# Patient Record
Sex: FEMALE | Race: WHITE | NOT HISPANIC OR LATINO | Employment: OTHER | URBAN - METROPOLITAN AREA
[De-identification: names, ages, dates, MRNs, and addresses within clinical notes are randomized per-mention and may not be internally consistent; named-entity substitution may affect disease eponyms.]

---

## 2017-05-03 ENCOUNTER — HOSPITAL ENCOUNTER (OUTPATIENT)
Dept: BONE DENSITY | Facility: IMAGING CENTER | Age: 77
Discharge: HOME | End: 2017-05-03
Payer: MEDICARE

## 2017-05-03 DIAGNOSIS — M81.0 AGE-RELATED OSTEOPOROSIS WITHOUT CURRENT PATHOLOGICAL FRACTURE: ICD-10-CM

## 2017-05-03 PROCEDURE — 77080 DXA BONE DENSITY AXIAL: CPT

## 2017-05-04 ENCOUNTER — GENERIC CONVERSION - ENCOUNTER (OUTPATIENT)
Dept: OTHER | Facility: OTHER | Age: 77
End: 2017-05-04

## 2017-06-19 ENCOUNTER — ALLSCRIPTS OFFICE VISIT (OUTPATIENT)
Dept: OTHER | Facility: OTHER | Age: 77
End: 2017-06-19

## 2017-06-19 ENCOUNTER — GENERIC CONVERSION - ENCOUNTER (OUTPATIENT)
Dept: OTHER | Facility: OTHER | Age: 77
End: 2017-06-19

## 2017-06-19 ENCOUNTER — TRANSCRIBE ORDERS (OUTPATIENT)
Dept: LAB | Facility: CLINIC | Age: 77
End: 2017-06-19

## 2017-06-19 ENCOUNTER — APPOINTMENT (OUTPATIENT)
Dept: LAB | Facility: CLINIC | Age: 77
End: 2017-06-19
Payer: MEDICARE

## 2017-06-19 DIAGNOSIS — I48.91 ATRIAL FIBRILLATION, UNSPECIFIED TYPE (HCC): ICD-10-CM

## 2017-06-19 DIAGNOSIS — I48.91 ATRIAL FIBRILLATION, UNSPECIFIED TYPE (HCC): Primary | ICD-10-CM

## 2017-06-19 LAB
INR PPP: 1.38 (ref 0.86–1.16)
PROTHROMBIN TIME: 17 SECONDS (ref 12.1–14.4)

## 2017-06-19 PROCEDURE — 36415 COLL VENOUS BLD VENIPUNCTURE: CPT

## 2017-06-19 PROCEDURE — 85610 PROTHROMBIN TIME: CPT

## 2017-07-05 ENCOUNTER — APPOINTMENT (OUTPATIENT)
Dept: LAB | Facility: CLINIC | Age: 77
End: 2017-07-05
Payer: MEDICARE

## 2017-07-05 ENCOUNTER — TRANSCRIBE ORDERS (OUTPATIENT)
Dept: LAB | Facility: CLINIC | Age: 77
End: 2017-07-05

## 2017-07-05 DIAGNOSIS — I48.91 ATRIAL FIBRILLATION, UNSPECIFIED TYPE (HCC): ICD-10-CM

## 2017-07-05 DIAGNOSIS — I48.91 ATRIAL FIBRILLATION, UNSPECIFIED TYPE (HCC): Primary | ICD-10-CM

## 2017-07-05 LAB
INR PPP: 3.63 (ref 0.86–1.16)
PROTHROMBIN TIME: 36.7 SECONDS (ref 12.1–14.4)

## 2017-07-05 PROCEDURE — 85610 PROTHROMBIN TIME: CPT

## 2017-07-05 PROCEDURE — 36415 COLL VENOUS BLD VENIPUNCTURE: CPT

## 2017-07-06 ENCOUNTER — GENERIC CONVERSION - ENCOUNTER (OUTPATIENT)
Dept: OTHER | Facility: OTHER | Age: 77
End: 2017-07-06

## 2017-08-09 ENCOUNTER — APPOINTMENT (OUTPATIENT)
Dept: LAB | Facility: CLINIC | Age: 77
End: 2017-08-09
Payer: MEDICARE

## 2017-08-09 ENCOUNTER — TRANSCRIBE ORDERS (OUTPATIENT)
Dept: LAB | Facility: CLINIC | Age: 77
End: 2017-08-09

## 2017-08-09 DIAGNOSIS — I48.91 ATRIAL FIBRILLATION, UNSPECIFIED TYPE (HCC): ICD-10-CM

## 2017-08-09 DIAGNOSIS — I48.91 ATRIAL FIBRILLATION, UNSPECIFIED TYPE (HCC): Primary | ICD-10-CM

## 2017-08-09 LAB
INR PPP: 3.53 (ref 0.86–1.16)
PROTHROMBIN TIME: 35.9 SECONDS (ref 12.1–14.4)

## 2017-08-09 PROCEDURE — 36415 COLL VENOUS BLD VENIPUNCTURE: CPT

## 2017-08-09 PROCEDURE — 85610 PROTHROMBIN TIME: CPT

## 2017-08-10 ENCOUNTER — GENERIC CONVERSION - ENCOUNTER (OUTPATIENT)
Dept: OTHER | Facility: OTHER | Age: 77
End: 2017-08-10

## 2017-09-29 ENCOUNTER — APPOINTMENT (OUTPATIENT)
Dept: LAB | Facility: CLINIC | Age: 77
End: 2017-09-29
Payer: MEDICARE

## 2017-09-29 ENCOUNTER — TRANSCRIBE ORDERS (OUTPATIENT)
Dept: LAB | Facility: CLINIC | Age: 77
End: 2017-09-29

## 2017-09-29 ENCOUNTER — GENERIC CONVERSION - ENCOUNTER (OUTPATIENT)
Dept: OTHER | Facility: OTHER | Age: 77
End: 2017-09-29

## 2017-09-29 DIAGNOSIS — I48.91 ATRIAL FIBRILLATION, UNSPECIFIED TYPE (HCC): ICD-10-CM

## 2017-09-29 DIAGNOSIS — I48.91 ATRIAL FIBRILLATION, UNSPECIFIED TYPE (HCC): Primary | ICD-10-CM

## 2017-09-29 LAB
INR PPP: 2.78 (ref 0.86–1.16)
PROTHROMBIN TIME: 29.7 SECONDS (ref 12.1–14.4)

## 2017-09-29 PROCEDURE — 36415 COLL VENOUS BLD VENIPUNCTURE: CPT

## 2017-09-29 PROCEDURE — 85610 PROTHROMBIN TIME: CPT

## 2017-10-23 ENCOUNTER — ALLSCRIPTS OFFICE VISIT (OUTPATIENT)
Dept: OTHER | Facility: OTHER | Age: 77
End: 2017-10-23

## 2017-10-23 ENCOUNTER — GENERIC CONVERSION - ENCOUNTER (OUTPATIENT)
Dept: OTHER | Facility: OTHER | Age: 77
End: 2017-10-23

## 2017-11-22 ENCOUNTER — APPOINTMENT (EMERGENCY)
Dept: RADIOLOGY | Facility: HOSPITAL | Age: 77
DRG: 871 | End: 2017-11-22
Payer: MEDICARE

## 2017-11-22 ENCOUNTER — ALLSCRIPTS OFFICE VISIT (OUTPATIENT)
Dept: OTHER | Facility: OTHER | Age: 77
End: 2017-11-22

## 2017-11-22 ENCOUNTER — HOSPITAL ENCOUNTER (INPATIENT)
Facility: HOSPITAL | Age: 77
LOS: 3 days | Discharge: HOME | DRG: 871 | End: 2017-11-25
Attending: INTERNAL MEDICINE | Admitting: INTERNAL MEDICINE
Payer: MEDICARE

## 2017-11-22 DIAGNOSIS — A41.9 SEPSIS (HCC): ICD-10-CM

## 2017-11-22 DIAGNOSIS — J96.01 ACUTE RESPIRATORY FAILURE WITH HYPOXIA (HCC): ICD-10-CM

## 2017-11-22 DIAGNOSIS — J18.9 PNEUMONIA OF RIGHT LOWER LOBE DUE TO INFECTIOUS ORGANISM: Primary | ICD-10-CM

## 2017-11-22 PROBLEM — D68.59 HYPERCOAGULABLE STATE (HCC): Status: ACTIVE | Noted: 2017-11-22

## 2017-11-22 PROBLEM — D68.2 HYPOPROTHROMBINEMIA (HCC): Status: ACTIVE | Noted: 2017-11-22

## 2017-11-22 PROBLEM — J44.9 COPD (CHRONIC OBSTRUCTIVE PULMONARY DISEASE) (HCC): Status: ACTIVE | Noted: 2017-11-22

## 2017-11-22 PROBLEM — I10 HYPERTENSION: Status: ACTIVE | Noted: 2017-11-22

## 2017-11-22 LAB
ALBUMIN SERPL BCP-MCNC: 3.4 G/DL (ref 3.5–5)
ALP SERPL-CCNC: 82 U/L (ref 46–116)
ALT SERPL W P-5'-P-CCNC: 17 U/L (ref 12–78)
ANION GAP SERPL CALCULATED.3IONS-SCNC: 10 MMOL/L (ref 4–13)
APTT PPP: 74 SECONDS (ref 23–35)
AST SERPL W P-5'-P-CCNC: 20 U/L (ref 5–45)
BACTERIA UR QL AUTO: ABNORMAL /HPF
BASOPHILS # BLD AUTO: 0.02 THOUSANDS/ΜL (ref 0–0.1)
BASOPHILS NFR BLD AUTO: 0 % (ref 0–1)
BILIRUB SERPL-MCNC: 1.3 MG/DL (ref 0.2–1)
BILIRUB UR QL STRIP: ABNORMAL
BUN SERPL-MCNC: 18 MG/DL (ref 5–25)
CALCIUM SERPL-MCNC: 8.4 MG/DL (ref 8.3–10.1)
CHLORIDE SERPL-SCNC: 94 MMOL/L (ref 100–108)
CLARITY UR: ABNORMAL
CO2 SERPL-SCNC: 29 MMOL/L (ref 21–32)
COLOR UR: ABNORMAL
CREAT SERPL-MCNC: 0.91 MG/DL (ref 0.6–1.3)
EOSINOPHIL # BLD AUTO: 0.01 THOUSAND/ΜL (ref 0–0.61)
EOSINOPHIL NFR BLD AUTO: 0 % (ref 0–6)
ERYTHROCYTE [DISTWIDTH] IN BLOOD BY AUTOMATED COUNT: 13.4 % (ref 11.6–15.1)
FLUAV AG SPEC QL IA: NEGATIVE
FLUAV AG SPEC QL: NORMAL
FLUBV AG SPEC QL IA: NEGATIVE
FLUBV AG SPEC QL: NORMAL
GFR SERPL CREATININE-BSD FRML MDRD: 61 ML/MIN/1.73SQ M
GLUCOSE SERPL-MCNC: 105 MG/DL (ref 65–140)
GLUCOSE UR STRIP-MCNC: NEGATIVE MG/DL
HCT VFR BLD AUTO: 41.7 % (ref 34.8–46.1)
HGB BLD-MCNC: 13.5 G/DL (ref 11.5–15.4)
HGB UR QL STRIP.AUTO: ABNORMAL
HYALINE CASTS #/AREA URNS LPF: ABNORMAL /LPF
INR PPP: 5.17 (ref 0.86–1.16)
KETONES UR STRIP-MCNC: ABNORMAL MG/DL
LACTATE SERPL-SCNC: 1.8 MMOL/L (ref 0.5–2)
LEUKOCYTE ESTERASE UR QL STRIP: NEGATIVE
LYMPHOCYTES # BLD AUTO: 0.87 THOUSANDS/ΜL (ref 0.6–4.47)
LYMPHOCYTES NFR BLD AUTO: 6 % (ref 14–44)
MAGNESIUM SERPL-MCNC: 1.8 MG/DL (ref 1.6–2.6)
MCH RBC QN AUTO: 30.3 PG (ref 26.8–34.3)
MCHC RBC AUTO-ENTMCNC: 32.4 G/DL (ref 31.4–37.4)
MCV RBC AUTO: 94 FL (ref 82–98)
MONOCYTES # BLD AUTO: 1.05 THOUSAND/ΜL (ref 0.17–1.22)
MONOCYTES NFR BLD AUTO: 7 % (ref 4–12)
NEUTROPHILS # BLD AUTO: 12.92 THOUSANDS/ΜL (ref 1.85–7.62)
NEUTS SEG NFR BLD AUTO: 87 % (ref 43–75)
NITRITE UR QL STRIP: NEGATIVE
NON-SQ EPI CELLS URNS QL MICRO: ABNORMAL /HPF
NT-PROBNP SERPL-MCNC: 531 PG/ML
PH UR STRIP.AUTO: 6 [PH] (ref 4.5–8)
PLATELET # BLD AUTO: 264 THOUSANDS/UL (ref 149–390)
PMV BLD AUTO: 9.4 FL (ref 8.9–12.7)
POTASSIUM SERPL-SCNC: 4.3 MMOL/L (ref 3.5–5.3)
PROT SERPL-MCNC: 8 G/DL (ref 6.4–8.2)
PROT UR STRIP-MCNC: ABNORMAL MG/DL
PROTHROMBIN TIME: 47.9 SECONDS (ref 12.1–14.4)
RBC # BLD AUTO: 4.45 MILLION/UL (ref 3.81–5.12)
RBC #/AREA URNS AUTO: ABNORMAL /HPF
RSV B RNA SPEC QL NAA+PROBE: NORMAL
S PNEUM AG UR QL: NEGATIVE
SODIUM SERPL-SCNC: 133 MMOL/L (ref 136–145)
SP GR UR STRIP.AUTO: >=1.03 (ref 1–1.03)
TROPONIN I SERPL-MCNC: <0.02 NG/ML
UROBILINOGEN UR QL STRIP.AUTO: 1 E.U./DL
WBC # BLD AUTO: 14.87 THOUSAND/UL (ref 4.31–10.16)
WBC #/AREA URNS AUTO: ABNORMAL /HPF

## 2017-11-22 PROCEDURE — 85610 PROTHROMBIN TIME: CPT | Performed by: NURSE PRACTITIONER

## 2017-11-22 PROCEDURE — 87185 SC STD ENZYME DETCJ PER NZM: CPT | Performed by: NURSE PRACTITIONER

## 2017-11-22 PROCEDURE — 80053 COMPREHEN METABOLIC PANEL: CPT | Performed by: NURSE PRACTITIONER

## 2017-11-22 PROCEDURE — 71010 HB CHEST X-RAY 1 VIEW FRONTAL: CPT | Performed by: INTERNAL MEDICINE

## 2017-11-22 PROCEDURE — 94640 AIRWAY INHALATION TREATMENT: CPT

## 2017-11-22 PROCEDURE — 87184 SC STD DISK METHOD PER PLATE: CPT | Performed by: NURSE PRACTITIONER

## 2017-11-22 PROCEDURE — 93005 ELECTROCARDIOGRAM TRACING: CPT | Performed by: NURSE PRACTITIONER

## 2017-11-22 PROCEDURE — 96365 THER/PROPH/DIAG IV INF INIT: CPT

## 2017-11-22 PROCEDURE — 99285 EMERGENCY DEPT VISIT HI MDM: CPT

## 2017-11-22 PROCEDURE — 83735 ASSAY OF MAGNESIUM: CPT | Performed by: NURSE PRACTITIONER

## 2017-11-22 PROCEDURE — 85730 THROMBOPLASTIN TIME PARTIAL: CPT | Performed by: NURSE PRACTITIONER

## 2017-11-22 PROCEDURE — 85025 COMPLETE CBC W/AUTO DIFF WBC: CPT | Performed by: NURSE PRACTITIONER

## 2017-11-22 PROCEDURE — 83605 ASSAY OF LACTIC ACID: CPT | Performed by: NURSE PRACTITIONER

## 2017-11-22 PROCEDURE — 36415 COLL VENOUS BLD VENIPUNCTURE: CPT | Performed by: NURSE PRACTITIONER

## 2017-11-22 PROCEDURE — 87449 NOS EACH ORGANISM AG IA: CPT | Performed by: INTERNAL MEDICINE

## 2017-11-22 PROCEDURE — 87077 CULTURE AEROBIC IDENTIFY: CPT | Performed by: NURSE PRACTITIONER

## 2017-11-22 PROCEDURE — 87798 DETECT AGENT NOS DNA AMP: CPT | Performed by: NURSE PRACTITIONER

## 2017-11-22 PROCEDURE — 83880 ASSAY OF NATRIURETIC PEPTIDE: CPT | Performed by: NURSE PRACTITIONER

## 2017-11-22 PROCEDURE — 81001 URINALYSIS AUTO W/SCOPE: CPT | Performed by: NURSE PRACTITIONER

## 2017-11-22 PROCEDURE — 94760 N-INVAS EAR/PLS OXIMETRY 1: CPT

## 2017-11-22 PROCEDURE — 84484 ASSAY OF TROPONIN QUANT: CPT | Performed by: NURSE PRACTITIONER

## 2017-11-22 PROCEDURE — 87400 INFLUENZA A/B EACH AG IA: CPT | Performed by: NURSE PRACTITIONER

## 2017-11-22 PROCEDURE — 87040 BLOOD CULTURE FOR BACTERIA: CPT | Performed by: NURSE PRACTITIONER

## 2017-11-22 RX ORDER — LEVOFLOXACIN 500 MG/1
500 TABLET, FILM COATED ORAL EVERY 24 HOURS
Status: DISCONTINUED | OUTPATIENT
Start: 2017-11-23 | End: 2017-11-25 | Stop reason: HOSPADM

## 2017-11-22 RX ORDER — METHYLPREDNISOLONE SODIUM SUCCINATE 40 MG/ML
20 INJECTION, POWDER, LYOPHILIZED, FOR SOLUTION INTRAMUSCULAR; INTRAVENOUS EVERY 8 HOURS SCHEDULED
Status: DISCONTINUED | OUTPATIENT
Start: 2017-11-22 | End: 2017-11-24

## 2017-11-22 RX ORDER — WARFARIN SODIUM 5 MG/1
TABLET ORAL
COMMUNITY
End: 2018-03-27 | Stop reason: SDUPTHER

## 2017-11-22 RX ORDER — ONDANSETRON 2 MG/ML
4 INJECTION INTRAMUSCULAR; INTRAVENOUS ONCE
Status: DISCONTINUED | OUTPATIENT
Start: 2017-11-22 | End: 2017-11-25 | Stop reason: HOSPADM

## 2017-11-22 RX ORDER — SODIUM CHLORIDE 9 MG/ML
125 INJECTION, SOLUTION INTRAVENOUS CONTINUOUS
Status: DISCONTINUED | OUTPATIENT
Start: 2017-11-22 | End: 2017-11-22

## 2017-11-22 RX ORDER — DILTIAZEM HYDROCHLORIDE 180 MG/1
180 CAPSULE, COATED, EXTENDED RELEASE ORAL DAILY
COMMUNITY
End: 2017-11-25 | Stop reason: HOSPADM

## 2017-11-22 RX ORDER — ACETAMINOPHEN 325 MG/1
650 TABLET ORAL ONCE
Status: COMPLETED | OUTPATIENT
Start: 2017-11-22 | End: 2017-11-22

## 2017-11-22 RX ORDER — DIGOXIN 125 MCG
125 TABLET ORAL DAILY
COMMUNITY
End: 2018-12-27

## 2017-11-22 RX ORDER — ZOLPIDEM TARTRATE 5 MG/1
5 TABLET ORAL
Status: DISCONTINUED | OUTPATIENT
Start: 2017-11-22 | End: 2017-11-25 | Stop reason: HOSPADM

## 2017-11-22 RX ORDER — SODIUM CHLORIDE 9 MG/ML
125 INJECTION, SOLUTION INTRAVENOUS CONTINUOUS
Status: DISCONTINUED | OUTPATIENT
Start: 2017-11-22 | End: 2017-11-23

## 2017-11-22 RX ORDER — DIPHENOXYLATE HYDROCHLORIDE AND ATROPINE SULFATE 2.5; .025 MG/1; MG/1
1 TABLET ORAL DAILY
COMMUNITY

## 2017-11-22 RX ORDER — DIGOXIN 125 MCG
125 TABLET ORAL DAILY
Status: DISCONTINUED | OUTPATIENT
Start: 2017-11-22 | End: 2017-11-25 | Stop reason: HOSPADM

## 2017-11-22 RX ORDER — ONDANSETRON 2 MG/ML
4 INJECTION INTRAMUSCULAR; INTRAVENOUS ONCE AS NEEDED
Status: DISCONTINUED | OUTPATIENT
Start: 2017-11-22 | End: 2017-11-25 | Stop reason: HOSPADM

## 2017-11-22 RX ORDER — MAGNESIUM HYDROXIDE/ALUMINUM HYDROXICE/SIMETHICONE 120; 1200; 1200 MG/30ML; MG/30ML; MG/30ML
30 SUSPENSION ORAL EVERY 6 HOURS PRN
Status: DISCONTINUED | OUTPATIENT
Start: 2017-11-22 | End: 2017-11-25 | Stop reason: HOSPADM

## 2017-11-22 RX ORDER — DILTIAZEM HYDROCHLORIDE 180 MG/1
180 CAPSULE, COATED, EXTENDED RELEASE ORAL DAILY
Status: DISCONTINUED | OUTPATIENT
Start: 2017-11-22 | End: 2017-11-24

## 2017-11-22 RX ORDER — PANTOPRAZOLE SODIUM 40 MG/1
40 TABLET, DELAYED RELEASE ORAL
Status: DISCONTINUED | OUTPATIENT
Start: 2017-11-23 | End: 2017-11-25 | Stop reason: HOSPADM

## 2017-11-22 RX ORDER — ALBUTEROL SULFATE 2.5 MG/3ML
2.5 SOLUTION RESPIRATORY (INHALATION) EVERY 6 HOURS PRN
COMMUNITY
End: 2019-03-06 | Stop reason: SDUPTHER

## 2017-11-22 RX ORDER — DOCUSATE SODIUM 100 MG/1
100 CAPSULE, LIQUID FILLED ORAL 2 TIMES DAILY
Status: DISCONTINUED | OUTPATIENT
Start: 2017-11-22 | End: 2017-11-25 | Stop reason: HOSPADM

## 2017-11-22 RX ORDER — PHYTONADIONE 10 MG/ML
2.5 INJECTION, EMULSION INTRAMUSCULAR; INTRAVENOUS; SUBCUTANEOUS ONCE
Status: COMPLETED | OUTPATIENT
Start: 2017-11-22 | End: 2017-11-22

## 2017-11-22 RX ORDER — GUAIFENESIN/DEXTROMETHORPHAN 100-10MG/5
10 SYRUP ORAL EVERY 4 HOURS PRN
Status: DISCONTINUED | OUTPATIENT
Start: 2017-11-22 | End: 2017-11-25 | Stop reason: HOSPADM

## 2017-11-22 RX ORDER — LEVOFLOXACIN 5 MG/ML
750 INJECTION, SOLUTION INTRAVENOUS ONCE
Status: COMPLETED | OUTPATIENT
Start: 2017-11-22 | End: 2017-11-22

## 2017-11-22 RX ORDER — ACETAMINOPHEN 325 MG/1
650 TABLET ORAL EVERY 6 HOURS PRN
Status: DISCONTINUED | OUTPATIENT
Start: 2017-11-22 | End: 2017-11-25 | Stop reason: HOSPADM

## 2017-11-22 RX ORDER — IPRATROPIUM BROMIDE AND ALBUTEROL SULFATE 2.5; .5 MG/3ML; MG/3ML
3 SOLUTION RESPIRATORY (INHALATION) ONCE
Status: COMPLETED | OUTPATIENT
Start: 2017-11-22 | End: 2017-11-22

## 2017-11-22 RX ADMIN — LEVOFLOXACIN 750 MG: 5 INJECTION, SOLUTION INTRAVENOUS at 12:35

## 2017-11-22 RX ADMIN — IPRATROPIUM BROMIDE AND ALBUTEROL SULFATE 3 ML: 2.5; .5 SOLUTION RESPIRATORY (INHALATION) at 12:33

## 2017-11-22 RX ADMIN — FLUTICASONE PROPIONATE AND SALMETEROL 1 PUFF: 50; 250 POWDER RESPIRATORY (INHALATION) at 20:24

## 2017-11-22 RX ADMIN — METHYLPREDNISOLONE SODIUM SUCCINATE 20 MG: 40 INJECTION, POWDER, FOR SOLUTION INTRAMUSCULAR; INTRAVENOUS at 21:36

## 2017-11-22 RX ADMIN — SODIUM CHLORIDE 125 ML/HR: 0.9 INJECTION, SOLUTION INTRAVENOUS at 15:15

## 2017-11-22 RX ADMIN — DIGOXIN 125 MCG: 125 TABLET ORAL at 16:32

## 2017-11-22 RX ADMIN — PHYTONADIONE 2.5 MG: 10 INJECTION, EMULSION INTRAMUSCULAR; INTRAVENOUS; SUBCUTANEOUS at 13:33

## 2017-11-22 RX ADMIN — SODIUM CHLORIDE 1000 ML: 0.9 INJECTION, SOLUTION INTRAVENOUS at 12:10

## 2017-11-22 RX ADMIN — SODIUM CHLORIDE 125 ML/HR: 0.9 INJECTION, SOLUTION INTRAVENOUS at 21:41

## 2017-11-22 RX ADMIN — ACETAMINOPHEN 650 MG: 325 TABLET ORAL at 12:14

## 2017-11-22 RX ADMIN — METHYLPREDNISOLONE SODIUM SUCCINATE 20 MG: 40 INJECTION, POWDER, FOR SOLUTION INTRAMUSCULAR; INTRAVENOUS at 16:32

## 2017-11-22 RX ADMIN — NICOTINE 1 PATCH: 7 PATCH, EXTENDED RELEASE TRANSDERMAL at 16:33

## 2017-11-22 RX ADMIN — DILTIAZEM HYDROCHLORIDE 180 MG: 180 CAPSULE, COATED, EXTENDED RELEASE ORAL at 16:32

## 2017-11-22 NOTE — ED PROVIDER NOTES
History  Chief Complaint   Patient presents with    Cough     Presents to ED with fever, cough, congestion since Monday  Was seen by PCP and referred to ED  Denies any N/V/D   Fever - 75 years or older      patient is a 11-year-old female who presents to the emergency room with a past medical history significant for Antiphospholipid antibody positive,Asthma, Atrial fibrillation, Chronic obstructive pulmonary disease, Hereditary hemolytic anemia cardiac disease, hypertension, with a chief complaint of shortness of breath  She was sent here by her family doctor when her O2 sat was noted to be in the 80s and remained in the 80s after breathing treatment  She states that she has some shortness of breath and a productive cough  She has chest pain associated with her coughing  She states that she had a fever and took to a Tylenol yesterday but has not taken any today  History provided by:  Patient   used: No    Cough   Cough characteristics:  Productive  Sputum characteristics:  Nondescript  Severity:  Moderate  Onset quality:  Gradual  Timing:  Constant  Chronicity:  New  Smoker: yes    Relieved by:  Nothing  Worsened by: Activity  Ineffective treatments:  Home nebulizer  Associated symptoms: fever, rhinorrhea and shortness of breath    Fever - 75 years or older   Associated symptoms: cough and rhinorrhea        Prior to Admission Medications   Prescriptions Last Dose Informant Patient Reported? Taking?    albuterol (2 5 mg/3 mL) 0 083 % nebulizer solution  Self Yes Yes   Sig: Take 2 5 mg by nebulization every 6 (six) hours as needed for wheezing   denosumab (PROLIA) 60 mg/mL  Self Yes Yes   Sig: Inject 60 mg under the skin once 6 months   digoxin (LANOXIN) 0 125 mg tablet  Self Yes Yes   Sig: Take 125 mcg by mouth daily   diltiazem (CARTIA XT) 180 mg 24 hr capsule  Self Yes Yes   Sig: Take 180 mg by mouth daily   fluticasone-salmeterol (ADVAIR) 250-50 mcg/dose inhaler  Self Yes Yes   Sig: Inhale 1 puff every 12 (twelve) hours   multivitamin (THERAGRAN) TABS  Self Yes Yes   Sig: Take 1 tablet by mouth daily   tiotropium (SPIRIVA) 18 mcg inhalation capsule  Self Yes Yes   Sig: Place 18 mcg into inhaler and inhale daily   warfarin (COUMADIN) 5 mg tablet  Self Yes Yes   Sig: Take by mouth daily      Facility-Administered Medications: None       Past Medical History:   Diagnosis Date    Cardiac disease     Chronic pain     COPD (chronic obstructive pulmonary disease) (HCC)     Hyperlipidemia     Hypertension        History reviewed  No pertinent surgical history  History reviewed  No pertinent family history  I have reviewed and agree with the history as documented  Social History   Substance Use Topics    Smoking status: Current Some Day Smoker     Types: Cigarettes    Smokeless tobacco: Never Used    Alcohol use No        Review of Systems   Constitutional: Positive for fatigue and fever  HENT: Positive for rhinorrhea  Respiratory: Positive for cough, chest tightness and shortness of breath  Physical Exam  ED Triage Vitals [11/22/17 1158]   Temperature Pulse Respirations Blood Pressure SpO2   (!) 101 6 °F (38 7 °C) (!) 112 20 136/60 (!) 77 %      Temp Source Heart Rate Source Patient Position - Orthostatic VS BP Location FiO2 (%)   Temporal Monitor Sitting Right arm --      Pain Score       5           Orthostatic Vital Signs  Vitals:    11/22/17 1315 11/22/17 1330 11/22/17 1345 11/22/17 1400   BP: 116/56 109/53 125/63 110/54   Pulse: 97 97 96 93   Patient Position - Orthostatic VS: Lying Lying Lying Lying       Physical Exam   Constitutional: She is oriented to person, place, and time  She appears well-developed and well-nourished  HENT:   Head: Normocephalic and atraumatic  Nose: Rhinorrhea present  Eyes: EOM are normal  Pupils are equal, round, and reactive to light  No scleral icterus  Neck: Normal range of motion  Neck supple  No thyromegaly present  Cardiovascular: Regular rhythm, normal heart sounds and intact distal pulses  Tachycardia present  Exam reveals no gallop and no friction rub  No murmur heard  Pulmonary/Chest: She has decreased breath sounds in the right lower field and the left lower field  She has rhonchi in the right middle field, the right lower field, the left middle field and the left lower field  Abdominal: Soft  Bowel sounds are normal    Musculoskeletal: Normal range of motion  She exhibits no edema or tenderness  Neurological: She is alert and oriented to person, place, and time  Skin: Skin is warm and dry  Nursing note and vitals reviewed  ED Medications  Medications   acetaminophen (TYLENOL) tablet 650 mg (650 mg Oral Given 11/22/17 1214)   sodium chloride 0 9 % bolus 1,000 mL (0 mL Intravenous Stopped 11/22/17 1310)   levofloxacin (LEVAQUIN) IVPB (premix) 750 mg (0 mg Intravenous Stopped 11/22/17 1405)   ipratropium-albuterol (DUO-NEB) 0 5-2 5 mg/mL inhalation solution 3 mL (3 mL Nebulization Given 11/22/17 1233)   phytonadione (AQUA-MEPHYTON) 10 mg/mL SC/IM injection 2 5 mg (2 5 mg Subcutaneous Given 11/22/17 1333)       Diagnostic Studies  Results Reviewed     Procedure Component Value Units Date/Time    Blood culture #1 [[id number]] Collected:  11/22/17 1220    Lab Status: In process Specimen:  Blood from Arm, Left Updated:  11/22/17 1349    B-type natriuretic peptide [[id number]]  (Abnormal) Collected:  11/22/17 1207    Lab Status:  Final result Specimen:  Blood from Arm, Right Updated:  11/22/17 1319     NT-proBNP 531 (H) pg/mL     APTT [[id number]]  (Abnormal) Collected:  11/22/17 1207    Lab Status:  Final result Specimen:  Blood Updated:  11/22/17 1249     PTT 74 (H) seconds     Narrative:          Therapeutic Heparin Range = 60-90 seconds  Therapeutic Heparin Range = 60-90 seconds    Protime-INR [[id number]]  (Abnormal) Collected:  11/22/17 1207    Lab Status:  Final result Specimen:  Blood Updated:  11/22/17 1249 Protime 47 9 (H) seconds      INR 5 17 (HH)    Lactic acid, plasma [[id number]]  (Normal) Collected:  11/22/17 1207    Lab Status:  Final result Specimen:  Blood from Arm, Right Updated:  11/22/17 1247     LACTIC ACID 1 8 mmol/L     Narrative:         Result may be elevated if tourniquet was used during collection  Rapid Influenza Screen with Reflex PCR (indicated for patients <2 mo of age) [[id number]]  (Normal) Collected:  11/22/17 1207    Lab Status:  Final result Specimen:  Nasopharyngeal from Nasopharyngeal Swab Updated:  11/22/17 1245     Rapid Influenza A Ag Negative     Rapid Influenza B Ag Negative    Influenza A/B and RSV by PCR (Indicated for patients > 2 mo of age) [[id number]] Collected:  11/22/17 1207    Lab Status: In process Specimen:  Nasopharyngeal from Nasopharyngeal Swab Updated:  11/22/17 1245    Troponin I [[id number]]  (Normal) Collected:  11/22/17 1207    Lab Status:  Final result Specimen:  Blood from Arm, Right Updated:  11/22/17 1243     Troponin I <0 02 ng/mL     Narrative:         [employer] Chemistry analyzer 99% cutoff is > 0 04 ng/mL in network labs    o cTnI 99% cutoff is useful only when applied to patients in the clinical setting of myocardial ischemia  o cTnI 99% cutoff should be interpreted in the context of clinical history, ECG findings and possibly cardiac imaging to establish correct diagnosis  o cTnI 99% cutoff may be suggestive but clearly not indicative of a coronary event without the clinical setting of myocardial ischemia      Comprehensive metabolic panel [[id number]]  (Abnormal) Collected:  11/22/17 1207    Lab Status:  Final result Specimen:  Blood from Arm, Right Updated:  11/22/17 1242     Sodium 133 (L) mmol/L      Potassium 4 3 mmol/L      Chloride 94 (L) mmol/L      CO2 29 mmol/L      Anion Gap 10 mmol/L      BUN 18 mg/dL      Creatinine 0 91 mg/dL      Glucose 105 mg/dL      Calcium 8 4 mg/dL      AST 20 U/L      ALT 17 U/L      Alkaline Phosphatase 82 U/L      Total Protein 8 0 g/dL      Albumin 3 4 (L) g/dL      Total Bilirubin 1 30 (H) mg/dL      eGFR 61 ml/min/1 73sq m     Narrative:         National Kidney Disease Education Program recommendations are as follows:  GFR calculation is accurate only with a steady state creatinine  Chronic Kidney disease less than 60 ml/min/1 73 sq  meters  Kidney failure less than 15 ml/min/1 73 sq  meters  Magnesium [[id number]]  (Normal) Collected:  11/22/17 1207    Lab Status:  Final result Specimen:  Blood from Arm, Right Updated:  11/22/17 1242     Magnesium 1 8 mg/dL     CBC and differential [[id number]]  (Abnormal) Collected:  11/22/17 1207    Lab Status:  Final result Specimen:  Blood from Arm, Right Updated:  11/22/17 1228     WBC 14 87 (H) Thousand/uL      RBC 4 45 Million/uL      Hemoglobin 13 5 g/dL      Hematocrit 41 7 %      MCV 94 fL      MCH 30 3 pg      MCHC 32 4 g/dL      RDW 13 4 %      MPV 9 4 fL      Platelets 588 Thousands/uL      Neutrophils Relative 87 (H) %      Lymphocytes Relative 6 (L) %      Monocytes Relative 7 %      Eosinophils Relative 0 %      Basophils Relative 0 %      Neutrophils Absolute 12 92 (H) Thousands/µL      Lymphocytes Absolute 0 87 Thousands/µL      Monocytes Absolute 1 05 Thousand/µL      Eosinophils Absolute 0 01 Thousand/µL      Basophils Absolute 0 02 Thousands/µL     Blood culture #2 [[id number]] Collected:  11/22/17 1207    Lab Status: In process Specimen:  Blood from Arm, Right Updated:  11/22/17 1220    UA w Reflex to Microscopic w Reflex to Culture [[id number]]     Lab Status:  No result Specimen:  Urine                  XR chest portable   Final Result by Larry Esparza MD (11/22 1246)      Right-sided pulmonary infiltrate with small right effusion  Findings concerning for pneumonia    Recommend continued follow-up         Workstation performed: [id number]                    Procedures  Procedures       Phone Contacts  ED Phone Contact    ED Course  ED Course as of Nov 22 1424 Wed Nov 22, 2017   1415 Blood Pressure: 110/54   1415 Temperature: 97 9 °F (36 6 °C)   1415 Blood Pressure: 136/60   1415 Temperature: (!) 101 6 °F (38 7 °C)   1415 Pulse: (!) 112   1415 SpO2: (!) 77 %   1415 Blood Pressure: 109/53   1415 Pulse: 97   1415 SpO2: 95 %   1415 Blood Pressure: 110/54   1415 Temperature: 97 9 °F (36 6 °C)   1415 Pulse: 93   1415 Respirations: 20   1415 SpO2: 96 %   1415 LACTIC ACID: 1 8   1416 WBC: (!) 14 87   1416 Hemoglobin: 13 5   1416 Hematocrit: 41 7   1416 Platelets: 427   6545 Sodium: (!) 133   1416 Potassium: 4 3   1416 eGFR: 61   1416 Rapid Influenza A Ag: Negative   1416 Rapid Influenza B Ag: Negative   1416 Protime: (!) 47 9   1416 INR: (!!) 5 17   1416 NT-proBNP: (!) 531                         Initial Sepsis Screening     Row Name 11/22/17 1418                Is the patient's history suggestive of a new or worsening infection? (!)  Yes (Proceed)  -AM        Suspected source of infection pneumonia  -AM        Are two or more of the following signs & symptoms of infection both present and new to the patient?         Indicate SIRS criteria Hyperthemia > 38 3C (100 9F); Tachycardia > 90 bpm;Leukocytosis (WBC > 51044 IJL); Tachypnea > 20 resp per min  -AM        If the answer is yes to both questions, suspicion of sepsis is present          If severe sepsis is present AND tissue hypoperfusion perists in the hour after fluid resuscitation or lactate > 4, the patient meets criteria for SEPTIC SHOCK          Are any of the following organ dysfunction criteria present within 6 hours of suspected infection and SIRS criteria that are NOT considered to be chronic conditions?  (!)  Yes  -AM        Organ dysfunction INR > 1 5 or aPTT > 60 secs  -AM        Date of presentation of severe sepsis          Time of presentation of severe sepsis          Tissue hypoperfusion persists in the hour after crystalloid fluid administration, evidenced, by either:          Was hypotension present within one hour of the conclusion of crystalloid fluid administration?           Date of presentation of septic shock          Time of presentation of septic shock            User Key  (r) = Recorded By, (t) = Taken By, (c) = Cosigned By    Initials Name Provider Type    AM Porsche Pandya Nurse Practitioner                  MDM  Number of Diagnoses or Management Options  Acute respiratory failure with hypoxia [facility]): new and requires workup  Pneumonia of right lower lobe due to infectious organism [facility]): new and requires workup  Sepsis [facility]): new and requires workup  Diagnosis management comments: Pt with new onset resp failure with hypoxia, cough, fever, SIRS with sepsis, not severe sepsis  RML, RLL pna  IV abx, fluid  Cont to need oxygen   Admit to med/surg         Amount and/or Complexity of Data Reviewed  Clinical lab tests: ordered and reviewed  Tests in the radiology section of CPT®: ordered and reviewed  Tests in the medicine section of CPT®: ordered and reviewed  Decide to obtain previous medical records or to obtain history from someone other than the patient: yes  Review and summarize past medical records: yes  Discuss the patient with other providers: yes  Independent visualization of images, tracings, or specimens: yes    Patient Progress  Patient progress: improved    CritCare Time    Disposition  Final diagnoses:   Pneumonia of right lower lobe due to infectious organism [facility])   Acute respiratory failure with hypoxia ([facility] [address] )   [facility]eps[facility]     Time reflects when diagnosis was documented in both MDM as applicable and the Disposition within this note     Time User Action Codes Description Comment    11/22/2017  2:09 PM Milus Gelineau Add [J18 1] Pneumonia of right lower lobe due to infectious organism ([facility] [address] )     11/22/2017  2:09 PM Milus Gelineau Modify [J18 1] Pneumonia of right lower lobe due to infectious organism ([facility] [address] )     11/22/2017  2:10 PM Esther Montesinter Add [J16 0] Pneumonia of right middle lobe due to Chlamydia species     11/22/2017  2:10 PM Carole Xiao Remove [J16 0] Pneumonia of right middle lobe due to Chlamydia species     11/22/2017  2:10 PM Carole Xiao Add [J96 01] Acute respiratory failure with hypoxia ([address] )     11/22/2017  2:10 PM Carole Xiao Add [A41 9] Sepsis [facility])       ED Disposition     ED Disposition Condition Comment    Admit  Case was discussed with Juan Arriaga and the patient's admission status was agreed to be Admission Status: inpatient status to the service of Dr Juan Arriaga   Follow-up Information    None       Current Discharge Medication List      CONTINUE these medications which have NOT CHANGED    Details   albuterol (2 5 mg/3 mL) 0 083 % nebulizer solution Take 2 5 mg by nebulization every 6 (six) hours as needed for wheezing      denosumab (PROLIA) 60 mg/mL Inject 60 mg under the skin once 6 months      digoxin (LANOXIN) 0 125 mg tablet Take 125 mcg by mouth daily      diltiazem (CARTIA XT) 180 mg 24 hr capsule Take 180 mg by mouth daily      fluticasone-salmeterol (ADVAIR) 250-50 mcg/dose inhaler Inhale 1 puff every 12 (twelve) hours      multivitamin (THERAGRAN) TABS Take 1 tablet by mouth daily      tiotropium (SPIRIVA) 18 mcg inhalation capsule Place 18 mcg into inhaler and inhale daily      warfarin (COUMADIN) 5 mg tablet Take by mouth daily           No discharge procedures on file      ED Provider  Electronically Signed by           YUE Bell  11/22/17 3597 YUE Boyle  11/22/17 5053

## 2017-11-22 NOTE — ED PROVIDER NOTES
History  Chief Complaint   Patient presents with    Cough     Presents to ED with fever, cough, congestion since Monday  Was seen by PCP and referred to ED  Denies any N/V/D   Fever - 76 years or older     Pt is a 65year old female who presents to the emergency room with a significant past medical history of COPD, cardiac disease, and hypertension with a chief complaint of shortness of breath  She was sent here by her family doctor when her O2 sat was noted to be in the [de-identified] and remained in the 80s after a breathing treatment  She states that she has some shortness of breath, and a productive cough  She has chest pain associated with her coughing  She states that she has had a fever, and took a Tylenol yesterday but has not taken any today  History provided by:  Patient and relative   used: No    Cough   Cough characteristics:  Productive  Severity:  Severe  Progression:  Worsening  Chronicity:  New  Smoker: yes    Relieved by: Given 1 neb treatment at PCP with minimal improvement  Worsened by: Activity  Ineffective treatments:  Home nebulizer  Associated symptoms: fever, rhinorrhea, shortness of breath and sinus congestion    Fever:     Max temp PTA:  101    Temp source:  Oral  Rhinorrhea:     Quality:  Clear    Severity:  Moderate    Timing:  Constant    Progression:  Worsening  Shortness of breath:     Severity:  Severe    Onset quality:  Gradual    Timing:  Constant    Progression:  Worsening  Fever - 75 years or older   Associated symptoms: cough and rhinorrhea        None       Past Medical History:   Diagnosis Date    Cardiac disease     Chronic pain     COPD (chronic obstructive pulmonary disease) (HCC)     Hyperlipidemia     Hypertension        History reviewed  No pertinent surgical history  History reviewed  No pertinent family history  I have reviewed and agree with the history as documented      Social History   Substance Use Topics    Smoking status: Current Some Day Smoker     Types: Cigarettes    Smokeless tobacco: Never Used    Alcohol use No        Review of Systems   Constitutional: Positive for fever  HENT: Positive for rhinorrhea  Respiratory: Positive for cough, chest tightness and shortness of breath  All other systems reviewed and are negative  Physical Exam  ED Triage Vitals [11/22/17 1158]   Temperature Pulse Respirations Blood Pressure SpO2   (!) 101 6 °F (38 7 °C) (!) 112 20 136/60 (!) 77 %      Temp Source Heart Rate Source Patient Position - Orthostatic VS BP Location FiO2 (%)   Temporal Monitor Sitting Right arm --      Pain Score       5           Orthostatic Vital Signs  Vitals:    11/22/17 1158   BP: 136/60   Pulse: (!) 112   Patient Position - Orthostatic VS: Sitting       Physical Exam   Constitutional: She is oriented to person, place, and time  She appears well-developed and well-nourished  HENT:   Head: Normocephalic and atraumatic  Nose: Rhinorrhea present  Eyes: EOM are normal  Pupils are equal, round, and reactive to light  No scleral icterus  Neck: Normal range of motion  Neck supple  No thyromegaly present  Cardiovascular: Regular rhythm, normal heart sounds and intact distal pulses  Tachycardia present  Exam reveals no gallop and no friction rub  No murmur heard  Pulmonary/Chest: Tachypnea noted  She is in respiratory distress  She has decreased breath sounds in the right lower field and the left lower field  She has rhonchi in the right upper field, the right middle field, the left upper field and the left middle field  Abdominal: Soft  Bowel sounds are normal    Musculoskeletal: Normal range of motion  She exhibits no edema or tenderness  Neurological: She is alert and oriented to person, place, and time  Skin: Skin is warm and dry  Nursing note and vitals reviewed        ED Medications  Medications   acetaminophen (TYLENOL) tablet 650 mg (not administered)   sodium chloride 0 9 % bolus 1,000 mL (not administered)       Diagnostic Studies  Results Reviewed     Procedure Component Value Units Date/Time    Rapid Influenza Screen with Reflex PCR (indicated for patients <2 mo of age) [[id number]]     Lab Status:  No result Specimen:  Nasopharyngeal     CBC and differential [[id number]]     Lab Status:  No result Specimen:  Blood     Blood culture #1 [[id number]]     Lab Status:  No result Specimen:  Blood     Blood culture #2 [[id number]]     Lab Status:  No result Specimen:  Blood     UA w Reflex to Microscopic w Reflex to Culture [[id number]]     Lab Status:  No result Specimen:  Urine     Comprehensive metabolic panel [[id number]]     Lab Status:  No result Specimen:  Blood     Lactic acid, plasma [[id number]]     Lab Status:  No result Specimen:  Blood     Magnesium [[id number]]     Lab Status:  No result Specimen:  Blood     Troponin I [[id number]]     Lab Status:  No result Specimen:  Blood     B-type natriuretic peptide [[id number]]     Lab Status:  No result Specimen:  Blood                  XR chest portable    (Results Pending)              Procedures  ECG 12 Lead Documentation  Date/Time: 11/22/2017 12:19 PM  Performed by: Kleber Dominguez by: Gaurav Varma     ECG reviewed by me, the ED Provider: yes    Patient location:  ED  Previous ECG:     Previous ECG:  Compared to current    Comparison ECG info:  A fib with rvr    Similarity:  Changes noted  Interpretation:     Interpretation: abnormal    Rate:     ECG rate:  108    ECG rate assessment: tachycardic    Rhythm:     Rhythm: sinus tachycardia    Ectopy:     Ectopy: PAC    QRS:     QRS axis:  Normal  ST segments:     ST segments:  Normal  T waves:     T waves: normal             Phone Contacts  ED Phone Contact    ED Course  ED Course                                [facility]  CritCare Time    Disposition  Final diagnoses:   None     ED Disposition     None      Follow-up Information    None       Patient's Medications    No medications on file     No discharge procedures on file      ED Provider  Electronically Signed by           YUE Groves  11/22/17 3982

## 2017-11-22 NOTE — H&P
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[email or web address] )      Plan:  Admit Med surge telemetry  Will require a 2 midnight stay  Patient is a full code  Treat pneumonia with Levaquin steroids nebulizers  Vitamin K as or even given to reverse her INR, as patient will clearly require less Coumadin    History of Present Illness        HPI: Serafin Forte is a 65y o  year old female who presents with shortness of breath of 2 days duration  Patient started with shortness of breath cough productive in nature last 2 days  Developed fever and chills in the last 24 hours  The eyes any hemoptysis or significant purulent phlegm  No chest pains  Patient went to her primary care physician's office who found her to be very hypoxic and sent to the emergency room for evaluation  She was subsequently be admitted with right lower lobe pneumonia  Patient has been on him other in the past but has not used it  Of note she smokes about 2 cigarettes per day but previously as a significant greater than 2 pack per day smoking history  Review of Systems   Constitutional: Positive for activity change, chills and fever  HENT: Negative  Eyes: Negative  Respiratory: Positive for cough  Cardiovascular: Negative  Gastrointestinal: Negative  Genitourinary: Negative  Neurological: Negative  Hematological:        Patient is history hypercoagulable state       Historical Information   Past Medical History:   Diagnosis Date    Cardiac disease     Chronic pain     COPD (chronic obstructive pulmonary disease) (HCC)     Hyperlipidemia     Hypertension      History reviewed   No pertinent surgical history  Social History   History   Alcohol Use No     History   Drug Use No     History   Smoking Status    Current Some Day Smoker    Types: Cigarettes   Smokeless Tobacco    Never Used     Family History: History reviewed  No pertinent family history  Meds/Allergies      Current Facility-Administered Medications   Medication Dose Route Frequency    levofloxacin (LEVAQUIN) IVPB (premix) 750 mg  750 mg Intravenous Once         (Not in a hospital admission)  Allergies   Allergen Reactions    Penicillins Rash    Sulfa Antibiotics Rash       Objective   Vitals: Blood pressure 109/53, pulse 97, temperature (!) 100 6 °F (38 1 °C), temperature source Temporal, resp  rate 20, height 5' 1 5" (1 562 m), weight 55 1 kg (121 lb 7 oz), SpO2 95 %, not currently breastfeeding  Intake/Output Summary (Last 24 hours) at 11/22/17 1358  Last data filed at 11/22/17 1310   Gross per 24 hour   Intake             1000 ml   Output                0 ml   Net             1000 ml     Invasive Devices     Peripheral Intravenous Line            Peripheral IV 11/22/17 Right Antecubital less than 1 day                Physical Exam   Constitutional: She is oriented to person, place, and time  She appears well-developed and well-nourished  HENT:   Head: Normocephalic and atraumatic  Right Ear: External ear normal    Left Ear: External ear normal    Eyes: EOM are normal  Pupils are equal, round, and reactive to light  Neck: Normal range of motion  No JVD present  Cardiovascular: Normal rate and regular rhythm  Pulmonary/Chest:   Rhonchi and rales right lung base few scattered wheezes   Abdominal: Soft  Bowel sounds are normal    Musculoskeletal: Normal range of motion  She exhibits no edema  Neurological: She is alert and oriented to person, place, and time  Skin: Skin is warm and dry  Psychiatric: She has a normal mood and affect  Vitals reviewed        Lab Results:   Admission on 11/22/2017 Component Date Value    Rapid Influenza A Ag 11/22/2017 Negative     Rapid Influenza B Ag 11/22/2017 Negative     WBC 11/22/2017 14 87*    RBC 11/22/2017 4 45     Hemoglobin 11/22/2017 13 5     Hematocrit 11/22/2017 41 7     MCV 11/22/2017 94     MCH 11/22/2017 30 3     MCHC 11/22/2017 32 4     RDW 11/22/2017 13 4     MPV 11/22/2017 9 4     Platelets 63/41/4234 264     Neutrophils Relative 11/22/2017 87*    Lymphocytes Relative 11/22/2017 6*    Monocytes Relative 11/22/2017 7     Eosinophils Relative 11/22/2017 0     Basophils Relative 11/22/2017 0     Neutrophils Absolute 11/22/2017 12 92*    Lymphocytes Absolute 11/22/2017 0 87     Monocytes Absolute 11/22/2017 1 05     Eosinophils Absolute 11/22/2017 0 01     Basophils Absolute 11/22/2017 0 02     Sodium 11/22/2017 133*    Potassium 11/22/2017 4 3     Chloride 11/22/2017 94*    CO2 11/22/2017 29     Anion Gap 11/22/2017 10     BUN 11/22/2017 18     Creatinine 11/22/2017 0 91     Glucose 11/22/2017 105     Calcium 11/22/2017 8 4     AST 11/22/2017 20     ALT 11/22/2017 17     Alkaline Phosphatase 11/22/2017 82     Total Protein 11/22/2017 8 0     Albumin 11/22/2017 3 4*    Total Bilirubin 11/22/2017 1 30*    eGFR 11/22/2017 61     LACTIC ACID 11/22/2017 1 8     Magnesium 11/22/2017 1 8     Troponin I 11/22/2017 <0 02     NT-proBNP 11/22/2017 531*    Protime 11/22/2017 47 9*    INR 11/22/2017 5 17*    PTT 11/22/2017 74*     Imaging Studies: I have personally reviewed pertinent reports  EKG, Pathology, and Other Studies: I have personally reviewed pertinent reports  VTE Pharmacologic Prophylaxis: Warfarin (Coumadin)  VTE Mechanical Prophylaxis: reason for no mechanical VTE prophylaxis Not indicated    This note has been constructed using a voice recognition system         Antionette Wallace MD

## 2017-11-22 NOTE — SEPSIS NOTE
Sepsis Note   Josue Sandoval 65 y o  female MRN: [id number]  Unit/Bed#: [address] [place] [address] Encounter: [id number]            Initial Sepsis Screening     Row Name 11/22/17 1418                Is the patient's history suggestive of a new or worsening infection? (!)  Yes (Proceed)  -AM        Suspected source of infection pneumonia  -AM        Are two or more of the following signs & symptoms of infection both present and new to the patient?         Indicate SIRS criteria Hyperthemia > 38 3C (100 9F); Tachycardia > 90 bpm;Leukocytosis (WBC > 10268 IJL); Tachypnea > 20 resp per min  -AM        If the answer is yes to both questions, suspicion of sepsis is present          If severe sepsis is present AND tissue hypoperfusion perists in the hour after fluid resuscitation or lactate > 4, the patient meets criteria for SEPTIC SHOCK          Are any of the following organ dysfunction criteria present within 6 hours of suspected infection and SIRS criteria that are NOT considered to be chronic conditions? (!)  Yes  -AM        Organ dysfunction INR > 1 5 or aPTT > 60 secs  -AM        Date of presentation of severe sepsis          Time of presentation of severe sepsis          Tissue hypoperfusion persists in the hour after crystalloid fluid administration, evidenced, by either:          Was hypotension present within one hour of the conclusion of crystalloid fluid administration?           Date of presentation of septic shock          Time of presentation of septic shock            User Key  (r) = Recorded By, (t) = Taken By, (c) = Cosigned By    Initials Name Provider Type    YUE Ford Nurse Practitioner

## 2017-11-23 PROBLEM — A41.9 SEPSIS (HCC): Status: RESOLVED | Noted: 2017-11-22 | Resolved: 2017-11-23

## 2017-11-23 LAB
ANION GAP SERPL CALCULATED.3IONS-SCNC: 7 MMOL/L (ref 4–13)
BUN SERPL-MCNC: 12 MG/DL (ref 5–25)
CALCIUM SERPL-MCNC: 7.6 MG/DL (ref 8.3–10.1)
CHLORIDE SERPL-SCNC: 103 MMOL/L (ref 100–108)
CO2 SERPL-SCNC: 26 MMOL/L (ref 21–32)
CREAT SERPL-MCNC: 0.51 MG/DL (ref 0.6–1.3)
ERYTHROCYTE [DISTWIDTH] IN BLOOD BY AUTOMATED COUNT: 13.2 % (ref 11.6–15.1)
GFR SERPL CREATININE-BSD FRML MDRD: 92 ML/MIN/1.73SQ M
GLUCOSE SERPL-MCNC: 135 MG/DL (ref 65–140)
HCT VFR BLD AUTO: 34.3 % (ref 34.8–46.1)
HGB BLD-MCNC: 10.9 G/DL (ref 11.5–15.4)
INR PPP: 3.89 (ref 0.86–1.16)
MCH RBC QN AUTO: 31 PG (ref 26.8–34.3)
MCHC RBC AUTO-ENTMCNC: 31.8 G/DL (ref 31.4–37.4)
MCV RBC AUTO: 97 FL (ref 82–98)
PLATELET # BLD AUTO: 225 THOUSANDS/UL (ref 149–390)
PMV BLD AUTO: 9.2 FL (ref 8.9–12.7)
POTASSIUM SERPL-SCNC: 4.6 MMOL/L (ref 3.5–5.3)
PROTHROMBIN TIME: 38.2 SECONDS (ref 12.1–14.4)
RBC # BLD AUTO: 3.52 MILLION/UL (ref 3.81–5.12)
SODIUM SERPL-SCNC: 136 MMOL/L (ref 136–145)
WBC # BLD AUTO: 9.26 THOUSAND/UL (ref 4.31–10.16)

## 2017-11-23 PROCEDURE — 94760 N-INVAS EAR/PLS OXIMETRY 1: CPT

## 2017-11-23 PROCEDURE — 85027 COMPLETE CBC AUTOMATED: CPT | Performed by: INTERNAL MEDICINE

## 2017-11-23 PROCEDURE — 94640 AIRWAY INHALATION TREATMENT: CPT

## 2017-11-23 PROCEDURE — 85610 PROTHROMBIN TIME: CPT | Performed by: INTERNAL MEDICINE

## 2017-11-23 PROCEDURE — 80048 BASIC METABOLIC PNL TOTAL CA: CPT | Performed by: INTERNAL MEDICINE

## 2017-11-23 RX ORDER — ALBUTEROL SULFATE 2.5 MG/3ML
2.5 SOLUTION RESPIRATORY (INHALATION) EVERY 4 HOURS PRN
Status: DISCONTINUED | OUTPATIENT
Start: 2017-11-23 | End: 2017-11-25 | Stop reason: HOSPADM

## 2017-11-23 RX ADMIN — FLUTICASONE PROPIONATE AND SALMETEROL 1 PUFF: 50; 250 POWDER RESPIRATORY (INHALATION) at 10:15

## 2017-11-23 RX ADMIN — LEVOFLOXACIN 500 MG: 500 TABLET, FILM COATED ORAL at 12:23

## 2017-11-23 RX ADMIN — METHYLPREDNISOLONE SODIUM SUCCINATE 20 MG: 40 INJECTION, POWDER, FOR SOLUTION INTRAMUSCULAR; INTRAVENOUS at 05:58

## 2017-11-23 RX ADMIN — METHYLPREDNISOLONE SODIUM SUCCINATE 20 MG: 40 INJECTION, POWDER, FOR SOLUTION INTRAMUSCULAR; INTRAVENOUS at 21:15

## 2017-11-23 RX ADMIN — DILTIAZEM HYDROCHLORIDE 180 MG: 180 CAPSULE, COATED, EXTENDED RELEASE ORAL at 08:17

## 2017-11-23 RX ADMIN — GUAIFENESIN AND DEXTROMETHORPHAN 10 ML: 100; 10 SYRUP ORAL at 22:09

## 2017-11-23 RX ADMIN — DOCUSATE SODIUM 100 MG: 100 CAPSULE, LIQUID FILLED ORAL at 08:17

## 2017-11-23 RX ADMIN — METHYLPREDNISOLONE SODIUM SUCCINATE 20 MG: 40 INJECTION, POWDER, FOR SOLUTION INTRAMUSCULAR; INTRAVENOUS at 13:24

## 2017-11-23 RX ADMIN — NICOTINE 1 PATCH: 7 PATCH, EXTENDED RELEASE TRANSDERMAL at 08:18

## 2017-11-23 RX ADMIN — DIGOXIN 125 MCG: 125 TABLET ORAL at 08:17

## 2017-11-23 RX ADMIN — TIOTROPIUM BROMIDE 18 MCG: 18 CAPSULE ORAL; RESPIRATORY (INHALATION) at 10:16

## 2017-11-23 RX ADMIN — SODIUM CHLORIDE 125 ML/HR: 0.9 INJECTION, SOLUTION INTRAVENOUS at 05:58

## 2017-11-23 RX ADMIN — FLUTICASONE PROPIONATE AND SALMETEROL 1 PUFF: 50; 250 POWDER RESPIRATORY (INHALATION) at 21:04

## 2017-11-23 NOTE — CASE MANAGEMENT
Initial Clinical Review    Admission: Date/Time/Statement: 11/22/17 @ 1325     Orders Placed This Encounter   Procedures    Inpatient Admission (expected length of stay for this patient is greater than two midnights)     Standing Status:   Standing     Number of Occurrences:   1     Order Specific Question:   Admitting Physician     Answer:   Angelina Lmabert [[id number]]     Order Specific Question:   Level of Care     Answer:   Med Surg [[id number]]     Order Specific Question:   Estimated length of stay     Answer:   More than 2 Midnights     Order Specific Question:   Certification     Answer:   I certify that inpatient services are medically necessary for this patient for a duration of greater than two midnights  See H&P and MD Progress Notes for additional information about the patient's course of treatment  ED: Date/Time/Mode of Arrival:   ED Arrival Information     Expected Arrival Acuity Means of Arrival Escorted By Service Admission Type    11/22/2017 11:26 11/22/2017 11:47 Urgent Walk-In Family Member General Medicine Urgent    Arrival Complaint    cough, fever, r/o pneumonia          Chief Complaint:   Chief Complaint   Patient presents with    Cough     Presents to ED with fever, cough, congestion since Monday  Was seen by PCP and referred to ED  Denies any N/V/D   Fever - 75 years or older       History of Illness: 65y o  year old female who presents with shortness of breath of 2 days duration  Patient started with shortness of breath cough productive in nature last 2 days  Developed fever and chills in the last 24 hours  The eyes any hemoptysis or significant purulent phlegm  No chest pains  Patient went to her primary care physician's office who found her to be very hypoxic and sent to the emergency room for evaluation  She was subsequently be admitted with right lower lobe pneumonia  Patient has been on him other in the past but has not used it    Of note she smokes about 2 cigarettes per day but previously as a significant greater than 2 pack per day smoking history  ED Vital Signs:   ED Triage Vitals [11/22/17 1158]   Temperature Pulse Respirations Blood Pressure SpO2   (!) 101 6 °F (38 7 °C) (!) 112 20 136/60 (!) 77 %      Temp Source Heart Rate Source Patient Position - Orthostatic VS BP Location FiO2 (%)   Temporal Monitor Sitting Right arm --      Pain Score       5        Wt Readings from Last 1 Encounters:   11/23/17 58 kg (127 lb 13 9 oz)       Vital Signs (abnormal): room air sat 77%  Maximum temperature 101  6   sustaied pulse 112 - 100  Abnormal Labs/Diagnostic Test Results:   UA trace protein  1+ ketones  Small blood  NT-proBNP 531  INR 5 17  Na 133, cl 94  Albumin 3 4  Total bilirubin 1 30  Wbc 14 87    CxR-   Right-sided pulmonary infiltrate with small right effusion   Findings concerning for pneumonia  ED Treatment: blood cultures  Oxygen 6 liters  Medication Administration from 11/22/2017 1126 to 11/22/2017 1419       Date/Time Order Dose Route Action Action by Comments     11/22/2017 1214 acetaminophen (TYLENOL) tablet 650 mg 650 mg Oral Given Lillian Otero RN given for fever of 101 6     11/22/2017 1310 sodium chloride 0 9 % bolus 1,000 mL 0 mL Intravenous Stopped Lillian Otero RN      11/22/2017 1210 sodium chloride 0 9 % bolus 1,000 mL 1,000 mL Intravenous New Bag Lillian Otero RN      11/22/2017 1405 levofloxacin (LEVAQUIN) IVPB (premix) 750 mg 0 mg Intravenous Stopped Lillian Otero RN      11/22/2017 1235 levofloxacin (LEVAQUIN) IVPB (premix) 750 mg 750 mg Intravenous [address] Lillian Otero RN      11/22/2017 1233 ipratropium-albuterol (DUO-NEB) 0 5-2 5 mg/mL inhalation solution 3 mL 3 mL Nebulization Given Lillian Otero RN      11/22/2017 1333 phytonadione (AQUA-MEPHYTON) 10 mg/mL SC/IM injection 2 5 mg 2 5 mg Subcutaneous Given Lillian Otero RN           Past Medical/Surgical History:    Active Ambulatory Problems     Diagnosis Date Noted    No Active Ambulatory Problems     Resolved Ambulatory Problems     Diagnosis Date Noted    No Resolved Ambulatory Problems     Past Medical History:   Diagnosis Date    Cardiac disease     Chronic pain     [facility]chronic obstructive [facility]) ([facility] )     Hyperlipidemia     Hypertension        Admitting Diagnosis: Cough [R05]  Fever [R50 9]  Acute respiratory failure with hypoxia ([facility]) [J96 01]  Sepsis ([facility] ) [A41 9]  Pneumonia of right lower lobe due to infectious organism ([place] [address] ) [J18 1]    Age/Sex: 65 y o  female    Assessment/Plan: Principal Problem:   Pneumonia  Active Problems:    COPD (chronic obstructive pulmonary disease) ([facility])    Sepsis ([facility] )    Acute respiratory failure with hypoxia ([facility] )    Hypertension    Hypercoagulable state ([place] [address] )    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[place] [address] )     Plan:  Admit Med surge telemetry  Will require a 2 midnight stay  Patient is a full code  Treat pneumonia with Levaquin steroids nebulizers  Vitamin K as or even given to reverse her INR, as patient will clearly require less Coumadin    Admission Orders:  11/22/2017  1325 INPATIENT   Scheduled Meds:   digoxin 125 mcg Oral Daily   diltiazem 180 mg Oral Daily   docusate sodium 100 mg Oral BID   fluticasone-salmeterol 1 puff Inhalation Q12H CARMEN   levofloxacin 500 mg Oral Q24H   methylPREDNISolone sodium succinate 20 mg Intravenous Q8H [address]   nicotine 1 patch Transdermal Daily   ondansetron 4 mg Intravenous Once   pantoprazole 40 mg Oral Early Morning   tiotropium 18 mcg Inhalation Daily     Continuous Infusions:    PRN Meds: not used:   acetaminophen    aluminum-magnesium hydroxide-simethicone    dextromethorphan-guaiFENesin    ondansetron    zolpidem    OTHER ORDERS: respiratory protocol - oxygen titrated 6 liters to current 4 liters     Oximetry room air with ambulation

## 2017-11-23 NOTE — PROGRESS NOTES
Patient alert and oriented x 4  Lung sounds diminished in the left lobes and coarse wheezes in the right lobes  Patient cooperative yet patient is eager to go home  Will continue to monitor  Call bell within reach

## 2017-11-23 NOTE — PROGRESS NOTES
Assessment    1  Shortness of breath (786 05) (R06 02)    Plan  Cough, Reactive airway disease    · Albuterol Sulfate (2 5 MG/3ML) 0 083% Inhalation Nebulization Solution   · MINI NEBULIZER- POC; Status:Complete;   Done: 10OTP7014 12:04PM    Discussion/Summary    Shortness of breath  Patient with what appears to be obvious respiratory infection and possibly pneumonia  She was reluctant to be seen in the emergency room but we myself and her son convinced patient to be seen at [facility] Emergency Room for further evaluation  I explained that this situation could be life threatening to her with her underlying condition  She agreed that she would go to emergency room for evaluation  Chief Complaint  Patient c/o productive cough, SOB and chills x 2 days  Taking Tylenol Cold medication  History of Present Illness  HPI: I reviewed chief complaint with the patient  Patient denies any documented fevers but has felt chills and sweats  She has productive cough  She has not been using her home nebulizer machine  Review of Systems   Constitutional: feeling poorly-- and-- feeling tired  ENT: sore throat  Cardiovascular: no complaints of slow or fast heart rate, no chest pain, no palpitations, no leg claudication or lower extremity edema  Respiratory: cough,-- wheezing-- and-- shortness of breath during exertion  Gastrointestinal: no complaints of abdominal pain, no constipation, no nausea or diarrhea, no vomiting, no bloody stools  Genitourinary: no complaints of dysuria, no incontinence, no pelvic pain, no dysmenorrhea, no vaginal discharge or abnormal vaginal bleeding  Musculoskeletal: no complaints of arthralgia, no myalgia, no joint swelling or stiffness, no limb pain or swelling  Integumentary: no complaints of skin rash or lesion, no itching or dry skin, no skin wounds  Active Problems  1  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)   2  Asthma (897 90) (J81 829)   3   Atrial fibrillation (427 31) (I48 91)   4  Cellulitis (682 9) (L03 90)   5  Chronic obstructive pulmonary disease (496) (J44 9)   6  Hereditary hemolytic anemia (282 9) (D58 9)   7  Hip Sprain (843 9)   8  Need for prophylactic vaccination and inoculation against influenza (V04 81) (Z23)   9  Need for vaccination with 13-polyvalent pneumococcal conjugate vaccine (V03 82) (Z23)   10  Osteoporosis (733 00) (M81 0)   11  Phlebitis or thrombophlebitis of deep vessel of lower extremity (451 19) (I80 209)   12  Reactive airway disease (493 90) (J45 909)   13  Tick bite (919 4,E906 4) (W57 XXXA)   14  Visit for screening mammogram (V76 12) (Z12 31)   15  Vitamin D deficiency (268 9) (E55 9)    Past Medical History  1  History of Blood type O+ (V49 89) (Z67 40)   2  History of Encounter for screening colonoscopy (V76 51) (Z12 11)   3  History of Fracture Of The Ankle (824 8)   4  History of Visit for screening mammogram (Y11 12) (Z12 31)    Family History  Mother    1  Family history of hypertension (V17 49) (Z82 49)   2  Family history of skin cancer (V16 8) (Z80 8)  Father    3  Family history of hypertension (V17 49) (Z82 49)    Social History   · Never A Smoker  The social history was reviewed and updated today  Surgical History    1  History of Oophorectomy    Current Meds   1  Advair Diskus 250-50 MCG/DOSE Inhalation Aerosol Powder Breath Activated; USE ONE INHALATION TWO TIMES A DAY; Therapy: 83MIR7349 to (Last Rx:01Apr2017)  Requested for: 01Apr2017 Ordered   2  Albuterol Sulfate (2 5 MG/3ML) 0 083% Inhalation Nebulization Solution; USE ONE VIAL IN NEBULIZER EVERY 4 TO 6 HOURS AS NEEDED FOR  COUGH  ANDWHEEZE; Therapy: (Recorded:96Xik5401) to Recorded   3  Cartia  MG Oral Capsule Extended Release 24 Hour; take 1 capsule daily; Therapy: 64OII7006 to (Last Rx:01Apr2017)  Requested for: 01Apr2017 Ordered   4  Digoxin 250 MCG Oral Tablet; Take 1 tablet daily;  Therapy: 85AMV5838 to (Last Rx:01Apr2017)  Requested for: 01Apr2017 Ordered   5  DilTIAZem HCl ER Coated Beads 180 MG Oral Capsule Extended Release 24 Hour; take 1 capsule daily; Therapy: 27TMZ9120 to (Last Rx:28Nov2014)  Requested for: 98BHU4282 Ordered   6  Multivitamins Oral Capsule; TAKE 1 CAPSULE Daily; Therapy: (Recorded:85Dsq7870) to Recorded   7  Prolia 60 MG/ML Subcutaneous Solution; INJECT SUBCUTANEOUSLY  60 MG / 1 ML EVERY 6 MONTHS; To Be Done: 81TTN6300; Status: HOLD FOR - Administration Ordered   8  Spiriva HandiHaler 18 MCG Inhalation Capsule; INHALE THE CONTENTS OF 1 CAPSULE DAILY AS DIRECTED; Therapy: 63LVC9651 to (Last Rx:01Apr2017)  Requested for: 01Apr2017 Ordered   9  Warfarin Sodium 5 MG Oral Tablet; Take 1 tablet daily; Therapy: 84TPT3341 to (Last Rx:01Apr2017)  Requested for: 01Apr2017 Ordered    The medication list was reviewed and updated today  Allergies  1  Penicillins   2  Sulfa Drugs    Vitals   Recorded: 22Nov2017 10:34AM   Temperature 100 7 F, Tympanic   Heart Rate 86   Respiration 24   Systolic 343, RUE, Sitting   Diastolic 64, RUE, Sitting   Weight 120 lb 4 oz   BMI Calculated 23 1   BSA Calculated 1 51       Physical Exam   Constitutional  General appearance: Abnormal  -- Patient appeared fatigued and speaking in short sentences due to coughing and shortness of breath  Ears, Nose, Mouth, and Throat  External inspection of ears and nose: Normal    Otoscopic examination: Tympanic membranes translucent with normal light reflex  Canals patent without erythema  Oropharynx: Normal with no erythema, edema, exudate or lesions  Pulmonary  Respiratory effort: Abnormal  -- Patient seen to be speaking in short sentences due to inability to catch her breath  Auscultation of lungs: Abnormal  -- Decreased breath sounds bilaterally  Harsh productive cough  Pulse oximetry was 83% on room air  After receiving albuterol nebulizer treatment maximum pulse ox reached 87%    Cardiovascular  Auscultation of heart: Abnormal  -- Chronic atrial fibrillation  Carotid pulses: Normal    Musculoskeletal  Gait and station: Abnormal  -- Patient needed assistance with walking by holding on to her son's arms due to weakness  Skin  Skin and subcutaneous tissue: Normal without rashes or lesions  Results/Data  MINI NEBULIZER- POC [id number] 48:68AR Garett Byrd     Test Name Result Flag Reference   [address] [id number]         Future Appointments    Date/Time Provider Specialty Site   38/06/8361 47:49 AM MADDIE Pinto   Family Medicine [address] [place] [address]       Signatures   Electronically signed by : Magali Rinne, M D ; Nov 22 8568  3:47PM EST                       (Author)

## 2017-11-23 NOTE — PROGRESS NOTES
Progress Note - Camilo Luis 65 y o  female MRN: [id number]    Unit/Bed#: [address] [place] [address] 204-01 Encounter: [id number]      Assessment:    Principal Problem:    Pneumonia  Active Problems:    COPD (chronic obstructive pulmonary disease) ([facility] )    Acute respiratory failure with hypoxia (HCC)    Hypertension    Hypercoagulable state ([facility] )    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[place] [address] )  Resolved Problems:    Sepsis ([place] [address] )      Plan:  Continue treatment for outpatient pneumonia  Discontinue telemetry and IV fluids  Check O2 sat room air    Hold Coumadin another day her INR is improved      Subjective:   Less cough and shortness of breath feels much better    Objective:     Vitals: Blood pressure (!) 115/47, pulse 70, temperature (!) 97 4 °F (36 3 °C), temperature source Oral, resp  rate 17, height 5' 1 5" (1 562 m), weight 58 kg (127 lb 13 9 oz), SpO2 92 %, not currently breastfeeding  ,Body mass index is 23 77 kg/m²  Intake/Output Summary (Last 24 hours) at 11/23/17 0819  Last data filed at 11/23/17 0100   Gross per 24 hour   Intake          1954 17 ml   Output              700 ml   Net          1254 17 ml       Physical Exam:    Alert and awake in no acute distress  Lungs better breath sounds  Heart regular rate and rythm, normal heart sounds  Abdomen soft, active bowel sounds, non-tender, non-distended  Extremities: no cyanosis, clubbing or edema        Invasive Devices     Peripheral Intravenous Line            Peripheral IV 11/22/17 Right Antecubital less than 1 day                            Lab, Imaging and other studies: I have personally reviewed pertinent reports         Results from last 7 days  Lab Units 11/23/17  0455 11/22/17  1207   WBC Thousand/uL 9 26 14 87*   HEMOGLOBIN g/dL 10 9* 13 5   HEMATOCRIT % 34 3* 41 7   PLATELETS Thousands/uL 225 264   NEUTROS PCT %  --  87*   LYMPHS PCT %  --  6*   MONOS PCT %  --  7   EOS PCT %  --  0       Results from last 7 days  Lab Units 11/23/17  0455 11/22/17  1207   SODIUM mmol/L 136 133*   POTASSIUM mmol/L 4 6 4 3   CHLORIDE mmol/L 103 94*   CO2 mmol/L 26 29   BUN mg/dL 12 18   CREATININE mg/dL 0 51* 0 91   CALCIUM mg/dL 7 6* 8 4   TOTAL PROTEIN g/dL  --  8 0   BILIRUBIN TOTAL mg/dL  --  1 30*   ALK PHOS U/L  --  82   ALT U/L  --  17   AST U/L  --  20   GLUCOSE RANDOM mg/dL 135 105     Lab Results   Component Value Date    TROPONINI <0 02 11/22/2017       Results from last 7 days  Lab Units 11/23/17  0455 11/22/17  1207   INR  3 89* 5 17*     No results found for: Cristy Cote SPUTUMCULTUR    Imaging:  Results for orders placed during the hospital encounter of 11/22/17   XR chest portable    Narrative CHEST     INDICATION:  Chest congestion and shortness of breath and cough    COMPARISON:  July 16, 2012    VIEWS:   AP frontal    IMAGES:  1    FINDINGS:         Cardiomediastinal silhouette appears unremarkable  There is a right-sided lung infiltrate probably involving middle lobe and at least a portion of lower lobe which is new from previous exam and is concerning for pneumonia  Left lung remains clear  There is blunting of the right costophrenic angle which   suggests the presence of pleural fluid  There is no pneumothorax  Visualized osseous structures appear within normal limits for the patient's age  Impression Right-sided pulmonary infiltrate with small right effusion  Findings concerning for pneumonia  Recommend continued follow-up      Workstation performed: [id number]       No results found for this or any previous visit  PATIENT CENTERED ROUNDS: I have performed rounds with the nursing staff  This note has been constructed using a voice recognition system      Brandie Vernon MD

## 2017-11-24 LAB
ATRIAL RATE: 108 BPM
INR PPP: 2.1 (ref 0.86–1.16)
P AXIS: 74 DEGREES
PR INTERVAL: 146 MS
PROTHROMBIN TIME: 23.4 SECONDS (ref 12.1–14.4)
QRS AXIS: 37 DEGREES
QRSD INTERVAL: 86 MS
QT INTERVAL: 298 MS
QTC INTERVAL: 399 MS
T WAVE AXIS: 65 DEGREES
VENTRICULAR RATE: 108 BPM

## 2017-11-24 PROCEDURE — 94640 AIRWAY INHALATION TREATMENT: CPT

## 2017-11-24 PROCEDURE — 94761 N-INVAS EAR/PLS OXIMETRY MLT: CPT

## 2017-11-24 PROCEDURE — 87040 BLOOD CULTURE FOR BACTERIA: CPT | Performed by: INTERNAL MEDICINE

## 2017-11-24 PROCEDURE — 94760 N-INVAS EAR/PLS OXIMETRY 1: CPT

## 2017-11-24 PROCEDURE — 85610 PROTHROMBIN TIME: CPT | Performed by: INTERNAL MEDICINE

## 2017-11-24 RX ORDER — DILTIAZEM HYDROCHLORIDE 240 MG/1
240 CAPSULE, COATED, EXTENDED RELEASE ORAL DAILY
Status: DISCONTINUED | OUTPATIENT
Start: 2017-11-24 | End: 2017-11-25 | Stop reason: HOSPADM

## 2017-11-24 RX ORDER — PREDNISONE 10 MG/1
10 TABLET ORAL 2 TIMES DAILY WITH MEALS
Status: DISCONTINUED | OUTPATIENT
Start: 2017-11-24 | End: 2017-11-25 | Stop reason: HOSPADM

## 2017-11-24 RX ORDER — WARFARIN SODIUM 2.5 MG/1
2.5 TABLET ORAL
Status: COMPLETED | OUTPATIENT
Start: 2017-11-24 | End: 2017-11-24

## 2017-11-24 RX ADMIN — FLUTICASONE PROPIONATE AND SALMETEROL 1 PUFF: 50; 250 POWDER RESPIRATORY (INHALATION) at 21:11

## 2017-11-24 RX ADMIN — FLUTICASONE PROPIONATE AND SALMETEROL 1 PUFF: 50; 250 POWDER RESPIRATORY (INHALATION) at 07:48

## 2017-11-24 RX ADMIN — DIGOXIN 125 MCG: 125 TABLET ORAL at 08:46

## 2017-11-24 RX ADMIN — WARFARIN SODIUM 2.5 MG: 2.5 TABLET ORAL at 17:30

## 2017-11-24 RX ADMIN — METHYLPREDNISOLONE SODIUM SUCCINATE 20 MG: 40 INJECTION, POWDER, FOR SOLUTION INTRAMUSCULAR; INTRAVENOUS at 05:35

## 2017-11-24 RX ADMIN — GUAIFENESIN AND DEXTROMETHORPHAN 10 ML: 100; 10 SYRUP ORAL at 17:35

## 2017-11-24 RX ADMIN — DILTIAZEM HYDROCHLORIDE 240 MG: 240 CAPSULE, COATED, EXTENDED RELEASE ORAL at 08:47

## 2017-11-24 RX ADMIN — PREDNISONE 10 MG: 10 TABLET ORAL at 15:54

## 2017-11-24 RX ADMIN — LEVOFLOXACIN 500 MG: 500 TABLET, FILM COATED ORAL at 13:10

## 2017-11-24 RX ADMIN — TIOTROPIUM BROMIDE 18 MCG: 18 CAPSULE ORAL; RESPIRATORY (INHALATION) at 07:46

## 2017-11-24 NOTE — RESPIRATORY THERAPY NOTE
sl oxygen          Home Oxygen Qualifying Test       Patient name: Surjit Kessler        : 1940   Date of Test:  2017  Diagnosis:      Home Oxygen Test:    **Medicare Guidelines require item(s) 1-5 on all ambulatory patients or 1 and 2 on on-ambulatory patients  1   Baseline SPO2 on Room Air at rest   89 %  If = or < 88% on room air add O2 via NC and titrate patient  Patient must be ambulated with O2 and titrated to > 88% with exertion  2   SPO2 on Oxygen at rest 2lpm  94 %  3   SPO2 during exercise on Room Air 86% to 90 %  4   SPO2 during exercise on Oxygen  90%  to 93% at a liter flow of  2  lpm     5   Exercise performed:    [x] Walking     [] Stairs     [x] Duration  5 (min )     [x] Distance  350 (ft )       [x]  Supplemental Home Oxygen is indicated  []  Client does not qualify for home oxygen        Tanya Ventura , RT

## 2017-11-24 NOTE — PROGRESS NOTES
Progress Note - Jane Ramos 65 y o  female MRN: [id number]    Unit/Bed#: [address] [place] [address] [id number] Encounter: [id number]        * Pneumonia   Assessment & Plan       Improving respiratory status with some persistent coughing spells this morning but minimal sputum production  Day 3  Of empiric antibiotic therapy with Levaquin  Blood cultures 1 of 2 from admission show positive for coccobacillus await final sensitivity-will repeat blood cultures this morning  Leukocytosis has improved from 14,009 1000 this morning        Acute respiratory failure with hypoxia ([facility])   Assessment & Plan       Currently on 2 L nasal cannula-will check oximetry with will rest and ambulation today         COPD (chronic obstructive pulmonary disease) ([facility])   Assessment & Plan       Currently on Solu-Medrol 20 Q 8-will switch to p o  steroids        Hypertension   Assessment & Plan       Blood pressure stable on current  diltiazem         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[facility])   Assessment & Plan    Improving today will resume at lower doses of warfarin        Hypercoagulable state ([address] )   Assessment & Plan       INR improved at 2 10-will resume Coumadin                Subjective:   Patient had coughing spell this morning but minimal sputum production  She is feeling somewhat stronger than admission  She denies any shaking chills or lightheadedness  ROS  Comprehensive system review negative other than noted above    Objective:     Vitals: Blood pressure (!) 175/72, pulse 73, temperature 97 6 °F (36 4 °C), temperature source Oral, resp  rate 18, height 5' 1 5" (1 562 m), weight 58 kg (127 lb 13 9 oz), SpO2 94 %, not currently breastfeeding  ,Body mass index is 23 77 kg/m²    Current Facility-Administered Medications   Medication Dose Route Frequency Provider Last Rate Last Dose    acetaminophen (TYLENOL) tablet 650 mg  650 mg Oral Q6H PRN Jerson Smith MD        albuterol inhalation solution 2 5 mg  2 5 mg Nebulization Q4H PRN Jerson Smith MD       [facility] aluminum-magnesium hydroxide-simethicone (MYLANTA) 200-200-20 mg/5 mL oral suspension 30 mL  30 mL Oral Q6H PRN Alexa Colon MD        dextromethorphan-guaiFENesin (ROBITUSSIN DM)  mg/5 mL oral syrup 10 mL  10 mL Oral Q4H PRN Alexa Colon MD   10 mL at 11/23/17 2209    digoxin (LANOXIN) tablet 125 mcg  125 mcg Oral Daily Alexa Colon MD   125 mcg at 11/23/17 0817    diltiazem (CARDIZEM CD) 24 hr capsule 180 mg  180 mg Oral Daily Alexa Colon MD   180 mg at 11/23/17 0817    docusate sodium (COLACE) capsule 100 mg  100 mg Oral BID Alexa Colon MD   100 mg at 11/23/17 0817    fluticasone-salmeterol (ADVAIR) 250-50 mcg/dose inhaler 1 puff  1 puff Inhalation Q12H [address] Alexa Colon MD   1 puff at 11/23/17 2104    levofloxacin (LEVAQUIN) tablet 500 mg  500 mg Oral Q24H Alexa Colon MD   500 mg at 11/23/17 1223    nicotine (NICODERM CQ) 7 mg/24hr TD 24 hr patch 1 patch  1 patch Transdermal Daily Alexa Colon MD   1 patch at 11/23/17 0818    ondansetron (ZOFRAN) injection 4 mg  4 mg Intravenous Once PRN YUE Galvez        ondansetron [facility]) injection 4 mg  4 mg Intravenous Once Alexa Colon MD        pantoprazole (PROTONIX) EC tablet 40 mg  40 mg Oral Early Morning Alexa Colon MD        predniSONE tablet 10 mg  10 mg Oral BID With Meals Gal Lindo DO        tiotropium (SPIRIVA) capsule for inhaler 18 mcg  18 mcg Inhalation Daily Alexa Colon MD   18 mcg at 11/23/17 1016    warfarin (COUMADIN) tablet 2 5 mg  2 5 mg Oral Once (warfarin) Gal Lindo DO        zolpidem (AMBIEN) tablet 5 mg  5 mg Oral HS PRN Alexa Colon MD         Prescriptions Prior to Admission   Medication    albuterol (2 5 mg/3 mL) 0 083 % nebulizer solution    denosumab (PROLIA) 60 mg/mL    digoxin (LANOXIN) 0 125 mg tablet    diltiazem (CARTIA XT) 180 mg 24 hr capsule    fluticasone-salmeterol (ADVAIR) 250-50 mcg/dose inhaler    multivitamin (THERAGRAN) TABS    tiotropium (SPIRIVA) 18 mcg inhalation capsule    warfarin (COUMADIN) 5 mg tablet         Intake/Output Summary (Last 24 hours) at 11/24/17 0735  Last data filed at 11/23/17 2121   Gross per 24 hour   Intake                0 ml   Output              800 ml   Net             -800 ml       Physical Exam:  General appearance: alert, cooperative and no distress  Neck: no adenopathy, supple, symmetrical, trachea midline and thyroid not enlarged, symmetric, no tenderness/mass/nodules  Lungs: Decreased breath sounds in the bases  Some occasional upper airway cough right greater than left rhonchi  Heart: regular rate and rhythm, S1, S2 normal, no murmur, click, rub or gallop  Abdomen: soft, non-tender; bowel sounds normal; no masses,  no organomegaly  Extremities: no ulcers, gangrene or trophic changes and Chronic trace nonpitting edema on the right leg   Skin: Skin color, texture, turgor normal  No rashes or lesions  Neurologic: Grossly normal       Lab, Imaging and other studies: I have personally reviewed pertinent reports              Results from last 7 days  Lab Units 11/23/17  0455 11/22/17  1207   WBC Thousand/uL 9 26 14 87*   HEMOGLOBIN g/dL 10 9* 13 5   HEMATOCRIT % 34 3* 41 7   PLATELETS Thousands/uL 225 264   NEUTROS PCT %  --  87*   LYMPHS PCT %  --  6*   MONOS PCT %  --  7   EOS PCT %  --  0       Results from last 7 days  Lab Units 11/23/17  0455 11/22/17  1207   SODIUM mmol/L 136 133*   POTASSIUM mmol/L 4 6 4 3   CHLORIDE mmol/L 103 94*   CO2 mmol/L 26 29   BUN mg/dL 12 18   CREATININE mg/dL 0 51* 0 91   CALCIUM mg/dL 7 6* 8 4   TOTAL PROTEIN g/dL  --  8 0   BILIRUBIN TOTAL mg/dL  --  1 30*   ALK PHOS U/L  --  82   ALT U/L  --  17   AST U/L  --  20   GLUCOSE RANDOM mg/dL 135 105     Lab Results   Component Value Date    TROPONINI <0 02 11/22/2017       Results from last 7 days  Lab Units 11/24/17  0533 11/23/17  0455 11/22/17  1207   INR  2 10* 3 89* 5 17*     Lab Results   Component Value Date    BLOODCX No Growth at 24 hrs  11/22/2017       Imaging:  Results for orders placed during the hospital encounter of 11/22/17   XR chest portable    Narrative CHEST     INDICATION:  Chest congestion and shortness of breath and cough    COMPARISON:  July 16, 2012    VIEWS:   AP frontal    IMAGES:  1    FINDINGS:         Cardiomediastinal silhouette appears unremarkable  There is a right-sided lung infiltrate probably involving middle lobe and at least a portion of lower lobe which is new from previous exam and is concerning for pneumonia  Left lung remains clear  There is blunting of the right costophrenic angle which   suggests the presence of pleural fluid  There is no pneumothorax  Visualized osseous structures appear within normal limits for the patient's age  Impression Right-sided pulmonary infiltrate with small right effusion  Findings concerning for pneumonia  Recommend continued follow-up      Workstation performed: [id number]       No results found for this or any previous visit  PATIENT CENTERED ROUNDS: I have performed rounds with the nursing staff            Mihaela Pereira DO

## 2017-11-24 NOTE — ASSESSMENT & PLAN NOTE
Improving respiratory status with some persistent coughing spells this morning but minimal sputum production  Day 3  Of empiric antibiotic therapy with Levaquin     Blood cultures 1 of 2 from admission show positive for coccobacillus await final sensitivity-will repeat blood cultures this morning  Leukocytosis has improved from 14,009 1000 this morning

## 2017-11-24 NOTE — SOCIAL WORK
Met with patient discussed role of Care Management  Patient resides alone [place] home  Her son, Sergio Verma  is currently at [facility] and is a dialysis patient  He often stays with her  Patient reports being independent of ADl's  And uses mo assistive device  She has a nebulizer at home  He uses [facility] in [facility] for her meds  She plans to return home and anticipates no discharge needs

## 2017-11-25 VITALS
OXYGEN SATURATION: 95 % | TEMPERATURE: 97.8 F | SYSTOLIC BLOOD PRESSURE: 154 MMHG | DIASTOLIC BLOOD PRESSURE: 67 MMHG | RESPIRATION RATE: 18 BRPM | HEIGHT: 62 IN | HEART RATE: 66 BPM | WEIGHT: 127.87 LBS | BODY MASS INDEX: 23.53 KG/M2

## 2017-11-25 LAB
BACTERIA BLD CULT: ABNORMAL
ERYTHROCYTE [DISTWIDTH] IN BLOOD BY AUTOMATED COUNT: 13.2 % (ref 11.6–15.1)
GRAM STN SPEC: ABNORMAL
HCT VFR BLD AUTO: 34.4 % (ref 34.8–46.1)
HGB BLD-MCNC: 11.1 G/DL (ref 11.5–15.4)
INR PPP: 1.98 (ref 0.86–1.16)
MCH RBC QN AUTO: 30.6 PG (ref 26.8–34.3)
MCHC RBC AUTO-ENTMCNC: 32.3 G/DL (ref 31.4–37.4)
MCV RBC AUTO: 95 FL (ref 82–98)
PLATELET # BLD AUTO: 263 THOUSANDS/UL (ref 149–390)
PMV BLD AUTO: 9.1 FL (ref 8.9–12.7)
PROTHROMBIN TIME: 22.4 SECONDS (ref 12.1–14.4)
RBC # BLD AUTO: 3.63 MILLION/UL (ref 3.81–5.12)
WBC # BLD AUTO: 11.12 THOUSAND/UL (ref 4.31–10.16)

## 2017-11-25 PROCEDURE — 85610 PROTHROMBIN TIME: CPT | Performed by: INTERNAL MEDICINE

## 2017-11-25 PROCEDURE — 85027 COMPLETE CBC AUTOMATED: CPT | Performed by: INTERNAL MEDICINE

## 2017-11-25 PROCEDURE — 94760 N-INVAS EAR/PLS OXIMETRY 1: CPT

## 2017-11-25 RX ORDER — CEFDINIR 300 MG/1
300 CAPSULE ORAL 2 TIMES DAILY
Status: DISCONTINUED | OUTPATIENT
Start: 2017-11-25 | End: 2017-11-25

## 2017-11-25 RX ORDER — PREDNISONE 10 MG/1
TABLET ORAL
Qty: 11 TABLET | Refills: 0 | Status: SHIPPED | OUTPATIENT
Start: 2017-11-25 | End: 2018-06-25 | Stop reason: ALTCHOICE

## 2017-11-25 RX ORDER — CEFUROXIME AXETIL 250 MG/1
500 TABLET ORAL EVERY 12 HOURS SCHEDULED
Status: DISCONTINUED | OUTPATIENT
Start: 2017-11-25 | End: 2017-11-25

## 2017-11-25 RX ORDER — CEFDINIR 300 MG/1
300 CAPSULE ORAL 2 TIMES DAILY
Status: DISCONTINUED | OUTPATIENT
Start: 2017-11-25 | End: 2017-11-25 | Stop reason: HOSPADM

## 2017-11-25 RX ORDER — CEFDINIR 300 MG/1
300 CAPSULE ORAL 2 TIMES DAILY
Qty: 20 CAPSULE | Refills: 0 | Status: SHIPPED | OUTPATIENT
Start: 2017-11-26 | End: 2017-12-06

## 2017-11-25 RX ORDER — DILTIAZEM HYDROCHLORIDE 240 MG/1
240 CAPSULE, COATED, EXTENDED RELEASE ORAL DAILY
Qty: 30 CAPSULE | Refills: 5 | Status: SHIPPED | OUTPATIENT
Start: 2017-11-26 | End: 2018-03-03 | Stop reason: SDUPTHER

## 2017-11-25 RX ADMIN — PREDNISONE 10 MG: 10 TABLET ORAL at 17:46

## 2017-11-25 RX ADMIN — PREDNISONE 10 MG: 10 TABLET ORAL at 08:45

## 2017-11-25 RX ADMIN — DILTIAZEM HYDROCHLORIDE 240 MG: 240 CAPSULE, COATED, EXTENDED RELEASE ORAL at 08:44

## 2017-11-25 RX ADMIN — DIGOXIN 125 MCG: 125 TABLET ORAL at 08:44

## 2017-11-25 RX ADMIN — LEVOFLOXACIN 500 MG: 500 TABLET, FILM COATED ORAL at 13:30

## 2017-11-25 RX ADMIN — TIOTROPIUM BROMIDE 18 MCG: 18 CAPSULE ORAL; RESPIRATORY (INHALATION) at 07:58

## 2017-11-25 RX ADMIN — FLUTICASONE PROPIONATE AND SALMETEROL 1 PUFF: 50; 250 POWDER RESPIRATORY (INHALATION) at 07:58

## 2017-11-25 RX ADMIN — CEFDINIR 300 MG: 300 CAPSULE ORAL at 15:38

## 2017-11-25 RX ADMIN — DOCUSATE SODIUM 100 MG: 100 CAPSULE, LIQUID FILLED ORAL at 08:44

## 2017-11-25 NOTE — DISCHARGE SUMMARY
Discharge Summary - Jane Ramos 65 y o  female MRN: [id number]    Unit/Bed#: [address] [place] [address] Encounter: [id number]    Admission Date: 11/22/2017     Admitting Diagnosis: Cough [R05]  Fever [R50 9]  Acute respiratory failure with hypoxia (HCC) [J96 01]  Sepsis (HCC) [A41 9]  Pneumonia of right lower lobe due to infectious organism ([address] ) [J18 1]    HPI:  69-year-old female admitted with fever respiratory failure and found to have sepsis related to right lower lobe pneumonia  Consults none    Procedures Performed:   Orders Placed This Encounter   Procedures    ED ECG Documentation Only       Hospital Course:  Patient was admitted and placed on empiric antibiotic therapy with Levaquin  Eventually her admission  blood cultures showed 1 of 2 positive showing Haemophilus influenzae  She was switch to oral cefdinir despite her history of penicillin allergy which was questionable rash  She was tolerating this will be discharged on an additional six days of oral antibiotics  Significant Findings, Care, Treatment and Services Provided:   CHEST     INDICATION:  Chest congestion and shortness of breath and cough    COMPARISON:  July 16, 2012    VIEWS:   AP frontal    IMAGES:  1    FINDINGS:         Cardiomediastinal silhouette appears unremarkable  There is a right-sided lung infiltrate probably involving middle lobe and at least a portion of lower lobe which is new from previous exam and is concerning for pneumonia   Left lung remains clear   There is blunting of the right costophrenic angle which   suggests the presence of pleural fluid   There is no pneumothorax  Visualized osseous structures appear within normal limits for the patient's age     Impression:       Right-sided pulmonary infiltrate with small right effusion   Findings concerning for pneumonia   Recommend continued follow-up       G    Complications: none  Discharge Diagnosis: Principal Problem:    Pneumonia  Active Problems:    Acute respiratory failure with hypoxia (HCC)    COPD (chronic obstructive pulmonary disease) ([facility] )    Hypertension    Hypercoagulable state ([facility] )    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[facility] )        Condition at Discharge: good     Discharge instructions/Information to patient and family:   See after visit summary for information provided to patient and family  Provisions for Follow-Up Care:  See after visit summary for information related to follow-up care and any pertinent home health orders  Disposition: Home    Planned Readmission: No    Discharge Statement   I spent 25 minutes discharging the patient  This time was spent on the day of discharge  I had direct contact with the patient on the day of discharge  Discharge Medications:  See after visit summary for reconciled discharge medications provided to patient and family          Roseann Nicholas DO

## 2017-11-27 LAB — BACTERIA BLD CULT: NORMAL

## 2017-11-29 LAB — BACTERIA BLD CULT: NORMAL

## 2017-12-01 ENCOUNTER — ALLSCRIPTS OFFICE VISIT (OUTPATIENT)
Dept: OTHER | Facility: OTHER | Age: 77
End: 2017-12-01

## 2017-12-01 DIAGNOSIS — J18.9 PNEUMONIA: ICD-10-CM

## 2017-12-01 DIAGNOSIS — I48.91 ATRIAL FIBRILLATION (HCC): ICD-10-CM

## 2017-12-27 ENCOUNTER — HOSPITAL ENCOUNTER (OUTPATIENT)
Dept: RADIOLOGY | Facility: HOSPITAL | Age: 77
Discharge: HOME | End: 2017-12-27
Payer: MEDICARE

## 2017-12-27 ENCOUNTER — TRANSCRIBE ORDERS (OUTPATIENT)
Dept: ADMINISTRATIVE | Facility: HOSPITAL | Age: 77
End: 2017-12-27

## 2017-12-27 ENCOUNTER — GENERIC CONVERSION - ENCOUNTER (OUTPATIENT)
Dept: OTHER | Facility: OTHER | Age: 77
End: 2017-12-27

## 2017-12-27 ENCOUNTER — APPOINTMENT (OUTPATIENT)
Dept: LAB | Facility: HOSPITAL | Age: 77
End: 2017-12-27
Payer: MEDICARE

## 2017-12-27 DIAGNOSIS — I48.91 ATRIAL FIBRILLATION (HCC): ICD-10-CM

## 2017-12-27 DIAGNOSIS — J18.9 PNEUMONIA: ICD-10-CM

## 2017-12-27 LAB
INR PPP: 3.9 (ref 0.86–1.16)
PROTHROMBIN TIME: 38.3 SECONDS (ref 12.1–14.4)

## 2017-12-27 PROCEDURE — 36415 COLL VENOUS BLD VENIPUNCTURE: CPT

## 2017-12-27 PROCEDURE — 71020 HB CHEST X-RAY 2VW FRONTAL&LATL: CPT

## 2017-12-27 PROCEDURE — 85610 PROTHROMBIN TIME: CPT

## 2017-12-29 ENCOUNTER — GENERIC CONVERSION - ENCOUNTER (OUTPATIENT)
Dept: OTHER | Facility: OTHER | Age: 77
End: 2017-12-29

## 2018-01-02 ENCOUNTER — GENERIC CONVERSION - ENCOUNTER (OUTPATIENT)
Dept: OTHER | Facility: OTHER | Age: 78
End: 2018-01-02

## 2018-01-10 NOTE — RESULT NOTES
Discussion/Summary   INR is 1 38  Is pt taking her coumadin as directed because this is very low   If she is compliant I will need to change the dosing     Verified Results  (1) PT WITH INR [id number] 83:68GO Kylie Persaud     Test Name Result Flag Reference   INR 1 38 H 0 86-1 16   PT 17 0 seconds H 12 1-14 4

## 2018-01-12 NOTE — RESULT NOTES
Discussion/Summary   hold coumadin for 2 days   decrease to 2 5mg M&W 5mg rest of week and check INR in 2-3 weeks     Verified Results  (1) PT WITH INR [id number] 55:38UM Branford Fallow     Test Name Result Flag Reference   INR 3 63 H 0 86-1 16   PT 36 7 seconds H 12 1-14 4

## 2018-01-12 NOTE — RESULT NOTES
Discussion/Summary   Hold Coumadin for 2 days    Restart same dose and recheck INR in 2 weeks after restart     Verified Results  (1) PT WITH INR [id number] 52:52ZC Nicole Mantle     Test Name Result Flag Reference   INR 3 53 H 0 86-1 16   PT 35 9 seconds H 12 1-14 4

## 2018-01-13 VITALS
BODY MASS INDEX: 23.98 KG/M2 | HEIGHT: 61 IN | SYSTOLIC BLOOD PRESSURE: 160 MMHG | WEIGHT: 127 LBS | HEART RATE: 80 BPM | DIASTOLIC BLOOD PRESSURE: 80 MMHG | TEMPERATURE: 97 F | RESPIRATION RATE: 16 BRPM

## 2018-01-14 VITALS
WEIGHT: 120.25 LBS | DIASTOLIC BLOOD PRESSURE: 64 MMHG | SYSTOLIC BLOOD PRESSURE: 148 MMHG | RESPIRATION RATE: 24 BRPM | HEART RATE: 86 BPM | TEMPERATURE: 100.7 F | BODY MASS INDEX: 23.1 KG/M2

## 2018-01-15 NOTE — RESULT NOTES
Verified Results  (1) PT WITH INR [id number] 36:41LS Aaron Lemon     Test Name Result Flag Reference   INR 1 96 H 0 86-1 16   PT 21 5 seconds H 11 8-14 1       Discussion/Summary   no changes to coumadin   recheck bw in 4 weeks

## 2018-01-15 NOTE — RESULT NOTES
Discussion/Summary   No changes to Coumadin dosing    Recheck INR in 4 weeks     Verified Results  (1) PT WITH INR [id number] 16:17CT Lindstrom Precise     Test Name Result Flag Reference   INR 2 78 H 0 86-1 16   PT 29 7 seconds H 12 1-14 4

## 2018-01-16 NOTE — RESULT NOTES
Verified Results  (1) CBC/PLT/DIFF [id number] 31:72RX Brigitte Arita     Test Name Result Flag Reference   WBC COUNT 4 64 Thousand/uL  4 31-10 16   RBC COUNT 4 35 Million/uL  3 81-5 12   HEMOGLOBIN 13 5 g/dL  11 5-15 4   HEMATOCRIT 41 1 %  34 8-46  1   MCV 95 fL  82-98   MCH 31 0 pg  26 8-34 3   MCHC 32 8 g/dL  31 4-37 4   RDW 12 9 %  11 6-15 1   MPV 9 5 fL  8 9-12 7   PLATELET COUNT 397 Thousands/uL  149-390   nRBC AUTOMATED 0 /100 WBCs     NEUTROPHILS RELATIVE PERCENT 63 %  43-75   LYMPHOCYTES RELATIVE PERCENT 28 %  14-44   MONOCYTES RELATIVE PERCENT 7 %  4-12   EOSINOPHILS RELATIVE PERCENT 2 %  0-6   BASOPHILS RELATIVE PERCENT 0 %  0-1   NEUTROPHILS ABSOLUTE COUNT 2 89 Thousands/µL  1 85-7 62   LYMPHOCYTES ABSOLUTE COUNT 1 31 Thousands/µL  0 60-4 47   MONOCYTES ABSOLUTE COUNT 0 32 Thousand/µL  0 17-1 22   EOSINOPHILS ABSOLUTE COUNT 0 08 Thousand/µL  0 00-0 61   BASOPHILS ABSOLUTE COUNT 0 01 Thousands/µL  0 00-0 10     (1) COMPREHENSIVE METABOLIC PANEL [id number] 02:78JN Aaron Lemon   National Kidney Disease Education Program recommendations are as follows:  GFR calculation is accurate only with a steady state creatinine  Chronic Kidney disease less than 60 ml/min/1 73 sq  meters  Kidney failure less than 15 ml/min/1 73 sq  meters       Test Name Result Flag Reference   GLUCOSE,RANDM 102 mg/dL     SODIUM 138 mmol/L  136-145   POTASSIUM 4 8 mmol/L  3 5-5 3   CHLORIDE 101 mmol/L  100-108   CARBON DIOXIDE 33 mmol/L H 21-32   ANION GAP (CALC) 4 mmol/L  4-13   BLOOD UREA NITROGEN 12 mg/dL  5-25   CREATININE 0 81 mg/dL  0 60-1 30   Standardized to IDMS reference method   CALCIUM 8 7 mg/dL  8 3-10 1   BILI, TOTAL 0 48 mg/dL  0 20-1 00   ALK PHOSPHATAS 72 U/L     ALT (SGPT) 21 U/L  12-78   AST(SGOT) 23 U/L  5-45   ALBUMIN 3 8 g/dL  3 5-5 0   TOTAL PROTEIN 7 4 g/dL  6 4-8 2   eGFR Non-African American      >60 0 ml/min/1 73sq m     (1) LIPID PANEL, FASTING [id number] 51:58EL Aaron Lemon   Triglyceride: Normal              <150 mg/dl       Borderline High    150-199 mg/dl       High               200-499 mg/dl       Very High          >499 mg/dl  Cholesterol:         Desirable        <200 mg/dl      Borderline High  200-239 mg/dl      High             >239 mg/dl  HDL Cholesterol:        High    >59 mg/dL      Low     <41 mg/dL     Test Name Result Flag Reference   CHOLESTEROL 174 mg/dL     HDL,DIRECT 47 mg/dL  40-60   LDL CHOLESTEROL CALCULATED 110 mg/dL H 0-100   TRIGLYCERIDES 86 mg/dL  <=150     (1) DIGOXIN [id number] 67:96CN Aaron Woods     Test Name Result Flag Reference   DIGOXIN 0 6 ng/mL L 0 8-2 0     (1) TSH [id number] 77:85LQ Aaron Lemon   Patients undergoing fluorescein dye angiography may retain small amounts of fluorescein in the body for 48-72 hours post procedure  Samples containing fluorescein can produce falsely depressed TSH values  If the patient had this procedure,a specimen should be resubmitted post fluorescein clearance  The recommended reference ranges for TSH during pregnancy are as follows:  First trimester 0 1 to 2 5 uIU/mL  Second trimester  0 2 to 3 0 uIU/mL  Third trimester 0 3 to 3 0 uIU/m     Test Name Result Flag Reference   TSH 3 690 uIU/mL  0 358-3 740       Discussion/Summary   bw shows digoxin level a little low  Please make sure pt is compliant with taking her Digoxin on a daily basis

## 2018-01-16 NOTE — RESULT NOTES
Discussion/Summary   No significant changes in bone density on latest DEXA scan  Continue dosing Prolia injections every 6 months     Verified Results  * DXA BONE DENSITY SPINE HIP AND PELVIS [id number] 22:47KH Lisy Gaytan Order Number: [id number]    - Patient Instructions: To schedule this appointment, please contact Central Scheduling at [phone]  Test Name Result Flag Reference   DXA BONE DENSITY SPINE HIP AND PELVIS (Report)     DXA SCAN     CLINICAL HISTORY: 34-year-old woman  Menopause at age 63  OTHER RISK FACTORS: History of vertebral compression fracture following a fall on wet grass  History of ankle fracture following a low level of injury  History of hip fracture in a parent COPD  TECHNIQUE: Bone densitometry was performed using a Hologic Horizon A bone densitometer  Regions of interest appear properly placed  COMPARISON: August 20, 2014  RESULTS:      LUMBAR SPINE L1-L4 (L3): BMD 0 829 gm/cm2 / T-score -1 9 / Z score 0 7   (Lumbar levels within parentheses [if any] represent vertebrae excluded from analysis due to local structural abnormalities or artifact)  LEFT TOTAL HIP: BMD 0 680 gm/cm2 / T-score -2 1 / Z score -0 2   LEFT FEMORAL NECK: BMD 0 517 gm/cm2 / T score -3 0 / Z score -0 8       CHANGE: Since the last DXA:   LUMBAR SPINE BMD has increased 0 012 gm/cm2 or 1 5%  This change is not statistically significant  HIP BMD has decreased 0 021 gm/cm2 or 3 0%  This change is not statistically significant  IMPRESSION:     1  Osteoporosis  2  Since a DXA study from August 20, 2014, there has been no statistically significant change in bone mineral density  2  The 10 year risk of hip fracture is 39% with the 10 year risk of major osteoporotic fracture being 52% as calculated by the [facility]/WHO fracture risk assessment tool (FRAX)        3  The current NOF guidelines recommend treating patients with a T-score of -2 5 or less in the lumbar spine or hips, or in post-menopausal women and men over the age of 48 with low bone mass (osteopenia) and a FRAX 10 year risk score of >3% for hip    fracture and/or >20% for major osteoporotic fracture  4  A daily intake of at least 1200 mg calcium and vitamin D 800- 1000 IU, as well as weight bearing and muscle strengthening exercise, fall prevention and avoidance of tobacco and excessive alcohol as preventive measurements are suggested  5  Follow-up DXA in two years is recommended for most patients  A one year follow-up DXA is recommended after initiation or change in therapy for osteoporosis  More frequent evaluation is also appropriate for patients with conditions associated with    rapid bone loss, such as glucocorticoid therapy  The FRAX tool has not been validated in patients currently or previously treated with pharmacotherapy for osteoporosis  In such patients, clinical judgment must be exercised in interpreting FRAX scores  It is not appropriate to use FRAX to monitor    treatment response         WHO CLASSIFICATION:   Normal (a T-score of -1 0 or higher)   Low bone mineral density (a T-score of less than -1 0 but higher than -2 5)   Osteoporosis (a T-score of -2 5 or less)   Severe osteoporosis (a T-score of -2 5 or less with a fragility fracture)     Least significant change (lumbar spine): 0 022 g/cm2; 2 3%   Least significant change (total hip): 0 022 g/cm2; 3 2%   Least significant change (forearm): 0 008 g/cm2; 1 2%       Workstation performed: [id number]     Signed by:   Tia Figueroa MD   5/3/17

## 2018-01-22 VITALS
WEIGHT: 123.38 LBS | DIASTOLIC BLOOD PRESSURE: 62 MMHG | SYSTOLIC BLOOD PRESSURE: 122 MMHG | BODY MASS INDEX: 23.29 KG/M2 | TEMPERATURE: 97.5 F | HEART RATE: 80 BPM | RESPIRATION RATE: 16 BRPM | HEIGHT: 61 IN

## 2018-01-22 VITALS — TEMPERATURE: 98.2 F

## 2018-01-23 NOTE — PROGRESS NOTES
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)   2  Asthma (493 90) (J45 909)   3  Cellulitis (682 9) (L03 90)   4  Chronic obstructive pulmonary disease (496) (J44 9)   5  Hereditary hemolytic anemia (282 9) (D58 9)   6  Hip Sprain (843 9)   7  Need for prophylactic vaccination and inoculation against influenza (V04 81) (Z23)   8  Need for vaccination with 13-polyvalent pneumococcal conjugate vaccine (V03 82) (Z23)   9  Osteoporosis (733 00) (M81 0)   10  Phlebitis or thrombophlebitis of deep vessel of lower extremity (451 19) (I80 209)   11  Reactive airway disease (493 90) (J45 909)   12  Tick bite (919 4,E906 4) (W57 XXXA)   13  Visit for screening mammogram (V76 12) (Z12 31)   14  Vitamin D deficiency (268 9) (E55 9)    Atrial fibrillation (427 31) (I48 91)          Current Meds   1  Advair Diskus 250-50 MCG/DOSE Inhalation Aerosol Powder Breath Activated; USE   ONE INHALATION TWO TIMES A DAY; Therapy: 21BVG0409 to (Last Rx:01Apr2017)  Requested for: 01Apr2017 Ordered   2  Albuterol Sulfate (2 5 MG/3ML) 0 083% Inhalation Nebulization Solution; USE ONE VIAL   IN NEBULIZER EVERY 4 TO 6 HOURS AS NEEDED FOR  COUGH    ANDWHEEZE;   Therapy: (Recorded:53Hrg3418) to Recorded   3  Cartia  MG Oral Capsule Extended Release 24 Hour; take 1 capsule daily; Therapy: 61CTQ2781 to (Last Rx:01Apr2017)  Requested for: 01Apr2017 Ordered   4  Digoxin 250 MCG Oral Tablet; Take 1 tablet daily; Therapy: 29ZVM0562 to (Last Rx:01Apr2017)  Requested for: 01Apr2017 Ordered   5  DilTIAZem HCl ER Coated Beads 180 MG Oral Capsule Extended Release 24 Hour;   take 1 capsule daily; Therapy: 70EZR8960 to (Last Rx:28Nov2014)  Requested for: 37FYB0953 Ordered   6  Multivitamins Oral Capsule; TAKE 1 CAPSULE Daily; Therapy: (Recorded:74Vyx6454) to Recorded   7   Prolia 60 MG/ML Subcutaneous Solution; INJECT SUBCUTANEOUSLY  60 MG / 1 ML   EVERY 6 MONTHS; To Be Done: 35MLQ0899; Status: HOLD FOR - Administration   Ordered   8  Spiriva HandiHaler 18 MCG Inhalation Capsule; INHALE THE CONTENTS OF 1   CAPSULE DAILY AS DIRECTED; Therapy: 99HWR2946 to (Last Rx:01Apr2017)  Requested for: 01Apr2017 Ordered   9  Warfarin Sodium 5 MG Oral Tablet; Take 1 tablet daily; Therapy: 43TCQ4911 to (Last Rx:01Apr2017)  Requested for: 01Apr2017 Ordered    Allergies    1  Penicillins   2   Sulfa Drugs    Vitals  Signs    Temperature: 98 2 F    Plan  Need for prophylactic vaccination and inoculation against influenza    · Fluzone High-Dose 0 5 ML Intramuscular Suspension Prefilled Syringe  Osteoporosis    · Prolia 60 MG/ML Subcutaneous Solution    Signatures   Electronically signed by : MADDIE Marin ; Dec  6 1616  5:20PM EST                       (Author)

## 2018-01-23 NOTE — RESULT NOTES
Discussion/Summary   hold coumadin for 2 days then restart at 2 5mg M,W,F and 5mg rest of week   recheck INR in 2-3 weeks     Verified Results  (1) PT WITH INR [id number] 39:81GF Emiliano Burkett Order Number: [id number]     Test Name Result Flag Reference   INR 3 90 H 0 86-1 16   PT 38 3 seconds H 12 1-14 4

## 2018-01-23 NOTE — MISCELLANEOUS
Assessment    1  Community acquired pneumonia [phone]) (J18 9)   2  Atrial fibrillation (427 31) (I48 91)    Plan  Asthma    · Albuterol Sulfate (2 5 MG/3ML) 0 083% Inhalation Nebulization Solution; INHALE 1   0 083% Inhalation   Rx By: Jyotsna Weeks; Dispense: 0 Days ; #:1 X 3 ML Plas Cont (25 Plas Conts); Refill: 3; For: Asthma; BING = N; Verified Transmission to [facility]; Last Updated By: System, [employer]; 12/1/2017 2:50:06 PM  Atrial fibrillation    · Cartia  MG Oral Capsule Extended Release 24 Hour; take 1 capsule daily   Rx By: Jyotsna Weeks; Dispense: 0 Days ; #:90 Capsule Extended Release 24 Hour; Refill: 0; For: Atrial fibrillation; BING = N; Verified Transmission to [facility]; Last Updated By: System, [employer]; 12/1/2017 2:50:06 PM   · (1) PT WITH INR; Status:Active; Requested for:16Eqe3065;    Perform:[facility]; Due:57Wdy7606; Ordered; For:Atrial fibrillation; Ordered By:Aaron Lemon;  Community acquired pneumonia    · * XR CHEST PA & LATERAL; Status:Active; Requested for:77Xdg3737;    Perform:[facility]; [phone]; Ordered; For:Community acquired pneumonia; Ordered By:Aaron Lemon;    Discussion/Summary  Discussion Summary:   Pneumonia  Patient given a prescription to obtain follow-up chest x-ray in 4 weeks to observe for resolution  Atrial fibrillation  Patient given prescription to obtain INR testing in 2 weeks  Hypertension  Patient's blood pressure is stable at this time she will continue with current regimen of medication similar to upon discharge  Chief Complaint  Chief Complaint Free Text Note Form: ELIANA NOTE: Admitted to [facility] 11/22/17 for Cough/Fever/Acute respiratory failure with hypoxia/Sepsis/Pneumonia RRL and D/C with Dx of Pneumonia  Spoke with pt, she denies fever/SOB or chest pain  Cough persists but very much decreased and not mucus to expectorate  Appt for 12/1/17 @ 2PM with Dr Karely Link  History of Present Illness  TCM Communication [place]: The patient is being contacted for follow-up after hospitalization and 11/27/17  Hospital records were reviewed  She was hospitalized at and [facility]  The date of admission: 11/22/17, date of discharge: 11/25/17  Diagnosis: Pneumonia RLL  She was discharged to home  Medications reviewed and updated today  She scheduled a follow up appointment  Symptoms: cough, but no shortness of breath and no chest pain  The patient is currently experiencing symptoms  Counseling was provided to the patient  Topics counseled included instructions for management and importance of compliance with treatment  Communication performed and completed by Charlene Turner LPN       HPI: I reviewed ELIANA note as well as discharge summary from the hospital  Patient appears to feel back to her baseline  She denies any fevers since being discharged from the hospital  She is back to driving short distances  She denies any significant coughing or shortness of breath  Review of Systems  Complete-Female:   Constitutional: No fever, no chills, feels well, no tiredness, no recent weight gain or weight loss  ENT: no complaints of earache, no loss of hearing, no nose bleeds, no nasal discharge, no sore throat, no hoarseness  Cardiovascular: No complaints of slow heart rate, no fast heart rate, no chest pain, no palpitations, no leg claudication, no lower extremity edema  Respiratory: No complaints of shortness of breath, no wheezing, no cough, no SOB on exertion, no orthopnea, no PND  Gastrointestinal: No complaints of abdominal pain, no constipation, no nausea or vomiting, no diarrhea, no bloody stools  Genitourinary: No complaints of dysuria, no incontinence, no pelvic pain, no dysmenorrhea, no vaginal discharge or bleeding  Musculoskeletal: No complaints of arthralgias, no myalgias, no joint swelling or stiffness, no limb pain or swelling     Integumentary: No complaints of skin rash or lesions, no itching, no skin wounds, no breast pain or lump  Active Problems    1  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)   2  Asthma (493 90) (J45 909)   3  Atrial fibrillation (427 31) (I48 91)   4  Cellulitis (682 9) (L03 90)   5  Chronic obstructive pulmonary disease (496) (J44 9)   6  Cough (786 2) (R05)   7  Hereditary hemolytic anemia (282 9) (D58 9)   8  Hip Sprain (843 9)   9  Need for prophylactic vaccination and inoculation against influenza (V04 81) (Z23)   10  Need for vaccination with 13-polyvalent pneumococcal conjugate vaccine (V03 82) (Z23)   11  Osteoporosis (733 00) (M81 0)   12  Phlebitis or thrombophlebitis of deep vessel of lower extremity (451 19) (I80 209)   13  Reactive airway disease (493 90) (J45 909)   14  Shortness of breath (786 05) (R06 02)   15  Tick bite (919 4,E906 4) (W57 XXXA)   16  Visit for screening mammogram (V76 12) (Z12 31)   17  Vitamin D deficiency (268 9) (E55 9)    Past Medical History    1  History of Blood type O+ (V49 89) (Z67 40)   2  History of Encounter for screening colonoscopy (V76 51) (Z12 11)   3  History of Fracture Of The Ankle (824 8)   4  History of Visit for screening mammogram (V76 12) (Z12 31)    Surgical History    1  History of Oophorectomy    Family History  Mother    1  Family history of hypertension (V17 49) (Z82 49)   2  Family history of skin cancer (V16 8) (Z80 8)  Father    3  Family history of hypertension (V17 49) (Z82 49)    Social History    · Never A Smoker  Social History Reviewed: The social history was reviewed and updated today  Current Meds   1  Advair Diskus 250-50 MCG/DOSE Inhalation Aerosol Powder Breath Activated; USE ONE   INHALATION TWO TIMES A DAY; Therapy: 38LUH6781 to (Last Rx:01Apr2017)  Requested for: 01Apr2017 Ordered   2   Albuterol Sulfate (2 5 MG/3ML) 0 083% Inhalation Nebulization Solution; USE ONE VIAL   IN NEBULIZER EVERY 4 TO 6 HOURS AS NEEDED FOR  COUGH    ANDWHEEZE;   Therapy: (Recorded:82Nag7463) to Recorded   3  Cartia  MG Oral Capsule Extended Release 24 Hour; Therapy: 98TBW3336 to  Requested for: 48IHQ8875 Recorded   4  Digoxin 250 MCG Oral Tablet; Take 1 tablet daily; Therapy: 38OLX0493 to (Last Rx:01Apr2017)  Requested for: 01Apr2017 Ordered   5  DilTIAZem HCl ER Coated Beads 180 MG Oral Capsule Extended Release 24 Hour;   take 1 capsule daily; Therapy: 23XPE6692 to (Last Rx:22Iiv4344)  Requested for: 59VKF6566 Ordered   6  Multivitamins Oral Capsule; TAKE 1 CAPSULE Daily; Therapy: (Recorded:87Nyj6081) to Recorded   7  Prolia 60 MG/ML Subcutaneous Solution; INJECT SUBCUTANEOUSLY  60 MG / 1 ML   EVERY 6 MONTHS; To Be Done: 92DMR3827; Status: HOLD FOR - Administration   Ordered   8  Spiriva HandiHaler 18 MCG Inhalation Capsule; INHALE THE CONTENTS OF 1   CAPSULE DAILY AS DIRECTED; Therapy: 75FOU2411 to (Last Rx:01Apr2017)  Requested for: 01Apr2017 Ordered   9  Warfarin Sodium 5 MG Oral Tablet; Take 1 tablet daily; Therapy: 59ENW2565 to (Last Rx:01Apr2017)  Requested for: 01Apr2017 Ordered  Medication List Reviewed: The medication list was reviewed and updated today  Allergies    1  Penicillins   2  Sulfa Drugs    Vitals  Signs   Recorded: 27BKT4492 02:06PM   Temperature: 97 5 F  Heart Rate: 80  Respiration: 16  Systolic: 325  Diastolic: 62  Height: 5 ft 0 5 in  Weight: 123 lb 6 oz  BMI Calculated: 23 7  BSA Calculated: 1 53    Physical Exam    Constitutional   General appearance: No acute distress, well appearing and well nourished  Ears, Nose, Mouth, and Throat   External inspection of ears and nose: Normal     Otoscopic examination: Tympanic membranes translucent with normal light reflex  Canals patent without erythema  Oropharynx: Normal with no erythema, edema, exudate or lesions  Pulmonary   Respiratory effort: No increased work of breathing or signs of respiratory distress  Auscultation of lungs: Clear to auscultation      Cardiovascular Auscultation of heart: Abnormal   Chronic atrial fibrillation  Lymphatic   Palpation of lymph nodes in neck: No lymphadenopathy  Health Management  Health Maintenance   Medicare Annual Wellness Visit; every 1 year; Next Due: 49PGT0462;  Active    Signatures   Electronically signed by : MADDIE Reyna ; Dec  1 7999  7:29PM EST                       (Author)

## 2018-01-23 NOTE — RESULT NOTES
Discussion/Summary   Chest x-ray shows no evidence of previous pneumonia     Verified Results  * XR CHEST PA & LATERAL 29HJC5570 82:04EN [facility] Order Number: [id number]     Test Name Result Flag Reference   XR CHEST PA & LATERAL (Report)     CHEST DUAL ENERGY     INDICATION: Cough  Smoker  Follow-up for pneumonia  COMPARISON: 11/22/2017 and 7/16/2012     VIEWS: PA (including soft tissue and bone algorithms) and lateral projections; 4 images     FINDINGS:        Cardiomediastinal silhouette appears unremarkable  The lungs have cleared  Lungs are hyperinflated  No pneumothorax or pleural effusion  Visualized osseous structures appear stable with chronic compression deformities throughout the thoracic spine  IMPRESSION:     COPD  Interval clearing of right-sided infiltrate  Workstation performed: [id number]     Signed by:    Tarsha Dela Cruz MD   12/29/17

## 2018-01-24 VITALS
SYSTOLIC BLOOD PRESSURE: 122 MMHG | HEART RATE: 84 BPM | BODY MASS INDEX: 23.65 KG/M2 | TEMPERATURE: 99.6 F | WEIGHT: 125.25 LBS | HEIGHT: 61 IN | RESPIRATION RATE: 17 BRPM | DIASTOLIC BLOOD PRESSURE: 60 MMHG

## 2018-03-03 DIAGNOSIS — I10 ESSENTIAL HYPERTENSION: Primary | ICD-10-CM

## 2018-03-05 RX ORDER — DILTIAZEM HYDROCHLORIDE 240 MG/1
CAPSULE, EXTENDED RELEASE ORAL
Qty: 90 CAPSULE | Refills: 1 | Status: SHIPPED | OUTPATIENT
Start: 2018-03-05 | End: 2018-08-29 | Stop reason: SDUPTHER

## 2018-03-27 DIAGNOSIS — I48.20 CHRONIC ATRIAL FIBRILLATION (HCC): ICD-10-CM

## 2018-03-27 DIAGNOSIS — J45.909 MODERATE ASTHMA, UNSPECIFIED WHETHER COMPLICATED, UNSPECIFIED WHETHER PERSISTENT: Primary | ICD-10-CM

## 2018-03-27 RX ORDER — DIGOXIN 250 MCG
TABLET ORAL
Qty: 90 TABLET | Refills: 3 | Status: SHIPPED | OUTPATIENT
Start: 2018-03-27 | End: 2019-03-22 | Stop reason: SDUPTHER

## 2018-03-27 RX ORDER — TIOTROPIUM BROMIDE 18 UG/1
CAPSULE ORAL; RESPIRATORY (INHALATION)
Qty: 90 EACH | Refills: 3 | Status: SHIPPED | OUTPATIENT
Start: 2018-03-27 | End: 2019-03-06 | Stop reason: SDUPTHER

## 2018-03-27 RX ORDER — WARFARIN SODIUM 5 MG/1
TABLET ORAL
Qty: 90 TABLET | Refills: 3 | Status: SHIPPED | OUTPATIENT
Start: 2018-03-27 | End: 2019-03-22 | Stop reason: SDUPTHER

## 2018-06-08 ENCOUNTER — TRANSCRIBE ORDERS (OUTPATIENT)
Dept: LAB | Facility: CLINIC | Age: 78
End: 2018-06-08

## 2018-06-08 ENCOUNTER — ANTICOAG VISIT (OUTPATIENT)
Dept: FAMILY MEDICINE CLINIC | Facility: CLINIC | Age: 78
End: 2018-06-08

## 2018-06-08 ENCOUNTER — TELEPHONE (OUTPATIENT)
Dept: FAMILY MEDICINE CLINIC | Facility: CLINIC | Age: 78
End: 2018-06-08

## 2018-06-08 ENCOUNTER — APPOINTMENT (OUTPATIENT)
Dept: LAB | Facility: CLINIC | Age: 78
End: 2018-06-08
Payer: MEDICARE

## 2018-06-08 DIAGNOSIS — I48.91 ATRIAL FIBRILLATION, UNSPECIFIED TYPE (HCC): Primary | ICD-10-CM

## 2018-06-08 DIAGNOSIS — I48.91 ATRIAL FIBRILLATION, UNSPECIFIED TYPE (HCC): ICD-10-CM

## 2018-06-08 LAB
INR PPP: 3.2 (ref 0.86–1.17)
INR PPP: 3.2 (ref 0.86–1.17)
PROTHROMBIN TIME: 32.8 SECONDS (ref 11.8–14.2)

## 2018-06-08 PROCEDURE — 85610 PROTHROMBIN TIME: CPT

## 2018-06-08 PROCEDURE — 36415 COLL VENOUS BLD VENIPUNCTURE: CPT

## 2018-06-08 NOTE — TELEPHONE ENCOUNTER
----- Message from Magali Rinne, MD sent at 9/8/8585  1:17 PM EDT -----  Hold Coumadin for 1 day    Restart same dose and recheck INR in 2 weeks

## 2018-06-17 DIAGNOSIS — J44.9 CHRONIC OBSTRUCTIVE PULMONARY DISEASE, UNSPECIFIED COPD TYPE (HCC): Primary | ICD-10-CM

## 2018-06-25 ENCOUNTER — ANTICOAG VISIT (OUTPATIENT)
Dept: FAMILY MEDICINE CLINIC | Facility: CLINIC | Age: 78
End: 2018-06-25

## 2018-06-25 ENCOUNTER — APPOINTMENT (OUTPATIENT)
Dept: LAB | Facility: CLINIC | Age: 78
End: 2018-06-25
Payer: MEDICARE

## 2018-06-25 ENCOUNTER — TELEPHONE (OUTPATIENT)
Dept: FAMILY MEDICINE CLINIC | Facility: CLINIC | Age: 78
End: 2018-06-25

## 2018-06-25 ENCOUNTER — OFFICE VISIT (OUTPATIENT)
Dept: FAMILY MEDICINE CLINIC | Facility: CLINIC | Age: 78
End: 2018-06-25
Payer: MEDICARE

## 2018-06-25 VITALS
RESPIRATION RATE: 20 BRPM | HEIGHT: 61 IN | WEIGHT: 124 LBS | BODY MASS INDEX: 23.41 KG/M2 | DIASTOLIC BLOOD PRESSURE: 70 MMHG | TEMPERATURE: 97.1 F | SYSTOLIC BLOOD PRESSURE: 120 MMHG | HEART RATE: 84 BPM

## 2018-06-25 DIAGNOSIS — D68.59 HYPERCOAGULABLE STATE (HCC): ICD-10-CM

## 2018-06-25 DIAGNOSIS — I48.21 PERMANENT ATRIAL FIBRILLATION (HCC): ICD-10-CM

## 2018-06-25 DIAGNOSIS — I48.21 PERMANENT ATRIAL FIBRILLATION (HCC): Primary | ICD-10-CM

## 2018-06-25 DIAGNOSIS — I10 ESSENTIAL HYPERTENSION: ICD-10-CM

## 2018-06-25 DIAGNOSIS — D68.2 HYPOPROTHROMBINEMIA (HCC): ICD-10-CM

## 2018-06-25 DIAGNOSIS — L98.9 SKIN LESION: ICD-10-CM

## 2018-06-25 DIAGNOSIS — J44.9 CHRONIC OBSTRUCTIVE PULMONARY DISEASE, UNSPECIFIED COPD TYPE (HCC): ICD-10-CM

## 2018-06-25 LAB
ALBUMIN SERPL BCP-MCNC: 4 G/DL (ref 3.5–5)
ALP SERPL-CCNC: 94 U/L (ref 46–116)
ALT SERPL W P-5'-P-CCNC: 20 U/L (ref 12–78)
ANION GAP SERPL CALCULATED.3IONS-SCNC: 5 MMOL/L (ref 4–13)
AST SERPL W P-5'-P-CCNC: 22 U/L (ref 5–45)
BILIRUB SERPL-MCNC: 0.44 MG/DL (ref 0.2–1)
BUN SERPL-MCNC: 11 MG/DL (ref 5–25)
CALCIUM SERPL-MCNC: 9.6 MG/DL (ref 8.3–10.1)
CHLORIDE SERPL-SCNC: 95 MMOL/L (ref 100–108)
CHOLEST SERPL-MCNC: 161 MG/DL (ref 50–200)
CO2 SERPL-SCNC: 33 MMOL/L (ref 21–32)
CREAT SERPL-MCNC: 0.88 MG/DL (ref 0.6–1.3)
ERYTHROCYTE [DISTWIDTH] IN BLOOD BY AUTOMATED COUNT: 14.3 % (ref 11.6–15.1)
GFR SERPL CREATININE-BSD FRML MDRD: 63 ML/MIN/1.73SQ M
GLUCOSE P FAST SERPL-MCNC: 101 MG/DL (ref 65–99)
HCT VFR BLD AUTO: 42 % (ref 34.8–46.1)
HDLC SERPL-MCNC: 49 MG/DL (ref 40–60)
HGB BLD-MCNC: 14.1 G/DL (ref 11.5–15.4)
INR PPP: 2.53 (ref 0.86–1.17)
INR PPP: 2.53 (ref 0.86–1.17)
LDLC SERPL CALC-MCNC: 101 MG/DL (ref 0–100)
MCH RBC QN AUTO: 32.9 PG (ref 26.8–34.3)
MCHC RBC AUTO-ENTMCNC: 33.6 G/DL (ref 31.4–37.4)
MCV RBC AUTO: 98 FL (ref 82–98)
NONHDLC SERPL-MCNC: 112 MG/DL
PLATELET # BLD AUTO: 281 THOUSANDS/UL (ref 149–390)
PMV BLD AUTO: 10.7 FL (ref 8.9–12.7)
POTASSIUM SERPL-SCNC: 5.1 MMOL/L (ref 3.5–5.3)
PROT SERPL-MCNC: 7.9 G/DL (ref 6.4–8.2)
PROTHROMBIN TIME: 27.3 SECONDS (ref 11.8–14.2)
RBC # BLD AUTO: 4.28 MILLION/UL (ref 3.81–5.12)
SODIUM SERPL-SCNC: 133 MMOL/L (ref 136–145)
TRIGL SERPL-MCNC: 55 MG/DL
TSH SERPL DL<=0.05 MIU/L-ACNC: 4.99 UIU/ML (ref 0.36–3.74)
WBC # BLD AUTO: 5.82 THOUSAND/UL (ref 4.31–10.16)

## 2018-06-25 PROCEDURE — 80053 COMPREHEN METABOLIC PANEL: CPT

## 2018-06-25 PROCEDURE — 85610 PROTHROMBIN TIME: CPT

## 2018-06-25 PROCEDURE — 85027 COMPLETE CBC AUTOMATED: CPT

## 2018-06-25 PROCEDURE — 99214 OFFICE O/P EST MOD 30 MIN: CPT | Performed by: FAMILY MEDICINE

## 2018-06-25 PROCEDURE — 80061 LIPID PANEL: CPT

## 2018-06-25 PROCEDURE — 36415 COLL VENOUS BLD VENIPUNCTURE: CPT

## 2018-06-25 PROCEDURE — 84443 ASSAY THYROID STIM HORMONE: CPT

## 2018-06-25 PROCEDURE — G0439 PPPS, SUBSEQ VISIT: HCPCS | Performed by: FAMILY MEDICINE

## 2018-06-25 RX ORDER — LORATADINE 10 MG/1
10 TABLET ORAL DAILY
COMMUNITY

## 2018-06-25 NOTE — ASSESSMENT & PLAN NOTE
COPD   Patient appears stable at this time  She refused a rescue inhaler but will continue to use home nebulizers as needed  She will continue with Advair and Spiriva inhalers on a daily basis    Patient will call if she has any significant flare ups

## 2018-06-25 NOTE — PROGRESS NOTES
FAMILY PRACTICE OFFICE VISIT       NAME: Josue Sandoval  AGE: 77 y o  SEX: female       : 1940        MRN: [id number]    DATE: 2018  TIME: 11:55 AM    Assessment and Plan     Problem List Items Addressed This Visit     COPD (chronic obstructive pulmonary disease) ([facility] )     COPD  Patient appears stable at this time  She refused a rescue inhaler but will continue to use home nebulizers as needed  She will continue with Advair and Spiriva inhalers on a daily basis  Patient will call if she has any significant flare ups         Relevant Medications    loratadine (CLARITIN) 10 mg tablet    Hypertension     Hypertension  Patient blood pressure is stable at this time she will continue with current regimen of medications         Hypercoagulable state ([facility] )    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HCC)     Hypoprothrombinemia  Patient with chronic anticoagulation  She will continue to obtain INR testing as ordered and continue with current warfarin therapy         Skin lesion     Skin lesion  Patient with suspected seborrheic keratoses on her her nose  She was referred back to dermatologist for evaluation and possible cryotherapy           Other Visit Diagnoses     Permanent atrial fibrillation ([facility] )    -  Primary    Relevant Orders    Protime-INR    CBC    Comprehensive metabolic panel    Lipid panel    TSH, 3rd generation            There are no Patient Instructions on file for this visit  Chief Complaint     Chief Complaint   Patient presents with    Follow-up    Medicare Wellness Visit     subsequent       History of Present Illness     Patient denies any recent illness  She is due for her INR tested at this time  Review of Systems   Review of Systems   Constitutional: Negative  HENT: Negative  Respiratory:        Occasional flare-ups of her asthma for which patient uses home nebulizer machine  She is still on Advair and Spiriva inhalers on a daily basis   Cardiovascular: Negative  Gastrointestinal: Negative  Genitourinary: Negative  Musculoskeletal: Negative  Neurological: Negative  Active Problem List     Patient Active Problem List   Diagnosis    Pneumonia    COPD (chronic obstructive pulmonary disease) ([facility] )    [facility] with [facility]    Hypertension    Hypercoagulable state ([facility] )    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[facility] )    Skin lesion       Past Medical History:  Past Medical History:   Diagnosis Date    Blood type O+     Cardiac disease     Chronic pain     COPD (chronic obstructive pulmonary disease) (HCC)     Fracture of ankle     left    Hyperlipidemia     Hypertension        Past Surgical History:  Past Surgical History:   Procedure Laterality Date    BILATERAL OOPHORECTOMY Bilateral        Family History:  Family History   Problem Relation Age of Onset    Hypertension Mother     Skin cancer Mother     Hypertension Father        Social History:  Social History     Social History    Marital status:      Spouse name: N/A    Number of children: N/A    Years of education: N/A     Occupational History    Not on file  Social History Main Topics    Smoking status: Current Some Day Smoker     Types: Cigarettes    Smokeless tobacco: Never Used      Comment: Never smoker per [employer]    Alcohol use No    Drug use: No    Sexual activity: Not on file     Other Topics Concern    Not on file     Social History Narrative    No narrative on file       Objective     Vitals:    06/25/18 1140   BP: 120/70   Pulse:    Resp:    Temp:      Wt Readings from Last 3 Encounters:   06/25/18 56 2 kg (124 lb)   01/02/18 56 8 kg (125 lb 4 oz)   12/01/17 56 kg (123 lb 6 1 oz)       Physical Exam   Constitutional: No distress  HENT:   Mouth/Throat: Oropharynx is clear and moist  No oropharyngeal exudate     Tympanic membranes within normal limits bilaterally   Neck:   No carotid bruits   Cardiovascular:   Regular rate and rhythm with no murmurs Pulmonary/Chest:   Lungs are clear to auscultation without wheezes,rales, or rhonchi   Musculoskeletal:   Trace edema bilateral lower extremities   Lymphadenopathy:     She has no cervical adenopathy  Skin:   Patient with noticed to have a brownish dry papular lesion on tip of her nose    She states she did have this frozen off by dermatologist Dr Serene Schaefer in the past       Pertinent Laboratory/Diagnostic Studies:  Lab Results   Component Value Date    GLUCOSE 135 11/23/2017    BUN 12 11/23/2017    CREATININE 0 51 (L) 11/23/2017    CALCIUM 7 6 (L) 11/23/2017     11/23/2017    K 4 6 11/23/2017    CO2 26 11/23/2017     11/23/2017     Lab Results   Component Value Date    ALT 17 11/22/2017    AST 20 11/22/2017    ALKPHOS 82 11/22/2017    BILITOT 1 30 (H) 11/22/2017       Lab Results   Component Value Date    WBC 11 12 (H) 11/25/2017    HGB 11 1 (L) 11/25/2017    HCT 34 4 (L) 11/25/2017    MCV 95 11/25/2017     11/25/2017       No results found for: TSH    Lab Results   Component Value Date    CHOL 175 12/20/2016     Lab Results   Component Value Date    TRIG 104 12/20/2016     Lab Results   Component Value Date    HDL 52 12/20/2016     Lab Results   Component Value Date    LDLCALC 102 (H) 12/20/2016     No results found for: HGBA1C    Results for orders placed or performed in visit on 06/08/18   Protime-INR   Result Value Ref Range    INR 3 20 (A) 0 86 - 1 17       Orders Placed This Encounter   Procedures    Protime-INR    CBC    Comprehensive metabolic panel    Lipid panel    TSH, 3rd generation       ALLERGIES:  Allergies   Allergen Reactions    Penicillins Rash    Sulfa Antibiotics Rash       Current Medications     Current Outpatient Prescriptions   Medication Sig Dispense Refill    ADVAIR DISKUS 250-50 MCG/DOSE inhaler USE ONE INHALATION TWO TIMES A  each 3    albuterol (2 5 mg/3 mL) 0 083 % nebulizer solution Take 2 5 mg by nebulization every 6 (six) hours as needed for wheezing      CARTIA  MG 24 hr capsule TAKE 1 CAPSULE DAILY 90 capsule 1    denosumab (PROLIA) 60 mg/mL Inject 60 mg under the skin once 6 months      digoxin (LANOXIN) 0 25 mg tablet TAKE 1 TABLET DAILY 90 tablet 3    loratadine (CLARITIN) 10 mg tablet Take 10 mg by mouth daily      multivitamin (THERAGRAN) TABS Take 1 tablet by mouth daily      SPIRIVA HANDIHALER 18 MCG inhalation capsule INHALE THE CONTENTS OF 1 CAPSULE DAILY AS DIRECTED 90 each 3    warfarin (COUMADIN) 5 mg tablet TAKE 1 TABLET DAILY 90 tablet 3    digoxin (LANOXIN) 0 125 mg tablet Take 125 mcg by mouth daily       No current facility-administered medications for this visit            Health Maintenance     Health Maintenance   Topic Date Due    Depression Screening PHQ-9  1940    SLP PLAN OF CARE  1940    DTaP,Tdap,and Td Vaccines (1 - Tdap) 02/20/1960    Fall Risk  02/20/2004    Urinary Incontinence Screening  02/20/2004    GLAUCOMA SCREENING 67+ YR  02/20/2006    INFLUENZA VACCINE  09/01/2018    PNEUMOCOCCAL POLYSACCHARIDE VACCINE AGE 65 AND OVER  Completed     Immunization History   Administered Date(s) Administered    Influenza Split High Dose Preservative Free IM 09/17/2015, 12/19/2016, 10/23/2017    Influenza TIV (IM) 11/01/2011, 11/16/2012, 10/25/2013    Pneumococcal Conjugate 13-Valent 03/17/2016    Pneumococcal Polysaccharide PPV23 50/12/3976       Ayleen Carbone MD

## 2018-06-25 NOTE — PROGRESS NOTES
Assessment and Plan:  Problem List Items Addressed This Visit     COPD (chronic obstructive pulmonary disease) ([facility] [address] )     COPD  Patient appears stable at this time  She refused a rescue inhaler but will continue to use home nebulizers as needed  She will continue with Advair and Spiriva inhalers on a daily basis  Patient will call if she has any significant flare ups         Relevant Medications    loratadine (CLARITIN) 10 mg tablet    Hypertension     Hypertension  Patient blood pressure is stable at this time she will continue with current regimen of medications         Hypercoagulable state ([facility] )    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HCC)     Hypoprothrombinemia  Patient with chronic anticoagulation  She will continue to obtain INR testing as ordered and continue with current warfarin therapy         Skin lesion     Skin lesion  Patient with suspected seborrheic keratoses on her her nose  She was referred back to dermatologist for evaluation and possible cryotherapy           Other Visit Diagnoses     Permanent atrial fibrillation ([facility] )    -  Primary    Relevant Orders    Protime-INR    CBC    Comprehensive metabolic panel    Lipid panel    TSH, 3rd generation        Health Maintenance Due   Topic Date Due    Depression Screening PHQ-9  1940    SLP PLAN OF CARE  1940    DTaP,Tdap,and Td Vaccines (1 - Tdap) 02/20/1960    Fall Risk  02/20/2004    Urinary Incontinence Screening  02/20/2004    GLAUCOMA SCREENING 67+ YR  02/20/2006         HPI:  Coby Campoverde is a 77 y o  female here for her Subsequent Wellness Visit      Patient Active Problem List   Diagnosis    Pneumonia    COPD (chronic obstructive pulmonary disease) ([facility] [address] [facility]    Acute respiratory failure with hypoxia ([facility]    Hypertension    Hypercoagulable state ([facility] )    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[facility] )    Skin lesion     Past Medical History:   Diagnosis Date    Blood type O+     Cardiac disease     Chronic pain     COPD (chronic obstructive pulmonary disease) ([facility] [address] )     Fracture of ankle     left    Hyperlipidemia     Hypertension      Past Surgical History:   Procedure Laterality Date    BILATERAL OOPHORECTOMY Bilateral      Family History   Problem Relation Age of Onset    Hypertension Mother     Skin cancer Mother     Hypertension Father      History   Smoking Status    Current Some Day Smoker    Types: Cigarettes   Smokeless Tobacco    Never Used     Comment: Never smoker per [employer]     History   Alcohol Use No      History   Drug Use No         Current Outpatient Prescriptions   Medication Sig Dispense Refill    ADVAIR DISKUS 250-50 MCG/DOSE inhaler USE ONE INHALATION TWO TIMES A  each 3    albuterol (2 5 mg/3 mL) 0 083 % nebulizer solution Take 2 5 mg by nebulization every 6 (six) hours as needed for wheezing      CARTIA  MG 24 hr capsule TAKE 1 CAPSULE DAILY 90 capsule 1    denosumab (PROLIA) 60 mg/mL Inject 60 mg under the skin once 6 months      digoxin (LANOXIN) 0 25 mg tablet TAKE 1 TABLET DAILY 90 tablet 3    loratadine (CLARITIN) 10 mg tablet Take 10 mg by mouth daily      multivitamin (THERAGRAN) TABS Take 1 tablet by mouth daily      SPIRIVA HANDIHALER 18 MCG inhalation capsule INHALE THE CONTENTS OF 1 CAPSULE DAILY AS DIRECTED 90 each 3    warfarin (COUMADIN) 5 mg tablet TAKE 1 TABLET DAILY 90 tablet 3    digoxin (LANOXIN) 0 125 mg tablet Take 125 mcg by mouth daily       No current facility-administered medications for this visit        Allergies   Allergen Reactions    Penicillins Rash    Sulfa Antibiotics Rash     Immunization History   Administered Date(s) Administered    Influenza Split High Dose Preservative Free IM 09/17/2015, 12/19/2016, 10/23/2017    Influenza TIV (IM) 11/01/2011, 11/16/2012, 10/25/2013    Pneumococcal Conjugate 13-Valent 03/17/2016    Pneumococcal Polysaccharide PPV23 12/23/2005       Patient Care Team:  Caden Kapoor MD as PCP - Carmine Mendosa MD    Medicare Screening Tests and Risk Assessments:  AWV Clinical     ISAR:   Previous hospitalizations?:  Yes   How many hospitalizations have you had in the last year?:  1-2   Additional Comments:  November 2017 pnuemonia       Once in a Lifetime Medicare Screening:   EKG performed?:  No    AAA screening performed? (if performed, please add date to Health Maintenance):  No       Medicare Screening Tests and Risk Assessment:   AAA Risk Assessment    None Indicated:  Yes    Osteoporosis Risk Assessment     Female:  Yes   :  Yes :  No   Age over 48:  Yes Low body weight (<127lbs):  Yes   Tobacco use:  No Alcohol use:  Yes   Low calcium diet:  No PMHX of fractures:  Yes   FHX of fractures:  Yes    HIV Risk Assessment    None indicated:  Yes        Drug and Alcohol Use:   Tobacco use    Cigarettes:  former smoker    Quit date:  1/1/17   Smokeless:  never used smokeless tobacco    Tobacco use duration    Tobacco Cessation Readiness    Alcohol use    Alcohol use:  rare use    Concern about alcohol use:  No    Alcohol Treatment Readiness   Illicit Drug Use    Drug use:  never    Drug type:  no sedative use       Diet & Exercise:   Diet   What is your diet?:  Regular, Low Saturated Fat, No Added Salt, Limited junk food   How many servings a day of the following:   Fruits and Vegetables:  5 or more Meat:  1-2   Whole Grains:  2 Simple Carbs:  1   Dairy:  2 Soda:  0   Coffee:  5 or more Tea:  0   Exercise    Do you currently exercise?:  yes    Frequency:  daily    Minutes per day:  15    Type of exercise:  walking       Cognitive Impairment Screening:   Depression screening preformed:  Yes     PHQ-9 Depression scale score:  0   Depression screening results:  negative for symptoms   Cognitive Impairment Screening    Do you have difficulty learning or retaining new information?:  No Do you have difficulty handling new tasks?:  No   Do you have difficulty with reasoning?:  No Do you have difficulty with spatial ability and orientation?:  No   Do you have difficulty with language?:  No Do you have difficulty with behavior?:  No       Functional Ability/Level of Safety:   Hearing    Hearing difficulties:  No Bilateral:  normal   Hearing aid:  No    Hearing Impairment Assessment    Hearing status:  No impairment   Current Activities    Status:  unlimited ADL's, unlimited IADL's, unlimited social activities, unlimited driving   Help needed with the folllowing:    Using the phone:  No Transportation:  No   Shopping:  No Preparing Meals:  No   Doing Housework:  No Doing Laundry:  No   Managing Medications:  No Managing Money:  No   ADL    Feeding:  Independant   Oral hygiene and Facial grooming:  Independant   Bathing:  Independant   Upper Body Dressing:  Independant   Lower Body Dressing:  Independant   Toileting:  Independant   Bed Mobility:  Independant   Fall Risk   Have you fallen in the last 12 months?:  No Are you unsteady on your feet?:  No    Are you taking any medications that may cause fatigue or dizziness?:  Yes    Do you rush to the bathroom potentially risking a fall?:  No   Injury History   Polypharmacy:  Yes Antidepressant Use:  No   Sedative Use:  No Antihypertensive Use:  Yes   Previous Fall:  No Alcohol Use:  Yes   Deconditioning:  No Visual Impairment:  No   Cogitive Impairment:  No Mmobility Impairment:  No   Postural Hypotension:  No Urinary Incontinence:  No       Home Safety:   Are there hazards in your environment?:  No   Home Safety Risk Factors   Unfamilar with surroundings:  No Uneven floors:  No   Stairs or handrail saftey risk:  No Loose rugs:  No   Household clutter:  No Poor household lighting:  No   No grab bars in bathroom:  No Further evaluation needed:  No       Advanced Directives:   Advanced Directives    Living Will:  No Durable POA for healthcare:  No   Advanced directive:  No    Patient's End of Life Decisions    End of life decisions comments:  Papers given and explained to pt         Urinary Incontinence:   Do you have urinary incontinence?:  No        Glaucoma:            Provider Screening    No data filed        No exam data present    Physical Exam :MMSE=30  History obtained from chart review  Physical Exam

## 2018-06-25 NOTE — ASSESSMENT & PLAN NOTE
Skin lesion  Patient with suspected seborrheic keratoses on her her nose    She was referred back to dermatologist for evaluation and possible cryotherapy

## 2018-06-25 NOTE — TELEPHONE ENCOUNTER
----- Message from Quinton Kate MD sent at 7/62/0909  3:41 PM EDT -----  No changes to Coumadin dosing    Recheck INR in 4 weeks

## 2018-06-25 NOTE — ASSESSMENT & PLAN NOTE
Hypoprothrombinemia  Patient with chronic anticoagulation    She will continue to obtain INR testing as ordered and continue with current warfarin therapy

## 2018-06-25 NOTE — ASSESSMENT & PLAN NOTE
Hypertension    Patient blood pressure is stable at this time she will continue with current regimen of medications

## 2018-06-26 ENCOUNTER — TELEPHONE (OUTPATIENT)
Dept: FAMILY MEDICINE CLINIC | Facility: CLINIC | Age: 78
End: 2018-06-26

## 2018-06-26 DIAGNOSIS — E03.9 HYPOTHYROIDISM, UNSPECIFIED TYPE: Primary | ICD-10-CM

## 2018-06-26 RX ORDER — LEVOTHYROXINE SODIUM 0.03 MG/1
25 TABLET ORAL DAILY
Qty: 90 TABLET | Refills: 3 | Status: SHIPPED | OUTPATIENT
Start: 2018-06-26 | End: 2019-06-03 | Stop reason: SDUPTHER

## 2018-06-26 NOTE — TELEPHONE ENCOUNTER
Patient is aware of MD instructions  Patient is agreeable to starting levothyroxine, please send to her mail order pharmacy

## 2018-06-26 NOTE — TELEPHONE ENCOUNTER
----- Message from Abdiel Mccord MD sent at 4/57/7837  9:20 AM EDT -----  Recent blood work shows her thyroid is low functioning    If she is agreeable I would [facility] to [facility] levothyroxine which she would take once daily and repeat blood work in 2 months for her thyroid

## 2018-08-02 ENCOUNTER — ANTICOAG VISIT (OUTPATIENT)
Dept: FAMILY MEDICINE CLINIC | Facility: CLINIC | Age: 78
End: 2018-08-02

## 2018-08-02 ENCOUNTER — TRANSCRIBE ORDERS (OUTPATIENT)
Dept: LAB | Facility: CLINIC | Age: 78
End: 2018-08-02

## 2018-08-02 ENCOUNTER — TELEPHONE (OUTPATIENT)
Dept: FAMILY MEDICINE CLINIC | Facility: CLINIC | Age: 78
End: 2018-08-02

## 2018-08-02 ENCOUNTER — APPOINTMENT (OUTPATIENT)
Dept: LAB | Facility: CLINIC | Age: 78
End: 2018-08-02
Payer: MEDICARE

## 2018-08-02 DIAGNOSIS — E03.9 HYPOTHYROIDISM, UNSPECIFIED TYPE: ICD-10-CM

## 2018-08-02 DIAGNOSIS — I48.20 CHRONIC ATRIAL FIBRILLATION (HCC): Primary | ICD-10-CM

## 2018-08-02 DIAGNOSIS — E03.9 HYPOTHYROIDISM, ADULT: ICD-10-CM

## 2018-08-02 DIAGNOSIS — E03.9 HYPOTHYROIDISM, UNSPECIFIED TYPE: Primary | ICD-10-CM

## 2018-08-02 LAB
INR PPP: 2.89 (ref 0.86–1.17)
INR PPP: 2.89 (ref 0.86–1.17)
PROTHROMBIN TIME: 30.3 SECONDS (ref 11.8–14.2)
TSH SERPL DL<=0.05 MIU/L-ACNC: 2.97 UIU/ML (ref 0.36–3.74)

## 2018-08-02 PROCEDURE — 84443 ASSAY THYROID STIM HORMONE: CPT

## 2018-08-02 PROCEDURE — 85610 PROTHROMBIN TIME: CPT

## 2018-08-02 PROCEDURE — 36415 COLL VENOUS BLD VENIPUNCTURE: CPT

## 2018-08-02 NOTE — TELEPHONE ENCOUNTER
----- Message from Erma Cheung MD sent at 5/8/5271  4:04 PM EDT -----  No changes on Coumadin dosing    Recheck INR in 4 weeks

## 2018-08-03 ENCOUNTER — TELEPHONE (OUTPATIENT)
Dept: FAMILY MEDICINE CLINIC | Facility: CLINIC | Age: 78
End: 2018-08-03

## 2018-08-03 DIAGNOSIS — I10 ESSENTIAL HYPERTENSION: Primary | ICD-10-CM

## 2018-08-03 DIAGNOSIS — E78.5 HYPERLIPIDEMIA, UNSPECIFIED HYPERLIPIDEMIA TYPE: ICD-10-CM

## 2018-08-03 NOTE — TELEPHONE ENCOUNTER
Yes she would need blood work prior to did December office visit however she should continue to get her Coumadin blood work as instructed    I will print out prescription

## 2018-08-03 NOTE — TELEPHONE ENCOUNTER
----- Message from Westley Bernal MD sent at 8/2/6090  7:17 AM EDT -----  Thyroid blood work stable for now    Continue current dose of medication

## 2018-08-03 NOTE — TELEPHONE ENCOUNTER
Spoke to patient and gave results, She has an appointment in Dec and she was wondering is she has to get more blood work done before her next appt

## 2018-08-29 DIAGNOSIS — I10 ESSENTIAL HYPERTENSION: ICD-10-CM

## 2018-08-29 RX ORDER — DILTIAZEM HYDROCHLORIDE 240 MG/1
CAPSULE, EXTENDED RELEASE ORAL
Qty: 90 CAPSULE | Refills: 1 | Status: SHIPPED | OUTPATIENT
Start: 2018-08-29 | End: 2019-02-25 | Stop reason: SDUPTHER

## 2018-12-06 ENCOUNTER — ANTICOAG VISIT (OUTPATIENT)
Dept: FAMILY MEDICINE CLINIC | Facility: CLINIC | Age: 78
End: 2018-12-06

## 2018-12-06 ENCOUNTER — APPOINTMENT (OUTPATIENT)
Dept: LAB | Facility: CLINIC | Age: 78
End: 2018-12-06
Payer: MEDICARE

## 2018-12-06 ENCOUNTER — TRANSCRIBE ORDERS (OUTPATIENT)
Dept: LAB | Facility: CLINIC | Age: 78
End: 2018-12-06

## 2018-12-06 ENCOUNTER — TELEPHONE (OUTPATIENT)
Dept: FAMILY MEDICINE CLINIC | Facility: CLINIC | Age: 78
End: 2018-12-06

## 2018-12-06 DIAGNOSIS — E78.5 HYPERLIPIDEMIA, UNSPECIFIED HYPERLIPIDEMIA TYPE: ICD-10-CM

## 2018-12-06 DIAGNOSIS — I10 ESSENTIAL HYPERTENSION: ICD-10-CM

## 2018-12-06 DIAGNOSIS — I48.91 ATRIAL FIBRILLATION, UNSPECIFIED TYPE (HCC): Primary | ICD-10-CM

## 2018-12-06 DIAGNOSIS — I48.91 ATRIAL FIBRILLATION, UNSPECIFIED TYPE (HCC): ICD-10-CM

## 2018-12-06 LAB
ALBUMIN SERPL BCP-MCNC: 3.7 G/DL (ref 3.5–5)
ALP SERPL-CCNC: 102 U/L (ref 46–116)
ALT SERPL W P-5'-P-CCNC: 23 U/L (ref 12–78)
ANION GAP SERPL CALCULATED.3IONS-SCNC: 5 MMOL/L (ref 4–13)
AST SERPL W P-5'-P-CCNC: 21 U/L (ref 5–45)
BILIRUB SERPL-MCNC: 0.38 MG/DL (ref 0.2–1)
BUN SERPL-MCNC: 9 MG/DL (ref 5–25)
CALCIUM SERPL-MCNC: 9.6 MG/DL (ref 8.3–10.1)
CHLORIDE SERPL-SCNC: 95 MMOL/L (ref 100–108)
CHOLEST SERPL-MCNC: 180 MG/DL (ref 50–200)
CO2 SERPL-SCNC: 32 MMOL/L (ref 21–32)
CREAT SERPL-MCNC: 0.76 MG/DL (ref 0.6–1.3)
ERYTHROCYTE [DISTWIDTH] IN BLOOD BY AUTOMATED COUNT: 14.4 % (ref 11.6–15.1)
GFR SERPL CREATININE-BSD FRML MDRD: 75 ML/MIN/1.73SQ M
GLUCOSE P FAST SERPL-MCNC: 108 MG/DL (ref 65–99)
HCT VFR BLD AUTO: 41.2 % (ref 34.8–46.1)
HDLC SERPL-MCNC: 57 MG/DL (ref 40–60)
HGB BLD-MCNC: 13.5 G/DL (ref 11.5–15.4)
INR PPP: 2.67 (ref 0.86–1.17)
INR PPP: 2.67 (ref 0.86–1.17)
LDLC SERPL CALC-MCNC: 109 MG/DL (ref 0–100)
MCH RBC QN AUTO: 32.8 PG (ref 26.8–34.3)
MCHC RBC AUTO-ENTMCNC: 32.8 G/DL (ref 31.4–37.4)
MCV RBC AUTO: 100 FL (ref 82–98)
NONHDLC SERPL-MCNC: 123 MG/DL
PLATELET # BLD AUTO: 285 THOUSANDS/UL (ref 149–390)
PMV BLD AUTO: 9.4 FL (ref 8.9–12.7)
POTASSIUM SERPL-SCNC: 4.3 MMOL/L (ref 3.5–5.3)
PROT SERPL-MCNC: 7.7 G/DL (ref 6.4–8.2)
PROTHROMBIN TIME: 28.5 SECONDS (ref 11.8–14.2)
RBC # BLD AUTO: 4.11 MILLION/UL (ref 3.81–5.12)
SODIUM SERPL-SCNC: 132 MMOL/L (ref 136–145)
TRIGL SERPL-MCNC: 68 MG/DL
TSH SERPL DL<=0.05 MIU/L-ACNC: 3.2 UIU/ML (ref 0.36–3.74)
WBC # BLD AUTO: 4.38 THOUSAND/UL (ref 4.31–10.16)

## 2018-12-06 PROCEDURE — 85610 PROTHROMBIN TIME: CPT

## 2018-12-06 PROCEDURE — 85027 COMPLETE CBC AUTOMATED: CPT

## 2018-12-06 PROCEDURE — 84443 ASSAY THYROID STIM HORMONE: CPT

## 2018-12-06 PROCEDURE — 36415 COLL VENOUS BLD VENIPUNCTURE: CPT

## 2018-12-06 PROCEDURE — 80061 LIPID PANEL: CPT

## 2018-12-06 PROCEDURE — 80053 COMPREHEN METABOLIC PANEL: CPT

## 2018-12-06 NOTE — TELEPHONE ENCOUNTER
----- Message from Gilmer Boyle MD sent at 07/7/7929 12:24 PM EST -----  No changes to Coumadin dosing    Recheck INR in 4 weeks

## 2018-12-26 RX ORDER — DILTIAZEM HYDROCHLORIDE 180 MG/1
1 CAPSULE, EXTENDED RELEASE ORAL DAILY
COMMUNITY
Start: 2013-02-15 | End: 2018-12-27

## 2018-12-27 ENCOUNTER — OFFICE VISIT (OUTPATIENT)
Dept: FAMILY MEDICINE CLINIC | Facility: CLINIC | Age: 78
End: 2018-12-27
Payer: MEDICARE

## 2018-12-27 VITALS
TEMPERATURE: 97.4 F | HEIGHT: 61 IN | BODY MASS INDEX: 22.13 KG/M2 | SYSTOLIC BLOOD PRESSURE: 140 MMHG | DIASTOLIC BLOOD PRESSURE: 70 MMHG | WEIGHT: 117.2 LBS | HEART RATE: 80 BPM | RESPIRATION RATE: 16 BRPM

## 2018-12-27 DIAGNOSIS — D68.59 HYPERCOAGULABLE STATE (HCC): ICD-10-CM

## 2018-12-27 DIAGNOSIS — J44.9 CHRONIC OBSTRUCTIVE PULMONARY DISEASE, UNSPECIFIED COPD TYPE (HCC): Primary | ICD-10-CM

## 2018-12-27 DIAGNOSIS — I10 ESSENTIAL HYPERTENSION: ICD-10-CM

## 2018-12-27 PROCEDURE — 99213 OFFICE O/P EST LOW 20 MIN: CPT | Performed by: FAMILY MEDICINE

## 2018-12-27 NOTE — ASSESSMENT & PLAN NOTE
Hypercoagulable state    Patient will continue to obtain INR testing as ordered and will continue with current warfarin treatment

## 2018-12-27 NOTE — PROGRESS NOTES
FAMILY PRACTICE OFFICE VISIT       NAME: Raysa Mane  AGE: 77 y o  SEX: female       : 1940        MRN: [id number]    DATE: 2018  TIME: 10:33 AM    Assessment and Plan     Problem List Items Addressed This Visit     COPD (chronic obstructive pulmonary disease) ([address] ) - Primary     COPD  Patient's condition appears stable on current regimen of inhalers  Hypertension     Hypertension  Blood pressures at home have been stable and patient will continue with current regimen of medications         Hypercoagulable state ([facility] [address] )     Hypercoagulable state  Patient will continue to obtain INR testing as ordered and will continue with current warfarin treatment                 There are no Patient Instructions on file for this visit  Chief Complaint     Chief Complaint   Patient presents with    Follow-up     Pt is here for 6 month follow up       History of Present Illness     Patient denies any recent illness  She occasionally gets back pains from degenerative joint disease  She has not had a Prolia injection in the past 13 months  I reviewed her most recent blood test results from 2018  Review of Systems   Review of Systems   Constitutional: Positive for fatigue  Negative for fever  HENT: Negative  Respiratory: Negative  Cardiovascular: Negative  Gastrointestinal: Negative  Genitourinary: Negative  Musculoskeletal:        Occ  Back pains from DJD   Neurological: Negative  Psychiatric/Behavioral: Negative          Active Problem List     Patient Active Problem List   Diagnosis    Pneumonia    COPD (chronic obstructive pulmonary disease) ([facility] [address] )    Acute respiratory failure with hypoxia ([facility] [address] )    Hypertension    Hypercoagulable state ([facility] [address] )    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[facility] [address] )    Skin lesion       Past Medical History:  Past Medical History:   Diagnosis Date    Blood type O+     Cardiac disease     Chronic pain     COPD [facility]chronic [facility] disease) ([facility] )     Fracture of ankle     left    Hyperlipidemia     Hypertension        Past Surgical History:  Past Surgical History:   Procedure Laterality Date    BILATERAL OOPHORECTOMY Bilateral        Family History:  Family History   Problem Relation Age of Onset    Hypertension Mother     Skin cancer Mother     Hypertension Father        Social History:  Social History     Social History    Marital status:      Spouse name: N/A    Number of children: N/A    Years of education: N/A     Occupational History    Not on file  Social History Main Topics    Smoking status: Current Some Day Smoker     Types: Cigarettes    Smokeless tobacco: Never Used      Comment: Never smoker per [employer]    Alcohol use No    Drug use: No    Sexual activity: Not on file     Other Topics Concern    Not on file     Social History Narrative    No narrative on file       Objective     Vitals:    12/27/18 1003   BP: 140/70   Pulse: 80   Resp: 16   Temp: (!) 97 4 °F (36 3 °C)     Wt Readings from Last 3 Encounters:   12/27/18 53 2 kg (117 lb 3 2 oz)   06/25/18 56 2 kg (124 lb)   01/02/18 56 8 kg (125 lb 4 oz)       Physical Exam   Constitutional: She is oriented to person, place, and time  No distress  HENT:   Mouth/Throat: Oropharynx is clear and moist  No oropharyngeal exudate  Tympanic membranes within normal limits bilaterally   Neck:   No carotid bruits   Cardiovascular:   Regular rate and rhythm with no murmurs   Pulmonary/Chest:   Lungs are clear to auscultation without wheezes,rales, or rhonchi   Musculoskeletal: She exhibits no edema  Lymphadenopathy:     She has no cervical adenopathy  Neurological: She is alert and oriented to person, place, and time  No cranial nerve deficit  Psychiatric: She has a normal mood and affect   Her behavior is normal  Judgment and thought content normal        Pertinent Laboratory/Diagnostic Studies:  Lab Results   Component Value Date    GLUCOSE 96 03/17/2015    BUN 9 12/06/2018    CREATININE 0 76 12/06/2018    CALCIUM 9 6 12/06/2018     03/17/2015    K 4 3 12/06/2018    CO2 32 12/06/2018    CL 95 (L) 12/06/2018     Lab Results   Component Value Date    ALT 23 12/06/2018    AST 21 12/06/2018    ALKPHOS 102 12/06/2018    BILITOT 0 40 03/17/2015       Lab Results   Component Value Date    WBC 4 38 12/06/2018    HGB 13 5 12/06/2018    HCT 41 2 12/06/2018     (H) 12/06/2018     12/06/2018       No results found for: TSH    Lab Results   Component Value Date    CHOL 145 03/17/2015     Lab Results   Component Value Date    TRIG 68 12/06/2018     Lab Results   Component Value Date    HDL 57 12/06/2018     Lab Results   Component Value Date    LDLCALC 109 (H) 12/06/2018     No results found for: HGBA1C    Results for orders placed or performed in visit on 12/06/18   Protime-INR   Result Value Ref Range    INR 2 67 (A) 0 86 - 1 17       No orders of the defined types were placed in this encounter        ALLERGIES:  Allergies   Allergen Reactions    Penicillins Rash and Hives    Sulfa Antibiotics Rash       Current Medications     Current Outpatient Prescriptions   Medication Sig Dispense Refill    ADVAIR DISKUS 250-50 MCG/DOSE inhaler USE ONE INHALATION TWO TIMES A  each 3    albuterol (2 5 mg/3 mL) 0 083 % nebulizer solution Take 2 5 mg by nebulization every 6 (six) hours as needed for wheezing      CARTIA  MG 24 hr capsule TAKE 1 CAPSULE DAILY 90 capsule 1    denosumab (PROLIA) 60 mg/mL Inject 60 mg under the skin once 6 months      digoxin (LANOXIN) 0 25 mg tablet TAKE 1 TABLET DAILY 90 tablet 3    levothyroxine 25 mcg tablet Take 1 tablet (25 mcg total) by mouth daily 90 tablet 3    loratadine (CLARITIN) 10 mg tablet Take 10 mg by mouth daily      multivitamin (THERAGRAN) TABS Take 1 tablet by mouth daily      SPIRIVA HANDIHALER 18 MCG inhalation capsule INHALE THE CONTENTS OF 1 CAPSULE DAILY AS DIRECTED 90 each 3    warfarin (COUMADIN) 5 mg tablet TAKE 1 TABLET DAILY 90 tablet 3     No current facility-administered medications for this visit            Health Maintenance     Health Maintenance   Topic Date Due    Medicare Annual Wellness Visit (AWV)  1940    SLP PLAN OF CARE  1940    DTaP,Tdap,and Td Vaccines (1 - Tdap) 02/20/1960    Fall Risk  06/25/2019    Urinary Incontinence Screening  06/25/2019    Depression Screening PHQ  12/27/2019    INFLUENZA VACCINE  Completed    Pneumococcal PPSV23/PCV13 65+ Years / Low and Medium Risk  Completed     Immunization History   Administered Date(s) Administered    Influenza 12/14/2004, 10/17/2018    Influenza Split High Dose Preservative Free IM 09/17/2015, 12/19/2016, 10/23/2017    Influenza TIV (IM) 11/01/2011, 11/16/2012, 10/25/2013    Pneumococcal Conjugate 13-Valent 03/17/2016    Pneumococcal Polysaccharide PPV23 31/42/1696       Ced Villatoro MD

## 2018-12-27 NOTE — ASSESSMENT & PLAN NOTE
Hypertension    Blood pressures at home have been stable and patient will continue with current regimen of medications

## 2019-02-25 DIAGNOSIS — I10 ESSENTIAL HYPERTENSION: ICD-10-CM

## 2019-02-25 RX ORDER — DILTIAZEM HYDROCHLORIDE 240 MG/1
CAPSULE, EXTENDED RELEASE ORAL
Qty: 90 CAPSULE | Refills: 1 | Status: SHIPPED | OUTPATIENT
Start: 2019-02-25 | End: 2019-08-24 | Stop reason: SDUPTHER

## 2019-03-06 DIAGNOSIS — J45.909 MODERATE ASTHMA, UNSPECIFIED WHETHER COMPLICATED, UNSPECIFIED WHETHER PERSISTENT: ICD-10-CM

## 2019-03-07 RX ORDER — TIOTROPIUM BROMIDE 18 UG/1
CAPSULE ORAL; RESPIRATORY (INHALATION)
Qty: 90 EACH | Refills: 3 | Status: SHIPPED | OUTPATIENT
Start: 2019-03-07 | End: 2020-05-31

## 2019-03-07 RX ORDER — ALBUTEROL SULFATE 2.5 MG/3ML
SOLUTION RESPIRATORY (INHALATION)
Qty: 75 ML | Refills: 3 | Status: SHIPPED | OUTPATIENT
Start: 2019-03-07 | End: 2020-05-31

## 2019-03-22 DIAGNOSIS — I48.20 CHRONIC ATRIAL FIBRILLATION (HCC): ICD-10-CM

## 2019-03-22 RX ORDER — DIGOXIN 250 UG/1
TABLET ORAL
Qty: 90 TABLET | Refills: 3 | Status: SHIPPED | OUTPATIENT
Start: 2019-03-22 | End: 2020-03-16

## 2019-03-22 RX ORDER — WARFARIN SODIUM 5 MG/1
TABLET ORAL
Qty: 90 TABLET | Refills: 3 | Status: SHIPPED | OUTPATIENT
Start: 2019-03-22 | End: 2020-03-16

## 2019-04-02 ENCOUNTER — APPOINTMENT (OUTPATIENT)
Dept: LAB | Facility: CLINIC | Age: 79
End: 2019-04-02
Payer: MEDICARE

## 2019-04-02 ENCOUNTER — ANTICOAG VISIT (OUTPATIENT)
Dept: FAMILY MEDICINE CLINIC | Facility: CLINIC | Age: 79
End: 2019-04-02

## 2019-04-02 ENCOUNTER — TRANSCRIBE ORDERS (OUTPATIENT)
Dept: LAB | Facility: CLINIC | Age: 79
End: 2019-04-02

## 2019-04-02 DIAGNOSIS — I48.20 CHRONIC ATRIAL FIBRILLATION (HCC): Primary | ICD-10-CM

## 2019-04-02 DIAGNOSIS — I48.20 CHRONIC ATRIAL FIBRILLATION (HCC): ICD-10-CM

## 2019-04-02 LAB
INR PPP: 2.88 (ref 0.86–1.17)
INR PPP: 2.88 (ref 0.86–1.17)
PROTHROMBIN TIME: 30.2 SECONDS (ref 11.8–14.2)

## 2019-04-02 PROCEDURE — 85610 PROTHROMBIN TIME: CPT

## 2019-04-02 PROCEDURE — 36415 COLL VENOUS BLD VENIPUNCTURE: CPT

## 2019-04-04 ENCOUNTER — CLINICAL SUPPORT (OUTPATIENT)
Dept: FAMILY MEDICINE CLINIC | Facility: CLINIC | Age: 79
End: 2019-04-04
Payer: MEDICARE

## 2019-04-04 DIAGNOSIS — M81.0 OSTEOPOROSIS, UNSPECIFIED OSTEOPOROSIS TYPE, UNSPECIFIED PATHOLOGICAL FRACTURE PRESENCE: Primary | ICD-10-CM

## 2019-06-03 DIAGNOSIS — E03.9 HYPOTHYROIDISM, UNSPECIFIED TYPE: ICD-10-CM

## 2019-06-03 RX ORDER — LEVOTHYROXINE SODIUM 0.03 MG/1
TABLET ORAL
Qty: 90 TABLET | Refills: 3 | Status: SHIPPED | OUTPATIENT
Start: 2019-06-03 | End: 2020-05-28

## 2019-06-27 ENCOUNTER — OFFICE VISIT (OUTPATIENT)
Dept: FAMILY MEDICINE CLINIC | Facility: CLINIC | Age: 79
End: 2019-06-27
Payer: MEDICARE

## 2019-06-27 ENCOUNTER — TRANSCRIBE ORDERS (OUTPATIENT)
Dept: LAB | Facility: CLINIC | Age: 79
End: 2019-06-27

## 2019-06-27 ENCOUNTER — APPOINTMENT (OUTPATIENT)
Dept: LAB | Facility: CLINIC | Age: 79
End: 2019-06-27
Payer: MEDICARE

## 2019-06-27 ENCOUNTER — ANTICOAG VISIT (OUTPATIENT)
Dept: FAMILY MEDICINE CLINIC | Facility: CLINIC | Age: 79
End: 2019-06-27

## 2019-06-27 VITALS
BODY MASS INDEX: 22.6 KG/M2 | WEIGHT: 115.13 LBS | SYSTOLIC BLOOD PRESSURE: 140 MMHG | RESPIRATION RATE: 16 BRPM | TEMPERATURE: 97.7 F | OXYGEN SATURATION: 91 % | HEIGHT: 60 IN | HEART RATE: 77 BPM | DIASTOLIC BLOOD PRESSURE: 70 MMHG

## 2019-06-27 DIAGNOSIS — I48.21 PERMANENT ATRIAL FIBRILLATION (HCC): Primary | ICD-10-CM

## 2019-06-27 DIAGNOSIS — I48.21 PERMANENT ATRIAL FIBRILLATION (HCC): ICD-10-CM

## 2019-06-27 DIAGNOSIS — I10 ESSENTIAL HYPERTENSION: ICD-10-CM

## 2019-06-27 DIAGNOSIS — D68.59 HYPERCOAGULABLE STATE (HCC): ICD-10-CM

## 2019-06-27 DIAGNOSIS — J44.9 CHRONIC OBSTRUCTIVE PULMONARY DISEASE, UNSPECIFIED COPD TYPE (HCC): ICD-10-CM

## 2019-06-27 DIAGNOSIS — J96.01 ACUTE RESPIRATORY FAILURE WITH HYPOXIA (HCC): ICD-10-CM

## 2019-06-27 DIAGNOSIS — M81.0 AGE RELATED OSTEOPOROSIS, UNSPECIFIED PATHOLOGICAL FRACTURE PRESENCE: ICD-10-CM

## 2019-06-27 DIAGNOSIS — M81.0 OSTEOPOROSIS, UNSPECIFIED OSTEOPOROSIS TYPE, UNSPECIFIED PATHOLOGICAL FRACTURE PRESENCE: Primary | ICD-10-CM

## 2019-06-27 LAB
INR PPP: 2.36 (ref 0.84–1.19)
INR PPP: 2.36 (ref 0.84–1.19)
PROTHROMBIN TIME: 25.3 SECONDS (ref 11.6–14.5)

## 2019-06-27 PROCEDURE — 99214 OFFICE O/P EST MOD 30 MIN: CPT | Performed by: FAMILY MEDICINE

## 2019-06-27 PROCEDURE — 85610 PROTHROMBIN TIME: CPT

## 2019-06-27 PROCEDURE — 36415 COLL VENOUS BLD VENIPUNCTURE: CPT

## 2019-08-13 DIAGNOSIS — J44.9 CHRONIC OBSTRUCTIVE PULMONARY DISEASE, UNSPECIFIED COPD TYPE (HCC): ICD-10-CM

## 2019-08-24 DIAGNOSIS — I10 ESSENTIAL HYPERTENSION: ICD-10-CM

## 2019-08-26 RX ORDER — DILTIAZEM HYDROCHLORIDE 240 MG/1
CAPSULE, EXTENDED RELEASE ORAL
Qty: 90 CAPSULE | Refills: 3 | Status: SHIPPED | OUTPATIENT
Start: 2019-08-26 | End: 2020-06-30 | Stop reason: ALTCHOICE

## 2019-09-05 ENCOUNTER — HOSPITAL ENCOUNTER (OUTPATIENT)
Dept: BONE DENSITY | Facility: IMAGING CENTER | Age: 79
Discharge: HOME | End: 2019-09-05
Payer: MEDICARE

## 2019-09-05 DIAGNOSIS — M81.0 OSTEOPOROSIS, UNSPECIFIED OSTEOPOROSIS TYPE, UNSPECIFIED PATHOLOGICAL FRACTURE PRESENCE: ICD-10-CM

## 2019-09-05 PROCEDURE — 77080 DXA BONE DENSITY AXIAL: CPT

## 2019-10-16 ENCOUNTER — CLINICAL SUPPORT (OUTPATIENT)
Dept: FAMILY MEDICINE CLINIC | Facility: CLINIC | Age: 79
End: 2019-10-16
Payer: MEDICARE

## 2019-10-16 ENCOUNTER — APPOINTMENT (OUTPATIENT)
Dept: LAB | Facility: CLINIC | Age: 79
End: 2019-10-16
Payer: MEDICARE

## 2019-10-16 ENCOUNTER — ANTICOAG VISIT (OUTPATIENT)
Dept: FAMILY MEDICINE CLINIC | Facility: CLINIC | Age: 79
End: 2019-10-16

## 2019-10-16 DIAGNOSIS — J30.89 ALLERGIC RHINITIS DUE TO OTHER ALLERGIC TRIGGER, UNSPECIFIED SEASONALITY: ICD-10-CM

## 2019-10-16 DIAGNOSIS — Z23 FLU VACCINE NEED: ICD-10-CM

## 2019-10-16 DIAGNOSIS — M81.0 OSTEOPOROSIS, UNSPECIFIED OSTEOPOROSIS TYPE, UNSPECIFIED PATHOLOGICAL FRACTURE PRESENCE: Primary | ICD-10-CM

## 2019-10-16 PROCEDURE — 90662 IIV NO PRSV INCREASED AG IM: CPT

## 2019-10-16 PROCEDURE — 96372 THER/PROPH/DIAG INJ SC/IM: CPT | Performed by: FAMILY MEDICINE

## 2019-10-16 PROCEDURE — G0008 ADMIN INFLUENZA VIRUS VAC: HCPCS

## 2019-10-16 NOTE — PATIENT INSTRUCTIONS
Message   Received:  Today   Message Contents   Madison Cordero MD  [facility]             No changes to Coumadin dosing   Recheck INR in 4 weeks

## 2019-12-27 ENCOUNTER — APPOINTMENT (OUTPATIENT)
Dept: LAB | Facility: CLINIC | Age: 79
End: 2019-12-27
Payer: MEDICARE

## 2019-12-27 ENCOUNTER — OFFICE VISIT (OUTPATIENT)
Dept: FAMILY MEDICINE CLINIC | Facility: CLINIC | Age: 79
End: 2019-12-27
Payer: MEDICARE

## 2019-12-27 ENCOUNTER — ANTICOAG VISIT (OUTPATIENT)
Dept: FAMILY MEDICINE CLINIC | Facility: CLINIC | Age: 79
End: 2019-12-27

## 2019-12-27 VITALS
TEMPERATURE: 98.9 F | HEART RATE: 88 BPM | DIASTOLIC BLOOD PRESSURE: 70 MMHG | OXYGEN SATURATION: 94 % | SYSTOLIC BLOOD PRESSURE: 150 MMHG | WEIGHT: 115.8 LBS | BODY MASS INDEX: 22.58 KG/M2

## 2019-12-27 DIAGNOSIS — E03.9 HYPOTHYROIDISM, UNSPECIFIED TYPE: ICD-10-CM

## 2019-12-27 DIAGNOSIS — J44.9 CHRONIC OBSTRUCTIVE PULMONARY DISEASE, UNSPECIFIED COPD TYPE (HCC): ICD-10-CM

## 2019-12-27 DIAGNOSIS — I10 ESSENTIAL HYPERTENSION: Primary | ICD-10-CM

## 2019-12-27 DIAGNOSIS — I10 ESSENTIAL HYPERTENSION: ICD-10-CM

## 2019-12-27 DIAGNOSIS — M81.0 AGE RELATED OSTEOPOROSIS, UNSPECIFIED PATHOLOGICAL FRACTURE PRESENCE: ICD-10-CM

## 2019-12-27 DIAGNOSIS — I48.91 ATRIAL FIBRILLATION, UNSPECIFIED TYPE (HCC): ICD-10-CM

## 2019-12-27 PROBLEM — J96.01 ACUTE RESPIRATORY FAILURE WITH HYPOXIA (HCC): Status: RESOLVED | Noted: 2017-11-22 | Resolved: 2019-12-27

## 2019-12-27 LAB
ALBUMIN SERPL BCP-MCNC: 3.8 G/DL (ref 3.5–5)
ALP SERPL-CCNC: 75 U/L (ref 46–116)
ALT SERPL W P-5'-P-CCNC: 20 U/L (ref 12–78)
ANION GAP SERPL CALCULATED.3IONS-SCNC: 2 MMOL/L (ref 4–13)
AST SERPL W P-5'-P-CCNC: 21 U/L (ref 5–45)
BILIRUB SERPL-MCNC: 0.57 MG/DL (ref 0.2–1)
BUN SERPL-MCNC: 9 MG/DL (ref 5–25)
CALCIUM SERPL-MCNC: 9 MG/DL (ref 8.3–10.1)
CHLORIDE SERPL-SCNC: 99 MMOL/L (ref 100–108)
CHOLEST SERPL-MCNC: 177 MG/DL (ref 50–200)
CO2 SERPL-SCNC: 35 MMOL/L (ref 21–32)
CREAT SERPL-MCNC: 0.75 MG/DL (ref 0.6–1.3)
ERYTHROCYTE [DISTWIDTH] IN BLOOD BY AUTOMATED COUNT: 13.8 % (ref 11.6–15.1)
GFR SERPL CREATININE-BSD FRML MDRD: 76 ML/MIN/1.73SQ M
GLUCOSE P FAST SERPL-MCNC: 106 MG/DL (ref 65–99)
HCT VFR BLD AUTO: 42.7 % (ref 34.8–46.1)
HDLC SERPL-MCNC: 60 MG/DL
HGB BLD-MCNC: 14.3 G/DL (ref 11.5–15.4)
INR PPP: 2.04 (ref 0.84–1.19)
LDLC SERPL CALC-MCNC: 106 MG/DL (ref 0–100)
MCH RBC QN AUTO: 34 PG (ref 26.8–34.3)
MCHC RBC AUTO-ENTMCNC: 33.5 G/DL (ref 31.4–37.4)
MCV RBC AUTO: 101 FL (ref 82–98)
NONHDLC SERPL-MCNC: 117 MG/DL
PLATELET # BLD AUTO: 277 THOUSANDS/UL (ref 149–390)
PMV BLD AUTO: 9.5 FL (ref 8.9–12.7)
POTASSIUM SERPL-SCNC: 4.6 MMOL/L (ref 3.5–5.3)
PROT SERPL-MCNC: 7.5 G/DL (ref 6.4–8.2)
PROTHROMBIN TIME: 22.5 SECONDS (ref 11.6–14.5)
RBC # BLD AUTO: 4.21 MILLION/UL (ref 3.81–5.12)
SODIUM SERPL-SCNC: 136 MMOL/L (ref 136–145)
TRIGL SERPL-MCNC: 57 MG/DL
TSH SERPL DL<=0.05 MIU/L-ACNC: 2.39 UIU/ML (ref 0.36–3.74)
WBC # BLD AUTO: 4.53 THOUSAND/UL (ref 4.31–10.16)

## 2019-12-27 PROCEDURE — 85027 COMPLETE CBC AUTOMATED: CPT

## 2019-12-27 PROCEDURE — 80053 COMPREHEN METABOLIC PANEL: CPT

## 2019-12-27 PROCEDURE — 85610 PROTHROMBIN TIME: CPT

## 2019-12-27 PROCEDURE — 36415 COLL VENOUS BLD VENIPUNCTURE: CPT

## 2019-12-27 PROCEDURE — 99214 OFFICE O/P EST MOD 30 MIN: CPT | Performed by: FAMILY MEDICINE

## 2019-12-27 PROCEDURE — 84443 ASSAY THYROID STIM HORMONE: CPT

## 2019-12-27 PROCEDURE — 80061 LIPID PANEL: CPT

## 2019-12-27 NOTE — PROGRESS NOTES
FAMILY PRACTICE OFFICE VISIT       NAME: Kenneth Cardona  AGE: [de-identified] y o  SEX: female       : 1940        MRN: [id number]    DATE: 2019  TIME: 10:12 AM    Assessment and Plan     Problem List Items Addressed This Visit        Respiratory    COPD (chronic obstructive pulmonary disease) ([facility] [address] )     COPD  Patient denies any recent illness  She feels her breathing is stable on current use of Spiriva  Occasionally she will use her home nebulizer as needed            Cardiovascular and Mediastinum    Hypertension - Primary     Hypertension  Patient is stable at this time she will continue with current regimen of medications  She was encouraged to continue checking home blood pressures and call if they maintain above 140/90         Relevant Orders    CBC    Comprehensive metabolic panel    Lipid panel    TSH, 3rd generation    Atrial fibrillation (HCC)     Atrial fibrillation  Patient with history of paroxysmal atrial fibrillation  Patient currently in sinus rhythm  She will continue with warfarin therapy and digoxin and have INR blood test as ordered            Musculoskeletal and Integument    Osteoporosis     Osteoporosis  Patient is due for next Prolia injection in mid 2020  Patient will make appointment for nurse visit           Other Visit Diagnoses     Hypothyroidism, unspecified type        Relevant Orders    CBC    Comprehensive metabolic panel    Lipid panel    TSH, 3rd generation              Chief Complaint     Chief Complaint   Patient presents with    Follow-up     6 month follow up        History of Present Illness     Patient denies any recent illness  She has not had her INR tested recently due to noncompliance  She does take her medications on a regular basis  She has had her annual flu vaccine this year  Patient states she does check her blood pressures at home and often time ranges in the 130s over 70s        Review of Systems   Review of Systems   Constitutional: Positive for fatigue  Negative for fever  HENT: Negative  Respiratory: Negative  Cardiovascular: Negative  Gastrointestinal: Negative  Genitourinary: Negative  Musculoskeletal: Negative  Neurological: Negative  Psychiatric/Behavioral: Negative  Active Problem List     Patient Active Problem List   Diagnosis    Pneumonia    COPD (chronic obstructive pulmonary disease) ([facility] )    Hypertension    Hypercoagulable state ([facility] )    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[facility] )    Skin lesion    Antiphospholipid antibody positive    Atrial fibrillation (HCC)    Hereditary hemolytic anemia (HCC)    Asthma    Vitamin D deficiency    Osteoporosis       Past Medical History:  Past Medical History:   Diagnosis Date    Blood type O+     Cardiac disease     Chronic pain     COPD (chronic obstructive pulmonary disease) (HCC)     Fracture of ankle     left    Hyperlipidemia     Hypertension        Past Surgical History:  Past Surgical History:   Procedure Laterality Date    BILATERAL OOPHORECTOMY Bilateral        Family History:  Family History   Problem Relation Age of Onset    Hypertension Mother     Skin cancer Mother     Hypertension Father        Social History:  Social History     Socioeconomic History    Marital status:       Spouse name: Not on file    Number of children: Not on file    Years of education: Not on file    Highest education level: Not on file   Occupational History    Not on file   Social Needs    Financial resource strain: Not on file    Food insecurity:     Worry: Not on file     Inability: Not on file    Transportation needs:     Medical: Not on file     Non-medical: Not on file   Tobacco Use    Smoking status: Current Some Day Smoker     Types: Cigarettes    Smokeless tobacco: Never Used    Tobacco comment: Never smoker per [employer]   Substance and Sexual Activity    Alcohol use: No    Drug use: No    Sexual activity: Not on file   Lifestyle    Physical activity: Days per week: Not on file     Minutes per session: Not on file    Stress: Not on file   Relationships    Social connections:     Talks on phone: Not on file     Gets together: Not on file     Attends [employer] service: Not on file     Active member of club or organization: Not on file     Attends meetings of clubs or organizations: Not on file     Relationship status: Not on file    Intimate partner violence:     Fear of current or ex partner: Not on file     Emotionally abused: Not on file     Physically abused: Not on file     Forced sexual activity: Not on file   Other Topics Concern    Not on file   Social History Narrative    Not on file       Objective     Vitals:    12/27/19 0957   BP: 150/70   Pulse:    Temp:    SpO2:      Wt Readings from Last 3 Encounters:   12/27/19 52 5 kg (115 lb 12 8 oz)   06/27/19 52 2 kg (115 lb 2 oz)   12/27/18 53 2 kg (117 lb 3 2 oz)       Physical Exam   Constitutional: She is oriented to person, place, and time  No distress  Cardiovascular: Normal heart sounds  Regular rate and rhythm with no murmurs   Pulmonary/Chest: Breath sounds normal    Lungs are clear to auscultation without wheezes,rales, or rhonchi   Musculoskeletal: She exhibits no edema  Neurological: She is alert and oriented to person, place, and time  No cranial nerve deficit  Psychiatric: She has a normal mood and affect   Her behavior is normal  Judgment and thought content normal        Pertinent Laboratory/Diagnostic Studies:  Lab Results   Component Value Date    GLUCOSE 96 03/17/2015    BUN 9 12/06/2018    CREATININE 0 76 12/06/2018    CALCIUM 9 6 12/06/2018     03/17/2015    K 4 3 12/06/2018    CO2 32 12/06/2018    CL 95 (L) 12/06/2018     Lab Results   Component Value Date    ALT 23 12/06/2018    AST 21 12/06/2018    ALKPHOS 102 12/06/2018    BILITOT 0 40 03/17/2015       Lab Results   Component Value Date    WBC 4 38 12/06/2018    HGB 13 5 12/06/2018    HCT 41 2 12/06/2018     (H) 12/06/2018     12/06/2018       No results found for: TSH    Lab Results   Component Value Date    CHOL 145 03/17/2015     Lab Results   Component Value Date    TRIG 68 12/06/2018     Lab Results   Component Value Date    HDL 57 12/06/2018     Lab Results   Component Value Date    LDLCALC 109 (H) 12/06/2018     No results found for: HGBA1C    Results for orders placed or performed in visit on 10/11/19   Protime-INR   Result Value Ref Range    Protime 30 4 (H) 11 6 - 14 5 seconds    INR 2 97 (H) 0 84 - 1 19       Orders Placed This Encounter   Procedures    CBC    Comprehensive metabolic panel    Lipid panel    TSH, 3rd generation       ALLERGIES:  Allergies   Allergen Reactions    Penicillins Rash and Hives    Sulfa Antibiotics Rash       Current Medications     Current Outpatient Medications   Medication Sig Dispense Refill    ADVAIR DISKUS 250-50 MCG/DOSE inhaler USE 1 INHALATION TWICE A  each 3    albuterol (2 5 mg/3 mL) 0 083 % nebulizer solution USE 1 VIAL IN NEBULIZER EVERY 4 TO 6 HOURS AS NEEDED FOR COUGH AND WHEEZE 75 mL 3    denosumab (PROLIA) 60 mg/mL Inject 60 mg under the skin once 6 months      DIGOX 250 MCG tablet TAKE 1 TABLET DAILY 90 tablet 3    levothyroxine 25 mcg tablet TAKE 1 TABLET DAILY 90 tablet 3    loratadine (CLARITIN) 10 mg tablet Take 10 mg by mouth daily      multivitamin (THERAGRAN) TABS Take 1 tablet by mouth daily      SPIRIVA HANDIHALER 18 MCG inhalation capsule INHALE THE CONTENTS OF 1 CAPSULE DAILY AS DIRECTED 90 each 3    warfarin (COUMADIN) 5 mg tablet TAKE 1 TABLET DAILY 90 tablet 3    CARTIA  MG 24 hr capsule TAKE 1 CAPSULE DAILY (Patient not taking: Reported on 12/27/2019) 90 capsule 3     No current facility-administered medications for this visit            Health Maintenance     Health Maintenance   Topic Date Due    SLP PLAN OF CARE  1940    Depression Screening PHQ  12/27/2019    DTaP,Tdap,and Td Vaccines (1 - Tdap) 06/26/2020 (Originally 2/20/1951)    Fall Risk  06/27/2020    Urinary Incontinence Screening  06/27/2020    BMI: Adult  06/27/2020    Influenza Vaccine  Completed    Pneumococcal Vaccine: 65+ Years  Completed    Pneumococcal Vaccine: Pediatrics (0 to 5 Years) and At-Risk Patients (6 to 59 Years)  Aged Out    HIB Vaccine  Aged Out    Hepatitis B Vaccine  Aged Out    IPV Vaccine  Aged Out    Hepatitis A Vaccine  Aged Out    Meningococcal ACWY Vaccine  Aged Out    HPV Vaccine  Aged [employer] History   Administered Date(s) Administered    INFLUENZA 12/14/2004, 10/17/2018    Influenza Split High Dose Preservative Free IM 09/17/2015, 12/19/2016, 10/23/2017    Influenza TIV (IM) 11/01/2011, 11/16/2012, 10/25/2013    Influenza, high dose seasonal 0 5 mL 10/16/2019    Pneumococcal Conjugate 13-Valent 03/17/2016    Pneumococcal Polysaccharide PPV23 20/50/6714       Eric Boyce MD

## 2019-12-27 NOTE — ASSESSMENT & PLAN NOTE
COPD   Patient denies any recent illness  She feels her breathing is stable on current use of Spiriva    Occasionally she will use her home nebulizer as needed

## 2019-12-27 NOTE — ASSESSMENT & PLAN NOTE
Hypertension  Patient is stable at this time she will continue with current regimen of medications    She was encouraged to continue checking home blood pressures and call if they maintain above 140/90

## 2019-12-27 NOTE — ASSESSMENT & PLAN NOTE
Atrial fibrillation  Patient with history of paroxysmal atrial fibrillation  Patient currently in sinus rhythm    She will continue with warfarin therapy and digoxin and have INR blood test as ordered

## 2019-12-27 NOTE — ASSESSMENT & PLAN NOTE
Osteoporosis  Patient is due for next Prolia injection in mid April 2020   Patient will make appointment for nurse visit

## 2019-12-30 ENCOUNTER — TELEPHONE (OUTPATIENT)
Dept: FAMILY MEDICINE CLINIC | Facility: CLINIC | Age: 79
End: 2019-12-30

## 2020-03-16 DIAGNOSIS — I48.20 CHRONIC ATRIAL FIBRILLATION (HCC): ICD-10-CM

## 2020-03-16 RX ORDER — DIGOXIN 250 MCG
TABLET ORAL
Qty: 90 TABLET | Refills: 3 | Status: SHIPPED | OUTPATIENT
Start: 2020-03-16 | End: 2021-03-11

## 2020-03-16 RX ORDER — WARFARIN SODIUM 5 MG/1
TABLET ORAL
Qty: 90 TABLET | Refills: 3 | Status: SHIPPED | OUTPATIENT
Start: 2020-03-16 | End: 2021-03-11

## 2020-05-28 DIAGNOSIS — E03.9 HYPOTHYROIDISM, UNSPECIFIED TYPE: ICD-10-CM

## 2020-05-28 RX ORDER — LEVOTHYROXINE SODIUM 0.03 MG/1
TABLET ORAL
Qty: 90 TABLET | Refills: 1 | Status: SHIPPED | OUTPATIENT
Start: 2020-05-28 | End: 2020-11-24

## 2020-05-31 DIAGNOSIS — J45.909 MODERATE ASTHMA, UNSPECIFIED WHETHER COMPLICATED, UNSPECIFIED WHETHER PERSISTENT: ICD-10-CM

## 2020-05-31 RX ORDER — TIOTROPIUM BROMIDE 18 UG/1
CAPSULE ORAL; RESPIRATORY (INHALATION)
Qty: 90 EACH | Refills: 3 | Status: SHIPPED | OUTPATIENT
Start: 2020-05-31 | End: 2021-05-26

## 2020-05-31 RX ORDER — ALBUTEROL SULFATE 2.5 MG/3ML
SOLUTION RESPIRATORY (INHALATION)
Qty: 75 VIAL | Refills: 3 | Status: SHIPPED | OUTPATIENT
Start: 2020-05-31

## 2020-06-05 ENCOUNTER — TELEPHONE (OUTPATIENT)
Dept: FAMILY MEDICINE CLINIC | Facility: CLINIC | Age: 80
End: 2020-06-05

## 2020-06-08 ENCOUNTER — TELEPHONE (OUTPATIENT)
Dept: FAMILY MEDICINE CLINIC | Facility: CLINIC | Age: 80
End: 2020-06-08

## 2020-06-08 ENCOUNTER — ANTICOAG VISIT (OUTPATIENT)
Dept: FAMILY MEDICINE CLINIC | Facility: CLINIC | Age: 80
End: 2020-06-08

## 2020-06-08 ENCOUNTER — APPOINTMENT (OUTPATIENT)
Dept: LAB | Facility: CLINIC | Age: 80
End: 2020-06-08
Payer: MEDICARE

## 2020-06-08 LAB — INR PPP: 3.4 (ref 0.84–1.19)

## 2020-06-22 ENCOUNTER — ANTICOAG VISIT (OUTPATIENT)
Dept: FAMILY MEDICINE CLINIC | Facility: CLINIC | Age: 80
End: 2020-06-22

## 2020-06-22 ENCOUNTER — APPOINTMENT (OUTPATIENT)
Dept: LAB | Facility: CLINIC | Age: 80
End: 2020-06-22
Payer: MEDICARE

## 2020-06-22 LAB — INR PPP: 1.4 (ref 0.84–1.19)

## 2020-06-30 ENCOUNTER — OFFICE VISIT (OUTPATIENT)
Dept: FAMILY MEDICINE CLINIC | Facility: CLINIC | Age: 80
End: 2020-06-30
Payer: MEDICARE

## 2020-06-30 VITALS
OXYGEN SATURATION: 91 % | TEMPERATURE: 98.7 F | HEIGHT: 60 IN | HEART RATE: 89 BPM | DIASTOLIC BLOOD PRESSURE: 70 MMHG | RESPIRATION RATE: 18 BRPM | WEIGHT: 118.8 LBS | SYSTOLIC BLOOD PRESSURE: 160 MMHG | BODY MASS INDEX: 23.32 KG/M2

## 2020-06-30 DIAGNOSIS — E03.9 HYPOTHYROIDISM, UNSPECIFIED TYPE: ICD-10-CM

## 2020-06-30 DIAGNOSIS — J44.9 CHRONIC OBSTRUCTIVE PULMONARY DISEASE, UNSPECIFIED COPD TYPE (HCC): ICD-10-CM

## 2020-06-30 DIAGNOSIS — M81.0 AGE RELATED OSTEOPOROSIS, UNSPECIFIED PATHOLOGICAL FRACTURE PRESENCE: Primary | ICD-10-CM

## 2020-06-30 DIAGNOSIS — I48.91 ATRIAL FIBRILLATION, UNSPECIFIED TYPE (HCC): ICD-10-CM

## 2020-06-30 DIAGNOSIS — I10 ESSENTIAL HYPERTENSION: ICD-10-CM

## 2020-06-30 PROCEDURE — 3078F DIAST BP <80 MM HG: CPT | Performed by: FAMILY MEDICINE

## 2020-06-30 PROCEDURE — 4040F PNEUMOC VAC/ADMIN/RCVD: CPT | Performed by: FAMILY MEDICINE

## 2020-06-30 PROCEDURE — 99214 OFFICE O/P EST MOD 30 MIN: CPT | Performed by: FAMILY MEDICINE

## 2020-06-30 PROCEDURE — 96372 THER/PROPH/DIAG INJ SC/IM: CPT | Performed by: FAMILY MEDICINE

## 2020-06-30 PROCEDURE — 1160F RVW MEDS BY RX/DR IN RCRD: CPT | Performed by: FAMILY MEDICINE

## 2020-06-30 PROCEDURE — 3008F BODY MASS INDEX DOCD: CPT | Performed by: FAMILY MEDICINE

## 2020-06-30 PROCEDURE — 3077F SYST BP >= 140 MM HG: CPT | Performed by: FAMILY MEDICINE

## 2020-07-06 ENCOUNTER — HOSPITAL ENCOUNTER (EMERGENCY)
Facility: HOSPITAL | Age: 80
Discharge: HOME | End: 2020-07-06
Attending: EMERGENCY MEDICINE | Admitting: EMERGENCY MEDICINE
Payer: MEDICARE

## 2020-07-06 ENCOUNTER — ANTICOAG VISIT (OUTPATIENT)
Dept: FAMILY MEDICINE CLINIC | Facility: CLINIC | Age: 80
End: 2020-07-06

## 2020-07-06 ENCOUNTER — TELEPHONE (OUTPATIENT)
Dept: FAMILY MEDICINE CLINIC | Facility: CLINIC | Age: 80
End: 2020-07-06

## 2020-07-06 ENCOUNTER — APPOINTMENT (OUTPATIENT)
Dept: LAB | Facility: CLINIC | Age: 80
End: 2020-07-06
Payer: MEDICARE

## 2020-07-06 VITALS
HEIGHT: 61 IN | BODY MASS INDEX: 22.62 KG/M2 | SYSTOLIC BLOOD PRESSURE: 165 MMHG | OXYGEN SATURATION: 92 % | RESPIRATION RATE: 18 BRPM | WEIGHT: 119.8 LBS | TEMPERATURE: 97.6 F | HEART RATE: 70 BPM | DIASTOLIC BLOOD PRESSURE: 70 MMHG

## 2020-07-06 DIAGNOSIS — I10 ESSENTIAL HYPERTENSION: ICD-10-CM

## 2020-07-06 DIAGNOSIS — R79.1 ELEVATED INR: Primary | ICD-10-CM

## 2020-07-06 DIAGNOSIS — E03.9 HYPOTHYROIDISM, UNSPECIFIED TYPE: ICD-10-CM

## 2020-07-06 DIAGNOSIS — D68.9 COAGULOPATHY (HCC): Primary | ICD-10-CM

## 2020-07-06 DIAGNOSIS — I48.91 ATRIAL FIBRILLATION, UNSPECIFIED TYPE (HCC): ICD-10-CM

## 2020-07-06 LAB
ALBUMIN SERPL BCP-MCNC: 3.7 G/DL (ref 3.5–5)
ALP SERPL-CCNC: 92 U/L (ref 46–116)
ALT SERPL W P-5'-P-CCNC: 15 U/L (ref 12–78)
ANION GAP SERPL CALCULATED.3IONS-SCNC: 3 MMOL/L (ref 4–13)
APTT PPP: 51 SECONDS (ref 23–37)
AST SERPL W P-5'-P-CCNC: 18 U/L (ref 5–45)
BILIRUB SERPL-MCNC: 0.63 MG/DL (ref 0.2–1)
BUN SERPL-MCNC: 10 MG/DL (ref 5–25)
CALCIUM SERPL-MCNC: 9.4 MG/DL (ref 8.3–10.1)
CHLORIDE SERPL-SCNC: 99 MMOL/L (ref 100–108)
CHOLEST SERPL-MCNC: 183 MG/DL (ref 50–200)
CO2 SERPL-SCNC: 33 MMOL/L (ref 21–32)
CREAT SERPL-MCNC: 0.76 MG/DL (ref 0.6–1.3)
ERYTHROCYTE [DISTWIDTH] IN BLOOD BY AUTOMATED COUNT: 13.6 % (ref 11.6–15.1)
GFR SERPL CREATININE-BSD FRML MDRD: 74 ML/MIN/1.73SQ M
GLUCOSE P FAST SERPL-MCNC: 99 MG/DL (ref 65–99)
HCT VFR BLD AUTO: 42.5 % (ref 34.8–46.1)
HDLC SERPL-MCNC: 52 MG/DL
HGB BLD-MCNC: 14.1 G/DL (ref 11.5–15.4)
INR PPP: 2.28 (ref 0.84–1.19)
INR PPP: 2.31 (ref 0.84–1.19)
INR PPP: 7.6 (ref 0.84–1.19)
LDLC SERPL CALC-MCNC: 119 MG/DL (ref 0–100)
MCH RBC QN AUTO: 33.6 PG (ref 26.8–34.3)
MCHC RBC AUTO-ENTMCNC: 33.2 G/DL (ref 31.4–37.4)
MCV RBC AUTO: 101 FL (ref 82–98)
NONHDLC SERPL-MCNC: 131 MG/DL
PLATELET # BLD AUTO: 273 THOUSANDS/UL (ref 149–390)
PMV BLD AUTO: 10.1 FL (ref 8.9–12.7)
POTASSIUM SERPL-SCNC: 4.7 MMOL/L (ref 3.5–5.3)
PROT SERPL-MCNC: 7.6 G/DL (ref 6.4–8.2)
PROTHROMBIN TIME: 24.8 SECONDS (ref 11.6–14.5)
PROTHROMBIN TIME: 25.2 SECONDS (ref 11.6–14.5)
RBC # BLD AUTO: 4.2 MILLION/UL (ref 3.81–5.12)
SODIUM SERPL-SCNC: 135 MMOL/L (ref 136–145)
TRIGL SERPL-MCNC: 59 MG/DL
TSH SERPL DL<=0.05 MIU/L-ACNC: 2.65 UIU/ML (ref 0.36–3.74)
WBC # BLD AUTO: 4.26 THOUSAND/UL (ref 4.31–10.16)

## 2020-07-06 PROCEDURE — 85610 PROTHROMBIN TIME: CPT | Performed by: EMERGENCY MEDICINE

## 2020-07-06 PROCEDURE — 80061 LIPID PANEL: CPT

## 2020-07-06 PROCEDURE — 85730 THROMBOPLASTIN TIME PARTIAL: CPT | Performed by: EMERGENCY MEDICINE

## 2020-07-06 PROCEDURE — 80053 COMPREHEN METABOLIC PANEL: CPT

## 2020-07-06 PROCEDURE — 85610 PROTHROMBIN TIME: CPT

## 2020-07-06 PROCEDURE — 36415 COLL VENOUS BLD VENIPUNCTURE: CPT

## 2020-07-06 PROCEDURE — 84443 ASSAY THYROID STIM HORMONE: CPT

## 2020-07-06 PROCEDURE — 85027 COMPLETE CBC AUTOMATED: CPT

## 2020-07-06 PROCEDURE — 99284 EMERGENCY DEPT VISIT MOD MDM: CPT

## 2020-07-06 PROCEDURE — 99284 EMERGENCY DEPT VISIT MOD MDM: CPT | Performed by: EMERGENCY MEDICINE

## 2020-07-06 RX ORDER — MULTIVIT WITH MINERALS/LUTEIN
1000 TABLET ORAL DAILY
COMMUNITY

## 2020-07-06 NOTE — TELEPHONE ENCOUNTER
----- Message from Jay Quiroz MD sent at 7/0/3960 12:31 PM EDT -----  INR drawn today was VERY high at 7 6  I would need her to go to [facility] emergency room today either in [place] or [facility] to possibly receive a vitamin K injection to lower number more quickly    Hold on any Coumadin dosing until she has INR recheck tomorrow

## 2020-07-06 NOTE — ED PROVIDER NOTES
History  Chief Complaint   Patient presents with    Coagulation Disorder     This is an 59-year-old female who presents for evaluation of elevated INR  She is on Coumadin for a history atrial fibrillation and DVTs  She receives 5 mg of Coumadin on Monday Wednesday Friday and 2 5 mg every other day  She had blood work done as an outpatient this morning and was told to come to the emergency room by her primary care physician secondary to a 7 6 reading of the INR  Of note there is also a reading from today 2 31  Called lab to look in to the discrepancy in the meantime will repeat the INR  She denies any headache chest pain shortness of breath no blood in her urine or stool no bleeding elsewhere she is awake alert no acute distress  History provided by:  Patient  Medical Problem   Location:  INR  Quality:  Elevation  Severity:  Unable to specify  Onset quality:  Unable to specify  Timing:  Unable to specify  Chronicity:  New  Context:  Elevated INR while on Coumadin therapy  Relieved by:  Nothing  Worsened by:  Oral anticoagulant  Associated symptoms: no abdominal pain, no chest pain and no shortness of breath        Prior to Admission Medications   Prescriptions Last Dose Informant Patient Reported? Taking?    ADVAIR DISKUS 250-50 MCG/DOSE inhaler 7/6/2020 at Unknown time  No Yes   Sig: USE 1 INHALATION TWICE A DAY   Ascorbic Acid (VITAMIN C) 1000 MG tablet   Yes Yes   Sig: Take 1,000 mg by mouth daily   Cholecalciferol (VITAMIN D3) 20 MCG (800 UNIT) TABS   Yes Yes   Sig: Take by mouth   SPIRIVA HANDIHALER 18 MCG inhalation capsule 7/6/2020 at Unknown time  No Yes   Sig: INHALE THE CONTENTS OF 1 CAPSULE DAILY AS DIRECTED   albuterol (2 5 mg/3 mL) 0 083 % nebulizer solution 7/6/2020 at Unknown time  No Yes   Sig: USE 1 VIAL IN NEBULIZER EVERY 4 TO 6 HOURS AS NEEDED FOR COUGH AND WHEEZE   denosumab (PROLIA) 60 mg/mL 7/6/2020 at Unknown time Self Yes Yes   Sig: Inject 60 mg under the skin once 6 months digoxin (LANOXIN) 0 25 mg tablet 7/6/2020 at Unknown time  No Yes   Sig: TAKE 1 TABLET DAILY   levothyroxine 25 mcg tablet 7/6/2020 at Unknown time  No Yes   Sig: TAKE 1 TABLET DAILY   loratadine (CLARITIN) 10 mg tablet 7/6/2020 at Unknown time Self Yes Yes   Sig: Take 10 mg by mouth daily   multivitamin (THERAGRAN) TABS 7/6/2020 at Unknown time Self Yes Yes   Sig: Take 1 tablet by mouth daily   warfarin (COUMADIN) 5 mg tablet 7/6/2020 at Unknown time  No Yes   Sig: TAKE 1 TABLET DAILY      Facility-Administered Medications: None       Past Medical History:   Diagnosis Date    Anti-phospholipid syndrome ([address] )     Asthma     Blood type O+     Cardiac disease     Chronic pain     [facility]     Fracture of ankle     left    Hyperlipidemia     Hypertension     Osteoporosis        Past Surgical History:   Procedure Laterality Date    APPENDECTOMY      BILATERAL OOPHORECTOMY Bilateral     ORIF TIBIAL SHAFT FRACTURE W/ PLATES AND SCREWS Right        Family History   Problem Relation Age of Onset    Hypertension Mother     Skin cancer Mother     Hypertension Father      I have reviewed and agree with the history as documented  E-Cigarette/Vaping    E-Cigarette Use Never User      E-Cigarette/Vaping Substances     Social History     Tobacco Use    Smoking status: Never Smoker    Smokeless tobacco: Never Used    Tobacco comment: Never smoker per [employer]   Substance Use Topics    Alcohol use: No     Frequency: Never    Drug use: No       Review of Systems   Respiratory: Negative for shortness of breath  Cardiovascular: Negative for chest pain  Gastrointestinal: Negative for abdominal pain  All other systems reviewed and are negative  Physical Exam  Physical Exam   Constitutional: She is oriented to person, place, and time  She appears well-developed and well-nourished  No distress  HENT:   Head: Normocephalic and atraumatic     Right Ear: External ear normal    Left Ear: External ear normal    Eyes: Pupils are equal, round, and reactive to light  EOM are normal  Right eye exhibits no discharge  Left eye exhibits no discharge  No scleral icterus  Neck: Neck supple  No JVD present  No tracheal deviation present  Cardiovascular: Normal heart sounds and intact distal pulses  Exam reveals no gallop and no friction rub  No murmur heard  Irregular   Pulmonary/Chest: Effort normal and breath sounds normal  No stridor  No respiratory distress  She has no wheezes  Abdominal: Soft  Bowel sounds are normal  She exhibits no distension and no mass  There is no tenderness  There is no rebound and no guarding  Musculoskeletal: Normal range of motion  She exhibits no edema, tenderness or deformity  Neurological: She is alert and oriented to person, place, and time  No cranial nerve deficit or sensory deficit  She exhibits normal muscle tone  Coordination normal    Skin: Skin is warm and dry  No rash noted  She is not diaphoretic  Psychiatric: She has a normal mood and affect  Her behavior is normal  Thought content normal    Vitals reviewed        Vital Signs  ED Triage Vitals   Temperature Pulse Respirations Blood Pressure SpO2   07/06/20 1538 07/06/20 1530 07/06/20 1530 07/06/20 1530 07/06/20 1530   97 6 °F (36 4 °C) 74 17 (!) 192/80 93 %      Temp Source Heart Rate Source Patient Position - Orthostatic VS BP Location FiO2 (%)   07/06/20 1538 07/06/20 1538 07/06/20 1530 07/06/20 1530 --   Temporal Monitor Lying Right arm       Pain Score       --                  Vitals:    07/06/20 1530 07/06/20 1538 07/06/20 1545   BP: (!) 192/80 165/70 165/70   Pulse: 74 70    Patient Position - Orthostatic VS: Lying Sitting          Visual Acuity      ED Medications  Medications - No data to display    Diagnostic Studies  Results Reviewed     Procedure Component Value Units Date/Time    Protime-INR [[id number]]  (Abnormal) Collected:  07/06/20 1530    Lab Status: Final result Specimen:  Blood from Arm, Right Updated:  07/06/20 1558     Protime 24 8 seconds      INR 2 28    APTT [[id number]]  (Abnormal) Collected:  07/06/20 1530    Lab Status:  Final result Specimen:  Blood from Arm, Right Updated:  07/06/20 1558     PTT 51 seconds                  No orders to display              Procedures  Procedures         ED Course  ED Course as of Jul 10 0723   Mon Jul 06, 2020   1624 Patient's INR is 2 2 a which is therapeutic no need for further intervention at this time follow-up with primary care physician          US AUDIT      Most Recent Value   Initial Alcohol Screen: US AUDIT-C    1  How often do you have a drink containing alcohol?  0 Filed at: 07/06/2020 1522   2  How many drinks containing alcohol do you have on a typical day you are drinking? 0 Filed at: 07/06/2020 1522   3b  FEMALE Any Age, or MALE 65+: How often do you have 4 or more drinks on one occassion? 0 Filed at: 07/06/2020 1522   Audit-C Score  0 Filed at: 07/06/2020 1522                  CAROLINA/DAST-10      Most Recent Value   How many times in the past year have you    Used an illegal drug or used a prescription medication for non-medical reasons?   Never Filed at: 07/06/2020 1522                                MDM  Number of Diagnoses or Management Options  Diagnosis management comments: Elevated INR possible lab error will repeat test at this time no obvious clinical signs of bleeding       Amount and/or Complexity of Data Reviewed  Clinical lab tests: ordered          Disposition  Final diagnoses:   Coagulopathy ([facility] ) - Therapeutic Coumadin     Time reflects when diagnosis was documented in both MDM as applicable and the Disposition within this note     Time User Action Codes Description Comment    7/6/2020  3:37 PM Kaitlynn Lao [D68 9] Coagulopathy ([facility] [address] )     7/6/2020  4:26 PM Trina Bedolla [D68 9] Coagulopathy [facility]) Therapeutic Coumadin      ED Disposition     ED Disposition Condition Date/Time Comment    Discharge Stable Mon Jul 6, 2020  4:26 PM Deidre Espinozas discharge to home/self care  Follow-up Information     Follow up With Specialties Details Why Contact Info    Marthe Krabbe, MD Family Medicine In 1 day  [address]  [phone]            Discharge Medication List as of 7/6/2020  4:30 PM      CONTINUE these medications which have NOT CHANGED    Details   ADVAIR DISKUS 250-50 MCG/DOSE inhaler USE 1 INHALATION TWICE A DAY, Normal      albuterol (2 5 mg/3 mL) 0 083 % nebulizer solution USE 1 VIAL IN NEBULIZER EVERY 4 TO 6 HOURS AS NEEDED FOR COUGH AND WHEEZE, Normal      Ascorbic Acid (VITAMIN C) 1000 MG tablet Take 1,000 mg by mouth daily, Historical Med      Cholecalciferol (VITAMIN D3) 20 MCG (800 UNIT) TABS Take by mouth, Historical Med      denosumab (PROLIA) 60 mg/mL Inject 60 mg under the skin once 6 months, Historical Med      digoxin (LANOXIN) 0 25 mg tablet TAKE 1 TABLET DAILY, Normal      levothyroxine 25 mcg tablet TAKE 1 TABLET DAILY, Normal      loratadine (CLARITIN) 10 mg tablet Take 10 mg by mouth daily, Historical Med      multivitamin (THERAGRAN) TABS Take 1 tablet by mouth daily, Historical Med      SPIRIVA HANDIHALER 18 MCG inhalation capsule INHALE THE CONTENTS OF 1 CAPSULE DAILY AS DIRECTED, Normal      warfarin (COUMADIN) 5 mg tablet TAKE 1 TABLET DAILY, Normal           No discharge procedures on file      PDMP Review     None          ED Provider  Electronically Signed by           Harsha Baird,   07/06/20 [address], DO  07/10/20 [id number]

## 2020-07-06 NOTE — TELEPHONE ENCOUNTER
I spoke with pt and made aware as per MD's orders  Pt was hesitant about going to ER  I reiterated the importance of further evaluation, Pt verbalized understanding  She states she will call her son and have him take her to  [facility]  ADT21 orders placed

## 2020-07-07 NOTE — TELEPHONE ENCOUNTER
Spoke with patient, stated when in the ER her INR came back 2 2, INR received was incorrect, she did not receive the vitamin K shot, and will be resuming coumadin and recheck in 2 weeks

## 2020-08-10 ENCOUNTER — ANTICOAG VISIT (OUTPATIENT)
Dept: FAMILY MEDICINE CLINIC | Facility: CLINIC | Age: 80
End: 2020-08-10

## 2020-08-10 ENCOUNTER — TELEPHONE (OUTPATIENT)
Dept: FAMILY MEDICINE CLINIC | Facility: CLINIC | Age: 80
End: 2020-08-10

## 2020-08-10 ENCOUNTER — APPOINTMENT (OUTPATIENT)
Dept: LAB | Facility: CLINIC | Age: 80
End: 2020-08-10
Payer: MEDICARE

## 2020-08-10 ENCOUNTER — TRANSCRIBE ORDERS (OUTPATIENT)
Dept: LAB | Facility: CLINIC | Age: 80
End: 2020-08-10

## 2020-08-10 DIAGNOSIS — I48.91 ATRIAL FIBRILLATION, UNSPECIFIED TYPE (HCC): Primary | ICD-10-CM

## 2020-08-10 LAB
INR PPP: 3.09 (ref 0.84–1.19)
PROTHROMBIN TIME: 31.6 SECONDS (ref 11.6–14.5)

## 2020-08-10 PROCEDURE — 85610 PROTHROMBIN TIME: CPT

## 2020-08-10 PROCEDURE — 36415 COLL VENOUS BLD VENIPUNCTURE: CPT

## 2020-08-10 NOTE — TELEPHONE ENCOUNTER
----- Message from Treasure Dsouza MD sent at 3/59/0753 11:54 AM EDT -----  Hold Coumadin dosing for 1 day    Restart same dose and recheck INR in 4 weeks

## 2020-08-10 NOTE — TELEPHONE ENCOUNTER
Spoke with pt  Made aware as per provider's orders  Pt verbalized understanding and had no questions at this time

## 2020-08-10 NOTE — PATIENT INSTRUCTIONS
Bonnie De La Rosa MD  [address] [facility]               Hold Coumadin dosing for 1 day   Restart same dose and recheck INR in 4 weeks      Spoke with pt  Made aware as per provider's orders  Pt verbalized understanding and had no questions at this time

## 2020-08-10 NOTE — PROGRESS NOTES
Sangeetha Ibrahim MD  [address] [facility]               Hold Coumadin dosing for 1 day   Restart same dose and recheck INR in 4 weeks      Spoke with pt  Made aware as per provider's orders  Pt verbalized understanding and had no questions at this time

## 2020-09-11 ENCOUNTER — ANTICOAG VISIT (OUTPATIENT)
Dept: FAMILY MEDICINE CLINIC | Facility: CLINIC | Age: 80
End: 2020-09-11

## 2020-09-11 ENCOUNTER — APPOINTMENT (OUTPATIENT)
Dept: LAB | Facility: CLINIC | Age: 80
End: 2020-09-11
Payer: MEDICARE

## 2020-09-11 LAB — INR PPP: 2.28 (ref 0.84–1.19)

## 2020-09-17 DIAGNOSIS — I10 ESSENTIAL HYPERTENSION: ICD-10-CM

## 2020-09-17 RX ORDER — DILTIAZEM HYDROCHLORIDE 240 MG/1
CAPSULE, COATED, EXTENDED RELEASE ORAL
Qty: 90 CAPSULE | Refills: 3 | Status: SHIPPED | OUTPATIENT
Start: 2020-09-17 | End: 2021-09-13

## 2020-10-22 ENCOUNTER — LAB (OUTPATIENT)
Dept: LAB | Facility: CLINIC | Age: 80
End: 2020-10-22
Payer: MEDICARE

## 2020-10-22 ENCOUNTER — ANTICOAG VISIT (OUTPATIENT)
Dept: FAMILY MEDICINE CLINIC | Facility: CLINIC | Age: 80
End: 2020-10-22

## 2020-10-22 LAB — INR PPP: 2.49 (ref 0.84–1.19)

## 2020-11-24 DIAGNOSIS — E03.9 HYPOTHYROIDISM, UNSPECIFIED TYPE: ICD-10-CM

## 2020-11-24 RX ORDER — LEVOTHYROXINE SODIUM 0.03 MG/1
TABLET ORAL
Qty: 90 TABLET | Refills: 3 | Status: SHIPPED | OUTPATIENT
Start: 2020-11-24 | End: 2021-11-01

## 2020-11-30 DIAGNOSIS — J44.9 CHRONIC OBSTRUCTIVE PULMONARY DISEASE, UNSPECIFIED COPD TYPE (HCC): ICD-10-CM

## 2020-12-03 ENCOUNTER — TRANSCRIBE ORDERS (OUTPATIENT)
Dept: LAB | Facility: CLINIC | Age: 80
End: 2020-12-03

## 2020-12-03 ENCOUNTER — ANTICOAG VISIT (OUTPATIENT)
Dept: FAMILY MEDICINE CLINIC | Facility: CLINIC | Age: 80
End: 2020-12-03

## 2020-12-03 ENCOUNTER — LAB (OUTPATIENT)
Dept: LAB | Facility: CLINIC | Age: 80
End: 2020-12-03
Payer: MEDICARE

## 2020-12-03 ENCOUNTER — TELEPHONE (OUTPATIENT)
Dept: FAMILY MEDICINE CLINIC | Facility: CLINIC | Age: 80
End: 2020-12-03

## 2020-12-03 DIAGNOSIS — I48.91 ATRIAL FIBRILLATION, UNSPECIFIED TYPE (HCC): Primary | ICD-10-CM

## 2020-12-03 LAB — INR PPP: 1.98 (ref 0.84–1.19)

## 2020-12-30 ENCOUNTER — OFFICE VISIT (OUTPATIENT)
Dept: FAMILY MEDICINE CLINIC | Facility: CLINIC | Age: 80
End: 2020-12-30
Payer: MEDICARE

## 2020-12-30 ENCOUNTER — TELEPHONE (OUTPATIENT)
Dept: FAMILY MEDICINE CLINIC | Facility: CLINIC | Age: 80
End: 2020-12-30

## 2020-12-30 ENCOUNTER — ANTICOAG VISIT (OUTPATIENT)
Dept: FAMILY MEDICINE CLINIC | Facility: CLINIC | Age: 80
End: 2020-12-30

## 2020-12-30 ENCOUNTER — LAB (OUTPATIENT)
Dept: LAB | Facility: CLINIC | Age: 80
End: 2020-12-30
Payer: COMMERCIAL

## 2020-12-30 VITALS
DIASTOLIC BLOOD PRESSURE: 70 MMHG | OXYGEN SATURATION: 95 % | TEMPERATURE: 98.2 F | WEIGHT: 115.4 LBS | RESPIRATION RATE: 18 BRPM | SYSTOLIC BLOOD PRESSURE: 170 MMHG | HEART RATE: 80 BPM | BODY MASS INDEX: 21.79 KG/M2 | HEIGHT: 61 IN

## 2020-12-30 DIAGNOSIS — M81.0 AGE RELATED OSTEOPOROSIS, UNSPECIFIED PATHOLOGICAL FRACTURE PRESENCE: ICD-10-CM

## 2020-12-30 DIAGNOSIS — I10 ESSENTIAL HYPERTENSION: ICD-10-CM

## 2020-12-30 DIAGNOSIS — I48.91 ATRIAL FIBRILLATION, UNSPECIFIED TYPE (HCC): Primary | ICD-10-CM

## 2020-12-30 LAB — INR PPP: 2.49 (ref 0.84–1.19)

## 2020-12-30 PROCEDURE — 99213 OFFICE O/P EST LOW 20 MIN: CPT | Performed by: FAMILY MEDICINE

## 2021-03-03 ENCOUNTER — TELEPHONE (OUTPATIENT)
Dept: FAMILY MEDICINE CLINIC | Facility: CLINIC | Age: 81
End: 2021-03-03

## 2021-03-03 ENCOUNTER — TRANSCRIBE ORDERS (OUTPATIENT)
Dept: LAB | Facility: CLINIC | Age: 81
End: 2021-03-03

## 2021-03-03 ENCOUNTER — LAB (OUTPATIENT)
Dept: LAB | Facility: CLINIC | Age: 81
End: 2021-03-03
Payer: MEDICARE

## 2021-03-03 ENCOUNTER — ANTICOAG VISIT (OUTPATIENT)
Dept: FAMILY MEDICINE CLINIC | Facility: CLINIC | Age: 81
End: 2021-03-03

## 2021-03-03 DIAGNOSIS — I48.91 ATRIAL FIBRILLATION, UNSPECIFIED TYPE (HCC): Primary | ICD-10-CM

## 2021-03-03 LAB
INR PPP: 2.39 (ref 0.84–1.19)
INR PPP: 2.39 (ref 0.84–1.19)
PROTHROMBIN TIME: 26 SECONDS (ref 11.6–14.5)

## 2021-03-03 PROCEDURE — 85610 PROTHROMBIN TIME: CPT

## 2021-03-03 PROCEDURE — 36415 COLL VENOUS BLD VENIPUNCTURE: CPT

## 2021-03-03 NOTE — PROGRESS NOTES
YUE [facility]             Continue same coumadin dosing and repeat INR in 1 month

## 2021-03-03 NOTE — TELEPHONE ENCOUNTER
----- Message from [address] [place] [address] sent at 3/3/2021  3:43 PM EST -----  Continue same coumadin dosing and repeat INR in 1 month

## 2021-03-03 NOTE — PATIENT INSTRUCTIONS
YUE [facility]             Continue same coumadin dosing and repeat INR in 1 month      Left detailed message

## 2021-03-04 DIAGNOSIS — Z23 ENCOUNTER FOR IMMUNIZATION: ICD-10-CM

## 2021-03-11 DIAGNOSIS — I48.20 CHRONIC ATRIAL FIBRILLATION (HCC): ICD-10-CM

## 2021-03-11 RX ORDER — WARFARIN SODIUM 5 MG/1
TABLET ORAL
Qty: 90 TABLET | Refills: 3 | Status: SHIPPED | OUTPATIENT
Start: 2021-03-11 | End: 2022-05-30

## 2021-03-11 RX ORDER — DIGOXIN 250 MCG
TABLET ORAL
Qty: 90 TABLET | Refills: 3 | Status: SHIPPED | OUTPATIENT
Start: 2021-03-11 | End: 2022-04-06

## 2021-03-18 ENCOUNTER — IMMUNIZATIONS (OUTPATIENT)
Dept: FAMILY MEDICINE CLINIC | Facility: HOSPITAL | Age: 81
End: 2021-03-18

## 2021-03-18 DIAGNOSIS — Z23 ENCOUNTER FOR IMMUNIZATION: Primary | ICD-10-CM

## 2021-03-18 PROCEDURE — 0001A SARS-COV-2 / COVID-19 MRNA VACCINE (PFIZER-BIONTECH) 30 MCG: CPT

## 2021-03-18 PROCEDURE — 91300 SARS-COV-2 / COVID-19 MRNA VACCINE (PFIZER-BIONTECH) 30 MCG: CPT

## 2021-04-09 ENCOUNTER — IMMUNIZATIONS (OUTPATIENT)
Dept: FAMILY MEDICINE CLINIC | Facility: HOSPITAL | Age: 81
End: 2021-04-09

## 2021-04-09 DIAGNOSIS — Z23 ENCOUNTER FOR IMMUNIZATION: Primary | ICD-10-CM

## 2021-04-09 PROCEDURE — 0002A SARS-COV-2 / COVID-19 MRNA VACCINE (PFIZER-BIONTECH) 30 MCG: CPT

## 2021-04-09 PROCEDURE — 91300 SARS-COV-2 / COVID-19 MRNA VACCINE (PFIZER-BIONTECH) 30 MCG: CPT

## 2021-05-26 DIAGNOSIS — J45.909 MODERATE ASTHMA, UNSPECIFIED WHETHER COMPLICATED, UNSPECIFIED WHETHER PERSISTENT: ICD-10-CM

## 2021-05-26 RX ORDER — TIOTROPIUM BROMIDE 18 UG/1
CAPSULE ORAL; RESPIRATORY (INHALATION)
Qty: 90 EACH | Refills: 3 | Status: SHIPPED | OUTPATIENT
Start: 2021-05-26 | End: 2022-05-23

## 2021-06-30 ENCOUNTER — ANTICOAG VISIT (OUTPATIENT)
Dept: FAMILY MEDICINE CLINIC | Facility: CLINIC | Age: 81
End: 2021-06-30

## 2021-06-30 ENCOUNTER — TELEPHONE (OUTPATIENT)
Dept: FAMILY MEDICINE CLINIC | Facility: CLINIC | Age: 81
End: 2021-06-30

## 2021-06-30 ENCOUNTER — OFFICE VISIT (OUTPATIENT)
Dept: FAMILY MEDICINE CLINIC | Facility: CLINIC | Age: 81
End: 2021-06-30
Payer: MEDICARE

## 2021-06-30 ENCOUNTER — APPOINTMENT (OUTPATIENT)
Dept: LAB | Facility: CLINIC | Age: 81
End: 2021-06-30
Payer: MEDICARE

## 2021-06-30 VITALS
BODY MASS INDEX: 21.71 KG/M2 | HEIGHT: 61 IN | TEMPERATURE: 97.8 F | RESPIRATION RATE: 15 BRPM | SYSTOLIC BLOOD PRESSURE: 128 MMHG | WEIGHT: 115 LBS | HEART RATE: 87 BPM | DIASTOLIC BLOOD PRESSURE: 70 MMHG | OXYGEN SATURATION: 86 %

## 2021-06-30 DIAGNOSIS — D68.2 HYPOPROTHROMBINEMIA (HCC): ICD-10-CM

## 2021-06-30 DIAGNOSIS — J44.9 CHRONIC OBSTRUCTIVE PULMONARY DISEASE, UNSPECIFIED COPD TYPE (HCC): ICD-10-CM

## 2021-06-30 DIAGNOSIS — I48.91 ATRIAL FIBRILLATION, UNSPECIFIED TYPE (HCC): ICD-10-CM

## 2021-06-30 DIAGNOSIS — R76.0 ANTIPHOSPHOLIPID ANTIBODY POSITIVE: ICD-10-CM

## 2021-06-30 DIAGNOSIS — I10 ESSENTIAL HYPERTENSION: ICD-10-CM

## 2021-06-30 DIAGNOSIS — D68.9 COAGULOPATHY (HCC): ICD-10-CM

## 2021-06-30 DIAGNOSIS — M54.50 LOW BACK PAIN WITHOUT SCIATICA, UNSPECIFIED BACK PAIN LATERALITY, UNSPECIFIED CHRONICITY: ICD-10-CM

## 2021-06-30 DIAGNOSIS — M81.0 OSTEOPOROSIS, UNSPECIFIED OSTEOPOROSIS TYPE, UNSPECIFIED PATHOLOGICAL FRACTURE PRESENCE: Primary | ICD-10-CM

## 2021-06-30 LAB
ALBUMIN SERPL BCP-MCNC: 3.3 G/DL (ref 3.5–5)
ALP SERPL-CCNC: 82 U/L (ref 46–116)
ALT SERPL W P-5'-P-CCNC: 16 U/L (ref 12–78)
ANION GAP SERPL CALCULATED.3IONS-SCNC: 2 MMOL/L (ref 4–13)
AST SERPL W P-5'-P-CCNC: 18 U/L (ref 5–45)
BILIRUB SERPL-MCNC: 0.73 MG/DL (ref 0.2–1)
BUN SERPL-MCNC: 13 MG/DL (ref 5–25)
CALCIUM ALBUM COR SERPL-MCNC: 9.7 MG/DL (ref 8.3–10.1)
CALCIUM SERPL-MCNC: 9.1 MG/DL (ref 8.3–10.1)
CHLORIDE SERPL-SCNC: 96 MMOL/L (ref 100–108)
CHOLEST SERPL-MCNC: 146 MG/DL (ref 50–200)
CO2 SERPL-SCNC: 35 MMOL/L (ref 21–32)
CREAT SERPL-MCNC: 0.61 MG/DL (ref 0.6–1.3)
ERYTHROCYTE [DISTWIDTH] IN BLOOD BY AUTOMATED COUNT: 16.6 % (ref 11.6–15.1)
GFR SERPL CREATININE-BSD FRML MDRD: 85 ML/MIN/1.73SQ M
GLUCOSE P FAST SERPL-MCNC: 88 MG/DL (ref 65–99)
HCT VFR BLD AUTO: 33.1 % (ref 34.8–46.1)
HDLC SERPL-MCNC: 47 MG/DL
HGB BLD-MCNC: 10.9 G/DL (ref 11.5–15.4)
INR PPP: 3.26 (ref 0.84–1.19)
INR PPP: 3.26 (ref 0.84–1.19)
LDLC SERPL CALC-MCNC: 88 MG/DL (ref 0–100)
MCH RBC QN AUTO: 34.6 PG (ref 26.8–34.3)
MCHC RBC AUTO-ENTMCNC: 32.9 G/DL (ref 31.4–37.4)
MCV RBC AUTO: 105 FL (ref 82–98)
NONHDLC SERPL-MCNC: 99 MG/DL
PLATELET # BLD AUTO: 279 THOUSANDS/UL (ref 149–390)
PMV BLD AUTO: 10.3 FL (ref 8.9–12.7)
POTASSIUM SERPL-SCNC: 4.5 MMOL/L (ref 3.5–5.3)
PROT SERPL-MCNC: 7.5 G/DL (ref 6.4–8.2)
PROTHROMBIN TIME: 33 SECONDS (ref 11.6–14.5)
RBC # BLD AUTO: 3.15 MILLION/UL (ref 3.81–5.12)
SODIUM SERPL-SCNC: 133 MMOL/L (ref 136–145)
TRIGL SERPL-MCNC: 53 MG/DL
TSH SERPL DL<=0.05 MIU/L-ACNC: 3.41 UIU/ML (ref 0.36–3.74)
WBC # BLD AUTO: 4.19 THOUSAND/UL (ref 4.31–10.16)

## 2021-06-30 PROCEDURE — 36415 COLL VENOUS BLD VENIPUNCTURE: CPT

## 2021-06-30 PROCEDURE — 85610 PROTHROMBIN TIME: CPT

## 2021-06-30 PROCEDURE — 84443 ASSAY THYROID STIM HORMONE: CPT

## 2021-06-30 PROCEDURE — 99214 OFFICE O/P EST MOD 30 MIN: CPT | Performed by: FAMILY MEDICINE

## 2021-06-30 PROCEDURE — 80053 COMPREHEN METABOLIC PANEL: CPT

## 2021-06-30 PROCEDURE — 80061 LIPID PANEL: CPT

## 2021-06-30 PROCEDURE — 85027 COMPLETE CBC AUTOMATED: CPT

## 2021-06-30 PROCEDURE — 96372 THER/PROPH/DIAG INJ SC/IM: CPT

## 2021-06-30 RX ORDER — LIDOCAINE 50 MG/G
1 PATCH TOPICAL DAILY
Qty: 30 PATCH | Refills: 3 | Status: SHIPPED | OUTPATIENT
Start: 2021-06-30

## 2021-06-30 NOTE — ASSESSMENT & PLAN NOTE
Hyper hypertension    Patient blood pressure is stable at this time she will continue with current regimen of medications

## 2021-06-30 NOTE — PATIENT INSTRUCTIONS
YUE Tejeda   6/30/2021  4:47 PM EDT Back to Top      Hold Coumadin tonight  Continue same Coumadin dosing and repeat INR in 1 week  Spoke with patient  Made aware of  results and provider's instructions  Patient verbalized understanding and was agreeable w/ plan

## 2021-06-30 NOTE — ASSESSMENT & PLAN NOTE
Osteoporosis    Patient received her Prolia injection today for her ongoing treatment of osteoporosis

## 2021-06-30 NOTE — TELEPHONE ENCOUNTER
----- Message from [facility] sent at 6/30/2021  4:47 PM EDT -----  Hold Coumadin tonight  Continue same Coumadin dosing and repeat INR in 1 week

## 2021-06-30 NOTE — PROGRESS NOTES
FAMILY PRACTICE OFFICE VISIT       NAME: Luda Grove  AGE: 79 y o  SEX: female       : 1940        MRN: [id number]    DATE: 2021  TIME: 5:21 PM    Assessment and Plan     Problem List Items Addressed This Visit        Respiratory    COPD (chronic obstructive pulmonary disease) ([facility] 75 )      COPD  Patient will continue with current regiment Spiriva and Advair as ordered  Her COVID vaccination is up-to-date  Cardiovascular and Mediastinum    Hypertension       Hyper hypertension  Patient blood pressure is stable at this time she will continue with current regimen of medications         Relevant Orders    CBC (Completed)    Comprehensive metabolic panel (Completed)    Lipid panel (Completed)    TSH, 3rd generation (Completed)    Atrial fibrillation (HCC)      History of atrial fibrillation  Patient continues on warfarin therapy  She will obtain INR testing today for further evaluation  We will make further recommendations pending results of test          Relevant Orders    CBC (Completed)    Comprehensive metabolic panel (Completed)    Lipid panel (Completed)    TSH, 3rd generation (Completed)       Musculoskeletal and Integument    Osteoporosis - Primary       Osteoporosis  Patient received her Prolia injection today for her ongoing treatment of osteoporosis         Relevant Medications    denosumab (PROLIA) subcutaneous injection 60 mg (Completed)    lidocaine (LIDODERM) 5 %       Hematopoietic and Hemostatic    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[facility] )    Relevant Orders    Protime-INR (Completed)       Other    Antiphospholipid antibody positive    Relevant Orders    Protime-INR (Completed)    Low back pain without sciatica      Back pain  Patient was given lidocaine patches to apply 12 hours per day as needed for chronic back pain    She will obtain x-ray of lumbar spine for further evaluation and to rule out possible compression fracture         Relevant Medications    lidocaine (LIDODERM) 5 % Other Relevant Orders    XR spine lumbar minimum 4 views non injury      Other Visit Diagnoses     Coagulopathy ([email or web address] )        Relevant Orders    CBC (Completed)    Comprehensive metabolic panel (Completed)    Lipid panel (Completed)    TSH, 3rd generation (Completed)    Protime-INR (Completed)              Chief Complaint     Chief Complaint   Patient presents with    Follow-up     bmi phq  due     Annual Exam       History of Present Illness      Patient in the office to review chronic medical condition  She denies any recent illness  She does struggle with humidity in warm weather but has been stable with current regimen of Advair and Spiriva  Patient is not always compliant with obtaining INR testing for her Coumadin on of regular basis as instructed  Patient is up-to-date with COVID vaccination      Review of Systems   Review of Systems   Constitutional: Positive for fatigue  Negative for fever  HENT: Negative  Respiratory: Negative  Cardiovascular: Negative  Gastrointestinal: Negative  Genitourinary: Negative  Musculoskeletal: Positive for arthralgias and back pain  Patient does complain of chronic back pain especially with standing and sitting positions  She denies any recent   Injuries   Neurological: Negative  Psychiatric/Behavioral: Negative          Active Problem List     Patient Active Problem List   Diagnosis    Pneumonia    [facility]    Hypertension    Hypercoagulable state ([facility]    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[email or web address] )    Skin lesion    Antiphospholipid antibody positive    Atrial fibrillation (HCC)    Hereditary hemolytic anemia (HCC)    Asthma    Vitamin D deficiency    Osteoporosis    Low back pain without sciatica       Past Medical History:  Past Medical History:   Diagnosis Date    Anti-phospholipid syndrome ([email or web address] )     Asthma     Blood type O+     Cardiac disease     Chronic pain     [facility]     Fracture of ankle     left    Hyperlipidemia     Hypertension     Osteoporosis        Past Surgical History:  Past Surgical History:   Procedure Laterality Date    APPENDECTOMY      BILATERAL OOPHORECTOMY Bilateral     ORIF TIBIAL SHAFT FRACTURE W/ PLATES AND SCREWS Right        Family History:  Family History   Problem Relation Age of Onset    Hypertension Mother     Skin cancer Mother     Hypertension Father        Social History:  Social History     Socioeconomic History    Marital status:      Spouse name: Not on file    Number of children: Not on file    Years of education: Not on file    Highest education level: Not on file   Occupational History    Not on file   Tobacco Use    Smoking status: Never Smoker    Smokeless tobacco: Never Used    Tobacco comment: Never smoker per [employer]   Vaping Use    Vaping Use: Never used   Substance and Sexual Activity    Alcohol use: No    Drug use: No    Sexual activity: Not on file   Other Topics Concern    Not on file   Social History Narrative    Not on file     Social Determinants of Health     Financial Resource Strain:     Difficulty of Paying Living Expenses:    Food Insecurity:     Worried About [address] [employer] in the Last Year:     [address] in the Last Year:    Transportation Needs:     Lack of Transportation (Medical):      Lack of Transportation (Non-Medical):    Physical Activity:     Days of Exercise per Week:     Minutes of Exercise per Session:    Stress:     Feeling of Stress :    Social Connections:     Frequency of Communication with Friends and Family:     Frequency of Social Gatherings with Friends and Family:     Attends [employer] Services:     Active Member of Clubs or Organizations:     Attends Club or Organization Meetings:     Marital Status:    Intimate Partner Violence:     Fear of Current or Ex-Partner:     Emotionally Abused:     Physically Abused:     Sexually Abused:        Objective     Vitals:    06/30/21 1041   BP: 128/70   Pulse: 87   Resp: 15   Temp: 97 8 °F (36 6 °C)   SpO2: (!) 86%     Wt Readings from Last 3 Encounters:   06/30/21 52 2 kg (115 lb)   12/30/20 52 3 kg (115 lb 6 4 oz)   07/06/20 54 3 kg (119 lb 12 8 oz)       Physical Exam  Constitutional:       General: She is not in acute distress  Appearance: Normal appearance  She is not ill-appearing  HENT:      Head: Normocephalic and atraumatic  Eyes:      General:         Right eye: No discharge  Left eye: No discharge  Extraocular Movements: Extraocular movements intact  Conjunctiva/sclera: Conjunctivae normal       Pupils: Pupils are equal, round, and reactive to light  Cardiovascular:      Rate and Rhythm: Normal rate and regular rhythm  Heart sounds: Normal heart sounds  No murmur heard  Pulmonary:      Effort: Pulmonary effort is normal  No respiratory distress  Breath sounds: Normal breath sounds  No wheezing, rhonchi or rales  Abdominal:      General: Abdomen is flat  Bowel sounds are normal  There is no distension  Palpations: Abdomen is soft  Tenderness: There is no abdominal tenderness  There is no guarding or rebound  Musculoskeletal:      Right lower leg: Edema present  Left lower leg: Edema present  Comments: Patient with  Chronic kyphosis of thoracic spine    Patient with +1-2 peripheral edema bilateral lower extremities  Negative ulcerations or skin lesions noted  Negative Homans sign   Neurological:      General: No focal deficit present  Mental Status: She is alert and oriented to person, place, and time  Psychiatric:         Mood and Affect: Mood normal          Behavior: Behavior normal          Thought Content:  Thought content normal          Judgment: Judgment normal          Pertinent Laboratory/Diagnostic Studies:  Lab Results   Component Value Date    GLUCOSE 96 03/17/2015    BUN 13 06/30/2021 CREATININE 0 61 06/30/2021    CALCIUM 9 1 06/30/2021     03/17/2015    K 4 5 06/30/2021    CO2 35 (H) 06/30/2021    CL 96 (L) 06/30/2021     Lab Results   Component Value Date    ALT 16 06/30/2021    AST 18 06/30/2021    ALKPHOS 82 06/30/2021    BILITOT 0 40 03/17/2015       Lab Results   Component Value Date    WBC 4 19 (L) 06/30/2021    HGB 10 9 (L) 06/30/2021    HCT 33 1 (L) 06/30/2021     (H) 06/30/2021     06/30/2021       No results found for: TSH    Lab Results   Component Value Date    CHOL 145 03/17/2015     Lab Results   Component Value Date    TRIG 53 06/30/2021     Lab Results   Component Value Date    HDL 47 06/30/2021     Lab Results   Component Value Date    LDLCALC 88 06/30/2021     No results found for: HGBA1C    Results for orders placed or performed in visit on 03/03/21   Protime-INR   Result Value Ref Range    INR 2 39 (A) 0 84 - 1 19       Orders Placed This Encounter   Procedures    XR spine lumbar minimum 4 views non injury    CBC    Comprehensive metabolic panel    Lipid panel    TSH, 3rd generation    Protime-INR       ALLERGIES:  Allergies   Allergen Reactions    Aspirin Other (See Comments)    Penicillins Rash and Hives    Sulfa Antibiotics Rash       Current Medications     Current Outpatient Medications   Medication Sig Dispense Refill    Advair Diskus 250-50 MCG/DOSE inhaler USE 1 INHALATION TWICE A  each 3    albuterol (2 5 mg/3 mL) 0 083 % nebulizer solution USE 1 VIAL IN NEBULIZER EVERY 4 TO 6 HOURS AS NEEDED FOR COUGH AND WHEEZE 75 vial 3    Ascorbic Acid (VITAMIN C) 1000 MG tablet Take 1,000 mg by mouth daily      Cholecalciferol (VITAMIN D3) 20 MCG (800 UNIT) TABS Take by mouth      denosumab (PROLIA) 60 mg/mL Inject 60 mg under the skin once 6 months      digoxin (LANOXIN) 0 25 mg tablet TAKE 1 TABLET DAILY 90 tablet 3    diltiazem (CARDIZEM CD) 240 mg 24 hr capsule TAKE 1 CAPSULE DAILY 90 capsule 3    levothyroxine 25 mcg tablet TAKE 1 TABLET DAILY 90 tablet 3    loratadine (CLARITIN) 10 mg tablet Take 10 mg by mouth daily      multivitamin (THERAGRAN) TABS Take 1 tablet by mouth daily      Spiriva HandiHaler 18 MCG inhalation capsule INHALE THE CONTENTS OF 1 CAPSULE DAILY AS DIRECTED 90 each 3    warfarin (COUMADIN) 5 mg tablet TAKE 1 TABLET DAILY 90 tablet 3    lidocaine (LIDODERM) 5 % Apply 1 patch topically daily Remove & Discard patch within 12 hours or as directed by MD 30 patch 3     No current facility-administered medications for this visit           Health Maintenance     Health Maintenance   Topic Date Due    SLP PLAN OF CARE  Never done    Annual Physical  Never done    DTaP,Tdap,and Td Vaccines (1 - Tdap) Never done    Fall Risk  06/27/2020    Depression Screening PHQ  06/30/2022    BMI: Adult  06/30/2022    Pneumococcal Vaccine: 65+ Years  Completed    Influenza Vaccine  Completed    COVID-19 Vaccine  Completed    HIB Vaccine  Aged Out    Hepatitis B Vaccine  Aged Out    IPV Vaccine  Aged Out    Hepatitis A Vaccine  Aged Out    Meningococcal ACWY Vaccine  Aged Out    HPV Vaccine  Aged Out     Immunization History   Administered Date(s) Administered    INFLUENZA 12/14/2004, 10/17/2018, 10/15/2020    Influenza Split High Dose Preservative Free IM 09/17/2015, 12/19/2016, 10/23/2017    Influenza, high dose seasonal 0 7 mL 10/16/2019    Influenza, seasonal, injectable 11/01/2011, 11/16/2012, 10/25/2013    Pneumococcal Conjugate 13-Valent 03/17/2016    Pneumococcal Polysaccharide PPV23 12/23/2005    SARS-CoV-2 / COVID-19 mRNA IM (Pfizer-BioNTech) 03/18/2021, 40/25/1084       Sangeetha Ibrahim MD

## 2021-06-30 NOTE — ASSESSMENT & PLAN NOTE
COPD   Patient will continue with current regiment Spiriva and Advair as ordered  Her COVID vaccination is up-to-date

## 2021-06-30 NOTE — PROGRESS NOTES
YUE Dsouza   6/30/2021  4:47 PM EDT Back to Top      Hold Coumadin tonight  Continue same Coumadin dosing and repeat INR in 1 week  Spoke with patient  Made aware of  results and provider's instructions  Patient verbalized understanding and was agreeable w/ plan

## 2021-06-30 NOTE — ASSESSMENT & PLAN NOTE
Back pain  Patient was given lidocaine patches to apply 12 hours per day as needed for chronic back pain    She will obtain x-ray of lumbar spine for further evaluation and to rule out possible compression fracture

## 2021-06-30 NOTE — ASSESSMENT & PLAN NOTE
History of atrial fibrillation  Patient continues on warfarin therapy  She will obtain INR testing today for further evaluation    We will make further recommendations pending results of test

## 2021-07-02 ENCOUNTER — TELEPHONE (OUTPATIENT)
Dept: FAMILY MEDICINE CLINIC | Facility: CLINIC | Age: 81
End: 2021-07-02

## 2021-07-02 NOTE — TELEPHONE ENCOUNTER
----- Message from Narcisa Stephen MD sent at 0/1/4104  6:42 AM EDT -----   Recent blood work showed patient to have anemia compared to last year    I will place order for further blood tests to check iron level  when she has repeat INR test next week

## 2021-07-07 ENCOUNTER — TELEPHONE (OUTPATIENT)
Dept: FAMILY MEDICINE CLINIC | Facility: CLINIC | Age: 81
End: 2021-07-07

## 2021-07-07 ENCOUNTER — APPOINTMENT (OUTPATIENT)
Dept: LAB | Facility: CLINIC | Age: 81
End: 2021-07-07
Payer: MEDICARE

## 2021-07-07 ENCOUNTER — ANTICOAG VISIT (OUTPATIENT)
Dept: FAMILY MEDICINE CLINIC | Facility: CLINIC | Age: 81
End: 2021-07-07

## 2021-07-07 DIAGNOSIS — D64.9 ANEMIA, UNSPECIFIED TYPE: ICD-10-CM

## 2021-07-07 LAB
FERRITIN SERPL-MCNC: 390 NG/ML (ref 8–388)
FOLATE SERPL-MCNC: >20 NG/ML (ref 3.1–17.5)
INR PPP: 3 (ref 0.84–1.19)
IRON SATN MFR SERPL: 20 %
IRON SERPL-MCNC: 49 UG/DL (ref 50–170)
TIBC SERPL-MCNC: 240 UG/DL (ref 250–450)
VIT B12 SERPL-MCNC: 487 PG/ML (ref 100–900)

## 2021-07-07 PROCEDURE — 83550 IRON BINDING TEST: CPT

## 2021-07-07 PROCEDURE — 82728 ASSAY OF FERRITIN: CPT

## 2021-07-07 PROCEDURE — 82746 ASSAY OF FOLIC ACID SERUM: CPT

## 2021-07-07 PROCEDURE — 82607 VITAMIN B-12: CPT

## 2021-07-07 PROCEDURE — 83540 ASSAY OF IRON: CPT

## 2021-07-07 NOTE — PROGRESS NOTES
Tamika Butler MD sent to [facility]   No changes to Coumadin dosing   Recheck INR in 4 weeks     Spoke with patient  Made aware of provider's instructions  Patient verbalized understanding and was agreeable w/ plan

## 2021-07-07 NOTE — PATIENT INSTRUCTIONS
Frannie Beard MD sent to [facility]   No changes to Coumadin dosing   Recheck INR in 4 weeks     Spoke with patient  Made aware of provider's instructions  Patient verbalized understanding and was agreeable w/ plan

## 2021-07-09 ENCOUNTER — TELEPHONE (OUTPATIENT)
Dept: FAMILY MEDICINE CLINIC | Facility: CLINIC | Age: 81
End: 2021-07-09

## 2021-07-09 NOTE — TELEPHONE ENCOUNTER
----- Message from Charlie Lizarraga MD sent at 7/7/5311  6:47 AM EDT -----   Latest blood work does show patient to be iron deficient  I would recommend restarting over-the-counter ferrous sulfate 65 mg tablet once daily

## 2021-07-16 ENCOUNTER — HOSPITAL ENCOUNTER (OUTPATIENT)
Dept: RADIOLOGY | Facility: HOSPITAL | Age: 81
Discharge: HOME | End: 2021-07-16
Payer: MEDICARE

## 2021-07-16 DIAGNOSIS — M54.50 LOW BACK PAIN WITHOUT SCIATICA, UNSPECIFIED BACK PAIN LATERALITY, UNSPECIFIED CHRONICITY: ICD-10-CM

## 2021-07-16 PROCEDURE — 72110 X-RAY EXAM L-2 SPINE 4/>VWS: CPT

## 2021-08-25 ENCOUNTER — TELEPHONE (OUTPATIENT)
Dept: FAMILY MEDICINE CLINIC | Facility: CLINIC | Age: 81
End: 2021-08-25

## 2021-08-25 ENCOUNTER — ANTICOAG VISIT (OUTPATIENT)
Dept: FAMILY MEDICINE CLINIC | Facility: CLINIC | Age: 81
End: 2021-08-25

## 2021-08-25 ENCOUNTER — APPOINTMENT (OUTPATIENT)
Dept: LAB | Facility: CLINIC | Age: 81
End: 2021-08-25
Payer: MEDICARE

## 2021-08-25 DIAGNOSIS — I48.91 ATRIAL FIBRILLATION, UNSPECIFIED TYPE (HCC): ICD-10-CM

## 2021-08-25 DIAGNOSIS — I48.91 ATRIAL FIBRILLATION, UNSPECIFIED TYPE (HCC): Primary | ICD-10-CM

## 2021-08-25 LAB
INR PPP: 3.77 (ref 0.84–1.19)
INR PPP: 3.77 (ref 0.84–1.19)
PROTHROMBIN TIME: 36.9 SECONDS (ref 11.6–14.5)

## 2021-08-25 PROCEDURE — 85610 PROTHROMBIN TIME: CPT

## 2021-08-25 PROCEDURE — 36415 COLL VENOUS BLD VENIPUNCTURE: CPT

## 2021-08-25 NOTE — TELEPHONE ENCOUNTER
----- Message from Bishnu Alfonso MD sent at 8/25/2021  4:23 PM EDT -----  Please advised patient to hold Coumadin today and tomorrow, then take 2 5 mg daily and repeat INR on Tuesday or Wednesday next week    Thank you

## 2021-08-25 NOTE — PATIENT INSTRUCTIONS
Anderson Cooper MD  [address] [facility]  Please advised patient to hold Coumadin today and tomorrow, then take 2 5 mg daily and repeat INR on Tuesday or Wednesday next week  Ashish  you   Pt aware

## 2021-08-25 NOTE — TELEPHONE ENCOUNTER
Patient is at the lab and needs a standing order for her PT InR    Please add this in as soon as possible,

## 2021-08-26 ENCOUNTER — ANTICOAG VISIT (OUTPATIENT)
Dept: FAMILY MEDICINE CLINIC | Facility: CLINIC | Age: 81
End: 2021-08-26

## 2021-08-26 NOTE — PATIENT INSTRUCTIONS
Hold Coumadin dosing for 2 days   Restart at 2 5 mg daily and repeat blood work in 2 weeks after restarting

## 2021-09-10 ENCOUNTER — ANTICOAG VISIT (OUTPATIENT)
Dept: FAMILY MEDICINE CLINIC | Facility: CLINIC | Age: 81
End: 2021-09-10

## 2021-09-10 ENCOUNTER — TELEPHONE (OUTPATIENT)
Dept: FAMILY MEDICINE CLINIC | Facility: CLINIC | Age: 81
End: 2021-09-10

## 2021-09-10 ENCOUNTER — APPOINTMENT (OUTPATIENT)
Dept: LAB | Facility: CLINIC | Age: 81
End: 2021-09-10
Payer: MEDICARE

## 2021-09-10 DIAGNOSIS — IMO0002 FLUTTER-FIBRILLATION: ICD-10-CM

## 2021-09-10 LAB
INR PPP: 1.7 (ref 0.84–1.19)
INR PPP: 1.7 (ref 0.84–1.19)
PROTHROMBIN TIME: 19.2 SECONDS (ref 11.6–14.5)

## 2021-09-10 PROCEDURE — 36415 COLL VENOUS BLD VENIPUNCTURE: CPT

## 2021-09-10 PROCEDURE — 85610 PROTHROMBIN TIME: CPT

## 2021-09-10 NOTE — PATIENT INSTRUCTIONS
MD MEKHI Valverde [facility]   Continue current regimen of warfarin and repeat INR in 2 weeks   Pt aware

## 2021-09-10 NOTE — PROGRESS NOTES
MD MEKHI Sarabia samy [facility]   Continue current regimen of warfarin and repeat INR in 2 weeks

## 2021-09-10 NOTE — TELEPHONE ENCOUNTER
----- Message from Que Linder MD sent at 3/95/0477 12:07 PM EDT -----   Continue current regimen of warfarin and repeat INR in 2 weeks

## 2021-09-12 DIAGNOSIS — I10 ESSENTIAL HYPERTENSION: ICD-10-CM

## 2021-09-13 RX ORDER — DILTIAZEM HYDROCHLORIDE 240 MG/1
CAPSULE, COATED, EXTENDED RELEASE ORAL
Qty: 90 CAPSULE | Refills: 3 | Status: SHIPPED | OUTPATIENT
Start: 2021-09-13

## 2021-09-24 ENCOUNTER — ANTICOAG VISIT (OUTPATIENT)
Dept: FAMILY MEDICINE CLINIC | Facility: CLINIC | Age: 81
End: 2021-09-24

## 2021-09-24 ENCOUNTER — APPOINTMENT (OUTPATIENT)
Dept: LAB | Facility: CLINIC | Age: 81
End: 2021-09-24
Payer: MEDICARE

## 2021-09-24 DIAGNOSIS — IMO0002 FLUTTER-FIBRILLATION: ICD-10-CM

## 2021-09-24 LAB
INR PPP: 1.99 (ref 0.84–1.19)
INR PPP: 1.99 (ref 0.84–1.19)
PROTHROMBIN TIME: 21.7 SECONDS (ref 11.6–14.5)

## 2021-09-24 PROCEDURE — 85610 PROTHROMBIN TIME: CPT

## 2021-09-24 PROCEDURE — 36415 COLL VENOUS BLD VENIPUNCTURE: CPT

## 2021-11-01 DIAGNOSIS — E03.9 HYPOTHYROIDISM, UNSPECIFIED TYPE: ICD-10-CM

## 2021-11-01 RX ORDER — LEVOTHYROXINE SODIUM 0.03 MG/1
TABLET ORAL
Qty: 90 TABLET | Refills: 3 | Status: SHIPPED | OUTPATIENT
Start: 2021-11-01

## 2021-11-02 ENCOUNTER — ANTICOAG VISIT (OUTPATIENT)
Dept: FAMILY MEDICINE CLINIC | Facility: CLINIC | Age: 81
End: 2021-11-02

## 2021-11-02 ENCOUNTER — APPOINTMENT (OUTPATIENT)
Dept: LAB | Facility: CLINIC | Age: 81
End: 2021-11-02
Payer: MEDICARE

## 2021-11-02 DIAGNOSIS — IMO0002 FLUTTER-FIBRILLATION: ICD-10-CM

## 2021-11-02 LAB
INR PPP: 1.61 (ref 0.84–1.19)
INR PPP: 1.61 (ref 0.84–1.19)
PROTHROMBIN TIME: 18.4 SECONDS (ref 11.6–14.5)

## 2021-11-02 PROCEDURE — 36415 COLL VENOUS BLD VENIPUNCTURE: CPT

## 2021-11-02 PROCEDURE — 85610 PROTHROMBIN TIME: CPT

## 2021-12-10 ENCOUNTER — TELEPHONE (OUTPATIENT)
Dept: FAMILY MEDICINE CLINIC | Facility: CLINIC | Age: 81
End: 2021-12-10

## 2021-12-10 ENCOUNTER — ANTICOAG VISIT (OUTPATIENT)
Dept: FAMILY MEDICINE CLINIC | Facility: CLINIC | Age: 81
End: 2021-12-10

## 2021-12-10 ENCOUNTER — APPOINTMENT (OUTPATIENT)
Dept: LAB | Facility: CLINIC | Age: 81
End: 2021-12-10
Payer: MEDICARE

## 2021-12-10 DIAGNOSIS — IMO0002 FLUTTER-FIBRILLATION: ICD-10-CM

## 2021-12-10 LAB
INR PPP: 2.15 (ref 0.84–1.19)
PROTHROMBIN TIME: 23 SECONDS (ref 11.6–14.5)

## 2021-12-10 PROCEDURE — 36415 COLL VENOUS BLD VENIPUNCTURE: CPT

## 2021-12-10 PROCEDURE — 85610 PROTHROMBIN TIME: CPT

## 2021-12-30 ENCOUNTER — APPOINTMENT (OUTPATIENT)
Dept: LAB | Facility: CLINIC | Age: 81
End: 2021-12-30
Payer: MEDICARE

## 2021-12-30 ENCOUNTER — OFFICE VISIT (OUTPATIENT)
Dept: FAMILY MEDICINE CLINIC | Facility: CLINIC | Age: 81
End: 2021-12-30
Payer: MEDICARE

## 2021-12-30 VITALS
DIASTOLIC BLOOD PRESSURE: 50 MMHG | BODY MASS INDEX: 21.93 KG/M2 | RESPIRATION RATE: 16 BRPM | HEIGHT: 61 IN | WEIGHT: 116.13 LBS | OXYGEN SATURATION: 95 % | SYSTOLIC BLOOD PRESSURE: 160 MMHG | HEART RATE: 80 BPM | TEMPERATURE: 97.8 F

## 2021-12-30 DIAGNOSIS — M81.0 AGE RELATED OSTEOPOROSIS, UNSPECIFIED PATHOLOGICAL FRACTURE PRESENCE: ICD-10-CM

## 2021-12-30 DIAGNOSIS — R60.9 PERIPHERAL EDEMA: ICD-10-CM

## 2021-12-30 DIAGNOSIS — L98.9 SKIN LESION: ICD-10-CM

## 2021-12-30 DIAGNOSIS — I10 PRIMARY HYPERTENSION: ICD-10-CM

## 2021-12-30 DIAGNOSIS — I10 ESSENTIAL HYPERTENSION: Primary | ICD-10-CM

## 2021-12-30 DIAGNOSIS — E03.9 HYPOTHYROIDISM, UNSPECIFIED TYPE: ICD-10-CM

## 2021-12-30 DIAGNOSIS — D64.9 ANEMIA, UNSPECIFIED TYPE: ICD-10-CM

## 2021-12-30 DIAGNOSIS — I10 ESSENTIAL HYPERTENSION: ICD-10-CM

## 2021-12-30 DIAGNOSIS — M54.50 LOW BACK PAIN WITHOUT SCIATICA, UNSPECIFIED BACK PAIN LATERALITY, UNSPECIFIED CHRONICITY: ICD-10-CM

## 2021-12-30 LAB
ALBUMIN SERPL BCP-MCNC: 3.8 G/DL (ref 3.5–5)
ALP SERPL-CCNC: 94 U/L (ref 46–116)
ALT SERPL W P-5'-P-CCNC: 17 U/L (ref 12–78)
ANION GAP SERPL CALCULATED.3IONS-SCNC: 1 MMOL/L (ref 4–13)
AST SERPL W P-5'-P-CCNC: 22 U/L (ref 5–45)
BILIRUB SERPL-MCNC: 1.02 MG/DL (ref 0.2–1)
BUN SERPL-MCNC: 13 MG/DL (ref 5–25)
CALCIUM SERPL-MCNC: 9.3 MG/DL (ref 8.3–10.1)
CHLORIDE SERPL-SCNC: 99 MMOL/L (ref 100–108)
CHOLEST SERPL-MCNC: 150 MG/DL
CO2 SERPL-SCNC: 35 MMOL/L (ref 21–32)
CREAT SERPL-MCNC: 0.66 MG/DL (ref 0.6–1.3)
ERYTHROCYTE [DISTWIDTH] IN BLOOD BY AUTOMATED COUNT: 16.6 % (ref 11.6–15.1)
GFR SERPL CREATININE-BSD FRML MDRD: 82 ML/MIN/1.73SQ M
GLUCOSE P FAST SERPL-MCNC: 92 MG/DL (ref 65–99)
HCT VFR BLD AUTO: 33.4 % (ref 34.8–46.1)
HDLC SERPL-MCNC: 53 MG/DL
HGB BLD-MCNC: 10.8 G/DL (ref 11.5–15.4)
LDLC SERPL CALC-MCNC: 87 MG/DL (ref 0–100)
MCH RBC QN AUTO: 37.4 PG (ref 26.8–34.3)
MCHC RBC AUTO-ENTMCNC: 32.3 G/DL (ref 31.4–37.4)
MCV RBC AUTO: 116 FL (ref 82–98)
NONHDLC SERPL-MCNC: 97 MG/DL
PLATELET # BLD AUTO: 265 THOUSANDS/UL (ref 149–390)
PMV BLD AUTO: 10.4 FL (ref 8.9–12.7)
POTASSIUM SERPL-SCNC: 4.8 MMOL/L (ref 3.5–5.3)
PROT SERPL-MCNC: 7.7 G/DL (ref 6.4–8.2)
RBC # BLD AUTO: 2.89 MILLION/UL (ref 3.81–5.12)
SODIUM SERPL-SCNC: 135 MMOL/L (ref 136–145)
TRIGL SERPL-MCNC: 52 MG/DL
TSH SERPL DL<=0.05 MIU/L-ACNC: 3.32 UIU/ML (ref 0.36–3.74)
WBC # BLD AUTO: 4.1 THOUSAND/UL (ref 4.31–10.16)

## 2021-12-30 PROCEDURE — 36415 COLL VENOUS BLD VENIPUNCTURE: CPT

## 2021-12-30 PROCEDURE — 80053 COMPREHEN METABOLIC PANEL: CPT

## 2021-12-30 PROCEDURE — 80061 LIPID PANEL: CPT

## 2021-12-30 PROCEDURE — 85027 COMPLETE CBC AUTOMATED: CPT

## 2021-12-30 PROCEDURE — 99214 OFFICE O/P EST MOD 30 MIN: CPT | Performed by: FAMILY MEDICINE

## 2021-12-30 PROCEDURE — 84443 ASSAY THYROID STIM HORMONE: CPT

## 2021-12-30 RX ORDER — HYDROCHLOROTHIAZIDE 12.5 MG/1
12.5 TABLET ORAL DAILY
Qty: 30 TABLET | Refills: 5 | Status: SHIPPED | OUTPATIENT
Start: 2021-12-30 | End: 2022-06-30 | Stop reason: SDUPTHER

## 2022-01-04 ENCOUNTER — TELEPHONE (OUTPATIENT)
Dept: FAMILY MEDICINE CLINIC | Facility: CLINIC | Age: 82
End: 2022-01-04

## 2022-01-04 NOTE — TELEPHONE ENCOUNTER
----- Message from Jay Quiroz MD sent at 9/5/7414  9:15 AM EST -----  Recent blood work shows patient to be anemic once again due to low iron  I sent prescription to pharmacy for patient to try different formulation of iron which she would drink 1 tsp on a daily basis    If she is able to tolerate this recheck blood work for blood count in 3 months

## 2022-01-06 ENCOUNTER — ANTICOAG VISIT (OUTPATIENT)
Dept: FAMILY MEDICINE CLINIC | Facility: CLINIC | Age: 82
End: 2022-01-06

## 2022-01-06 ENCOUNTER — APPOINTMENT (OUTPATIENT)
Dept: LAB | Facility: CLINIC | Age: 82
End: 2022-01-06
Payer: MEDICARE

## 2022-01-06 DIAGNOSIS — IMO0002 FLUTTER-FIBRILLATION: ICD-10-CM

## 2022-01-06 LAB
INR PPP: 2.36 (ref 0.84–1.19)
INR PPP: 2.36 (ref 0.84–1.19)
PROTHROMBIN TIME: 24.6 SECONDS (ref 11.6–14.5)

## 2022-01-06 PROCEDURE — 85610 PROTHROMBIN TIME: CPT

## 2022-01-06 PROCEDURE — 36415 COLL VENOUS BLD VENIPUNCTURE: CPT

## 2022-01-17 ENCOUNTER — TELEPHONE (OUTPATIENT)
Dept: PAIN MEDICINE | Facility: CLINIC | Age: 82
End: 2022-01-17

## 2022-01-25 ENCOUNTER — TELEPHONE (OUTPATIENT)
Dept: PAIN MEDICINE | Facility: CLINIC | Age: 82
End: 2022-01-25

## 2022-02-08 ENCOUNTER — TELEPHONE (OUTPATIENT)
Dept: PAIN MEDICINE | Facility: CLINIC | Age: 82
End: 2022-02-08

## 2022-03-01 ENCOUNTER — TELEPHONE (OUTPATIENT)
Dept: PAIN MEDICINE | Facility: CLINIC | Age: 82
End: 2022-03-01

## 2022-03-03 DIAGNOSIS — J44.9 CHRONIC OBSTRUCTIVE PULMONARY DISEASE, UNSPECIFIED COPD TYPE (HCC): ICD-10-CM

## 2022-04-06 DIAGNOSIS — I48.20 CHRONIC ATRIAL FIBRILLATION (HCC): ICD-10-CM

## 2022-04-06 RX ORDER — DIGOXIN 250 MCG
TABLET ORAL
Qty: 90 TABLET | Refills: 3 | Status: SHIPPED | OUTPATIENT
Start: 2022-04-06

## 2022-05-23 DIAGNOSIS — J45.909 MODERATE ASTHMA, UNSPECIFIED WHETHER COMPLICATED, UNSPECIFIED WHETHER PERSISTENT: ICD-10-CM

## 2022-05-23 RX ORDER — TIOTROPIUM BROMIDE 18 UG/1
CAPSULE ORAL; RESPIRATORY (INHALATION)
Qty: 90 CAPSULE | Refills: 3 | Status: SHIPPED | OUTPATIENT
Start: 2022-05-23

## 2022-05-30 DIAGNOSIS — I48.20 CHRONIC ATRIAL FIBRILLATION (HCC): ICD-10-CM

## 2022-05-30 RX ORDER — WARFARIN SODIUM 5 MG/1
TABLET ORAL
Qty: 90 TABLET | Refills: 3 | Status: SHIPPED | OUTPATIENT
Start: 2022-05-30

## 2022-06-30 ENCOUNTER — OFFICE VISIT (OUTPATIENT)
Dept: FAMILY MEDICINE CLINIC | Facility: CLINIC | Age: 82
End: 2022-06-30
Payer: MEDICARE

## 2022-06-30 ENCOUNTER — ANTICOAG VISIT (OUTPATIENT)
Dept: FAMILY MEDICINE CLINIC | Facility: CLINIC | Age: 82
End: 2022-06-30

## 2022-06-30 ENCOUNTER — APPOINTMENT (OUTPATIENT)
Dept: LAB | Facility: CLINIC | Age: 82
End: 2022-06-30
Payer: MEDICARE

## 2022-06-30 VITALS
WEIGHT: 115.13 LBS | HEART RATE: 89 BPM | SYSTOLIC BLOOD PRESSURE: 140 MMHG | TEMPERATURE: 97.8 F | DIASTOLIC BLOOD PRESSURE: 60 MMHG | RESPIRATION RATE: 16 BRPM | BODY MASS INDEX: 22.6 KG/M2 | OXYGEN SATURATION: 95 % | HEIGHT: 60 IN

## 2022-06-30 DIAGNOSIS — I48.91 ATRIAL FIBRILLATION, UNSPECIFIED TYPE (HCC): ICD-10-CM

## 2022-06-30 DIAGNOSIS — I10 PRIMARY HYPERTENSION: ICD-10-CM

## 2022-06-30 DIAGNOSIS — R60.9 PERIPHERAL EDEMA: ICD-10-CM

## 2022-06-30 DIAGNOSIS — I10 ESSENTIAL HYPERTENSION: ICD-10-CM

## 2022-06-30 DIAGNOSIS — D68.9 COAGULOPATHY (HCC): ICD-10-CM

## 2022-06-30 DIAGNOSIS — J44.9 CHRONIC OBSTRUCTIVE PULMONARY DISEASE, UNSPECIFIED COPD TYPE (HCC): ICD-10-CM

## 2022-06-30 DIAGNOSIS — M81.0 AGE RELATED OSTEOPOROSIS, UNSPECIFIED PATHOLOGICAL FRACTURE PRESENCE: Primary | ICD-10-CM

## 2022-06-30 DIAGNOSIS — E03.9 HYPOTHYROIDISM, UNSPECIFIED TYPE: ICD-10-CM

## 2022-06-30 PROBLEM — L98.9 SKIN LESION: Status: RESOLVED | Noted: 2018-06-25 | Resolved: 2022-06-30

## 2022-06-30 PROBLEM — J18.9 PNEUMONIA: Status: RESOLVED | Noted: 2017-11-22 | Resolved: 2022-06-30

## 2022-06-30 LAB
ALBUMIN SERPL BCP-MCNC: 3.4 G/DL (ref 3.5–5)
ALP SERPL-CCNC: 93 U/L (ref 46–116)
ALT SERPL W P-5'-P-CCNC: 17 U/L (ref 12–78)
ANION GAP SERPL CALCULATED.3IONS-SCNC: 1 MMOL/L (ref 4–13)
AST SERPL W P-5'-P-CCNC: 23 U/L (ref 5–45)
BILIRUB SERPL-MCNC: 0.92 MG/DL (ref 0.2–1)
BUN SERPL-MCNC: 13 MG/DL (ref 5–25)
CALCIUM ALBUM COR SERPL-MCNC: 10.2 MG/DL (ref 8.3–10.1)
CALCIUM SERPL-MCNC: 9.7 MG/DL (ref 8.3–10.1)
CHLORIDE SERPL-SCNC: 99 MMOL/L (ref 100–108)
CHOLEST SERPL-MCNC: 142 MG/DL
CO2 SERPL-SCNC: 37 MMOL/L (ref 21–32)
CREAT SERPL-MCNC: 0.77 MG/DL (ref 0.6–1.3)
ERYTHROCYTE [DISTWIDTH] IN BLOOD BY AUTOMATED COUNT: 15.8 % (ref 11.6–15.1)
FERRITIN SERPL-MCNC: 360 NG/ML (ref 8–388)
GFR SERPL CREATININE-BSD FRML MDRD: 71 ML/MIN/1.73SQ M
GLUCOSE P FAST SERPL-MCNC: 112 MG/DL (ref 65–99)
HCT VFR BLD AUTO: 33.6 % (ref 34.8–46.1)
HDLC SERPL-MCNC: 54 MG/DL
HGB BLD-MCNC: 11.7 G/DL (ref 11.5–15.4)
INR PPP: 1.85 (ref 0.84–1.19)
INR PPP: 1.85 (ref 0.84–1.19)
IRON SATN MFR SERPL: 20 % (ref 15–50)
IRON SERPL-MCNC: 53 UG/DL (ref 50–170)
LDLC SERPL CALC-MCNC: 78 MG/DL (ref 0–100)
MCH RBC QN AUTO: 39.3 PG (ref 26.8–34.3)
MCHC RBC AUTO-ENTMCNC: 34.8 G/DL (ref 31.4–37.4)
MCV RBC AUTO: 113 FL (ref 82–98)
NONHDLC SERPL-MCNC: 88 MG/DL
PLATELET # BLD AUTO: 268 THOUSANDS/UL (ref 149–390)
PMV BLD AUTO: 9.1 FL (ref 8.9–12.7)
POTASSIUM SERPL-SCNC: 4.5 MMOL/L (ref 3.5–5.3)
PROT SERPL-MCNC: 8.1 G/DL (ref 6.4–8.2)
PROTHROMBIN TIME: 20.5 SECONDS (ref 11.6–14.5)
RBC # BLD AUTO: 2.98 MILLION/UL (ref 3.81–5.12)
SODIUM SERPL-SCNC: 137 MMOL/L (ref 136–145)
TIBC SERPL-MCNC: 266 UG/DL (ref 250–450)
TRIGL SERPL-MCNC: 50 MG/DL
TSH SERPL DL<=0.05 MIU/L-ACNC: 3.45 UIU/ML (ref 0.45–4.5)
WBC # BLD AUTO: 4.2 THOUSAND/UL (ref 4.31–10.16)

## 2022-06-30 PROCEDURE — 85027 COMPLETE CBC AUTOMATED: CPT

## 2022-06-30 PROCEDURE — 82728 ASSAY OF FERRITIN: CPT

## 2022-06-30 PROCEDURE — 83540 ASSAY OF IRON: CPT

## 2022-06-30 PROCEDURE — 96372 THER/PROPH/DIAG INJ SC/IM: CPT | Performed by: FAMILY MEDICINE

## 2022-06-30 PROCEDURE — 80061 LIPID PANEL: CPT

## 2022-06-30 PROCEDURE — 99214 OFFICE O/P EST MOD 30 MIN: CPT | Performed by: FAMILY MEDICINE

## 2022-06-30 PROCEDURE — 80053 COMPREHEN METABOLIC PANEL: CPT

## 2022-06-30 PROCEDURE — 85610 PROTHROMBIN TIME: CPT

## 2022-06-30 PROCEDURE — 36415 COLL VENOUS BLD VENIPUNCTURE: CPT

## 2022-06-30 PROCEDURE — 83550 IRON BINDING TEST: CPT

## 2022-06-30 PROCEDURE — 84443 ASSAY THYROID STIM HORMONE: CPT

## 2022-06-30 RX ORDER — HYDROCHLOROTHIAZIDE 12.5 MG/1
12.5 TABLET ORAL DAILY
Qty: 30 TABLET | Refills: 5 | Status: SHIPPED | OUTPATIENT
Start: 2022-06-30

## 2022-06-30 NOTE — ASSESSMENT & PLAN NOTE
Osteoporosis  Patient received Prolia injection today    She will continue with injection every 6 months as ordered

## 2022-06-30 NOTE — ASSESSMENT & PLAN NOTE
Peripheral edema  Patient with asymptomatic chronic peripheral edema  She refuses any type of compression stocking  She will continue with hydrochlorothiazide as ordered  She was instructed to elevate legs whenever possible

## 2022-06-30 NOTE — PROGRESS NOTES
FAMILY PRACTICE OFFICE VISIT       NAME: Zain Sexton  AGE: 79 y o  SEX: female       : 1940        MRN: [id number]    DATE: 2022  TIME: 10:21 AM    Assessment and Plan     Problem List Items Addressed This Visit        Endocrine    Hypothyroidism     Hypothyroidism  Patient will check TSH blood work and continue with current dose of levothyroxine           Relevant Orders    CBC    Lipid panel    Comprehensive metabolic panel    TSH, 3rd generation       Respiratory    COPD (chronic obstructive pulmonary disease) ([facility] [address] )       Cardiovascular and Mediastinum    Hypertension - Primary     Hypertension  Patient blood pressure is stable at this time she will continue current regimen of medications           Relevant Medications    hydrochlorothiazide (HYDRODIURIL) 12 5 mg tablet    Other Relevant Orders    CBC    Lipid panel    Comprehensive metabolic panel    TSH, 3rd generation    Atrial fibrillation (HCC)     Atrial fibrillation  Patient currently in stable rhythm  She will continue with current dose of Coumadin and obtain INR testing as recommended           Relevant Orders    CBC    Lipid panel    Comprehensive metabolic panel    TSH, 3rd generation    Protime-INR       Musculoskeletal and Integument    Osteoporosis     Osteoporosis  Patient received Prolia injection today  She will continue with injection every 6 months as ordered              Other    Peripheral edema     Peripheral edema  Patient with asymptomatic chronic peripheral edema  She refuses any type of compression stocking  She will continue with hydrochlorothiazide as ordered  She was instructed to elevate legs whenever possible               Other Visit Diagnoses     Coagulopathy ([facility] [address] )        Essential hypertension        Relevant Medications    hydrochlorothiazide (HYDRODIURIL) 12 5 mg tablet              Chief Complaint     Chief Complaint   Patient presents with    Follow-up     6 month        History of Present Illness Patient in the office to review chronic medical condition  She denies any recent illness  She does describe increased stressors in her life with her daughter being diagnosed with breast cancer  She has not seen any specialists recently  She has been noncompliant with obtaining INR testing as requested  Patient is due for Prolia injection for her history of osteoporosis      Review of Systems   Review of Systems   Constitutional: Negative  HENT: Negative  Eyes: Negative  Respiratory: Negative  Cardiovascular: Positive for leg swelling  Negative for chest pain and palpitations  Gastrointestinal: Negative  Genitourinary: Negative  Musculoskeletal: Negative  Skin: Negative  Neurological: Negative      Psychiatric/Behavioral:        As per HPI       Active Problem List     Patient Active Problem List   Diagnosis    COPD (chronic obstructive pulmonary disease) (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[facility] )    Antiphospholipid antibody positive    Atrial fibrillation (HCC)    Hereditary hemolytic anemia (HCC)    Asthma    Vitamin D deficiency    Osteoporosis    Low back pain without sciatica    Peripheral edema    Hypothyroidism       Past Medical History:  Past Medical History:   Diagnosis Date    Anti-phospholipid syndrome (HCC)     Asthma     Blood type O+     Cardiac disease     Chronic pain     COPD (chronic obstructive pulmonary disease) (HCC)     Fracture of ankle     left    Hyperlipidemia     Hypertension     Osteoporosis        Past Surgical History:  Past Surgical History:   Procedure Laterality Date    APPENDECTOMY      BILATERAL OOPHORECTOMY Bilateral     ORIF TIBIAL SHAFT FRACTURE W/ PLATES AND SCREWS Right        Family History:  Family History   Problem Relation Age of Onset    Hypertension Mother     Skin cancer Mother     Hypertension Father        Social History:  Social History     Socioeconomic History    Marital status:      Spouse name: Not on file    Number of children: Not on file    Years of education: Not on file    Highest education level: Not on file   Occupational History    Not on file   Tobacco Use    Smoking status: Never Smoker    Smokeless tobacco: Never Used    Tobacco comment: Never smoker per [employer]   Vaping Use    Vaping Use: Never used   Substance and Sexual Activity    Alcohol use: No    Drug use: No    Sexual activity: Not Currently   Other Topics Concern    Not on file   Social History Narrative    Not on file     Social Determinants of Health     Financial Resource Strain: Not on file   Food Insecurity: Not on file   Transportation Needs: Not on file   Physical Activity: Not on file   Stress: Not on file   Social Connections: Not on file   Intimate Partner Violence: Not on file   Housing Stability: Not on file       Objective     Vitals:    06/30/22 1008   BP: 140/60   Pulse:    Resp:    Temp:    SpO2:      Wt Readings from Last 3 Encounters:   06/30/22 52 2 kg (115 lb 2 oz)   12/30/21 52 7 kg (116 lb 2 oz)   06/30/21 52 2 kg (115 lb)       Physical Exam  Constitutional:       General: She is not in acute distress  Appearance: Normal appearance  She is not ill-appearing  HENT:      Head: Normocephalic and atraumatic  Eyes:      General:         Right eye: No discharge  Left eye: No discharge  Extraocular Movements: Extraocular movements intact  Conjunctiva/sclera: Conjunctivae normal       Pupils: Pupils are equal, round, and reactive to light  Neck:      Vascular: No carotid bruit  Cardiovascular:      Rate and Rhythm: Normal rate and regular rhythm  Heart sounds: Normal heart sounds  No murmur heard  Pulmonary:      Effort: Pulmonary effort is normal       Breath sounds: Normal breath sounds  No wheezing, rhonchi or rales  Musculoskeletal:      Right lower leg: Edema present  Left lower leg: Edema present        Comments: Patient with chronic +1 peripheral edema bilateral lower extremities  Skin is dry but intact with no breakdown   Lymphadenopathy:      Cervical: No cervical adenopathy  Skin:     Findings: No rash  Neurological:      General: No focal deficit present  Mental Status: She is alert and oriented to person, place, and time  Cranial Nerves: No cranial nerve deficit  Psychiatric:         Mood and Affect: Mood normal          Behavior: Behavior normal          Thought Content:  Thought content normal          Judgment: Judgment normal          Pertinent Laboratory/Diagnostic Studies:  Lab Results   Component Value Date    GLUCOSE 96 03/17/2015    BUN 13 12/30/2021    CREATININE 0 66 12/30/2021    CALCIUM 9 3 12/30/2021     03/17/2015    K 4 8 12/30/2021    CO2 35 (H) 12/30/2021    CL 99 (L) 12/30/2021     Lab Results   Component Value Date    ALT 17 12/30/2021    AST 22 12/30/2021    ALKPHOS 94 12/30/2021    BILITOT 0 40 03/17/2015       Lab Results   Component Value Date    WBC 4 10 (L) 12/30/2021    HGB 10 8 (L) 12/30/2021    HCT 33 4 (L) 12/30/2021     (H) 12/30/2021     12/30/2021       No results found for: TSH    Lab Results   Component Value Date    CHOL 145 03/17/2015     Lab Results   Component Value Date    TRIG 52 12/30/2021     Lab Results   Component Value Date    HDL 53 12/30/2021     Lab Results   Component Value Date    LDLCALC 87 12/30/2021     No results found for: HGBA1C    Results for orders placed or performed in visit on 01/06/22   Protime-INR   Result Value Ref Range    INR 2 36 (A) 0 84 - 1 19       Orders Placed This Encounter   Procedures    CBC    Lipid panel    Comprehensive metabolic panel    TSH, 3rd generation    Protime-INR       ALLERGIES:  Allergies   Allergen Reactions    Aspirin Other (See Comments)    Penicillins Rash and Hives    Sulfa Antibiotics Rash       Current Medications     Current Outpatient Medications   Medication Sig Dispense Refill    Advair Diskus 250-50 MCG/DOSE inhaler USE 1 INHALATION TWICE A  blister 3    albuterol (2 5 mg/3 mL) 0 083 % nebulizer solution USE 1 VIAL IN NEBULIZER EVERY 4 TO 6 HOURS AS NEEDED FOR COUGH AND WHEEZE 75 vial 3    Ascorbic Acid (VITAMIN C) 1000 MG tablet Take 1,000 mg by mouth daily      Cholecalciferol (VITAMIN D3) 20 MCG (800 UNIT) TABS Take by mouth      denosumab (PROLIA) 60 mg/mL Inject 60 mg under the skin once 6 months      digoxin (LANOXIN) 0 25 mg tablet TAKE 1 TABLET DAILY 90 tablet 3    diltiazem (CARDIZEM CD) 240 mg 24 hr capsule TAKE 1 CAPSULE DAILY 90 capsule 3    Ferrous Sulfate 220 (44 Fe) MG/5ML SOLN Take one tsp po q day 150 mL 5    hydrochlorothiazide (HYDRODIURIL) 12 5 mg tablet Take 1 tablet (12 5 mg total) by mouth daily 30 tablet 5    levothyroxine 25 mcg tablet TAKE 1 TABLET DAILY 90 tablet 3    loratadine (CLARITIN) 10 mg tablet Take 10 mg by mouth daily      multivitamin (THERAGRAN) TABS Take 1 tablet by mouth daily      Spiriva HandiHaler 18 MCG inhalation capsule INHALE THE CONTENTS OF 1 CAPSULE DAILY AS DIRECTED 90 capsule 3    warfarin (COUMADIN) 5 mg tablet TAKE 1 TABLET DAILY 90 tablet 3    lidocaine (LIDODERM) 5 % Apply 1 patch topically daily Remove & Discard patch within 12 hours or as directed by MD (Patient not taking: No sig reported) 30 patch 3     No current facility-administered medications for this visit           Health Maintenance     Health Maintenance   Topic Date Due    SLP PLAN OF CARE  Never done    Annual Physical  Never done    DTaP,Tdap,and Td Vaccines (1 - Tdap) Never done    COVID-19 Vaccine (3 - Booster for Pfizer series) 09/09/2021    Fall Risk  12/30/2022    Depression Screening  06/30/2023    BMI: Adult  06/30/2023    Osteoporosis Screening  Completed    Pneumococcal Vaccine: 65+ Years  Completed    Influenza Vaccine  Completed    HIB Vaccine  Aged Out    Hepatitis B Vaccine  Aged Out    IPV Vaccine  Aged C/ [address] 19 Hepatitis A Vaccine  Aged Out    Meningococcal ACWY Vaccine  Aged Out    HPV Vaccine  Aged Out     Immunization History   Administered Date(s) Administered    COVID-19 PFIZER VACCINE 0 3 ML IM 03/18/2021, 04/09/2021    INFLUENZA 12/14/2004, 10/17/2018, 10/15/2020, 09/24/2021    Influenza Split High Dose Preservative Free IM 09/17/2015, 12/19/2016, 10/23/2017    Influenza, high dose seasonal 0 7 mL 10/16/2019    Influenza, seasonal, injectable 11/01/2011, 11/16/2012, 10/25/2013    Pneumococcal Conjugate 13-Valent 03/17/2016    Pneumococcal Polysaccharide PPV23 93/95/1581       Blanka Colunga MD    Spent 25 minutes with this patient which greater than 50% was spent counseling or reviewing chart

## 2022-06-30 NOTE — ASSESSMENT & PLAN NOTE
Atrial fibrillation  Patient currently in stable rhythm    She will continue with current dose of Coumadin and obtain INR testing as recommended

## 2022-10-25 DIAGNOSIS — E03.9 HYPOTHYROIDISM, UNSPECIFIED TYPE: ICD-10-CM

## 2022-10-25 RX ORDER — LEVOTHYROXINE SODIUM 0.03 MG/1
TABLET ORAL
Qty: 90 TABLET | Refills: 3 | Status: SHIPPED | OUTPATIENT
Start: 2022-10-25

## 2022-11-08 DIAGNOSIS — I10 ESSENTIAL HYPERTENSION: ICD-10-CM

## 2022-11-08 RX ORDER — DILTIAZEM HYDROCHLORIDE 240 MG/1
CAPSULE, COATED, EXTENDED RELEASE ORAL
Qty: 90 CAPSULE | Refills: 3 | Status: SHIPPED | OUTPATIENT
Start: 2022-11-08

## 2023-01-01 ENCOUNTER — APPOINTMENT (EMERGENCY)
Dept: CT IMAGING | Facility: HOSPITAL | Age: 83
DRG: 871 | End: 2023-01-01
Payer: MEDICARE

## 2023-01-01 ENCOUNTER — HOSPITAL ENCOUNTER (INPATIENT)
Facility: HOSPITAL | Age: 83
LOS: 6 days | DRG: 871 | End: 2023-12-18
Attending: EMERGENCY MEDICINE | Admitting: INTERNAL MEDICINE
Payer: MEDICARE

## 2023-01-01 ENCOUNTER — APPOINTMENT (INPATIENT)
Dept: RADIOLOGY | Facility: HOSPITAL | Age: 83
DRG: 871 | End: 2023-01-01
Payer: MEDICARE

## 2023-01-01 ENCOUNTER — APPOINTMENT (EMERGENCY)
Dept: RADIOLOGY | Facility: HOSPITAL | Age: 83
DRG: 871 | End: 2023-01-01
Payer: MEDICARE

## 2023-01-01 ENCOUNTER — PATIENT OUTREACH (OUTPATIENT)
Dept: CASE MANAGEMENT | Facility: OTHER | Age: 83
End: 2023-01-01

## 2023-01-01 VITALS
RESPIRATION RATE: 18 BRPM | TEMPERATURE: 98.6 F | SYSTOLIC BLOOD PRESSURE: 143 MMHG | WEIGHT: 102.51 LBS | OXYGEN SATURATION: 93 % | HEART RATE: 99 BPM | HEIGHT: 66 IN | DIASTOLIC BLOOD PRESSURE: 63 MMHG | BODY MASS INDEX: 16.48 KG/M2

## 2023-01-01 DIAGNOSIS — J18.9 PNEUMONIA: ICD-10-CM

## 2023-01-01 DIAGNOSIS — Z51.5 PALLIATIVE CARE PATIENT: ICD-10-CM

## 2023-01-01 DIAGNOSIS — J42 CHRONIC BRONCHITIS, UNSPECIFIED CHRONIC BRONCHITIS TYPE (HCC): ICD-10-CM

## 2023-01-01 DIAGNOSIS — R63.6 UNDERWEIGHT: ICD-10-CM

## 2023-01-01 DIAGNOSIS — G89.4 CHRONIC PAIN SYNDROME: ICD-10-CM

## 2023-01-01 DIAGNOSIS — R62.7 FAILURE TO THRIVE IN ADULT: Primary | ICD-10-CM

## 2023-01-01 LAB
2HR DELTA HS TROPONIN: -1 NG/L
4HR DELTA HS TROPONIN: 1 NG/L
ALBUMIN SERPL BCP-MCNC: 2.9 G/DL (ref 3.5–5)
ALBUMIN SERPL BCP-MCNC: 2.9 G/DL (ref 3.5–5)
ALBUMIN SERPL BCP-MCNC: 3.2 G/DL (ref 3.5–5)
ALP SERPL-CCNC: 111 U/L (ref 34–104)
ALP SERPL-CCNC: 124 U/L (ref 34–104)
ALP SERPL-CCNC: 89 U/L (ref 34–104)
ALT SERPL W P-5'-P-CCNC: 10 U/L (ref 7–52)
ALT SERPL W P-5'-P-CCNC: 11 U/L (ref 7–52)
ALT SERPL W P-5'-P-CCNC: 11 U/L (ref 7–52)
ANION GAP SERPL CALCULATED.3IONS-SCNC: 11 MMOL/L
ANION GAP SERPL CALCULATED.3IONS-SCNC: 11 MMOL/L
ANION GAP SERPL CALCULATED.3IONS-SCNC: 4 MMOL/L
ANION GAP SERPL CALCULATED.3IONS-SCNC: 8 MMOL/L
ANISOCYTOSIS BLD QL SMEAR: PRESENT
APAP SERPL-MCNC: 4 UG/ML (ref 10–20)
APTT PPP: 52 SECONDS (ref 23–37)
APTT PPP: 59 SECONDS (ref 23–37)
APTT PPP: 68 SECONDS (ref 23–37)
APTT PPP: 70 SECONDS (ref 23–37)
APTT PPP: 75 SECONDS (ref 23–37)
APTT PPP: 77 SECONDS (ref 23–37)
APTT PPP: 83 SECONDS (ref 23–37)
APTT PPP: 88 SECONDS (ref 23–37)
APTT PPP: 96 SECONDS (ref 23–37)
AST SERPL W P-5'-P-CCNC: 12 U/L (ref 13–39)
AST SERPL W P-5'-P-CCNC: 17 U/L (ref 13–39)
AST SERPL W P-5'-P-CCNC: 20 U/L (ref 13–39)
ATRIAL RATE: 83 BPM
BACTERIA BLD CULT: NORMAL
BACTERIA BLD CULT: NORMAL
BACTERIA UR QL AUTO: ABNORMAL /HPF
BASE EXCESS BLDA CALC-SCNC: 21 MMOL/L (ref -2–3)
BASOPHILS # BLD AUTO: 0.02 THOUSANDS/ÂΜL (ref 0–0.1)
BASOPHILS # BLD AUTO: 0.03 THOUSANDS/ÂΜL (ref 0–0.1)
BASOPHILS # BLD AUTO: 0.03 THOUSANDS/ÂΜL (ref 0–0.1)
BASOPHILS # BLD MANUAL: 0 THOUSAND/UL (ref 0–0.1)
BASOPHILS NFR BLD AUTO: 0 % (ref 0–1)
BASOPHILS NFR MAR MANUAL: 0 % (ref 0–1)
BILIRUB DIRECT SERPL-MCNC: 0.1 MG/DL (ref 0–0.2)
BILIRUB DIRECT SERPL-MCNC: 0.14 MG/DL (ref 0–0.2)
BILIRUB SERPL-MCNC: 0.53 MG/DL (ref 0.2–1)
BILIRUB SERPL-MCNC: 0.65 MG/DL (ref 0.2–1)
BILIRUB SERPL-MCNC: 0.79 MG/DL (ref 0.2–1)
BILIRUB UR QL STRIP: NEGATIVE
BUN SERPL-MCNC: 33 MG/DL (ref 5–25)
BUN SERPL-MCNC: 35 MG/DL (ref 5–25)
BUN SERPL-MCNC: 38 MG/DL (ref 5–25)
BUN SERPL-MCNC: 50 MG/DL (ref 5–25)
BUN SERPL-MCNC: 54 MG/DL (ref 5–25)
BUN SERPL-MCNC: 54 MG/DL (ref 5–25)
CA-I BLD-SCNC: 1.1 MMOL/L (ref 1.12–1.32)
CALCIUM ALBUM COR SERPL-MCNC: 9.6 MG/DL (ref 8.3–10.1)
CALCIUM SERPL-MCNC: 8.5 MG/DL (ref 8.4–10.2)
CALCIUM SERPL-MCNC: 9 MG/DL (ref 8.4–10.2)
CALCIUM SERPL-MCNC: 9 MG/DL (ref 8.4–10.2)
CALCIUM SERPL-MCNC: 9.2 MG/DL (ref 8.4–10.2)
CALCIUM SERPL-MCNC: 9.3 MG/DL (ref 8.4–10.2)
CALCIUM SERPL-MCNC: 9.4 MG/DL (ref 8.4–10.2)
CAOX CRY URNS QL MICRO: ABNORMAL /HPF
CARDIAC TROPONIN I PNL SERPL HS: 22 NG/L
CARDIAC TROPONIN I PNL SERPL HS: 23 NG/L
CARDIAC TROPONIN I PNL SERPL HS: 24 NG/L
CHLORIDE SERPL-SCNC: 100 MMOL/L (ref 96–108)
CHLORIDE SERPL-SCNC: 102 MMOL/L (ref 96–108)
CHLORIDE SERPL-SCNC: 107 MMOL/L (ref 96–108)
CHLORIDE SERPL-SCNC: 90 MMOL/L (ref 96–108)
CHLORIDE SERPL-SCNC: 94 MMOL/L (ref 96–108)
CHLORIDE SERPL-SCNC: 96 MMOL/L (ref 96–108)
CLARITY UR: ABNORMAL
CO2 SERPL-SCNC: 35 MMOL/L (ref 21–32)
CO2 SERPL-SCNC: 37 MMOL/L (ref 21–32)
CO2 SERPL-SCNC: 39 MMOL/L (ref 21–32)
CO2 SERPL-SCNC: 41 MMOL/L (ref 21–32)
CO2 SERPL-SCNC: 43 MMOL/L (ref 21–32)
CO2 SERPL-SCNC: 45 MMOL/L (ref 21–32)
COLOR UR: YELLOW
CREAT SERPL-MCNC: 0.67 MG/DL (ref 0.6–1.3)
CREAT SERPL-MCNC: 0.8 MG/DL (ref 0.6–1.3)
CREAT SERPL-MCNC: 0.82 MG/DL (ref 0.6–1.3)
CREAT SERPL-MCNC: 0.88 MG/DL (ref 0.6–1.3)
CREAT SERPL-MCNC: 0.9 MG/DL (ref 0.6–1.3)
CREAT SERPL-MCNC: 1.01 MG/DL (ref 0.6–1.3)
EOSINOPHIL # BLD AUTO: 0 THOUSAND/ÂΜL (ref 0–0.61)
EOSINOPHIL # BLD AUTO: 0.01 THOUSAND/ÂΜL (ref 0–0.61)
EOSINOPHIL # BLD AUTO: 0.11 THOUSAND/ÂΜL (ref 0–0.61)
EOSINOPHIL # BLD MANUAL: 0 THOUSAND/UL (ref 0–0.4)
EOSINOPHIL NFR BLD AUTO: 0 % (ref 0–6)
EOSINOPHIL NFR BLD AUTO: 0 % (ref 0–6)
EOSINOPHIL NFR BLD AUTO: 1 % (ref 0–6)
EOSINOPHIL NFR BLD MANUAL: 0 % (ref 0–6)
ERYTHROCYTE [DISTWIDTH] IN BLOOD BY AUTOMATED COUNT: 16.2 % (ref 11.6–15.1)
ERYTHROCYTE [DISTWIDTH] IN BLOOD BY AUTOMATED COUNT: 16.5 % (ref 11.6–15.1)
ERYTHROCYTE [DISTWIDTH] IN BLOOD BY AUTOMATED COUNT: 16.6 % (ref 11.6–15.1)
ERYTHROCYTE [DISTWIDTH] IN BLOOD BY AUTOMATED COUNT: 16.7 % (ref 11.6–15.1)
ERYTHROCYTE [DISTWIDTH] IN BLOOD BY AUTOMATED COUNT: 16.7 % (ref 11.6–15.1)
ERYTHROCYTE [DISTWIDTH] IN BLOOD BY AUTOMATED COUNT: 17 % (ref 11.6–15.1)
ETHANOL SERPL-MCNC: <10 MG/DL
FLUAV RNA RESP QL NAA+PROBE: NEGATIVE
FLUBV RNA RESP QL NAA+PROBE: NEGATIVE
GFR SERPL CREATININE-BSD FRML MDRD: 51 ML/MIN/1.73SQ M
GFR SERPL CREATININE-BSD FRML MDRD: 58 ML/MIN/1.73SQ M
GFR SERPL CREATININE-BSD FRML MDRD: 60 ML/MIN/1.73SQ M
GFR SERPL CREATININE-BSD FRML MDRD: 65 ML/MIN/1.73SQ M
GFR SERPL CREATININE-BSD FRML MDRD: 67 ML/MIN/1.73SQ M
GFR SERPL CREATININE-BSD FRML MDRD: 80 ML/MIN/1.73SQ M
GLUCOSE SERPL-MCNC: 124 MG/DL (ref 65–140)
GLUCOSE SERPL-MCNC: 125 MG/DL (ref 65–140)
GLUCOSE SERPL-MCNC: 138 MG/DL (ref 65–140)
GLUCOSE SERPL-MCNC: 154 MG/DL (ref 65–140)
GLUCOSE SERPL-MCNC: 164 MG/DL (ref 65–140)
GLUCOSE SERPL-MCNC: 75 MG/DL (ref 65–140)
GLUCOSE SERPL-MCNC: 97 MG/DL (ref 65–140)
GLUCOSE UR STRIP-MCNC: NEGATIVE MG/DL
HAV IGM SER QL: ABNORMAL
HBV CORE AB SER QL: NORMAL
HBV CORE IGM SER QL: REACTIVE
HBV SURFACE AG SER QL: ABNORMAL
HCO3 BLDA-SCNC: 49.7 MMOL/L (ref 24–30)
HCT VFR BLD AUTO: 31.2 % (ref 34.8–46.1)
HCT VFR BLD AUTO: 32.8 % (ref 34.8–46.1)
HCT VFR BLD AUTO: 33.4 % (ref 34.8–46.1)
HCT VFR BLD AUTO: 34 % (ref 34.8–46.1)
HCT VFR BLD AUTO: 34.5 % (ref 34.8–46.1)
HCT VFR BLD AUTO: 35.1 % (ref 34.8–46.1)
HCT VFR BLD CALC: 30 % (ref 34.8–46.1)
HCV AB SER QL: ABNORMAL
HELMET CELLS BLD QL SMEAR: PRESENT
HGB BLD-MCNC: 10.2 G/DL (ref 11.5–15.4)
HGB BLD-MCNC: 10.2 G/DL (ref 11.5–15.4)
HGB BLD-MCNC: 10.3 G/DL (ref 11.5–15.4)
HGB BLD-MCNC: 9.5 G/DL (ref 11.5–15.4)
HGB BLD-MCNC: 9.7 G/DL (ref 11.5–15.4)
HGB BLD-MCNC: 9.7 G/DL (ref 11.5–15.4)
HGB BLDA-MCNC: 10.2 G/DL (ref 11.5–15.4)
HGB UR QL STRIP.AUTO: NEGATIVE
HYALINE CASTS #/AREA URNS LPF: ABNORMAL /LPF
IMM GRANULOCYTES # BLD AUTO: 0.07 THOUSAND/UL (ref 0–0.2)
IMM GRANULOCYTES # BLD AUTO: 0.11 THOUSAND/UL (ref 0–0.2)
IMM GRANULOCYTES # BLD AUTO: 0.28 THOUSAND/UL (ref 0–0.2)
IMM GRANULOCYTES NFR BLD AUTO: 1 % (ref 0–2)
IMM GRANULOCYTES NFR BLD AUTO: 1 % (ref 0–2)
IMM GRANULOCYTES NFR BLD AUTO: 2 % (ref 0–2)
INR PPP: 1.32 (ref 0.84–1.19)
INR PPP: 1.57 (ref 0.84–1.19)
KETONES UR STRIP-MCNC: NEGATIVE MG/DL
L PNEUMO1 AG UR QL IA.RAPID: NEGATIVE
LACTATE SERPL-SCNC: 1.1 MMOL/L (ref 0.5–2)
LEUKOCYTE ESTERASE UR QL STRIP: ABNORMAL
LYMPHOCYTES # BLD AUTO: 0.2 THOUSAND/UL (ref 0.6–4.47)
LYMPHOCYTES # BLD AUTO: 0.23 THOUSANDS/ÂΜL (ref 0.6–4.47)
LYMPHOCYTES # BLD AUTO: 0.25 THOUSANDS/ÂΜL (ref 0.6–4.47)
LYMPHOCYTES # BLD AUTO: 0.54 THOUSANDS/ÂΜL (ref 0.6–4.47)
LYMPHOCYTES # BLD AUTO: 1 % (ref 14–44)
LYMPHOCYTES NFR BLD AUTO: 2 % (ref 14–44)
LYMPHOCYTES NFR BLD AUTO: 3 % (ref 14–44)
LYMPHOCYTES NFR BLD AUTO: 3 % (ref 14–44)
MACROCYTES BLD QL AUTO: PRESENT
MAGNESIUM SERPL-MCNC: 2.4 MG/DL (ref 1.9–2.7)
MAGNESIUM SERPL-MCNC: 2.5 MG/DL (ref 1.9–2.7)
MAGNESIUM SERPL-MCNC: 2.5 MG/DL (ref 1.9–2.7)
MAGNESIUM SERPL-MCNC: 2.6 MG/DL (ref 1.9–2.7)
MCH RBC QN AUTO: 30.3 PG (ref 26.8–34.3)
MCH RBC QN AUTO: 31.3 PG (ref 26.8–34.3)
MCH RBC QN AUTO: 31.4 PG (ref 26.8–34.3)
MCH RBC QN AUTO: 31.5 PG (ref 26.8–34.3)
MCH RBC QN AUTO: 32.1 PG (ref 26.8–34.3)
MCH RBC QN AUTO: 32.1 PG (ref 26.8–34.3)
MCHC RBC AUTO-ENTMCNC: 28.5 G/DL (ref 31.4–37.4)
MCHC RBC AUTO-ENTMCNC: 29.1 G/DL (ref 31.4–37.4)
MCHC RBC AUTO-ENTMCNC: 29.6 G/DL (ref 31.4–37.4)
MCHC RBC AUTO-ENTMCNC: 29.9 G/DL (ref 31.4–37.4)
MCHC RBC AUTO-ENTMCNC: 30.4 G/DL (ref 31.4–37.4)
MCHC RBC AUTO-ENTMCNC: 30.5 G/DL (ref 31.4–37.4)
MCV RBC AUTO: 105 FL (ref 82–98)
MCV RBC AUTO: 105 FL (ref 82–98)
MCV RBC AUTO: 106 FL (ref 82–98)
MCV RBC AUTO: 108 FL (ref 82–98)
MONOCYTES # BLD AUTO: 0.2 THOUSAND/UL (ref 0–1.22)
MONOCYTES # BLD AUTO: 0.38 THOUSAND/ÂΜL (ref 0.17–1.22)
MONOCYTES # BLD AUTO: 0.57 THOUSAND/ÂΜL (ref 0.17–1.22)
MONOCYTES # BLD AUTO: 0.61 THOUSAND/ÂΜL (ref 0.17–1.22)
MONOCYTES NFR BLD AUTO: 3 % (ref 4–12)
MONOCYTES NFR BLD AUTO: 4 % (ref 4–12)
MONOCYTES NFR BLD AUTO: 5 % (ref 4–12)
MONOCYTES NFR BLD: 1 % (ref 4–12)
MRSA NOSE QL CULT: NORMAL
NEUTROPHILS # BLD AUTO: 10.32 THOUSANDS/ÂΜL (ref 1.85–7.62)
NEUTROPHILS # BLD AUTO: 17.39 THOUSANDS/ÂΜL (ref 1.85–7.62)
NEUTROPHILS # BLD AUTO: 9.42 THOUSANDS/ÂΜL (ref 1.85–7.62)
NEUTROPHILS # BLD MANUAL: 20.06 THOUSAND/UL (ref 1.85–7.62)
NEUTS BAND NFR BLD MANUAL: 8 % (ref 0–8)
NEUTS SEG NFR BLD AUTO: 90 % (ref 43–75)
NEUTS SEG NFR BLD AUTO: 91 % (ref 43–75)
NEUTS SEG NFR BLD AUTO: 92 % (ref 43–75)
NEUTS SEG NFR BLD AUTO: 92 % (ref 43–75)
NITRITE UR QL STRIP: NEGATIVE
NON-SQ EPI CELLS URNS QL MICRO: ABNORMAL /HPF
NRBC BLD AUTO-RTO: 0 /100 WBCS
P AXIS: 58 DEGREES
PCO2 BLD: 84.2 MM HG (ref 42–50)
PCO2 BLD: >45 MMOL/L (ref 21–32)
PH BLD: 7.38 [PH] (ref 7.3–7.4)
PH UR STRIP.AUTO: 5.5 [PH]
PHOSPHATE SERPL-MCNC: 2.7 MG/DL (ref 2.3–4.1)
PHOSPHATE SERPL-MCNC: 2.8 MG/DL (ref 2.3–4.1)
PHOSPHATE SERPL-MCNC: 3.3 MG/DL (ref 2.3–4.1)
PHOSPHATE SERPL-MCNC: 3.3 MG/DL (ref 2.3–4.1)
PLATELET # BLD AUTO: 144 THOUSANDS/UL (ref 149–390)
PLATELET # BLD AUTO: 188 THOUSANDS/UL (ref 149–390)
PLATELET # BLD AUTO: 209 THOUSANDS/UL (ref 149–390)
PLATELET # BLD AUTO: 256 THOUSANDS/UL (ref 149–390)
PLATELET # BLD AUTO: 272 THOUSANDS/UL (ref 149–390)
PLATELET # BLD AUTO: 291 THOUSANDS/UL (ref 149–390)
PLATELET BLD QL SMEAR: ADEQUATE
PMV BLD AUTO: 9 FL (ref 8.9–12.7)
PMV BLD AUTO: 9.1 FL (ref 8.9–12.7)
PMV BLD AUTO: 9.2 FL (ref 8.9–12.7)
PMV BLD AUTO: 9.3 FL (ref 8.9–12.7)
PMV BLD AUTO: 9.4 FL (ref 8.9–12.7)
PMV BLD AUTO: 9.6 FL (ref 8.9–12.7)
PO2 BLD: 61 MM HG (ref 35–45)
POIKILOCYTOSIS BLD QL SMEAR: PRESENT
POTASSIUM BLD-SCNC: 2.9 MMOL/L (ref 3.5–5.3)
POTASSIUM SERPL-SCNC: 3 MMOL/L (ref 3.5–5.3)
POTASSIUM SERPL-SCNC: 3.3 MMOL/L (ref 3.5–5.3)
POTASSIUM SERPL-SCNC: 3.5 MMOL/L (ref 3.5–5.3)
POTASSIUM SERPL-SCNC: 4 MMOL/L (ref 3.5–5.3)
POTASSIUM SERPL-SCNC: 4 MMOL/L (ref 3.5–5.3)
POTASSIUM SERPL-SCNC: 4.2 MMOL/L (ref 3.5–5.3)
PR INTERVAL: 134 MS
PROCALCITONIN SERPL-MCNC: 0.46 NG/ML
PROCALCITONIN SERPL-MCNC: 0.59 NG/ML
PROCALCITONIN SERPL-MCNC: 0.61 NG/ML
PROT SERPL-MCNC: 5.8 G/DL (ref 6.4–8.4)
PROT SERPL-MCNC: 5.8 G/DL (ref 6.4–8.4)
PROT SERPL-MCNC: 6.5 G/DL (ref 6.4–8.4)
PROT UR STRIP-MCNC: ABNORMAL MG/DL
PROTHROMBIN TIME: 16.8 SECONDS (ref 11.6–14.5)
PROTHROMBIN TIME: 19.3 SECONDS (ref 11.6–14.5)
QRS AXIS: 45 DEGREES
QRSD INTERVAL: 92 MS
QT INTERVAL: 338 MS
QTC INTERVAL: 397 MS
RBC # BLD AUTO: 2.96 MILLION/UL (ref 3.81–5.12)
RBC # BLD AUTO: 3.1 MILLION/UL (ref 3.81–5.12)
RBC # BLD AUTO: 3.18 MILLION/UL (ref 3.81–5.12)
RBC # BLD AUTO: 3.2 MILLION/UL (ref 3.81–5.12)
RBC # BLD AUTO: 3.25 MILLION/UL (ref 3.81–5.12)
RBC # BLD AUTO: 3.27 MILLION/UL (ref 3.81–5.12)
RBC #/AREA URNS AUTO: ABNORMAL /HPF
RBC MORPH BLD: PRESENT
RSV RNA RESP QL NAA+PROBE: NEGATIVE
S PNEUM AG UR QL: NEGATIVE
SALICYLATES SERPL-MCNC: <5 MG/DL (ref 3–20)
SAO2 % BLD FROM PO2: 88 % (ref 60–85)
SARS-COV-2 RNA RESP QL NAA+PROBE: NEGATIVE
SODIUM BLD-SCNC: 137 MMOL/L (ref 136–145)
SODIUM SERPL-SCNC: 139 MMOL/L (ref 135–147)
SODIUM SERPL-SCNC: 143 MMOL/L (ref 135–147)
SODIUM SERPL-SCNC: 147 MMOL/L (ref 135–147)
SODIUM SERPL-SCNC: 147 MMOL/L (ref 135–147)
SODIUM SERPL-SCNC: 150 MMOL/L (ref 135–147)
SODIUM SERPL-SCNC: 153 MMOL/L (ref 135–147)
SP GR UR STRIP.AUTO: 1.02 (ref 1–1.03)
SPECIMEN SOURCE: ABNORMAL
STOMATOCYTES BLD QL SMEAR: PRESENT
T WAVE AXIS: 39 DEGREES
UROBILINOGEN UR STRIP-ACNC: <2 MG/DL
VENTRICULAR RATE: 83 BPM
WBC # BLD AUTO: 10.19 THOUSAND/UL (ref 4.31–10.16)
WBC # BLD AUTO: 11.33 THOUSAND/UL (ref 4.31–10.16)
WBC # BLD AUTO: 12 THOUSAND/UL (ref 4.31–10.16)
WBC # BLD AUTO: 18.85 THOUSAND/UL (ref 4.31–10.16)
WBC # BLD AUTO: 20 THOUSAND/UL (ref 4.31–10.16)
WBC # BLD AUTO: 20.47 THOUSAND/UL (ref 4.31–10.16)
WBC #/AREA URNS AUTO: ABNORMAL /HPF

## 2023-01-01 PROCEDURE — 70498 CT ANGIOGRAPHY NECK: CPT

## 2023-01-01 PROCEDURE — 94760 N-INVAS EAR/PLS OXIMETRY 1: CPT

## 2023-01-01 PROCEDURE — G1004 CDSM NDSC: HCPCS

## 2023-01-01 PROCEDURE — 84100 ASSAY OF PHOSPHORUS: CPT

## 2023-01-01 PROCEDURE — 85610 PROTHROMBIN TIME: CPT

## 2023-01-01 PROCEDURE — 80076 HEPATIC FUNCTION PANEL: CPT | Performed by: PHYSICIAN ASSISTANT

## 2023-01-01 PROCEDURE — 87081 CULTURE SCREEN ONLY: CPT | Performed by: PHYSICIAN ASSISTANT

## 2023-01-01 PROCEDURE — 85027 COMPLETE CBC AUTOMATED: CPT

## 2023-01-01 PROCEDURE — 85027 COMPLETE CBC AUTOMATED: CPT | Performed by: PHYSICIAN ASSISTANT

## 2023-01-01 PROCEDURE — 80048 BASIC METABOLIC PNL TOTAL CA: CPT

## 2023-01-01 PROCEDURE — 85014 HEMATOCRIT: CPT

## 2023-01-01 PROCEDURE — 99418 PROLNG IP/OBS E/M EA 15 MIN: CPT | Performed by: PHYSICIAN ASSISTANT

## 2023-01-01 PROCEDURE — 80179 DRUG ASSAY SALICYLATE: CPT | Performed by: EMERGENCY MEDICINE

## 2023-01-01 PROCEDURE — 82077 ASSAY SPEC XCP UR&BREATH IA: CPT | Performed by: EMERGENCY MEDICINE

## 2023-01-01 PROCEDURE — 0241U HB NFCT DS VIR RESP RNA 4 TRGT: CPT

## 2023-01-01 PROCEDURE — 94669 MECHANICAL CHEST WALL OSCILL: CPT

## 2023-01-01 PROCEDURE — 85730 THROMBOPLASTIN TIME PARTIAL: CPT | Performed by: PHYSICIAN ASSISTANT

## 2023-01-01 PROCEDURE — 74177 CT ABD & PELVIS W/CONTRAST: CPT

## 2023-01-01 PROCEDURE — 81001 URINALYSIS AUTO W/SCOPE: CPT

## 2023-01-01 PROCEDURE — 82948 REAGENT STRIP/BLOOD GLUCOSE: CPT

## 2023-01-01 PROCEDURE — 94003 VENT MGMT INPAT SUBQ DAY: CPT

## 2023-01-01 PROCEDURE — 85025 COMPLETE CBC W/AUTO DIFF WBC: CPT

## 2023-01-01 PROCEDURE — 82330 ASSAY OF CALCIUM: CPT

## 2023-01-01 PROCEDURE — 94660 CPAP INITIATION&MGMT: CPT

## 2023-01-01 PROCEDURE — 99291 CRITICAL CARE FIRST HOUR: CPT | Performed by: INTERNAL MEDICINE

## 2023-01-01 PROCEDURE — 94640 AIRWAY INHALATION TREATMENT: CPT

## 2023-01-01 PROCEDURE — 94002 VENT MGMT INPAT INIT DAY: CPT

## 2023-01-01 PROCEDURE — 94644 CONT INHLJ TX 1ST HOUR: CPT

## 2023-01-01 PROCEDURE — 84484 ASSAY OF TROPONIN QUANT: CPT

## 2023-01-01 PROCEDURE — 84100 ASSAY OF PHOSPHORUS: CPT | Performed by: PHYSICIAN ASSISTANT

## 2023-01-01 PROCEDURE — 80048 BASIC METABOLIC PNL TOTAL CA: CPT | Performed by: STUDENT IN AN ORGANIZED HEALTH CARE EDUCATION/TRAINING PROGRAM

## 2023-01-01 PROCEDURE — 87040 BLOOD CULTURE FOR BACTERIA: CPT

## 2023-01-01 PROCEDURE — 99238 HOSP IP/OBS DSCHRG MGMT 30/<: CPT

## 2023-01-01 PROCEDURE — 85730 THROMBOPLASTIN TIME PARTIAL: CPT

## 2023-01-01 PROCEDURE — 80053 COMPREHEN METABOLIC PANEL: CPT | Performed by: PHYSICIAN ASSISTANT

## 2023-01-01 PROCEDURE — 83735 ASSAY OF MAGNESIUM: CPT

## 2023-01-01 PROCEDURE — 85007 BL SMEAR W/DIFF WBC COUNT: CPT | Performed by: PHYSICIAN ASSISTANT

## 2023-01-01 PROCEDURE — 83735 ASSAY OF MAGNESIUM: CPT | Performed by: PHYSICIAN ASSISTANT

## 2023-01-01 PROCEDURE — 70496 CT ANGIOGRAPHY HEAD: CPT

## 2023-01-01 PROCEDURE — 99233 SBSQ HOSP IP/OBS HIGH 50: CPT | Performed by: INTERNAL MEDICINE

## 2023-01-01 PROCEDURE — 96361 HYDRATE IV INFUSION ADD-ON: CPT

## 2023-01-01 PROCEDURE — 71045 X-RAY EXAM CHEST 1 VIEW: CPT

## 2023-01-01 PROCEDURE — 71260 CT THORAX DX C+: CPT

## 2023-01-01 PROCEDURE — 87449 NOS EACH ORGANISM AG IA: CPT | Performed by: PHYSICIAN ASSISTANT

## 2023-01-01 PROCEDURE — 80074 ACUTE HEPATITIS PANEL: CPT

## 2023-01-01 PROCEDURE — 99223 1ST HOSP IP/OBS HIGH 75: CPT | Performed by: PHYSICIAN ASSISTANT

## 2023-01-01 PROCEDURE — 94664 DEMO&/EVAL PT USE INHALER: CPT

## 2023-01-01 PROCEDURE — 96365 THER/PROPH/DIAG IV INF INIT: CPT

## 2023-01-01 PROCEDURE — 80076 HEPATIC FUNCTION PANEL: CPT

## 2023-01-01 PROCEDURE — 82947 ASSAY GLUCOSE BLOOD QUANT: CPT

## 2023-01-01 PROCEDURE — 84295 ASSAY OF SERUM SODIUM: CPT

## 2023-01-01 PROCEDURE — 84145 PROCALCITONIN (PCT): CPT

## 2023-01-01 PROCEDURE — 99232 SBSQ HOSP IP/OBS MODERATE 35: CPT | Performed by: INTERNAL MEDICINE

## 2023-01-01 PROCEDURE — 93005 ELECTROCARDIOGRAM TRACING: CPT

## 2023-01-01 PROCEDURE — 84132 ASSAY OF SERUM POTASSIUM: CPT

## 2023-01-01 PROCEDURE — 85730 THROMBOPLASTIN TIME PARTIAL: CPT | Performed by: INTERNAL MEDICINE

## 2023-01-01 PROCEDURE — 99233 SBSQ HOSP IP/OBS HIGH 50: CPT | Performed by: PHYSICIAN ASSISTANT

## 2023-01-01 PROCEDURE — 96375 TX/PRO/DX INJ NEW DRUG ADDON: CPT

## 2023-01-01 PROCEDURE — 99285 EMERGENCY DEPT VISIT HI MDM: CPT

## 2023-01-01 PROCEDURE — 80143 DRUG ASSAY ACETAMINOPHEN: CPT | Performed by: EMERGENCY MEDICINE

## 2023-01-01 PROCEDURE — 85025 COMPLETE CBC W/AUTO DIFF WBC: CPT | Performed by: PHYSICIAN ASSISTANT

## 2023-01-01 PROCEDURE — 84145 PROCALCITONIN (PCT): CPT | Performed by: PHYSICIAN ASSISTANT

## 2023-01-01 PROCEDURE — 36415 COLL VENOUS BLD VENIPUNCTURE: CPT

## 2023-01-01 PROCEDURE — 82803 BLOOD GASES ANY COMBINATION: CPT

## 2023-01-01 PROCEDURE — 83605 ASSAY OF LACTIC ACID: CPT

## 2023-01-01 PROCEDURE — 86704 HEP B CORE ANTIBODY TOTAL: CPT

## 2023-01-01 PROCEDURE — 80048 BASIC METABOLIC PNL TOTAL CA: CPT | Performed by: PHYSICIAN ASSISTANT

## 2023-01-01 RX ORDER — BUDESONIDE 0.5 MG/2ML
0.5 INHALANT ORAL
Status: DISCONTINUED | OUTPATIENT
Start: 2023-01-01 | End: 2023-01-01

## 2023-01-01 RX ORDER — SODIUM CHLORIDE FOR INHALATION 0.9 %
3 VIAL, NEBULIZER (ML) INHALATION
Status: DISCONTINUED | OUTPATIENT
Start: 2023-01-01 | End: 2023-01-01

## 2023-01-01 RX ORDER — LEVALBUTEROL INHALATION SOLUTION 1.25 MG/3ML
1.25 SOLUTION RESPIRATORY (INHALATION)
Status: DISCONTINUED | OUTPATIENT
Start: 2023-01-01 | End: 2023-01-01

## 2023-01-01 RX ORDER — CHLORHEXIDINE GLUCONATE ORAL RINSE 1.2 MG/ML
15 SOLUTION DENTAL EVERY 12 HOURS SCHEDULED
Status: DISCONTINUED | OUTPATIENT
Start: 2023-01-01 | End: 2023-01-01

## 2023-01-01 RX ORDER — MAGNESIUM SULFATE HEPTAHYDRATE 40 MG/ML
2 INJECTION, SOLUTION INTRAVENOUS ONCE
Status: COMPLETED | OUTPATIENT
Start: 2023-01-01 | End: 2023-01-01

## 2023-01-01 RX ORDER — FENTANYL CITRATE 50 UG/ML
25 INJECTION, SOLUTION INTRAMUSCULAR; INTRAVENOUS ONCE
Status: COMPLETED | OUTPATIENT
Start: 2023-01-01 | End: 2023-01-01

## 2023-01-01 RX ORDER — LIDOCAINE 50 MG/G
1 PATCH TOPICAL DAILY
Status: DISCONTINUED | OUTPATIENT
Start: 2023-01-01 | End: 2023-01-01

## 2023-01-01 RX ORDER — HALOPERIDOL 5 MG/ML
0.5 INJECTION INTRAMUSCULAR EVERY 2 HOUR PRN
Status: DISCONTINUED | OUTPATIENT
Start: 2023-01-01 | End: 2023-01-01 | Stop reason: HOSPADM

## 2023-01-01 RX ORDER — FERROUS SULFATE 325(65) MG
325 TABLET ORAL DAILY
Status: DISCONTINUED | OUTPATIENT
Start: 2023-01-01 | End: 2023-01-01

## 2023-01-01 RX ORDER — POTASSIUM CHLORIDE 14.9 MG/ML
20 INJECTION INTRAVENOUS
Status: COMPLETED | OUTPATIENT
Start: 2023-01-01 | End: 2023-01-01

## 2023-01-01 RX ORDER — LORAZEPAM 2 MG/ML
1 INJECTION INTRAMUSCULAR
Status: DISCONTINUED | OUTPATIENT
Start: 2023-01-01 | End: 2023-01-01 | Stop reason: HOSPADM

## 2023-01-01 RX ORDER — ALBUTEROL SULFATE 2.5 MG/3ML
2.5 SOLUTION RESPIRATORY (INHALATION) EVERY 6 HOURS PRN
Status: DISCONTINUED | OUTPATIENT
Start: 2023-01-01 | End: 2023-01-01

## 2023-01-01 RX ORDER — FUROSEMIDE 10 MG/ML
40 INJECTION INTRAMUSCULAR; INTRAVENOUS ONCE
Status: COMPLETED | OUTPATIENT
Start: 2023-01-01 | End: 2023-01-01

## 2023-01-01 RX ORDER — LABETALOL HYDROCHLORIDE 5 MG/ML
10 INJECTION, SOLUTION INTRAVENOUS EVERY 6 HOURS PRN
Status: DISCONTINUED | OUTPATIENT
Start: 2023-01-01 | End: 2023-01-01

## 2023-01-01 RX ORDER — GUAIFENESIN 600 MG/1
600 TABLET, EXTENDED RELEASE ORAL 2 TIMES DAILY
Status: DISCONTINUED | OUTPATIENT
Start: 2023-01-01 | End: 2023-01-01

## 2023-01-01 RX ORDER — IPRATROPIUM BROMIDE AND ALBUTEROL SULFATE 2.5; .5 MG/3ML; MG/3ML
3 SOLUTION RESPIRATORY (INHALATION)
Status: DISCONTINUED | OUTPATIENT
Start: 2023-01-01 | End: 2023-01-01

## 2023-01-01 RX ORDER — DILTIAZEM HYDROCHLORIDE 120 MG/1
240 CAPSULE, COATED, EXTENDED RELEASE ORAL DAILY
Status: DISCONTINUED | OUTPATIENT
Start: 2023-01-01 | End: 2023-01-01

## 2023-01-01 RX ORDER — POTASSIUM CHLORIDE 14.9 MG/ML
20 INJECTION INTRAVENOUS ONCE
Qty: 100 ML | Refills: 0 | Status: COMPLETED | OUTPATIENT
Start: 2023-01-01 | End: 2023-01-01

## 2023-01-01 RX ORDER — CEFTRIAXONE 2 G/50ML
2000 INJECTION, SOLUTION INTRAVENOUS ONCE
Status: COMPLETED | OUTPATIENT
Start: 2023-01-01 | End: 2023-01-01

## 2023-01-01 RX ORDER — FLUTICASONE FUROATE AND VILANTEROL 200; 25 UG/1; UG/1
1 POWDER RESPIRATORY (INHALATION) DAILY
Status: DISCONTINUED | OUTPATIENT
Start: 2023-01-01 | End: 2023-01-01

## 2023-01-01 RX ORDER — METHYLPREDNISOLONE SODIUM SUCCINATE 40 MG/ML
40 INJECTION, POWDER, LYOPHILIZED, FOR SOLUTION INTRAMUSCULAR; INTRAVENOUS EVERY 8 HOURS SCHEDULED
Status: DISCONTINUED | OUTPATIENT
Start: 2023-01-01 | End: 2023-01-01

## 2023-01-01 RX ORDER — VANCOMYCIN HYDROCHLORIDE 1 G/200ML
1000 INJECTION, SOLUTION INTRAVENOUS EVERY 24 HOURS
Status: DISCONTINUED | OUTPATIENT
Start: 2023-01-01 | End: 2023-01-01

## 2023-01-01 RX ORDER — ACETAMINOPHEN 10 MG/ML
1000 INJECTION, SOLUTION INTRAVENOUS ONCE
Status: COMPLETED | OUTPATIENT
Start: 2023-01-01 | End: 2023-01-01

## 2023-01-01 RX ORDER — HEPARIN SODIUM 1000 [USP'U]/ML
1350 INJECTION, SOLUTION INTRAVENOUS; SUBCUTANEOUS EVERY 6 HOURS PRN
Status: DISCONTINUED | OUTPATIENT
Start: 2023-01-01 | End: 2023-01-01

## 2023-01-01 RX ORDER — CEFTRIAXONE 1 G/50ML
1000 INJECTION, SOLUTION INTRAVENOUS EVERY 24 HOURS
Status: DISCONTINUED | OUTPATIENT
Start: 2023-01-01 | End: 2023-01-01

## 2023-01-01 RX ORDER — METHYLPREDNISOLONE SODIUM SUCCINATE 125 MG/2ML
125 INJECTION, POWDER, LYOPHILIZED, FOR SOLUTION INTRAMUSCULAR; INTRAVENOUS ONCE
Status: COMPLETED | OUTPATIENT
Start: 2023-01-01 | End: 2023-01-01

## 2023-01-01 RX ORDER — PANTOPRAZOLE SODIUM 40 MG/1
40 TABLET, DELAYED RELEASE ORAL
Status: DISCONTINUED | OUTPATIENT
Start: 2023-01-01 | End: 2023-01-01

## 2023-01-01 RX ORDER — CEFEPIME HYDROCHLORIDE 2 G/50ML
2000 INJECTION, SOLUTION INTRAVENOUS EVERY 12 HOURS
Status: DISCONTINUED | OUTPATIENT
Start: 2023-01-01 | End: 2023-01-01

## 2023-01-01 RX ORDER — HYDRALAZINE HYDROCHLORIDE 20 MG/ML
5 INJECTION INTRAMUSCULAR; INTRAVENOUS EVERY 6 HOURS PRN
Status: DISCONTINUED | OUTPATIENT
Start: 2023-01-01 | End: 2023-01-01

## 2023-01-01 RX ORDER — HEPARIN SODIUM 10000 [USP'U]/100ML
3-20 INJECTION, SOLUTION INTRAVENOUS
Status: DISCONTINUED | OUTPATIENT
Start: 2023-01-01 | End: 2023-01-01

## 2023-01-01 RX ORDER — GLYCOPYRROLATE 0.2 MG/ML
0.1 INJECTION INTRAMUSCULAR; INTRAVENOUS EVERY 4 HOURS PRN
Status: DISCONTINUED | OUTPATIENT
Start: 2023-01-01 | End: 2023-01-01 | Stop reason: HOSPADM

## 2023-01-01 RX ORDER — HYDRALAZINE HYDROCHLORIDE 20 MG/ML
10 INJECTION INTRAMUSCULAR; INTRAVENOUS EVERY 6 HOURS PRN
Status: DISCONTINUED | OUTPATIENT
Start: 2023-01-01 | End: 2023-01-01

## 2023-01-01 RX ORDER — HEPARIN SODIUM 1000 [USP'U]/ML
2700 INJECTION, SOLUTION INTRAVENOUS; SUBCUTANEOUS EVERY 6 HOURS PRN
Status: DISCONTINUED | OUTPATIENT
Start: 2023-01-01 | End: 2023-01-01

## 2023-01-01 RX ORDER — LEVOTHYROXINE SODIUM 0.03 MG/1
25 TABLET ORAL
Status: DISCONTINUED | OUTPATIENT
Start: 2023-01-01 | End: 2023-01-01

## 2023-01-01 RX ORDER — HEPARIN SODIUM 5000 [USP'U]/ML
5000 INJECTION, SOLUTION INTRAVENOUS; SUBCUTANEOUS EVERY 8 HOURS SCHEDULED
Status: DISCONTINUED | OUTPATIENT
Start: 2023-01-01 | End: 2023-01-01

## 2023-01-01 RX ORDER — SODIUM CHLORIDE FOR INHALATION 0.9 %
12 VIAL, NEBULIZER (ML) INHALATION ONCE
Status: COMPLETED | OUTPATIENT
Start: 2023-01-01 | End: 2023-01-01

## 2023-01-01 RX ORDER — ACETAMINOPHEN 325 MG/1
650 TABLET ORAL EVERY 6 HOURS PRN
Status: DISCONTINUED | OUTPATIENT
Start: 2023-01-01 | End: 2023-01-01

## 2023-01-01 RX ORDER — HYDROMORPHONE HCL/PF 1 MG/ML
0.5 SYRINGE (ML) INJECTION EVERY 2 HOUR PRN
Status: DISCONTINUED | OUTPATIENT
Start: 2023-01-01 | End: 2023-01-01 | Stop reason: HOSPADM

## 2023-01-01 RX ORDER — HYDROMORPHONE HCL IN WATER/PF 6 MG/30 ML
0.2 PATIENT CONTROLLED ANALGESIA SYRINGE INTRAVENOUS EVERY 4 HOURS PRN
Status: DISCONTINUED | OUTPATIENT
Start: 2023-01-01 | End: 2023-01-01

## 2023-01-01 RX ORDER — DEXMEDETOMIDINE HYDROCHLORIDE 4 UG/ML
.1-1.2 INJECTION, SOLUTION INTRAVENOUS
Status: DISCONTINUED | OUTPATIENT
Start: 2023-01-01 | End: 2023-01-01 | Stop reason: HOSPADM

## 2023-01-01 RX ORDER — LORAZEPAM 2 MG/ML
1 INJECTION INTRAMUSCULAR
Status: DISCONTINUED | OUTPATIENT
Start: 2023-01-01 | End: 2023-01-01

## 2023-01-01 RX ADMIN — BUDESONIDE 0.5 MG: 0.5 INHALANT ORAL at 19:16

## 2023-01-01 RX ADMIN — LIDOCAINE 1 PATCH: 700 PATCH TOPICAL at 08:32

## 2023-01-01 RX ADMIN — HYDROMORPHONE HYDROCHLORIDE 0.2 MG: 0.2 INJECTION, SOLUTION INTRAMUSCULAR; INTRAVENOUS; SUBCUTANEOUS at 03:45

## 2023-01-01 RX ADMIN — CEFEPIME HYDROCHLORIDE 2000 MG: 2 INJECTION, SOLUTION INTRAVENOUS at 11:37

## 2023-01-01 RX ADMIN — IOHEXOL 60 ML: 350 INJECTION, SOLUTION INTRAVENOUS at 20:45

## 2023-01-01 RX ADMIN — IPRATROPIUM BROMIDE 0.5 MG: 0.5 SOLUTION RESPIRATORY (INHALATION) at 01:09

## 2023-01-01 RX ADMIN — IPRATROPIUM BROMIDE 0.5 MG: 0.5 SOLUTION RESPIRATORY (INHALATION) at 07:22

## 2023-01-01 RX ADMIN — LIDOCAINE 1 PATCH: 700 PATCH TOPICAL at 10:49

## 2023-01-01 RX ADMIN — HEPARIN SODIUM 12 UNITS/KG/HR: 10000 INJECTION, SOLUTION INTRAVENOUS at 00:36

## 2023-01-01 RX ADMIN — LEVALBUTEROL HYDROCHLORIDE 1.25 MG: 1.25 SOLUTION RESPIRATORY (INHALATION) at 07:21

## 2023-01-01 RX ADMIN — CEFEPIME HYDROCHLORIDE 2000 MG: 2 INJECTION, SOLUTION INTRAVENOUS at 11:19

## 2023-01-01 RX ADMIN — CEFEPIME HYDROCHLORIDE 2000 MG: 2 INJECTION, SOLUTION INTRAVENOUS at 01:05

## 2023-01-01 RX ADMIN — IPRATROPIUM BROMIDE 0.5 MG: 0.5 SOLUTION RESPIRATORY (INHALATION) at 07:28

## 2023-01-01 RX ADMIN — BUDESONIDE 0.5 MG: 0.5 INHALANT ORAL at 08:27

## 2023-01-01 RX ADMIN — CEFEPIME HYDROCHLORIDE 2000 MG: 2 INJECTION, SOLUTION INTRAVENOUS at 00:36

## 2023-01-01 RX ADMIN — FUROSEMIDE 40 MG: 10 INJECTION, SOLUTION INTRAMUSCULAR; INTRAVENOUS at 12:15

## 2023-01-01 RX ADMIN — FENTANYL CITRATE 25 MCG: 50 INJECTION INTRAMUSCULAR; INTRAVENOUS at 10:35

## 2023-01-01 RX ADMIN — CEFEPIME HYDROCHLORIDE 2000 MG: 2 INJECTION, SOLUTION INTRAVENOUS at 23:08

## 2023-01-01 RX ADMIN — HYDROMORPHONE HYDROCHLORIDE 0.2 MG: 0.2 INJECTION, SOLUTION INTRAMUSCULAR; INTRAVENOUS; SUBCUTANEOUS at 22:22

## 2023-01-01 RX ADMIN — LIDOCAINE 1 PATCH: 700 PATCH TOPICAL at 11:38

## 2023-01-01 RX ADMIN — POTASSIUM CHLORIDE 20 MEQ: 14.9 INJECTION, SOLUTION INTRAVENOUS at 14:14

## 2023-01-01 RX ADMIN — BUDESONIDE 0.5 MG: 0.5 INHALANT ORAL at 20:24

## 2023-01-01 RX ADMIN — CHLORHEXIDINE GLUCONATE 15 ML: 1.2 RINSE ORAL at 21:25

## 2023-01-01 RX ADMIN — DEXMEDETOMIDINE HYDROCHLORIDE 0.6 MCG/KG/HR: 4 INJECTION, SOLUTION INTRAVENOUS at 15:22

## 2023-01-01 RX ADMIN — DEXMEDETOMIDINE HYDROCHLORIDE 0.4 MCG/KG/HR: 4 INJECTION, SOLUTION INTRAVENOUS at 00:35

## 2023-01-01 RX ADMIN — HYDROMORPHONE HYDROCHLORIDE 0.5 MG: 1 INJECTION, SOLUTION INTRAMUSCULAR; INTRAVENOUS; SUBCUTANEOUS at 00:14

## 2023-01-01 RX ADMIN — LEVALBUTEROL HYDROCHLORIDE 1.25 MG: 1.25 SOLUTION RESPIRATORY (INHALATION) at 02:31

## 2023-01-01 RX ADMIN — ACETAMINOPHEN 1000 MG: 10 INJECTION INTRAVENOUS at 08:00

## 2023-01-01 RX ADMIN — VANCOMYCIN HYDROCHLORIDE 1000 MG: 1 INJECTION, SOLUTION INTRAVENOUS at 15:21

## 2023-01-01 RX ADMIN — METHYLPREDNISOLONE SODIUM SUCCINATE 40 MG: 40 INJECTION, POWDER, FOR SOLUTION INTRAMUSCULAR; INTRAVENOUS at 12:15

## 2023-01-01 RX ADMIN — LEVALBUTEROL HYDROCHLORIDE 1.25 MG: 1.25 SOLUTION RESPIRATORY (INHALATION) at 19:16

## 2023-01-01 RX ADMIN — DEXMEDETOMIDINE HYDROCHLORIDE 0.7 MCG/KG/HR: 4 INJECTION, SOLUTION INTRAVENOUS at 16:49

## 2023-01-01 RX ADMIN — LEVALBUTEROL HYDROCHLORIDE 1.25 MG: 1.25 SOLUTION RESPIRATORY (INHALATION) at 13:08

## 2023-01-01 RX ADMIN — METHYLPREDNISOLONE SODIUM SUCCINATE 40 MG: 40 INJECTION, POWDER, FOR SOLUTION INTRAMUSCULAR; INTRAVENOUS at 05:16

## 2023-01-01 RX ADMIN — BUDESONIDE 0.5 MG: 0.5 INHALANT ORAL at 07:24

## 2023-01-01 RX ADMIN — IPRATROPIUM BROMIDE 0.5 MG: 0.5 SOLUTION RESPIRATORY (INHALATION) at 02:33

## 2023-01-01 RX ADMIN — LEVALBUTEROL HYDROCHLORIDE 1.25 MG: 1.25 SOLUTION RESPIRATORY (INHALATION) at 08:27

## 2023-01-01 RX ADMIN — IPRATROPIUM BROMIDE 0.5 MG: 0.5 SOLUTION RESPIRATORY (INHALATION) at 19:16

## 2023-01-01 RX ADMIN — METHYLPREDNISOLONE SODIUM SUCCINATE 40 MG: 40 INJECTION, POWDER, FOR SOLUTION INTRAMUSCULAR; INTRAVENOUS at 05:07

## 2023-01-01 RX ADMIN — CEFEPIME HYDROCHLORIDE 2000 MG: 2 INJECTION, SOLUTION INTRAVENOUS at 12:12

## 2023-01-01 RX ADMIN — LEVALBUTEROL HYDROCHLORIDE 1.25 MG: 1.25 SOLUTION RESPIRATORY (INHALATION) at 02:33

## 2023-01-01 RX ADMIN — VANCOMYCIN HYDROCHLORIDE 750 MG: 750 INJECTION, SOLUTION INTRAVENOUS at 14:39

## 2023-01-01 RX ADMIN — LEVALBUTEROL HYDROCHLORIDE 1.25 MG: 1.25 SOLUTION RESPIRATORY (INHALATION) at 14:05

## 2023-01-01 RX ADMIN — VANCOMYCIN HYDROCHLORIDE 1250 MG: 5 INJECTION, POWDER, LYOPHILIZED, FOR SOLUTION INTRAVENOUS at 01:13

## 2023-01-01 RX ADMIN — CEFEPIME HYDROCHLORIDE 2000 MG: 2 INJECTION, SOLUTION INTRAVENOUS at 01:00

## 2023-01-01 RX ADMIN — IPRATROPIUM BROMIDE 0.5 MG: 0.5 SOLUTION RESPIRATORY (INHALATION) at 20:27

## 2023-01-01 RX ADMIN — CEFEPIME HYDROCHLORIDE 2000 MG: 2 INJECTION, SOLUTION INTRAVENOUS at 01:28

## 2023-01-01 RX ADMIN — HEPARIN SODIUM 12 UNITS/KG/HR: 10000 INJECTION, SOLUTION INTRAVENOUS at 14:58

## 2023-01-01 RX ADMIN — BUDESONIDE 0.5 MG: 0.5 INHALANT ORAL at 07:23

## 2023-01-01 RX ADMIN — CHLORHEXIDINE GLUCONATE 15 ML: 1.2 RINSE ORAL at 22:20

## 2023-01-01 RX ADMIN — LEVALBUTEROL HYDROCHLORIDE 1.25 MG: 1.25 SOLUTION RESPIRATORY (INHALATION) at 20:24

## 2023-01-01 RX ADMIN — CHLORHEXIDINE GLUCONATE 15 ML: 1.2 RINSE ORAL at 22:00

## 2023-01-01 RX ADMIN — MAGNESIUM SULFATE HEPTAHYDRATE 2 G: 2 INJECTION, SOLUTION INTRAVENOUS at 04:54

## 2023-01-01 RX ADMIN — LIDOCAINE 1 PATCH: 700 PATCH TOPICAL at 10:35

## 2023-01-01 RX ADMIN — BUDESONIDE 0.5 MG: 0.5 INHALANT ORAL at 19:33

## 2023-01-01 RX ADMIN — Medication 12 ML: at 23:08

## 2023-01-01 RX ADMIN — HYDRALAZINE HYDROCHLORIDE 10 MG: 20 INJECTION INTRAMUSCULAR; INTRAVENOUS at 02:37

## 2023-01-01 RX ADMIN — HYDROMORPHONE HYDROCHLORIDE 0.2 MG: 0.2 INJECTION, SOLUTION INTRAMUSCULAR; INTRAVENOUS; SUBCUTANEOUS at 11:32

## 2023-01-01 RX ADMIN — HYDRALAZINE HYDROCHLORIDE 10 MG: 20 INJECTION INTRAMUSCULAR; INTRAVENOUS at 11:34

## 2023-01-01 RX ADMIN — HYDROMORPHONE HYDROCHLORIDE 0.2 MG: 0.2 INJECTION, SOLUTION INTRAMUSCULAR; INTRAVENOUS; SUBCUTANEOUS at 23:06

## 2023-01-01 RX ADMIN — BUDESONIDE 0.5 MG: 0.5 INHALANT ORAL at 02:31

## 2023-01-01 RX ADMIN — HEPARIN SODIUM 10 UNITS/KG/HR: 10000 INJECTION, SOLUTION INTRAVENOUS at 23:52

## 2023-01-01 RX ADMIN — POTASSIUM CHLORIDE 20 MEQ: 14.9 INJECTION, SOLUTION INTRAVENOUS at 07:12

## 2023-01-01 RX ADMIN — BUDESONIDE 0.5 MG: 0.5 INHALANT ORAL at 07:28

## 2023-01-01 RX ADMIN — HYDROMORPHONE HYDROCHLORIDE 0.5 MG: 1 INJECTION, SOLUTION INTRAMUSCULAR; INTRAVENOUS; SUBCUTANEOUS at 16:04

## 2023-01-01 RX ADMIN — SODIUM CHLORIDE 1000 ML: 0.9 INJECTION, SOLUTION INTRAVENOUS at 21:31

## 2023-01-01 RX ADMIN — METHYLPREDNISOLONE SODIUM SUCCINATE 125 MG: 125 INJECTION, POWDER, FOR SOLUTION INTRAMUSCULAR; INTRAVENOUS at 04:54

## 2023-01-01 RX ADMIN — IPRATROPIUM BROMIDE 0.5 MG: 0.5 SOLUTION RESPIRATORY (INHALATION) at 01:51

## 2023-01-01 RX ADMIN — IPRATROPIUM BROMIDE 0.5 MG: 0.5 SOLUTION RESPIRATORY (INHALATION) at 14:05

## 2023-01-01 RX ADMIN — IPRATROPIUM BROMIDE 0.5 MG: 0.5 SOLUTION RESPIRATORY (INHALATION) at 20:24

## 2023-01-01 RX ADMIN — LEVALBUTEROL HYDROCHLORIDE 1.25 MG: 1.25 SOLUTION RESPIRATORY (INHALATION) at 01:51

## 2023-01-01 RX ADMIN — DEXMEDETOMIDINE HYDROCHLORIDE 0.7 MCG/KG/HR: 4 INJECTION, SOLUTION INTRAVENOUS at 04:50

## 2023-01-01 RX ADMIN — METHYLPREDNISOLONE SODIUM SUCCINATE 40 MG: 40 INJECTION, POWDER, FOR SOLUTION INTRAMUSCULAR; INTRAVENOUS at 22:20

## 2023-01-01 RX ADMIN — HYDROMORPHONE HYDROCHLORIDE 0.5 MG: 1 INJECTION, SOLUTION INTRAMUSCULAR; INTRAVENOUS; SUBCUTANEOUS at 19:53

## 2023-01-01 RX ADMIN — MAGNESIUM SULFATE HEPTAHYDRATE 2 G: 2 INJECTION, SOLUTION INTRAVENOUS at 02:45

## 2023-01-01 RX ADMIN — LEVALBUTEROL HYDROCHLORIDE 1.25 MG: 1.25 SOLUTION RESPIRATORY (INHALATION) at 07:28

## 2023-01-01 RX ADMIN — FUROSEMIDE 40 MG: 10 INJECTION, SOLUTION INTRAMUSCULAR; INTRAVENOUS at 19:27

## 2023-01-01 RX ADMIN — LEVALBUTEROL HYDROCHLORIDE 1.25 MG: 1.25 SOLUTION RESPIRATORY (INHALATION) at 02:47

## 2023-01-01 RX ADMIN — CHLORHEXIDINE GLUCONATE 15 ML: 1.2 RINSE ORAL at 08:02

## 2023-01-01 RX ADMIN — LABETALOL HYDROCHLORIDE 10 MG: 5 INJECTION, SOLUTION INTRAVENOUS at 12:14

## 2023-01-01 RX ADMIN — LEVALBUTEROL HYDROCHLORIDE 1.25 MG: 1.25 SOLUTION RESPIRATORY (INHALATION) at 20:27

## 2023-01-01 RX ADMIN — HYDROMORPHONE HYDROCHLORIDE 0.2 MG: 0.2 INJECTION, SOLUTION INTRAMUSCULAR; INTRAVENOUS; SUBCUTANEOUS at 02:22

## 2023-01-01 RX ADMIN — IPRATROPIUM BROMIDE 0.5 MG: 0.5 SOLUTION RESPIRATORY (INHALATION) at 13:08

## 2023-01-01 RX ADMIN — IPRATROPIUM BROMIDE 0.5 MG: 0.5 SOLUTION RESPIRATORY (INHALATION) at 08:27

## 2023-01-01 RX ADMIN — BUDESONIDE 0.5 MG: 0.5 INHALANT ORAL at 07:22

## 2023-01-01 RX ADMIN — METHYLPREDNISOLONE SODIUM SUCCINATE 125 MG: 125 INJECTION, POWDER, FOR SOLUTION INTRAMUSCULAR; INTRAVENOUS at 23:07

## 2023-01-01 RX ADMIN — LEVOTHYROXINE SODIUM 25 MCG: 25 TABLET ORAL at 05:10

## 2023-01-01 RX ADMIN — HYDROMORPHONE HYDROCHLORIDE 0.2 MG: 0.2 INJECTION, SOLUTION INTRAMUSCULAR; INTRAVENOUS; SUBCUTANEOUS at 16:53

## 2023-01-01 RX ADMIN — IPRATROPIUM BROMIDE 0.5 MG: 0.5 SOLUTION RESPIRATORY (INHALATION) at 19:33

## 2023-01-01 RX ADMIN — LEVALBUTEROL HYDROCHLORIDE 1.25 MG: 1.25 SOLUTION RESPIRATORY (INHALATION) at 07:22

## 2023-01-01 RX ADMIN — CHLORHEXIDINE GLUCONATE 15 ML: 1.2 RINSE ORAL at 10:49

## 2023-01-01 RX ADMIN — POTASSIUM CHLORIDE 20 MEQ: 14.9 INJECTION, SOLUTION INTRAVENOUS at 09:15

## 2023-01-01 RX ADMIN — AZITHROMYCIN 500 MG: 500 INJECTION, POWDER, LYOPHILIZED, FOR SOLUTION INTRAVENOUS at 23:28

## 2023-01-01 RX ADMIN — CEFEPIME HYDROCHLORIDE 2000 MG: 2 INJECTION, SOLUTION INTRAVENOUS at 11:15

## 2023-01-01 RX ADMIN — IPRATROPIUM BROMIDE 1 MG: 0.5 SOLUTION RESPIRATORY (INHALATION) at 23:08

## 2023-01-01 RX ADMIN — DEXMEDETOMIDINE HYDROCHLORIDE 0.7 MCG/KG/HR: 4 INJECTION, SOLUTION INTRAVENOUS at 03:49

## 2023-01-01 RX ADMIN — CHLORHEXIDINE GLUCONATE 15 ML: 1.2 RINSE ORAL at 10:35

## 2023-01-01 RX ADMIN — LEVALBUTEROL HYDROCHLORIDE 1.25 MG: 1.25 SOLUTION RESPIRATORY (INHALATION) at 13:01

## 2023-01-01 RX ADMIN — DEXMEDETOMIDINE HYDROCHLORIDE 0.2 MCG/KG/HR: 4 INJECTION, SOLUTION INTRAVENOUS at 04:39

## 2023-01-01 RX ADMIN — POTASSIUM CHLORIDE 20 MEQ: 14.9 INJECTION, SOLUTION INTRAVENOUS at 04:20

## 2023-01-01 RX ADMIN — LEVALBUTEROL HYDROCHLORIDE 1.25 MG: 1.25 SOLUTION RESPIRATORY (INHALATION) at 13:39

## 2023-01-01 RX ADMIN — HYDROMORPHONE HYDROCHLORIDE 0.2 MG: 0.2 INJECTION, SOLUTION INTRAMUSCULAR; INTRAVENOUS; SUBCUTANEOUS at 07:59

## 2023-01-01 RX ADMIN — HYDROMORPHONE HYDROCHLORIDE 0.5 MG: 1 INJECTION, SOLUTION INTRAMUSCULAR; INTRAVENOUS; SUBCUTANEOUS at 13:55

## 2023-01-01 RX ADMIN — IPRATROPIUM BROMIDE 0.5 MG: 0.5 SOLUTION RESPIRATORY (INHALATION) at 07:25

## 2023-01-01 RX ADMIN — HYDROMORPHONE HYDROCHLORIDE 0.2 MG: 0.2 INJECTION, SOLUTION INTRAMUSCULAR; INTRAVENOUS; SUBCUTANEOUS at 05:06

## 2023-01-01 RX ADMIN — ALBUTEROL SULFATE 10 MG: 2.5 SOLUTION RESPIRATORY (INHALATION) at 23:08

## 2023-01-01 RX ADMIN — LEVALBUTEROL HYDROCHLORIDE 1.25 MG: 1.25 SOLUTION RESPIRATORY (INHALATION) at 01:09

## 2023-01-01 RX ADMIN — CEFEPIME HYDROCHLORIDE 2000 MG: 2 INJECTION, SOLUTION INTRAVENOUS at 13:12

## 2023-01-01 RX ADMIN — POTASSIUM CHLORIDE 20 MEQ: 14.9 INJECTION, SOLUTION INTRAVENOUS at 12:10

## 2023-01-01 RX ADMIN — CHLORHEXIDINE GLUCONATE 15 ML: 1.2 RINSE ORAL at 08:32

## 2023-01-01 RX ADMIN — LABETALOL HYDROCHLORIDE 10 MG: 5 INJECTION, SOLUTION INTRAVENOUS at 23:07

## 2023-01-01 RX ADMIN — LEVALBUTEROL HYDROCHLORIDE 1.25 MG: 1.25 SOLUTION RESPIRATORY (INHALATION) at 07:24

## 2023-01-01 RX ADMIN — IPRATROPIUM BROMIDE 0.5 MG: 0.5 SOLUTION RESPIRATORY (INHALATION) at 02:47

## 2023-01-01 RX ADMIN — HYDRALAZINE HYDROCHLORIDE 5 MG: 20 INJECTION INTRAMUSCULAR; INTRAVENOUS at 14:41

## 2023-01-01 RX ADMIN — IPRATROPIUM BROMIDE 0.5 MG: 0.5 SOLUTION RESPIRATORY (INHALATION) at 13:01

## 2023-01-01 RX ADMIN — CEFTRIAXONE 2000 MG: 2 INJECTION, SOLUTION INTRAVENOUS at 22:51

## 2023-01-01 RX ADMIN — LEVALBUTEROL HYDROCHLORIDE 1.25 MG: 1.25 SOLUTION RESPIRATORY (INHALATION) at 19:33

## 2023-01-01 RX ADMIN — HYDROMORPHONE HYDROCHLORIDE 0.2 MG: 0.2 INJECTION, SOLUTION INTRAMUSCULAR; INTRAVENOUS; SUBCUTANEOUS at 17:15

## 2023-01-01 RX ADMIN — HYDROMORPHONE HYDROCHLORIDE 0.2 MG: 0.2 INJECTION, SOLUTION INTRAMUSCULAR; INTRAVENOUS; SUBCUTANEOUS at 12:07

## 2023-01-01 RX ADMIN — LORAZEPAM 1 MG: 2 INJECTION INTRAMUSCULAR; INTRAVENOUS at 16:01

## 2023-01-01 RX ADMIN — IPRATROPIUM BROMIDE 0.5 MG: 0.5 SOLUTION RESPIRATORY (INHALATION) at 13:39

## 2023-01-01 RX ADMIN — POTASSIUM CHLORIDE 20 MEQ: 14.9 INJECTION, SOLUTION INTRAVENOUS at 02:07

## 2023-01-01 RX ADMIN — HYDROMORPHONE HYDROCHLORIDE 0.2 MG: 0.2 INJECTION, SOLUTION INTRAMUSCULAR; INTRAVENOUS; SUBCUTANEOUS at 01:09

## 2023-01-01 RX ADMIN — IPRATROPIUM BROMIDE 0.5 MG: 0.5 SOLUTION RESPIRATORY (INHALATION) at 02:31

## 2023-01-01 RX ADMIN — METHYLPREDNISOLONE SODIUM SUCCINATE 40 MG: 40 INJECTION, POWDER, FOR SOLUTION INTRAMUSCULAR; INTRAVENOUS at 22:22

## 2023-01-01 RX ADMIN — METHYLPREDNISOLONE SODIUM SUCCINATE 40 MG: 40 INJECTION, POWDER, FOR SOLUTION INTRAMUSCULAR; INTRAVENOUS at 15:19

## 2023-01-01 RX ADMIN — BUDESONIDE 0.5 MG: 0.5 INHALANT ORAL at 20:26

## 2023-01-03 ENCOUNTER — APPOINTMENT (OUTPATIENT)
Dept: LAB | Facility: CLINIC | Age: 83
End: 2023-01-03

## 2023-01-03 ENCOUNTER — ANTICOAG VISIT (OUTPATIENT)
Dept: FAMILY MEDICINE CLINIC | Facility: CLINIC | Age: 83
End: 2023-01-03

## 2023-01-03 ENCOUNTER — OFFICE VISIT (OUTPATIENT)
Dept: FAMILY MEDICINE CLINIC | Facility: CLINIC | Age: 83
End: 2023-01-03

## 2023-01-03 ENCOUNTER — TELEPHONE (OUTPATIENT)
Dept: FAMILY MEDICINE CLINIC | Facility: CLINIC | Age: 83
End: 2023-01-03

## 2023-01-03 VITALS
WEIGHT: 116.13 LBS | BODY MASS INDEX: 22.8 KG/M2 | HEART RATE: 70 BPM | RESPIRATION RATE: 16 BRPM | SYSTOLIC BLOOD PRESSURE: 140 MMHG | OXYGEN SATURATION: 95 % | DIASTOLIC BLOOD PRESSURE: 60 MMHG | HEIGHT: 60 IN | TEMPERATURE: 98 F

## 2023-01-03 DIAGNOSIS — I10 ESSENTIAL HYPERTENSION: ICD-10-CM

## 2023-01-03 DIAGNOSIS — E03.9 HYPOTHYROIDISM, UNSPECIFIED TYPE: ICD-10-CM

## 2023-01-03 DIAGNOSIS — D68.9 COAGULOPATHY (HCC): ICD-10-CM

## 2023-01-03 DIAGNOSIS — M54.50 LOW BACK PAIN WITHOUT SCIATICA, UNSPECIFIED BACK PAIN LATERALITY, UNSPECIFIED CHRONICITY: ICD-10-CM

## 2023-01-03 DIAGNOSIS — I10 ESSENTIAL HYPERTENSION: Primary | ICD-10-CM

## 2023-01-03 DIAGNOSIS — I48.91 ATRIAL FIBRILLATION, UNSPECIFIED TYPE (HCC): ICD-10-CM

## 2023-01-03 DIAGNOSIS — M81.0 AGE RELATED OSTEOPOROSIS, UNSPECIFIED PATHOLOGICAL FRACTURE PRESENCE: ICD-10-CM

## 2023-01-03 DIAGNOSIS — I10 PRIMARY HYPERTENSION: ICD-10-CM

## 2023-01-03 DIAGNOSIS — J44.9 CHRONIC OBSTRUCTIVE PULMONARY DISEASE, UNSPECIFIED COPD TYPE (HCC): ICD-10-CM

## 2023-01-03 LAB
ALBUMIN SERPL BCP-MCNC: 3.6 G/DL (ref 3.5–5)
ALP SERPL-CCNC: 89 U/L (ref 46–116)
ALT SERPL W P-5'-P-CCNC: 19 U/L (ref 12–78)
ANION GAP SERPL CALCULATED.3IONS-SCNC: 3 MMOL/L (ref 4–13)
AST SERPL W P-5'-P-CCNC: 23 U/L (ref 5–45)
BILIRUB SERPL-MCNC: 0.81 MG/DL (ref 0.2–1)
BUN SERPL-MCNC: 17 MG/DL (ref 5–25)
CALCIUM SERPL-MCNC: 9.3 MG/DL (ref 8.3–10.1)
CHLORIDE SERPL-SCNC: 98 MMOL/L (ref 96–108)
CHOLEST SERPL-MCNC: 156 MG/DL
CO2 SERPL-SCNC: 33 MMOL/L (ref 21–32)
CREAT SERPL-MCNC: 0.8 MG/DL (ref 0.6–1.3)
ERYTHROCYTE [DISTWIDTH] IN BLOOD BY AUTOMATED COUNT: 15.9 % (ref 11.6–15.1)
GFR SERPL CREATININE-BSD FRML MDRD: 68 ML/MIN/1.73SQ M
GLUCOSE P FAST SERPL-MCNC: 89 MG/DL (ref 65–99)
HCT VFR BLD AUTO: 35.6 % (ref 34.8–46.1)
HDLC SERPL-MCNC: 60 MG/DL
HGB BLD-MCNC: 11.9 G/DL (ref 11.5–15.4)
INR PPP: 2.34 (ref 0.84–1.19)
INR PPP: 2.34 (ref 0.84–1.19)
LDLC SERPL CALC-MCNC: 86 MG/DL (ref 0–100)
MCH RBC QN AUTO: 37.3 PG (ref 26.8–34.3)
MCHC RBC AUTO-ENTMCNC: 33.4 G/DL (ref 31.4–37.4)
MCV RBC AUTO: 112 FL (ref 82–98)
NONHDLC SERPL-MCNC: 96 MG/DL
PLATELET # BLD AUTO: 293 THOUSANDS/UL (ref 149–390)
PMV BLD AUTO: 9.3 FL (ref 8.9–12.7)
POTASSIUM SERPL-SCNC: 4.8 MMOL/L (ref 3.5–5.3)
PROT SERPL-MCNC: 7.5 G/DL (ref 6.4–8.4)
PROTHROMBIN TIME: 25.9 SECONDS (ref 11.6–14.5)
RBC # BLD AUTO: 3.19 MILLION/UL (ref 3.81–5.12)
SODIUM SERPL-SCNC: 134 MMOL/L (ref 135–147)
TRIGL SERPL-MCNC: 52 MG/DL
TSH SERPL DL<=0.05 MIU/L-ACNC: 3.83 UIU/ML (ref 0.45–4.5)
WBC # BLD AUTO: 5.42 THOUSAND/UL (ref 4.31–10.16)

## 2023-01-03 NOTE — ASSESSMENT & PLAN NOTE
Osteoporosis  Unfortunately we did not have Prolia injection available in our office today however she will call back next week to receive her Prolia injection every 6 months as per her schedule

## 2023-01-03 NOTE — TELEPHONE ENCOUNTER
----- Message from Andreia Tidwell MD sent at 4/0/9271  4:27 PM EST -----  No changes to Coumadin dosing    Recheck INR in 4 weeks

## 2023-01-03 NOTE — ASSESSMENT & PLAN NOTE
Back pain    Patient was given referral to [facility] management in [facility] for evaluation of her chronic back pain due to compression fracture

## 2023-01-03 NOTE — ASSESSMENT & PLAN NOTE
History of atrial fibrillation  Patient will obtain INR testing and continue with current dose of warfarin

## 2023-01-03 NOTE — ASSESSMENT & PLAN NOTE
COPD   Patient denies any worsening of shortness of breath I will continue with current inhaler as ordered

## 2023-01-03 NOTE — PROGRESS NOTES
FAMILY PRACTICE OFFICE VISIT       NAME: Cara Lopez  AGE: 79 y o  SEX: female       : 1940        MRN: [id number]    DATE: 1/3/2023  TIME: 12:45 PM    Assessment and Plan     Problem List Items Addressed This Visit        Endocrine    Hypothyroidism     Hypothyroidism  Patient will check TSH blood work and continue with current dose of levothyroxine         Relevant Orders    CBC    Comprehensive metabolic panel    Lipid panel    TSH, 3rd generation       Respiratory    COPD (chronic obstructive pulmonary disease) ([facility] [address] )     COPD  Patient denies any worsening of shortness of breath I will continue with current inhaler as ordered            Cardiovascular and Mediastinum    Hypertension     Hypertension  Patient blood pressure is stable at this time and she will continue with current regimen of medications         Atrial fibrillation ([facility] [address] )     History of atrial fibrillation  Patient will obtain INR testing and continue with current dose of warfarin  Relevant Orders    Protime-INR       Musculoskeletal and Integument    Osteoporosis     Osteoporosis  Unfortunately we did not have Prolia injection available in our office today however she will call back next week to receive her Prolia injection every 6 months as per her schedule  Relevant Orders    Ambulatory Referral to Pain Management       Other    Low back pain without sciatica     Back pain    Patient was given referral to [facility] [place] [facility] pain management in [facility] for evaluation of her chronic back pain due to compression fracture         Relevant Orders    Ambulatory Referral to Pain Management   Other Visit Diagnoses     Essential hypertension    -  Primary    Relevant Orders    CBC    Comprehensive metabolic panel    Lipid panel    TSH, 3rd generation    Coagulopathy [facility])                  Chief Complaint     Chief Complaint   Patient presents with   • Follow-up     6 month        History of Present Illness     Patient in the office to review chronic medical condition  She denies any recent illness  Unfortunately she continues to experience back pain from her history of compression fracture of 2 vertebral spinal bones  She did not pursue pain management recommendation that was recommended last year  She is accompanied by her son who requested a referral back to pain management for evaluation  Patient has been noncompliant in obtaining INR testing on a regular basis as recommended due to being on her warfarin therapy      Review of Systems   Review of Systems   Constitutional: Positive for fatigue  HENT: Negative  Respiratory: Negative  Cardiovascular: Negative  Gastrointestinal: Negative  Genitourinary: Negative  Musculoskeletal: Positive for arthralgias, back pain and gait problem  Psychiatric/Behavioral: Positive for sleep disturbance  The patient is nervous/anxious          Active Problem List     Patient Active Problem List   Diagnosis   • COPD (chronic obstructive pulmonary disease) ([facility] [address] )   • Hypertension   • Hypercoagulable state ([facility] [address] )   • 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HCC)   • Antiphospholipid antibody positive   • Atrial fibrillation (HCC)   • Hereditary hemolytic anemia (HCC)   • Asthma   • Vitamin D deficiency   • Osteoporosis   • Low back pain without sciatica   • Peripheral edema   • Hypothyroidism       Past Medical History:  Past Medical History:   Diagnosis Date   • Anti-phospholipid syndrome (HCC)    • Asthma    • Blood type O+    • Cardiac disease    • Chronic pain    • COPD (chronic obstructive pulmonary disease) (HCC)    • Fracture of ankle     left   • Hyperlipidemia    • Hypertension    • Osteoporosis        Past Surgical History:  Past Surgical History:   Procedure Laterality Date   • APPENDECTOMY     • BILATERAL OOPHORECTOMY Bilateral    • ORIF TIBIAL SHAFT FRACTURE W/ PLATES AND SCREWS Right        Family History:  Family History   Problem Relation Age of Onset   • Hypertension Mother    • Skin cancer Mother    • Hypertension Father        Social History:  Social History     Socioeconomic History   • Marital status:      Spouse name: Not on file   • Number of children: Not on file   • Years of education: Not on file   • Highest education level: Not on file   Occupational History   • Not on file   Tobacco Use   • Smoking status: Never   • Smokeless tobacco: Never   • Tobacco comments:     Never smoker per [employer]   Vaping Use   • Vaping Use: Never used   Substance and Sexual Activity   • Alcohol use: No   • Drug use: No   • Sexual activity: Not Currently   Other Topics Concern   • Not on file   Social History Narrative   • Not on file     Social Determinants of Health     Financial Resource Strain: Not on file   Food Insecurity: Not on file   Transportation Needs: Not on file   Physical Activity: Not on file   Stress: Not on file   Social Connections: Not on file   Intimate Partner Violence: Not on file   Housing Stability: Not on file       Objective     Vitals:    01/03/23 1129   BP: 140/60   Pulse:    Resp:    Temp:    SpO2:      Wt Readings from Last 3 Encounters:   01/03/23 52 7 kg (116 lb 2 oz)   06/30/22 52 2 kg (115 lb 2 oz)   12/30/21 52 7 kg (116 lb 2 oz)       Physical Exam  Constitutional:       General: She is not in acute distress  Appearance: Normal appearance  She is not ill-appearing  HENT:      Head: Normocephalic and atraumatic  Eyes:      General:         Right eye: No discharge  Left eye: No discharge  Conjunctiva/sclera: Conjunctivae normal       Pupils: Pupils are equal, round, and reactive to light  Neck:      Vascular: No carotid bruit  Cardiovascular:      Rate and Rhythm: Normal rate and regular rhythm  Heart sounds: Normal heart sounds  No murmur heard  Pulmonary:      Effort: Pulmonary effort is normal       Breath sounds: Normal breath sounds  No wheezing, rhonchi or rales  Musculoskeletal:      Right lower leg: No edema        Left lower leg: No edema  Comments: Patient ambulates with a slight kyphotic gait but no assistive device  Lymphadenopathy:      Cervical: No cervical adenopathy  Skin:     Findings: No rash  Neurological:      General: No focal deficit present  Mental Status: She is alert and oriented to person, place, and time  Cranial Nerves: No cranial nerve deficit  Psychiatric:         Mood and Affect: Mood normal          Behavior: Behavior normal          Thought Content:  Thought content normal          Judgment: Judgment normal          Pertinent Laboratory/Diagnostic Studies:  Lab Results   Component Value Date    GLUCOSE 96 03/17/2015    BUN 13 06/30/2022    CREATININE 0 77 06/30/2022    CALCIUM 9 7 06/30/2022     03/17/2015    K 4 5 06/30/2022    CO2 37 (H) 06/30/2022    CL 99 (L) 06/30/2022     Lab Results   Component Value Date    ALT 17 06/30/2022    AST 23 06/30/2022    ALKPHOS 93 06/30/2022    BILITOT 0 40 03/17/2015       Lab Results   Component Value Date    WBC 4 20 (L) 06/30/2022    HGB 11 7 06/30/2022    HCT 33 6 (L) 06/30/2022     (H) 06/30/2022     06/30/2022       No results found for: TSH    Lab Results   Component Value Date    CHOL 145 03/17/2015     Lab Results   Component Value Date    TRIG 50 06/30/2022     Lab Results   Component Value Date    HDL 54 06/30/2022     Lab Results   Component Value Date    LDLCALC 78 06/30/2022     No results found for: HGBA1C    Results for orders placed or performed in visit on 06/30/22   Protime-INR   Result Value Ref Range    INR 1 85 (A) 0 84 - 1 19       Orders Placed This Encounter   Procedures   • CBC   • Comprehensive metabolic panel   • Lipid panel   • TSH, 3rd generation   • Protime-INR   • Ambulatory Referral to Pain Management       ALLERGIES:  Allergies   Allergen Reactions   • Aspirin Other (See Comments)   • Penicillins Rash and Hives   • Sulfa Antibiotics Rash       Current Medications     Current Outpatient Medications   Medication Sig Dispense Refill   • Advair Diskus 250-50 MCG/DOSE inhaler USE 1 INHALATION TWICE A  blister 3   • albuterol (2 5 mg/3 mL) 0 083 % nebulizer solution USE 1 VIAL IN NEBULIZER EVERY 4 TO 6 HOURS AS NEEDED FOR COUGH AND WHEEZE 75 vial 3   • Ascorbic Acid (VITAMIN C) 1000 MG tablet Take 1,000 mg by mouth daily     • Cholecalciferol (VITAMIN D3) 20 MCG (800 UNIT) TABS Take by mouth     • denosumab (PROLIA) 60 mg/mL Inject 60 mg under the skin once 6 months     • digoxin (LANOXIN) 0 25 mg tablet TAKE 1 TABLET DAILY 90 tablet 3   • diltiazem (CARDIZEM CD) 240 mg 24 hr capsule TAKE 1 CAPSULE DAILY 90 capsule 3   • Ferrous Sulfate 220 (44 Fe) MG/5ML LIQD take 1 teaspoonful by mouth once daily 150 mL 5   • hydrochlorothiazide (HYDRODIURIL) 12 5 mg tablet Take 1 tablet (12 5 mg total) by mouth daily 30 tablet 5   • loratadine (CLARITIN) 10 mg tablet Take 10 mg by mouth daily     • multivitamin (THERAGRAN) TABS Take 1 tablet by mouth daily     • Spiriva HandiHaler 18 MCG inhalation capsule INHALE THE CONTENTS OF 1 CAPSULE DAILY AS DIRECTED 90 capsule 3   • warfarin (COUMADIN) 5 mg tablet TAKE 1 TABLET DAILY 90 tablet 3   • levothyroxine 25 mcg tablet TAKE 1 TABLET DAILY 90 tablet 3   • lidocaine (LIDODERM) 5 % Apply 1 patch topically daily Remove & Discard patch within 12 hours or as directed by MD (Patient not taking: Reported on 12/30/2021) 30 patch 3     No current facility-administered medications for this visit           Health Maintenance     Health Maintenance   Topic Date Due   • Hepatitis B Vaccine (1 of 3 - 3-dose series) Never done   • SLP PLAN OF CARE  Never done   • Annual Physical  Never done   • DTaP,Tdap,and Td Vaccines (1 - Tdap) Never done   • COVID-19 Vaccine (3 - Booster for Pfizer series) 06/04/2021   • Influenza Vaccine (1) 06/30/2023 (Originally 9/1/2022)   • Fall Risk  01/03/2024   • Depression Screening  01/03/2024   • Urinary Incontinence Screening  01/03/2024   • BMI: Adult  01/03/2024   • Osteoporosis Screening  Completed   • Pneumococcal Vaccine: 65+ Years  Completed   • HIB Vaccine  Aged Out   • IPV Vaccine  Aged Out   • Hepatitis A Vaccine  Aged Out   • Meningococcal ACWY Vaccine  Aged Out   • HPV Vaccine  Aged Out     Immunization History   Administered Date(s) Administered   • COVID-19 PFIZER VACCINE 0 3 ML IM 03/18/2021, 04/09/2021   • INFLUENZA 12/14/2004, 10/17/2018, 10/15/2020, 09/24/2021   • Influenza Split High Dose Preservative Free IM 09/17/2015, 12/19/2016, 10/23/2017   • Influenza, high dose seasonal 0 7 mL 10/16/2019   • Influenza, seasonal, injectable 11/01/2011, 11/16/2012, 10/25/2013   • Pneumococcal Conjugate 13-Valent 03/17/2016   • Pneumococcal Polysaccharide PPV23 50/69/7179       Liz Ocampo MD    I spent 25 minutes with this patient of which greater 50% was spent counseling or reviewing chart

## 2023-01-03 NOTE — PATIENT INSTRUCTIONS
MD MEKHI Brown [facility] Clinical  No changes to Coumadin dosing    Recheck INR in 4 weeks  Pt aware

## 2023-01-06 NOTE — PROGRESS NOTES
Assessment and Plan:     Problem List Items Addressed This Visit        Endocrine    Hypothyroidism     Hypothyroidism  Patient will check TSH blood work and continue with current dose of levothyroxine         Relevant Orders    CBC (Completed)    Comprehensive metabolic panel (Completed)    Lipid panel (Completed)    TSH, 3rd generation (Completed)       Respiratory    COPD (chronic obstructive pulmonary disease) ([address] )     COPD  Patient denies any worsening of shortness of breath I will continue with current inhaler as ordered            Cardiovascular and Mediastinum    Hypertension     Hypertension  Patient blood pressure is stable at this time and she will continue with current regimen of medications         Atrial fibrillation ([address] )     History of atrial fibrillation  Patient will obtain INR testing and continue with current dose of warfarin  Relevant Orders    Protime-INR (Completed)       Musculoskeletal and Integument    Osteoporosis     Osteoporosis  Unfortunately we did not have Prolia injection available in our office today however she will call back next week to receive her Prolia injection every 6 months as per her schedule  Relevant Orders    Ambulatory Referral to Pain Management       Other    Low back pain without sciatica     Back pain  Patient was given referral to [facility] pain management in [facility] for evaluation of her chronic back pain due to compression fracture         Relevant Orders    Ambulatory Referral to Pain Management   Other Visit Diagnoses     Essential hypertension    -  Primary    Relevant Orders    CBC (Completed)    Comprehensive metabolic panel (Completed)    Lipid panel (Completed)    TSH, 3rd generation (Completed)    Coagulopathy ([address] )               Preventive health issues were discussed with patient, and age appropriate screening tests were ordered as noted in patient's After Visit Summary    Personalized health advice and appropriate referrals for health education or preventive services given if needed, as noted in patient's After Visit Summary  History of Present Illness:     Patient presents for a Medicare Wellness Visit    HPI   Patient Care Team:  Colin Peterson MD as PCP - Noelle Cook MD     Review of Systems:     Review of Systems     Problem List:     Patient Active Problem List   Diagnosis   • COPD (chronic obstructive pulmonary disease) ([place] [address] )   • Hypertension   • Hypercoagulable state ([place] [address] )   • 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HCC)   • Antiphospholipid antibody positive   • Atrial fibrillation (HCC)   • Hereditary hemolytic anemia ([place] [address] )   • Asthma   • Vitamin D deficiency   • Osteoporosis   • Low back pain without sciatica   • Peripheral edema   • Hypothyroidism      Past Medical and Surgical History:     Past Medical History:   Diagnosis Date   • Anti-phospholipid syndrome ([place] [address] )    • Asthma    • Blood type O+    • Cardiac disease    • Chronic pain    • COPD (chronic obstructive pulmonary disease) ([place] [address] )    • Fracture of ankle     left   • Hyperlipidemia    • Hypertension    • Osteoporosis      Past Surgical History:   Procedure Laterality Date   • APPENDECTOMY     • BILATERAL OOPHORECTOMY Bilateral    • ORIF TIBIAL SHAFT FRACTURE W/ PLATES AND SCREWS Right       Family History:     Family History   Problem Relation Age of Onset   • Hypertension Mother    • Skin cancer Mother    • Hypertension Father       Social History:     Social History     Socioeconomic History   • Marital status:       Spouse name: None   • Number of children: None   • Years of education: None   • Highest education level: None   Occupational History   • None   Tobacco Use   • Smoking status: Never   • Smokeless tobacco: Never   • Tobacco comments:     Never smoker per [employer]   Vaping Use   • Vaping Use: Never used   Substance and Sexual Activity   • Alcohol use: No   • Drug use: No   • Sexual activity: Not Currently   Other Topics Concern   • None   Social History Narrative   • None     Social Determinants of Health     Financial Resource Strain: Not on file   Food Insecurity: Not on file   Transportation Needs: Not on file   Physical Activity: Not on file   Stress: Not on file   Social Connections: Not on file   Intimate Partner Violence: Not on file   Housing Stability: Not on file      Medications and Allergies:     Current Outpatient Medications   Medication Sig Dispense Refill   • Advair Diskus 250-50 MCG/DOSE inhaler USE 1 INHALATION TWICE A  blister 3   • albuterol (2 5 mg/3 mL) 0 083 % nebulizer solution USE 1 VIAL IN NEBULIZER EVERY 4 TO 6 HOURS AS NEEDED FOR COUGH AND WHEEZE 75 vial 3   • Ascorbic Acid (VITAMIN C) 1000 MG tablet Take 1,000 mg by mouth daily     • Cholecalciferol (VITAMIN D3) 20 MCG (800 UNIT) TABS Take by mouth     • denosumab (PROLIA) 60 mg/mL Inject 60 mg under the skin once 6 months     • digoxin (LANOXIN) 0 25 mg tablet TAKE 1 TABLET DAILY 90 tablet 3   • diltiazem (CARDIZEM CD) 240 mg 24 hr capsule TAKE 1 CAPSULE DAILY 90 capsule 3   • Ferrous Sulfate 220 (44 Fe) MG/5ML LIQD take 1 teaspoonful by mouth once daily 150 mL 5   • hydrochlorothiazide (HYDRODIURIL) 12 5 mg tablet Take 1 tablet (12 5 mg total) by mouth daily 30 tablet 5   • loratadine (CLARITIN) 10 mg tablet Take 10 mg by mouth daily     • multivitamin (THERAGRAN) TABS Take 1 tablet by mouth daily     • Spiriva HandiHaler 18 MCG inhalation capsule INHALE THE CONTENTS OF 1 CAPSULE DAILY AS DIRECTED 90 capsule 3   • warfarin (COUMADIN) 5 mg tablet TAKE 1 TABLET DAILY 90 tablet 3   • levothyroxine 25 mcg tablet TAKE 1 TABLET DAILY 90 tablet 3   • lidocaine (LIDODERM) 5 % Apply 1 patch topically daily Remove & Discard patch within 12 hours or as directed by MD (Patient not taking: Reported on 12/30/2021) 30 patch 3     No current facility-administered medications for this visit       Allergies   Allergen Reactions   • Aspirin Other (See Comments)   • Penicillins Rash and Hives   • Sulfa Antibiotics Rash      Immunizations:     Immunization History   Administered Date(s) Administered   • COVID-19 PFIZER VACCINE 0 3 ML IM 03/18/2021, 04/09/2021   • INFLUENZA 12/14/2004, 10/17/2018, 10/15/2020, 09/24/2021   • Influenza Split High Dose Preservative Free IM 09/17/2015, 12/19/2016, 10/23/2017   • Influenza, high dose seasonal 0 7 mL 10/16/2019   • Influenza, seasonal, injectable 11/01/2011, 11/16/2012, 10/25/2013   • Pneumococcal Conjugate 13-Valent 03/17/2016   • Pneumococcal Polysaccharide PPV23 12/23/2005      Health Maintenance: There are no preventive care reminders to display for this patient  Topic Date Due   • Hepatitis B Vaccine (1 of 3 - 3-dose series) Never done   • DTaP,Tdap,and Td Vaccines (1 - Tdap) Never done   • COVID-19 Vaccine (3 - Booster for Pfizer series) 06/04/2021      Medicare Screening Tests and Risk Assessments:         Health Risk Assessment:   Patient rates overall health as fair  Patient feels that their physical health rating is same  Patient is satisfied with their life  Eyesight was rated as same  Hearing was rated as same  Patient feels that their emotional and mental health rating is same  Patients states they are never, rarely angry  Patient states they are never, rarely unusually tired/fatigued  Pain experienced in the last 7 days has been none  Patient states that she has experienced no weight loss or gain in last 6 months  Depression Screening:   PHQ-2 Score: 1      Fall Risk Screening: In the past year, patient has experienced: no history of falling in past year      Urinary Incontinence Screening:   Patient has leaked urine accidently in the last six months  Home Safety:  Patient does not have trouble with stairs inside or outside of their home  Patient has working smoke alarms and has working carbon monoxide detector  Home safety hazards include: none  Nutrition:   Current diet is Regular       Medications:   Patient is currently taking over-the-counter supplements  OTC medications include: see medication list  Patient is able to manage medications  Activities of Daily Living (ADLs)/Instrumental Activities of Daily Living (IADLs):   Walk and transfer into and out of bed and chair?: Yes  Dress and groom yourself?: Yes    Bathe or shower yourself?: Yes    Feed yourself? Yes  Do your laundry/housekeeping?: Yes  Manage your money, pay your bills and track your expenses?: Yes  Make your own meals?: Yes    Do your own shopping?: Yes    Previous Hospitalizations:   Any hospitalizations or ED visits within the last 12 months?: No      Advance Care Planning:   Living will: No      PREVENTIVE SCREENINGS      Cardiovascular Screening:    General: Screening Current      Diabetes Screening:     General: Screening Current      Cervical Cancer Screening:    General: Screening Not Indicated      Osteoporosis Screening:    General: Screening Not Indicated and History Osteoporosis      Lung Cancer Screening:     General: Screening Not Indicated    Screening, Brief Intervention, and Referral to Treatment (SBIRT)    Screening    Typical number of drinks in a week: 0    Single Item Drug Screening:  How often have you used an illegal drug (including marijuana) or a prescription medication for non-medical reasons in the past year? never    Single Item Drug Screen Score: 0  Interpretation: Negative screen for possible drug use disorder    No results found       Physical Exam:     /60   Pulse 70   Temp 98 °F (36 7 °C)   Resp 16   Ht 5' (1 524 m)   Wt 52 7 kg (116 lb 2 oz)   SpO2 95%   BMI 22 68 kg/m²     Physical Exam     Bonnie De La Rosa MD

## 2023-01-06 NOTE — PATIENT INSTRUCTIONS
Medicare Preventive Visit Patient Instructions  Thank you for completing your Welcome to Medicare Visit or Medicare Annual Wellness Visit today  Your next wellness visit will be due in one year (1/7/2024)  The screening/preventive services that you may require over the next 5-10 years are detailed below  Some tests may not apply to you based off risk factors and/or age  Screening tests ordered at today's visit but not completed yet may show as past due  Also, please note that scanned in results may not display below  Preventive Screenings:  Service Recommendations Previous Testing/Comments   Colorectal Cancer Screening  * Colonoscopy    * Fecal Occult Blood Test (FOBT)/Fecal Immunochemical Test (FIT)  * Fecal DNA/Cologuard Test  * Flexible Sigmoidoscopy Age: 39-70 years old   Colonoscopy: every 10 years (may be performed more frequently if at higher risk)  OR  FOBT/FIT: every 1 year  OR  Cologuard: every 3 years  OR  Sigmoidoscopy: every 5 years  Screening may be recommended earlier than age 39 if at higher risk for colorectal cancer  Also, an individualized decision between you and your healthcare provider will decide whether screening between the ages of 74-80 would be appropriate  Colonoscopy: Not on file  FOBT/FIT: Not on file  Cologuard: Not on file  Sigmoidoscopy: Not on file          Breast Cancer Screening Age: 36 years old  Frequency: every 1-2 years  Not required if history of left and right mastectomy Mammogram: Not on file        Cervical Cancer Screening Between the ages of 21-29, pap smear recommended once every 3 years  Between the ages of 33-67, can perform pap smear with HPV co-testing every 5 years     Recommendations may differ for women with a history of total hysterectomy, cervical cancer, or abnormal pap smears in past  Pap Smear: Not on file    Screening Not Indicated   Hepatitis C Screening Once for adults born between [facility] [place]  More frequently in patients at high risk for Hepatitis C Hep C Antibody: Not on file        Diabetes Screening 1-2 times per year if you're at risk for diabetes or have pre-diabetes Fasting glucose: 89 mg/dL (1/3/2023)  A1C: No results in last 5 years (No results in last 5 years)  Screening Current   Cholesterol Screening Once every 5 years if you don't have a lipid disorder  May order more often based on risk factors  Lipid panel: 01/03/2023    Screening Current     Other Preventive Screenings Covered by Medicare:  1  Abdominal Aortic Aneurysm (AAA) Screening: covered once if your at risk  You're considered to be at risk if you have a family history of AAA  2  Lung Cancer Screening: covers low dose CT scan once per year if you meet all of the following conditions: (1) Age 50-69; (2) No signs or symptoms of lung cancer; (3) Current smoker or have quit smoking within the last 15 years; (4) You have a tobacco smoking history of at least 20 pack years (packs per day multiplied by number of years you smoked); (5) You get a written order from a healthcare provider  3  Glaucoma Screening: covered annually if you're considered high risk: (1) You have diabetes OR (2) Family history of glaucoma OR (3)  aged 48 and older OR (3)  American aged 72 and older  3  Osteoporosis Screening: covered every 2 years if you meet one of the following conditions: (1) You're estrogen deficient and at risk for osteoporosis based off medical history and other findings; (2) Have a vertebral abnormality; (3) On glucocorticoid therapy for more than 3 months; (4) Have primary hyperparathyroidism; (5) On osteoporosis medications and need to assess response to drug therapy  · Last bone density test (DXA Scan): 09/05/2019   5  HIV Screening: covered annually if you're between the age of 15-65  Also covered annually if you are younger than 13 and older than 72 with risk factors for HIV infection   For pregnant patients, it is covered up to 3 times per pregnancy  Immunizations:  Immunization Recommendations   Influenza Vaccine Annual influenza vaccination during flu season is recommended for all persons aged >= 6 months who do not have contraindications   Pneumococcal Vaccine   * Pneumococcal conjugate vaccine = PCV13 (Prevnar 13), PCV15 (Vaxneuvance), PCV20 (Prevnar 20)  * Pneumococcal polysaccharide vaccine = PPSV23 (Pneumovax) Adults 25-60 years old: 1-3 doses may be recommended based on certain risk factors  Adults 72 years old: 1-2 doses may be recommended based off what pneumonia vaccine you previously received   Hepatitis B Vaccine 3 dose series if at intermediate or high risk (ex: diabetes, end stage renal disease, liver disease)   Tetanus (Td) Vaccine - COST NOT COVERED BY MEDICARE PART B Following completion of primary series, a booster dose should be given every 10 years to maintain immunity against tetanus  Td may also be given as tetanus wound prophylaxis  Tdap Vaccine - COST NOT COVERED BY MEDICARE PART B Recommended at least once for all adults  For pregnant patients, recommended with each pregnancy  Shingles Vaccine (Shingrix) - COST NOT COVERED BY MEDICARE PART B  2 shot series recommended in those aged 48 and above     Health Maintenance Due:  There are no preventive care reminders to display for this patient  Immunizations Due:      Topic Date Due   • Hepatitis B Vaccine (1 of 3 - 3-dose series) Never done   • DTaP,Tdap,and Td Vaccines (1 - Tdap) Never done   • COVID-19 Vaccine (3 - Booster for Pfizer series) 06/04/2021     Advance Directives   What are advance directives? Advance directives are legal documents that state your wishes and plans for medical care  These plans are made ahead of time in case you lose your ability to make decisions for yourself  Advance directives can apply to any medical decision, such as the treatments you want, and if you want to donate organs  What are the types of advance directives?   There are many types of advance directives, and each state has rules about how to use them  You may choose a combination of any of the following:  · Living will: This is a written record of the treatment you want  You can also choose which treatments you do not want, which to limit, and which to stop at a certain time  This includes surgery, medicine, IV fluid, and tube feedings  · Durable power of  for healthcare [facility]): This is a written record that states who you want to make healthcare choices for you when you are unable to make them for yourself  This person, called a proxy, is usually a family member or a friend  You may choose more than 1 proxy  · Do not resuscitate (DNR) order:  A DNR order is used in case your heart stops beating or you stop breathing  It is a request not to have certain forms of treatment, such as CPR  A DNR order may be included in other types of advance directives  · Medical directive: This covers the care that you want if you are in a coma, near death, or unable to make decisions for yourself  You can list the treatments you want for each condition  Treatment may include pain medicine, surgery, blood transfusions, dialysis, IV or tube feedings, and a ventilator (breathing machine)  · Values history: This document has questions about your views, beliefs, and how you feel and think about life  This information can help others choose the care that you would choose  Why are advance directives important? An advance directive helps you control your care  Although spoken wishes may be used, it is better to have your wishes written down  Spoken wishes can be misunderstood, or not followed  Treatments may be given even if you do not want them  An advance directive may make it easier for your family to make difficult choices about your care  Urinary Incontinence   Urinary incontinence (UI)  is when you lose control of your bladder   UI develops because your bladder cannot store or empty urine properly  The 3 most common types of UI are stress incontinence, urge incontinence, or both  Medicines:   · May be given to help strengthen your bladder control  Report any side effects of medication to your healthcare provider  Do pelvic muscle exercises often:  Your pelvic muscles help you stop urinating  Squeeze these muscles tight for 5 seconds, then relax for 5 seconds  Gradually work up to squeezing for 10 seconds  Do 3 sets of 15 repetitions a day, or as directed  This will help strengthen your pelvic muscles and improve bladder control  Train your bladder:  Go to the bathroom at set times, such as every 2 hours, even if you do not feel the urge to go  You can also try to hold your urine when you feel the urge to go  For example, hold your urine for 5 minutes when you feel the urge to go  As that becomes easier, hold your urine for 10 minutes  Self-care:   · Keep a UI record  Write down how often you leak urine and how much you leak  Make a note of what you were doing when you leaked urine  · Drink liquids as directed  You may need to limit the amount of liquid you drink to help control your urine leakage  Do not drink any liquid right before you go to bed  Limit or do not have drinks that contain caffeine or alcohol  · Prevent constipation  Eat a variety of high-fiber foods  Good examples are high-fiber cereals, beans, vegetables, and whole-grain breads  Walking is the best way to trigger your intestines to have a bowel movement  · Exercise regularly and maintain a healthy weight  Weight loss and exercise will decrease pressure on your bladder and help you control your leakage  · Use a catheter as directed  to help empty your bladder  A catheter is a tiny, plastic tube that is put into your bladder to drain your urine  · Go to behavior therapy as directed  Behavior therapy may be used to help you learn to control your urge to urinate         © Copyright [employer] 2018 Information is for End User's use only and may not be sold, redistributed or otherwise used for commercial purposes   All illustrations and images included in CareNotes® are the copyrighted property of A [employer] A M [employer]  or Sonal Rodriguez

## 2023-02-10 ENCOUNTER — NURSE TRIAGE (OUTPATIENT)
Dept: OTHER | Facility: OTHER | Age: 83
End: 2023-02-10

## 2023-02-10 ENCOUNTER — OFFICE VISIT (OUTPATIENT)
Dept: FAMILY MEDICINE CLINIC | Facility: CLINIC | Age: 83
End: 2023-02-10

## 2023-02-10 VITALS
RESPIRATION RATE: 16 BRPM | OXYGEN SATURATION: 95 % | HEIGHT: 60 IN | TEMPERATURE: 97.2 F | DIASTOLIC BLOOD PRESSURE: 70 MMHG | SYSTOLIC BLOOD PRESSURE: 160 MMHG | WEIGHT: 113.13 LBS | HEART RATE: 66 BPM | BODY MASS INDEX: 22.21 KG/M2

## 2023-02-10 DIAGNOSIS — F41.9 ANXIETY: Primary | ICD-10-CM

## 2023-02-10 DIAGNOSIS — I10 ESSENTIAL HYPERTENSION: ICD-10-CM

## 2023-02-10 RX ORDER — HYDROCHLOROTHIAZIDE 12.5 MG/1
12.5 TABLET ORAL DAILY
Qty: 30 TABLET | Refills: 5 | Status: SHIPPED | OUTPATIENT
Start: 2023-02-10

## 2023-02-10 RX ORDER — ALPRAZOLAM 0.25 MG/1
TABLET ORAL
Qty: 30 TABLET | Refills: 1 | Status: SHIPPED | OUTPATIENT
Start: 2023-02-10

## 2023-02-10 NOTE — ASSESSMENT & PLAN NOTE
Anxiety  I had a long discussion with the patient regarding possible treatment options  She was given prescription to take 1/2 tablet of Xanax 0 25 mg p o  twice daily to see if this would help with her symptoms as well as with her sleeping at night    They will call if symptoms persist without improvement after the first week

## 2023-02-10 NOTE — PROGRESS NOTES
FAMILY PRACTICE OFFICE VISIT       NAME: Albert Marroquin  AGE: 79 y o  SEX: female       : 1940        MRN: [id number]    DATE: 2/10/2023  TIME: 1:58 PM    Assessment and Plan     Problem List Items Addressed This Visit        Cardiovascular and Mediastinum    Essential hypertension    Relevant Medications    hydrochlorothiazide (HYDRODIURIL) 12 5 mg tablet       Other    Anxiety - Primary     Anxiety  I had a long discussion with the patient regarding possible treatment options  She was given prescription to take 1/2 tablet of Xanax 0 25 mg p o  twice daily to see if this would help with her symptoms as well as with her sleeping at night  They will call if symptoms persist without improvement after the first week         Relevant Medications    ALPRAZolam (XANAX) 0 25 mg tablet           Chief Complaint     Chief Complaint   Patient presents with   • Anxiety     X weeks    • Insomnia     Poor appetite        History of Present Illness     Patient in the office with complaints of increased generalized anxiety  She is accompanied by her son who reports that over the past several weeks she has had significant increase in anxiety where patient worries about daily issues such as finances and paying bills although family members take care of her billing  Patient had 1 out right panic attack for her she was shaking and felt uncomfortable  After her son bought her a pack of cigarettes and she started smoking her symptoms subsided  She also has concerns for her children who at times have had issues with her medical health  Patient lives alone but at least 2 of her sons stop by her house a couple times a day or call on a frequent basis to see how she is doing  For the most part patient is self-sufficient and can cook for herself and take care of ADLs      Review of Systems   Review of Systems   Constitutional: Negative  Respiratory: Negative  Cardiovascular: Negative  Gastrointestinal: Negative  Psychiatric/Behavioral: Positive for agitation, decreased concentration, dysphoric mood and sleep disturbance  Negative for self-injury and suicidal ideas  The patient is nervous/anxious  Active Problem List     Patient Active Problem List   Diagnosis   • COPD (chronic obstructive pulmonary disease) ([place] [address] )   • Hypertension   • Hypercoagulable state ([place] [address] )   • 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HCC)   • Antiphospholipid antibody positive   • Atrial fibrillation (HCC)   • Hereditary hemolytic anemia (HCC)   • Asthma   • Vitamin D deficiency   • Osteoporosis   • Low back pain without sciatica   • Peripheral edema   • Hypothyroidism   • Essential hypertension   • Anxiety       Past Medical History:  Past Medical History:   Diagnosis Date   • Anti-phospholipid syndrome ([place] [address] )    • Asthma    • Blood type O+    • Cardiac disease    • Chronic pain    • COPD (chronic obstructive pulmonary disease) ([facility])    • Fracture of ankle     left   • Hyperlipidemia    • Hypertension    • Osteoporosis        Past Surgical History:  Past Surgical History:   Procedure Laterality Date   • APPENDECTOMY     • BILATERAL OOPHORECTOMY Bilateral    • ORIF TIBIAL SHAFT FRACTURE W/ PLATES AND SCREWS Right        Family History:  Family History   Problem Relation Age of Onset   • Hypertension Mother    • Skin cancer Mother    • Hypertension Father        Social History:  Social History     Socioeconomic History   • Marital status:       Spouse name: Not on file   • Number of children: Not on file   • Years of education: Not on file   • Highest education level: Not on file   Occupational History   • Not on file   Tobacco Use   • Smoking status: Never   • Smokeless tobacco: Never   • Tobacco comments:     Never smoker per [employer]   Vaping Use   • Vaping Use: Never used   Substance and Sexual Activity   • Alcohol use: No   • Drug use: No   • Sexual activity: Not Currently   Other Topics Concern   • Not on file   Social History Narrative   • Not on file     Social Determinants of Health     Financial Resource Strain: Medium Risk   • Difficulty of Paying Living Expenses: Somewhat hard   Food Insecurity: Not on file   Transportation Needs: No Transportation Needs   • Lack of Transportation (Medical): No   • Lack of Transportation (Non-Medical): No   Physical Activity: Not on file   Stress: Not on file   Social Connections: Not on file   Intimate Partner Violence: Not on file   Housing Stability: Not on file       Objective     Vitals:    02/10/23 1135   BP: 160/70   Pulse: 66   Resp: 16   Temp: (!) 97 2 °F (36 2 °C)   SpO2: 95%     Wt Readings from Last 3 Encounters:   02/10/23 51 3 kg (113 lb 2 oz)   01/03/23 52 7 kg (116 lb 2 oz)   06/30/22 52 2 kg (115 lb 2 oz)       Physical Exam  Constitutional:       General: She is not in acute distress  Appearance: Normal appearance  She is not ill-appearing  Neurological:      General: No focal deficit present  Mental Status: She is alert and oriented to person, place, and time  Psychiatric:      Comments: Patient in no acute distress but did appear anxious and describing her symptoms           Pertinent Laboratory/Diagnostic Studies:  Lab Results   Component Value Date    GLUCOSE 96 03/17/2015    BUN 17 01/03/2023    CREATININE 0 80 01/03/2023    CALCIUM 9 3 01/03/2023     03/17/2015    K 4 8 01/03/2023    CO2 33 (H) 01/03/2023    CL 98 01/03/2023     Lab Results   Component Value Date    ALT 19 01/03/2023    AST 23 01/03/2023    ALKPHOS 89 01/03/2023    BILITOT 0 40 03/17/2015       Lab Results   Component Value Date    WBC 5 42 01/03/2023    HGB 11 9 01/03/2023    HCT 35 6 01/03/2023     (H) 01/03/2023     01/03/2023       No results found for: TSH    Lab Results   Component Value Date    CHOL 145 03/17/2015     Lab Results   Component Value Date    TRIG 52 01/03/2023     Lab Results   Component Value Date    HDL 60 01/03/2023     Lab Results   Component Value Date    LDLCALC 86 01/03/2023     No results found for: HGBA1C    Results for orders placed or performed in visit on 01/03/23   Protime-INR   Result Value Ref Range    INR 2 34 (A) 0 84 - 1 19       No orders of the defined types were placed in this encounter        ALLERGIES:  Allergies   Allergen Reactions   • Aspirin Other (See Comments)   • Penicillins Rash and Hives   • Sulfa Antibiotics Rash       Current Medications     Current Outpatient Medications   Medication Sig Dispense Refill   • ALPRAZolam (XANAX) 0 25 mg tablet 1/2 tablet p o  twice daily 30 tablet 1   • Ascorbic Acid (VITAMIN C) 1000 MG tablet Take 1,000 mg by mouth daily     • Cholecalciferol (VITAMIN D3) 20 MCG (800 UNIT) TABS Take by mouth     • denosumab (PROLIA) 60 mg/mL Inject 60 mg under the skin once 6 months     • digoxin (LANOXIN) 0 25 mg tablet TAKE 1 TABLET DAILY 90 tablet 3   • diltiazem (CARDIZEM CD) 240 mg 24 hr capsule TAKE 1 CAPSULE DAILY 90 capsule 3   • Ferrous Sulfate 220 (44 Fe) MG/5ML LIQD take 1 teaspoonful by mouth once daily 150 mL 5   • hydrochlorothiazide (HYDRODIURIL) 12 5 mg tablet Take 1 tablet (12 5 mg total) by mouth daily 30 tablet 5   • levothyroxine 25 mcg tablet TAKE 1 TABLET DAILY 90 tablet 3   • loratadine (CLARITIN) 10 mg tablet Take 10 mg by mouth daily     • multivitamin (THERAGRAN) TABS Take 1 tablet by mouth daily     • Spiriva HandiHaler 18 MCG inhalation capsule INHALE THE CONTENTS OF 1 CAPSULE DAILY AS DIRECTED 90 capsule 3   • warfarin (COUMADIN) 5 mg tablet TAKE 1 TABLET DAILY 90 tablet 3   • Advair Diskus 250-50 MCG/DOSE inhaler USE 1 INHALATION TWICE A DAY (Patient not taking: Reported on 2/10/2023) 180 blister 3   • albuterol (2 5 mg/3 mL) 0 083 % nebulizer solution USE 1 VIAL IN NEBULIZER EVERY 4 TO 6 HOURS AS NEEDED FOR COUGH AND WHEEZE (Patient not taking: Reported on 2/10/2023) 75 vial 3   • lidocaine (LIDODERM) 5 % Apply 1 patch topically daily Remove & Discard patch within 12 hours or as directed by MD (Patient not taking: Reported on 12/30/2021) 30 patch 3     No current facility-administered medications for this visit           Health Maintenance     Health Maintenance   Topic Date Due   • SLP PLAN OF CARE  Never done   • Annual Physical  Never done   • DTaP,Tdap,and Td Vaccines (1 - Tdap) Never done   • COVID-19 Vaccine (3 - Booster for Pfizer series) 06/04/2021   • Influenza Vaccine (1) 06/30/2023 (Originally 9/1/2022)   • Depression Screening  01/03/2024   • Fall Risk  01/06/2024   • Urinary Incontinence Screening  01/06/2024   • BMI: Adult  02/10/2024   • Osteoporosis Screening  Completed   • Pneumococcal Vaccine: 65+ Years  Completed   • HIB Vaccine  Aged Out   • IPV Vaccine  Aged Out   • Hepatitis A Vaccine  Aged Out   • Meningococcal ACWY Vaccine  Aged Out   • HPV Vaccine  Aged Out     Immunization History   Administered Date(s) Administered   • COVID-19 PFIZER VACCINE 0 3 ML IM 03/18/2021, 04/09/2021   • INFLUENZA 12/14/2004, 10/17/2018, 10/15/2020, 09/24/2021   • Influenza Split High Dose Preservative Free IM 09/17/2015, 12/19/2016, 10/23/2017   • Influenza, high dose seasonal 0 7 mL 10/16/2019   • Influenza, seasonal, injectable 11/01/2011, 11/16/2012, 10/25/2013   • Pneumococcal Conjugate 13-Valent 03/17/2016   • Pneumococcal Polysaccharide PPV23 78/61/9278       Isael Montoya MD

## 2023-02-10 NOTE — TELEPHONE ENCOUNTER
Patient's son reports they lost a family member 2 weeks ago and ever since then patient seems to have increased anxiety that has worsened over the last 4-5 days  She is anxious, not sleeping, and tearful  He would like her seen today in the office  Appointment made for [phone] with her PCP  Reason for Disposition  • Symptoms interfere with sleep    Answer Assessment - Initial Assessment Questions  1  CONCERN: "What happened that made you call today?"      Patient crying, anxious   2  ANXIETY SYMPTOM SCREENING: "Can you describe how you have been feeling?"  (e g , tense, restless, panicky, anxious, keyed up, trouble sleeping, trouble concentrating)      Lost a family member, confused, not sleeping, crying   3  ONSET: "How long have you been feeling this way?"      2 weeks, worse the last 4-5 days   4   RECURRENT: "Have you felt this way before?"  If Yes, ask: "What happened that time?" "What helped these feelings go away in the past?"       Forgetful at times, this is new    Protocols used: ANXIETY AND PANIC ATTACK-ADULT-

## 2023-02-12 ENCOUNTER — HOSPITAL ENCOUNTER (EMERGENCY)
Facility: HOSPITAL | Age: 83
Discharge: HOME | End: 2023-02-12
Attending: EMERGENCY MEDICINE

## 2023-02-12 ENCOUNTER — APPOINTMENT (EMERGENCY)
Dept: RADIOLOGY | Facility: HOSPITAL | Age: 83
End: 2023-02-12

## 2023-02-12 VITALS
DIASTOLIC BLOOD PRESSURE: 90 MMHG | WEIGHT: 113 LBS | OXYGEN SATURATION: 94 % | TEMPERATURE: 97.9 F | RESPIRATION RATE: 21 BRPM | SYSTOLIC BLOOD PRESSURE: 177 MMHG | HEIGHT: 60 IN | BODY MASS INDEX: 22.19 KG/M2 | HEART RATE: 73 BPM

## 2023-02-12 DIAGNOSIS — R53.1 GENERALIZED WEAKNESS: Primary | ICD-10-CM

## 2023-02-12 DIAGNOSIS — N39.0 UTI (URINARY TRACT INFECTION): ICD-10-CM

## 2023-02-12 DIAGNOSIS — R91.1 PULMONARY NODULE, RIGHT: ICD-10-CM

## 2023-02-12 LAB
2HR DELTA HS TROPONIN: 2 NG/L
ALBUMIN SERPL BCP-MCNC: 3.6 G/DL (ref 3.5–5)
ALP SERPL-CCNC: 90 U/L (ref 46–116)
ALT SERPL W P-5'-P-CCNC: 23 U/L (ref 12–78)
ANION GAP SERPL CALCULATED.3IONS-SCNC: 4 MMOL/L (ref 4–13)
APTT PPP: 43 SECONDS (ref 23–37)
AST SERPL W P-5'-P-CCNC: 32 U/L (ref 5–45)
ATRIAL RATE: 76 BPM
BACTERIA UR QL AUTO: ABNORMAL /HPF
BASOPHILS # BLD AUTO: 0.01 THOUSANDS/ÂΜL (ref 0–0.1)
BASOPHILS NFR BLD AUTO: 0 % (ref 0–1)
BILIRUB SERPL-MCNC: 0.8 MG/DL (ref 0.2–1)
BILIRUB UR QL STRIP: NEGATIVE
BUN SERPL-MCNC: 22 MG/DL (ref 5–25)
CALCIUM SERPL-MCNC: 9.2 MG/DL (ref 8.3–10.1)
CARDIAC TROPONIN I PNL SERPL HS: 11 NG/L
CARDIAC TROPONIN I PNL SERPL HS: 13 NG/L
CHLORIDE SERPL-SCNC: 98 MMOL/L (ref 96–108)
CLARITY UR: ABNORMAL
CO2 SERPL-SCNC: 34 MMOL/L (ref 21–32)
COLOR UR: YELLOW
CREAT SERPL-MCNC: 0.83 MG/DL (ref 0.6–1.3)
EOSINOPHIL # BLD AUTO: 0.03 THOUSAND/ÂΜL (ref 0–0.61)
EOSINOPHIL NFR BLD AUTO: 1 % (ref 0–6)
FLUAV RNA RESP QL NAA+PROBE: NEGATIVE
FLUBV RNA RESP QL NAA+PROBE: NEGATIVE
GFR SERPL CREATININE-BSD FRML MDRD: 65 ML/MIN/1.73SQ M
GLUCOSE SERPL-MCNC: 100 MG/DL (ref 65–140)
GLUCOSE UR STRIP-MCNC: NEGATIVE MG/DL
HCT VFR BLD AUTO: 32.5 % (ref 34.8–46.1)
HGB BLD-MCNC: 11.9 G/DL (ref 11.5–15.4)
HGB UR QL STRIP.AUTO: ABNORMAL
HYALINE CASTS #/AREA URNS LPF: ABNORMAL /LPF
IMM GRANULOCYTES # BLD AUTO: 0.01 THOUSAND/UL (ref 0–0.2)
IMM GRANULOCYTES NFR BLD AUTO: 0 % (ref 0–2)
INR PPP: 1.84 (ref 0.84–1.19)
KETONES UR STRIP-MCNC: NEGATIVE MG/DL
LEUKOCYTE ESTERASE UR QL STRIP: ABNORMAL
LYMPHOCYTES # BLD AUTO: 0.59 THOUSANDS/ÂΜL (ref 0.6–4.47)
LYMPHOCYTES NFR BLD AUTO: 12 % (ref 14–44)
MCH RBC QN AUTO: 41.3 PG (ref 26.8–34.3)
MCHC RBC AUTO-ENTMCNC: 36.6 G/DL (ref 31.4–37.4)
MCV RBC AUTO: 113 FL (ref 82–98)
MONOCYTES # BLD AUTO: 0.28 THOUSAND/ÂΜL (ref 0.17–1.22)
MONOCYTES NFR BLD AUTO: 6 % (ref 4–12)
MUCOUS THREADS UR QL AUTO: ABNORMAL
NEUTROPHILS # BLD AUTO: 3.93 THOUSANDS/ÂΜL (ref 1.85–7.62)
NEUTS SEG NFR BLD AUTO: 81 % (ref 43–75)
NITRITE UR QL STRIP: NEGATIVE
NON-SQ EPI CELLS URNS QL MICRO: ABNORMAL /HPF
NRBC BLD AUTO-RTO: 0 /100 WBCS
NT-PROBNP SERPL-MCNC: 746 PG/ML
P AXIS: 65 DEGREES
PH UR STRIP.AUTO: 6.5 [PH]
PLATELET # BLD AUTO: 244 THOUSANDS/UL (ref 149–390)
PMV BLD AUTO: 8.5 FL (ref 8.9–12.7)
POTASSIUM SERPL-SCNC: 4.6 MMOL/L (ref 3.5–5.3)
PR INTERVAL: 170 MS
PROT SERPL-MCNC: 7.6 G/DL (ref 6.4–8.4)
PROT UR STRIP-MCNC: ABNORMAL MG/DL
PROTHROMBIN TIME: 22.3 SECONDS (ref 11.6–14.5)
QRS AXIS: -9 DEGREES
QRSD INTERVAL: 80 MS
QT INTERVAL: 322 MS
QTC INTERVAL: 362 MS
RBC # BLD AUTO: 2.88 MILLION/UL (ref 3.81–5.12)
RBC #/AREA URNS AUTO: ABNORMAL /HPF
RSV RNA RESP QL NAA+PROBE: NEGATIVE
SARS-COV-2 RNA RESP QL NAA+PROBE: NEGATIVE
SODIUM SERPL-SCNC: 136 MMOL/L (ref 135–147)
SP GR UR STRIP.AUTO: 1.01 (ref 1–1.03)
T WAVE AXIS: 38 DEGREES
TSH SERPL DL<=0.05 MIU/L-ACNC: 4.05 UIU/ML (ref 0.45–4.5)
UROBILINOGEN UR STRIP-ACNC: <2 MG/DL
VENTRICULAR RATE: 76 BPM
WBC # BLD AUTO: 4.85 THOUSAND/UL (ref 4.31–10.16)
WBC #/AREA URNS AUTO: ABNORMAL /HPF

## 2023-02-12 RX ORDER — CEPHALEXIN 250 MG/1
500 CAPSULE ORAL ONCE
Status: COMPLETED | OUTPATIENT
Start: 2023-02-12 | End: 2023-02-12

## 2023-02-12 RX ORDER — CEPHALEXIN 500 MG/1
500 CAPSULE ORAL EVERY 12 HOURS SCHEDULED
Qty: 14 CAPSULE | Refills: 0 | Status: SHIPPED | OUTPATIENT
Start: 2023-02-12 | End: 2023-02-19

## 2023-02-12 RX ADMIN — CEPHALEXIN 500 MG: 250 CAPSULE ORAL at 14:05

## 2023-02-12 NOTE — ED PROVIDER NOTES
History  Chief Complaint   Patient presents with   • Weakness - Generalized     Pt to ED c/o weakness, decreased urine output, increased swelling in legs over the last month  Dtr states that she seemed dehydrated so increased pts fluids  Pt states that even with the increased fluid she is still not urinating as usual  Feels its trickling and frequent and that shes not emptying her bladder all the way  C/o shortness of breath that seems worse than usual     Patient is an 80 y/o F with h/o COPD, HTN, hyperlipidemia, CAD that presents to the ED with decreased urination and swelling in her legs that started about 1 month ago  Patient stopped taking her diuretic about 1 month ago  She denies fevers, chills  She has had dysuria off and on over the past month and dribbling urine  She has had generalized weakness  Patient states she stopped taking her diuretic because she feels nauseated all the time  She denies chest pain or SOB  No abdominal pain  Patient continues to smoke  History provided by:  Patient and relative (daughter)      Prior to Admission Medications   Prescriptions Last Dose Informant Patient Reported? Taking?    ALPRAZolam (XANAX) 0 25 mg tablet   No No   Si/2 tablet p o  twice daily   Advair Diskus 250-50 MCG/DOSE inhaler   No No   Sig: USE 1 INHALATION TWICE A DAY   Patient not taking: Reported on 2/10/2023   Ascorbic Acid (VITAMIN C) 1000 MG tablet   Yes No   Sig: Take 1,000 mg by mouth daily   Cholecalciferol (VITAMIN D3) 20 MCG (800 UNIT) TABS   Yes No   Sig: Take by mouth   Ferrous Sulfate 220 (44 Fe) MG/5ML LIQD   No No   Sig: take 1 teaspoonful by mouth once daily   Spiriva HandiHaler 18 MCG inhalation capsule   No No   Sig: INHALE THE CONTENTS OF 1 CAPSULE DAILY AS DIRECTED   albuterol (2 5 mg/3 mL) 0 083 % nebulizer solution   No No   Sig: USE 1 VIAL IN NEBULIZER EVERY 4 TO 6 HOURS AS NEEDED FOR COUGH AND WHEEZE   Patient not taking: Reported on 2/10/2023   denosumab (PROLIA) 60 mg/mL   Yes No   Sig: Inject 60 mg under the skin once 6 months   digoxin (LANOXIN) 0 25 mg tablet   No No   Sig: TAKE 1 TABLET DAILY   diltiazem (CARDIZEM CD) 240 mg 24 hr capsule   No No   Sig: TAKE 1 CAPSULE DAILY   hydrochlorothiazide (HYDRODIURIL) 12 5 mg tablet   No No   Sig: Take 1 tablet (12 5 mg total) by mouth daily   levothyroxine 25 mcg tablet   No No   Sig: TAKE 1 TABLET DAILY   lidocaine (LIDODERM) 5 %   No No   Sig: Apply 1 patch topically daily Remove & Discard patch within 12 hours or as directed by MD   Patient not taking: Reported on 12/30/2021   loratadine (CLARITIN) 10 mg tablet   Yes No   Sig: Take 10 mg by mouth daily   multivitamin (THERAGRAN) TABS   Yes No   Sig: Take 1 tablet by mouth daily   warfarin (COUMADIN) 5 mg tablet   No No   Sig: TAKE 1 TABLET DAILY      Facility-Administered Medications: None       Past Medical History:   Diagnosis Date   • Anti-phospholipid syndrome (HCC)    • Asthma    • Blood type O+    • Cardiac disease    • Chronic pain    • COPD (chronic obstructive pulmonary disease) (HCC)    • Fracture of ankle     left   • Hyperlipidemia    • Hypertension    • Osteoporosis        Past Surgical History:   Procedure Laterality Date   • APPENDECTOMY     • BILATERAL OOPHORECTOMY Bilateral    • ORIF TIBIAL SHAFT FRACTURE W/ PLATES AND SCREWS Right        Family History   Problem Relation Age of Onset   • Hypertension Mother    • Skin cancer Mother    • Hypertension Father      I have reviewed and agree with the history as documented  E-Cigarette/Vaping   • E-Cigarette Use Never User      E-Cigarette/Vaping Substances   • Nicotine No    • THC No      Social History     Tobacco Use   • Smoking status: Never   • Smokeless tobacco: Never   • Tobacco comments:     Never smoker per [employer]   Vaping Use   • Vaping Use: Never used   Substance Use Topics   • Alcohol use: No   • Drug use: No       Review of Systems   Constitutional: Positive for chills and fatigue   Negative for fever    HENT: Negative  Respiratory: Negative for cough and shortness of breath  Gastrointestinal: Positive for nausea  Genitourinary: Positive for decreased urine volume and dysuria  Skin: Negative for color change, pallor and rash  Neurological: Positive for weakness  Negative for dizziness, light-headedness and numbness  Psychiatric/Behavioral: Negative for confusion  All other systems reviewed and are negative  Physical Exam  Physical Exam  Vitals and nursing note reviewed  Constitutional:       General: She is not in acute distress  Appearance: Normal appearance  She is well-developed, well-groomed and underweight  She is not ill-appearing or diaphoretic  HENT:      Head: Normocephalic and atraumatic  Right Ear: External ear normal       Left Ear: External ear normal       Nose: Nose normal       Mouth/Throat:      Mouth: Mucous membranes are dry  Eyes:      Conjunctiva/sclera: Conjunctivae normal       Pupils: Pupils are equal    Cardiovascular:      Rate and Rhythm: Normal rate and regular rhythm  Heart sounds: Normal heart sounds  Pulmonary:      Effort: Pulmonary effort is normal       Breath sounds: Decreased breath sounds present  No wheezing or rhonchi  Abdominal:      General: Abdomen is flat  Bowel sounds are normal       Palpations: Abdomen is soft  Tenderness: There is no abdominal tenderness  Musculoskeletal:         General: Normal range of motion  Cervical back: Normal range of motion  Right lower le+ Pitting Edema present  Left lower le+ Pitting Edema present  Skin:     General: Skin is warm and dry  Coloration: Skin is pale  Findings: No abrasion, bruising or rash  Neurological:      Mental Status: She is alert and oriented to person, place, and time  GCS: GCS eye subscore is 4  GCS verbal subscore is 5  GCS motor subscore is 6  Cranial Nerves: Cranial nerves 2-12 are intact        Motor: Motor function is intact  Psychiatric:         Mood and Affect: Mood normal          Speech: Speech normal          Behavior: Behavior is cooperative           Vital Signs  ED Triage Vitals   Temperature Pulse Respirations Blood Pressure SpO2   02/12/23 1046 02/12/23 1030 02/12/23 1032 02/12/23 1030 02/12/23 1030   97 9 °F (36 6 °C) 81 18 (!) 197/86 94 %      Temp Source Heart Rate Source Patient Position - Orthostatic VS BP Location FiO2 (%)   02/12/23 1046 02/12/23 1032 02/12/23 1032 02/12/23 1032 --   Oral Monitor Lying Left arm       Pain Score       02/12/23 1033       No Pain           Vitals:    02/12/23 1230 02/12/23 1300 02/12/23 1315 02/12/23 1400   BP:   (!) 189/80 (!) 177/90   Pulse: 72 71 69 73   Patient Position - Orthostatic VS:             Visual Acuity      ED Medications  Medications   cephalexin (KEFLEX) capsule 500 mg (500 mg Oral Given 2/12/23 1405)       Diagnostic Studies  Results Reviewed     Procedure Component Value Units Date/Time    UA w Reflex to Microscopic w Reflex to Culture [[id number]]  (Abnormal) Collected: 02/12/23 1220    Lab Status: Final result Specimen: Urine, Clean Catch Updated: 02/12/23 1347     Color, UA Yellow     Clarity, UA Slightly Cloudy     Specific Gravity, UA 1 015     pH, UA 6 5     Leukocytes, UA Large     Nitrite, UA Negative     Protein, UA 30 (1+) mg/dl      Glucose, UA Negative mg/dl      Ketones, UA Negative mg/dl      Urobilinogen, UA <2 0 mg/dl      Bilirubin, UA Negative     Occult Blood, UA Trace    Urine Microscopic [[id number]]  (Abnormal) Collected: 02/12/23 1220    Lab Status: Final result Specimen: Urine, Clean Catch Updated: 02/12/23 1346     RBC, UA 0-1 /hpf      WBC, UA 4-10 /hpf      Epithelial Cells Moderate /hpf      Bacteria, UA Occasional /hpf      MUCUS THREADS Occasional     Hyaline Casts, UA 10-20 /lpf     HS Troponin I 2hr [[id number]]  (Normal) Collected: 02/12/23 1229    Lab Status: Final result Specimen: Blood from Arm, Right Updated: 02/12/23 1318     hs TnI 2hr 13 ng/L      Delta 2hr hsTnI 2 ng/L     FLU/RSV/COVID - if FLU/RSV clinically relevant [[id number]]  (Normal) Collected: 02/12/23 1051    Lab Status: Final result Specimen: Nares from Nose Updated: 02/12/23 1200     SARS-CoV-2 Negative     INFLUENZA A PCR Negative     INFLUENZA B PCR Negative     RSV PCR Negative    Narrative:      FOR PEDIATRIC PATIENTS - copy/paste COVID Guidelines URL to browser: [email or web address]  Signifydx    SARS-CoV-2 assay is a Nucleic Acid Amplification assay intended for the  qualitative detection of nucleic acid from SARS-CoV-2 in nasopharyngeal  swabs  Results are for the presumptive identification of SARS-CoV-2 RNA  Positive results are indicative of infection with SARS-CoV-2, the virus  causing COVID-19, but do not rule out bacterial infection or co-infection  with other viruses  Laboratories within the [place] and its  territories are required to report all positive results to the appropriate  public health authorities  Negative results do not preclude SARS-CoV-2  infection and should not be used as the sole basis for treatment or other  patient management decisions  Negative results must be combined with  clinical observations, patient history, and epidemiological information  This test has not been FDA cleared or approved  This test has been authorized by FDA under an Emergency Use Authorization  (EUA)  This test is only authorized for the duration of time the  declaration that circumstances exist justifying the authorization of the  emergency use of an in vitro diagnostic tests for detection of SARS-CoV-2  virus and/or diagnosis of COVID-19 infection under section 564(b)(1) of  the Act, 21 U  S C  601IEZ-0(B)(8), unless the authorization is terminated  or revoked sooner  The test has been validated but independent review by FDA  and CLIA is pending  Test performed using [employer] GeneXpert:  This RT-PCR assay targets N2,  a region unique to SARS-CoV-2  A conserved region in the E-gene was chosen  for pan-Sarbecovirus detection which includes SARS-CoV-2  According to CMS-2020-01-R, this platform meets the definition of high-throughput technology      Comprehensive metabolic panel [[id number]]  (Abnormal) Collected: 02/12/23 1051    Lab Status: Final result Specimen: Blood from Arm, Right Updated: 02/12/23 1136     Sodium 136 mmol/L      Potassium 4 6 mmol/L      Chloride 98 mmol/L      CO2 34 mmol/L      ANION GAP 4 mmol/L      BUN 22 mg/dL      Creatinine 0 83 mg/dL      Glucose 100 mg/dL      Calcium 9 2 mg/dL      AST 32 U/L      ALT 23 U/L      Alkaline Phosphatase 90 U/L      Total Protein 7 6 g/dL      Albumin 3 6 g/dL      Total Bilirubin 0 80 mg/dL      eGFR 65 ml/min/1 73sq m     Narrative:      [place] guidelines for Chronic Kidney Disease (CKD):   •  Stage 1 with normal or high GFR (GFR > 90 mL/min/1 73 square meters)  •  Stage 2 Mild CKD (GFR = 60-89 mL/min/1 73 square meters)  •  Stage 3A Moderate CKD (GFR = 45-59 mL/min/1 73 square meters)  •  Stage 3B Moderate CKD (GFR = 30-44 mL/min/1 73 square meters)  •  Stage 4 Severe CKD (GFR = 15-29 mL/min/1 73 square meters)  •  Stage 5 End Stage CKD (GFR <15 mL/min/1 73 square meters)  Note: GFR calculation is accurate only with a steady state creatinine    NT-BNP PRO-[facility],[place],[facility],[place] campuses only [[id number]]  (Abnormal) Collected: 02/12/23 1051    Lab Status: Final result Specimen: Blood from Arm, Right Updated: 02/12/23 1136     NT-proBNP 746 pg/mL     HS Troponin 0hr (reflex protocol) [[id number]]  (Normal) Collected: 02/12/23 1051    Lab Status: Final result Specimen: Blood from Arm, Right Updated: 02/12/23 1132     hs TnI 0hr 11 ng/L     TSH [[id number]]  (Normal) Collected: 02/12/23 1051    Lab Status: Final result Specimen: Blood from Arm, Right Updated: 02/12/23 1132     TSH 3RD GENERATON 4 055 uIU/mL     Narrative: Patients undergoing fluorescein dye angiography may retain small amounts of fluorescein in the body for 48-72 hours post procedure  Samples containing fluorescein can produce falsely depressed TSH values  If the patient had this procedure,a specimen should be resubmitted post fluorescein clearance  Protime-INR [[id number]]  (Abnormal) Collected: 02/12/23 1051    Lab Status: Final result Specimen: Blood from Arm, Right Updated: 02/12/23 1127     Protime 22 3 seconds      INR 1 84    APTT [[id number]]  (Abnormal) Collected: 02/12/23 1051    Lab Status: Final result Specimen: Blood from Arm, Right Updated: 02/12/23 1127     PTT 43 seconds     CBC and differential [[id number]]  (Abnormal) Collected: 02/12/23 1051    Lab Status: Final result Specimen: Blood from Arm, Right Updated: 02/12/23 1101     WBC 4 85 Thousand/uL      RBC 2 88 Million/uL      Hemoglobin 11 9 g/dL      Hematocrit 32 5 %       fL      MCH 41 3 pg      MCHC 36 6 g/dL      MPV 8 5 fL      Platelets 340 Thousands/uL      nRBC 0 /100 WBCs      Neutrophils Relative 81 %      Immat GRANS % 0 %      Lymphocytes Relative 12 %      Monocytes Relative 6 %      Eosinophils Relative 1 %      Basophils Relative 0 %      Neutrophils Absolute 3 93 Thousands/µL      Immature Grans Absolute 0 01 Thousand/uL      Lymphocytes Absolute 0 59 Thousands/µL      Monocytes Absolute 0 28 Thousand/µL      Eosinophils Absolute 0 03 Thousand/µL      Basophils Absolute 0 01 Thousands/µL                  XR chest 1 view portable   Final Result by Merlinda Lambert, MD (02/12 1109)      No definite acute disease  2 5 cm nodule in the right cardiophrenic sulcus, of uncertain etiology  Further evaluation with a chest CT with no contrast could be performed if clinically warranted  The study was marked in [facility] for immediate notification                    Workstation performed: [id number]                    Procedures  ECG 12 Lead Documentation Only    Date/Time: 2/12/2023 10:50 AM  Performed by: Carlene Bolivar PA-C  Authorized by: Carlene Bolivar PA-C     Indications / Diagnosis:  Weakness  ECG reviewed by me, the ED Provider: yes    Patient location:  ED  Previous ECG:     Previous ECG:  Compared to current    Similarity:  No change  Rate:     ECG rate:  76  Rhythm:     Rhythm: sinus rhythm    Conduction:     Conduction: normal    ST segments:     ST segments:  Normal  T waves:     T waves: normal               ED Course                               SBIRT 22yo+    Flowsheet Row Most Recent Value   SBIRT (23 yo +)    In order to provide better care to our patients, we are screening all of our patients for alcohol and drug use  Would it be okay to ask you these screening questions? Yes Filed at: 02/12/2023 1058   Initial Alcohol Screen: US AUDIT-C     1  How often do you have a drink containing alcohol? 0 Filed at: 02/12/2023 1058   2  How many drinks containing alcohol do you have on a typical day you are drinking? 0 Filed at: 02/12/2023 1058   3a  Male UNDER 65: How often do you have five or more drinks on one occasion? 0 Filed at: 02/12/2023 1058   3b  FEMALE Any Age, or MALE 65+: How often do you have 4 or more drinks on one occassion? 0 Filed at: 02/12/2023 1058   Audit-C Score 0 Filed at: 02/12/2023 1058   [place]: How many times in the past year have you    Used an illegal drug or used a prescription medication for non-medical reasons? Never Filed at: 02/12/2023 1058                    Medical Decision Making  Patient with decreased urination, dribbling, weakness, stopped taking diuretic, will order labs, CXR, EKG to r/o cardiopulmonary disease, UTI, electrolyte abnormality or dehydration  Patient with over 200mL of urine pre void, only 80mL post void  Patient found to have UTI, will prescribe abx  Patient has swelling in legs, elevated BNP, explained to patient the importance of taking her diuretic  Return precautions given     Patient and daughter informed of pulmonary nodule and need to f/u with PCP for further evaluation with CT  Generalized weakness: complicated acute illness or injury  Pulmonary nodule, right: complicated acute illness or injury  UTI (urinary tract infection): complicated acute illness or injury  Amount and/or Complexity of Data Reviewed  Independent Historian:      Details: daughter supplied partial history due to patient being a poor historian  Labs: ordered  Radiology: ordered  ECG/medicine tests: ordered and independent interpretation performed  Risk  Prescription drug management  Disposition  Final diagnoses:   Generalized weakness   Pulmonary nodule, right   UTI (urinary tract infection)     Time reflects when diagnosis was documented in both MDM as applicable and the Disposition within this note     Time User Action Codes Description Comment    2/12/2023 11:14 AM Evia Feather Add [R53 1] Generalized weakness     2/12/2023 11:29 AM Evia Feather Add [R91 1] Pulmonary nodule, right     2/12/2023  1:50 PM Evia Feather Add [N39 0] UTI (urinary tract infection)       ED Disposition     ED Disposition   Discharge    Condition   Stable    Date/Time   Sun Feb 12, 2023  1:50 PM    Comment   Fawn Felty discharge to home/self care                 Follow-up Information     Follow up With Specialties Details Why Contact Devendra Navarrete MD Family Medicine Call in 2 days For recheck [address]  [phone]            Discharge Medication List as of 2/12/2023  1:51 PM      START taking these medications    Details   cephalexin (KEFLEX) 500 mg capsule Take 1 capsule (500 mg total) by mouth every 12 (twelve) hours for 7 days, Starting Sun 2/12/2023, Until Sun 2/19/2023, Normal         CONTINUE these medications which have NOT CHANGED    Details   Advair Diskus 250-50 MCG/DOSE inhaler USE 1 INHALATION TWICE A DAY, Normal      albuterol (2 5 mg/3 mL) 0 083 % nebulizer solution USE 1 VIAL IN NEBULIZER EVERY 4 TO 6 HOURS AS NEEDED FOR COUGH AND WHEEZE, Normal      ALPRAZolam (XANAX) 0 25 mg tablet 1/2 tablet p o  twice daily, Normal      Ascorbic Acid (VITAMIN C) 1000 MG tablet Take 1,000 mg by mouth daily, Historical Med      Cholecalciferol (VITAMIN D3) 20 MCG (800 UNIT) TABS Take by mouth, Historical Med      denosumab (PROLIA) 60 mg/mL Inject 60 mg under the skin once 6 months, Historical Med      digoxin (LANOXIN) 0 25 mg tablet TAKE 1 TABLET DAILY, Normal      diltiazem (CARDIZEM CD) 240 mg 24 hr capsule TAKE 1 CAPSULE DAILY, Normal      Ferrous Sulfate 220 (44 Fe) MG/5ML LIQD take 1 teaspoonful by mouth once daily, Normal      hydrochlorothiazide (HYDRODIURIL) 12 5 mg tablet Take 1 tablet (12 5 mg total) by mouth daily, Starting Fri 2/10/2023, Normal      levothyroxine 25 mcg tablet TAKE 1 TABLET DAILY, Normal      lidocaine (LIDODERM) 5 % Apply 1 patch topically daily Remove & Discard patch within 12 hours or as directed by MD, Starting Wed 6/30/2021, Normal      loratadine (CLARITIN) 10 mg tablet Take 10 mg by mouth daily, Historical Med      multivitamin (THERAGRAN) TABS Take 1 tablet by mouth daily, Historical Med      Spiriva HandiHaler 18 MCG inhalation capsule INHALE THE CONTENTS OF 1 CAPSULE DAILY AS DIRECTED, Normal      warfarin (COUMADIN) 5 mg tablet TAKE 1 TABLET DAILY, Normal             No discharge procedures on file      PDMP Review     None          ED Provider  Electronically Signed by           Esperanza Sumner PA-C  02/12/23 0844

## 2023-02-13 ENCOUNTER — TELEPHONE (OUTPATIENT)
Dept: FAMILY MEDICINE CLINIC | Facility: CLINIC | Age: 83
End: 2023-02-13

## 2023-02-13 NOTE — TELEPHONE ENCOUNTER
Please speak to pt directly  She may continue to get her Prolia injections in the office   Please arrange nurse visit to administer

## 2023-02-15 ENCOUNTER — TELEPHONE (OUTPATIENT)
Dept: FAMILY MEDICINE CLINIC | Facility: CLINIC | Age: 83
End: 2023-02-15

## 2023-02-15 ENCOUNTER — OFFICE VISIT (OUTPATIENT)
Dept: FAMILY MEDICINE CLINIC | Facility: CLINIC | Age: 83
End: 2023-02-15

## 2023-02-15 VITALS
TEMPERATURE: 98.4 F | RESPIRATION RATE: 16 BRPM | DIASTOLIC BLOOD PRESSURE: 60 MMHG | WEIGHT: 104.5 LBS | HEIGHT: 60 IN | OXYGEN SATURATION: 95 % | HEART RATE: 60 BPM | SYSTOLIC BLOOD PRESSURE: 180 MMHG | BODY MASS INDEX: 20.52 KG/M2

## 2023-02-15 DIAGNOSIS — F41.9 ANXIETY: ICD-10-CM

## 2023-02-15 DIAGNOSIS — B35.1 ONYCHOMYCOSIS: ICD-10-CM

## 2023-02-15 DIAGNOSIS — R91.1 PULMONARY NODULE: ICD-10-CM

## 2023-02-15 DIAGNOSIS — N39.0 URINARY TRACT INFECTION WITHOUT HEMATURIA, SITE UNSPECIFIED: Primary | ICD-10-CM

## 2023-02-15 LAB
SL AMB  POCT GLUCOSE, UA: NORMAL
SL AMB LEUKOCYTE ESTERASE,UA: NORMAL
SL AMB POCT BILIRUBIN,UA: NORMAL
SL AMB POCT BLOOD,UA: NORMAL
SL AMB POCT CLARITY,UA: NORMAL
SL AMB POCT COLOR,UA: YELLOW
SL AMB POCT KETONES,UA: NORMAL
SL AMB POCT NITRITE,UA: NORMAL
SL AMB POCT PH,UA: 5
SL AMB POCT SPECIFIC GRAVITY,UA: 1.01
SL AMB POCT URINE PROTEIN: 15
SL AMB POCT UROBILINOGEN: NORMAL

## 2023-02-15 NOTE — ASSESSMENT & PLAN NOTE
UTI   Urine sample in the office appeared stable    Patient will complete course of antibiotics provided in emergency room for treatment of previously diagnosed UTI

## 2023-02-15 NOTE — ASSESSMENT & PLAN NOTE
Anxiety  Patient will initiate sertraline 50 mg daily  She is aware it may take 2 to 4 weeks for symptoms to improve  She will try to wean down on her use of Xanax to one quarter of a tablet twice daily if needed

## 2023-02-15 NOTE — PROGRESS NOTES
FAMILY PRACTICE OFFICE VISIT       NAME: Ene Damon  AGE: 79 y o  SEX: female       : 1940        MRN: [id number]    DATE: 2/15/2023  TIME: 12:28 PM    Assessment and Plan     Problem List Items Addressed This Visit        Musculoskeletal and Integument    Onychomycosis     Onychomycosis  Patient was given recommendation for podiatry evaluation for further treatment and general maintenance of nail care            Genitourinary    Urinary tract infection without hematuria - Primary     UTI  Urine sample in the office appeared stable  Patient will complete course of antibiotics provided in emergency room for treatment of previously diagnosed UTI         Relevant Orders    POCT urine dip (Completed)       Other    Anxiety     Anxiety  Patient will initiate sertraline 50 mg daily  She is aware it may take 2 to 4 weeks for symptoms to improve  She will try to wean down on her use of Xanax to one quarter of a tablet twice daily if needed  Relevant Medications    sertraline (Zoloft) 50 mg tablet    Pulmonary nodule    Relevant Orders    CT chest wo contrast           Chief Complaint     Chief Complaint   Patient presents with   • Follow-up     ER , PT HERE TODAY WITH DAUGHTER PO         History of Present Illness     Patient in the office after having emergency room visit  I reviewed report from emergency room  Patient was suspected to have UTI and was given prescription for antibiotic  She is accompanied by her daughter who states she has been much more sedated and less talkative since initiating Xanax medication  Although she is less anxious and has no hand tremors daughter feels she is overmedicated  Patient daughter would like to initiate a maintenance medication for anxiety such as Zoloft  Patient was also noted to have incidental finding of 2 5 cm nodule on x-ray  Radiologist recommends CAT scan with for further evaluation  Patient continues to smoke on a daily basis    She has had decreased appetite and some weight loss since hospital visit  Review of Systems   Review of Systems   Constitutional: Positive for fatigue and unexpected weight change  Negative for fever  Respiratory: Negative  Cardiovascular: Negative  Gastrointestinal: Negative  Psychiatric/Behavioral: Positive for agitation, confusion, decreased concentration, dysphoric mood and sleep disturbance  Negative for hallucinations, self-injury and suicidal ideas  The patient is nervous/anxious and is hyperactive  Active Problem List     Patient Active Problem List   Diagnosis   • [facility]   • Hypertension   • Hypercoagulable state ([facility] )   • 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HCC)   • Antiphospholipid antibody positive   • Atrial fibrillation (HCC)   • Hereditary hemolytic anemia (HCC)   • Asthma   • Vitamin D deficiency   • Osteoporosis   • Low back pain without sciatica   • Peripheral edema   • Hypothyroidism   • Essential hypertension   • Anxiety   • Pulmonary nodule   • Urinary tract infection without hematuria   • Onychomycosis       Past Medical History:  Past Medical History:   Diagnosis Date   • Anti-phospholipid syndrome (HCC)    • Asthma    • Blood type O+    • Cardiac disease    • Chronic pain    • COPD (chronic obstructive pulmonary disease) ([facility])    • Fracture of ankle     left   • Hyperlipidemia    • Hypertension    • Osteoporosis        Past Surgical History:  Past Surgical History:   Procedure Laterality Date   • APPENDECTOMY     • BILATERAL OOPHORECTOMY Bilateral    • ORIF TIBIAL SHAFT FRACTURE W/ PLATES AND SCREWS Right        Family History:  Family History   Problem Relation Age of Onset   • Hypertension Mother    • Skin cancer Mother    • Hypertension Father        Social History:  Social History     Socioeconomic History   • Marital status:       Spouse name: Not on file   • Number of children: Not on file   • Years of education: Not on file   • Highest education level: Not on file   Occupational History   • Not on file   Tobacco Use   • Smoking status: Never   • Smokeless tobacco: Never   • Tobacco comments:     Never smoker per [employer]   Vaping Use   • Vaping Use: Never used   Substance and Sexual Activity   • Alcohol use: No   • Drug use: No   • Sexual activity: Not Currently   Other Topics Concern   • Not on file   Social History Narrative   • Not on file     Social Determinants of Health     Financial Resource Strain: Medium Risk   • Difficulty of Paying Living Expenses: Somewhat hard   Food Insecurity: Not on file   Transportation Needs: No Transportation Needs   • Lack of Transportation (Medical): No   • Lack of Transportation (Non-Medical): No   Physical Activity: Not on file   Stress: Not on file   Social Connections: Not on file   Intimate Partner Violence: Not on file   Housing Stability: Not on file       Objective     Vitals:    02/15/23 0725   BP: (!) 180/60   Pulse: 60   Resp: 16   Temp: 98 4 °F (36 9 °C)   SpO2: 95%     Wt Readings from Last 3 Encounters:   02/15/23 47 4 kg (104 lb 8 oz)   02/12/23 51 3 kg (113 lb)   02/10/23 51 3 kg (113 lb 2 oz)       Physical Exam  Constitutional:       General: She is not in acute distress  Appearance: Normal appearance  She is not ill-appearing  Eyes:      General:         Right eye: No discharge  Left eye: No discharge  Extraocular Movements: Extraocular movements intact  Conjunctiva/sclera: Conjunctivae normal       Pupils: Pupils are equal, round, and reactive to light  Cardiovascular:      Rate and Rhythm: Normal rate and regular rhythm  Heart sounds: Normal heart sounds  No murmur heard  Pulmonary:      Effort: Pulmonary effort is normal  No respiratory distress  Breath sounds: Normal breath sounds  No wheezing, rhonchi or rales  Musculoskeletal:      Right lower leg: Edema present  Left lower leg: Edema present        Comments: Patient with onychomycosis and hard disfigured toenails on bilateral feet    Trace bilateral peripheral edema lower extremities   Neurological:      Mental Status: She is alert  Mental status is at baseline  Psychiatric:      Comments: Patient in no acute distress but appeared much more sedated than previous office visit where she was obviously anxious with tremors    She will answer questions slowly and appropriately but otherwise does not speak unless spoken to         Pertinent Laboratory/Diagnostic Studies:  Lab Results   Component Value Date    GLUCOSE 96 03/17/2015    BUN 22 02/12/2023    CREATININE 0 83 02/12/2023    CALCIUM 9 2 02/12/2023     03/17/2015    K 4 6 02/12/2023    CO2 34 (H) 02/12/2023    CL 98 02/12/2023     Lab Results   Component Value Date    ALT 23 02/12/2023    AST 32 02/12/2023    ALKPHOS 90 02/12/2023    BILITOT 0 40 03/17/2015       Lab Results   Component Value Date    WBC 4 85 02/12/2023    HGB 11 9 02/12/2023    HCT 32 5 (L) 02/12/2023     (H) 02/12/2023     02/12/2023       No results found for: TSH    Lab Results   Component Value Date    CHOL 145 03/17/2015     Lab Results   Component Value Date    TRIG 52 01/03/2023     Lab Results   Component Value Date    HDL 60 01/03/2023     Lab Results   Component Value Date    LDLCALC 86 01/03/2023     No results found for: HGBA1C    Results for orders placed or performed in visit on 02/15/23   POCT urine dip   Result Value Ref Range    LEUKOCYTE ESTERASE,UA neg     NITRITE,UA neg     SL AMB POCT UROBILINOGEN neg     POCT URINE PROTEIN 15      PH,UA 5 0     BLOOD,UA neg     SPECIFIC GRAVITY,UA 1 015     KETONES,UA neg     BILIRUBIN,UA neg     GLUCOSE, UA neg      COLOR,UA yellow     CLARITY,UA dark        Orders Placed This Encounter   Procedures   • CT chest wo contrast   • POCT urine dip       ALLERGIES:  Allergies   Allergen Reactions   • Aspirin Other (See Comments)   • Penicillins Rash and Hives   • Sulfa Antibiotics Rash       Current Medications     Current Outpatient Medications   Medication Sig Dispense Refill   • ALPRAZolam (XANAX) 0 25 mg tablet 1/2 tablet p o  twice daily 30 tablet 1   • Ascorbic Acid (VITAMIN C) 1000 MG tablet Take 1,000 mg by mouth daily     • cephalexin (KEFLEX) 500 mg capsule Take 1 capsule (500 mg total) by mouth every 12 (twelve) hours for 7 days 14 capsule 0   • Cholecalciferol (VITAMIN D3) 20 MCG (800 UNIT) TABS Take by mouth     • denosumab (PROLIA) 60 mg/mL Inject 60 mg under the skin once 6 months     • digoxin (LANOXIN) 0 25 mg tablet TAKE 1 TABLET DAILY 90 tablet 3   • diltiazem (CARDIZEM CD) 240 mg 24 hr capsule TAKE 1 CAPSULE DAILY 90 capsule 3   • Ferrous Sulfate 220 (44 Fe) MG/5ML LIQD take 1 teaspoonful by mouth once daily 150 mL 5   • hydrochlorothiazide (HYDRODIURIL) 12 5 mg tablet Take 1 tablet (12 5 mg total) by mouth daily 30 tablet 5   • levothyroxine 25 mcg tablet TAKE 1 TABLET DAILY 90 tablet 3   • loratadine (CLARITIN) 10 mg tablet Take 10 mg by mouth daily     • multivitamin (THERAGRAN) TABS Take 1 tablet by mouth daily     • sertraline (Zoloft) 50 mg tablet Take 1 tablet (50 mg total) by mouth daily 30 tablet 5   • Spiriva HandiHaler 18 MCG inhalation capsule INHALE THE CONTENTS OF 1 CAPSULE DAILY AS DIRECTED 90 capsule 3   • warfarin (COUMADIN) 5 mg tablet TAKE 1 TABLET DAILY 90 tablet 3   • Advair Diskus 250-50 MCG/DOSE inhaler USE 1 INHALATION TWICE A DAY (Patient not taking: Reported on 2/10/2023) 180 blister 3   • albuterol (2 5 mg/3 mL) 0 083 % nebulizer solution USE 1 VIAL IN NEBULIZER EVERY 4 TO 6 HOURS AS NEEDED FOR COUGH AND WHEEZE (Patient not taking: Reported on 2/10/2023) 75 vial 3   • lidocaine (LIDODERM) 5 % Apply 1 patch topically daily Remove & Discard patch within 12 hours or as directed by MD (Patient not taking: Reported on 12/30/2021) 30 patch 3     No current facility-administered medications for this visit           Health Maintenance     Health Maintenance   Topic Date Due • SLP PLAN OF CARE  Never done   • Annual Physical  Never done   • DTaP,Tdap,and Td Vaccines (1 - Tdap) Never done   • COVID-19 Vaccine (3 - Booster for Pfizer series) 06/04/2021   • Influenza Vaccine (1) 06/30/2023 (Originally 9/1/2022)   • Depression Screening  01/03/2024   • Fall Risk  01/06/2024   • Urinary Incontinence Screening  01/06/2024   • BMI: Adult  02/15/2024   • Osteoporosis Screening  Completed   • Pneumococcal Vaccine: 65+ Years  Completed   • HIB Vaccine  Aged Out   • IPV Vaccine  Aged Out   • Hepatitis A Vaccine  Aged Out   • Meningococcal ACWY Vaccine  Aged Out   • HPV Vaccine  Aged Out     Immunization History   Administered Date(s) Administered   • COVID-19 PFIZER VACCINE 0 3 ML IM 03/18/2021, 04/09/2021   • INFLUENZA 12/14/2004, 10/17/2018, 10/15/2020, 09/24/2021   • Influenza Split High Dose Preservative Free IM 09/17/2015, 12/19/2016, 10/23/2017   • Influenza, high dose seasonal 0 7 mL 10/16/2019   • Influenza, seasonal, injectable 11/01/2011, 11/16/2012, 10/25/2013   • Pneumococcal Conjugate 13-Valent 03/17/2016   • Pneumococcal Polysaccharide PPV23 65/24/8210       Kelechi Hightower MD

## 2023-02-15 NOTE — TELEPHONE ENCOUNTER
Please speak to a family member  I forgot to give them information on local podiatrist for her nail problem of her feet    Please give phone number for Dr Melvin Licea or Dr Vincenzo Dalton in [facility] [place] for podiatric care

## 2023-02-15 NOTE — ASSESSMENT & PLAN NOTE
Onychomycosis    Patient was given recommendation for podiatry evaluation for further treatment and general maintenance of nail care

## 2023-02-21 ENCOUNTER — APPOINTMENT (EMERGENCY)
Dept: CT IMAGING | Facility: HOSPITAL | Age: 83
End: 2023-02-21

## 2023-02-21 ENCOUNTER — APPOINTMENT (EMERGENCY)
Dept: RADIOLOGY | Facility: HOSPITAL | Age: 83
End: 2023-02-21

## 2023-02-21 ENCOUNTER — HOSPITAL ENCOUNTER (INPATIENT)
Facility: HOSPITAL | Age: 83
LOS: 2 days | End: 2023-02-24
Attending: EMERGENCY MEDICINE | Admitting: INTERNAL MEDICINE

## 2023-02-21 DIAGNOSIS — R62.7 ADULT FAILURE TO THRIVE: Primary | ICD-10-CM

## 2023-02-21 DIAGNOSIS — I10 PRIMARY HYPERTENSION: ICD-10-CM

## 2023-02-21 DIAGNOSIS — I48.91 ATRIAL FIBRILLATION, UNSPECIFIED TYPE (HCC): ICD-10-CM

## 2023-02-21 DIAGNOSIS — R62.7 FAILURE TO THRIVE IN ADULT: ICD-10-CM

## 2023-02-21 DIAGNOSIS — F32.2 SEVERE DEPRESSION (HCC): ICD-10-CM

## 2023-02-21 DIAGNOSIS — F32.A DEPRESSION: ICD-10-CM

## 2023-02-21 DIAGNOSIS — R53.83 LETHARGY: ICD-10-CM

## 2023-02-21 DIAGNOSIS — F41.8 DEPRESSION WITH ANXIETY: ICD-10-CM

## 2023-02-21 LAB
ALBUMIN SERPL BCP-MCNC: 3.9 G/DL (ref 3.5–5)
ALP SERPL-CCNC: 75 U/L (ref 34–104)
ALT SERPL W P-5'-P-CCNC: 14 U/L (ref 7–52)
ANION GAP SERPL CALCULATED.3IONS-SCNC: 6 MMOL/L (ref 4–13)
AST SERPL W P-5'-P-CCNC: 19 U/L (ref 13–39)
BACTERIA UR QL AUTO: ABNORMAL /HPF
BASOPHILS # BLD AUTO: 0.01 THOUSANDS/ÂΜL (ref 0–0.1)
BASOPHILS NFR BLD AUTO: 0 % (ref 0–1)
BILIRUB SERPL-MCNC: 0.81 MG/DL (ref 0.2–1)
BILIRUB UR QL STRIP: NEGATIVE
BUN SERPL-MCNC: 35 MG/DL (ref 5–25)
CALCIUM SERPL-MCNC: 9.9 MG/DL (ref 8.4–10.2)
CARDIAC TROPONIN I PNL SERPL HS: 24 NG/L
CHLORIDE SERPL-SCNC: 94 MMOL/L (ref 96–108)
CLARITY UR: CLEAR
CO2 SERPL-SCNC: 39 MMOL/L (ref 21–32)
COLOR UR: YELLOW
CREAT SERPL-MCNC: 0.6 MG/DL (ref 0.6–1.3)
DIGOXIN SERPL-MCNC: 2.5 NG/ML (ref 0.8–2)
EOSINOPHIL # BLD AUTO: 0.02 THOUSAND/ÂΜL (ref 0–0.61)
EOSINOPHIL NFR BLD AUTO: 0 % (ref 0–6)
ERYTHROCYTE [DISTWIDTH] IN BLOOD BY AUTOMATED COUNT: 15 % (ref 11.6–15.1)
GFR SERPL CREATININE-BSD FRML MDRD: 83 ML/MIN/1.73SQ M
GLUCOSE SERPL-MCNC: 118 MG/DL (ref 65–140)
GLUCOSE UR STRIP-MCNC: NEGATIVE MG/DL
HCT VFR BLD AUTO: 35.5 % (ref 34.8–46.1)
HGB BLD-MCNC: 12.2 G/DL (ref 11.5–15.4)
HGB UR QL STRIP.AUTO: NEGATIVE
IMM GRANULOCYTES # BLD AUTO: 0.01 THOUSAND/UL (ref 0–0.2)
IMM GRANULOCYTES NFR BLD AUTO: 0 % (ref 0–2)
INR PPP: 3.13 (ref 0.84–1.19)
KETONES UR STRIP-MCNC: NEGATIVE MG/DL
LACTATE SERPL-SCNC: 0.8 MMOL/L (ref 0.5–2)
LEUKOCYTE ESTERASE UR QL STRIP: NEGATIVE
LIPASE SERPL-CCNC: 35 U/L (ref 11–82)
LYMPHOCYTES # BLD AUTO: 0.54 THOUSANDS/ÂΜL (ref 0.6–4.47)
LYMPHOCYTES NFR BLD AUTO: 10 % (ref 14–44)
MCH RBC QN AUTO: 35.9 PG (ref 26.8–34.3)
MCHC RBC AUTO-ENTMCNC: 34.4 G/DL (ref 31.4–37.4)
MCV RBC AUTO: 104 FL (ref 82–98)
MONOCYTES # BLD AUTO: 0.3 THOUSAND/ÂΜL (ref 0.17–1.22)
MONOCYTES NFR BLD AUTO: 6 % (ref 4–12)
MUCOUS THREADS UR QL AUTO: ABNORMAL
NEUTROPHILS # BLD AUTO: 4.47 THOUSANDS/ÂΜL (ref 1.85–7.62)
NEUTS SEG NFR BLD AUTO: 84 % (ref 43–75)
NITRITE UR QL STRIP: NEGATIVE
NON-SQ EPI CELLS URNS QL MICRO: ABNORMAL /HPF
NRBC BLD AUTO-RTO: 0 /100 WBCS
PH UR STRIP.AUTO: 6 [PH]
PLATELET # BLD AUTO: 270 THOUSANDS/UL (ref 149–390)
PMV BLD AUTO: 8.7 FL (ref 8.9–12.7)
POTASSIUM SERPL-SCNC: 3.2 MMOL/L (ref 3.5–5.3)
PROT SERPL-MCNC: 7.4 G/DL (ref 6.4–8.4)
PROT UR STRIP-MCNC: ABNORMAL MG/DL
PROTHROMBIN TIME: 32.4 SECONDS (ref 11.6–14.5)
RBC # BLD AUTO: 3.4 MILLION/UL (ref 3.81–5.12)
RBC #/AREA URNS AUTO: ABNORMAL /HPF
SODIUM SERPL-SCNC: 139 MMOL/L (ref 135–147)
SP GR UR STRIP.AUTO: 1.03 (ref 1–1.03)
UROBILINOGEN UR STRIP-ACNC: 2 MG/DL
WBC # BLD AUTO: 5.35 THOUSAND/UL (ref 4.31–10.16)
WBC #/AREA URNS AUTO: ABNORMAL /HPF

## 2023-02-21 RX ADMIN — SODIUM CHLORIDE 500 ML: 0.9 INJECTION, SOLUTION INTRAVENOUS at 22:53

## 2023-02-22 ENCOUNTER — APPOINTMENT (EMERGENCY)
Dept: CT IMAGING | Facility: HOSPITAL | Age: 83
End: 2023-02-22

## 2023-02-22 PROBLEM — R07.9 CHEST PAIN: Status: RESOLVED | Noted: 2023-02-22 | Resolved: 2023-02-22

## 2023-02-22 PROBLEM — R77.8 ELEVATED TROPONIN: Status: ACTIVE | Noted: 2023-02-22

## 2023-02-22 PROBLEM — R62.7 FAILURE TO THRIVE IN ADULT: Status: ACTIVE | Noted: 2023-02-22

## 2023-02-22 PROBLEM — R07.9 CHEST PAIN: Status: ACTIVE | Noted: 2023-02-22

## 2023-02-22 PROBLEM — R79.89 ELEVATED TROPONIN: Status: ACTIVE | Noted: 2023-02-22

## 2023-02-22 LAB
2HR DELTA HS TROPONIN: 0 NG/L
4HR DELTA HS TROPONIN: 1 NG/L
ANION GAP SERPL CALCULATED.3IONS-SCNC: 6 MMOL/L (ref 4–13)
ATRIAL RATE: 63 BPM
ATRIAL RATE: 64 BPM
BUN SERPL-MCNC: 27 MG/DL (ref 5–25)
CALCIUM SERPL-MCNC: 9 MG/DL (ref 8.4–10.2)
CARDIAC TROPONIN I PNL SERPL HS: 24 NG/L
CARDIAC TROPONIN I PNL SERPL HS: 25 NG/L
CHLORIDE SERPL-SCNC: 97 MMOL/L (ref 96–108)
CO2 SERPL-SCNC: 36 MMOL/L (ref 21–32)
CREAT SERPL-MCNC: 0.45 MG/DL (ref 0.6–1.3)
GFR SERPL CREATININE-BSD FRML MDRD: 92 ML/MIN/1.73SQ M
GLUCOSE SERPL-MCNC: 104 MG/DL (ref 65–140)
INR PPP: 3.33 (ref 0.84–1.19)
MAGNESIUM SERPL-MCNC: 2 MG/DL (ref 1.9–2.7)
P AXIS: 53 DEGREES
P AXIS: 55 DEGREES
POTASSIUM SERPL-SCNC: 3.5 MMOL/L (ref 3.5–5.3)
PR INTERVAL: 196 MS
PR INTERVAL: 212 MS
PROTHROMBIN TIME: 34 SECONDS (ref 11.6–14.5)
QRS AXIS: -27 DEGREES
QRS AXIS: -27 DEGREES
QRSD INTERVAL: 94 MS
QRSD INTERVAL: 96 MS
QT INTERVAL: 360 MS
QT INTERVAL: 370 MS
QTC INTERVAL: 368 MS
QTC INTERVAL: 381 MS
SODIUM SERPL-SCNC: 139 MMOL/L (ref 135–147)
T WAVE AXIS: 109 DEGREES
T WAVE AXIS: 65 DEGREES
TSH SERPL DL<=0.05 MIU/L-ACNC: 1.16 UIU/ML (ref 0.45–4.5)
VENTRICULAR RATE: 63 BPM
VENTRICULAR RATE: 64 BPM

## 2023-02-22 RX ORDER — SODIUM CHLORIDE 9 MG/ML
75 INJECTION, SOLUTION INTRAVENOUS CONTINUOUS
Status: DISPENSED | OUTPATIENT
Start: 2023-02-22 | End: 2023-02-22

## 2023-02-22 RX ORDER — DIGOXIN 125 MCG
125 TABLET ORAL DAILY
Status: DISCONTINUED | OUTPATIENT
Start: 2023-02-23 | End: 2023-02-24 | Stop reason: HOSPADM

## 2023-02-22 RX ORDER — POTASSIUM CHLORIDE 29.8 MG/ML
40 INJECTION INTRAVENOUS ONCE
Status: DISCONTINUED | OUTPATIENT
Start: 2023-02-22 | End: 2023-02-22

## 2023-02-22 RX ORDER — LEVOTHYROXINE SODIUM 0.03 MG/1
25 TABLET ORAL
Status: DISCONTINUED | OUTPATIENT
Start: 2023-02-22 | End: 2023-02-24 | Stop reason: HOSPADM

## 2023-02-22 RX ORDER — MIRTAZAPINE 15 MG/1
15 TABLET, FILM COATED ORAL
Status: DISCONTINUED | OUTPATIENT
Start: 2023-02-22 | End: 2023-02-23

## 2023-02-22 RX ORDER — HYDROCHLOROTHIAZIDE 12.5 MG/1
12.5 TABLET ORAL DAILY
Status: DISCONTINUED | OUTPATIENT
Start: 2023-02-22 | End: 2023-02-24 | Stop reason: HOSPADM

## 2023-02-22 RX ORDER — FLUTICASONE FUROATE AND VILANTEROL 100; 25 UG/1; UG/1
1 POWDER RESPIRATORY (INHALATION)
Status: DISCONTINUED | OUTPATIENT
Start: 2023-02-22 | End: 2023-02-24 | Stop reason: HOSPADM

## 2023-02-22 RX ORDER — HYDRALAZINE HYDROCHLORIDE 20 MG/ML
10 INJECTION INTRAMUSCULAR; INTRAVENOUS ONCE
Status: COMPLETED | OUTPATIENT
Start: 2023-02-22 | End: 2023-02-22

## 2023-02-22 RX ORDER — ALBUTEROL SULFATE 2.5 MG/3ML
2.5 SOLUTION RESPIRATORY (INHALATION) EVERY 6 HOURS PRN
Status: DISCONTINUED | OUTPATIENT
Start: 2023-02-22 | End: 2023-02-24 | Stop reason: HOSPADM

## 2023-02-22 RX ORDER — ACETAMINOPHEN 325 MG/1
650 TABLET ORAL EVERY 6 HOURS PRN
Status: DISCONTINUED | OUTPATIENT
Start: 2023-02-22 | End: 2023-02-24 | Stop reason: HOSPADM

## 2023-02-22 RX ORDER — LABETALOL HYDROCHLORIDE 5 MG/ML
10 INJECTION, SOLUTION INTRAVENOUS ONCE
Status: COMPLETED | OUTPATIENT
Start: 2023-02-22 | End: 2023-02-22

## 2023-02-22 RX ORDER — DILTIAZEM HYDROCHLORIDE 240 MG/1
240 CAPSULE, COATED, EXTENDED RELEASE ORAL DAILY
Status: DISCONTINUED | OUTPATIENT
Start: 2023-02-22 | End: 2023-02-24 | Stop reason: HOSPADM

## 2023-02-22 RX ORDER — POTASSIUM CHLORIDE 14.9 MG/ML
20 INJECTION INTRAVENOUS
Status: COMPLETED | OUTPATIENT
Start: 2023-02-22 | End: 2023-02-22

## 2023-02-22 RX ADMIN — HYDRALAZINE HYDROCHLORIDE 10 MG: 20 INJECTION INTRAMUSCULAR; INTRAVENOUS at 04:22

## 2023-02-22 RX ADMIN — SODIUM CHLORIDE 75 ML/HR: 0.9 INJECTION, SOLUTION INTRAVENOUS at 04:22

## 2023-02-22 RX ADMIN — DILTIAZEM HYDROCHLORIDE 240 MG: 240 CAPSULE, COATED, EXTENDED RELEASE ORAL at 08:00

## 2023-02-22 RX ADMIN — LABETALOL HYDROCHLORIDE 10 MG: 5 INJECTION, SOLUTION INTRAVENOUS at 05:56

## 2023-02-22 RX ADMIN — POTASSIUM CHLORIDE 20 MEQ: 14.9 INJECTION, SOLUTION INTRAVENOUS at 05:56

## 2023-02-22 RX ADMIN — SERTRALINE HYDROCHLORIDE 50 MG: 50 TABLET ORAL at 08:00

## 2023-02-22 RX ADMIN — POTASSIUM CHLORIDE 20 MEQ: 14.9 INJECTION, SOLUTION INTRAVENOUS at 04:25

## 2023-02-22 RX ADMIN — MIRTAZAPINE 15 MG: 15 TABLET, FILM COATED ORAL at 20:45

## 2023-02-22 RX ADMIN — SODIUM CHLORIDE 500 ML: 0.9 INJECTION, SOLUTION INTRAVENOUS at 02:31

## 2023-02-22 RX ADMIN — LEVOTHYROXINE SODIUM 25 MCG: 25 TABLET ORAL at 05:57

## 2023-02-22 RX ADMIN — HYDROCHLOROTHIAZIDE 12.5 MG: 12.5 TABLET ORAL at 08:00

## 2023-02-22 NOTE — ASSESSMENT & PLAN NOTE
Home medications include Cardizem, digoxin, warfarin  INR-3 13 supratherapeutic (2/22) and 4 58 (2/23)  PT 34 (2/22) and 43 6 (2/23)  Digoxin level-2 5 supratherapeutic  EKG- sinus rhythm 64  Digoxin held (2/22) and restarted (2/23)    Plan-  Hold warfarin for now   Repeat PT/INR

## 2023-02-22 NOTE — ASSESSMENT & PLAN NOTE
Home medications include levothyroxine 25 QD   Medication compliance question  TSH levels normal (2/21)    Plan-  Continue levothyroxine med

## 2023-02-22 NOTE — PHYSICAL THERAPY NOTE
PHYSICAL THERAPY EVALUATION NOTE      Patient Name: Jose Ramirez  CQFPU'A Date: 2023    AGE:   79 y o  Mrn:   [id number]  ADMIT DX:  Adult failure to thrive [R62 7]  Lethargy [R53 83]    Past Medical History:   Diagnosis Date    Anti-phospholipid syndrome (HCC)     Asthma     Blood type O+     Cardiac disease     Chronic pain     COPD (chronic obstructive pulmonary disease) (HCC)     Fracture of ankle     left    Hyperlipidemia     Hypertension     Osteoporosis      Length Of Stay: 0  PHYSICAL THERAPY EVALUATION :   Patient's identity confirmed via 2 patient identifiers (full name and ) at start of session       23 0820   PT Last Visit   PT Visit Date 23   Note Type   Note type Evaluation   Pain Assessment   Pain Assessment Tool 0-10   Pain Score No Pain   Restrictions/Precautions   Weight Bearing Precautions Per Order No   Other Precautions Cognitive; Chair Alarm; Bed Alarm;Multiple lines;O2;Fall Risk   Home Living   Type of Home House   Home Layout Two level  (0 SWETA)   Bathroom Shower/Tub Tub/shower unit   Home Equipment [employer]   Additional Comments Pt reports living home alone w/ her cat  She uses a cane for "stability"   Prior Function   Level of [place] Independent with ADLs; Independent with functional mobility   Lives With (S)  Alone   Receives Help From Family  (reports kids live locally)   IADLs Family/Friend/Other provides transportation   Falls in the last 6 months 0  (pt declines)   Vocational Retired   General   Family/Caregiver Present No   Cognition   Overall Cognitive Status Impaired   Arousal/Participation Responsive   Attention Attends with cues to redirect   Orientation Level Oriented to person   Memory Within functional limits   Following Commands Follows one step commands inconsistently   Comments Pt w/ a very flat affect, significantly increased time to respond to questions   Appears anxious/panic-y  Subjective   Subjective "My cane has now disappeared with my purse and my keys to my house  I have a cat that's now going to die since I can't feed it"   RLE Assessment   RLE Assessment WFL   Strength RLE   RLE Overall Strength 3+/5   LLE Assessment   LLE Assessment WFL   Strength LLE   LLE Overall Strength 3+/5   Bed Mobility   Supine to Sit 4  Minimal assistance   Additional items Assist x 1; Increased time required;Verbal cues;LE management;HOB elevated  (to pt's R)   Additional Comments Pt is able to sit EOB w/ supervision balance   Transfers   Sit to Stand 4  Minimal assistance   Additional items Assist x 1   Stand to Sit 4  Minimal assistance   Additional items Assist x 1; Increased time required;Verbal cues   Ambulation/Elevation   Gait pattern Decreased foot clearance; Short stride   Gait Assistance 3  Moderate assist   Additional items Assist x 1   Assistive Device SPC   Distance 3 ft  (to commode)   Balance   Static Sitting Fair   Static Standing Poor +   Ambulatory Poor  (w/ SPC)   Activity Tolerance   Activity Tolerance Patient limited by fatigue   Medical Staff [place] coordination w/ OT Pam Jovel   Nurse Made Aware Spoke to CIPRIANO Kline   Assessment   Problem List Decreased strength;Decreased endurance; Impaired balance;Decreased mobility; Decreased cognition   Assessment Nereida Santamaria is a 79 y o  Female who [facility] from home w/ c/o decreased PO intake, fatigue, difficulty breathing and diagnosis of FTT  Orders for PT eval and treat received  Pt presents w/ comorbidities of COPD, HTN, antiphospholipid antibody syndrome, hypothyroidism, osteoporosis, HTN, anxiety  At baseline, pt mobilizes mod I w/ SPC, and reports 0 falls in the last 6 months  Upon evaluation, pt presents w/ the following deficits: weakness, impaired balance, decreased endurance and impaired cognition   Upon eval, pt requires min A x 1 for bed mobility, min A x 1 for transfers, and mod A x 1 for gait  Given the above findings, discharge recommendation is for Post-acute inpatient rehabilitation  During this admission, pt would benefit from continued skilled inpatient PT in the acute care setting in order to address the abovementioned deficits to maximize function and mobility before DC from acute care  Goals   Patient Goals pt reports wanting to feed her cat so she doesn't die   STG Expiration Date 03/04/23   Short Term Goal #1 Patient will: Increase bilateral LE strength 1/2 grade to facilitate independent mobility, Perform all bed mobility tasks w/ supervision to improve pt's independence w/ repositioning for decrease risk of skin breakdown, Perform all transfers w/ supervision consistently from various height surfaces in order to improve I w/ engagement w/ real-world environments/situations, Ambulate at least 150 ft  with least restrictive assistive device w/ supervision w/o LOB to facilitate return and engagement w/ previous living environment, Increase all balance 1/2 grade to decrease risk for falls, Complete exercise program independently and Tolerate 3 hr OOB to faciliate upright tolerance, PT to see for stairs when appropriate   PT Treatment Day 0   Plan   Treatment/Interventions Functional transfer training;LE strengthening/ROM; Therapeutic exercise; Endurance training;Cognitive reorientation;Patient/family training;Gait training;Bed mobility; Equipment eval/education   PT Frequency 3-5x/wk   Recommendation   PT Discharge Recommendation Post acute rehabilitation services   Equipment Recommended Walker   Additional Comments (S)  Pt would likely benefit from a psych consult given onset of symptoms after passing of pt's brother  Pt also apparently very anxious at baseline     AM-PAC Basic Mobility Inpatient   Turning in Flat Bed Without Bedrails 3   Lying on Back to Sitting on Edge of Flat Bed Without Bedrails 3   Moving Bed to Chair 3   Standing Up From Chair Using Arms 3   Walk in Room 2 Climb 3-5 Stairs With Railing 1   Basic Mobility Inpatient Raw Score 15   Basic Mobility Standardized Score 36 97   Highest Level Of Mobility   -[facility] Goal 4: Move to chair/commode   -[facility] Achieved 5: Stand (1 or more minutes)   Additional Treatment Session   Treatment Assessment Pt seated OOB on commode  Agreeable to trial RW for functional mobility  Pt requires min A x 1 for STS from commode w/ VC for hand placement  Pt then is able to ambulate 6 ft w/ RW w/ min A x 1 w/ VC for RW management  Pt also reports feeling tired  Pt OOB in recliner chair at end of session   Equipment Use RW   Additional Treatment Day 1   End of Consult   Patient Position at End of Consult Bedside chair;Bed/Chair alarm activated; All needs within reach         The patient's AM-PAC Basic Mobility Inpatient Short Form Raw Score is 15, Standardized Score is 36 97  A standardized score less than 38 32 (raw score of 16) suggests the patient may benefit from discharge to post-acute rehabilitation services which may not coincide with above PT recommendations  However please refer to therapist recommendation for discharge planning given other factors that may influence destination      Pt would benefit from skilled inpatient PT during this admission in order to facilitate progress towards goals to maximize functional independence    Dean Wade, PT, DPT

## 2023-02-22 NOTE — ASSESSMENT & PLAN NOTE
Home medications include Warfarin   INR supratherapeutic 3 13 (2/22) and 4 58 (2/23)  PT 34 0 (2/22) and 43 6 (2/23)  Digoxin held (2/22) and restarted (2/23)    Plan-   Hold warfarin  Repeat PT/INR

## 2023-02-22 NOTE — OCCUPATIONAL THERAPY NOTE
Occupational Therapy Evaluation     Patient Name: Amanda Serna  [id number] Date: 2/22/2023  Problem List  Principal Problem:    Failure to thrive in adult  Active Problems:    COPD (chronic obstructive pulmonary disease) (HCC)    Hypertension    Antiphospholipid antibody syndrome (HCC)    Atrial fibrillation ([address] )    Hypothyroidism    Past Medical History  Past Medical History:   Diagnosis Date    Anti-phospholipid syndrome (HCC)     Asthma     Blood type O+     Cardiac disease     Chronic pain     COPD (chronic obstructive pulmonary disease) (HCC)     Fracture of ankle     left    Hyperlipidemia     Hypertension     Osteoporosis      Past Surgical History  Past Surgical History:   Procedure Laterality Date    APPENDECTOMY      BILATERAL OOPHORECTOMY Bilateral     ORIF TIBIAL SHAFT FRACTURE W/ PLATES AND SCREWS Right              02/22/23 0819   OT Last Visit   OT Visit Date 02/22/23   Note Type   Note type Evaluation   Pain Assessment   Pain Assessment Tool 0-10   Pain Score No Pain   Restrictions/Precautions   Weight Bearing Precautions Per Order No   Other Precautions Chair Alarm; Bed Alarm;Multiple lines;Telemetry; Fall Risk;O2;Cognitive   Home Living   Type of [facility] Two level;Bed/bath upstairs;Stairs to enter with rails  (0 SWETA through front door, 12 SWETA with rails through back door)   Bathroom Shower/Tub Tub/shower unit   Bathroom Toilet Standard   Bathroom Equipment   (none)   [address]   Additional Comments pt reports using cane for functional mobility at baseline   Prior Function   Level of [place] Independent with ADLs; Independent with functional mobility   Lives With Alone   Receives Help From Family  (son)   IADLs Independent with driving; Independent with meal prep; Independent with medication management   Falls in the last 6 months 0   Vocational Retired   Lifestyle   Autonomy PTA, pt living alone in [facility], (I) with ADLs, (I) with IADLs, (+) driving, (-) falls, and use of cane for functional mobility  Reciprocal Relationships son lives nearby   Service to Others retired   Intrinsic Gratification used to like to garden and [address] "I crotcheted placemats"   ADL   Eating Assistance 5  Supervision/Setup   Grooming Assistance 5  Supervision/Setup   UB Bathing Assistance 4  Minimal Assistance   LB [address]  [address] 2  Maximal Assistance   LB [address] into underwear; Thread LLE into underwear;Don/doff L sock; Supervision/safety;Verbal cueing;Setup; Increased time to complete  (pt don/doff socks at EOB  pt don underwear sitting at [facility])   Toileting Assistance  3  Moderate Assistance   Toileting Deficit Supervison/safety;Setup; Increased time to complete; Bedside commode;Clothing management up  (at [facility])   Additional Comments While unable to assess eating, grooming, UB bathing, LB bathing and UB dressing at time of evaluation, with use of clinical reasoning, pt's performance throughout evaluation indicates that pt may be able to perform these tasks at the levels listed above  Bed Mobility   Supine to Sit 4  Minimal assistance   Additional items Assist x 1; Increased time required;HOB elevated;Verbal cues;LE management   Additional Comments pt sat at EOB for 3-5 minutes don socks   Transfers   Sit to Stand 4  Minimal assistance   Additional items Assist x 1; Increased time required;Verbal cues  (min Ax1, verbal cues for hand placement)   Stand to Sit 4  Minimal assistance   Additional items Assist x 1; Increased time required;Verbal cues  (min Ax1, verbal cues for hand placement)   Toilet transfer 4  Minimal assistance   Additional items Assist x 1; Increased time required;Verbal cues; Commode  (use of RW, verbal cues for hand placement)   Additional Comments use of cane and RW   Functional Mobility   Functional Mobility 3  Moderate assistance   Additional Comments Ax1, mod Ax1 with cane from EOB>BSC  min Ax1 with RW from [employer]   Additional items Rolling walker;SPC   Balance   Static Sitting Fair   Dynamic Sitting Fair -   Static Standing Poor +   Dynamic Standing Poor   Ambulatory Poor   Activity Tolerance   Activity Tolerance Patient limited by fatigue   Medical Staff Made Aware RN Omar Martinez, PT Natalya   RUE Assessment   RUE Assessment WFL   LUE Assessment   LUE Assessment WFL   Hand Function   Gross Motor Coordination Functional   Fine Motor Coordination Functional   Cognition   Overall Cognitive Status Impaired   Arousal/Participation Alert   Attention Attends with cues to redirect   Orientation Level Oriented to person  (unable to assess time, place, situation  pt with delayed response  time)   Memory Within functional limits   Following Commands Follows one step commands inconsistently   Comments pt identified via name and   pt alert upon arrival  pt required increased response time when answering questions  Pt selectively responding to questions  Will continue to assess cognition  Assessment   Limitation Decreased ADL status; Decreased Safe judgement during ADL;Decreased cognition;Decreased endurance;Decreased self-care trans;Decreased high-level ADLs   Prognosis Fair   Assessment Pt is a 79 y o  female seen for OT evaluation s/p admission to [address] [place] [address] on 2023 due to generalized weakness  Pt diagnosed with Failure to thrive in adult  Pt has a significant PMH impacting occupational performance including: A-fib, COPD, anxiety, hypertension, and hypothyroidism  Pt with no recent admissions in the last 2 months  PTA, pt living alone in [facility], (I) with ADLs, (I) with IADLs, (+) driving, (-) falls, and use of cane for functional mobility   Personal and environmental factors inhibiting engagement in occupations include limited social support, inaccessible home environment, inaccessible bathroom environment, difficulty completing ADLs and difficulty completing IADLs  During evaluation pt performed as is outlined above in flowsheet  Pt required VC for attention to task and hand placement  Standardized assessments used to assist in identifying performance deficits include AMPAC 6-Clicks and Barthel ADL Index  Performance deficits that affect the pt’s occupational performance during the initial evaluation include impaired balance, functional mobility, endurance, activity tolerance, forward functional reach, functional standing tolerance and overall strength, attention to task, communication and social skills, safety awareness, insight into deficits, orientation and increased response time   Based on pt’s functional performance and deficits the following occupations will be addressed in OT treatments in order to maximize pt’s independence and overall occupational performance: grooming, bathing/showering, toileting and toilet hygiene, dressing and functional mobility  Goals are listed below  Upon discharge from acute care setting recommend d/c to [address] [place] [address]  Goals   Patient Goals pt did not state goals  will continue to assess   LTG Time Frame 10-14   Long Term Goal see goals listed below   Plan   Treatment Interventions ADL retraining;Functional transfer training; Endurance training;Cognitive reorientation;Patient/family training;Equipment evaluation/education; Compensatory technique education; Energy conservation; Activityengagement   Goal Expiration Date 03/08/23   OT Treatment Day 0   OT Frequency 3-5x/wk   Recommendation   OT Discharge Recommendation Post acute rehabilitation services   AM-PAC Daily Activity Inpatient   Lower Body Dressing 2   Bathing 2   Toileting 2   Upper Body Dressing 3   Grooming 3   Eating 3   Daily Activity Raw Score 15   Daily Activity Standardized Score (Calc for Raw Score >=11) 34 69   AM-PAC Applied Cognition Inpatient   Following a Speech/Presentation 2   Understanding Ordinary Conversation 3   Taking Medications 1   Remembering Where Things Are Placed or Put Away 2   Remembering List of 4-5 Errands 1   Taking Care of Complicated Tasks 1   Applied Cognition Raw Score 10   Applied Cognition Standardized Score 24 98   Barthel Index   Feeding 5   Bathing 0   Grooming Score 0   Dressing Score 5   Bladder Score 5   Bowels Score 10   Toilet Use Score 5   Transfers (Bed/Chair) Score 10   Mobility (Level Surface) Score 0   Stairs Score 0   Barthel Index Score 40   End of Consult   Patient Position at End of Consult Bedside chair;Bed/Chair alarm activated; All needs within reach     Pt will perform functional transfers from supine to EOB independently to improve participation in morning routine  Pt will perform functional transfers to/from all surfaces with (S) to improve participation in daily routines  Pt will complete functional mobility from bedroom><bathroom with mi (A) to improve participation in daily routine  Pt will perform UB dressing with mod (I) to improve participation in morning routines  Pt will perform UB bathing with mod (I) to improve participation in ADLs  Pt will perform LB dressing with min (A) to improve participation in ADLs  Pt will perform LB bathing with min (A) to improve participation in morning routines  Pt will perform tolieting tasks with (S) to improve independence in ADL tasks  Pt will performing grooming tasks with mod (I) to improve participation in ADLs  Pt will stand at sink for 2 minutes with (S) to improve standing tolerance during ADL tasks  Pt will participate in on-going cognitive evaluation to ensure safe discharge planning  The patient's raw score on the -PAC Daily Activity Inpatient Short Form is 15  A raw score of less than 19 suggests the patient may benefit from discharge to post-acute rehabilitation services  Please refer to the recommendation of the Occupational Therapist for safe discharge planning  This session, pt required and most appropriately benefited from skilled OT/PT co-eval due to decreased activity tolerance and unpredictable medical and/or functional status  OT and PT goals were addressed separately as seen in documentation       Nasrin Records, OTS

## 2023-02-22 NOTE — ED PROVIDER NOTES
tHistory  Chief Complaint   Patient presents with   • Failure To Thrive     Pt has been having more difficulty breathing  Has a hx of COPD  Family states o2 was in the [de-identified]  Pt has not been eating  Pts daughter believes that pt is depressed and triggered by her brother in laws        Linette Barcenas is a 79-year-old female presenting due to decreased oral intake, fatigue, increased difficulty breathing  Patient history includes COPD, A-fib brought in by her son and daughter due to decreased eating and drinking for about 2 weeks with a decrease in activity  Over the past 2 weeks patient has had times where she did not eat much at all but also times where she will eat a little bit, today she was refusing to eat or drink anything  They think patient has been becoming depressed due to the passing of her brother-in-law about 6 weeks ago  Patient does not answer most questions directed at her and history is coming from son and daughter  Patient does not endorse any recent pain but children say she has been a little nauseous  Patient had a recent UTI that was treated with antibiotics, last dose was taken 2 days ago  Prior to Admission Medications   Prescriptions Last Dose Informant Patient Reported? Taking?    ALPRAZolam (XANAX) 0 25 mg tablet   No No   Si/ tablet p o  twice daily   Advair Diskus 250-50 MCG/DOSE inhaler   No No   Sig: USE 1 INHALATION TWICE A DAY   Patient not taking: Reported on 2/10/2023   Ascorbic Acid (VITAMIN C) 1000 MG tablet   Yes No   Sig: Take 1,000 mg by mouth daily   Cholecalciferol (VITAMIN D3) 20 MCG (800 UNIT) TABS   Yes No   Sig: Take by mouth   Ferrous Sulfate 220 (44 Fe) MG/5ML LIQD   No No   Sig: take 1 teaspoonful by mouth once daily   Spiriva HandiHaler 18 MCG inhalation capsule   No No   Sig: INHALE THE CONTENTS OF 1 CAPSULE DAILY AS DIRECTED   albuterol (2 5 mg/3 mL) 0 083 % nebulizer solution   No No   Sig: USE 1 VIAL IN NEBULIZER EVERY 4 TO 6 HOURS AS NEEDED FOR COUGH AND WHEEZE   Patient not taking: Reported on 2/10/2023   denosumab (PROLIA) 60 mg/mL   Yes No   Sig: Inject 60 mg under the skin once 6 months   digoxin (LANOXIN) 0 25 mg tablet   No No   Sig: TAKE 1 TABLET DAILY   diltiazem (CARDIZEM CD) 240 mg 24 hr capsule   No No   Sig: TAKE 1 CAPSULE DAILY   hydrochlorothiazide (HYDRODIURIL) 12 5 mg tablet   No No   Sig: Take 1 tablet (12 5 mg total) by mouth daily   levothyroxine 25 mcg tablet   No No   Sig: TAKE 1 TABLET DAILY   lidocaine (LIDODERM) 5 %   No No   Sig: Apply 1 patch topically daily Remove & Discard patch within 12 hours or as directed by MD   Patient not taking: Reported on 12/30/2021   loratadine (CLARITIN) 10 mg tablet   Yes No   Sig: Take 10 mg by mouth daily   multivitamin (THERAGRAN) TABS   Yes No   Sig: Take 1 tablet by mouth daily   sertraline (Zoloft) 50 mg tablet   No No   Sig: Take 1 tablet (50 mg total) by mouth daily   warfarin (COUMADIN) 5 mg tablet   No No   Sig: TAKE 1 TABLET DAILY      Facility-Administered Medications: None       Past Medical History:   Diagnosis Date   • Anti-phospholipid syndrome (HCC)    • Asthma    • Blood type O+    • Cardiac disease    • Chronic pain    • COPD (chronic obstructive pulmonary disease) (HCC)    • Fracture of ankle     left   • Hyperlipidemia    • Hypertension    • Osteoporosis        Past Surgical History:   Procedure Laterality Date   • APPENDECTOMY     • BILATERAL OOPHORECTOMY Bilateral    • ORIF TIBIAL SHAFT FRACTURE W/ PLATES AND SCREWS Right        Family History   Problem Relation Age of Onset   • Hypertension Mother    • Skin cancer Mother    • Hypertension Father      I have reviewed and agree with the history as documented      E-Cigarette/Vaping   • E-Cigarette Use Never User      E-Cigarette/Vaping Substances   • Nicotine No    • THC No      Social History     Tobacco Use   • Smoking status: Never   • Smokeless tobacco: Never   • Tobacco comments:     Never smoker per [employer]   Vaping Use • Vaping Use: Never used   Substance Use Topics   • Alcohol use: No   • Drug use: No        Review of Systems   Constitutional: Positive for activity change (Decreased) and appetite change (Decreased)  Negative for chills and fever  HENT: Negative for congestion and rhinorrhea  Respiratory: Positive for shortness of breath  Negative for cough  Cardiovascular: Negative for chest pain and palpitations  Gastrointestinal: Positive for nausea  Negative for abdominal pain and vomiting  Neurological: Negative for dizziness, speech difficulty, weakness and headaches  Physical Exam  ED Triage Vitals [02/21/23 1937]   Temperature Pulse Respirations Blood Pressure SpO2   97 7 °F (36 5 °C) (!) 53 20 170/72 93 %      Temp Source Heart Rate Source Patient Position - Orthostatic VS BP Location FiO2 (%)   Oral Monitor Sitting Right arm --      Pain Score       --             Orthostatic Vital Signs  Vitals:    02/21/23 1937 02/21/23 2300   BP: 170/72 (!) 184/81   Pulse: (!) 53 58   Patient Position - Orthostatic VS: Sitting Lying       Physical Exam  Vitals and nursing note reviewed  Constitutional:       General: She is not in acute distress  Appearance: She is well-developed  Comments: Patient does not answer many questions   HENT:      Head: Normocephalic and atraumatic  Nose: Nose normal  No congestion  Mouth/Throat:      Mouth: Mucous membranes are dry  Pharynx: Oropharynx is clear  No oropharyngeal exudate or posterior oropharyngeal erythema  Eyes:      General: No scleral icterus  Extraocular Movements: Extraocular movements intact  Conjunctiva/sclera: Conjunctivae normal       Pupils: Pupils are equal, round, and reactive to light  Cardiovascular:      Rate and Rhythm: Normal rate and regular rhythm  Pulses: Normal pulses  Heart sounds: Normal heart sounds  No murmur heard  Pulmonary:      Effort: Pulmonary effort is normal  No respiratory distress  Breath sounds: Normal breath sounds  Chest:      Chest wall: No tenderness  Abdominal:      General: Abdomen is flat  Bowel sounds are normal       Palpations: Abdomen is soft  Tenderness: There is abdominal tenderness (Nonlocalized)  Musculoskeletal:         General: No deformity or signs of injury  Cervical back: Neck supple  No tenderness  Right lower leg: Edema present  Left lower leg: Edema present  Skin:     General: Skin is warm and dry  Neurological:      Mental Status: She is alert  ED Medications  Medications   sodium chloride 0 9 % bolus 500 mL (has no administration in time range)   sodium chloride 0 9 % bolus 500 mL (500 mL Intravenous New Bag 2/21/23 2253)       Diagnostic Studies  Results Reviewed     Procedure Component Value Units Date/Time    HS Troponin I 2hr [[id number]] Collected: 02/22/23 0142    Lab Status: In process Specimen: Blood from Arm, Right Updated: 02/22/23 0149    HS Troponin I 4hr [[id number]]     Lab Status: No result Specimen: Blood     Protime-INR [[id number]]  (Abnormal) Collected: 02/21/23 2251    Lab Status: Final result Specimen: Blood from Arm, Right Updated: 02/21/23 2331     Protime 32 4 seconds      INR 3 13    HS Troponin 0hr (reflex protocol) [[id number]]  (Normal) Collected: 02/21/23 2251    Lab Status: Final result Specimen: Blood from Arm, Right Updated: 02/21/23 2330     hs TnI 0hr 24 ng/L     Lactic acid [[id number]]  (Normal) Collected: 02/21/23 2251    Lab Status: Final result Specimen: Blood from Arm, Right Updated: 02/21/23 2324     LACTIC ACID 0 8 mmol/L     Narrative:      Result may be elevated if tourniquet was used during collection      Lipase [[id number]]  (Normal) Collected: 02/21/23 2251    Lab Status: Final result Specimen: Blood from Arm, Right Updated: 02/21/23 2323     Lipase 35 u/L     Comprehensive metabolic panel [[id number]]  (Abnormal) Collected: 02/21/23 2251    Lab Status: Final result Specimen: Blood from Arm, Right Updated: 02/21/23 2323     Sodium 139 mmol/L      Potassium 3 2 mmol/L      Chloride 94 mmol/L      CO2 39 mmol/L      ANION GAP 6 mmol/L      BUN 35 mg/dL      Creatinine 0 60 mg/dL      Glucose 118 mg/dL      Calcium 9 9 mg/dL      AST 19 U/L      ALT 14 U/L      Alkaline Phosphatase 75 U/L      Total Protein 7 4 g/dL      Albumin 3 9 g/dL      Total Bilirubin 0 81 mg/dL      eGFR 83 ml/min/1 73sq m     Narrative:      [employer] guidelines for Chronic Kidney Disease (CKD):   •  Stage 1 with normal or high GFR (GFR > 90 mL/min/1 73 square meters)  •  Stage 2 Mild CKD (GFR = 60-89 mL/min/1 73 square meters)  •  Stage 3A Moderate CKD (GFR = 45-59 mL/min/1 73 square meters)  •  Stage 3B Moderate CKD (GFR = 30-44 mL/min/1 73 square meters)  •  Stage 4 Severe CKD (GFR = 15-29 mL/min/1 73 square meters)  •  Stage 5 End Stage CKD (GFR <15 mL/min/1 73 square meters)  Note: GFR calculation is accurate only with a steady state creatinine    Digoxin level [[id number]]  (Abnormal) Collected: 02/21/23 2251    Lab Status: Final result Specimen: Blood from Arm, Right Updated: 02/21/23 2323     Digoxin Lvl 2 5 ng/mL     CBC and differential [[id number]]  (Abnormal) Collected: 02/21/23 2251    Lab Status: Final result Specimen: Blood from Arm, Right Updated: 02/21/23 2317     WBC 5 35 Thousand/uL      RBC 3 40 Million/uL      Hemoglobin 12 2 g/dL      Hematocrit 35 5 %       fL      MCH 35 9 pg      MCHC 34 4 g/dL      RDW 15 0 %      MPV 8 7 fL      Platelets 063 Thousands/uL      nRBC 0 /100 WBCs      Neutrophils Relative 84 %      Immat GRANS % 0 %      Lymphocytes Relative 10 %      Monocytes Relative 6 %      Eosinophils Relative 0 %      Basophils Relative 0 %      Neutrophils Absolute 4 47 Thousands/µL      Immature Grans Absolute 0 01 Thousand/uL      Lymphocytes Absolute 0 54 Thousands/µL      Monocytes Absolute 0 30 Thousand/µL      Eosinophils Absolute 0 02 Thousand/µL      Basophils Absolute 0 01 Thousands/µL     Urine Microscopic [[id number]]  (Abnormal) Collected: 02/21/23 2244    Lab Status: Final result Specimen: Urine, Clean Catch Updated: 02/21/23 2310     RBC, UA 1-2 /hpf      WBC, UA 2-4 /hpf      Epithelial Cells None Seen /hpf      Bacteria, UA None Seen /hpf      MUCUS THREADS Occasional    UA w Reflex to Microscopic w Reflex to Culture [[id number]]  (Abnormal) Collected: 02/21/23 2244    Lab Status: Final result Specimen: Urine, Clean Catch Updated: 02/21/23 2307     Color, UA Yellow     Clarity, UA Clear     Specific Gravity, UA 1 027     pH, UA 6 0     Leukocytes, UA Negative     Nitrite, UA Negative     Protein, UA 30 (1+) mg/dl      Glucose, UA Negative mg/dl      Ketones, UA Negative mg/dl      Urobilinogen, UA 2 0 mg/dl      Bilirubin, UA Negative     Occult Blood, UA Negative                 CT abdomen pelvis wo contrast   Final Result by Des Burch DO (02/22 0143)      Small right-sided femoral hernia contains several normal-appearing loops of bowel (axial image 131, series 301)  No discrete evidence of bowel obstruction  Enlarged fibroid uterus  Age-indeterminate osteoporotic compression fractures as described  Skin thickening of the bilateral breasts, left greater than right, possibly related to prior intervention/radiation therapy  Correlation with patient's symptoms recommended  Dedicated breast imaging may be considered nonemergently as clinically    warranted  Cardiomegaly, coronary atherosclerosis, cholelithiasis without discrete evidence of acute cholecystitis, colonic diverticulosis without evidence of acute diverticulitis and other findings as above        Workstation performed: [id number]         XR chest 1 view portable   ED Interpretation by Marcie Dawn MD (02/21 2334)   No acute cardiopulmonary disease      CT head without contrast    (Results Pending)         Procedures  ECG 12 Lead Documentation Only    Date/Time: 2/22/2023 1:15 AM  Performed by: Daryn Guzmán MD  Authorized by: Daryn Guzmán MD     Patient location:  ED  Previous ECG:     Previous ECG:  Compared to current    Comparison ECG info:  PVC present    Similarity:  Changes noted  Interpretation:     Interpretation: abnormal    Rate:     ECG rate:  64    ECG rate assessment: normal    Rhythm:     Rhythm: sinus rhythm    Ectopy:     Ectopy: PVCs      PVCs:  Infrequent  QRS:     QRS axis:  Normal    QRS intervals:  Normal  ST segments:     ST segments:  Normal  T waves:     T waves: normal            ED Course  ED Course as of 02/22/23 0213   Tue Feb 21, 2023   2334 XR chest 1 view portable  No acute cardiopulmonary disease  2334 Vitals reviewed, bradycardia   2335 hs TnI 0hr: 24  Elevated, will repeat 2 hr   Wed Feb 22, 2023   0018 LACTIC ACID: 0 8   0018 Urine Microscopic(!)  No UTI   0103 Patient dehydrated, IV NS given   0149 Small right-sided femoral hernia contains several normal-appearing loops of bowel (axial image 131, series 301)  No discrete evidence of bowel obstruction        Enlarged fibroid uterus      Age-indeterminate osteoporotic compression fractures as described      Skin thickening of the bilateral breasts, left greater than right, possibly related to prior intervention/radiation therapy  Correlation with patient's symptoms recommended  Dedicated breast imaging may be considered nonemergently as clinically   warranted      Cardiomegaly, coronary atherosclerosis, cholelithiasis without discrete evidence of acute cholecystitis, colonic diverticulosis without evidence of acute diverticulitis and other findings as above  0046 Patient discussed with inpatient team and admitted inpatient under Dr Schwartz               HEART Risk Score    Flowsheet Row Most Recent Value   Heart Score Risk Calculator    History 1 Filed at: 02/22/2023 0140   ECG 1 Filed at: 02/22/2023 0140   Age 2 Filed at: 02/22/2023 0140   Risk Factors 2 Filed at: 02/22/2023 0140   Troponin 1 Filed at: 02/22/2023 0140   HEART Score 7 Filed at: 02/22/2023 0140                      SBIRT 20yo+    Flowsheet Row Most Recent Value   SBIRT (25 yo +)    In order to provide better care to our patients, we are screening all of our patients for alcohol and drug use  Would it be okay to ask you these screening questions? Yes Filed at: 02/21/2023 2106   Initial Alcohol Screen: US AUDIT-C     1  How often do you have a drink containing alcohol? 0 Filed at: 02/21/2023 2106   2  How many drinks containing alcohol do you have on a typical day you are drinking? 0 Filed at: 02/21/2023 2106   3a  Male UNDER 65: How often do you have five or more drinks on one occasion? 0 Filed at: 02/21/2023 2106   3b  FEMALE Any Age, or MALE 65+: How often do you have 4 or more drinks on one occassion? 0 Filed at: 02/21/2023 2106   Audit-C Score 0 Filed at: 02/21/2023 2106   [place]: How many times in the past year have you    Used an illegal drug or used a prescription medication for non-medical reasons? Never Filed at: 02/21/2023 2106                MDM      Disposition  Final diagnoses:   Adult failure to thrive   Lethargy     Time reflects when diagnosis was documented in both MDM as applicable and the Disposition within this note     Time User Action Codes Description Comment    2/22/2023  2:10 AM Vickye Fruits Add [R62 7] Adult failure to thrive     2/22/2023  2:11 AM Vickye Fruits Add [R53 83] Lethargy       ED Disposition     ED Disposition   Admit    Condition   Stable    Date/Time   Wed Feb 22, 2023  2:10 AM    Comment   Case was discussed with Resident and the patient's admission status was agreed to be Admission Status: inpatient status to the service of Dr Jolanta Stubbs   Follow-up Information    None         Patient's Medications   Discharge Prescriptions    No medications on file     No discharge procedures on file      PDMP Review     None           ED Provider  Attending physically available and gene Ramirez I managed the patient along with the ED Attending      Electronically Signed by         Saran Morales MD  02/22/23 4791

## 2023-02-22 NOTE — ASSESSMENT & PLAN NOTE
Home medications include Advair, Albuterol, Spiriva  Son suspects medication non compliance   Denies SOB, cough, congestion, fevers   93% on RA     Plan-  Continue home medications

## 2023-02-22 NOTE — ASSESSMENT & PLAN NOTE
POA /72  Repeat during examination 201/81  Home medications include hydrochlorothiazide  Son is unsure if patient was compliant with home medications  Hydralazine 10 mg given once (2/23)   Remains hypertensive within last 24hr    Plan-  Continue vital signs as per routine  Continue hydrochlorothiazide

## 2023-02-22 NOTE — CONSULTS
Consultation - Geriatric Medicine   Jose Ramirez 79 y o  female MRN: [id number]  Unit/Bed#: S -01 Encounter: [id number]      Assessment/Plan   Failure to thrive in adult  Unclear etiology, could be secondary to depression/anxiety-but also could be other things  Progressively worsening lethargy and generalized weakness  With noted poor appetite and decreased p o  intake  Patient did report that she has not eaten anything in about a week  She does report that she is not eating because she cannot swallow, speech therapy consult placed  CT of the head was negative  CT chest abdomen pelvis showed small right-sided femoral hernia, enlarged fibroid uterus, age-indeterminate osteoporotic compression fractures, skin thickening of the bilateral breasts, cardiomegaly, coronary atherosclerosis, cholelithiasis, colonic diverticulosis- see scanned for full report  Nutrition consult placed for supplement recommendations  Continue IVF  Further management per primary     Depression/anxiety  Chronic and worse since loss of her brother in law about 6 weeks ago  The son states that she has always had anxiety but lately over the last couple months she has become extremely more anxious and paranoid about things  Her depression got worse after the loss of her brother-in-law  Was started on Xanax a couple weeks ago to assist with anxiety  Per her son the Xanax just made her zombie like in which she would stare off in space  Was started on Zoloft 50 mg daily about 1 week ago and her xanax dose was decreased to 0 125mg bid prn  Patient with flat affect and minimally wanting to participate in exam  Would not answer questions about SI or HI  Discussed history, examination, and medications with Dr Karol Celestin, recommended psych consult and possibly starting Remeron  May also benefit from discontinuing Zoloft and trialing Remeron 7 5 mg nightly which could help assist her with appetite, depression, and insomnia  Last QTc 381 from EKG done today, stable  Will place psych consult and see what their recommendations would be as her depression is severe and she is having some episodes of paranoia    Insomnia  First-line treatment is behavior modification  Maintain sleep-wake cycle, avoid nighttime interruptions  Avoid caffeine throughout the day  Avoid napping throughout the day  Encourage physical activity throughout the day  Could trial melatonin 3 mg nightly  May benefit from low-dose Remeron that may help with her insomnia, depression, and poor appetite    Hypertension  Blood pressures have been elevated since admission  Blood pressure this morning 183/76  Currently on Cardizem 240 mg daily, hydrochlorothiazide 12 5 mg daily  Management per primary    Hypothyroidism  Last TSH 1 156  Currently on levothyroxine 25 mcg daily  Follow-up with PCP on discharge    A-fib  Currently rate controlled and asymptomatic  On Coumadin for anticoagulation  Other medications digoxin and Cardizem   Management per primary    [address]  Patient does have hearing impairment   Hearing impairment  correlated with depression, cognitive impairment, delirium and falls in the older adult  Speak clearly  Use sound amplifier  Speak face to face  Use clear dictation and enunciation of words    Vision impairment  Patient does have vision impairment  Wears glasses at baseline, at home at this time  Recommend use of corrective lenses at all appropriate times  Encourage adequate lighting and encourage use of assistance with ambulation  Keep personal belongings close to avoid reaching  Encourage appropriate footwear at all times  Recommend large font for printed materials provided to patient      Antiphospholipid antibody syndrome  On Coumadin for anticoagulation  INR supratherapeutic on admission at 3 13  INR this morning 3 33  Management per primary    COPD  Home medications include Advair, albuterol, and Spiriva  Anterior patient is taking medications at appropriately  Respiratory status currently stable on room air  Continue home inhalers  Management per primary    Osteoporosis  Receives Prolia injections every 6 months at her PCP office    Low back pain without sciatica  Chronic, was previously doing lidocaine patches  Was referred to [facility] pain management in [facility] for evaluation by her PCP  Does not appear to have gone to that appointment at this time    Dysphagia  Patient reports that she is having trouble swallowing and things are getting stuck and that is why she is not eating  Speech therapy consult placed  May need to consider GI consult pending speech eval    Frailty  Clinical Frail Scale: 6 living with moderate fraility  Total protein 7 4 and albumin 3 9  Encourage adequate hydration and nutrition, including protein in meals  OOB as tolerated  PT/OT consulted  Encourage early and frequent moblization    Ambulatory dysfunction  At a baseline ambulates with cane  PT/OT following  Fall precautions  Out of bed as tolerated  Encourage early and frequent mobilization  Encourage adequate hydration and nutrition  Provide adequate pain management   Goal is to STR? Continue with PT/OT for continued strength and balance training     Home medication review  Albuterol nebulizer 1 vial every 4-6 hours as needed for coughing or wheezing  Xanax 0 25 mg take half tablet twice daily  Vitamin C 1000 mg daily  Digoxin 0 25 mg daily  Cardizem 240 mg daily  Ferrous sulfate 220 mg daily  Advair 1 puff twice daily  Hydrochlorothiazide 12 5 mg daily  Levothyroxine 25 mcg daily  Claritin 10 mg daily  Multivitamin daily  Sertraline 50 mg daily  Spiriva 1 capsule daily  Coumadin 5 mg daily    Personally confirmed with medication list on visit with PCP Dr Hermelindo Lopez on 6/89/8453  Did attempt to call pharmacy multiple times and was unable to get through        History of Present Illness   Physician Requesting Consult: Yamilet Mckeon MD  Reason for Consult / Principal Problem: Failure to thrive adult  Hx and PE limited by: None    Additional history obtained from: Chart review and patient evaluation      HPI: Raysa Mane is a 79y o  year old female who presents with generalized weakness and lethargy which has been progressively getting worse over the last few weeks  Patient's brother in law recently passed away about 6 weeks ago which has worsened her mental health  She has gotten increasingly more anxious and paranoid  Her son reports that prior to this his mother smoked a pack a day and drink multiple cups of coffee, at this time she stopped smoking and was no longer drinking coffee or eating  Her comorbidities include A-fib, COPD, anxiety, hypertension, hypothyroidism, antiphospholipid syndrome, osteoporosis, and amatory dysfunction  Patient lives at home alone in a two-level house  She ambulates with a cane  Denies any recent falls  She is independent for ADLs and IADLs  Although her son reports that over the last 2 weeks she has gotten weaker and has not been able to do a lot for herself  She uses eyeglasses and dentures, no hearing aids  She has issues with insomnia, anxiety, and depression  She also notes appetite loss and weight loss, states that she has not eaten anything in 1 week  She also states that she cannot swallow and that it is contributing to why she cannot and will not eat  Recently has been having bowel bladder incontinence  She still drives, no recent accidents or tickets  Did go back to patient's room about an hour later to speak with son Jose Barrera, he was able to assist with history and I updated him on plan of care  Upon examination patient was out of bed in a chair, resting  She appeared very anxious on exam   Only answered a few questions and did not want to participate in exam   She did tell me she was not feeling hungry and did not want to eat  She also stated that she does not eat because she cannot swallow  She denied any pain   Per nursing no acute concerns or issues at his time  Inpatient consult to Gerontology  Consult performed by: YUE Ac  Consult ordered by: Herbert Staley MD          Review of Systems   Reason unable to perform ROS: limited by patient refusing to answer questions  Constitutional: Positive for appetite change and fatigue  HENT: Positive for trouble swallowing  Musculoskeletal: Positive for back pain and gait problem  Negative for arthralgias  Neurological: Positive for weakness  Psychiatric/Behavioral: Positive for dysphoric mood and sleep disturbance  The patient is nervous/anxious          Historical Information   Past Medical History:   Diagnosis Date   • Anti-phospholipid syndrome (HCC)    • Asthma    • Blood type O+    • Cardiac disease    • Chronic pain    • COPD (chronic obstructive pulmonary disease) ([facility])    • Fracture of ankle     left   • Hyperlipidemia    • Hypertension    • Osteoporosis      Past Surgical History:   Procedure Laterality Date   • APPENDECTOMY     • BILATERAL OOPHORECTOMY Bilateral    • ORIF TIBIAL SHAFT FRACTURE W/ PLATES AND SCREWS Right      Social History   Social History     Substance and Sexual Activity   Alcohol Use No     Social History     Substance and Sexual Activity   Drug Use No     Social History     Tobacco Use   Smoking Status Never   Smokeless Tobacco Never   Tobacco Comments    Never smoker per [employer]     Family History:   Family History   Problem Relation Age of Onset   • Hypertension Mother    • Skin cancer Mother    • Hypertension Father        Meds/Allergies   all current active meds have been reviewed    Allergies   Allergen Reactions   • Aspirin Other (See Comments)   • Penicillins Rash and Hives   • Sulfa Antibiotics Rash       Objective     Intake/Output Summary (Last 24 hours) at 2/22/2023 1109  Last data filed at 2/22/2023 0309  Gross per 24 hour   Intake 500 ml   Output --   Net 500 ml     Invasive Devices     Peripheral Intravenous Line  Duration Peripheral IV 02/21/23 Right Antecubital <1 day                Physical Exam  Vitals reviewed  Constitutional:       General: She is not in acute distress  Appearance: She is well-developed  She is ill-appearing  HENT:      Head: Normocephalic and atraumatic  Cardiovascular:      Rate and Rhythm: Normal rate and regular rhythm  Heart sounds: No murmur heard  Pulmonary:      Effort: Pulmonary effort is normal  No respiratory distress  Breath sounds: Normal breath sounds  Abdominal:      Palpations: Abdomen is soft  Tenderness: There is no abdominal tenderness  Musculoskeletal:         General: No swelling  Right lower leg: Edema present  Left lower leg: Edema present  Skin:     General: Skin is warm and dry  Capillary Refill: Capillary refill takes less than 2 seconds  Coloration: Skin is pale  Findings: Bruising present  Neurological:      General: No focal deficit present  Mental Status: She is alert and oriented to person, place, and time  Mental status is at baseline  Cranial Nerves: No cranial nerve deficit  Motor: Weakness present  Gait: Gait abnormal    Psychiatric:         Mood and Affect: Mood is anxious and depressed  Affect is flat  Speech: She is noncommunicative  Speech is delayed  Behavior: Behavior is slowed           Lab Results:   I have personally reviewed pertinent lab results including the following:  -CBC, CMP, UA, digoxin level, lactic acid, lipase, magnesium, TSH, troponin    I have personally reviewed the following imaging study reports in PACS:  -CT abdomen pelvis, CT head      Therapies:   PT: consulted  OT: consulted    VTE Prophylaxis: Sequential compression device (Venodyne)     Code Status: Level 1 - Full Code  Advance Directive and Living Will:    yes, in process  Power of :  yes  POLST:  no    Family and Social Support:   Lives at home alone  Drives on her own  Son for support system    Goals of Care: Undetermined    Please note:  Voice-recognition software may have been used in the preparation of this document  Occasional wrong word or "sound-alike" substitutions may have occurred due to the inherent limitations of voice recognition software  Interpretation should be guided by context

## 2023-02-22 NOTE — ED ATTENDING ATTESTATION
2/21/2023    IZeinab MD, saw and evaluated the patient  I have discussed the patient with the resident and agree with the resident's findings, Plan of Care, and MDM as documented in the resident's  note, except where noted  All available labs and Radiology studies were reviewed  I was present for key portions of any procedure(s) performed by the resident and I was immediately available to provide assistance  At this point I agree with the current assessment done in the Emergency Department  I have conducted an independent evaluation of this patient a history and physical is as follows:    50-year-old male presenting to the emergency department with family describes as fatigue lethargy and a very depressed mood  Patient at time of evaluation in any is not very forthcoming with her history  She has no specific concerns other than some generalized weakness and fatigue  Vital signs reviewed and she is found to be slightly bradycardic  Physical examination is benign for any acute Felegy      Plan: Labs, EKG, chest x-ray  ED Course         Critical Care Time  Procedures

## 2023-02-22 NOTE — PLAN OF CARE
Problem: OCCUPATIONAL THERAPY ADULT  Goal: Performs self-care activities at highest level of function for planned discharge setting  See evaluation for individualized goals  Description: Treatment Interventions: ADL retraining, Functional transfer training, Endurance training, Cognitive reorientation, Patient/family training, Equipment evaluation/education, Compensatory technique education, Energy conservation, Activityengagement          See flowsheet documentation for full assessment, interventions and recommendations  Note: Limitation: Decreased ADL status, Decreased Safe judgement during ADL, Decreased cognition, Decreased endurance, Decreased self-care trans, Decreased high-level ADLs  Prognosis: Fair  Assessment: Pt is a 79 y o  female seen for OT evaluation s/p admission to [address] [place] [address] on 2/21/2023 due to generalized weakness  Pt diagnosed with Failure to thrive in adult  Pt has a significant PMH impacting occupational performance including: A-fib, COPD, anxiety, hypertension, and hypothyroidism  Pt with no recent admissions in the last 2 months  PTA, pt living alone in [facility], (I) with ADLs, (I) with IADLs, (+) driving, (-) falls, and use of cane for functional mobility  Personal and environmental factors inhibiting engagement in occupations include limited social support, inaccessible home environment, inaccessible bathroom environment, difficulty completing ADLs and difficulty completing IADLs  During evaluation pt performed as is outlined above in flowsheet  Pt required VC for attention to task and hand placement  Standardized assessments used to assist in identifying performance deficits include AMPAC 6-Clicks and Barthel ADL Index   Performance deficits that affect the pt’s occupational performance during the initial evaluation include impaired balance, functional mobility, endurance, activity tolerance, forward functional reach, functional standing tolerance and overall strength, attention to task, communication and social skills, safety awareness, insight into deficits, orientation and increased response time   Based on pt’s functional performance and deficits the following occupations will be addressed in OT treatments in order to maximize pt’s independence and overall occupational performance: grooming, bathing/showering, toileting and toilet hygiene, dressing and functional mobility  Goals are listed below  Upon discharge from acute care setting recommend d/c to [address] [place] [address]       OT Discharge Recommendation: Post acute rehabilitation services

## 2023-02-22 NOTE — ASSESSMENT & PLAN NOTE
Progressively worsening lethargy and generalized weakness   Poor appetite, decreased PO intake  Recent family death 6 weeks ago and has been depressed since   DDx including but not limited to: metabolic abnormality, dehydration, viral illness, anemia, ACS, MI, thyroid disease, intracranial process, other infectious process including UTI; doubt Guillan [place] syndrome or myasthenia gravis or botulism or multiple sclerosis  K-3 2  UA- Proteins, Micro WBCs, Occasional mucus   CT Head- Neg   CT AP- Right femoral hernia, cardiomegaly, CAD, cholelithiasis, diverticulosis, stable compression fracture    Plan-  TSH Free T4- Pending  Mg- pending  Monitor and replenish electrolytes  Appreciate geriatrics recommendations

## 2023-02-22 NOTE — ASSESSMENT & PLAN NOTE
Home medications include Cardizem, digoxin, warfarin  INR-3 13 supratherapeutic  Digoxin level-2 5 supratherapeutic  EKG- sinus rhythm 64    Plan-  Hold warfarin for now   Hold digoxin for now  Recheck INR

## 2023-02-22 NOTE — ASSESSMENT & PLAN NOTE
Progressively worsening lethargy and generalized weakness   Poor appetite, decreased PO intake  Recent family death 6 weeks ago and has been depressed since   DDx including but not limited to: metabolic abnormality, dehydration, viral illness, anemia, ACS, MI, thyroid disease, intracranial process, other infectious process including UTI; doubt Guillan [place] syndrome or myasthenia gravis or botulism or multiple sclerosis   Likely metabolic abnormality secondary to dehydration and poor PO intake d/t depressive episode  K-3 2  Increased BUN   Troponin 24, 24 unlikely cardiac origin   UA- Proteins, Micro WBCs, Occasional mucus   CT Head- Neg   CT AP- Right femoral hernia, cardiomegaly, CAD, cholelithiasis, diverticulosis, stable compression fracture  Magnesium and TSH levels normal (2/21)  Albumin level low (2/22)  Level 1 diet with Ensure shake   Ate 75% of meal at 2000 on 2/22    Plan-  · Monitor and replenish electrolytes  · Continue sertraline daily  · Add mirtazapine 7 5 mg p o  nightly  · Appreciate geriatrics recommendations  · Appreciate nutritionist recommendations  · Gentle IVF 75ml/hr

## 2023-02-22 NOTE — ASSESSMENT & PLAN NOTE
POA /72  Repeat during examination 201/81  Home medications include hydrochlorothiazide  Son is unsure if patient was compliant with home medications    Plan-  Hydralazine 10 mg IV once   Repeat BP  Continue home medications as appropriate

## 2023-02-22 NOTE — ASSESSMENT & PLAN NOTE
Home medications include Advair, Albuterol, Spiriva  Son suspects medication non compliance   Denies SOB, cough, congestion, fevers   93% on RA upon admission  Mid-90s O2 saturation on 3 L (2/23)    Plan-  Continue home medications  Add incentive spirometry

## 2023-02-22 NOTE — ASSESSMENT & PLAN NOTE
Home medications include levothyroxine 25 QD   Medication compliance question    Plan-  TSH free T4-pending

## 2023-02-22 NOTE — PLAN OF CARE
Problem: PHYSICAL THERAPY ADULT  Goal: Performs mobility at highest level of function for planned discharge setting  See evaluation for individualized goals  Description: Treatment/Interventions: Functional transfer training, LE strengthening/ROM, Therapeutic exercise, Endurance training, Cognitive reorientation, Patient/family training, Gait training, Bed mobility, Equipment eval/education  Equipment Recommended: Maria Eugenia Morris       See flowsheet documentation for full assessment, interventions and recommendations  2/22/2023 1541 by Darrell Barragan, PT  Note:    Problem List: Decreased strength, Decreased endurance, Impaired balance, Decreased mobility, Decreased cognition  Assessment: Gaudencio Calvin is a 79 y o  Female who [facility] from home w/ c/o decreased PO intake, fatigue, difficulty breathing and diagnosis of FTT  Orders for PT eval and treat received  Pt presents w/ comorbidities of COPD, HTN, antiphospholipid antibody syndrome, hypothyroidism, osteoporosis, HTN, anxiety  At baseline, pt mobilizes mod I w/ SPC, and reports 0 falls in the last 6 months  Upon evaluation, pt presents w/ the following deficits: weakness, impaired balance, decreased endurance and impaired cognition  Upon eval, pt requires min A x 1 for bed mobility, min A x 1 for transfers, and mod A x 1 for gait  Given the above findings, discharge recommendation is for Post-acute inpatient rehabilitation  During this admission, pt would benefit from continued skilled inpatient PT in the acute care setting in order to address the abovementioned deficits to maximize function and mobility before DC from acute care  PT Discharge Recommendation: Post acute rehabilitation services    See flowsheet documentation for full assessment

## 2023-02-22 NOTE — SPEECH THERAPY NOTE
Speech Language/Pathology  Speech-Language Pathology Bedside Swallow Evaluation        Patient Name: Abundio Meyer    [id number] Date: 2/22/2023     Problem List  Principal Problem:    Failure to thrive in adult  Active Problems:    COPD (chronic obstructive pulmonary disease) (HCC)    Hypertension    Antiphospholipid antibody syndrome (HCC)    Atrial fibrillation ([facility] [address] )    Hypothyroidism         Past Medical History  Past Medical History:   Diagnosis Date   • Anti-phospholipid syndrome (HCC)    • Asthma    • Blood type O+    • Cardiac disease    • Chronic pain    • COPD (chronic obstructive pulmonary disease) (HCC)    • Fracture of ankle     left   • Hyperlipidemia    • Hypertension    • Osteoporosis        Past Surgical History  Past Surgical History:   Procedure Laterality Date   • APPENDECTOMY     • BILATERAL OOPHORECTOMY Bilateral    • ORIF TIBIAL SHAFT FRACTURE W/ PLATES AND SCREWS Right        Summary   Pt presents with oral and pharyngeal swallow function that appears [facility] for thin liquids and puree  Pt with limited PO intake secondary to medical reason vs fear? Clinician unable to decipher  Clinician provided extensive education on food textures and then subcategories of those textures  Pt accepted puree only  Clinician presented trials from thin to thicker puree and also increasing bolus size  For example first trial of jello was about 2 ml, next trial was 5 ml, and so forth  Overall, pt consumed 3 oz of purees  No overt s/s of aspiration were observed  Pt reports feeling full though did not eat breakfast and only consumed about 3oz of purees with SLP  Pt also reports foods feeling stuck  However, this was determined via yes/no ?'s as pt had limited verbal communication w/ SLP  ST to follow in order to determine LRD for adequate nutrition/hydration        Recommendations:   Diet: puree/level 1 diet and thin liquids   Meds: crushed with puree   Frequent Oral care: 4x/day  Aspiration precautions and compensatory swallowing strategies: upright posture  Other Recommendations/ considerations: Encouragement to drink/eat       Current Medical Status  Pt is a 79 y o  female who presented to [address] [place] [address]  On February 21, 2023 with FTT  Upon presenting to the ED, decreased oral intake, fatigue, increased difficulty breathing  Patient history includes COPD, A-fib brought in by her son and daughter due to decreased eating and drinking for about 2 weeks with a decrease in activity  Over the past 2 weeks patient has had times where she did not eat much at all but also times where she will eat a little bit, today she was refusing to eat or drink anything  They think patient has been becoming depressed due to the passing of her brother-in-law about 6 weeks ago  Patient does not answer most questions directed at her and history is coming from son and daughter  Patient does not endorse any recent pain but children say she has been a little nauseous  Patient had a recent UTI that was treated with antibiotics, last dose was taken 2 days ago  Past medical history:  Please see H&P for details    Special Studies:  02/21/2023 Chest XR IMPRESSION: Persistent nodular opacity at the right cardiophrenic angle thought to represent atelectasis or scarring on abdominal pelvic CT performed contemporaneously  02/22/23 CT Head wo Contrast IMPRESSION: No acute infarct, hemorrhage, or midline shift     Moderate chronic microangiopathic changes within the brain      Social/Education/Vocational Hx:  Pt lives alone    Swallow Information   Current Risks for Dysphagia & Aspiration: AMS  Current Symptoms/Concerns: decreased PO intake  Current Diet: regular diet and thin liquids   Baseline Diet: regular diet and thin liquids  Takes pills: whole with liquids    Baseline Assessment   Behavior/Cognition: intermittent periods of alertness as pt would fluctuate between open/closed eyes  Speech/Language Status: very limited verbal output  Patient Positioning: OOB in chair- semi reclined     Swallow Mechanism Exam   Facial: symmetrical at rest  Labial: unable to test 2/2 limited command following  Lingual: unable to test 2/2 limited command following  Velum: unable to visualize  Mandible: unable to test 2/2 limited command following  Dentition: dentures per son  Vocal quality:weak   Volitional Cough: weak   Respiratory: Supplemental O2    Consistencies Assessed and Performance   Consistencies Administered: puree: graded puree trials (jello, applesauce, and pudding)    Oral Stage: Pt was able to adequately retrieve bolus from spoon and straw  Pt with noted oral manipulation of all puree trials  A-P transfer appeared timely  No oral residue remained in the oral cavity s/p swallows       Pharyngeal Stage: Pt consumed bites of puree, without immediate cough, throat clearing, change in vocal quality, or change in respiratory functioning s/p swallows  Esophageal Concerns: Pt reports feeling full though did not eat breakfast and only consumed about 3oz of purees with SLP  Pt also reports foods feeling stuck  However, this was determined via yes/no ?'s as pt had limited verbal communication w/ SLP  *Pt with limited PO intake secondary to medical reason vs fear? Clinician unable to decipher  Clinician provided extensive education on food textures and then subcategories of those textures  Pt accepted puree only  Clinician presented trials from thin to thicker puree and also increasing bolus size  For example first trial of jello was about 2 ml, next trial was 5 ml, and so forth  Overall, pt consumed 3 oz of purees  No overt s/s of aspiration were observed  Results Reviewed with: patient, RN, PA and family     Dysphagia Goals:   1  Pt will consume puree with thin liquids without overt s/s of aspiration/dysphagia x1-2 meal follow ups  2  Pt will trial mechanical soft solids/soft solids without overt s/s of aspiration/dysphagia      Discharge recommendation: TBD    Speech Therapy Prognosis   Prognosis: fair    Prognosis Considerations: medical status and cognitive status

## 2023-02-22 NOTE — CASE MANAGEMENT
Case Management Assessment & Discharge Planning Note    Patient name Raysa Mane  Location S /S -01 MRN [id number]  : 1940 Date 2023       Current Admission Date: 2023  Current Admission Diagnosis:Failure to thrive in adult   Patient Active Problem List    Diagnosis Date Noted   • Failure to thrive in adult 2023   • Pulmonary nodule 02/15/2023   • Urinary tract infection without hematuria 02/15/2023   • Onychomycosis 02/15/2023   • Essential hypertension 02/10/2023   • Anxiety 02/10/2023   • Hypothyroidism 2022   • Peripheral edema 2021   • Low back pain without sciatica 2021   • COPD (chronic obstructive pulmonary disease) ([facility] [address] ) 2017   • Hypertension 2017   • Hypercoagulable state ([facility] [address] ) 2017   • 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   • Vitamin D deficiency 2016   • Osteoporosis 2015   • Antiphospholipid antibody syndrome ([facility] [address] ) 2012   • Atrial fibrillation ([facility] [address] ) 2012   • Hereditary hemolytic anemia ([facility] [address] ) 2012   • Asthma 2012      LOS (days): 0  Geometric Mean LOS (GMLOS) (days): 2 60  Days to GMLOS:2     OBJECTIVE:    Risk of Unplanned Readmission Score: 13         Current admission status: Inpatient       Preferred Pharmacy:   [id number] [address], [address] [address]  Phone: [phone] Fax: [address], [address] [address]  Phone: [phone] Fax: [phone]    Primary Care Provider: Leon Cardona MD    Primary Insurance: MEDICARE  Secondary Insurance: BLUE CROSS    ASSESSMENT:  Lui Castellanos Proxies    There are no active Health Care Proxies on file                   Readmission Root Cause  30 Day Readmission: No    Patient Information  Admitted from[de-identified] Home  Mental Status: Alert (Patient oriented to name, place and year but very flat affect and took long to respond to questions )  During Assessment patient was accompanied by: Son  Assessment information provided by[de-identified] Patient  Primary Caregiver: Self  Support Systems: [address] of Residence: [address] [place] [address] do you live in?: [address] [facility] entry access options  Select all that apply : Stairs  Number of steps to enter home : 7  Do the steps have railings?: Yes  Type of Current Residence: Bi-level  Upon entering residence, is there a bedroom on the main floor (no further steps)?: Yes (If enters through the front door after going up 7 steps )  Upon entering residence, is there a bathroom on the main floor (no further steps)?: Yes  In the last 12 months, was there a time when you were not able to pay the mortgage or rent on time?: No  In the last 12 months, how many places have you lived?: 1  In the last 12 months, was there a time when you did not have a steady place to sleep or slept in a shelter (including now)?: No  Homeless/housing insecurity resource given?: N/A  Living Arrangements: Lives Alone (Has supportive kids that takes turns and visits daily)  Is patient a ?: No    Activities of Daily Living Prior to Admission  Functional Status: Independent  Completes ADLs independently?: Yes (Takes sponge baths with basin   Cooked light meals )  Ambulates independently?: Yes  Does patient use assisted devices?: Yes  Assisted Devices (DME) used: Straight Cane (At times)  Does patient currently own DME?: Yes  What DME does the patient currently own?: Straight Cane  Does patient have a history of Outpatient Therapy (PT/OT)?: No  Does the patient have a history of Short-Term Rehab?: No  Does patient have a history of HHC?: No  Does patient currently have [address]?: No         Patient Information Continued  Income Source: Pension/FDC  Does patient have prescription coverage?: Yes  Within the past 12 months, you worried that your food would run out before you got the money to buy more : Never true  Within the past 12 months, the food you bought just didn't last and you didn't have money to get more : Never true  Food insecurity resource given?: N/A  Does patient have a history of substance abuse?: No  Does patient have a history of Mental Health Diagnosis?: Yes (Depression and Anxiety)  Is patient receiving treatment for mental health?: Yes (Started on Xanax and Zoloft recently)  Has patient received inpatient treatment related to mental health in the last 2 years?: No         Means of Transportation  Means of Transport to Appts[de-identified] Family transport  In the past 12 months, has lack of transportation kept you from medical appointments or from getting medications?: No  In the past 12 months, has lack of transportation kept you from meetings, work, or from getting things needed for daily living?: No  Was application for public transport provided?: N/A        DISCHARGE DETAILS:    Discharge planning discussed with[de-identified] Patient, Patient's sons Franci Davila and Marylou 89 of Choice: Yes  Comments - Freedom of Choice: CM discussed discharge needs per care team recommendations and both sons in agreement with blanket SNF referrals - referrals made   Cm will follow up with SNF choice list   CM contacted family/caregiver?: Yes  Were Treatment Team discharge recommendations reviewed with patient/caregiver?: Yes  Did patient/caregiver verbalize understanding of patient care needs?: N/A- going to facility  Were patient/caregiver advised of the risks associated with not following Treatment Team discharge recommendations?: Yes    Contacts  Patient Contacts: SonFranci  Relationship to Patient[de-identified] Family  Contact Method: Phone  Phone Number: See face sheet  Reason/Outcome: Discharge [address]         Is the patient interested in [facility] at discharge?: No    DME Referral Provided  Referral made for DME?: No    Other Referral/Resources/Interventions Provided:  Interventions: Short Term Rehab  Referral Comments: CM introduced self and role and discussed discharge needs per care team recommendations and both sons in agreement with blanket SNF referrals - referrals made   Cm will follow up with SNF choice list     Would you like to participate in our [address] service program?  : No - Declined    Treatment Team Recommendation: Short Term Rehab  Discharge Destination Plan[de-identified] Short Term Rehab

## 2023-02-22 NOTE — H&P
[facility]  H&P- [facility] 1940, 79 y o  female MRN: [id number]  Unit/Bed#: ED-41 Encounter: [id number]  Primary Care Provider: Beatriz Schwartz MD   Date and time admitted to hospital: 2/21/2023  9:03 PM    * Failure to thrive in adult  Assessment & Plan  Progressively worsening lethargy and generalized weakness   Poor appetite, decreased PO intake  Recent family death 6 weeks ago and has been depressed since   DDx including but not limited to: metabolic abnormality, dehydration, viral illness, anemia, ACS, MI, thyroid disease, intracranial process, other infectious process including UTI; doubt Guillan [place] syndrome or myasthenia gravis or botulism or multiple sclerosis  K-3 2  Increased BUN   Troponin 24, 24 unlikely cardiac origin   UA- Proteins, Micro WBCs, Occasional mucus   CT Head- Neg   CT AP- Right femoral hernia, cardiomegaly, CAD, cholelithiasis, diverticulosis, stable compression fracture    Plan-  TSH Free T4- Pending  Mg- pending  Monitor and replenish electrolytes  Appreciate geriatrics recommendations  Gentle IVF 75ml/hr     Hypertension  Assessment & Plan  POA /72  Repeat during examination 201/81  Home medications include hydrochlorothiazide  Son is unsure if patient was compliant with home medications    Plan-  Hydralazine 10 mg IV once   Repeat BP  Continue home medications as appropriate    Hypothyroidism  Assessment & Plan  Home medications include levothyroxine 25 QD   Medication compliance question    Plan-  TSH free T4-pending    Atrial fibrillation (HCC)  Assessment & Plan  Home medications include Cardizem, digoxin, warfarin  INR-3 13 supratherapeutic  Digoxin level-2 5 supratherapeutic  EKG- sinus rhythm 64    Plan-  Hold warfarin for now   Hold digoxin for now  Recheck INR      Antiphospholipid antibody syndrome (HCC)  Assessment & Plan  Home medications include Warfarin   INR supratherapeutic 3 13    Plan-   See Afib plan  Repeat INR    COPD (chronic obstructive pulmonary disease) (HCC)  Assessment & Plan  Home medications include Advair, Albuterol, Spiriva  Son suspects medication non compliance   Denies SOB, cough, congestion, fevers   93% on RA     Plan-  Continue home medications     VTE Pharmacologic Prophylaxis: VTE Score: 7 High Risk (Score >/= 5) - Pharmacological DVT Prophylaxis Ordered: warfarin (Coumadin)  Sequential Compression Devices Ordered  Code Status: Level 1 - Full Code   Discussion with family: Updated  (son) at bedside  Anticipated Length of Stay: Patient will be admitted on an inpatient basis with an anticipated length of stay of greater than 2 midnights secondary to FTT  Chief Complaint: Failure to thrive, Generalized Weakness    History of Present Illness:  Micheline Dobbins is a 79 y o  female with a PMH of A-fib, COPD, anxiety, hypertension, hypothyroidism, antiphospholipid syndrome who presents with generalized weakness and lethargy that has been progressively worsening over the past few weeks  Patient's son was at the bedside who also per history who explained that patient's brother recently passed away around 6 weeks ago and has taken a toll on her mental health since, for example she has not been eating or drinking enough  He stated that normally patient is very anxious and high strung but however prior to arrival she became very lethargic and weak  He notes that the patient lives alone and suspect that patient is noncompliant with medications  Patient denies fevers, chills, headache, dizziness, chest pain, shortness of breath, palpitations, abdominal pain, NVD, urinary symptoms, leg pain, or any other symptoms at this time        ED-  Afebrile, /81, HR 58, 93% on room air  CBC unremarkable  BMP hypokalemia 3 2, increased BUN 35  Digoxin level supratherapeutic 2 5  INR supratherapeutic 3 13  Elevated troponin 24, 24  UA proteins, micro WBCs, occasional mucus  CT head negative  CT AP- cardiomegaly, CAD, cholelithiasis, diverticulosis, ostial compression fractures, right femoral hernia, skin thickening of breasts  ED gave to NS 1L Bolus  Admitted for failure to thrive, generalized weakness      Review of Systems:  Review of Systems   Constitutional: Positive for activity change and appetite change  Negative for chills and fever  Generalized weakness     HENT: Negative for congestion, ear pain and sore throat  Eyes: Negative for pain and visual disturbance  Respiratory: Negative for cough and shortness of breath  Cardiovascular: Negative for chest pain and palpitations  Gastrointestinal: Negative for abdominal pain and vomiting  Genitourinary: Negative for difficulty urinating, dysuria, hematuria and urgency  Musculoskeletal: Negative for arthralgias and back pain  Skin: Negative for color change and rash  Neurological: Positive for weakness  Negative for dizziness, seizures, syncope, numbness and headaches  Psychiatric/Behavioral: Negative for confusion  All other systems reviewed and are negative  Past Medical and Surgical History:   Past Medical History:   Diagnosis Date   • Anti-phospholipid syndrome ([facility] [address] )    • Asthma    • Blood type O+    • Cardiac disease    • Chronic pain    • COPD (chronic obstructive pulmonary disease) ([facility])    • Fracture of ankle     left   • Hyperlipidemia    • Hypertension    • Osteoporosis        Past Surgical History:   Procedure Laterality Date   • APPENDECTOMY     • BILATERAL OOPHORECTOMY Bilateral    • ORIF TIBIAL SHAFT FRACTURE W/ PLATES AND SCREWS Right        Meds/Allergies:  Prior to Admission medications    Medication Sig Start Date End Date Taking?  Authorizing Provider   Advair Diskus 250-50 MCG/DOSE inhaler USE 1 INHALATION TWICE A DAY 2/2/74  Yes Jay Quiroz MD   ALPRAZolam Melvin Santana) 0 25 mg tablet 1/2 tablet p o  twice daily 0/44/59  Yes Jay Quiroz MD   Ascorbic Acid (VITAMIN C) 1000 MG tablet Take 1,000 mg by mouth daily   Yes Historical Provider, MD   Cholecalciferol (VITAMIN D3) 20 MCG (800 UNIT) TABS Take by mouth   Yes Historical Provider, MD   digoxin (LANOXIN) 0 25 mg tablet TAKE 1 TABLET DAILY 3/5/28  Yes Steve Treadwell MD   diltiazem (CARDIZEM CD) 240 mg 24 hr capsule TAKE 1 CAPSULE DAILY 01/0/92  Yes Steve Treadwell MD   Ferrous Sulfate 220 (44 Fe) MG/5ML LIQD take 1 teaspoonful by mouth once daily 3/6/55  Yes Steve Treadwell MD   hydrochlorothiazide (HYDRODIURIL) 12 5 mg tablet Take 1 tablet (12 5 mg total) by mouth daily 5/61/02  Yes Steve Treadwell MD   levothyroxine 25 mcg tablet TAKE 1 TABLET DAILY 45/79/70  Yes Steve Treadwell MD   lidocaine (LIDODERM) 5 % Apply 1 patch topically daily Remove & Discard patch within 12 hours or as directed by MD 9/08/66  Yes Steve Treadwell MD   loratadine (CLARITIN) 10 mg tablet Take 10 mg by mouth daily   Yes Historical Provider, MD   multivitamin (THERAGRAN) TABS Take 1 tablet by mouth daily   Yes Historical Provider, MD   sertraline (Zoloft) 50 mg tablet Take 1 tablet (50 mg total) by mouth daily 8/32/71  Yes Steve Treadwell MD   Spiriva HandiHaler 18 MCG inhalation capsule INHALE THE CONTENTS OF 1 CAPSULE DAILY AS DIRECTED 8/35/79  Yes Steve Treadwell MD   warfarin (COUMADIN) 5 mg tablet TAKE 1 TABLET DAILY 0/13/98  Yes Steve Treadwell MD   albuterol (2 5 mg/3 mL) 0 083 % nebulizer solution USE 1 VIAL IN NEBULIZER EVERY 4 TO 6 HOURS AS NEEDED FOR COUGH AND WHEEZE 4/93/76   Steve Treadwell MD   denosumab (PROLIA) 60 mg/mL Inject 60 mg under the skin once 6 months    Historical Provider, MD     I have reviewed home medications with patient family member  Allergies: Allergies   Allergen Reactions   • Aspirin Other (See Comments)   • Penicillins Rash and Hives   • Sulfa Antibiotics Rash       Social History:  Marital Status:     Occupation: Retired  Patient Pre-hospital Living Situation: Home  Patient Pre-hospital Level of Mobility: walks  Patient Pre-hospital Diet Restrictions: None  Substance Use History:   Social History     Substance and Sexual Activity   Alcohol Use No     Social History     Tobacco Use   Smoking Status Never   Smokeless Tobacco Never   Tobacco Comments    Never smoker per [employer]     Social History     Substance and Sexual Activity   Drug Use No       Family History:  Family History   Problem Relation Age of Onset   • Hypertension Mother    • Skin cancer Mother    • Hypertension Father        Physical Exam:     Vitals:   Blood Pressure: (!) 181/81 (02/22/23 0400)  Pulse: 66 (02/22/23 0400)  Temperature: 97 7 °F (36 5 °C) (02/21/23 1937)  Temp Source: Oral (02/21/23 1937)  Respirations: 18 (02/21/23 2300)  SpO2: 98 % (02/22/23 0400)    Physical Exam  Constitutional:       General: She is not in acute distress  Appearance: She is not ill-appearing, toxic-appearing or diaphoretic  HENT:      Head: Normocephalic and atraumatic  Mouth/Throat:      Mouth: Mucous membranes are dry  Eyes:      Extraocular Movements: Extraocular movements intact  Pupils: Pupils are equal, round, and reactive to light  Cardiovascular:      Rate and Rhythm: Normal rate and regular rhythm  Pulses: Normal pulses  Heart sounds: Normal heart sounds  No murmur heard  No friction rub  No gallop  Pulmonary:      Effort: Pulmonary effort is normal  No respiratory distress  Breath sounds: Normal breath sounds  No wheezing or rhonchi  Chest:      Chest wall: No tenderness  Abdominal:      General: Abdomen is flat  Bowel sounds are normal       Tenderness: There is no abdominal tenderness  There is no right CVA tenderness or left CVA tenderness  Musculoskeletal:      Cervical back: Normal range of motion  Right lower leg: Edema present  Left lower leg: Edema present  Neurological:      General: No focal deficit present  Mental Status: She is alert and oriented to person, place, and time  Cranial Nerves: No cranial nerve deficit        Sensory: No sensory deficit  Motor: Weakness present  Coordination: Coordination normal    Psychiatric:         Mood and Affect: Mood normal          Behavior: Behavior normal          Additional Data:     Lab Results:  Results from last 7 days   Lab Units 02/21/23  2251   WBC Thousand/uL 5 35   HEMOGLOBIN g/dL 12 2   HEMATOCRIT % 35 5   PLATELETS Thousands/uL 270   NEUTROS PCT % 84*   LYMPHS PCT % 10*   MONOS PCT % 6   EOS PCT % 0     Results from last 7 days   Lab Units 02/21/23  2251   SODIUM mmol/L 139   POTASSIUM mmol/L 3 2*   CHLORIDE mmol/L 94*   CO2 mmol/L 39*   BUN mg/dL 35*   CREATININE mg/dL 0 60   ANION GAP mmol/L 6   CALCIUM mg/dL 9 9   ALBUMIN g/dL 3 9   TOTAL BILIRUBIN mg/dL 0 81   ALK PHOS U/L 75   ALT U/L 14   AST U/L 19   GLUCOSE RANDOM mg/dL 118     Results from last 7 days   Lab Units 02/21/23  2251   INR  3 13*             Results from last 7 days   Lab Units 02/21/23  2251   LACTIC ACID mmol/L 0 8       Lines/Drains:  Invasive Devices     Peripheral Intravenous Line  Duration           Peripheral IV 02/21/23 Right Antecubital <1 day                    Imaging: Reviewed radiology reports from this admission including: chest xray, abdominal/pelvic CT and CT head  CT head without contrast   Final Result by Genna Blood MD (02/22 7239)      No acute infarct, hemorrhage, or midline shift  Moderate chronic microangiopathic changes within the brain  Workstation performed: [id number]         CT abdomen pelvis wo contrast   Final Result by Yeimy Ricci DO (02/22 5383)      Small right-sided femoral hernia contains several normal-appearing loops of bowel (axial image 131, series 301)  No discrete evidence of bowel obstruction  Enlarged fibroid uterus  Age-indeterminate osteoporotic compression fractures as described  Skin thickening of the bilateral breasts, left greater than right, possibly related to prior intervention/radiation therapy    Correlation with patient's symptoms recommended  Dedicated breast imaging may be considered nonemergently as clinically    warranted  Cardiomegaly, coronary atherosclerosis, cholelithiasis without discrete evidence of acute cholecystitis, colonic diverticulosis without evidence of acute diverticulitis and other findings as above  Workstation performed: [id number]         XR chest 1 view portable   ED Interpretation by Juan Manuel Zuleta MD (02/21 7294)   No acute cardiopulmonary disease          EKG and Other Studies Reviewed on Admission:   · EKG: NSR  HR 64     ** Please Note: This note has been constructed using a voice recognition system   **

## 2023-02-23 PROBLEM — F39 UNSPECIFIED MOOD (AFFECTIVE) DISORDER (HCC): Status: ACTIVE | Noted: 2023-02-23

## 2023-02-23 PROBLEM — E43 SEVERE PROTEIN-CALORIE MALNUTRITION (HCC): Status: ACTIVE | Noted: 2023-02-23

## 2023-02-23 LAB
ALBUMIN SERPL BCP-MCNC: 3.4 G/DL (ref 3.5–5)
ALP SERPL-CCNC: 71 U/L (ref 34–104)
ALT SERPL W P-5'-P-CCNC: 14 U/L (ref 7–52)
ANION GAP SERPL CALCULATED.3IONS-SCNC: 3 MMOL/L (ref 4–13)
AST SERPL W P-5'-P-CCNC: 19 U/L (ref 13–39)
BILIRUB DIRECT SERPL-MCNC: 0.16 MG/DL (ref 0–0.2)
BILIRUB SERPL-MCNC: 0.72 MG/DL (ref 0.2–1)
BUN SERPL-MCNC: 29 MG/DL (ref 5–25)
CALCIUM SERPL-MCNC: 9.3 MG/DL (ref 8.4–10.2)
CHLORIDE SERPL-SCNC: 98 MMOL/L (ref 96–108)
CO2 SERPL-SCNC: 39 MMOL/L (ref 21–32)
CREAT SERPL-MCNC: 0.55 MG/DL (ref 0.6–1.3)
GFR SERPL CREATININE-BSD FRML MDRD: 86 ML/MIN/1.73SQ M
GLUCOSE SERPL-MCNC: 147 MG/DL (ref 65–140)
INR PPP: 4.58 (ref 0.84–1.19)
POTASSIUM SERPL-SCNC: 3.3 MMOL/L (ref 3.5–5.3)
PROT SERPL-MCNC: 6.6 G/DL (ref 6.4–8.4)
PROTHROMBIN TIME: 43.6 SECONDS (ref 11.6–14.5)
SODIUM SERPL-SCNC: 140 MMOL/L (ref 135–147)

## 2023-02-23 RX ORDER — POTASSIUM CHLORIDE 20 MEQ/1
20 TABLET, EXTENDED RELEASE ORAL ONCE
Status: COMPLETED | OUTPATIENT
Start: 2023-02-23 | End: 2023-02-23

## 2023-02-23 RX ORDER — MIRTAZAPINE 15 MG/1
7.5 TABLET, FILM COATED ORAL
Status: DISCONTINUED | OUTPATIENT
Start: 2023-02-23 | End: 2023-02-24 | Stop reason: HOSPADM

## 2023-02-23 RX ORDER — ARIPIPRAZOLE 2 MG/1
2 TABLET ORAL DAILY
Status: DISCONTINUED | OUTPATIENT
Start: 2023-02-23 | End: 2023-02-24 | Stop reason: HOSPADM

## 2023-02-23 RX ORDER — LIDOCAINE 50 MG/G
1 PATCH TOPICAL DAILY
Status: DISCONTINUED | OUTPATIENT
Start: 2023-02-23 | End: 2023-02-24 | Stop reason: HOSPADM

## 2023-02-23 RX ADMIN — DIGOXIN 125 MCG: 125 TABLET ORAL at 08:11

## 2023-02-23 RX ADMIN — DILTIAZEM HYDROCHLORIDE 240 MG: 240 CAPSULE, COATED, EXTENDED RELEASE ORAL at 08:11

## 2023-02-23 RX ADMIN — LIDOCAINE 5% 1 PATCH: 700 PATCH TOPICAL at 14:24

## 2023-02-23 RX ADMIN — HYDROCHLOROTHIAZIDE 12.5 MG: 12.5 TABLET ORAL at 08:11

## 2023-02-23 RX ADMIN — LEVOTHYROXINE SODIUM 25 MCG: 25 TABLET ORAL at 05:09

## 2023-02-23 RX ADMIN — MIRTAZAPINE 7.5 MG: 15 TABLET, FILM COATED ORAL at 22:43

## 2023-02-23 RX ADMIN — POTASSIUM CHLORIDE 20 MEQ: 1500 TABLET, EXTENDED RELEASE ORAL at 15:32

## 2023-02-23 RX ADMIN — ARIPIPRAZOLE 2 MG: 2 TABLET ORAL at 15:32

## 2023-02-23 RX ADMIN — SERTRALINE HYDROCHLORIDE 50 MG: 50 TABLET ORAL at 08:11

## 2023-02-23 NOTE — ASSESSMENT & PLAN NOTE
Pt has been experiencing anhedonia, sleep disturbances, fatigue, appetite disturbance and psychomotor retardation since brother-in-law's death  Pt has been stating that her death is near and rejects making any future plans  Pt denies acute suicidal ideation, homicidal ideation, or passive death wishes, patient has been cooperative with care      Plan:  See anxiety plan

## 2023-02-23 NOTE — ASSESSMENT & PLAN NOTE
Malnutrition Findings:   Adult Malnutrition type: Acute illness  Adult Degree of Malnutrition: Other severe protein calorie malnutrition  Malnutrition Characteristics: Fat loss, Muscle loss, Inadequate energy, Weight loss     Plan:  -Monitor weight and input/output  -Level 1 diet with Ensure supplementation  - Nutritionist consult    360 Statement: Severe malnutrition related to inadequate oral intake as evidenced by loss of subcutaneous fat and muscle extremities, orbitals, osseaous; 10 4% weight decrease x 1 5 months, +3 edema, < 75% energy intake compared to estimated nutritional needs for > 7 days  Currently treated with oral supplementation, nutrition consult  BMI Findings: There is no height or weight on file to calculate BMI

## 2023-02-23 NOTE — PLAN OF CARE
Problem: PHYSICAL THERAPY ADULT  Goal: Performs mobility at highest level of function for planned discharge setting  See evaluation for individualized goals  Description: Treatment/Interventions: Functional transfer training, LE strengthening/ROM, Therapeutic exercise, Endurance training, Cognitive reorientation, Patient/family training, Gait training, Bed mobility, Equipment eval/education  Equipment Recommended: Jesusita Brandon       See flowsheet documentation for full assessment, interventions and recommendations  Outcome: Progressing  Note:    Problem List: Decreased strength, Decreased endurance, Impaired balance, Decreased mobility, Decreased cognition  Assessment: pt began tx session seated OOB in the recliner and was agreeable to participate in PT intervention  pt cotninues to require min Ax1 for all functional transfers to and from RW with VC's for hand placementb while ascending to rW and descending back into recliner  pt was able to complete 3 STS in todays tx session in order to strengthen Le';s and increase safety and balance with all funcitonal transfers  pt continues to be limited with functional mobility, activity tolerance and ambulation distance as pt was limited to 30'x2 rW and required a 5 minmute rest break in between ambulation trials  pt was able to participate in TE activities while seated in recliner  pt would benefit from continued skilled PT intervention in order to increase functional mobility, activity tolerance and ambulation distance  Continue to recommend post acute rehab services at the time of D/C in order to maximize pt functional independence and safety with all OOB mobility  PT Discharge Recommendation: Post acute rehabilitation services    See flowsheet documentation for full assessment

## 2023-02-23 NOTE — ASSESSMENT & PLAN NOTE
Pt has been experiencing worries about advanced directives and taking care of herself for the past two weeks and demonstrates pessimism about these issues being handled  Pt demonstrating paranoia about her insurance coverage  Pt denies acute suicidal ideation, homicidal ideation, or passive death wishes, patient has been cooperative with care      Differential: Acute stress disorder, Generalized anxiety disorder, Substance induced anxiety disorder (including prescription medication), Anxiety secondary to other medical condition    Plan:  Geriatrics consulted and recs appreciated  Behavioral health consulted and recs appreciated  Remeron 7 5 mg QHS ordered  Continue Zoloft 50 mg daily  Consider Abilify 2 mg daily and cardiology consult to assess risk of its use  Consider inpatient psych admission after medication adjustments and continued therapies

## 2023-02-23 NOTE — PROGRESS NOTES
Progress Note - Geriatric Medicine   Harijacobo Gallardo 79 y o  female MRN: [id number]  Unit/Bed#: S -01 Encounter: [id number]      Assessment/Plan:  Failure to thrive in adult  Unclear etiology, could be secondary to depression/anxiety-but also could be other things  Progressively worsening lethargy and generalized weakness  With noted poor appetite and decreased p o  intake  Patient did report that she has not eaten anything in about a week  Nutrition consult placed for supplement recommendations, started on Ensure supplements  Was started on Remeron to assist with insomnia, depression, and appetite  Further management per primary     Depression/anxiety  Chronic and worse since loss of her brother in law about 6 weeks ago  The son states that she has always had anxiety but lately over the last couple months she has become extremely more anxious and paranoid about things  Was started on Xanax a couple weeks ago to assist with anxiety  Per her son the Xanax just made her zombie like in which she would stare off in space  Was started on Zoloft 50 mg daily about 1 week ago and her xanax dose was decreased to 0 125mg bid prn  Was started on Remeron 15 mg nightly, would recommend 7 5 mg nightly instead of 50 mg nightly  Last QTc 381 from EKG done today, stable  Psych consult pending, appreciate recommendations     Insomnia  First-line treatment is behavior modification  Maintain sleep-wake cycle, avoid nighttime interruptions  Avoid caffeine throughout the day  Avoid napping throughout the day  Encourage physical activity throughout the day  Was started on Remeron 15 mg nightly  To get to sleep last night    Dysphagia  Reports trouble swallowing and feeling like things get stuck  Was seen by speech therapy who recommended pureed diet with thin liquids  Aspiration precautions    Ambulatory dysfunction  At a baseline ambulates without AD  PT/OT following  Fall precautions  Out of bed as tolerated  Encourage early and frequent mobilization  Encourage adequate hydration and nutrition  Provide adequate pain management   Goal is undetermined  Continue with PT/OT for continued strength and balance training     Subjective:   Upon examination patient was out of bed in the chair, resting  She appeared anxious and depressed on exam   ROS and examination limited due to patient not wanting to participate  Nurse reports she did eat some of her breakfast   She slept okay last night  Her son Franci Davila was bedside during examination  He stated that she was feeling a bit better today  Per nursing no other acute concerns or issues at this time  Review of Systems   Unable to perform ROS: Other (patient not wanting to particpate in exam)         Objective:     Vitals: Blood pressure 168/60, pulse 64, temperature 98 5 °F (36 9 °C), resp  rate 16, SpO2 95 %, not currently breastfeeding  ,There is no height or weight on file to calculate BMI  No intake or output data in the 24 hours ending 02/23/23 1146    Current Medications: Reviewed    Physical Exam:   Physical Exam  Vitals reviewed  Constitutional:       General: She is not in acute distress  Appearance: She is well-developed  She is ill-appearing  HENT:      Head: Normocephalic and atraumatic  Mouth/Throat:      Mouth: Mucous membranes are dry  Pharynx: No oropharyngeal exudate or posterior oropharyngeal erythema  Cardiovascular:      Rate and Rhythm: Normal rate and regular rhythm  Heart sounds: No murmur heard  Pulmonary:      Effort: Pulmonary effort is normal  No respiratory distress  Breath sounds: Normal breath sounds  Abdominal:      Palpations: Abdomen is soft  Tenderness: There is no abdominal tenderness  Musculoskeletal:         General: No swelling  Right lower leg: Edema present  Left lower leg: Edema present  Skin:     General: Skin is warm and dry  Capillary Refill: Capillary refill takes less than 2 seconds  Coloration: Skin is pale  Findings: Bruising present  Neurological:      General: No focal deficit present  Mental Status: She is alert and oriented to person, place, and time  Mental status is at baseline  Cranial Nerves: No cranial nerve deficit  Motor: Weakness present  Gait: Gait abnormal    Psychiatric:         Mood and Affect: Mood is anxious and depressed  Affect is flat  Speech: She is noncommunicative  Speech is delayed  Behavior: Behavior is slowed  Invasive Devices     Peripheral Intravenous Line  Duration           Peripheral IV 02/21/23 Right Antecubital 1 day                Lab, Imaging and other studies: I have personally reviewed pertinent reports  Please note:  Voice-recognition software may have been used in the preparation of this document  Occasional wrong word or "sound-alike" substitutions may have occurred due to the inherent limitations of voice recognition software  Interpretation should be guided by context

## 2023-02-23 NOTE — PHYSICAL THERAPY NOTE
PHYSICAL THERAPY NOTE          Patient Name: Marci Hill  [id number] Date: 2/23/2023 02/23/23 1423   PT Last Visit   PT Visit Date 02/23/23   Note Type   Note Type Treatment   Pain Assessment   Pain Assessment Tool 0-10   Pain Score No Pain   Pain Location/Orientation Location: Abdomen   Effect of Pain on Daily Activities limited activity tolerance   Patient's Stated Pain Goal No pain   Hospital Pain Intervention(s) Repositioned; Ambulation/increased activity; Rest   Multiple Pain Sites No   Pain Rating: FLACC (Rest) - Face 1   Pain Rating: FLACC (Rest) - Legs 0   Pain Rating: FLACC (Rest) - Activity 0   Pain Rating: FLACC (Rest) - Cry 0   Pain Rating: FLACC (Rest) - Consolability 0   Score: FLACC (Rest) 1   Restrictions/Precautions   Weight Bearing Precautions Per Order No   Other Precautions Cognitive; Chair Alarm; Bed Alarm;Multiple lines;O2;Fall Risk  (3L NC 02)   General   Chart Reviewed Yes   Response to Previous Treatment Patient with no complaints from previous session  Family/Caregiver Present No   Cognition   Overall Cognitive Status Impaired   Arousal/Participation Alert; Responsive; Cooperative   Attention Attends with cues to redirect   Orientation Level Oriented to person;Disoriented to time;Oriented to place;Oriented to situation   Memory Within functional limits   Following Commands Follows one step commands inconsistently   Comments pt with continued flat affect and required + time to respond to questions and to follow directions   Subjective   Subjective pt was agreeable to participate in PT intervention   Bed Mobility   Rolling R Unable to assess   Rolling L Unable to assess   Supine to Sit Unable to assess   Sit to Supine Unable to assess   Additional Comments pt seated OOB in the recliner pre/post tx session   Transfers   Sit to Stand 4  Minimal assistance   Additional items Assist x 1; Armrests; Increased time required;Verbal cues   Stand to Sit 4  Minimal assistance   Additional items Assist x 1; Armrests; Increased time required;Verbal cues   Stand pivot 4  Minimal assistance   Additional items Assist x 1; Increased time required;Verbal cues   Toilet transfer Unable to assess   Additional Comments pt continues to require RW, VC's for hand placement and min Ax1 for safety and balance with all functional transfers and ambulation in todays tx session   Ambulation/Elevation   Gait pattern Decreased foot clearance; Foward flexed; Short stride; Excessively slow   Gait Assistance 4  Minimal assist   Additional items Assist x 1;Verbal cues   Assistive Device Rolling walker  (pt requested RW instead of SPC)   Distance 30'x2 RW  (pt required a 5 minute seated recliner rest break)   Balance   Static Sitting Fair   Dynamic Sitting Fair -   Static Standing Poor +   Dynamic Standing Poor   Ambulatory Poor +  (w/ RW)   Endurance Deficit   Endurance Deficit Yes   Endurance Deficit Description limited activity tolerance and ambulation distance   Activity Tolerance   Activity Tolerance Patient limited by fatigue   Nurse Made Aware Spoke to RN   Exercises   Hip Abduction Sitting;10 reps;AROM; Bilateral   Knee AROM Long Arc Quad Sitting;10 reps;AROM; Bilateral   Ankle Pumps Sitting;10 reps;AROM; Bilateral   Marching Sitting;5 reps;AROM; Bilateral   Assessment   Problem List Decreased strength;Decreased endurance; Impaired balance;Decreased mobility; Decreased cognition   Assessment pt began tx session seated OOB in the recliner and was agreeable to participate in PT intervention  pt cotninues to require min Ax1 for all functional transfers to and from RW with VC's for hand placementb while ascending to rW and descending back into recliner  pt was able to complete 3 STS in todays tx session in order to strengthen Le';s and increase safety and balance with all funcitonal transfers   pt continues to be limited with functional mobility, activity tolerance and ambulation distance as pt was limited to 30'x2 rW and required a 5 minmute rest break in between ambulation trials  pt was able to participate in TE activities while seated in recliner  pt would benefit from continued skilled PT intervention in order to increase functional mobility, activity tolerance and ambulation distance  Continue to recommend post acute rehab services at the time of D/C in order to maximize pt functional independence and safety with all OOB mobility  Goals   Patient Goals no goals stated this tx session   STG Expiration Date 03/04/23   PT Treatment Day 1   Plan   Treatment/Interventions Functional transfer training;LE strengthening/ROM; Therapeutic exercise; Endurance training;Cognitive reorientation;Patient/family training;Equipment eval/education; Bed mobility;Gait training;Spoke to nursing   Progress Slow progress, decreased activity tolerance   PT Frequency 3-5x/wk   Recommendation   PT Discharge Recommendation Post acute rehabilitation services   Equipment Recommended [address] Basic Mobility Inpatient   Turning in Flat Bed Without Bedrails 3   Lying on Back to Sitting on Edge of Flat Bed Without Bedrails 3   Moving Bed to Chair 3   Standing Up From Chair Using Arms 3   Walk in Room 3   Climb 3-5 Stairs With Railing 1   Basic Mobility Inpatient Raw Score 16   Basic Mobility Standardized Score 38 32   Highest Level Of Mobility   [facility] Goal 5: Stand one or more mins   Education   Education Provided Mobility training;Assistive device   Patient Reinforcement needed   End of Consult   Patient Position at End of Consult Bedside chair;Bed/Chair alarm activated; All needs within reach   The patient's AM-PAC Basic Mobility Inpatient Short Form Raw Score is 16  A Raw score of less than or equal to 16 suggests the patient may benefit from discharge to post-acute rehabilitation services  Please also refer to the recommendation of the Physical Therapist for safe discharge planning        Luis Felipe Wilson Hali Ramirez

## 2023-02-23 NOTE — PROGRESS NOTES
[facility]  Progress Note - Brii Veloz 1940, 79 y o  female MRN: [id number]  Unit/Bed#: S -01 Encounter: [id number]  Primary Care Provider: Treasure Dsouza MD   Date and time admitted to hospital: 2/21/2023  9:03 PM    * Failure to thrive in adult  Assessment & Plan  Progressively worsening lethargy and generalized weakness   Poor appetite, decreased PO intake  Recent family death 6 weeks ago and has been depressed since   DDx including but not limited to: metabolic abnormality, dehydration, viral illness, anemia, ACS, MI, thyroid disease, intracranial process, other infectious process including UTI; doubt Guillan [place] syndrome or myasthenia gravis or botulism or multiple sclerosis   Likely metabolic abnormality secondary to dehydration and poor PO intake d/t depressive episode  K-3 2  Increased BUN   Troponin 24, 24 unlikely cardiac origin   UA- Proteins, Micro WBCs, Occasional mucus   CT Head- Neg   CT AP- Right femoral hernia, cardiomegaly, CAD, cholelithiasis, diverticulosis, stable compression fracture  Magnesium and TSH levels normal (2/21)  Albumin level low (2/22)  Level 1 diet with Ensure shake   Ate 75% of meal at 2000 on 2/22    Plan-  Monitor and replenish electrolytes  Appreciate geriatrics recommendations  Appreciate nutritionist recommendations  Gentle IVF 75ml/hr    Severe protein-calorie malnutrition ([address] )  Assessment & Plan  Malnutrition Findings:   Adult Malnutrition type: Acute illness  Adult Degree of Malnutrition: Other severe protein calorie malnutrition  Malnutrition Characteristics: Fat loss, Muscle loss, Inadequate energy, Weight loss     Plan:  -Monitor weight and input/output  -Level 1 diet with Ensure supplementation  - Nutritionist consult    360 Statement: Severe malnutrition related to inadequate oral intake as evidenced by loss of subcutaneous fat and muscle extremities, orbitals, osseaous; 10 4% weight decrease x 1 5 months, +3 edema, < 75% energy intake compared to estimated nutritional needs for > 7 days  Currently treated with oral supplementation, nutrition consult  BMI Findings: There is no height or weight on file to calculate BMI  Unspecified mood (affective) disorder (HCC)  Assessment & Plan  Pt has been experiencing anhedonia, sleep disturbances, fatigue, appetite disturbance and psychomotor retardation since brother-in-law's death  Pt has been stating that her death is near and rejects making any future plans  Pt denies acute suicidal ideation, homicidal ideation, or passive death wishes, patient has been cooperative with care  Plan:  See anxiety plan        Anxiety  Assessment & Plan  Pt has been experiencing worries about advanced directives and taking care of herself for the past two weeks and demonstrates pessimism about these issues being handled  Pt demonstrating paranoia about her insurance coverage  Pt denies acute suicidal ideation, homicidal ideation, or passive death wishes, patient has been cooperative with care      Differential: Acute stress disorder, Generalized anxiety disorder, Substance induced anxiety disorder (including prescription medication), Anxiety secondary to other medical condition    Plan:  Geriatrics consulted and recs appreciated  Behavioral health consulted and recs appreciated  Remeron 7 5 mg QHS ordered  Continue Zoloft 50 mg daily  Consider Abilify 2 mg daily and cardiology consult to assess risk of its use  Consider inpatient psych admission after medication adjustments and continued therapies    Hypothyroidism  Assessment & Plan  Home medications include levothyroxine 25 QD   Medication compliance question  TSH levels normal (2/21)    Plan-  Continue levothyroxine med    Atrial fibrillation [facility])  Assessment & Plan  Home medications include Cardizem, digoxin, warfarin  INR-3 13 supratherapeutic (2/22) and 4 58 (2/23)  PT 34 (2/22) and 43 6 (2/23)  Digoxin level-2 5 supratherapeutic  EKG- sinus rhythm 64  Digoxin held (2/22) and restarted (2/23)    Plan-  Hold warfarin for now   Repeat PT/INR      Antiphospholipid antibody syndrome [facility])  Assessment & Plan  Home medications include Warfarin   INR supratherapeutic 3 13 (2/22) and 4 58 (2/23)  PT 34 0 (2/22) and 43 6 (2/23)  Digoxin held (2/22) and restarted (2/23)    Plan-   Hold warfarin  Repeat PT/INR    Hypertension  Assessment & Plan  POA /72  Repeat during examination 201/81  Home medications include hydrochlorothiazide  Son is unsure if patient was compliant with home medications  Hydralazine 10 mg given once (2/23)   Remains hypertensive within last 24hr    Plan-  Continue vital signs as per routine  Continue hydrochlorothiazide    COPD (chronic obstructive pulmonary disease) (HCC)  Assessment & Plan  Home medications include Advair, Albuterol, Spiriva  Son suspects medication non compliance   Denies SOB, cough, congestion, fevers   93% on RA upon admission  Mid-90s O2 saturation on 3 L (2/23)    Plan-  Continue home medications  Add incentive spirometry        VTE Pharmacologic Prophylaxis: VTE Score: 7 High Risk (Score >/= 5) - Pharmacological DVT Prophylaxis Contraindicated  Sequential Compression Devices Ordered  Patient Centered Rounds: I performed bedside rounds with nursing staff today  Discussions with Specialists or Other Care Team Provider: Gerontology, Behavioral Health, PT, OT, Nutrition Services    Education and Discussions with Family / Patient: Updated  (son) at bedside  Current Length of Stay: 1 day(s)  Current Patient Status: Inpatient   Discharge Plan: Anticipate discharge tomorrow to rehab facility  Code Status: Level 1 - Full Code    Subjective:   Pt was seated and drinking Ensure shake upon evaluation  Pt's son, Duy Dang, was also at bedside  Pt demonstrated more interactiveness and quicker response time  Pt is complaining of abdominal pain   Pt finished 75% of her dinner yesterday and was able to work with PT today  Teresita White endorses interest in acute rehabilitation for his mother and is looking for long-term care facilities for her post-rehab  Objective:     Vitals:   Temp (24hrs), Av 3 °F (36 8 °C), Min:97 9 °F (36 6 °C), Max:98 5 °F (36 9 °C)    Temp:  [97 9 °F (36 6 °C)-98 5 °F (36 9 °C)] 97 9 °F (36 6 °C)  HR:  [55-64] 62  Resp:  [16-18] 16  BP: (150-168)/(60-72) 167/72  SpO2:  [94 %-97 %] 94 %  There is no height or weight on file to calculate BMI  Input and Output Summary (last 24 hours):   No intake or output data in the 24 hours ending 23 1612    Physical Exam:   Physical Exam  Constitutional:       General: She is awake  Appearance: She is underweight  Interventions: Nasal cannula in place  HENT:      Head: Normocephalic  Right Ear: External ear normal       Left Ear: External ear normal       Nose: Nose normal       Mouth/Throat:      Mouth: Mucous membranes are moist    Eyes:      Conjunctiva/sclera: Conjunctivae normal       Pupils: Pupils are equal, round, and reactive to light  Cardiovascular:      Rate and Rhythm: Normal rate and regular rhythm  Pulses: Normal pulses  Pulmonary:      Effort: Pulmonary effort is normal  No respiratory distress  Breath sounds: No stridor  Wheezing present  No rhonchi  Abdominal:      General: There is no distension  Palpations: Abdomen is soft  There is no mass  Tenderness: There is abdominal tenderness  There is no guarding or rebound  Musculoskeletal:         General: Normal range of motion  Cervical back: Normal range of motion  Right lower leg: No edema  Left lower leg: No edema  Lymphadenopathy:      Cervical: No cervical adenopathy  Skin:     Capillary Refill: Capillary refill takes less than 2 seconds  Neurological:      General: No focal deficit present  Mental Status: She is alert  Psychiatric:         Mood and Affect: Mood is anxious and depressed           Speech: Speech is delayed  Behavior: Behavior is slowed and withdrawn  Behavior is cooperative           Additional Data:     Labs:  Results from last 7 days   Lab Units 02/21/23  2251   WBC Thousand/uL 5 35   HEMOGLOBIN g/dL 12 2   HEMATOCRIT % 35 5   PLATELETS Thousands/uL 270   NEUTROS PCT % 84*   LYMPHS PCT % 10*   MONOS PCT % 6   EOS PCT % 0     Results from last 7 days   Lab Units 02/23/23  1305   SODIUM mmol/L 140   POTASSIUM mmol/L 3 3*   CHLORIDE mmol/L 98   CO2 mmol/L 39*   BUN mg/dL 29*   CREATININE mg/dL 0 55*   ANION GAP mmol/L 3*   CALCIUM mg/dL 9 3   ALBUMIN g/dL 3 4*   TOTAL BILIRUBIN mg/dL 0 72   ALK PHOS U/L 71   ALT U/L 14   AST U/L 19   GLUCOSE RANDOM mg/dL 147*     Results from last 7 days   Lab Units 02/23/23  1305   INR  4 58*             Results from last 7 days   Lab Units 02/21/23  2251   LACTIC ACID mmol/L 0 8       Lines/Drains:  Invasive Devices     Peripheral Intravenous Line  Duration           Peripheral IV 02/21/23 Right Antecubital 1 day                      Imaging: Reviewed radiology reports from this admission including: chest xray, abdominal/pelvic CT and CT head    Recent Cultures (last 7 days):         Last 24 Hours Medication List:   Current Facility-Administered Medications   Medication Dose Route Frequency Provider Last Rate   • acetaminophen  650 mg Oral Q6H PRN Truman Denson MD     • albuterol  2 5 mg Nebulization Q6H PRN Truman Denson MD     • ARIPiprazole  2 mg Oral Daily Mallory Green DO     • digoxin  125 mcg Oral Daily Radha Davila MD     • diltiazem  240 mg Oral Daily Truman Denson MD     • Fluticasone Furoate-Vilanterol  1 puff Inhalation Daily Truman Denson MD     • hydrochlorothiazide  12 5 mg Oral Daily Truman Denson MD     • levothyroxine  25 mcg Oral Early Morning Truman Denson MD     • lidocaine  1 patch Topical Daily Rosendo Ricci MD     • mirtazapine  7 5 mg Oral HS Mallory Green DO     • sertraline  50 mg Oral Daily Truman Denson MD     • umeclidinium  1 puff Inhalation Daily Truman Denson MD          Today, Patient Was Seen By: Elan Aiken    **Please Note: This note may have been constructed using a voice recognition system  **

## 2023-02-23 NOTE — CONSULTS
Psychiatry Consultation Note   Lety Marquez 79 y o  female MRN: [id number]  Unit/Bed#: S -01 Encounter: [id number]    Assessment and Plan:    Assessment:    Principal Psychiatric Problem:  1  Unspecified mood disorder ([facility] )  a  Differential: Major depressive disorder, Persistent complex bereavement disorder, Acute stress disorder, Substance induced mood disorder (including prescription medications), Depression secondary to other medical condition,   2  Unspecified anxiety disorder  a  Differential: Acute stress disorder, Generalized anxiety disorder, Substance induced anxiety disorder (including prescription medication), Anxiety secondary to other medical condition  3  Tobacco use disorder  4  Failure to thrive in adult    Principal Problem:    Failure to thrive in adult  Active Problems:    COPD (chronic obstructive pulmonary disease) ([facility])    Hypertension    Antiphospholipid antibody syndrome (HCC)    Atrial fibrillation ([facility] )    Hypothyroidism      Plan:   Discussed with primary team, with the following recommendations:    1  Treatment Recommendations:  a  Patient with significant symptoms of depression contributing to failure to thrive  Patient with significant neurovegetative symptoms of depression with poor oral intake which presents risk to patient  Patient denies acute suicidal ideation, homicidal ideation, or passive death wishes, patient has been cooperative with care  At time of evaluation, patient agreeable to medication adjustments while on medical-surgical unit and patient and family agreeable to structured environment/rehabilaition placement after hospitalization  Psychiatry will continue to follow as patient progresses through medical treatment and assess for need for inpatient psychiatric admission after medication adjustments and continued therapies  b  Medical management per primary team, no new labs indicated at this time  2  Pharmacological:   a   Recommend the following medication changes:   i  Agree with addition of Remeron, recommend 7 5 mg QHS for augmentation of antidepressant, appetite disturbance, and sleep disturbance  ii  Continue Zoloft 50 mg daily for anxiety and mood  iii  Discussed with Geriatrics and Primary Team regarding risk/benefits of adding augmentation agent for symptoms  Discussed stimulant vs Abilify  1  Given patient's cardiac history, recommend Abilify 2 mg daily and cardiology consult to assess risk for stimulant use  of stimulant medication  3  Observation level: Routine  4  Referrals: to be determined  5  Psychiatry will continue to follow as needed  Please contact our service via [employer] with any additional questions or concerns  If contacting after hours, please call or [facility] the on-call team (AMWELL: [phone]) with any questions or concerns  Risks, benefits and possible side effects of Medications: Risks, benefits, and possible side effects of medications explained to patient and patient son's and both verbalize understanding  HPI:  History of Present Illness   Physician Requesting Consult: Jelani Cohen MD  Reason for Consult / Principal Problem: depression    Chief Complaint: "I no longer eat or drink "    Camden Lam is a 79 y o  female with a past medical history significant for recent failure to thrive in adult with electrolyte imbalances and frailty, HTN, hypothyrodism, A-fib rate controlled on anticoagulation, antiphospholipid antibody syndrome, COPD, osteoporosis on Prolia injections, visual and hearing impairment, and a past psychiatric history significant for anxiety, who was admitted to [place] [address] medical-surgical unit on 2/21/2023 due to failure to thrive in adult  Psychiatric consultation was requested for management of depression and anxiety  On initial psychiatric evaluation, Simin appears frail, with blunted affect, and psychomotor slowing   The patient frequently answers "I don't know," when asked questions about current symptoms and situation, patient more freely discusses the past and her day-to-day activities  Patient replies "I guess," when asked if she is feeling sad, and depressed  Patient became tearful when asked about recent losses, and was unable to bring herself to state her brother-in-law recently passed  This writer asked directly about brother-in-law's death and patient nodded, stating "I don't know," when asked if her decline in eating, drinking, and sleeping worsened after his passing  The patient states his passing made her "realize how important life is " When asked to expand further, patient stated "I don't know "   Patient endorsed about 2 weeks ago she stopped feeling hungry and has stopped eating, stating "I no longer eat or drink " She notes her appetite has been "going downhill," and endorses she has no desire to eat  The patient endorses she also has not been sleeping  She does not feel rested and notes decreased energy  She endorses decreased motivation, concentration, and memory  The patient endorses guilt and feels like a burden to her family because she is "wearing out," her children since they are helping her more frequently now  The     Patient denied feeling anxious at time of evaluation and denied a history of anxiety, though per chart review, patient has been diagnosed with anxiety previously  Patient did endorse worry at times  Patient denied racing thoughts  The patient denied history of manic-related symptoms other than recent decreased sleep  The patient denied psychotic symptoms of hallucinations, paranoia, referential thinking, and did not demonstrate delusional thinking  On re-evaluation with attending psychiatrist, patient's son, Franci Davila, present and patient agreeable to psychiatric team obtaining collateral information from son  Franci Davila discusses patient was attending to her ADLs and IADLs until very recently   He notes a decline over the last 6-8 weeks, which sharply declined over the last 2 weeks  He endorses the patient had anxiety throughout her life, worsened during the pandemic and she has not been out of her house for much other than doctor's appointments  He notes she has not attended her grandchildren's birthdays or other events for multiple years  He endorsed after the death of his Uncle, the patient became more withdrawn and depressed  He endorsed she stopped sleeping, was maybe sleeping 1-3 hours per night and within the last week has stopped eating  He notes she stopped drinking coffee or smoking ciggarettes, which were both frequent hobbies for patient  He endorses they presented to PCP a few weeks ago who trialed Xanax which caused the patient "to just stare off," and she endorsed visual changes so medication was stopped and patient was started on Zoloft  He endorses patient has had fear of not paying her bills, toilet issues in the home (which were attended to and repaired previously), and getting her affairs in order for her estate   He reports the patient stated she did not need the new computer he had purchased her because she "wouldn't be here in a week "     Psychiatric Review Of Systems:  Sleep: Yes, decreased  Interest/Anhedonia: yes  Guilt/hopeless: Yes, increased  Low energy/anergy: yes  Poor Concentration: yes  Appetite changes: Yes, decreased  Weight changes: Yes, decreased  Somatic symptoms: Denies  Anxiety/panic: Denies  Vandana: Denies history of manic-related symptoms and does not demonstrate at time of evaluation  Self injurious behavior/risky behavior: yes, decreased oral intake  Trauma: none reported  Suicidal ideation: Denies, though answers "I don't know," when asked if she wanted to be with her  family members  Homicidal ideation: Denies  Auditory hallucinations: Denies  Visual hallucinations: Denies  Other hallucinations: Denies  Delusional thinking: patient did not make delusional statements at time of evaluation    Historical Information     Past Psychiatric History:   Past Inpatient Psychiatric Treatment:   No history of past inpatient psychiatric admissions  Past Outpatient Psychiatric Treatment:    No history of past outpatient psychiatric treatment  Past Suicide Attempts: Denied  Past Violent Behavior: None reported or per chart review  Past Psychiatric Medication Trials: Zoloft and Xanax    Substance Abuse History:  Social History     Tobacco History     Smoking Status  Never    Smokeless Tobacco Use  Never    Tobacco Comments  Never smoker per [employer]          Alcohol History     Alcohol Use Status  No          Drug Use     Drug Use Status  No          Sexual Activity     Sexually Active  Not Currently          Activities of Daily Living    Not Asked                 I have assessed this patient for substance use within the past 12 months      Alcohol use: Endorses she drinks socially - notes glass of wine at weddings/events  Marijuana: Denies current use  Other substance use:  Denies current use  Longest clean time: not applicable  History of Inpatient/Outpatient rehabilitation program: Denies  Nicotine use: 1 ppd smoker, endorses she quit a few months ago    Family Psychiatric History:   Psychiatric Illness:  patient denies  Substance Abuse:  Son - substance abuse  Suicide Attempts:  patient denies    Social History:  Education: unknown  Learning Disabilities: none reported  Marital History:   Children: 4 adult children, 3 sons and 1 daughter, multiple grandchildren  Living Arrangement: lives alone in a house  Occupational History: retired  Functioning Relationships: good support system  Legal History: Denies   History: none reported  Access to Firearms: none reported    Traumatic History:   Abuse: none reported  Other Traumatic Events: death of family members, notably her  and recently her brother-in-law     Past Medical History:    Past Medical History:   Diagnosis Date   • Anti-phospholipid syndrome ([facility] )    • Asthma    • Blood type O+    • Cardiac disease    • Chronic pain    • COPD (chronic obstructive pulmonary disease) ([facility])    • Fracture of ankle     left   • Hyperlipidemia    • Hypertension    • Osteoporosis      Past Surgical History:   Procedure Laterality Date   • APPENDECTOMY     • BILATERAL OOPHORECTOMY Bilateral    • ORIF TIBIAL SHAFT FRACTURE W/ PLATES AND SCREWS Right        Medical Review Of Systems:  Pertinent items are noted in HPI  Meds/Allergies   All current active medications have been reviewed    Current medications:   Current Facility-Administered Medications   Medication Dose Route Frequency   • acetaminophen (TYLENOL) tablet 650 mg  650 mg Oral Q6H PRN   • albuterol inhalation solution 2 5 mg  2 5 mg Nebulization Q6H PRN   • digoxin (LANOXIN) tablet 125 mcg  125 mcg Oral Daily   • diltiazem (CARDIZEM CD) 24 hr capsule 240 mg  240 mg Oral Daily   • Fluticasone Furoate-Vilanterol 100-25 mcg/actuation 1 puff  1 puff Inhalation Daily   • hydrochlorothiazide (HYDRODIURIL) tablet 12 5 mg  12 5 mg Oral Daily   • levothyroxine tablet 25 mcg  25 mcg Oral Early Morning   • mirtazapine (REMERON) tablet 15 mg  15 mg Oral HS   • sertraline (ZOLOFT) tablet 50 mg  50 mg Oral Daily   • umeclidinium 62 5 mcg/actuation inhaler AEPB 1 puff  1 puff Inhalation Daily     Allergies   Allergen Reactions   • Aspirin Other (See Comments)   • Penicillins Rash and Hives   • Sulfa Antibiotics Rash       Objective   Vital signs in last 24 hours:  Temp:  [97 9 °F (36 6 °C)-98 5 °F (36 9 °C)] 98 5 °F (36 9 °C)  HR:  [55-64] 64  Resp:  [16-18] 16  BP: (150-168)/(56-61) 168/60    No intake or output data in the 24 hours ending 02/23/23 1030    Mental Status Evaluation:  Appearance:  age appropriate, wearing hospital clothes, marginal grooming and hygiene, nasal canula in place, frail appearing, intermittent eye contact   Behavior:  cooperative, calm, guarded at times, psychomotor slowing   Speech:  clear, coherent, slow, delayed, soft   Mood:  "I don't know"   Affect:  blunted, depressed, reactive to son at times   Language: appears grossly intact   Thought Process:  organized, coherent, slowing of thoughts, negative thinking   Associations: intact associations   Thought Content:  no overt delusions, negative thinking, negative thoughts, preoccupied with thoughts of being a burden/guilt   Perceptual Disturbances: no auditory hallucinations, no visual hallucinations, does not appear responding to internal stimuli   Risk Potential: Suicidal ideation - None at present  Homicidal ideation - None at present  Potential for aggression - Not at present   Sensorium:  oriented to person, place, time/date and situation   Memory:  remote memory intact, patient answers "I don't know," to questions regarding recent events, patient able to name days of the week backward from Friday, patient able to identify objects, patient able to complete simple calculations correctly   Consciousness:  alert and awake   Attention/Concentration: attention span and concentration are age appropriate   Intellect: within normal limits   Fund of Knowledge: Appears within normal limits   Insight:  limited   Judgment: limited   Muscle Strength Muscle Tone: appears to have decreased strength  appears within normal range   Gait/Station: unable to assess as patient remained in hospital bed, patient ambulates with cane at baseline   Motor Activity: no abnormal movements     Laboratory Results: I have personally reviewed all pertinent laboratory/tests results    Results from the past 24 hours: No results found for this or any previous visit (from the past 24 hour(s))  Imaging Studies: CT abdomen pelvis wo contrast    Result Date: 2/22/2023  Narrative: CT ABDOMEN AND PELVIS WITHOUT IV CONTRAST INDICATION:   LLQ abdominal pain failure to thrive, abd tenderness  COMPARISON:  None   TECHNIQUE:  CT examination of the abdomen and pelvis was performed without intravenous contrast  Axial, sagittal, and coronal 2D reformatted images were created from the source data and submitted for interpretation  Radiation dose length product (DLP) for this visit:  207 mGy-cm   This examination, like all CT scans performed in the [facility], was performed utilizing techniques to minimize radiation dose exposure, including the use of iterative reconstruction and automated exposure control  Enteric contrast was not administered  FINDINGS: ABDOMEN LOWER CHEST:  Atelectasis/scarring in the bilateral lower lung fields  Heart appears enlarged  Moderate coronary atherosclerosis  Moderate aortic atherosclerosis  Skin thickening of the bilateral breasts, left greater than right, possibly related to prior intervention/radiation therapy  Correlation with patient's symptoms recommended  Dedicated breast imaging may be considered as clinically warranted nonemergently  LIVER/BILIARY TREE:  Unremarkable  GALLBLADDER:  There are gallstone(s) within the gallbladder, without pericholecystic inflammatory changes  SPLEEN:  Unremarkable  PANCREAS:  Unremarkable  ADRENAL GLANDS:  Adrenal gland thickening bilaterally  KIDNEYS/URETERS:  Unremarkable  No hydronephrosis  STOMACH AND BOWEL:  Colonic diverticulosis, no discrete evidence of acute diverticulitis  Small right-sided femoral hernia contains several normal-appearing loops of bowel  No discrete evidence of bowel obstruction  Otherwise grossly unremarkable  APPENDIX:  No findings to suggest appendicitis  ABDOMINOPELVIC CAVITY:  No ascites  No pneumoperitoneum  No lymphadenopathy  VESSELS:  Tortuous aorta, moderate atherosclerosis; no aortic aneurysm PELVIS REPRODUCTIVE ORGANS:  Enlarged fibroid uterus URINARY BLADDER:  Several small calcifications in the posterior bladder lumen; otherwise grossly unremarkable ABDOMINAL WALL/INGUINAL REGIONS:  Body wall edema    Midline sutures in the lower abdomen/pelvis anteriorly OSSEOUS STRUCTURES: Multiple osteoporotic compression fractures most prominently at the T11, L2, and L4 levels with approximately 50% loss of height; age indeterminate  Multilevel degenerative changes  Anterolisthesis of approximately 4 mm of L5 on S1,  probably related to facet joint arthropathy at this level  Impression: Small right-sided femoral hernia contains several normal-appearing loops of bowel (axial image 131, series 301)  No discrete evidence of bowel obstruction  Enlarged fibroid uterus  Age-indeterminate osteoporotic compression fractures as described  Skin thickening of the bilateral breasts, left greater than right, possibly related to prior intervention/radiation therapy  Correlation with patient's symptoms recommended  Dedicated breast imaging may be considered nonemergently as clinically warranted  Cardiomegaly, coronary atherosclerosis, cholelithiasis without discrete evidence of acute cholecystitis, colonic diverticulosis without evidence of acute diverticulitis and other findings as above  Workstation performed: [id number]     XR chest 1 view portable    Result Date: 2/22/2023  Narrative: CHEST INDICATION:   Hx COPD, SOB  COMPARISON:  2/12/2023 EXAM PERFORMED/VIEWS:  XR CHEST PORTABLE Single view FINDINGS: Nodular opacity measuring approximately 2 5 cm is again noted at the right cardiophrenic angle, unchanged  This was thought to represent atelectasis/scarring on abdominopelvic CT performed 2/21/2023  Cardiomediastinal silhouette appears unremarkable  The lungs are otherwise clear  No pneumothorax or pleural effusion  Osseous structures appear within normal limits for patient age       Impression: Persistent nodular opacity at the right cardiophrenic angle thought to represent atelectasis or scarring on abdominal pelvic CT performed contemporaneously Workstation performed: [id number]     XR chest 1 view portable    Result Date: 2/12/2023  Narrative: CHEST INDICATION:   SOB  COMPARISON:  CXR 12/27/2017 and chest CT 05/26/2006 7  EXAM PERFORMED/VIEWS:  XR CHEST PORTABLE FINDINGS: Cardiomediastinal silhouette appears unremarkable  2 5 cm nodule in the right cardiophrenic sulcus  No definite acute disease  No effusion or pneumothorax  Osseous structures appear within normal limits for patient age  Impression: No definite acute disease  2 5 cm nodule in the right cardiophrenic sulcus, of uncertain etiology  Further evaluation with a chest CT with no contrast could be performed if clinically warranted  The study was marked in [facility] for immediate notification  Workstation performed: [id number]     CT head without contrast    Result Date: 2/22/2023  Narrative: CT BRAIN - WITHOUT CONTRAST INDICATION:   AMS, on coumadin  COMPARISON:  None  TECHNIQUE:  CT examination of the brain was performed  In addition to axial images, sagittal and coronal 2D reformatted images were created and submitted for interpretation  Radiation dose length product (DLP) for this visit:  1041 mGy-cm   This examination, like all CT scans performed in the [facility], was performed utilizing techniques to minimize radiation dose exposure, including the use of iterative reconstruction and automated exposure control  IMAGE QUALITY:  Diagnostic  FINDINGS: PARENCHYMA: Decreased attenuation is noted in periventricular and subcortical white matter demonstrating an appearance that is statistically most likely to represent moderate microangiopathic change  No CT signs of acute infarction  No intracranial mass, mass effect or midline shift  No acute parenchymal hemorrhage  VENTRICLES AND EXTRA-AXIAL SPACES:  Ventricles and extra-axial CSF spaces are prominent commensurate with the degree of volume loss  No hydrocephalus  No acute extra-axial hemorrhage  VISUALIZED ORBITS: Normal visualized orbits  PARANASAL SINUSES: Normal visualized paranasal sinuses   CALVARIUM AND EXTRACRANIAL SOFT TISSUES:  Normal      Impression: No acute infarct, hemorrhage, or midline shift  Moderate chronic microangiopathic changes within the brain  Workstation performed: [id number]     EKG: EKG on 02/22/23 demonstrated NSR with occasional PVCs, septal infarct peviously identified, HR 64 bpm, QT/QTc 370/381 ms    Code Status: Level 1 - Full Code  Advance Directive and Living Will:     N/A  Power of :   N/A    The following interventions are recommended: to be determined as patient continues to progress in treatment with medication adjustments and psychiatric and geriatric follow up    Haider Collins DO 02/23/23  Psychiatry Resident, PGY-II    This note was completed in part utilizing [employer] Software  Grammatical, translation, syntax errors, random word insertions, spelling mistakes, and incomplete sentences may be an occasional consequence of this system secondary to software limitations with voice recognition, ambient noise, and hardware issues  If you have any questions or concerns about the content, text, or information contained within the body of this dictation, please contact the provider for clarification

## 2023-02-24 VITALS
OXYGEN SATURATION: 89 % | BODY MASS INDEX: 19.73 KG/M2 | WEIGHT: 101 LBS | TEMPERATURE: 98.3 F | HEART RATE: 67 BPM | DIASTOLIC BLOOD PRESSURE: 71 MMHG | RESPIRATION RATE: 16 BRPM | SYSTOLIC BLOOD PRESSURE: 167 MMHG

## 2023-02-24 LAB
ANION GAP SERPL CALCULATED.3IONS-SCNC: 5 MMOL/L (ref 4–13)
BUN SERPL-MCNC: 24 MG/DL (ref 5–25)
CALCIUM SERPL-MCNC: 9.1 MG/DL (ref 8.4–10.2)
CHLORIDE SERPL-SCNC: 94 MMOL/L (ref 96–108)
CO2 SERPL-SCNC: 42 MMOL/L (ref 21–32)
CREAT SERPL-MCNC: 0.47 MG/DL (ref 0.6–1.3)
GFR SERPL CREATININE-BSD FRML MDRD: 90 ML/MIN/1.73SQ M
GLUCOSE SERPL-MCNC: 115 MG/DL (ref 65–140)
INR PPP: 4.01 (ref 0.84–1.19)
POTASSIUM SERPL-SCNC: 3 MMOL/L (ref 3.5–5.3)
PROTHROMBIN TIME: 39.3 SECONDS (ref 11.6–14.5)
SODIUM SERPL-SCNC: 141 MMOL/L (ref 135–147)

## 2023-02-24 RX ORDER — LOSARTAN POTASSIUM 25 MG/1
25 TABLET ORAL DAILY
Status: DISCONTINUED | OUTPATIENT
Start: 2023-02-24 | End: 2023-02-24 | Stop reason: HOSPADM

## 2023-02-24 RX ORDER — LOSARTAN POTASSIUM 25 MG/1
12.5 TABLET ORAL DAILY
Status: CANCELLED | OUTPATIENT
Start: 2023-02-24

## 2023-02-24 RX ORDER — DILTIAZEM HYDROCHLORIDE 240 MG/1
240 CAPSULE, COATED, EXTENDED RELEASE ORAL DAILY
Qty: 30 CAPSULE | Refills: 0
Start: 2023-02-25

## 2023-02-24 RX ORDER — LOSARTAN POTASSIUM 25 MG/1
25 TABLET ORAL DAILY
Status: DISCONTINUED | OUTPATIENT
Start: 2023-02-24 | End: 2023-02-24

## 2023-02-24 RX ORDER — DIGOXIN 125 MCG
125 TABLET ORAL DAILY
Qty: 30 TABLET | Refills: 0
Start: 2023-02-25 | End: 2023-03-14

## 2023-02-24 RX ORDER — LOSARTAN POTASSIUM 25 MG/1
12.5 TABLET ORAL DAILY
Status: DISCONTINUED | OUTPATIENT
Start: 2023-02-24 | End: 2023-02-24

## 2023-02-24 RX ORDER — ARIPIPRAZOLE 2 MG/1
2 TABLET ORAL DAILY
Qty: 30 TABLET | Refills: 0
Start: 2023-02-25 | End: 2023-03-14

## 2023-02-24 RX ORDER — LOSARTAN POTASSIUM 25 MG/1
25 TABLET ORAL DAILY
Qty: 30 TABLET | Refills: 0
Start: 2023-02-25

## 2023-02-24 RX ORDER — MIRTAZAPINE 7.5 MG/1
7.5 TABLET, FILM COATED ORAL
Qty: 30 TABLET | Refills: 0
Start: 2023-02-24

## 2023-02-24 RX ADMIN — DIGOXIN 125 MCG: 125 TABLET ORAL at 08:44

## 2023-02-24 RX ADMIN — ARIPIPRAZOLE 2 MG: 2 TABLET ORAL at 08:44

## 2023-02-24 RX ADMIN — LOSARTAN POTASSIUM 25 MG: 25 TABLET, FILM COATED ORAL at 11:27

## 2023-02-24 RX ADMIN — LIDOCAINE 5% 1 PATCH: 700 PATCH TOPICAL at 08:43

## 2023-02-24 RX ADMIN — SERTRALINE HYDROCHLORIDE 50 MG: 50 TABLET ORAL at 08:44

## 2023-02-24 RX ADMIN — HYDROCHLOROTHIAZIDE 12.5 MG: 12.5 TABLET ORAL at 08:44

## 2023-02-24 RX ADMIN — DILTIAZEM HYDROCHLORIDE 240 MG: 240 CAPSULE, COATED, EXTENDED RELEASE ORAL at 08:44

## 2023-02-24 NOTE — DISCHARGE INSTR - AVS FIRST PAGE
Dear Charito Banks,     It was our pleasure to care for you here at [facility] [employer]  It is our hope that we were always able to exceed the expected standards for your care during your stay  You were hospitalized due to ***  You were cared for on the *** floor by Renetta Bailey MD under the service of Naveen Simmons MD with the [facility] who covers for your primary care physician (PCP), Colin Peterson MD, while you were hospitalized  If you have any questions or concerns related to this hospitalization, you may contact us at [phone]  For follow up as well as any medication refills, we recommend that you follow up with your primary care physician  A registered nurse will reach out to you by phone within a few days after your discharge to answer any additional questions that you may have after going home  However, at this time we provide for you here, the most important instructions / recommendations at discharge:     Notable Medication Adjustments -   Please start taking Remeron 7 5 mg daily at bedtime -this helps with sleeping and with your appetite  Please start take Abilify 2 mg tablet once daily-this helps with feelings of hopelessness  Please start taking losartan (Cozaar) 25 mg once daily-this helps with blood pressure  Testing Required after Discharge -   None  Important follow up information -   Please take care to follow good sleep hygiene  Make sure lights are off, and things are quiet from 11 PM until 6 AM   Other Instructions -   Number  Please review this entire after visit summary as additional general instructions including medication list, appointments, activity, diet, any pertinent wound care, and other additional recommendations from your care team that may be provided for you        Sincerely,     Renetta Bailey MD

## 2023-02-24 NOTE — ASSESSMENT & PLAN NOTE
Progressively worsening lethargy and generalized weakness   Poor appetite, decreased PO intake  Recent family death 6 weeks ago and has been depressed since   DDx including but not limited to: metabolic abnormality, dehydration, viral illness, anemia, ACS, MI, thyroid disease, intracranial process, other infectious process including UTI; doubt Gu[place] syndrome or myasthenia gravis or botulism or multiple sclerosis   Likely metabolic abnormality secondary to dehydration and poor PO intake d/t depressive episode  K-3 2  Increased BUN   Troponin 24, 24 unlikely cardiac origin   UA- Proteins, Micro WBCs, Occasional mucus   CT Head- Neg   CT AP- Right femoral hernia, cardiomegaly, CAD, cholelithiasis, diverticulosis, stable compression fracture  Magnesium and TSH levels normal (2/21)  Albumin level low (2/22)  Level 1 diet with Ensure shake   Ate 75% of meal at 2000 on 2/22 and 2/23    Plan-  · Monitor and replenish electrolytes  · Continue sertraline daily  · Add mirtazapine 7 5 mg p o  nightly  · Appreciate geriatrics recommendations  · Appreciate nutritionist recommendations  · Discontinue IV fluids

## 2023-02-24 NOTE — CASE MANAGEMENT
Case Management Discharge Planning Note    Patient name Camden Lam  Location S /S -01 MRN [id number]  : 1940 Date 2023       Current Admission Date: 2023  Current Admission Diagnosis:Failure to thrive in adult   Patient Active Problem List    Diagnosis Date Noted   • Severe protein-calorie malnutrition ([facility] ) 2023   • Unspecified mood (affective) disorder ([facility] ) 2023   • Failure to thrive in adult 2023   • Pulmonary nodule 02/15/2023   • Urinary tract infection without hematuria 02/15/2023   • Onychomycosis 02/15/2023   • Essential hypertension 02/10/2023   • Anxiety 02/10/2023   • Hypothyroidism 2022   • Peripheral edema 2021   • Low back pain without sciatica 2021   • COPD (chronic obstructive pulmonary disease) ([facility] ) 2017   • Hypertension 2017   • Hypercoagulable state ([facility] ) 2017   • 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   • Vitamin D deficiency 2016   • Osteoporosis 2015   • Antiphospholipid antibody syndrome ([facility] ) 2012   • Atrial fibrillation ([facility] ) 2012   • Hereditary hemolytic anemia ([facility] ) 2012   • Asthma 2012      LOS (days): 2  Geometric Mean LOS (GMLOS) (days): 3 40  Days to GMLOS:0 9     OBJECTIVE:  Risk of Unplanned Readmission Score: 16 14         Current admission status: Inpatient   Preferred Pharmacy:   [address] [place], [place] [address], [address] [facility] [address] [address]  Phone: [phone] Fax: Jake Good , [facility] [address]  Phone: [phone] Fax: [phone]    Primary Care Provider: Sangeetha Ibrahim MD    Primary Insurance: MEDICARE  Secondary Insurance: BLUE CROSS    DISCHARGE DETAILS:          Comments - Freedom of Choice: CM provided SNF choice list and patient's son, [place] decided on [facility] and [facility]   Cm informed the facility and made aware patient will discharge today - they have a bed today  CM contacted family/caregiver?: Yes  Were Treatment Team discharge recommendations reviewed with patient/caregiver?: Yes  Did patient/caregiver verbalize understanding of patient care needs?: N/A- going to facility  Were patient/caregiver advised of the risks associated with not following Treatment Team discharge recommendations?: Yes    Contacts  Patient Contacts: Son, Nacho Espino  Relationship to Patient[de-identified] Family  Contact Method: In Person  Reason/Outcome: Discharge [address]         Is the patient interested in [facility] at discharge?: No    DME Referral Provided  Referral made for DME?: No    Other Referral/Resources/Interventions Provided:  Interventions: Short Term Rehab  Referral Comments: [facility]    Would you like to participate in our [facility] service program?  : No - Declined    Treatment Team Recommendation: Short Term Rehab  Discharge Destination Plan[de-identified] Short Term Rehab  Transport at Discharge : [facility]  Dispatcher Contacted: Yes  Number/Name of Dispatcher: RoundTrip     ETA of Transport (Date): 02/24/23  ETA of Transport (Time): 1700     Transfer Mode: Stretcher  Accompanied by: EMS personnel     IMM Given (Date):: 02/24/23  IMM Given to[de-identified] Family  Family notified[de-identified] Nacho Espino, son       Accepting Facility Name, [address] : [facility]  Receiving Facility/Agency Phone Number: [phone]  Facility/Agency Fax Number: [phone]       Lasha Burk IMM reviewed with patient's caregiver, patient's caregiver agrees with discharge determination

## 2023-02-24 NOTE — SPEECH THERAPY NOTE
Speech Language/Pathology    Speech/Language Pathology Progress Note    Patient Name: Lety Marquez  UPHEMANT Date: 2/24/2023    Subjective:  Patient reports ongoing lack of hunger/desire to eat  RN reports some po intake but has been minimal      Objective:  Patient seen upright in recliner  She refused po today but with discussion she did admit to globus sensation/discomfort in midsternal area with intake  She reports she denies any regurgitation/retrograde flow but admits she "cant remember " She denies any sxs of aspiraiton with intake  Education was provided on strategies for improved ease/safety of the swallow  Reciprocal comprehension was verbally expressed  Also discussed potential esophageal dysphagia and option for additional barium swallow evaluation however I suspect she would have limited tolerance for this and when discussed with patient she declines this at this time  Assessment:  Swallow function not directly assessed today as the patient politely refused po intake however she did admit to esophageal dysphagia sxs  Cannot r/o an esophageal component of her poor po intake  Could order an esophagram or EGD to further assess however it is unlikely she would be able to tolerate at this time and furthermore politely refused additional assessment       Plan/Recommendations:  Continue current POC    Aspiration precautions and compensatory swallowing strategies: upright posture, slow rate of feeding, small bites/sips, alternating bites and sips and use of added moisteners as well as smaller more frequent meals    Will f/u as able/indicated    MADDIE Esparza S , [address] [facility]  Speech Language Pathologist   Available via [address] [place] [address] #[id number]  [place] #[id number]

## 2023-02-24 NOTE — ASSESSMENT & PLAN NOTE
Malnutrition Findings:   Adult Malnutrition type: Acute illness  Adult Degree of Malnutrition: Other severe protein calorie malnutrition  Malnutrition Characteristics: Fat loss, Muscle loss, Inadequate energy, Weight loss     Plan:  -Monitor weight and input/output  -Level 1 diet with Ensure supplementation    360 Statement: Severe malnutrition related to inadequate oral intake as evidenced by loss of subcutaneous fat and muscle extremities, orbitals, osseaous; 10 4% weight decrease x 1 5 months, +3 edema, < 75% energy intake compared to estimated nutritional needs for > 7 days  Currently treated with oral supplementation, nutrition consult  BMI Findings: Body mass index is 19 73 kg/m²

## 2023-02-24 NOTE — ASSESSMENT & PLAN NOTE
Home medications include Warfarin   INR supratherapeutic 3 13 (2/22) and 4 58 (2/23) and downtrending  PT 34 0 (2/22) and 43 6 (2/23) and downtrending  Digoxin held (2/22) and restarted (2/23)    Plan-   Resume warfarin at discharge  Follow-up PT/INR in 1 week

## 2023-02-24 NOTE — PROGRESS NOTES
[facility]  Progress Note - Camden Lam 1940, 79 y o  female MRN: [id number]  Unit/Bed#: S -01 Encounter: [id number]  Primary Care Provider: Sangeetha Ibrahim MD   Date and time admitted to hospital: 2/21/2023  9:03 PM    * Failure to thrive in adult  Assessment & Plan  Progressively worsening lethargy and generalized weakness   Poor appetite, decreased PO intake  Recent family death 6 weeks ago and has been depressed since   DDx including but not limited to: metabolic abnormality, dehydration, viral illness, anemia, ACS, MI, thyroid disease, intracranial process, other infectious process including UTI; doubt Guillan [place] syndrome or myasthenia gravis or botulism or multiple sclerosis   Likely metabolic abnormality secondary to dehydration and poor PO intake d/t depressive episode  K-3 2  Increased BUN   Troponin 24, 24 unlikely cardiac origin   UA- Proteins, Micro WBCs, Occasional mucus   CT Head- Neg   CT AP- Right femoral hernia, cardiomegaly, CAD, cholelithiasis, diverticulosis, stable compression fracture  Magnesium and TSH levels normal (2/21)  Albumin level low (2/22)  Level 1 diet with Ensure shake   Ate 75% of meal at 2000 on 2/22 and 2/23    Plan-  · Monitor and replenish electrolytes  · Continue sertraline daily  · Add mirtazapine 7 5 mg p o  nightly  · Appreciate geriatrics recommendations  · Appreciate nutritionist recommendations  · Gentle IVF 75ml/hr    Severe protein-calorie malnutrition ([facility] [address] )  Assessment & Plan  Malnutrition Findings:   Adult Malnutrition type: Acute illness  Adult Degree of Malnutrition: Other severe protein calorie malnutrition  Malnutrition Characteristics: Fat loss, Muscle loss, Inadequate energy, Weight loss     Plan:  -Monitor weight and input/output  -Level 1 diet with Ensure supplementation    360 Statement: Severe malnutrition related to inadequate oral intake as evidenced by loss of subcutaneous fat and muscle extremities, orbitals, osseaous; 10 4% weight decrease x 1 5 months, +3 edema, < 75% energy intake compared to estimated nutritional needs for > 7 days  Currently treated with oral supplementation, nutrition consult  BMI Findings: Body mass index is 19 73 kg/m²  Unspecified mood (affective) disorder (HCC)  Assessment & Plan  Pt has been experiencing anhedonia, sleep disturbances, fatigue, appetite disturbance and psychomotor retardation since brother-in-law's death  Pt has been stating that her death is near and rejects making any future plans  Pt denies acute suicidal ideation, homicidal ideation, or passive death wishes, patient has been cooperative with care  Plan:  See anxiety plan        Anxiety  Assessment & Plan  Pt has been experiencing worries about advanced directives and taking care of herself for the past two weeks and demonstrates pessimism about these issues being handled  Pt demonstrating paranoia about her insurance coverage  Pt denies acute suicidal ideation, homicidal ideation, or passive death wishes, patient has been cooperative with care      Differential: Acute stress disorder, Generalized anxiety disorder, Substance induced anxiety disorder (including prescription medication), Anxiety secondary to other medical condition  Remeron dosage changed from 15 mg to 7 5 mg per [address]:  Geriatrics consulted and recs appreciated  Behavioral health consulted and recs appreciated  Continue Remeron 7 5 mg QHS   Continue Zoloft 50 mg daily  Continue Abilify 2 mg daily  Consider inpatient psych admission after medication adjustments and continued therapies    Hypothyroidism  Assessment & Plan  Home medications include levothyroxine 25 QD   Medication compliance question  TSH levels normal (2/21)    Plan-  Continue levothyroxine med    Atrial fibrillation [facility])  Assessment & Plan  Home medications include Cardizem, digoxin, warfarin  INR-3 13 supratherapeutic (2/22) and 4 58 (2/23)  PT 34 (2/22) and 43 6 (2/23)  Digoxin level-2 5 supratherapeutic  EKG- sinus rhythm 64  Digoxin held (2/22) and restarted (2/23)    Plan-  Hold warfarin for now  Continue Cardizem  Repeat PT/INR      Antiphospholipid antibody syndrome [facility])  Assessment & Plan  Home medications include Warfarin   INR supratherapeutic 3 13 (2/22) and 4 58 (2/23) and downtrending  PT 34 0 (2/22) and 43 6 (2/23) and downtrending  Digoxin held (2/22) and restarted (2/23)    Plan-   Hold warfarin  Repeat PT/INR and trend    Hypertension  Assessment & Plan  POA /72  Repeat during examination 201/81  Home medications include hydrochlorothiazide  Son is unsure if patient was compliant with home medications  Hydralazine 10 mg given once (2/23)   Remains hypertensive within last 24hr  Ordered Losartan 25 mg (2/24)    Plan-  Continue vital signs as per routine  Continue hydrochlorothiazide  Continue Losartan 25 mg    COPD (chronic obstructive pulmonary disease) ([facility])  Assessment & Plan  Home medications include Advair, Albuterol, Spiriva  Son suspects medication non compliance   Denies SOB, cough, congestion, fevers   93% on RA upon admission  Mid-90s O2 saturation on 3 L (2/23)  O2 sat at 97% on 3L (2/24)    Plan-  Continue home medications  Add incentive spirometry        VTE Pharmacologic Prophylaxis: VTE Score: 7 High Risk (Score >/= 5) - Pharmacological DVT Prophylaxis Contraindicated  Sequential Compression Devices Ordered  Patient Centered Rounds: I performed bedside rounds with nursing staff today  Discussions with Specialists or Other Care Team Provider: Geriatrics and [facility], PT/OT and Nutrition Services    Education and Discussions with Family / Patient: Updated  (son) at bedside  Current Length of Stay: 2 day(s)  Current Patient Status: Inpatient   Discharge Plan: Anticipate discharge tomorrow to rehab facility  Code Status: Level 1 - Full Code    Subjective:   Pt was being awoken by nurse at bedside   Pt ate 75% of dinner last night and has been experiencing neck pain with fears of dysphagia  Swallow function was not assessed today due to poor PO intake and pt refused other assessment options (esophagram or EGD)  Pt has persisted to be hypertensive O/N  Mirtazapine was changed from 15 to 7 5 mg dosage per behavioral health recs  Pt was discontinued off IV drugs  Objective:     Vitals:   Temp (24hrs), Av 6 °F (37 °C), Min:97 9 °F (36 6 °C), Max:99 2 °F (37 3 °C)    Temp:  [97 9 °F (36 6 °C)-99 2 °F (37 3 °C)] 98 6 °F (37 °C)  HR:  [62-74] 74  BP: (166-177)/(60-84) 171/77  SpO2:  [91 %-95 %] 91 %  Body mass index is 19 73 kg/m²  Input and Output Summary (last 24 hours): Intake/Output Summary (Last 24 hours) at 2023 1403  Last data filed at 2023 1701  Gross per 24 hour   Intake --   Output 200 ml   Net -200 ml       Physical Exam:   Physical Exam  Vitals reviewed  Constitutional:       General: She is not in acute distress  Appearance: She is well-developed  She is ill-appearing  Comments: Frail appearing    HENT:      Head: Normocephalic and atraumatic  Mouth/Throat:      Mouth: Mucous membranes are dry  Pharynx: No oropharyngeal exudate or posterior oropharyngeal erythema  Cardiovascular:      Rate and Rhythm: Normal rate and regular rhythm  Heart sounds: No murmur heard  Pulmonary:      Effort: Pulmonary effort is normal  No respiratory distress  Breath sounds: Normal breath sounds  Abdominal:      General: Abdomen is flat  There is no distension  Palpations: Abdomen is soft  Tenderness: There is no abdominal tenderness  Musculoskeletal:         General: No swelling  Right lower leg: Edema (trace) present  Left lower leg: Edema (trace) present  Skin:     General: Skin is warm and dry  Capillary Refill: Capillary refill takes less than 2 seconds  Coloration: Skin is pale  Findings: Bruising present     Neurological: General: No focal deficit present  Mental Status: She is alert and oriented to person, place, and time  Mental status is at baseline  Cranial Nerves: No cranial nerve deficit  Motor: Weakness present  Gait: Gait abnormal    Psychiatric:         Mood and Affect: Mood is anxious and depressed  Affect is flat  Speech: She is noncommunicative  Speech is delayed  Behavior: Behavior is slowed            Additional Data:     Labs:  Results from last 7 days   Lab Units 02/21/23  2251   WBC Thousand/uL 5 35   HEMOGLOBIN g/dL 12 2   HEMATOCRIT % 35 5   PLATELETS Thousands/uL 270   NEUTROS PCT % 84*   LYMPHS PCT % 10*   MONOS PCT % 6   EOS PCT % 0     Results from last 7 days   Lab Units 02/24/23  1150 02/23/23  1305   SODIUM mmol/L 141 140   POTASSIUM mmol/L 3 0* 3 3*   CHLORIDE mmol/L 94* 98   CO2 mmol/L 42* 39*   BUN mg/dL 24 29*   CREATININE mg/dL 0 47* 0 55*   ANION GAP mmol/L 5 3*   CALCIUM mg/dL 9 1 9 3   ALBUMIN g/dL  --  3 4*   TOTAL BILIRUBIN mg/dL  --  0 72   ALK PHOS U/L  --  71   ALT U/L  --  14   AST U/L  --  19   GLUCOSE RANDOM mg/dL 115 147*     Results from last 7 days   Lab Units 02/24/23  0520   INR  4 01*             Results from last 7 days   Lab Units 02/21/23  2251   LACTIC ACID mmol/L 0 8       Lines/Drains:  Invasive Devices     Peripheral Intravenous Line  Duration           Peripheral IV 02/21/23 Right Antecubital 2 days                      Imaging: Reviewed radiology reports from this admission including: chest xray, abdominal/pelvic CT and CT head    Recent Cultures (last 7 days):         Last 24 Hours Medication List:   Current Facility-Administered Medications   Medication Dose Route Frequency Provider Last Rate   • acetaminophen  650 mg Oral Q6H PRN Maria Zazueta MD     • albuterol  2 5 mg Nebulization Q6H PRN Maria Zazueta MD     • ARIPiprazole  2 mg Oral Daily Mallory Green DO     • digoxin  125 mcg Oral Daily Jerry Garcia MD     • diltiazem 240 mg Oral Daily Maria Zazueta MD     • Fluticasone Furoate-Vilanterol  1 puff Inhalation Daily Maria Zazueta MD     • hydrochlorothiazide  12 5 mg Oral Daily Maria Zazueta MD     • levothyroxine  25 mcg Oral Early Morning Maria Zazueta MD     • lidocaine  1 patch Topical Daily Mckenzie Suárez MD     • losartan  25 mg Oral Daily Mckenzie Suárez MD     • mirtazapine  7 5 mg Oral HS Mallory Green DO     • sertraline  50 mg Oral Daily Maria Zazueta MD     • umeclidinium  1 puff Inhalation Daily Maria Zazueta MD          Today, Patient Was Seen By: Saurav Ramirez    **Please Note: This note may have been constructed using a voice recognition system  **

## 2023-02-24 NOTE — ASSESSMENT & PLAN NOTE
POA /72  Repeat during examination 201/81  Home medications include hydrochlorothiazide  Son is unsure if patient was compliant with home medications  Hydralazine 10 mg given once (2/23)   Remains hypertensive within last 24hr  Ordered Losartan 25 mg (2/24)    Plan-  Continue vital signs as per routine  Continue hydrochlorothiazide  Continue Losartan 25 mg

## 2023-02-24 NOTE — ASSESSMENT & PLAN NOTE
Pt has been experiencing worries about advanced directives and taking care of herself for the past two weeks and demonstrates pessimism about these issues being handled  Pt demonstrating paranoia about her insurance coverage  Pt denies acute suicidal ideation, homicidal ideation, or passive death wishes, patient has been cooperative with care      Differential: Acute stress disorder, Generalized anxiety disorder, Substance induced anxiety disorder (including prescription medication), Anxiety secondary to other medical condition  Remeron dosage changed from 15 mg to 7 5 mg per Behavioral Health recs    Plan:  Geriatrics consulted and recs appreciated  Behavioral health consulted and recs appreciated  Continue Remeron 7 5 mg QHS   Continue Zoloft 50 mg daily  Continue Abilify 2 mg daily  Consider inpatient psych admission after medication adjustments and continued therapies

## 2023-02-24 NOTE — PROGRESS NOTES
Progress Note - Geriatric Medicine   Anne De Jesus 79 y o  female MRN: [id number]  Unit/Bed#: S -01 Encounter: [id number]      Assessment/Plan:  Failure to thrive in adult  Unclear etiology, likely secondary to depression   Progressively worsening lethargy and generalized weakness noted by family over the last 1-2 weeks  With noted poor appetite and decreased p o  intake  Nutrition consult placed for supplement recommendations, started on Ensure supplements  Was started on Remeron to assist with insomnia, depression, and appetite  Psych consulted for further management on depression  Further management per primary     Depression/anxiety  Chronic, but worse since loss of her brother in law about 6 weeks ago  Anxiety worsened and she Was started on Xanax a couple weeks ago to assist with anxiety  Per her son the Xanax just made her zombie like in which she would stare off in space  Was started on Zoloft 50 mg daily about 1 week ago and her xanax dose was decreased to 0 125mg bid prn  Was started on Remeron 15 mg Wednesday night which was then changed to 7 5mg qhs   Last QTc 381 from EKG on 2/22/23, stable  Neck with confidence outpatient yesterday who recommended Remeron 7 5 mg nightly, continue Zoloft 50 mg daily, and starting Abilify 2 mg daily  They also recommended consulting cardiology to assess risk for stimulant use such as Ritalin  Further management per psych    Insomnia  First-line treatment is behavior modification  Maintain sleep-wake cycle, avoid nighttime interruptions  Avoid caffeine throughout the day  Avoid napping throughout the day  Encourage physical activity throughout the day  Is currently on Remeron 7 5 mg nightly  Continue to monitor    Dysphagia  Reports trouble swallowing and feeling like things get stuck  Was seen by speech therapy who recommended pureed diet with thin liquids  Aspiration precautions  Follow up with ST at facility for further changes in diet     Ambulatory dysfunction  At a baseline ambulates without AD  PT/OT following  Fall precautions  Out of bed as tolerated  Encourage early and frequent mobilization  Encourage adequate hydration and nutrition  Provide adequate pain management   Goal is to discharge to STR  Continue with PT/OT for continued strength and balance training     Subjective:   Upon examination patient was noted in the chair, resting  Patient's son and grandson at bedside during examination  Patient was feeling a little bit better today  She ate a small amount of her breakfast   Still appears very depressed on exam   She only answers a few questions  Did states she had some pain in her back  Per nursing no acute concerns or issues at this time  Review of Systems   Unable to perform ROS: Other (limited by patient response )   Constitutional: Positive for activity change, appetite change and fatigue  Respiratory: Positive for shortness of breath  Cardiovascular: Negative for chest pain and palpitations  Musculoskeletal: Positive for arthralgias, back pain and gait problem  Neurological: Positive for weakness  Psychiatric/Behavioral: Positive for dysphoric mood and sleep disturbance  The patient is nervous/anxious  Objective:     Vitals: Blood pressure (!) 171/77, pulse 74, temperature 98 6 °F (37 °C), resp  rate 16, weight 45 8 kg (101 lb), SpO2 91 %, not currently breastfeeding  ,Body mass index is 19 73 kg/m²  Intake/Output Summary (Last 24 hours) at 2/24/2023 1155  Last data filed at 2/23/2023 1701  Gross per 24 hour   Intake --   Output 200 ml   Net -200 ml       Current Medications: Reviewed    Physical Exam:   Physical Exam  Vitals reviewed  Constitutional:       General: She is not in acute distress  Appearance: She is well-developed  She is ill-appearing  Comments: Frail appearing    HENT:      Head: Normocephalic and atraumatic  Mouth/Throat:      Mouth: Mucous membranes are dry        Pharynx: No oropharyngeal exudate or posterior oropharyngeal erythema  Cardiovascular:      Rate and Rhythm: Normal rate and regular rhythm  Heart sounds: No murmur heard  Pulmonary:      Effort: Pulmonary effort is normal  No respiratory distress  Breath sounds: Normal breath sounds  Abdominal:      General: Abdomen is flat  There is no distension  Palpations: Abdomen is soft  Tenderness: There is no abdominal tenderness  Musculoskeletal:         General: No swelling  Right lower leg: Edema (trace) present  Left lower leg: Edema (trace) present  Skin:     General: Skin is warm and dry  Capillary Refill: Capillary refill takes less than 2 seconds  Coloration: Skin is pale  Findings: Bruising present  Neurological:      General: No focal deficit present  Mental Status: She is alert and oriented to person, place, and time  Mental status is at baseline  Cranial Nerves: No cranial nerve deficit  Motor: Weakness present  Gait: Gait abnormal    Psychiatric:         Mood and Affect: Mood is anxious and depressed  Affect is flat  Speech: She is noncommunicative  Speech is delayed  Behavior: Behavior is slowed  Invasive Devices     Peripheral Intravenous Line  Duration           Peripheral IV 02/21/23 Right Antecubital 2 days                Lab, Imaging and other studies: I have personally reviewed pertinent reports  Please note:  Voice-recognition software may have been used in the preparation of this document  Occasional wrong word or "sound-alike" substitutions may have occurred due to the inherent limitations of voice recognition software  Interpretation should be guided by context

## 2023-02-24 NOTE — ASSESSMENT & PLAN NOTE
Home medications include Cardizem, digoxin, warfarin  INR-3 13 supratherapeutic (2/22) and 4 58 (2/23)  PT 34 (2/22) and 43 6 (2/23)  Digoxin level-2 5 supratherapeutic  EKG- sinus rhythm 64  Digoxin held (2/22) and restarted (2/23)    Plan-  Resume warfarin at discharge  Continue Cardizem  Repeat PT/INR

## 2023-02-24 NOTE — ASSESSMENT & PLAN NOTE
Home medications include Advair, Albuterol, Spiriva  Son suspects medication non compliance   Denies SOB, cough, congestion, fevers   93% on RA upon admission  Mid-90s O2 saturation on 3 L (2/23)  O2 sat at 97% on 3L (2/24)    Plan-  Continue home medications  Add incentive spirometry

## 2023-02-24 NOTE — PLAN OF CARE
Problem: Potential for Falls  Goal: Patient will remain free of falls  Description: INTERVENTIONS:  - Educate patient/family on patient safety including physical limitations  - Instruct patient to call for assistance with activity   - Consult OT/PT to assist with strengthening/mobility   - Keep Call bell within reach  - Keep bed low and locked with side rails adjusted as appropriate  - Keep care items and personal belongings within reach  - Initiate and maintain comfort rounds  - Make Fall Risk Sign visible to staff  Problem: MOBILITY - ADULT  Goal: Maintain or return to baseline ADL function  Description: INTERVENTIONS:  -  Assess patient's ability to carry out ADLs; assess patient's baseline for ADL function and identify physical deficits which impact ability to perform ADLs (bathing, care of mouth/teeth, toileting, grooming, dressing, etc )  - Assess/evaluate cause of self-care deficits   - Assess range of motion  - Assess patient's mobility; develop plan if impaired  - Assess patient's need for assistive devices and provide as appropriate  - Encourage maximum independence but intervene and supervise when necessary  - Involve family in performance of ADLs  - Assess for home care needs following discharge   - Consider OT consult to assist with ADL evaluation and planning for discharge  - Provide patient education as appropriate  Outcome: Progressing  Goal: Maintains/Returns to pre admission functional level  Description: INTERVENTIONS:  - Perform BMAT or MOVE assessment daily    - Set and communicate daily mobility goal to care team and patient/family/caregiver     - Collaborate with rehabilitation services on mobility goals if consulted  - Out of bed for toileting  - Record patient progress and toleration of activity level   Outcome: Progressing     - Apply yellow socks and bracelet for high fall risk patients  - Consider moving patient to room near nurses station  Outcome: Progressing

## 2023-02-25 DIAGNOSIS — D64.9 ANEMIA, UNSPECIFIED TYPE: ICD-10-CM

## 2023-02-26 RX ORDER — FERROUS SULFATE 220 (44)/5
SOLUTION, ORAL ORAL
Qty: 150 ML | Refills: 5 | Status: ON HOLD | OUTPATIENT
Start: 2023-02-26

## 2023-02-27 ENCOUNTER — APPOINTMENT (EMERGENCY)
Dept: CT IMAGING | Facility: HOSPITAL | Age: 83
End: 2023-02-27

## 2023-02-27 ENCOUNTER — HOSPITAL ENCOUNTER (INPATIENT)
Facility: HOSPITAL | Age: 83
LOS: 15 days | Discharge: NURSING, REHAB OR LONG-TERM CARE | End: 2023-03-14
Attending: EMERGENCY MEDICINE | Admitting: HOSPITALIST

## 2023-02-27 DIAGNOSIS — F32.9 MDD (MAJOR DEPRESSIVE DISORDER): ICD-10-CM

## 2023-02-27 DIAGNOSIS — R62.7 FAILURE TO THRIVE IN ADULT: ICD-10-CM

## 2023-02-27 DIAGNOSIS — R41.82 ALTERED MENTAL STATUS: Primary | ICD-10-CM

## 2023-02-27 DIAGNOSIS — I48.20 CHRONIC ATRIAL FIBRILLATION (HCC): ICD-10-CM

## 2023-02-27 DIAGNOSIS — J18.9 PNEUMONIA: ICD-10-CM

## 2023-02-27 DIAGNOSIS — F39 UNSPECIFIED MOOD (AFFECTIVE) DISORDER (HCC): ICD-10-CM

## 2023-02-27 DIAGNOSIS — I10 ESSENTIAL HYPERTENSION: ICD-10-CM

## 2023-02-27 DIAGNOSIS — K59.00 CONSTIPATION, UNSPECIFIED CONSTIPATION TYPE: ICD-10-CM

## 2023-02-27 DIAGNOSIS — I72.5 ANEURYSM OF BASILAR ARTERY (HCC): ICD-10-CM

## 2023-02-27 DIAGNOSIS — M54.50 LOW BACK PAIN WITHOUT SCIATICA, UNSPECIFIED BACK PAIN LATERALITY, UNSPECIFIED CHRONICITY: ICD-10-CM

## 2023-02-27 PROBLEM — R93.89 ABNORMAL CT OF THE CHEST: Status: ACTIVE | Noted: 2023-02-27

## 2023-02-27 PROBLEM — E04.1 THYROID NODULE: Status: ACTIVE | Noted: 2023-02-27

## 2023-02-27 PROBLEM — E87.29 COMPENSATED RESPIRATORY ACIDOSIS: Status: ACTIVE | Noted: 2023-02-27

## 2023-02-27 PROBLEM — R79.89 POSITIVE D DIMER: Status: ACTIVE | Noted: 2023-02-27

## 2023-02-27 PROBLEM — R79.1 SUPRATHERAPEUTIC INR: Status: ACTIVE | Noted: 2023-02-27

## 2023-02-27 PROBLEM — R79.89 POSITIVE D DIMER: Status: RESOLVED | Noted: 2023-02-27 | Resolved: 2023-02-27

## 2023-02-27 PROBLEM — E87.6 HYPOKALEMIA: Status: ACTIVE | Noted: 2023-02-27

## 2023-02-27 LAB
2HR DELTA HS TROPONIN: -8 NG/L
ABO GROUP BLD: NORMAL
ABO GROUP BLD: NORMAL
ALBUMIN SERPL BCP-MCNC: 3.3 G/DL (ref 3.5–5)
ALP SERPL-CCNC: 66 U/L (ref 34–104)
ALT SERPL W P-5'-P-CCNC: 14 U/L (ref 7–52)
AMMONIA PLAS-SCNC: 38 UMOL/L (ref 18–72)
ANION GAP SERPL CALCULATED.3IONS-SCNC: 11 MMOL/L (ref 4–13)
APAP SERPL-MCNC: <10 UG/ML (ref 10–20)
APTT PPP: 47 SECONDS (ref 23–37)
ARTERIAL PATENCY WRIST A: YES
AST SERPL W P-5'-P-CCNC: 23 U/L (ref 13–39)
ATRIAL RATE: 74 BPM
BACTERIA UR QL AUTO: ABNORMAL /HPF
BASE EXCESS BLDA CALC-SCNC: 16.2 MMOL/L
BASOPHILS # BLD AUTO: 0.01 THOUSANDS/ÂΜL (ref 0–0.1)
BASOPHILS NFR BLD AUTO: 0 % (ref 0–1)
BILIRUB SERPL-MCNC: 0.93 MG/DL (ref 0.2–1)
BILIRUB UR QL STRIP: NEGATIVE
BLD GP AB SCN SERPL QL: POSITIVE
BNP SERPL-MCNC: 243 PG/ML (ref 0–100)
BUN SERPL-MCNC: 31 MG/DL (ref 5–25)
CALCIUM ALBUM COR SERPL-MCNC: 9.7 MG/DL (ref 8.3–10.1)
CALCIUM SERPL-MCNC: 9.1 MG/DL (ref 8.4–10.2)
CARDIAC TROPONIN I PNL SERPL HS: 29 NG/L
CARDIAC TROPONIN I PNL SERPL HS: 37 NG/L
CHLORIDE SERPL-SCNC: 97 MMOL/L (ref 96–108)
CK SERPL-CCNC: 26 U/L (ref 26–192)
CLARITY UR: ABNORMAL
CO2 SERPL-SCNC: 37 MMOL/L (ref 21–32)
COLOR UR: YELLOW
CREAT SERPL-MCNC: 0.53 MG/DL (ref 0.6–1.3)
D DIMER PPP FEU-MCNC: 1.06 UG/ML FEU
DAT C3-SP REAG RBC QL: NORMAL
DAT IGG-SP REAG RBCCO QL: NORMAL
DAT POLY-SP REAG RBC QL: NORMAL
DIGOXIN SERPL-MCNC: 0.8 NG/ML (ref 0.8–2)
EOSINOPHIL # BLD AUTO: 0.01 THOUSAND/ÂΜL (ref 0–0.61)
EOSINOPHIL NFR BLD AUTO: 0 % (ref 0–6)
ERYTHROCYTE [DISTWIDTH] IN BLOOD BY AUTOMATED COUNT: 12.7 % (ref 11.6–15.1)
ETHANOL SERPL-MCNC: <10 MG/DL
FLUAV RNA RESP QL NAA+PROBE: NEGATIVE
FLUBV RNA RESP QL NAA+PROBE: NEGATIVE
GFR SERPL CREATININE-BSD FRML MDRD: 87 ML/MIN/1.73SQ M
GLUCOSE SERPL-MCNC: 67 MG/DL (ref 65–140)
GLUCOSE SERPL-MCNC: 77 MG/DL (ref 65–140)
GLUCOSE UR STRIP-MCNC: NEGATIVE MG/DL
HCO3 BLDA-SCNC: 45.3 MMOL/L (ref 22–28)
HCT VFR BLD AUTO: 37.5 % (ref 34.8–46.1)
HGB BLD-MCNC: 12.2 G/DL (ref 11.5–15.4)
HGB UR QL STRIP.AUTO: ABNORMAL
IMM GRANULOCYTES # BLD AUTO: 0.04 THOUSAND/UL (ref 0–0.2)
IMM GRANULOCYTES NFR BLD AUTO: 1 % (ref 0–2)
INR PPP: 4.6 (ref 0.84–1.19)
KETONES UR STRIP-MCNC: ABNORMAL MG/DL
LACTATE SERPL-SCNC: 0.6 MMOL/L (ref 0.5–2)
LEUKOCYTE ESTERASE UR QL STRIP: NEGATIVE
LYMPHOCYTES # BLD AUTO: 0.71 THOUSANDS/ÂΜL (ref 0.6–4.47)
LYMPHOCYTES NFR BLD AUTO: 10 % (ref 14–44)
MAGNESIUM SERPL-MCNC: 2.3 MG/DL (ref 1.9–2.7)
MCH RBC QN AUTO: 33.2 PG (ref 26.8–34.3)
MCHC RBC AUTO-ENTMCNC: 32.5 G/DL (ref 31.4–37.4)
MCV RBC AUTO: 102 FL (ref 82–98)
MONOCYTES # BLD AUTO: 0.4 THOUSAND/ÂΜL (ref 0.17–1.22)
MONOCYTES NFR BLD AUTO: 5 % (ref 4–12)
MUCOUS THREADS UR QL AUTO: ABNORMAL
NASAL CANNULA: 3.5
NEUTROPHILS # BLD AUTO: 6.27 THOUSANDS/ÂΜL (ref 1.85–7.62)
NEUTS SEG NFR BLD AUTO: 84 % (ref 43–75)
NITRITE UR QL STRIP: NEGATIVE
NON-SQ EPI CELLS URNS QL MICRO: ABNORMAL /HPF
NRBC BLD AUTO-RTO: 0 /100 WBCS
O2 CT BLDA-SCNC: 21.1 ML/DL (ref 16–23)
OXYHGB MFR BLDA: 95.4 % (ref 94–97)
P AXIS: 71 DEGREES
PCO2 BLDA: 73.2 MM HG (ref 36–44)
PH BLDA: 7.41 [PH] (ref 7.35–7.45)
PH UR STRIP.AUTO: 6 [PH]
PLATELET # BLD AUTO: 239 THOUSANDS/UL (ref 149–390)
PMV BLD AUTO: 8.8 FL (ref 8.9–12.7)
PO2 BLDA: 91.4 MM HG (ref 75–129)
POTASSIUM SERPL-SCNC: 3.3 MMOL/L (ref 3.5–5.3)
PR INTERVAL: 186 MS
PROCALCITONIN SERPL-MCNC: 0.11 NG/ML
PROT SERPL-MCNC: 6.2 G/DL (ref 6.4–8.4)
PROT UR STRIP-MCNC: ABNORMAL MG/DL
PROTHROMBIN TIME: 43.8 SECONDS (ref 11.6–14.5)
QRS AXIS: 17 DEGREES
QRSD INTERVAL: 88 MS
QT INTERVAL: 336 MS
QTC INTERVAL: 372 MS
RBC # BLD AUTO: 3.67 MILLION/UL (ref 3.81–5.12)
RBC #/AREA URNS AUTO: ABNORMAL /HPF
RH BLD: POSITIVE
RH BLD: POSITIVE
RSV RNA RESP QL NAA+PROBE: NEGATIVE
SALICYLATES SERPL-MCNC: <5 MG/DL (ref 3–20)
SARS-COV-2 RNA RESP QL NAA+PROBE: NEGATIVE
SODIUM SERPL-SCNC: 145 MMOL/L (ref 135–147)
SP GR UR STRIP.AUTO: 1.02 (ref 1–1.03)
SPECIMEN EXPIRATION DATE: NORMAL
SPECIMEN SOURCE: ABNORMAL
T WAVE AXIS: 2 DEGREES
TSH SERPL DL<=0.05 MIU/L-ACNC: 1.14 UIU/ML (ref 0.45–4.5)
UROBILINOGEN UR STRIP-ACNC: 2 MG/DL
VENTRICULAR RATE: 74 BPM
VIT B12 SERPL-MCNC: 836 PG/ML (ref 100–900)
WBC # BLD AUTO: 7.44 THOUSAND/UL (ref 4.31–10.16)
WBC #/AREA URNS AUTO: ABNORMAL /HPF

## 2023-02-27 RX ORDER — FLUTICASONE FUROATE AND VILANTEROL 100; 25 UG/1; UG/1
1 POWDER RESPIRATORY (INHALATION)
Status: DISCONTINUED | OUTPATIENT
Start: 2023-02-27 | End: 2023-03-14 | Stop reason: HOSPADM

## 2023-02-27 RX ORDER — POTASSIUM CHLORIDE 14.9 MG/ML
20 INJECTION INTRAVENOUS EVERY 4 HOURS
Status: COMPLETED | OUTPATIENT
Start: 2023-02-27 | End: 2023-02-28

## 2023-02-27 RX ORDER — MIRTAZAPINE 15 MG/1
7.5 TABLET, FILM COATED ORAL
Status: DISCONTINUED | OUTPATIENT
Start: 2023-02-27 | End: 2023-03-06

## 2023-02-27 RX ORDER — LOSARTAN POTASSIUM 25 MG/1
25 TABLET ORAL DAILY
Status: DISCONTINUED | OUTPATIENT
Start: 2023-02-27 | End: 2023-03-01

## 2023-02-27 RX ORDER — SODIUM CHLORIDE, SODIUM GLUCONATE, SODIUM ACETATE, POTASSIUM CHLORIDE, MAGNESIUM CHLORIDE, SODIUM PHOSPHATE, DIBASIC, AND POTASSIUM PHOSPHATE .53; .5; .37; .037; .03; .012; .00082 G/100ML; G/100ML; G/100ML; G/100ML; G/100ML; G/100ML; G/100ML
100 INJECTION, SOLUTION INTRAVENOUS CONTINUOUS
Status: DISCONTINUED | OUTPATIENT
Start: 2023-02-27 | End: 2023-03-01

## 2023-02-27 RX ORDER — POTASSIUM CHLORIDE 14.9 MG/ML
20 INJECTION INTRAVENOUS EVERY 4 HOURS
Status: DISCONTINUED | OUTPATIENT
Start: 2023-02-27 | End: 2023-02-27

## 2023-02-27 RX ORDER — ALBUTEROL SULFATE 2.5 MG/3ML
2.5 SOLUTION RESPIRATORY (INHALATION) EVERY 4 HOURS PRN
Status: DISCONTINUED | OUTPATIENT
Start: 2023-02-27 | End: 2023-03-11

## 2023-02-27 RX ORDER — DEXTROSE MONOHYDRATE 25 G/50ML
25 INJECTION, SOLUTION INTRAVENOUS ONCE
Status: COMPLETED | OUTPATIENT
Start: 2023-02-27 | End: 2023-02-27

## 2023-02-27 RX ORDER — LEVOTHYROXINE SODIUM 0.03 MG/1
25 TABLET ORAL DAILY
Status: DISCONTINUED | OUTPATIENT
Start: 2023-02-27 | End: 2023-03-14 | Stop reason: HOSPADM

## 2023-02-27 RX ORDER — HYDROCHLOROTHIAZIDE 12.5 MG/1
12.5 TABLET ORAL DAILY
Status: DISCONTINUED | OUTPATIENT
Start: 2023-02-27 | End: 2023-03-02

## 2023-02-27 RX ORDER — DILTIAZEM HYDROCHLORIDE 240 MG/1
240 CAPSULE, COATED, EXTENDED RELEASE ORAL DAILY
Status: DISCONTINUED | OUTPATIENT
Start: 2023-02-27 | End: 2023-03-14 | Stop reason: HOSPADM

## 2023-02-27 RX ORDER — LIDOCAINE 50 MG/G
1 PATCH TOPICAL DAILY
Status: DISCONTINUED | OUTPATIENT
Start: 2023-02-27 | End: 2023-03-14 | Stop reason: HOSPADM

## 2023-02-27 RX ADMIN — CEFTRIAXONE 1000 MG: 1 INJECTION, POWDER, FOR SOLUTION INTRAMUSCULAR; INTRAVENOUS at 07:50

## 2023-02-27 RX ADMIN — SODIUM CHLORIDE, SODIUM GLUCONATE, SODIUM ACETATE, POTASSIUM CHLORIDE, MAGNESIUM CHLORIDE, SODIUM PHOSPHATE, DIBASIC, AND POTASSIUM PHOSPHATE 85 ML/HR: .53; .5; .37; .037; .03; .012; .00082 INJECTION, SOLUTION INTRAVENOUS at 16:16

## 2023-02-27 RX ADMIN — POTASSIUM CHLORIDE 20 MEQ: 14.9 INJECTION, SOLUTION INTRAVENOUS at 18:45

## 2023-02-27 RX ADMIN — MIRTAZAPINE 7.5 MG: 15 TABLET, FILM COATED ORAL at 22:00

## 2023-02-27 RX ADMIN — AZITHROMYCIN 500 MG: 500 INJECTION, POWDER, LYOPHILIZED, FOR SOLUTION INTRAVENOUS at 08:30

## 2023-02-27 RX ADMIN — DEXTROSE MONOHYDRATE 25 ML: 25 INJECTION, SOLUTION INTRAVENOUS at 03:49

## 2023-02-27 RX ADMIN — IOHEXOL 100 ML: 350 INJECTION, SOLUTION INTRAVENOUS at 06:22

## 2023-02-27 RX ADMIN — POTASSIUM CHLORIDE 20 MEQ: 14.9 INJECTION, SOLUTION INTRAVENOUS at 20:45

## 2023-02-27 NOTE — ASSESSMENT & PLAN NOTE
Admission /73  3  History of COPD, unclear compliance with home inhalers per pt's sons  Smoker  No wheezing on exam    No indication for BiPAP at this time, given metabolic compensation  Continue home inhalers  Wean supplemental O2 to target SpO2 of 88% and above, not to exceed 96%

## 2023-02-27 NOTE — PLAN OF CARE
Problem: MOBILITY - ADULT  Goal: Maintain or return to baseline ADL function  Description: INTERVENTIONS:  -  Assess patient's ability to carry out ADLs; assess patient's baseline for ADL function and identify physical deficits which impact ability to perform ADLs (bathing, care of mouth/teeth, toileting, grooming, dressing, etc )  - Assess/evaluate cause of self-care deficits   - Assess range of motion  - Assess patient's mobility; develop plan if impaired  - Assess patient's need for assistive devices and provide as appropriate  - Encourage maximum independence but intervene and supervise when necessary  - Involve family in performance of ADLs  - Assess for home care needs following discharge   - Consider OT consult to assist with ADL evaluation and planning for discharge  - Provide patient education as appropriate  Outcome: Progressing  Goal: Maintains/Returns to pre admission functional level  Description: INTERVENTIONS:  - Perform BMAT or MOVE assessment daily    - Set and communicate daily mobility goal to care team and patient/family/caregiver  - Collaborate with rehabilitation services on mobility goals if consulted  - Reposition patient every 2 hours    - Record patient progress and toleration of activity level   Outcome: Progressing     Problem: Prexisting or High Potential for Compromised Skin Integrity  Goal: Skin integrity is maintained or improved  Description: INTERVENTIONS:  - Identify patients at risk for skin breakdown  - Assess and monitor skin integrity  - Assess and monitor nutrition and hydration status  - Monitor labs   - Assess for incontinence   - Turn and reposition patient  - Assist with mobility/ambulation  - Relieve pressure over bony prominences  - Avoid friction and shearing  - Provide appropriate hygiene as needed including keeping skin clean and dry  - Evaluate need for skin moisturizer/barrier cream  - Collaborate with interdisciplinary team   - Patient/family teaching  - Consider wound care consult   Outcome: Progressing     Problem: NEUROSENSORY - ADULT  Goal: Achieves stable or improved neurological status  Description: INTERVENTIONS  - Monitor and report changes in neurological status  - Monitor vital signs such as temperature, blood pressure, glucose, and any other labs ordered   - Initiate measures to prevent increased intracranial pressure  - Monitor for seizure activity and implement precautions if appropriate      Outcome: Progressing  Goal: Achieves maximal functionality and self care  Description: INTERVENTIONS  - Monitor swallowing and airway patency with patient fatigue and changes in neurological status  - Encourage and assist patient to increase activity and self care     - Encourage visually impaired, hearing impaired and aphasic patients to use assistive/communication devices  Outcome: Progressing     Problem: RESPIRATORY - ADULT  Goal: Achieves optimal ventilation and oxygenation  Description: INTERVENTIONS:  - Assess for changes in respiratory status  - Assess for changes in mentation and behavior  - Position to facilitate oxygenation and minimize respiratory effort  - Oxygen administered by appropriate delivery if ordered  - Initiate smoking cessation education as indicated  - Encourage broncho-pulmonary hygiene including cough, deep breathe, Incentive Spirometry  - Assess the need for suctioning and aspirate as needed  - Assess and instruct to report SOB or any respiratory difficulty  - Respiratory Therapy support as indicated  Outcome: Progressing

## 2023-02-27 NOTE — ASSESSMENT & PLAN NOTE
On diltiazem, digoxin, and warfarin at home  Warfarin favored AC for antiphospholipid syndrome     -Holding warfarin for supratherapeutic INR of 4 6 POA  -Dig level supratherapeutic at 2 5 during prior admission  -Dig restarted at discharge  -Check dig level this admission-->therapeutic at 0 8  -Continue to hold digoxin in this elderly woman given concern for toxicity  Per chart review, has been on dig since at least 2012   -Monitor on tele  -Check Echo   None in EMR   -Can add metoprolol or increase dose of diltiazem for rate control if HR going up off dig

## 2023-02-27 NOTE — ASSESSMENT & PLAN NOTE
Pt on warfarin for antiphospholipid syndrome  INR 4 60 POA    -Hold home warfarin 5 mg QD  -Monitor INR daily  -Resume warfarin at reduced dose of 2 5 mg daily when INR approximately 2 5 (goal 2-3)  Monitor and adjust dose as needed

## 2023-02-27 NOTE — ED ATTENDING ATTESTATION
2/27/2023  IRian MD, saw and evaluated the patient  I have discussed the patient with the resident/non-physician practitioner and agree with the resident's/non-physician practitioner's findings, Plan of Care, and MDM as documented in the resident's/non-physician practitioner's note, except where noted  All available labs and Radiology studies were reviewed  I was present for key portions of any procedure(s) performed by the resident/non-physician practitioner and I was immediately available to provide assistance  At this point I agree with the current assessment done in the Emergency Department  I have conducted an independent evaluation of this patient a history and physical is as follows:    70-year-old female brought in from a local nursing home via EMS with a reported alteration of her mental status  Not much history provided by nursing was increasingly more confused and all former baseline  Patient not providing much history at the time of evaluation so we are unable to obtain a full review of systems  Vital signs reviewed  Very thin elderly female showing no signs of acute distress  She will respond to some verbal cues  Cardiopulmonary examination within normal limits  Neuro exam grossly intact  Abdominal examination soft 9 tender nondistended normal bowel sounds  No signs of trauma to the head  Plan: Cardiac monitoring, continuous pulse oximetry, place IVs, IV hydration, labs, imaging        ED Course         Critical Care Time  Procedures

## 2023-02-27 NOTE — ASSESSMENT & PLAN NOTE
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

## 2023-02-27 NOTE — H&P
[address] [facility] 1940, 79 y o  female MRN: [id number]  Unit/Bed#: S -01 Encounter: [id number]  Primary Care Provider: Mode Reyez MD   Date and time admitted to hospital: 2023  3:02 AM    * Altered mental status  Assessment & Plan  Acutely depressed mental status  Per family, pt was socially conversational when seen on 23  STR reported that pt was less alert starting afternoon of 23  Broadly, DDx includes neurologic, infection, toxic/metabolic  Low suspicion for stroke or seizure, given persistence of obtunded state and absence of focal findings  Low suspicion for infection given normal VS; WBC, LA, procal wnl; UA negative for leuks or nitrites  Toxic/metabolic: Salicylates, acetaminophen, EtOH, ammonia, digoxin level, TSH negative or normal   ABG notable for respiratory acidosis without acidemia, compensated by metabolic alkalosis  Recent medication changes include addition of aripiprazole, started 23  Adverse affects include sedated state reported in 3-5% of patients     -Hold Abilify for possible medication SE   -Check vit B12  -Monitor mental status  GCS approx 7 on admission  -Continue to monitor VS, WBC  Monitor off additional abx at this time  -Wean supp O2 to target SpO2 88% and above, not greater than 96%    Hypokalemia  Assessment & Plan  K 3 3 POA    Replace K now, 20 meq IV q4h x3 doses  Check AM BMP  Keep K >4 0, given hx of Afib    Supratherapeutic INR  Assessment & Plan  Pt on warfarin for antiphospholipid syndrome  INR 4 60 POA    -Hold home warfarin 5 mg QD  -Monitor INR daily  -Resume warfarin at reduced dose of 2 5 mg daily when INR approximately 2 5 (goal 2-3)  Monitor and adjust dose as needed  Compensated respiratory acidosis  Assessment & Plan  Admission /73  4/45 3  History of COPD, unclear compliance with home inhalers per pt's sons  Smoker    No wheezing on exam    No indication for BiPAP at this time, given metabolic compensation  Continue home inhalers  Wean supplemental O2 to target SpO2 of 88% and above, not to exceed 96%  Failure to thrive in adult  Assessment & Plan  Recent admission for same  Pt had reportedly stopped eating, drinking, or taking meds following death of brother-in-law, however, pt had improved at time of discharge  Started on mirtazapine, Abilify during that admission     -Continue mirtazapine   -Pt currently NPO due to obtunded mental status   -Nursing dysphagia assessment and advance diet when pt more alert  -IVF plasmalyte 85 ml/h until pt taking adequate PO  -PT/OT consult for treatment while admitted once pt able to participate  -Consider consult to Palliative Care if pt in decline and pending family goals of care    Atrial fibrillation [facility])  Assessment & Plan  On diltiazem, digoxin, and warfarin at home  Warfarin favored AC for antiphospholipid syndrome     -Holding warfarin for supratherapeutic INR of 4 6 POA  -Dig level supratherapeutic at 2 5 during prior admission  -Dig restarted at discharge  -Check dig level this admission-->therapeutic at 0 8  -Continue to hold digoxin in this elderly woman given concern for toxicity  Per chart review, has been on dig since at least 2012   -Monitor on tele  -Check Echo  None in EMR   -Can add metoprolol or increase dose of diltiazem for rate control if HR going up off dig    Abnormal CT of the chest  Assessment & Plan  PE Study with CT abdomen & pelvis with contrast 2/27/2023:  "No pulmonary embolism seen  There is a new right basilar density with associated few branching nodular density in the right middle lobe and a 5 mm nodular density superior segment left lower lobe, indeterminate  These may be on inflammatory/infectious basis /pneumonia     Short interval follow-up chest CT at 3 months suggested to demonstrate resolution No acute inflammatory stranding within the abdomen Right-sided inguinal/femoral hernia, stable without bowel obstruction  Fibroid uterus "    Similar changes seen on prior imaging of 2/21  Low suspicion for PNA, given normal VS; absence of cough, SOB, increased wob; normal WBC, LA, procal   Suspect atelectasis in dependent lung of pt who has recently been hospitalized and has had limited mobility out of bed  Pt received ceftriaxone and azithromycin in ED    -Start incentive vinicio when pt alert to participate  -Turn patient with nursing  -Monitor off additional abx, trend WBC and temp  -F/u BCx x2, in process  -Can collect sputum Cx if pt develops cough  -3 month f/u imaging      Positive D dimer-resolved as of 2/27/2023  Assessment & Plan  D-dimer elevated at 1 06 on presentation  Low suspicion for PE given normal VS, supratherapeutic INR of 4 6 on admission    CT PE chest w con negative for PE    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[address] )  Assessment & Plan  Incidental finding on CTA head and neck    CTA head and neck with and without contrast 2/27/2023:  "No acute intracranial abnormality  Moderate chronic microangiopathy  Negative CTA head and neck for large vessel occlusion, dissection, or high-grade stenosis  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 projecting laterally in between left posterior cerebral artery and left superior cerebellar  Recommend consultation with the Neurovascular Center, a division of [address] [place] [address] Neuroscience at [phone]  Incidental thyroid nodule(s) for which nonemergent thyroid ultrasound is recommended "    Spoke with out-patient Neurosurg office at above number, who recommend o/p f/u    Referred for ambulatory Neurosurg new pt visit      Thyroid nodule  Assessment & Plan  CTA head and neck with and without contrast 2/27/2023:  "No acute intracranial abnormality  Moderate chronic microangiopathy  Negative CTA head and neck for large vessel occlusion, dissection, or high-grade stenosis   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 projecting laterally in between left posterior cerebral artery and left superior cerebellar  Recommend consultation with the Neurovascular Center, a division of [facility] [place] for Neuroscience at [phone]  Incidental thyroid nodule(s) for which nonemergent thyroid ultrasound is recommended "    -Order o/p US prior to discharge    Hypothyroidism  Assessment & Plan  TSH wnl  Cont home levothyroxine    VTE Pharmacologic Prophylaxis: VTE Score: 6 High Risk (Score >/= 5) - Pharmacological DVT Prophylaxis Contraindicated  Sequential Compression Devices Ordered  Pt takes Coumadin at home for antiphospholipid syndrome, INR supra therapeutic at presentation  Code Status: Level 2 - DNAR: but accepts endotracheal intubation, per adult sons, present at bedside, who report their mother has explicitly expressed this to them in the past   Discussion with family: Updated  (son) at bedside  Anticipated Length of Stay: Patient will be admitted on an inpatient basis with an anticipated length of stay of greater than 2 midnights secondary to AMS  Chief Complaint: AMS    History of Present Illness:  Baylee Nix is a 79 y o  female with a PMH of antiphospholipid syndrome, asthma, COPD, hypertension, hyperlipidemia, A-fib, osteoporosis, hypothyroidism and recent admission for failure to thrive from 2/21/2023 to 2/24/2023 who presents from [facility] via EMS for altered mental status with decreased responsiveness  Pt is obtunded during this encounter, moans, no eye opening, responds to painful stimuli GCS of 7  History is provided by adult sons, who are present at bedside  They report that their mother improved during her recent hospitalization, with better PO intake at meals, more interactive and conversationally coherent  Sons report pt was doing well when seen on Saturday, but they received a call from [facility] staff on Sunday afternoon reporting that patient had become less alert   Sons report recent medication changes from prior admission include additions of aripiprazole, mirtazapine, and losartan  Sons are not aware of any new symptoms prior to change in mental status  Deny cough, SOB, increased wob  They do not believe their mother had been compliant with her respiratory inhalers at home, but are not sure whether it was administered at [facility]  Review of Systems:  Review of Systems   Unable to perform ROS: Mental status change       Past Medical and Surgical History:   Past Medical History:   Diagnosis Date   • Anti-phospholipid syndrome (HCC)    • Asthma    • Blood type O+    • Cardiac disease    • Chronic pain    • COPD (chronic obstructive pulmonary disease) (HCC)    • Fracture of ankle     left   • Hyperlipidemia    • Hypertension    • Osteoporosis        Past Surgical History:   Procedure Laterality Date   • APPENDECTOMY     • BILATERAL OOPHORECTOMY Bilateral    • ORIF TIBIAL SHAFT FRACTURE W/ PLATES AND SCREWS Right        Meds/Allergies:  Prior to Admission medications    Medication Sig Start Date End Date Taking?  Authorizing Provider   Advair Diskus 250-50 MCG/DOSE inhaler USE 1 INHALATION TWICE A DAY 0/6/67   Lacie Bob MD   albuterol (2 5 mg/3 mL) 0 083 % nebulizer solution USE 1 VIAL IN NEBULIZER EVERY 4 TO 6 HOURS AS NEEDED FOR COUGH AND WHEEZE 6/16/27   Lacie Bob MD   ARIPiprazole (ABILIFY) 2 mg tablet Take 1 tablet (2 mg total) by mouth daily Do not start before February 25, 2023 2/25/23   Alex Velez MD   Ascorbic Acid (VITAMIN C) 1000 MG tablet Take 1,000 mg by mouth daily    Historical Provider, MD   Cholecalciferol (VITAMIN D3) 20 MCG (800 UNIT) TABS Take by mouth    Historical Provider, MD   denosumab (PROLIA) 60 mg/mL Inject 60 mg under the skin once 6 months    Historical Provider, MD   digoxin (LANOXIN) 0 125 mg tablet Take 1 tablet (125 mcg total) by mouth daily Do not start before February 25, 2023 2/25/23   Alex Velez MD   diltiazem (CARDIZEM CD) 240 mg 24 hr capsule Take 1 capsule (240 mg total) by mouth daily Do not start before February 25, 2023 2/25/23   Carlos Alberto Rose MD   Ferrous Sulfate 220 (44 Fe) MG/5ML LIQD take 1 teaspoonful by mouth once daily 3/34/90   Jeanie Fortune MD   hydrochlorothiazide (HYDRODIURIL) 12 5 mg tablet Take 1 tablet (12 5 mg total) by mouth daily 0/92/93   Jeanie Fortune MD   levothyroxine 25 mcg tablet TAKE 1 TABLET DAILY 08/93/42   Jeanie Fortune MD   lidocaine (LIDODERM) 5 % Apply 1 patch topically daily Remove & Discard patch within 12 hours or as directed by MD 1/12/86   Jeanie Fortune MD   loratadine (CLARITIN) 10 mg tablet Take 10 mg by mouth daily    Historical Provider, MD   losartan (COZAAR) 25 mg tablet Take 1 tablet (25 mg total) by mouth daily Do not start before February 25, 2023 2/25/23   Carlos Alberto Rose MD   mirtazapine (REMERON) 7 5 MG tablet Take 1 tablet (7 5 mg total) by mouth daily at bedtime 2/24/23   Carlos Alberto Rose MD   multivitamin [facility]) TABS Take 1 tablet by mouth daily    Historical Provider, MD   sertraline (Zoloft) 50 mg tablet Take 1 tablet (50 mg total) by mouth daily 6/72/21   Jeanie Fortune MD   Spiriva HandiHaler 18 MCG inhalation capsule INHALE THE CONTENTS OF 1 CAPSULE DAILY AS DIRECTED 8/71/87   Jeanie Fortune MD   warfarin (COUMADIN) 5 mg tablet TAKE 1 TABLET DAILY 4/74/74   Jeanie Fortune MD     I have reveiwed home medications using records provided by [facility]  Allergies: Allergies   Allergen Reactions   • Aspirin Other (See Comments)   • Penicillins Rash and Hives   • Sulfa Antibiotics Rash       Social History:  Marital Status:    Occupation: Not assessed  Patient Pre-hospital Living Situation: Pt is presenting from [facility]  Lives at home at baseline  Patient Pre-hospital Level of Mobility: Presenting from STR  Walks at baseline    Patient Pre-hospital Diet Restrictions: None  Substance Use History:   Social History     Substance and Sexual Activity   Alcohol Use No     Social History     Tobacco Use   Smoking Status Some Days   • Types: Cigarettes   Smokeless Tobacco Never   Tobacco Comments    Never smoker per [employer]     Social History     Substance and Sexual Activity   Drug Use No       Family History:  Family History   Problem Relation Age of Onset   • Hypertension Mother    • Skin cancer Mother    • Hypertension Father        Physical Exam:     Vitals:   Blood Pressure: 170/77 (02/27/23 1015)  Pulse: 71 (02/27/23 1015)  Temperature: 98 °F (36 7 °C) (02/27/23 0307)  Temp Source: Rectal (02/27/23 0307)  Respirations: 16 (02/27/23 1015)  SpO2: 100 % (02/27/23 1015)    Physical Exam  Constitutional:       General: She is in acute distress (moaning)  Appearance: Normal appearance  She is normal weight  She is ill-appearing  She is not toxic-appearing or diaphoretic  Comments: obtunded   HENT:      Head: Normocephalic and atraumatic  Mouth/Throat:      Mouth: Mucous membranes are dry  Pharynx: Oropharynx is clear  No oropharyngeal exudate or posterior oropharyngeal erythema  Cardiovascular:      Rate and Rhythm: Normal rate and regular rhythm  Pulses: Normal pulses  Heart sounds: No murmur heard  No friction rub  No gallop  Comments: Faint heart sounds  Pulmonary:      Effort: Pulmonary effort is normal  No respiratory distress  Breath sounds: No stridor  Rales (diffuse scant rales, greatest in R lateral base) present  No wheezing or rhonchi  Comments: Auscultated in anterior and lateral fields  Chest:      Chest wall: No tenderness  Abdominal:      General: Bowel sounds are normal  There is no distension  Palpations: Abdomen is soft  Tenderness: There is no guarding or rebound  Musculoskeletal:         General: No swelling  Right lower leg: No edema  Left lower leg: No edema  Skin:     General: Skin is warm and dry  Coloration: Skin is not jaundiced or pale     Neurological:      Comments: Not opening eyes, incoherent verbalizations, withdrawal from pain  GCS 7  Additional Data:     Lab Results:  Results from last 7 days   Lab Units 02/27/23  0332   WBC Thousand/uL 7 44   HEMOGLOBIN g/dL 12 2   HEMATOCRIT % 37 5   PLATELETS Thousands/uL 239   NEUTROS PCT % 84*   LYMPHS PCT % 10*   MONOS PCT % 5   EOS PCT % 0     Results from last 7 days   Lab Units 02/27/23  0324   SODIUM mmol/L 145   POTASSIUM mmol/L 3 3*   CHLORIDE mmol/L 97   CO2 mmol/L 37*   BUN mg/dL 31*   CREATININE mg/dL 0 53*   ANION GAP mmol/L 11   CALCIUM mg/dL 9 1   ALBUMIN g/dL 3 3*   TOTAL BILIRUBIN mg/dL 0 93   ALK PHOS U/L 66   ALT U/L 14   AST U/L 23   GLUCOSE RANDOM mg/dL 77     Results from last 7 days   Lab Units 02/27/23  0324   INR  4 60*     Results from last 7 days   Lab Units 02/27/23  0346   POC GLUCOSE mg/dl 67         Results from last 7 days   Lab Units 02/27/23  0332 02/27/23  0324 02/21/23  2251   LACTIC ACID mmol/L 0 6  --  0 8   PROCALCITONIN ng/ml  --  0 11  --        Lines/Drains:  Invasive Devices     Peripheral Intravenous Line  Duration           Peripheral IV 02/27/23 Left Antecubital <1 day    Peripheral IV 02/27/23 Right;Ventral (anterior) Forearm <1 day                    Imaging: Reviewed radiology reports from this admission including: chest CT scan, abdominal/pelvic CT and CT head and Personally reviewed the following imaging: chest CT scan and abdominal/pelvic CT  CTA head and neck with and without contrast   Final Result by Gracie Stewart MD (02/27 8003)      No acute intracranial abnormality  Moderate chronic microangiopathy  Negative CTA head and neck for large vessel occlusion, dissection, or high-grade stenosis  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 projecting laterally in between left posterior cerebral artery and left superior cerebellar  Recommend consultation with the Neurovascular Center, a division of [facility] Neuroscience at    [phone]        Incidental thyroid nodule(s) for which nonemergent thyroid ultrasound is recommended  Additional chronic/incidental findings as detailed above  The study was marked in [facility] for immediate notification  Workstation performed: [id number]         PE Study with CT abdomen & pelvis with contrast   Final Result by Albino Burnett MD (02/27 1996)      No pulmonary embolism seen      There is a new right basilar density with associated few branching nodular density in the right middle lobe and a 5 mm nodular density superior segment left lower lobe, indeterminate  These may be on inflammatory/infectious basis /pneumonia   Short    interval follow-up chest CT at 3 months suggested to demonstrate resolution      No acute inflammatory stranding within the abdomen      Right-sided inguinal/femoral hernia, stable without bowel obstruction   Fibroid uterus   The study was marked in [employer] for immediate notification  Workstation performed: [id number]             EKG and Other Studies Reviewed on Admission:   · EKG: Sinus rhythm with occasional PVCs, HR 74     ** Please Note: This note has been constructed using a voice recognition system   **

## 2023-02-27 NOTE — ASSESSMENT & PLAN NOTE
D-dimer elevated at 1 06 on presentation  Low suspicion for PE given normal VS, supratherapeutic INR of 4 6 on admission    CT PE chest w con negative for PE

## 2023-02-27 NOTE — ASSESSMENT & PLAN NOTE
Recent admission for same  Pt had reportedly stopped eating, drinking, or taking meds following death of brother-in-law, however, pt had improved at time of discharge   Started on mirtazapine, Abilify during that admission     -Continue mirtazapine   -Pt currently NPO due to obtunded mental status   -Nursing dysphagia assessment and advance diet when pt more alert  -IVF plasmalyte 85 ml/h until pt taking adequate PO  -PT/OT consult for treatment while admitted once pt able to participate  -Consider consult to Palliative Care if pt in decline and pending family goals of care

## 2023-02-27 NOTE — CASE MANAGEMENT
Case Management Assessment & Discharge Planning Note    Patient name Baylee Nix  Location S /S -05 MRN [id number]  : 1940 Date 2023       Current Admission Date: 2023  Current Admission Diagnosis:Altered mental status, Pneumonia, Failure to thrive in adult, AMS (altered mental status)   Patient Active Problem List    Diagnosis Date Noted   • Severe protein-calorie malnutrition ([facility] [address] ) 2023   • Unspecified mood (affective) disorder ([facility] [facility] ) 2023   • Failure to thrive in adult 2023   • Pulmonary nodule 02/15/2023   • Urinary tract infection without hematuria 02/15/2023   • Onychomycosis 02/15/2023   • Essential hypertension 02/10/2023   • Anxiety 02/10/2023   • Hypothyroidism 2022   • Peripheral edema 2021   • Low back pain without sciatica 2021   • COPD (chronic obstructive pulmonary disease) ([facility] [address] ) 2017   • Hypertension 2017   • Hypercoagulable state ([address] ) 2017   • 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   • Vitamin D deficiency 2016   • Osteoporosis 2015   • Antiphospholipid antibody syndrome ([facility] [address] ) 2012   • Atrial fibrillation ([facility] [address] ) 2012   • [facility] 2012   • Asthma 2012      LOS (days): 0  Geometric Mean LOS (GMLOS) (days):   Days to GMLOS:     OBJECTIVE:  PATIENT READMITTED TO HOSPITAL  Risk of Unplanned Readmission Score: 16 43         Current admission status: Inpatient       Preferred Pharmacy:   [id number] [address]  [address] [id number]  Phone: [phone] Fax: [address], [address] [address]  Phone: [phone] Fax: [phone]    Primary Care Provider: Venice Hernandez MD    Primary Insurance: MEDICARE  Secondary Insurance: BLUE CROSS    ASSESSMENT:  [address] Proxies    There are no active Health Care Proxies on file  Readmission Root Cause  30 Day Readmission: Yes  Who directed you to return to the hospital?: Other (comment) [facility])  Did you understand whom to contact if you had questions or problems?: Yes  Did you get your prescriptions before you left the hospital?: Yes  Were you able to get your prescriptions filled when you left the hospital?: Yes  Did you take your medications as prescribed?: Yes  Were you able to get to your follow-up appointments?: Yes  During previous admission, was a post-acute recommendation made?: Yes  What post-acute resources were offered?: STR  Patient was readmitted due to: Altered Mental Status  Action Plan: Medication and medical management  Patient Information  Admitted from[de-identified] Facility  Mental Status: Confused  During Assessment patient was accompanied by: Son  Assessment information provided by[de-identified] Son (Solomon-pt's son at bedside )  Primary Caregiver: Self  Support Systems: [address] of Residence: [id number] do you live in?: [address] [facility] entry access options   Select all that apply : Stairs  Number of steps to enter home : 7  Do the steps have railings?: Yes  Type of Current Residence: Bi-level  Upon entering residence, is there a bedroom on the main floor (no further steps)?: Yes  Upon entering residence, is there a bathroom on the main floor (no further steps)?: Yes  In the last 12 months, was there a time when you were not able to pay the mortgage or rent on time?: No  In the last 12 months, how many places have you lived?: 1  In the last 12 months, was there a time when you did not have a steady place to sleep or slept in a shelter (including now)?: No  Homeless/housing insecurity resource given?: N/A  Living Arrangements: Lives Alone (Has supportive sons who take turns visiting patient )  Is patient a ?: No    Activities of Daily Living Prior to Admission  Functional Status: Independent  Completes ADLs independently?: Yes  Ambulates independently?: Yes  Does patient use assisted devices?: Yes  Assisted Devices (DME) used: Straight Cane  Does patient currently own DME?: Yes  What DME does the patient currently own?: Straight Cane  Does patient have a history of Outpatient Therapy (PT/OT)?: No  Does the patient have a history of Short-Term Rehab?: Yes (Currently in STR at [facility])  Does patient have a history of HHC?: No  Does patient currently have [facility]?: No         Patient Information Continued  Income Source: SSI/SSD  Does patient have prescription coverage?: Yes  Within the past 12 months, you worried that your food would run out before you got the money to buy more : Never true  Within the past 12 months, the food you bought just didn't last and you didn't have money to get more : Never true  Food insecurity resource given?: N/A  Does patient receive dialysis treatments?: No  Does patient have a history of substance abuse?: No  Does patient have a history of Mental Health Diagnosis?: Yes (Depression and anxiety)  Is patient receiving treatment for mental health?: Yes         Means of Transportation  Means of Transport to Appts[de-identified] Family transport  In the past 12 months, has lack of transportation kept you from medical appointments or from getting medications?: No  In the past 12 months, has lack of transportation kept you from meetings, work, or from getting things needed for daily living?: No  Was application for public transport provided?: N/A        DISCHARGE DETAILS:    Discharge planning discussed with[de-identified] Patient and pt's sons at bedside  Freedom of Choice: Yes  Comments - Freedom of Choice: Pt was at [facility] for STR prior to admission  Pt's family would like pt to return to [facility] upon discharge    CM contacted family/caregiver?: Yes  Were Treatment Team discharge recommendations reviewed with patient/caregiver?: Yes  Did patient/caregiver verbalize understanding of patient care needs?: N/A- going to facility  Were patient/caregiver advised of the risks associated with not following Treatment Team discharge recommendations?: Yes    Contacts  Patient Contacts: Son, Miki Lombardi  Relationship to Patient[de-identified] Family  Contact Method: In Person  Reason/Outcome: Discharge Planning, Emergency [address]         Is the patient interested in [facility] at discharge?: No         Other Referral/Resources/Interventions Provided:  Interventions: Short Term Rehab  Referral Comments: Pt was at [facility] prior to admission  Pt's family would like pt to return to [facility] to complete STR      Would you like to participate in our [address] service program?  : No - Declined    Treatment Team Recommendation: Short Term Rehab

## 2023-02-27 NOTE — ASSESSMENT & PLAN NOTE
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

## 2023-02-27 NOTE — ED PROVIDER NOTES
History  Chief Complaint   Patient presents with   • Altered Mental Status     Pt presents via EMS from [address] [place] [address] in [place] with pt being unresponsive  EMS received minimal information and pt only responds to painful stimuli      HPI patient is an 79year-old for presents this evening with AMS, decreased responsiveness  Patient recently seen here and admitted for unspecified mood disorder - with adjustment disorder since brother-in-law's death, severe protein calorie malnutrition, failure to thrive  Patient responds to painful stimuli  Nesha Anderson did not provide a history for the patient to EMS, was told the nursing home will be faxing information over  Patient is 99% on 3 L O2 which is her baseline per EMS relayed from nursing home staff  Patient has a past medical history of hypothyroidism, CAD, COPD, antiphospholipid syndrome, psych  Pt is a full code  Prior to Admission Medications   Prescriptions Last Dose Informant Patient Reported? Taking? ARIPiprazole (ABILIFY) 2 mg tablet   No No   Sig: Take 1 tablet (2 mg total) by mouth daily Do not start before February 25, 2023  Advair Diskus 250-50 MCG/DOSE inhaler   No No   Sig: USE 1 INHALATION TWICE A DAY   Ascorbic Acid (VITAMIN C) 1000 MG tablet   Yes No   Sig: Take 1,000 mg by mouth daily   Cholecalciferol (VITAMIN D3) 20 MCG (800 UNIT) TABS   Yes No   Sig: Take by mouth   Ferrous Sulfate 220 (44 Fe) MG/5ML LIQD   No No   Sig: take 1 teaspoonful by mouth once daily   Spiriva HandiHaler 18 MCG inhalation capsule   No No   Sig: INHALE THE CONTENTS OF 1 CAPSULE DAILY AS DIRECTED   albuterol (2 5 mg/3 mL) 0 083 % nebulizer solution   No No   Sig: USE 1 VIAL IN NEBULIZER EVERY 4 TO 6 HOURS AS NEEDED FOR COUGH AND WHEEZE   denosumab (PROLIA) 60 mg/mL   Yes No   Sig: Inject 60 mg under the skin once 6 months   digoxin (LANOXIN) 0 125 mg tablet   No No   Sig: Take 1 tablet (125 mcg total) by mouth daily Do not start before February 25, 2023     diltiazem (CARDIZEM CD) 240 mg 24 hr capsule   No No   Sig: Take 1 capsule (240 mg total) by mouth daily Do not start before February 25, 2023    hydrochlorothiazide (HYDRODIURIL) 12 5 mg tablet   No No   Sig: Take 1 tablet (12 5 mg total) by mouth daily   levothyroxine 25 mcg tablet   No No   Sig: TAKE 1 TABLET DAILY   lidocaine (LIDODERM) 5 %   No No   Sig: Apply 1 patch topically daily Remove & Discard patch within 12 hours or as directed by MD   loratadine (CLARITIN) 10 mg tablet   Yes No   Sig: Take 10 mg by mouth daily   losartan (COZAAR) 25 mg tablet   No No   Sig: Take 1 tablet (25 mg total) by mouth daily Do not start before February 25, 2023  mirtazapine (REMERON) 7 5 MG tablet   No No   Sig: Take 1 tablet (7 5 mg total) by mouth daily at bedtime   multivitamin (THERAGRAN) TABS   Yes No   Sig: Take 1 tablet by mouth daily   sertraline (Zoloft) 50 mg tablet   No No   Sig: Take 1 tablet (50 mg total) by mouth daily   warfarin (COUMADIN) 5 mg tablet   No No   Sig: TAKE 1 TABLET DAILY      Facility-Administered Medications: None       Past Medical History:   Diagnosis Date   • Anti-phospholipid syndrome (HCC)    • Asthma    • Blood type O+    • Cardiac disease    • Chronic pain    • COPD (chronic obstructive pulmonary disease) (HCC)    • Fracture of ankle     left   • Hyperlipidemia    • Hypertension    • Osteoporosis        Past Surgical History:   Procedure Laterality Date   • APPENDECTOMY     • BILATERAL OOPHORECTOMY Bilateral    • ORIF TIBIAL SHAFT FRACTURE W/ PLATES AND SCREWS Right        Family History   Problem Relation Age of Onset   • Hypertension Mother    • Skin cancer Mother    • Hypertension Father      I have reviewed and agree with the history as documented      E-Cigarette/Vaping   • E-Cigarette Use Never User      E-Cigarette/Vaping Substances   • Nicotine No    • THC No      Social History     Tobacco Use   • Smoking status: Some Days     Types: Cigarettes   • Smokeless tobacco: Never   • Tobacco comments:     Never smoker per [employer]   Vaping Use   • Vaping Use: Never used   Substance Use Topics   • Alcohol use: No   • Drug use: No        Review of Systems   Unable to perform ROS: Patient unresponsive       Physical Exam  ED Triage Vitals   Temperature Pulse Respirations Blood Pressure SpO2   02/27/23 0307 02/27/23 0307 02/27/23 0307 02/27/23 0307 02/27/23 0307   98 °F (36 7 °C) 72 20 (!) 171/81 99 %      Temp Source Heart Rate Source Patient Position - Orthostatic VS BP Location FiO2 (%)   02/27/23 0307 02/27/23 0307 02/27/23 0307 02/27/23 0307 --   Rectal Monitor Lying Right arm       Pain Score       02/27/23 1100       No Pain             Orthostatic Vital Signs  Vitals:    02/27/23 1015 02/27/23 1511 02/27/23 1634 02/27/23 2058   BP: 170/77 158/64  (!) 179/81   Pulse: 71 78 79 79   Patient Position - Orthostatic VS: Lying Lying         Physical Exam  Vitals and nursing note reviewed  Constitutional:       Appearance: She is ill-appearing  Comments: Unresponsive   HENT:      Head: Atraumatic  Mouth/Throat:      Pharynx: Oropharynx is clear  Eyes:      General: No scleral icterus  Extraocular Movements:      Right eye: No nystagmus  Left eye: No nystagmus  Pupils: Pupils are equal, round, and reactive to light  Pupils are equal       Right eye: Pupil is round and reactive  Left eye: Pupil is round and reactive  Cardiovascular:      Rate and Rhythm: Normal rate and regular rhythm  Pulmonary:      Effort: Pulmonary effort is normal  No respiratory distress  Breath sounds: No stridor  No wheezing  Comments: 100% on baseline 3L O2, Coarse breath sounds right lung lower and middle lobe  Chest:      Chest wall: No tenderness  Abdominal:      Palpations: Abdomen is soft  Musculoskeletal:      Cervical back: Neck supple  No rigidity  Skin:     General: Skin is warm and dry  Coloration: Skin is not cyanotic or pale        Findings: No erythema or rash    Neurological:      Mental Status: She is unresponsive  GCS: GCS eye subscore is 3  GCS verbal subscore is 2  GCS motor subscore is 3  Cranial Nerves: No facial asymmetry           ED Medications  Medications   diltiazem (CARDIZEM CD) 24 hr capsule 240 mg (has no administration in time range)   hydrochlorothiazide (HYDRODIURIL) tablet 12 5 mg (has no administration in time range)   lidocaine (LIDODERM) 5 % patch 1 patch (has no administration in time range)   losartan (COZAAR) tablet 25 mg (has no administration in time range)   mirtazapine (REMERON) tablet 7 5 mg (7 5 mg Oral Given 2/27/23 2200)   sertraline (ZOLOFT) tablet 50 mg (has no administration in time range)   Fluticasone Furoate-Vilanterol 100-25 mcg/actuation 1 puff (has no administration in time range)   albuterol inhalation solution 2 5 mg (has no administration in time range)   levothyroxine tablet 25 mcg (has no administration in time range)   umeclidinium 62 5 mcg/actuation inhaler AEPB 1 puff (has no administration in time range)   multi-electrolyte (PLASMALYTE-A/ISOLYTE-S PH 7 4) IV solution (85 mL/hr Intravenous New Bag 2/27/23 1616)   dextrose 50 % IV solution 25 mL (25 mL Intravenous Given 2/27/23 0349)   iohexol (OMNIPAQUE) 350 MG/ML injection (SINGLE-DOSE) 100 mL (100 mL Intravenous Given 2/27/23 0622)   ceftriaxone (ROCEPHIN) 1 g/50 mL in dextrose IVPB (0 mg Intravenous Stopped 2/27/23 0820)   azithromycin (ZITHROMAX) 500 mg in sodium chloride 0 9% 250mL IVPB 500 mg (0 mg Intravenous Stopped 2/27/23 0930)   potassium chloride 20 mEq IVPB (premix) (20 mEq Intravenous New Bag 2/27/23 2045)       Diagnostic Studies  Results Reviewed     Procedure Component Value Units Date/Time    Vitamin B12 [[id number]]  (Normal) Collected: 02/27/23 0332    Lab Status: Final result Specimen: Blood from Arm, Left Updated: 02/27/23 1821     Vitamin B-12 836 pg/mL     Digoxin level [[id number]]  (Normal) Collected: 02/27/23 0324    Lab Status: Final result Specimen: Blood from Arm, Right Updated: 02/27/23 1403     Digoxin Lvl 0 8 ng/mL     Blood gas, arterial [[id number]]  (Abnormal) Collected: 02/27/23 1108    Lab Status: Final result Specimen: Blood, Arterial from Radial, Left Updated: 02/27/23 1206     pH, Arterial 7 409     pCO2, Arterial 73 2 mm Hg      pO2, Arterial 91 4 mm Hg      HCO3, Arterial 45 3 mmol/L      Base Excess, Arterial 16 2 mmol/L      O2 Content, Arterial 21 1 mL/dL      O2 HGB,Arterial  95 4 %      SOURCE Radial, Left     MARYLIN TEST Yes     Nasal Cannula 3 5    Blood culture #1 [[id number]] Collected: 02/27/23 0324    Lab Status: Preliminary result Specimen: Blood from Arm, Right Updated: 02/27/23 1101     Blood Culture Received in Microbiology Lab  Culture in Progress  Blood culture #2 [[id number]] Collected: 02/27/23 0332    Lab Status: Preliminary result Specimen: Blood from Arm, Left Updated: 02/27/23 1101     Blood Culture Received in Microbiology Lab  Culture in Progress      Procalcitonin [[id number]]  (Normal) Collected: 02/27/23 0324    Lab Status: Final result Specimen: Blood from Arm, Right Updated: 02/27/23 1035     Procalcitonin 0 11 ng/ml     HS Troponin I 2hr [[id number]]  (Normal) Collected: 02/27/23 0537    Lab Status: Final result Specimen: Blood from Arm, Right Updated: 02/27/23 0616     hs TnI 2hr 29 ng/L      Delta 2hr hsTnI -8 ng/L     Comprehensive metabolic panel [[id number]]  (Abnormal) Collected: 02/27/23 0324    Lab Status: Final result Specimen: Blood from Arm, Right Updated: 02/27/23 7571     Sodium 145 mmol/L      Potassium 3 3 mmol/L      Chloride 97 mmol/L      CO2 37 mmol/L      ANION GAP 11 mmol/L      BUN 31 mg/dL      Creatinine 0 53 mg/dL      Glucose 77 mg/dL      Calcium 9 1 mg/dL      Corrected Calcium 9 7 mg/dL      AST 23 U/L      ALT 14 U/L      Alkaline Phosphatase 66 U/L      Total Protein 6 2 g/dL      Albumin 3 3 g/dL      Total Bilirubin 0 93 mg/dL      eGFR 87 ml/min/1 73sq m Narrative:      National Kidney Disease Foundation guidelines for Chronic Kidney Disease (CKD):   •  Stage 1 with normal or high GFR (GFR > 90 mL/min/1 73 square meters)  •  Stage 2 Mild CKD (GFR = 60-89 mL/min/1 73 square meters)  •  Stage 3A Moderate CKD (GFR = 45-59 mL/min/1 73 square meters)  •  Stage 3B Moderate CKD (GFR = 30-44 mL/min/1 73 square meters)  •  Stage 4 Severe CKD (GFR = 15-29 mL/min/1 73 square meters)  •  Stage 5 End Stage CKD (GFR <15 mL/min/1 73 square meters)  Note: GFR calculation is accurate only with a steady state creatinine    Magnesium [[id number]]  (Normal) Collected: 02/27/23 0324    Lab Status: Final result Specimen: Blood from Arm, Right Updated: 02/27/23 2419     Magnesium 2 3 mg/dL     CK Total with Reflex CKMB [[id number]]  (Normal) Collected: 02/27/23 0324    Lab Status: Final result Specimen: Blood from Arm, Right Updated: 02/27/23 0613     Total CK 26 U/L     Salicylate level [[id number]]  (Normal) Collected: 02/27/23 0332    Lab Status: Final result Specimen: Blood from Arm, Left Updated: 14/19/47 3775     Salicylate Lvl <5 mg/dL     Acetaminophen level-If concentration is detectable, please discuss with medical  on call  [[id number]]  (Abnormal) Collected: 02/27/23 0332    Lab Status: Final result Specimen: Blood from Arm, Left Updated: 02/27/23 0613     Acetaminophen Level <10 ug/mL     TSH [[id number]]  (Normal) Collected: 02/27/23 0324    Lab Status: Final result Specimen: Blood from Arm, Right Updated: 02/27/23 0454     TSH 3RD GENERATON 1 139 uIU/mL     Narrative:      Patients undergoing fluorescein dye angiography may retain small amounts of fluorescein in the body for 48-72 hours post procedure  Samples containing fluorescein can produce falsely depressed TSH values  If the patient had this procedure,a specimen should be resubmitted post fluorescein clearance        FLU/RSV/COVID - if FLU/RSV clinically relevant [[id number]]  (Normal) Collected: 02/27/23 0324 Lab Status: Final result Specimen: Nares from Nose Updated: 02/27/23 0433     SARS-CoV-2 Negative     INFLUENZA A PCR Negative     INFLUENZA B PCR Negative     RSV PCR Negative    Narrative:      FOR PEDIATRIC PATIENTS - copy/paste COVID Guidelines URL to browser: [email or web address]  [email or web address]    SARS-CoV-2 assay is a Nucleic Acid Amplification assay intended for the  qualitative detection of nucleic acid from SARS-CoV-2 in nasopharyngeal  swabs  Results are for the presumptive identification of SARS-CoV-2 RNA  Positive results are indicative of infection with SARS-CoV-2, the virus  causing COVID-19, but do not rule out bacterial infection or co-infection  with other viruses  Laboratories within the [place] and its  territories are required to report all positive results to the appropriate  public health authorities  Negative results do not preclude SARS-CoV-2  infection and should not be used as the sole basis for treatment or other  patient management decisions  Negative results must be combined with  clinical observations, patient history, and epidemiological information  This test has not been FDA cleared or approved  This test has been authorized by FDA under an Emergency Use Authorization  (EUA)  This test is only authorized for the duration of time the  declaration that circumstances exist justifying the authorization of the  emergency use of an in vitro diagnostic tests for detection of SARS-CoV-2  virus and/or diagnosis of COVID-19 infection under section 564(b)(1) of  the Act, 21 U  S C  943KUD-6(U)(7), unless the authorization is terminated  or revoked sooner  The test has been validated but independent review by FDA  and CLIA is pending  Test performed using [employer] GeneXpert: This RT-PCR assay targets N2,  a region unique to SARS-CoV-2   A conserved region in the E-gene was chosen  for pan-Sarbecovirus detection which includes SARS-CoV-2  According to CMS-2020-01-R, this platform meets the definition of high-throughput technology  HS Troponin 0hr (reflex protocol) [[id number]]  (Normal) Collected: 02/27/23 0332    Lab Status: Final result Specimen: Blood from Arm, Left Updated: 02/27/23 0421     hs TnI 0hr 37 ng/L     B-Type Natriuretic Peptide(BNP) [[id number]]  (Abnormal) Collected: 02/27/23 0332    Lab Status: Final result Specimen: Blood from Arm, Left Updated: 02/27/23 0419      pg/mL     Lactic acid [[id number]]  (Normal) Collected: 02/27/23 0332    Lab Status: Final result Specimen: Blood from Arm, Left Updated: 02/27/23 0417     LACTIC ACID 0 6 mmol/L     Narrative:      Result may be elevated if tourniquet was used during collection  Ethanol [[id number]]  (Normal) Collected: 02/27/23 0324    Lab Status: Final result Specimen: Blood from Arm, Right Updated: 02/27/23 0417     Ethanol Lvl <10 mg/dL     Ammonia [[id number]]  (Normal) Collected: 02/27/23 0324    Lab Status: Final result Specimen: Blood from Arm, Right Updated: 02/27/23 0416     Ammonia 38 umol/L     Urine Microscopic [[id number]]  (Abnormal) Collected: 02/27/23 0357    Lab Status: Final result Specimen: Urine, Straight Cath Updated: 02/27/23 0416     RBC, UA 10-20 /hpf      WBC, UA 4-10 /hpf      Epithelial Cells Occasional /hpf      Bacteria, UA Occasional /hpf      MUCUS THREADS Occasional    Protime-INR [[id number]]  (Abnormal) Collected: 02/27/23 0324    Lab Status: Final result Specimen: Blood from Arm, Right Updated: 02/27/23 0411     Protime 43 8 seconds      INR 4 60    APTT [[id number]]  (Abnormal) Collected: 02/27/23 0324    Lab Status: Final result Specimen: Blood from Arm, Right Updated: 02/27/23 0411     PTT 47 seconds     D-Dimer [[id number]]  (Abnormal) Collected: 02/27/23 0324    Lab Status: Final result Specimen: Blood from Arm, Right Updated: 02/27/23 0411     D-Dimer, Quant 1 06 ug/ml FEU     Narrative:       In the evaluation for possible pulmonary embolism, in the appropriate (Well's Score of 4 or less) patient, the age adjusted d-dimer cutoff for this patient can be calculated as:    Age x 0 01 (in ug/mL) for Age-adjusted D-dimer exclusion threshold for a patient over 50 years  UA w Reflex to Microscopic w Reflex to Culture [[id number]]  (Abnormal) Collected: 02/27/23 0357    Lab Status: Final result Specimen: Urine, Straight Cath Updated: 02/27/23 0411     Color, UA Yellow     Clarity, UA Turbid     Specific [place], UA 1 023     pH, UA 6 0     Leukocytes, UA Negative     Nitrite, UA Negative     Protein, UA 30 (1+) mg/dl      Glucose, UA Negative mg/dl      Ketones, UA 40 (2+) mg/dl      Urobilinogen, UA 2 0 mg/dl      Bilirubin, UA Negative     Occult Blood, UA Trace    Fingerstick Glucose (POCT) [[id number]]  (Normal) Collected: 02/27/23 0346    Lab Status: Final result Updated: 02/27/23 0357     POC Glucose 67 mg/dl     CBC and differential [[id number]]  (Abnormal) Collected: 02/27/23 0332    Lab Status: Final result Specimen: Blood from Arm, Left Updated: 02/27/23 0351     WBC 7 44 Thousand/uL      RBC 3 67 Million/uL      Hemoglobin 12 2 g/dL      Hematocrit 37 5 %       fL      MCH 33 2 pg      MCHC 32 5 g/dL      RDW 12 7 %      MPV 8 8 fL      Platelets 985 Thousands/uL      nRBC 0 /100 WBCs      Neutrophils Relative 84 %      Immat GRANS % 1 %      Lymphocytes Relative 10 %      Monocytes Relative 5 %      Eosinophils Relative 0 %      Basophils Relative 0 %      Neutrophils Absolute 6 27 Thousands/µL      Immature Grans Absolute 0 04 Thousand/uL      Lymphocytes Absolute 0 71 Thousands/µL      Monocytes Absolute 0 40 Thousand/µL      Eosinophils Absolute 0 01 Thousand/µL      Basophils Absolute 0 01 Thousands/µL                  CTA head and neck with and without contrast   Final Result by Ceasar Bosworth, MD (02/27 0353)      No acute intracranial abnormality  Moderate chronic microangiopathy        Negative CTA head and neck for large vessel occlusion, dissection, or high-grade stenosis  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 projecting laterally in between left posterior cerebral artery and left superior cerebellar  Recommend consultation with the [facility], a division of [address] [place] [address] Neuroscience at    [phone]  Incidental thyroid nodule(s) for which nonemergent thyroid ultrasound is recommended  Additional chronic/incidental findings as detailed above  The study was marked in [facility] for immediate notification  Workstation performed: [id number]         PE Study with CT abdomen & pelvis with contrast   Final Result by Hang Lizama MD (02/27 9664)      No pulmonary embolism seen      There is a new right basilar density with associated few branching nodular density in the right middle lobe and a 5 mm nodular density superior segment left lower lobe, indeterminate  These may be on inflammatory/infectious basis /pneumonia   Short    interval follow-up chest CT at 3 months suggested to demonstrate resolution      No acute inflammatory stranding within the abdomen      Right-sided inguinal/femoral hernia, stable without bowel obstruction   Fibroid uterus   The study was marked in [facility] for immediate notification  Workstation performed: [id number]               Procedures  ECG 12 Lead Documentation Only    Date/Time: 2/27/2023 3:37 AM  Performed by: Junior Mitzy DO  Authorized by: Junior Mitzy DO     Indications / Diagnosis:  74  ECG reviewed by me, the ED Provider: yes    Patient location:  ED  Previous ECG:     Previous ECG:  Compared to current    Comparison ECG info:  2/22/23 T wave inversions in lateral leads resolved  QRS shifted right      Similarity:  Changes noted    Comparison to cardiac monitor: Yes    Interpretation:     Interpretation: abnormal    Rate:     ECG rate:  74    ECG rate assessment: normal    Rhythm: Rhythm: sinus rhythm    Ectopy:     Ectopy: PVCs    Comments:      NSR @ 74 with PVCs  No STEMI  ED Course       Initial blood glucose is 67  Amp of D50 ordered  Glucose improved to     Patient has been snoring, protecting airway - 100% on 3L O2 currently  Have not been able to elicit a response beyond groaning to pain and occasionally eyes opening with what appears to be some level of intent  Pt was admitted here the previous week - will discuss with [facility]  Pt admitted to inpatient [facility] under the care of Dr Jesse Car stable, wnl  MDM  AMS, pneumonia, UTI, failure to thrive, hypothyroidism, mood disorder, adjustment disorder, beriberi, wernicke's, delirium, polypharmacy, CVA, head injury  Pt's bsg was 67 upon initial assessment - amp of d50 given improving bsg  Pt resisted having eyes opened, pupils PERRL @ 2mm  Will panscan and order variable workup do to AMS/lack of present illness hx  Pt appeared to have pna on ct chest, there also seems to be a psych component to the unresponsiveness per history  Nursing home reported she was close to her baseline per EMS  Pt has a hx of failure to thrive and malnutrition  Disposition  Final diagnoses:    Altered mental status   Pneumonia   Failure to thrive in adult     Time reflects when diagnosis was documented in both MDM as applicable and the Disposition within this note     Time User Action Codes Description Comment    2/27/2023  7:59 AM Shantell Alma Rosa Add [R41 82] Altered mental status     2/27/2023  7:59 AM Shantell Alma Rosa Add [R62 51] Failure to thrive (child)     2/27/2023  7:59 AM Shantell Alma Rosa Remove [R62 51] Failure to thrive (child)     2/27/2023  7:59 AM Shantell Alma Rosa Add [J18 9] Pneumonia     2/27/2023  8:00 AM Shantell Alma Rosa Add [R62 7] Failure to thrive in adult     2/27/2023  2:54 PM Zaynab Hightower Add [I72 5] Aneurysm of basilar artery [facility])       ED Disposition     ED Disposition   Admit    Condition   Stable    Date/Time   Mon Feb 27, 2023  8:00 AM    Comment   Case was discussed with Ella Huerta (EDEN DAVIS) and the patient's admission status was agreed to be Inpatient admission to the service of Dr Socorro Gonzalez  Follow-up Information    None         Current Discharge Medication List      CONTINUE these medications which have NOT CHANGED    Details   Advair Diskus 250-50 MCG/DOSE inhaler USE 1 INHALATION TWICE A DAY  Qty: 180 blister, Refills: 3    Associated Diagnoses: Chronic obstructive pulmonary disease, unspecified COPD type ([facility])      albuterol (2 5 mg/3 mL) 0 083 % nebulizer solution USE 1 VIAL IN NEBULIZER EVERY 4 TO 6 HOURS AS NEEDED FOR COUGH AND WHEEZE  Qty: 75 vial, Refills: 3    Associated Diagnoses: Moderate asthma, unspecified whether complicated, unspecified whether persistent      ARIPiprazole (ABILIFY) 2 mg tablet Take 1 tablet (2 mg total) by mouth daily Do not start before February 25, 2023  Qty: 30 tablet, Refills: 0    Associated Diagnoses: Adult failure to thrive      Ascorbic Acid (VITAMIN C) 1000 MG tablet Take 1,000 mg by mouth daily      Cholecalciferol (VITAMIN D3) 20 MCG (800 UNIT) TABS Take by mouth      denosumab (PROLIA) 60 mg/mL Inject 60 mg under the skin once 6 months      digoxin (LANOXIN) 0 125 mg tablet Take 1 tablet (125 mcg total) by mouth daily Do not start before February 25, 2023  Qty: 30 tablet, Refills: 0    Associated Diagnoses: Atrial fibrillation, unspecified type ([facility])      diltiazem (CARDIZEM CD) 240 mg 24 hr capsule Take 1 capsule (240 mg total) by mouth daily Do not start before February 25, 2023  Qty: 30 capsule, Refills: 0    Associated Diagnoses: Atrial fibrillation, unspecified type ([address] );  Primary hypertension      Ferrous Sulfate 220 (44 Fe) MG/5ML LIQD take 1 teaspoonful by mouth once daily  Qty: 150 mL, Refills: 5    Associated Diagnoses: Anemia, unspecified type      hydrochlorothiazide (HYDRODIURIL) 12 5 mg tablet Take 1 tablet (12 5 mg total) by mouth daily  Qty: 30 tablet, Refills: 5    Associated Diagnoses: Essential hypertension      levothyroxine 25 mcg tablet TAKE 1 TABLET DAILY  Qty: 90 tablet, Refills: 3    Associated Diagnoses: Hypothyroidism, unspecified type      lidocaine (LIDODERM) 5 % Apply 1 patch topically daily Remove & Discard patch within 12 hours or as directed by MD  Qty: 30 patch, Refills: 3    Associated Diagnoses: Osteoporosis, unspecified osteoporosis type, unspecified pathological fracture presence; Low back pain without sciatica, unspecified back pain laterality, unspecified chronicity      loratadine (CLARITIN) 10 mg tablet Take 10 mg by mouth daily      losartan (COZAAR) 25 mg tablet Take 1 tablet (25 mg total) by mouth daily Do not start before February 25, 2023  Qty: 30 tablet, Refills: 0    Associated Diagnoses: Primary hypertension      mirtazapine (REMERON) 7 5 MG tablet Take 1 tablet (7 5 mg total) by mouth daily at bedtime  Qty: 30 tablet, Refills: 0    Associated Diagnoses: Adult failure to thrive      multivitamin (THERAGRAN) TABS Take 1 tablet by mouth daily      sertraline (Zoloft) 50 mg tablet Take 1 tablet (50 mg total) by mouth daily  Qty: 30 tablet, Refills: 5    Associated Diagnoses: Anxiety      Spiriva HandiHaler 18 MCG inhalation capsule INHALE THE CONTENTS OF 1 CAPSULE DAILY AS DIRECTED  Qty: 90 capsule, Refills: 3    Associated Diagnoses: Moderate asthma, unspecified whether complicated, unspecified whether persistent      warfarin (COUMADIN) 5 mg tablet TAKE 1 TABLET DAILY  Qty: 90 tablet, Refills: 3    Associated Diagnoses: Chronic atrial fibrillation ([facility] [address] )               PDMP Review     None           ED Provider  Attending physically available and evaluated Pete Soriano I managed the patient along with the ED Attending      Electronically Signed by         Tasha Esparza DO  02/27/23 9500

## 2023-02-27 NOTE — ASSESSMENT & PLAN NOTE
Suspect toxic metabolic encephalopathy from multiple behavioral health medications  Per family, pt was socially conversational when seen on Saturday 2/25/23  STR reported that pt was less alert starting afternoon of Sunday 2/26/23  No acute infection identified  Patient appears dehydrated  SNF notes worsening mentation following abilify  CTA head/neck unremarkable for acute pathology  Salicylates, acetaminophen, EtOH, ammonia, digoxin level, TSH negative or normal   ABG notable for respiratory acidosis without acidemia, compensated by metabolic alkalosis  Improving mental status since admission  · DC Abilify  Do not restart  Patient did receive dose this morning 2/27/23  · Continue Remeron and Zoloft  · No indication for inpatient psych admission  · IV Hydration  · PT/OT evals with likely return to SNF for subacute rehab  · Encourage oral intake  Speech therapy has cleared patient for regular diet/thin liquids

## 2023-02-27 NOTE — QUICK NOTE
QUICK NOTE - Deterioration Index  Erik Gray 79 y o  female MRN: [id number]  Unit/Bed#: S -01 Encounter: [id number]    Date Paged: 23  Time Paged: [phone]  Room #: S   Arrival Time: 1310  Deterioration index score at time of page: 62 81  %  Spoke with RN from primary team  Need to escalate level of care: no     PROBLEMS resulting in high DI score:   •   30% [place] coma scale 11   24 Age 79years old   17% Supplemental oxygen Nasal cannula   15% Neurological exam Lethargic   7% Systolic 156   4% Sodium 748 mmol/L   2% Pulse oximetry 100 %   2% BUN abnormal (31 mg/dL)       PLAN:    • GCS 11, initally 6 in ED, 7 on H/p note  Improving  • Supplemental oxygen 4L NC, currently 100% SpO2  • Lethargic, pt admitted with AMS, was initially unresponsive  DDx unclear at this time  • /77, antihypertensives ordered, bedside RN will recheck now and reach out to primary team for PRN antihypertensives  • Pt is currently a level 2 DNR but accepts intubation  • D/w bedside RN, mental status slowly improving now responding to voice and answerig questions  • No need to escalate level of care    Please contact critical care via [employer] with any questions or concerns       Vitals:   Vitals:    23 0700 23 0830 23 0915 23 1015   BP: (!) 185/83 (!) 173/76 170/75 170/77   BP Location: Right arm Right arm Right arm Right arm   Pulse: 68 68 69 71   Resp: 14 16 14 16   Temp:       TempSrc:       SpO2: 100% 98% 99% 100%       Respiratory:  SpO2: SpO2: 100 %, SpO2 Activity: SpO2 Activity: At Rest, SpO2 Device: O2 Device: Nasal cannula  Nasal Cannula O2 Flow Rate (L/min): 4 L/min    Temperature: Temp (24hrs), Av °F (36 7 °C), Min:98 °F (36 7 °C), Max:98 °F (36 7 °C)  Current: Temperature: 98 °F (36 7 °C)    Labs:   Results from last 7 days   Lab Units 23  0332 23  2251   WBC Thousand/uL 7 44 5 35   HEMOGLOBIN g/dL 12 2 12 2   HEMATOCRIT % 37 5 35 5   PLATELETS Thousands/uL 239 270   NEUTROS PCT % 84* 84*   MONOS PCT % 5 6     Results from last 7 days   Lab Units 02/27/23  0324 02/24/23  1150 02/23/23  1305 02/22/23  0613 02/21/23  2251   SODIUM mmol/L 145 141 140   < > 139   POTASSIUM mmol/L 3 3* 3 0* 3 3*   < > 3 2*   CHLORIDE mmol/L 97 94* 98   < > 94*   CO2 mmol/L 37* 42* 39*   < > 39*   BUN mg/dL 31* 24 29*   < > 35*   CREATININE mg/dL 0 53* 0 47* 0 55*   < > 0 60   CALCIUM mg/dL 9 1 9 1 9 3   < > 9 9   ALK PHOS U/L 66  --  71  --  75   ALT U/L 14  --  14  --  14   AST U/L 23  --  19  --  19    < > = values in this interval not displayed       Results from last 7 days   Lab Units 02/27/23  0324 02/21/23  2251   MAGNESIUM mg/dL 2 3 2 0     Results from last 7 days   Lab Units 02/27/23  0332 02/21/23  2251   LACTIC ACID mmol/L 0 6 0 8         Results from last 7 days   Lab Units 02/27/23  0324   PROCALCITONIN ng/ml 0 11       Code Status: Level 2 - DNAR: but accepts endotracheal intubation

## 2023-02-27 NOTE — ASSESSMENT & PLAN NOTE
Acutely depressed mental status  Per family, pt was socially conversational when seen on Saturday 2/25/23  STR reported that pt was less alert starting afternoon of Sunday 2/26/23  Broadly, DDx includes neurologic, infection, toxic/metabolic  Low suspicion for stroke or seizure, given persistence of obtunded state and absence of focal findings  Low suspicion for infection given normal VS; WBC, LA, procal wnl; UA negative for leuks or nitrites  Toxic/metabolic: Salicylates, acetaminophen, EtOH, ammonia, digoxin level, TSH negative or normal   ABG notable for respiratory acidosis without acidemia, compensated by metabolic alkalosis  Recent medication changes include addition of aripiprazole, started 2/23/23  Adverse affects include sedated state reported in 3-5% of patients     -Hold Abilify for possible medication SE   -Check vit B12  -Monitor mental status  GCS approx 7 on admission  -Continue to monitor VS, WBC   Monitor off additional abx at this time  -Wean supp O2 to target SpO2 88% and above, not greater than 96%

## 2023-02-27 NOTE — ASSESSMENT & PLAN NOTE
PE Study with CT abdomen & pelvis with contrast 2/27/2023:  "No pulmonary embolism seen  There is a new right basilar density with associated few branching nodular density in the right middle lobe and a 5 mm nodular density superior segment left lower lobe, indeterminate  These may be on inflammatory/infectious basis /pneumonia   Short interval follow-up chest CT at 3 months suggested to demonstrate resolution No acute inflammatory stranding within the abdomen Right-sided inguinal/femoral hernia, stable without bowel obstruction  Fibroid uterus "    Similar changes seen on prior imaging of 2/21  Low suspicion for PNA, given normal VS; absence of cough, SOB, increased wob; normal WBC, LA, procal   Suspect atelectasis in dependent lung of pt who has recently been hospitalized and has had limited mobility out of bed      Pt received ceftriaxone and azithromycin in ED    -Start incentive vinicio when pt alert to participate  -Turn patient with nursing  -Monitor off additional abx, trend WBC and temp  -F/u BCx x2, in process  -Can collect sputum Cx if pt develops cough  -3 month f/u imaging

## 2023-02-28 ENCOUNTER — APPOINTMENT (INPATIENT)
Dept: NON INVASIVE DIAGNOSTICS | Facility: HOSPITAL | Age: 83
End: 2023-02-28

## 2023-02-28 PROBLEM — E87.29 COMPENSATED RESPIRATORY ACIDOSIS: Status: RESOLVED | Noted: 2023-02-27 | Resolved: 2023-02-28

## 2023-02-28 PROBLEM — Z91.199 MEDICAL NON-COMPLIANCE: Status: RESOLVED | Noted: 2023-02-28 | Resolved: 2023-02-28

## 2023-02-28 PROBLEM — Z91.199 MEDICAL NON-COMPLIANCE: Status: ACTIVE | Noted: 2023-02-28

## 2023-02-28 PROBLEM — E87.6 HYPOKALEMIA: Status: RESOLVED | Noted: 2023-02-27 | Resolved: 2023-02-28

## 2023-02-28 LAB
ANION GAP SERPL CALCULATED.3IONS-SCNC: 6 MMOL/L (ref 4–13)
AORTIC ROOT: 3.2 CM
APICAL FOUR CHAMBER EJECTION FRACTION: 63 %
ASCENDING AORTA: 2.9 CM
BUN SERPL-MCNC: 28 MG/DL (ref 5–25)
CALCIUM SERPL-MCNC: 8.6 MG/DL (ref 8.4–10.2)
CHLORIDE SERPL-SCNC: 98 MMOL/L (ref 96–108)
CO2 SERPL-SCNC: 40 MMOL/L (ref 21–32)
CREAT SERPL-MCNC: 0.39 MG/DL (ref 0.6–1.3)
E WAVE DECELERATION TIME: 305 MS
ERYTHROCYTE [DISTWIDTH] IN BLOOD BY AUTOMATED COUNT: 12.9 % (ref 11.6–15.1)
FRACTIONAL SHORTENING: 32 (ref 28–44)
GFR SERPL CREATININE-BSD FRML MDRD: 96 ML/MIN/1.73SQ M
GLUCOSE SERPL-MCNC: 74 MG/DL (ref 65–140)
HCT VFR BLD AUTO: 37.3 % (ref 34.8–46.1)
HGB BLD-MCNC: 12.1 G/DL (ref 11.5–15.4)
INR PPP: 5.66 (ref 0.84–1.19)
INTERVENTRICULAR SEPTUM IN DIASTOLE (PARASTERNAL SHORT AXIS VIEW): 1.2 CM
INTERVENTRICULAR SEPTUM: 1.2 CM (ref 0.6–1.1)
LAAS-AP2: 18.1 CM2
LAAS-AP4: 14.9 CM2
LEFT ATRIUM SIZE: 3.5 CM
LEFT INTERNAL DIMENSION IN SYSTOLE: 2.3 CM (ref 2.1–4)
LEFT VENTRICULAR INTERNAL DIMENSION IN DIASTOLE: 3.4 CM (ref 3.5–6)
LEFT VENTRICULAR POSTERIOR WALL IN END DIASTOLE: 1.2 CM
LEFT VENTRICULAR STROKE VOLUME: 28 ML
LVSV (TEICH): 28 ML
MAGNESIUM SERPL-MCNC: 2.3 MG/DL (ref 1.9–2.7)
MCH RBC QN AUTO: 33.5 PG (ref 26.8–34.3)
MCHC RBC AUTO-ENTMCNC: 32.4 G/DL (ref 31.4–37.4)
MCV RBC AUTO: 103 FL (ref 82–98)
MV E'TISSUE VEL-SEP: 6 CM/S
MV PEAK A VEL: 1.45 M/S
MV PEAK E VEL: 85 CM/S
MV STENOSIS PRESSURE HALF TIME: 88 MS
MV VALVE AREA P 1/2 METHOD: 2.5
PHOSPHATE SERPL-MCNC: 2.3 MG/DL (ref 2.3–4.1)
PLATELET # BLD AUTO: 227 THOUSANDS/UL (ref 149–390)
PMV BLD AUTO: 9 FL (ref 8.9–12.7)
POTASSIUM SERPL-SCNC: 3.5 MMOL/L (ref 3.5–5.3)
PROTHROMBIN TIME: 51.4 SECONDS (ref 11.6–14.5)
RBC # BLD AUTO: 3.61 MILLION/UL (ref 3.81–5.12)
RIGHT ATRIUM AREA SYSTOLE A4C: 10.1 CM2
RIGHT VENTRICLE ID DIMENSION: 3.8 CM
SL CV LEFT ATRIUM LENGTH A2C: 5.3 CM
SL CV LV EF: 65
SL CV PED ECHO LEFT VENTRICLE DIASTOLIC VOLUME (MOD BIPLANE) 2D: 46 ML
SL CV PED ECHO LEFT VENTRICLE SYSTOLIC VOLUME (MOD BIPLANE) 2D: 18 ML
SODIUM SERPL-SCNC: 144 MMOL/L (ref 135–147)
TR MAX PG: 31 MMHG
TR PEAK VELOCITY: 2.8 M/S
TRICUSPID ANNULAR PLANE SYSTOLIC EXCURSION: 1.9 CM
TRICUSPID VALVE PEAK REGURGITATION VELOCITY: 2.81 M/S
WBC # BLD AUTO: 9.82 THOUSAND/UL (ref 4.31–10.16)

## 2023-02-28 RX ORDER — LABETALOL HYDROCHLORIDE 5 MG/ML
10 INJECTION, SOLUTION INTRAVENOUS EVERY 4 HOURS PRN
Status: DISCONTINUED | OUTPATIENT
Start: 2023-02-28 | End: 2023-03-03

## 2023-02-28 RX ORDER — ARIPIPRAZOLE 2 MG/1
2 TABLET ORAL DAILY
Status: CANCELLED | OUTPATIENT
Start: 2023-02-28

## 2023-02-28 RX ADMIN — SERTRALINE HYDROCHLORIDE 50 MG: 50 TABLET ORAL at 10:10

## 2023-02-28 RX ADMIN — DILTIAZEM HYDROCHLORIDE 240 MG: 240 CAPSULE, COATED, EXTENDED RELEASE ORAL at 10:10

## 2023-02-28 RX ADMIN — HYDROCHLOROTHIAZIDE 12.5 MG: 12.5 TABLET ORAL at 10:10

## 2023-02-28 RX ADMIN — LOSARTAN POTASSIUM 25 MG: 25 TABLET, FILM COATED ORAL at 10:10

## 2023-02-28 RX ADMIN — SODIUM CHLORIDE, SODIUM GLUCONATE, SODIUM ACETATE, POTASSIUM CHLORIDE, MAGNESIUM CHLORIDE, SODIUM PHOSPHATE, DIBASIC, AND POTASSIUM PHOSPHATE 85 ML/HR: .53; .5; .37; .037; .03; .012; .00082 INJECTION, SOLUTION INTRAVENOUS at 01:32

## 2023-02-28 RX ADMIN — MIRTAZAPINE 7.5 MG: 15 TABLET, FILM COATED ORAL at 22:12

## 2023-02-28 RX ADMIN — FLUTICASONE FUROATE AND VILANTEROL TRIFENATATE 1 PUFF: 100; 25 POWDER RESPIRATORY (INHALATION) at 10:11

## 2023-02-28 RX ADMIN — LEVOTHYROXINE SODIUM 25 MCG: 25 TABLET ORAL at 10:10

## 2023-02-28 RX ADMIN — UMECLIDINIUM 1 PUFF: 62.5 AEROSOL, POWDER ORAL at 10:11

## 2023-02-28 NOTE — PLAN OF CARE
Problem: MOBILITY - ADULT  Goal: Maintain or return to baseline ADL function  Description: INTERVENTIONS:  -  Assess patient's ability to carry out ADLs; assess patient's baseline for ADL function and identify physical deficits which impact ability to perform ADLs (bathing, care of mouth/teeth, toileting, grooming, dressing, etc )  - Assess/evaluate cause of self-care deficits   - Assess range of motion  - Assess patient's mobility; develop plan if impaired  - Assess patient's need for assistive devices and provide as appropriate  - Encourage maximum independence but intervene and supervise when necessary  - Involve family in performance of ADLs  - Assess for home care needs following discharge   - Consider OT consult to assist with ADL evaluation and planning for discharge  - Provide patient education as appropriate  Outcome: Progressing  Goal: Maintains/Returns to pre admission functional level  Description: INTERVENTIONS:  - Perform BMAT or MOVE assessment daily    - Set and communicate daily mobility goal to care team and patient/family/caregiver  - Collaborate with rehabilitation services on mobility goals if consulted  - Perform Range of Motion  times a day  - Reposition patient every  hours    - Dangle patient  times a day  - Stand patient  times a day  - Ambulate patient  times a day  - Out of bed to chair  times a day   - Out of bed for meals  times a day  - Out of bed for toileting  - Record patient progress and toleration of activity level   Outcome: Progressing     Problem: Prexisting or High Potential for Compromised Skin Integrity  Goal: Skin integrity is maintained or improved  Description: INTERVENTIONS:  - Identify patients at risk for skin breakdown  - Assess and monitor skin integrity  - Assess and monitor nutrition and hydration status  - Monitor labs   - Assess for incontinence   - Turn and reposition patient  - Assist with mobility/ambulation  - Relieve pressure over bony prominences  - Avoid friction and shearing  - Provide appropriate hygiene as needed including keeping skin clean and dry  - Evaluate need for skin moisturizer/barrier cream  - Collaborate with interdisciplinary team   - Patient/family teaching  - Consider wound care consult   Outcome: Progressing     Problem: NEUROSENSORY - ADULT  Goal: Achieves stable or improved neurological status  Description: INTERVENTIONS  - Monitor and report changes in neurological status  - Monitor vital signs such as temperature, blood pressure, glucose, and any other labs ordered   - Initiate measures to prevent increased intracranial pressure  - Monitor for seizure activity and implement precautions if appropriate      Outcome: Progressing  Goal: Achieves maximal functionality and self care  Description: INTERVENTIONS  - Monitor swallowing and airway patency with patient fatigue and changes in neurological status  - Encourage and assist patient to increase activity and self care     - Encourage visually impaired, hearing impaired and aphasic patients to use assistive/communication devices  Outcome: Progressing     Problem: RESPIRATORY - ADULT  Goal: Achieves optimal ventilation and oxygenation  Description: INTERVENTIONS:  - Assess for changes in respiratory status  - Assess for changes in mentation and behavior  - Position to facilitate oxygenation and minimize respiratory effort  - Oxygen administered by appropriate delivery if ordered  - Initiate smoking cessation education as indicated  - Encourage broncho-pulmonary hygiene including cough, deep breathe, Incentive Spirometry  - Assess the need for suctioning and aspirate as needed  - Assess and instruct to report SOB or any respiratory difficulty  - Respiratory Therapy support as indicated  Outcome: Progressing     Problem: Nutrition/Hydration-ADULT  Goal: Nutrient/Hydration intake appropriate for improving, restoring or maintaining nutritional needs  Description: Monitor and assess patient's nutrition/hydration status for malnutrition  Collaborate with interdisciplinary team and initiate plan and interventions as ordered  Monitor patient's weight and dietary intake as ordered or per policy  Utilize nutrition screening tool and intervene as necessary  Determine patient's food preferences and provide high-protein, high-caloric foods as appropriate       INTERVENTIONS:  - Monitor oral intake, urinary output, labs, and treatment plans  - Assess nutrition and hydration status and recommend course of action  - Evaluate amount of meals eaten  - Assist patient with eating if necessary   - Allow adequate time for meals  - Recommend/ encourage appropriate diets, oral nutritional supplements, and vitamin/mineral supplements  - Order, calculate, and assess calorie counts as needed  - Recommend, monitor, and adjust tube feedings and TPN/PPN based on assessed needs  - Assess need for intravenous fluids  - Provide specific nutrition/hydration education as appropriate  - Include patient/family/caregiver in decisions related to nutrition  Outcome: Progressing

## 2023-02-28 NOTE — ASSESSMENT & PLAN NOTE
Incidental finding on CTA head and neck/  CTA head and neck with and without contrast 2/27/2023:  "No acute intracranial abnormality  Moderate chronic microangiopathy  Negative CTA head and neck for large vessel occlusion, dissection, or high-grade stenosis  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 projecting laterally in between left posterior cerebral artery and left superior cerebellar  · Outpatient referral for Neurosurgery follow-up  Will provide on DC

## 2023-02-28 NOTE — PROGRESS NOTES
-- Patient:  -- MRN: [id number]  -- Aidin Request ID: [id number]  -- Level of care reserved: Jeffery Gillilanduel  -- Partner Reserved: Marzena [address] [facility] [place], [place] [facility] [place], [address] [place] [address] [phone]  -- Clinical needs requested:  -- Geography searched: 10 miles around [phone]  -- Start of Service:  -- Request sent: 1:38pm EST on 2/27/2023 by Angel Hutchison  -- Partner reserved: 3:09pm EST on 2/28/2023 by Bello Panchal  -- Choice list shared: 9:20am EST on 2/28/2023 by Bello Panchal

## 2023-02-28 NOTE — MALNUTRITION/BMI
This medical record reflects one or more clinical indicators suggestive of malnutrition and/or morbid obesity  Malnutrition Findings:   Adult Malnutrition type: Chronic illness  Adult Degree of Malnutrition: Other severe protein calorie malnutrition  Malnutrition Characteristics: Muscle loss, Inadequate energy, Weight loss              360 Statement: Severe protein calorie malnutrition r/t inadequate intake as evidenced by a 13% weight loss over 2 months, <75% energy intake compared to estimated energy needs > 1 month, and muscle wasting present to temples and clavicle; currently NPO recommend oral nutritional supplements once diet advances    BMI Findings: Body mass index is 19 73 kg/m²  See Nutrition note dated 2/28/2023 for additional details  Completed nutrition assessment is viewable in the nutrition documentation

## 2023-02-28 NOTE — SPEECH THERAPY NOTE
Speech-Language Pathology Bedside Swallow Evaluation        Patient Name: Hayley Torres    RMLRD'P Date: 2/28/2023     Problem List  Principal Problem:    Altered mental status  Active Problems:    Atrial fibrillation (HCC)    Hypothyroidism    Failure to thrive in adult    Hypokalemia    Abnormal CT of the chest    Supratherapeutic INR    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HCC)    Thyroid nodule    Compensated respiratory acidosis    Medical non-compliance         Past Medical History  Past Medical History:   Diagnosis Date   • Anti-phospholipid syndrome (HCC)    • Asthma    • Blood type O+    • Cardiac disease    • Chronic pain    • COPD (chronic obstructive pulmonary disease) (HCC)    • Fracture of ankle     left   • Hyperlipidemia    • Hypertension    • Osteoporosis        Past Surgical History  Past Surgical History:   Procedure Laterality Date   • APPENDECTOMY     • BILATERAL OOPHORECTOMY Bilateral    • ORIF TIBIAL SHAFT FRACTURE W/ PLATES AND SCREWS Right        Summary    oral and pharyngeal stages of swallowing appeared WNL at this time  No overt s/s aspiration and no c/o food dysphagia symptoms w/ toast  Pt stated she usually can take meds whole w/ water  Recommendations:   Diet: regular diet and thin liquids   Meds: whole with liquid   Frequent Oral care: 2x/day  Other Recommendations/ considerations: will follow up  X 1-2 as needed for diet tolerance  Current Medical Status  Pt is a 79 y o  female who presented to [address] [place] [address]  with altered mental status  Pt [facility] from [facility] via EMS for altered mental status with decreased responsiveness  Pt is obtunded during this encounter, moans, no eye opening, responds to painful stimuli GCS of 7  History is provided by adult sons, who are present at bedside  They report that their mother improved during her recent hospitalization, with better PO intake at meals, more interactive and conversationally coherent   Sons report pt was doing well when seen on Saturday, but they received a call from [facility] staff on Sunday afternoon reporting that patient had become less alert  Sons report recent medication changes     Past medical history:   Please see H&P for details    Special Studies:  CTA chest/ ct abd/pelvis w/ contrast: 2/27/23  There is a new right basilar density with associated few branching nodular density in the right middle lobe and a 5 mm nodular density superior segment left lower lobe, indeterminate  These may be on inflammatory/infectious basis /pneumonia   Social/Education/Vocational Hx:  Pt lives at home, was at [address] [place] [address]   Current Risks for Dysphagia & Aspiration: AMS  Current Symptoms/Concerns: AMS; FTT  Current Diet: NPO   Baseline Diet: puree/level 1 diet and thin liquids-last admission, but regular at home  Takes pills-can take whole w/ water, has been getting them crushed     Baseline Assessment   Behavior/Cognition: alert  Speech/Language Status: able to participate in basic conversation and able to follow commands  Patient Positioning: upright in bed     Swallow Mechanism Exam   Facial: symmetrical  Labial: WFL  Lingual: WFL  Velum: unable to visualize  Mandible: adequate ROM  Dentition: full dentures  Vocal quality:clear/adequate   Volitional Cough: strong/productive   Respiratory: NC    Consistencies Assessed and Performance   Consistencies Administered: thin liquids, puree and soft solids (toast)    Oral Stage: pt self fed- able to bite toast, good mastication/manipulation and transfer  Pt drank thin liquids by cup and straw w/ good oral control and transfer  No pocketing or oral residue noted  Pharyngeal Stage: swallow initiation was prompt and complete w/ no coughing, throat clearing or wet voice noted         Esophageal Concerns: none reported      Results Reviewed with: patient, MD and family   Dysphagia Goals: pt will tolerate regular diet with thin liquids without s/s of aspiration x2-3 sessions      Chary Gil MA [facility]  Speech Pathologist  [place] license # [id number]  [address] [place] [address] license # [id number]  Available via [employer]

## 2023-02-28 NOTE — ASSESSMENT & PLAN NOTE
CTA head and neck with and without contrast 2/27/2023:  "No acute intracranial abnormality  Moderate chronic microangiopathy  Negative CTA head and neck for large vessel occlusion, dissection, or high-grade stenosis  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 projecting laterally in between left posterior cerebral artery and left superior cerebellar  Recommend consultation with the [facility], a division of [address] [place] [address] Neuroscience at [phone]   Incidental thyroid nodule(s) for which nonemergent thyroid ultrasound is recommended "    · Outpatient f/u with pcp for non-emergent thyroid u/s

## 2023-02-28 NOTE — INCIDENTAL FINDINGS
The following findings require follow up:  Radiographic finding   Finding:  new right basilar density with associated few branching nodular density in the right middle lobe and a 5 mm nodular density superior segment left lower lobe, indeterminate    These may be on inflammatory/infectious basis /pneumonia       Follow up required:  Short interval follow-up chest CT at 3 months suggested to demonstrate resolution     Follow up should be done within 3 month(s)    Please notify the following clinician to assist with the follow up:   Dr Barbara Morgan MD

## 2023-02-28 NOTE — CONSULTS
Consultation - Geriatric Medicine   Keith Lewsi 79 y o  female MRN: [id number]  Unit/Bed#: S -01 Encounter: [id number]              Inpatient consult to Gerontology     Performed by  Rishi Reeves MD     Authorized by Marcelo Rascon MD                Assessment/Plan     Altered Mental status  Resolved  Per son Courtney Hatfield, patient was conversational on 2023 then on 2023 patient was noted to be less alert approximately 30 minutes after receiving Abilify and progressed to becoming obtunded  Abilify was held upon admission on  and patient was noted to be A&Ox3 again on morning of        - Discontinue Abilify, as this is presumed to have provoked her AMS  Depression/Anxiety  Per son Courtney Hatfield, patient has been increasingly depressed and anxious since her brother-in-law  about 1 month ago  Psychiatry was consulted during last admission and added Abilify for mood stabilization    - Continue with home Zoloft 50 mg daily and Remeron 7 5 mg at bedtime     - Discontinue Abilify     Failure to thrive  Patient recently admitted from - for failure to thrive in the context of worsening depression, anxiety and appetite after death of brother-in-law  - Continue home Zoloft and Remeron to aid in depression, anxiety and appetite  - Recommend advancing diet as tolerated with protein supplementation    Dysphagia  During recent admission, patient reported trouble swallowing and feeling like things get stuck  Was seen by speech therapy then, but patient refused PO at that time and did not participate in evaluation  They recommended pureed diet with thin liquids     - Aspiration precautions  - Speech swallow eval pending  - recommend pureed diet with thing liquids for now pending speech swallow eval      Ambulatory Dysfunction  Normally ambulates without assistive devises    Walker recommended at recent STR     -PT/OT following  -Fall precautions  -Out of bed as tolerated  -Encourage early and frequent mobilization  -Encourage adequate hydration and nutrition  -Provide adequate pain management   -Goal is to discharge to [facility]  -Continue with PT/OT for continued strength and balance training     Medical Non-Compliance  Per son Ivette Rene, patient has stockpiles of meds at home including inhalers and digoxin, he suspects that she isn't taking most of her medications  - consider VNA services to assist with medication compliance     Other conditions to be managed per primary team      History of Present Illness   Physician Requesting Consult: Karyn Chand MD  Reason for Consult / Principal Problem: AMS  Hx and PE limited by: NA  Additional history obtained from: Patient, SNF, and sons Berto Lamb      HPI: Baylee Nix is a 79y o  year old female with a past medical history of antiphospholipid syndrome, asthma, COPD, hypertension, hyperlipidemia, A-fib, osteoporosis, hypothyroidism who was readmitted yesterday from [facility] ([address] [facility]) for altered mental status  Of note patient was recently admitted from 2023 to 2023 due to failure to thrive  Per discussion with son Miroslava Melissa, patient was been doing well living home alone independently until about a month ago when her brother-in-law  and she started becoming increasingly depressed and anxious, and started eating less which led to her prior admission on 2023  At home, she never required oxygen or a walker  Her son reported that she is not adherent to her home medications, he has found months supplies of inhalers and digoxin in her home, he cannot say for sure what exactly she has and has not been taking  During last admission, psychiatry was consulted and they added Abilify and Remeron to her Zoloft  She was also started on losartan    Patient was discharged to short-term rehab on , per nursing staff she was initially ambulating well independently, but continued to have reduced appetite eating about 25%-50% of her servings  She was on 2-3 liters of O2 which was a new requirement  Per son Anuradha Plascencia, she became obtunded about 30 minutes after receiving Abilify, she was then brought back to the ED  This morning upon examination, patient was sitting up comfortably in bed, alert and oriented x3, and had no complaints  She has yet to ambulate or eat anything  She did report some anxiety about how she would be able to get around and care for herself  Review of Systems   Constitutional: Positive for appetite change (decreased)  Negative for chills and fever  HENT: Negative for congestion and rhinorrhea  Respiratory: Negative for cough, shortness of breath and wheezing  Cardiovascular: Negative for chest pain, palpitations and leg swelling  Gastrointestinal: Negative for abdominal pain, blood in stool, constipation, diarrhea, nausea and vomiting  Endocrine: Negative for polyuria  Genitourinary: Negative for dysuria and hematuria  Neurological: Negative for dizziness and headaches  Psychiatric/Behavioral: Negative for confusion, dysphoric mood and suicidal ideas  The patient is nervous/anxious  Meds/Allergies   All current active meds have been reviewed  Allergies   Allergen Reactions   • Aspirin Other (See Comments)   • Penicillins Rash and Hives   • Sulfa Antibiotics Rash       Objective   Vitals:    02/28/23 0852   BP: (!) 182/60   Pulse:    Resp:    Temp:    SpO2:        Intake/Output Summary (Last 24 hours) at 2/28/2023 2456  Last data filed at 2/27/2023 0930  Gross per 24 hour   Intake 250 ml   Output --   Net 250 ml     Invasive Devices     Peripheral Intravenous Line  Duration           Peripheral IV 02/27/23 Left Antecubital 1 day    Peripheral IV 02/27/23 Right;Ventral (anterior) Forearm 1 day                Physical Exam  Constitutional:       General: She is not in acute distress  Appearance: She is not ill-appearing, toxic-appearing or diaphoretic        Comments: Cachectic   HENT: Head: Normocephalic  Mouth/Throat:      Mouth: Mucous membranes are dry  Cardiovascular:      Rate and Rhythm: Normal rate and regular rhythm  Heart sounds: Normal heart sounds  No murmur heard  Pulmonary:      Effort: Pulmonary effort is normal  No respiratory distress  Breath sounds: Normal breath sounds  No stridor  No wheezing, rhonchi or rales  Abdominal:      General: Bowel sounds are normal       Tenderness: There is no abdominal tenderness  There is no guarding  Musculoskeletal:      Right lower leg: No edema  Left lower leg: No edema  Skin:     Capillary Refill: Capillary refill takes less than 2 seconds  Neurological:      Mental Status: She is alert and oriented to person, place, and time  Psychiatric:         Mood and Affect: Mood normal          Behavior: Behavior normal          Lab Results:   I have personally reviewed pertinent lab and imaging results       Tests:   Echo 2/28 - EF 65%  EKG 2/27 - Sinu rhythm with PVCs, QTc 372      VTE Prophylaxis: Drema Gathers held due to supra-therapeutic INR    Code Status: Level 2 - DNAR: but accepts endotracheal intubation  Advance Directive and Living Will:      Power of :    POLST:      Family and Social Support:   Living Arrangements: Lives Alone  Support Systems: Son  Type of Current Residence: Private residence  [address]: No   [place] of Choice: Yes

## 2023-02-28 NOTE — INCIDENTAL FINDINGS
The following findings require follow up:  Radiographic finding   Finding: Thyroid nodule   Follow up required: Non-emergent thyroid u/s   Follow up should be done within 1 month(s)    Please notify the following clinician to assist with the follow up:   Dr Moon Wolfe

## 2023-02-28 NOTE — INCIDENTAL FINDINGS
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

## 2023-02-28 NOTE — PROGRESS NOTES
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 projecting laterally in between left posterior cerebral artery and left superior cerebellar  Recommend consultation with [facility], a division of [address] [place] [address] Neuroscience at [phone]  Incidental thyroid nodule(s) for which nonemergent thyroid ultrasound is recommended "    · Outpatient f/u with pcp for non-emergent thyroid u/s    Aneurysm of basilar artery [facility])  Assessment & Plan  Incidental finding on CTA head and neck/  CTA head and neck with and without contrast 2/27/2023:  "No acute intracranial abnormality  Moderate chronic microangiopathy  Negative CTA head and neck for large vessel occlusion, dissection, or high-grade stenosis  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 projecting laterally in between left posterior cerebral artery and left superior cerebellar  · Outpatient referral for Neurosurgery follow-up  Will provide on DC  Supratherapeutic INR  Assessment & Plan  · See Afib plan      Abnormal CT of the chest  Assessment & Plan  · PE Study with CT abdomen & pelvis with contrast 2/27/2023:  There is a new right basilar density with associated few branching nodular density in the right middle lobe and a 5 mm nodular density superior segment left lower lobe, indeterminate  These may be on inflammatory/infectious basis /pneumonia     Short interval follow-up chest CT at 3 months suggested to demonstrate resolution  Failure to thrive in adult  Assessment & Plan  · Likely multifactoria in the setting of toxic metabolic encephalopathy with recent changes to meds as well as element of depression  · Speech cleared for oral diet    Hypothyroidism  Assessment & Plan  · TSH wnl  · Cont home levothyroxine    Atrial fibrillation (HCC)  Assessment & Plan  On diltiazem, digoxin, and warfarin at home  · Hold coumadin  INR supratherapeutic; no signs of active bleeding  Goal INR 2-3  Daily INR  · Continue digoxin and cardizem as ordered    Antiphospholipid antibody syndrome (HCC)  Assessment & Plan  · On coumadin  Goal INR 2-3  · INR supra therapeutic  No active evidence of bleeding  · Hold coumadin  Daily INR, if INR continues to rise >6, will give low dose oral vit K 2 5mg    * Altered mental status  Assessment & Plan  Suspect toxic metabolic encephalopathy from multiple behavioral health medications  Per family, pt was socially conversational when seen on Saturday 2/25/23  STR reported that pt was less alert starting afternoon of Sunday 2/26/23  No acute infection identified  Patient appears dehydrated  SNF notes worsening mentation following abilify  CTA head/neck unremarkable for acute pathology  Salicylates, acetaminophen, EtOH, ammonia, digoxin level, TSH negative or normal   ABG notable for respiratory acidosis without acidemia, compensated by metabolic alkalosis  Improving mental status since admission  · DC Abilify  Do not restart  Patient did receive dose this morning 2/27/23  · Continue Remeron and Zoloft  · No indication for inpatient psych admission  · IV Hydration  · PT/OT evals with likely return to SNF for subacute rehab  · Encourage oral intake  Speech therapy has cleared patient for regular diet/thin liquids  VTE Pharmacologic Prophylaxis: VTE Score: 6 Moderate Risk (Score 3-4) - Pharmacological DVT Prophylaxis Contraindicated  Sequential Compression Devices Ordered  Patient has supratherapeutic INR on coumadin  Currently anticoagulated    Patient Centered Rounds: I performed bedside rounds with nursing staff today  Discussions with Specialists or Other Care Team Provider: Discussed with Geriatrics and behavioral health    Education and Discussions with Family / Patient: Updated  (son) via phone  Total Time Spent on Date of Encounter in care of patient: 45 minutes This time was spent on one or more of the following: performing physical exam; counseling and coordination of care; obtaining or reviewing history; documenting in the medical record; reviewing/ordering tests, medications or procedures; communicating with other healthcare professionals and discussing with patient's family/caregivers  Current Length of Stay: 1 day(s)  Current Patient Status: Inpatient   Certification Statement: The patient will continue to require additional inpatient hospital stay due to IV fluids  PT evals  Holding abilify, monitor for improvement in mental status  Discharge Plan: Anticipate discharge in 24-48 hrs to rehab facility  Code Status: Level 2 - DNAR: but accepts endotracheal intubation    Subjective:   Patient seen and examined at bedside  Patient arousable though lethargic this morning  Able to tell me she is in the hospital, the correct month and year  States she feels weak  Poor/unreliable historian  Objective:     Vitals:   Temp (24hrs), Av 8 °F (36 6 °C), Min:97 4 °F (36 3 °C), Max:98 6 °F (37 °C)    Temp:  [97 4 °F (36 3 °C)-98 6 °F (37 °C)] 97 8 °F (36 6 °C)  HR:  [71-81] 71  Resp:  [16-18] 16  BP: (147-182)/(56-81) 147/56  SpO2:  [94 %-99 %] 94 %  Body mass index is 19 73 kg/m²  Input and Output Summary (last 24 hours):   No intake or output data in the 24 hours ending 23 1609    Physical Exam:   Physical Exam  Vitals reviewed  Constitutional:       Appearance: She is ill-appearing        Comments: Thin-appearing   HENT: Mouth/Throat:      Mouth: Mucous membranes are dry  Pharynx: No oropharyngeal exudate or posterior oropharyngeal erythema  Cardiovascular:      Rate and Rhythm: Normal rate  Rhythm irregular  Pulmonary:      Effort: Respiratory distress present  Breath sounds: No wheezing or rhonchi  Abdominal:      General: There is no distension  Palpations: Abdomen is soft  There is no mass  Tenderness: There is no abdominal tenderness  There is no guarding  Skin:     General: Skin is warm and dry     Neurological:      Comments: Lethargic though arousable          Additional Data:     Labs:  Results from last 7 days   Lab Units 02/28/23  0516 02/27/23  0332   WBC Thousand/uL 9 82 7 44   HEMOGLOBIN g/dL 12 1 12 2   HEMATOCRIT % 37 3 37 5   PLATELETS Thousands/uL 227 239   NEUTROS PCT %  --  84*   LYMPHS PCT %  --  10*   MONOS PCT %  --  5   EOS PCT %  --  0     Results from last 7 days   Lab Units 02/28/23  0516 02/27/23  0324   SODIUM mmol/L 144 145   POTASSIUM mmol/L 3 5 3 3*   CHLORIDE mmol/L 98 97   CO2 mmol/L 40* 37*   BUN mg/dL 28* 31*   CREATININE mg/dL 0 39* 0 53*   ANION GAP mmol/L 6 11   CALCIUM mg/dL 8 6 9 1   ALBUMIN g/dL  --  3 3*   TOTAL BILIRUBIN mg/dL  --  0 93   ALK PHOS U/L  --  66   ALT U/L  --  14   AST U/L  --  23   GLUCOSE RANDOM mg/dL 74 77     Results from last 7 days   Lab Units 02/28/23  0516   INR  5 66*     Results from last 7 days   Lab Units 02/27/23  0346   POC GLUCOSE mg/dl 67         Results from last 7 days   Lab Units 02/27/23  0332 02/27/23  0324 02/21/23  2251   LACTIC ACID mmol/L 0 6  --  0 8   PROCALCITONIN ng/ml  --  0 11  --        Lines/Drains:  Invasive Devices     Peripheral Intravenous Line  Duration           Peripheral IV 02/27/23 Left Antecubital 1 day    Peripheral IV 02/27/23 Right;Ventral (anterior) Forearm 1 day                      Imaging: Reviewed radiology reports from this admission including: abdominal/pelvic CT    Recent Cultures (last 7 days): Results from last 7 days   Lab Units 02/27/23  0332 02/27/23  0324   BLOOD CULTURE  No Growth at 24 hrs  No Growth at 24 hrs  Last 24 Hours Medication List:   Current Facility-Administered Medications   Medication Dose Route Frequency Provider Last Rate   • albuterol  2 5 mg Nebulization Q4H PRN Perez Sunshine MD     • diltiazem  240 mg Oral Daily Perez Sunshine MD     • Fluticasone Furoate-Vilanterol  1 puff Inhalation Daily Perez Sunshine MD     • hydrochlorothiazide  12 5 mg Oral Daily Perez Sunshine MD     • labetalol  10 mg Intravenous Q4H PRN Stacey Jackson MD     • levothyroxine  25 mcg Oral Daily Perez Sunshine MD     • lidocaine  1 patch Topical Daily Perez Sunshine MD     • losartan  25 mg Oral Daily Perez Sunshine MD     • mirtazapine  7 5 mg Oral HS Perez Sunshine MD     • multi-electrolyte  100 mL/hr Intravenous Continuous Stacey Jackson  mL/hr (02/28/23 1008)   • sertraline  50 mg Oral Daily Perez Sunshine MD     • umeclidinium  1 puff Inhalation Daily Perez Sunshine MD          Today, Patient Was Seen By: Stacey Jackson MD    **Please Note: This note may have been constructed using a voice recognition system  **

## 2023-02-28 NOTE — ASSESSMENT & PLAN NOTE
· PE Study with CT abdomen & pelvis with contrast 2/27/2023:  There is a new right basilar density with associated few branching nodular density in the right middle lobe and a 5 mm nodular density superior segment left lower lobe, indeterminate  These may be on inflammatory/infectious basis /pneumonia   Short interval follow-up chest CT at 3 months suggested to demonstrate resolution

## 2023-02-28 NOTE — ASSESSMENT & PLAN NOTE
Per sons, patient has stockpiles of unused medications at home including inhalers and digoxin  They suspect the she is not taking most of her medications

## 2023-02-28 NOTE — PLAN OF CARE
Problem: MOBILITY - ADULT  Goal: Maintain or return to baseline ADL function  Description: INTERVENTIONS:  -  Assess patient's ability to carry out ADLs; assess patient's baseline for ADL function and identify physical deficits which impact ability to perform ADLs (bathing, care of mouth/teeth, toileting, grooming, dressing, etc )  - Assess/evaluate cause of self-care deficits   - Assess range of motion  - Assess patient's mobility; develop plan if impaired  - Assess patient's need for assistive devices and provide as appropriate  - Encourage maximum independence but intervene and supervise when necessary  - Involve family in performance of ADLs  - Assess for home care needs following discharge   - Consider OT consult to assist with ADL evaluation and planning for discharge  - Provide patient education as appropriate  Outcome: Progressing  Goal: Maintains/Returns to pre admission functional level  Description: INTERVENTIONS:  - Perform BMAT or MOVE assessment daily    - Set and communicate daily mobility goal to care team and patient/family/caregiver  - Collaborate with rehabilitation services on mobility goals if consulted  - Reposition patient every 2 hours    - Out of bed for toileting  - Record patient progress and toleration of activity level   Outcome: Progressing     Problem: Prexisting or High Potential for Compromised Skin Integrity  Goal: Skin integrity is maintained or improved  Description: INTERVENTIONS:  - Identify patients at risk for skin breakdown  - Assess and monitor skin integrity  - Assess and monitor nutrition and hydration status  - Monitor labs   - Assess for incontinence   - Turn and reposition patient  - Assist with mobility/ambulation  - Relieve pressure over bony prominences  - Avoid friction and shearing  - Provide appropriate hygiene as needed including keeping skin clean and dry  - Evaluate need for skin moisturizer/barrier cream  - Collaborate with interdisciplinary team   - Patient/family teaching  - Consider wound care consult   Outcome: Progressing     Problem: NEUROSENSORY - ADULT  Goal: Achieves stable or improved neurological status  Description: INTERVENTIONS  - Monitor and report changes in neurological status  - Monitor vital signs such as temperature, blood pressure, glucose, and any other labs ordered   - Initiate measures to prevent increased intracranial pressure  - Monitor for seizure activity and implement precautions if appropriate      Outcome: Progressing  Goal: Achieves maximal functionality and self care  Description: INTERVENTIONS  - Monitor swallowing and airway patency with patient fatigue and changes in neurological status  - Encourage and assist patient to increase activity and self care  - Encourage visually impaired, hearing impaired and aphasic patients to use assistive/communication devices  Outcome: Progressing     Problem: RESPIRATORY - ADULT  Goal: Achieves optimal ventilation and oxygenation  Description: INTERVENTIONS:  - Assess for changes in respiratory status  - Assess for changes in mentation and behavior  - Position to facilitate oxygenation and minimize respiratory effort  - Oxygen administered by appropriate delivery if ordered  - Initiate smoking cessation education as indicated  - Encourage broncho-pulmonary hygiene including cough, deep breathe, Incentive Spirometry  - Assess the need for suctioning and aspirate as needed  - Assess and instruct to report SOB or any respiratory difficulty  - Respiratory Therapy support as indicated  Outcome: Progressing     Problem: Nutrition/Hydration-ADULT  Goal: Nutrient/Hydration intake appropriate for improving, restoring or maintaining nutritional needs  Description: Monitor and assess patient's nutrition/hydration status for malnutrition  Collaborate with interdisciplinary team and initiate plan and interventions as ordered  Monitor patient's weight and dietary intake as ordered or per policy   Utilize nutrition screening tool and intervene as necessary  Determine patient's food preferences and provide high-protein, high-caloric foods as appropriate       INTERVENTIONS:  - Monitor oral intake, urinary output, labs, and treatment plans  - Assess nutrition and hydration status and recommend course of action  - Evaluate amount of meals eaten  - Assist patient with eating if necessary   - Allow adequate time for meals  - Recommend/ encourage appropriate diets, oral nutritional supplements, and vitamin/mineral supplements  - Order, calculate, and assess calorie counts as needed  - Recommend, monitor, and adjust tube feedings and TPN/PPN based on assessed needs  - Assess need for intravenous fluids  - Provide specific nutrition/hydration education as appropriate  - Include patient/family/caregiver in decisions related to nutrition  Outcome: Progressing

## 2023-02-28 NOTE — ASSESSMENT & PLAN NOTE
On diltiazem, digoxin, and warfarin at home  · Hold coumadin  INR supratherapeutic; no signs of active bleeding  Goal INR 2-3  Daily INR    · Continue digoxin and cardizem as ordered

## 2023-02-28 NOTE — ASSESSMENT & PLAN NOTE
· Likely multifactoria in the setting of toxic metabolic encephalopathy with recent changes to meds as well as element of depression  · Speech cleared for oral diet

## 2023-02-28 NOTE — PROGRESS NOTES
Received critical lab value, INR 5 66, on call notified via tiger text, Urmila Huitron  No new orders received at this time

## 2023-02-28 NOTE — ASSESSMENT & PLAN NOTE
· On coumadin  Goal INR 2-3  · INR supra therapeutic  No active evidence of bleeding  · Hold coumadin    Daily INR, if INR continues to rise >6, will give low dose oral vit K 2 5mg

## 2023-02-28 NOTE — CONSULTS
Psychiatry Consultation Note   Keith Lewis 79 y o  female MRN: [id number]  Unit/Bed#: S -01 Encounter: [id number]    Assessment and Plan:    Assessment:    Principal Psychiatric Problem:  1  Major depressive disorder, single episodeUnspecified mood disorder ([address] )  a  Differential: Persistent complex bereavement disorder  2  Unspecified anxiety disorder  a  Differential: Generalized anxiety disorder, Anxiety secondary to other medical condition  3  Tobacco use disorder  4  Failure to thrive in adult    Principal Problem:    Altered mental status  Active Problems:    Atrial fibrillation (HCC)    Hypothyroidism    Failure to thrive in adult    Hypokalemia    Abnormal CT of the chest    Supratherapeutic INR    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HCC)    Thyroid nodule    Compensated respiratory acidosis      Plan:   Discussed with primary team, with the following recommendations:    1  Treatment Recommendations:  a  Patient does not appear to pose an acute risk to self or others and does not meet inpatient psychiatric admission at this time and can be discharged pending medical clearance  Referrals will be made for outpatient psychiatric follow-up as needed  b  Medical management per primary team, no new labs indicated at this time  2  Pharmacological:   a  Recommend the following medication changes:   i  Continue Zoloft 50 mg daily for mood  ii  Continue Remeron 7 5 mg QHS for mood, sleep disturbance, and appetite disturbance  iii  Will not continue Abilify at this time after conversation with patient and family and per report patient become more disoriented following administration of medication on 02/27/23  Family does not want medication re-started at this time  3  Observation level: Routine  4  Psychiatry will sign off at this time  Please reach out to our service via [employer] for re-evaluation as needed   If contacting after hours, please call or TigerText the on-call team (JACQUELINE: [phone]) with any questions or concerns  Risks, benefits and possible side effects of Medications: No new medications at this time  HPI:  History of Present Illness   Physician Requesting Consult: Lesley Almanza MD  Reason for Consult / Principal Problem: failure to thrive, altered mental status    Chief Complaint: "I'm tired "    Erik Gray is a 79 y o  female with a past medical history significant for recent failure to thrive in adult with electrolyte imbalances and frailty, HTN, hypothyroidism, A-fib rate controlled and on anticoagulation, antiphospholipid antibody syndrome on anticoagulation, COPD, osteoporosis on Prolia injections, visual and hearing impairment, and a past psychiatric history significant for anxiety and unspecified mood disorder, who was admitted to [address] [place] [address] medical-surgical unit on 2/27/2023 due to altered mental status  Psychiatric consultation was requested for management of altered mental status and mood  Patient was recently admitted to [address] [place] [address] from 02/22-02/24/23 and was followed by primary team, geriatrics, and psychiatry  Patient was diagnosed with unspecified mood disorder - MDD high on differential at that time and patient was continued on Zoloft, started on Remeron 7 5 mg QHS, and started on Abilify 2 mg daily  Patient with improvement in oral intake and more conversational  She was discharged to [address]  Patient presented to emergency room via EMS from [facility] on [facility]/27/23 with altered mental status and unresponsiveness  At the time of presentation, patient responded to painful stimuli  Patient with blood glucose of 67 on presentation, amp of d50 was provided  Findings consistent with pneumonia or inflammatory process were found on CT  No CTA signs of acute infarction, intracranial mass, parenchymal hemorrhage on CT head/neck   Evidence of fibromuscular dysplasia seen on L vertebral artery, mild atherosclerotic disease visualized in carotids b/l, incidental finding son aneurysm in basilar artery  Patient with markers and clinical signs of dehydration  Patient with supratherapeutic INR on admission and hypokalemia  On initial psychiatric evaluation this admission, Simin endorses "I'm trying," and when asked to elaborate stated "to get better " Simin endorses her anxiety has been "not so bad," and endorses an improvement in her mood since starting new medications last week  She endorses she was sleeping better while still hospitalized and endorses she has been "scared to fall out of bed," at home and on discharge to SNF which has caused decreased sleep  Patient is unable to recall how well she has or has not been eating since discharge, stating "I would have eaten the applesauce!" and patient laughs afterwards  Patient more reactive and conversational compared to prior  She does not respond "I don't know," as frequently, though does answer this way when asked about current depressed mood  Patient denies suicidal ideation, homicidal ideation, passive death wishes including thoughts to go to sleep and not wake up/thoughts of giving up, on evaluation today  Patient does endorse feeling tired and endorses decreased energy  Patient states "I'm hopeful, I'm hoping," in regards to ability to improve physically and mentally today  Patient denies history or current manic-related symptoms, denies history of or current auditory or visual hallucinations  Patient does not appear to be responding to internal stimuli and does not make paranoid or delusional statements at time of evaluation  Patient oriented to self, hospital, city/state, and time at time of evaluation  The patient appears weaker compared to prior evaluation on last admission, and appears dry on physical exam     Per psychiatric consult during last admission by this writer on 02/23/23: "Simin appears frail, with blunted affect, and psychomotor slowing   The patient frequently answers "I don't know," when asked questions about current symptoms and situation, patient more freely discusses the past and her day-to-day activities  Patient replies "I guess," when asked if she is feeling sad, and depressed  Patient became tearful when asked about recent losses, and was unable to bring herself to state her brother-in-law recently passed  This writer asked directly about brother-in-law's death and patient nodded, stating "I don't know," when asked if her decline in eating, drinking, and sleeping worsened after his passing  The patient states his passing made her "realize how important life is " When asked to expand further, patient stated "I don't know "   Patient endorsed about 2 weeks ago she stopped feeling hungry and has stopped eating, stating "I no longer eat or drink " She notes her appetite has been "going downhill," and endorses she has no desire to eat  The patient endorses she also has not been sleeping  She does not feel rested and notes decreased energy  She endorses decreased motivation, concentration, and memory  The patient endorses guilt and feels like a burden to her family because she is "wearing out," her children since they are helping her more frequently now  Patient denied feeling anxious at time of evaluation and denied a history of anxiety, though per chart review, patient has been diagnosed with anxiety previously  Patient did endorse worry at times  Patient denied racing thoughts  The patient denied history of manic-related symptoms other than recent decreased sleep  The patient denied psychotic symptoms of hallucinations, paranoia, referential thinking, and did not demonstrate delusional thinking  On re-evaluation with attending psychiatrist, patient's son, Arthor Simmonds, present and patient agreeable to psychiatric team obtaining collateral information from son  Arthor Simmonds discusses patient was attending to her ADLs and IADLs until very recently   He notes a decline over the last 6-8 weeks, which sharply declined over the last 2 weeks  He endorses the patient had anxiety throughout her life, worsened during the pandemic and she has not been out of her house for much other than doctor's appointments  He notes she has not attended her grandchildren's birthdays or other events for multiple years  He endorsed after the death of his Uncle, the patient became more withdrawn and depressed  He endorsed she stopped sleeping, was maybe sleeping 1-3 hours per night and within the last week has stopped eating  He notes she stopped drinking coffee or smoking ciggarettes, which were both frequent hobbies for patient  He endorses they presented to PCP a few weeks ago who trialed Xanax which caused the patient "to just stare off," and she endorsed visual changes so medication was stopped and patient was started on Zoloft  He endorses patient has had fear of not paying her bills, toilet issues in the home (which were attended to and repaired previously), and getting her affairs in order for her estate   He reports the patient stated she did not need the new computer he had purchased her because she "wouldn't be here in a week " "    Psychiatric Review Of Systems:  Sleep: Yes, decreased  Interest/Anhedonia: unchanged, decreased overall  Guilt/hopeless: Denied  Low energy/anergy: yes  Poor Concentration: Denied  Appetite changes: Yes, decreased  Weight changes: unchanged, had decreased  Somatic symptoms: Dened  Anxiety/panic: improved  Vandana: denies history and does not demonstrate manic-related symptoms at time of evaluation  Self injurious behavior/risky behavior: yes, decreased oral intake  Suicidal ideation: Denied  Homicidal ideation: Denied  Auditory hallucinations: Denied  Visual hallucinations: Denied  Other hallucinations: Denied  Delusional thinking: patient does not make paranoid or delusional statements at time of evaluation    Historical Information     Past Psychiatric History:   Past Inpatient Psychiatric Treatment:   No history of past inpatient psychiatric admissions  Past Outpatient Psychiatric Treatment:    No history of past outpatient psychiatric treatment  Past Suicide Attempts: Denied  Past Violent Behavior: None per chart review and none reported on prior evaluations  Past Psychiatric Medication Trials: Zoloft, Remeron, Abilify and Xanax    Substance Abuse History:  Social History     Tobacco History     Smoking Status  Some Days Smoking Tobacco Type  Cigarettes    Smokeless Tobacco Use  Never    Tobacco Comments  Never smoker per [employer]          Alcohol History     Alcohol Use Status  No          Drug Use     Drug Use Status  No          Sexual Activity     Sexually Active  Not Currently          Activities of Daily Living    Not Asked                 I am unable to assess the patient for substance use within the past 12 months as they are unable or unwilling to answer on this presentation   On prior evaluation on 02/23/23, patient and patient's son reported:  Alcohol use: previously drank socially - glass of wine at weddings/events prior to recent admissions  Marijuana: Denied use  Other substance use:  Denied use  Longest clean time: not applicable  History of Inpatient/Outpatient rehabilitation program: Denied  Nicotine use: 1 ppd smoker, son endorses patient stopped smoking a few weeks ago    Family Psychiatric History:   Psychiatric Illness:  no family history of psychiatric illness  Substance Abuse:  Son - substance use  Suicide Attempts:  no family history of suicide attempts    Social History:  Education: unknown  Learning Disabilities: none previously reported  Marital History:   Children: 4 adult children, 3 sons and 1 daughter, multiple grandchildren  Living Arrangement: was residing at [facility] since 02/24/23, patient previously lived in home alone prior to recent admission  Occupational History: retired  Functioning Relationships: good support system  Legal History: Denied   History: Denied  Access to Firearms: Denied    Traumatic History:   Abuse: none previously reported  Other Traumatic Events: death of family members - notably her  and most recently her brother-in-law     Past Medical History:    Past Medical History:   Diagnosis Date   • Anti-phospholipid syndrome ([facility] )    • Asthma    • Blood type O+    • Cardiac disease    • Chronic pain    • COPD (chronic obstructive pulmonary disease) ([facility] )    • Fracture of ankle     left   • Hyperlipidemia    • Hypertension    • Osteoporosis      Past Surgical History:   Procedure Laterality Date   • APPENDECTOMY     • BILATERAL OOPHORECTOMY Bilateral    • ORIF TIBIAL SHAFT FRACTURE W/ PLATES AND SCREWS Right        Medical Review Of Systems:  Pertinent items are noted in HPI  Meds/Allergies   All current active medications have been reviewed    Current medications:   Current Facility-Administered Medications   Medication Dose Route Frequency   • albuterol inhalation solution 2 5 mg  2 5 mg Nebulization Q4H PRN   • diltiazem (CARDIZEM CD) 24 hr capsule 240 mg  240 mg Oral Daily   • Fluticasone Furoate-Vilanterol 100-25 mcg/actuation 1 puff  1 puff Inhalation Daily   • hydrochlorothiazide (HYDRODIURIL) tablet 12 5 mg  12 5 mg Oral Daily   • labetalol (NORMODYNE) injection 10 mg  10 mg Intravenous Q4H PRN   • levothyroxine tablet 25 mcg  25 mcg Oral Daily   • lidocaine (LIDODERM) 5 % patch 1 patch  1 patch Topical Daily   • losartan (COZAAR) tablet 25 mg  25 mg Oral Daily   • mirtazapine (REMERON) tablet 7 5 mg  7 5 mg Oral HS   • multi-electrolyte (PLASMALYTE-A/ISOLYTE-S PH 7 4) IV solution  100 mL/hr Intravenous Continuous   • sertraline (ZOLOFT) tablet 50 mg  50 mg Oral Daily   • umeclidinium 62 5 mcg/actuation inhaler AEPB 1 puff  1 puff Inhalation Daily     Allergies   Allergen Reactions   • Aspirin Other (See Comments)   • Penicillins Rash and Hives   • Sulfa Antibiotics Rash       Objective Vital signs in last 24 hours:  Temp:  [97 4 °F (36 3 °C)-98 6 °F (37 °C)] 97 4 °F (36 3 °C)  HR:  [71-81] 81  Resp:  [16-18] 18  BP: (158-182)/(60-81) 182/60      Intake/Output Summary (Last 24 hours) at 2/28/2023 0418  Last data filed at 2/27/2023 0930  Gross per 24 hour   Intake 250 ml   Output --   Net 250 ml       Mental Status Evaluation:  Appearance:  age appropriate, marginal hygiene, dressed in hospital attire, frail appearing, good eye contact   Behavior:  cooperative, calm, psychomotor slowing   Speech:  clear, coherent, slow, soft   Mood:  "I'm hoping "   Affect:  constricted, reactive at times, appropriately laughing   Language: appears grossly intact   Thought Process:  organized, coherent, logical, appears to have slowing of thoughts, intermittent negative thinking   Associations: intact associations   Thought Content:  no overt delusions, negative thoughts   Perceptual Disturbances: no auditory hallucinations, no visual hallucinations, does not appear responding to internal stimuli   Risk Potential: Suicidal ideation - None at present, contracts for safety on the unit, would talk to staff if not feeling safe on the unit  Homicidal ideation - None at present  Potential for aggression - Not at present   Sensorium:  oriented to person, place, time/date and situation   Memory:  recent memory of events prior to admission impaired, remote memory intact   Consciousness:  alert and awake   Attention/Concentration: attention span and concentration are age appropriate   Intellect: within normal limits   Fund of Knowledge: Appears within normal limits   Insight:  limited   Judgment: limited   Muscle Strength Muscle Tone: appears to have decreased strength  appears within normal range   Gait/Station: unable to assess as patient remained in hospital bed, patient ambulated with cane at baseline previously   Motor Activity: no abnormal movements     Laboratory Results: I have personally reviewed all pertinent laboratory/tests results    Results from the past 24 hours:   Recent Results (from the past 24 hour(s))   Blood gas, arterial    Collection Time: 02/27/23 11:08 AM   Result Value Ref Range    pH, Arterial 7 409 7 350 - 7 450    pCO2, Arterial 73 2 (HH) 36 0 - 44 0 mm Hg    pO2, Arterial 91 4 75 0 - 129 0 mm Hg    HCO3, Arterial 45 3 (H) 22 0 - 28 0 mmol/L    Base Excess, Arterial 16 2 mmol/L    O2 Content, Arterial 21 1 16 0 - 23 0 mL/dL    O2 HGB,Arterial  95 4 94 0 - 97 0 %    SOURCE Radial, Left     MARYLIN TEST Yes     Nasal Cannula 3 5    Protime-INR    Collection Time: 02/28/23  5:16 AM   Result Value Ref Range    Protime 51 4 (H) 11 6 - 14 5 seconds    INR 5 66 (HH) 0 84 - 6 30   Basic metabolic panel    Collection Time: 02/28/23  5:16 AM   Result Value Ref Range    Sodium 144 135 - 147 mmol/L    Potassium 3 5 3 5 - 5 3 mmol/L    Chloride 98 96 - 108 mmol/L    CO2 40 (H) 21 - 32 mmol/L    ANION GAP 6 4 - 13 mmol/L    BUN 28 (H) 5 - 25 mg/dL    Creatinine 0 39 (L) 0 60 - 1 30 mg/dL    Glucose 74 65 - 140 mg/dL    Calcium 8 6 8 4 - 10 2 mg/dL    eGFR 96 ml/min/1 73sq m   CBC (With Platelets)    Collection Time: 02/28/23  5:16 AM   Result Value Ref Range    WBC 9 82 4 31 - 10 16 Thousand/uL    RBC 3 61 (L) 3 81 - 5 12 Million/uL    Hemoglobin 12 1 11 5 - 15 4 g/dL    Hematocrit 37 3 34 8 - 46 1 %     (H) 82 - 98 fL    MCH 33 5 26 8 - 34 3 pg    MCHC 32 4 31 4 - 37 4 g/dL    RDW 12 9 11 6 - 15 1 %    Platelets 559 627 - 468 Thousands/uL    MPV 9 0 8 9 - 12 7 fL   Magnesium    Collection Time: 02/28/23  5:16 AM   Result Value Ref Range    Magnesium 2 3 1 9 - 2 7 mg/dL   Phosphorus    Collection Time: 02/28/23  5:16 AM   Result Value Ref Range    Phosphorus 2 3 2 3 - 4 1 mg/dL   Echo complete w/ contrast if indicated    Collection Time: 02/28/23  8:17 AM   Result Value Ref Range    A4C EF 63 %    LVIDd 3 40 cm    LVIDS 2 30 cm    IVSd 1 20 cm    LVPWd 1 20 cm    FS 32 28 - 44    MV E' Tissue Velocity Septal 6 cm/s    LA Volume Index (BP) 35 0     E/A ratio 0 59     E wave deceleration time 305 ms    MV Peak E Deyvi 85 cm/s    MV Peak A Deyvi 1 45 m/s    RVID d 3 8 cm    Tricuspid annular plane systolic excursion 7 43 cm    LA size 3 5 cm    LA length (A2C) 5 30 cm    RAA A4C 10 1 cm2    MV stenosis pressure 1/2 time 88 ms    MV valve area p 1/2 method 2 50     TR Peak Deyvi 2 8 m/s    Triscuspid Valve Regurgitation Peak Gradient 31 0 mmHg    Ao root 3 20 cm    Asc Ao 2 9 cm    Tricuspid valve peak regurgitation velocity 2 81 m/s    Left ventricular stroke volume (2D) 28 00 mL    IVS 1 2 cm    LEFT VENTRICLE SYSTOLIC VOLUME (MOD BIPLANE) 2D 18 mL    LV DIASTOLIC VOLUME (MOD BIPLANE) 2D 46 mL    Left Atrium Area-systolic Four Chamber 22 5 cm2    Left Atrium Area-systolic Apical Two Chamber 18 1 cm2    LVSV, 2D 28 mL       Imaging Studies: CT abdomen pelvis wo contrast    Result Date: 2/22/2023  Narrative: CT ABDOMEN AND PELVIS WITHOUT IV CONTRAST INDICATION:   LLQ abdominal pain failure to thrive, abd tenderness  COMPARISON:  None  TECHNIQUE:  CT examination of the abdomen and pelvis was performed without intravenous contrast  Axial, sagittal, and coronal 2D reformatted images were created from the source data and submitted for interpretation  Radiation dose length product (DLP) for this visit:  207 mGy-cm   This examination, like all CT scans performed in the [facility], was performed utilizing techniques to minimize radiation dose exposure, including the use of iterative reconstruction and automated exposure control  Enteric contrast was not administered  FINDINGS: ABDOMEN LOWER CHEST:  Atelectasis/scarring in the bilateral lower lung fields  Heart appears enlarged  Moderate coronary atherosclerosis  Moderate aortic atherosclerosis  Skin thickening of the bilateral breasts, left greater than right, possibly related to prior intervention/radiation therapy    Correlation with patient's symptoms recommended  Dedicated breast imaging may be considered as clinically warranted nonemergently  LIVER/BILIARY TREE:  Unremarkable  GALLBLADDER:  There are gallstone(s) within the gallbladder, without pericholecystic inflammatory changes  SPLEEN:  Unremarkable  PANCREAS:  Unremarkable  ADRENAL GLANDS:  Adrenal gland thickening bilaterally  KIDNEYS/URETERS:  Unremarkable  No hydronephrosis  STOMACH AND BOWEL:  Colonic diverticulosis, no discrete evidence of acute diverticulitis  Small right-sided femoral hernia contains several normal-appearing loops of bowel  No discrete evidence of bowel obstruction  Otherwise grossly unremarkable  APPENDIX:  No findings to suggest appendicitis  ABDOMINOPELVIC CAVITY:  No ascites  No pneumoperitoneum  No lymphadenopathy  VESSELS:  Tortuous aorta, moderate atherosclerosis; no aortic aneurysm PELVIS REPRODUCTIVE ORGANS:  Enlarged fibroid uterus URINARY BLADDER:  Several small calcifications in the posterior bladder lumen; otherwise grossly unremarkable ABDOMINAL WALL/INGUINAL REGIONS:  Body wall edema  Midline sutures in the lower abdomen/pelvis anteriorly OSSEOUS STRUCTURES: Multiple osteoporotic compression fractures most prominently at the T11, L2, and L4 levels with approximately 50% loss of height; age indeterminate  Multilevel degenerative changes  Anterolisthesis of approximately 4 mm of L5 on S1,  probably related to facet joint arthropathy at this level  Impression: Small right-sided femoral hernia contains several normal-appearing loops of bowel (axial image 131, series 301)  No discrete evidence of bowel obstruction  Enlarged fibroid uterus  Age-indeterminate osteoporotic compression fractures as described  Skin thickening of the bilateral breasts, left greater than right, possibly related to prior intervention/radiation therapy  Correlation with patient's symptoms recommended    Dedicated breast imaging may be considered nonemergently as clinically warranted  Cardiomegaly, coronary atherosclerosis, cholelithiasis without discrete evidence of acute cholecystitis, colonic diverticulosis without evidence of acute diverticulitis and other findings as above  Workstation performed: [id number]     CTA head and neck with and without contrast    Result Date: 2/27/2023  Narrative: CTA NECK AND BRAIN WITH AND WITHOUT CONTRAST INDICATION: AMS - decreased responsiveness, no history COMPARISON:   CT head without contrast February 22, 2023  TECHNIQUE:  Routine CT imaging of the Brain without contrast   Post contrast imaging was performed after administration of iodinated contrast through the neck and brain  Post contrast axial 0 625 mm images timed to opacify the arterial system  3D rendering was performed on an independent workstation  MIP reconstructions performed  Coronal reconstructions were performed of the noncontrast portion of the brain  Radiation dose length product (DLP) for this visit:  0496 78 75 49 mGy-cm   This examination, like all CT scans performed in the [facility], was performed utilizing techniques to minimize radiation dose exposure, including the use of iterative reconstruction and automated exposure control  IV Contrast:  100 mL of iohexol (OMNIPAQUE)  IMAGE QUALITY:   Diagnostic FINDINGS: NONCONTRAST BRAIN PARENCHYMA: Decreased attenuation is noted in periventricular and subcortical white matter demonstrating an appearance that is statistically most likely to represent moderate microangiopathic change  No CT signs of acute infarction  No intracranial mass, mass effect or midline shift  No acute parenchymal hemorrhage  Partial empty sella  VENTRICLES AND EXTRA-AXIAL SPACES:  Normal for the patient's age  VISUALIZED ORBITS: Normal visualized orbits  PARANASAL SINUSES: Normal visualized paranasal sinuses   CERVICAL VASCULATURE AORTIC ARCH AND GREAT VESSELS:  Mild atherosclerotic disease of the arch, proximal great vessels and visualized subclavian vessels  No significant stenosis  RIGHT VERTEBRAL ARTERY CERVICAL SEGMENT:  Normal origin  The vessel is normal in caliber throughout the neck  LEFT VERTEBRAL ARTERY CERVICAL SEGMENT:  Normal origin  The vessel is normal in caliber throughout the neck  Mildly beaded appearance of left distal V2/proximal V3 segment, likely due to fibromuscular dysplasia  RIGHT EXTRACRANIAL CAROTID SEGMENT:  Mild atherosclerotic disease of the distal common carotid artery and proximal cervical internal carotid artery without significant stenosis compared to the more distal ICA  Less than 50% stenosis  No dissection  LEFT EXTRACRANIAL CAROTID SEGMENT:  Mild atherosclerotic disease of the distal common carotid artery and proximal cervical internal carotid artery without significant stenosis compared to the more distal ICA  Less than 50% stenosis  No dissection  NASCET criteria was used to determine the degree of internal carotid artery diameter stenosis  INTRACRANIAL VASCULATURE INTERNAL CAROTID ARTERIES:  Normal enhancement of the intracranial portions of the internal carotid arteries with mild calcified atherosclerotic disease bilaterally  Normal ophthalmic artery origins  Normal ICA terminus  ANTERIOR CIRCULATION:  Aplastic left A1 segment, normal variant  Normal caliber right A1 segment  Anterior cerebral arteries with normal enhancement  Normal anterior communicating artery  MIDDLE CEREBRAL ARTERY CIRCULATION:  M1 segment and middle cerebral artery branches demonstrate normal enhancement bilaterally  DISTAL VERTEBRAL ARTERIES:  Patent distal vertebral arteries  Minimal calcified atherosclerotic disease in right vertebral artery proximal V4 segment  Posterior inferior cerebellar artery origins are normal  Normal vertebral basilar junction  BASILAR ARTERY:  Basilar artery is normal in caliber    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 projecting laterally in between left posterior cerebral artery and left superior cerebellar artery (305:178)  Normal superior cerebellar arteries  POSTERIOR CEREBRAL ARTERIES: Both posterior cerebral arteries arises from the basilar tip  Both arteries demonstrate normal enhancement  VENOUS STRUCTURES:  Normal  NON VASCULAR ANATOMY BONY STRUCTURES:  No acute osseous abnormality  Moderate multilevel spinal degenerative changes  Chronic T5 compression deformity with moderate height loss  SOFT TISSUES OF THE NECK: 1 5 cm heterogeneously enhancing partially calcified nodule in left thyroid lobe (304:217)  Incidental discovery of one or more thyroid nodule(s) measuring more than 1 5 cm and without suspicious features is noted in this patient who is above 28years old; according to guidelines published in the February 2015 white paper on incidental thyroid nodules in the Journal of the [employer] of Radiology Nasir Deng), further characterization with thyroid ultrasound is recommended  THORACIC INLET:  Emphysema with mild biapical fibrotic change  Impression: No acute intracranial abnormality  Moderate chronic microangiopathy  Negative CTA head and neck for large vessel occlusion, dissection, or high-grade stenosis  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 projecting laterally in between left posterior cerebral artery and left superior cerebellar  Recommend consultation with the [facility], a division of [facility] at [phone]  Incidental thyroid nodule(s) for which nonemergent thyroid ultrasound is recommended  Additional chronic/incidental findings as detailed above  The study was marked in [facility] for immediate notification   Workstation performed: [id number]     XR chest 1 view portable    Result Date: 2/22/2023  Narrative: CHEST INDICATION:   Hx COPD, SOB  COMPARISON:  2/12/2023 EXAM PERFORMED/VIEWS:  XR CHEST PORTABLE Single view FINDINGS: Nodular opacity measuring approximately 2 5 cm is again noted at the right cardiophrenic angle, unchanged  This was thought to represent atelectasis/scarring on abdominopelvic CT performed 2/21/2023  Cardiomediastinal silhouette appears unremarkable  The lungs are otherwise clear  No pneumothorax or pleural effusion  Osseous structures appear within normal limits for patient age  Impression: Persistent nodular opacity at the right cardiophrenic angle thought to represent atelectasis or scarring on abdominal pelvic CT performed contemporaneously Workstation performed: [id number]     XR chest 1 view portable    Result Date: 2/12/2023  Narrative: CHEST INDICATION:   SOB  COMPARISON:  CXR 12/27/2017 and chest CT 05/26/2006 7  EXAM PERFORMED/VIEWS:  XR CHEST PORTABLE FINDINGS: Cardiomediastinal silhouette appears unremarkable  2 5 cm nodule in the right cardiophrenic sulcus  No definite acute disease  No effusion or pneumothorax  Osseous structures appear within normal limits for patient age  Impression: No definite acute disease  2 5 cm nodule in the right cardiophrenic sulcus, of uncertain etiology  Further evaluation with a chest CT with no contrast could be performed if clinically warranted  The study was marked in [facility] for immediate notification  Workstation performed: [id number]     CT head without contrast    Result Date: 2/22/2023  Narrative: CT BRAIN - WITHOUT CONTRAST INDICATION:   AMS, on coumadin  COMPARISON:  None  TECHNIQUE:  CT examination of the brain was performed  In addition to axial images, sagittal and coronal 2D reformatted images were created and submitted for interpretation  Radiation dose length product (DLP) for this visit:  1041 mGy-cm   This examination, like all CT scans performed in the [facility], was performed utilizing techniques to minimize radiation dose exposure, including the use of iterative reconstruction and automated exposure control  IMAGE QUALITY:  Diagnostic   FINDINGS: PARENCHYMA: Decreased attenuation is noted in periventricular and subcortical white matter demonstrating an appearance that is statistically most likely to represent moderate microangiopathic change  No CT signs of acute infarction  No intracranial mass, mass effect or midline shift  No acute parenchymal hemorrhage  VENTRICLES AND EXTRA-AXIAL SPACES:  Ventricles and extra-axial CSF spaces are prominent commensurate with the degree of volume loss  No hydrocephalus  No acute extra-axial hemorrhage  VISUALIZED ORBITS: Normal visualized orbits  PARANASAL SINUSES: Normal visualized paranasal sinuses  CALVARIUM AND EXTRACRANIAL SOFT TISSUES:  Normal      Impression: No acute infarct, hemorrhage, or midline shift  Moderate chronic microangiopathic changes within the brain  Workstation performed: [id number]     PE Study with CT abdomen & pelvis with contrast    Result Date: 2/27/2023  Narrative: CT PULMONARY ANGIOGRAM OF THE CHEST AND CT ABDOMEN AND PELVIS WITH INTRAVENOUS CONTRAST INDICATION:   ams, no history of present illness  COMPARISON:  None  TECHNIQUE:  CT examination of the chest, abdomen and pelvis was performed  Thin section CT angiographic technique was used in the chest in order to evaluate for pulmonary embolus and coronal 3D MIP postprocessing was performed on the acquisition scanner  Axial, sagittal, and coronal 2D reformatted images were created from the source data and submitted for interpretation  Radiation dose length product (DLP) for this visit:  541 mGy-cm   This examination, like all CT scans performed in the [facility], was performed utilizing techniques to minimize radiation dose exposure, including the use of iterative reconstruction and automated exposure control  IV Contrast:  100 mL of iohexol (OMNIPAQUE) Enteric Contrast:  Enteric contrast was not administered   FINDINGS: CHEST PULMONARY ARTERIAL TREE:  No filling defects seen in the 1st through 4th order pulmonary branches to suggest any pulmonary embolism LUNGS:  No acute consolidation seen Emphysema seen a nodular density seen in the superior segment of the left lower lobe, measuring about 5 mm, image 65 series 308 Nodular densities are also seen in the right middle lobe area measuring about 5 to 6 mm, seen in image 127 series 308 Atelectasis seen right mid middle lobe Consolidation/atelectasis right lung base Trachea and central bronchi are patent PLEURA:  Unremarkable  HEART/AORTA:  Heart is unremarkable for patient's age  Ascending aorta measures 3 6 cm Descending thoracic aorta measures about 2 9 cm Multifocal mild atherosclerotic plaquing seen MEDIASTINUM AND BENITO:  Unremarkable  CHEST WALL AND LOWER NECK:  Unremarkable  Again noted is skin thickening in the both breasts Chronic wedge compression deformity of the thoracic vertebra ABDOMEN LIVER/BILIARY TREE:  Unremarkable  GALLBLADDER:  No calcified gallstones  No pericholecystic inflammatory change  SPLEEN:  Unremarkable  PANCREAS:  Unremarkable  ADRENAL GLANDS:  Nodularity of the both adrenal gland seen KIDNEYS/URETERS:  Unremarkable  No hydronephrosis  STOMACH AND BOWEL:  Diverticulosis seen within the right inguinal hernia seen containing the small bowel loops There is no abnormal dilation of the small bowel loops seen APPENDIX:  No findings to suggest appendicitis  ABDOMINOPELVIC CAVITY:  No ascites  No pneumoperitoneum  No lymphadenopathy  VESSELS:  Unremarkable for patient's age  PELVIS REPRODUCTIVE ORGANS:  Fibroid uterus seen URINARY BLADDER:  Bladder is incompletely distended ABDOMINAL WALL/INGUINAL REGIONS:  Unremarkable  OSSEOUS STRUCTURES:  Degenerative disc disease seen at L5-S1, L4-5 Chronic wedge compression deformity of the L2 vertebra, L1 vertebra     Impression: No pulmonary embolism seen There is a new right basilar density with associated few branching nodular density in the right middle lobe and a 5 mm nodular density superior segment left lower lobe, indeterminate    These may be on inflammatory/infectious basis /pneumonia   Short interval follow-up chest CT at 3 months suggested to demonstrate resolution No acute inflammatory stranding within the abdomen Right-sided inguinal/femoral hernia, stable without bowel obstruction Fibroid uterus The study was marked in EPIC for immediate notification  Workstation performed: [id number]     EKG: EKG on 02/27/23 demonstrated NSR with PVCs, septal infarct previously noted, HR 74 bpm, QT/QTc 336/372 ms    Code Status: Level 2 - DNAR: but accepts endotracheal intubation  Advance Directive and Living Will:     N/A  Power of : N? [id number] [facility], DO 02/28/23  Psychiatry Resident, PGY-II    This note was completed in part utilizing [employer] Software  Grammatical, translation, syntax errors, random word insertions, spelling mistakes, and incomplete sentences may be an occasional consequence of this system secondary to software limitations with voice recognition, ambient noise, and hardware issues  If you have any questions or concerns about the content, text, or information contained within the body of this dictation, please contact the provider for clarification

## 2023-03-01 LAB
ANION GAP SERPL CALCULATED.3IONS-SCNC: 3 MMOL/L (ref 4–13)
BASOPHILS # BLD AUTO: 0.02 THOUSANDS/ÂΜL (ref 0–0.1)
BASOPHILS NFR BLD AUTO: 0 % (ref 0–1)
BUN SERPL-MCNC: 20 MG/DL (ref 5–25)
CALCIUM SERPL-MCNC: 8.3 MG/DL (ref 8.4–10.2)
CHLORIDE SERPL-SCNC: 93 MMOL/L (ref 96–108)
CO2 SERPL-SCNC: 44 MMOL/L (ref 21–32)
CREAT SERPL-MCNC: 0.4 MG/DL (ref 0.6–1.3)
EOSINOPHIL # BLD AUTO: 0.01 THOUSAND/ÂΜL (ref 0–0.61)
EOSINOPHIL NFR BLD AUTO: 0 % (ref 0–6)
ERYTHROCYTE [DISTWIDTH] IN BLOOD BY AUTOMATED COUNT: 12.6 % (ref 11.6–15.1)
GFR SERPL CREATININE-BSD FRML MDRD: 95 ML/MIN/1.73SQ M
GLUCOSE SERPL-MCNC: 96 MG/DL (ref 65–140)
HCT VFR BLD AUTO: 36.1 % (ref 34.8–46.1)
HGB BLD-MCNC: 11.8 G/DL (ref 11.5–15.4)
IMM GRANULOCYTES # BLD AUTO: 0.05 THOUSAND/UL (ref 0–0.2)
IMM GRANULOCYTES NFR BLD AUTO: 1 % (ref 0–2)
INR PPP: 4 (ref 0.84–1.19)
LYMPHOCYTES # BLD AUTO: 0.66 THOUSANDS/ÂΜL (ref 0.6–4.47)
LYMPHOCYTES NFR BLD AUTO: 8 % (ref 14–44)
MCH RBC QN AUTO: 33.5 PG (ref 26.8–34.3)
MCHC RBC AUTO-ENTMCNC: 32.7 G/DL (ref 31.4–37.4)
MCV RBC AUTO: 103 FL (ref 82–98)
MONOCYTES # BLD AUTO: 0.29 THOUSAND/ÂΜL (ref 0.17–1.22)
MONOCYTES NFR BLD AUTO: 3 % (ref 4–12)
MRSA NOSE QL CULT: NORMAL
NEUTROPHILS # BLD AUTO: 7.54 THOUSANDS/ÂΜL (ref 1.85–7.62)
NEUTS SEG NFR BLD AUTO: 88 % (ref 43–75)
NRBC BLD AUTO-RTO: 0 /100 WBCS
PLATELET # BLD AUTO: 221 THOUSANDS/UL (ref 149–390)
PMV BLD AUTO: 9.4 FL (ref 8.9–12.7)
POTASSIUM SERPL-SCNC: 2.7 MMOL/L (ref 3.5–5.3)
POTASSIUM SERPL-SCNC: 3.3 MMOL/L (ref 3.5–5.3)
PROTHROMBIN TIME: 39.2 SECONDS (ref 11.6–14.5)
RBC # BLD AUTO: 3.52 MILLION/UL (ref 3.81–5.12)
SODIUM SERPL-SCNC: 140 MMOL/L (ref 135–147)
WBC # BLD AUTO: 8.57 THOUSAND/UL (ref 4.31–10.16)

## 2023-03-01 RX ORDER — POTASSIUM CHLORIDE 20 MEQ/1
40 TABLET, EXTENDED RELEASE ORAL ONCE
Status: COMPLETED | OUTPATIENT
Start: 2023-03-01 | End: 2023-03-01

## 2023-03-01 RX ORDER — LOSARTAN POTASSIUM 50 MG/1
50 TABLET ORAL DAILY
Status: DISCONTINUED | OUTPATIENT
Start: 2023-03-01 | End: 2023-03-06

## 2023-03-01 RX ORDER — POTASSIUM CHLORIDE 14.9 MG/ML
20 INJECTION INTRAVENOUS
Status: COMPLETED | OUTPATIENT
Start: 2023-03-01 | End: 2023-03-01

## 2023-03-01 RX ADMIN — UMECLIDINIUM 1 PUFF: 62.5 AEROSOL, POWDER ORAL at 09:40

## 2023-03-01 RX ADMIN — FLUTICASONE FUROATE AND VILANTEROL TRIFENATATE 1 PUFF: 100; 25 POWDER RESPIRATORY (INHALATION) at 09:40

## 2023-03-01 RX ADMIN — MIRTAZAPINE 7.5 MG: 15 TABLET, FILM COATED ORAL at 21:54

## 2023-03-01 RX ADMIN — HYDROCHLOROTHIAZIDE 12.5 MG: 12.5 TABLET ORAL at 09:38

## 2023-03-01 RX ADMIN — POTASSIUM CHLORIDE 40 MEQ: 1500 TABLET, EXTENDED RELEASE ORAL at 09:39

## 2023-03-01 RX ADMIN — POTASSIUM CHLORIDE 20 MEQ: 14.9 INJECTION, SOLUTION INTRAVENOUS at 09:40

## 2023-03-01 RX ADMIN — LOSARTAN POTASSIUM 50 MG: 50 TABLET, FILM COATED ORAL at 09:47

## 2023-03-01 RX ADMIN — DILTIAZEM HYDROCHLORIDE 240 MG: 240 CAPSULE, COATED, EXTENDED RELEASE ORAL at 09:38

## 2023-03-01 RX ADMIN — LEVOTHYROXINE SODIUM 25 MCG: 25 TABLET ORAL at 09:38

## 2023-03-01 RX ADMIN — SERTRALINE HYDROCHLORIDE 50 MG: 50 TABLET ORAL at 09:38

## 2023-03-01 RX ADMIN — POTASSIUM CHLORIDE 20 MEQ: 14.9 INJECTION, SOLUTION INTRAVENOUS at 11:54

## 2023-03-01 NOTE — PLAN OF CARE
Problem: MOBILITY - ADULT  Goal: Maintain or return to baseline ADL function  Description: INTERVENTIONS:  -  Assess patient's ability to carry out ADLs; assess patient's baseline for ADL function and identify physical deficits which impact ability to perform ADLs (bathing, care of mouth/teeth, toileting, grooming, dressing, etc )  - Assess/evaluate cause of self-care deficits   - Assess range of motion  - Assess patient's mobility; develop plan if impaired  - Assess patient's need for assistive devices and provide as appropriate  - Encourage maximum independence but intervene and supervise when necessary  - Involve family in performance of ADLs  - Assess for home care needs following discharge   - Consider OT consult to assist with ADL evaluation and planning for discharge  - Provide patient education as appropriate  Outcome: Progressing  Goal: Maintains/Returns to pre admission functional level  Description: INTERVENTIONS:  - Perform BMAT or MOVE assessment daily    - Set and communicate daily mobility goal to care team and patient/family/caregiver  - Collaborate with rehabilitation services on mobility goals if consulted  - Perform Range of Motion  times a day  - Reposition patient every  hours    - Dangle patient  times a day  - Stand patient  times a day  - Ambulate patient  times a day  - Out of bed to chair  times a day   - Out of bed for me times a day  - Out of bed for toileting  - Record patient progress and toleration of activity level   Outcome: Progressing     Problem: Prexisting or High Potential for Compromised Skin Integrity  Goal: Skin integrity is maintained or improved  Description: INTERVENTIONS:  - Identify patients at risk for skin breakdown  - Assess and monitor skin integrity  - Assess and monitor nutrition and hydration status  - Monitor labs   - Assess for incontinence   - Turn and reposition patient  - Assist with mobility/ambulation  - Relieve pressure over bony prominences  - Avoid friction and shearing  - Provide appropriate hygiene as needed including keeping skin clean and dry  - Evaluate need for skin moisturizer/barrier cream  - Collaborate with interdisciplinary team   - Patient/family teaching  - Consider wound care consult   Outcome: Progressing     Problem: NEUROSENSORY - ADULT  Goal: Achieves stable or improved neurological status  Description: INTERVENTIONS  - Monitor and report changes in neurological status  - Monitor vital signs such as temperature, blood pressure, glucose, and any other labs ordered   - Initiate measures to prevent increased intracranial pressure  - Monitor for seizure activity and implement precautions if appropriate      Outcome: Progressing  Goal: Achieves maximal functionality and self care  Description: INTERVENTIONS  - Monitor swallowing and airway patency with patient fatigue and changes in neurological status  - Encourage and assist patient to increase activity and self care     - Encourage visually impaired, hearing impaired and aphasic patients to use assistive/communication devices  Outcome: Progressing     Problem: RESPIRATORY - ADULT  Goal: Achieves optimal ventilation and oxygenation  Description: INTERVENTIONS:  - Assess for changes in respiratory status  - Assess for changes in mentation and behavior  - Position to facilitate oxygenation and minimize respiratory effort  - Oxygen administered by appropriate delivery if ordered  - Initiate smoking cessation education as indicated  - Encourage broncho-pulmonary hygiene including cough, deep breathe, Incentive Spirometry  - Assess the need for suctioning and aspirate as needed  - Assess and instruct to report SOB or any respiratory difficulty  - Respiratory Therapy support as indicated  Outcome: Progressing     Problem: Nutrition/Hydration-ADULT  Goal: Nutrient/Hydration intake appropriate for improving, restoring or maintaining nutritional needs  Description: Monitor and assess patient's nutrition/hydration status for malnutrition  Collaborate with interdisciplinary team and initiate plan and interventions as ordered  Monitor patient's weight and dietary intake as ordered or per policy  Utilize nutrition screening tool and intervene as necessary  Determine patient's food preferences and provide high-protein, high-caloric foods as appropriate       INTERVENTIONS:  - Monitor oral intake, urinary output, labs, and treatment plans  - Assess nutrition and hydration status and recommend course of action  - Evaluate amount of meals eaten  - Assist patient with eating if necessary   - Allow adequate time for meals  - Recommend/ encourage appropriate diets, oral nutritional supplements, and vitamin/mineral supplements  - Order, calculate, and assess calorie counts as needed  - Recommend, monitor, and adjust tube feedings and TPN/PPN based on assessed needs  - Assess need for intravenous fluids  - Provide specific nutrition/hydration education as appropriate  - Include patient/family/caregiver in decisions related to nutrition  Outcome: Progressing

## 2023-03-01 NOTE — ASSESSMENT & PLAN NOTE
· On coumadin  Goal INR 2-3  · INR supra therapeutic  No active evidence of bleeding  · Hold coumadin  INR downtrending, restart coumadin when INR 2-3

## 2023-03-01 NOTE — CASE MANAGEMENT
Case Management Discharge Planning Note    Patient name Jey Montoya S /S -94 MRN [id number]  : 1940 Date 3/1/2023       Current Admission Date: 2023  Current Admission Diagnosis:Altered mental status   Patient Active Problem List    Diagnosis Date Noted   • Altered mental status 2023   • Abnormal CT of the chest 2023   • Supratherapeutic INR 2023   • 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[address] ) 2023   • Thyroid nodule 2023   • Severe protein-calorie malnutrition ([address] ) 2023   • Unspecified mood (affective) disorder ([address] ) 2023   • Failure to thrive in adult 2023   • Pulmonary nodule 02/15/2023   • Urinary tract infection without hematuria 02/15/2023   • Onychomycosis 02/15/2023   • Essential hypertension 02/10/2023   • Anxiety 02/10/2023   • Hypothyroidism 2022   • Peripheral edema 2021   • Low back pain without sciatica 2021   • COPD (chronic obstructive pulmonary disease) ([address] ) 2017   • Hypertension 2017   • Hypercoagulable state ([address] ) 2017   • 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   • Vitamin D deficiency 2016   • Osteoporosis 2015   • Antiphospholipid antibody syndrome ([facility] ) 2012   • Atrial fibrillation ([address] ) 2012   • Hereditary hemolytic anemia ([facility] [address] [facility] ) 2012   • Asthma 2012      LOS (days): 2  Geometric Mean LOS (GMLOS) (days): 2 70  Days to GMLOS:0 5     OBJECTIVE:  Risk of Unplanned Readmission Score: 21 58         Current admission status: Inpatient   Preferred Pharmacy:   [address] [place] [address] #[id number] [address] [facility] [address] [address]  Phone: [phone] Fax: [address], [address] [address]  Phone: [phone] Fax: [phone]    Primary Care Provider: Luis Felipe Schneider MD    Primary Insurance: MEDICARE  Secondary Insurance: [employer]    DISCHARGE DETAILS:                                                                                      IMM reviewed with patient's caregiver, patient's caregiver agrees with discharge determination    IMM Given (Date):: 03/01/23  IMM Given to[de-identified] Family  Family notified[de-identified] Anuradha Plascencia (son)

## 2023-03-01 NOTE — ASSESSMENT & PLAN NOTE
Suspect toxic metabolic encephalopathy from multiple behavioral health medications  Per family, pt was socially conversational when seen on Saturday 2/25/23  STR reported that pt was less alert starting afternoon of Sunday 2/26/23  No acute infection identified  Patient appears dehydrated  SNF notes worsening mentation following abilify  CTA head/neck unremarkable for acute pathology  Salicylates, acetaminophen, EtOH, ammonia, digoxin level, TSH negative or normal   ABG notable for respiratory acidosis without acidemia, compensated by metabolic alkalosis  Improving mental status since admission  · DC Abilify  Do not restart  Last dose 2/27/23      · Continue Remeron and Zoloft  · No indication for inpatient psych admission  · IV fluids discontinued  · PT/OT evals with likely return to SNF for subacute rehab  · Oral intake improved

## 2023-03-01 NOTE — QUICK NOTE
Toxic encephalopathy, possibly due to adverse effects of Abilify, as evidenced by acute altered mental status, stupor, treated with head ct, holding Abilify, monitoring mental status

## 2023-03-01 NOTE — OCCUPATIONAL THERAPY NOTE
Occupational Therapy Evaluation     Patient Name: Keith Lewis  RSEOF'X Date: 3/1/2023  Problem List  Principal Problem:    Altered mental status  Active Problems:    Antiphospholipid antibody syndrome (HCC)    Atrial fibrillation (HCC)    Hypothyroidism    Failure to thrive in adult    Abnormal CT of the chest    Supratherapeutic INR    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HCC)    Thyroid nodule    Past Medical History  Past Medical History:   Diagnosis Date    Anti-phospholipid syndrome (HCC)     Asthma     Blood type O+     Cardiac disease     Chronic pain     COPD (chronic obstructive pulmonary disease) (HCC)     Fracture of ankle     left    Hyperlipidemia     Hypertension     Hypokalemia 2/27/2023    Osteoporosis      Past Surgical History  Past Surgical History:   Procedure Laterality Date    APPENDECTOMY      BILATERAL OOPHORECTOMY Bilateral     ORIF TIBIAL SHAFT FRACTURE W/ PLATES AND SCREWS Right         03/01/23 1212   OT Last Visit   OT Visit Date 03/01/23  (Wednesday)   Note Type   Note type Evaluation   Pain Assessment   Pain Assessment Tool 0-10   Pain Score No Pain   Pain Location/Orientation   ("the only pain I have is the potassium that I to take")   Restrictions/Precautions   Weight Bearing Precautions Per Order No   Other Precautions Cognitive; Chair Alarm; Bed Alarm;Telemetry;Multiple lines; Fall Risk;O2;Hard of hearing   Home Living   Type of Home SNF  (admit from rehab)   Additional Comments Pt admit from rehab  At baseline lives alone   Prior Function   Level of [place] Needs assistance with [address] staff  (at baseline pt lives alone)   [place] [address] Help From Family  (supportive son, Courtney Hatfield present upon therapist arrival)   IADLs Family/Friend/Other provides transportation; Family/Friend/Other provides meals; Family/Friend/Other provides medication management   Falls in the last 6 months   (pt denies since rencet DC to rehab)   Vocational Retired   Comments Pt reports using cane at baseline for functional mobility  Use of RW at rehab   Lifestyle   Autonomy Pt completes ADLs Roxy at baseline prior to recent admission and rehab  Reciprocal Relationships Supportive family  Pt lives alone at baseline  Pt reports raising 4 children and has 8 great / grandchildren   Service to Others Pt reports retired    Intrinsic Gratification Pt reports enjoying knitting, crocehting and sewing  Pt added that she had a garden at home   General   Family/Caregiver Present Yes  (son, Nava Guess present upon therapist arrival)   Subjective   Subjective "The only pain I have is that postassium that I had to take"   ADL   Where Assessed Edge of bed  (vs OOB In chair)   Eating Assistance 6  Modified independent   Eating Deficit Setup   Grooming Assistance 4  Minimal Assistance   Grooming Deficit Setup;Supervision/safety; Increased time to complete;Brushing hair   UB Bathing Assistance 5  Supervision/Setup   UB Bathing Deficit Setup;Supervision/safety; Increased time to complete  (seated after set- up)   LB Bathing Assistance Unable to assess   UB Dressing Assistance 5  Supervision/Setup   UB Dressing Deficit Setup; Increased time to complete;Supervision/safety  (due to mutliple lines)   LB Dressing Assistance 3  Moderate Assistance   LB Dressing Deficit Thread RLE into pants; Thread LLE into pants;Pull up over hips; Requires assistive device for steadying;Steadying;Setup   Toileting Assistance  Unable to assess  (denied need to void)   Additional Comments on 2 L acute O2   Bed Mobility   Supine to Sit 4  Minimal assistance   Additional items Assist x 1; Increased time required;Verbal cues;HOB elevated; Bedrails  (to pt's L)   Sit to Supine Unable to assess   Additional Comments Pt seated OOB in chair post eval w/ needs met, call bell in reach and chair alarm acitvated   Transfers   Sit to Stand 4  Minimal assistance   Additional items Assist x 1; Increased time required;Verbal cues   Stand to Sit 4  Minimal assistance Additional items Assist x 1; Increased time required;Verbal cues   Additional Comments Pt performed sit <> stand 2 X   Functional Mobility   Functional Mobility 4  Minimal assistance   Additional Comments engaged in functional mobility few feet from EOB to chair w/ min HHA and 2nd trial using RW w/ min A (CG/ steadying)   Additional items Rolling walker;Hand hold assistance   Balance   Static Sitting Fair +   Dynamic Sitting Fair -   Static Standing Fair -   Ambulatory Poor +   Activity Tolerance   Activity Tolerance Patient limited by fatigue   Medical Staff Made Aware spoke to Adela BOYLE and PT, DEBBY   Nurse Made Aware per Jennie COTA appropriate to see pt   RUE Assessment   RUE Assessment WFL   RUE Strength   RUE Overall Strength Within Functional Limits - able to perform ADL tasks with strength  (observed during ADLs)   LUE Assessment   LUE Assessment WFL   LUE Strength   LUE Overall Strength Within Functional Limits - able to perform ADL tasks with strength  (observed during ADLs)   Hand Function   Gross Motor Coordination Functional   Fine Motor Coordination Impaired   Sensation   Light Touch   (responded to light touch)   Cognition   Overall Cognitive Status Impaired   Arousal/Participation Alert; Cooperative   Attention Attends with cues to redirect   Orientation Level Oriented to person;Oriented to place  (able to report month/ year but not date)   Memory Decreased recall of recent events   Following Commands Follows one step commands without difficulty   Comments Identified pt by full name and birthdate  Alert and oriented to person/ place/ time  Able to follow directions during ADLs w/ + time  Soft spoken but pleasant and cooperative   Assessment   Limitation Decreased ADL status; Decreased UE strength;Decreased cognition;Decreased endurance;Decreased high-level ADLs; Decreased self-care trans;Decreased fine motor control   Assessment Pt is an 80yo female [facility] from rehab w/ altered mental status, decreased responsiveness  Recently admitted for failure to thrive and DC to rehab on 2/24/2023  Significant PMH impacting her occupational performance includes COPD, HTN, a-fib, L ankle fx, R tibia ORIF  Pt w/ active OT orders and activity orders  Pt admit from rehab, and at baseline lives alone, I w/ ADLs using cane  Upon eval, pt alert and oriented to person, place, and generally to time (for month / year)  Pt required min A to complete grooming, S UB bathing, S w/ UBD, and mod A LBD  Pt required min A to complete bed mobility, sit <> stand and functional mobility  Pt completing ADLs below baseline level of I and would benefit from OT while in acute care to address deficits  Recommend post acute rehab when medically stable for discharge from acute care  Will continue to follow   Goals   Patient Goals Will continue to assess  Pt did not state   Plan   Treatment Interventions ADL retraining;Functional transfer training;UE strengthening/ROM; Endurance training;Cognitive reorientation;Patient/family training;Equipment evaluation/education; Compensatory technique education;Continued evaluation; Energy conservation; Activityengagement   Goal Expiration Date 03/08/23   OT Frequency 3-5x/wk   Recommendation   OT Discharge Recommendation Post acute rehabilitation services   AM-PAC Daily Activity Inpatient   Lower Body Dressing 3   Bathing 3   Toileting 3   Upper Body Dressing 3   Grooming 3   Eating 4   Daily Activity Raw Score 19   Daily Activity Standardized Score (Calc for Raw Score >=11) 40 22   AM-PAC Applied Cognition Inpatient   Following a Speech/Presentation 2   Understanding Ordinary Conversation 4   Taking Medications 2   Remembering Where Things Are Placed or Put Away 3   Remembering List of 4-5 Errands 2   Taking Care of Complicated Tasks 2   Applied Cognition Raw Score 15   Applied Cognition Standardized Score 33 54   Barthel Index   Feeding 5   Bathing 0   Grooming Score 0   Dressing Score 5   Bladder Score 5   Bowels Score 10   Toilet Use Score 5   Transfers (Bed/Chair) Score 10   Mobility (Level Surface) Score 0   Stairs Score 0   Barthel Index Score 40   Modified Winchester Scale   Modified Winchester Scale 4         The patient's raw score on the AM-PAC Daily Activity Inpatient Short Form is 19  A raw score of greater than or equal to 19 suggests the patient may benefit from discharge to home  Please refer to the recommendation of the Occupational Therapist for safe discharge planning  OT DC recommendation does not coincide w/ AMPA score   Recommend return to rehab when medically stable as pt completing ADL below baseline level of I      Pt goals to be met by 3/8/2023 to max I w/ ADLs and improve engagement to return home includes:    -Pt will demonstrate good attention and participation in ongoing eval of functional cognitive skills to assist in DC planning    -Pt will complete functional transfers to all surfaces w/ S using AD, DME as needed to max I w/ ADLs    -Pt will consistently engage in functional mobility using least restrictive device w/ S household distances    -Pt will complete bed mobility supine <> sit w/ mod I to max I w/ ADLs and improve engagement    -Pt will complete UBD w/ mod I to max I w/ ADLs    -Pt will complete LBD w/ S to max I and minimize burden of care    -Pt will demonstrate improved activity and sitting tolerance OOB in chair for all meals    Pt will consistently follow 2 step directions w/ good recall to improve engagement and max I     Safia Yoo OTR/L

## 2023-03-01 NOTE — ASSESSMENT & PLAN NOTE
· Likely multifactorial in the setting of toxic metabolic encephalopathy with recent changes to meds as well as element of depression  Improving    · Speech cleared for oral diet

## 2023-03-01 NOTE — PLAN OF CARE
Problem: OCCUPATIONAL THERAPY ADULT  Goal: Performs self-care activities at highest level of function for planned discharge setting  See evaluation for individualized goals  Description: Treatment Interventions: ADL retraining, Functional transfer training, UE strengthening/ROM, Endurance training, Cognitive reorientation, Patient/family training, Equipment evaluation/education, Compensatory technique education, Continued evaluation, Energy conservation, Activityengagement          See flowsheet documentation for full assessment, interventions and recommendations  Note: Limitation: Decreased ADL status, Decreased UE strength, Decreased cognition, Decreased endurance, Decreased high-level ADLs, Decreased self-care trans, Decreased fine motor control     Assessment: Pt is an 80yo female admitted [facility] on 2/27/2023 from rehab w/ altered mental status, decreased responsiveness  Recently admitted for failure to thrive and DC to rehab on 2/24/2023  Significant PMH impacting her occupational performance includes COPD, HTN, a-fib, L ankle fx, R tibia ORIF  Pt w/ active OT orders and activity orders  Pt admit from rehab, and at baseline lives alone, I w/ ADLs using cane  Upon eval, pt alert and oriented to person, place, and generally to time (for month / year)  Pt required min A to complete grooming, S UB bathing, S w/ UBD, and mod A LBD  Pt required min A to complete bed mobility, sit <> stand and functional mobility  Pt completing ADLs below baseline level of I and would benefit from OT while in acute care to address deficits  Recommend post acute rehab when medically stable for discharge from acute care   Will continue to follow     OT Discharge Recommendation: Post acute rehabilitation services

## 2023-03-01 NOTE — QUICK NOTE
Severe protein-calorie malnutrition, due to chronic illness, as evidenced by Dietary note stating, Malnutrition Characteristics: Muscle loss, Inadequate energy, Weight loss, bmi 19 73, treated with Dietary consult, oral supplements when diet is advanced         Findings: Dietary note 360 Statement: Severe protein calorie malnutrition r/t inadequate intake as evidenced by a 13% weight loss over 2 months, <75% energy intake compared to estimated energy needs > 1 month, and muscle wasting present to temples and clavicle; currently NPO recommend oral nutritional supplements once diet advances

## 2023-03-01 NOTE — ASSESSMENT & PLAN NOTE
CTA head and neck with and without contrast 2/27/2023:  "No acute intracranial abnormality  Moderate chronic microangiopathy  Negative CTA head and neck for large vessel occlusion, dissection, or high-grade stenosis  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 projecting laterally in between left posterior cerebral artery and left superior cerebellar  Recommend [facility] with [facility] of [address] [place] [address] Neuroscience at [phone]   Incidental thyroid nodule(s) for which nonemergent thyroid ultrasound is recommended "    · Outpatient f/u with pcp for non-emergent thyroid u/s

## 2023-03-01 NOTE — PROGRESS NOTES
Progress Note - Geriatric Medicine   Baylee Hidden 79 y o  female MRN: [id number]  Unit/Bed#: S -01 Encounter: [id number]      Assessment/Plan:  1  Altered Mental Status   Pt was altered and confused  Pt was disoriented and barely able to speak  Pt was not able to answer questions appropriately yesterday  However, today pt was much more oriented and able to converse appropriately  Son and patient are happy that Abilify was discontinued  Delirium precautions  -Patient is high risk of delirium due to mental status change  -Initiate delirium precautions  -maintain normal sleep/wake cycle  -minimize overnight interruptions, group overnight vitals/labs/nursing checks as possible  -dim lights, close blinds and turn off tv to minimize stimulation and encourage sleep environment in evenings  -ensure that pain is well controlled consider Tylenol 975mg Q8H scheduled if not already ordered   -monitor for fecal and urinary retention which may precipitate delirium  -encourage early mobilization and ambulation  -provide frequent reorientation and redirection  -encourage family and friends at the bedside to help help calm patient if anxious  -avoid medications which may precipitate or worsen delirium such as tramadol, benzodiazepine, anticholinergics, and benadryl  -encourage hydration and nutrition   -redirect unwanted behaviors as first line, avoid physical restraints, use chemical restraint only if all other attempts have been unsuccessful  2   Depression/anxiety  Patient's mood has become recently depressed and anxious lately due to her brother-in-law passing about a month ago  Patient was taking Remeron 7 5 mg at bedtime, Zoloft 50 mg daily, and Abilify 2 mg daily  Due to patient's increased altered mental status, Abilify has been discontinued  3   Failure to thrive  Patient was recently admitted from 2/21/2023 to 2/20/2023 for failure to thrive    Patient was discharged with the medications explained above for depression and anxiety  But now recommend to just continue Zoloft and Remeron  Recommend patient to continue to eat and drink  Patient was evaluated by psychiatry  4   Dysphagia  Patient complained of trouble swallowing on last admission and has been lethargic throughout this admission until today  Speech pathology evaluated patient and recommended a regular diet with thin liquids  Patient has been tolerating diet so far  5  Ambulatory dysfunction  Patient usually ambulates without any assistive devices but at [facility] walker was recommended  PT and OT were consulted  Follow the recommendations  Fall Precautions  - Assess patient frequently for physical needs, encourage use of assistant devices as needed and directed by PT/OT  -  Identify cognitive and physical deficits and behaviors that affect risk of falls  - consider moving patient closer to nursing station to monitor more closely for impulsive behavior which may increase risk of falls  -  [place] fall precautions   - Educate patient/family on patient safety including physical limitations and importance of using call bell for assistance   - Modify environment to reduce risk of injury including cap IV and disconnect from pole when not in use, ensuring adequate lighting in room and restroom, ensuring that path to restroom is clear and free of trip hazards  - PT/OT consulted to assist with strengthening/mobility and assist with discharge planning to appropriate level of care    6  Medication noncompliance  Her son Jake Keller reported finding many positive medications at her home including inhalers and digoxin  Son suspects she is not taking most of her medications  VNA services were recommended to assist with medication compliance  7   Hypokalemia  Potassium has been low throughout this admission  This morning her potassium was 2 7  Patient received potassium chloride 40 mEq oral and 20 mEq IV  Follow potassium level with a BMP tomorrow      8  Hypertension  Patient is currently taking hydrochlorothiazide 12 5 mg, diltiazem 240 mg, and losartan 50 mg  Patient's blood pressure has been high despite antihypertensives  Due to hypertension and hypokalemia, recommend discontinuing hydrochlorothiazide and initiating spironolactone  9   Hypothyroidism  Patient is currently taking levothyroxine 25 mcg daily  TSH has been in normal range during admission with most recent level at 1 139      10  Antiphospholipid syndrome  Patient takes warfarin  Patient is not taking warfarin during admission  Most recent INR was 4 00  Follow-up with PCP upon discharge to evaluate INR  11   Osteoporosis  Patient receives Prolia injection every 6 months  At a recent PCP visit a referral for pain management was placed  Follow-up with PCP upon discharge  12   COPD  Patient should continue with inhalers to manage intermittent shortness of breath  13   Polypharmacy  Patient is currently taking diltiazem 240 mg daily, hydrochlorothiazide 12 5 mg daily, levothyroxine 25 mcg daily, lidocaine 5% patch administer over 12 hours, losartan 50 mg daily, for diazepam 7 5 mg daily, sertraline 50 mg daily, fluticasone furoate-vilanterol inhalation 1 puff daily, and umeclidinium 62 5 mcg inhalation 1 puff daily  Recommendations:  - Due to hypertension and hypokalemia, recommend discontinuing hydrochlorothiazide and initiating spironolactone  Subjective:   Pt is more oriented since being off of Abilify  Feeling the best she has since this admission  Pt has been tolerating more by mouth  Son was in the room at the time of interview and exam  Pt wants to get back home  Pt did not recall having a BM  Stated she slept well  Review of Systems   Constitutional: Positive for appetite change  Negative for chills, fatigue and fever  HENT: Negative for sore throat and trouble swallowing  Respiratory: Negative for chest tightness and shortness of breath      Cardiovascular: Negative for chest pain and palpitations  Gastrointestinal: Negative for abdominal pain  Genitourinary: Negative for difficulty urinating, frequency and urgency  Musculoskeletal: Negative for arthralgias  Neurological: Negative for dizziness, light-headedness and headaches  Psychiatric/Behavioral: Negative for behavioral problems, confusion and sleep disturbance  The patient is not nervous/anxious  Objective:     Vitals: Blood pressure 152/59, pulse 67, temperature 98 3 °F (36 8 °C), temperature source Oral, resp  rate 16, height 5' (1 524 m), weight 45 8 kg (101 lb), SpO2 91 %, not currently breastfeeding  ,Body mass index is 19 73 kg/m²  Intake/Output Summary (Last 24 hours) at 3/1/2023 1616  Last data filed at 3/1/2023 1500  Gross per 24 hour   Intake --   Output 550 ml   Net -550 ml       Current Medications: Reviewed    Physical Exam:   Physical Exam  Constitutional:       Appearance: Normal appearance  She is ill-appearing  HENT:      Nose: Nose normal       Mouth/Throat:      Mouth: Mucous membranes are moist       Pharynx: Oropharynx is clear  Eyes:      Conjunctiva/sclera: Conjunctivae normal    Cardiovascular:      Rate and Rhythm: Normal rate and regular rhythm  Pulses: Normal pulses  Heart sounds: Normal heart sounds  No murmur heard  No friction rub  Pulmonary:      Effort: Pulmonary effort is normal  No respiratory distress  Breath sounds: Normal breath sounds  No wheezing or rhonchi  Abdominal:      General: Bowel sounds are normal  There is no distension  Palpations: Abdomen is soft  There is no mass  Tenderness: There is no abdominal tenderness  Musculoskeletal:      Right lower leg: No edema  Left lower leg: No edema  Skin:     General: Skin is warm  Findings: Bruising present  Neurological:      General: No focal deficit present  Mental Status: She is alert and oriented to person, place, and time     Psychiatric: Mood and Affect: Mood normal          Speech: Speech is delayed  Behavior: Behavior normal          Thought Content: Thought content normal          Judgment: Judgment normal           Invasive Devices       Peripheral Intravenous Line  Duration             Peripheral IV 02/27/23 Left Antecubital 2 days    Peripheral IV 02/27/23 Right;Ventral (anterior) Forearm 2 days                    Lab, Imaging and other studies: I have personally reviewed pertinent reports  Paola RANGELS transcribed the note and examined pt with Dr Rainelle Seip

## 2023-03-01 NOTE — PROGRESS NOTES
[facility]  Progress Note - Iftikhar Shells 1940, 79 y o  female MRN: [id number]  Unit/Bed#: S -01 Encounter: [id number]  Primary Care Provider: Sandra Lyons MD   Date and time admitted to hospital: 2/27/2023  3:02 AM    * Altered mental status  Assessment & Plan  Suspect toxic metabolic encephalopathy from multiple behavioral health medications  Per family, pt was socially conversational when seen on Saturday 2/25/23  STR reported that pt was less alert starting afternoon of Sunday 2/26/23  No acute infection identified  Patient appears dehydrated  SNF notes worsening mentation following abilify  CTA head/neck unremarkable for acute pathology  Salicylates, acetaminophen, EtOH, ammonia, digoxin level, TSH negative or normal   ABG notable for respiratory acidosis without acidemia, compensated by metabolic alkalosis  Improving mental status since admission  · DC Abilify  Do not restart  Last dose 2/27/23  · Continue Remeron and Zoloft  · No indication for inpatient psych admission  · IV fluids discontinued  · PT/OT evals with likely return to SNF for subacute rehab  · Oral intake improved          Thyroid nodule  Assessment & Plan  CTA head and neck with and without contrast 2/27/2023:  "No acute intracranial abnormality  Moderate chronic microangiopathy  Negative CTA head and neck for large vessel occlusion, dissection, or high-grade stenosis  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 projecting laterally in between left posterior cerebral artery and left superior cerebellar  Recommend [facility] with [facility] a [facility] of [address] [place] [address] for Neuroscience at [phone]   Incidental thyroid nodule(s) for which nonemergent thyroid ultrasound is recommended "    · Outpatient f/u with pcp for non-emergent thyroid u/s    Aneurysm of basilar artery [facility])  Assessment & Plan  Incidental finding on CTA head and neck/  CTA head and neck with and without contrast 2/27/2023:  "No acute intracranial abnormality  Moderate chronic microangiopathy  Negative CTA head and neck for large vessel occlusion, dissection, or high-grade stenosis  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 projecting laterally in between left posterior cerebral artery and left superior cerebellar  · Outpatient referral for Neurosurgery follow-up  Will provide on DC  Supratherapeutic INR  Assessment & Plan  · See Afib plan      Abnormal CT of the chest  Assessment & Plan  · PE Study with CT abdomen & pelvis with contrast 2/27/2023:  There is a new right basilar density with associated few branching nodular density in the right middle lobe and a 5 mm nodular density superior segment left lower lobe, indeterminate  These may be on inflammatory/infectious basis /pneumonia   Short interval follow-up chest CT at 3 months suggested to demonstrate resolution  Hypokalemia  Assessment & Plan  · K 2 7, replete  AM BMP  Anticipate to improve with improvement in oral intake  Failure to thrive in adult  Assessment & Plan  · Likely multifactorial in the setting of toxic metabolic encephalopathy with recent changes to meds as well as element of depression  Improving  · Speech cleared for oral diet    Hypothyroidism  Assessment & Plan  · TSH wnl  · Cont home levothyroxine    Atrial fibrillation (HCC)  Assessment & Plan  On diltiazem, digoxin, and warfarin at home  · See above    Antiphospholipid antibody syndrome (HCC)  Assessment & Plan  · On coumadin  Goal INR 2-3  · INR supra therapeutic  No active evidence of bleeding  · Hold coumadin  INR downtrending, restart coumadin when INR 2-3  VTE Pharmacologic Prophylaxis: VTE Score: 6 High Risk (Score >/= 5) - Pharmacological DVT Prophylaxis Ordered: Supratherapeutic on coumadin  Sequential Compression Devices Ordered      Patient Centered Rounds: I performed bedside rounds with nursing staff today   Discussions with Specialists or Other Care Team Provider: None    Education and Discussions with Family / Patient: Updated  (son) via phone  Solomon    Total Time Spent on Date of Encounter in care of patient: 35 minutes This time was spent on one or more of the following: performing physical exam; counseling and coordination of care; obtaining or reviewing history; documenting in the medical record; reviewing/ordering tests, medications or procedures; communicating with other healthcare professionals and discussing with patient's family/caregivers  Current Length of Stay: 2 day(s)  Current Patient Status: Inpatient   Certification Statement: The patient will continue to require additional inpatient hospital stay due to electrolyte disturbance  Discharge Plan: Anticipate discharge tomorrow to rehab facility  Code Status: Level 2 - DNAR: but accepts endotracheal intubation    Subjective:   No acute events overnight  Patient is alert and oriented x3  Still gets confused at times though much better with respect to mentation  Eating and drinking this morning  Afebrile  Objective:     Vitals:   Temp (24hrs), Av °F (36 7 °C), Min:97 7 °F (36 5 °C), Max:98 3 °F (36 8 °C)    Temp:  [97 7 °F (36 5 °C)-98 3 °F (36 8 °C)] 98 3 °F (36 8 °C)  HR:  [59-67] 67  Resp:  [16-19] 16  BP: (152-179)/(55-69) 152/59  SpO2:  [91 %-96 %] 91 %  Body mass index is 19 73 kg/m²  Input and Output Summary (last 24 hours): Intake/Output Summary (Last 24 hours) at 3/1/2023 1509  Last data filed at 3/1/2023 0542  Gross per 24 hour   Intake --   Output 400 ml   Net -400 ml       Physical Exam:   Physical Exam  Vitals and nursing note reviewed  Constitutional:       General: She is not in acute distress  Appearance: She is not ill-appearing or toxic-appearing  Comments: Thin-appearing   Cardiovascular:      Rate and Rhythm: Normal rate  Rhythm irregular     Pulmonary:      Effort: No respiratory distress  Breath sounds: No wheezing, rhonchi or rales  Abdominal:      General: There is no distension  Palpations: Abdomen is soft  Tenderness: There is no abdominal tenderness  Musculoskeletal:         General: No swelling  Skin:     General: Skin is warm and dry  Coloration: Skin is not jaundiced  Neurological:      Mental Status: Mental status is at baseline  Additional Data:     Labs:  Results from last 7 days   Lab Units 03/01/23  0539   WBC Thousand/uL 8 57   HEMOGLOBIN g/dL 11 8   HEMATOCRIT % 36 1   PLATELETS Thousands/uL 221   NEUTROS PCT % 88*   LYMPHS PCT % 8*   MONOS PCT % 3*   EOS PCT % 0     Results from last 7 days   Lab Units 03/01/23  0539 02/28/23  0516 02/27/23  0324   SODIUM mmol/L 140   < > 145   POTASSIUM mmol/L 2 7*   < > 3 3*   CHLORIDE mmol/L 93*   < > 97   CO2 mmol/L 44*   < > 37*   BUN mg/dL 20   < > 31*   CREATININE mg/dL 0 40*   < > 0 53*   ANION GAP mmol/L 3*   < > 11   CALCIUM mg/dL 8 3*   < > 9 1   ALBUMIN g/dL  --   --  3 3*   TOTAL BILIRUBIN mg/dL  --   --  0 93   ALK PHOS U/L  --   --  66   ALT U/L  --   --  14   AST U/L  --   --  23   GLUCOSE RANDOM mg/dL 96   < > 77    < > = values in this interval not displayed       Results from last 7 days   Lab Units 03/01/23  0539   INR  4 00*     Results from last 7 days   Lab Units 02/27/23  0346   POC GLUCOSE mg/dl 67         Results from last 7 days   Lab Units 02/27/23  0332 02/27/23  0324   LACTIC ACID mmol/L 0 6  --    PROCALCITONIN ng/ml  --  0 11       Lines/Drains:  Invasive Devices     Peripheral Intravenous Line  Duration           Peripheral IV 02/27/23 Left Antecubital 2 days    Peripheral IV 02/27/23 Right;Ventral (anterior) Forearm 2 days                  Telemetry:  Telemetry Orders (From admission, onward)             24 Hour Telemetry Monitoring  Continuous x 24 Hours (Telem)        References:    Telemetry Guidelines   Question:  Reason for 24 Hour Telemetry  Answer: Metabolic/Electrolyte Disturbance with High Probability of Dysrhythmia (K level <3 or >6, or KCL infusion >10mEq/hr)                 Indication for Continued Telemetry Use: Metabolic/electrolyte disturbance with high probability of dysrhythmia             Imaging: No pertinent imaging reviewed  Recent Cultures (last 7 days):   Results from last 7 days   Lab Units 02/28/23  1534 02/27/23  0332 02/27/23  0324   BLOOD CULTURE   --  No Growth at 48 hrs  No Growth at 48 hrs  URINE CULTURE  50,000-59,000 cfu/ml Enterococcus species*  --   --        Last 24 Hours Medication List:   Current Facility-Administered Medications   Medication Dose Route Frequency Provider Last Rate   • albuterol  2 5 mg Nebulization Q4H PRN Jaja Hill MD     • diltiazem  240 mg Oral Daily Jaja Hill MD     • Fluticasone Furoate-Vilanterol  1 puff Inhalation Daily Jaja Hill MD     • hydrochlorothiazide  12 5 mg Oral Daily Jaja Hill MD     • labetalol  10 mg Intravenous Q4H PRN Tawnya Ramos MD     • levothyroxine  25 mcg Oral Daily Jaja Hill MD     • lidocaine  1 patch Topical Daily Jaja Hill MD     • losartan  50 mg Oral Daily Tawnya Ramos MD     • mirtazapine  7 5 mg Oral HS Jaja Hill MD     • sertraline  50 mg Oral Daily Jaja Hill MD     • umeclidinium  1 puff Inhalation Daily Jaja Hill MD          Today, Patient Was Seen By: Tawnya Ramos MD    **Please Note: This note may have been constructed using a voice recognition system  **

## 2023-03-02 ENCOUNTER — APPOINTMENT (INPATIENT)
Dept: RADIOLOGY | Facility: HOSPITAL | Age: 83
End: 2023-03-02

## 2023-03-02 ENCOUNTER — APPOINTMENT (INPATIENT)
Dept: MRI IMAGING | Facility: HOSPITAL | Age: 83
End: 2023-03-02

## 2023-03-02 ENCOUNTER — APPOINTMENT (INPATIENT)
Dept: CT IMAGING | Facility: HOSPITAL | Age: 83
End: 2023-03-02

## 2023-03-02 LAB
AMMONIA PLAS-SCNC: 40 UMOL/L (ref 18–72)
ANION GAP SERPL CALCULATED.3IONS-SCNC: 2 MMOL/L (ref 4–13)
ARTERIAL PATENCY WRIST A: YES
BACTERIA UR CULT: ABNORMAL
BACTERIA UR CULT: ABNORMAL
BASE EXCESS BLDA CALC-SCNC: 15.5 MMOL/L
BASOPHILS # BLD AUTO: 0.02 THOUSANDS/ÂΜL (ref 0–0.1)
BASOPHILS NFR BLD AUTO: 0 % (ref 0–1)
BUN SERPL-MCNC: 14 MG/DL (ref 5–25)
CALCIUM SERPL-MCNC: 8.4 MG/DL (ref 8.4–10.2)
CHLORIDE SERPL-SCNC: 94 MMOL/L (ref 96–108)
CO2 SERPL-SCNC: 43 MMOL/L (ref 21–32)
CREAT SERPL-MCNC: 0.39 MG/DL (ref 0.6–1.3)
EOSINOPHIL # BLD AUTO: 0.03 THOUSAND/ÂΜL (ref 0–0.61)
EOSINOPHIL NFR BLD AUTO: 1 % (ref 0–6)
ERYTHROCYTE [DISTWIDTH] IN BLOOD BY AUTOMATED COUNT: 12.8 % (ref 11.6–15.1)
GFR SERPL CREATININE-BSD FRML MDRD: 96 ML/MIN/1.73SQ M
GLUCOSE SERPL-MCNC: 86 MG/DL (ref 65–140)
HCO3 BLDA-SCNC: 41.6 MMOL/L (ref 22–28)
HCT VFR BLD AUTO: 44.1 % (ref 34.8–46.1)
HGB BLD-MCNC: 14.5 G/DL (ref 11.5–15.4)
IMM GRANULOCYTES # BLD AUTO: 0.03 THOUSAND/UL (ref 0–0.2)
IMM GRANULOCYTES NFR BLD AUTO: 1 % (ref 0–2)
LYMPHOCYTES # BLD AUTO: 0.77 THOUSANDS/ÂΜL (ref 0.6–4.47)
LYMPHOCYTES NFR BLD AUTO: 15 % (ref 14–44)
MCH RBC QN AUTO: 33.1 PG (ref 26.8–34.3)
MCHC RBC AUTO-ENTMCNC: 32.9 G/DL (ref 31.4–37.4)
MCV RBC AUTO: 101 FL (ref 82–98)
MONOCYTES # BLD AUTO: 0.24 THOUSAND/ÂΜL (ref 0.17–1.22)
MONOCYTES NFR BLD AUTO: 5 % (ref 4–12)
NASAL CANNULA: 1
NEUTROPHILS # BLD AUTO: 4.12 THOUSANDS/ÂΜL (ref 1.85–7.62)
NEUTS SEG NFR BLD AUTO: 78 % (ref 43–75)
NRBC BLD AUTO-RTO: 0 /100 WBCS
O2 CT BLDA-SCNC: 16.4 ML/DL (ref 16–23)
OXYHGB MFR BLDA: 92.6 % (ref 94–97)
PCO2 BLDA: 57.3 MM HG (ref 36–44)
PH BLDA: 7.48 [PH] (ref 7.35–7.45)
PLATELET # BLD AUTO: 204 THOUSANDS/UL (ref 149–390)
PMV BLD AUTO: 9.2 FL (ref 8.9–12.7)
PO2 BLDA: 68.2 MM HG (ref 75–129)
POTASSIUM SERPL-SCNC: 3.1 MMOL/L (ref 3.5–5.3)
RBC # BLD AUTO: 4.38 MILLION/UL (ref 3.81–5.12)
SODIUM SERPL-SCNC: 139 MMOL/L (ref 135–147)
SPECIMEN SOURCE: ABNORMAL
WBC # BLD AUTO: 5.21 THOUSAND/UL (ref 4.31–10.16)

## 2023-03-02 RX ORDER — POTASSIUM CHLORIDE 14.9 MG/ML
20 INJECTION INTRAVENOUS
Status: DISCONTINUED | OUTPATIENT
Start: 2023-03-02 | End: 2023-03-02

## 2023-03-02 RX ORDER — SODIUM CHLORIDE, SODIUM GLUCONATE, SODIUM ACETATE, POTASSIUM CHLORIDE, MAGNESIUM CHLORIDE, SODIUM PHOSPHATE, DIBASIC, AND POTASSIUM PHOSPHATE .53; .5; .37; .037; .03; .012; .00082 G/100ML; G/100ML; G/100ML; G/100ML; G/100ML; G/100ML; G/100ML
50 INJECTION, SOLUTION INTRAVENOUS CONTINUOUS
Status: DISCONTINUED | OUTPATIENT
Start: 2023-03-02 | End: 2023-03-06

## 2023-03-02 RX ORDER — SPIRONOLACTONE 25 MG/1
25 TABLET ORAL DAILY
Status: DISCONTINUED | OUTPATIENT
Start: 2023-03-02 | End: 2023-03-14 | Stop reason: HOSPADM

## 2023-03-02 RX ORDER — POTASSIUM CHLORIDE 20 MEQ/1
40 TABLET, EXTENDED RELEASE ORAL ONCE
Status: COMPLETED | OUTPATIENT
Start: 2023-03-02 | End: 2023-03-02

## 2023-03-02 RX ORDER — ATORVASTATIN CALCIUM 40 MG/1
40 TABLET, FILM COATED ORAL EVERY EVENING
Status: DISCONTINUED | OUTPATIENT
Start: 2023-03-02 | End: 2023-03-06

## 2023-03-02 RX ORDER — ASPIRIN 81 MG/1
81 TABLET, CHEWABLE ORAL DAILY
Status: DISCONTINUED | OUTPATIENT
Start: 2023-03-02 | End: 2023-03-06

## 2023-03-02 RX ADMIN — SPIRONOLACTONE 25 MG: 25 TABLET ORAL at 09:00

## 2023-03-02 RX ADMIN — LIDOCAINE 5% 1 PATCH: 700 PATCH TOPICAL at 09:00

## 2023-03-02 RX ADMIN — CEFTRIAXONE 1000 MG: 1 INJECTION, POWDER, FOR SOLUTION INTRAMUSCULAR; INTRAVENOUS at 18:37

## 2023-03-02 RX ADMIN — DILTIAZEM HYDROCHLORIDE 240 MG: 240 CAPSULE, COATED, EXTENDED RELEASE ORAL at 08:59

## 2023-03-02 RX ADMIN — POTASSIUM CHLORIDE 40 MEQ: 1500 TABLET, EXTENDED RELEASE ORAL at 10:42

## 2023-03-02 RX ADMIN — SODIUM CHLORIDE, SODIUM GLUCONATE, SODIUM ACETATE, POTASSIUM CHLORIDE AND MAGNESIUM CHLORIDE 75 ML/HR: 526; 502; 368; 37; 30 INJECTION, SOLUTION INTRAVENOUS at 21:50

## 2023-03-02 RX ADMIN — FLUTICASONE FUROATE AND VILANTEROL TRIFENATATE 1 PUFF: 100; 25 POWDER RESPIRATORY (INHALATION) at 09:01

## 2023-03-02 RX ADMIN — LABETALOL HYDROCHLORIDE 10 MG: 5 INJECTION, SOLUTION INTRAVENOUS at 22:21

## 2023-03-02 RX ADMIN — UMECLIDINIUM 1 PUFF: 62.5 AEROSOL, POWDER ORAL at 09:01

## 2023-03-02 RX ADMIN — LEVOTHYROXINE SODIUM 25 MCG: 25 TABLET ORAL at 09:00

## 2023-03-02 RX ADMIN — LOSARTAN POTASSIUM 50 MG: 50 TABLET, FILM COATED ORAL at 09:00

## 2023-03-02 RX ADMIN — SERTRALINE HYDROCHLORIDE 50 MG: 50 TABLET ORAL at 08:59

## 2023-03-02 NOTE — CASE MANAGEMENT
Case Management Progress Note    Patient name Adán Bob  Location S /S -68 MRN [id number]  : 1940 Date 3/2/2023       LOS (days): 3  Geometric Mean LOS (GMLOS) (days): 2 70  Days to GMLOS:-0 6        OBJECTIVE:        Current admission status: Inpatient  Preferred Pharmacy:   [address] [place] [address] #[id number] [facility]  [address] [place], [place] [address]  Phone: [phone] Fax: Jake Good , [place] [address]  Phone: [phone] Fax: [phone]    Primary Care Provider: Hussein Zuniga MD    Primary Insurance: MEDICARE  Secondary Insurance: BLUE CROSS    PROGRESS NOTE:    CM received consult per Stroke Pathway protocol today  CM confirmed assessment previously completed  Current plan for STR at [facility] when stable for DC

## 2023-03-02 NOTE — ASSESSMENT & PLAN NOTE
Suspect toxic metabolic encephalopathy from multiple behavioral health medications  Per family, pt was socially conversational when seen on Saturday 2/25/23  STR reported that pt was less alert starting afternoon of Sunday 2/26/23  No acute infection identified  Patient appears dehydrated  SNF notes worsening mentation following abilify  CTA head/neck unremarkable for acute pathology  Salicylates, acetaminophen, EtOH, ammonia, digoxin level, TSH negative or normal   ABG notable for respiratory acidosis without acidemia, compensated by metabolic alkalosis  Initially thought to be d/t Abilify  A0x3 baseline and yesterday at 8 pm  Today Minimally responsive to touch, and conversation  D/D UTI vs Stroke (new slurred speech since Saturday per family, but INR suprathreapuetic) vs Aspiration PNA? · DC Abilify  Do not restart  Last dose 2/27/23  · Continue Remeron and Zoloft  · No indication for inpatient psych admission  · PT/OT evals with likely return to SNF for subacute rehab  · Stroke pathway - CT head neg, MRI pending, speech  · CXR oending -prior CT showed suspicious for PNA?   · Ammonia pending   · Rx UTI with Ceftriaxone   · Neuro reconsulted

## 2023-03-02 NOTE — PROGRESS NOTES
Progress Note - Geriatric Medicine   Rhys Bobbi 79 y o  female MRN: [id number]  Unit/Bed#: S -01 Encounter: [id number]      Assessment/Plan:  Altered mental status  Presents to the ER with altered mental status and lethargy  Mental status was improved yesterday per review of notes  Abilify was discontinued  Unable to assess orientation due to lethargy  Stat head CT unremarkable  Ammonia level normal  Chest x-ray pending  Neuro consult pending  ABG ordered  At risk for delirium due to cognitive impairment  Recommend delirium precautions  Maintain sleep-wake cycle, avoid nighttime interruptions  Provide adequate pain control  Avoid urinary retention and constipation  Provide frequent and early mobilization  Provide frequent redirection and reorientation as needed  Avoid medications that may worsen or precipitate delirium such as tramadol, benzodiazepines, anticholinergics, and Benadryl  Redirect unwanted behaviors as first-line therapy, avoid physical restraints as able to  Continue to monitor    Depression/anxiety  Recently became severely depressed and anxious after the loss of her brother-in-law  Was prescribed Ativan by PCP, per her son it made her a zombie and that was weaned off  Her PCP then put her on Zoloft 50 mg daily  Last hospitalization patient was started on Remeron 7 5 mg nightly and Abilify 2 mg daily  Was seen by psychiatry last hospitalization who agreed with starting Remeron and recommended Abilify 2 mg daily  Abilify was discontinued due to episode of altered mental status and lethargy  Would hold Remeron while lethargic  Management per psych    UTI  Urine culture showed 50-59,000 Enterococcus faecalis  Was started on Rocephin 1000 mg daily by primary team  Encourage adequate p o  hydration once more awake  Monitor for urinary retention  Management per primary    Failure to thrive  Recent admission for failure to thrive  At that time patient was started on Remeron 7 5 mg nightly and Abilify 2 mg daily  Was previously on Zoloft 50 mg daily recently prescribed by PCP  Started on nutritional supplements  Appetite has been improving, although today has been unable to eat  drink anything     Dysphagia  Complained of trouble swallowing last admission  Was on puréed diet with thin liquids this hospitalization  Is currently on regular diet with thin  Aspiration precautions    Ambulatory dysfunction  At a baseline ambulates with no assistive device  PT/OT following  Fall precautions  Out of bed as tolerated  Encourage early and frequent mobilization  Encourage adequate hydration and nutrition  Provide adequate pain management   Goal is to   Continue with PT/OT for continued strength and balance training     Subjective:   Upon examination patient was lying bed, resting  She was extremely lethargic, only responding to pain  Stat CT was ordered and completed  Chest x-ray ordered and pending  Chest x-ray ordered and pending neurology was reconsulted  She has been unable to eat or drink anything today  Per nursing this is how she has been on morning  Review of Systems   Unable to perform ROS: Mental status change         Objective:     Vitals: Blood pressure (!) 180/70, pulse 56, temperature 97 9 °F (36 6 °C), resp  rate 18, height 5' (1 524 m), weight 45 8 kg (101 lb), SpO2 100 %, not currently breastfeeding  ,Body mass index is 19 73 kg/m²  Intake/Output Summary (Last 24 hours) at 3/2/2023 1205  Last data filed at 3/2/2023 0004  Gross per 24 hour   Intake --   Output 750 ml   Net -750 ml       Current Medications: Reviewed    Physical Exam:   Physical Exam  Vitals reviewed  Constitutional:       General: She is not in acute distress  Appearance: She is well-developed  She is ill-appearing  Comments: Frail appearing   HENT:      Head: Normocephalic and atraumatic  Mouth/Throat:      Mouth: Mucous membranes are dry        Pharynx: No oropharyngeal exudate or posterior oropharyngeal erythema  Cardiovascular:      Rate and Rhythm: Normal rate and regular rhythm  Heart sounds: No murmur heard  Pulmonary:      Effort: Pulmonary effort is normal  No respiratory distress  Breath sounds: Normal breath sounds  Abdominal:      General: Abdomen is flat  There is no distension  Palpations: Abdomen is soft  Tenderness: There is no abdominal tenderness  Musculoskeletal:         General: No swelling  Right lower leg: No edema  Left lower leg: No edema  Skin:     General: Skin is warm and dry  Capillary Refill: Capillary refill takes less than 2 seconds  Coloration: Skin is pale  Findings: Bruising present  Neurological:      Mental Status: She is lethargic  GCS: GCS eye subscore is 1  GCS verbal subscore is 1  GCS motor subscore is 5  Cranial Nerves: No cranial nerve deficit  Motor: Weakness present  Gait: Gait abnormal       Comments: Unable to assess orientation due to lethargy          Invasive Devices     Peripheral Intravenous Line  Duration           Peripheral IV 03/02/23 Right Antecubital <1 day                Lab, Imaging and other studies: I have personally reviewed pertinent reports  Please note:  Voice-recognition software may have been used in the preparation of this document  Occasional wrong word or "sound-alike" substitutions may have occurred due to the inherent limitations of voice recognition software  Interpretation should be guided by context

## 2023-03-02 NOTE — PROGRESS NOTES
[facility]  Progress Note - Ej Whitlock 1940, 79 y o  female MRN: [id number]  Unit/Bed#: S -01 Encounter: [id number]  Primary Care Provider: Raiza Auguste MD   Date and time admitted to hospital: 2/27/2023  3:02 AM    * Altered mental status  Assessment & Plan  Suspect toxic metabolic encephalopathy from multiple behavioral health medications  Per family, pt was socially conversational when seen on Saturday 2/25/23  STR reported that pt was less alert starting afternoon of Sunday 2/26/23  No acute infection identified  Patient appears dehydrated  SNF notes worsening mentation following abilify  CTA head/neck unremarkable for acute pathology  Salicylates, acetaminophen, EtOH, ammonia, digoxin level, TSH negative or normal   ABG notable for respiratory acidosis without acidemia, compensated by metabolic alkalosis  Initially thought to be d/t Abilify  A0x3 baseline and yesterday at 8 pm  Today Minimally responsive to touch, and conversation  D/D UTI vs Stroke (new slurred speech since Saturday per family, but INR suprathreapuetic) vs Aspiration PNA? · DC Abilify  Do not restart  Last dose 2/27/23  · Continue Remeron and Zoloft  · No indication for inpatient psych admission  · PT/OT evals with likely return to SNF for subacute rehab  · Stroke pathway - CT head neg, MRI pending, speech  · CXR oending -prior CT showed suspicious for PNA? · Ammonia pending   · Rx UTI with Ceftriaxone   · Neuro reconsulted         Abnormal CT of the chest  Assessment & Plan  · PE Study with CT abdomen & pelvis with contrast 2/27/2023:  There is a new right basilar density with associated few branching nodular density in the right middle lobe and a 5 mm nodular density superior segment left lower lobe, indeterminate  These may be on inflammatory/infectious basis /pneumonia   Short interval follow-up chest CT at 3 months suggested to demonstrate resolution       Aneurysm of basilar artery [facility])  Assessment & Plan  Incidental finding on CTA head and neck/  CTA head and neck with and without contrast 2/27/2023:  "No acute intracranial abnormality  Moderate chronic microangiopathy  Negative CTA head and neck for large vessel occlusion, dissection, or high-grade stenosis  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 projecting laterally in between left posterior cerebral artery and left superior cerebellar- repeat CT stable when developed AMS  · Outpatient referral for Neurosurgery follow-up  Will provide on DC  Thyroid nodule  Assessment & Plan  CTA head and neck with and without contrast 2/27/2023:  "No acute intracranial abnormality  Moderate chronic microangiopathy  Negative CTA head and neck for large vessel occlusion, dissection, or high-grade stenosis  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 projecting laterally in between left posterior cerebral artery and left superior cerebellar  Recommend consultation with the Neurovascular [facility], a division of [facility] at [phone]  Incidental thyroid nodule(s) for which nonemergent thyroid ultrasound is recommended "    · Outpatient f/u with pcp for non-emergent thyroid u/s    Supratherapeutic INR  Assessment & Plan  · See Afib plan      Failure to thrive in adult  Assessment & Plan  · Likely multifactorial in the setting of toxic metabolic encephalopathy with recent changes to meds as well as element of depression  ·     Hypothyroidism  Assessment & Plan  · TSH wnl  · Cont home levothyroxine    Atrial fibrillation (HCC)  Assessment & Plan  On diltiazem, digoxin, and warfarin at home  · See above    Antiphospholipid antibody syndrome (HCC)  Assessment & Plan  · On coumadin  Goal INR 2-3  · INR supra therapeutic  No active evidence of bleeding  · Hold coumadin  INR downtrending, restart coumadin when INR 2-3      res    VTE Pharmacologic Prophylaxis: VTE Score: 6 High Risk (Score >/= 5) - Pharmacological DVT Prophylaxis Contraindicated  Sequential Compression Devices Ordered  Patient Centered Rounds: I performed bedside rounds with nursing staff today  Discussions with Specialists or Other Care Team Provider: None    Education and Discussions with Family / Patient: Updated  (son) at bedside  Current Length of Stay: 3 day(s)  Current Patient Status: Inpatient   Discharge Plan: Anticipate discharge in 48-72 hrs to rehab facility  Code Status: Level 2 - DNAR: but accepts endotracheal intubation    Subjective: Altered    Objective:     Vitals:   Temp (24hrs), Av 9 °F (36 6 °C), Min:97 8 °F (36 6 °C), Max:97 9 °F (36 6 °C)    Temp:  [97 8 °F (36 6 °C)-97 9 °F (36 6 °C)] 97 9 °F (36 6 °C)  HR:  [56-60] 56  Resp:  [18] 18  BP: (179-213)/(70-81) 180/70  SpO2:  [94 %-100 %] 100 %  Body mass index is 19 73 kg/m²  Input and Output Summary (last 24 hours): Intake/Output Summary (Last 24 hours) at 3/2/2023 1517  Last data filed at 3/2/2023 0004  Gross per 24 hour   Intake --   Output 600 ml   Net -600 ml       Physical Exam:   Physical Exam  Vitals and nursing note reviewed  Constitutional:       Appearance: She is well-developed  Comments: Groans to abd palpatin and at IV site where potassium was running   HENT:      Head: Normocephalic and atraumatic  Eyes:      Conjunctiva/sclera: Conjunctivae normal    Cardiovascular:      Rate and Rhythm: Normal rate and regular rhythm  Heart sounds: No murmur heard  Pulmonary:      Effort: Pulmonary effort is normal  No respiratory distress  Breath sounds: Rales present  Abdominal:      Palpations: Abdomen is soft  Tenderness: There is abdominal tenderness (lower abdomen?)  Musculoskeletal:         General: No swelling  Cervical back: Neck supple  Skin:     General: Skin is warm and dry  Capillary Refill: Capillary refill takes less than 2 seconds     Neurological:      Mental Status: She is disoriented  Additional Data:     Labs:  Results from last 7 days   Lab Units 03/02/23  1401   WBC Thousand/uL 5 21   HEMOGLOBIN g/dL 14 5   HEMATOCRIT % 44 1   PLATELETS Thousands/uL 204   NEUTROS PCT % 78*   LYMPHS PCT % 15   MONOS PCT % 5   EOS PCT % 1     Results from last 7 days   Lab Units 03/02/23  0509 02/28/23  0516 02/27/23  0324   SODIUM mmol/L 139   < > 145   POTASSIUM mmol/L 3 1*   < > 3 3*   CHLORIDE mmol/L 94*   < > 97   CO2 mmol/L 43*   < > 37*   BUN mg/dL 14   < > 31*   CREATININE mg/dL 0 39*   < > 0 53*   ANION GAP mmol/L 2*   < > 11   CALCIUM mg/dL 8 4   < > 9 1   ALBUMIN g/dL  --   --  3 3*   TOTAL BILIRUBIN mg/dL  --   --  0 93   ALK PHOS U/L  --   --  66   ALT U/L  --   --  14   AST U/L  --   --  23   GLUCOSE RANDOM mg/dL 86   < > 77    < > = values in this interval not displayed       Results from last 7 days   Lab Units 03/01/23  0539   INR  4 00*     Results from last 7 days   Lab Units 02/27/23  0346   POC GLUCOSE mg/dl 67         Results from last 7 days   Lab Units 02/27/23  0332 02/27/23  0324   LACTIC ACID mmol/L 0 6  --    PROCALCITONIN ng/ml  --  0 11       Lines/Drains:  Invasive Devices     Peripheral Intravenous Line  Duration           Peripheral IV 03/02/23 Right Antecubital <1 day                  Telemetry:  Telemetry Orders (From admission, onward)             48 Hour Telemetry Monitoring  Continuous x 48 hours        References:    Telemetry Guidelines   Question:  Reason for 48 Hour Telemetry  Answer:  Acute CVA (<24 hrs old, hemispheric strokes, selected brainstem strokes, cardiac arrhythmias)                 Telemetry Reviewed: has not yet been placed on pt  Indication for Continued Telemetry Use: Acute CVA             Imaging: Reviewed radiology reports from this admission including: all imaging this admission     Recent Cultures (last 7 days):   Results from last 7 days   Lab Units 02/28/23  1534 02/27/23  0332 02/27/23  0324   BLOOD CULTURE   --  No Growth at 67 hrs  No Growth at 72 hrs  URINE CULTURE  50,000-59,000 cfu/ml Enterococcus faecalis*  <10,000 cfu/ml  --   --        Last 24 Hours Medication List:   Current Facility-Administered Medications   Medication Dose Route Frequency Provider Last Rate   • albuterol  2 5 mg Nebulization Q4H PRN Sammy Alvarez MD     • aspirin  81 mg Oral Daily Darwin Urban MD     • atorvastatin  40 mg Oral QPM Darwin Urban MD     • cefTRIAXone  1,000 mg Intravenous Q24H Darwin Urban MD     • diltiazem  240 mg Oral Daily Sammy Alvarez MD     • Fluticasone Furoate-Vilanterol  1 puff Inhalation Daily Sammy Alvarez MD     • labetalol  10 mg Intravenous Q4H PRN Karine Schofield MD     • levothyroxine  25 mcg Oral Daily Sammy Alvarez MD     • lidocaine  1 patch Topical Daily Sammy Alvarez MD     • losartan  50 mg Oral Daily Karine Schofield MD     • mirtazapine  7 5 mg Oral HS Sammy Alvarez MD     • sertraline  50 mg Oral Daily Sammy Alvarez MD     • spironolactone  25 mg Oral Daily Darwin Urban MD     • umeclidinium  1 puff Inhalation Daily Sammy Alvarez MD          Today, Patient Was Seen By: Darwin Urban MD    **Please Note: This note may have been constructed using a voice recognition system  **

## 2023-03-02 NOTE — ASSESSMENT & PLAN NOTE
Incidental finding on CTA head and neck/  CTA head and neck with and without contrast 2/27/2023:  "No acute intracranial abnormality  Moderate chronic microangiopathy  Negative CTA head and neck for large vessel occlusion, dissection, or high-grade stenosis  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 projecting laterally in between left posterior cerebral artery and left superior cerebellar- repeat CT stable when developed AMS  · Outpatient referral for Neurosurgery follow-up  Will provide on DC

## 2023-03-02 NOTE — ASSESSMENT & PLAN NOTE
CTA head and neck with and without contrast 2/27/2023:  "No acute intracranial abnormality  Moderate chronic microangiopathy  Negative CTA head and neck for large vessel occlusion, dissection, or high-grade stenosis  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 projecting laterally in between left posterior cerebral artery and left superior cerebellar  Recommend consultation with the [facility]vascular [facility], a division of [facility] at [phone]   Incidental thyroid nodule(s) for which nonemergent thyroid ultrasound is recommended "    · Outpatient f/u with pcp for non-emergent thyroid u/s

## 2023-03-02 NOTE — ASSESSMENT & PLAN NOTE
· Likely multifactorial in the setting of toxic metabolic encephalopathy with recent changes to meds as well as element of depression     ·

## 2023-03-03 ENCOUNTER — APPOINTMENT (INPATIENT)
Dept: RADIOLOGY | Facility: HOSPITAL | Age: 83
End: 2023-03-03

## 2023-03-03 ENCOUNTER — APPOINTMENT (INPATIENT)
Dept: NEUROLOGY | Facility: HOSPITAL | Age: 83
End: 2023-03-03

## 2023-03-03 PROBLEM — E87.6 HYPOKALEMIA: Status: RESOLVED | Noted: 2023-02-27 | Resolved: 2023-03-03

## 2023-03-03 PROBLEM — R06.02 SOB (SHORTNESS OF BREATH): Status: ACTIVE | Noted: 2023-03-03

## 2023-03-03 LAB
ALBUMIN SERPL BCP-MCNC: 2.8 G/DL (ref 3.5–5)
ALP SERPL-CCNC: 59 U/L (ref 34–104)
ALT SERPL W P-5'-P-CCNC: 14 U/L (ref 7–52)
ANION GAP SERPL CALCULATED.3IONS-SCNC: 6 MMOL/L (ref 4–13)
AST SERPL W P-5'-P-CCNC: 19 U/L (ref 13–39)
BASOPHILS # BLD AUTO: 0.01 THOUSANDS/ÂΜL (ref 0–0.1)
BASOPHILS NFR BLD AUTO: 0 % (ref 0–1)
BILIRUB SERPL-MCNC: 0.67 MG/DL (ref 0.2–1)
BUN SERPL-MCNC: 13 MG/DL (ref 5–25)
CALCIUM ALBUM COR SERPL-MCNC: 9.3 MG/DL (ref 8.3–10.1)
CALCIUM SERPL-MCNC: 8.3 MG/DL (ref 8.4–10.2)
CHLORIDE SERPL-SCNC: 97 MMOL/L (ref 96–108)
CHOLEST SERPL-MCNC: 139 MG/DL
CO2 SERPL-SCNC: 38 MMOL/L (ref 21–32)
CREAT SERPL-MCNC: 0.34 MG/DL (ref 0.6–1.3)
EOSINOPHIL # BLD AUTO: 0.05 THOUSAND/ÂΜL (ref 0–0.61)
EOSINOPHIL NFR BLD AUTO: 1 % (ref 0–6)
ERYTHROCYTE [DISTWIDTH] IN BLOOD BY AUTOMATED COUNT: 12.3 % (ref 11.6–15.1)
EST. AVERAGE GLUCOSE BLD GHB EST-MCNC: 82 MG/DL
GFR SERPL CREATININE-BSD FRML MDRD: 101 ML/MIN/1.73SQ M
GLUCOSE SERPL-MCNC: 85 MG/DL (ref 65–140)
HBA1C MFR BLD: 4.5 %
HCT VFR BLD AUTO: 33.7 % (ref 34.8–46.1)
HDLC SERPL-MCNC: 37 MG/DL
HGB BLD-MCNC: 11.1 G/DL (ref 11.5–15.4)
IMM GRANULOCYTES # BLD AUTO: 0.03 THOUSAND/UL (ref 0–0.2)
IMM GRANULOCYTES NFR BLD AUTO: 1 % (ref 0–2)
LDLC SERPL CALC-MCNC: 87 MG/DL (ref 0–100)
LYMPHOCYTES # BLD AUTO: 0.78 THOUSANDS/ÂΜL (ref 0.6–4.47)
LYMPHOCYTES NFR BLD AUTO: 12 % (ref 14–44)
MAGNESIUM SERPL-MCNC: 1.9 MG/DL (ref 1.9–2.7)
MCH RBC QN AUTO: 33.1 PG (ref 26.8–34.3)
MCHC RBC AUTO-ENTMCNC: 32.9 G/DL (ref 31.4–37.4)
MCV RBC AUTO: 101 FL (ref 82–98)
MONOCYTES # BLD AUTO: 0.37 THOUSAND/ÂΜL (ref 0.17–1.22)
MONOCYTES NFR BLD AUTO: 6 % (ref 4–12)
NEUTROPHILS # BLD AUTO: 5.22 THOUSANDS/ÂΜL (ref 1.85–7.62)
NEUTS SEG NFR BLD AUTO: 80 % (ref 43–75)
NRBC BLD AUTO-RTO: 0 /100 WBCS
PLATELET # BLD AUTO: 203 THOUSANDS/UL (ref 149–390)
PMV BLD AUTO: 8.8 FL (ref 8.9–12.7)
POTASSIUM SERPL-SCNC: 3.9 MMOL/L (ref 3.5–5.3)
PROCALCITONIN SERPL-MCNC: 0.12 NG/ML
PROT SERPL-MCNC: 5.2 G/DL (ref 6.4–8.4)
RBC # BLD AUTO: 3.35 MILLION/UL (ref 3.81–5.12)
SODIUM SERPL-SCNC: 141 MMOL/L (ref 135–147)
TRIGL SERPL-MCNC: 76 MG/DL
WBC # BLD AUTO: 6.46 THOUSAND/UL (ref 4.31–10.16)

## 2023-03-03 RX ORDER — HYDRALAZINE HYDROCHLORIDE 20 MG/ML
10 INJECTION INTRAMUSCULAR; INTRAVENOUS EVERY 6 HOURS PRN
Status: DISCONTINUED | OUTPATIENT
Start: 2023-03-03 | End: 2023-03-10

## 2023-03-03 RX ORDER — FUROSEMIDE 10 MG/ML
20 INJECTION INTRAMUSCULAR; INTRAVENOUS ONCE
Status: COMPLETED | OUTPATIENT
Start: 2023-03-03 | End: 2023-03-03

## 2023-03-03 RX ADMIN — SODIUM CHLORIDE, SODIUM GLUCONATE, SODIUM ACETATE, POTASSIUM CHLORIDE AND MAGNESIUM CHLORIDE 75 ML/HR: 526; 502; 368; 37; 30 INJECTION, SOLUTION INTRAVENOUS at 12:00

## 2023-03-03 RX ADMIN — FUROSEMIDE 20 MG: 10 INJECTION, SOLUTION INTRAMUSCULAR; INTRAVENOUS at 15:16

## 2023-03-03 RX ADMIN — HYDRALAZINE HYDROCHLORIDE 10 MG: 20 INJECTION INTRAMUSCULAR; INTRAVENOUS at 13:00

## 2023-03-03 NOTE — PROGRESS NOTES
Progress Note - Geriatric Medicine   Maria C Villasenor 79 y o  female MRN: [id number]  Unit/Bed#: S -01 Encounter: [id number]      Assessment/Plan:  Altered mental status  Presents to the ER with altered mental status and lethargy  Mental status was improved yesterday per review of notes  Abilify was discontinued  Unable to assess orientation due to lethargy, although patient was able to follow commands and open her eyes today  Stat head CT yesterday unremarkable  Ammonia and procalcitonin level normal  Chest x-ray showed subsegmental atelectasis in the right lung base  MRI of the brain from today showed no evidence of acute infarct, intracranial large or mass  Questioning if some of this is related to behaviors from her depression? Neuro consult pending, consider EEG?   At risk for delirium due to cognitive impairment  Recommend delirium precautions  Maintain sleep-wake cycle, avoid nighttime interruptions  Provide adequate pain control  Avoid urinary retention and constipation  Provide frequent and early mobilization  Provide frequent redirection and reorientation as needed  Avoid medications that may worsen or precipitate delirium such as tramadol, benzodiazepines, anticholinergics, and Benadryl  Redirect unwanted behaviors as first-line therapy, avoid physical restraints as able to  Continue to monitor    Depression/anxiety  Recently became severely depressed and anxious after the loss of her brother-in-law  Was prescribed Ativan by PCP, per her son it made her a zombie and that was weaned off  Her PCP then put her on Zoloft 50 mg daily  Last hospitalization patient was started on Remeron 7 5 mg nightly and Abilify 2 mg daily  Was seen by psychiatry last hospitalization who agreed with starting Remeron and recommended Abilify 2 mg daily  Abilify was discontinued due to episode of altered mental status and lethargy  Would hold Remeron while lethargic  Possibility that mental status could be secondary to her severe depression? Management per psych    UTI  Urine culture showed 50-59,000 Enterococcus faecalis  Was started on Rocephin 1000 mg daily by primary team  Encourage adequate p o  hydration once more awake, although patient is currently n p o  Patient is receiving IV fluids  Monitor for urinary retention, she has a pure wick in which appears to have adequate amount of urine  Management per primary    Failure to thrive  Recent admission for failure to thrive  At that time patient was started on Remeron 7 5 mg nightly and Abilify 2 mg daily  Was previously on Zoloft 50 mg daily recently prescribed by PCP  Started on nutritional supplements, would continue once able to eat and drink  Patient currently n p o  due to lethargy  Receiving IV fluids    Hypertension  With elevated blood pressure since admission  Blood pressure this morning 196/68  Blood pressures have trended from 152/59 to 213/81  Patient was unable to take any oral medications yesterday and did not receive any oral medications today  As needed hydralazine has been ordered at this morning    Dysphagia  Complained of trouble swallowing last admission  Was on puréed diet with thin liquids this hospitalization  Is currently NPO due to lethargy  Recommend reevaluation by speech prior to restarting diet    Ambulatory dysfunction  At a baseline ambulates with no assistive device  PT/OT following  Fall precautions  Out of bed as tolerated  Encourage early and frequent mobilization  Encourage adequate hydration and nutrition  Provide adequate pain management   Goal is to   Continue with PT/OT for continued strength and balance training     Subjective:   Patient is being seen for a geriatrics follow-up  Patient's son were bedside during examination  Upon examination patient was lying in bed, resting  She still remains lethargic on exam, did not answer verbally at all  She was able to open her eyes and follow commands for me today  She is currently NPO    Does not appear to be in any pain  Per nursing no acute concerns or issues at this time  Review of Systems   Unable to perform ROS: Mental status change         Objective:     Vitals: Blood pressure (!) 196/68, pulse 61, temperature 97 6 °F (36 4 °C), resp  rate 18, height 5' (1 524 m), weight 45 8 kg (101 lb), SpO2 95 %, not currently breastfeeding  ,Body mass index is 19 73 kg/m²  No intake or output data in the 24 hours ending 03/03/23 1130    Current Medications: Reviewed    Physical Exam:   Physical Exam  Vitals reviewed  Constitutional:       General: She is not in acute distress  Appearance: She is well-developed  She is ill-appearing  Comments: Frail appearing   HENT:      Head: Normocephalic and atraumatic  Mouth/Throat:      Mouth: Mucous membranes are dry  Pharynx: No oropharyngeal exudate or posterior oropharyngeal erythema  Comments: Dry cracked lips  Cardiovascular:      Rate and Rhythm: Normal rate and regular rhythm  Heart sounds: No murmur heard  Pulmonary:      Effort: Pulmonary effort is normal  No respiratory distress  Breath sounds: Normal breath sounds  Abdominal:      General: Abdomen is flat  There is no distension  Palpations: Abdomen is soft  Tenderness: There is no abdominal tenderness  Musculoskeletal:         General: No swelling  Right lower leg: No edema  Left lower leg: No edema  Skin:     General: Skin is warm and dry  Capillary Refill: Capillary refill takes less than 2 seconds  Coloration: Skin is pale  Findings: Bruising present  Neurological:      Mental Status: She is easily aroused  She is lethargic  GCS: GCS eye subscore is 3  GCS verbal subscore is 1  GCS motor subscore is 6  Cranial Nerves: No cranial nerve deficit  Motor: Weakness present        Gait: Gait abnormal       Comments: Unable to assess orientation due to lethargy, was able to follow commands          Invasive Devices Peripheral Intravenous Line  Duration           Peripheral IV 03/02/23 Right Forearm <1 day                Lab, Imaging and other studies: I have personally reviewed pertinent reports  Please note:  Voice-recognition software may have been used in the preparation of this document  Occasional wrong word or "sound-alike" substitutions may have occurred due to the inherent limitations of voice recognition software  Interpretation should be guided by context

## 2023-03-03 NOTE — ASSESSMENT & PLAN NOTE
CTA head and neck with and without contrast 2/27/2023:  "No acute intracranial abnormality  Moderate chronic microangiopathy  Negative CTA head and neck for large vessel occlusion, dissection, or high-grade stenosis  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 projecting laterally in between left posterior cerebral artery and left superior cerebellar  Recommend consultation with the [facility]vascular Center, a division of [address] [place] [address] Neuroscience at [phone]   Incidental thyroid nodule(s) for which nonemergent thyroid ultrasound is recommended "    · Outpatient f/u with pcp for non-emergent thyroid u/s

## 2023-03-03 NOTE — PLAN OF CARE
Problem: MOBILITY - ADULT  Goal: Maintain or return to baseline ADL function  Description: INTERVENTIONS:  -  Assess patient's ability to carry out ADLs; assess patient's baseline for ADL function and identify physical deficits which impact ability to perform ADLs (bathing, care of mouth/teeth, toileting, grooming, dressing, etc )  - Assess/evaluate cause of self-care deficits   - Assess range of motion  - Assess patient's mobility; develop plan if impaired  - Assess patient's need for assistive devices and provide as appropriate  - Encourage maximum independence but intervene and supervise when necessary  - Involve family in performance of ADLs  - Assess for home care needs following discharge   - Consider OT consult to assist with ADL evaluation and planning for discharge  - Provide patient education as appropriate  Outcome: Progressing  Goal: Maintains/Returns to pre admission functional level  Description: INTERVENTIONS:  - Perform BMAT or MOVE assessment daily    - Set and communicate daily mobility goal to care team and patient/family/caregiver  - Collaborate with rehabilitation services on mobility goals if consulted  - Perform Range of Motion 2 times a day  - Reposition patient every 2 hours    - Dangle patient 2 times a day  - Stand patient 2 times a day  - Ambulate patient 3 times a day  - Out of bed to chair 3 times a day   - Out of bed for meals 3 times a day  - Out of bed for toileting  - Record patient progress and toleration of activity level   Outcome: Progressing     Problem: Prexisting or High Potential for Compromised Skin Integrity  Goal: Skin integrity is maintained or improved  Description: INTERVENTIONS:  - Identify patients at risk for skin breakdown  - Assess and monitor skin integrity  - Assess and monitor nutrition and hydration status  - Monitor labs   - Assess for incontinence   - Turn and reposition patient  - Assist with mobility/ambulation  - Relieve pressure over bony prominences  - Avoid friction and shearing  - Provide appropriate hygiene as needed including keeping skin clean and dry  - Evaluate need for skin moisturizer/barrier cream  - Collaborate with interdisciplinary team   - Patient/family teaching  - Consider wound care consult   Outcome: Progressing     Problem: NEUROSENSORY - ADULT  Goal: Achieves stable or improved neurological status  Description: INTERVENTIONS  - Monitor and report changes in neurological status  - Monitor vital signs such as temperature, blood pressure, glucose, and any other labs ordered   - Initiate measures to prevent increased intracranial pressure  - Monitor for seizure activity and implement precautions if appropriate      Outcome: Progressing  Goal: Achieves maximal functionality and self care  Description: INTERVENTIONS  - Monitor swallowing and airway patency with patient fatigue and changes in neurological status  - Encourage and assist patient to increase activity and self care     - Encourage visually impaired, hearing impaired and aphasic patients to use assistive/communication devices  Outcome: Progressing     Problem: RESPIRATORY - ADULT  Goal: Achieves optimal ventilation and oxygenation  Description: INTERVENTIONS:  - Assess for changes in respiratory status  - Assess for changes in mentation and behavior  - Position to facilitate oxygenation and minimize respiratory effort  - Oxygen administered by appropriate delivery if ordered  - Initiate smoking cessation education as indicated  - Encourage broncho-pulmonary hygiene including cough, deep breathe, Incentive Spirometry  - Assess the need for suctioning and aspirate as needed  - Assess and instruct to report SOB or any respiratory difficulty  - Respiratory Therapy support as indicated  Outcome: Progressing     Problem: Nutrition/Hydration-ADULT  Goal: Nutrient/Hydration intake appropriate for improving, restoring or maintaining nutritional needs  Description: Monitor and assess patient's nutrition/hydration status for malnutrition  Collaborate with interdisciplinary team and initiate plan and interventions as ordered  Monitor patient's weight and dietary intake as ordered or per policy  Utilize nutrition screening tool and intervene as necessary  Determine patient's food preferences and provide high-protein, high-caloric foods as appropriate       INTERVENTIONS:  - Monitor oral intake, urinary output, labs, and treatment plans  - Assess nutrition and hydration status and recommend course of action  - Evaluate amount of meals eaten  - Assist patient with eating if necessary   - Allow adequate time for meals  - Recommend/ encourage appropriate diets, oral nutritional supplements, and vitamin/mineral supplements  - Order, calculate, and assess calorie counts as needed  - Recommend, monitor, and adjust tube feedings and TPN/PPN based on assessed needs  - Assess need for intravenous fluids  - Provide specific nutrition/hydration education as appropriate  - Include patient/family/caregiver in decisions related to nutrition  Outcome: Progressing

## 2023-03-03 NOTE — CONSULTS
NEUROLOGY RESIDENCY CONSULT NOTE     Name: Ricardo Park   Age & Sex: 79 y o  female   MRN: [id number]  Unit/Bed#: S -01   Encounter: [id number]  Length of Stay: 4    ASSESSMENT & PLAN     * Altered mental status  Assessment & Plan  Patient is an 79year old female with a past medical history significant for antiphospholipid syndrome, COPD, HLD, HTN, A fib on warfarin, and hypothyroidism who presents with altered mental status starting on 02/27/23 when the patient was initially admitted to the hospital     Patient recently admitted and discharged on 2/24/23 when she started Abilify 2mg, Remeron 7 5mg and Losartan per SLIM, patient was discharged to [facility] where she initially improved but then her sons received a call stating she decreased alertness at which time she was brought to the hospital and evaluated by psychiatry and geriatrics and Abilify was thought to be a potential offending agent so it was discontinued and the patient continued to improve until 03/02/23 at which time the primary team noticed an acute change in mental status  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  MRI brain on 03/02/23 revealed no acute intracranial abnormalities but did note a punctate chronic left cerebellar infarct  On exam, patient is still altered but responsive to painful stimuli and hemodynamically stable at this time  Discussed recent death of loved one with sons today, and further workup for seizure vs  Behavioral health etiology  Plan:  EEG  Neuro checks  Continue psychiatric regimen per primary team/psychiatry  PT/OT/Speech         Recommendations for outpatient neurological follow up have yet to be determined       Pending for discharge: TBD    SUBJECTIVE     Reason for Consult / Principal Problem: Altered Mental Status  Hx and PE limited by: Patient stuporous on exam    HPI: Ricardo Park is a 79 y o  right handed female with a past medical history significant for antiphospholipid syndrome, COPD, HLD, HTN, A fib on warfarin, and hypothyroidism who presents with altered mental status starting on 02/27/23 when the patient was initially admitted to the hospital  Per chart review, prior to admission the patient was recently hospitalized on 2/22/23-2/24/23 for failure to thrive at which time she was seen by multiple specialities including psychiatry and geriatrics at which time she was started on Abilify 2mg, Remeron 7 5mg and losartan per SLIM  The patient was accompanied by her sons in the ED who reported initial improvement in the patients interaction and po intake/appetite after previous discharge, states they received a call from staff at [facility] where she was discharged to stating that the patient had suddenly become less alert  During her hospitalization, the patient's Abilify was discontinued due to possible medication side effects, and she was found to have supratherapeutic INR and was found to be alert and oriented again on the morning of 2/28  The patient was seen by Geriatrics and Psychiatry on 2/28 at which time she was awake and alert and doing better  Patient was noted to be obtunded later by Speech therapy, and then lethargic but arousable later by primary team, and the patient continued to improve into 03/01 until 03/02/23 at which time the primary team noted an acute change in mental status and the patient was evaluated for infectious and metabolic causes, and MRI brain and Neurology consult were ordered  Patient CTA head and neck on 2/27/23 significant for 0 2cm aneurysm in the distal basilar artery, MRI brain wo contrast on 03/02/23 was not noted to be significant for any acute infarct, intracranial hemorrhage or mass but did note a punctate chronic infarct in the L cerebellar hemisphere       On this note writer's initial evaluation the patient was stuporous and unattended by family to assist with history, however patient was able to squeeze this note writer's fingers when asked, and did withdraw from pain  The patient was again evaluated with attending physician at which time the patient's two sons were present  The patient was lethargic but arousable to painful stimuli  She was able to follow single commands, but did not provide much further history, and the patient's sons confirm that she has been struggling with anxiety and depression for a long time, but that a week ago they felt as though she could have "given up," when they heard her stating that she would not be here for much longer  They also state she has been very confused over the past few weeks, claiming that her toilet does not work, that she will be arrested because she has not paid her taxes, and trying to get her affairs in order because she is the last of her family line  They endorse that this is a significant deviation from her baseline where she was living at home and able to care for herself, noting her hobbies to include drinking coffee and smoking cigarettes, both of which she has been apathetic toward in the past few weeks  They were informed that the neuro team would like to obtain eeg for further workup to which they were amenable  Inpatient consult to Neurology  Consult performed by: Ivonne Gregorio DO  Consult ordered by:  Mary Lopez MD          Historical Information   Past Medical History:   Diagnosis Date   • Anti-phospholipid syndrome ([facility] [address] )    • Asthma    • Blood type O+    • Cardiac disease    • Chronic pain    • COPD (chronic obstructive pulmonary disease) ([facility])    • Fracture of ankle     left   • Hyperlipidemia    • Hypertension    • Hypokalemia 2/27/2023   • Osteoporosis      Past Surgical History:   Procedure Laterality Date   • APPENDECTOMY     • BILATERAL OOPHORECTOMY Bilateral    • ORIF TIBIAL SHAFT FRACTURE W/ PLATES AND SCREWS Right      Social History   Social History     Substance and Sexual Activity   Alcohol Use No     Social History     Substance and Sexual Activity   Drug Use No E-Cigarette/Vaping   • E-Cigarette Use Never User      E-Cigarette/Vaping Substances   • Nicotine No    • THC No      Social History     Tobacco Use   Smoking Status Some Days   • Types: Cigarettes   Smokeless Tobacco Never   Tobacco Comments    Never smoker per [employer]     Family History:   Family History   Problem Relation Age of Onset   • Hypertension Mother    • Skin cancer Mother    • Hypertension Father      Meds/Allergies   all current active meds have been reviewed, current meds:   Current Facility-Administered Medications   Medication Dose Route Frequency   • albuterol inhalation solution 2 5 mg  2 5 mg Nebulization Q4H PRN   • aspirin chewable tablet 81 mg  81 mg Oral Daily   • atorvastatin (LIPITOR) tablet 40 mg  40 mg Oral QPM   • diltiazem (CARDIZEM CD) 24 hr capsule 240 mg  240 mg Oral Daily   • Fluticasone Furoate-Vilanterol 100-25 mcg/actuation 1 puff  1 puff Inhalation Daily   • hydrALAZINE (APRESOLINE) injection 10 mg  10 mg Intravenous Q6H PRN   • levothyroxine tablet 25 mcg  25 mcg Oral Daily   • lidocaine (LIDODERM) 5 % patch 1 patch  1 patch Topical Daily   • losartan (COZAAR) tablet 50 mg  50 mg Oral Daily   • mirtazapine (REMERON) tablet 7 5 mg  7 5 mg Oral HS   • multi-electrolyte (PLASMALYTE-A/ISOLYTE-S PH 7 4) IV solution  75 mL/hr Intravenous Continuous   • sertraline (ZOLOFT) tablet 50 mg  50 mg Oral Daily   • spironolactone (ALDACTONE) tablet 25 mg  25 mg Oral Daily   • umeclidinium 62 5 mcg/actuation inhaler AEPB 1 puff  1 puff Inhalation Daily    and PTA meds:   Prior to Admission Medications   Prescriptions Last Dose Informant Patient Reported? Taking? ARIPiprazole (ABILIFY) 2 mg tablet   No No   Sig: Take 1 tablet (2 mg total) by mouth daily Do not start before February 25, 2023     Advair Diskus 250-50 MCG/DOSE inhaler   No No   Sig: USE 1 INHALATION TWICE A DAY   Ascorbic Acid (VITAMIN C) 1000 MG tablet   Yes No   Sig: Take 1,000 mg by mouth daily   Cholecalciferol (VITAMIN D3) 20 MCG (800 UNIT) TABS   Yes No   Sig: Take by mouth   Ferrous Sulfate 220 (44 Fe) MG/5ML LIQD   No No   Sig: take 1 teaspoonful by mouth once daily   Spiriva HandiHaler 18 MCG inhalation capsule   No No   Sig: INHALE THE CONTENTS OF 1 CAPSULE DAILY AS DIRECTED   albuterol (2 5 mg/3 mL) 0 083 % nebulizer solution   No No   Sig: USE 1 VIAL IN NEBULIZER EVERY 4 TO 6 HOURS AS NEEDED FOR COUGH AND WHEEZE   denosumab (PROLIA) 60 mg/mL   Yes No   Sig: Inject 60 mg under the skin once 6 months   digoxin (LANOXIN) 0 125 mg tablet   No No   Sig: Take 1 tablet (125 mcg total) by mouth daily Do not start before February 25, 2023  diltiazem (CARDIZEM CD) 240 mg 24 hr capsule   No No   Sig: Take 1 capsule (240 mg total) by mouth daily Do not start before February 25, 2023    hydrochlorothiazide (HYDRODIURIL) 12 5 mg tablet   No No   Sig: Take 1 tablet (12 5 mg total) by mouth daily   levothyroxine 25 mcg tablet   No No   Sig: TAKE 1 TABLET DAILY   lidocaine (LIDODERM) 5 %   No No   Sig: Apply 1 patch topically daily Remove & Discard patch within 12 hours or as directed by MD   loratadine (CLARITIN) 10 mg tablet   Yes No   Sig: Take 10 mg by mouth daily   losartan (COZAAR) 25 mg tablet   No No   Sig: Take 1 tablet (25 mg total) by mouth daily Do not start before February 25, 2023  mirtazapine (REMERON) 7 5 MG tablet   No No   Sig: Take 1 tablet (7 5 mg total) by mouth daily at bedtime   multivitamin (THERAGRAN) TABS   Yes No   Sig: Take 1 tablet by mouth daily   sertraline (Zoloft) 50 mg tablet   No No   Sig: Take 1 tablet (50 mg total) by mouth daily   warfarin (COUMADIN) 5 mg tablet   No No   Sig: TAKE 1 TABLET DAILY      Facility-Administered Medications: None     Allergies   Allergen Reactions   • Aspirin Other (See Comments)   • Penicillins Rash and Hives   • Sulfa Antibiotics Rash       Review of previous medical records was completed        Review of Systems   Reason unable to perform ROS: ROS obtained per patient's sons as patient was not able to answer questions at time of interview  Constitutional: Positive for activity change and appetite change  Negative for fever  HENT: Positive for trouble swallowing  Negative for voice change  Neurological: Positive for weakness  Psychiatric/Behavioral: Positive for confusion, dysphoric mood and sleep disturbance  The patient is nervous/anxious  OBJECTIVE     Patient ID: Ene Damon is a 79 y o  female  Vitals:   Vitals:    23 0747 23 0747 23 1302 23 1615   BP: (!) 196/68 (!) 196/68 153/60 143/61   Pulse: 62 61 86 91   Resp:    18   Temp: 97 6 °F (36 4 °C) 97 6 °F (36 4 °C)  99 3 °F (37 4 °C)   TempSrc:       SpO2: 93% 95% (!) 88% 93%   Weight:       Height:          Body mass index is 19 73 kg/m²  Intake/Output Summary (Last 24 hours) at 3/3/2023 1851  Last data filed at 3/3/2023 1845  Gross per 24 hour   Intake --   Output 1000 ml   Net -1000 ml       Temperature:   Temp (24hrs), Av 1 °F (36 7 °C), Min:97 6 °F (36 4 °C), Max:99 3 °F (37 4 °C)    Temperature: 99 3 °F (37 4 °C)    Invasive Devices: Invasive Devices     Peripheral Intravenous Line  Duration           Peripheral IV 23 Right Forearm 1 day                Physical Exam  Vitals and nursing note reviewed  Constitutional:       General: She is not in acute distress  Appearance: She is well-developed  She is ill-appearing  She is not toxic-appearing or diaphoretic  HENT:      Head: Normocephalic and atraumatic  Eyes:      Conjunctiva/sclera: Conjunctivae normal       Pupils: Pupils are equal, round, and reactive to light  Cardiovascular:      Rate and Rhythm: Normal rate  Pulmonary:      Effort: Pulmonary effort is normal  No respiratory distress  Abdominal:      General: Abdomen is flat  There is no distension  Palpations: Abdomen is soft  Musculoskeletal:         General: No swelling  Cervical back: Neck supple     Skin: General: Skin is warm and dry  Capillary Refill: Capillary refill takes less than 2 seconds  Coloration: Skin is not jaundiced or pale  Findings: No lesion or rash  Neurological:      Deep Tendon Reflexes:      Reflex Scores:       Tricep reflexes are 2+ on the right side and 2+ on the left side  Bicep reflexes are 2+ on the right side and 2+ on the left side  Brachioradialis reflexes are 2+ on the right side and 2+ on the left side  Patellar reflexes are 2+ on the right side and 2+ on the left side  Achilles reflexes are 2+ on the right side and 2+ on the left side  Psychiatric:      Comments: Patient does not answer questions regarding her mental status          Neurologic Exam     Mental Status   Attention: decreased  Concentration: decreased  Speech: (Limited to groaning, and asking "please stop," when attempting to examine lower extremities  )  Level of consciousness: responsive to painful stimuli    Cranial Nerves     CN III, IV, VI   Pupils are equal, round, and reactive to light  Right pupil: Shape: regular  Left pupil: Shape: regular     CN III: no CN III palsy  CN VI: no CN VI palsy  Nystagmus: none   Conjugate gaze: present    CN V   Right corneal reflex: normal  Left corneal reflex: normal    CN VIII   Hearing: intact    Motor Exam   Muscle bulk: decreased  Overall muscle tone: decreased    Strength   Right deltoid: 4/5  Left deltoid: 4/5  Right biceps: 4/5  Left biceps: 4/5  Right wrist flexion: 4/5  Left wrist flexion: 4/5  Right quadriceps: 3/5  Left quadriceps: 3/5    Sensory Exam   Proprioception normal    Patient does not respond to questioning regarding sensory exam      Gait, Coordination, and Reflexes     Tremor   Resting tremor: absent  Action tremor: absent    Reflexes   Right brachioradialis: 2+  Left brachioradialis: 2+  Right biceps: 2+  Left biceps: 2+  Right triceps: 2+  Left triceps: 2+  Right patellar: 2+  Left patellar: 2+  Right achilles: 2+  Left achilles: 2+           LABORATORY DATA     Labs: I have personally reviewed pertinent reports  Results from last 7 days   Lab Units 03/03/23  0831 03/02/23  1401 03/01/23  0539   WBC Thousand/uL 6 46 5 21 8 57   HEMOGLOBIN g/dL 11 1* 14 5 11 8   HEMATOCRIT % 33 7* 44 1 36 1   PLATELETS Thousands/uL 203 204 221   NEUTROS PCT % 80* 78* 88*   MONOS PCT % 6 5 3*      Results from last 7 days   Lab Units 03/03/23  0502 03/02/23  0509 03/01/23  1636 03/01/23  0539 02/28/23  0516 02/27/23  0324   POTASSIUM mmol/L 3 9 3 1* 3 3* 2 7*   < > 3 3*   CHLORIDE mmol/L 97 94*  --  93*   < > 97   CO2 mmol/L 38* 43*  --  44*   < > 37*   BUN mg/dL 13 14  --  20   < > 31*   CREATININE mg/dL 0 34* 0 39*  --  0 40*   < > 0 53*   CALCIUM mg/dL 8 3* 8 4  --  8 3*   < > 9 1   ALK PHOS U/L 59  --   --   --   --  66   ALT U/L 14  --   --   --   --  14   AST U/L 19  --   --   --   --  23    < > = values in this interval not displayed  Results from last 7 days   Lab Units 03/03/23  0502 02/28/23  0516 02/27/23  0324   MAGNESIUM mg/dL 1 9 2 3 2 3     Results from last 7 days   Lab Units 02/28/23  0516   PHOSPHORUS mg/dL 2 3      Results from last 7 days   Lab Units 03/01/23  0539 02/28/23  0516 02/27/23  0324   INR  4 00* 5 66* 4 60*   PTT seconds  --   --  47*     Results from last 7 days   Lab Units 02/27/23  0332   LACTIC ACID mmol/L 0 6           IMAGING & DIAGNOSTIC TESTING     Radiology Results: I have personally reviewed pertinent films in PACS  XR chest portable   Final Result by Laurie Whiting MD (03/03 1527)      No pneumonia or definitive findings of pulmonary edema  Workstation performed: [id number]         MRI brain wo contrast   Final Result by Justina Drake MD (03/03 0202)      No evidence of acute infarct, intracranial hemorrhage or mass        Workstation performed: [id number]         XR chest portable   Final Result by Isabel Lyon MD (90/21 8385)      Subsegmental atelectasis of the right lung base  Workstation performed: [id number]         CT head wo contrast   Final Result by Naveed Duarte MD (03/02 1309)      No acute intracranial abnormality  Moderate chronic microangiopathy  Known 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 is better evaluated on CTA head and neck 2/27/2023  Workstation performed: [id number]         CTA head and neck with and without contrast   Final Result by Naveed Duarte MD (02/27 6088)      No acute intracranial abnormality  Moderate chronic microangiopathy  Negative CTA head and neck for large vessel occlusion, dissection, or high-grade stenosis  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 projecting laterally in between left posterior cerebral artery and left superior cerebellar  Recommend consultation with the [facility] a division of [address] [place] [address] Neuroscience at    [phone]  Incidental thyroid nodule(s) for which nonemergent thyroid ultrasound is recommended  Additional chronic/incidental findings as detailed above  The study was marked in [facility] for immediate notification  Workstation performed: [id number]         PE Study with CT abdomen & pelvis with contrast   Final Result by Dickson Ornelas MD (02/27 9775)      No pulmonary embolism seen      There is a new right basilar density with associated few branching nodular density in the right middle lobe and a 5 mm nodular density superior segment left lower lobe, indeterminate  These may be on inflammatory/infectious basis /pneumonia   Short    interval follow-up chest CT at 3 months suggested to demonstrate resolution      No acute inflammatory stranding within the abdomen      Right-sided inguinal/femoral hernia, stable without bowel obstruction   Fibroid uterus   The study was marked in EPIC for immediate notification        Workstation performed: [id number] Other Diagnostic Testing: I have personally reviewed pertinent reports  ACTIVE MEDICATIONS     Current Facility-Administered Medications   Medication Dose Route Frequency   • albuterol inhalation solution 2 5 mg  2 5 mg Nebulization Q4H PRN   • aspirin chewable tablet 81 mg  81 mg Oral Daily   • atorvastatin (LIPITOR) tablet 40 mg  40 mg Oral QPM   • diltiazem (CARDIZEM CD) 24 hr capsule 240 mg  240 mg Oral Daily   • Fluticasone Furoate-Vilanterol 100-25 mcg/actuation 1 puff  1 puff Inhalation Daily   • hydrALAZINE (APRESOLINE) injection 10 mg  10 mg Intravenous Q6H PRN   • levothyroxine tablet 25 mcg  25 mcg Oral Daily   • lidocaine (LIDODERM) 5 % patch 1 patch  1 patch Topical Daily   • losartan (COZAAR) tablet 50 mg  50 mg Oral Daily   • mirtazapine (REMERON) tablet 7 5 mg  7 5 mg Oral HS   • multi-electrolyte (PLASMALYTE-A/ISOLYTE-S PH 7 4) IV solution  75 mL/hr Intravenous Continuous   • sertraline (ZOLOFT) tablet 50 mg  50 mg Oral Daily   • spironolactone (ALDACTONE) tablet 25 mg  25 mg Oral Daily   • umeclidinium 62 5 mcg/actuation inhaler AEPB 1 puff  1 puff Inhalation Daily       Prior to Admission medications    Medication Sig Start Date End Date Taking?  Authorizing Provider   Advair Diskus 250-50 MCG/DOSE inhaler USE 1 INHALATION TWICE A DAY 4/2/29   Johanny Dalton MD   albuterol (2 5 mg/3 mL) 0 083 % nebulizer solution USE 1 VIAL IN NEBULIZER EVERY 4 TO 6 HOURS AS NEEDED FOR COUGH AND WHEEZE 7/58/77   Johanny Dalton MD   ARIPiprazole (ABILIFY) 2 mg tablet Take 1 tablet (2 mg total) by mouth daily Do not start before February 25, 2023 2/25/23   Tio Mccloud MD   Ascorbic Acid (VITAMIN C) 1000 MG tablet Take 1,000 mg by mouth daily    Historical Provider, MD   Cholecalciferol (VITAMIN D3) 20 MCG (800 UNIT) TABS Take by mouth    Historical Provider, MD   denosumab (PROLIA) 60 mg/mL Inject 60 mg under the skin once 6 months    Historical Provider, MD   digoxin (LANOXIN) 0 125 mg tablet Take 1 tablet (125 mcg total) by mouth daily Do not start before February 25, 2023 2/25/23   Tammy Mendoza MD   diltiazem (CARDIZEM CD) 240 mg 24 hr capsule Take 1 capsule (240 mg total) by mouth daily Do not start before February 25, 2023 2/25/23   Tammy Mendoza MD   Ferrous Sulfate 220 (44 Fe) MG/5ML LIQD take 1 teaspoonful by mouth once daily 3/09/12   Sandra Lyons MD   hydrochlorothiazide (HYDRODIURIL) 12 5 mg tablet Take 1 tablet (12 5 mg total) by mouth daily 1/85/48   Sandra Lyons MD   levothyroxine 25 mcg tablet TAKE 1 TABLET DAILY 74/34/87   Sandra Lyons MD   lidocaine (LIDODERM) 5 % Apply 1 patch topically daily Remove & Discard patch within 12 hours or as directed by MD 3/65/92   Sandra Lyons MD   loratadine (CLARITIN) 10 mg tablet Take 10 mg by mouth daily    Historical Provider, MD   losartan (COZAAR) 25 mg tablet Take 1 tablet (25 mg total) by mouth daily Do not start before February 25, 2023 2/25/23   Tammy Mendoza MD   mirtazapine (REMERON) 7 5 MG tablet Take 1 tablet (7 5 mg total) by mouth daily at bedtime 2/24/23   Tammy Mendoza MD   multivitamin [facility]) TABS Take 1 tablet by mouth daily    Historical Provider, MD   sertraline (Zoloft) 50 mg tablet Take 1 tablet (50 mg total) by mouth daily 2/24/82   Sandra Lyons MD   Spiriva HandiHaler 18 MCG inhalation capsule INHALE THE CONTENTS OF 1 CAPSULE DAILY AS DIRECTED 7/77/16   Sandra Lyons MD   warfarin (COUMADIN) 5 mg tablet TAKE 1 TABLET DAILY 9/68/32   Sandra Lyons MD         CODE STATUS & ADVANCED DIRECTIVES     Code Status: Level 2 - DNAR: but accepts endotracheal intubation  Advance Directive and Living Will:      Power of :    POLST:        VTE Pharmacologic Prophylaxis: Pharmacologic VTE Prophylaxis contraindicated due to supratherapeutic INR  VTE Mechanical Prophylaxis: sequential compression device    ==  DO Raghavendra Brody [facility] Psychiatry Residency, PGY-1

## 2023-03-03 NOTE — ASSESSMENT & PLAN NOTE
CXR showed more pulm congestion per primary team read, s/p lasix , +/- Atlectasis  IF requires fluids give albumin

## 2023-03-03 NOTE — ASSESSMENT & PLAN NOTE
This is a 79 y o  female with history of APS and A-fib on Coumadin, COPD, hyperlipidemia, HTN, and hypothyroidism  Presented on 2/27/23 with altered mental status  Had another recent admission 2/21/23 - 2/24/23 for failure to thrive  Has had precipitous decline since family member passed in January  She has stopped eating/drinking, stopped smoking, no longer drinking coffee, has made comments about dying, and has had delusions regarding lack of insurance and financial issues  During admission she was started on Abilify, Remeron, and Losartan  Initially had some improvement but then had worsening mental status leading to current admission  Initially thought to be TME due to polypharmacy  Abilify discontinued without change  During initial consultation evaluation on 3/4, patient was obtunded  She would moan and sometimes provide one-word answers  She was able to follow one-step commands with encouragement  Cranial nerves were intact  Strength was 5/5 with encouragement  Withdraws appropriately to painful stimuli in all four extremities  Her tone is normal - no waxy rigidity such as what is seen in catatonia  Today, patient's was seen while sitting in recliner  She was more alert and required only verbal stimuli to maintain wakefulness for the encounter  She was able to give a few one-word answers  Can follow commands  No other significant changes to exam     Based on presentation, EEG was recommended to evaluate for seizure potential  Her presentation would be unusual for seizure  Routine EEG was completed afternoon of 3/4 and was mildly abnormal due to too slow PDR which is non-specific  No epileptiform discharges or seizure activity noted  MRI was non-acute  There is evidence of moderate chronic microangiopathy and 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 which was known  Labs overall have been bland with no significant abnormalities that would cause this level of change      Based on discussion with family, presentation, and workup completed, do not suspect a primary neurological etiology for her symptoms  Most likely this is psychiatric in nature related to bereavement and adjustment disorder  Will check for vitamin deficiencies as she has not eaten well the last few weeks and this would be relatively easy to correct and could improve her condition  Plan:   - Check vitamin D, thiamine, and vitamin E levels  Vitamin B12 was in the 800's last week so no need to recheck today  - Start thiamine supplementation tomorrow (to start after vitamin B1 lab is drawn)  - Recommend psychiatry to reevaluate as they have not seen patient since 2/28 where they signed off  Workup completed since then has been unrevealing and suspect underlying psychiatric condition to be contributing to her presentation   - Delirium precautions  - Will likely need PT/OT when able to participate  - Remainder of care as per primary team  - No further inpatient neurology recommendations  Please contact with questions or concerns

## 2023-03-03 NOTE — PLAN OF CARE
Problem: Prexisting or High Potential for Compromised Skin Integrity  Goal: Skin integrity is maintained or improved  Description: INTERVENTIONS:  - Identify patients at risk for skin breakdown  - Assess and monitor skin integrity  - Assess and monitor nutrition and hydration status  - Monitor labs   - Assess for incontinence   - Turn and reposition patient  - Assist with mobility/ambulation  - Relieve pressure over bony prominences  - Avoid friction and shearing  - Provide appropriate hygiene as needed including keeping skin clean and dry  - Evaluate need for skin moisturizer/barrier cream  - Collaborate with interdisciplinary team   - Patient/family teaching  - Consider wound care consult   Outcome: Progressing     Problem: NEUROSENSORY - ADULT  Goal: Achieves stable or improved neurological status  Description: INTERVENTIONS  - Monitor and report changes in neurological status  - Monitor vital signs such as temperature, blood pressure, glucose, and any other labs ordered   - Initiate measures to prevent increased intracranial pressure  - Monitor for seizure activity and implement precautions if appropriate      Outcome: Progressing  Goal: Achieves maximal functionality and self care  Description: INTERVENTIONS  - Monitor swallowing and airway patency with patient fatigue and changes in neurological status  - Encourage and assist patient to increase activity and self care     - Encourage visually impaired, hearing impaired and aphasic patients to use assistive/communication devices  Outcome: Progressing     Problem: RESPIRATORY - ADULT  Goal: Achieves optimal ventilation and oxygenation  Description: INTERVENTIONS:  - Assess for changes in respiratory status  - Assess for changes in mentation and behavior  - Position to facilitate oxygenation and minimize respiratory effort  - Oxygen administered by appropriate delivery if ordered  - Initiate smoking cessation education as indicated  - Encourage broncho-pulmonary hygiene including cough, deep breathe, Incentive Spirometry  - Assess the need for suctioning and aspirate as needed  - Assess and instruct to report SOB or any respiratory difficulty  - Respiratory Therapy support as indicated  Outcome: Progressing     Problem: Nutrition/Hydration-ADULT  Goal: Nutrient/Hydration intake appropriate for improving, restoring or maintaining nutritional needs  Description: Monitor and assess patient's nutrition/hydration status for malnutrition  Collaborate with interdisciplinary team and initiate plan and interventions as ordered  Monitor patient's weight and dietary intake as ordered or per policy  Utilize nutrition screening tool and intervene as necessary  Determine patient's food preferences and provide high-protein, high-caloric foods as appropriate       INTERVENTIONS:  - Monitor oral intake, urinary output, labs, and treatment plans  - Assess nutrition and hydration status and recommend course of action  - Evaluate amount of meals eaten  - Assist patient with eating if necessary   - Allow adequate time for meals  - Recommend/ encourage appropriate diets, oral nutritional supplements, and vitamin/mineral supplements  - Order, calculate, and assess calorie counts as needed  - Recommend, monitor, and adjust tube feedings and TPN/PPN based on assessed needs  - Assess need for intravenous fluids  - Provide specific nutrition/hydration education as appropriate  - Include patient/family/caregiver in decisions related to nutrition  Outcome: Progressing     Problem: MOBILITY - ADULT  Goal: Maintain or return to baseline ADL function  Description: INTERVENTIONS:  -  Assess patient's ability to carry out ADLs; assess patient's baseline for ADL function and identify physical deficits which impact ability to perform ADLs (bathing, care of mouth/teeth, toileting, grooming, dressing, etc )  - Assess/evaluate cause of self-care deficits   - Assess range of motion  - Assess patient's mobility; develop plan if impaired  - Assess patient's need for assistive devices and provide as appropriate  - Encourage maximum independence but intervene and supervise when necessary  - Involve family in performance of ADLs  - Assess for home care needs following discharge   - Consider OT consult to assist with ADL evaluation and planning for discharge  - Provide patient education as appropriate  Outcome: Progressing  Goal: Maintains/Returns to pre admission functional level  Description: INTERVENTIONS:  - Perform BMAT or MOVE assessment daily    - Set and communicate daily mobility goal to care team and patient/family/caregiver  - Collaborate with rehabilitation services on mobility goals if consulted  - Reposition patient every 2 hours    - Record patient progress and toleration of activity level   Outcome: Progressing

## 2023-03-03 NOTE — ASSESSMENT & PLAN NOTE
Suspect toxic metabolic encephalopathy from multiple behavioral health medications  Per family, pt was socially conversational when seen on Saturday 2/25/23  STR reported that pt was less alert starting afternoon of Sunday 2/26/23  No acute infection identified  Patient appears dehydrated  SNF notes worsening mentation following abilify  CTA head/neck unremarkable for acute pathology  Salicylates, acetaminophen, EtOH, ammonia, digoxin level, TSH negative or normal   ABG notable for respiratory acidosis without acidemia, compensated by metabolic alkalosis  Initially thought to be d/t Abilify  A0x3 baseline on on 3 1  AMS on 3 2  On exam today can follow comands wth encourgament  F/D Seizure, behavioral - bereavement w  Adjustment disorder w/  (family endorses significant anxiety/ depression since family deaths), osvaldo ruled out  Infections unlikely as procal neg  · DC Abilify  Do not restart  Last dose 2/27/23  · Continue Remeron and Zoloft  · No indication for inpatient psych admission  · PT/OT evals with likely return to SNF for subacute rehab  · Stroke pathway - CT head neg, MRI stable anuersym  · CXR oending -prior CT showed suspicious for PNA?   · Ammonia neg  · Rx UTI with Ceftriaxone dced s procal neg  · Neuro reconsulted   · EEG

## 2023-03-03 NOTE — PHYSICAL THERAPY NOTE
Physical Therapy Cancellation Note         03/03/23 0956   Note Type   Note type Cancelled Session   Cancel Reasons Other   Additional Comments referral received for PT eval and tx  attempted to see pt for PT eval but Anival Shahid reports pt is too lethargic to participate  will follow and initiate PT as appropriate       Kenny Goyal, PT

## 2023-03-03 NOTE — SPEECH THERAPY NOTE
Speech Language/Pathology Missed Visit Note    Dysphagia evaluation orders received and appreciated  Evaluation attempted, however pt's poor level of arousal precludes safe PO trails at this time  Evaluation deferred  Will re-attempt as patient status and scheduling permits  Nursing aware            Yahir Estrada [address], [address] [facility]  Speech-Language Pathologist  [place] #[id number]  [place] #[id number]

## 2023-03-03 NOTE — QUICK NOTE
Progress Note - Wound   Susa Flaming 79 y o  female MRN: [id number]  Unit/Bed#: S -01 Encounter: [id number]      Assessment/Plan:    Wound care nurse visit to assist PCA with turning patient  Patient moved up in bed, heels offloaded with a green wedge, and accumax pump connected to mattress

## 2023-03-03 NOTE — PROGRESS NOTES
[facility]  Progress Note - Gabriel Talbot 1940, 79 y o  female MRN: [id number]  Unit/Bed#: S -01 Encounter: [id number]  Primary Care Provider: Sandrine Villanueva MD   Date and time admitted to hospital: 2/27/2023  3:02 AM    * Altered mental status  Assessment & Plan  Suspect toxic metabolic encephalopathy from multiple behavioral health medications  Per family, pt was socially conversational when seen on Saturday 2/25/23  STR reported that pt was less alert starting afternoon of Sunday 2/26/23  No acute infection identified  Patient appears dehydrated  SNF notes worsening mentation following abilify  CTA head/neck unremarkable for acute pathology  Salicylates, acetaminophen, EtOH, ammonia, digoxin level, TSH negative or normal   ABG notable for respiratory acidosis without acidemia, compensated by metabolic alkalosis  Initially thought to be d/t Abilify  A0x3 baseline on on 3 1  AMS on 3 2  On exam today can follow comands wth encourgament  F/D Seizure, behavioral - bereavement w  Adjustment disorder w/  (family endorses significant anxiety/ depression since family deaths), osvaldo ruled out  Infections unlikely as procal neg  · DC Abilify  Do not restart  Last dose 2/27/23  · Continue Remeron and Zoloft  · No indication for inpatient psych admission  · PT/OT evals with likely return to SNF for subacute rehab  · Stroke pathway - CT head neg, MRI stable anuersym  · CXR oending -prior CT showed suspicious for PNA?   · Ammonia neg  · Rx UTI with Ceftriaxone dced s procal neg  · Neuro reconsulted   · EEG          SOB (shortness of breath)  Assessment & Plan  CXR showed more pulm congestion per primary team read, s/p lasix , +/- Atlectasis  IF requires fluids give albumin    Abnormal CT of the chest  Assessment & Plan  · PE Study with CT abdomen & pelvis with contrast 2/27/2023:  There is a new right basilar density with associated few branching nodular density in the right middle lobe and a 5 mm nodular density superior segment left lower lobe, indeterminate  These may be on inflammatory/infectious basis /pneumonia   Short interval follow-up chest CT at 3 months suggested to demonstrate resolution  Aneurysm of basilar artery [facility])  Assessment & Plan  Incidental finding on CTA head and neck/  CTA head and neck with and without contrast 2/27/2023:  "No acute intracranial abnormality  Moderate chronic microangiopathy  Negative CTA head and neck for large vessel occlusion, dissection, or high-grade stenosis  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 projecting laterally in between left posterior cerebral artery and left superior cerebellar- repeat CT stable when developed AMS  · Outpatient referral for Neurosurgery follow-up  Will provide on DC  Thyroid nodule  Assessment & Plan  CTA head and neck with and without contrast 2/27/2023:  "No acute intracranial abnormality  Moderate chronic microangiopathy  Negative CTA head and neck for large vessel occlusion, dissection, or high-grade stenosis  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 projecting laterally in between left posterior cerebral artery and left superior cerebellar  Recommend [facility] with [facility] of [address] [place] [address] Neuroscience at [phone]  Incidental thyroid nodule(s) for which nonemergent thyroid ultrasound is recommended "    · Outpatient f/u with pcp for non-emergent thyroid u/s    Supratherapeutic INR  Assessment & Plan  · See Afib plan      Failure to thrive in adult  Assessment & Plan  · Likely multifactorial in the setting of toxic metabolic encephalopathy with recent changes to meds as well as element of depression  ·     Hypothyroidism  Assessment & Plan  · TSH wnl  · Cont home levothyroxine    Atrial fibrillation (HCC)  Assessment & Plan  On diltiazem, digoxin, and warfarin at home    · See above    Antiphospholipid antibody syndrome [facility])  Assessment & Plan  · On coumadin  Goal INR 2-3  · INR supra therapeutic  No active evidence of bleeding  · Hold coumadin  INR downtrending, restart coumadin when INR 2-3  Hypokalemia-resolved as of 3/3/2023  Assessment & Plan  · K 2 7, replete  AM BMP  Anticipate to improve with improvement in oral intake  VTE Pharmacologic Prophylaxis: VTE Score: 6 High Risk (Score >/= 5) - Pharmacological DVT Prophylaxis Contraindicated  Sequential Compression Devices Ordered  Patient Centered Rounds: I performed bedside rounds with nursing staff today  Discussions with Specialists or Other Care Team Provider: neurology     Education and Discussions with Family / Patient: Updated  (son) at bedside  Current Length of Stay: 4 day(s)  Current Patient Status: Inpatient   Discharge Plan: Anticipate discharge in 48-72 hrs to pt is waxing and waning - will see dc plan based on AMS    Code Status: Level 2 - DNAR: but accepts endotracheal intubation    Subjective:   Pt states " I dont know" to all questions"    Objective:     Vitals:   Temp (24hrs), Av 7 °F (36 5 °C), Min:97 6 °F (36 4 °C), Max:97 8 °F (36 6 °C)    Temp:  [97 6 °F (36 4 °C)-97 8 °F (36 6 °C)] 97 6 °F (36 4 °C)  HR:  [57-86] 86  BP: (153-196)/() 153/60  SpO2:  [88 %-95 %] 88 %  Body mass index is 19 73 kg/m²  Input and Output Summary (last 24 hours): Intake/Output Summary (Last 24 hours) at 3/3/2023 1540  Last data filed at 3/3/2023 1520  Gross per 24 hour   Intake --   Output 500 ml   Net -500 ml       Physical Exam:   Physical Exam  Vitals and nursing note reviewed  Constitutional:       Appearance: She is well-developed  Comments: Please see full neuro assesment by Dr Bakari Chapin for which I was present in the room - no focal defict   Sad affect and effect   Can open eyes and answers question and do neuro exam with sustained exnoucrgament       HENT:      Head: Normocephalic and atraumatic     Eyes: Conjunctiva/sclera: Conjunctivae normal    Cardiovascular:      Rate and Rhythm: Normal rate and regular rhythm  Heart sounds: No murmur heard  Pulmonary:      Effort: Pulmonary effort is normal  No respiratory distress  Breath sounds: Normal breath sounds  Abdominal:      General: There is no distension  Palpations: Abdomen is soft  Tenderness: There is no abdominal tenderness  There is no guarding  Musculoskeletal:         General: No swelling  Skin:     General: Skin is warm and dry  Capillary Refill: Capillary refill takes less than 2 seconds            Additional Data:     Labs:  Results from last 7 days   Lab Units 03/03/23  0831   WBC Thousand/uL 6 46   HEMOGLOBIN g/dL 11 1*   HEMATOCRIT % 33 7*   PLATELETS Thousands/uL 203   NEUTROS PCT % 80*   LYMPHS PCT % 12*   MONOS PCT % 6   EOS PCT % 1     Results from last 7 days   Lab Units 03/03/23  0502   SODIUM mmol/L 141   POTASSIUM mmol/L 3 9   CHLORIDE mmol/L 97   CO2 mmol/L 38*   BUN mg/dL 13   CREATININE mg/dL 0 34*   ANION GAP mmol/L 6   CALCIUM mg/dL 8 3*   ALBUMIN g/dL 2 8*   TOTAL BILIRUBIN mg/dL 0 67   ALK PHOS U/L 59   ALT U/L 14   AST U/L 19   GLUCOSE RANDOM mg/dL 85     Results from last 7 days   Lab Units 03/01/23  0539   INR  4 00*     Results from last 7 days   Lab Units 02/27/23  0346   POC GLUCOSE mg/dl 67     Results from last 7 days   Lab Units 03/03/23  0502   HEMOGLOBIN A1C % 4 5     Results from last 7 days   Lab Units 03/03/23  0502 02/27/23  0332 02/27/23  0324   LACTIC ACID mmol/L  --  0 6  --    PROCALCITONIN ng/ml 0 12  --  0 11       Lines/Drains:  Invasive Devices     Peripheral Intravenous Line  Duration           Peripheral IV 03/02/23 Right Forearm <1 day                  Telemetry:  Telemetry Orders (From admission, onward)             48 Hour Telemetry Monitoring  Continuous x 48 hours        References:    Telemetry Guidelines   Question:  Reason for 48 Hour Telemetry  Answer:  Acute CVA (<24 hrs old, hemispheric strokes, selected brainstem strokes, cardiac arrhythmias)                 Telemetry Reviewed: Normal Sinus Rhythm  Indication for Continued Telemetry Use: No indication for continued use  Will discontinue  Imaging: Reviewed radiology reports from this admission including: chest xray    Recent Cultures (last 7 days):   Results from last 7 days   Lab Units 02/28/23  1534 02/27/23  0332 02/27/23  0324   BLOOD CULTURE   --  No Growth After 4 Days  No Growth After 4 Days  URINE CULTURE  50,000-59,000 cfu/ml Enterococcus faecalis*  <10,000 cfu/ml  --   --        Last 24 Hours Medication List:   Current Facility-Administered Medications   Medication Dose Route Frequency Provider Last Rate   • albuterol  2 5 mg Nebulization Q4H PRN Isaac Zamarripa MD     • aspirin  81 mg Oral Daily John Torres MD     • atorvastatin  40 mg Oral QPM John Torres MD     • diltiazem  240 mg Oral Daily Isaac Zamarripa MD     • Fluticasone Furoate-Vilanterol  1 puff Inhalation Daily Isaac Zamarripa MD     • hydrALAZINE  10 mg Intravenous Q6H PRN Sharlette Goodell, MD     • levothyroxine  25 mcg Oral Daily Isaac Zamarripa MD     • lidocaine  1 patch Topical Daily Isaac Zamarripa MD     • losartan  50 mg Oral Daily Sharlette Goodell, MD     • mirtazapine  7 5 mg Oral HS Isaac Zamarripa MD     • multi-electrolyte  75 mL/hr Intravenous Continuous John Torres MD Stopped (03/03/23 7354)   • sertraline  50 mg Oral Daily Isaac Zamarripa MD     • spironolactone  25 mg Oral Daily John Torres MD     • umeclidinium  1 puff Inhalation Daily Isaac Zamarripa MD          Today, Patient Was Seen By: John Torres MD    **Please Note: This note may have been constructed using a voice recognition system  **

## 2023-03-04 PROBLEM — R79.1 SUPRATHERAPEUTIC INR: Status: RESOLVED | Noted: 2023-02-27 | Resolved: 2023-03-04

## 2023-03-04 LAB
ALBUMIN SERPL BCP-MCNC: 3 G/DL (ref 3.5–5)
ALP SERPL-CCNC: 65 U/L (ref 34–104)
ALT SERPL W P-5'-P-CCNC: 16 U/L (ref 7–52)
ANION GAP SERPL CALCULATED.3IONS-SCNC: 6 MMOL/L (ref 4–13)
AST SERPL W P-5'-P-CCNC: 22 U/L (ref 13–39)
BACTERIA BLD CULT: NORMAL
BACTERIA BLD CULT: NORMAL
BASOPHILS # BLD AUTO: 0.01 THOUSANDS/ÂΜL (ref 0–0.1)
BASOPHILS NFR BLD AUTO: 0 % (ref 0–1)
BILIRUB SERPL-MCNC: 0.89 MG/DL (ref 0.2–1)
BLOOD GROUP ANTIBODIES SERPL: NORMAL
BLOOD GROUP ANTIBODIES SERPL: NORMAL
BUN SERPL-MCNC: 15 MG/DL (ref 5–25)
CALCIUM ALBUM COR SERPL-MCNC: 9.2 MG/DL (ref 8.3–10.1)
CALCIUM SERPL-MCNC: 8.4 MG/DL (ref 8.4–10.2)
CHLORIDE SERPL-SCNC: 94 MMOL/L (ref 96–108)
CO2 SERPL-SCNC: 36 MMOL/L (ref 21–32)
CREAT SERPL-MCNC: 0.39 MG/DL (ref 0.6–1.3)
EOSINOPHIL # BLD AUTO: 0.07 THOUSAND/ÂΜL (ref 0–0.61)
EOSINOPHIL NFR BLD AUTO: 1 % (ref 0–6)
ERYTHROCYTE [DISTWIDTH] IN BLOOD BY AUTOMATED COUNT: 12.8 % (ref 11.6–15.1)
GFR SERPL CREATININE-BSD FRML MDRD: 96 ML/MIN/1.73SQ M
GLUCOSE SERPL-MCNC: 73 MG/DL (ref 65–140)
HCT VFR BLD AUTO: 36.5 % (ref 34.8–46.1)
HGB BLD-MCNC: 12 G/DL (ref 11.5–15.4)
IMM GRANULOCYTES # BLD AUTO: 0.04 THOUSAND/UL (ref 0–0.2)
IMM GRANULOCYTES NFR BLD AUTO: 1 % (ref 0–2)
INR PPP: 1.6 (ref 0.84–1.19)
LYMPHOCYTES # BLD AUTO: 0.77 THOUSANDS/ÂΜL (ref 0.6–4.47)
LYMPHOCYTES NFR BLD AUTO: 12 % (ref 14–44)
MAGNESIUM SERPL-MCNC: 1.9 MG/DL (ref 1.9–2.7)
MCH RBC QN AUTO: 32.7 PG (ref 26.8–34.3)
MCHC RBC AUTO-ENTMCNC: 32.9 G/DL (ref 31.4–37.4)
MCV RBC AUTO: 100 FL (ref 82–98)
MONOCYTES # BLD AUTO: 0.35 THOUSAND/ÂΜL (ref 0.17–1.22)
MONOCYTES NFR BLD AUTO: 5 % (ref 4–12)
NEUTROPHILS # BLD AUTO: 5.43 THOUSANDS/ÂΜL (ref 1.85–7.62)
NEUTS SEG NFR BLD AUTO: 81 % (ref 43–75)
NRBC BLD AUTO-RTO: 0 /100 WBCS
PLATELET # BLD AUTO: 244 THOUSANDS/UL (ref 149–390)
PMV BLD AUTO: 9.2 FL (ref 8.9–12.7)
POTASSIUM SERPL-SCNC: 3.5 MMOL/L (ref 3.5–5.3)
PROT SERPL-MCNC: 5.8 G/DL (ref 6.4–8.4)
PROTHROMBIN TIME: 19.3 SECONDS (ref 11.6–14.5)
RBC # BLD AUTO: 3.67 MILLION/UL (ref 3.81–5.12)
SODIUM SERPL-SCNC: 136 MMOL/L (ref 135–147)
WBC # BLD AUTO: 6.67 THOUSAND/UL (ref 4.31–10.16)

## 2023-03-04 RX ORDER — WARFARIN SODIUM 5 MG/1
5 TABLET ORAL
Status: DISCONTINUED | OUTPATIENT
Start: 2023-03-04 | End: 2023-03-09

## 2023-03-04 NOTE — PLAN OF CARE
Problem: SLP ADULT - SWALLOWING, IMPAIRED  Goal: Advance to least restrictive diet without signs or symptoms of aspiration for planned discharge setting  See evaluation for individualized goals  Description: Patient will:    1  Safely swallow recommended diet/ liquid consistencies without overt signs or symptoms of aspiration 100% of time  1 week      Outcome: Not Progressing     Problem: SLP ADULT - SWALLOWING, IMPAIRED  Goal: Initial SLP swallow eval performed  Outcome: Completed

## 2023-03-04 NOTE — ASSESSMENT & PLAN NOTE
· Initially thought to be secondary to adverse medication side effects with up titration of her multiple antidepressants recently  Abilify was discontinued earlier on in the admission  Initially she had improvement in her mentation though continues to have intermittent episodes of obtundation without a clear organic cause  Infectious and metabolic work-up essentially unrevealing  · Would appreciate behavioral health reevaluate patient this admission and Monday, 3/6/2023  · Appreciate neurology recs    EEG without epileptiform discharges though grossly abnormal suggestive of diffuse cerebral dysfunction  · Continue supportive cares, advance diet as tolerated, as upgraded by ongoing speech evaluations   · Maintain aspiration precautions

## 2023-03-04 NOTE — PLAN OF CARE
Problem: PHYSICAL THERAPY ADULT  Goal: Performs mobility at highest level of function for planned discharge setting  See evaluation for individualized goals  Description: Treatment/Interventions: Functional transfer training, LE strengthening/ROM, Therapeutic exercise, Endurance training, Cognitive reorientation, Patient/family training, Equipment eval/education, Bed mobility, Spoke to nursing  Equipment Recommended:  (TBD pending mobility progress)       See flowsheet documentation for full assessment, interventions and recommendations  Note: Prognosis: Fair  Problem List: Decreased strength, Decreased endurance, Impaired balance, Decreased mobility, Decreased cognition, Impaired hearing  Assessment: Pt is a 79 y o  female seen for PT evaluation s/p admit to [facility] on [facility]/27/2023 w/ Altered mental status  Pertinent PMH includes: afib, HTN, COPD, and hx of recent admissions  Prior to LAST admission 02/21/2023, per case management note pt was living alone in bi-level home with 7 SWETA, requiring SPC for ambulation  Prior to THIS admission, pt was admitted from [facility] and used RW for ambulation at [facility]  Upon evaluation, Pt was able to perform bed mobility and STS transfers with [place], however required modA for stand-pivot transfers  Pt c/o increasing lethargy and SOB with hypertensive episodes in supine  Pt presented with the following deficits including, but not limited to: decreased strength, decreased cognition, and decreased endurance coupled with SOB and hypertensive episodes  DC recommendation is for post acute inpatient rehab in order to decreased risk of falls and return to previous level of function  Barriers to Discharge: Inaccessible home environment, Decreased caregiver support     PT Discharge Recommendation: Post acute rehabilitation services    See flowsheet documentation for full assessment

## 2023-03-04 NOTE — PROGRESS NOTES
[facility]  Progress Note - Fawn Felty 1940, 79 y o  female MRN: [id number]  Unit/Bed#: S -01 Encounter: [id number]  Primary Care Provider: Johanny Dalton MD   Date and time admitted to hospital: 2/27/2023  3:02 AM    * Failure to thrive in adult  Assessment & Plan  · Suspect large component due to profound grief and depression  · Continue supportive cares  · See additional recommendations below      Altered mental status  Assessment & Plan  · Initially thought to be secondary to adverse medication side effects with up titration of her multiple antidepressants recently  Abilify was discontinued earlier on in the admission  Initially she had improvement in her mentation though continues to have intermittent episodes of obtundation without a clear organic cause  Infectious and metabolic work-up essentially unrevealing  · Would appreciate behavioral health reevaluate patient this admission and Monday, 3/6/2023  · Appreciate neurology recs  EEG without epileptiform discharges though grossly abnormal suggestive of diffuse cerebral dysfunction  · Continue supportive cares, advance diet as tolerated, as upgraded by ongoing speech evaluations   · Maintain aspiration precautions          SOB (shortness of breath)  Assessment & Plan  · Patient name of some shortness of breath on 3/3/2023  She was receiving IV fluids in the setting of poor p o  intake and failure to thrive, there was concern for some element of volume overload with pulmonary edema  She was given 20 mg of IV Lasix with good response  · Monitor off further diuretic    Thyroid nodule  Assessment & Plan  CTA head and neck with and without contrast 2/27/2023:  "No acute intracranial abnormality  Moderate chronic microangiopathy  Negative CTA head and neck for large vessel occlusion, dissection, or high-grade stenosis   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 projecting laterally in between left posterior cerebral artery and left superior cerebellar  Recommend consultation with the [facility] a [facility] [address]  [facility] at [phone]  Incidental thyroid nodule(s) for which nonemergent thyroid ultrasound is recommended "    · Outpatient f/u with pcp for non-emergent thyroid u/s    Abnormal CT of the chest  Assessment & Plan  · PE Study with CT abdomen & pelvis with contrast 2/27/2023:  There is a new right basilar density with associated few branching nodular density in the right middle lobe and a 5 mm nodular density superior segment left lower lobe, indeterminate  These may be on inflammatory/infectious basis /pneumonia   Short interval follow-up chest CT at 3 months suggested to demonstrate resolution  Hypothyroidism  Assessment & Plan  · TSH wnl  · Cont home levothyroxine    Atrial fibrillation (HCC)  Assessment & Plan  · On diltiazem, digoxin, and warfarin at home  · See above regarding AC    Antiphospholipid antibody syndrome (HCC)  Assessment & Plan  · On coumadin  Goal INR 2-3  INR initially supratherapeutic, coumadin held  No active evidence of bleeding  · Restart Coumadin 5mg daily  INR is subtherapeutic - INR 1 6 (3/4/23)  · Daily INR        VTE Pharmacologic Prophylaxis: VTE Score: 6 High Risk (Score >/= 5) - Pharmacological DVT Prophylaxis Ordered: warfarin (Coumadin)  Sequential Compression Devices Ordered  Patient Centered Rounds: I performed bedside rounds with nursing staff today  Discussions with Specialists or Other Care Team Provider: Neurology  Education and Discussions with Family / Patient: Updated  (son) at bedside      Total Time Spent on Date of Encounter in care of patient: 35 minutes This time was spent on one or more of the following: performing physical exam; counseling and coordination of care; obtaining or reviewing history; documenting in the medical record; reviewing/ordering tests, medications or procedures; communicating with other healthcare professionals and discussing with patient's family/caregivers  Current Length of Stay: 5 day(s)  Current Patient Status: Inpatient   Certification Statement: The patient will continue to require additional inpatient hospital stay due to Persistent encephalopathy  Discharge Plan: Anticipate discharge in >72 hrs to Pending clinical course    Code Status: Level 2 - DNAR: but accepts endotracheal intubation    Subjective:   Patient is a little more alert again today, she is sitting up in the chair, very different from yesterday  She has very slow and difficult speech  Appears overall ill and very weak  Patient states that her shortness of breath is improved  Objective:     Vitals:   Temp (24hrs), Av 9 °F (36 6 °C), Min:97 °F (36 1 °C), Max:99 3 °F (37 4 °C)    Temp:  [97 °F (36 1 °C)-99 3 °F (37 4 °C)] 97 7 °F (36 5 °C)  HR:  [74-91] 76  Resp:  [18] 18  BP: (143-204)/(61-87) 165/78  SpO2:  [93 %-96 %] 94 %  Body mass index is 19 73 kg/m²  Input and Output Summary (last 24 hours): Intake/Output Summary (Last 24 hours) at 3/4/2023 1453  Last data filed at 3/3/2023 1845  Gross per 24 hour   Intake --   Output 1000 ml   Net -1000 ml       Physical Exam:   Physical Exam  Vitals reviewed  Constitutional:       General: She is not in acute distress  Appearance: She is ill-appearing  She is not toxic-appearing  Comments: Appears very thin, chronically ill and cachectic   HENT:      Mouth/Throat:      Mouth: Mucous membranes are dry  Cardiovascular:      Rate and Rhythm: Normal rate and regular rhythm  Heart sounds: No murmur heard  Comments: NC oxygen  Pulmonary:      Effort: No respiratory distress  Breath sounds: No wheezing, rhonchi or rales  Abdominal:      General: There is no distension  Palpations: Abdomen is soft  Tenderness: There is no abdominal tenderness  Musculoskeletal:         General: No swelling     Skin: General: Skin is warm and dry  Neurological:      Comments: Slowed speech, able to answer some questions  Additional Data:     Labs:  Results from last 7 days   Lab Units 03/04/23  0554   WBC Thousand/uL 6 67   HEMOGLOBIN g/dL 12 0   HEMATOCRIT % 36 5   PLATELETS Thousands/uL 244   NEUTROS PCT % 81*   LYMPHS PCT % 12*   MONOS PCT % 5   EOS PCT % 1     Results from last 7 days   Lab Units 03/04/23  0554   SODIUM mmol/L 136   POTASSIUM mmol/L 3 5   CHLORIDE mmol/L 94*   CO2 mmol/L 36*   BUN mg/dL 15   CREATININE mg/dL 0 39*   ANION GAP mmol/L 6   CALCIUM mg/dL 8 4   ALBUMIN g/dL 3 0*   TOTAL BILIRUBIN mg/dL 0 89   ALK PHOS U/L 65   ALT U/L 16   AST U/L 22   GLUCOSE RANDOM mg/dL 73     Results from last 7 days   Lab Units 03/04/23  0554   INR  1 60*     Results from last 7 days   Lab Units 02/27/23  0346   POC GLUCOSE mg/dl 67     Results from last 7 days   Lab Units 03/03/23  0502   HEMOGLOBIN A1C % 4 5     Results from last 7 days   Lab Units 03/03/23  0502 02/27/23  0332 02/27/23  0324   LACTIC ACID mmol/L  --  0 6  --    PROCALCITONIN ng/ml 0 12  --  0 11       Lines/Drains:  Invasive Devices     Peripheral Intravenous Line  Duration           Peripheral IV 03/02/23 Right Forearm 1 day                      Imaging: Reviewed radiology reports from this admission including: chest xray    Recent Cultures (last 7 days):   Results from last 7 days   Lab Units 02/28/23  1534 02/27/23  0332 02/27/23  0324   BLOOD CULTURE   --  No Growth After 5 Days  No Growth After 5 Days     URINE CULTURE  50,000-59,000 cfu/ml Enterococcus faecalis*  <10,000 cfu/ml  --   --        Last 24 Hours Medication List:   Current Facility-Administered Medications   Medication Dose Route Frequency Provider Last Rate   • albuterol  2 5 mg Nebulization Q4H PRN Susie Goldman MD     • aspirin  81 mg Oral Daily Florida Inman MD     • atorvastatin  40 mg Oral QPM Florida Inman MD     • diltiazem  240 mg Oral Daily Susie Goldman MD • Fluticasone Furoate-Vilanterol  1 puff Inhalation Daily Perez Sunshine MD     • hydrALAZINE  10 mg Intravenous Q6H PRN Stacey Jackson MD     • levothyroxine  25 mcg Oral Daily Perez Sunshine MD     • lidocaine  1 patch Topical Daily Perez Sunshine MD     • losartan  50 mg Oral Daily Stacey Jackson MD     • mirtazapine  7 5 mg Oral HS Perez Sunshine MD     • multi-electrolyte  75 mL/hr Intravenous Continuous Nicole Rodriguez MD 75 mL/hr (03/04/23 1254)   • sertraline  50 mg Oral Daily Perez Sunshine MD     • spironolactone  25 mg Oral Daily Nicole Rodriguez MD     • umeclidinium  1 puff Inhalation Daily Perez Sunshine MD     • warfarin  5 mg Oral Daily (warfarin) Stacey Jackson MD          Today, Patient Was Seen By: Stacey Jackson MD    **Please Note: This note may have been constructed using a voice recognition system  **

## 2023-03-04 NOTE — SPEECH THERAPY NOTE
Speech Language Pathology    Speech Language Pathology Bedside Swallow Evaluation      Patient Name: Quique Salazar  [id number] Date: 3/4/2023    Recommend:  Maintain strict NPO status for now  Non-oral medications continue  Stringent daily oral care/hygiene  Aspiration and reflux precautions! Swallow re-assessment for PO readiness  RD follow up    Patient received awake in bedside chair, c/o dry mouth  Oral care performed, removed significant thick dried secretions from surface/ sides of tongue  Patient displayed reduced endurance as PO trials progressed, resulting in audible swallows as well as coughing post swallows of honey thick water  Will continue to follow for swallow re-evaluation and PO candidacy  Discussed with patient and RN  Problem List  Principal Problem:    Altered mental status  Active Problems:    Antiphospholipid antibody syndrome (HCC)    Atrial fibrillation (HCC)    Hypothyroidism    Failure to thrive in adult    Abnormal CT of the chest    Supratherapeutic INR    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HCC)    Thyroid nodule    SOB (shortness of breath)    Past Medical History  Past Medical History:   Diagnosis Date   • Anti-phospholipid syndrome (HCC)    • Asthma    • Blood type O+    • Cardiac disease    • Chronic pain    • COPD (chronic obstructive pulmonary disease) (HCC)    • Fracture of ankle     left   • Hyperlipidemia    • Hypertension    • Hypokalemia 2/27/2023   • Osteoporosis      Past Surgical History  Past Surgical History:   Procedure Laterality Date   • APPENDECTOMY     • BILATERAL OOPHORECTOMY Bilateral    • ORIF TIBIAL SHAFT FRACTURE W/ PLATES AND SCREWS Right       03/04/23 0569   Patient Information   Current Medical 79year-old female admitted secondary to [facility], decreased responsiveness  Past Medical History HTN, HLD, OP, COPD, cardiac disease, Afib  and as outlined above     Social History Patient from [address] [place] [address]   Current Risks for Dysphagia & Aspiration Mental status change   Current Symptoms/Concerns   Patient c/o dry mouth/ Xerostomia   Current Diet NPO   Baseline Diet Regular; Thin liquids   Baseline Assessment   Behavior/Cognition Alert; Cooperative; Requires cueing  c/o fatigue   Speech/Language Status WFL   Patient Positioning Upright in chair   Swallow Mechanism Exam   Labial Strength WFL   Labial ROM WFL   Lingual Strength WFL   Lingual ROM WFL   Velum WFL   Mandible WFL   Dentition Edentulous   Volitional Cough Weak   Consistencies Assessed and Performance   Materials Administered Puree/Level 1; Thin liquid; Honey thick liquid   Materials Adminstered Comment All via spoon   Oral Stage Moderately impaired   Oral Stage Comment Slow/delayed manipulation, bolus formation, and A-P transfer   Phargngeal Stage Moderate to severely impaired; with limited texture; Aspiration risk   Pharyngeal Stage Comment Patient demonstrated weak cough post swallows of honey thick water  There was no evidence of aspiration following swallows of apple sauce  Increased WOB noted post swallows of thin water  Patient with poor endurance and unsafe to feed orally due to elevated risk of aspiration with all textures  Swallow Mechanics Moderately delayed swallow initiation; Weak laryngeal rise; Aspiration risk   Esophageal Concerns No s/s reported   Strategies and Efficacy Small bites/sips, slow rate   Summary   Swallow Summary Patient demonstrates reduced endurance for PO intake and presents with elevated risk for potential aspiration of all consistency foods/ fluids due to fatigue, generalized weakness, and poor endurance  Unsafe to feed patient orally at this time  Recommendations   Risk for Aspiration Severe   Recommendations Continue swallow eval process;  Dysphagia treatment   Diet Solid Recommendation   NPO, strict   Diet Liquid Recommendation   NPO, strict   Recommended Form of Meds Feeding tube/ IV   General Precautions Aspiration precautions   Further Evaluations Dietitian Results Reviewed with RN, patient   Treatment Recommendations   Duration of treatment 1-3 times per week   Follow up treatments Strategy training; Continue clinical assessment; Patient/family education   Dysphagia Goals Patient will safely swallow recommended diet/ liquid consistencies without overt signs of aspiration 100% of time  1 week  LTG:  Safe swallowing skills for nutrition needs  1 week  Speech Therapy Prognosis   Prognosis Good   Prognosis Considerations Age; Co-Morbidities; Medical Diagnosis;  Potential; Previous Level of Function; Severity of Impairments

## 2023-03-04 NOTE — PHYSICAL THERAPY NOTE
PHYSICAL THERAPY EVALUATION  NAME:  Joe Sharma  DATE: 03/04/23    AGE:   79 y o  Mrn:   [id number]  Principal problem: Principal Problem:    Altered mental status  Active Problems:    Antiphospholipid antibody syndrome (HCC)    Atrial fibrillation (HCC)    Hypothyroidism    Failure to thrive in adult    Abnormal CT of the chest    Supratherapeutic INR    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HCC)    Thyroid nodule    SOB (shortness of breath)      Vitals:    03/04/23 0910 03/04/23 0912 03/04/23 0924   BP: (!) 204/85 (!) 182/87 165/78   Pulse: 79 82 76   Resp:      Temp:      TempSrc:      SpO2: 93% 94% 94%   Weight:      Height:          Length Of Stay: 5  Performed at least 2 patient identifiers during session: Name and ID evelyn  PHYSICAL THERAPY EVALUATION :    03/04/23 0900   PT Last Visit   PT Visit Date 03/04/23   Note Type   Note type Evaluation   Pain Assessment   Pain Assessment Tool FLACC   Pain Score   (Pt unable to report level of pain/location at this time )   Pain Rating: FLACC (Rest) - Face 1   Pain Rating: FLACC (Rest) - Legs 0   Pain Rating: FLACC (Rest) - Activity 0   Pain Rating: FLACC (Rest) - Cry 1   Pain Rating: FLACC (Rest) - Consolability 0   Score: FLACC (Rest) 2   Pain Rating: FLACC (Activity) - Face 1   Pain Rating: FLACC (Activity) - Legs 0   Pain Rating: FLACC (Activity) - Activity 0   Pain Rating: FLACC (Activity) - Cry 1   Pain Rating: FLACC (Activity) - Consolability 1   Score: FLACC (Activity) 3   Restrictions/Precautions   Weight Bearing Precautions Per Order No   Other Precautions Cognitive; Chair Alarm; Bed Alarm;O2;Fall Risk;Hard of hearing;Multiple lines   Home Living   Type of Home SNF  (Per chart review, pt admitted from [facility])   Home Layout Performs ADLs on one level; One level   Home Equipment Walker;Cane  (Per chart review, pt uses SPC at baseline however using RW at [facility] )   Additional Comments Per chart review, pt uses SPC at baseline however using RW at [facility]     Prior Function   Level of [place] Needs assistance with functional mobility   Lives With Facility staff; Alone  (Per chart review, pt lives alone prior to last admission )   Receives Help From Family  (per chart review)   Falls in the last 6 months   (Unsure as pt unable to answer at time of evaluation )   General   Family/Caregiver Present No   Cognition   Overall Cognitive Status Impaired   Arousal/Participation Responsive   Attention Attends with cues to redirect   Orientation Level Oriented to person   Memory Decreased recall of recent events   Following Commands Follows one step commands with increased time or repetition   Comments Pt lethargic, however increased alertness with conversation throughout session  Pt answered most questions with "I dont remember", unable to obtain full history and PLOF at this time  Limited understanding with MMT  Subjective   Subjective Pt very lethargic at start of evaluation and unable to answer most questions  Pt stated "my mouth is so dry" throughout session  RUE Assessment   RUE Assessment X  (shoulder flexion AROM <90deg)   LUE Assessment   LUE Assessment (S)  X  (shoulder flexion AROM <90deg;  Pt presented with substantial swelling in L arm and hand, did not actively use arm/hand throughout session with transfers)   Strength RLE   R Hip Flexion 3/5  (tested to 3; hip flexion PROM, however pt able to actively maintain hip flexion)   R Knee Extension 3/5  (AROM tested to 3 in seated)   R Ankle Dorsiflexion 3/5  (tested to 3; PROM to neutral, however pt able to actively maintain neutral)   Strength LLE   L Hip Flexion 3/5  (tested to 3; hip flexion PROM, however pt able to actively maintain hip flexion)   L Knee Extension 3/5  (AROM tested to 3 in seated)   L Ankle Dorsiflexion 3/5  (tested to 3; PROM to neutral, however pt able to actively maintain neutral)   Sharp/Dull   RLE Sharp/Dull Grossly intact  (Pt with limited understandig of directions, however able to feel light touch aorund L4, L5 and S1 dermatomes )   LLE Sharp/Dull Grossly intact  (Pt with limited understandig of directions, however able to feel light touch aorund L4, L5 and S1 dermatomes )   Bed Mobility   Supine to Sit 4  Minimal assistance   Additional items Assist x 1;HOB elevated; Increased time required;Verbal cues  (verbal instruction for LE management)   Sit to Supine Unable to assess   Additional items   (Pt seated in bedside chair at end of evaluation )   Additional Comments Pt reported increasing SOB when supine, BP taken L arm 204/85  BP taken R arm 182/87  RN notified of BP, SOB subsided before continued transfer evaluation  Pt sat EOB for ~10mins, /78  Transfers   Sit to Stand 4  Minimal assistance   Additional items Assist x 1; Armrests; Increased time required;Verbal cues  (verbal instruction for hand placement)   Stand to Sit 4  Minimal assistance   Additional items Assist x 1; Armrests; Increased time required   Stand pivot 3  Moderate assistance   Additional items Assist x 1; Armrests; Increased time required;Verbal cues  (verbal cues for hand placement)   Ambulation/Elevation   Gait pattern Not appropriate  (due to decreased endurance and increasing lethargy)   Balance   Static Sitting Fair  (Pt sat EOB ~10mins, degradation in sitting balance to poor+ )   Static Standing Poor +   Endurance Deficit   Endurance Deficit Yes   Endurance Deficit Description increasing SOB, hypertensive episodes when supine, degradation of sitting balance, limited activity tolerance and increasing lethargy throughout session   Activity Tolerance   Activity Tolerance Patient limited by fatigue   Nurse Made Aware RN re: hypertensive episodes and L forearm/hand swelling   Assessment   Prognosis Fair   Problem List Decreased strength;Decreased endurance; Impaired balance;Decreased mobility; Decreased cognition; Impaired hearing   Assessment Pt is a 79 y o  female seen for PT evaluation s/p admit to [facility] on [facility]/27/2023 w/ Altered mental status  Pertinent PMH includes: afib, HTN, COPD, and hx of recent admissions  Prior to LAST admission 02/21/2023, per case management note pt was living alone in bi-level home with 7 SWETA, requiring SPC for ambulation  Prior to THIS admission, pt was admitted from [facility] and used RW for ambulation at [facility]  Upon evaluation, Pt was able to perform bed mobility and STS transfers with [place], however required modA for stand-pivot transfers  Pt c/o increasing lethargy and SOB with hypertensive episodes in supine  Pt presented with the following deficits including, but not limited to: decreased strength, decreased cognition, and decreased endurance coupled with SOB and hypertensive episodes  DC recommendation is for post acute inpatient rehab in order to decreased risk of falls and return to previous level of function  Barriers to Discharge Inaccessible home environment;Decreased caregiver support   Goals   Patient Goals none stated at this time   STG Expiration Date 03/14/23   Short Term Goal #1 Pt will: Perform bed mobility tasks to supervision to prepare for transfers and reposition self in bed  Perform transfers to consistent A of 1 to decrease burden of care and decrease risk for falls  Perform ambulation with LRAD for 10ft to consistent A of 1 To increase Indep in home environment  PT Treatment Day 0   Plan   Treatment/Interventions Functional transfer training;LE strengthening/ROM; Therapeutic exercise; Endurance training;Cognitive reorientation;Patient/family training;Equipment eval/education; Bed mobility;Spoke to nursing   PT Frequency 3-5x/wk   Recommendation   PT Discharge Recommendation Post acute rehabilitation services   Equipment Recommended   (TBD pending mobility progress)   Additional Comments Skilled PT recommendations include continued mobility trials using LRAD, LE strengthening, balance activities, and functional mobility training to address above deficits and increase level of functional independence  Recommend nursing mobilize pt OOB to target surfaces w/ modAx1 and stand-pivot transfers  [facility] Basic Mobility Inpatient   Turning in Flat Bed Without Bedrails 3   Lying on Back to Sitting on Edge of Flat Bed Without Bedrails 3   Moving Bed to Chair 2   Standing Up From Chair Using Arms 3   Walk in Room 1   Climb 3-5 Stairs With Railing 1   Basic Mobility Inpatient Raw Score 13   Basic Mobility Standardized Score 33 99   Highest Level Of Mobility   JH-HLM Goal 4: Move to chair/commode   JH-HLM Achieved 4: Move to chair/commode   End of Consult   Patient Position at End of Consult Bedside chair;Bed/Chair alarm activated; All needs within reach  (speech therapy beginning session)   High Complexity Evaluation   (Please find full objective findings from PT assessment regarding body systems outlined above)  The patient's [facility] Basic Mobility Inpatient Short Form Raw Score is 13  A Raw score of less than or equal to 16 suggests the patient may benefit from discharge to post-acute rehabilitation services, which DOES coincide with CURRENT above PT recommendations  However please refer to therapist recommendation for discharge planning given other factors that may influence destination  Adapted from [employer] [facility] “6-Clicks” Basic Mobility and Daily Activity Scores With Discharge Destination  Physical Therapy, 2021;101:1-9  DOI: [email or web address]    Portions of the record may have been created with voice recognition software  Occasional wrong word or "sound a like" substitutions may have occurred due to the inherent limitations of voice recognition software    Read the chart carefully and recognize, using context, where substitutions have occurred    Jessika Martinez SPT

## 2023-03-04 NOTE — ASSESSMENT & PLAN NOTE
· Suspect large component due to profound grief and depression  · Continue supportive cares  · See additional recommendations below

## 2023-03-04 NOTE — ASSESSMENT & PLAN NOTE
· Patient name of some shortness of breath on 3/3/2023  She was receiving IV fluids in the setting of poor p o  intake and failure to thrive, there was concern for some element of volume overload with pulmonary edema  She was given 20 mg of IV Lasix with good response    · Monitor off further diuretic

## 2023-03-04 NOTE — PROGRESS NOTES
NEUROLOGY RESIDENCY PROGRESS NOTE     Name: Delio Aparicio   Age & Sex: 79 y o  female   MRN: [id number]  Unit/Bed#: S -01   Encounter: [id number]    Delio Aparicio will need follow up in in 6 weeks with general neurology attending or advanced practitioner  She will not require outpatient neurological testing  Pending for discharge: vitamin levels, thiamine supplementation, PT/OT, psych consultation  No additional inpatient neurology recommendations  Please contact with questions or concerns  ASSESSMENT & PLAN     Altered mental status  Assessment & Plan  This is a 79 y o  female with history of APS and A-fib on Coumadin, COPD, hyperlipidemia, HTN, and hypothyroidism  Presented on 2/27/23 with altered mental status  Had another recent admission 2/21/23 - 2/24/23 for failure to thrive  Has had precipitous decline since family member passed in January  She has stopped eating/drinking, stopped smoking, no longer drinking coffee, has made comments about dying, and has had delusions regarding lack of insurance and financial issues  During admission she was started on Abilify, Remeron, and Losartan  Initially had some improvement but then had worsening mental status leading to current admission  Initially thought to be TME due to polypharmacy  Abilify discontinued without change  During initial consultation evaluation on 3/4, patient was obtunded  She would moan and sometimes provide one-word answers  She was able to follow one-step commands with encouragement  Cranial nerves were intact  Strength was 5/5 with encouragement  Withdraws appropriately to painful stimuli in all four extremities  Her tone is normal - no waxy rigidity such as what is seen in catatonia  Today, patient's was seen while sitting in recliner  She was more alert and required only verbal stimuli to maintain wakefulness for the encounter  She was able to give a few one-word answers  Can follow commands   No other significant changes to exam     Based on presentation, EEG was recommended to evaluate for seizure potential  Her presentation would be unusual for seizure  Routine EEG was completed afternoon of 3/4 and was mildly abnormal due to too slow PDR which is non-specific  No epileptiform discharges or seizure activity noted  MRI was non-acute  There is evidence of moderate chronic microangiopathy and 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 which was known  Labs overall have been bland with no significant abnormalities that would cause this level of change  Based on discussion with family, presentation, and workup completed, do not suspect a primary neurological etiology for her symptoms  Most likely this is psychiatric in nature related to bereavement and adjustment disorder  Will check for vitamin deficiencies as she has not eaten well the last few weeks and this would be relatively easy to correct and could improve her condition  Plan:   - Check vitamin D, thiamine, and vitamin E levels  Vitamin B12 was in the 800's last week so no need to recheck today  - Start thiamine supplementation tomorrow (to start after vitamin B1 lab is drawn)  - Recommend psychiatry to reevaluate as they have not seen patient since 2/28 where they signed off  Workup completed since then has been unrevealing and suspect underlying psychiatric condition to be contributing to her presentation   - Delirium precautions  - Will likely need PT/OT when able to participate  - Remainder of care as per primary team  - No further inpatient neurology recommendations  Please contact with questions or concerns  SUBJECTIVE     Patient was seen and examined  No acute events overnight  Patient was in bedside recliner today  She was initially sleeping but aroused to voice only  She is unable to contribute to history today  Review of Systems   Unable to perform ROS: Mental status change       OBJECTIVE     Patient ID: Sandra Sun is a 79 y  o  female  Vitals:    23 0757 23 0910 23 0912 23 0924   BP: (!) 197/84 (!) 204/85 (!) 182/87 165/78   Pulse: 80 79 82 76   Resp:       Temp: 97 7 °F (36 5 °C)      TempSrc:       SpO2: 96% 93% 94% 94%   Weight:       Height:          Temperature:   Temp (24hrs), Av 9 °F (36 6 °C), Min:97 °F (36 1 °C), Max:99 3 °F (37 4 °C)    Temperature: 97 7 °F (36 5 °C)      Physical Exam  Vitals and nursing note reviewed  Constitutional:       Appearance: She is normal weight  She is ill-appearing  HENT:      Head: Normocephalic and atraumatic  Right Ear: External ear normal       Left Ear: External ear normal       Nose: Nose normal       Mouth/Throat:      Mouth: Mucous membranes are dry  Pharynx: Oropharynx is clear  Eyes:      General: No scleral icterus  Extraocular Movements: Extraocular movements intact and EOM normal       Conjunctiva/sclera: Conjunctivae normal       Pupils: Pupils are equal, round, and reactive to light  Cardiovascular:      Rate and Rhythm: Normal rate  Pulses: Normal pulses  Pulmonary:      Effort: Tachypnea present  Comments: Breathing is mildly labored, appears short of breath  SpO2 is stable  Abdominal:      General: Abdomen is flat  There is no distension  Musculoskeletal:      Cervical back: Neck supple  Right lower leg: No edema  Left lower leg: No edema  Comments: + LUE edema (IV infiltrated)   Skin:     General: Skin is warm and dry  Neurological:      Mental Status: She is easily aroused  She is lethargic  Deep Tendon Reflexes:      Reflex Scores:       Bicep reflexes are 2+ on the right side and 2+ on the left side  Brachioradialis reflexes are 2+ on the right side and 2+ on the left side  Patellar reflexes are 1+ on the right side and 1+ on the left side  Achilles reflexes are 1+ on the right side and 1+ on the left side  Psychiatric:         Mood and Affect: Mood is depressed   Affect is flat  Speech: Speech is delayed  Behavior: Behavior is slowed and withdrawn  Cognition and Memory: Cognition is impaired  Comments: Withdrawn  Limited interaction  Limited speech production  Neurologic Exam     Mental Status   Oriented to person  Oriented to place  (Says "hospital")  Disoriented to time  Disoriented to month  Oriented to year  Follows 1 step commands  Level of consciousness: arousable by verbal stimuli  Knowledge: poor  Able to name object  Speech is slowed and deliberate  Paucity of speech production  Cranial Nerves     CN II   Visual fields full to confrontation  CN III, IV, VI   Pupils are equal, round, and reactive to light  Extraocular motions are normal    Nystagmus: none   Upgaze: normal  Downgaze: normal  Conjugate gaze: present    CN V   Facial sensation intact  CN VIII   Hearing: intact    CN XII   CN XII normal      Face appears symmetric     Motor Exam Tone is normal  Can hold arms antigravity for 10 seconds and legs antigravity for 5 seconds  Sensory Exam   Light touch normal      Gait, Coordination, and Reflexes     Reflexes   Right brachioradialis: 2+  Left brachioradialis: 2+  Right biceps: 2+  Left biceps: 2+  Right patellar: 1+  Left patellar: 1+  Right achilles: 1+  Left achilles: 1+  Right plantar: normal  Left plantar: normal       LABORATORY DATA     Labs: I have personally reviewed pertinent reports      Results from last 7 days   Lab Units 03/04/23  0554 03/03/23  0831 03/02/23  1401   WBC Thousand/uL 6 67 6 46 5 21   HEMOGLOBIN g/dL 12 0 11 1* 14 5   HEMATOCRIT % 36 5 33 7* 44 1   PLATELETS Thousands/uL 244 203 204   NEUTROS PCT % 81* 80* 78*   MONOS PCT % 5 6 5      Results from last 7 days   Lab Units 03/04/23  0554 03/03/23  0502 03/02/23  0509 02/28/23  0516 02/27/23  0324   SODIUM mmol/L 136 141 139   < > 145   POTASSIUM mmol/L 3 5 3 9 3 1*   < > 3 3*   CHLORIDE mmol/L 94* 97 94*   < > 97   CO2 mmol/L 36* 38* 43*   < > 37*   BUN mg/dL 15 13 14   < > 31*   CREATININE mg/dL 0 39* 0 34* 0 39*   < > 0 53*   CALCIUM mg/dL 8 4 8 3* 8 4   < > 9 1   ALK PHOS U/L 65 59  --   --  66   ALT U/L 16 14  --   --  14   AST U/L 22 19  --   --  23    < > = values in this interval not displayed  Results from last 7 days   Lab Units 03/04/23  0554 03/03/23  0502 02/28/23  0516   MAGNESIUM mg/dL 1 9 1 9 2 3     Results from last 7 days   Lab Units 02/28/23  0516   PHOSPHORUS mg/dL 2 3      Results from last 7 days   Lab Units 03/04/23  0554 03/01/23  0539 02/28/23  0516 02/27/23  0324   INR  1 60* 4 00* 5 66* 4 60*   PTT seconds  --   --   --  47*     Results from last 7 days   Lab Units 02/27/23  0332   LACTIC ACID mmol/L 0 6           IMAGING & DIAGNOSTIC TESTING     Radiology Results: I have personally reviewed pertinent reports  and I have personally reviewed pertinent films in PACS    CTA head and neck with and without contrast  Result Date: 2/27/2023  Impression: No acute intracranial abnormality  Moderate chronic microangiopathy  Negative CTA head and neck for large vessel occlusion, dissection, or high-grade stenosis  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 projecting laterally in between left posterior cerebral artery and left superior cerebellar  Recommend consultation with the [facility] at [phone]  Incidental thyroid nodule(s) for which nonemergent thyroid ultrasound is recommended  Additional chronic/incidental findings as detailed above  The study was marked in [facility] for immediate notification  Workstation performed: [id number]     XR chest portable  Result Date: 3/3/2023  Impression: No pneumonia or definitive findings of pulmonary edema  Workstation performed: [id number]     XR chest portable  Result Date: 3/3/2023  Impression: Subsegmental atelectasis of the right lung base   Workstation performed: [id number]     CT head wo contrast  Result Date: 3/2/2023  Impression: No acute intracranial abnormality  Moderate chronic microangiopathy  Known 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 is better evaluated on CTA head and neck 2/27/2023  Workstation performed: [id number]     MRI brain wo contrast  Result Date: 3/3/2023  Impression: No evidence of acute infarct, intracranial hemorrhage or mass  Workstation performed: [id number]     PE Study with CT abdomen & pelvis with contrast  Result Date: 2/27/2023  Impression: No pulmonary embolism seen There is a new right basilar density with associated few branching nodular density in the right middle lobe and a 5 mm nodular density superior segment left lower lobe, indeterminate  These may be on inflammatory/infectious basis /pneumonia   Short interval follow-up chest CT at 3 months suggested to demonstrate resolution No acute inflammatory stranding within the abdomen Right-sided inguinal/femoral hernia, stable without bowel obstruction Fibroid uterus The study was marked in EP[employer] for immediate notification  Workstation performed: [id number]       Other Diagnostic Testing: I have personally reviewed pertinent reports  EEG Interpretation: This Routine EEG recorded during wakefulness and drowsiness is abnormal  Background activities and posteriorly dominant rhythm are too slow suggesting mild diffuse cerebral dysfunction of nonspecific etiology       ACTIVE MEDICATIONS     Current Facility-Administered Medications   Medication Dose Route Frequency   • albuterol inhalation solution 2 5 mg  2 5 mg Nebulization Q4H PRN   • aspirin chewable tablet 81 mg  81 mg Oral Daily   • atorvastatin (LIPITOR) tablet 40 mg  40 mg Oral QPM   • diltiazem (CARDIZEM CD) 24 hr capsule 240 mg  240 mg Oral Daily   • Fluticasone Furoate-Vilanterol 100-25 mcg/actuation 1 puff  1 puff Inhalation Daily   • hydrALAZINE (APRESOLINE) injection 10 mg  10 mg Intravenous Q6H PRN   • levothyroxine tablet 25 mcg  25 mcg Oral Daily   • lidocaine (LIDODERM) 5 % patch 1 patch  1 patch Topical Daily   • losartan (COZAAR) tablet 50 mg  50 mg Oral Daily   • mirtazapine (REMERON) tablet 7 5 mg  7 5 mg Oral HS   • multi-electrolyte (PLASMALYTE-A/ISOLYTE-S PH 7 4) IV solution  75 mL/hr Intravenous Continuous   • sertraline (ZOLOFT) tablet 50 mg  50 mg Oral Daily   • spironolactone (ALDACTONE) tablet 25 mg  25 mg Oral Daily   • [START ON 3/5/2023] thiamine (VITAMIN B1) 100 mg in sodium chloride 0 9 % 50 mL IVPB  100 mg Intravenous Daily   • umeclidinium 62 5 mcg/actuation inhaler AEPB 1 puff  1 puff Inhalation Daily   • warfarin (COUMADIN) tablet 5 mg  5 mg Oral Daily (warfarin)       Prior to Admission medications    Medication Sig Start Date End Date Taking?  Authorizing Provider   Advair Diskus 250-50 MCG/DOSE inhaler USE 1 INHALATION TWICE A DAY 2/0/09   Leslie Vasquez MD   albuterol (2 5 mg/3 mL) 0 083 % nebulizer solution USE 1 VIAL IN NEBULIZER EVERY 4 TO 6 HOURS AS NEEDED FOR COUGH AND WHEEZE 6/00/94   Leslie Vasquez MD   ARIPiprazole (ABILIFY) 2 mg tablet Take 1 tablet (2 mg total) by mouth daily Do not start before February 25, 2023 2/25/23   Katelin Siegel MD   Ascorbic Acid (VITAMIN C) 1000 MG tablet Take 1,000 mg by mouth daily    Historical Provider, MD   Cholecalciferol (VITAMIN D3) 20 MCG (800 UNIT) TABS Take by mouth    Historical Provider, MD   denosumab (PROLIA) 60 mg/mL Inject 60 mg under the skin once 6 months    Historical Provider, MD   digoxin (LANOXIN) 0 125 mg tablet Take 1 tablet (125 mcg total) by mouth daily Do not start before February 25, 2023 2/25/23   Katelin Siegel MD   diltiazem (CARDIZEM CD) 240 mg 24 hr capsule Take 1 capsule (240 mg total) by mouth daily Do not start before February 25, 2023 2/25/23   Katelin Siegel MD   Ferrous Sulfate 220 (44 Fe) MG/5ML LIQD take 1 teaspoonful by mouth once daily 2/68/26   Leslie Vasquez MD   hydrochlorothiazide (HYDRODIURIL) 12 5 mg tablet Take 1 tablet (12 5 mg total) by mouth daily 0/12/99   Sandra Lyons MD   levothyroxine 25 mcg tablet TAKE 1 TABLET DAILY 35/65/78   Sandra Lyons MD   lidocaine (LIDODERM) 5 % Apply 1 patch topically daily Remove & Discard patch within 12 hours or as directed by MD 9/26/30   Sandra Lyons MD   loratadine (CLARITIN) 10 mg tablet Take 10 mg by mouth daily    Historical Provider, MD   losartan (COZAAR) 25 mg tablet Take 1 tablet (25 mg total) by mouth daily Do not start before February 25, 2023 2/25/23   Tammy Mendoza MD   mirtazapine (REMERON) 7 5 MG tablet Take 1 tablet (7 5 mg total) by mouth daily at bedtime 2/24/23   Tammy Mendoza MD   multivitamin [facility]) TABS Take 1 tablet by mouth daily    Historical Provider, MD   sertraline (Zoloft) 50 mg tablet Take 1 tablet (50 mg total) by mouth daily 2/43/66   Sandra Lyons MD   Spiriva HandiHaler 18 MCG inhalation capsule INHALE THE CONTENTS OF 1 CAPSULE DAILY AS DIRECTED 7/85/10   Sandra Lyons MD   warfarin (COUMADIN) 5 mg tablet TAKE 1 TABLET DAILY 0/09/66   Sandra Lyons MD         VTE Pharmacologic Prophylaxis: Warfarin (Coumadin)  VTE Mechanical Prophylaxis: sequential compression device    ======    I have discussed the patient's history, physical exam findings, assessment, and plan in detail with attending, Dr Kristine Vega    Thank you for allowing me to participate in the care of your patient, Iftikhartead Shells Harlo Olszewski, DO  [facility], PGY-3

## 2023-03-04 NOTE — ASSESSMENT & PLAN NOTE
· On coumadin  Goal INR 2-3  INR initially supratherapeutic, coumadin held  No active evidence of bleeding  · Restart Coumadin 5mg daily    INR is subtherapeutic - INR 1 6 (3/4/23)  · Daily INR

## 2023-03-05 LAB
ANION GAP SERPL CALCULATED.3IONS-SCNC: 7 MMOL/L (ref 4–13)
BUN SERPL-MCNC: 17 MG/DL (ref 5–25)
CALCIUM SERPL-MCNC: 8.3 MG/DL (ref 8.4–10.2)
CHLORIDE SERPL-SCNC: 94 MMOL/L (ref 96–108)
CO2 SERPL-SCNC: 36 MMOL/L (ref 21–32)
CREAT SERPL-MCNC: 0.38 MG/DL (ref 0.6–1.3)
ERYTHROCYTE [DISTWIDTH] IN BLOOD BY AUTOMATED COUNT: 12.4 % (ref 11.6–15.1)
GFR SERPL CREATININE-BSD FRML MDRD: 97 ML/MIN/1.73SQ M
GLUCOSE SERPL-MCNC: 66 MG/DL (ref 65–140)
HCT VFR BLD AUTO: 35.2 % (ref 34.8–46.1)
HGB BLD-MCNC: 11.8 G/DL (ref 11.5–15.4)
INR PPP: 1.95 (ref 0.84–1.19)
MCH RBC QN AUTO: 33 PG (ref 26.8–34.3)
MCHC RBC AUTO-ENTMCNC: 33.5 G/DL (ref 31.4–37.4)
MCV RBC AUTO: 98 FL (ref 82–98)
PLATELET # BLD AUTO: 236 THOUSANDS/UL (ref 149–390)
PMV BLD AUTO: 9 FL (ref 8.9–12.7)
POTASSIUM SERPL-SCNC: 3.3 MMOL/L (ref 3.5–5.3)
PROTHROMBIN TIME: 22.5 SECONDS (ref 11.6–14.5)
RBC # BLD AUTO: 3.58 MILLION/UL (ref 3.81–5.12)
SODIUM SERPL-SCNC: 137 MMOL/L (ref 135–147)
WBC # BLD AUTO: 9.03 THOUSAND/UL (ref 4.31–10.16)

## 2023-03-05 RX ORDER — POTASSIUM CHLORIDE 20 MEQ/1
40 TABLET, EXTENDED RELEASE ORAL 2 TIMES DAILY
Status: COMPLETED | OUTPATIENT
Start: 2023-03-05 | End: 2023-03-05

## 2023-03-05 RX ADMIN — POTASSIUM CHLORIDE 40 MEQ: 1500 TABLET, EXTENDED RELEASE ORAL at 17:20

## 2023-03-05 RX ADMIN — LEVOTHYROXINE SODIUM 25 MCG: 25 TABLET ORAL at 09:34

## 2023-03-05 RX ADMIN — UMECLIDINIUM 1 PUFF: 62.5 AEROSOL, POWDER ORAL at 09:38

## 2023-03-05 RX ADMIN — SPIRONOLACTONE 25 MG: 25 TABLET ORAL at 09:34

## 2023-03-05 RX ADMIN — ATORVASTATIN CALCIUM 40 MG: 40 TABLET, FILM COATED ORAL at 17:19

## 2023-03-05 RX ADMIN — LIDOCAINE 5% 1 PATCH: 700 PATCH TOPICAL at 09:34

## 2023-03-05 RX ADMIN — LOSARTAN POTASSIUM 50 MG: 50 TABLET, FILM COATED ORAL at 09:34

## 2023-03-05 RX ADMIN — WARFARIN SODIUM 5 MG: 5 TABLET ORAL at 17:19

## 2023-03-05 RX ADMIN — POTASSIUM CHLORIDE 40 MEQ: 1500 TABLET, EXTENDED RELEASE ORAL at 09:34

## 2023-03-05 RX ADMIN — SERTRALINE HYDROCHLORIDE 50 MG: 50 TABLET ORAL at 09:34

## 2023-03-05 RX ADMIN — DILTIAZEM HYDROCHLORIDE 240 MG: 240 CAPSULE, COATED, EXTENDED RELEASE ORAL at 09:35

## 2023-03-05 RX ADMIN — THIAMINE HYDROCHLORIDE 100 MG: 100 INJECTION, SOLUTION INTRAMUSCULAR; INTRAVENOUS at 10:52

## 2023-03-05 NOTE — ASSESSMENT & PLAN NOTE
· Initially thought to be secondary to adverse medication side effects with up titration of her multiple antidepressants recently  Abilify was discontinued earlier on in the admission  Initially she had improvement in her mentation though continues to have intermittent episodes of obtundation without a clear organic cause  Infectious and metabolic work-up essentially unrevealing  · Would appreciate behavioral health reevaluate patient this admission and Monday, 3/6/2023  · Appreciate neurology recs    EEG without epileptiform discharges though grossly abnormal suggestive of diffuse cerebral dysfunction  · Continue supportive cares, advance diet as tolerated: Dysphagia 1 Pureed 1 ; Honey Thick Liquid with close monitoring  · Maintain aspiration precautions

## 2023-03-05 NOTE — PLAN OF CARE
Problem: MOBILITY - ADULT  Goal: Maintain or return to baseline ADL function  Description: INTERVENTIONS:  -  Assess patient's ability to carry out ADLs; assess patient's baseline for ADL function and identify physical deficits which impact ability to perform ADLs (bathing, care of mouth/teeth, toileting, grooming, dressing, etc )  - Assess/evaluate cause of self-care deficits   - Assess range of motion  - Assess patient's mobility; develop plan if impaired  - Assess patient's need for assistive devices and provide as appropriate  - Encourage maximum independence but intervene and supervise when necessary  - Involve family in performance of ADLs  - Assess for home care needs following discharge   - Consider OT consult to assist with ADL evaluation and planning for discharge  - Provide patient education as appropriate  Outcome: Progressing  Goal: Maintains/Returns to pre admission functional level  Description: INTERVENTIONS:  - Perform BMAT or MOVE assessment daily    - Set and communicate daily mobility goal to care team and patient/family/caregiver  - Collaborate with rehabilitation services on mobility goals if consulted  - Perform Range of Motion 2 times a day  - Reposition patient every 2 hours    - Dangle patient 2 times a day  - Stand patient 2 times a day  - Ambulate patient 2 times a day  - Out of bed to chair 2 times a day   - Out of bed for meals 2 times a day  - Out of bed for toileting  - Record patient progress and toleration of activity level   Outcome: Progressing     Problem: Prexisting or High Potential for Compromised Skin Integrity  Goal: Skin integrity is maintained or improved  Description: INTERVENTIONS:  - Identify patients at risk for skin breakdown  - Assess and monitor skin integrity  - Assess and monitor nutrition and hydration status  - Monitor labs   - Assess for incontinence   - Turn and reposition patient  - Assist with mobility/ambulation  - Relieve pressure over bony prominences  - Avoid friction and shearing  - Provide appropriate hygiene as needed including keeping skin clean and dry  - Evaluate need for skin moisturizer/barrier cream  - Collaborate with interdisciplinary team   - Patient/family teaching  - Consider wound care consult   Outcome: Progressing     Problem: NEUROSENSORY - ADULT  Goal: Achieves stable or improved neurological status  Description: INTERVENTIONS  - Monitor and report changes in neurological status  - Monitor vital signs such as temperature, blood pressure, glucose, and any other labs ordered   - Initiate measures to prevent increased intracranial pressure  - Monitor for seizure activity and implement precautions if appropriate      Outcome: Progressing  Goal: Achieves maximal functionality and self care  Description: INTERVENTIONS  - Monitor swallowing and airway patency with patient fatigue and changes in neurological status  - Encourage and assist patient to increase activity and self care     - Encourage visually impaired, hearing impaired and aphasic patients to use assistive/communication devices  Outcome: Progressing     Problem: RESPIRATORY - ADULT  Goal: Achieves optimal ventilation and oxygenation  Description: INTERVENTIONS:  - Assess for changes in respiratory status  - Assess for changes in mentation and behavior  - Position to facilitate oxygenation and minimize respiratory effort  - Oxygen administered by appropriate delivery if ordered  - Initiate smoking cessation education as indicated  - Encourage broncho-pulmonary hygiene including cough, deep breathe, Incentive Spirometry  - Assess the need for suctioning and aspirate as needed  - Assess and instruct to report SOB or any respiratory difficulty  - Respiratory Therapy support as indicated  Outcome: Progressing     Problem: Nutrition/Hydration-ADULT  Goal: Nutrient/Hydration intake appropriate for improving, restoring or maintaining nutritional needs  Description: Monitor and assess patient's nutrition/hydration status for malnutrition  Collaborate with interdisciplinary team and initiate plan and interventions as ordered  Monitor patient's weight and dietary intake as ordered or per policy  Utilize nutrition screening tool and intervene as necessary  Determine patient's food preferences and provide high-protein, high-caloric foods as appropriate       INTERVENTIONS:  - Monitor oral intake, urinary output, labs, and treatment plans  - Assess nutrition and hydration status and recommend course of action  - Evaluate amount of meals eaten  - Assist patient with eating if necessary   - Allow adequate time for meals  - Recommend/ encourage appropriate diets, oral nutritional supplements, and vitamin/mineral supplements  - Order, calculate, and assess calorie counts as needed  - Recommend, monitor, and adjust tube feedings and TPN/PPN based on assessed needs  - Assess need for intravenous fluids  - Provide specific nutrition/hydration education as appropriate  - Include patient/family/caregiver in decisions related to nutrition  Outcome: Progressing

## 2023-03-05 NOTE — SPEECH THERAPY NOTE
Speech Language/Pathology     Speech/Language Pathology Progress Note     Patient Name: Pete CUNNINGHAMIMP'H Date: 3/5/2023     Problem List  Principal Problem:    Failure to thrive in adult  Active Problems:    Antiphospholipid antibody syndrome (HCC)    Atrial fibrillation (HCC)    Hypothyroidism    Altered mental status    Abnormal CT of the chest    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HCC)    Thyroid nodule    SOB (shortness of breath)        Recommendations:   Diet: puree/level 1 diet and honey thick liquids   Meds: whole with puree ; may cut/crush larger   Feeding Assistance: tray set up w/ assist   Frequent Oral care: 2-4x/day  Aspiration precautions and compensatory swallowing strategies: upright posture, only feed when fully alert, slow rate of feeding, small bites/sips and alternating bites and sips **Slow pacing   Other Recommendations/ considerations: SLP will follow up for diet advancement or adjustement / determine need for instrumental measures 1-3x     Subjective:  Patient received awake and alert, "I'm so dry"  Gentle oral care rendered during which pt maintains adequate JILLIAN  This improves oral range of motion/speech clarity significantly  Previous/current diet: NPO    Objective: The following consistencies were tested puree, mech soft, honey thick, nectar thick, thin liquids  Patient presents with functional bolus retrieval and containment  Adequtae manipulation and control for all textures w/ exception of moistened solids  Mastication judged to be prolonged w/ increased work of breath/fatigue  Transfer prolonged, difficulty advancing chewables  Moderate oral residue collects across lingual surface (moisetned/chewable solids)  Minimal retention w/ puree and all liquids  Laryngeal rise palpated, swallow initiation appears delayed  Patient with notably weak and delayed cough following thin and nectar thick trials    Increased work of breath with same (able to utter only 1-2 words at a time, breathy vocal quality)  No overt s/s of aspiration or distress w/ single sips of honey thick and puree solids  Respiratory rate and O2 saturation remains WNL  Assessment:   Oral and suspected pharyngeal dysphagia  Maintain strict aspiration precautions  Plan: Will continue follow as indicated         Yahir Ho [address], [id number] [facility]  Speech-Language Pathologist  [place] #[id number]  [place] #[id number]

## 2023-03-05 NOTE — PROGRESS NOTES
[facility]  Progress Note - Jessica Millan 1940, 79 y o  female MRN: [id number]  Unit/Bed#: S -01 Encounter: [id number]  Primary Care Provider: Tana Tyson MD   Date and time admitted to hospital: 2/27/2023  3:02 AM    Altered mental status  Assessment & Plan  · Initially thought to be secondary to adverse medication side effects with up titration of her multiple antidepressants recently  Abilify was discontinued earlier on in the admission  Initially she had improvement in her mentation though continues to have intermittent episodes of obtundation without a clear organic cause  Infectious and metabolic work-up essentially unrevealing  · Would appreciate behavioral health reevaluate patient this admission and Monday, 3/6/2023  · Appreciate neurology recs  EEG without epileptiform discharges though grossly abnormal suggestive of diffuse cerebral dysfunction  · Continue supportive cares, advance diet as tolerated: Dysphagia 1 Pureed 1 ; Honey Thick Liquid with close monitoring  · Maintain aspiration precautions          * Failure to thrive in adult  Assessment & Plan  · Suspect large component due to profound grief and depression  · Continue supportive cares  · See additional recommendations below      SOB (shortness of breath)  Assessment & Plan  · Patient name of some shortness of breath on 3/3/2023  She was receiving IV fluids in the setting of poor p o  intake and failure to thrive, there was concern for some element of volume overload with pulmonary edema  She was given 20 mg of IV Lasix with good response  · Monitor off further diuretic    Abnormal CT of the chest  Assessment & Plan  · PE Study with CT abdomen & pelvis with contrast 2/27/2023:  There is a new right basilar density with associated few branching nodular density in the right middle lobe and a 5 mm nodular density superior segment left lower lobe, indeterminate    These may be on inflammatory/infectious basis /pneumonia   Short interval follow-up chest CT at 3 months suggested to demonstrate resolution  Aneurysm of basilar artery [facility])  Assessment & Plan  Incidental finding on CTA head and neck/  CTA head and neck with and without contrast 2/27/2023:  "No acute intracranial abnormality  Moderate chronic microangiopathy  Negative CTA head and neck for large vessel occlusion, dissection, or high-grade stenosis  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 projecting laterally in between left posterior cerebral artery and left superior cerebellar- repeat CT stable when developed AMS  · Outpatient referral for Neurosurgery follow-up  Will provide on DC  Thyroid nodule  Assessment & Plan  CTA head and neck with and without contrast 2/27/2023:  "No acute intracranial abnormality  Moderate chronic microangiopathy  Negative CTA head and neck for large vessel occlusion, dissection, or high-grade stenosis  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 projecting laterally in between left posterior cerebral artery and left superior cerebellar  Recommend consultation with the [facility], a division of [address] [place] [address] Neuroscience at [phone]  Incidental thyroid nodule(s) for which nonemergent thyroid ultrasound is recommended "    · Outpatient f/u with pcp for non-emergent thyroid u/s    Hypothyroidism  Assessment & Plan  · TSH wnl  · Cont home levothyroxine    Atrial fibrillation (HCC)  Assessment & Plan  · On diltiazem, digoxin, and warfarin at home  · See above regarding AC    Antiphospholipid antibody syndrome (HCC)  Assessment & Plan  · On coumadin  Goal INR 2-3  INR initially supratherapeutic, coumadin held  No active evidence of bleeding  · Restart Coumadin 5mg daily    INR is subtherapeutic - INR 1 6 (3/4/23)  · Daily INR        VTE Pharmacologic Prophylaxis: VTE Score: 6 High Risk (Score >/= 5) - Pharmacological DVT Prophylaxis Ordered: warfarin (Coumadin)  Sequential Compression Devices Ordered  Patient Centered Rounds: I performed bedside rounds with nursing staff today  Discussions with Specialists or Other Care Team Provider:     Education and Discussions with Family / Patient: Updated  (son) via phone  Current Length of Stay: 6 day(s)  Current Patient Status: Inpatient   Discharge Plan: Anticipate discharge in >72 hrs to depending on her clinical improvement    Code Status: Level 2 - DNAR: but accepts endotracheal intubation    Subjective:   Patient states " I am tired " to most questions    Objective:     Vitals:   Temp (24hrs), Av °F (36 7 °C), Min:98 °F (36 7 °C), Max:98 °F (36 7 °C)    Temp:  [98 °F (36 7 °C)] 98 °F (36 7 °C)  HR:  [80-82] 82  Resp:  [16-20] 20  BP: (149-167)/(66-76) 149/66  SpO2:  [92 %-97 %] 92 %  Body mass index is 19 73 kg/m²  Input and Output Summary (last 24 hours): Intake/Output Summary (Last 24 hours) at 3/5/2023 1257  Last data filed at 3/4/2023 1708  Gross per 24 hour   Intake 0 ml   Output --   Net 0 ml       Physical Exam:   Physical Exam  Vitals and nursing note reviewed  Constitutional:       Appearance: She is well-developed  She is ill-appearing  Comments: Cachectic   Slow to respond   Sad affect and effect    HENT:      Head: Normocephalic and atraumatic  Eyes:      Extraocular Movements: Extraocular movements intact  Conjunctiva/sclera: Conjunctivae normal    Cardiovascular:      Rate and Rhythm: Normal rate and regular rhythm  Heart sounds: No murmur heard  Pulmonary:      Effort: Pulmonary effort is normal  No respiratory distress  Breath sounds: Normal breath sounds  No wheezing or rales  Abdominal:      General: There is no distension  Palpations: Abdomen is soft  Tenderness: There is no abdominal tenderness  There is no guarding  Musculoskeletal:         General: No swelling  Cervical back: Neck supple     Skin:     General: Skin is warm and dry  Additional Data:     Labs:  Results from last 7 days   Lab Units 03/05/23  0522 03/04/23  0554   WBC Thousand/uL 9 03 6 67   HEMOGLOBIN g/dL 11 8 12 0   HEMATOCRIT % 35 2 36 5   PLATELETS Thousands/uL 236 244   NEUTROS PCT %  --  81*   LYMPHS PCT %  --  12*   MONOS PCT %  --  5   EOS PCT %  --  1     Results from last 7 days   Lab Units 03/05/23  0522 03/04/23  0554   SODIUM mmol/L 137 136   POTASSIUM mmol/L 3 3* 3 5   CHLORIDE mmol/L 94* 94*   CO2 mmol/L 36* 36*   BUN mg/dL 17 15   CREATININE mg/dL 0 38* 0 39*   ANION GAP mmol/L 7 6   CALCIUM mg/dL 8 3* 8 4   ALBUMIN g/dL  --  3 0*   TOTAL BILIRUBIN mg/dL  --  0 89   ALK PHOS U/L  --  65   ALT U/L  --  16   AST U/L  --  22   GLUCOSE RANDOM mg/dL 66 73     Results from last 7 days   Lab Units 03/05/23  0522   INR  1 95*     Results from last 7 days   Lab Units 02/27/23  0346   POC GLUCOSE mg/dl 67     Results from last 7 days   Lab Units 03/03/23  0502   HEMOGLOBIN A1C % 4 5     Results from last 7 days   Lab Units 03/03/23  0502 02/27/23  0332 02/27/23  0324   LACTIC ACID mmol/L  --  0 6  --    PROCALCITONIN ng/ml 0 12  --  0 11       Lines/Drains:  Invasive Devices     Peripheral Intravenous Line  Duration           Peripheral IV 03/02/23 Right Forearm 2 days                      Imaging: No pertinent imaging reviewed  Recent Cultures (last 7 days):   Results from last 7 days   Lab Units 02/28/23  1534 02/27/23  0332 02/27/23  0324   BLOOD CULTURE   --  No Growth After 5 Days  No Growth After 5 Days     URINE CULTURE  50,000-59,000 cfu/ml Enterococcus faecalis*  <10,000 cfu/ml  --   --        Last 24 Hours Medication List:   Current Facility-Administered Medications   Medication Dose Route Frequency Provider Last Rate   • albuterol  2 5 mg Nebulization Q4H PRN Hillman Meckel, MD     • aspirin  81 mg Oral Daily Kaden Rogers MD     • atorvastatin  40 mg Oral QPM Kaden Rogers MD     • diltiazem  240 mg Oral Daily Lum Ormond Melba Romo MD     • Fluticasone Furoate-Vilanterol  1 puff Inhalation Daily Elizabeth Lewis MD     • hydrALAZINE  10 mg Intravenous Q6H PRN Payam Sharma MD     • levothyroxine  25 mcg Oral Daily Elizabeth Lewis MD     • lidocaine  1 patch Topical Daily Elizabeth Lewis MD     • losartan  50 mg Oral Daily Payam Sharma MD     • mirtazapine  7 5 mg Oral HS Elizabeth Lewis MD     • multi-electrolyte  50 mL/hr Intravenous Continuous Matthew Edouard MD 50 mL/hr (03/05/23 1050)   • potassium chloride  40 mEq Oral BID Matthew Edouard MD     • sertraline  50 mg Oral Daily Elizabeth Lewis MD     • spironolactone  25 mg Oral Daily Matthew Edouard MD     • thiamine  100 mg Intravenous Daily Eloise Oshea  mg (03/05/23 1052)   • umeclidinium  1 puff Inhalation Daily Elizabeth Lewis MD     • warfarin  5 mg Oral Daily (warfarin) Payam Sharma MD          Today, Patient Was Seen By: Matthew Edouard MD    **Please Note: This note may have been constructed using a voice recognition system  **

## 2023-03-05 NOTE — PLAN OF CARE
Recommendations:   Diet: puree/level 1 diet and honey thick liquids   Meds: whole with puree ; may cut/crush larger   Feeding Assistance: tray set up w/ assist   Frequent Oral care: 2-4x/day  Aspiration precautions and compensatory swallowing strategies: upright posture, only feed when fully alert, slow rate of feeding, small bites/sips and alternating bites and sips **Slow pacing   Other Recommendations/ considerations: SLP will follow up for diet advancement or adjustement / determine need for instrumental measures 1-3x

## 2023-03-06 ENCOUNTER — APPOINTMENT (INPATIENT)
Dept: RADIOLOGY | Facility: HOSPITAL | Age: 83
End: 2023-03-06

## 2023-03-06 LAB
ALBUMIN SERPL BCP-MCNC: 2.9 G/DL (ref 3.5–5)
ALP SERPL-CCNC: 62 U/L (ref 34–104)
ALT SERPL W P-5'-P-CCNC: 14 U/L (ref 7–52)
ANION GAP SERPL CALCULATED.3IONS-SCNC: 3 MMOL/L (ref 4–13)
AST SERPL W P-5'-P-CCNC: 27 U/L (ref 13–39)
BASOPHILS # BLD AUTO: 0.01 THOUSANDS/ÂΜL (ref 0–0.1)
BASOPHILS NFR BLD AUTO: 0 % (ref 0–1)
BILIRUB SERPL-MCNC: 0.78 MG/DL (ref 0.2–1)
BUN SERPL-MCNC: 19 MG/DL (ref 5–25)
CALCIUM ALBUM COR SERPL-MCNC: 9 MG/DL (ref 8.3–10.1)
CALCIUM SERPL-MCNC: 8.1 MG/DL (ref 8.4–10.2)
CHLORIDE SERPL-SCNC: 99 MMOL/L (ref 96–108)
CO2 SERPL-SCNC: 36 MMOL/L (ref 21–32)
CREAT SERPL-MCNC: 0.34 MG/DL (ref 0.6–1.3)
EOSINOPHIL # BLD AUTO: 0.04 THOUSAND/ÂΜL (ref 0–0.61)
EOSINOPHIL NFR BLD AUTO: 1 % (ref 0–6)
ERYTHROCYTE [DISTWIDTH] IN BLOOD BY AUTOMATED COUNT: 12.9 % (ref 11.6–15.1)
GFR SERPL CREATININE-BSD FRML MDRD: 101 ML/MIN/1.73SQ M
GLUCOSE SERPL-MCNC: 101 MG/DL (ref 65–140)
HCT VFR BLD AUTO: 34.3 % (ref 34.8–46.1)
HGB BLD-MCNC: 11.3 G/DL (ref 11.5–15.4)
IMM GRANULOCYTES # BLD AUTO: 0.03 THOUSAND/UL (ref 0–0.2)
IMM GRANULOCYTES NFR BLD AUTO: 0 % (ref 0–2)
INR PPP: 1.57 (ref 0.84–1.19)
LYMPHOCYTES # BLD AUTO: 0.53 THOUSANDS/ÂΜL (ref 0.6–4.47)
LYMPHOCYTES NFR BLD AUTO: 8 % (ref 14–44)
MAGNESIUM SERPL-MCNC: 2.2 MG/DL (ref 1.9–2.7)
MCH RBC QN AUTO: 33.2 PG (ref 26.8–34.3)
MCHC RBC AUTO-ENTMCNC: 32.9 G/DL (ref 31.4–37.4)
MCV RBC AUTO: 101 FL (ref 82–98)
MONOCYTES # BLD AUTO: 0.36 THOUSAND/ÂΜL (ref 0.17–1.22)
MONOCYTES NFR BLD AUTO: 5 % (ref 4–12)
NEUTROPHILS # BLD AUTO: 6.02 THOUSANDS/ÂΜL (ref 1.85–7.62)
NEUTS SEG NFR BLD AUTO: 86 % (ref 43–75)
NRBC BLD AUTO-RTO: 0 /100 WBCS
PLATELET # BLD AUTO: 157 THOUSANDS/UL (ref 149–390)
PMV BLD AUTO: 9.7 FL (ref 8.9–12.7)
POTASSIUM SERPL-SCNC: 4 MMOL/L (ref 3.5–5.3)
PROT SERPL-MCNC: 5.5 G/DL (ref 6.4–8.4)
PROTHROMBIN TIME: 19 SECONDS (ref 11.6–14.5)
RBC # BLD AUTO: 3.4 MILLION/UL (ref 3.81–5.12)
SODIUM SERPL-SCNC: 138 MMOL/L (ref 135–147)
WBC # BLD AUTO: 6.99 THOUSAND/UL (ref 4.31–10.16)

## 2023-03-06 RX ORDER — LOSARTAN POTASSIUM 50 MG/1
50 TABLET ORAL DAILY
Status: DISCONTINUED | OUTPATIENT
Start: 2023-03-06 | End: 2023-03-07

## 2023-03-06 RX ORDER — LOSARTAN POTASSIUM 50 MG/1
100 TABLET ORAL DAILY
Status: DISCONTINUED | OUTPATIENT
Start: 2023-03-07 | End: 2023-03-14 | Stop reason: HOSPADM

## 2023-03-06 RX ORDER — MIRTAZAPINE 15 MG/1
7.5 TABLET, FILM COATED ORAL
Status: DISCONTINUED | OUTPATIENT
Start: 2023-03-06 | End: 2023-03-14 | Stop reason: HOSPADM

## 2023-03-06 RX ADMIN — DILTIAZEM HYDROCHLORIDE 240 MG: 240 CAPSULE, COATED, EXTENDED RELEASE ORAL at 07:39

## 2023-03-06 RX ADMIN — SERTRALINE HYDROCHLORIDE 50 MG: 50 TABLET ORAL at 07:40

## 2023-03-06 RX ADMIN — WARFARIN SODIUM 5 MG: 5 TABLET ORAL at 17:05

## 2023-03-06 RX ADMIN — HYDRALAZINE HYDROCHLORIDE 10 MG: 20 INJECTION INTRAMUSCULAR; INTRAVENOUS at 10:44

## 2023-03-06 RX ADMIN — MIRTAZAPINE 7.5 MG: 15 TABLET, FILM COATED ORAL at 22:40

## 2023-03-06 RX ADMIN — THIAMINE HYDROCHLORIDE 100 MG: 100 INJECTION, SOLUTION INTRAMUSCULAR; INTRAVENOUS at 09:31

## 2023-03-06 RX ADMIN — LOSARTAN POTASSIUM 50 MG: 50 TABLET, FILM COATED ORAL at 07:40

## 2023-03-06 RX ADMIN — LIDOCAINE 5% 1 PATCH: 700 PATCH TOPICAL at 09:30

## 2023-03-06 RX ADMIN — LOSARTAN POTASSIUM 50 MG: 50 TABLET, FILM COATED ORAL at 11:53

## 2023-03-06 RX ADMIN — LEVOTHYROXINE SODIUM 25 MCG: 25 TABLET ORAL at 07:40

## 2023-03-06 RX ADMIN — ASPIRIN 81 MG: 81 TABLET, CHEWABLE ORAL at 07:40

## 2023-03-06 RX ADMIN — SPIRONOLACTONE 25 MG: 25 TABLET ORAL at 07:39

## 2023-03-06 NOTE — ASSESSMENT & PLAN NOTE
· Initially thought to be secondary to adverse medication side effects with up titration of her multiple antidepressants recently  Abilify was discontinued earlier on in the admission  Initially she had improvement in her mentation though continues to have intermittent episodes of obtundation without a clear organic cause  Infectious and metabolic work-up essentially unrevealing  · Would appreciate behavioral health reevaluate patient this admission and Monday, 3/6/2023  · Appreciate neurology recs  EEG without epileptiform discharges though grossly abnormal suggestive of diffuse cerebral dysfunction  · Continue supportive cares, advance diet as tolerated: Dysphagia 1 Pureed 1 ; Honey Thick Liquid with close monitoring  · Maintain aspiration precautions  · Does not seem to have AMS, just periods of time where does not want to interact--> will follow exam with encouragement but may not open eyes with sternal rub  · Palliative: Patient does not have palliative or hospice diagnosis at this time  · Psych:   · We will attempt to get different p o  preparation of Remeron so patient may take  · Will need cardiology evaluation to determine if patient is a candidate for Ritalin as she has A-fib with RVR  · Candidate for ketamine?  Ketamine 30 mg PO tabs vs IV 0 5 mg/kg/IV dose x 1 vs Nasal spray 25 mg?

## 2023-03-06 NOTE — CONSULTS
Progress Note - Behavioral Health   Vel Lira 79 y o  female MRN: [id number]  Unit/Bed#: S -01 Encounter: [id number]    Assessment    Principal Psychiatric Problem:  1  Major depressive disorder  a  Differential: Persistent complex bereavement disorder  2  Unspecified anxiety disorder  a  Differential: Generalized anxiety disorder, Anxiety secondary to other medical condition  3  Tobacco use disorder  4  Failure to thrive in adult    Principal Problem:    Failure to thrive in adult  Active Problems:    Antiphospholipid antibody syndrome (HCC)    Atrial fibrillation (HCC)    Hypothyroidism    Altered mental status    Abnormal CT of the chest    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HCC)    Thyroid nodule    SOB (shortness of breath)    Plan:   Discussed with primary team the following,  1  Psychiatry will continue to follow regarding medication and treatment  a  Pending cardiology consult, will consider short course of Ritalin to address depressive symptoms  2  Recommend no changes to psychiatric medications at this time:  a  Continue Remeron 7 5 mg QHS, patient has not received medication x4 days due being NPO and decreased alertness  Confirmed with pharmacy Remeron can be crushed, will adjust timing of medication to 2000   i  Confirmed with pharmacist Remeron Soltab formulation comes only in 15 mg and is unable to be halved/split for 7 5 mg dosing   b  Continue Zoloft 50 mg daily for mood  5  Please reach out to on-call Psychiatry team via [employer] with any questions or concerns  ------------------------------------------------------------    Subjective: Patient evaluated for continuity of care, patient seen at bedside  Patient observed with eyes closed and remained closed for duration of evaluation  Patient able to follow 1 step commands, patient without rigidity or waxy flexibility to suggest symptoms of catatonia  Patient did not engage in answering questions, did respond to painful stimuli with moaning  Patient did ask for water at the end of evaluation  Patient with decreased responsiveness compared to prior evaluation  Patient did not appear to be responding to internal stimuli during evaluation  Family present in room at time of evaluation, son reported what was observed by family over the weekend and during this admission  Patient's son notes patient has "better days," and days of decreased responsiveness, notes patient was talking more yesterday compared to today  Overall notes a decline and decrease in responsive  Family was updated to plan for psychiatric medications      Psychiatric Review of Systems:  Behavior over the last 24 hours: unchanged  Sleep: patient unable to provide response  Appetite: adequate, poor oral intake  Medication side effects: patient unable to provide response  ROS: Review of systems could not be obtained due to patient factors    Vital signs in last 24 hours:  Temp:  [97 6 °F (36 4 °C)-97 9 °F (36 6 °C)] 97 7 °F (36 5 °C)  HR:  [60-71] 71  Resp:  [16-19] 19  BP: (135-195)/(52-72) 147/60    Mental Status Exam:  Appearance:  drowsy, no eye contact, appears stated age, marginal grooming/hygiene, thin and frail appearing, laying in hospital bed   Behavior:  calm, laying in bed and patient kept eyes closed for duration of evaluation, patient with minimal to no responses during evaluation   Motor: no abnormal movements and unable to assess gait and balance as patient remained in hospital bed   Speech:  patient said "water," during evaluation otherwise responded with moaning   Mood:  Unable to obtain due to patient factors   Affect:  Unable to assess   Thought Process:  unable to assess due to patient factors   Thought Content: unable to assess due to patient factors   Perceptual disturbances: does not appear to be responding to internal stimuli at this time   Risk Potential: unable to assess due to patient factors   Cognition: cognition not formally tested and limited assessment due to patient factors, patient able to follow 1 step commands though delayed in response   Insight:  unable to assess secondary to patient factors   Judgment: unable to assess secondary to patient factors     Current Medications: All current medications have been reviewed  Current Facility-Administered Medications   Medication Dose Route Frequency Provider Last Rate   • albuterol  2 5 mg Nebulization Q4H PRN Isaac Zamarripa MD     • diltiazem  240 mg Oral Daily Isaac Zamarripa MD     • Fluticasone Furoate-Vilanterol  1 puff Inhalation Daily Isaac Zamarripa MD     • hydrALAZINE  10 mg Intravenous Q6H PRN Sharlette Goodell, MD     • levothyroxine  25 mcg Oral Daily Iasac Zamarripa MD     • lidocaine  1 patch Topical Daily Isaac Zamarripa MD     • [START ON 3/7/2023] losartan  100 mg Oral Daily John Torres MD     • losartan  50 mg Oral Daily John Torres MD     • mirtazapine  7 5 mg Oral HS Mallory Green DO     • sertraline  50 mg Oral Daily Isaac Zamarripa MD     • spironolactone  25 mg Oral Daily John Torres MD     • thiamine  100 mg Intravenous Daily Eloisekarol Oshea,  mg (03/06/23 0931)   • umeclidinium  1 puff Inhalation Daily Isaac Zamarripa MD     • warfarin  5 mg Oral Daily (warfarin) Sharlette Goodell, MD         Laboratory results:  I have personally reviewed all pertinent laboratory/tests results    Recent Results (from the past 48 hour(s))   Protime-INR    Collection Time: 03/05/23  5:22 AM   Result Value Ref Range    Protime 22 5 (H) 11 6 - 14 5 seconds    INR 1 95 (H) 0 84 - 1 29   Basic metabolic panel    Collection Time: 03/05/23  5:22 AM   Result Value Ref Range    Sodium 137 135 - 147 mmol/L    Potassium 3 3 (L) 3 5 - 5 3 mmol/L    Chloride 94 (L) 96 - 108 mmol/L    CO2 36 (H) 21 - 32 mmol/L    ANION GAP 7 4 - 13 mmol/L    BUN 17 5 - 25 mg/dL    Creatinine 0 38 (L) 0 60 - 1 30 mg/dL    Glucose 66 65 - 140 mg/dL    Calcium 8 3 (L) 8 4 - 10 2 mg/dL    eGFR 97 ml/min/1 73sq m   CBC and Platelet Collection Time: 03/05/23  5:22 AM   Result Value Ref Range    WBC 9 03 4 31 - 10 16 Thousand/uL    RBC 3 58 (L) 3 81 - 5 12 Million/uL    Hemoglobin 11 8 11 5 - 15 4 g/dL    Hematocrit 35 2 34 8 - 46 1 %    MCV 98 82 - 98 fL    MCH 33 0 26 8 - 34 3 pg    MCHC 33 5 31 4 - 37 4 g/dL    RDW 12 4 11 6 - 15 1 %    Platelets 621 624 - 046 Thousands/uL    MPV 9 0 8 9 - 12 7 fL   Protime-INR    Collection Time: 03/06/23  5:22 AM   Result Value Ref Range    Protime 19 0 (H) 11 6 - 14 5 seconds    INR 1 57 (H) 0 84 - 1 19   CBC and differential    Collection Time: 03/06/23  5:22 AM   Result Value Ref Range    WBC 6 99 4 31 - 10 16 Thousand/uL    RBC 3 40 (L) 3 81 - 5 12 Million/uL    Hemoglobin 11 3 (L) 11 5 - 15 4 g/dL    Hematocrit 34 3 (L) 34 8 - 46 1 %     (H) 82 - 98 fL    MCH 33 2 26 8 - 34 3 pg    MCHC 32 9 31 4 - 37 4 g/dL    RDW 12 9 11 6 - 15 1 %    MPV 9 7 8 9 - 12 7 fL    Platelets 530 990 - 016 Thousands/uL    nRBC 0 /100 WBCs    Neutrophils Relative 86 (H) 43 - 75 %    Immat GRANS % 0 0 - 2 %    Lymphocytes Relative 8 (L) 14 - 44 %    Monocytes Relative 5 4 - 12 %    Eosinophils Relative 1 0 - 6 %    Basophils Relative 0 0 - 1 %    Neutrophils Absolute 6 02 1 85 - 7 62 Thousands/µL    Immature Grans Absolute 0 03 0 00 - 0 20 Thousand/uL    Lymphocytes Absolute 0 53 (L) 0 60 - 4 47 Thousands/µL    Monocytes Absolute 0 36 0 17 - 1 22 Thousand/µL    Eosinophils Absolute 0 04 0 00 - 0 61 Thousand/µL    Basophils Absolute 0 01 0 00 - 0 10 Thousands/µL   Magnesium    Collection Time: 03/06/23  5:22 AM   Result Value Ref Range    Magnesium 2 2 1 9 - 2 7 mg/dL   Comprehensive metabolic panel    Collection Time: 03/06/23  5:22 AM   Result Value Ref Range    Sodium 138 135 - 147 mmol/L    Potassium 4 0 3 5 - 5 3 mmol/L    Chloride 99 96 - 108 mmol/L    CO2 36 (H) 21 - 32 mmol/L    ANION GAP 3 (L) 4 - 13 mmol/L    BUN 19 5 - 25 mg/dL    Creatinine 0 34 (L) 0 60 - 1 30 mg/dL    Glucose 101 65 - 140 mg/dL    Calcium 8 1 (L) 8 4 - 10 2 mg/dL    Corrected Calcium 9 0 8 3 - 10 1 mg/dL    AST 27 13 - 39 U/L    ALT 14 7 - 52 U/L    Alkaline Phosphatase 62 34 - 104 U/L    Total Protein 5 5 (L) 6 4 - 8 4 g/dL    Albumin 2 9 (L) 3 5 - 5 0 g/dL    Total Bilirubin 0 78 0 20 - 1 00 mg/dL    eGFR 101 ml/min/1 73sq m        Tr Cabrera DO  Psychiatry Residency, PGY-II

## 2023-03-06 NOTE — PLAN OF CARE
Pt tolerating puree diet w/ HTL by tsp  Cont meds in applesauce  Cont aspiration precautions, feed when awake and alert

## 2023-03-06 NOTE — PROGRESS NOTES
[facility]  Progress Note - Yamil Edge 1940, 79 y o  female MRN: [id number]  Unit/Bed#: S -01 Encounter: [id number]  Primary Care Provider: Daylin Bell MD   Date and time admitted to hospital: 2/27/2023  3:02 AM    Altered mental status  Assessment & Plan  · Initially thought to be secondary to adverse medication side effects with up titration of her multiple antidepressants recently  Abilify was discontinued earlier on in the admission  Initially she had improvement in her mentation though continues to have intermittent episodes of obtundation without a clear organic cause  Infectious and metabolic work-up essentially unrevealing  · Would appreciate behavioral health reevaluate patient this admission and Monday, 3/6/2023  · Appreciate neurology recs  EEG without epileptiform discharges though grossly abnormal suggestive of diffuse cerebral dysfunction  · Continue supportive cares, advance diet as tolerated: Dysphagia 1 Pureed 1 ; Honey Thick Liquid with close monitoring  · Maintain aspiration precautions  · Does not seem to have AMS, just periods of time where does not want to interact--> will follow exam with encouragement but may not open eyes with sternal rub  · Palliative: Patient does not have palliative or hospice diagnosis at this time  · Psych:   · We will attempt to get different p o  preparation of Remeron so patient may take  · Will need cardiology evaluation to determine if patient is a candidate for Ritalin as she has A-fib with RVR  · Candidate for ketamine? Ketamine 30 mg PO tabs vs IV 0 5 mg/kg/IV dose x 1 vs Nasal spray 25 mg?          * Failure to thrive in adult  Assessment & Plan  · Suspect large component due to profound grief and depression  · Continue supportive cares  · See additional recommendations below      SOB (shortness of breath)  Assessment & Plan  · Patient name of some shortness of breath on 3/3/2023    She was receiving IV fluids in the setting of poor p o  intake and failure to thrive, there was concern for some element of volume overload with pulmonary edema  She was given 20 mg of IV Lasix with good response  · Monitor off further diuretic    Abnormal CT of the chest  Assessment & Plan  · PE Study with CT abdomen & pelvis with contrast 2/27/2023:  There is a new right basilar density with associated few branching nodular density in the right middle lobe and a 5 mm nodular density superior segment left lower lobe, indeterminate  These may be on inflammatory/infectious basis /pneumonia   Short interval follow-up chest CT at 3 months suggested to demonstrate resolution  Aneurysm of basilar artery [facility])  Assessment & Plan  Incidental finding on CTA head and neck/  CTA head and neck with and without contrast 2/27/2023:  "No acute intracranial abnormality  Moderate chronic microangiopathy  Negative CTA head and neck for large vessel occlusion, dissection, or high-grade stenosis  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 projecting laterally in between left posterior cerebral artery and left superior cerebellar- repeat CT stable when developed AMS  · Outpatient referral for Neurosurgery follow-up  Will provide on DC  Thyroid nodule  Assessment & Plan  CTA head and neck with and without contrast 2/27/2023:  "No acute intracranial abnormality  Moderate chronic microangiopathy  Negative CTA head and neck for large vessel occlusion, dissection, or high-grade stenosis  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 projecting laterally in between left posterior cerebral artery and left superior cerebellar  Recommend consultation with the [facility], a division of [address] [place] [address] Neuroscience at [phone]   Incidental thyroid nodule(s) for which nonemergent thyroid ultrasound is recommended "    · Outpatient f/u with pcp for non-emergent thyroid u/s    Hypothyroidism  Assessment & Plan  · TSH wnl  · Cont home levothyroxine    Atrial fibrillation (HCC)  Assessment & Plan  · On diltiazem, digoxin, and warfarin at home  · See above regarding AC    Antiphospholipid antibody syndrome (HCC)  Assessment & Plan  · On coumadin  Goal INR 2-3  INR initially supratherapeutic, coumadin held  No active evidence of bleeding  · Restart Coumadin 5mg daily  INR is subtherapeutic - INR 1 6 (3/4/23)  · Daily INR          VTE Pharmacologic Prophylaxis: VTE Score: 6 High Risk (Score >/= 5) - Pharmacological DVT Prophylaxis Ordered: warfarin (Coumadin)  Sequential Compression Devices Ordered  Patient Centered Rounds: I performed bedside rounds with nursing staff today  Discussions with Specialists or Other Care Team Provider: Mindi Orr,     Education and Discussions with Family / Patient: Updated  (updated by attending) at bedside  Current Length of Stay: 7 day(s)  Current Patient Status: Inpatient   Discharge Plan: Anticipate discharge in >72 hrs to TBD    Code Status: Level 2 - DNAR: but accepts endotracheal intubation    Subjective:   No complaints offered    Objective:     Vitals:   Temp (24hrs), Av 7 °F (36 5 °C), Min:97 6 °F (36 4 °C), Max:97 9 °F (36 6 °C)    Temp:  [97 6 °F (36 4 °C)-97 9 °F (36 6 °C)] 97 7 °F (36 5 °C)  HR:  [60-71] 71  Resp:  [16-19] 19  BP: (135-195)/(52-72) 147/60  SpO2:  [90 %-100 %] 97 %  Body mass index is 19 73 kg/m²  Input and Output Summary (last 24 hours): Intake/Output Summary (Last 24 hours) at 3/6/2023 1530  Last data filed at 3/5/2023 1701  Gross per 24 hour   Intake 100 ml   Output --   Net 100 ml       Physical Exam:   Physical Exam  Vitals and nursing note reviewed  Constitutional:       General: She is not in acute distress  Appearance: She is well-developed  Comments: cacehitic   Says " no" to participating in exam  Profound sad affect and effect  Does not wish to open eyes   HENT:      Head: Normocephalic and atraumatic     Eyes: Conjunctiva/sclera: Conjunctivae normal    Cardiovascular:      Rate and Rhythm: Normal rate and regular rhythm  Heart sounds: No murmur heard  Pulmonary:      Effort: Pulmonary effort is normal  No respiratory distress  Breath sounds: Normal breath sounds  Abdominal:      Palpations: Abdomen is soft  Musculoskeletal:         General: No swelling  Cervical back: Neck supple  Skin:     General: Skin is warm and dry  Capillary Refill: Capillary refill takes less than 2 seconds  Neurological:      Mental Status: She is alert            Additional Data:     Labs:  Results from last 7 days   Lab Units 03/06/23  0522   WBC Thousand/uL 6 99   HEMOGLOBIN g/dL 11 3*   HEMATOCRIT % 34 3*   PLATELETS Thousands/uL 157   NEUTROS PCT % 86*   LYMPHS PCT % 8*   MONOS PCT % 5   EOS PCT % 1     Results from last 7 days   Lab Units 03/06/23  0522   SODIUM mmol/L 138   POTASSIUM mmol/L 4 0   CHLORIDE mmol/L 99   CO2 mmol/L 36*   BUN mg/dL 19   CREATININE mg/dL 0 34*   ANION GAP mmol/L 3*   CALCIUM mg/dL 8 1*   ALBUMIN g/dL 2 9*   TOTAL BILIRUBIN mg/dL 0 78   ALK PHOS U/L 62   ALT U/L 14   AST U/L 27   GLUCOSE RANDOM mg/dL 101     Results from last 7 days   Lab Units 03/06/23  0522   INR  1 57*         Results from last 7 days   Lab Units 03/03/23  0502   HEMOGLOBIN A1C % 4 5     Results from last 7 days   Lab Units 03/03/23  0502   PROCALCITONIN ng/ml 0 12       Lines/Drains:  Invasive Devices     Peripheral Intravenous Line  Duration           Peripheral IV 03/02/23 Right Forearm 3 days    Long-Dwell Peripheral IV (Midline) 40/15/62 Left Basilic <1 day                      Imaging: Reviewed radiology reports from this admission including: chest xray    Recent Cultures (last 7 days):   Results from last 7 days   Lab Units 02/28/23  1534   URINE CULTURE  50,000-59,000 cfu/ml Enterococcus faecalis*  <10,000 cfu/ml       Last 24 Hours Medication List:   Current Facility-Administered Medications   Medication Dose Route Frequency Provider Last Rate   • albuterol  2 5 mg Nebulization Q4H PRN Des Erazo MD     • diltiazem  240 mg Oral Daily Des Erazo MD     • Fluticasone Furoate-Vilanterol  1 puff Inhalation Daily Des Erazo MD     • hydrALAZINE  10 mg Intravenous Q6H PRN Karyn Chand MD     • levothyroxine  25 mcg Oral Daily Des Erazo MD     • lidocaine  1 patch Topical Daily Des Erazo MD     • [START ON 3/7/2023] losartan  100 mg Oral Daily Glenroy Aguilar MD     • losartan  50 mg Oral Daily Glenroy Aguilar MD     • mirtazapine  7 5 mg Oral HS Des Erazo MD     • sertraline  50 mg Oral Daily Des Erazo MD     • spironolactone  25 mg Oral Daily Glenroy Aguilar MD     • thiamine  100 mg Intravenous Daily Eloise Oshea,  mg (03/06/23 0931)   • umeclidinium  1 puff Inhalation Daily Des Erazo MD     • warfarin  5 mg Oral Daily (warfarin) Karyn Chand MD          Today, Patient Was Seen By: Glenroy Aguilar MD    **Please Note: This note may have been constructed using a voice recognition system  **

## 2023-03-06 NOTE — PROGRESS NOTES
Progress Note - Geriatric Medicine   Fawn Felty 79 y o  female MRN: [id number]  Unit/Bed#: S -01 Encounter: [id number]      Assessment/Plan:  Altered mental status  Presents to the ER with altered mental status and lethargy  Abilify was discontinued as per twice by family  Unable to assess orientation due to lethargy, although patient was able to follow commands   CT, lab work, chest x-ray, MRI unremarkable  EEG Interpretation: This Routine EEG recorded during wakefulness and drowsiness is abnormal  Background activities and posteriorly dominant rhythm are too slow suggesting mild diffuse cerebral dysfunction of nonspecific etiology  Appreciate neurology recommendations  Possibly psychiatric component-  Appreciate psych recommendations  At risk for delirium due to cognitive impairment  Recommend delirium precautions  Maintain sleep-wake cycle, avoid nighttime interruptions  Provide adequate pain control  Avoid urinary retention and constipation  Provide frequent and early mobilization  Provide frequent redirection and reorientation as needed  Avoid medications that may worsen or precipitate delirium such as tramadol, benzodiazepines, anticholinergics, and Benadryl  Redirect unwanted behaviors as first-line therapy, avoid physical restraints as able to  Continue to monitor    Depression/anxiety  Recently became severely depressed and anxious after the loss of her brother-in-law  Was started on Zoloft 50 mg daily by PCP after unsuccessful trial of Ativan  Last hospitalization patient was started on Remeron 7 5 mg nightly and Abilify 2 mg daily  Was seen by psychiatry last hospitalization who agreed with starting Remeron and recommended Abilify 2 mg daily  Abilify was discontinued due to episode of altered mental status and lethargy at the beginning of this hospitalization  Psych to reeval patient today?   Management per psych    UTI  Urine culture showed 50-59,000 Enterococcus faecalis  Was started on Rocephin 1000 mg daily by primary team, has been completed  Encourage adequate p o  hydration once more awake, although patient is currently n p o   Monitor for urinary retention, she has a pure wick in which appears to have adequate amount of urine  Management per primary    Failure to thrive  Recent admission for failure to thrive  At that time patient was started on Remeron 7 5 mg nightly and Abilify 2 mg daily  Was previously on Zoloft 50 mg daily recently prescribed by PCP  Abilify since discontinued the beginning of this hospitalization  Started on nutritional supplements, would continue once able to eat and drink  Currently on puréed diet with honey thick liquids    Hypertension  With elevated blood pressure since admission  Blood pressure this morning 195/62  Blood pressures have trended from 142/58-24/85  Patient was unable to take any oral medications yesterday, but appears to have taken oral medications this morning  Is currently on Cardizem 240 mg daily, losartan 50 mg daily, and spironolactone 25 mg daily  As needed hydralazine also ordered-given this morning  Avoid hypotension  Management per primary    Dysphagia  Complained of trouble swallowing last admission  Is currently on puréed diet with honey thick liquids  Aspiration precautions    Ambulatory dysfunction  At a baseline ambulates without AD  PT/OT following  Fall precautions  Out of bed as tolerated  Encourage early and frequent mobilization  Encourage adequate hydration and nutrition  Provide adequate pain management   Goal is undetermined   Continue with PT/OT for continued strength and balance training     Goals of care  Discussed goals of care with sons who are bedside  Michael Wesley they would be agreeable to an temporary feeding tube such as NG tube   Not interested  at this time for long-term feeding tube such as PEG  Wants to continue with level 2 DNR-no compressions but okay with intubation    Subjective:   Patient is being seen for a geriatrics follow-up  Upon examination patient was lying in bed, resting  Her sons were at bedside during examination  Patient remains lethargic and would not verbally respond  She did follow commands  Nursing reports she did take her medicine this morning and ate about 20% of her breakfast   Per nursing no other acute concerns or issues at this time  Review of Systems   Unable to perform ROS: Mental status change         Objective:     Vitals: Blood pressure (!) 195/62, pulse 68, temperature 97 6 °F (36 4 °C), temperature source Axillary, resp  rate 16, height 5' (1 524 m), weight 45 8 kg (101 lb), SpO2 100 %, not currently breastfeeding  ,Body mass index is 19 73 kg/m²  Intake/Output Summary (Last 24 hours) at 3/6/2023 1029  Last data filed at 3/5/2023 1701  Gross per 24 hour   Intake 310 ml   Output 50 ml   Net 260 ml       Current Medications: Reviewed    Physical Exam:   Physical Exam  Vitals reviewed  Constitutional:       General: She is not in acute distress  Appearance: She is underweight  She is ill-appearing  Comments: Frail appearing   HENT:      Head: Normocephalic and atraumatic  Mouth/Throat:      Mouth: Mucous membranes are dry  Pharynx: No oropharyngeal exudate or posterior oropharyngeal erythema  Comments: Dry cracked lips  Cardiovascular:      Rate and Rhythm: Normal rate and regular rhythm  Heart sounds: No murmur heard  Pulmonary:      Effort: Pulmonary effort is normal  No respiratory distress  Breath sounds: Normal breath sounds  Abdominal:      General: Abdomen is flat  There is no distension  Palpations: Abdomen is soft  Tenderness: There is no abdominal tenderness  Musculoskeletal:         General: No swelling  Right lower leg: No edema  Left lower leg: No edema  Skin:     General: Skin is warm and dry  Coloration: Skin is pale  Findings: Bruising present     Neurological:      Mental Status: She is easily aroused  She is lethargic  GCS: GCS eye subscore is 3  GCS verbal subscore is 1  GCS motor subscore is 6  Cranial Nerves: No cranial nerve deficit  Motor: Weakness present  Gait: Gait abnormal       Comments: Unable to assess orientation due to lethargy, was able to follow commands   Psychiatric:         Mood and Affect: Mood is depressed  Affect is flat  Invasive Devices     Peripheral Intravenous Line  Duration           Peripheral IV 03/02/23 Right Forearm 3 days                Lab, Imaging and other studies: I have personally reviewed pertinent reports  Please note:  Voice-recognition software may have been used in the preparation of this document  Occasional wrong word or "sound-alike" substitutions may have occurred due to the inherent limitations of voice recognition software  Interpretation should be guided by context

## 2023-03-06 NOTE — SPEECH THERAPY NOTE
Speech Language/Pathology    Speech/Language Pathology Progress Note    Patient Name: Willie Hanson  VWOUY'O Date: 3/6/2023          Subjective:  Pt seen for dysphagia tx follow up  Pt being fed by nsg student, family at bedside  Pt appeared lethargic and dyspneic  Head hyper extended, folded pillow behind head for more neutral positioning  Objective:  Pt ate ~90% of entree, observed w/ pudding  And HTL by straw and tsp  Slow oral processing, adequate oral control of HTL  Pt able to suck from straw but appeared with increased WOB  Swallows delayed but complete, no overt s/s aspiration noted  Assessment:  Pt cont to be lethargic, but tolerated puree diet w/ HTL by tsp, noted increased WOB w/ HTL by straw; no overt s/s aspiration noted  Plan/Recommendations:  Cont puree diet w/ HTL by tsp   feed when awake and alert   aspiration precautions  meds in applesauce  Will cont to follow        Nara Hutchison CCC-SLP  Speech Pathologist  Available via  tiger text

## 2023-03-06 NOTE — ASSESSMENT & PLAN NOTE
CTA head and neck with and without contrast 2/27/2023:  "No acute intracranial abnormality  Moderate chronic microangiopathy  Negative CTA head and neck for large vessel occlusion, dissection, or high-grade stenosis  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 projecting laterally in between left posterior cerebral artery and left superior cerebellar  Recommend consultation with the [facility], a division of [facility] Neuroscience at [phone]   Incidental thyroid nodule(s) for which nonemergent thyroid ultrasound is recommended "    · Outpatient f/u with pcp for non-emergent thyroid u/s

## 2023-03-07 PROBLEM — J96.00 ACUTE RESPIRATORY FAILURE (HCC): Status: ACTIVE | Noted: 2017-11-22

## 2023-03-07 PROBLEM — R33.9 URINARY RETENTION: Status: ACTIVE | Noted: 2023-03-07

## 2023-03-07 LAB
ALBUMIN SERPL BCP-MCNC: 2.9 G/DL (ref 3.5–5)
ALP SERPL-CCNC: 66 U/L (ref 34–104)
ALT SERPL W P-5'-P-CCNC: 14 U/L (ref 7–52)
ANION GAP SERPL CALCULATED.3IONS-SCNC: 2 MMOL/L (ref 4–13)
AST SERPL W P-5'-P-CCNC: 19 U/L (ref 13–39)
BASOPHILS # BLD AUTO: 0.02 THOUSANDS/ÂΜL (ref 0–0.1)
BASOPHILS NFR BLD AUTO: 0 % (ref 0–1)
BILIRUB SERPL-MCNC: 0.59 MG/DL (ref 0.2–1)
BUN SERPL-MCNC: 24 MG/DL (ref 5–25)
CALCIUM ALBUM COR SERPL-MCNC: 9.5 MG/DL (ref 8.3–10.1)
CALCIUM SERPL-MCNC: 8.6 MG/DL (ref 8.4–10.2)
CHLORIDE SERPL-SCNC: 100 MMOL/L (ref 96–108)
CO2 SERPL-SCNC: 39 MMOL/L (ref 21–32)
CREAT SERPL-MCNC: 0.46 MG/DL (ref 0.6–1.3)
EOSINOPHIL # BLD AUTO: 0.09 THOUSAND/ÂΜL (ref 0–0.61)
EOSINOPHIL NFR BLD AUTO: 1 % (ref 0–6)
ERYTHROCYTE [DISTWIDTH] IN BLOOD BY AUTOMATED COUNT: 12.7 % (ref 11.6–15.1)
GFR SERPL CREATININE-BSD FRML MDRD: 91 ML/MIN/1.73SQ M
GLUCOSE SERPL-MCNC: 101 MG/DL (ref 65–140)
HCT VFR BLD AUTO: 34.1 % (ref 34.8–46.1)
HGB BLD-MCNC: 11.2 G/DL (ref 11.5–15.4)
IMM GRANULOCYTES # BLD AUTO: 0.04 THOUSAND/UL (ref 0–0.2)
IMM GRANULOCYTES NFR BLD AUTO: 1 % (ref 0–2)
INR PPP: 1.93 (ref 0.84–1.19)
LYMPHOCYTES # BLD AUTO: 0.55 THOUSANDS/ÂΜL (ref 0.6–4.47)
LYMPHOCYTES NFR BLD AUTO: 7 % (ref 14–44)
MAGNESIUM SERPL-MCNC: 2.2 MG/DL (ref 1.9–2.7)
MCH RBC QN AUTO: 33.7 PG (ref 26.8–34.3)
MCHC RBC AUTO-ENTMCNC: 32.8 G/DL (ref 31.4–37.4)
MCV RBC AUTO: 103 FL (ref 82–98)
MONOCYTES # BLD AUTO: 0.61 THOUSAND/ÂΜL (ref 0.17–1.22)
MONOCYTES NFR BLD AUTO: 7 % (ref 4–12)
NEUTROPHILS # BLD AUTO: 7.1 THOUSANDS/ÂΜL (ref 1.85–7.62)
NEUTS SEG NFR BLD AUTO: 84 % (ref 43–75)
NRBC BLD AUTO-RTO: 0 /100 WBCS
PLATELET # BLD AUTO: 254 THOUSANDS/UL (ref 149–390)
PMV BLD AUTO: 9.5 FL (ref 8.9–12.7)
POTASSIUM SERPL-SCNC: 3.7 MMOL/L (ref 3.5–5.3)
PROCALCITONIN SERPL-MCNC: 0.15 NG/ML
PROT SERPL-MCNC: 5.5 G/DL (ref 6.4–8.4)
PROTHROMBIN TIME: 22.3 SECONDS (ref 11.6–14.5)
RBC # BLD AUTO: 3.32 MILLION/UL (ref 3.81–5.12)
SODIUM SERPL-SCNC: 141 MMOL/L (ref 135–147)
VIT B1 BLD-SCNC: 111.2 NMOL/L (ref 66.5–200)
WBC # BLD AUTO: 8.41 THOUSAND/UL (ref 4.31–10.16)

## 2023-03-07 RX ORDER — METHYLPHENIDATE HYDROCHLORIDE 10 MG/1
5 TABLET ORAL
Status: DISCONTINUED | OUTPATIENT
Start: 2023-03-08 | End: 2023-03-14 | Stop reason: HOSPADM

## 2023-03-07 RX ADMIN — UMECLIDINIUM 1 PUFF: 62.5 AEROSOL, POWDER ORAL at 09:51

## 2023-03-07 RX ADMIN — LIDOCAINE 5% 1 PATCH: 700 PATCH TOPICAL at 09:49

## 2023-03-07 RX ADMIN — SPIRONOLACTONE 25 MG: 25 TABLET ORAL at 09:49

## 2023-03-07 RX ADMIN — SERTRALINE HYDROCHLORIDE 50 MG: 50 TABLET ORAL at 09:49

## 2023-03-07 RX ADMIN — LEVOTHYROXINE SODIUM 25 MCG: 25 TABLET ORAL at 09:49

## 2023-03-07 RX ADMIN — LOSARTAN POTASSIUM 100 MG: 50 TABLET, FILM COATED ORAL at 10:22

## 2023-03-07 RX ADMIN — MIRTAZAPINE 7.5 MG: 15 TABLET, FILM COATED ORAL at 20:26

## 2023-03-07 RX ADMIN — WARFARIN SODIUM 5 MG: 5 TABLET ORAL at 18:01

## 2023-03-07 RX ADMIN — DILTIAZEM HYDROCHLORIDE 240 MG: 240 CAPSULE, COATED, EXTENDED RELEASE ORAL at 09:49

## 2023-03-07 RX ADMIN — FLUTICASONE FUROATE AND VILANTEROL TRIFENATATE 1 PUFF: 100; 25 POWDER RESPIRATORY (INHALATION) at 09:51

## 2023-03-07 RX ADMIN — THIAMINE HYDROCHLORIDE 100 MG: 100 INJECTION, SOLUTION INTRAMUSCULAR; INTRAVENOUS at 11:05

## 2023-03-07 NOTE — SPEECH THERAPY NOTE
Speech Language/Pathology    Speech/Language Pathology Progress Note    Patient Name: Mel ARGUELLESCLReganN Date: 3/7/2023         Subjective:  Pt seen for dysphagia tx at breakfast  Pt on puree w/ HTL pt appeared more awake and alert, but eyes mostly closed during session  Objective:  Pt ate all of pureed bananas, pureed waffle and HTL OJ by tsp  Bolus manipulation and transfer appeared [facility]  No oral residue or labial leakage noted  Swallows timely and complete for consistencies assessed  No coughing, throat clearing, or wet voice noted  Increased resp noted toward end of session, ? Related to fatigue  Assessment:  Pt somewhat more alert, tolerated puree diet w/ HTL by tsp w/ good po intake and no overt s/s aspiration       Plan/Recommendations:  Cont present diet, will cont to follow      Nara Hutchison CCC-SLP  Speech Pathologist  Available via  tiger text

## 2023-03-07 NOTE — PROGRESS NOTES
Progress Note - Behavioral Health   Myron Showers 79 y o  female MRN: [id number]  Unit/Bed#: S -01 Encounter: [id number]    Assessment    Principal Psychiatric Problem:  1  Major depressive disorder  a  Differential: Persistent complex bereavement disorder  2  Unspecified anxiety disorder  a  Differential: Generalized anxiety disorder, Anxiety secondary to other medical condition  3  Tobacco use disorder  4  Failure to thrive in adult    Principal Problem:    Failure to thrive in adult  Active Problems:    Antiphospholipid antibody syndrome (HCC)    Atrial fibrillation (HCC)    Hypothyroidism    Altered mental status    Abnormal CT of the chest    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HCC)    Thyroid nodule    SOB (shortness of breath)    Plan:  Discussed with primary team the following,  1  Psychiatry will continue to follow regarding medication and treatment  2  Recommend the following changes to medications:  a  Continue Remeron 7 5 mg at 2000 for mood, appetite disturbance, and sleep disturbance  b  Continue Zoloft 50 mg daily for mood  c  Patient with continued poor oral intake, decreased responsiveness, apathetic symptoms  Patient was previously trialed on Abilify 2 mg as an augmentation agent for antidepressants and to improve neurovegetative/apathetic symptoms  Patient with observable increase in appetite and improvement in mood when combination of Remeron and Abilify was started on prior admission  Due to patient's significant cardiac risk factors, Abilify was previously chosen agent over stimulants which both have evidence for improved depressive symptoms in the elderly  However, patient with episodes of unresponsiveness in setting of Abilify which was discontinued and requested not to be restarted by patient's family  With failed Abilify attempt, stimulant was reconsidered on this admission   Cardiology consult was initially requested, and was recommended for consultation if there were side effects of stimulant medication  d  Discussion was had with SLIM, consult psychiatry team, and patient's son at bedside regarding initiation of stimulant medication for augmentation of antidepressant agents and to promote improvement in neurovegetative/apathetic symptoms  Cardiac risk factors were reviewed with family by both SLIM and psychiatric team which may result in cardiovascular adverse effects paying special attention to the fact the patient is currently Level 2 code  Discussed if adverse effects occur, we will try to mitigate though patient may not have reserve to tolerate  At this time, family would like to move forward with addition of stimulant medication  Start low dose Ritalin at 5 mg BID to start 03/08/23, family agreeable  e  There is literature evidence to suggest stimulants, notably Ritalin, have shown improvement as augmentation agents for treatment resistant depression and depression in elderly with cognitive impairment of which 4 studies are cited below for reference and were utilized in decision making process:  i  Bob DEAN, Noemi Galo PB, Luba Vasquez CH, Whitney [place], Goldie O, Syed AP, Ernie Mchughh, Trinity S, Brent AI, Archie W, Kailee J, [place] D; ADMET 2 Research Group  Effect of Methylphenidate on Apathy in Patients With Alzheimer Disease: The ADMET 2 Randomized Clinical Trial  DIANE Neurol  2021 Nov 1;78(11):0792-0943  doi: 10 1001/jamaneurol 2021 3356  ii  Ronald MEYER, Leonel ROSE, Newton Perdomo  Pharmacological interventions for apathy in Alzheimer's disease  [place] Database Syst Rev  2018 May 4;5(5):TU451578  doi: 54 [id number]  [id number] TQK1  iii  [place] FERRARA, Andria DEAN  Psychostimulants in the therapy of treatment-resistant depression Review of the literature and findings from a retrospective study in 72 depressed patients  Dialogues Clin Neurosci  1999 Dec;1(3):165-74  doi: 10 28713/DCNS 1999 1 3/kathy     iv  Aleta MERCADO  Withdrawn, apathetic geriatric patients responsive to methylphenidate  Anatolymery 6 Jun;23(6):271-6  doi: 10 1111/j 5584-7417 1975  [id number] x  3  Please reach out to on-call Psychiatry team via [employer] with any questions or concerns  ------------------------------------------------------------    Subjective: Patient evaluated for continuity of care, patient seen at bedside  Patient observed with eyes closed and remained closed for duration of evaluation  Patient again able to follow 1 step command slowly  Patient moaned in response to stimuli, though did not produce speech at time of evaluation  Patient with similar level of responsiveness compared to evaluation yesterday  Family at bedside noted patient had slight improvement in appetite today  SLIM present for evaluation and discussion with patient's family  Patient's son, Paul Sun, at bedside  At length discussion had with patient's family at bedside to review medication options  Discussed previously trialed Abilify, and discussed option for stimulant medication, Ritalin, to augment antidepressants  SLIM explained prognosis if patient does not improve oral intake and discussed next steps of parenteral nutrition  Reviewed significant cardiac risk factors of initiating stimulant medication  Patient's son expressed understanding  Patient's son noted preference to start Ritalin  Discussed dosing which would be 5 mg BID, patient's son agreeable  Patient's son noted he will contact his brother and sister to notify of the plan      Psychiatric Review of Systems:  Behavior over the last 24 hours: minimal improvement  Sleep: unchanged  Appetite: poor oral intake  Medication side effects: unable to assess due to patient factors  ROS: Review of systems could not be obtained due to patient factors    Vital signs in last 24 hours:  Temp:  [97 3 °F (36 3 °C)-97 7 °F (36 5 °C)] 97 3 °F (36 3 °C)  HR:  [61-71] 65  Resp:  [19] 19  BP: (135-181)/(52-72) 167/66    Mental Status Exam:  Appearance:  drowsy, no eye contact, appears stated age, marginal grooming/hygiene and thin and frail appearing, laying in hospital bed   Behavior:  calm, laying in bed and patient kept eyes closed for duration of evaluation   Motor: no abnormal movements and unable to assess gait and balance as patient remained in hospital bed   Speech:  no speech at time of evaluation, patient with moans in response to stimuli   Mood:  Unable to obtain due to patient factors   Affect:  Unable to assess   Thought Process:  unable to assess due to patient factors   Thought Content: no verbalized delusions or overt paranoia, unable to assess due to patient factors   Perceptual disturbances: does not appear to be responding to internal stimuli at this time   Risk Potential: unable to assess due to patient factors   Cognition: cognition not formally tested and patient able to follow 1 step commands though delayed in response   Insight:  unable to assess due to patient factors   Judgment: unable to assess due to patient factors     Current Medications: All current medications have been reviewed    Current Facility-Administered Medications   Medication Dose Route Frequency Provider Last Rate   • albuterol  2 5 mg Nebulization Q4H PRN Mark Faith MD     • diltiazem  240 mg Oral Daily Mark Faith MD     • Fluticasone Furoate-Vilanterol  1 puff Inhalation Daily Mark Faith MD     • hydrALAZINE  10 mg Intravenous Q6H PRN Iris Hartmann MD     • levothyroxine  25 mcg Oral Daily Mark Faith MD     • lidocaine  1 patch Topical Daily Mark Faith MD     • losartan  100 mg Oral Daily Bria Hernandez MD     • mirtazapine  7 5 mg Oral HS Mallory Green DO     • sertraline  50 mg Oral Daily Mark Faith MD     • spironolactone  25 mg Oral Daily Bria Hernandez MD     • thiamine  100 mg Intravenous Daily Eloise Oshea  mg (03/06/23 7914)   • umeclidinium  1 puff Inhalation Daily Marita Westbrook Rhianna Zaman MD     • warfarin  5 mg Oral Daily (warfarin) Zaki Baltazar MD         Laboratory results:  I have personally reviewed all pertinent laboratory/tests results    Recent Results (from the past 50 hour(s))   Protime-INR    Collection Time: 03/06/23  5:22 AM   Result Value Ref Range    Protime 19 0 (H) 11 6 - 14 5 seconds    INR 1 57 (H) 0 84 - 1 19   CBC and differential    Collection Time: 03/06/23  5:22 AM   Result Value Ref Range    WBC 6 99 4 31 - 10 16 Thousand/uL    RBC 3 40 (L) 3 81 - 5 12 Million/uL    Hemoglobin 11 3 (L) 11 5 - 15 4 g/dL    Hematocrit 34 3 (L) 34 8 - 46 1 %     (H) 82 - 98 fL    MCH 33 2 26 8 - 34 3 pg    MCHC 32 9 31 4 - 37 4 g/dL    RDW 12 9 11 6 - 15 1 %    MPV 9 7 8 9 - 12 7 fL    Platelets 220 502 - 289 Thousands/uL    nRBC 0 /100 WBCs    Neutrophils Relative 86 (H) 43 - 75 %    Immat GRANS % 0 0 - 2 %    Lymphocytes Relative 8 (L) 14 - 44 %    Monocytes Relative 5 4 - 12 %    Eosinophils Relative 1 0 - 6 %    Basophils Relative 0 0 - 1 %    Neutrophils Absolute 6 02 1 85 - 7 62 Thousands/µL    Immature Grans Absolute 0 03 0 00 - 0 20 Thousand/uL    Lymphocytes Absolute 0 53 (L) 0 60 - 4 47 Thousands/µL    Monocytes Absolute 0 36 0 17 - 1 22 Thousand/µL    Eosinophils Absolute 0 04 0 00 - 0 61 Thousand/µL    Basophils Absolute 0 01 0 00 - 0 10 Thousands/µL   Magnesium    Collection Time: 03/06/23  5:22 AM   Result Value Ref Range    Magnesium 2 2 1 9 - 2 7 mg/dL   Comprehensive metabolic panel    Collection Time: 03/06/23  5:22 AM   Result Value Ref Range    Sodium 138 135 - 147 mmol/L    Potassium 4 0 3 5 - 5 3 mmol/L    Chloride 99 96 - 108 mmol/L    CO2 36 (H) 21 - 32 mmol/L    ANION GAP 3 (L) 4 - 13 mmol/L    BUN 19 5 - 25 mg/dL    Creatinine 0 34 (L) 0 60 - 1 30 mg/dL    Glucose 101 65 - 140 mg/dL    Calcium 8 1 (L) 8 4 - 10 2 mg/dL    Corrected Calcium 9 0 8 3 - 10 1 mg/dL    AST 27 13 - 39 U/L    ALT 14 7 - 52 U/L    Alkaline Phosphatase 62 34 - 104 U/L    Total Protein 5 5 (L) 6 4 - 8 4 g/dL    Albumin 2 9 (L) 3 5 - 5 0 g/dL    Total Bilirubin 0 78 0 20 - 1 00 mg/dL    eGFR 101 ml/min/1 73sq m   Protime-INR    Collection Time: 03/07/23  4:51 AM   Result Value Ref Range    Protime 22 3 (H) 11 6 - 14 5 seconds    INR 1 93 (H) 0 84 - 1 19   Procalcitonin    Collection Time: 03/07/23  4:51 AM   Result Value Ref Range    Procalcitonin 0 15 <=0 25 ng/ml   CBC and differential    Collection Time: 03/07/23  4:51 AM   Result Value Ref Range    WBC 8 41 4 31 - 10 16 Thousand/uL    RBC 3 32 (L) 3 81 - 5 12 Million/uL    Hemoglobin 11 2 (L) 11 5 - 15 4 g/dL    Hematocrit 34 1 (L) 34 8 - 46 1 %     (H) 82 - 98 fL    MCH 33 7 26 8 - 34 3 pg    MCHC 32 8 31 4 - 37 4 g/dL    RDW 12 7 11 6 - 15 1 %    MPV 9 5 8 9 - 12 7 fL    Platelets 561 382 - 194 Thousands/uL    nRBC 0 /100 WBCs    Neutrophils Relative 84 (H) 43 - 75 %    Immat GRANS % 1 0 - 2 %    Lymphocytes Relative 7 (L) 14 - 44 %    Monocytes Relative 7 4 - 12 %    Eosinophils Relative 1 0 - 6 %    Basophils Relative 0 0 - 1 %    Neutrophils Absolute 7 10 1 85 - 7 62 Thousands/µL    Immature Grans Absolute 0 04 0 00 - 0 20 Thousand/uL    Lymphocytes Absolute 0 55 (L) 0 60 - 4 47 Thousands/µL    Monocytes Absolute 0 61 0 17 - 1 22 Thousand/µL    Eosinophils Absolute 0 09 0 00 - 0 61 Thousand/µL    Basophils Absolute 0 02 0 00 - 0 10 Thousands/µL   Magnesium    Collection Time: 03/07/23  4:51 AM   Result Value Ref Range    Magnesium 2 2 1 9 - 2 7 mg/dL   Comprehensive metabolic panel    Collection Time: 03/07/23  4:51 AM   Result Value Ref Range    Sodium 141 135 - 147 mmol/L    Potassium 3 7 3 5 - 5 3 mmol/L    Chloride 100 96 - 108 mmol/L    CO2 39 (H) 21 - 32 mmol/L    ANION GAP 2 (L) 4 - 13 mmol/L    BUN 24 5 - 25 mg/dL    Creatinine 0 46 (L) 0 60 - 1 30 mg/dL    Glucose 101 65 - 140 mg/dL    Calcium 8 6 8 4 - 10 2 mg/dL    Corrected Calcium 9 5 8 3 - 10 1 mg/dL    AST 19 13 - 39 U/L    ALT 14 7 - 52 U/L    Alkaline Phosphatase 66 34 - 104 U/L    Total Protein 5 5 (L) 6 4 - 8 4 g/dL    Albumin 2 9 (L) 3 5 - 5 0 g/dL    Total Bilirubin 0 59 0 20 - 1 00 mg/dL    eGFR 91 ml/min/1 73sq m        Gale Rodriguez DO  Psychiatry Residency, PGY-II

## 2023-03-07 NOTE — ASSESSMENT & PLAN NOTE
· PE Study with CT abdomen & pelvis with contrast 2/27/2023:   · There is a new right basilar density with associated few branching nodular density in the right middle lobe and a 5 mm nodular density superior segment left lower lobe, indeterminate  These may be on inflammatory/infectious basis /pneumonia   · Short interval follow-up chest CT at 3 months suggested to demonstrate resolution

## 2023-03-07 NOTE — PLAN OF CARE
Pt tolerating puree diet w/ HTL w/ good po intake and no overt s/s aspiration     Cont meds in applesauce

## 2023-03-07 NOTE — ASSESSMENT & PLAN NOTE
· Initially thought to be secondary to adverse medication side effects with up titration of her multiple antidepressants recently  Abilify was discontinued earlier on in the admission  Initially she had improvement in her mentation though continues to have intermittent episodes of obtundation without a clear organic cause  Infectious and metabolic work-up essentially unrevealing  · Would appreciate behavioral health reevaluate patient this admission and Monday, 3/6/2023  · Appreciate neurology recs  EEG without epileptiform discharges though grossly abnormal suggestive of diffuse cerebral dysfunction  · Continue supportive cares, advance diet as tolerated: Dysphagia 1 Pureed 1 ; Honey Thick Liquid with close monitoring  · Maintain aspiration precautions  · Does not seem to have AMS, just periods of time where does not want to interact--> will follow exam with encouragement but may not open eyes with sternal rub  · Palliative: Patient does not have palliative or hospice diagnosis at this time  · Psych:   · Not an ect candidate d/t aneurysm    · 3/7 Multidisciplinary meeting today psych and SLIM w/ son at bedside  Ritalin side effects discussed and explained that we will try to mitigate side effects however if patient should become unstable she may not have reserve to tolerate which may hasten her demise  The alternative is to watch her attempt to take PO meds and intermittently eat however intake is incompatible long term with meaningful recovery  Family agreeable to attempt   Plan to start rukhsana when full team working to monitor - Check HR and BP

## 2023-03-07 NOTE — ASSESSMENT & PLAN NOTE
· Incidental finding on CTA head and neck/  CTA head and neck with and without contrast 2/27/2023:  · "No acute intracranial abnormality  Moderate chronic microangiopathy  Negative CTA head and neck for large vessel occlusion, dissection, or high-grade stenosis  · 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 projecting laterally in between left posterior cerebral artery and left superior cerebellar- repeat CT stable when developed AMS  · Outpatient referral for Neurosurgery follow-up  Will provide on DC

## 2023-03-07 NOTE — PROGRESS NOTES
[facility]  Progress Note - Jay Hernandez 1940, 79 y o  female MRN: [id number]  Unit/Bed#: S -01 Encounter: [id number]  Primary Care Provider: Kelly Thompson MD   Date and time admitted to hospital: 2/27/2023  3:02 AM    MDD with persistant bereavement complex  Assessment & Plan  · Initially thought to be secondary to adverse medication side effects with up titration of her multiple antidepressants recently  Abilify was discontinued earlier on in the admission  Initially she had improvement in her mentation though continues to have intermittent episodes of obtundation without a clear organic cause  Infectious and metabolic work-up essentially unrevealing  · Would appreciate behavioral health reevaluate patient this admission and Monday, 3/6/2023  · Appreciate neurology recs  EEG without epileptiform discharges though grossly abnormal suggestive of diffuse cerebral dysfunction  · Continue supportive cares, advance diet as tolerated: Dysphagia 1 Pureed 1 ; Honey Thick Liquid with close monitoring  · Maintain aspiration precautions  · Does not seem to have AMS, just periods of time where does not want to interact--> will follow exam with encouragement but may not open eyes with sternal rub  · Palliative: Patient does not have palliative or hospice diagnosis at this time  · Psych:   · Not an ect candidate d/t aneurysm    · 3/7 Multidisciplinary meeting today psych and SLIM w/ son at bedside  Ritalin side effects discussed and explained that we will try to mitigate side effects however if patient should become unstable she may not have reserve to tolerate which may hasten her demise  The alternative is to watch her attempt to take PO meds and intermittently eat however intake is incompatible long term with meaningful recovery  Family agreeable to attempt   Plan to start rukhsana when full team working to monitor - Check HR and BP           * Failure to thrive in adult  Assessment & Plan  · Suspect large component due to profound grief and depression  · Continue supportive cares  · See additional recommendations below      SOB (shortness of breath)  Assessment & Plan  · Likely atlectatsis in RLL   · Pt unable to tolerate IVF or diuresis well    Urinary retention  Assessment & Plan  Required straight cath  Continue with urinary retention protocol    Antiphospholipid antibody syndrome (HCC)  Assessment & Plan  · On coumadin  Goal INR 2-3  INR initially supratherapeutic, coumadin held  No active evidence of bleeding  · Restart Coumadin 5mg daily  INR is subtherapeutic - INR 1 6 (3/4/23)  · Daily INR    Acute respiratory failure ([facility] [address] )  Assessment & Plan  Looks to have right-sided basal atelectasis  Try to wean O2  Due to patient's MDD not participating in incentive spirometer    Atrial fibrillation ([facility] [address] )  Assessment & Plan  · On diltiazem, digoxin, and warfarin at home  · See above regarding AC    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HCC)  Assessment & Plan  · Incidental finding on CTA head and neck/  CTA head and neck with and without contrast 2/27/2023:  · "No acute intracranial abnormality  Moderate chronic microangiopathy  Negative CTA head and neck for large vessel occlusion, dissection, or high-grade stenosis  · 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 projecting laterally in between left posterior cerebral artery and left superior cerebellar- repeat CT stable when developed AMS  · Outpatient referral for Neurosurgery follow-up  Will provide on DC  Abnormal CT of the chest  Assessment & Plan  · PE Study with CT abdomen & pelvis with contrast 2/27/2023:   · There is a new right basilar density with associated few branching nodular density in the right middle lobe and a 5 mm nodular density superior segment left lower lobe, indeterminate  These may be on inflammatory/infectious basis /pneumonia       · Short interval follow-up chest CT at 3 months suggested to demonstrate resolution  Thyroid nodule  Assessment & Plan  · CTA head and neck with and without contrast 2023:  · Incidental thyroid nodule(s) for which nonemergent thyroid ultrasound is recommended "  · Outpatient f/u with pcp for non-emergent thyroid u/s    Hypothyroidism  Assessment & Plan  · TSH wnl  · Cont home levothyroxine        VTE Pharmacologic Prophylaxis: VTE Score: 6 High Risk (Score >/= 5) - Pharmacological DVT Prophylaxis Ordered: warfarin (Coumadin)  Sequential Compression Devices Ordered  Patient Centered Rounds: I performed bedside rounds with nursing staff today  Discussions with Specialists or Other Care Team Provider: Behavioral health    Education and Discussions with Family / Patient: Updated  (son) at bedside  Current Length of Stay: 8 day(s)  Current Patient Status: Inpatient   Discharge Plan: Anticipate discharge in >72 hrs to Discharge pending depending on disposition during this week    Code Status: Level 2 - DNAR: but accepts endotracheal intubation    Subjective:   Her stomach hurts    Objective:     Vitals:   Temp (24hrs), Av 9 °F (36 6 °C), Min:97 3 °F (36 3 °C), Max:99 1 °F (37 3 °C)    Temp:  [97 3 °F (36 3 °C)-99 1 °F (37 3 °C)] 99 1 °F (37 3 °C)  HR:  [61-72] 72  Resp:  [18-20] 20  BP: (130-168)/(48-71) 147/50  SpO2:  [90 %-98 %] 92 %  Body mass index is 19 73 kg/m²  Input and Output Summary (last 24 hours): Intake/Output Summary (Last 24 hours) at 3/7/2023 1622  Last data filed at 3/7/2023 1100  Gross per 24 hour   Intake 360 ml   Output --   Net 360 ml       Physical Exam:   Physical Exam  Vitals and nursing note reviewed  Constitutional:       Appearance: She is well-developed  Comments: Patient keeps her eyes closed during conversation  Minimally interactive  Requiring 1-2 L to maintain sats above 89%   HENT:      Head: Normocephalic and atraumatic     Eyes:      Conjunctiva/sclera: Conjunctivae normal    Cardiovascular:      Rate and Rhythm: Normal rate and regular rhythm  Heart sounds: No murmur heard  Pulmonary:      Effort: Pulmonary effort is normal       Breath sounds: Rales present  Abdominal:      General: There is no distension  Palpations: Abdomen is soft  Tenderness: There is abdominal tenderness  There is no guarding  Musculoskeletal:      Cervical back: Neck supple  Skin:     General: Skin is warm and dry  Capillary Refill: Capillary refill takes less than 2 seconds  Additional Data:     Labs:  Results from last 7 days   Lab Units 03/07/23  0451   WBC Thousand/uL 8 41   HEMOGLOBIN g/dL 11 2*   HEMATOCRIT % 34 1*   PLATELETS Thousands/uL 254   NEUTROS PCT % 84*   LYMPHS PCT % 7*   MONOS PCT % 7   EOS PCT % 1     Results from last 7 days   Lab Units 03/07/23  0451   SODIUM mmol/L 141   POTASSIUM mmol/L 3 7   CHLORIDE mmol/L 100   CO2 mmol/L 39*   BUN mg/dL 24   CREATININE mg/dL 0 46*   ANION GAP mmol/L 2*   CALCIUM mg/dL 8 6   ALBUMIN g/dL 2 9*   TOTAL BILIRUBIN mg/dL 0 59   ALK PHOS U/L 66   ALT U/L 14   AST U/L 19   GLUCOSE RANDOM mg/dL 101     Results from last 7 days   Lab Units 03/07/23  0451   INR  1 93*         Results from last 7 days   Lab Units 03/03/23  0502   HEMOGLOBIN A1C % 4 5     Results from last 7 days   Lab Units 03/07/23  0451 03/03/23  0502   PROCALCITONIN ng/ml 0 15 0 12       Lines/Drains:  Invasive Devices     Peripheral Intravenous Line  Duration           Long-Dwell Peripheral IV (Midline) 78/02/17 Left Basilic 1 day                      Imaging: No pertinent imaging reviewed      Recent Cultures (last 7 days):         Last 24 Hours Medication List:   Current Facility-Administered Medications   Medication Dose Route Frequency Provider Last Rate   • albuterol  2 5 mg Nebulization Q4H PRN Zayra Alatorre MD     • diltiazem  240 mg Oral Daily Zayra Alatorre MD     • Fluticasone Furoate-Vilanterol  1 puff Inhalation Daily Zayra Alatorre MD     • hydrALAZINE  10 mg Intravenous Q6H PRN Rupinder Jin MD     • levothyroxine  25 mcg Oral Daily Mickey Zuñiga MD     • lidocaine  1 patch Topical Daily Mickey Zuñiga MD     • losartan  100 mg Oral Daily Heidi Carranza MD     • mirtazapine  7 5 mg Oral HS Mallory Green DO     • sertraline  50 mg Oral Daily Mickey Zuñiga MD     • spironolactone  25 mg Oral Daily Heidi Carranza MD     • umeclidinium  1 puff Inhalation Daily Mickey Zuñiga MD     • warfarin  5 mg Oral Daily (warfarin) Rupinder Jin MD          Today, Patient Was Seen By: Heidi Carranza MD    **Please Note: This note may have been constructed using a voice recognition system  **

## 2023-03-07 NOTE — PROGRESS NOTES
Progress Note - Geriatric Medicine   Pete Soriano 79 y o  female MRN: [id number]  Unit/Bed#: S -01 Encounter: [id number]      Assessment/Plan:  Altered mental status  Presents to the ER with altered mental status and lethargy  Abilify discontinued last week  CT, lab work, chest x-ray, MRI unremarkable  EEG Interpretation: This Routine EEG recorded during wakefulness and drowsiness is abnormal  Background activities and posteriorly dominant rhythm are too slow suggesting mild diffuse cerebral dysfunction of nonspecific etiology  Appreciate neurology recommendations  Possibly psychiatric component-    Appreciate psych recommendations  Is more arousable today, did shake her head yes and no to a few questions and follow commands  At risk for delirium due to cognitive impairment  Recommend delirium precautions  Maintain sleep-wake cycle, avoid nighttime interruptions  Provide adequate pain control  Avoid urinary retention and constipation  Provide frequent and early mobilization  Provide frequent redirection and reorientation as needed  Avoid medications that may worsen or precipitate delirium such as tramadol, benzodiazepines, anticholinergics, and Benadryl  Redirect unwanted behaviors as first-line therapy, avoid physical restraints as able to  Continue to monitor    Depression/anxiety  Recently became severely depressed and anxious after the loss of her brother-in-law  Was started on Zoloft 50 mg daily by PCP after unsuccessful trial of Ativan  Last hospitalization patient was started on Remeron 7 5 mg nightly and Abilify 2 mg daily  Was seen by psychiatry last hospitalization who agreed with starting Remeron and recommended Abilify 2 mg daily  Abilify was discontinued due to episode of altered mental status and lethargy at the beginning of this hospitalization  Psych saw patient yesterday- recommend conitnuing current regimen and getting cardiology input on starting ritalin   Consult not placed for cardiology, will discuss with internal medicine about placing consult  Management per psych    Failure to thrive  Recent admission for failure to thrive  At that time patient was started on Remeron 7 5 mg nightly and Abilify 2 mg daily  Was previously on Zoloft 50 mg daily recently prescribed by PCP  Abilify since discontinued the beginning of this hospitalization  Started on nutritional supplements-Magic cups with breakfast lunch and dinner  Currently on puréed diet with honey thick liquids  Did eat most of her breakfast this morning    Urinary retention  Pure wick in place with minimal urine output  Urine dark-colored  Bladder seems distended on exam  will order bladder scan    Hypertension  With elevated blood pressure since admission  Blood pressure this morning 165/57  Blood pressures have trended from 135//62  Patient was unable to take any oral medications yesterday, but appears to have taken oral medications this morning  Is currently on Cardizem 240 mg daily, losartan 150 mg daily, and spironolactone 25 mg daily  Losartan was increased from 100 mg daily to 150 mg daily yesterday  As needed hydralazine also ordered-given this morning  Avoid hypotension  Management per primary    Dysphagia  Complained of trouble swallowing last admission  Is currently on puréed diet with honey thick liquids  Aspiration precautions  Diet changes as per recommendations by speech therapy    Ambulatory dysfunction  At a baseline ambulates without AD at home  PT/OT following  Fall precautions  Out of bed as tolerated  Encourage early and frequent mobilization  Encourage adequate hydration and nutrition  Provide adequate pain management   Goal is undetermined  Continue with PT/OT for continued strength and balance training       Subjective:   She is being seen for a geriatrics follow-up  Upon examination patient was lying in bed, resting  She appeared comfortable and was in no acute distress    She shook her head yes and no to a couple questions  She denied any pain  She told me she was cold  She ate almost all of her breakfast and took her morning medications  She took her Remeron last night  Per nursing no acute concerns or issues at this time  Her son was bedside during examination, all questions answered  Review of Systems   Unable to perform ROS: Mental status change         Objective:     Vitals: Blood pressure 165/57, pulse 65, temperature (!) 97 3 °F (36 3 °C), resp  rate 19, height 5' (1 524 m), weight 45 8 kg (101 lb), SpO2 98 %, not currently breastfeeding  ,Body mass index is 19 73 kg/m²  Intake/Output Summary (Last 24 hours) at 3/7/2023 6771  Last data filed at 3/6/2023 1700  Gross per 24 hour   Intake 240 ml   Output --   Net 240 ml       Current Medications: Reviewed    Physical Exam:   Physical Exam  Vitals reviewed  Constitutional:       General: She is sleeping  She is not in acute distress  Appearance: She is underweight  She is ill-appearing  Comments: Frail appearing   HENT:      Head: Normocephalic and atraumatic  Mouth/Throat:      Mouth: Mucous membranes are dry  Pharynx: No oropharyngeal exudate or posterior oropharyngeal erythema  Comments: Dry cracked lips  Cardiovascular:      Rate and Rhythm: Normal rate and regular rhythm  Heart sounds: No murmur heard  Pulmonary:      Effort: Pulmonary effort is normal  No respiratory distress  Breath sounds: Normal breath sounds  Abdominal:      General: Abdomen is flat  Bowel sounds are normal  There is no distension  Palpations: Abdomen is soft  Tenderness: There is no abdominal tenderness  Musculoskeletal:         General: No swelling  Right lower leg: No edema  Left lower leg: No edema  Skin:     General: Skin is warm and dry  Coloration: Skin is pale  Findings: Bruising present  Neurological:      Mental Status: She is easily aroused        Cranial Nerves: No cranial nerve deficit  Motor: Weakness present  Gait: Gait abnormal       Comments: Unable to assess orientation due to lethargy, was able to follow commands   Psychiatric:         Mood and Affect: Mood is depressed  Affect is flat  Invasive Devices     Peripheral Intravenous Line  Duration           Long-Dwell Peripheral IV (Midline) 54/72/54 Left Basilic <1 day                Lab, Imaging and other studies: I have personally reviewed pertinent reports  Please note:  Voice-recognition software may have been used in the preparation of this document  Occasional wrong word or "sound-alike" substitutions may have occurred due to the inherent limitations of voice recognition software  Interpretation should be guided by context

## 2023-03-07 NOTE — ASSESSMENT & PLAN NOTE
Looks to have right-sided basal atelectasis  Try to wean O2  Due to patient's MDD not participating in incentive spirometer

## 2023-03-07 NOTE — ASSESSMENT & PLAN NOTE
· CTA head and neck with and without contrast 2/27/2023:  · Incidental thyroid nodule(s) for which nonemergent thyroid ultrasound is recommended "  · Outpatient f/u with pcp for non-emergent thyroid u/s

## 2023-03-08 PROBLEM — R06.02 SOB (SHORTNESS OF BREATH): Status: RESOLVED | Noted: 2023-03-03 | Resolved: 2023-03-08

## 2023-03-08 LAB
ANION GAP SERPL CALCULATED.3IONS-SCNC: 1 MMOL/L (ref 4–13)
BUN SERPL-MCNC: 28 MG/DL (ref 5–25)
CALCIUM SERPL-MCNC: 8.6 MG/DL (ref 8.4–10.2)
CHLORIDE SERPL-SCNC: 101 MMOL/L (ref 96–108)
CO2 SERPL-SCNC: 40 MMOL/L (ref 21–32)
CREAT SERPL-MCNC: 0.49 MG/DL (ref 0.6–1.3)
ERYTHROCYTE [DISTWIDTH] IN BLOOD BY AUTOMATED COUNT: 13.1 % (ref 11.6–15.1)
GFR SERPL CREATININE-BSD FRML MDRD: 89 ML/MIN/1.73SQ M
GLUCOSE SERPL-MCNC: 89 MG/DL (ref 65–140)
HCT VFR BLD AUTO: 32.6 % (ref 34.8–46.1)
HGB BLD-MCNC: 10.4 G/DL (ref 11.5–15.4)
INR PPP: 2.57 (ref 0.84–1.19)
MAGNESIUM SERPL-MCNC: 2 MG/DL (ref 1.9–2.7)
MCH RBC QN AUTO: 32.9 PG (ref 26.8–34.3)
MCHC RBC AUTO-ENTMCNC: 31.9 G/DL (ref 31.4–37.4)
MCV RBC AUTO: 103 FL (ref 82–98)
PLATELET # BLD AUTO: 238 THOUSANDS/UL (ref 149–390)
PMV BLD AUTO: 8.9 FL (ref 8.9–12.7)
POTASSIUM SERPL-SCNC: 4.1 MMOL/L (ref 3.5–5.3)
PROTHROMBIN TIME: 27.9 SECONDS (ref 11.6–14.5)
RBC # BLD AUTO: 3.16 MILLION/UL (ref 3.81–5.12)
SODIUM SERPL-SCNC: 142 MMOL/L (ref 135–147)
WBC # BLD AUTO: 8.16 THOUSAND/UL (ref 4.31–10.16)

## 2023-03-08 RX ADMIN — SPIRONOLACTONE 25 MG: 25 TABLET ORAL at 08:19

## 2023-03-08 RX ADMIN — DILTIAZEM HYDROCHLORIDE 240 MG: 240 CAPSULE, COATED, EXTENDED RELEASE ORAL at 08:18

## 2023-03-08 RX ADMIN — METHYLPHENIDATE HYDROCHLORIDE 5 MG: 10 TABLET ORAL at 08:18

## 2023-03-08 RX ADMIN — MIRTAZAPINE 7.5 MG: 15 TABLET, FILM COATED ORAL at 21:00

## 2023-03-08 RX ADMIN — SERTRALINE HYDROCHLORIDE 50 MG: 50 TABLET ORAL at 08:19

## 2023-03-08 RX ADMIN — LOSARTAN POTASSIUM 100 MG: 50 TABLET, FILM COATED ORAL at 08:19

## 2023-03-08 RX ADMIN — LIDOCAINE 5% 1 PATCH: 700 PATCH TOPICAL at 08:18

## 2023-03-08 RX ADMIN — LEVOTHYROXINE SODIUM 25 MCG: 25 TABLET ORAL at 08:17

## 2023-03-08 RX ADMIN — METHYLPHENIDATE HYDROCHLORIDE 5 MG: 10 TABLET ORAL at 12:35

## 2023-03-08 RX ADMIN — WARFARIN SODIUM 5 MG: 5 TABLET ORAL at 17:48

## 2023-03-08 NOTE — PROGRESS NOTES
Progress Note - Geriatric Medicine   Delio Aparicio 79 y o  female MRN: [id number]  Unit/Bed#: S -01 Encounter: [id number]      Assessment/Plan:  1 -Major depressive disorder -  Patient has been placed on Ritalin family present in room when we evaluated patient  They state that they have noticed that since the medication was given she appeared to be more responsive son was feeding her     2 -  Antiphospholipid syndrome -  Patient on Coumadin continue with daily INRs  3 -Atrial fibrillation -Currently anticoagulated continue with digoxin and diltiazem  4 -Dysphagia -Patient currently in a puréed diet  5 -Failure to thrive  -  We will need to observe how she does on  Ritalin  Subjective: When we gave her choices as to what to eat she did state that she wanted to try the vanilla ice cream   Son got up to feed her  Review of Systems   Constitutional: Positive for appetite change  HENT: Negative  Eyes: Negative  Respiratory: Negative  Cardiovascular: Negative  Gastrointestinal: Negative  Endocrine: Negative  Genitourinary: Negative  Musculoskeletal: Positive for arthralgias and gait problem  Allergic/Immunologic: Negative  Hematological: Negative  Psychiatric/Behavioral: Positive for behavioral problems  Objective:     Vitals: Blood pressure (!) 118/47, pulse 68, temperature 98 2 °F (36 8 °C), resp  rate 22, height 5' (1 524 m), weight 45 8 kg (101 lb), SpO2 91 %, not currently breastfeeding  ,Body mass index is 19 73 kg/m²  Intake/Output Summary (Last 24 hours) at 3/8/2023 1331  Last data filed at 3/8/2023 0504  Gross per 24 hour   Intake --   Output 300 ml   Net -300 ml       Current Medications: Reviewed    Physical Exam:   Physical Exam  HENT:      Head: Atraumatic  Right Ear: Ear canal and external ear normal       Left Ear: Ear canal and external ear normal       Nose: Nose normal       Mouth/Throat:      Pharynx: Oropharynx is clear     Eyes: Extraocular Movements: Extraocular movements intact  Cardiovascular:      Rate and Rhythm: Normal rate  Rhythm irregular  Heart sounds: Normal heart sounds  Pulmonary:      Breath sounds: Normal breath sounds  Abdominal:      General: Bowel sounds are normal       Palpations: Abdomen is soft  Musculoskeletal:         General: Normal range of motion  Cervical back: Neck supple  Skin:     General: Skin is dry  Invasive Devices     Peripheral Intravenous Line  Duration           Long-Dwell Peripheral IV (Midline) 06/62/14 Left Basilic 1 day                Lab, Imaging and other studies: I have personally reviewed pertinent reports

## 2023-03-08 NOTE — PROGRESS NOTES
[facility]  Progress Note - Myron Showers 1940, 79 y o  female MRN: [id number]  Unit/Bed#: S -01 Encounter: [id number]  Primary Care Provider: Isael Montoya MD   Date and time admitted to hospital: 2/27/2023  3:02 AM    MDD with persistant bereavement complex  Assessment & Plan  · Initially thought to be secondary to adverse medication side effects with up titration of her multiple antidepressants recently  Abilify was discontinued earlier on in the admission  Initially she had improvement in her mentation though continues to have intermittent episodes of obtundation without a clear organic cause  Infectious and metabolic work-up essentially unrevealing  · Would appreciate behavioral health reevaluate patient this admission and Monday, 3/6/2023  · Appreciate neurology recs  EEG without epileptiform discharges though grossly abnormal suggestive of diffuse cerebral dysfunction  · Continue supportive cares, advance diet as tolerated: Dysphagia 1 Pureed 1 ; Honey Thick Liquid with close monitoring  · Maintain aspiration precautions  · Does not seem to have AMS, just periods of time where does not want to interact--> will follow exam with encouragement but may not open eyes with sternal rub  · Palliative: Patient does not have palliative or hospice diagnosis at this time  · Psych:   · Not an ect candidate d/t aneurysm    · 3/7 Multidisciplinary meeting today psych and [facility] w/ son at bedside  Ritalin side effects discussed and explained that we will try to mitigate side effects however if patient should become unstable she may not have reserve to tolerate which may hasten her demise  The alternative is to watch her attempt to take PO meds and intermittently eat however intake is incompatible long term with meaningful recovery  Family agreeable to attempt   Plan to start Ritalin today, monitor BP and HR closely           * Failure to thrive in adult  Assessment & Plan  · Suspect large component due to profound grief and depression  · Continue supportive cares  · See additional recommendations below      SOB (shortness of breath)-resolved as of 3/8/2023  Assessment & Plan  · Likely atlectatsis in RLL   · Pt unable to tolerate IVF or diuresis well    Urinary retention  Assessment & Plan  Required straight cath early in admission   Continue with urinary retention protocol    Antiphospholipid antibody syndrome (HCC)  Assessment & Plan  · On coumadin  Goal INR 2-3  INR initially supratherapeutic, coumadin held  No active evidence of bleeding  · Restart Coumadin 5mg daily  INR is subtherapeutic - INR 1 6 (3/4/23)  · Daily INR    Acute respiratory failure ([address] )  Assessment & Plan  Looks to have right-sided basal atelectasis  Had pulm edema d/t IVF - requiring 02 and pt SOB, CXr showing pulm edema,  resolved w/ diuresis   Pt unable to tolerate IVF or diuresis well  Try to wean O2  Due to patient's MDD not participating in incentive spirometer    Atrial fibrillation (HCC)  Assessment & Plan  · On diltiazem, digoxin, and warfarin at home  · See above regarding AC    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HCC)  Assessment & Plan  · Incidental finding on CTA head and neck/  CTA head and neck with and without contrast 2/27/2023:  · "No acute intracranial abnormality  Moderate chronic microangiopathy  Negative CTA head and neck for large vessel occlusion, dissection, or high-grade stenosis  · 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 projecting laterally in between left posterior cerebral artery and left superior cerebellar- repeat CT stable when developed AMS  · Outpatient referral for Neurosurgery follow-up  Will provide on DC        Abnormal CT of the chest  Assessment & Plan  · PE Study with CT abdomen & pelvis with contrast 2/27/2023:   · There is a new right basilar density with associated few branching nodular density in the right middle lobe and a 5 mm nodular density superior segment left lower lobe, indeterminate  These may be on inflammatory/infectious basis /pneumonia   · Short interval follow-up chest CT at 3 months suggested to demonstrate resolution  Thyroid nodule  Assessment & Plan  · CTA head and neck with and without contrast 2023:  · Incidental thyroid nodule(s) for which nonemergent thyroid ultrasound is recommended "  · Outpatient f/u with pcp for non-emergent thyroid u/s    Hypothyroidism  Assessment & Plan  · TSH wnl  · Cont home levothyroxine        VTE Pharmacologic Prophylaxis: VTE Score: 6 High Risk (Score >/= 5) - Pharmacological DVT Prophylaxis Ordered: warfarin (Coumadin)  Sequential Compression Devices Ordered  Patient Centered Rounds: I performed bedside rounds with nursing staff today  Discussions with Specialists or Other Care Team Provider: Psych    Education and Discussions with Family / Patient: will speak woth family   Current Length of Stay: 9 day(s)  Current Patient Status: Inpatient   Discharge Plan: Anticipate discharge in >72 hrs to TBD    Code Status: Level 2 - DNAR: but accepts endotracheal intubation    Subjective:   She says " I am tired"  NO abdominal pain reported,     Objective:     Vitals:   Temp (24hrs), Av 4 °F (36 9 °C), Min:97 8 °F (36 6 °C), Max:99 1 °F (37 3 °C)    Temp:  [97 8 °F (36 6 °C)-99 1 °F (37 3 °C)] 97 9 °F (36 6 °C)  HR:  [64-72] 64  Resp:  [15-26] 15  BP: (116-173)/(47-78) 168/78  SpO2:  [90 %-93 %] 93 %  Body mass index is 19 73 kg/m²  Input and Output Summary (last 24 hours): Intake/Output Summary (Last 24 hours) at 3/8/2023 0825  Last data filed at 3/8/2023 0504  Gross per 24 hour   Intake 120 ml   Output 300 ml   Net -180 ml       Physical Exam:   Physical Exam  Vitals and nursing note reviewed  Constitutional:       General: She is not in acute distress  Appearance: She is well-developed        Comments: Pt mor interactive today after 1 dose of ritalin - answers questions albeit slowly  Still no eye opening while interacting,   LL edema putting off IVF   HENT:      Head: Normocephalic and atraumatic  Eyes:      Conjunctiva/sclera: Conjunctivae normal    Cardiovascular:      Rate and Rhythm: Normal rate and regular rhythm  Heart sounds: No murmur heard  Pulmonary:      Effort: Pulmonary effort is normal  No respiratory distress  Breath sounds: Normal breath sounds  Abdominal:      General: There is no distension  Palpations: Abdomen is soft  Tenderness: There is no guarding  Musculoskeletal:         General: No swelling  Cervical back: Neck supple  Skin:     General: Skin is warm and dry  Additional Data:     Labs:  Results from last 7 days   Lab Units 03/08/23  0458 03/07/23  0451   WBC Thousand/uL 8 16 8 41   HEMOGLOBIN g/dL 10 4* 11 2*   HEMATOCRIT % 32 6* 34 1*   PLATELETS Thousands/uL 238 254   NEUTROS PCT %  --  84*   LYMPHS PCT %  --  7*   MONOS PCT %  --  7   EOS PCT %  --  1     Results from last 7 days   Lab Units 03/08/23  0458 03/07/23  0451   SODIUM mmol/L 142 141   POTASSIUM mmol/L 4 1 3 7   CHLORIDE mmol/L 101 100   CO2 mmol/L 40* 39*   BUN mg/dL 28* 24   CREATININE mg/dL 0 49* 0 46*   ANION GAP mmol/L 1* 2*   CALCIUM mg/dL 8 6 8 6   ALBUMIN g/dL  --  2 9*   TOTAL BILIRUBIN mg/dL  --  0 59   ALK PHOS U/L  --  66   ALT U/L  --  14   AST U/L  --  19   GLUCOSE RANDOM mg/dL 89 101     Results from last 7 days   Lab Units 03/08/23  0458   INR  2 57*         Results from last 7 days   Lab Units 03/03/23  0502   HEMOGLOBIN A1C % 4 5     Results from last 7 days   Lab Units 03/07/23  0451 03/03/23  0502   PROCALCITONIN ng/ml 0 15 0 12       Lines/Drains:  Invasive Devices     Peripheral Intravenous Line  Duration           Long-Dwell Peripheral IV (Midline) 46/32/53 Left Basilic 1 day                      Imaging: No pertinent imaging reviewed      Recent Cultures (last 7 days):         Last 24 Hours Medication List:   Current Facility-Administered Medications   Medication Dose Route Frequency Provider Last Rate   • albuterol  2 5 mg Nebulization Q4H PRN Soto Lira MD     • diltiazem  240 mg Oral Daily Soto Lira MD     • Fluticasone Furoate-Vilanterol  1 puff Inhalation Daily Soto Lira MD     • hydrALAZINE  10 mg Intravenous Q6H PRN Lauren Carrizales MD     • levothyroxine  25 mcg Oral Daily Soto Lira MD     • lidocaine  1 patch Topical Daily Soto Lira MD     • losartan  100 mg Oral Daily Kaylee Storey MD     • methylphenidate  5 mg Oral BID before breakfast/lunch Mireille Chambers, DO     • mirtazapine  7 5 mg Oral HS Mallory Green DO     • sertraline  50 mg Oral Daily Soto Lira MD     • spironolactone  25 mg Oral Daily Kaylee Storey MD     • umeclidinium  1 puff Inhalation Daily Soto Lira MD     • warfarin  5 mg Oral Daily (warfarin) Lauren Carrizales MD          Today, Patient Was Seen By: Kaylee Storey MD    **Please Note: This note may have been constructed using a voice recognition system  **

## 2023-03-08 NOTE — QUICK NOTE
QUICK NOTE - Deterioration Index  Delio Aparicio 79 y o  female MRN: [id number]  Unit/Bed#: S -01 Encounter: [id number]    Date Paged: 23  Time Paged:   Room #: MS 26  Arrival Time:   Deterioration index score at time of page: 62 6  %  Spoke with Dr Le Barriga ([facility] resident on call) from primary team  Need to escalate level of care: no     PROBLEMS resulting in high DI score:   24 Age 79years old   20% Respiratory rate 26   19% Alona coma scale 13   18% Supplemental oxygen Nasal cannula   14% Neurological exam Lethargic   6% Pulse oximetry 90 %       • Altered mental status  Lethargic but rousable -- currently being treated for MDD and failure to thrive  [facility], Geriatric medicine, and psychiatry following patient and planning for trial of Ritalin starting tomorrow  Nursing also reports that patients mental status is unchanged  • SpO2 on bedside monitor 93% -- no acute distress noted  PLAN:    • Continue plan as outlined by primary team and consultants  • Titrate FiO2 for SpO2 > 92%     Please contact critical care via [employer] with any questions or concerns       Vitals:   Vitals:    23 0949 23 1257 23 1520 23 1900   BP: 167/66 (!) 130/48 147/50 (!) 116/47   BP Location:  Left arm     Pulse:  66 72 70   Resp:  18 20 (!) 26   Temp:  97 8 °F (36 6 °C) 99 1 °F (37 3 °C) 98 7 °F (37 1 °C)   TempSrc:  Axillary     SpO2:  90% 92% 90%   Weight:       Height:           Respiratory:  SpO2: SpO2: 90 %, SpO2 Activity: SpO2 Activity: At Rest, SpO2 Device: O2 Device: Nasal cannula  Nasal Cannula O2 Flow Rate (L/min): 1 L/min    Temperature: Temp (24hrs), Av 1 °F (36 7 °C), Min:97 3 °F (36 3 °C), Max:99 1 °F (37 3 °C)  Current: Temperature: 98 7 °F (37 1 °C)    Labs:   Results from last 7 days   Lab Units 23  0451 23  0522 23  0522 23  0554   WBC Thousand/uL 8 41 6 99 9 03 6 67   HEMOGLOBIN g/dL 11 2* 11 3* 11 8 12 0   HEMATOCRIT % 34 1* 34 3* 35 2 36 5   PLATELETS Thousands/uL 254 157 236 244   NEUTROS PCT % 84* 86*  --  81*   MONOS PCT % 7 5  --  5     Results from last 7 days   Lab Units 03/07/23  0451 03/06/23  0522 03/05/23  0522 03/04/23  0554   SODIUM mmol/L 141 138 137 136   POTASSIUM mmol/L 3 7 4 0 3 3* 3 5   CHLORIDE mmol/L 100 99 94* 94*   CO2 mmol/L 39* 36* 36* 36*   BUN mg/dL 24 19 17 15   CREATININE mg/dL 0 46* 0 34* 0 38* 0 39*   CALCIUM mg/dL 8 6 8 1* 8 3* 8 4   ALK PHOS U/L 66 62  --  65   ALT U/L 14 14  --  16   AST U/L 19 27  --  22     Results from last 7 days   Lab Units 03/07/23 0451 03/06/23  0522 03/04/23  0554   MAGNESIUM mg/dL 2 2 2 2 1 9             Results from last 7 days   Lab Units 03/07/23  0451 03/03/23  0502   PROCALCITONIN ng/ml 0 15 0 12       Code Status: Level 2 - DNAR: but accepts endotracheal intubation

## 2023-03-08 NOTE — CASE MANAGEMENT
Case Management Progress Note    Patient name Albert Marroquin  Location S /S -61 MRN [id number]  : 1940 Date 3/8/2023       LOS (days): 9  Geometric Mean LOS (GMLOS) (days): 3 40  Days to GMLOS:-5 7        OBJECTIVE:        Current admission status: Inpatient  Preferred Pharmacy:   [address] [place] [address] #[id number] Tanmay Amanda Pimentel [address] [place] [id number]  Phone: [phone] Fax: [facility] [address] , [place] [address]  Phone: [phone] Fax: [phone]    Primary Care Provider: Eduarda Nunez MD    Primary Insurance: MEDICARE  Secondary Insurance: BLUE CROSS    PROGRESS NOTE:    CM discussed case with [facility] Resident Dr Leeann Fitzpatrick  Pt is not yet medically stable, started on Ritalin, will need to be monitored for 5 days due to recent history of elevated BP  Pending improvement in oral intake, will be able to DC to [facility] v  F/u [address] conversation with family  Family aware of the above plan    CM updated Marzena Freeman

## 2023-03-08 NOTE — ASSESSMENT & PLAN NOTE
· Initially thought to be secondary to adverse medication side effects with up titration of her multiple antidepressants recently  Abilify was discontinued earlier on in the admission  Initially she had improvement in her mentation though continues to have intermittent episodes of obtundation without a clear organic cause  Infectious and metabolic work-up essentially unrevealing  · Would appreciate behavioral health reevaluate patient this admission and Monday, 3/6/2023  · Appreciate neurology recs  EEG without epileptiform discharges though grossly abnormal suggestive of diffuse cerebral dysfunction  · Continue supportive cares, advance diet as tolerated: Dysphagia 1 Pureed 1 ; Honey Thick Liquid with close monitoring  · Maintain aspiration precautions  · Does not seem to have AMS, just periods of time where does not want to interact--> will follow exam with encouragement but may not open eyes with sternal rub  · Palliative: Patient does not have palliative or hospice diagnosis at this time  · Psych:   · Not an ect candidate d/t aneurysm    · 3/7 Multidisciplinary meeting w/ psych and [facility] w/ son at bedside  Ritalin side effects discussed and explained that we will try to mitigate side effects however if patient should become unstable she may not have reserve to tolerate which may hasten her demise  The alternative is to watch her attempt to take PO meds and intermittently eat however intake is incompatible long term with meaningful recovery  Family agreeable to attempt     · Plan to start Ritalin 3/8, monitor BP and HR closely

## 2023-03-08 NOTE — ASSESSMENT & PLAN NOTE
Looks to have right-sided basal atelectasis  Had pulm edema d/t IVF - requiring 02 and pt SOB, CXr showing pulm edema,  resolved w/ diuresis   Pt unable to tolerate IVF or diuresis well  Try to wean O2  Due to patient's MDD not participating in incentive spirometer

## 2023-03-08 NOTE — PLAN OF CARE
Problem: PHYSICAL THERAPY ADULT  Goal: Performs mobility at highest level of function for planned discharge setting  See evaluation for individualized goals  Description: Treatment/Interventions: Functional transfer training, LE strengthening/ROM, Therapeutic exercise, Endurance training, Cognitive reorientation, Patient/family training, Equipment eval/education, Bed mobility, Spoke to nursing  Equipment Recommended:  (TBD pending mobility progress)       See flowsheet documentation for full assessment, interventions and recommendations  Outcome: Not Progressing  Note: Prognosis: Fair  Problem List: Decreased strength, Decreased endurance, Impaired balance, Decreased mobility, Decreased cognition, Impaired hearing, Decreased safety awareness, Decreased range of motion  Assessment: pt shows decline in mobility status from previous session w/ inability to participate in transfer training  pt had limited ability to communicate and actively participate in PT intervention  assist was needed w/ bed mobility and pt needs dependent means for out of bed mobilization  continued inpatient PT is indicated to reduce fall risk and resume transfer training as appropriate  discharge recommendation is for inpatient rehab to maximize level of independence  Barriers to Discharge: Inaccessible home environment, Decreased caregiver support     PT Discharge Recommendation: Post acute rehabilitation services    See flowsheet documentation for full assessment

## 2023-03-08 NOTE — PLAN OF CARE
Problem: MOBILITY - ADULT  Goal: Maintain or return to baseline ADL function  Description: INTERVENTIONS:  -  Assess patient's ability to carry out ADLs; assess patient's baseline for ADL function and identify physical deficits which impact ability to perform ADLs (bathing, care of mouth/teeth, toileting, grooming, dressing, etc )  - Assess/evaluate cause of self-care deficits   - Assess range of motion  - Assess patient's mobility; develop plan if impaired  - Assess patient's need for assistive devices and provide as appropriate  - Encourage maximum independence but intervene and supervise when necessary  - Involve family in performance of ADLs  - Assess for home care needs following discharge   - Consider OT consult to assist with ADL evaluation and planning for discharge  - Provide patient education as appropriate  Outcome: Progressing  Goal: Maintains/Returns to pre admission functional level  Description: INTERVENTIONS:  - Perform BMAT or MOVE assessment daily    - Set and communicate daily mobility goal to care team and patient/family/caregiver  - Collaborate with rehabilitation services on mobility goals if consulted  - Perform Range of Motion  times a day  - Reposition patient every  hours    - Dangle patient  times a day  - Stand patient  times a day  - Ambulate patient  times a day  - Out of bed to chair  times a day   - Out of bed for meal times a day  - Out of bed for toileting  - Record patient progress and toleration of activity level   Outcome: Progressing     Problem: Prexisting or High Potential for Compromised Skin Integrity  Goal: Skin integrity is maintained or improved  Description: INTERVENTIONS:  - Identify patients at risk for skin breakdown  - Assess and monitor skin integrity  - Assess and monitor nutrition and hydration status  - Monitor labs   - Assess for incontinence   - Turn and reposition patient  - Assist with mobility/ambulation  - Relieve pressure over bony prominences  - Avoid friction and shearing  - Provide appropriate hygiene as needed including keeping skin clean and dry  - Evaluate need for skin moisturizer/barrier cream  - Collaborate with interdisciplinary team   - Patient/family teaching  - Consider wound care consult   Outcome: Progressing     Problem: NEUROSENSORY - ADULT  Goal: Achieves stable or improved neurological status  Description: INTERVENTIONS  - Monitor and report changes in neurological status  - Monitor vital signs such as temperature, blood pressure, glucose, and any other labs ordered   - Initiate measures to prevent increased intracranial pressure  - Monitor for seizure activity and implement precautions if appropriate      Outcome: Progressing  Goal: Achieves maximal functionality and self care  Description: INTERVENTIONS  - Monitor swallowing and airway patency with patient fatigue and changes in neurological status  - Encourage and assist patient to increase activity and self care     - Encourage visually impaired, hearing impaired and aphasic patients to use assistive/communication devices  Outcome: Progressing     Problem: RESPIRATORY - ADULT  Goal: Achieves optimal ventilation and oxygenation  Description: INTERVENTIONS:  - Assess for changes in respiratory status  - Assess for changes in mentation and behavior  - Position to facilitate oxygenation and minimize respiratory effort  - Oxygen administered by appropriate delivery if ordered  - Initiate smoking cessation education as indicated  - Encourage broncho-pulmonary hygiene including cough, deep breathe, Incentive Spirometry  - Assess the need for suctioning and aspirate as needed  - Assess and instruct to report SOB or any respiratory difficulty  - Respiratory Therapy support as indicated  Outcome: Progressing     Problem: Nutrition/Hydration-ADULT  Goal: Nutrient/Hydration intake appropriate for improving, restoring or maintaining nutritional needs  Description: Monitor and assess patient's nutrition/hydration status for malnutrition  Collaborate with interdisciplinary team and initiate plan and interventions as ordered  Monitor patient's weight and dietary intake as ordered or per policy  Utilize nutrition screening tool and intervene as necessary  Determine patient's food preferences and provide high-protein, high-caloric foods as appropriate       INTERVENTIONS:  - Monitor oral intake, urinary output, labs, and treatment plans  - Assess nutrition and hydration status and recommend course of action  - Evaluate amount of meals eaten  - Assist patient with eating if necessary   - Allow adequate time for meals  - Recommend/ encourage appropriate diets, oral nutritional supplements, and vitamin/mineral supplements  - Order, calculate, and assess calorie counts as needed  - Recommend, monitor, and adjust tube feedings and TPN/PPN based on assessed needs  - Assess need for intravenous fluids  - Provide specific nutrition/hydration education as appropriate  - Include patient/family/caregiver in decisions related to nutrition  Outcome: Progressing

## 2023-03-08 NOTE — PROGRESS NOTES
Progress Note - Behavioral Health   Iftikhar Victoria 79 y o  female MRN: [id number]  Unit/Bed#: S -01 Encounter: [id number]    Assessment    Principal Psychiatric Problem:  1  Major depressive disorder  a  Differential: Persistent complex bereavement disorder  2  Unspecified anxiety disorder  a  Differential: Generalized anxiety disorder, Anxiety secondary to other medical condition  3  Tobacco use disorder  4  Failure to thrive in adult    Principal Problem:    Failure to thrive in adult  Active Problems:    Acute respiratory failure (HCC)    Antiphospholipid antibody syndrome (HCC)    Atrial fibrillation (HCC)    Hypothyroidism    MDD with persistant bereavement complex    Abnormal CT of the chest    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[facility] [address] )    Thyroid nodule    Urinary retention      Plan:  Discussed with primary team the followin  Psychiatry will continue to follow regarding medication and treatment  2  Recommend no changes to psychiatric medications at this time:  a  Continue Remeron 7 5 mg at 2000 for mood, appetite disturbance, and sleep disturbance  b  Continue Zoloft 50 mg daily for mood  c  Continue Ritalin 5 mg BID at 0800 and 1130 for augmentation of antidepressants and to help apathetic and neurovegetative symptoms of depression  i  Multidisciplinary meeting was had with family and SLIM on 23  Family remains in agreement for Ritalin therapy in setting of significant cardiac risk factors  ii  Close monitoring of heart rate and blood pressure will continue   iii  Patient with first doses of medication today  Patient with improvement in level of responsiveness, increased oral intake - though still poor, increased speech - though still slow and delayed, continues to be able to follow 1 step commands   3  Please reach out to on-call Psychiatry team via [employer] with any questions or concerns      ------------------------------------------------------------    Subjective: Patient seen and evaluated for continuity of care, patient seen at bedside  Patient observed with eyes closed, patient did attempt to open her eyes during evaluation today  Patient continues to be able to follow one-step command slowly  Patient more verbal today, able to express that she is tired and able to express that she was thirsty  Patient with increased level of responsiveness today compared to evaluation yesterday, though continues to have significant decrease in level of responsiveness  Family at bedside during time of evaluation, patient and son notes improvement in patient's responsiveness and appetite  Family remains in agreement for trial of Ritalin to help with current symptoms  Vital signs reviewed and remained stable at this time  We will continue to monitor      Psychiatric Review of Systems:  Behavior over the last 24 hours: slight improvement  Sleep: unchanged  Appetite: improving, poor oral intake  Medication side effects: none verbalized  ROS: Review of systems could not be obtained due to patient factors    Vital signs in last 24 hours:  Temp:  [97 9 °F (36 6 °C)-98 8 °F (37 1 °C)] 98 2 °F (36 8 °C)  HR:  [64-70] 68  Resp:  [15-26] 22  BP: (116-173)/(47-78) 118/47    Mental Status Exam:  Appearance:  drowsy, no eye contact, appears stated age, marginal grooming/hygiene and thin and frail appearing, laying in hospital bed   Behavior:  calm, laying in bed and patient kept eyes closed for evaluation - did attempt to open on command   Motor: no abnormal movements and unable to assess gait and balance as patient remained in hospital bed   Speech:  delayed initiation, minimal, coherent   Mood:  Unable to obtain due to patient factors   Affect:  Unable to assess   Thought Process:  unable to assess due to patient factors   Thought Content: no verbalized delusions or overt paranoia, unable to assess due to patient factors   Perceptual disturbances: does not appear to be responding to internal stimuli at this time   Risk Potential: Unable to assess due to patient factors   Cognition: cognition not formally tested and patient able to follow 1 step commands   Insight:  unable to assess due to patient factors   Judgment: unable to assess due to patient factors     Current Medications: All current medications have been reviewed  Current Facility-Administered Medications   Medication Dose Route Frequency Provider Last Rate   • albuterol  2 5 mg Nebulization Q4H PRN Calvin Sheffield MD     • diltiazem  240 mg Oral Daily Calvin Sheffield MD     • Fluticasone Furoate-Vilanterol  1 puff Inhalation Daily Calvin Sheffield MD     • hydrALAZINE  10 mg Intravenous Q6H PRN Roberto Muñoz MD     • levothyroxine  25 mcg Oral Daily Calvin Sheffield MD     • lidocaine  1 patch Topical Daily Calvin Sheffield MD     • losartan  100 mg Oral Daily Timothy Foster MD     • methylphenidate  5 mg Oral BID before breakfast/lunch Abner Lal DO     • mirtazapine  7 5 mg Oral HS Mallory Green DO     • sertraline  50 mg Oral Daily Calvin Sheffield MD     • spironolactone  25 mg Oral Daily Timothy Foster MD     • umeclidinium  1 puff Inhalation Daily Calvin Sheffield MD     • warfarin  5 mg Oral Daily (warfarin) Roberto Muñoz MD         Laboratory results:  I have personally reviewed all pertinent laboratory/tests results    Recent Results (from the past 48 hour(s))   Protime-INR    Collection Time: 03/07/23  4:51 AM   Result Value Ref Range    Protime 22 3 (H) 11 6 - 14 5 seconds    INR 1 93 (H) 0 84 - 1 19   Procalcitonin    Collection Time: 03/07/23  4:51 AM   Result Value Ref Range    Procalcitonin 0 15 <=0 25 ng/ml   CBC and differential    Collection Time: 03/07/23  4:51 AM   Result Value Ref Range    WBC 8 41 4 31 - 10 16 Thousand/uL    RBC 3 32 (L) 3 81 - 5 12 Million/uL    Hemoglobin 11 2 (L) 11 5 - 15 4 g/dL    Hematocrit 34 1 (L) 34 8 - 46 1 %     (H) 82 - 98 fL    MCH 33 7 26 8 - 34 3 pg    MCHC 32 8 31 4 - 37 4 g/dL    RDW 12 7 11 6 - 15 1 %    MPV 9 5 8 9 - 12 7 fL    Platelets 667 091 - 942 Thousands/uL    nRBC 0 /100 WBCs    Neutrophils Relative 84 (H) 43 - 75 %    Immat GRANS % 1 0 - 2 %    Lymphocytes Relative 7 (L) 14 - 44 %    Monocytes Relative 7 4 - 12 %    Eosinophils Relative 1 0 - 6 %    Basophils Relative 0 0 - 1 %    Neutrophils Absolute 7 10 1 85 - 7 62 Thousands/µL    Immature Grans Absolute 0 04 0 00 - 0 20 Thousand/uL    Lymphocytes Absolute 0 55 (L) 0 60 - 4 47 Thousands/µL    Monocytes Absolute 0 61 0 17 - 1 22 Thousand/µL    Eosinophils Absolute 0 09 0 00 - 0 61 Thousand/µL    Basophils Absolute 0 02 0 00 - 0 10 Thousands/µL   Magnesium    Collection Time: 03/07/23  4:51 AM   Result Value Ref Range    Magnesium 2 2 1 9 - 2 7 mg/dL   Comprehensive metabolic panel    Collection Time: 03/07/23  4:51 AM   Result Value Ref Range    Sodium 141 135 - 147 mmol/L    Potassium 3 7 3 5 - 5 3 mmol/L    Chloride 100 96 - 108 mmol/L    CO2 39 (H) 21 - 32 mmol/L    ANION GAP 2 (L) 4 - 13 mmol/L    BUN 24 5 - 25 mg/dL    Creatinine 0 46 (L) 0 60 - 1 30 mg/dL    Glucose 101 65 - 140 mg/dL    Calcium 8 6 8 4 - 10 2 mg/dL    Corrected Calcium 9 5 8 3 - 10 1 mg/dL    AST 19 13 - 39 U/L    ALT 14 7 - 52 U/L    Alkaline Phosphatase 66 34 - 104 U/L    Total Protein 5 5 (L) 6 4 - 8 4 g/dL    Albumin 2 9 (L) 3 5 - 5 0 g/dL    Total Bilirubin 0 59 0 20 - 1 00 mg/dL    eGFR 91 ml/min/1 73sq m   Protime-INR    Collection Time: 03/08/23  4:58 AM   Result Value Ref Range    Protime 27 9 (H) 11 6 - 14 5 seconds    INR 2 57 (H) 0 84 - 1 19   CBC    Collection Time: 03/08/23  4:58 AM   Result Value Ref Range    WBC 8 16 4 31 - 10 16 Thousand/uL    RBC 3 16 (L) 3 81 - 5 12 Million/uL    Hemoglobin 10 4 (L) 11 5 - 15 4 g/dL    Hematocrit 32 6 (L) 34 8 - 46 1 %     (H) 82 - 98 fL    MCH 32 9 26 8 - 34 3 pg    MCHC 31 9 31 4 - 37 4 g/dL    RDW 13 1 11 6 - 15 1 %    Platelets 741 862 - 188 Thousands/uL    MPV 8 9 8 9 - 12 7 fL   Magnesium    Collection Time: 03/08/23  4:58 AM   Result Value Ref Range    Magnesium 2 0 1 9 - 2 7 mg/dL   Basic metabolic panel    Collection Time: 03/08/23  4:58 AM   Result Value Ref Range    Sodium 142 135 - 147 mmol/L    Potassium 4 1 3 5 - 5 3 mmol/L    Chloride 101 96 - 108 mmol/L    CO2 40 (H) 21 - 32 mmol/L    ANION GAP 1 (L) 4 - 13 mmol/L    BUN 28 (H) 5 - 25 mg/dL    Creatinine 0 49 (L) 0 60 - 1 30 mg/dL    Glucose 89 65 - 140 mg/dL    Calcium 8 6 8 4 - 10 2 mg/dL    eGFR 89 ml/min/1 73sq m        Wesly Gordon DO  Psychiatry Residency, PGY-II

## 2023-03-08 NOTE — OCCUPATIONAL THERAPY NOTE
Occupational Therapy Progress Note     Patient Name: Quique Salazar  [id number] Date: 3/8/2023  Problem List  Principal Problem:    Failure to thrive in adult  Active Problems:    Acute respiratory failure (HCC)    Antiphospholipid antibody syndrome (HCC)    Atrial fibrillation (HCC)    Hypothyroidism    MDD with persistant bereavement complex    Abnormal CT of the chest    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HCC)    Thyroid nodule    Urinary retention       03/08/23 1600   OT Last Visit   OT Visit Date 03/08/23  (Wednesday)   Note Type   Note Type Treatment   Pain Assessment   Pain Assessment Tool FLACC   Pain Rating: FLACC (Rest) - Face 0   Pain Rating: FLACC (Rest) - Legs 0   Pain Rating: FLACC (Rest) - Activity 0   Pain Rating: FLACC (Rest) - Cry 0   Pain Rating: FLACC (Rest) - Consolability 0   Score: FLACC (Rest) 0   Pain Rating: FLACC (Activity) - Face 1   Pain Rating: FLACC (Activity) - Legs 0   Pain Rating: FLACC (Activity) - Activity 1   Pain Rating: FLACC (Activity) - Cry 0   Pain Rating: FLACC (Activity) - Consolability 1   Score: FLACC (Activity) 3   Restrictions/Precautions   Weight Bearing Precautions Per Order No   Other Precautions Cognitive; Bed Alarm;O2;Fall Risk;Pain;Hard of hearing;Aspiration  (Mario Alberto)   Lifestyle   Autonomy Pt completes ADLs Roxy at baseline prior to recent admission and rehab  Reciprocal Relationships Supportive family  Pt lives alone at baseline  Pt reports raising 4 children and has 12 great / grandchildren   Service to Others Pt reports retired    Intrinsic Gratification Pt reports enjoying knitting, crocehting and sewing  Pt added that she had a garden at home   ADL   Where Assessed Edge of bed   Eating Assistance 5  Supervision/Setup   Eating Deficit Setup; Increased time to complete;Supervision/safety   Eating Comments tolerated sitting at EOB 15 minutes to feed self ice cream w/ S   Grooming Assistance 4  Minimal Assistance   Grooming Deficit Setup;Verbal cueing;Supervision/safety; Increased time to complete   UB Dressing Assistance Unable to assess   LB Dressing Assistance Unable to assess   Bed Mobility   Supine to Sit 4  Minimal assistance   Additional items Assist x 1; Increased time required;Verbal cues;HOB elevated; Bedrails  (to pt's R)   Sit to Supine 4  Minimal assistance   Additional items Assist x 1; Increased time required;Verbal cues; Bedrails   Additional Comments Pt completed bed mobility supine to sit w/ min A to R  Tolerated sitting at EOB approx 15 minutes w/ S  Pt supine HOB elevated at end of tx session w/ needs met, call bell in reach and bed alarm activated   Transfers   Sit to Stand Unable to assess   Stand to Sit Unable to assess   Stand pivot Unable to assess   Additional Comments Pt declined sit <> stand despite encourabement and education stating "I just want to lie down"   Functional Mobility   Additional Comments NT  Will continue to assess   Subjective   Subjective "I want something cold"   Cognition   Overall Cognitive Status Impaired   Arousal/Participation Responsive; Cooperative   Attention Attends with cues to redirect   Orientation Level Oriented to person   Memory Decreased short term memory;Decreased recall of recent events;Decreased recall of biographical information  (able to confirm birthdate but unable to report)   Following Commands Follows one step commands with increased time or repetition   Comments Identified pt by full name and wristband  Responsive and cooperative  Able to follow directions during ADLs w/ + time  Activity Tolerance   Activity Tolerance Patient limited by fatigue   Medical Staff Made Aware spoke w/ RNMikhail pre / post tx session and Jose BOYLE  Assessment   Assessment Pt seen for skilled OT tx session day 1 from [id number] focusing on activity engagement  Pt agreeable to participate w/ encouragement  Pt able to follow directions w/ + time  Pt tolerated sitting at EOB approx 15 minutes w/ S   Pt engaged in feeding seated unsupported at EOB w/ S  Pt more lethargic this afternoon since OT eval  Pt declined sit to stand despite encouragement / education  Continue to recommend post acute rehab when medically stable  Goals established in OT eval will be extended to 3/15/2023  Will continue to follow   Plan   Treatment Interventions ADL retraining;Functional transfer training;UE strengthening/ROM; Endurance training;Cognitive reorientation;Patient/family training;Equipment evaluation/education; Compensatory technique education; Fine motor coordination activities;Continued evaluation; Energy conservation; Activityengagement   Goal Expiration Date 03/15/23   OT Treatment Day 1  (Monday)   OT Frequency 3-5x/wk   Recommendation   OT Discharge Recommendation Post acute rehabilitation services   AM-[facility] Daily Activity Inpatient   Lower Body Dressing 2   Bathing 2   Toileting 2   Upper Body Dressing 2   Grooming 3   Eating 3   Daily Activity Raw Score 14   Daily Activity Standardized Score (Calc for Raw Score >=11) 33 39   AM-[facility] Applied Cognition Inpatient   Following a Speech/Presentation 2   Understanding Ordinary Conversation 2   Taking Medications 1   Remembering Where Things Are Placed or Put Away 2   Remembering List of 4-5 Errands 1   Taking Care of Complicated Tasks 1   Applied Cognition Raw Score 9   Applied Cognition Standardized Score 22 48   Barthel Index   Feeding 5   Bathing 0   Grooming Score 0   Dressing Score 5   Bladder Score 5   Bowels Score 5   Toilet Use Score 0   Transfers (Bed/Chair) Score 0   Mobility (Level Surface) Score 0   Stairs Score 0   Barthel Index Score 20   Modified Kirksey Scale   Modified Genevieve Scale 5          The patient's raw score on the AM-PAC Daily Activity Inpatient Short Form is 14  A raw score of less than 19 suggests the patient may benefit from discharge to post-acute rehabilitation services   Please refer to the recommendation of the Occupational Therapist for safe discharge planning      Pt goals to be met by 3/15/2023 to max I w/ ADLs and improve engagement to return home includes:     -Pt will demonstrate good attention and participation in ongoing eval of functional cognitive skills to assist in DC planning     -Pt will complete functional transfers to all surfaces w/ S using AD, DME as needed to max I w/ ADLs     -Pt will consistently engage in functional mobility using least restrictive device w/ S household distances     -Pt will complete bed mobility supine <> sit w/ mod I to max I w/ ADLs and improve engagement     -Pt will complete UBD w/ mod I to max I w/ ADLs     -Pt will complete LBD w/ S to max I and minimize burden of care     -Pt will demonstrate improved activity and sitting tolerance OOB in chair for all meals     Pt will consistently follow 2 step directions w/ good recall to improve engagement and max I        Kari Saavedra, OTR/L

## 2023-03-08 NOTE — PLAN OF CARE
Problem: OCCUPATIONAL THERAPY ADULT  Goal: Performs self-care activities at highest level of function for planned discharge setting  See evaluation for individualized goals  Description: Treatment Interventions: ADL retraining, Functional transfer training, UE strengthening/ROM, Endurance training, Cognitive reorientation, Patient/family training, Equipment evaluation/education, Compensatory technique education, Continued evaluation, Energy conservation, Activityengagement          See flowsheet documentation for full assessment, interventions and recommendations  3/8/2023 1615 by Imani Al OT  Note: Limitation: Decreased ADL status, Decreased UE strength, Decreased cognition, Decreased endurance, Decreased high-level ADLs, Decreased self-care trans, Decreased fine motor control     Assessment: Pt seen for skilled OT tx session day 1 from [id number] focusing on activity engagement  Pt agreeable to participate w/ encouragement  Pt able to follow directions w/ + time  Pt tolerated sitting at EOB approx 15 minutes w/ S  Pt engaged in feeding seated unsupported at EOB w/ S  Pt more lethargic this afternoon since OT eval  Pt declined sit to stand despite encouragement / education  Continue to recommend post acute rehab when medically stable  Goals established in OT eval will be extended to 3/15/2023  Will continue to follow     OT Discharge Recommendation: Post acute rehabilitation services       3/8/2023 1615 by Imani Al OT  Outcome: Progressing  Note: Limitation: Decreased ADL status, Decreased UE strength, Decreased cognition, Decreased endurance, Decreased high-level ADLs, Decreased self-care trans, Decreased fine motor control     Assessment: Pt seen for skilled OT tx session day 1 from [id number] focusing on activity engagement  Pt agreeable to participate w/ encouragement  Pt able to follow directions w/ + time  Pt tolerated sitting at EOB approx 15 minutes w/ S   Pt engaged in feeding seated unsupported at EOB w/ S  Pt more lethargic this afternoon since OT eval  Pt declined sit to stand despite encouragement / education  Continue to recommend post acute rehab when medically stable  Goals established in OT eval will be extended to 3/15/2023   Will continue to follow     OT Discharge Recommendation: Post acute rehabilitation services

## 2023-03-08 NOTE — PHYSICAL THERAPY NOTE
PHYSICAL THERAPY EVALUATION NOTE    Patient Name: Willie Hanson  [id number] Date: 3/8/2023  AGE:   79 y o  Mrn:   [id number]  ADMIT DX:  Altered mental status [R41 82]  Pneumonia [J18 9]  Failure to thrive in adult [R62 7]  AMS (altered mental status) [R41 82]    Past Medical History:   Diagnosis Date    Anti-phospholipid syndrome (HCC)     Asthma     Blood type O+     Cardiac disease     Chronic pain     COPD (chronic obstructive pulmonary disease) (HCC)     Fracture of ankle     left    Hyperlipidemia     Hypertension     Hypokalemia 2/27/2023    Osteoporosis      Length Of Stay: 9  PHYSICAL THERAPY EVALUATION :    03/08/23 1200   PT Last Visit   PT Visit Date 03/08/23   Pain Assessment   Pain Assessment Tool FLACC   Pain Rating: FLACC (Rest) - Face 0   Pain Rating: FLACC (Rest) - Legs 0   Pain Rating: FLACC (Rest) - Activity 0   Pain Rating: FLACC (Rest) - Cry 0   Pain Rating: FLACC (Rest) - Consolability 0   Score: FLACC (Rest) 0   Pain Rating: FLACC (Activity) - Face 1   Pain Rating: FLACC (Activity) - Legs 0   Pain Rating: FLACC (Activity) - Activity 0   Pain Rating: FLACC (Activity) - Cry 1   Pain Rating: FLACC (Activity) - Consolability 1   Score: FLACC (Activity) 3   Restrictions/Precautions   Other Precautions Cognitive; Bed Alarm;Aspiration;Multiple lines;O2;Fall Risk;Hard of hearing  ([place])   General   Chart Reviewed Yes   Additional Pertinent History resting pulse ox 92% and 81 BPM, active 90% and 88 BPM    Family/Caregiver Present Yes   Cognition   Arousal/Participation Cooperative;Lethargic   Attention Attends with cues to redirect   Orientation Level Oriented to person; Other (Comment)  (pt was identified w/ full name, ID bracelet)   Following Commands Follows one step commands inconsistently   Subjective   Subjective pt seen supine in bed w/ son present  pt was lethargic and needed input for task focus   pt was slow to respond to questions  pt agreed to participate in PT session  Bed Mobility   Supine to Sit 2  Maximal assistance   Additional items Assist x 1;HOB elevated; Increased time required;Verbal cues;LE management  (for trunk/LE positioning)   Sit to Supine 2  Maximal assistance   Additional items Assist x 1;HOB elevated; Increased time required;Verbal cues;LE management  (for trunk/LE positioning)   Transfers   Sit to Stand Unable to assess   Additional Comments pt sat edge of bed approximately 9 minutes w/ level of assistance varying between min and modx1  left trunk lean was noted  pt was unable to participte in transfer training when attempted  pt presently needs dependent means for out of bed mobilization  Balance   Static Sitting Poor +  (to poor)   Static Standing Zero   Activity Tolerance   Activity Tolerance Patient limited by fatigue   Nurse Made Aware spoke to [facility] 95   Equipment Use   Comments ankle pumps 20  short arc quads and heel slides 10 each  pt needed assistance and frequent verbal cues to participate in LE exercises  Assessment   Problem List Decreased strength;Decreased endurance; Impaired balance;Decreased mobility; Decreased cognition; Impaired hearing;Decreased safety awareness;Decreased range of motion   Assessment pt shows decline in mobility status from previous session w/ inability to participate in transfer training  pt had limited ability to communicate and actively participate in PT intervention  assist was needed w/ bed mobility and pt needs dependent means for out of bed mobilization  continued inpatient PT is indicated to reduce fall risk and resume transfer training as appropriate  discharge recommendation is for inpatient rehab to maximize level of independence  Goals   Patient Goals pt did not state goals when asekd  STG Expiration Date 03/14/23   Short Term Goal #1 Pt will: Perform bed mobility tasks to supervision to prepare for transfers and reposition self in bed   Perform transfers to consistent A of 1 to decrease burden of care and decrease risk for falls  Perform ambulation with LRAD for 10ft to consistent A of 1 To increase Indep in home environment  PT Treatment Day 1   Plan   Treatment/Interventions Functional transfer training;LE strengthening/ROM; Therapeutic exercise; Endurance training;Cognitive reorientation;Patient/family training;Equipment eval/education; Bed mobility   Progress Slow progress, decreased activity tolerance   PT Frequency 3-5x/wk   Recommendation   PT Discharge Recommendation Post acute rehabilitation services   AM-PAC Basic Mobility Inpatient   Turning in Flat Bed Without Bedrails 2   Lying on Back to Sitting on Edge of Flat Bed Without Bedrails 2   Moving Bed to Chair 1   Standing Up From Chair Using Arms 1   Walk in Room 1   Climb 3-5 Stairs With Railing 1   Basic Mobility Inpatient Raw Score 8   Turning Head Towards Sound 3   Follow Simple Instructions 2   Low Function Basic Mobility Raw Score 13   Low Function Basic Mobility Standardized Score 20 14   Highest Level Of Mobility   -HLM Goal 3: Sit at edge of bed   JH-HLM Achieved 3: Sit at edge of bed   End of Consult   Patient Position at End of Consult Supine;Bed/Chair alarm activated; All needs within reach       The patient's AM-PAC Basic Mobility Inpatient Short Form Raw Score is 8  A Raw score of less than or equal to 16 suggests the patient may benefit from discharge to post-acute rehabilitation services  Please also refer to the recommendation of the Physical Therapist for safe discharge planning  Skilled PT recommended while in hospital and upon DC to progress pt toward treatment goals       Sal Man, PT

## 2023-03-09 ENCOUNTER — APPOINTMENT (INPATIENT)
Dept: CT IMAGING | Facility: HOSPITAL | Age: 83
End: 2023-03-09

## 2023-03-09 PROBLEM — R14.0 ABDOMINAL DISTENTION: Status: ACTIVE | Noted: 2023-03-09

## 2023-03-09 LAB
ALBUMIN SERPL BCP-MCNC: 2.8 G/DL (ref 3.5–5)
ALP SERPL-CCNC: 70 U/L (ref 34–104)
ALT SERPL W P-5'-P-CCNC: 12 U/L (ref 7–52)
ANION GAP SERPL CALCULATED.3IONS-SCNC: 3 MMOL/L (ref 4–13)
AST SERPL W P-5'-P-CCNC: 14 U/L (ref 13–39)
BASOPHILS # BLD AUTO: 0.02 THOUSANDS/ÂΜL (ref 0–0.1)
BASOPHILS NFR BLD AUTO: 0 % (ref 0–1)
BILIRUB SERPL-MCNC: 0.42 MG/DL (ref 0.2–1)
BUN SERPL-MCNC: 36 MG/DL (ref 5–25)
CALCIUM ALBUM COR SERPL-MCNC: 9.9 MG/DL (ref 8.3–10.1)
CALCIUM SERPL-MCNC: 8.9 MG/DL (ref 8.4–10.2)
CHLORIDE SERPL-SCNC: 101 MMOL/L (ref 96–108)
CO2 SERPL-SCNC: 38 MMOL/L (ref 21–32)
CREAT SERPL-MCNC: 0.57 MG/DL (ref 0.6–1.3)
EOSINOPHIL # BLD AUTO: 0.13 THOUSAND/ÂΜL (ref 0–0.61)
EOSINOPHIL NFR BLD AUTO: 1 % (ref 0–6)
ERYTHROCYTE [DISTWIDTH] IN BLOOD BY AUTOMATED COUNT: 13.5 % (ref 11.6–15.1)
GFR SERPL CREATININE-BSD FRML MDRD: 85 ML/MIN/1.73SQ M
GLUCOSE SERPL-MCNC: 97 MG/DL (ref 65–140)
HCT VFR BLD AUTO: 32.5 % (ref 34.8–46.1)
HGB BLD-MCNC: 10.4 G/DL (ref 11.5–15.4)
IMM GRANULOCYTES # BLD AUTO: 0.06 THOUSAND/UL (ref 0–0.2)
IMM GRANULOCYTES NFR BLD AUTO: 1 % (ref 0–2)
INR PPP: 2.92 (ref 0.84–1.19)
LYMPHOCYTES # BLD AUTO: 0.63 THOUSANDS/ÂΜL (ref 0.6–4.47)
LYMPHOCYTES NFR BLD AUTO: 7 % (ref 14–44)
MAGNESIUM SERPL-MCNC: 2.2 MG/DL (ref 1.9–2.7)
MCH RBC QN AUTO: 33.7 PG (ref 26.8–34.3)
MCHC RBC AUTO-ENTMCNC: 32 G/DL (ref 31.4–37.4)
MCV RBC AUTO: 105 FL (ref 82–98)
MONOCYTES # BLD AUTO: 0.76 THOUSAND/ÂΜL (ref 0.17–1.22)
MONOCYTES NFR BLD AUTO: 8 % (ref 4–12)
NEUTROPHILS # BLD AUTO: 7.4 THOUSANDS/ÂΜL (ref 1.85–7.62)
NEUTS SEG NFR BLD AUTO: 83 % (ref 43–75)
NRBC BLD AUTO-RTO: 0 /100 WBCS
PLATELET # BLD AUTO: 259 THOUSANDS/UL (ref 149–390)
PMV BLD AUTO: 9.2 FL (ref 8.9–12.7)
POTASSIUM SERPL-SCNC: 3.8 MMOL/L (ref 3.5–5.3)
PROT SERPL-MCNC: 5.5 G/DL (ref 6.4–8.4)
PROTHROMBIN TIME: 30.8 SECONDS (ref 11.6–14.5)
RBC # BLD AUTO: 3.09 MILLION/UL (ref 3.81–5.12)
SODIUM SERPL-SCNC: 142 MMOL/L (ref 135–147)
WBC # BLD AUTO: 9 THOUSAND/UL (ref 4.31–10.16)

## 2023-03-09 RX ORDER — AMOXICILLIN 250 MG
1 CAPSULE ORAL
Status: DISCONTINUED | OUTPATIENT
Start: 2023-03-09 | End: 2023-03-14 | Stop reason: HOSPADM

## 2023-03-09 RX ORDER — BISACODYL 10 MG
10 SUPPOSITORY, RECTAL RECTAL DAILY
Status: DISCONTINUED | OUTPATIENT
Start: 2023-03-09 | End: 2023-03-14 | Stop reason: HOSPADM

## 2023-03-09 RX ORDER — SODIUM PHOSPHATE, DIBASIC AND SODIUM PHOSPHATE, MONOBASIC 7; 19 G/133ML; G/133ML
1 ENEMA RECTAL ONCE
Status: COMPLETED | OUTPATIENT
Start: 2023-03-09 | End: 2023-03-09

## 2023-03-09 RX ADMIN — SPIRONOLACTONE 25 MG: 25 TABLET ORAL at 08:31

## 2023-03-09 RX ADMIN — SODIUM PHOSPHATE 1 ENEMA: 7; 19 ENEMA RECTAL at 15:57

## 2023-03-09 RX ADMIN — IOHEXOL 100 ML: 350 INJECTION, SOLUTION INTRAVENOUS at 13:21

## 2023-03-09 RX ADMIN — SENNOSIDES AND DOCUSATE SODIUM 1 TABLET: 8.6; 5 TABLET ORAL at 17:52

## 2023-03-09 RX ADMIN — LIDOCAINE 5% 1 PATCH: 700 PATCH TOPICAL at 08:32

## 2023-03-09 RX ADMIN — UMECLIDINIUM 1 PUFF: 62.5 AEROSOL, POWDER ORAL at 08:33

## 2023-03-09 RX ADMIN — MIRTAZAPINE 7.5 MG: 15 TABLET, FILM COATED ORAL at 20:39

## 2023-03-09 RX ADMIN — METHYLPHENIDATE HYDROCHLORIDE 5 MG: 10 TABLET ORAL at 08:32

## 2023-03-09 RX ADMIN — SERTRALINE HYDROCHLORIDE 50 MG: 50 TABLET ORAL at 08:31

## 2023-03-09 RX ADMIN — SENNOSIDES AND DOCUSATE SODIUM 1 TABLET: 8.6; 5 TABLET ORAL at 22:00

## 2023-03-09 RX ADMIN — Medication 10 MG: at 17:52

## 2023-03-09 RX ADMIN — Medication 1000 MG: at 17:52

## 2023-03-09 RX ADMIN — LEVOTHYROXINE SODIUM 25 MCG: 25 TABLET ORAL at 08:32

## 2023-03-09 RX ADMIN — LOSARTAN POTASSIUM 100 MG: 50 TABLET, FILM COATED ORAL at 08:32

## 2023-03-09 RX ADMIN — FLUTICASONE FUROATE AND VILANTEROL TRIFENATATE 1 PUFF: 100; 25 POWDER RESPIRATORY (INHALATION) at 08:33

## 2023-03-09 RX ADMIN — METHYLPHENIDATE HYDROCHLORIDE 5 MG: 10 TABLET ORAL at 13:56

## 2023-03-09 NOTE — ASSESSMENT & PLAN NOTE
Had pulm edema d/t IVF - requiring 02 and pt SOB, CXr showing pulm edema,  resolved w/ diuresis   Pt unable to tolerate IVF or diuresis well  Try to wean O2 likely 2/2 to atlectasis now (Looks to have right-sided basal atelectasis)  Due to patient's MDD not participating in incentive spirometer

## 2023-03-09 NOTE — PLAN OF CARE
Problem: PHYSICAL THERAPY ADULT  Goal: Performs mobility at highest level of function for planned discharge setting  See evaluation for individualized goals  Description: Treatment/Interventions: Functional transfer training, LE strengthening/ROM, Therapeutic exercise, Endurance training, Cognitive reorientation, Patient/family training, Equipment eval/education, Bed mobility, Spoke to nursing  Equipment Recommended:  (TBD pending mobility progress)       See flowsheet documentation for full assessment, interventions and recommendations  Outcome: Progressing  Note: Prognosis: Fair  Problem List: Decreased strength, Decreased endurance, Impaired balance, Decreased mobility, Decreased cognition, Impaired hearing, Decreased safety awareness, Decreased range of motion  Assessment: pt shows improvement from previous session w/ resumption of transfer and ambulation training  pt continues to need assistance w/ all phases of mobility, including instruction for technique/safety and chair follow w/ ambulation  pt had limited carryover of education received  continued inpatient PT is needed to reduce fall risk  discharge recommendation is for inpatient rehab to maximize level of independence  Barriers to Discharge: Inaccessible home environment, Decreased caregiver support     PT Discharge Recommendation: Post acute rehabilitation services    See flowsheet documentation for full assessment

## 2023-03-09 NOTE — QUICK NOTE
Patient's CAT scan reviewed with radiology large amount of stool noted throughout colon and small bowel  Also noted increased edema of the bladder wall could be indicative of active infection  Discussed with family  I did a rectal examination small amount of stool noted in rectal vault which was removed  Proceeded to give patient a fleets enema to see with stimulate further BM  Enema placed without any difficulty  Skin contour and lower back appear to be intact no open areas noted  Discussed with primary care team they are aware of CT findings

## 2023-03-09 NOTE — SPEECH THERAPY NOTE
Speech Language/Pathology    Speech/Language Pathology Progress Note    Patient Name: Hayley WATSON Date: 3/9/2023       Subjective:  Pt seen for dysphagia tx follow up at breakfast  Pt lethargic, eyes mostly closed, stating, "No  No more" throughout session  Objective:  Pt ate pureed Danish toast, pureed peaches, and 4 oz of HTL by straw and tsp  Pt w/ fair bolus retrieval from spoon, at times pushing out w/ her tongue  Inconsistently able to suck from straw  Adequate oral control; slow manipulation and transfer  Swallow initiation was timely and complete w/ no coughing, throat clearing or wet voice noted  Per nsg, pt took her meds whole in pudding today  Assessment:  Pt cont w/ decreased JILLIAN, limiting potential for diet advancement given fluctuating mental status  Pt tolerating puree w/ HTL w/o sig aspiration risk  Plan/Recommendations:  Cont puree diet w/ honey thick liquids  meds in applesauce or pudding     Will cont to follow 2-4 x/ week pending JILLIAN/mental status      Nara Hutchison CCC-SLP  Speech Pathologist  Available via  tiger text

## 2023-03-09 NOTE — PLAN OF CARE
Problem: MOBILITY - ADULT  Goal: Maintain or return to baseline ADL function  Description: INTERVENTIONS:  -  Assess patient's ability to carry out ADLs; assess patient's baseline for ADL function and identify physical deficits which impact ability to perform ADLs (bathing, care of mouth/teeth, toileting, grooming, dressing, etc )  - Assess/evaluate cause of self-care deficits   - Assess range of motion  - Assess patient's mobility; develop plan if impaired  - Assess patient's need for assistive devices and provide as appropriate  - Encourage maximum independence but intervene and supervise when necessary  - Involve family in performance of ADLs  - Assess for home care needs following discharge   - Consider OT consult to assist with ADL evaluation and planning for discharge  - Provide patient education as appropriate  Outcome: Progressing  Goal: Maintains/Returns to pre admission functional level  Description: INTERVENTIONS:  - Perform BMAT or MOVE assessment daily    - Set and communicate daily mobility goal to care team and patient/family/caregiver  - Collaborate with rehabilitation services on mobility goals if consulted  - Perform Range of Motion  times a day  - Reposition patient every  hours    - Dangle patient  times a day  - Stand patient  times a day  - Ambulate patient  times a day  - Out of bed to chair  times a day   - Out of bed for meals times a day  - Out of bed for toileting  - Record patient progress and toleration of activity level   Outcome: Progressing     Problem: Prexisting or High Potential for Compromised Skin Integrity  Goal: Skin integrity is maintained or improved  Description: INTERVENTIONS:  - Identify patients at risk for skin breakdown  - Assess and monitor skin integrity  - Assess and monitor nutrition and hydration status  - Monitor labs   - Assess for incontinence   - Turn and reposition patient  - Assist with mobility/ambulation  - Relieve pressure over bony prominences  - Avoid friction and shearing  - Provide appropriate hygiene as needed including keeping skin clean and dry  - Evaluate need for skin moisturizer/barrier cream  - Collaborate with interdisciplinary team   - Patient/family teaching  - Consider wound care consult   Outcome: Progressing     Problem: NEUROSENSORY - ADULT  Goal: Achieves stable or improved neurological status  Description: INTERVENTIONS  - Monitor and report changes in neurological status  - Monitor vital signs such as temperature, blood pressure, glucose, and any other labs ordered   - Initiate measures to prevent increased intracranial pressure  - Monitor for seizure activity and implement precautions if appropriate      Outcome: Progressing     Problem: RESPIRATORY - ADULT  Goal: Achieves optimal ventilation and oxygenation  Description: INTERVENTIONS:  - Assess for changes in respiratory status  - Assess for changes in mentation and behavior  - Position to facilitate oxygenation and minimize respiratory effort  - Oxygen administered by appropriate delivery if ordered  - Initiate smoking cessation education as indicated  - Encourage broncho-pulmonary hygiene including cough, deep breathe, Incentive Spirometry  - Assess the need for suctioning and aspirate as needed  - Assess and instruct to report SOB or any respiratory difficulty  - Respiratory Therapy support as indicated  Outcome: Progressing     Problem: Nutrition/Hydration-ADULT  Goal: Nutrient/Hydration intake appropriate for improving, restoring or maintaining nutritional needs  Description: Monitor and assess patient's nutrition/hydration status for malnutrition  Collaborate with interdisciplinary team and initiate plan and interventions as ordered  Monitor patient's weight and dietary intake as ordered or per policy  Utilize nutrition screening tool and intervene as necessary  Determine patient's food preferences and provide high-protein, high-caloric foods as appropriate  INTERVENTIONS:  - Monitor oral intake, urinary output, labs, and treatment plans  - Assess nutrition and hydration status and recommend course of action  - Evaluate amount of meals eaten  - Assist patient with eating if necessary   - Allow adequate time for meals  - Recommend/ encourage appropriate diets, oral nutritional supplements, and vitamin/mineral supplements  - Order, calculate, and assess calorie counts as needed  - Recommend, monitor, and adjust tube feedings and TPN/PPN based on assessed needs  - Assess need for intravenous fluids  - Provide specific nutrition/hydration education as appropriate  - Include patient/family/caregiver in decisions related to nutrition  Outcome: Progressing     Problem: NEUROSENSORY - ADULT  Goal: Achieves maximal functionality and self care  Description: INTERVENTIONS  - Monitor swallowing and airway patency with patient fatigue and changes in neurological status  - Encourage and assist patient to increase activity and self care     - Encourage visually impaired, hearing impaired and aphasic patients to use assistive/communication devices  Outcome: Not Progressing

## 2023-03-09 NOTE — PLAN OF CARE
Pt tolerating puree diet w/ honey thick liquids   Meds in puree   Will follow 2-4x/week pending JILLIAN

## 2023-03-09 NOTE — PROGRESS NOTES
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 as indicated by the psych team   Patient takes Remeron 7 5 mg dialy and sertraline 50 mg daily  Patient was started on Ritalin 5 mg twice daily yesterday  Pt was more awake before starting Ritalin, but did not open her eyes or answer many questions  Pt's appetite is getting better  - Antiphospholipid syndrome  Patient has been historically controlled with Coumadin  Daily INR is 7 measured during her admission  Coumadin was discontinued due to her INR trending up  Continue to monitor with daily INRs  - Failure to thrive in adult  Suspect this is likely due to patient's profound grief and depression  Patient's sons have been consistently supporting the patient in her room  Would recommend continuing the supportive care  Treatment of her major depressive disorder may also resolve her failure to thrive  - Atrial fibrillation  Patient takes digoxin, diltiazem, and coumadin outpatient  EKG on 2/27/23 showed sinus rhythm with occasional premature ventricular complexes  Rhythm on auscultation has been noted to be irregular with a normal rate  - Dysphagia  Patient was put on a puréed diet with honey thick liquids  Aspiration precautions were put in place by speech pathology  - Hypertension  Pt is currently taking losartan 100 mg daily, spironolactone 25 mg daily, and hydralazine 10 mg IV Q6H PRN when SBP > 180  Pt has not needed hydralazine since 3/6/23  BP this morning was 143/57  Continue to monitor throughout admission  Note: Since our interview and exam this morning, pt has complained to staff about left abdominal pain and tenderness as well as back pain  Primary team is evaluating for constipation  CT abdomen is in progress      Recommendations:  - Use a sponge to give pt normal thickness water- cleared by speech pathology   - Dulcolax rectal suppository for constipation dependent on CT abdomen scan       Subjective:   Upon entering pt's room, she was sleeping and did not open eyes throughout visit  Pt did say hi but did not answer questions  Pt's son thought she was about the same if not worse than yesterday behavior wise  Pt ate breakfast and lunch yesterday  Pt' son reported his mom does not like the taste of the thickened water and was normal water  Pt's last BM was 3/5/23  Review of Systems   Unable to perform ROS: Mental status change     Objective:     Vitals: Blood pressure 143/57, pulse 77, temperature 98 8 °F (37 1 °C), resp  rate 18, height 5' (1 524 m), weight 45 8 kg (101 lb), SpO2 92 %, not currently breastfeeding  ,Body mass index is 19 73 kg/m²  Intake/Output Summary (Last 24 hours) at 3/9/2023 1427  Last data filed at 3/8/2023 2104  Gross per 24 hour   Intake 0 ml   Output 300 ml   Net -300 ml       Current Medications: Reviewed    Physical Exam:   Physical Exam  Constitutional:       Appearance: She is ill-appearing  HENT:      Mouth/Throat:      Mouth: Mucous membranes are dry  Pharynx: Oropharynx is clear  Cardiovascular:      Rate and Rhythm: Normal rate and regular rhythm  Pulses: Normal pulses  Heart sounds: Normal heart sounds  Pulmonary:      Effort: No respiratory distress  Breath sounds: Normal breath sounds  No wheezing or rhonchi  Abdominal:      General: Bowel sounds are normal       Comments: Pt winced when right abdomen was palpated initially but not when palpated a second time  Skin:     General: Skin is warm  Neurological:      Mental Status: She is lethargic        Comments: Eyes closed and did not answer many questions   Psychiatric:      Comments: Eyes closed and did not answer many questions          Invasive Devices     Peripheral Intravenous Line  Duration           Long-Dwell Peripheral IV (Midline) 40/32/14 Left Basilic 3 days                Lab, Imaging and other studies: I have personally reviewed pertinent reports  Roselyn RANGELS transcribed this note and examined the pt with Dr Lela Donis

## 2023-03-09 NOTE — ASSESSMENT & PLAN NOTE
Had pulm edema d/t IVF - requiring 02 and pt SOB, CXr showing pulm edema,  resolved w/ diuresis   Now requiring  likely 2/2 to atlectasis now (Looks to have right-sided basal atelectasis) 2/2 to mucus plugging  Pt unable to tolerate IVF or diuresis well, now on IVF due to poor po intake, monitor for fluid overload  Due to patient's MDD not participating in incentive spirometer/flutter / vest therapy she is too frail

## 2023-03-09 NOTE — PLAN OF CARE
Problem: OCCUPATIONAL THERAPY ADULT  Goal: Performs self-care activities at highest level of function for planned discharge setting  See evaluation for individualized goals  Description: Treatment Interventions: ADL retraining, Functional transfer training, UE strengthening/ROM, Endurance training, Cognitive reorientation, Patient/family training, Equipment evaluation/education, Compensatory technique education, Continued evaluation, Energy conservation, Activityengagement          See flowsheet documentation for full assessment, interventions and recommendations  Outcome: Progressing  Note: Limitation: Decreased ADL status, Decreased UE strength, Decreased cognition, Decreased endurance, Decreased high-level ADLs, Decreased self-care trans, Decreased fine motor control     Assessment: Pt seen for skilled OT tx session day 2 from [id number] focusing on activity engagement, challenging activity tolerance and activity engagement in ADLs  Pt agreeable to participate w/ encouragement and demonstrated improved activity tolerance  Pt required consistent min A to complete bed mobility  Pt required min A to stand and min A for functional mobility using RW  Pt engaged in feeding, grooming and UB bathing seated  Continue to recommend post acute rehab when medically stable for discharge from acute care   Will continue to follow     OT Discharge Recommendation: Post acute rehabilitation services

## 2023-03-09 NOTE — PROGRESS NOTES
Progress Note - Behavioral Health   Joe Sharma 79 y o  female MRN: [id number]  Unit/Bed#: S -01 Encounter: [id number]    Assessment    Principal Psychiatric Problem:  1  1  Major depressive disorder  a  Differential: Persistent complex bereavement disorder  2  Unspecified anxiety disorder  a  Differential: Generalized anxiety disorder, Anxiety secondary to other medical condition  3  Tobacco use disorder  4  Failure to thrive in adult    Principal Problem:    Failure to thrive in adult  Active Problems:    Acute respiratory failure (HCC)    Antiphospholipid antibody syndrome (HCC)    Atrial fibrillation (HCC)    Hypothyroidism    MDD with persistant bereavement complex    Abnormal CT of the chest    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HCC)    Thyroid nodule    Urinary retention    Abdominal distention/back pain      Plan:  Discussed with primary team the followin  Psychiatry will continue to follow regarding medication and treatment  2  Recommend no changes to psychiatric medications at this time:  1  Continue Remeron 7 5 mg at 2000 for mood, appetite disturbance, and sleep disturbance  2  Continue Zoloft 50 mg daily for mood  3  Continue Ritalin 5 mg BID at 0800 and 1130 for augmentation of antidepressants and to help apathetic and neurovegetative symptoms of depression  1  Multidisciplinary meeting was had with family and SLIM on 23 regarding medication risks/benefits  2  Will continue to monitor patient on medication for response and improvement to determine duration of stimulant therapy  Please confirm duration of therapy with psychiatric team prior to discharge  3  Close monitoring of heart rate and blood pressure will continue  3  Please reach out to on-call Psychiatry team via [employer] with any questions or concerns  ------------------------------------------------------------    Subjective: Patient seen and evaluated for continuity of care, patient seen at bedside    Patient observed with eyes closed initially, patient able to open her eyes for short period of time during evaluation today  Patient able to follow one-step commands, continues to follow slowly  Patient able to state today that her abdomen was not painful at the time of evaluation, and expressed she was thirsty and would like something cold  Patient did allow for multiple spoonfuls of thickened liquid to be given at bedside during time of evaluation  Vital signs reviewed, remained stable at this time  We will continue to monitor  Psychiatric Review of Systems:  Behavior over the last 24 hours: some improvement compared to yesterday  Sleep: uncahnged  Appetite: improving  Medication side effects: none verbalized  ROS: Complete review of systems is negative except as noted above  Vital signs in last 24 hours:  Temp:  [98 4 °F (36 9 °C)-99 2 °F (37 3 °C)] 98 8 °F (37 1 °C)  HR:  [69-77] 77  Resp:  [18] 18  BP: (137-144)/(57-58) 143/57    Mental Status Exam:  Appearance:  drowsy, limited eye contact, appears stated age, marginal grooming/hygiene and thin and frail appearing, laying in hospital bed   Behavior:  calm and laying in bed   Motor: no abnormal movements and unable to assess gait and balance as patient remained in hospital bed   Speech:  delayed initiation, slow, soft and coherent   Mood:  "thirsty"   Affect:  blunted   Thought Process:  unable to assess due to patient factors   Thought Content: no verbalized delusions or overt paranoia, unable to assess due to patient factors   Perceptual disturbances: does not appear to be responding to internal stimuli at this time   Risk Potential: unable to assess due to patient factors   Cognition: cognition not formally tested   Insight:  unable to assess due to patient factors   Judgment: unable to assess due to patient factors     Current Medications: All current medications have been reviewed    Current Facility-Administered Medications   Medication Dose Route Frequency Provider Last Rate   • albuterol  2 5 mg Nebulization Q4H PRN Alicja Dutta MD     • bisacodyl  10 mg Rectal Daily Leslie Brooks MD     • cefTRIAXone  1,000 mg Intravenous Q24H Leslie Brooks MD     • diltiazem  240 mg Oral Daily Alicja Dutta MD     • Fluticasone Furoate-Vilanterol  1 puff Inhalation Daily Alicja Dutta MD     • hydrALAZINE  10 mg Intravenous Q6H PRN Zaki Baltazar MD     • levothyroxine  25 mcg Oral Daily Alicja Dutta MD     • lidocaine  1 patch Topical Daily Alicja Dutta MD     • losartan  100 mg Oral Daily Leslie Brooks MD     • methylphenidate  5 mg Oral BID before breakfast/lunch Antwan Fried DO     • mirtazapine  7 5 mg Oral HS Antwan Fried DO     • senna-docusate sodium  1 tablet Oral HS Leslie Brooks MD     • sertraline  50 mg Oral Daily Alicja Dutta MD     • spironolactone  25 mg Oral Daily Leslie Brooks MD     • umeclidinium  1 puff Inhalation Daily Alicja Dutta MD         Laboratory results:  I have personally reviewed all pertinent laboratory/tests results    Recent Results (from the past 48 hour(s))   Protime-INR    Collection Time: 03/08/23  4:58 AM   Result Value Ref Range    Protime 27 9 (H) 11 6 - 14 5 seconds    INR 2 57 (H) 0 84 - 1 19   CBC    Collection Time: 03/08/23  4:58 AM   Result Value Ref Range    WBC 8 16 4 31 - 10 16 Thousand/uL    RBC 3 16 (L) 3 81 - 5 12 Million/uL    Hemoglobin 10 4 (L) 11 5 - 15 4 g/dL    Hematocrit 32 6 (L) 34 8 - 46 1 %     (H) 82 - 98 fL    MCH 32 9 26 8 - 34 3 pg    MCHC 31 9 31 4 - 37 4 g/dL    RDW 13 1 11 6 - 15 1 %    Platelets 991 003 - 543 Thousands/uL    MPV 8 9 8 9 - 12 7 fL   Magnesium    Collection Time: 03/08/23  4:58 AM   Result Value Ref Range    Magnesium 2 0 1 9 - 2 7 mg/dL   Basic metabolic panel    Collection Time: 03/08/23  4:58 AM   Result Value Ref Range    Sodium 142 135 - 147 mmol/L    Potassium 4 1 3 5 - 5 3 mmol/L    Chloride 101 96 - 108 mmol/L    CO2 40 (H) 21 - 32 mmol/L ANION GAP 1 (L) 4 - 13 mmol/L    BUN 28 (H) 5 - 25 mg/dL    Creatinine 0 49 (L) 0 60 - 1 30 mg/dL    Glucose 89 65 - 140 mg/dL    Calcium 8 6 8 4 - 10 2 mg/dL    eGFR 89 ml/min/1 73sq m   Protime-INR    Collection Time: 03/09/23  5:21 AM   Result Value Ref Range    Protime 30 8 (H) 11 6 - 14 5 seconds    INR 2 92 (H) 0 84 - 1 19   CBC and differential    Collection Time: 03/09/23  5:21 AM   Result Value Ref Range    WBC 9 00 4 31 - 10 16 Thousand/uL    RBC 3 09 (L) 3 81 - 5 12 Million/uL    Hemoglobin 10 4 (L) 11 5 - 15 4 g/dL    Hematocrit 32 5 (L) 34 8 - 46 1 %     (H) 82 - 98 fL    MCH 33 7 26 8 - 34 3 pg    MCHC 32 0 31 4 - 37 4 g/dL    RDW 13 5 11 6 - 15 1 %    MPV 9 2 8 9 - 12 7 fL    Platelets 161 298 - 270 Thousands/uL    nRBC 0 /100 WBCs    Neutrophils Relative 83 (H) 43 - 75 %    Immat GRANS % 1 0 - 2 %    Lymphocytes Relative 7 (L) 14 - 44 %    Monocytes Relative 8 4 - 12 %    Eosinophils Relative 1 0 - 6 %    Basophils Relative 0 0 - 1 %    Neutrophils Absolute 7 40 1 85 - 7 62 Thousands/µL    Immature Grans Absolute 0 06 0 00 - 0 20 Thousand/uL    Lymphocytes Absolute 0 63 0 60 - 4 47 Thousands/µL    Monocytes Absolute 0 76 0 17 - 1 22 Thousand/µL    Eosinophils Absolute 0 13 0 00 - 0 61 Thousand/µL    Basophils Absolute 0 02 0 00 - 0 10 Thousands/µL   Magnesium    Collection Time: 03/09/23  5:21 AM   Result Value Ref Range    Magnesium 2 2 1 9 - 2 7 mg/dL   Comprehensive metabolic panel    Collection Time: 03/09/23  5:21 AM   Result Value Ref Range    Sodium 142 135 - 147 mmol/L    Potassium 3 8 3 5 - 5 3 mmol/L    Chloride 101 96 - 108 mmol/L    CO2 38 (H) 21 - 32 mmol/L    ANION GAP 3 (L) 4 - 13 mmol/L    BUN 36 (H) 5 - 25 mg/dL    Creatinine 0 57 (L) 0 60 - 1 30 mg/dL    Glucose 97 65 - 140 mg/dL    Calcium 8 9 8 4 - 10 2 mg/dL    Corrected Calcium 9 9 8 3 - 10 1 mg/dL    AST 14 13 - 39 U/L    ALT 12 7 - 52 U/L    Alkaline Phosphatase 70 34 - 104 U/L    Total Protein 5 5 (L) 6 4 - 8 4 g/dL Albumin 2 8 (L) 3 5 - 5 0 g/dL    Total Bilirubin 0 42 0 20 - 1 00 mg/dL    eGFR 85 ml/min/1 73sq m        Mouna Miller DO  Psychiatry Residency, PGY-II

## 2023-03-09 NOTE — ASSESSMENT & PLAN NOTE
· On coumadin  Goal INR 2-3  INR initially supratherapeutic, coumadin held  No active evidence of bleeding  · Hold Coumadin 5mg daily    INR is subtherapeutic  · Daily INR

## 2023-03-09 NOTE — OCCUPATIONAL THERAPY NOTE
Occupational Therapy Progress Note     Patient Name: Yamil Powell  KQEMQ'D Date: 3/9/2023  Problem List  Principal Problem:    Failure to thrive in adult  Active Problems:    Acute respiratory failure (HCC)    Antiphospholipid antibody syndrome (HCC)    Atrial fibrillation (HCC)    Hypothyroidism    MDD with persistant bereavement complex    Abnormal CT of the chest    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HCC)    Thyroid nodule    Urinary retention       03/09/23 1440   OT Last Visit   OT Visit Date 03/09/23  (Thursday)   Note Type   Note Type Treatment   Pain Assessment   Pain Assessment Tool 0-10   Pain Score No Pain   Restrictions/Precautions   Weight Bearing Precautions Per Order No   Other Precautions Cognitive; Chair Alarm; Bed Alarm;Aspiration; Fall Risk;Hard of hearing   Lifestyle   Reciprocal Relationships Supportive son present  Supportive family   Intrinsic Gratification Pt reports she enjoyed having a garden at home   ADL   Where Assessed Edge of bed  (vs OOB in chair)   Eating Assistance 5  Supervision/Setup  (S <> mod A)   Eating Deficit Setup;Supervision/safety; Increased time to complete;Verbal cueing;Bringing food to mouth assist;Pureed diet; Thickened liquids   Eating Comments seated at EOB to eat few bites of ice cream  Required mod A and + time due to fatigue to feed self lunch OOB in chair  Grooming Assistance 2  Maximal Assistance   Grooming Deficit Brushing hair;Setup; Increased time to complete   Grooming Comments seated OOB in chair comb hair   UB Bathing Assistance 2  Maximal Assistance   UB Bathing Deficit Setup; Increased time to complete;Supervision/safety;Verbal cueing   UB Bathing Comments wash hair using shower cap seated OOB in chair   LB Dressing Assistance 2  Maximal Assistance   LB Dressing Deficit Don/doff R sock; Don/doff L sock; Setup;Steadying; Increased time to complete;Verbal cueing;Supervision/safety   LB Dressing Comments doff socks   Bed Mobility   Supine to Sit 4  Minimal assistance  (to pt's L)   Additional items Assist x 1; Increased time required;Verbal cues;LE management   Sit to Supine Unable to assess   Additional Comments Pt seated OOB In chair at end of session w/ needs met, call bell in reach and chair alarm activated  Transfers   Sit to Stand 4  Minimal assistance   Additional items Assist x 1; Increased time required;Verbal cues   Stand to Sit 4  Minimal assistance   Additional items Assist x 1; Increased time required;Verbal cues   Stand pivot 4  Minimal assistance  (required min HHA to complete steppage transfer from EOB to chair)   Additional items Assist x 1; Increased time required   Additional Comments Pt performed sit <> stand 2 X w/ min A   Functional Mobility   Functional Mobility 4  Minimal assistance   Additional Comments engaged in functional mobility w/ min A using RW   Additional items Rolling walker   Subjective   Subjective "I am tired"   Cognition   Overall Cognitive Status Impaired   Arousal/Participation Responsive; Cooperative  (w/ encouragement)   Attention Attends with cues to redirect   Orientation Level Oriented to person;Disoriented to situation;Disoriented to time   Memory Decreased short term memory;Decreased recall of recent events;Decreased recall of precautions   Following Commands Follows one step commands with increased time or repetition   Comments Identified pt by full name and birthdate  Responsve and agreeable to participate w/ encouragement  Son present reports pt more lethargic today  Able to follow directions w/ + time  Able to communicate wants / needs, answer simple questions w/ garbled, soft spoken speech  Eyes closed throuhgout majority of tx session   Recommend ongoing eval of functional cognition as appropriate pend improved arousal, attention   Activity Tolerance   Activity Tolerance Patient limited by fatigue   Medical Staff Made Aware care coordination w/ PT, RJ due to decreased activity tolerance, regression from baseline, and anticipated physical assistance  Spoke w/ CM, Michelle Mendez and RN, Jacque   Assessment   Assessment Pt seen for skilled OT tx session day 2 from [id number] focusing on activity engagement, challenging activity tolerance and activity engagement in ADLs  Pt agreeable to participate w/ encouragement and demonstrated improved activity tolerance  Pt required consistent min A to complete bed mobility  Pt required min A to stand and min A for functional mobility using RW  Pt engaged in feeding, grooming and UB bathing seated  Continue to recommend post acute rehab when medically stable for discharge from acute care  Will continue to follow   Plan   Treatment Interventions ADL retraining;Functional transfer training;UE strengthening/ROM; Endurance training;Cognitive reorientation;Patient/family training;Equipment evaluation/education; Compensatory technique education;Continued evaluation; Energy conservation; Activityengagement   Goal Expiration Date 03/15/23   OT Treatment Day 2  (Thursday)   OT Frequency 3-5x/wk   Recommendation   OT Discharge Recommendation Post acute rehabilitation services   AM-PAC Daily Activity Inpatient   Lower Body Dressing 2   Bathing 2   Toileting 2   Upper Body Dressing 2   Grooming 3   Eating 3   Daily Activity Raw Score 14   Daily Activity Standardized Score (Calc for Raw Score >=11) 33 39   AM-PAC Applied Cognition Inpatient   Following a Speech/Presentation 2   Understanding Ordinary Conversation 2   Taking Medications 1   Remembering Where Things Are Placed or Put Away 2   Remembering List of 4-5 Errands 1   Taking Care of Complicated Tasks 1   Applied Cognition Raw Score 9   Applied Cognition Standardized Score 22 48   Barthel Index   Feeding 5   Bathing 0   Grooming Score 0   Dressing Score 5   Bladder Score 0   Bowels Score 0   Toilet Use Score 5   Transfers (Bed/Chair) Score 10   Mobility (Level Surface) Score 0   Stairs Score 0   Barthel Index Score 25   Modified Clayton Scale   Modified Genevieve Scale 4        The patient's raw score on the AM-PAC Daily Activity Inpatient Short Form is 14  A raw score of less than 19 suggests the patient may benefit from discharge to post-acute rehabilitation services  Please refer to the recommendation of the Occupational Therapist for safe discharge planning      Purnima Comer OTR/L

## 2023-03-09 NOTE — PROGRESS NOTES
[facility]  Progress Note - Dharmesh Barros 1940, 79 y o  female MRN: [id number]  Unit/Bed#: S -01 Encounter: [id number]  Primary Care Provider: Jhoana Sapngler MD   Date and time admitted to hospital: 2/27/2023  3:02 AM    MDD with persistant bereavement complex  Assessment & Plan  · Initially thought to be secondary to adverse medication side effects with up titration of her multiple antidepressants recently  Abilify was discontinued earlier on in the admission  Initially she had improvement in her mentation though continues to have intermittent episodes of obtundation without a clear organic cause  Infectious and metabolic work-up essentially unrevealing  · Would appreciate behavioral health reevaluate patient this admission and Monday, 3/6/2023  · Appreciate neurology recs  EEG without epileptiform discharges though grossly abnormal suggestive of diffuse cerebral dysfunction  · Continue supportive cares, advance diet as tolerated: Dysphagia 1 Pureed 1 ; Honey Thick Liquid with close monitoring  · Maintain aspiration precautions  · Does not seem to have AMS, just periods of time where does not want to interact--> will follow exam with encouragement but may not open eyes with sternal rub  · Palliative: Patient does not have palliative or hospice diagnosis at this time  · Psych:   · Not an ect candidate d/t aneurysm    · 3/7 Multidisciplinary meeting w/ psych and SLIM w/ son at bedside  Ritalin side effects discussed and explained that we will try to mitigate side effects however if patient should become unstable she may not have reserve to tolerate which may hasten her demise  The alternative is to watch her attempt to take PO meds and intermittently eat however intake is incompatible long term with meaningful recovery  Family agreeable to attempt     · Start Ritalin 3/8, monitor BP and HR closely, so far tolerating and pt more responsive           * Failure to thrive in adult  Assessment & Plan  · Suspect large component due to profound grief and depression  · Continue supportive cares  · See additional recommendations below      Abdominal distention/back pain  Assessment & Plan  2/2 constipation ? Back fracture worsening? Ct a/p     Urinary retention  Assessment & Plan  Required straight cath early in admission   Continue with urinary retention protocol    Antiphospholipid antibody syndrome (HCC)  Assessment & Plan  · On coumadin  Goal INR 2-3  INR initially supratherapeutic, coumadin held  No active evidence of bleeding  · Hold Coumadin 5mg daily  INR is subtherapeutic  · Daily INR    Acute respiratory failure (HCC)  Assessment & Plan  Had pulm edema d/t IVF - requiring 02 and pt SOB, CXr showing pulm edema,  resolved w/ diuresis   Pt unable to tolerate IVF or diuresis well  Try to wean O2 likely 2/2 to atlectasis now (Looks to have right-sided basal atelectasis)  Due to patient's MDD not participating in incentive spirometer    Atrial fibrillation ([facility] [address] )  Assessment & Plan  · On diltiazem, digoxin, and warfarin at home  · See above regarding AC    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HCC)  Assessment & Plan  · Incidental finding on CTA head and neck/  CTA head and neck with and without contrast 2/27/2023:  · "No acute intracranial abnormality  Moderate chronic microangiopathy  Negative CTA head and neck for large vessel occlusion, dissection, or high-grade stenosis  · 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 projecting laterally in between left posterior cerebral artery and left superior cerebellar- repeat CT stable when developed AMS  · Outpatient referral for Neurosurgery follow-up  Will provide on DC        Abnormal CT of the chest  Assessment & Plan  · PE Study with CT abdomen & pelvis with contrast 2/27/2023:   · There is a new right basilar density with associated few branching nodular density in the right middle lobe and a 5 mm nodular density superior segment left lower lobe, indeterminate  These may be on inflammatory/infectious basis /pneumonia   · Short interval follow-up chest CT at 3 months suggested to demonstrate resolution  Thyroid nodule  Assessment & Plan  · CTA head and neck with and without contrast 2023:  · Incidental thyroid nodule(s) for which nonemergent thyroid ultrasound is recommended "  · Outpatient f/u with pcp for non-emergent thyroid u/s    Hypothyroidism  Assessment & Plan  · TSH wnl  · Cont home levothyroxine      VTE Pharmacologic Prophylaxis: VTE Score: 6 High Risk (Score >/= 5) - Pharmacological DVT Prophylaxis Ordered: warfarin (Coumadin)  Sequential Compression Devices Ordered  Patient Centered Rounds: I performed bedside rounds with nursing staff today  Discussions with Specialists or Other Care Team Provider: psych    Education and Discussions with Family / Patient: Charity Irving speak to family  Current Length of Stay: 10 day(s)  Current Patient Status: Inpatient   Discharge Plan: Anticipate discharge in >72 hrs to discharge location to be determined pending rehab evaluations  Code Status: Level 2 - DNAR: but accepts endotracheal intubation    Subjective:   Does not complain of chest pain or abdominal pain  Objective:     Vitals:   Temp (24hrs), Av 9 °F (37 2 °C), Min:98 4 °F (36 9 °C), Max:99 2 °F (37 3 °C)    Temp:  [98 4 °F (36 9 °C)-99 2 °F (37 3 °C)] 98 8 °F (37 1 °C)  HR:  [69-77] 77  Resp:  [18] 18  BP: (137-144)/(57-58) 143/57  SpO2:  [92 %-95 %] 92 %  Body mass index is 19 73 kg/m²  Input and Output Summary (last 24 hours): Intake/Output Summary (Last 24 hours) at 3/9/2023 1526  Last data filed at 3/8/2023 2104  Gross per 24 hour   Intake 0 ml   Output 300 ml   Net -300 ml       Physical Exam:   Physical Exam  Vitals and nursing note reviewed  Constitutional:       General: She is not in acute distress  Appearance: She is well-developed        Comments: Sad affect and effect  Talking to me more and opening eyes although slow  cachectic   HENT:      Head: Normocephalic and atraumatic  Eyes:      Extraocular Movements: Extraocular movements intact  Conjunctiva/sclera: Conjunctivae normal    Cardiovascular:      Rate and Rhythm: Normal rate and regular rhythm  Heart sounds: No murmur heard  Pulmonary:      Effort: Pulmonary effort is normal  No respiratory distress  Breath sounds: Normal breath sounds  Abdominal:      General: There is distension  Palpations: Abdomen is soft  Tenderness: There is abdominal tenderness  There is no guarding  Musculoskeletal:         General: No swelling  Cervical back: Neck supple  Skin:     General: Skin is warm and dry  Additional Data:     Labs:  Results from last 7 days   Lab Units 03/09/23  0521   WBC Thousand/uL 9 00   HEMOGLOBIN g/dL 10 4*   HEMATOCRIT % 32 5*   PLATELETS Thousands/uL 259   NEUTROS PCT % 83*   LYMPHS PCT % 7*   MONOS PCT % 8   EOS PCT % 1     Results from last 7 days   Lab Units 03/09/23  0521   SODIUM mmol/L 142   POTASSIUM mmol/L 3 8   CHLORIDE mmol/L 101   CO2 mmol/L 38*   BUN mg/dL 36*   CREATININE mg/dL 0 57*   ANION GAP mmol/L 3*   CALCIUM mg/dL 8 9   ALBUMIN g/dL 2 8*   TOTAL BILIRUBIN mg/dL 0 42   ALK PHOS U/L 70   ALT U/L 12   AST U/L 14   GLUCOSE RANDOM mg/dL 97     Results from last 7 days   Lab Units 03/09/23  0521   INR  2 92*         Results from last 7 days   Lab Units 03/03/23  0502   HEMOGLOBIN A1C % 4 5     Results from last 7 days   Lab Units 03/07/23  0451 03/03/23  0502   PROCALCITONIN ng/ml 0 15 0 12       Lines/Drains:  Invasive Devices     Peripheral Intravenous Line  Duration           Long-Dwell Peripheral IV (Midline) 68/26/71 Left Basilic 3 days                      Imaging: No pertinent imaging reviewed      Recent Cultures (last 7 days):         Last 24 Hours Medication List:   Current Facility-Administered Medications   Medication Dose Route Frequency Provider Last Rate   • albuterol  2 5 mg Nebulization Q4H PRN Soto Lira MD     • diltiazem  240 mg Oral Daily Soto Lira MD     • Fluticasone Furoate-Vilanterol  1 puff Inhalation Daily Soto Lira MD     • hydrALAZINE  10 mg Intravenous Q6H PRN Lauren Carrizales MD     • levothyroxine  25 mcg Oral Daily Soto Lira MD     • lidocaine  1 patch Topical Daily Soto Lira MD     • losartan  100 mg Oral Daily Kaylee Storey MD     • methylphenidate  5 mg Oral BID before breakfast/lunch Mallory Green DO     • mirtazapine  7 5 mg Oral HS Mallory Green DO     • sertraline  50 mg Oral Daily Soto Lira MD     • spironolactone  25 mg Oral Daily Kaylee Storey MD     • umeclidinium  1 puff Inhalation Daily Soto Lira MD          Today, Patient Was Seen By: Kaylee Storey MD    **Please Note: This note may have been constructed using a voice recognition system  **

## 2023-03-09 NOTE — ASSESSMENT & PLAN NOTE
· Initially thought to be secondary to adverse medication side effects with up titration of her multiple antidepressants recently  Abilify was discontinued earlier on in the admission  Initially she had improvement in her mentation though continues to have intermittent episodes of obtundation without a clear organic cause  Infectious and metabolic work-up essentially unrevealing  · Would appreciate behavioral health reevaluate patient this admission and Monday, 3/6/2023  · Appreciate neurology recs  EEG without epileptiform discharges though grossly abnormal suggestive of diffuse cerebral dysfunction  · Continue supportive cares, advance diet as tolerated: Dysphagia 1 Pureed 1 ; Honey Thick Liquid with close monitoring  · Maintain aspiration precautions  · Does not seem to have AMS, just periods of time where does not want to interact--> will follow exam with encouragement but may not open eyes with sternal rub  · Palliative: Patient does not have palliative or hospice diagnosis at this time  · Psych:   · Not an ect candidate d/t aneurysm    · 3/7 Multidisciplinary meeting w/ psych and SLIM w/ son at bedside  Ritalin side effects discussed and explained that we will try to mitigate side effects however if patient should become unstable she may not have reserve to tolerate which may hasten her demise  The alternative is to watch her attempt to take PO meds and intermittently eat however intake is incompatible long term with meaningful recovery  Family agreeable to attempt     · Start Ritalin 3/8, monitor BP and HR closely, so far tolerating and pt more responsive

## 2023-03-09 NOTE — PHYSICAL THERAPY NOTE
PHYSICAL THERAPY TREATMENT NOTE    Patient Name: Steve Becerra Date: 3/9/2023     03/09/23 1434   PT Last Visit   PT Visit Date 03/09/23   Pain Assessment   Pain Assessment Tool 0-10   Pain Score No Pain   Restrictions/Precautions   Other Precautions Chair Alarm; Bed Alarm;Cognitive;Multiple lines; Fall Risk;Hard of hearing   General   Chart Reviewed Yes   Family/Caregiver Present No   Cognition   Arousal/Participation Cooperative   Attention Attends with cues to redirect   Orientation Level Oriented to person; Other (Comment)  (pt was identified w/ full name, birth date)   Subjective   Subjective pt seen sitting on edge of bed w/ Kari OT and family present  pt needed cuing for task focus  pt agreed to PT intervention  Transfers   Sit to Stand 4  Minimal assistance   Additional items Assist x 1; Increased time required;Verbal cues  (for hand placement)   Stand to Sit 4  Minimal assistance   Additional items Increased time required;Verbal cues  (for body positioning, hand placement)   Ambulation/Elevation   Gait pattern Forward Flexion; Short stride;Narrow EMIGDIO; Excessively slow   Gait Assistance 4  Minimal assist  (w/ chair follow)   Additional items Assist x 1;Verbal cues  (for walker positioning, safety)   Assistive Device Rolling walker   Distance 3 feet  seated rest break  6 feet  (additional ambulation not possible due to fatigue)   Balance   Static Sitting Fair   Static Standing Poor +  (w/ roller walker)   Ambulatory Poor +  (w/ roller walker)   Activity Tolerance   Activity Tolerance Patient limited by fatigue   Nurse Made Aware spoke to Kari Avila OT   Assessment   Problem List Decreased strength;Decreased endurance; Impaired balance;Decreased mobility; Decreased cognition; Impaired hearing;Decreased safety awareness;Decreased range of motion   Assessment pt shows improvement from previous session w/ resumption of transfer and ambulation training  pt continues to need assistance w/ all phases of mobility, including instruction for technique/safety and chair follow w/ ambulation  pt had limited carryover of education received  continued inpatient PT is needed to reduce fall risk  discharge recommendation is for inpatient rehab to maximize level of independence  Goals   Patient Goals no goals provided when asked  STG Expiration Date 03/14/23   Short Term Goal #1 Pt will: Perform bed mobility tasks to supervision to prepare for transfers and reposition self in bed  Perform transfers to consistent A of 1 to decrease burden of care and decrease risk for falls  Perform ambulation with LRAD for 10ft to consistent A of 1 To increase Indep in home environment  PT Treatment Day 2   Plan   Treatment/Interventions Functional transfer training;LE strengthening/ROM; Therapeutic exercise; Endurance training;Cognitive reorientation;Patient/family training;Equipment eval/education; Bed mobility   Progress Progressing toward goals   PT Frequency 3-5x/wk   Recommendation   PT Discharge Recommendation Post acute rehabilitation services   AM-PAC Basic Mobility Inpatient   Turning in Flat Bed Without Bedrails 3   Lying on Back to Sitting on Edge of Flat Bed Without Bedrails 3   Moving Bed to Chair 3   Standing Up From Chair Using Arms 3   Walk in Room 3   Climb 3-5 Stairs With Railing 1   Basic Mobility Inpatient Raw Score 16   Basic Mobility Standardized Score 38 32   Highest Level Of Mobility   -[facility] Goal 5: Stand one or more mins   -[facility] Achieved 5: Stand (1 or more minutes)   End of Consult   Patient Position at End of Consult Bedside chair;Bed/Chair alarm activated; All needs within reach   The patient's AM-PAC Basic Mobility Inpatient Short Form Raw Score is 16  A Raw score of less than or equal to 16 suggests the patient may benefit from discharge to post-acute rehabilitation services   Please also refer to the recommendation of the Physical Therapist for safe discharge planning  Skilled inpatient PT recommended while in hospital to progress pt toward treatment goals  Pt requires PT/OT co-treat due to signficant assistance with mobility and cognitive-behavioral impairments      Poppy Bassett, PT

## 2023-03-09 NOTE — ASSESSMENT & PLAN NOTE
· Initially thought to be secondary to adverse medication side effects with up titration of her multiple antidepressants recently  Abilify was discontinued earlier on in the admission  Initially she had improvement in her mentation though continues to have intermittent episodes of obtundation without a clear organic cause  Infectious and metabolic work-up essentially unrevealing  · Would appreciate behavioral health reevaluate patient this admission and Monday, 3/6/2023  · Appreciate neurology recs  EEG without epileptiform discharges though grossly abnormal suggestive of diffuse cerebral dysfunction  · Continue supportive cares, advance diet as tolerated: Dysphagia 1 Pureed 1 ; Honey Thick Liquid with close monitoring  · Maintain aspiration precautions  · Does not seem to have AMS, just periods of time where does not want to interact--> will follow exam with encouragement but may not open eyes with sternal rub  · Palliative: Patient does not have palliative or hospice diagnosis at this time  · Psych:   · Not an ect candidate d/t aneurysm    · 3/7 Multidisciplinary meeting w/ psych and SLIM w/ son at bedside  Ritalin side effects discussed and explained that we will try to mitigate side effects however if patient should become unstable she may not have reserve to tolerate which may hasten her demise  The alternative is to watch her attempt to take PO meds and intermittently eat however intake is incompatible long term with meaningful recovery  Family agreeable to attempt     · Start Ritalin 3/8, monitor BP and HR closely, so far tolerating and pt has stagnated in her response to it

## 2023-03-10 LAB
ALBUMIN SERPL BCP-MCNC: 2.7 G/DL (ref 3.5–5)
ALP SERPL-CCNC: 65 U/L (ref 34–104)
ALT SERPL W P-5'-P-CCNC: 13 U/L (ref 7–52)
ANION GAP SERPL CALCULATED.3IONS-SCNC: 1 MMOL/L (ref 4–13)
AST SERPL W P-5'-P-CCNC: 14 U/L (ref 13–39)
BASOPHILS # BLD AUTO: 0.01 THOUSANDS/ÂΜL (ref 0–0.1)
BASOPHILS NFR BLD AUTO: 0 % (ref 0–1)
BILIRUB SERPL-MCNC: 0.47 MG/DL (ref 0.2–1)
BUN SERPL-MCNC: 31 MG/DL (ref 5–25)
CALCIUM ALBUM COR SERPL-MCNC: 9.5 MG/DL (ref 8.3–10.1)
CALCIUM SERPL-MCNC: 8.5 MG/DL (ref 8.4–10.2)
CHLORIDE SERPL-SCNC: 101 MMOL/L (ref 96–108)
CO2 SERPL-SCNC: 39 MMOL/L (ref 21–32)
CREAT SERPL-MCNC: 0.48 MG/DL (ref 0.6–1.3)
EOSINOPHIL # BLD AUTO: 0.1 THOUSAND/ÂΜL (ref 0–0.61)
EOSINOPHIL NFR BLD AUTO: 2 % (ref 0–6)
ERYTHROCYTE [DISTWIDTH] IN BLOOD BY AUTOMATED COUNT: 13.5 % (ref 11.6–15.1)
FOLATE SERPL-MCNC: 15.2 NG/ML (ref 3.1–17.5)
GFR SERPL CREATININE-BSD FRML MDRD: 90 ML/MIN/1.73SQ M
GLUCOSE SERPL-MCNC: 90 MG/DL (ref 65–140)
HCT VFR BLD AUTO: 32.3 % (ref 34.8–46.1)
HGB BLD-MCNC: 10.2 G/DL (ref 11.5–15.4)
IMM GRANULOCYTES # BLD AUTO: 0.03 THOUSAND/UL (ref 0–0.2)
IMM GRANULOCYTES NFR BLD AUTO: 0 % (ref 0–2)
INR PPP: 2.59 (ref 0.84–1.19)
LYMPHOCYTES # BLD AUTO: 0.69 THOUSANDS/ÂΜL (ref 0.6–4.47)
LYMPHOCYTES NFR BLD AUTO: 10 % (ref 14–44)
MAGNESIUM SERPL-MCNC: 2.2 MG/DL (ref 1.9–2.7)
MCH RBC QN AUTO: 32.9 PG (ref 26.8–34.3)
MCHC RBC AUTO-ENTMCNC: 31.6 G/DL (ref 31.4–37.4)
MCV RBC AUTO: 104 FL (ref 82–98)
MONOCYTES # BLD AUTO: 0.34 THOUSAND/ÂΜL (ref 0.17–1.22)
MONOCYTES NFR BLD AUTO: 5 % (ref 4–12)
NEUTROPHILS # BLD AUTO: 5.64 THOUSANDS/ÂΜL (ref 1.85–7.62)
NEUTS SEG NFR BLD AUTO: 83 % (ref 43–75)
NRBC BLD AUTO-RTO: 0 /100 WBCS
PLATELET # BLD AUTO: 235 THOUSANDS/UL (ref 149–390)
PMV BLD AUTO: 8.8 FL (ref 8.9–12.7)
POTASSIUM SERPL-SCNC: 4.2 MMOL/L (ref 3.5–5.3)
PROT SERPL-MCNC: 5.4 G/DL (ref 6.4–8.4)
PROTHROMBIN TIME: 28 SECONDS (ref 11.6–14.5)
RBC # BLD AUTO: 3.1 MILLION/UL (ref 3.81–5.12)
SODIUM SERPL-SCNC: 141 MMOL/L (ref 135–147)
VIT B12 SERPL-MCNC: 599 PG/ML (ref 100–900)
WBC # BLD AUTO: 6.81 THOUSAND/UL (ref 4.31–10.16)

## 2023-03-10 RX ORDER — WARFARIN SODIUM 2 MG/1
2 TABLET ORAL
Status: DISCONTINUED | OUTPATIENT
Start: 2023-03-10 | End: 2023-03-14 | Stop reason: HOSPADM

## 2023-03-10 RX ORDER — SODIUM CHLORIDE 450 MG/100ML
50 INJECTION, SOLUTION INTRAVENOUS CONTINUOUS
Status: DISPENSED | OUTPATIENT
Start: 2023-03-10 | End: 2023-03-11

## 2023-03-10 RX ORDER — HYDRALAZINE HYDROCHLORIDE 20 MG/ML
10 INJECTION INTRAMUSCULAR; INTRAVENOUS EVERY 6 HOURS PRN
Status: DISCONTINUED | OUTPATIENT
Start: 2023-03-10 | End: 2023-03-14 | Stop reason: HOSPADM

## 2023-03-10 RX ORDER — WARFARIN SODIUM 5 MG/1
5 TABLET ORAL
Status: DISCONTINUED | OUTPATIENT
Start: 2023-03-10 | End: 2023-03-10

## 2023-03-10 RX ADMIN — SERTRALINE HYDROCHLORIDE 50 MG: 50 TABLET ORAL at 10:02

## 2023-03-10 RX ADMIN — SODIUM CHLORIDE 50 ML/HR: 0.45 INJECTION, SOLUTION INTRAVENOUS at 13:35

## 2023-03-10 RX ADMIN — SENNOSIDES AND DOCUSATE SODIUM 1 TABLET: 8.6; 5 TABLET ORAL at 21:30

## 2023-03-10 RX ADMIN — UMECLIDINIUM 1 PUFF: 62.5 AEROSOL, POWDER ORAL at 10:09

## 2023-03-10 RX ADMIN — DILTIAZEM HYDROCHLORIDE 240 MG: 240 CAPSULE, COATED, EXTENDED RELEASE ORAL at 10:02

## 2023-03-10 RX ADMIN — LEVOTHYROXINE SODIUM 25 MCG: 25 TABLET ORAL at 10:02

## 2023-03-10 RX ADMIN — METHYLPHENIDATE HYDROCHLORIDE 5 MG: 10 TABLET ORAL at 12:13

## 2023-03-10 RX ADMIN — SPIRONOLACTONE 25 MG: 25 TABLET ORAL at 10:02

## 2023-03-10 RX ADMIN — LOSARTAN POTASSIUM 100 MG: 50 TABLET, FILM COATED ORAL at 10:02

## 2023-03-10 RX ADMIN — FLUTICASONE FUROATE AND VILANTEROL TRIFENATATE 1 PUFF: 100; 25 POWDER RESPIRATORY (INHALATION) at 10:09

## 2023-03-10 RX ADMIN — WARFARIN SODIUM 2 MG: 2 TABLET ORAL at 17:05

## 2023-03-10 RX ADMIN — Medication 10 MG: at 10:10

## 2023-03-10 RX ADMIN — MIRTAZAPINE 7.5 MG: 15 TABLET, FILM COATED ORAL at 21:30

## 2023-03-10 RX ADMIN — METHYLPHENIDATE HYDROCHLORIDE 5 MG: 10 TABLET ORAL at 10:05

## 2023-03-10 NOTE — PLAN OF CARE
Problem: OCCUPATIONAL THERAPY ADULT  Goal: Performs self-care activities at highest level of function for planned discharge setting  See evaluation for individualized goals  Description: Treatment Interventions: ADL retraining, Functional transfer training, UE strengthening/ROM, Endurance training, Cognitive reorientation, Patient/family training, Equipment evaluation/education, Compensatory technique education, Continued evaluation, Energy conservation, Activityengagement          See flowsheet documentation for full assessment, interventions and recommendations  Outcome: Progressing  Note: Limitation: Decreased ADL status, Decreased UE strength, Decreased cognition, Decreased endurance, Decreased high-level ADLs, Decreased self-care trans, Decreased fine motor control     Assessment: Pt seen for skilled OT tx session day 3 from [id number] focusing on activity engagement  Pt agreeable to participate w/ max encouragement  Pt required consistent min A to complete sit <> stand and functional mobility short distances using RW  Pt engaged in feeding w/ S after set- up seated OOB in chair  Pt lethargic but responsive  Continue to recommend post acute rehab when [facility]ty [facility] to return to baseline level of I   Will continue to follow     OT Discharge Recommendation: Post acute rehabilitation services

## 2023-03-10 NOTE — OCCUPATIONAL THERAPY NOTE
Occupational Therapy Progress Note     Patient Name: Jessica PLUNKETT Date: 3/10/2023  Problem List  Principal Problem:    Failure to thrive in adult  Active Problems:    Acute respiratory failure (HCC)    Antiphospholipid antibody syndrome (HCC)    Atrial fibrillation (HCC)    Hypothyroidism    MDD with persistant bereavement complex    Abnormal CT of the chest    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HCC)    Thyroid nodule    Urinary retention    Abdominal distention/back pain       03/10/23 1433   OT Last Visit   OT Visit Date 03/10/23  (Friday)   Note Type   Note Type Treatment   Pain Assessment   Pain Assessment Tool 0-10   Pain Score No Pain  (pt reports fatigue, "tired")   Effect of Pain on Daily Activities limits activity tolerance and I w/ ADLs   Patient's Stated Pain Goal No pain   Hospital Pain Intervention(s) Repositioned; Ambulation/increased activity; Emotional support   Restrictions/Precautions   Weight Bearing Precautions Per Order No   Other Precautions Cognitive; Chair Alarm; Bed Alarm; Fall Risk;Aspiration;Hard of hearing   ADL   Where Assessed Edge of bed  (vs OOB In chair at end of session)   Eating Assistance 5  Supervision/Setup   Eating Deficit Setup; Increased time to complete;Pureed diet; Thickened liquids   Eating Comments seated OOB in chair to eat pureed lunch using spoon w/ + time   Grooming Assistance 2  Maximal Assistance   Grooming Deficit Brushing hair;Setup; Increased time to complete;Supervision/safety;Verbal cueing   Grooming Comments seated OOB in chair   Toileting Assistance  1  Total Assistance   Toileting Deficit Perineal hygiene   Toileting Comments required A personal hygiene following urinary incontinence (pad saturated upon arrival)   Bed Mobility   Supine to Sit 5  Supervision   Additional items Assist x 1; Increased time required;HOB elevated; Bedrails  (to pt's L)   Sit to Supine Unable to assess   Additional Comments Pt seated OOB In chair at end of session w/ needs met, call bell in reach and chair alarm activated   Transfers   Sit to Stand 4  Minimal assistance   Additional items Assist x 1; Increased time required;Verbal cues   Stand to Sit 4  Minimal assistance   Additional items Assist x 1; Increased time required   Functional Mobility   Functional Mobility 4  Minimal assistance   Additional Comments engaged in short distance functional mobility using RW w/ min A few feet from EOB to chair   Additional items Rolling walker   Subjective   Subjective "I can't walk"   Cognition   Overall Cognitive Status Impaired   Arousal/Participation Responsive   Attention Attends with cues to redirect   Orientation Level Oriented to person   Memory Decreased short term memory;Decreased recall of recent events;Decreased recall of precautions;Decreased recall of biographical information   Following Commands Follows one step commands with increased time or repetition   Comments Identified pt by full name and wristband  Required encouragement to actively participate in ADLs  Activity Tolerance   Activity Tolerance Patient limited by fatigue   Medical Staff Made Aware spoke w/ Alessandro COTA pre / post tx session   Assessment   Assessment Pt seen for skilled OT tx session day 3 from [phone] focusing on activity engagement  Pt agreeable to participate w/ max encouragement  Pt required consistent min A to complete sit <> stand and functional mobility short distances using RW  Pt engaged in feeding w/ S after set- up seated OOB in chair  Pt lethargic but responsive  Continue to recommend post acute rehab when medicallty stable for discharge from [facility] care to return to baseline level of I  Will continue to follow   Plan   Treatment Interventions ADL retraining;Functional transfer training; Endurance training;Patient/family training;Equipment evaluation/education;Continued evaluation; Activityengagement; Energy conservation   Goal Expiration Date 03/15/23   OT Treatment Day 3  (Friday)   OT Frequency 3-5x/wk Recommendation   OT Discharge Recommendation Post acute rehabilitation services   AM-[facility] Daily Activity Inpatient   Lower Body Dressing 2   Bathing 2   Toileting 2   Upper Body Dressing 2   Grooming 3   Eating 3   Daily Activity Raw Score 14   Daily Activity Standardized Score (Calc for Raw Score >=11) 33 39   AM-[facility] Applied Cognition Inpatient   Following a Speech/Presentation 2   Understanding Ordinary Conversation 2   Taking Medications 1   Remembering Where Things Are Placed or Put Away 2   Remembering List of 4-5 Errands 1   Taking Care of Complicated Tasks 1   Applied Cognition Raw Score 9   Applied Cognition Standardized Score 22 48   Barthel Index   Feeding 5   Bathing 0   Grooming Score 0   Dressing Score 5   Bladder Score 0   Bowels Score 0   Toilet Use Score 5   Transfers (Bed/Chair) Score 10   Mobility (Level Surface) Score 0   Stairs Score 0   Barthel Index Score 25   Modified [place] Scale   Modified [place] Scale 4        The patient's raw score on the AM-PAC Daily Activity Inpatient Short Form is 14  A raw score of less than 19 suggests the patient may benefit from discharge to post-acute rehabilitation services  Please refer to the recommendation of the Occupational Therapist for safe discharge planning      MARLYS Jacques/L

## 2023-03-10 NOTE — PLAN OF CARE
Problem: MOBILITY - ADULT  Goal: Maintain or return to baseline ADL function  Description: INTERVENTIONS:  -  Assess patient's ability to carry out ADLs; assess patient's baseline for ADL function and identify physical deficits which impact ability to perform ADLs (bathing, care of mouth/teeth, toileting, grooming, dressing, etc )  - Assess/evaluate cause of self-care deficits   - Assess range of motion  - Assess patient's mobility; develop plan if impaired  - Assess patient's need for assistive devices and provide as appropriate  - Encourage maximum independence but intervene and supervise when necessary  - Involve family in performance of ADLs  - Assess for home care needs following discharge   - Consider OT consult to assist with ADL evaluation and planning for discharge  - Provide patient education as appropriate  Outcome: Progressing  Goal: Maintains/Returns to pre admission functional level  Description: INTERVENTIONS:  - Perform BMAT or MOVE assessment daily    - Set and communicate daily mobility goal to care team and patient/family/caregiver  - Collaborate with rehabilitation services on mobility goals if consulted  - Perform Range of Motion 3 times a day  - Reposition patient every 2 hours    - Dangle patient 3 times a day  - Stand patient 3 times a day  - Ambulate patient 3 times a day  - Out of bed to chair 3 times a day   - Out of bed for meals 3 times a day  - Out of bed for toileting  - Record patient progress and toleration of activity level   Outcome: Progressing     Problem: Prexisting or High Potential for Compromised Skin Integrity  Goal: Skin integrity is maintained or improved  Description: INTERVENTIONS:  - Identify patients at risk for skin breakdown  - Assess and monitor skin integrity  - Assess and monitor nutrition and hydration status  - Monitor labs   - Assess for incontinence   - Turn and reposition patient  - Assist with mobility/ambulation  - Relieve pressure over bony prominences  - Avoid friction and shearing  - Provide appropriate hygiene as needed including keeping skin clean and dry  - Evaluate need for skin moisturizer/barrier cream  - Collaborate with interdisciplinary team   - Patient/family teaching  - Consider wound care consult   Outcome: Progressing     Problem: NEUROSENSORY - ADULT  Goal: Achieves stable or improved neurological status  Description: INTERVENTIONS  - Monitor and report changes in neurological status  - Monitor vital signs such as temperature, blood pressure, glucose, and any other labs ordered   - Initiate measures to prevent increased intracranial pressure  - Monitor for seizure activity and implement precautions if appropriate      Outcome: Progressing  Goal: Achieves maximal functionality and self care  Description: INTERVENTIONS  - Monitor swallowing and airway patency with patient fatigue and changes in neurological status  - Encourage and assist patient to increase activity and self care  - Encourage visually impaired, hearing impaired and aphasic patients to use assistive/communication devices  Outcome: Progressing     Problem: Nutrition/Hydration-ADULT  Goal: Nutrient/Hydration intake appropriate for improving, restoring or maintaining nutritional needs  Description: Monitor and assess patient's nutrition/hydration status for malnutrition  Collaborate with interdisciplinary team and initiate plan and interventions as ordered  Monitor patient's weight and dietary intake as ordered or per policy  Utilize nutrition screening tool and intervene as necessary  Determine patient's food preferences and provide high-protein, high-caloric foods as appropriate       INTERVENTIONS:  - Monitor oral intake, urinary output, labs, and treatment plans  - Assess nutrition and hydration status and recommend course of action  - Evaluate amount of meals eaten  - Assist patient with eating if necessary   - Allow adequate time for meals  - Recommend/ encourage appropriate diets, oral nutritional supplements, and vitamin/mineral supplements  - Order, calculate, and assess calorie counts as needed  - Assess need for intravenous fluids  - Provide specific nutrition/hydration education as appropriate  - Include patient/family/caregiver in decisions related to nutrition  Outcome: Progressing

## 2023-03-10 NOTE — PROGRESS NOTES
[facility]  Progress Note - Read Pacini 1940, 79 y o  female MRN: [id number]  Unit/Bed#: S -01 Encounter: [id number]  Primary Care Provider: Vinod Trammell MD   Date and time admitted to hospital: 2/27/2023  3:02 AM    MDD with persistant bereavement complex  Assessment & Plan  · Initially thought to be secondary to adverse medication side effects with up titration of her multiple antidepressants recently  Abilify was discontinued earlier on in the admission  Initially she had improvement in her mentation though continues to have intermittent episodes of obtundation without a clear organic cause  Infectious and metabolic work-up essentially unrevealing  · Would appreciate behavioral health reevaluate patient this admission and Monday, 3/6/2023  · Appreciate neurology recs  EEG without epileptiform discharges though grossly abnormal suggestive of diffuse cerebral dysfunction  · Continue supportive cares, advance diet as tolerated: Dysphagia 1 Pureed 1 ; Honey Thick Liquid with close monitoring  · Maintain aspiration precautions  · Does not seem to have AMS, just periods of time where does not want to interact--> will follow exam with encouragement but may not open eyes with sternal rub  · Palliative: Patient does not have palliative or hospice diagnosis at this time  · Psych:   · Not an ect candidate d/t aneurysm    · 3/7 Multidisciplinary meeting w/ psych and SLIM w/ son at bedside  Ritalin side effects discussed and explained that we will try to mitigate side effects however if patient should become unstable she may not have reserve to tolerate which may hasten her demise  The alternative is to watch her attempt to take PO meds and intermittently eat however intake is incompatible long term with meaningful recovery  Family agreeable to attempt     · Start Ritalin 3/8, monitor BP and HR closely, so far tolerating and pt has stagnated in her response to it           * Failure to thrive in adult  Assessment & Plan  · Suspect large component due to profound grief and depression  · Continue supportive cares  · See additional recommendations below      Abdominal distention/back pain  Assessment & Plan  2/2 constipation s/p multipple bowel movements, still needs more bowel movements  Sennakot S, enema    Urinary retention  Assessment & Plan  Required straight cath early in admission   Continue with urinary retention protocol    Antiphospholipid antibody syndrome (HCC)  Assessment & Plan  · On coumadin  Goal INR 2-3  INR initially supratherapeutic, coumadin held  No active evidence of bleeding  · Hold Coumadin 5mg daily  INR is subtherapeutic  · Daily INR    Acute respiratory failure (HCC)  Assessment & Plan  Had pulm edema d/t IVF - requiring 02 and pt SOB, CXr showing pulm edema,  resolved w/ diuresis   Now requiring  likely 2/2 to atlectasis now (Looks to have right-sided basal atelectasis) 2/2 to mucus plugging  Pt unable to tolerate IVF or diuresis well  Due to patient's MDD not participating in incentive spirometer/flutter / vest therapy she is too frail     Atrial fibrillation (HCC)  Assessment & Plan  · On diltiazem, digoxin, and warfarin at home  · See above regarding AC    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HCC)  Assessment & Plan  · Incidental finding on CTA head and neck/  CTA head and neck with and without contrast 2/27/2023:  · "No acute intracranial abnormality  Moderate chronic microangiopathy  Negative CTA head and neck for large vessel occlusion, dissection, or high-grade stenosis  · 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 projecting laterally in between left posterior cerebral artery and left superior cerebellar- repeat CT stable when developed AMS  · Outpatient referral for Neurosurgery follow-up  Will provide on DC        Abnormal CT of the chest  Assessment & Plan  · PE Study with CT abdomen & pelvis with contrast 2/27/2023:   · There is a new right basilar density with associated few branching nodular density in the right middle lobe and a 5 mm nodular density superior segment left lower lobe, indeterminate  These may be on inflammatory/infectious basis /pneumonia   · Short interval follow-up chest CT at 3 months suggested to demonstrate resolution  Thyroid nodule  Assessment & Plan  · CTA head and neck with and without contrast 2023:  · Incidental thyroid nodule(s) for which nonemergent thyroid ultrasound is recommended "  · Outpatient f/u with pcp for non-emergent thyroid u/s    Hypothyroidism  Assessment & Plan  · TSH wnl  · Cont home levothyroxine          VTE Pharmacologic Prophylaxis: VTE Score: 6 High Risk (Score >/= 5) - Pharmacological DVT Prophylaxis Ordered: warfarin (Coumadin)  Sequential Compression Devices Ordered  Patient Centered Rounds: I performed bedside rounds with nursing staff today  Discussions with Specialists or Other Care Team Provider: Geriatrics    Education and Discussions with Family / Patient: Updated  (son) via phone  Current Length of Stay: 11 day(s)  Current Patient Status: Inpatient   Discharge Plan: Anticipate discharge in >72 hrs to TBD    Code Status: Level 2 - DNAR: but accepts endotracheal intubation    Subjective:   Patient states " I am tired" repeatedly    Objective:     Vitals:   Temp (24hrs), Av 5 °F (36 4 °C), Min:96 7 °F (35 9 °C), Max:98 3 °F (36 8 °C)    Temp:  [96 7 °F (35 9 °C)-98 3 °F (36 8 °C)] 96 7 °F (35 9 °C)  HR:  [68-72] 68  Resp:  [18] 18  BP: (128-173)/(58-69) 128/58  SpO2:  [87 %-92 %] 87 %  Body mass index is 19 73 kg/m²  Input and Output Summary (last 24 hours): Intake/Output Summary (Last 24 hours) at 3/10/2023 1639  Last data filed at 3/10/2023 1621  Gross per 24 hour   Intake 120 ml   Output 427 ml   Net -307 ml       Physical Exam:   Physical Exam  Vitals and nursing note reviewed  Constitutional:       Appearance: She is well-developed   She is ill-appearing  Comments: Patient test that affect in effect  Speaks very slowly  Patient is exercised after a few sentences  Cachectic   HENT:      Head: Normocephalic and atraumatic  Eyes:      Conjunctiva/sclera: Conjunctivae normal    Cardiovascular:      Rate and Rhythm: Normal rate and regular rhythm  Heart sounds: No murmur heard  Pulmonary:      Effort: Pulmonary effort is normal  No respiratory distress  Breath sounds: Rales (Right lower lobe) present  No wheezing  Abdominal:      General: There is distension (Distention improved with bowel movement)  Palpations: Abdomen is soft  Musculoskeletal:         General: No swelling  Cervical back: Neck supple  Skin:     General: Skin is warm and dry  Capillary Refill: Capillary refill takes less than 2 seconds  Additional Data:     Labs:  Results from last 7 days   Lab Units 03/10/23  0600   WBC Thousand/uL 6 81   HEMOGLOBIN g/dL 10 2*   HEMATOCRIT % 32 3*   PLATELETS Thousands/uL 235   NEUTROS PCT % 83*   LYMPHS PCT % 10*   MONOS PCT % 5   EOS PCT % 2     Results from last 7 days   Lab Units 03/10/23  0600   SODIUM mmol/L 141   POTASSIUM mmol/L 4 2   CHLORIDE mmol/L 101   CO2 mmol/L 39*   BUN mg/dL 31*   CREATININE mg/dL 0 48*   ANION GAP mmol/L 1*   CALCIUM mg/dL 8 5   ALBUMIN g/dL 2 7*   TOTAL BILIRUBIN mg/dL 0 47   ALK PHOS U/L 65   ALT U/L 13   AST U/L 14   GLUCOSE RANDOM mg/dL 90     Results from last 7 days   Lab Units 03/10/23  0600   INR  2 59*             Results from last 7 days   Lab Units 03/07/23  0451   PROCALCITONIN ng/ml 0 15       Lines/Drains:  Invasive Devices     Peripheral Intravenous Line  Duration           Long-Dwell Peripheral IV (Midline) 00/78/43 Left Basilic 4 days                      Imaging: No pertinent imaging reviewed      Recent Cultures (last 7 days):         Last 24 Hours Medication List:   Current Facility-Administered Medications   Medication Dose Route Frequency Provider Last Rate   • albuterol  2 5 mg Nebulization Q4H PRN Malcolm Gresham MD     • bisacodyl  10 mg Rectal Daily Courtney Hicks MD     • diltiazem  240 mg Oral Daily Malcolm Gresham MD     • Fluticasone Furoate-Vilanterol  1 puff Inhalation Daily Malcolm Gresham MD     • hydrALAZINE  10 mg Intravenous Q6H PRN Courtney Hicks MD     • levothyroxine  25 mcg Oral Daily Malcolm Gresham MD     • lidocaine  1 patch Topical Daily Malcolm Gresham MD     • losartan  100 mg Oral Daily Courtney Hicks MD     • methylphenidate  5 mg Oral BID before breakfast/lunch Mallory Green DO     • mirtazapine  7 5 mg Oral HS Mallory Green DO     • senna-docusate sodium  1 tablet Oral HS Courtney Hicks MD     • sertraline  50 mg Oral Daily Malcolm Gresham MD     • sodium chloride  50 mL/hr Intravenous Continuous Courtney Hicks MD 50 mL/hr (03/10/23 1335)   • spironolactone  25 mg Oral Daily Courtney Hicks MD     • umeclidinium  1 puff Inhalation Daily Malcolm Gresham MD     • warfarin  2 mg Oral Daily (warfarin) Courtney Hicks MD          Today, Patient Was Seen By: Courtney Hicks MD    **Please Note: This note may have been constructed using a voice recognition system  **

## 2023-03-10 NOTE — SPEECH THERAPY NOTE
Speech Language/Pathology    SLP approached patient for skilled ST session  Lunch tray present and untouched  Pt reporting that she would like to "rest" vs eat/participate in therapy at this time  ST to follow up as schedule allows

## 2023-03-10 NOTE — PROGRESS NOTES
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 as indicated by the psych team   Patient takes Remeron 7 5 mg dialy and sertraline 50 mg daily  Patient was started on Ritalin 5 mg twice daily on 3/8/23  Pt did open her eyes today and said "hi" when we walked in  Pt did not verbalize very much after that  Unable to get any information from pt  Pt's behavior is similar to yesterday and the day before  Long term care and next steps currently being managed by primary team      -Antiphospholipid Syndrome  Patient has been historically controlled with Coumadin  Daily INR has been measured during her admission  Coumadin was discontinued yesterday due to her INR trending up but was restarted and 5 mg will be given today at 1800  Continue to monitor with daily INRs  -Failure to thrive in adult  Suspect this is likely due to patient's profound grief and depression  Patient's sons have been consistently supporting the patient in her room  Would recommend continuing the supportive care  Treatment of her major depressive disorder may also resolve her failure to thrive     -Constipation  CT of abdomen yesterday showed a large amount of stool throughout colon and small bowel  Most likely due to patient's limited p o  intake  Rectal exam was performed yesterday as well as an attempt of disimpaction but there was no impacted stool in the rectum  Patient had 2 BMs last night  Continue with bowel protocol currently in place     -Atrial fibrillation  Patient takes digoxin, diltiazem, and coumadin outpatient  EKG on 2/27/23 showed sinus rhythm with occasional premature ventricular complexes    Rhythm on auscultation has been noted to be irregular with a normal rate     -Dysphagia  Patient was put on a puréed diet with honey thick liquids  Speech cleared patient to have normal thickness water with a sponge  Aspiration precautions were put in place by speech pathology     -Hypertension  Pt is currently taking losartan 100 mg daily, spironolactone 25 mg daily, and hydralazine 10 mg IV Q6H PRN when SBP > 180  Pt has not needed hydralazine since 3/6/23  BP this morning was 173/69  Continue to monitor throughout admission  Subjective:   Pt had 2 BMs last night after enema  Pt opened eyes when asked and responded hi when we walked in room  Pt is very lethargic  Review of Systems   Unable to perform ROS: Mental status change       Objective:     Vitals: Blood pressure (!) 173/69, pulse 68, temperature (!) 97 2 °F (36 2 °C), resp  rate 18, height 5' (1 524 m), weight 45 8 kg (101 lb), SpO2 90 %, not currently breastfeeding  ,Body mass index is 19 73 kg/m²  Intake/Output Summary (Last 24 hours) at 3/10/2023 0916  Last data filed at 3/10/2023 0748  Gross per 24 hour   Intake --   Output 211 ml   Net -211 ml       Current Medications: Reviewed    Physical Exam:   Physical Exam  Constitutional:       Appearance: She is ill-appearing  HENT:      Mouth/Throat:      Mouth: Mucous membranes are dry  Pharynx: Oropharynx is clear  Eyes:      Conjunctiva/sclera: Conjunctivae normal    Cardiovascular:      Pulses: Normal pulses  Heart sounds: Normal heart sounds  No murmur heard  No friction rub  Pulmonary:      Effort: No respiratory distress  Breath sounds: Normal breath sounds  No wheezing or rhonchi  Abdominal:      General: Bowel sounds are normal  There is no distension  Palpations: Abdomen is soft  There is no mass  Tenderness: There is no abdominal tenderness  Skin:     General: Skin is warm  Neurological:      Mental Status: She is lethargic and disoriented     Psychiatric:      Comments: Unable to assess duet to lack of verbal responses           Invasive Devices     Peripheral Intravenous Line Duration           Long-Dwell Peripheral IV (Midline) 81/43/72 Left Basilic 3 days                Lab, Imaging and other studies: I have personally reviewed pertinent reports  Yudi MOSS transcribed this note and examined the pt with Dr Dulce Nunez

## 2023-03-11 LAB
ALBUMIN SERPL BCP-MCNC: 2.8 G/DL (ref 3.5–5)
ALP SERPL-CCNC: 70 U/L (ref 34–104)
ALT SERPL W P-5'-P-CCNC: 19 U/L (ref 7–52)
ANION GAP SERPL CALCULATED.3IONS-SCNC: 2 MMOL/L (ref 4–13)
AST SERPL W P-5'-P-CCNC: 20 U/L (ref 13–39)
BASOPHILS # BLD AUTO: 0.01 THOUSANDS/ÂΜL (ref 0–0.1)
BASOPHILS NFR BLD AUTO: 0 % (ref 0–1)
BILIRUB SERPL-MCNC: 0.46 MG/DL (ref 0.2–1)
BUN SERPL-MCNC: 29 MG/DL (ref 5–25)
CALCIUM ALBUM COR SERPL-MCNC: 9.7 MG/DL (ref 8.3–10.1)
CALCIUM SERPL-MCNC: 8.7 MG/DL (ref 8.4–10.2)
CHLORIDE SERPL-SCNC: 101 MMOL/L (ref 96–108)
CO2 SERPL-SCNC: 37 MMOL/L (ref 21–32)
CREAT SERPL-MCNC: 0.51 MG/DL (ref 0.6–1.3)
EOSINOPHIL # BLD AUTO: 0.1 THOUSAND/ÂΜL (ref 0–0.61)
EOSINOPHIL NFR BLD AUTO: 1 % (ref 0–6)
ERYTHROCYTE [DISTWIDTH] IN BLOOD BY AUTOMATED COUNT: 13.6 % (ref 11.6–15.1)
GFR SERPL CREATININE-BSD FRML MDRD: 88 ML/MIN/1.73SQ M
GLUCOSE SERPL-MCNC: 96 MG/DL (ref 65–140)
HCT VFR BLD AUTO: 33.6 % (ref 34.8–46.1)
HGB BLD-MCNC: 10.7 G/DL (ref 11.5–15.4)
IMM GRANULOCYTES # BLD AUTO: 0.03 THOUSAND/UL (ref 0–0.2)
IMM GRANULOCYTES NFR BLD AUTO: 0 % (ref 0–2)
INR PPP: 2.48 (ref 0.84–1.19)
LYMPHOCYTES # BLD AUTO: 0.62 THOUSANDS/ÂΜL (ref 0.6–4.47)
LYMPHOCYTES NFR BLD AUTO: 9 % (ref 14–44)
MAGNESIUM SERPL-MCNC: 2.1 MG/DL (ref 1.9–2.7)
MCH RBC QN AUTO: 32.9 PG (ref 26.8–34.3)
MCHC RBC AUTO-ENTMCNC: 31.8 G/DL (ref 31.4–37.4)
MCV RBC AUTO: 103 FL (ref 82–98)
MONOCYTES # BLD AUTO: 0.31 THOUSAND/ÂΜL (ref 0.17–1.22)
MONOCYTES NFR BLD AUTO: 4 % (ref 4–12)
NEUTROPHILS # BLD AUTO: 5.95 THOUSANDS/ÂΜL (ref 1.85–7.62)
NEUTS SEG NFR BLD AUTO: 86 % (ref 43–75)
NRBC BLD AUTO-RTO: 0 /100 WBCS
PLATELET # BLD AUTO: 240 THOUSANDS/UL (ref 149–390)
PMV BLD AUTO: 8.8 FL (ref 8.9–12.7)
POTASSIUM SERPL-SCNC: 4.3 MMOL/L (ref 3.5–5.3)
PROT SERPL-MCNC: 5.8 G/DL (ref 6.4–8.4)
PROTHROMBIN TIME: 27.1 SECONDS (ref 11.6–14.5)
RBC # BLD AUTO: 3.25 MILLION/UL (ref 3.81–5.12)
SODIUM SERPL-SCNC: 140 MMOL/L (ref 135–147)
WBC # BLD AUTO: 7.02 THOUSAND/UL (ref 4.31–10.16)

## 2023-03-11 RX ORDER — ACETAMINOPHEN 325 MG/1
975 TABLET ORAL EVERY 8 HOURS SCHEDULED
Status: DISCONTINUED | OUTPATIENT
Start: 2023-03-11 | End: 2023-03-14 | Stop reason: HOSPADM

## 2023-03-11 RX ORDER — ALBUTEROL SULFATE 90 UG/1
2 AEROSOL, METERED RESPIRATORY (INHALATION) EVERY 4 HOURS PRN
Status: DISCONTINUED | OUTPATIENT
Start: 2023-03-11 | End: 2023-03-14 | Stop reason: HOSPADM

## 2023-03-11 RX ADMIN — METHYLPHENIDATE HYDROCHLORIDE 5 MG: 10 TABLET ORAL at 09:41

## 2023-03-11 RX ADMIN — LEVOTHYROXINE SODIUM 25 MCG: 25 TABLET ORAL at 09:42

## 2023-03-11 RX ADMIN — SERTRALINE HYDROCHLORIDE 50 MG: 50 TABLET ORAL at 09:42

## 2023-03-11 RX ADMIN — Medication 10 MG: at 09:33

## 2023-03-11 RX ADMIN — ACETAMINOPHEN 975 MG: 325 TABLET ORAL at 15:47

## 2023-03-11 RX ADMIN — LOSARTAN POTASSIUM 100 MG: 50 TABLET, FILM COATED ORAL at 09:42

## 2023-03-11 RX ADMIN — SPIRONOLACTONE 25 MG: 25 TABLET ORAL at 09:42

## 2023-03-11 RX ADMIN — UMECLIDINIUM 1 PUFF: 62.5 AEROSOL, POWDER ORAL at 09:45

## 2023-03-11 RX ADMIN — SENNOSIDES AND DOCUSATE SODIUM 1 TABLET: 8.6; 5 TABLET ORAL at 21:02

## 2023-03-11 RX ADMIN — FLUTICASONE FUROATE AND VILANTEROL TRIFENATATE 1 PUFF: 100; 25 POWDER RESPIRATORY (INHALATION) at 09:45

## 2023-03-11 RX ADMIN — METHYLPHENIDATE HYDROCHLORIDE 5 MG: 10 TABLET ORAL at 12:48

## 2023-03-11 RX ADMIN — WARFARIN SODIUM 2 MG: 2 TABLET ORAL at 17:20

## 2023-03-11 RX ADMIN — MIRTAZAPINE 7.5 MG: 15 TABLET, FILM COATED ORAL at 21:02

## 2023-03-11 RX ADMIN — ACETAMINOPHEN 975 MG: 325 TABLET ORAL at 21:02

## 2023-03-11 RX ADMIN — DILTIAZEM HYDROCHLORIDE 240 MG: 240 CAPSULE, COATED, EXTENDED RELEASE ORAL at 09:42

## 2023-03-11 RX ADMIN — LIDOCAINE 5% 1 PATCH: 700 PATCH TOPICAL at 09:33

## 2023-03-11 NOTE — ASSESSMENT & PLAN NOTE
· Suspect large component due to profound grief and depression  · Continue supportive cares  · Oral intake improved from admission

## 2023-03-11 NOTE — ASSESSMENT & PLAN NOTE
Had pulm edema d/t IVF - requiring 02 and pt SOB, CXr showing pulm edema,  resolved w/ diuresis   Now requiring  likely 2/2 to atlectasis now (Looks to have right-sided basal atelectasis) 2/2 to mucus plugging  Pt unable to tolerate IVF or diuresis well  Due to patient's MDD not participating in incentive spirometer/flutter / vest therapy she is too frail

## 2023-03-11 NOTE — PROGRESS NOTES
[facility]  Progress Note - Iftikhar Shells 1940, 79 y o  female MRN: [id number]  Unit/Bed#: S -01 Encounter: [id number]  Primary Care Provider: Sandra Lyons MD   Date and time admitted to hospital: 2/27/2023  3:02 AM    MDD with persistant bereavement complex  Assessment & Plan  · Initially thought to be secondary to adverse medication side effects with up titration of her multiple antidepressants recently  Abilify was discontinued earlier on in the admission  Initially she had improvement in her mentation though continues to have intermittent episodes of obtundation without a clear organic cause  Infectious and metabolic work-up essentially unrevealing  · Would appreciate behavioral health reevaluate patient this admission and Monday, 3/6/2023  · Appreciate neurology recs  EEG without epileptiform discharges though grossly abnormal suggestive of diffuse cerebral dysfunction  · Continue supportive cares, advance diet as tolerated: Dysphagia 1 Pureed 1 ; Honey Thick Liquid with close monitoring  · Maintain aspiration precautions  · Does not seem to have AMS, just periods of time where does not want to interact--> will follow exam with encouragement but may not open eyes with sternal rub  · Palliative: Patient does not have palliative or hospice diagnosis at this time  · Psych:   · Not an ect candidate d/t aneurysm    · 3/7 Multidisciplinary meeting w/ psych and SLIM w/ son at bedside  Ritalin side effects discussed and explained that we will try to mitigate side effects however if patient should become unstable she may not have reserve to tolerate which may hasten her demise  The alternative is to watch her attempt to take PO meds and intermittently eat however intake is incompatible long term with meaningful recovery  Family agreeable to attempt     · Start Ritalin 3/8, monitor BP and HR closely, so far tolerating           * Failure to thrive in adult  Assessment & Plan  · Suspect large component due to profound grief and depression  · Continue supportive cares  · Oral intake improved from admission      Abdominal distention/back pain  Assessment & Plan  2/2 constipation s/p multipple bowel movements, still needs more bowel movements  Sennakot S, enema    Urinary retention  Assessment & Plan  Continue with urinary retention protocol    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HCC)  Assessment & Plan  · Incidental finding on CTA head and neck/  CTA head and neck with and without contrast 2/27/2023:  · "No acute intracranial abnormality  Moderate chronic microangiopathy  Negative CTA head and neck for large vessel occlusion, dissection, or high-grade stenosis  · 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 projecting laterally in between left posterior cerebral artery and left superior cerebellar- repeat CT stable when developed AMS  · Outpatient referral for Neurosurgery follow-up  Will provide on DC  Abnormal CT of the chest  Assessment & Plan  · PE Study with CT abdomen & pelvis with contrast 2/27/2023:   · There is a new right basilar density with associated few branching nodular density in the right middle lobe and a 5 mm nodular density superior segment left lower lobe, indeterminate  These may be on inflammatory/infectious basis /pneumonia   · Short interval follow-up chest CT at 3 months suggested to demonstrate resolution  Atrial fibrillation ([address] )  Assessment & Plan  · On diltiazem, digoxin, and warfarin at home  · See above regarding AC    Antiphospholipid antibody syndrome (HCC)  Assessment & Plan  · On coumadin  Goal INR 2-3  INR initially supratherapeutic, coumadin held  No active evidence of bleeding  · Hold Coumadin 5mg daily    INR is subtherapeutic  · Daily INR    Acute respiratory failure (HCC)  Assessment & Plan  Had pulm edema d/t IVF - requiring 02 and pt SOB, CXr showing pulm edema,  resolved w/ diuresis   Now requiring  likely 2/2 to atlectasis now (Looks to have right-sided basal atelectasis) 2/2 to mucus plugging  Pt unable to tolerate IVF or diuresis well  Due to patient's MDD not participating in incentive spirometer/flutter / vest therapy she is too frail         VTE Pharmacologic Prophylaxis:   Pharmacologic: Warfarin (Coumadin)  Mechanical VTE Prophylaxis in Place: Yes    Patient Centered Rounds: I have performed bedside rounds with nursing staff today  Discussions with Specialists or Other Care Team Provider:     Education and Discussions with Family / Patient: son    Time Spent for Care: 30 minutes  More than 50% of total time spent on counseling and coordination of care as described above  Current Length of Stay: 12 day(s)    Current Patient Status: Inpatient   Certification Statement: The patient will continue to require additional inpatient hospital stay due to family meeting tomorrow    Discharge Plan: 1-2 days    Code Status: Level 2 - DNAR: but accepts endotracheal intubation      Subjective:   Pt seen and examined by me in morning  Complained of pain in her neck  Was sitting in chair  Objective:     Vitals:   Temp (24hrs), Av 8 °F (36 6 °C), Min:97 4 °F (36 3 °C), Max:98 °F (36 7 °C)    Temp:  [97 4 °F (36 3 °C)-98 °F (36 7 °C)] 97 9 °F (36 6 °C)  HR:  [60-74] 74  Resp:  [18-19] 18  BP: (127-169)/(53-69) 127/53  SpO2:  [91 %-94 %] 93 %  Body mass index is 19 73 kg/m²  Input and Output Summary (last 24 hours): Intake/Output Summary (Last 24 hours) at 3/11/2023 1539  Last data filed at 3/11/2023 1100  Gross per 24 hour   Intake 75 ml   Output 191 ml   Net -116 ml       Physical Exam:     Physical Exam    Constitutional: Pt appears comfortable  Not in any acute distress  Cardiovascular: Normal rate, regular rhythm, normal heart sounds  No murmur heard  Pulmonary/Chest: Effort normal, air entry b/l equal  No respiratory distress  Pt has no wheezes or crackles  Abdominal: Soft  Non-distended, Non-tender  Bowel sounds are normal     Neurological: awake, alert  Moving all extremities spontaneously  Psychiatric: normal mood and affect  Additional Data:     Labs:    Results from last 7 days   Lab Units 03/11/23  0610   WBC Thousand/uL 7 02   HEMOGLOBIN g/dL 10 7*   HEMATOCRIT % 33 6*   PLATELETS Thousands/uL 240   NEUTROS PCT % 86*   LYMPHS PCT % 9*   MONOS PCT % 4   EOS PCT % 1     Results from last 7 days   Lab Units 03/11/23  0610   SODIUM mmol/L 140   POTASSIUM mmol/L 4 3   CHLORIDE mmol/L 101   CO2 mmol/L 37*   BUN mg/dL 29*   CREATININE mg/dL 0 51*   ANION GAP mmol/L 2*   CALCIUM mg/dL 8 7   ALBUMIN g/dL 2 8*   TOTAL BILIRUBIN mg/dL 0 46   ALK PHOS U/L 70   ALT U/L 19   AST U/L 20   GLUCOSE RANDOM mg/dL 96     Results from last 7 days   Lab Units 03/11/23  0610   INR  2 48*             Results from last 7 days   Lab Units 03/07/23  0451   PROCALCITONIN ng/ml 0 15           * I Have Reviewed All Lab Data Listed Above  * Additional Pertinent Lab Tests Reviewed:  [address] Admission Reviewed    Imaging:    Imaging Reports Reviewed Today Include:   Imaging Personally Reviewed by Myself Includes:      Recent Cultures (last 7 days):           Last 24 Hours Medication List:   Current Facility-Administered Medications   Medication Dose Route Frequency Provider Last Rate   • acetaminophen  975 mg Oral Q8H [address] Izzy East MD     • albuterol  2 puff Inhalation Q4H PRN Izzy East MD     • bisacodyl  10 mg Rectal Daily Kaylee Storey MD     • diltiazem  240 mg Oral Daily Soto Lira MD     • Fluticasone Furoate-Vilanterol  1 puff Inhalation Daily Soto Lira MD     • hydrALAZINE  10 mg Intravenous Q6H PRN Kaylee Storey MD     • levothyroxine  25 mcg Oral Daily Soto Lira MD     • lidocaine  1 patch Topical Daily Soto Lira MD     • losartan  100 mg Oral Daily Kaylee Storey MD     • methylphenidate  5 mg Oral BID before breakfast/lunch Mallory Green, DO     • mirtazapine  7 5 mg Oral HS Mallory Green DO     • senna-docusate sodium  1 tablet Oral HS Milan Franco MD     • sertraline  50 mg Oral Daily Jaja Hill MD     • spironolactone  25 mg Oral Daily Milan Franco MD     • umeclidinium  1 puff Inhalation Daily Jaja Hill MD     • warfarin  2 mg Oral Daily (warfarin) Milan Franco MD          Today, Patient Was Seen By: Addi Marquez MD    ** Please Note: Dictation voice to text software may have been used in the creation of this document   **

## 2023-03-11 NOTE — ASSESSMENT & PLAN NOTE
· Initially thought to be secondary to adverse medication side effects with up titration of her multiple antidepressants recently  Abilify was discontinued earlier on in the admission  Initially she had improvement in her mentation though continues to have intermittent episodes of obtundation without a clear organic cause  Infectious and metabolic work-up essentially unrevealing  · Would appreciate behavioral health reevaluate patient this admission and Monday, 3/6/2023  · Appreciate neurology recs  EEG without epileptiform discharges though grossly abnormal suggestive of diffuse cerebral dysfunction  · Continue supportive cares, advance diet as tolerated: Dysphagia 1 Pureed 1 ; Honey Thick Liquid with close monitoring  · Maintain aspiration precautions  · Does not seem to have AMS, just periods of time where does not want to interact--> will follow exam with encouragement but may not open eyes with sternal rub  · Palliative: Patient does not have palliative or hospice diagnosis at this time  · Psych:   · Not an ect candidate d/t aneurysm    · 3/7 Multidisciplinary meeting w/ psych and SLIM w/ son at bedside  Ritalin side effects discussed and explained that we will try to mitigate side effects however if patient should become unstable she may not have reserve to tolerate which may hasten her demise  The alternative is to watch her attempt to take PO meds and intermittently eat however intake is incompatible long term with meaningful recovery  Family agreeable to attempt     · Start Ritalin 3/8, monitor BP and HR closely, so far tolerating

## 2023-03-12 PROBLEM — J96.00 ACUTE RESPIRATORY FAILURE (HCC): Status: RESOLVED | Noted: 2017-11-22 | Resolved: 2023-03-12

## 2023-03-12 LAB
INR PPP: 2.24 (ref 0.84–1.19)
PROTHROMBIN TIME: 25.1 SECONDS (ref 11.6–14.5)

## 2023-03-12 RX ADMIN — LOSARTAN POTASSIUM 100 MG: 50 TABLET, FILM COATED ORAL at 08:02

## 2023-03-12 RX ADMIN — LIDOCAINE 5% 1 PATCH: 700 PATCH TOPICAL at 08:06

## 2023-03-12 RX ADMIN — MIRTAZAPINE 7.5 MG: 15 TABLET, FILM COATED ORAL at 20:13

## 2023-03-12 RX ADMIN — LEVOTHYROXINE SODIUM 25 MCG: 25 TABLET ORAL at 08:02

## 2023-03-12 RX ADMIN — SENNOSIDES AND DOCUSATE SODIUM 1 TABLET: 8.6; 5 TABLET ORAL at 20:13

## 2023-03-12 RX ADMIN — SERTRALINE HYDROCHLORIDE 50 MG: 50 TABLET ORAL at 08:02

## 2023-03-12 RX ADMIN — WARFARIN SODIUM 2 MG: 2 TABLET ORAL at 17:07

## 2023-03-12 RX ADMIN — ACETAMINOPHEN 975 MG: 325 TABLET ORAL at 13:56

## 2023-03-12 RX ADMIN — ACETAMINOPHEN 975 MG: 325 TABLET ORAL at 20:13

## 2023-03-12 RX ADMIN — SPIRONOLACTONE 25 MG: 25 TABLET ORAL at 08:02

## 2023-03-12 RX ADMIN — UMECLIDINIUM 1 PUFF: 62.5 AEROSOL, POWDER ORAL at 08:08

## 2023-03-12 RX ADMIN — Medication 10 MG: at 07:53

## 2023-03-12 RX ADMIN — DILTIAZEM HYDROCHLORIDE 240 MG: 240 CAPSULE, COATED, EXTENDED RELEASE ORAL at 08:02

## 2023-03-12 RX ADMIN — FLUTICASONE FUROATE AND VILANTEROL TRIFENATATE 1 PUFF: 100; 25 POWDER RESPIRATORY (INHALATION) at 08:08

## 2023-03-12 RX ADMIN — ACETAMINOPHEN 975 MG: 325 TABLET ORAL at 04:56

## 2023-03-12 RX ADMIN — METHYLPHENIDATE HYDROCHLORIDE 5 MG: 10 TABLET ORAL at 08:02

## 2023-03-12 RX ADMIN — METHYLPHENIDATE HYDROCHLORIDE 5 MG: 10 TABLET ORAL at 11:59

## 2023-03-12 NOTE — PROGRESS NOTES
[facility]  Progress Note - Mel Vergara 1940, 79 y o  female MRN: [id number]  Unit/Bed#: S -01 Encounter: [id number]  Primary Care Provider: Mode Reyez MD   Date and time admitted to hospital: 2/27/2023  3:02 AM    MDD with persistant bereavement complex  Assessment & Plan  · Initially thought to be secondary to adverse medication side effects with up titration of her multiple antidepressants recently  Abilify was discontinued earlier on in the admission  Initially she had improvement in her mentation though continues to have intermittent episodes of obtundation without a clear organic cause  Infectious and metabolic work-up essentially unrevealing  · Would appreciate behavioral health reevaluate patient this admission and Monday, 3/6/2023  · Appreciate neurology recs  EEG without epileptiform discharges though grossly abnormal suggestive of diffuse cerebral dysfunction  · Continue supportive cares, advance diet as tolerated: Dysphagia 1 Pureed 1 ; Honey Thick Liquid with close monitoring  · Maintain aspiration precautions  · Does not seem to have AMS, just periods of time where does not want to interact--> will follow exam with encouragement but may not open eyes with sternal rub  · Palliative: Patient does not have palliative or hospice diagnosis at this time  · Psych:   · Not an ect candidate d/t aneurysm    · 3/7 Multidisciplinary meeting w/ psych and SLIM w/ son at bedside  Ritalin side effects discussed and explained that we will try to mitigate side effects however if patient should become unstable she may not have reserve to tolerate which may hasten her demise  The alternative is to watch her attempt to take PO meds and intermittently eat however intake is incompatible long term with meaningful recovery  Family agreeable to attempt     · Start Ritalin 3/8, monitor BP and HR closely, so far tolerating well and improvement noticeable  · Family meeting today regarding discharge planning  · Plan to dc home w/ [facility] vs Post acute rehab and Ritalin? Per psych           * Failure to thrive in adult  Assessment & Plan  · Suspect large component due to profound grief and depression  · Continue supportive cares  · Oral intake improved from admission      Abdominal distention/back pain  Assessment & Plan  2/2 constipation s/p multipple bowel movements, still needs more bowel movements  Sendanitza S    Urinary retention  Assessment & Plan  Continue with urinary retention protocol    Antiphospholipid antibody syndrome (HCC)  Assessment & Plan  · On coumadin  Goal INR 2-3  INR initially supratherapeutic, coumadin held  No active evidence of bleeding  · Hold Coumadin 5mg daily  INR is subtherapeutic  · Daily INR    Acute respiratory failure (HCC)-resolved as of 3/12/2023  Assessment & Plan  Had pulm edema d/t IVF - requiring 02 and pt SOB, CXr showing pulm edema,  resolved w/ diuresis   Now requiring  likely 2/2 to atlectasis now (Looks to have right-sided basal atelectasis) 2/2 to mucus plugging  Pt unable to tolerate IVF or diuresis well  Due to patient's MDD not participating in incentive spirometer/flutter / vest therapy she is too frail   On RA on 3/12, 93%--> Resolved    Atrial fibrillation (HCC)  Assessment & Plan  · On diltiazem, digoxin, and warfarin at home  · See above regarding AC    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HCC)  Assessment & Plan  · Incidental finding on CTA head and neck/  CTA head and neck with and without contrast 2/27/2023:  · "No acute intracranial abnormality  Moderate chronic microangiopathy  Negative CTA head and neck for large vessel occlusion, dissection, or high-grade stenosis  · 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 projecting laterally in between left posterior cerebral artery and left superior cerebellar- repeat CT stable when developed AMS  · Outpatient referral for Neurosurgery follow-up  Will provide on DC        Abnormal CT of the chest  Assessment & Plan  · PE Study with CT abdomen & pelvis with contrast 2023:   · There is a new right basilar density with associated few branching nodular density in the right middle lobe and a 5 mm nodular density superior segment left lower lobe, indeterminate  These may be on inflammatory/infectious basis /pneumonia   · Short interval follow-up chest CT at 3 months suggested to demonstrate resolution  Thyroid nodule  Assessment & Plan  · CTA head and neck with and without contrast 2023:  · Incidental thyroid nodule(s) for which nonemergent thyroid ultrasound is recommended "  · Outpatient f/u with pcp for non-emergent thyroid u/s    Hypothyroidism  Assessment & Plan  · TSH wnl  · Cont home levothyroxine        VTE Pharmacologic Prophylaxis: VTE Score: 6 High Risk (Score >/= 5) - Pharmacological DVT Prophylaxis Ordered: warfarin (Coumadin)  Sequential Compression Devices Ordered  Patient Centered Rounds: I performed bedside rounds with nursing staff today  Discussions with Specialists or Other Care Team Provider: None    Education and Discussions with Family / Patient: Susie Rooney today  Current Length of Stay: 13 day(s)  Current Patient Status: Inpatient   Discharge Plan: Anticipate discharge in 24-48 hrs to rehab facility  Code Status: Level 2 - DNAR: but accepts endotracheal intubation    Subjective:   Pt wants to go to the bathroom  Objective:     Vitals:   Temp (24hrs), Av 4 °F (36 9 °C), Min:97 9 °F (36 6 °C), Max:99 °F (37 2 °C)    Temp:  [97 9 °F (36 6 °C)-99 °F (37 2 °C)] 99 °F (37 2 °C)  HR:  [63-77] 63  Resp:  [18] 18  BP: (127-168)/(53-69) 168/69  SpO2:  [93 %-95 %] 95 %  Body mass index is 19 73 kg/m²  Input and Output Summary (last 24 hours):      Intake/Output Summary (Last 24 hours) at 3/12/2023 0911  Last data filed at 3/12/2023 0756  Gross per 24 hour   Intake 75 ml   Output 694 ml   Net -619 ml       Physical Exam:   Physical Exam  Vitals and nursing note reviewed  Constitutional:       General: She is not in acute distress  Appearance: She is well-developed  Comments: Flat affect and effect  Nehemias to ambulate with 1 person assiatnce to the bathroom after teling me she needs to go to the bathroom - I have not seen this during my tie caring for his- significant improvement on ritalin   HENT:      Head: Normocephalic and atraumatic  Eyes:      Conjunctiva/sclera: Conjunctivae normal    Cardiovascular:      Rate and Rhythm: Normal rate  Neurological:      Mental Status: She is alert  Additional Data:     Labs:  Results from last 7 days   Lab Units 03/11/23  0610   WBC Thousand/uL 7 02   HEMOGLOBIN g/dL 10 7*   HEMATOCRIT % 33 6*   PLATELETS Thousands/uL 240   NEUTROS PCT % 86*   LYMPHS PCT % 9*   MONOS PCT % 4   EOS PCT % 1     Results from last 7 days   Lab Units 03/11/23  0610   SODIUM mmol/L 140   POTASSIUM mmol/L 4 3   CHLORIDE mmol/L 101   CO2 mmol/L 37*   BUN mg/dL 29*   CREATININE mg/dL 0 51*   ANION GAP mmol/L 2*   CALCIUM mg/dL 8 7   ALBUMIN g/dL 2 8*   TOTAL BILIRUBIN mg/dL 0 46   ALK PHOS U/L 70   ALT U/L 19   AST U/L 20   GLUCOSE RANDOM mg/dL 96     Results from last 7 days   Lab Units 03/12/23  0453   INR  2 24*             Results from last 7 days   Lab Units 03/07/23  0451   PROCALCITONIN ng/ml 0 15       Lines/Drains:  Invasive Devices     Peripheral Intravenous Line  Duration           Long-Dwell Peripheral IV (Midline) 34/07/12 Left Basilic 5 days                      Imaging: No pertinent imaging reviewed      Recent Cultures (last 7 days):         Last 24 Hours Medication List:   Current Facility-Administered Medications   Medication Dose Route Frequency Provider Last Rate   • acetaminophen  975 mg Oral Q8H [facility] Adán Sol MD     • albuterol  2 puff Inhalation Q4H PRN Adán Sol MD     • bisacodyl  10 mg Rectal Daily Teri Garcia MD     • diltiazem  240 mg Oral Daily Agnes Rivera MD     • Fluticasone Furoate-Vilanterol  1 puff Inhalation Daily Mickey Zuñiga MD     • hydrALAZINE  10 mg Intravenous Q6H PRN Heidi Carranza MD     • levothyroxine  25 mcg Oral Daily Mickey Zuñiga MD     • lidocaine  1 patch Topical Daily Mickey Zuñiga MD     • losartan  100 mg Oral Daily Heidi Carranza MD     • methylphenidate  5 mg Oral BID before breakfast/lunch Mallory Green DO     • mirtazapine  7 5 mg Oral HS Barbara De Santiago DO     • senna-docusate sodium  1 tablet Oral HS Heidi Carranza MD     • sertraline  50 mg Oral Daily Mickey Zuñiga MD     • spironolactone  25 mg Oral Daily Heidi Carranza MD     • umeclidinium  1 puff Inhalation Daily Mickey Zuñiga MD     • warfarin  2 mg Oral Daily (warfarin) Heidi Carranza MD          Today, Patient Was Seen By: Heidi Carranza MD    **Please Note: This note may have been constructed using a voice recognition system  **

## 2023-03-12 NOTE — ASSESSMENT & PLAN NOTE
· CTA head and neck with and without contrast 2/27/2023: Incidental thyroid nodule(s) for which nonemergent thyroid ultrasound is recommended    · Outpatient f/u with pcp for non-emergent thyroid u/s

## 2023-03-12 NOTE — ASSESSMENT & PLAN NOTE
· On coumadin  Goal INR 2-3  INR initially supratherapeutic  No active evidence of bleeding      · Continue Coumadin at 2 mg daily and monitor INR, adjust Warfarin dose per level

## 2023-03-12 NOTE — ASSESSMENT & PLAN NOTE
· POA- Closed eyes, minimally interactice demeaner and poor appetite   · Appreciate neurology recs  EEG without epileptiform discharges though grossly abnormal suggestive of diffuse cerebral dysfunction  · Palliative: Patient does not have palliative or hospice diagnosis at this time  · Not an ect candidate d/t aneurysm    · Started Ritalin on 3/8, with clinical improvement  · Patient to be discharged to rehab today  Per psych, discharge on Ritalin 5 mg twice twice daily with breakfast and lunch for 3 days and after that, 5 mg daily until mood improves  Will need to be reevaluation by physician at the facility

## 2023-03-12 NOTE — PLAN OF CARE
Problem: MOBILITY - ADULT  Goal: Maintain or return to baseline ADL function  Description: INTERVENTIONS:  -  Assess patient's ability to carry out ADLs; assess patient's baseline for ADL function and identify physical deficits which impact ability to perform ADLs (bathing, care of mouth/teeth, toileting, grooming, dressing, etc )  - Assess/evaluate cause of self-care deficits   - Assess range of motion  - Assess patient's mobility; develop plan if impaired  - Assess patient's need for assistive devices and provide as appropriate  - Encourage maximum independence but intervene and supervise when necessary  - Involve family in performance of ADLs  - Assess for home care needs following discharge   - Consider OT consult to assist with ADL evaluation and planning for discharge  - Provide patient education as appropriate  Outcome: Progressing  Goal: Maintains/Returns to pre admission functional level  Description: INTERVENTIONS:  - Perform BMAT or MOVE assessment daily    - Set and communicate daily mobility goal to care team and patient/family/caregiver     - Collaborate with rehabilitation services on mobility goals if consulted  - Out of bed for toileting  - Record patient progress and toleration of activity level   Outcome: Progressing     Problem: Prexisting or High Potential for Compromised Skin Integrity  Goal: Skin integrity is maintained or improved  Description: INTERVENTIONS:  - Identify patients at risk for skin breakdown  - Assess and monitor skin integrity  - Assess and monitor nutrition and hydration status  - Monitor labs   - Assess for incontinence   - Turn and reposition patient  - Assist with mobility/ambulation  - Relieve pressure over bony prominences  - Avoid friction and shearing  - Provide appropriate hygiene as needed including keeping skin clean and dry  - Evaluate need for skin moisturizer/barrier cream  - Collaborate with interdisciplinary team   - Patient/family teaching  - Consider wound care consult   Outcome: Progressing     Problem: NEUROSENSORY - ADULT  Goal: Achieves stable or improved neurological status  Description: INTERVENTIONS  - Monitor and report changes in neurological status  - Monitor vital signs such as temperature, blood pressure, glucose, and any other labs ordered   - Initiate measures to prevent increased intracranial pressure  - Monitor for seizure activity and implement precautions if appropriate      Outcome: Progressing  Goal: Achieves maximal functionality and self care  Description: INTERVENTIONS  - Monitor swallowing and airway patency with patient fatigue and changes in neurological status  - Encourage and assist patient to increase activity and self care  - Encourage visually impaired, hearing impaired and aphasic patients to use assistive/communication devices  Outcome: Progressing     Problem: RESPIRATORY - ADULT  Goal: Achieves optimal ventilation and oxygenation  Description: INTERVENTIONS:  - Assess for changes in respiratory status  - Assess for changes in mentation and behavior  - Position to facilitate oxygenation and minimize respiratory effort  - Oxygen administered by appropriate delivery if ordered  - Initiate smoking cessation education as indicated  - Encourage broncho-pulmonary hygiene including cough, deep breathe, Incentive Spirometry  - Assess the need for suctioning and aspirate as needed  - Assess and instruct to report SOB or any respiratory difficulty  - Respiratory Therapy support as indicated  Outcome: Progressing     Problem: Nutrition/Hydration-ADULT  Goal: Nutrient/Hydration intake appropriate for improving, restoring or maintaining nutritional needs  Description: Monitor and assess patient's nutrition/hydration status for malnutrition  Collaborate with interdisciplinary team and initiate plan and interventions as ordered  Monitor patient's weight and dietary intake as ordered or per policy   Utilize nutrition screening tool and intervene as necessary  Determine patient's food preferences and provide high-protein, high-caloric foods as appropriate       INTERVENTIONS:  - Monitor oral intake, urinary output, labs, and treatment plans  - Assess nutrition and hydration status and recommend course of action  - Evaluate amount of meals eaten  - Assist patient with eating if necessary   - Allow adequate time for meals  - Recommend/ encourage appropriate diets, oral nutritional supplements, and vitamin/mineral supplements  - Order, calculate, and assess calorie counts as needed  - Recommend, monitor, and adjust tube feedings and TPN/PPN based on assessed needs  - Assess need for intravenous fluids  - Provide specific nutrition/hydration education as appropriate  - Include patient/family/caregiver in decisions related to nutrition  Outcome: Progressing

## 2023-03-12 NOTE — ASSESSMENT & PLAN NOTE
· Suspect large component due to profound grief and depression  · Continue supportive care  · Oral intake improved, continue Remeron

## 2023-03-12 NOTE — QUICK NOTE
Had spoken to both sons and daughter at bedside  Explained that we may or may not continue Ritalin depending on what psychiatry recommends tomorrow after their clinical evaluation  Even with the Ritalin I do not know how she will continue to respond  If she does not respond to Ritalin she is not a good candidate for PEG tube for feeding or TPN  (Did say that 1 additional medication may be tried (by which I was referring to ketamine) however this would be evaluated at the next admission and may or may not be done  She may stay at this baseline where she is flat affect and walking with walker with 1 times assistance or may grasp back into her state where she was not eating, not walking, not interactive, not opening eyes again at which point despite medication she may not improve  She does not meet palliative diagnosis so would not be able to follow-up with them at this point  I have asked them to prepare for this inevitability as we have seen that she does regress to this during the hospital stays  I have also informed them that she is at risk for aspiration pneumonia with mucous plugging as we have seen her have a right lower lobe bronchus mucous plug -currently on room air with no conversational dyspnea or respiratory distress  At this time they want to pursue acute rehab with discharge to SNF and then possibly to home as they could not not handle her acuity of care at this time

## 2023-03-12 NOTE — ASSESSMENT & PLAN NOTE
· Initially thought to be secondary to adverse medication side effects with up titration of her multiple antidepressants recently  Abilify was discontinued earlier on in the admission  Initially she had improvement in her mentation though continues to have intermittent episodes of obtundation without a clear organic cause  Infectious and metabolic work-up essentially unrevealing  · Would appreciate behavioral health reevaluate patient this admission and Monday, 3/6/2023  · Appreciate neurology recs  EEG without epileptiform discharges though grossly abnormal suggestive of diffuse cerebral dysfunction  · Continue supportive cares, advance diet as tolerated: Dysphagia 1 Pureed 1 ; Honey Thick Liquid with close monitoring  · Maintain aspiration precautions  · Does not seem to have AMS, just periods of time where does not want to interact--> will follow exam with encouragement but may not open eyes with sternal rub  · Palliative: Patient does not have palliative or hospice diagnosis at this time  · Psych:   · Not an ect candidate d/t aneurysm    · 3/7 Multidisciplinary meeting w/ psych and SLIM w/ son at bedside  Ritalin side effects discussed and explained that we will try to mitigate side effects however if patient should become unstable she may not have reserve to tolerate which may hasten her demise  The alternative is to watch her attempt to take PO meds and intermittently eat however intake is incompatible long term with meaningful recovery  Family agreeable to attempt  · Start Ritalin 3/8, monitor BP and HR closely, so far tolerating well and improvement noticeable  · Family meeting today regarding discharge planning  · Plan to dc home w/ [address] vs Post acute rehab and Ritalin?  Per psych

## 2023-03-12 NOTE — ASSESSMENT & PLAN NOTE
· Has hand  · Voiding trial as ambulation and alertness continue to improve Need for prophylactic measure

## 2023-03-12 NOTE — ASSESSMENT & PLAN NOTE
· Incidental finding on CTA head and neck/  CTA head and neck with and without contrast 2/27/2023:  · "No acute intracranial abnormality  Moderate chronic microangiopathy  Negative CTA head and neck for large vessel occlusion, dissection, or high-grade stenosis  · 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 projecting laterally in between left posterior cerebral artery and left superior cerebellar- repeat CT stable when developed AMS  · Outpatient referral for Neurosurgery follow-up

## 2023-03-12 NOTE — PLAN OF CARE
Problem: MOBILITY - ADULT  Goal: Maintain or return to baseline ADL function  Description: INTERVENTIONS:  -  Assess patient's ability to carry out ADLs; assess patient's baseline for ADL function and identify physical deficits which impact ability to perform ADLs (bathing, care of mouth/teeth, toileting, grooming, dressing, etc )  - Assess/evaluate cause of self-care deficits   - Assess range of motion  - Assess patient's mobility; develop plan if impaired  - Assess patient's need for assistive devices and provide as appropriate  - Encourage maximum independence but intervene and supervise when necessary  - Involve family in performance of ADLs  - Assess for home care needs following discharge   - Consider OT consult to assist with ADL evaluation and planning for discharge  - Provide patient education as appropriate  Outcome: Progressing  Goal: Maintains/Returns to pre admission functional level  Description: INTERVENTIONS:  - Perform BMAT or MOVE assessment daily    - Set and communicate daily mobility goal to care team and patient/family/caregiver  - Collaborate with rehabilitation services on mobility goals if consulted  - Perform Range of Motion 3 times a day  - Reposition patient every 2 hours    - Dangle patient 3 times a day  - Stand patient 3 times a day  - Ambulate patient 3 times a day  - Out of bed to chair 3 times a day   - Out of bed for meals 3 times a day  - Out of bed for toileting  - Record patient progress and toleration of activity level   Outcome: Progressing     Problem: Prexisting or High Potential for Compromised Skin Integrity  Goal: Skin integrity is maintained or improved  Description: INTERVENTIONS:  - Identify patients at risk for skin breakdown  - Assess and monitor skin integrity  - Assess and monitor nutrition and hydration status  - Monitor labs   - Assess for incontinence   - Turn and reposition patient  - Assist with mobility/ambulation  - Relieve pressure over bony prominences  - Avoid friction and shearing  - Provide appropriate hygiene as needed including keeping skin clean and dry  - Evaluate need for skin moisturizer/barrier cream  - Collaborate with interdisciplinary team   - Patient/family teaching  - Consider wound care consult   Outcome: Progressing     Problem: NEUROSENSORY - ADULT  Goal: Achieves stable or improved neurological status  Description: INTERVENTIONS  - Monitor and report changes in neurological status  - Monitor vital signs such as temperature, blood pressure, glucose, and any other labs ordered   - Initiate measures to prevent increased intracranial pressure  - Monitor for seizure activity and implement precautions if appropriate      Outcome: Progressing  Goal: Achieves maximal functionality and self care  Description: INTERVENTIONS  - Monitor swallowing and airway patency with patient fatigue and changes in neurological status  - Encourage and assist patient to increase activity and self care     - Encourage visually impaired, hearing impaired and aphasic patients to use assistive/communication devices  Outcome: Progressing     Problem: RESPIRATORY - ADULT  Goal: Achieves optimal ventilation and oxygenation  Description: INTERVENTIONS:  - Assess for changes in respiratory status  - Assess for changes in mentation and behavior  - Position to facilitate oxygenation and minimize respiratory effort  - Oxygen administered by appropriate delivery if ordered  - Initiate smoking cessation education as indicated  - Encourage broncho-pulmonary hygiene including cough, deep breathe, Incentive Spirometry  - Assess the need for suctioning and aspirate as needed  - Assess and instruct to report SOB or any respiratory difficulty  - Respiratory Therapy support as indicated  Outcome: Progressing     Problem: Nutrition/Hydration-ADULT  Goal: Nutrient/Hydration intake appropriate for improving, restoring or maintaining nutritional needs  Description: Monitor and assess patient's nutrition/hydration status for malnutrition  Collaborate with interdisciplinary team and initiate plan and interventions as ordered  Monitor patient's weight and dietary intake as ordered or per policy  Utilize nutrition screening tool and intervene as necessary  Determine patient's food preferences and provide high-protein, high-caloric foods as appropriate       INTERVENTIONS:  - Monitor oral intake, urinary output, labs, and treatment plans  - Assess nutrition and hydration status and recommend course of action  - Evaluate amount of meals eaten  - Assist patient with eating if necessary   - Allow adequate time for meals  - Recommend/ encourage appropriate diets, oral nutritional supplements, and vitamin/mineral supplements  - Order, calculate, and assess calorie counts as needed  - Assess need for intravenous fluids  - Provide specific nutrition/hydration education as appropriate  - Include patient/family/caregiver in decisions related to nutrition  Outcome: Progressing

## 2023-03-12 NOTE — ASSESSMENT & PLAN NOTE
Had pulm edema d/t IVF - requiring 02 and pt SOB, CXr showing pulm edema,  resolved w/ diuresis   Now requiring  likely 2/2 to atlectasis now (Looks to have right-sided basal atelectasis) 2/2 to mucus plugging  Pt unable to tolerate IVF or diuresis well  Due to patient's MDD not participating in incentive spirometer/flutter / vest therapy she is too frail   On RA on 3/12, 93%--> Resolved

## 2023-03-13 ENCOUNTER — EPISODE CHANGES (OUTPATIENT)
Dept: CASE MANAGEMENT | Facility: OTHER | Age: 83
End: 2023-03-13

## 2023-03-13 ENCOUNTER — PATIENT OUTREACH (OUTPATIENT)
Dept: FAMILY MEDICINE CLINIC | Facility: CLINIC | Age: 83
End: 2023-03-13

## 2023-03-13 LAB
ANION GAP SERPL CALCULATED.3IONS-SCNC: 2 MMOL/L (ref 4–13)
BUN SERPL-MCNC: 27 MG/DL (ref 5–25)
CALCIUM SERPL-MCNC: 8.5 MG/DL (ref 8.4–10.2)
CHLORIDE SERPL-SCNC: 103 MMOL/L (ref 96–108)
CO2 SERPL-SCNC: 34 MMOL/L (ref 21–32)
CREAT SERPL-MCNC: 0.53 MG/DL (ref 0.6–1.3)
ERYTHROCYTE [DISTWIDTH] IN BLOOD BY AUTOMATED COUNT: 13.6 % (ref 11.6–15.1)
GFR SERPL CREATININE-BSD FRML MDRD: 87 ML/MIN/1.73SQ M
GLUCOSE SERPL-MCNC: 82 MG/DL (ref 65–140)
HCT VFR BLD AUTO: 31.8 % (ref 34.8–46.1)
HGB BLD-MCNC: 10.4 G/DL (ref 11.5–15.4)
INR PPP: 2.28 (ref 0.84–1.19)
MAGNESIUM SERPL-MCNC: 2.1 MG/DL (ref 1.9–2.7)
MCH RBC QN AUTO: 33.4 PG (ref 26.8–34.3)
MCHC RBC AUTO-ENTMCNC: 32.7 G/DL (ref 31.4–37.4)
MCV RBC AUTO: 102 FL (ref 82–98)
PLATELET # BLD AUTO: 244 THOUSANDS/UL (ref 149–390)
PMV BLD AUTO: 8.7 FL (ref 8.9–12.7)
POTASSIUM SERPL-SCNC: 4.4 MMOL/L (ref 3.5–5.3)
PROTHROMBIN TIME: 25.4 SECONDS (ref 11.6–14.5)
RBC # BLD AUTO: 3.11 MILLION/UL (ref 3.81–5.12)
SODIUM SERPL-SCNC: 139 MMOL/L (ref 135–147)
WBC # BLD AUTO: 6.02 THOUSAND/UL (ref 4.31–10.16)

## 2023-03-13 RX ORDER — METHYLPHENIDATE HYDROCHLORIDE 5 MG/1
TABLET ORAL
Qty: 5 TABLET | Refills: 0 | Status: SHIPPED | OUTPATIENT
Start: 2023-03-13 | End: 2023-03-13 | Stop reason: SDUPTHER

## 2023-03-13 RX ORDER — AMOXICILLIN 250 MG
1 CAPSULE ORAL
Refills: 0
Start: 2023-03-13

## 2023-03-13 RX ORDER — METHYLPHENIDATE HYDROCHLORIDE 5 MG/1
TABLET ORAL
Qty: 5 TABLET | Refills: 0 | Status: SHIPPED | OUTPATIENT
Start: 2023-03-13

## 2023-03-13 RX ORDER — ACETAMINOPHEN 325 MG/1
975 TABLET ORAL EVERY 8 HOURS SCHEDULED
Refills: 0
Start: 2023-03-13

## 2023-03-13 RX ORDER — SPIRONOLACTONE 25 MG/1
25 TABLET ORAL DAILY
Refills: 0
Start: 2023-03-14

## 2023-03-13 RX ORDER — WARFARIN SODIUM 2 MG/1
TABLET ORAL
Refills: 0
Start: 2023-03-13

## 2023-03-13 RX ORDER — METHYLPHENIDATE HYDROCHLORIDE 5 MG/1
5 TABLET ORAL DAILY
Qty: 20 TABLET | Refills: 0
Start: 2023-03-17 | End: 2023-03-13 | Stop reason: SDUPTHER

## 2023-03-13 RX ORDER — METHYLPHENIDATE HYDROCHLORIDE 5 MG/1
5 TABLET ORAL
Qty: 6 TABLET | Refills: 0
Start: 2023-03-14 | End: 2023-03-13

## 2023-03-13 RX ADMIN — LEVOTHYROXINE SODIUM 25 MCG: 25 TABLET ORAL at 09:05

## 2023-03-13 RX ADMIN — MIRTAZAPINE 7.5 MG: 15 TABLET, FILM COATED ORAL at 20:11

## 2023-03-13 RX ADMIN — ALBUTEROL SULFATE 2 PUFF: 90 AEROSOL, METERED RESPIRATORY (INHALATION) at 09:17

## 2023-03-13 RX ADMIN — ACETAMINOPHEN 975 MG: 325 TABLET ORAL at 20:11

## 2023-03-13 RX ADMIN — UMECLIDINIUM 1 PUFF: 62.5 AEROSOL, POWDER ORAL at 13:41

## 2023-03-13 RX ADMIN — ACETAMINOPHEN 975 MG: 325 TABLET ORAL at 05:19

## 2023-03-13 RX ADMIN — LOSARTAN POTASSIUM 100 MG: 50 TABLET, FILM COATED ORAL at 09:05

## 2023-03-13 RX ADMIN — SPIRONOLACTONE 25 MG: 25 TABLET ORAL at 09:05

## 2023-03-13 RX ADMIN — Medication 10 MG: at 09:06

## 2023-03-13 RX ADMIN — METHYLPHENIDATE HYDROCHLORIDE 5 MG: 10 TABLET ORAL at 09:05

## 2023-03-13 RX ADMIN — ACETAMINOPHEN 975 MG: 325 TABLET ORAL at 13:40

## 2023-03-13 RX ADMIN — WARFARIN SODIUM 2 MG: 2 TABLET ORAL at 20:17

## 2023-03-13 RX ADMIN — SERTRALINE HYDROCHLORIDE 50 MG: 50 TABLET ORAL at 09:05

## 2023-03-13 RX ADMIN — FLUTICASONE FUROATE AND VILANTEROL TRIFENATATE 1 PUFF: 100; 25 POWDER RESPIRATORY (INHALATION) at 09:17

## 2023-03-13 RX ADMIN — DILTIAZEM HYDROCHLORIDE 240 MG: 240 CAPSULE, COATED, EXTENDED RELEASE ORAL at 09:05

## 2023-03-13 RX ADMIN — METHYLPHENIDATE HYDROCHLORIDE 5 MG: 10 TABLET ORAL at 13:40

## 2023-03-13 RX ADMIN — SENNOSIDES AND DOCUSATE SODIUM 1 TABLET: 8.6; 5 TABLET ORAL at 20:11

## 2023-03-13 RX ADMIN — LIDOCAINE 5% 1 PATCH: 700 PATCH TOPICAL at 09:18

## 2023-03-13 NOTE — OCCUPATIONAL THERAPY NOTE
Occupational Therapy Progress Note     Patient Name: Keith Lewis  [id number] Date: 3/13/2023  Problem List  Principal Problem:    Failure to thrive in adult  Active Problems:    Antiphospholipid antibody syndrome (HCC)    Atrial fibrillation (HCC)    Hypothyroidism    MDD with persistant bereavement complex    Abnormal CT of the chest    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HCC)    Thyroid nodule    Urinary retention    Abdominal distention/back pain       03/13/23 1303   OT Last Visit   OT Visit Date 03/13/23  (Monday)   Note Type   Note Type Treatment   Pain Assessment   Pain Assessment Tool 0-10   Pain Score No Pain   Restrictions/Precautions   Weight Bearing Precautions Per Order No   Other Precautions Cognitive; Chair Alarm; Bed Alarm; Fall Risk  ([place] on room air)   Lifestyle   Reciprocal Relationships Supportive sons / family   ADL   Where Assessed Edge of bed  (vs OOB in chair at end of session)   Eating Assistance 5  Supervision/Setup   Eating Deficit Setup; Increased time to complete; Other (Comment)  (per ST, April upgrade diet w/ thin liquids)   Eating Comments able to manage water and feed self cookie   Grooming Assistance 2  Maximal Assistance   Grooming Deficit Setup;Brushing hair; Other (Comment)  (wash hair using shower cap  "My fingers have no umph")   Grooming Comments seated OOB in chair to wash/ comb hair using shower cap   UB Dressing Assistance 4  Minimal Assistance   UB Dressing Deficit Setup;Pull around back; Fasteners;Verbal cueing;Supervision/safety; Increased time to complete   Toileting Assistance    (denied need to void)   Toileting Comments Pt reports aware when she needs to void but can't always make it to the bathroom / commode   Bed Mobility   Supine to Sit 5  Supervision   Additional items Assist x 1; Increased time required;HOB elevated; Bedrails  (to pt's R)   Sit to Supine Unable to assess   Additional Comments Pt seated OOB in chair at end of session w/ needs met, call bell in reach and chair alarm activated   Transfers   Sit to Stand 4  Minimal assistance   Additional items Assist x 1; Increased time required;Verbal cues;Armrests; Bedrails   Stand to Sit 4  Minimal assistance   Additional items Assist x 1; Increased time required;Verbal cues; Bedrails;Armrests   Functional Mobility   Functional Mobility 4  Minimal assistance   Additional Comments engaged in functional mobility w/ min A using RW   Additional items Rolling walker   Subjective   Subjective "I am weak"   Cognition   Overall Cognitive Status Impaired   Arousal/Participation Alert; Cooperative   Attention Attends with cues to redirect   Orientation Level Oriented to person;Oriented to place   Memory Decreased short term memory;Decreased recall of recent events   Following Commands Follows one step commands with increased time or repetition   Comments Identified pt by full name and birthdate  Alert and able to follow directions, communiucate wants / needs w/ + time  More alert and attentive this afternoon   Activity Tolerance   Activity Tolerance Patient limited by fatigue   Medical Staff Made Aware spoke w/ Pete COTA pre / post tx session  Care coordination w/ ST, April   Assessment   Assessment Pt seen for skilled OT tx session day 4 from [id number] focusing on activity engagement  Pt agreeable and motivated to participate w/ encouragement  Pt demonstrated improved activity tolerance  Pt more alert and engaged in functional mobility using RW farther distance  Pt completed bed mobility w/ S  Pt engaged in grooming seated OOB in chair  Pt continues to demonstrate progress in OT towards goals established  Goals will be extended to 3/20/23  Continue to recommend post acute rehab when medically stable for dishcarge from acute care  Will continue to follow   Plan   Treatment Interventions ADL retraining;Functional transfer training; Endurance training;Cognitive reorientation;Patient/family training;Equipment evaluation/education; Compensatory technique education;Continued evaluation; Activityengagement; Energy conservation   Goal Expiration Date 03/20/23   OT Treatment Day 4  (Monday)   OT Frequency 3-5x/wk   Recommendation   OT Discharge Recommendation Post acute rehabilitation services   AM-[facility] Daily Activity Inpatient   Lower Body Dressing 2   Bathing 2   Toileting 2   Upper Body Dressing 3   Grooming 3   Eating 3   Daily Activity Raw Score 15   Daily Activity Standardized Score (Calc for Raw Score >=11) 34 69   AM-[facility] Applied Cognition Inpatient   Following a Speech/Presentation 2   Understanding Ordinary Conversation 3   Taking Medications 1   Remembering Where Things Are Placed or Put Away 2   Remembering List of 4-5 Errands 2   Taking Care of Complicated Tasks 1   Applied Cognition Raw Score 11   Applied Cognition Standardized Score 27 03   Barthel Index   Feeding 5   Bathing 0   Grooming Score 0   Dressing Score 5   Bladder Score 5   Bowels Score 5   Toilet Use Score 5   Transfers (Bed/Chair) Score 10   Mobility (Level Surface) Score 0   Stairs Score 0   Barthel Index Score 35   Modified [place] Scale   Modified [place] Scale 4        The patient's raw score on the AM-[facility] Daily Activity Inpatient Short Form is 15  A raw score of less than 19 suggests the patient may benefit from discharge to post-acute rehabilitation services  Please refer to the recommendation of the Occupational Therapist for safe discharge planning      MARLYS Jean/L

## 2023-03-13 NOTE — PROGRESS NOTES
Progress Note - Behavioral Health   Jennifer Zarate 79 y o  female MRN: [id number]  Unit/Bed#: S -01 Encounter: [id number]    Assessment    Principal Psychiatric Problem:  1  Major depressive disorder, differential: persistent complex bereavement disorder  2  Unspecified anxiety disorder, differential: generalized anxiety disorder, anxiety secondary to other medical condition  3  Tobacco use disorder  4  Failure to thrive in adult    Principal Problem:    Failure to thrive in adult  Active Problems:    Antiphospholipid antibody syndrome (HCC)    Atrial fibrillation (HCC)    Hypothyroidism    MDD with persistant bereavement complex    Abnormal CT of the chest    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HCC)    Thyroid nodule    Urinary retention    Abdominal distention/back pain      Plan  Discussed with primary team as follows:  1  Recommend no current changes to medication regimen at this time  Continue the following:  a  Remeron 7 5mg HS for appetite, sleep, mood  b  Zoloft 50mg daily for mood  a  Continue Ritalin 5mg twice daily at 0800 and 1130 for augmentation of antidepressants to address apathetic and neurovegetative symptoms  i  Will continue to monitor on medications for response and to determine appropriate duration of stimulant therapy, please confirm appropriate duration of therapy with psychiatric team upon discharge  ii  Continue to closely monitor HR and BP  2  Psychiatry will continue to follow as needed  Please reach out to on-call Psychiatry team via [employer] with any questions or concerns    ------------------------------------------------------------    Subjective:     Patient seen and evaluated for continuity of care  Patient awoke upon team entering room, able to appropriately respond to questions, and appears in no apparent distress   Based on comparison with previous evaluation by other members of consult service, patient is slightly improved and more reactive in affect compared to previous evaluations  She reports feeling "tired' today, but states her appetite, "was pretty good this morning " Patient's breakfast plate observed to be mostly empty, she was requesting water and assisted with drinking thickened water at the bedside at the time of evaluation  Patient denies any problems or discomfort  She reports feeling "scrunched up" in bed, but when asked if she would like to be repositioned, responded, "I better stay where I am for now " She denies any unmet needs  When seen later alongside attending physician, patient was working with speech therapy to determine appropriate diet  Patient was more awake and alert, eating and speaking appropriately  Family at the bedside  We discussed the current plan in terms of her medication regimen, and all questions were addressed  Psychiatric Review of Systems:  Behavior over the last 24 hours: improved  Sleep: unchanged  Appetite: improving  Medication side effects: none verbalized  ROS: Complete review of systems is negative except as noted above  Vital signs in last 24 hours:  Temp:  [97 4 °F (36 3 °C)-98 7 °F (37 1 °C)] 98 7 °F (37 1 °C)  HR:  [60-71] 63  Resp:  [18] 18  BP: (126-154)/(49-64) 152/59    Mental Status Exam:  Appearance:  alert, limited eye contact, appears stated age and appropriate grooming and hygiene   Behavior:  calm and cooperative, laying in bed   Motor: no abnormal movements   Speech:  spontaneous, soft and coherent   Mood:  "tired"   Affect:  constricted   Thought Process:  Organized, logical, goal-directed   Thought Content: no verbalized delusions or overt paranoia   Perceptual disturbances: no reported hallucinations and does not appear to be responding to internal stimuli at this time   Risk Potential: No active or passive suicidal or homicidal ideation was verbalized during interview   Cognition: cognition not formally tested   Insight:  Limited   Judgment: Limited     Current Medications:   All current medications have been reviewed  Current Facility-Administered Medications   Medication Dose Route Frequency Provider Last Rate   • acetaminophen  975 mg Oral Q8H [address] Nilton Domingo MD     • albuterol  2 puff Inhalation Q4H PRN Nilton Domingo MD     • bisacodyl  10 mg Rectal Daily Mary Lopez MD     • diltiazem  240 mg Oral Daily Al Love MD     • Fluticasone Furoate-Vilanterol  1 puff Inhalation Daily Al Love MD     • hydrALAZINE  10 mg Intravenous Q6H PRN Mary Lopez MD     • levothyroxine  25 mcg Oral Daily Al Love MD     • lidocaine  1 patch Topical Daily Al Love MD     • losartan  100 mg Oral Daily Mary Lopez MD     • methylphenidate  5 mg Oral BID before breakfast/lunch Aron Hsu DO     • mirtazapine  7 5 mg Oral HS Aron Hsu DO     • senna-docusate sodium  1 tablet Oral HS Mary Lopez MD     • sertraline  50 mg Oral Daily Al Love MD     • spironolactone  25 mg Oral Daily Mary Lopez MD     • umeclidinium  1 puff Inhalation Daily Al Love MD     • warfarin  2 mg Oral Daily (warfarin) Mary Lopez MD         Laboratory results:  I have personally reviewed all pertinent laboratory/tests results    Recent Results (from the past 48 hour(s))   Protime-INR    Collection Time: 03/12/23  4:53 AM   Result Value Ref Range    Protime 25 1 (H) 11 6 - 14 5 seconds    INR 2 24 (H) 0 84 - 1 19   Protime-INR    Collection Time: 03/13/23  5:19 AM   Result Value Ref Range    Protime 25 4 (H) 11 6 - 14 5 seconds    INR 2 28 (H) 0 84 - 1 19   Magnesium    Collection Time: 03/13/23  5:19 AM   Result Value Ref Range    Magnesium 2 1 1 9 - 2 7 mg/dL   Basic metabolic panel    Collection Time: 03/13/23  5:19 AM   Result Value Ref Range    Sodium 139 135 - 147 mmol/L    Potassium 4 4 3 5 - 5 3 mmol/L    Chloride 103 96 - 108 mmol/L    CO2 34 (H) 21 - 32 mmol/L    ANION GAP 2 (L) 4 - 13 mmol/L    BUN 27 (H) 5 - 25 mg/dL    Creatinine 0 53 (L) 0 60 - 1 30 mg/dL Glucose 82 65 - 140 mg/dL    Calcium 8 5 8 4 - 10 2 mg/dL    eGFR 87 ml/min/1 73sq m   CBC    Collection Time: 03/13/23  5:19 AM   Result Value Ref Range    WBC 6 02 4 31 - 10 16 Thousand/uL    RBC 3 11 (L) 3 81 - 5 12 Million/uL    Hemoglobin 10 4 (L) 11 5 - 15 4 g/dL    Hematocrit 31 8 (L) 34 8 - 46 1 %     (H) 82 - 98 fL    MCH 33 4 26 8 - 34 3 pg    MCHC 32 7 31 4 - 37 4 g/dL    RDW 13 6 11 6 - 15 1 %    Platelets 658 960 - 445 Thousands/uL    MPV 8 7 (L) 8 9 - 12 7 fL        Charity Rodriguez MD  Psychiatry Residency, PGY-II

## 2023-03-13 NOTE — PROGRESS NOTES
Chart review completed for the following sections:  • Recent Vital Signs  • Allergies/Contradictions  • Medication Review   • History   • SDOH   • Problem List  • Immunizations  • Past hospitalizations and major procedures, including surgery  • Significant past illnesses and treatment history including: History and Physical  • Relevant past medications related to the patient's condition    ALESSANDRO called pt, pt is eligible for Better [employer] program  ALESSANDRO reached pt voicemail and left message, requesting call back  Note routed to [facility]

## 2023-03-13 NOTE — PLAN OF CARE
Problem: OCCUPATIONAL THERAPY ADULT  Goal: Performs self-care activities at highest level of function for planned discharge setting  See evaluation for individualized goals  Description: Treatment Interventions: ADL retraining, Functional transfer training, UE strengthening/ROM, Endurance training, Cognitive reorientation, Patient/family training, Equipment evaluation/education, Compensatory technique education, Continued evaluation, Energy conservation, Activityengagement          See flowsheet documentation for full assessment, interventions and recommendations  Note: Limitation: Decreased ADL status, Decreased UE strength, Decreased cognition, Decreased endurance, Decreased high-level ADLs, Decreased self-care trans, Decreased fine motor control     Assessment: Pt seen for skilled OT tx session day 4 from [id number] focusing on activity engagement  Pt agreeable and motivated to participate w/ encouragement  Pt demonstrated improved activity tolerance  Pt more alert and engaged in functional mobility using RW farther distance  Pt completed bed mobility w/ S  Pt engaged in grooming seated OOB in chair  Pt continues to demonstrate progress in OT towards goals established  Goals will be extended to 3/20/23  Continue to recommend post acute rehab when medically stable for dishcarge from acute care   Will continue to follow     OT Discharge Recommendation: Post acute rehabilitation services

## 2023-03-13 NOTE — PROGRESS NOTES
Progress Note - Geriatric Medicine   Fawn Felty 79 y o  female MRN: [id number]  Unit/Bed#: S -01 Encounter: [id number]      Assessment/Plan:  Major depressive disorder with persistent bereavement contacts  · Recently became severely depressed and anxious after the loss of her brother-in-law  · Was prescribed Ativan by PCP, per her son it made her a zombie and that was weaned off  · Her PCP then put her on Zoloft 50 mg daily  · Last hospitalization patient was started on Remeron 7 5 mg nightly and Abilify 2 mg daily  · Was seen by psychiatry last hospitalization who agreed with starting Remeron and recommended Abilify 2 mg daily  · Abilify was discontinued this hospitalization due to episode of altered mental status and lethargy  · Psych was reconsulted and recommended continuing with Remeron 7 5 mg daily and patient was then started on Ritalin on 3/8/2022  · With some slow improvements  · Her appetite has improved, ate 100% of her breakfast and 80% of most of her meals yesterday  · Patient is waking up and being able to feed herself  · Her son reports that she has been participating in conversation more and more alert  · Per psych note May decrease dose of Ritalin tomorrow  · Psych following, appreciate recommendations    Failure to thrive  · Recent admission for failure to thrive  · Suspect large component due to profound grief and depression  · History  · Continue to encourage patient to eat and continue with dietary supplements  · Psych consulted and following for depression    Hypertension  · With elevated blood pressure since admission  · Blood pressure this morning 152/59  · Blood pressures have trended from 126/-69  · Currently on losartan 100 mg daily and spironolactone 25 mg daily  · Avoid hypotension  · Management per primary    Dysphagia  · Complained of trouble swallowing last admission  · Was on puréed diet with honey thick liquids which was upgraded to regular diet with thin liquids today  · Aspiration precautions  · Diet changes as per recommendations by speech therapy    Delirium precautions  Patient is alert oriented x4  At risk for delirium due to prolonged hospilization  Recommend delirium precautions  Maintain sleep-wake cycle, avoid nighttime interruptions  Provide adequate pain control  Avoid urinary retention and constipation  Provide frequent and early mobilization  Provide frequent redirection and reorientation as needed  Avoid medications that may worsen or precipitate delirium such as tramadol, benzodiazepines, anticholinergics, and Benadryl  Redirect unwanted behaviors as first-line therapy, avoid physical restraints as able to  · Continue to monitor      Ambulatory dysfunction  At a baseline ambulates with out assistive device  PT/OT following  Fall precautions  Out of bed as tolerated  Encourage early and frequent mobilization  Encourage adequate hydration and nutrition  Provide adequate pain management   Goal is short-term rehab  · Continue with PT/OT for continued strength and balance training        Subjective:   Patient is being seen for geriatric follow-up  Upon examination patient was lying bed, resting  She appeared comfortable and was in no acute distress  She was initially sleeping on exam, but arouses easily  She answer a few yes and no questions  Per nursing patient her appetite is improving and she ate most of her breakfast   She slept well last night  Does have some mild back pain  Per nursing no acute concerns or issues at this time  Review of Systems   Constitutional: Positive for activity change, appetite change and fatigue  Cardiovascular: Negative for chest pain and palpitations  Gastrointestinal: Negative for constipation and diarrhea  Genitourinary: Negative for difficulty urinating and frequency  Musculoskeletal: Positive for arthralgias, back pain and gait problem  Neurological: Positive for weakness     Psychiatric/Behavioral: Positive for dysphoric mood  Negative for confusion and sleep disturbance  The patient is nervous/anxious  Objective:     Vitals: Blood pressure 152/59, pulse 63, temperature 98 7 °F (37 1 °C), resp  rate 18, height 5' (1 524 m), weight 45 8 kg (101 lb), SpO2 91 %, not currently breastfeeding  ,Body mass index is 19 73 kg/m²  Intake/Output Summary (Last 24 hours) at 3/13/2023 1134  Last data filed at 3/13/2023 0520  Gross per 24 hour   Intake --   Output 350 ml   Net -350 ml       Current Medications: Reviewed    Physical Exam:   Physical Exam  Vitals reviewed  Constitutional:       General: She is not in acute distress  Appearance: She is well-developed  She is ill-appearing  Comments: Frail appearing   HENT:      Head: Normocephalic and atraumatic  Mouth/Throat:      Mouth: Mucous membranes are dry  Pharynx: No oropharyngeal exudate or posterior oropharyngeal erythema  Comments: Dry cracked lips  Cardiovascular:      Rate and Rhythm: Normal rate and regular rhythm  Heart sounds: No murmur heard  Pulmonary:      Effort: Pulmonary effort is normal  No respiratory distress  Breath sounds: Normal breath sounds  Abdominal:      General: Abdomen is flat  There is no distension  Palpations: Abdomen is soft  Tenderness: There is no abdominal tenderness  Musculoskeletal:         General: No swelling  Right lower leg: No edema  Left lower leg: No edema  Skin:     General: Skin is warm and dry  Capillary Refill: Capillary refill takes less than 2 seconds  Coloration: Skin is pale  Findings: Bruising present  Neurological:      Mental Status: She is easily aroused  She is lethargic  GCS: GCS eye subscore is 3  GCS verbal subscore is 1  GCS motor subscore is 6  Cranial Nerves: No cranial nerve deficit  Motor: Weakness present        Gait: Gait abnormal       Comments: Unable to assess orientation due to lethargy, was able to follow commands          Invasive Devices     Peripheral Intravenous Line  Duration           Long-Dwell Peripheral IV (Midline) 20/94/48 Left Basilic 6 days                Lab, Imaging and other studies: I have personally reviewed pertinent reports  Please note:  Voice-recognition software may have been used in the preparation of this document  Occasional wrong word or "sound-alike" substitutions may have occurred due to the inherent limitations of voice recognition software  Interpretation should be guided by context

## 2023-03-13 NOTE — QUICK NOTE
[facility]ist [facility] Attending Physician Attestation Note - Discharge    I have seen and examined Simin Hauser personally and have reviewed the medical record independently  I have reviewed the case with the resident physician including all assessments and the plan of care for each  I agree with the resident physician and offer the following addendum to the below statements by the resident physician:     Date Evaluated: 3/13/23    Pt seen and examined by me in morning  Was sleeping but woke up when called  Denied any specific complaints  Per nursing patient's oral intake is improving  Slept well last night  Patient reevaluated by psych today  Cleared for discharge  Patient to be discharged to rehab on Ritalin 5 mg twice twice daily with breakfast and lunch for 3 days and after that 5 mg daily until her mood improves  Will need to be reevaluated by physician at the facility  Discharge Statement:  I spent 35 minutes discharging the patient  This time was spent on the day of discharge  I had direct contact with the patient on the day of discharge  Greater than 50% of the total time was spent examining patient, answering all patient questions, arranging and discussing plan of care with patient as well as directly providing post-discharge instructions  Additional time then spent on discharge activities  For detailed history, assessment, and plan of care, please review the statements below by resident       Marie Mixon MD

## 2023-03-13 NOTE — CASE MANAGEMENT
Case Management Discharge Planning Note    Patient name Jay Hernandez  Location S /S -55 MRN [id number]  : 1940 Date 3/13/2023       Current Admission Date: 2023  Current Admission Diagnosis:Failure to thrive in adult   Patient Active Problem List    Diagnosis Date Noted   • Abdominal distention/back pain 2023   • Urinary retention 2023   • MDD with persistant bereavement complex 2023   • Abnormal CT of the chest 2023   • 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[address] ) 2023   • Thyroid nodule 2023   • Severe protein-calorie malnutrition ([facility] ) 2023   • Unspecified mood (affective) disorder ([facility] ) 2023   • Failure to thrive in adult 2023   • Pulmonary nodule 02/15/2023   • Urinary tract infection without hematuria 02/15/2023   • Onychomycosis 02/15/2023   • Essential hypertension 02/10/2023   • Anxiety 02/10/2023   • Hypothyroidism 2022   • Peripheral edema 2021   • Low back pain without sciatica 2021   • COPD (chronic obstructive pulmonary disease) ([address] ) 2017   • Hypertension 2017   • Hypercoagulable state ([address] ) 2017   • 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   • Vitamin D deficiency 2016   • Osteoporosis 2015   • Antiphospholipid antibody syndrome ([facility] ) 2012   • Atrial fibrillation ([facility] ) 2012   • Hereditary hemolytic anemia ([facility] ) 2012   • Asthma 2012      LOS (days): 14  Geometric Mean LOS (GMLOS) (days): 3 40  Days to GMLOS:-11     OBJECTIVE:  Risk of Unplanned Readmission Score: 26 17         Current admission status: Inpatient   Preferred Pharmacy:   [address] [place] [address] #[id number] Corrinne Bottom, [address] [address]  Phone: [phone] Fax: [address], [address] [address]  Phone: [phone] Fax: [phone]    Primary Care Provider: Kelly Thompson, MD    Primary Insurance: MEDICARE  Secondary Insurance: BLUE CROSS    DISCHARGE DETAILS:    Discharge planning discussed with[de-identified] Ernesto  [place] of Choice: Yes  Comments - Freedom of Choice: CM f/u with Ernesto as [facility] is the only other accepting facilty at this time  Emma Cueva prefers [facility]  CM informed Rivasyanna Bermudezt that [facility] has been notified that if a facility closer to [facility] has a bed open up, family prefers to transfer due to location to family  CM contacted family/caregiver?: Yes  Were Treatment Team discharge recommendations reviewed with patient/caregiver?: Yes  Did patient/caregiver verbalize understanding of patient care needs?: N/A- going to facility  Were patient/caregiver advised of the risks associated with not following Treatment Team discharge recommendations?: Yes    Contacts  Patient Contacts: Ernesto  Relationship to Patient[de-identified] Family  Contact Method: Phone  Phone Number: [phone]  Reason/Outcome: Continuity of Care, Discharge Planning    Other Referral/Resources/Interventions Provided:  Interventions: Transportation  Referral Comments: CM requested WCV for 630pm pendign confirmation of time   Charge Frederick informed    Accepting Facility Name, [address] : [facility]  Receiving Facility/Agency Phone Number: [phone]  Facility/Agency Fax Number: [phone]

## 2023-03-13 NOTE — CASE MANAGEMENT
Case Management Discharge Planning Note    Patient name Albert Marroquin  Location S /S -14 MRN [id number]  : 1940 Date 3/13/2023       Current Admission Date: 2023  Current Admission Diagnosis:Failure to thrive in adult   Patient Active Problem List    Diagnosis Date Noted   • Abdominal distention/back pain 2023   • Urinary retention 2023   • MDD with persistant bereavement complex 2023   • Abnormal CT of the chest 2023   • 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[facility] [facility] ) 2023   • Thyroid nodule 2023   • Severe protein-calorie malnutrition ([address] ) 2023   • Unspecified mood (affective) disorder ([facility] [facility] ) 2023   • Failure to thrive in adult 2023   • Pulmonary nodule 02/15/2023   • Urinary tract infection without hematuria 02/15/2023   • Onychomycosis 02/15/2023   • Essential hypertension 02/10/2023   • Anxiety 02/10/2023   • Hypothyroidism 2022   • Peripheral edema 2021   • Low back pain without sciatica 2021   • COPD (chronic obstructive pulmonary disease) ([address] ) 2017   • Hypertension 2017   • Hypercoagulable state ([facility] [address] [facility] ) 2017   • 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[facility] [facility] ) 2017   • Vitamin D deficiency 2016   • Osteoporosis 2015   • Antiphospholipid antibody syndrome ([facility] [facility] ) 2012   • Atrial fibrillation ([facility] [facility] ) 2012   • Hereditary hemolytic anemia ([facility] [address] [facility] ) 2012   • Asthma 2012      LOS (days): 14  Geometric Mean LOS (GMLOS) (days): 3 40  Days to GMLOS:-10 9     OBJECTIVE:  Risk of Unplanned Readmission Score: 26 11         Current admission status: Inpatient   Preferred Pharmacy:   [address] [place] [address] #[id number] [facility] [address] [facility] [address] [address]  Phone: [phone] Fax: [address], [address] [address]  Phone: [phone] Fax: [phone]    Primary Care Provider: Eduarda Nunez, MD    Primary Insurance: MEDICARE  Secondary Insurance: BLUE CROSS    DISCHARGE DETAILS:    Discharge planning discussed with[de-identified] Ernesto  [place] of Choice: Yes  Comments - Freedom of Choice: CM informed pt stable for DC to [facility]  CM TC pt's Ernesto to inform him  Per son, he was informed today that pt would not be leaving and that the family wants to now try for [facility], or facilities in the [address] [place] [address] with the exception of [facility] if possible  If no other accepting facility, family amenable to return to [facility]  CM placed referrals and requested [facility] Resident Dr Wilian Shine please reach out to Ernesto regarding medical stability  CM contacted family/caregiver?: Yes  Were Treatment Team discharge recommendations reviewed with patient/caregiver?: Yes  Did patient/caregiver verbalize understanding of patient care needs?: N/A- going to facility  Were patient/caregiver advised of the risks associated with not following Treatment Team discharge recommendations?: Yes    Contacts  Patient Contacts: Ernesto  Relationship to Patient[de-identified] Family  Contact Method: Phone  Phone Number: [phone]  Reason/Outcome: Continuity of Care, Discharge Planning    Other Referral/Resources/Interventions Provided:  Interventions: Short Term Rehab  Referral Comments: CM expanded referrals per request of Ernesto    CM informed [facility] that they are first choice and stable for DC

## 2023-03-13 NOTE — SPEECH THERAPY NOTE
Speech Language/Pathology    Speech/Language Pathology Progress Note    Patient Name: Ricardo Park  [id number] Date: 3/13/2023         Subjective:  Pt seen for dysphagia tx; pt on puree w/ HTL due to decreased JILLIAN/lethargy  However pt reportedly now more awake and alert  Objective: Son at bedside  Pt sleeping but awakened, eyes opened, able to answer questions and feed self  Pt self fed pureed foods, asking for regular ice water, does not like the thickened water  Pt trialed w/ NTL water by cup, good oral control, no overt s/s aspiration  Pt then trialed w/ thin juice by cup, 6 oz of water by cup and water by straw- cont w/ good oral control & transfer  No overt s/s aspiration noted  Dentures cleaned and placed in oral cavity  Pt then ate pc of jelly bread and trina doone cookies- able to bite cookies, adequate mastication/manipulation, bolus formation and transfer  Swallows were complete  No c/o food dysphagia symptoms  Assessment:  Pt w/ improved JILLIAN/mental status, appears appropriate for diet upgrade  Pt tolerated regular texture foods and thin liquids w/o overt s/s aspiration  Plan/Recommendations:  rec advance to regular diet w/ thin liquids  meds as tolerated   Aspiration precautions- Feed when awake and alert     Will cont to follow      Nara Hutchison CCC-SLP  Speech Pathologist  Available via  tiger text

## 2023-03-13 NOTE — PLAN OF CARE
Problem: MOBILITY - ADULT  Goal: Maintain or return to baseline ADL function  Description: INTERVENTIONS:  -  Assess patient's ability to carry out ADLs; assess patient's baseline for ADL function and identify physical deficits which impact ability to perform ADLs (bathing, care of mouth/teeth, toileting, grooming, dressing, etc )  - Assess/evaluate cause of self-care deficits   - Assess range of motion  - Assess patient's mobility; develop plan if impaired  - Assess patient's need for assistive devices and provide as appropriate  - Encourage maximum independence but intervene and supervise when necessary  - Involve family in performance of ADLs  - Assess for home care needs following discharge   - Consider OT consult to assist with ADL evaluation and planning for discharge  - Provide patient education as appropriate  Outcome: Progressing  Goal: Maintains/Returns to pre admission functional level  Description: INTERVENTIONS:  - Perform BMAT or MOVE assessment daily    - Set and communicate daily mobility goal to care team and patient/family/caregiver  - Collaborate with rehabilitation services on mobility goals if consulted  - Reposition patient every 2 hours    - Stand patient 2 times a day  - Ambulate patient 2 times a day  - Out of bed to chair 2 times a day   - Out of bed for meals 2 times a day  - Out of bed for toileting  - Record patient progress and toleration of activity level   Outcome: Progressing     Problem: Prexisting or High Potential for Compromised Skin Integrity  Goal: Skin integrity is maintained or improved  Description: INTERVENTIONS:  - Identify patients at risk for skin breakdown  - Assess and monitor skin integrity  - Assess and monitor nutrition and hydration status  - Monitor labs   - Assess for incontinence   - Turn and reposition patient  - Assist with mobility/ambulation  - Relieve pressure over bony prominences  - Avoid friction and shearing  - Provide appropriate hygiene as needed including keeping skin clean and dry  - Evaluate need for skin moisturizer/barrier cream  - Collaborate with interdisciplinary team   - Patient/family teaching  - Consider wound care consult   Outcome: Progressing     Problem: NEUROSENSORY - ADULT  Goal: Achieves stable or improved neurological status  Description: INTERVENTIONS  - Monitor and report changes in neurological status  - Monitor vital signs such as temperature, blood pressure, glucose, and any other labs ordered   - Initiate measures to prevent increased intracranial pressure  - Monitor for seizure activity and implement precautions if appropriate      Outcome: Progressing  Goal: Achieves maximal functionality and self care  Description: INTERVENTIONS  - Monitor swallowing and airway patency with patient fatigue and changes in neurological status  - Encourage and assist patient to increase activity and self care  - Encourage visually impaired, hearing impaired and aphasic patients to use assistive/communication devices  Outcome: Progressing     Problem: RESPIRATORY - ADULT  Goal: Achieves optimal ventilation and oxygenation  Description: INTERVENTIONS:  - Assess for changes in respiratory status  - Assess for changes in mentation and behavior  - Position to facilitate oxygenation and minimize respiratory effort  - Oxygen administered by appropriate delivery if ordered  - Initiate smoking cessation education as indicated  - Encourage broncho-pulmonary hygiene including cough, deep breathe, Incentive Spirometry  - Assess the need for suctioning and aspirate as needed  - Assess and instruct to report SOB or any respiratory difficulty  - Respiratory Therapy support as indicated  Outcome: Progressing     Problem: Nutrition/Hydration-ADULT  Goal: Nutrient/Hydration intake appropriate for improving, restoring or maintaining nutritional needs  Description: Monitor and assess patient's nutrition/hydration status for malnutrition   Collaborate with interdisciplinary team and initiate plan and interventions as ordered  Monitor patient's weight and dietary intake as ordered or per policy  Utilize nutrition screening tool and intervene as necessary  Determine patient's food preferences and provide high-protein, high-caloric foods as appropriate       INTERVENTIONS:  - Monitor oral intake, urinary output, labs, and treatment plans  - Assess nutrition and hydration status and recommend course of action  - Evaluate amount of meals eaten  - Assist patient with eating if necessary   - Allow adequate time for meals  - Recommend/ encourage appropriate diets, oral nutritional supplements, and vitamin/mineral supplements  - Order, calculate, and assess calorie counts as needed  - Recommend, monitor, and adjust tube feedings and TPN/PPN based on assessed needs  - Assess need for intravenous fluids  - Provide specific nutrition/hydration education as appropriate  - Include patient/family/caregiver in decisions related to nutrition  Outcome: Progressing

## 2023-03-13 NOTE — DISCHARGE SUMMARY
[facility]  Discharge- Jenae Escobar 1940, 79 y o  female MRN: [id number]  Unit/Bed#: S -01 Encounter: [id number]  Primary Care Provider: Rohit Rodney MD   Date and time admitted to hospital: 2/21/2023  9:03 PM    * Failure to thrive in adult  Assessment & Plan  Progressively worsening lethargy and generalized weakness   Poor appetite, decreased PO intake  Recent family death 6 weeks ago and has been depressed since   DDx including but not limited to: metabolic abnormality, dehydration, viral illness, anemia, ACS, MI, thyroid disease, intracranial process, other infectious process including UTI; doubt Guillan [place] syndrome or myasthenia gravis or botulism or multiple sclerosis   Likely metabolic abnormality secondary to dehydration and poor PO intake d/t depressive episode  K-3 2  Increased BUN   Troponin 24, 24 unlikely cardiac origin   UA- Proteins, Micro WBCs, Occasional mucus   CT Head- Neg   CT AP- Right femoral hernia, cardiomegaly, CAD, cholelithiasis, diverticulosis, stable compression fracture  Magnesium and TSH levels normal (2/21)  Albumin level low (2/22)  Level 1 diet with Ensure shake   Ate 75% of meal at 2000 on 2/22 and 2/23    Plan-  · Monitor and replenish electrolytes  · Continue sertraline daily  · Add mirtazapine 7 5 mg p o  nightly  · Appreciate geriatrics recommendations  · Appreciate nutritionist recommendations  · Discontinue IV fluids    Severe protein-calorie malnutrition ([facility] [address] )  Assessment & Plan  Malnutrition Findings:   Adult Malnutrition type: Acute illness  Adult Degree of Malnutrition: Other severe protein calorie malnutrition  Malnutrition Characteristics: Fat loss, Muscle loss, Inadequate energy, Weight loss     Plan:  -Monitor weight and input/output  -Level 1 diet with Ensure supplementation    360 Statement: Severe malnutrition related to inadequate oral intake as evidenced by loss of subcutaneous fat and muscle extremities, orbitals, [facility] screening is covered by insurance, but pt does not feel she needs the whole package, and would like to see if Dr Smyth will order just the carotid us.    Dr Smyth - ok to order?  -[facility]   osseaous; 10 4% weight decrease x 1 5 months, +3 edema, < 75% energy intake compared to estimated nutritional needs for > 7 days  Currently treated with oral supplementation, nutrition consult  BMI Findings: Body mass index is 19 73 kg/m²  Unspecified mood (affective) disorder (HCC)  Assessment & Plan  Pt has been experiencing anhedonia, sleep disturbances, fatigue, appetite disturbance and psychomotor retardation since brother-in-law's death  Pt has been stating that her death is near and rejects making any future plans  Pt denies acute suicidal ideation, homicidal ideation, or passive death wishes, patient has been cooperative with care  Plan:  See anxiety plan        Anxiety  Assessment & Plan  Pt has been experiencing worries about advanced directives and taking care of herself for the past two weeks and demonstrates pessimism about these issues being handled  Pt demonstrating paranoia about her insurance coverage  Pt denies acute suicidal ideation, homicidal ideation, or passive death wishes, patient has been cooperative with care      Differential: Acute stress disorder, Generalized anxiety disorder, Substance induced anxiety disorder (including prescription medication), Anxiety secondary to other medical condition  Remeron dosage changed from 15 mg to 7 5 mg per [address]:  Geriatrics consulted and recs appreciated  Behavioral health consulted and recs appreciated  Continue Remeron 7 5 mg QHS   Continue Zoloft 50 mg daily  Continue Abilify 2 mg daily  Consider inpatient psych admission after medication adjustments and continued therapies    Hypothyroidism  Assessment & Plan  Home medications include levothyroxine 25 QD   Medication compliance question  TSH levels normal (2/21)    Plan-  Continue levothyroxine med    Atrial fibrillation [facility])  Assessment & Plan  Home medications include Cardizem, digoxin, warfarin  INR-3 13 supratherapeutic (2/22) and 4 58 (2/23)  PT 34 (2/22) and 43 6 (2/23)  Digoxin level-2 5 supratherapeutic  EKG- sinus rhythm 64  Digoxin held (2/22) and restarted (2/23)    Plan-  Resume warfarin at discharge  Continue Cardizem  Repeat PT/INR      Antiphospholipid antibody syndrome [facility])  Assessment & Plan  Home medications include Warfarin   INR supratherapeutic 3 13 (2/22) and 4 58 (2/23) and downtrending  PT 34 0 (2/22) and 43 6 (2/23) and downtrending  Digoxin held (2/22) and restarted (2/23)    Plan-   Resume warfarin at discharge  Follow-up PT/INR in 1 week      Hypertension  Assessment & Plan  POA /72  Repeat during examination 201/81  Home medications include hydrochlorothiazide  Son is unsure if patient was compliant with home medications  Hydralazine 10 mg given once (2/23)   Remains hypertensive within last 24hr  Ordered Losartan 25 mg (2/24)    Plan-  Continue vital signs as per routine  Continue hydrochlorothiazide  Continue Losartan 25 mg    COPD (chronic obstructive pulmonary disease) (HCC)  Assessment & Plan  Home medications include Advair, Albuterol, Spiriva  Son suspects medication non compliance   Denies SOB, cough, congestion, fevers   93% on RA upon admission  Mid-90s O2 saturation on 3 L (2/23)  O2 sat at 97% on 3L (2/24)    Plan-  Continue home medications  Add incentive spirometry      Medical Problems     Resolved Problems  Date Reviewed: 2/22/2023   None       Discharging Resident: Nella Morel MD  Discharging Attending: Kostas Petersen MD  PCP: Treasure Dsouza MD  Admission Date:   Admission Orders (From admission, onward)     Ordered        02/22/23 0211  INPATIENT ADMISSION  Once                      Discharge Date: 02/24/23    Consultations During Hospital Stay:  · Geriatric  · Speech pathology  · Nutrition  · Psychiatry    Procedures Performed:   · -    Significant Findings / Test Results:   · -    Incidental Findings:   · -     Test Results Pending at Discharge (will require follow up):  · -     Outpatient Tests Requested:  · INR in 1 week    Complications:  None    Reason for Admission: Failure to thrive    Hospital Course:   Charito Banks is a 79 y o  female patient who originally presented to the hospital on 2023 due to failure to thrive  Patient recently stopped taking her medications and stopped eating and drinking after her brother-in-law   She also stopped smoking cigarettes and drinking coffee  She has been losing weight precipitously  She is slow in answering questions, and often confused  She was admitted for care of malnutrition and psychiatric evaluation  Remeron and aripiprazole were added to her regimen  She started to sleep at night and eat during the day  She agrees with discharge to rehabilitation  Plans and considerations were discussed with the family  She is now stable for discharge  Please see above list of diagnoses and related plan for additional information  Condition at Discharge: stable    Discharge Day Visit / Exam:   Subjective:  Pt was being awoken by nurse at bedside  Pt ate 75% of dinner last night and has been experiencing neck pain with fears of dysphagia  Swallow function was not assessed today due to poor PO intake and pt refused other assessment options (esophagram or EGD)  Pt has persisted to be hypertensive O/N  Mirtazapine was changed from 15 to 7 5 mg dosage per behavioral health recs  Pt was discontinued off IV drugs  Vitals: Blood Pressure: 167/71 (23 1535)  Pulse: 67 (23 1535)  Temperature: 98 3 °F (36 8 °C) (23 1535)  Temp Source: Oral (23 1937)  Respirations: 16 (23 0616)  Weight - Scale: 45 8 kg (101 lb) (23 1035)  SpO2: (!) 89 % (23 1535)  Exam:   Physical Exam  Vitals reviewed  Constitutional:       General: She is not in acute distress  Appearance: She is well-developed  She is not ill-appearing  Comments: Frail appearing    HENT:      Head: Normocephalic and atraumatic        Right Ear: External ear normal       Left Ear: External ear normal       Nose: Nose normal       Mouth/Throat:      Mouth: Mucous membranes are dry  Pharynx: No oropharyngeal exudate or posterior oropharyngeal erythema  Cardiovascular:      Rate and Rhythm: Normal rate and regular rhythm  Heart sounds: No murmur heard  Pulmonary:      Effort: Pulmonary effort is normal  No respiratory distress  Breath sounds: Normal breath sounds  Abdominal:      General: Abdomen is flat  There is no distension  Palpations: Abdomen is soft  Tenderness: There is no abdominal tenderness  Musculoskeletal:         General: No swelling  Right lower leg: Edema (trace) present  Left lower leg: Edema (trace) present  Lymphadenopathy:      Cervical: No cervical adenopathy  Skin:     General: Skin is warm and dry  Capillary Refill: Capillary refill takes less than 2 seconds  Coloration: Skin is pale  Findings: Bruising present  Neurological:      General: No focal deficit present  Mental Status: She is alert and oriented to person, place, and time  Mental status is at baseline  Cranial Nerves: No cranial nerve deficit  Motor: Weakness present  Gait: Gait normal    Psychiatric:         Mood and Affect: Mood is anxious and depressed  Affect is flat  Speech: She is noncommunicative  Speech is delayed  Behavior: Behavior is slowed  Discussion with Family: Updated  (son) at bedside  Discharge instructions/Information to patient and family:   See after visit summary for information provided to patient and family  Provisions for Follow-Up Care:  See after visit summary for information related to follow-up care and any pertinent home health orders  Disposition:   Acute Rehab at [facility]    Planned Readmission: -    Discharge Medications:  See after visit summary for reconciled discharge medications provided to patient and/or family  **Please Note: This note may have been constructed using a voice recognition system**

## 2023-03-13 NOTE — DISCHARGE SUMMARY
[facility]  Discharge- Pete Soriano 1940, 79 y o  female MRN: [id number]  Unit/Bed#: S -01 Encounter: [id number]  Primary Care Provider: Uli Albright MD   Date and time admitted to hospital: 2/27/2023  3:02 AM    MDD with persistant bereavement complex  Assessment & Plan  · POA- Closed eyes, minimally interactice demeaner and poor appetite   · Appreciate neurology recs  EEG without epileptiform discharges though grossly abnormal suggestive of diffuse cerebral dysfunction  · Palliative: Patient does not have palliative or hospice diagnosis at this time  · Not an ect candidate d/t aneurysm    · Started Ritalin on 3/8, with clinical improvement  · Patient to be discharged to rehab today  Per psych, discharge on Ritalin 5 mg twice twice daily with breakfast and lunch for 2 more days and after that, 5 mg daily until mood improves  Will need to be reevaluation by physician at the facility  Abdominal distention/back pain  Assessment & Plan  · 2/2 constipation, resolved  · Continue bowel regimen    Urinary retention  Assessment & Plan  · Urinary retention protocol    Thyroid nodule  Assessment & Plan  · CTA head and neck with and without contrast 2/27/2023: Incidental thyroid nodule(s) for which nonemergent thyroid ultrasound is recommended  · Outpatient f/u with pcp for non-emergent thyroid u/s    Aneurysm of basilar artery [facility])  Assessment & Plan  · Incidental finding on CTA head and neck/  CTA head and neck with and without contrast 2/27/2023:  · "No acute intracranial abnormality  Moderate chronic microangiopathy  Negative CTA head and neck for large vessel occlusion, dissection, or high-grade stenosis  · 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 projecting laterally in between left posterior cerebral artery and left superior cerebellar- repeat CT stable when developed AMS  · Outpatient referral for Neurosurgery follow-up         Abnormal CT of the chest  Assessment & Plan  · PE Study with CT abdomen & pelvis with contrast 2/27/2023:   · There is a new right basilar density with associated few branching nodular density in the right middle lobe and a 5 mm nodular density superior segment left lower lobe, indeterminate  These may be on inflammatory/infectious basis /pneumonia   · Short interval follow-up chest CT at 3 months suggested to demonstrate resolution  Hypothyroidism  Assessment & Plan  · TSH wnl  · Cont home levothyroxine    Atrial fibrillation (HCC)  Assessment & Plan  · On diltiazem, digoxin, and warfarin at home  · See above regarding AC    Antiphospholipid antibody syndrome (HCC)  Assessment & Plan  · On coumadin  Goal INR 2-3  INR initially supratherapeutic  No active evidence of bleeding      · Continue Coumadin at current dose and monitor INR, adjust Warfarin dose per INR level to keep therapeutic    * Failure to thrive in adult  Assessment & Plan  · Suspect large component due to profound grief and depression  · Continue supportive care  · Oral intake improved, continue Remeron        Medical Problems     Resolved Problems  Date Reviewed: 3/13/2023          Resolved    Acute respiratory failure ([address] ) 3/12/2023     Resolved by  Glenroy Aguilar MD    Hypokalemia 3/3/2023     Resolved by  Glenroy Aguilar MD    Supratherapeutic INR 3/4/2023     Resolved by  Karyn Chand MD    Positive D dimer 2/27/2023     Resolved by  Des Erazo MD    Compensated respiratory acidosis 2/28/2023     Resolved by  Karyn Chand MD    Medical non-compliance 2/28/2023     Resolved by  Karyn Chand MD    SOB (shortness of breath) 3/8/2023     Resolved by  Glenroy Aguilar MD        Discharging Resident: Yolanda Jenkins MD  Discharging Attending: Hernandez Forte MD  PCP: Venice Hernanedz MD  Admission Date:   Admission Orders (From admission, onward)     Ordered        02/27/23 0801  INPATIENT ADMISSION  Once                      Discharge Date: 3/14/23    Consultations During Hospital Stay:  · Psychiatry   · Neurology  · Geriatrics  · PT, OT    Procedures Performed:   · None    Significant Findings / Test Results:   · As noted    Incidental Findings:   · As noted    Test Results Pending at Discharge (will require follow up): · None     Outpatient Tests Requested:  · none    Complications:  none    Reason for Admission: [address] Course:   Myron Galindo is a 79 y o  female patient who originally presented to the hospital on 2/27/2023 due to /AMS  This was initially thought to be secondary to medication side effects with recent up-titration of her multiple antidepressants  Abilify, which was started in 2/2023 by psychiatry, was stopped on admission as it was thought to be the offending agent  Infectious and metabolic workup was unrevealing  Stroke was ruled out on imaging and no focal deficits were noted on exam   Her altered mental status was also thought to be due to severe MDD with persistent bereavement complex  Due to her failure to thrive, alternate therapy with Ritalin was discussed with family by psychiatry team and [facility]  Upon starting Ritalin, patient became more interactive, talkative, and was able to ambulate to the bathroom with assistance whereas previously she would not even open her eyes to sternal rub  With consistent Remeron use, po intake also increased  She was recommended for post acute rehab and family was in agreement  Patient has improved clinically  Per psych, patient can be discharged on Ritalin 5 mg bid for 2 more days, then decrease to 5 mg once daily until reevaluation by physician at rehab facility  She is stable for discharge to rehab facility today  Please see above list of diagnoses and related plan for additional information  Condition at Discharge: stable    Discharge Day Visit / Exam:   Subjective:  Seen this AM in NAD, awake and alert, talkative, flat affect   She is in agreement with plan for dc to rehab  Plan discussed with her son  Vitals: Blood Pressure: 137/50 (03/13/23 2235)  Pulse: 79 (03/13/23 2235)  Temperature: 98 °F (36 7 °C) (03/13/23 2235)  Temp Source: Axillary (03/13/23 1547)  Respirations: 16 (03/13/23 2235)  Height: 5' (152 4 cm) (02/28/23 0730)  Weight - Scale: 45 8 kg (101 lb) (02/28/23 0730)  SpO2: 93 % (03/13/23 2235)    Exam:   Physical Exam  Vitals reviewed  Constitutional:       General: She is not in acute distress  Appearance: She is not toxic-appearing  HENT:      Head: Normocephalic and atraumatic  Mouth/Throat:      Pharynx: Oropharynx is clear  Eyes:      Extraocular Movements: Extraocular movements intact  Cardiovascular:      Rate and Rhythm: Normal rate and regular rhythm  Pulses: Normal pulses  Pulmonary:      Effort: Pulmonary effort is normal  No respiratory distress  Abdominal:      General: There is no distension  Palpations: Abdomen is soft  Tenderness: There is no abdominal tenderness  Musculoskeletal:         General: No swelling or tenderness  Skin:     General: Skin is dry  Neurological:      Mental Status: She is alert  Psychiatric:      Comments: Flat affect          Discussion with Family: Updated  (son) via phone  Discharge instructions/Information to patient and family:   See after visit summary for information provided to patient and family  Provisions for Follow-Up Care:  See after visit summary for information related to follow-up care and any pertinent home health orders  Disposition:   [facility] [employer] Readmission: No    Discharge Medications:  See after visit summary for reconciled discharge medications provided to patient and/or family        **Please Note: This note may have been constructed using a voice recognition system**

## 2023-03-14 VITALS
HEART RATE: 79 BPM | SYSTOLIC BLOOD PRESSURE: 159 MMHG | WEIGHT: 101 LBS | OXYGEN SATURATION: 93 % | TEMPERATURE: 98.7 F | HEIGHT: 60 IN | BODY MASS INDEX: 19.83 KG/M2 | DIASTOLIC BLOOD PRESSURE: 69 MMHG | RESPIRATION RATE: 16 BRPM

## 2023-03-14 LAB
25(OH)D2 SERPL-MCNC: <1 NG/ML
25(OH)D3 SERPL-MCNC: 49 NG/ML
25(OH)D3+25(OH)D2 SERPL-MCNC: 49 NG/ML
A-TOCOPHEROL VIT E SERPL-MCNC: 8.2 MG/L (ref 9–29)
BLOOD GROUP ANTIBODIES SERPL: NORMAL
BLOOD GROUP ANTIBODIES SERPL: NORMAL
GAMMA TOCOPHEROL SERPL-MCNC: 0.2 MG/L (ref 0.5–4.9)
INR PPP: 2.53 (ref 0.84–1.19)
PROTHROMBIN TIME: 27.5 SECONDS (ref 11.6–14.5)

## 2023-03-14 RX ADMIN — SPIRONOLACTONE 25 MG: 25 TABLET ORAL at 10:00

## 2023-03-14 RX ADMIN — LEVOTHYROXINE SODIUM 25 MCG: 25 TABLET ORAL at 10:00

## 2023-03-14 RX ADMIN — LOSARTAN POTASSIUM 100 MG: 50 TABLET, FILM COATED ORAL at 10:00

## 2023-03-14 RX ADMIN — SERTRALINE HYDROCHLORIDE 50 MG: 50 TABLET ORAL at 10:00

## 2023-03-14 RX ADMIN — FLUTICASONE FUROATE AND VILANTEROL TRIFENATATE 1 PUFF: 100; 25 POWDER RESPIRATORY (INHALATION) at 09:00

## 2023-03-14 RX ADMIN — LIDOCAINE 5% 1 PATCH: 700 PATCH TOPICAL at 10:00

## 2023-03-14 RX ADMIN — METHYLPHENIDATE HYDROCHLORIDE 5 MG: 10 TABLET ORAL at 10:31

## 2023-03-14 RX ADMIN — DILTIAZEM HYDROCHLORIDE 240 MG: 240 CAPSULE, COATED, EXTENDED RELEASE ORAL at 10:00

## 2023-03-14 RX ADMIN — ACETAMINOPHEN 975 MG: 325 TABLET ORAL at 05:41

## 2023-03-14 NOTE — CASE MANAGEMENT
Case Management Progress Note    Patient name Payton Antonio  Location S /S -51 MRN [id number]  : 1940 Date 3/14/2023       LOS (days): 15  Geometric Mean LOS (GMLOS) (days): 3 40  Days to GMLOS:-11 7        OBJECTIVE:        Current admission status: Inpatient  Preferred Pharmacy:   [id number]  [address] [place] [id number]  Phone: [phone] Fax: Jake Good 01, [address] [address]  Phone: [phone] Fax: [phone]    Primary Care Provider: Chalino Nichols MD    Primary Insurance: MEDICARE  Secondary Insurance: BLUE CROSS    PROGRESS NOTE:    CM informed transport canceled yesterday evening due to a  getting sick  CM re-requested 10:30am this morning   CM informed RN-Marcella Villanueva, and Dr Damian Simpson

## 2023-03-14 NOTE — ASSESSMENT & PLAN NOTE
· On coumadin  Goal INR 2-3  INR initially supratherapeutic  No active evidence of bleeding      · Continue Coumadin at current dose and monitor INR, adjust Warfarin dose per INR level to keep therapeutic

## 2023-03-14 NOTE — PLAN OF CARE
Problem: MOBILITY - ADULT  Goal: Maintain or return to baseline ADL function  Description: INTERVENTIONS:  -  Assess patient's ability to carry out ADLs; assess patient's baseline for ADL function and identify physical deficits which impact ability to perform ADLs (bathing, care of mouth/teeth, toileting, grooming, dressing, etc )  - Assess/evaluate cause of self-care deficits   - Assess range of motion  - Assess patient's mobility; develop plan if impaired  - Assess patient's need for assistive devices and provide as appropriate  - Encourage maximum independence but intervene and supervise when necessary  - Involve family in performance of ADLs  - Assess for home care needs following discharge   - Consider OT consult to assist with ADL evaluation and planning for discharge  - Provide patient education as appropriate  Outcome: Completed  Goal: Maintains/Returns to pre admission functional level  Description: INTERVENTIONS:  - Perform BMAT or MOVE assessment daily    - Set and communicate daily mobility goal to care team and patient/family/caregiver  - Collaborate with rehabilitation services on mobility goals if consulted  - Perform Range of Motion  times a day  - Reposition patient every  hours    - Dangle patient  times a day  - Stand patient  times a day  - Ambulate patient  times a day  - Out of bed to chair  times a day   - Out of bed for meals imes a day  - Out of bed for toileting  - Record patient progress and toleration of activity level   Outcome: Completed     Problem: Prexisting or High Potential for Compromised Skin Integrity  Goal: Skin integrity is maintained or improved  Description: INTERVENTIONS:  - Identify patients at risk for skin breakdown  - Assess and monitor skin integrity  - Assess and monitor nutrition and hydration status  - Monitor labs   - Assess for incontinence   - Turn and reposition patient  - Assist with mobility/ambulation  - Relieve pressure over bony prominences  - Avoid friction and shearing  - Provide appropriate hygiene as needed including keeping skin clean and dry  - Evaluate need for skin moisturizer/barrier cream  - Collaborate with interdisciplinary team   - Patient/family teaching  - Consider wound care consult   Outcome: Completed     Problem: NEUROSENSORY - ADULT  Goal: Achieves stable or improved neurological status  Description: INTERVENTIONS  - Monitor and report changes in neurological status  - Monitor vital signs such as temperature, blood pressure, glucose, and any other labs ordered   - Initiate measures to prevent increased intracranial pressure  - Monitor for seizure activity and implement precautions if appropriate      Outcome: Completed  Goal: Achieves maximal functionality and self care  Description: INTERVENTIONS  - Monitor swallowing and airway patency with patient fatigue and changes in neurological status  - Encourage and assist patient to increase activity and self care     - Encourage visually impaired, hearing impaired and aphasic patients to use assistive/communication devices  Outcome: Completed     Problem: RESPIRATORY - ADULT  Goal: Achieves optimal ventilation and oxygenation  Description: INTERVENTIONS:  - Assess for changes in respiratory status  - Assess for changes in mentation and behavior  - Position to facilitate oxygenation and minimize respiratory effort  - Oxygen administered by appropriate delivery if ordered  - Initiate smoking cessation education as indicated  - Encourage broncho-pulmonary hygiene including cough, deep breathe, Incentive Spirometry  - Assess the need for suctioning and aspirate as needed  - Assess and instruct to report SOB or any respiratory difficulty  - Respiratory Therapy support as indicated  Outcome: Completed     Problem: Nutrition/Hydration-ADULT  Goal: Nutrient/Hydration intake appropriate for improving, restoring or maintaining nutritional needs  Description: Monitor and assess patient's nutrition/hydration status for malnutrition  Collaborate with interdisciplinary team and initiate plan and interventions as ordered  Monitor patient's weight and dietary intake as ordered or per policy  Utilize nutrition screening tool and intervene as necessary  Determine patient's food preferences and provide high-protein, high-caloric foods as appropriate       INTERVENTIONS:  - Monitor oral intake, urinary output, labs, and treatment plans  - Assess nutrition and hydration status and recommend course of action  - Evaluate amount of meals eaten  - Assist patient with eating if necessary   - Allow adequate time for meals  - Recommend/ encourage appropriate diets, oral nutritional supplements, and vitamin/mineral supplements  - Order, calculate, and assess calorie counts as needed  - Recommend, monitor, and adjust tube feedings and TPN/PPN based on assessed needs  - Assess need for intravenous fluids  - Provide specific nutrition/hydration education as appropriate  - Include patient/family/caregiver in decisions related to nutrition  Outcome: Completed

## 2023-03-14 NOTE — ASSESSMENT & PLAN NOTE
· POA- Closed eyes, minimally interactice demeaner and poor appetite   · Appreciate neurology recs  EEG without epileptiform discharges though grossly abnormal suggestive of diffuse cerebral dysfunction  · Palliative: Patient does not have palliative or hospice diagnosis at this time  · Not an ect candidate d/t aneurysm    · Started Ritalin on 3/8, with clinical improvement  · Per psych, discharge on Ritalin 5 mg twice twice daily with breakfast and lunch for 2 more days and after that, 5 mg daily until mood improves  Will need to be reevaluation by physician at the facility

## 2023-03-14 NOTE — PROGRESS NOTES
[facility]  Progress Note - Maria C Villasenor 1940, 79 y o  female MRN: [id number]  Unit/Bed#: S -01 Encounter: [id number]  Primary Care Provider: Johnny Urbina MD   Date and time admitted to hospital: 2/27/2023  3:02 AM    MDD with persistant bereavement complex  Assessment & Plan  · POA- Closed eyes, minimally interactice demeaner and poor appetite   · Appreciate neurology recs  EEG without epileptiform discharges though grossly abnormal suggestive of diffuse cerebral dysfunction  · Palliative: Patient does not have palliative or hospice diagnosis at this time  · Not an ect candidate d/t aneurysm    · Started Ritalin on 3/8, with clinical improvement  · Per psych, discharge on Ritalin 5 mg twice twice daily with breakfast and lunch for 2 more days and after that, 5 mg daily until mood improves  Will need to be reevaluation by physician at the facility  Abdominal distention/back pain  Assessment & Plan  · 2/2 constipation, resolved  · Continue bowel regimen    Urinary retention  Assessment & Plan  · Urinary retention protocol    Thyroid nodule  Assessment & Plan  · CTA head and neck with and without contrast 2/27/2023: Incidental thyroid nodule(s) for which nonemergent thyroid ultrasound is recommended  · Outpatient f/u with pcp for non-emergent thyroid u/s    Aneurysm of basilar artery [facility])  Assessment & Plan  · Incidental finding on CTA head and neck/  CTA head and neck with and without contrast 2/27/2023:  · "No acute intracranial abnormality  Moderate chronic microangiopathy  Negative CTA head and neck for large vessel occlusion, dissection, or high-grade stenosis  · 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 projecting laterally in between left posterior cerebral artery and left superior cerebellar- repeat CT stable when developed AMS  · Outpatient referral for Neurosurgery follow-up         Abnormal CT of the chest  Assessment & Plan  · PE Study with CT abdomen & pelvis with contrast 2/27/2023:   · There is a new right basilar density with associated few branching nodular density in the right middle lobe and a 5 mm nodular density superior segment left lower lobe, indeterminate  These may be on inflammatory/infectious basis /pneumonia   · Short interval follow-up chest CT at 3 months suggested to demonstrate resolution  Hypothyroidism  Assessment & Plan  · TSH wnl  · Cont home levothyroxine    Atrial fibrillation (HCC)  Assessment & Plan  · On diltiazem, digoxin, and warfarin at home  · See above regarding AC    Antiphospholipid antibody syndrome (HCC)  Assessment & Plan  · On coumadin  Goal INR 2-3  INR initially supratherapeutic  No active evidence of bleeding  · Continue Coumadin at current dose and monitor INR, adjust Warfarin dose per INR level to keep therapeutic    * Failure to thrive in adult  Assessment & Plan  · Suspect large component due to profound grief and depression  · Continue supportive care  · Oral intake improved, continue Remeron          VTE Pharmacologic Prophylaxis: VTE Score: 6 High Risk (Score >/= 5) - Pharmacological DVT Prophylaxis Ordered: warfarin (Coumadin)  Sequential Compression Devices Ordered  Patient Centered Rounds: I performed bedside rounds with nursing staff today  Discussions with Specialists or Other Care Team Provider: [place], consults    Education and Discussions with Family / Patient: Updated  (son) via phone  Total Time Spent on Date of Encounter in care of patient: 45 minutes This time was spent on one or more of the following: performing physical exam; counseling and coordination of care; obtaining or reviewing history; documenting in the medical record; reviewing/ordering tests, medications or procedures; communicating with other healthcare professionals and discussing with patient's family/caregivers      Current Length of Stay: 15 day(s)  Current Patient Status: Inpatient Certification Statement: The patient will continue to require additional inpatient hospital stay due to pending rehab placement  Discharge Plan: Anticipate discharge tomorrow to rehab facility  Code Status: Level 2 - DNAR: but accepts endotracheal intubation    Subjective:   Seen in NAD, awake and alert  Reports poor sleep  Objective:     Vitals:   Temp (24hrs), Av °F (36 7 °C), Min:97 9 °F (36 6 °C), Max:98 °F (36 7 °C)    Temp:  [97 9 °F (36 6 °C)-98 °F (36 7 °C)] 98 °F (36 7 °C)  HR:  [63-79] 79  Resp:  [16-18] 16  BP: (110-152)/(39-59) 137/50  SpO2:  [91 %-93 %] 93 %  Body mass index is 19 73 kg/m²  Input and Output Summary (last 24 hours): Intake/Output Summary (Last 24 hours) at 3/14/2023 0544  Last data filed at 3/14/2023 0540  Gross per 24 hour   Intake --   Output 650 ml   Net -650 ml       Physical Exam:   Physical Exam  Vitals reviewed  Constitutional:       General: She is not in acute distress  Appearance: She is ill-appearing  HENT:      Head: Normocephalic and atraumatic  Mouth/Throat:      Mouth: Mucous membranes are dry  Pharynx: Oropharynx is clear  Eyes:      Extraocular Movements: Extraocular movements intact  Cardiovascular:      Rate and Rhythm: Normal rate and regular rhythm  Pulses: Normal pulses  Pulmonary:      Effort: Pulmonary effort is normal  No respiratory distress  Abdominal:      General: There is no distension  Palpations: Abdomen is soft  Tenderness: There is no abdominal tenderness  Musculoskeletal:         General: No swelling or tenderness  Normal range of motion  Skin:     General: Skin is warm and dry  Neurological:      Mental Status: She is alert and oriented to person, place, and time     Psychiatric:      Comments: Flat affect          Additional Data:     Labs:  Results from last 7 days   Lab Units 23  0519 23  0610   WBC Thousand/uL 6 02 7 02   HEMOGLOBIN g/dL 10 4* 10 7*   HEMATOCRIT % 31 8* 33 6*   PLATELETS Thousands/uL 244 240   NEUTROS PCT %  --  86*   LYMPHS PCT %  --  9*   MONOS PCT %  --  4   EOS PCT %  --  1     Results from last 7 days   Lab Units 03/13/23  0519 03/11/23  0610   SODIUM mmol/L 139 140   POTASSIUM mmol/L 4 4 4 3   CHLORIDE mmol/L 103 101   CO2 mmol/L 34* 37*   BUN mg/dL 27* 29*   CREATININE mg/dL 0 53* 0 51*   ANION GAP mmol/L 2* 2*   CALCIUM mg/dL 8 5 8 7   ALBUMIN g/dL  --  2 8*   TOTAL BILIRUBIN mg/dL  --  0 46   ALK PHOS U/L  --  70   ALT U/L  --  19   AST U/L  --  20   GLUCOSE RANDOM mg/dL 82 96     Results from last 7 days   Lab Units 03/13/23  0519   INR  2 28*             Results from last 7 days   Lab Units 03/07/23  0451   PROCALCITONIN ng/ml 0 15       Lines/Drains:  Invasive Devices     Peripheral Intravenous Line  Duration           Long-Dwell Peripheral IV (Midline) 85/83/44 Left Basilic 7 days                Recent Cultures (last 7 days):         Last 24 Hours Medication List:   Current Facility-Administered Medications   Medication Dose Route Frequency Provider Last Rate   • acetaminophen  975 mg Oral Q8H [facility] Merrill Arrieta MD     • albuterol  2 puff Inhalation Q4H PRN Merrill Arrieta MD     • bisacodyl  10 mg Rectal Daily Glenroy Aguilar MD     • diltiazem  240 mg Oral Daily Des Erazo MD     • Fluticasone Furoate-Vilanterol  1 puff Inhalation Daily Des Erazo MD     • hydrALAZINE  10 mg Intravenous Q6H PRN Glenroy Aguilar MD     • levothyroxine  25 mcg Oral Daily Des Erazo MD     • lidocaine  1 patch Topical Daily Des Erazo MD     • losartan  100 mg Oral Daily Glenroy Aguilar MD     • methylphenidate  5 mg Oral BID before breakfast/lunch Mallory Green DO     • mirtazapine  7 5 mg Oral HS Mallory Green DO     • senna-docusate sodium  1 tablet Oral HS Glenroy Aguilar MD     • sertraline  50 mg Oral Daily Des Erazo MD     • spironolactone  25 mg Oral Daily Glenroy Aguilar MD     • umeclidinium  1 puff Inhalation Daily Perez Sunshine MD     • warfarin  2 mg Oral Daily (warfarin) Nicole Rodriguez MD          Today, Patient Was Seen By: Giorgio Zhang MD    **Please Note: This note may have been constructed using a voice recognition system  **

## 2023-03-15 ENCOUNTER — TELEPHONE (OUTPATIENT)
Dept: PSYCHIATRY | Facility: CLINIC | Age: 83
End: 2023-03-15

## 2023-03-15 NOTE — TELEPHONE ENCOUNTER
Patient has been added to the Non referral ( med mgmt) wait list  Confirmed insurance, needs of service, and location preferences       Called the [address] [place] [address] and Rehab when scheduling # [phone]

## 2023-03-21 ENCOUNTER — PATIENT OUTREACH (OUTPATIENT)
Dept: FAMILY MEDICINE CLINIC | Facility: CLINIC | Age: 83
End: 2023-03-21

## 2023-03-21 NOTE — PROGRESS NOTES
Spoke with  at [facility] patient there for rehab but Physical therapy recommending long term   will talk with family  I will check back in two weeks

## 2023-03-22 ENCOUNTER — PATIENT OUTREACH (OUTPATIENT)
Dept: FAMILY MEDICINE CLINIC | Facility: CLINIC | Age: 83
End: 2023-03-22

## 2023-03-22 NOTE — PROGRESS NOTES
Transitions:   • Type: Unplanned  • Provider notification within 2 days of discharge  • PCP: Mindy Beltran  • Notified via: ADT alert  • Specialty Provider:  • Notified via: N/A  • Other Care Team Member: Johnny Montoya  • Notified via: ADT alert  • Collaboration with discharge team: reviewed Case Management notes  • Communication of changes to care plans to patient/caregiver: Pt not available  Pt discharged to [facility] rehab  CM to follow up after discharge to home       Note routed to Johnny Montoya

## 2023-04-03 ENCOUNTER — TELEPHONE (OUTPATIENT)
Dept: NEUROSURGERY | Facility: CLINIC | Age: 83
End: 2023-04-03

## 2023-04-04 ENCOUNTER — PATIENT OUTREACH (OUTPATIENT)
Dept: FAMILY MEDICINE CLINIC | Facility: CLINIC | Age: 83
End: 2023-04-04

## 2023-04-04 ENCOUNTER — OFFICE VISIT (OUTPATIENT)
Dept: NEUROSURGERY | Facility: CLINIC | Age: 83
End: 2023-04-04

## 2023-04-04 VITALS
HEIGHT: 60 IN | BODY MASS INDEX: 19.73 KG/M2 | HEART RATE: 72 BPM | TEMPERATURE: 97.2 F | DIASTOLIC BLOOD PRESSURE: 60 MMHG | OXYGEN SATURATION: 95 % | SYSTOLIC BLOOD PRESSURE: 126 MMHG | RESPIRATION RATE: 16 BRPM

## 2023-04-04 DIAGNOSIS — I72.5 ANEURYSM OF BASILAR ARTERY (HCC): ICD-10-CM

## 2023-04-04 DIAGNOSIS — I67.1 CEREBRAL ANEURYSM, NONRUPTURED: Primary | ICD-10-CM

## 2023-04-04 NOTE — PROGRESS NOTES
Neurosurgery Office Note  Doe Mckeon 79 y o  female MRN: [id number]      Assessment/Plan     Cerebral aneurysm, nonruptured  Patient presents as a new consult at the request of internal medicine for the evaluation of 2mm distal basilar artery aneurysm  · Recent prolonged hospitalization 2/27-3/14/2023 due to AMS thought to be secondary to medication side effects (recently had uptitration of multiple antidepressants); improved clinically with alternate medication regimen    Imaging:  • CTA head and neck w wo contrast 2/27/2023: 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 projecting laterally in between left posterior cerebral artery and left superior cerebellar  Plan:  • Extensively discussed natural history of aneurysms  Discussed that based on ISUIA she has a < 1%/yr risk of aneurysm rupture  Discussed modifiable risk factors including blood pressure control, cholesterol control and smoking cessation  Discussed signs and symptoms of aneurysm rupture including severe, sudden onset headache, neck pain, nausea and vomiting, weakness, confusion and seizure  Reiterated that these symptoms should prompt the patient to visit an emergency department immediately    o BP:  Typically well controlled, 126/60 in the office  o Cholesterol:  3/2023 lipid panel with only low HDL, not on medication  o Smoking: former smoker (5-10 cigarettes per day for about 25 years), quit about 6-10 years ago  o Family history:  N/A  • Patient is to return to the office in 1 year with CTA head w wo contrast for ongoing monitoring or sooner should patient develop worsening symptoms or red flag signs  o If stable, consider PRN follow up at that time         Diagnoses and all orders for this visit:    Cerebral aneurysm, nonruptured  -     CTA head w wo contrast; Future    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[facility] [address] )  -     Ambulatory Referral to Neurosurgery  -     CTA head w wo contrast; Future          I have spent a total time of 30 minutes on 04/04/23 in caring for this patient including Diagnostic results, Prognosis, Instructions for management, Patient and family education, Importance of tx compliance, Risk factor reductions, Impressions, Counseling / Coordination of care, Documenting in the medical record, Reviewing / ordering tests, medicine, procedures   and Obtaining or reviewing history    CHIEF COMPLAINT    Chief Complaint   Patient presents with   • New Patient Visit       HISTORY    History of Present Illness     79y o  year old female with PMHx significant for anti-phospholipid syndrome on coumadin, asthma, cardiac disease, COPD, hyperlipidemia, hypertension, osteoporosis who presents as a new consultation at the request of internal medicine for the evaluation of basilar artery aneurysm  Patient is here from facility  She is quite amnestic to the events of her recent hospitalization  Per chart review, patient was hospitalized from 2/27/2023 to 3/14/2023 secondary to altered mental status  This was initially thought to be secondary to medication side effects with recent up titration of her multiple antidepressants versus 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  She was placed on alternate psychiatric agents and fortunately improved significantly  She was recommended for postacute rehab where she currently remains  During work-up of her altered mental status she had a CTA of the head and neck which demonstrated a 2 mm distal basilar artery aneurysm for which neurosurgical evaluation was recommended  At this time patient is back to her baseline  She is doing well at her current rehab facility  Again she is amnestic to the events of the hospitalization and is not quite sure why she is here but she does not offer any new or worsening complaints  To her knowledge she has good blood pressure control  She had a lipid panel done in March 2023 which only demonstrated slightly decreased HDL    She is a former smoker, reporting 5 to 10 cigarettes/day for about 25 years, quit about 6 to 10 years ago  She has no known family history of aneurysms or sudden unexplained death  See Discussion    REVIEW OF SYSTEMS    Review of Systems   Musculoskeletal: Positive for gait problem (ambulates today w wheelchair-uses walker at nursing home )  Neurological:        NP ANEURYSM CTA SL   Ref by [facility] ED     difficult walk ( uses walker & wheelchair)    pt presented to [facility] ED 2/27/23       ROS obtained by MA  Reviewed  See HPI       Meds/Allergies     Current Outpatient Medications   Medication Sig Dispense Refill   • acetaminophen (TYLENOL) 325 mg tablet Take 3 tablets (975 mg total) by mouth every 8 (eight) hours  0   • Advair Diskus 250-50 MCG/DOSE inhaler USE 1 INHALATION TWICE A  blister 3   • albuterol (2 5 mg/3 mL) 0 083 % nebulizer solution USE 1 VIAL IN NEBULIZER EVERY 4 TO 6 HOURS AS NEEDED FOR COUGH AND WHEEZE 75 vial 3   • Ascorbic Acid (VITAMIN C) 1000 MG tablet Take 1,000 mg by mouth daily     • Cholecalciferol (VITAMIN D3) 20 MCG (800 UNIT) TABS Take by mouth     • diltiazem (CARDIZEM CD) 240 mg 24 hr capsule Take 1 capsule (240 mg total) by mouth daily Do not start before February 25, 2023  30 capsule 0   • Ferrous Sulfate 220 (44 Fe) MG/5ML LIQD take 1 teaspoonful by mouth once daily 150 mL 5   • levothyroxine 25 mcg tablet TAKE 1 TABLET DAILY 90 tablet 3   • lidocaine (LIDODERM) 5 % Apply 1 patch topically daily Remove & Discard patch within 12 hours or as directed by MD 30 patch 3   • loratadine (CLARITIN) 10 mg tablet Take 10 mg by mouth daily     • losartan (COZAAR) 25 mg tablet Take 1 tablet (25 mg total) by mouth daily Do not start before February 25, 2023  30 tablet 0   • mirtazapine (REMERON) 7 5 MG tablet Take 1 tablet (7 5 mg total) by mouth daily at bedtime 30 tablet 0   • multivitamin (THERAGRAN) TABS Take 1 tablet by mouth daily     • senna-docusate sodium (SENOKOT S) 8 6-50 mg per tablet Take 1 tablet by mouth daily at bedtime  0   • sertraline (Zoloft) 50 mg tablet Take 1 tablet (50 mg total) by mouth daily 30 tablet 5   • Spiriva HandiHaler 18 MCG inhalation capsule INHALE THE CONTENTS OF 1 CAPSULE DAILY AS DIRECTED 90 capsule 3   • spironolactone (ALDACTONE) 25 mg tablet Take 1 tablet (25 mg total) by mouth daily Do not start before March 14, 2023   0   • warfarin (COUMADIN) 2 mg tablet Take 2 mg daily, adjust to keep INR therapeutic, between 2-3  0   • denosumab (PROLIA) 60 mg/mL Inject 60 mg under the skin once 6 months (Patient not taking: Reported on 4/4/2023)     • methylphenidate (Ritalin) 5 mg tablet P o  5 mg (1 tablet) twice daily with breakfast (8 am) and lunch (11:30 am) for 3 days and then 5 mg (1 tablet) daily with breakfast until her mood improves  Should be revaluated by physician  (Patient not taking: Reported on 4/4/2023) 5 tablet 0     No current facility-administered medications for this visit         Allergies   Allergen Reactions   • Aspirin Other (See Comments)   • Penicillins Rash and Hives   • Sulfa Antibiotics Rash       PAST HISTORY    Past Medical History:   Diagnosis Date   • Anti-phospholipid syndrome (HCC)    • Asthma    • Blood type O+    • Cardiac disease    • Chronic pain    • COPD (chronic obstructive pulmonary disease) (HCC)    • Fracture of ankle     left   • Hyperlipidemia    • Hypertension    • Hypokalemia 2/27/2023   • Osteoporosis        Past Surgical History:   Procedure Laterality Date   • APPENDECTOMY     • BILATERAL OOPHORECTOMY Bilateral    • ORIF TIBIAL SHAFT FRACTURE W/ PLATES AND SCREWS Right        Social History     Tobacco Use   • Smoking status: Former     Types: Cigarettes   • Smokeless tobacco: Never   • Tobacco comments:     Never smoker per [employer]   Vaping Use   • Vaping Use: Never used   Substance Use Topics   • Alcohol use: No   • Drug use: No       Family History   Problem Relation Age of Onset   • Hypertension Mother    • Skin cancer Mother    • Hypertension Father          Above history personally reviewed  EXAM    Vitals:Blood pressure 126/60, pulse 72, temperature (!) 97 2 °F (36 2 °C), temperature source Temporal, resp  rate 16, height 5' (1 524 m), SpO2 95 %, not currently breastfeeding  ,Body mass index is 19 73 kg/m²  Physical Exam  Constitutional:       General: She is awake  Appearance: Normal appearance  Comments: In wheelchair   HENT:      Head: Normocephalic and atraumatic  Eyes:      Extraocular Movements: EOM normal       Conjunctiva/sclera: Conjunctivae normal    Cardiovascular:      Rate and Rhythm: Normal rate  Pulmonary:      Effort: Pulmonary effort is normal  No respiratory distress  Skin:     General: Skin is warm and dry  Neurological:      Mental Status: She is alert and oriented to person, place, and time  Coordination: Finger-Nose-Finger Test normal    Psychiatric:         Attention and Perception: Attention and perception normal          Mood and Affect: Mood and affect normal          Speech: Speech normal          Behavior: Behavior normal  Behavior is cooperative  Thought Content: Thought content normal          Cognition and Memory: Cognition normal  Memory is impaired (does not remember anything from her hospitalization)  Judgment: Judgment normal          Neurologic Exam     Mental Status   Oriented to person, place, and time  Follows 1 step commands  Attention: normal  Concentration: normal    Speech: speech is normal   Level of consciousness: alert  Knowledge: good  Normal comprehension       Cranial Nerves     CN III, IV, VI   Extraocular motions are normal    CN III: no CN III palsy  CN VI: no CN VI palsy  Nystagmus: none   Diplopia: none  Ophthalmoparesis: none  Upgaze: normal  Downgaze: normal  Conjugate gaze: present    CN V   Right facial sensation deficit: none  Left facial sensation deficit: none    CN VII   Right facial weakness: none  Left facial "weakness: none    CN VIII   Hearing: intact    CN IX, X   CN IX normal    CN X normal      CN XI   Right trapezius strength: normal  Left trapezius strength: normal    CN XII   CN XII normal      Motor Exam   Muscle bulk: normal  Overall muscle tone: normal  Right arm pronator drift: absent  Left arm pronator drift: absent  4+/5 throughout     Gait, Coordination, and Reflexes     Coordination   Finger to nose coordination: normal    Tremor   Resting tremor: absent  Intention tremor: absent  Action tremor: absent    Reflexes   Right : 2+  Left : 2+        MEDICAL DECISION MAKING    Imaging Studies:     XR chest portable    Result Date: 3/6/2023  Narrative: CHEST INDICATION:  Tachypnea on IVF, rales B/L , r o pulm vas congestion  COMPARISON:  3/3/2023, CT chest, abdomen and pelvis 2/27/2023 EXAM PERFORMED/VIEWS:  XR CHEST PORTABLE FINDINGS:  The patient is rotated to the right  Cardiomediastinal silhouette appears unremarkable  Small focus of right basilar atelectasis or infiltrate  No evidence of pulmonary edema  No pneumothorax or pleural effusion  Dextroscoliosis and degenerative changes visualized lumbar spine  Old right lower posterolateral rib fracture  Impression: Small focus of right basilar atelectasis or infiltrate  Workstation performed: [id number]     CT abdomen pelvis w contrast    Result Date: 3/9/2023  Narrative: CT ABDOMEN AND PELVIS WITH IV CONTRAST INDICATION:   \"LLQ abdominal pain LUQ abdominal pain Back pain (h?o veterbal fractue), abd distention w  Tenderness in Left side (Unknown last bowel moevement)  \" COMPARISON:  2/27/2023 TECHNIQUE:  CT examination of the abdomen and pelvis was performed  Axial, sagittal, and coronal 2D reformatted images were created from the source data and submitted for interpretation  Radiation dose length product (DLP) for this visit:  371 mGy-cm     This examination, like all CT scans performed in the [facility], was performed utilizing " techniques to minimize radiation dose exposure, including the use of iterative reconstruction and automated exposure control  IV Contrast:  100 mL of iohexol (OMNIPAQUE) Enteric Contrast:  Enteric contrast was not administered  FINDINGS: ABDOMEN LOWER CHEST:  Compared to prior abdominal CT, development of small left effusion and trace right effusion  Bibasilar atelectasis noted  Also noted is mucous plugging within the posterior branch of the right lower lobe bronchus (2/8)  Patchy focus of atelectasis also seen in the right middle lobe  LIVER/BILIARY TREE:  Unremarkable  GALLBLADDER:  No calcified gallstones  No pericholecystic inflammatory change  SPLEEN:  Unremarkable  PANCREAS:  Unremarkable  ADRENAL GLANDS:  Unchanged KIDNEYS/URETERS:  Unremarkable  No hydronephrosis  STOMACH AND BOWEL:  Large amount of stool present within the colon, increased since prior examination  No small bowel obstruction  Severe sigmoid diverticulosis, without evidence of acute diverticulitis  Bowel containing right inguinal hernia noted as  on prior study  This is not creating bowel obstruction  No inflammatory change  APPENDIX:  No findings to suggest appendicitis  ABDOMINOPELVIC CAVITY:  No ascites  No pneumoperitoneum  No lymphadenopathy  VESSELS:  Unchanged PELVIS REPRODUCTIVE ORGANS:  Calcified uterine fibroids URINARY BLADDER:  Greater than anticipated enhancement of the bladder mucosa  No bladder calculi  No bladder air  ABDOMINAL WALL/INGUINAL REGIONS:  There is mild body wall edema  Right inguinal bowel containing hernia as above  OSSEOUS STRUCTURES:  There are compression deformities of L4, L2, and T11, unchanged from prior study  There is grade 1 anterolisthesis, L5-S1     Impression: 1  Large amount of stool, increased since prior study 2  Greater than anticipated enhancement of the bladder mucosa  Correlate with clinical and laboratory concern for cystitis 3   Severe sigmoid diverticulosis without evidence of diverticulitis 4  Development of small left and trace right effusion  Bibasilar atelectasis, with evidence of mucous plugging within posterior right lower lobe bronchus 5  Bowel containing nonobstructing right inguinal hernia, unchanged from earlier Workstation performed: [id number]       I have personally reviewed pertinent reports     and I have personally reviewed pertinent films in PACS

## 2023-04-04 NOTE — ASSESSMENT & PLAN NOTE
Patient presents as a new consult at the request of internal medicine for the evaluation of 2mm distal basilar artery aneurysm  · Recent prolonged hospitalization 2/27-3/14/2023 due to AMS thought to be secondary to medication side effects (recently had uptitration of multiple antidepressants); improved clinically with alternate medication regimen    Imaging:  • CTA head and neck w wo contrast 2/27/2023: 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 projecting laterally in between left posterior cerebral artery and left superior cerebellar  Plan:  • Extensively discussed natural history of aneurysms  Discussed that based on ISUIA she has a < 1%/yr risk of aneurysm rupture  Discussed modifiable risk factors including blood pressure control, cholesterol control and smoking cessation  Discussed signs and symptoms of aneurysm rupture including severe, sudden onset headache, neck pain, nausea and vomiting, weakness, confusion and seizure  Reiterated that these symptoms should prompt the patient to visit an emergency department immediately    o BP:  Typically well controlled, 126/60 in the office  o Cholesterol:  3/2023 lipid panel with only low HDL, not on medication  o Smoking: former smoker (5-10 cigarettes per day for about 25 years), quit about 6-10 years ago  o Family history:  N/A  • Patient is to return to the office in 1 year with CTA head w wo contrast for ongoing monitoring or sooner should patient develop worsening symptoms or red flag signs  o If stable, consider PRN follow up at that time

## 2023-04-04 NOTE — PROGRESS NOTES
Spoke with DIANNE Wallace- patient is currently there    Left  Message with social work department to return my call to check status of patient discharge plan

## 2023-04-04 NOTE — PATIENT INSTRUCTIONS
Extensively discussed natural history of aneurysms  Discussed that based on ISUIA she has a < 1%/yr risk of aneurysm rupture  Discussed modifiable risk factors including blood pressure control, cholesterol control and smoking cessation  Discussed signs and symptoms of aneurysm rupture including severe, sudden onset headache, neck pain, nausea and vomiting, weakness, confusion and seizure  Reiterated that these symptoms should prompt the patient to visit an emergency department immediately  Patient is to maintain good blood pressure control with systolic blood pressure 728  She is to maintain good cholesterol control with diet and medication necessary she is to refrain from smoking  Plan outpatient follow up in 1 year with CTA head with and without contrast prior to appointment

## 2023-04-06 ENCOUNTER — PATIENT OUTREACH (OUTPATIENT)
Dept: FAMILY MEDICINE CLINIC | Facility: CLINIC | Age: 83
End: 2023-04-06

## 2023-04-06 NOTE — PROGRESS NOTES
This SW has not yet spoken with pt, as pt currently resides at [facility], per [employer]  SW reassigned pt to OP [place] Carpenter who covers this practice  Note routed to Braydon

## 2023-04-16 PROBLEM — N39.0 URINARY TRACT INFECTION WITHOUT HEMATURIA: Status: RESOLVED | Noted: 2023-02-15 | Resolved: 2023-04-16

## 2023-05-03 ENCOUNTER — TELEPHONE (OUTPATIENT)
Dept: FAMILY MEDICINE CLINIC | Facility: CLINIC | Age: 83
End: 2023-05-03

## 2023-05-03 DIAGNOSIS — M54.50 LOW BACK PAIN WITHOUT SCIATICA, UNSPECIFIED BACK PAIN LATERALITY, UNSPECIFIED CHRONICITY: Primary | ICD-10-CM

## 2023-05-03 RX ORDER — TIZANIDINE 2 MG/1
2 TABLET ORAL 2 TIMES DAILY PRN
Qty: 30 TABLET | Refills: 0 | Status: SHIPPED | OUTPATIENT
Start: 2023-05-03 | End: 2023-08-07

## 2023-05-03 NOTE — TELEPHONE ENCOUNTER
I will send in for muscle relaxer to take twice daily as needed however if patient is having significant discomfort or symptoms are not improving on medication if she should be evaluated at an urgent care or emergency room in the future

## 2023-05-03 NOTE — TELEPHONE ENCOUNTER
Baldomero Baig is a nurse from [facility] that received a phone call from patients son stating that patient injured her back   Its  Muscular and they would like to know if you could prescribe a muscle relaxer for her  Please call to advise if this could be sent

## 2023-05-04 ENCOUNTER — HOSPITAL ENCOUNTER (INPATIENT)
Facility: HOSPITAL | Age: 83
LOS: 3 days | Discharge: NURSING, REHAB OR LONG-TERM CARE | End: 2023-05-08
Attending: EMERGENCY MEDICINE | Admitting: FAMILY MEDICINE

## 2023-05-04 ENCOUNTER — APPOINTMENT (EMERGENCY)
Dept: RADIOLOGY | Facility: HOSPITAL | Age: 83
End: 2023-05-04

## 2023-05-04 DIAGNOSIS — I48.91 ATRIAL FIBRILLATION, UNSPECIFIED TYPE (HCC): ICD-10-CM

## 2023-05-04 DIAGNOSIS — R09.02 HYPOXIA: ICD-10-CM

## 2023-05-04 DIAGNOSIS — M81.0 AGE RELATED OSTEOPOROSIS, UNSPECIFIED PATHOLOGICAL FRACTURE PRESENCE: ICD-10-CM

## 2023-05-04 DIAGNOSIS — R26.2 AMBULATORY DYSFUNCTION: ICD-10-CM

## 2023-05-04 DIAGNOSIS — J44.1 COPD EXACERBATION (HCC): Primary | ICD-10-CM

## 2023-05-04 LAB
2HR DELTA HS TROPONIN: -1 NG/L
4HR DELTA HS TROPONIN: -1 NG/L
ALBUMIN SERPL BCP-MCNC: 3.5 G/DL (ref 3.5–5)
ALP SERPL-CCNC: 121 U/L (ref 34–104)
ALT SERPL W P-5'-P-CCNC: 13 U/L (ref 7–52)
ANION GAP SERPL CALCULATED.3IONS-SCNC: 4 MMOL/L (ref 4–13)
APTT PPP: 54 SECONDS (ref 23–37)
AST SERPL W P-5'-P-CCNC: 18 U/L (ref 13–39)
BASE EXCESS BLDA CALC-SCNC: 9 MMOL/L (ref -2–3)
BASOPHILS # BLD AUTO: 0.02 THOUSANDS/ÂΜL (ref 0–0.1)
BASOPHILS NFR BLD AUTO: 0 % (ref 0–1)
BILIRUB SERPL-MCNC: 0.73 MG/DL (ref 0.2–1)
BNP SERPL-MCNC: 138 PG/ML (ref 0–100)
BUN SERPL-MCNC: 26 MG/DL (ref 5–25)
CA-I BLD-SCNC: 1.21 MMOL/L (ref 1.12–1.32)
CALCIUM SERPL-MCNC: 9.6 MG/DL (ref 8.4–10.2)
CARDIAC TROPONIN I PNL SERPL HS: 7 NG/L
CARDIAC TROPONIN I PNL SERPL HS: 7 NG/L
CARDIAC TROPONIN I PNL SERPL HS: 8 NG/L
CHLORIDE SERPL-SCNC: 99 MMOL/L (ref 96–108)
CO2 SERPL-SCNC: 34 MMOL/L (ref 21–32)
CREAT SERPL-MCNC: 0.81 MG/DL (ref 0.6–1.3)
CRP SERPL QL: 61.9 MG/L
EOSINOPHIL # BLD AUTO: 0.06 THOUSAND/ÂΜL (ref 0–0.61)
EOSINOPHIL NFR BLD AUTO: 1 % (ref 0–6)
ERYTHROCYTE [DISTWIDTH] IN BLOOD BY AUTOMATED COUNT: 17.2 % (ref 11.6–15.1)
FLUAV RNA RESP QL NAA+PROBE: NEGATIVE
FLUBV RNA RESP QL NAA+PROBE: NEGATIVE
GFR SERPL CREATININE-BSD FRML MDRD: 66 ML/MIN/1.73SQ M
GLUCOSE SERPL-MCNC: 89 MG/DL (ref 65–140)
GLUCOSE SERPL-MCNC: 90 MG/DL (ref 65–140)
HCO3 BLDA-SCNC: 34.5 MMOL/L (ref 24–30)
HCT VFR BLD AUTO: 31.8 % (ref 34.8–46.1)
HCT VFR BLD CALC: 32 % (ref 34.8–46.1)
HGB BLD-MCNC: 10.7 G/DL (ref 11.5–15.4)
HGB BLDA-MCNC: 10.9 G/DL (ref 11.5–15.4)
IMM GRANULOCYTES # BLD AUTO: 0.02 THOUSAND/UL (ref 0–0.2)
IMM GRANULOCYTES NFR BLD AUTO: 0 % (ref 0–2)
INR PPP: 3.8 (ref 0.84–1.19)
LACTATE SERPL-SCNC: 0.8 MMOL/L (ref 0.5–2)
LYMPHOCYTES # BLD AUTO: 0.8 THOUSANDS/ÂΜL (ref 0.6–4.47)
LYMPHOCYTES NFR BLD AUTO: 11 % (ref 14–44)
MCH RBC QN AUTO: 36.1 PG (ref 26.8–34.3)
MCHC RBC AUTO-ENTMCNC: 33.6 G/DL (ref 31.4–37.4)
MCV RBC AUTO: 107 FL (ref 82–98)
MONOCYTES # BLD AUTO: 0.49 THOUSAND/ÂΜL (ref 0.17–1.22)
MONOCYTES NFR BLD AUTO: 7 % (ref 4–12)
NEUTROPHILS # BLD AUTO: 5.6 THOUSANDS/ÂΜL (ref 1.85–7.62)
NEUTS SEG NFR BLD AUTO: 81 % (ref 43–75)
NRBC BLD AUTO-RTO: 0 /100 WBCS
PCO2 BLD: 36 MMOL/L (ref 21–32)
PCO2 BLD: 52 MM HG (ref 42–50)
PH BLD: 7.43 [PH] (ref 7.3–7.4)
PLATELET # BLD AUTO: 322 THOUSANDS/UL (ref 149–390)
PMV BLD AUTO: 9 FL (ref 8.9–12.7)
PO2 BLD: 27 MM HG (ref 35–45)
POTASSIUM BLD-SCNC: 4.6 MMOL/L (ref 3.5–5.3)
POTASSIUM SERPL-SCNC: 4.6 MMOL/L (ref 3.5–5.3)
PROCALCITONIN SERPL-MCNC: 0.27 NG/ML
PROCALCITONIN SERPL-MCNC: 0.28 NG/ML
PROT SERPL-MCNC: 7 G/DL (ref 6.4–8.4)
PROTHROMBIN TIME: 39.1 SECONDS (ref 11.6–14.5)
RBC # BLD AUTO: 2.96 MILLION/UL (ref 3.81–5.12)
RSV RNA RESP QL NAA+PROBE: NEGATIVE
SAO2 % BLD FROM PO2: 51 % (ref 60–85)
SARS-COV-2 RNA RESP QL NAA+PROBE: NEGATIVE
SODIUM BLD-SCNC: 137 MMOL/L (ref 136–145)
SODIUM SERPL-SCNC: 137 MMOL/L (ref 135–147)
SPECIMEN SOURCE: ABNORMAL
WBC # BLD AUTO: 6.99 THOUSAND/UL (ref 4.31–10.16)

## 2023-05-04 RX ORDER — LEVOTHYROXINE SODIUM 0.03 MG/1
25 TABLET ORAL
Status: DISCONTINUED | OUTPATIENT
Start: 2023-05-05 | End: 2023-05-08 | Stop reason: HOSPADM

## 2023-05-04 RX ORDER — SPIRONOLACTONE 25 MG/1
25 TABLET ORAL DAILY
Status: DISCONTINUED | OUTPATIENT
Start: 2023-05-05 | End: 2023-05-08 | Stop reason: HOSPADM

## 2023-05-04 RX ORDER — DILTIAZEM HYDROCHLORIDE 120 MG/1
240 CAPSULE, COATED, EXTENDED RELEASE ORAL DAILY
Status: DISCONTINUED | OUTPATIENT
Start: 2023-05-05 | End: 2023-05-08 | Stop reason: HOSPADM

## 2023-05-04 RX ORDER — ASCORBIC ACID 500 MG
1000 TABLET ORAL DAILY
Status: DISCONTINUED | OUTPATIENT
Start: 2023-05-05 | End: 2023-05-08 | Stop reason: HOSPADM

## 2023-05-04 RX ORDER — LOSARTAN POTASSIUM 25 MG/1
25 TABLET ORAL DAILY
Status: DISCONTINUED | OUTPATIENT
Start: 2023-05-05 | End: 2023-05-08 | Stop reason: HOSPADM

## 2023-05-04 RX ORDER — LEVALBUTEROL 1.25 MG/.5ML
1.25 SOLUTION, CONCENTRATE RESPIRATORY (INHALATION)
Status: DISCONTINUED | OUTPATIENT
Start: 2023-05-04 | End: 2023-05-04

## 2023-05-04 RX ORDER — MIRTAZAPINE 15 MG/1
7.5 TABLET, FILM COATED ORAL
Status: DISCONTINUED | OUTPATIENT
Start: 2023-05-04 | End: 2023-05-08 | Stop reason: HOSPADM

## 2023-05-04 RX ORDER — ACETAMINOPHEN 325 MG/1
975 TABLET ORAL EVERY 8 HOURS SCHEDULED
Status: DISCONTINUED | OUTPATIENT
Start: 2023-05-04 | End: 2023-05-08 | Stop reason: HOSPADM

## 2023-05-04 RX ORDER — AZITHROMYCIN 500 MG/1
500 TABLET, FILM COATED ORAL EVERY 24 HOURS
Status: COMPLETED | OUTPATIENT
Start: 2023-05-04 | End: 2023-05-04

## 2023-05-04 RX ORDER — GUAIFENESIN 600 MG/1
600 TABLET, EXTENDED RELEASE ORAL 2 TIMES DAILY
Status: DISCONTINUED | OUTPATIENT
Start: 2023-05-04 | End: 2023-05-08 | Stop reason: HOSPADM

## 2023-05-04 RX ORDER — CEFTRIAXONE 1 G/50ML
1000 INJECTION, SOLUTION INTRAVENOUS EVERY 24 HOURS
Status: DISCONTINUED | OUTPATIENT
Start: 2023-05-04 | End: 2023-05-05

## 2023-05-04 RX ORDER — FLUTICASONE FUROATE AND VILANTEROL 100; 25 UG/1; UG/1
1 POWDER RESPIRATORY (INHALATION)
Status: DISCONTINUED | OUTPATIENT
Start: 2023-05-05 | End: 2023-05-08 | Stop reason: HOSPADM

## 2023-05-04 RX ORDER — ONDANSETRON 2 MG/ML
4 INJECTION INTRAMUSCULAR; INTRAVENOUS EVERY 6 HOURS PRN
Status: DISCONTINUED | OUTPATIENT
Start: 2023-05-04 | End: 2023-05-08 | Stop reason: HOSPADM

## 2023-05-04 RX ORDER — LIDOCAINE 50 MG/G
1 PATCH TOPICAL DAILY
Status: DISCONTINUED | OUTPATIENT
Start: 2023-05-05 | End: 2023-05-08 | Stop reason: HOSPADM

## 2023-05-04 RX ORDER — WARFARIN SODIUM 2 MG/1
2 TABLET ORAL
Status: DISCONTINUED | OUTPATIENT
Start: 2023-05-05 | End: 2023-05-05

## 2023-05-04 RX ORDER — IPRATROPIUM BROMIDE AND ALBUTEROL SULFATE 2.5; .5 MG/3ML; MG/3ML
3 SOLUTION RESPIRATORY (INHALATION) ONCE
Status: COMPLETED | OUTPATIENT
Start: 2023-05-04 | End: 2023-05-04

## 2023-05-04 RX ORDER — AMOXICILLIN 250 MG
1 CAPSULE ORAL
Status: DISCONTINUED | OUTPATIENT
Start: 2023-05-04 | End: 2023-05-08 | Stop reason: HOSPADM

## 2023-05-04 RX ORDER — TIZANIDINE 2 MG/1
2 TABLET ORAL 2 TIMES DAILY PRN
Status: DISCONTINUED | OUTPATIENT
Start: 2023-05-04 | End: 2023-05-08 | Stop reason: HOSPADM

## 2023-05-04 RX ORDER — ACETAMINOPHEN 325 MG/1
650 TABLET ORAL ONCE
Status: COMPLETED | OUTPATIENT
Start: 2023-05-04 | End: 2023-05-04

## 2023-05-04 RX ORDER — LORATADINE 10 MG/1
10 TABLET ORAL DAILY
Status: DISCONTINUED | OUTPATIENT
Start: 2023-05-05 | End: 2023-05-08 | Stop reason: HOSPADM

## 2023-05-04 RX ORDER — IPRATROPIUM BROMIDE AND ALBUTEROL SULFATE 2.5; .5 MG/3ML; MG/3ML
3 SOLUTION RESPIRATORY (INHALATION)
Status: DISCONTINUED | OUTPATIENT
Start: 2023-05-04 | End: 2023-05-04

## 2023-05-04 RX ORDER — LEVALBUTEROL INHALATION SOLUTION 1.25 MG/3ML
1.25 SOLUTION RESPIRATORY (INHALATION)
Status: DISCONTINUED | OUTPATIENT
Start: 2023-05-05 | End: 2023-05-06

## 2023-05-04 RX ORDER — MELATONIN
1000 DAILY
Status: DISCONTINUED | OUTPATIENT
Start: 2023-05-05 | End: 2023-05-08 | Stop reason: HOSPADM

## 2023-05-04 RX ORDER — METHYLPREDNISOLONE SODIUM SUCCINATE 125 MG/2ML
60 INJECTION, POWDER, LYOPHILIZED, FOR SOLUTION INTRAMUSCULAR; INTRAVENOUS ONCE
Status: COMPLETED | OUTPATIENT
Start: 2023-05-04 | End: 2023-05-04

## 2023-05-04 RX ADMIN — GUAIFENESIN 600 MG: 600 TABLET ORAL at 18:11

## 2023-05-04 RX ADMIN — IPRATROPIUM BROMIDE 0.5 MG: 0.5 SOLUTION RESPIRATORY (INHALATION) at 19:21

## 2023-05-04 RX ADMIN — CEFTRIAXONE 1000 MG: 1 INJECTION, SOLUTION INTRAVENOUS at 18:04

## 2023-05-04 RX ADMIN — MIRTAZAPINE 7.5 MG: 15 TABLET, FILM COATED ORAL at 21:50

## 2023-05-04 RX ADMIN — AZITHROMYCIN MONOHYDRATE 500 MG: 500 TABLET ORAL at 18:06

## 2023-05-04 RX ADMIN — METHYLPREDNISOLONE SODIUM SUCCINATE 60 MG: 125 INJECTION, POWDER, FOR SOLUTION INTRAMUSCULAR; INTRAVENOUS at 16:19

## 2023-05-04 RX ADMIN — ACETAMINOPHEN 325MG 975 MG: 325 TABLET ORAL at 21:50

## 2023-05-04 RX ADMIN — LEVALBUTEROL HYDROCHLORIDE 1.25 MG: 1.25 SOLUTION, CONCENTRATE RESPIRATORY (INHALATION) at 19:21

## 2023-05-04 RX ADMIN — ACETAMINOPHEN 325MG 650 MG: 325 TABLET ORAL at 16:19

## 2023-05-04 RX ADMIN — TIZANIDINE 2 MG: 2 TABLET ORAL at 18:11

## 2023-05-04 RX ADMIN — IPRATROPIUM BROMIDE AND ALBUTEROL SULFATE 3 ML: 2.5; .5 SOLUTION RESPIRATORY (INHALATION) at 13:04

## 2023-05-04 NOTE — ASSESSMENT & PLAN NOTE
· Maintained on digoxin and diltiazem outpatient, continue  · Maintained on Coumadin, INR 3 80 on admission, hold evening of 5/4 and consider resuming tomorrow

## 2023-05-04 NOTE — ASSESSMENT & PLAN NOTE
· Maintained on Advair and Spiriva outpatient with prn albuterol inhaler   · Requiring 2L NC in ED to maintain saturations  · Wean as tolerated  · Treat with IV Solumedrol, supportive care, respiratory protocol

## 2023-05-04 NOTE — ASSESSMENT & PLAN NOTE
· Was recently discharged from physical therapy, no acute fall noted    · CT spine pending  · Patient recently with worsening back pain, started on Zanaflex day prior to admission  · Patient and family discussed with ED who recommended admission with orthopedic consult  · Orthopedic consult placed

## 2023-05-04 NOTE — H&P
[place]  H&P  Name: Desiree Solo 79 y o  female I MRN: [id number]  Unit/Bed#: -01 I Date of Admission: 5/4/2023   Date of Service: 5/4/2023 I Hospital Day: 0      Assessment/Plan   * Chronic obstructive pulmonary disease with acute exacerbation (HCC)  Assessment & Plan  · Maintained on Advair and Spiriva outpatient with prn albuterol inhaler   · Requiring 2L NC in ED to maintain saturations  · Wean as tolerated  · Treat with IV Solumedrol, supportive care, respiratory protocol    Essential hypertension  Assessment & Plan  · Maintained on losartan outpatient, continue    Hypothyroidism  Assessment & Plan  · Maintained on levothyroxine 25mcg daily, continue    Low back pain without sciatica  Assessment & Plan  · Was recently discharged from physical therapy, no acute fall noted  · CT spine pending  · Patient recently with worsening back pain, started on Zanaflex day prior to admission  · Patient and family discussed with ED who recommended admission with orthopedic consult  · Orthopedic consult placed    Atrial fibrillation [facility])  Assessment & Plan  · Maintained on digoxin and diltiazem outpatient, continue  · Maintained on Coumadin, INR 3 80 on admission, hold evening of 5/4 and consider resuming tomorrow    Antiphospholipid antibody syndrome ([facility] [address] )  Assessment & Plan  · Maintained on Coumadin outpatient, with a goal of 2-3  · INR 3 80 on admission, hold coumadin tonight  · Trend INR daily       VTE Pharmacologic Prophylaxis: VTE Score: 10 High Risk (Score >/= 5) - Pharmacological DVT Prophylaxis Contraindicated  Sequential Compression Devices Ordered  Code Status: Level 2 - DNAR: but accepts endotracheal intubation   Discussion with family: Updated  (son) at bedside  Anticipated Length of Stay: Patient will be admitted on an inpatient basis with an anticipated length of stay of greater than 2 midnights secondary to COPD exacerbation      Total Time Spent on Date of Encounter in care of patient: 65 minutes This time was spent on one or more of the following: performing physical exam; counseling and coordination of care; obtaining or reviewing history; documenting in the medical record; reviewing/ordering tests, medications or procedures; communicating with other healthcare professionals and discussing with patient's family/caregivers  Chief Complaint: back and leg pain    History of Present Illness:  Erma Moore is a 79 y o  female with a PMH of low back pain, COPD, atrial fibrillation, antiphospholipid antibody syndrome, hypertension who presents with back and leg pain  Patient also reports that she has become more short of breath over the past few days with activity  She was recently discharged from a rehab facility  Suspect exacerbation of patient's low back pain, she was recently started on muscle relaxer the day prior to admission and was advised to follow with Dr Natalie Valdez (sp) for outpatient injections  Per family, ER recommended they be admitted for orthopedic evaluation, orthopedic consult placed  On admission, patient needed 2 L nasal cannula to maintain oxygenation  Procalcitonin slightly elevated, started on Omnicef and azithromycin  Suspect she may have contracted pneumonia while at rehab facility  Treat for COPD exacerbation with IV steroids as well, respiratory protocol  We will have PT/OT evaluate patient  Review of Systems:  Review of Systems   Respiratory: Positive for cough and shortness of breath  All other systems reviewed and are negative        Past Medical and Surgical History:   Past Medical History:   Diagnosis Date   • Anti-phospholipid syndrome ([facility] [address] )    • Asthma    • Blood type O+    • Cardiac disease    • Chronic pain    • COPD (chronic obstructive pulmonary disease) (HCC)    • Fracture of ankle     left   • Hyperlipidemia    • Hypertension    • Hypokalemia 2/27/2023   • Osteoporosis        Past Surgical History: Procedure Laterality Date   • APPENDECTOMY     • BILATERAL OOPHORECTOMY Bilateral    • ORIF TIBIAL SHAFT FRACTURE W/ PLATES AND SCREWS Right        Meds/Allergies:  Prior to Admission medications    Medication Sig Start Date End Date Taking?  Authorizing Provider   tiZANidine (ZANAFLEX) 2 mg tablet Take 1 tablet (2 mg total) by mouth 2 (two) times a day as needed for muscle spasms 0/3/00   Anna Ortiz MD   acetaminophen (TYLENOL) 325 mg tablet Take 3 tablets (975 mg total) by mouth every 8 (eight) hours 3/13/23   Colletta Emerald, MD   Advair Diskus 250-50 MCG/DOSE inhaler USE 1 INHALATION TWICE A DAY 8/8/69   Anna Ortiz MD   albuterol (2 5 mg/3 mL) 0 083 % nebulizer solution USE 1 VIAL IN NEBULIZER EVERY 4 TO 6 HOURS AS NEEDED FOR COUGH AND WHEEZE 5/14/79   Anna Ortiz MD   Ascorbic Acid (VITAMIN C) 1000 MG tablet Take 1,000 mg by mouth daily    Historical Provider, MD   Cholecalciferol (VITAMIN D3) 20 MCG (800 UNIT) TABS Take by mouth    Historical Provider, MD   Cholecalciferol (Vitamin D3) 25 MCG TABS Take 1 tablet by mouth daily 4/7/23   Historical Provider, MD   denosumab (PROLIA) 60 mg/mL Inject 60 mg under the skin once 6 months  Patient not taking: Reported on 4/4/2023    Historical Provider, MD   digoxin (LANOXIN) 0 25 mg tablet  2/24/23   Historical Provider, MD   diltiazem (CARDIZEM CD) 240 mg 24 hr capsule Take 1 capsule (240 mg total) by mouth daily 7/92/21   Anna Ortiz MD   Ferrous Sulfate 220 (44 Fe) MG/5ML LIQD take 1 teaspoonful by mouth once daily 8/44/73   Anna Ortiz MD   levothyroxine 25 mcg tablet TAKE 1 TABLET DAILY 76/30/92   Anna Ortiz MD   lidocaine (LIDODERM) 5 % Apply 1 patch topically over 12 hours daily Remove & Discard patch within 12 hours or as directed by MD 7/54/79   Anna Ortiz MD   loratadine (CLARITIN) 10 mg tablet Take 10 mg by mouth daily    Historical Provider, MD   losartan (COZAAR) 25 mg tablet Take 1 tablet (25 mg total) by mouth daily Do not start before February 25, 2023 2/25/23   Lacho Deng MD   methylphenidate (Ritalin) 5 mg tablet P o  5 mg (1 tablet) twice daily with breakfast (8 am) and lunch (11:30 am) for 3 days and then 5 mg (1 tablet) daily with breakfast until her mood improves  Should be revaluated by physician  Patient not taking: Reported on 4/4/2023 3/13/23   Cony Martin MD   mirtazapine (REMERON) 7 5 MG tablet Take 1 tablet (7 5 mg total) by mouth daily at bedtime 9/32/00   Socorro Sarabia MD   multivitamin [facility]) TABS Take 1 tablet by mouth daily    Historical Provider, MD   senna-docusate sodium (SENOKOT S) 8 6-50 mg per tablet Take 1 tablet by mouth daily at bedtime 3/13/23   Romana Mullet, MD   sertraline (Zoloft) 50 mg tablet Take 1 tablet (50 mg total) by mouth daily 7/79/68   Socorro Sarabia MD   spironolactone (ALDACTONE) 25 mg tablet Take 1 tablet (25 mg total) by mouth daily Do not start before March 14, 2023  3/14/23   Romana Mullet, MD   tiotropium (Spiriva HandiHaler) 18 mcg inhalation capsule Place 1 capsule (18 mcg total) into inhaler and inhale daily As directed 5/66/09   Socorro Sarabia MD   warfarin (COUMADIN) 2 mg tablet Take 2 mg daily, adjust to keep INR therapeutic, between 2-3 3/13/23   Romana Mullet, MD     I have reviewed home medications with patient family member  Allergies: Allergies   Allergen Reactions   • Aspirin Other (See Comments)   • Penicillins Rash and Hives   • Sulfa Antibiotics Rash       Social History:  Marital Status:     Occupation: retired  Patient Pre-hospital Living Situation: Home  Patient Pre-hospital Level of Mobility: walks with walker  Patient Pre-hospital Diet Restrictions: Low fat, low salt   Substance Use History:   Social History     Substance and Sexual Activity   Alcohol Use No     Social History     Tobacco Use   Smoking Status Former   • Types: Cigarettes   Smokeless Tobacco Never   Tobacco Comments    Never smoker per [employer]     Social History     Substance and Sexual Activity   Drug Use No       Family History:  Family History   Problem Relation Age of Onset   • Hypertension Mother    • Skin cancer Mother    • Hypertension Father        Physical Exam:     Vitals:   Blood Pressure: 132/52 (05/04/23 1743)  Pulse: 77 (05/04/23 1743)  Temperature: 97 7 °F (36 5 °C) (05/04/23 1743)  Temp Source: Temporal (05/04/23 1743)  Respirations: 20 (05/04/23 1623)  Height: 5' (152 4 cm) (05/04/23 1741)  Weight - Scale: 47 3 kg (104 lb 4 4 oz) (05/04/23 1741)  SpO2: 93 % (05/04/23 1743)    Physical Exam  Vitals and nursing note reviewed  Constitutional:       General: She is not in acute distress  Appearance: Normal appearance  She is well-developed  HENT:      Head: Normocephalic and atraumatic  Eyes:      General: No scleral icterus  Conjunctiva/sclera: Conjunctivae normal    Cardiovascular:      Rate and Rhythm: Normal rate and regular rhythm  Pulmonary:      Effort: Pulmonary effort is normal       Breath sounds: Decreased air movement present  Decreased breath sounds present  No wheezing, rhonchi or rales  Abdominal:      General: There is no distension  Palpations: Abdomen is soft  Skin:     General: Skin is warm and dry  Neurological:      General: No focal deficit present  Mental Status: She is alert     Psychiatric:         Mood and Affect: Mood normal          Additional Data:     Lab Results:  Results from last 7 days   Lab Units 05/04/23  1318 05/04/23  1309   WBC Thousand/uL  --  6 99   HEMOGLOBIN g/dL  --  10 7*   I STAT HEMOGLOBIN g/dl 10 9*  --    HEMATOCRIT %  --  31 8*   HEMATOCRIT, ISTAT % 32*  --    PLATELETS Thousands/uL  --  322   NEUTROS PCT %  --  81*   LYMPHS PCT %  --  11*   MONOS PCT %  --  7   EOS PCT %  --  1     Results from last 7 days   Lab Units 05/04/23  1318 05/04/23  1309   SODIUM mmol/L  --  137   POTASSIUM mmol/L  --  4 6   CHLORIDE mmol/L  --  99   CO2 mmol/L  --  34*   CO2, I-STAT mmol/L 36* --    BUN mg/dL  --  26*   CREATININE mg/dL  --  0 81   ANION GAP mmol/L  --  4   CALCIUM mg/dL  --  9 6   ALBUMIN g/dL  --  3 5   TOTAL BILIRUBIN mg/dL  --  0 73   ALK PHOS U/L  --  121*   ALT U/L  --  13   AST U/L  --  18   GLUCOSE RANDOM mg/dL  --  90     Results from last 7 days   Lab Units 05/04/23  1309   INR  3 80*             Results from last 7 days   Lab Units 05/04/23  1309   LACTIC ACID mmol/L 0 8   PROCALCITONIN ng/ml 0 27*       Lines/Drains:  Invasive Devices     Peripheral Intravenous Line  Duration           Long-Dwell Peripheral IV (Midline) 79/05/19 Left Basilic 59 days    Peripheral IV 05/04/23 Right Antecubital <1 day                    Imaging: Reviewed radiology reports from this admission including: chest xray  XR hip/pelv 2-3 vws right   ED Interpretation by Helen Abernathy DO (05/04 7726)   No acute fracture or dislocation      XR chest pa & lateral   ED Interpretation by Helen Abernathy DO (05/04 8636)   No acute infiltrate or pneumothorax there is chronic right basilar atelectasis seen on prior studies          EKG and Other Studies Reviewed on Admission:   · EKG: NSR  HR 71bpm     ** Please Note: This note has been constructed using a voice recognition system   **

## 2023-05-04 NOTE — ED PROVIDER NOTES
"History  Chief Complaint   Patient presents with   • Back Pain     Pt to er with reports of back pain that started the other day, son reports that he noted a \"bulging disc at the bottom\"  Tried heat, ice, muscle relaxer, and lidocaine patch  Right leg on and off numb   • Shortness of Breath     Shortness of breath started 3-4 days ago  Usually sob on exertion, but the past 3-4 days it has been when she is laying flat, and son states he hears \"crackles\"  Pulse ox at home is \"93% on average\"      This is an 79-year-old female who lives at home alone presents complaining of shortness of breath dyspnea on exertion and right hip pain over the past several days  Was recently admitted to [facility] for pneumonia  Has occasional numbness to the lateral aspect of the right thigh  Has not been able to ambulate on her own at home over the past 3 days son has been carrying her she is on Coumadin for atrial fibrillation  She is a former smoker with a history of COPD with low oxygen saturations on presentation and was placed on 2 L nasal cannula  Does have occasional back pain but not currently  Patient also does have a history of lumbar compression fractures and chronic recurrent pain but she is usually to get around and ambulate on her own power  History provided by:  Patient and relative  Medical Problem  Location:  Right hip  Quality:  Achy pain  Severity:  Moderate  Onset quality:  Gradual  Timing:  Intermittent  Progression:  Waxing and waning  Chronicity:  New  Context:  79year-old with right hip pain and shortness of breath over the past few days  Worsened by:  Exertion  Associated symptoms: cough, fatigue and shortness of breath        Prior to Admission Medications   Prescriptions Last Dose Informant Patient Reported? Taking?    Advair Diskus 250-50 MCG/DOSE inhaler   No No   Sig: USE 1 INHALATION TWICE A DAY   Ascorbic Acid (VITAMIN C) 1000 MG tablet   Yes No   Sig: Take 1,000 mg by mouth daily " Cholecalciferol (VITAMIN D3) 20 MCG (800 UNIT) TABS   Yes No   Sig: Take by mouth   Cholecalciferol (Vitamin D3) 25 MCG TABS   Yes No   Sig: Take 1 tablet by mouth daily   Ferrous Sulfate 220 (44 Fe) MG/5ML LIQD   No No   Sig: take 1 teaspoonful by mouth once daily   acetaminophen (TYLENOL) 325 mg tablet   No No   Sig: Take 3 tablets (975 mg total) by mouth every 8 (eight) hours   albuterol (2 5 mg/3 mL) 0 083 % nebulizer solution   No No   Sig: USE 1 VIAL IN NEBULIZER EVERY 4 TO 6 HOURS AS NEEDED FOR COUGH AND WHEEZE   denosumab (PROLIA) 60 mg/mL   Yes No   Sig: Inject 60 mg under the skin once 6 months   Patient not taking: Reported on 4/4/2023   digoxin (LANOXIN) 0 25 mg tablet   Yes No   diltiazem (CARDIZEM CD) 240 mg 24 hr capsule   No No   Sig: Take 1 capsule (240 mg total) by mouth daily   levothyroxine 25 mcg tablet   No No   Sig: TAKE 1 TABLET DAILY   lidocaine (LIDODERM) 5 %   No No   Sig: Apply 1 patch topically over 12 hours daily Remove & Discard patch within 12 hours or as directed by MD   loratadine (CLARITIN) 10 mg tablet   Yes No   Sig: Take 10 mg by mouth daily   losartan (COZAAR) 25 mg tablet   No No   Sig: Take 1 tablet (25 mg total) by mouth daily Do not start before February 25, 2023  methylphenidate (Ritalin) 5 mg tablet   No No   Sig: P o  5 mg (1 tablet) twice daily with breakfast (8 am) and lunch (11:30 am) for 3 days and then 5 mg (1 tablet) daily with breakfast until her mood improves  Should be revaluated by physician     Patient not taking: Reported on 4/4/2023   mirtazapine (REMERON) 7 5 MG tablet   No No   Sig: Take 1 tablet (7 5 mg total) by mouth daily at bedtime   multivitamin (THERAGRAN) TABS   Yes No   Sig: Take 1 tablet by mouth daily   senna-docusate sodium (SENOKOT S) 8 6-50 mg per tablet   No No   Sig: Take 1 tablet by mouth daily at bedtime   sertraline (Zoloft) 50 mg tablet   No No   Sig: Take 1 tablet (50 mg total) by mouth daily   spironolactone (ALDACTONE) 25 mg tablet   No No   Sig: Take 1 tablet (25 mg total) by mouth daily Do not start before March 14, 2023  tiZANidine (ZANAFLEX) 2 mg tablet   No No   Sig: Take 1 tablet (2 mg total) by mouth 2 (two) times a day as needed for muscle spasms   tiotropium (Spiriva HandiHaler) 18 mcg inhalation capsule   No No   Sig: Place 1 capsule (18 mcg total) into inhaler and inhale daily As directed   warfarin (COUMADIN) 2 mg tablet   No No   Sig: Take 2 mg daily, adjust to keep INR therapeutic, between 2-3      Facility-Administered Medications: None       Past Medical History:   Diagnosis Date   • Anti-phospholipid syndrome (HCC)    • Asthma    • Blood type O+    • Cardiac disease    • Chronic pain    • COPD (chronic obstructive pulmonary disease) (HCC)    • Fracture of ankle     left   • Hyperlipidemia    • Hypertension    • Hypokalemia 2/27/2023   • Osteoporosis        Past Surgical History:   Procedure Laterality Date   • APPENDECTOMY     • BILATERAL OOPHORECTOMY Bilateral    • ORIF TIBIAL SHAFT FRACTURE W/ PLATES AND SCREWS Right        Family History   Problem Relation Age of Onset   • Hypertension Mother    • Skin cancer Mother    • Hypertension Father      I have reviewed and agree with the history as documented  E-Cigarette/Vaping   • E-Cigarette Use Never User      E-Cigarette/Vaping Substances   • Nicotine No    • THC No      Social History     Tobacco Use   • Smoking status: Former     Types: Cigarettes   • Smokeless tobacco: Never   • Tobacco comments:     Never smoker per [employer]   Vaping Use   • Vaping Use: Never used   Substance Use Topics   • Alcohol use: No   • Drug use: No       Review of Systems   Constitutional: Positive for fatigue  Respiratory: Positive for cough and shortness of breath  Musculoskeletal: Positive for gait problem  Right hip pain   Neurological: Positive for weakness  All other systems reviewed and are negative        Physical Exam  Physical Exam  Vitals and nursing note reviewed  Constitutional:       Appearance: She is not toxic-appearing or diaphoretic  HENT:      Head: Normocephalic and atraumatic  Right Ear: External ear normal       Left Ear: External ear normal    Eyes:      General:         Right eye: No discharge  Left eye: No discharge  Extraocular Movements: Extraocular movements intact  Pupils: Pupils are equal, round, and reactive to light  Cardiovascular:      Rate and Rhythm: Normal rate and regular rhythm  Pulses: Normal pulses  Heart sounds: No murmur heard  No friction rub  No gallop  Pulmonary:      Breath sounds: Wheezing and rhonchi present  No rales  Abdominal:      General: There is no distension  Palpations: Abdomen is soft  Tenderness: There is no abdominal tenderness  There is no guarding or rebound  Musculoskeletal:      Cervical back: Normal range of motion and neck supple  No rigidity or tenderness  Right lower leg: Edema present  Left lower leg: Edema present  Comments: Pain with internal and external rotation of the right hip   Skin:     Coloration: Skin is pale  Skin is not jaundiced  Findings: No bruising, erythema or rash  Neurological:      General: No focal deficit present  Mental Status: She is alert and oriented to person, place, and time  Cranial Nerves: No cranial nerve deficit  Sensory: Sensory deficit present        Comments: Decrease sensation to touch only over the right lateral thigh area   Psychiatric:         Mood and Affect: Mood normal          Behavior: Behavior normal          Vital Signs  ED Triage Vitals   Temperature Pulse Respirations Blood Pressure SpO2   05/04/23 1215 05/04/23 1215 05/04/23 1215 05/04/23 1215 05/04/23 1215   97 8 °F (36 6 °C) 80 20 129/62 (!) 89 %      Temp Source Heart Rate Source Patient Position - Orthostatic VS BP Location FiO2 (%)   05/04/23 1215 05/04/23 1215 05/04/23 1215 05/04/23 1215 --   Temporal Monitor Sitting Left arm       Pain Score       05/04/23 1302       8           Vitals:    05/04/23 1215 05/04/23 1302 05/04/23 1412 05/04/23 1623   BP: 129/62 135/70 145/84 138/67   Pulse: 80 73 70 87   Patient Position - Orthostatic VS: Sitting Sitting Sitting Sitting         Visual Acuity      ED Medications  Medications   ipratropium-albuterol (DUO-NEB) 0 5-2 5 mg/3 mL inhalation solution 3 mL (3 mL Nebulization Given 5/4/23 1304)   methylPREDNISolone sodium succinate (Solu-MEDROL) injection 60 mg (60 mg Intravenous Given 5/4/23 1619)   acetaminophen (TYLENOL) tablet 650 mg (650 mg Oral Given 5/4/23 1619)       Diagnostic Studies  Results Reviewed     Procedure Component Value Units Date/Time    HS Troponin I 2hr [[id number]] Collected: 05/04/23 1616    Lab Status: In process Specimen: Blood from Arm, Right Updated: 05/04/23 1629    HS Troponin I 4hr [[id number]]     Lab Status: No result Specimen: Blood     B-Type Natriuretic Peptide(BNP) [[id number]]  (Abnormal) Collected: 05/04/23 1309    Lab Status: Final result Specimen: Blood from Arm, Right Updated: 05/04/23 1408      pg/mL     FLU/RSV/COVID - if FLU/RSV clinically relevant [[id number]]  (Normal) Collected: 05/04/23 1309    Lab Status: Final result Specimen: Nares from Nose Updated: 05/04/23 1400     SARS-CoV-2 Negative     INFLUENZA A PCR Negative     INFLUENZA B PCR Negative     RSV PCR Negative    Narrative:      FOR PEDIATRIC PATIENTS - copy/paste COVID Guidelines URL to browser: [email or web address]    SARS-CoV-2 assay is a Nucleic Acid Amplification assay intended for the  qualitative detection of nucleic acid from SARS-CoV-2 in nasopharyngeal  swabs  Results are for the presumptive identification of SARS-CoV-2 RNA  Positive results are indicative of infection with SARS-CoV-2, the virus  causing COVID-19, but do not rule out bacterial infection or co-infection  with other viruses   Laboratories within the [place] and its  territories are required to report all positive results to the appropriate  public health authorities  Negative results do not preclude SARS-CoV-2  infection and should not be used as the sole basis for treatment or other  patient management decisions  Negative results must be combined with  clinical observations, patient history, and epidemiological information  This test has not been FDA cleared or approved  This test has been authorized by FDA under an Emergency Use Authorization  (EUA)  This test is only authorized for the duration of time the  declaration that circumstances exist justifying the authorization of the  emergency use of an in vitro diagnostic tests for detection of SARS-CoV-2  virus and/or diagnosis of COVID-19 infection under section 564(b)(1) of  the Act, 21 U  S C  293ECY-8(S)(4), unless the authorization is terminated  or revoked sooner  The test has been validated but independent review by FDA  and CLIA is pending  Test performed using [employer]: This RT-PCR assay targets N2,  a region unique to SARS-CoV-2  A conserved region in the E-gene was chosen  for pan-Sarbecovirus detection which includes SARS-CoV-2  According to CMS-2020-01-R, this platform meets the definition of high-throughput technology      Procalcitonin [[id number]]  (Abnormal) Collected: 05/04/23 1309    Lab Status: Final result Specimen: Blood from Arm, Right Updated: 05/04/23 1353     Procalcitonin 0 27 ng/ml     HS Troponin 0hr (reflex protocol) [[id number]]  (Normal) Collected: 05/04/23 1309    Lab Status: Final result Specimen: Blood from Arm, Right Updated: 05/04/23 1352     hs TnI 0hr 8 ng/L     Lactic acid, plasma (w/reflex if result > 2 0) [[id number]]  (Normal) Collected: 05/04/23 1309    Lab Status: Final result Specimen: Blood from Arm, Right Updated: 05/04/23 1346     LACTIC ACID 0 8 mmol/L     Narrative:      Result may be elevated if tourniquet was used during collection      Comprehensive metabolic panel [[id number]]  (Abnormal) Collected: 05/04/23 1309    Lab Status: Final result Specimen: Blood from Arm, Right Updated: 05/04/23 1345     Sodium 137 mmol/L      Potassium 4 6 mmol/L      Chloride 99 mmol/L      CO2 34 mmol/L      ANION GAP 4 mmol/L      BUN 26 mg/dL      Creatinine 0 81 mg/dL      Glucose 90 mg/dL      Calcium 9 6 mg/dL      AST 18 U/L      ALT 13 U/L      Alkaline Phosphatase 121 U/L      Total Protein 7 0 g/dL      Albumin 3 5 g/dL      Total Bilirubin 0 73 mg/dL      eGFR 66 ml/min/1 73sq m     Narrative:      [place] guidelines for Chronic Kidney Disease (CKD):   •  Stage 1 with normal or high GFR (GFR > 90 mL/min/1 73 square meters)  •  Stage 2 Mild CKD (GFR = 60-89 mL/min/1 73 square meters)  •  Stage 3A Moderate CKD (GFR = 45-59 mL/min/1 73 square meters)  •  Stage 3B Moderate CKD (GFR = 30-44 mL/min/1 73 square meters)  •  Stage 4 Severe CKD (GFR = 15-29 mL/min/1 73 square meters)  •  Stage 5 End Stage CKD (GFR <15 mL/min/1 73 square meters)  Note: GFR calculation is accurate only with a steady state creatinine    C-reactive protein [[id number]]  (Abnormal) Collected: 05/04/23 1309    Lab Status: Final result Specimen: Blood from Arm, Right Updated: 05/04/23 1345     CRP 61 9 mg/L     Protime-INR [[id number]]  (Abnormal) Collected: 05/04/23 1309    Lab Status: Final result Specimen: Blood from Arm, Right Updated: 05/04/23 1338     Protime 39 1 seconds      INR 3 80    APTT [[id number]]  (Abnormal) Collected: 05/04/23 1309    Lab Status: Final result Specimen: Blood from Arm, Right Updated: 05/04/23 1338     PTT 54 seconds     POCT Blood Gas (CG8+) [[id number]]  (Abnormal) Collected: 05/04/23 1318    Lab Status: Final result Specimen: Venous Updated: 05/04/23 1322     ph, Tristen ISTAT 7 430     pCO2, Tristen i-STAT 52 0 mm HG      pO2, Tristen i-STAT 27 0 mm HG      BE, i-STAT 9 mmol/L      HCO3, Tristen i-STAT 34 5 mmol/L      CO2, i-STAT 36 mmol/L      O2 Sat, i-STAT 51 %      SODIUM, I-STAT 137 mmol/l      Potassium, i-STAT 4 6 mmol/L      Calcium, Ionized i-STAT 1 21 mmol/L      Hct, i-STAT 32 %      Hgb, i-STAT 10 9 g/dl      Glucose, i-STAT 89 mg/dl      Specimen Type VENOUS    CBC and differential [[id number]]  (Abnormal) Collected: 05/04/23 1309    Lab Status: Final result Specimen: Blood from Arm, Right Updated: 05/04/23 1320     WBC 6 99 Thousand/uL      RBC 2 96 Million/uL      Hemoglobin 10 7 g/dL      Hematocrit 31 8 %       fL      MCH 36 1 pg      MCHC 33 6 g/dL      RDW 17 2 %      MPV 9 0 fL      Platelets 986 Thousands/uL      nRBC 0 /100 WBCs      Neutrophils Relative 81 %      Immat GRANS % 0 %      Lymphocytes Relative 11 %      Monocytes Relative 7 %      Eosinophils Relative 1 %      Basophils Relative 0 %      Neutrophils Absolute 5 60 Thousands/µL      Immature Grans Absolute 0 02 Thousand/uL      Lymphocytes Absolute 0 80 Thousands/µL      Monocytes Absolute 0 49 Thousand/µL      Eosinophils Absolute 0 06 Thousand/µL      Basophils Absolute 0 02 Thousands/µL     Blood culture #1 [[id number]] Collected: 05/04/23 1309    Lab Status: In process Specimen: Blood from Arm, Right Updated: 05/04/23 1317    Blood culture #2 [[id number]] Collected: 05/04/23 1309    Lab Status:  In process Specimen: Blood from Arm, Right Updated: 05/04/23 1317                 XR hip/pelv 2-3 vws right   ED Interpretation by Uche Melgar DO (05/04 0946)   No acute fracture or dislocation      XR chest pa & lateral   ED Interpretation by Uche Melgar DO (05/04 4416)   No acute infiltrate or pneumothorax there is chronic right basilar atelectasis seen on prior studies                 Procedures  ECG 12 Lead Documentation Only    Date/Time: 5/4/2023 1:50 PM  Performed by: Uche Melgar DO  Authorized by: Uche Melgar DO     ECG reviewed by me, the ED Provider: yes    Patient location:  ED  Rate:     ECG rate:  71  Rhythm:     Rhythm: sinus rhythm    Conduction:     Conduction: normal    T waves:     T waves: normal               ED Course             HEART Risk Score    Flowsheet Row Most Recent Value   Heart Score Risk Calculator    History 0 Filed at: 05/04/2023 1558   ECG 0 Filed at: 05/04/2023 1558   Age 2 Filed at: 05/04/2023 1558   Risk Factors 1 Filed at: 05/04/2023 1558   Troponin 0 Filed at: 05/04/2023 1558   HEART Score 3 Filed at: 05/04/2023 1558                        SBIRT 20yo+    Flowsheet Row Most Recent Value   Initial Alcohol Screen: US AUDIT-C     1  How often do you have a drink containing alcohol? 0 Filed at: 05/04/2023 1217   2  How many drinks containing alcohol do you have on a typical day you are drinking? 0 Filed at: 05/04/2023 1217   3a  Male UNDER 65: How often do you have five or more drinks on one occasion? 0 Filed at: 05/04/2023 1217   3b  FEMALE Any Age, or MALE 65+: How often do you have 4 or more drinks on one occassion? 0 Filed at: 05/04/2023 1217   Audit-C Score 0 Filed at: 05/04/2023 1217   [place]: How many times in the past year have you    Used an illegal drug or used a prescription medication for non-medical reasons? Never Filed at: 05/04/2023 1217                    Medical Decision Making  Shortness of breath rule out congestive heart failure versus COPD exacerbation severe anemia work-up in progress including EKG chest x-ray and lab work    Amount and/or Complexity of Data Reviewed  Labs: ordered  Radiology: ordered  Risk  Prescription drug management            Disposition  Final diagnoses:   COPD exacerbation ([facility] 75 )   Hypoxia   Ambulatory dysfunction - Back and right hip pain     Time reflects when diagnosis was documented in both MDM as applicable and the Disposition within this note     Time User Action Codes Description Comment    5/4/2023  4:07 PM Vicky Arita Add [J44 1] [facility] )     5/4/2023  4:07 PM Vicky Bonlilay Add [R09 02] Hypoxia     5/4/2023  4:08 PM Vicky Bonillay Add [R26 2] Ambulatory dysfunction     5/4/2023  4:11 PM Lucrecia Nose Modify [R26 2] Ambulatory dysfunction Back and right hip pain      ED Disposition     ED Disposition   Admit    Condition   Stable    Date/Time   Thu May 4, 2023  4:30 PM    Comment   Case was discussed with **Dr Trinity Davila* and the patient's admission status was agreed to be Admission Status: observation status to the service of Dr Trinity Davila   Follow-up Information    None         Patient's Medications   Discharge Prescriptions    No medications on file       No discharge procedures on file      PDMP Review     None          ED Provider  Electronically Signed by           Mary Pereira DO  05/04/23 5236

## 2023-05-04 NOTE — ASSESSMENT & PLAN NOTE
· Maintained on Coumadin outpatient, with a goal of 2-3  · INR 3 80 on admission, hold coumadin tonight  · Trend INR daily

## 2023-05-05 ENCOUNTER — APPOINTMENT (OUTPATIENT)
Dept: RADIOLOGY | Facility: HOSPITAL | Age: 83
End: 2023-05-05

## 2023-05-05 LAB
ANION GAP SERPL CALCULATED.3IONS-SCNC: 3 MMOL/L (ref 4–13)
BASOPHILS # BLD MANUAL: 0 THOUSAND/UL (ref 0–0.1)
BASOPHILS NFR MAR MANUAL: 0 % (ref 0–1)
BUN SERPL-MCNC: 26 MG/DL (ref 5–25)
CALCIUM SERPL-MCNC: 9 MG/DL (ref 8.4–10.2)
CHLORIDE SERPL-SCNC: 100 MMOL/L (ref 96–108)
CO2 SERPL-SCNC: 33 MMOL/L (ref 21–32)
CREAT SERPL-MCNC: 0.7 MG/DL (ref 0.6–1.3)
EOSINOPHIL # BLD MANUAL: 0 THOUSAND/UL (ref 0–0.4)
EOSINOPHIL NFR BLD MANUAL: 0 % (ref 0–6)
ERYTHROCYTE [DISTWIDTH] IN BLOOD BY AUTOMATED COUNT: 15.1 % (ref 11.6–15.1)
GFR SERPL CREATININE-BSD FRML MDRD: 79 ML/MIN/1.73SQ M
GLUCOSE P FAST SERPL-MCNC: 134 MG/DL (ref 65–99)
GLUCOSE SERPL-MCNC: 134 MG/DL (ref 65–140)
HCT VFR BLD AUTO: 29.8 % (ref 34.8–46.1)
HGB BLD-MCNC: 9.9 G/DL (ref 11.5–15.4)
INR PPP: 4.3 (ref 0.84–1.19)
L PNEUMO1 AG UR QL IA.RAPID: NEGATIVE
LYMPHOCYTES # BLD AUTO: 0.38 THOUSAND/UL (ref 0.6–4.47)
LYMPHOCYTES # BLD AUTO: 7 % (ref 14–44)
MCH RBC QN AUTO: 34.7 PG (ref 26.8–34.3)
MCHC RBC AUTO-ENTMCNC: 33.2 G/DL (ref 31.4–37.4)
MCV RBC AUTO: 105 FL (ref 82–98)
MONOCYTES # BLD AUTO: 0.05 THOUSAND/UL (ref 0–1.22)
MONOCYTES NFR BLD: 1 % (ref 4–12)
NEUTROPHILS # BLD MANUAL: 4.95 THOUSAND/UL (ref 1.85–7.62)
NEUTS SEG NFR BLD AUTO: 92 % (ref 43–75)
PLATELET # BLD AUTO: 273 THOUSANDS/UL (ref 149–390)
PLATELET BLD QL SMEAR: ADEQUATE
PMV BLD AUTO: 8.7 FL (ref 8.9–12.7)
POTASSIUM SERPL-SCNC: 4.7 MMOL/L (ref 3.5–5.3)
PROCALCITONIN SERPL-MCNC: 0.22 NG/ML
PROTHROMBIN TIME: 43 SECONDS (ref 11.6–14.5)
RBC # BLD AUTO: 2.85 MILLION/UL (ref 3.81–5.12)
RBC MORPH BLD: NORMAL
S PNEUM AG UR QL: NEGATIVE
SODIUM SERPL-SCNC: 136 MMOL/L (ref 135–147)
WBC # BLD AUTO: 5.38 THOUSAND/UL (ref 4.31–10.16)

## 2023-05-05 RX ORDER — PREDNISONE 20 MG/1
40 TABLET ORAL DAILY
Status: DISCONTINUED | OUTPATIENT
Start: 2023-05-05 | End: 2023-05-08 | Stop reason: HOSPADM

## 2023-05-05 RX ORDER — AZITHROMYCIN 500 MG/1
500 TABLET, FILM COATED ORAL EVERY 24 HOURS
Status: COMPLETED | OUTPATIENT
Start: 2023-05-05 | End: 2023-05-06

## 2023-05-05 RX ADMIN — SERTRALINE 50 MG: 50 TABLET, FILM COATED ORAL at 09:58

## 2023-05-05 RX ADMIN — ACETAMINOPHEN 325MG 975 MG: 325 TABLET ORAL at 15:40

## 2023-05-05 RX ADMIN — OXYCODONE HYDROCHLORIDE AND ACETAMINOPHEN 1000 MG: 500 TABLET ORAL at 09:58

## 2023-05-05 RX ADMIN — MIRTAZAPINE 7.5 MG: 15 TABLET, FILM COATED ORAL at 22:33

## 2023-05-05 RX ADMIN — LEVALBUTEROL HYDROCHLORIDE 1.25 MG: 1.25 SOLUTION RESPIRATORY (INHALATION) at 14:15

## 2023-05-05 RX ADMIN — LIDOCAINE 1 PATCH: 50 PATCH TOPICAL at 09:58

## 2023-05-05 RX ADMIN — SENNOSIDES AND DOCUSATE SODIUM 1 TABLET: 8.6; 5 TABLET ORAL at 22:33

## 2023-05-05 RX ADMIN — ACETAMINOPHEN 325MG 975 MG: 325 TABLET ORAL at 22:33

## 2023-05-05 RX ADMIN — PREDNISONE 40 MG: 20 TABLET ORAL at 15:40

## 2023-05-05 RX ADMIN — LOSARTAN POTASSIUM 25 MG: 25 TABLET, FILM COATED ORAL at 09:58

## 2023-05-05 RX ADMIN — FLUTICASONE FUROATE AND VILANTEROL TRIFENATATE 1 PUFF: 100; 25 POWDER RESPIRATORY (INHALATION) at 09:59

## 2023-05-05 RX ADMIN — GUAIFENESIN 600 MG: 600 TABLET ORAL at 18:15

## 2023-05-05 RX ADMIN — ACETAMINOPHEN 325MG 975 MG: 325 TABLET ORAL at 05:09

## 2023-05-05 RX ADMIN — AZITHROMYCIN MONOHYDRATE 500 MG: 500 TABLET ORAL at 15:40

## 2023-05-05 RX ADMIN — LORATADINE 10 MG: 10 TABLET ORAL at 09:58

## 2023-05-05 RX ADMIN — LEVALBUTEROL HYDROCHLORIDE 1.25 MG: 1.25 SOLUTION RESPIRATORY (INHALATION) at 19:28

## 2023-05-05 RX ADMIN — IPRATROPIUM BROMIDE 0.5 MG: 0.5 SOLUTION RESPIRATORY (INHALATION) at 19:28

## 2023-05-05 RX ADMIN — LEVOTHYROXINE SODIUM 25 MCG: 25 TABLET ORAL at 05:09

## 2023-05-05 RX ADMIN — TIZANIDINE 2 MG: 2 TABLET ORAL at 09:54

## 2023-05-05 RX ADMIN — DILTIAZEM HYDROCHLORIDE 240 MG: 120 CAPSULE, COATED, EXTENDED RELEASE ORAL at 09:58

## 2023-05-05 RX ADMIN — Medication 1000 UNITS: at 09:58

## 2023-05-05 RX ADMIN — UMECLIDINIUM 1 PUFF: 62.5 AEROSOL, POWDER ORAL at 09:59

## 2023-05-05 RX ADMIN — TIZANIDINE 2 MG: 2 TABLET ORAL at 18:15

## 2023-05-05 RX ADMIN — GUAIFENESIN 600 MG: 600 TABLET ORAL at 09:58

## 2023-05-05 RX ADMIN — IPRATROPIUM BROMIDE 0.5 MG: 0.5 SOLUTION RESPIRATORY (INHALATION) at 14:15

## 2023-05-05 RX ADMIN — SPIRONOLACTONE 25 MG: 25 TABLET ORAL at 09:58

## 2023-05-05 NOTE — ASSESSMENT & PLAN NOTE
· Maintained on digoxin and diltiazem outpatient, continue  · Maintained on Coumadin  · INR currently supratherapeutic, hold Coumadin

## 2023-05-05 NOTE — CONSULTS
Consultation - Coleridge Habermann 79 y o  female MRN: [id number]  Unit/Bed#: -01 Encounter: [id number]      Assessment/Plan     Assessment:  Right sacroilitis    Plan:  Recommend pain control  Can continue steroids to reduce inflammation and pain  WBAT to BL LE's   Can consider LSO brace when ambulating for more support  No surgical indication  Follow up with Dr Melisa Dunn with [facility] after discharge for cortisone injection to right SI joint  Ortho signing off  Please call or TT with questions or concerns  History of Present Illness   Physician Requesting Consult: Bruce Gerard MD  Reason for Consult / Principal Problem: lumbar spine pain  HPI: Claude Bennett is a 79y o  year old female who is a community ambulator presents with lumbar spine pain that started gradually about a week ago  She denies any injury or change in activities at that time  Pain worsened about 3 days ago with no injury at that time as well  Pain is localized over the right side of her lumbar spine  It occasionally radiates down the right lower extremity to the level of the knee  She notes numbness in that area as well  The numbness does not travel further past the knee  She denies any bowel or bladder incontinence  Her PCP prescribed her a muscle relaxer 2 days ago and advised her to go to the emergency room if it does not help  She came to the emergency room yesterday and was admitted to the medical service  Orthopedics was consulted  She does have a history of having an L2 compression fracture few years ago  She denies being treated in a brace at that time  No other recent treatment  Inpatient consult to Orthopedic Surgery  Consult performed by: Tyler Hay PA-C  Consult ordered by: Magui Wong PA-C          Review of Systems   Constitutional: Negative  HENT: Negative  Eyes: Negative  Respiratory: Positive for cough and shortness of breath  Cardiovascular: Negative  Gastrointestinal: Negative  Endocrine: Negative  Genitourinary: Negative  Musculoskeletal: Positive for back pain and gait problem  Skin: Negative  Allergic/Immunologic: Negative  Neurological: Positive for numbness  Hematological: Negative  Psychiatric/Behavioral: Negative  Historical Information   Past Medical History:   Diagnosis Date   • Anti-phospholipid syndrome (HCC)    • Asthma    • Blood type O+    • Cardiac disease    • Chronic pain    • COPD (chronic obstructive pulmonary disease) ([facility])    • Fracture of ankle     left   • Hyperlipidemia    • Hypertension    • Hypokalemia 2/27/2023   • Osteoporosis      Past Surgical History:   Procedure Laterality Date   • APPENDECTOMY     • BILATERAL OOPHORECTOMY Bilateral    • ORIF TIBIAL SHAFT FRACTURE W/ PLATES AND SCREWS Right      Social History   Social History     Substance and Sexual Activity   Alcohol Use Not Currently     Social History     Substance and Sexual Activity   Drug Use No     E-Cigarette/Vaping   • E-Cigarette Use Never User      E-Cigarette/Vaping Substances   • Nicotine No    • THC No      Social History     Tobacco Use   Smoking Status Former   • Types: Cigarettes   Smokeless Tobacco Never   Tobacco Comments    Never smoker per [employer]     Family History: non-contributory    Meds/Allergies   all current active meds have been reviewed  Allergies   Allergen Reactions   • Aspirin Other (See Comments)   • Penicillins Rash and Hives   • Sulfa Antibiotics Rash       Objective   Vitals: Blood pressure (!) 114/45, pulse 73, temperature (!) 97 3 °F (36 3 °C), temperature source Temporal, resp  rate 20, height 5' (1 524 m), weight 47 3 kg (104 lb 4 4 oz), SpO2 96 %, not currently breastfeeding  ,Body mass index is 20 37 kg/m²  No intake or output data in the 24 hours ending 05/05/23 0824  No intake/output data recorded      Invasive Devices     Peripheral Intravenous Line  Duration           Peripheral IV 05/04/23 Right Antecubital <1 day                Physical Exam  Vitals and nursing note reviewed  Constitutional:       General: She is not in acute distress  Appearance: She is well-developed  HENT:      Head: Normocephalic and atraumatic  Eyes:      Conjunctiva/sclera: Conjunctivae normal    Cardiovascular:      Rate and Rhythm: Normal rate and regular rhythm  Heart sounds: No murmur heard  Pulmonary:      Effort: Pulmonary effort is normal  No respiratory distress  Breath sounds: No wheezing  Chest:      Chest wall: No tenderness  Abdominal:      Palpations: Abdomen is soft  Tenderness: There is no abdominal tenderness  Musculoskeletal:         General: Tenderness present  Cervical back: Neck supple  Lumbar back: Negative right straight leg raise test and negative left straight leg raise test    Skin:     General: Skin is warm and dry  Capillary Refill: Capillary refill takes less than 2 seconds  Neurological:      Mental Status: She is alert and oriented to person, place, and time  Psychiatric:         Mood and Affect: Mood normal        Back Exam     Tenderness   The patient is experiencing tenderness in the sacroiliac (right)  Tests   Straight leg raise right: negative  Straight leg raise left: negative    Other   Sensation: decreased (lateral right thigh)  Erythema: no back redness  Scars: absent    Comments:  5/5 bilateral quad, hamstring, EHL, gastrocnemius            Lab Results: I have personally reviewed pertinent lab results  Imaging Studies: I have personally reviewed pertinent films in PACS   X-ray lumbar spine: advanced spondylosis   Grade 2 spondylolisthesis L5 on S1

## 2023-05-05 NOTE — ASSESSMENT & PLAN NOTE
· Maintained on Coumadin outpatient, with a goal of 2-3  · INR 4 3 today, hold coumadin tonight   · Trend INR daily

## 2023-05-05 NOTE — CASE MANAGEMENT
Case Management Discharge Planning Note    Patient name Mila Drew  Location /-95 MRN [id number]  : 1940 Date 2023       Current Admission Date: 2023  Current Admission Diagnosis:Chronic obstructive pulmonary disease with acute exacerbation [facility])   Patient Active Problem List    Diagnosis Date Noted   • Cerebral aneurysm, nonruptured 2023   • Abdominal distention/back pain 2023   • Urinary retention 2023   • MDD with persistant bereavement complex 2023   • Abnormal CT of the chest 2023   • 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[facility] [address] ) 2023   • Thyroid nodule 2023   • Severe protein-calorie malnutrition ([address] ) 2023   • Unspecified mood (affective) disorder ([address] ) 2023   • Failure to thrive in adult 2023   • Pulmonary nodule 02/15/2023   • Onychomycosis 02/15/2023   • Essential hypertension 02/10/2023   • Anxiety 02/10/2023   • Hypothyroidism 2022   • Peripheral edema 2021   • Low back pain without sciatica 2021   • Chronic obstructive pulmonary disease with acute exacerbation ([address] ) 2017   • Hypertension 2017   • Hypercoagulable state ([address] ) 2017   • 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   • Vitamin D deficiency 2016   • Osteoporosis 2015   • Antiphospholipid antibody syndrome ([address] ) 2012   • Atrial fibrillation ([address] ) 2012   • Hereditary hemolytic anemia ([address] ) 2012   • Asthma 2012      LOS (days): 0  Geometric Mean LOS (GMLOS) (days):   Days to GMLOS:     OBJECTIVE:            Current admission status: Observation   Preferred Pharmacy:   [facility] #[id number] ADIEL Crook - [address] [address]  Phone: [phone] Fax: [address], [address] [address]  Phone: [phone] Fax: [phone]    Primary Care Provider: Kalyani Case, MD    Primary Insurance: MEDICARE  Secondary Insurance: BLUE CROSS    DISCHARGE DETAILS:        Therapy is recommending short term rehab  Spoke with patient and she does not want STR  Pt is agreeable to home health  Referral made in [place]

## 2023-05-05 NOTE — CASE MANAGEMENT
Case Management Assessment & Discharge Planning Note    Patient name Claude Bennett  Location /-07 MRN [id number]  : 1940 Date 2023       Current Admission Date: 2023  Current Admission Diagnosis:Chronic obstructive pulmonary disease with acute exacerbation [facility])   Patient Active Problem List    Diagnosis Date Noted   • Cerebral aneurysm, nonruptured 2023   • Abdominal distention/back pain 2023   • Urinary retention 2023   • MDD with persistant bereavement complex 2023   • Abnormal CT of the chest 2023   • 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[facility] [address] ) 2023   • Thyroid nodule 2023   • Severe protein-calorie malnutrition ([address] ) 2023   • Unspecified mood (affective) disorder ([facility] [address] ) 2023   • Failure to thrive in adult 2023   • Pulmonary nodule 02/15/2023   • Onychomycosis 02/15/2023   • Essential hypertension 02/10/2023   • Anxiety 02/10/2023   • Hypothyroidism 2022   • Peripheral edema 2021   • Low back pain without sciatica 2021   • Chronic obstructive pulmonary disease with acute exacerbation ([address] ) 2017   • Hypertension 2017   • Hypercoagulable state ([address] ) 2017   • 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   • Vitamin D deficiency 2016   • Osteoporosis 2015   • Antiphospholipid antibody syndrome ([address] ) 2012   • Atrial fibrillation ([address] ) 2012   • Hereditary hemolytic anemia ([facility] [address] ) 2012   • Asthma 2012      LOS (days): 0  Geometric Mean LOS (GMLOS) (days):   Days to GMLOS:     OBJECTIVE:              Current admission status: Observation       Preferred Pharmacy:   [address] [place] [address] #[id number] ADIEL Mijares [address] [address]  Phone: [phone] Fax: [phone] [place], [address] [place] [address]  Phone: [phone] Fax: [phone]    Primary Care Provider: Ab Ramon MD    Primary Insurance: MEDICARE  Secondary Insurance: BLUE CROSS    ASSESSMENT:  Active Health Care Proxies    There are no active Health Care Proxies on file  Advance Directives  Does patient have a Health Care POA?: Yes  Does patient have Advance Directives?: Yes  Advance Directives: Living will, Power of  for health care  Primary Contact: Ted Nunez         Readmission Root Cause  30 Day Readmission: No    Patient Information  Admitted from[de-identified] Home  Mental Status: Alert  During Assessment patient was accompanied by: Not accompanied during assessment  Assessment information provided by[de-identified] Patient  Primary Caregiver: Self  Support Systems: Self, Son, Mina Pagan  of Residence: [address] [facility] do you live in?: [address] [facility] entry access options   Select all that apply : Stairs  Number of steps to enter home : 7  Do the steps have railings?: Yes  Type of Current Residence: [address] home  Upon entering residence, is there a bedroom on the main floor (no further steps)?: No  A bedroom is located on the following floor levels of residence (select all that apply):: 2nd Floor  Upon entering residence, is there a bathroom on the main floor (no further steps)?: No  Indicate which floors of current residence have a bathroom (select all the apply):: 2nd Floor  Number of steps to 2nd floor from main floor: One Flight  In the last 12 months, was there a time when you were not able to pay the mortgage or rent on time?: No  In the last 12 months, how many places have you lived?: 1  In the last 12 months, was there a time when you did not have a steady place to sleep or slept in a shelter (including now)?: No  Homeless/housing insecurity resource given?: N/A  Living Arrangements: Lives Alone    Activities of Daily Living Prior to Admission  Functional Status: Independent  Completes ADLs independently?: Yes  Ambulates independently?: Yes  Does patient use assisted devices?: No  Does patient currently own DME?: Yes  What DME does the patient currently own?: [place] More  Does patient have a history of Outpatient Therapy (PT/OT)?: No  Does the patient have a history of Short-Term Rehab?: Yes ([facility])  Does patient have a history of HHC?: Yes (Unable to recall)  Does patient currently have [facility]?: No         Patient Information Continued  Income Source: Pension/long term  Does patient have prescription coverage?: Yes  Within the past 12 months, you worried that your food would run out before you got the money to buy more : Never true  Within the past 12 months, the food you bought just didn't last and you didn't have money to get more : Never true  Food insecurity resource given?: N/A  Does patient receive dialysis treatments?: No  Does patient have a history of substance abuse?: No  Does patient have a history of Mental Health Diagnosis?: No    PHQ 2/9 Screening   Reviewed PHQ 2/9 Depression Screening Score?: No    Means of Transportation  Means of Transport to Appts[de-identified] Family transport  In the past 12 months, has lack of transportation kept you from medical appointments or from getting medications?: No  In the past 12 months, has lack of transportation kept you from meetings, work, or from getting things needed for daily living?: No  Was application for public transport provided?: N/A        DISCHARGE DETAILS:    Discharge planning discussed with[de-identified] Patient  Freedom of Choice: Yes     CM contacted family/caregiver?: No- see comments (Pt reports being in contact with son)  Were Treatment Team discharge recommendations reviewed with patient/caregiver?: Yes  Did patient/caregiver verbalize understanding of patient care needs?: Yes  Were patient/caregiver advised of the risks associated with not following Treatment Team discharge recommendations?: Yes         Requested [address]         Is the patient interested in [facility] at discharge?: No    DME Referral Provided  Referral made for DME?: No              Treatment Team Recommendation: Home  Discharge Destination Plan[de-identified] Home  Transport at Discharge : Family                                      Additional Comments: Met with patient and discussed role of CM and any needs prior to discharge  Pt lives alone in bi-level home w/ 7 SWETA  Indp PTA  Owns walker cane  [facility]x [facility] at [facility] and [facility] (unable to recall name)  No hax OP PT/DA/MH  Pt uses [employer] in [place] and is vaccinated for [place] [employer])  SonJessy Stage will transport at discharge

## 2023-05-05 NOTE — OCCUPATIONAL THERAPY NOTE
Occupational Therapy Evaluation     Patient Name: Chad WILCOX'L Date: 5/5/2023  Problem List  Principal Problem:    Chronic obstructive pulmonary disease with acute exacerbation ([address] )  Active Problems:    Antiphospholipid antibody syndrome (HCC)    Atrial fibrillation (HCC)    Low back pain without sciatica    Hypothyroidism    Essential hypertension    Past Medical History  Past Medical History:   Diagnosis Date    Anti-phospholipid syndrome (HCC)     Asthma     Blood type O+     Cardiac disease     Chronic pain     COPD (chronic obstructive pulmonary disease) (HCC)     Fracture of ankle     left    Hyperlipidemia     Hypertension     Hypokalemia 2/27/2023    Osteoporosis      Past Surgical History  Past Surgical History:   Procedure Laterality Date    APPENDECTOMY      BILATERAL OOPHORECTOMY Bilateral     ORIF TIBIAL SHAFT FRACTURE W/ PLATES AND SCREWS Right            05/05/23 1352   OT Last Visit   OT Visit Date 05/05/23   Note Type   Note type Evaluation   Pain Assessment   Pain Assessment Tool 0-10   Pain Score 2   Restrictions/Precautions   Weight Bearing Precautions Per Order No   Braces or Orthoses LSO   Other Precautions Chair Alarm; Bed Alarm; Fall Risk;Pain   Home Living   Type of [address] [place] [address] Two level;Stairs to enter with rails   Bathroom Shower/Tub Tub/shower unit   Bathroom Equipment Commode;Hand-held shower; Shower chair   Home Equipment Walker;Cane   Prior Function   Level of [place] Independent with ADLs; Independent with IADLS   Lives With Alone   Receives Help From Family   IADLs Family/Friend/Other provides transportation; Family/Friend/Other provides meals; Family/Friend/Other provides medication management   Falls in the last 6 months 0   Vocational Retired   Subjective   Subjective Pt received in supine position  Pt agreeable to session     ADL   Eating Assistance 7  Independent   Grooming Assistance 7  Independent   UB Bathing Assistance 5  Supervision/Setup   LB Bathing Assistance 3  Moderate Assistance   UB Dressing Assistance 4  Minimal Assistance   LB Dressing Assistance 3  Moderate Assistance   Toileting Assistance  3  Moderate Assistance   Bed Mobility   Supine to Sit 3  Moderate assistance   Additional items Assist x 1;Verbal cues; Increased time required   Additional Comments Pt remained seated in recliner by end of session   Transfers   Sit to Stand 3  Moderate assistance   Additional items Assist x 1;Verbal cues; Bedrails   Stand to Sit 3  Moderate assistance   Additional items Assist x 1;Verbal cues   Stand pivot 3  Moderate assistance   Additional items Assist x 1;Verbal cues; Increased time required   Balance   Static Sitting Fair +   Dynamic Sitting Fair   Static Standing Poor +   Dynamic Standing Poor +   Activity Tolerance   Activity Tolerance Patient limited by pain   Medical Staff Made Aware PT C  [address]   Nurse Made Aware RN [place]   RUE Assessment   RUE Assessment WFL   LUE Assessment   LUE Assessment WFL   Cognition   Overall Cognitive Status Impaired   Arousal/Participation Alert   Attention Within functional limits   Orientation Level Oriented to person;Oriented to place;Oriented to situation;Disoriented to time   Memory Decreased recall of precautions;Decreased recall of recent events   Following Commands Follows one step commands with increased time or repetition   Assessment   Limitation Decreased ADL status; Decreased self-care trans;Decreased high-level ADLs; Decreased endurance;Decreased cognition   Prognosis Good   Assessment Pt is a 79 y o  female seen for OT [facility] at [facility], admitted 5/4/2023 w/ Chronic obstructive pulmonary disease with acute exacerbation ([facility] [address] )  Comorbidities affecting pt's functional performance at time of assessment include: low back pain, COPD, atrial fibrillation, antiphospholipid antibody syndrome, hypertension   Personal factors affecting pt at time of IE include:steps to enter environment, limited home support, difficulty performing ADLS, difficulty performing IADLS  and decreased functional mobility  Prior to admission, pt was living alone in a house with steps to manage  Pt was I w/  ADLS and required some assist with IADLS  Required walker PTA  Upon evaluation: Pt requires mod A x 1 for bed mobility, mod Ax 1 for functional mobility/transfers, sup-min A for UB ADLs and mod A for LB ADLS 2* the following deficits impacting occupational performance: decreased strength, decreased balance, decreased tolerance and impaired problem solving  Full objective findings from OT assessment regarding body systems outlined above  These impairments, as well as pt's decreased caregiver support and risk for falls  limit pt's ability to safely engage in all baseline areas of occupation and mobility  Pt to benefit from continued skilled OT tx while in the hospital to address deficits as defined above and maximize level of functional independence w ADL's and functional mobility  Occupational Performance areas to address include: bathing/shower, toilet hygiene, dressing and functional mobility  This evaluation required an extensive review of medical and/or therapy records and additional review of physical, cognitive and psychosocial history related to functional performance  Based upon functional performance deficits and assessments, this evaluation has been identified as a high complexity evaluation  The patient's raw score on the AM-PAC Daily Activity inpatient short form is 17, standardized score is 37 26, less than 39 4  Patients at this level are likely to benefit from DC to post-acute rehabilitation services  However please refer to therapist recommendation for discharge planning given other factors that may influence destination  Goals   Patient Goals Pt wishes to get home   Plan   Treatment Interventions ADL retraining;Functional transfer training;Patient/family training; Compensatory technique education; Energy conservation; Endurance training   Goal Expiration Date 05/15/23   OT Treatment Day 0   OT Frequency 3-5x/wk   Recommendation   UB Rehab Discharge Recommendation (PT/OT) Level 2   AM-PAC Daily Activity Inpatient   Lower Body Dressing 2   Bathing 2   Toileting 2   Upper Body Dressing 3   Grooming 4   Eating 4   Daily Activity Raw Score 17   Daily Activity Standardized Score (Calc for Raw Score >=11) 37 26   AM-PAC Applied Cognition Inpatient   Following a Speech/Presentation 2   Understanding Ordinary Conversation 3   Taking Medications 3   Remembering Where Things Are Placed or Put Away 3   Remembering List of 4-5 Errands 3   Taking Care of Complicated Tasks 2   Applied Cognition Raw Score 16   Applied Cognition Standardized Score 35 03   End of Consult   Education Provided Yes   Patient Position at End of Consult Bedside chair;Bed/Chair alarm activated; All needs within reach   Nurse Communication Nurse aware of consult       Pt will achieve the following goals within 10 days  *Pt will complete UB bathing and dressing with mod I     *Pt will complete LB bathing and dressing with mod i      * Pt will complete toileting w/ mod I w/ G hygiene/thoroughness using DME PRN    *Pt will complete bed mobility with mod I, with bed flat and no side rail to prep for purposeful tasks    *Pt will perform functional transfers with on/off all surfaces with mod I using DME as needed w/ G balance/safety  *Pt will increase standing tolerance x 5  minutes for inc'd tolerance with standing purposeful tasks        MARLYS Holley/L

## 2023-05-05 NOTE — PROGRESS NOTES
[place]  Progress Note  Name: Neva Rice  MRN: [id number]  Unit/Bed#: -01 I Date of Admission: 5/4/2023   Date of Service: 5/5/2023 I Hospital Day: 0    Assessment/Plan   * Chronic obstructive pulmonary disease with acute exacerbation ([facility] 75 )  Assessment & Plan  · Maintained on Advair and Spiriva outpatient with prn albuterol inhaler   · Requiring 2L NC in ED to maintain saturations, Wean as tolerated  · Chest x-ray negative  · Patient was given 1 dose of IV Solu-Medrol 60 mg in ED  · Start prednisone 40 mg daily for 5 days  · Complete azithromycin for 3 days  · Discontinue Rocephin, procalcitonin negative    Essential hypertension  Assessment & Plan  · Maintained on losartan outpatient, continue    Hypothyroidism  Assessment & Plan  · Maintained on levothyroxine 25mcg daily, continue    Low back pain without sciatica  Assessment & Plan  · Was recently discharged from physical therapy, no acute fall noted  · Patient recently with worsening back pain, started on Zanaflex day prior to admission  · Patient and family discussed with ED who recommended admission with orthopedic consult  · Orthopedic consult appreciated    · Likely secondary to sacroiliitis    Atrial fibrillation ([facility] )  Assessment & Plan  · Maintained on digoxin and diltiazem outpatient, continue  · Maintained on Coumadin  · INR currently supratherapeutic, hold Coumadin    Antiphospholipid antibody syndrome (HCC)  Assessment & Plan  · Maintained on Coumadin outpatient, with a goal of 2-3  · INR 4 3 today, hold coumadin tonight   · Trend INR daily              VTE Pharmacologic Prophylaxis:   Pharmacologic: Pharmacologic VTE Prophylaxis contraindicated due to Supratherapeutic INR  Mechanical VTE Prophylaxis in Place: Yes    Patient Centered Rounds: I have performed bedside rounds with nursing staff today    Education and Discussions with Family / Patient: 2 sons present at bedside        Current Length of Stay: 0 day(s)    Current Patient Status: Inpatient   Certification Statement: The patient will continue to require additional inpatient hospital stay due to Pending weaning off of oxygen, pending improvement from COPD    Discharge Plan: Likely 24 to 48 hours, pending PT/OT, likely will need rehab    Code Status: Level 2 - DNAR: but accepts endotracheal intubation      Subjective:     Patient examined at bedside  Patient denies any dyspnea  Currently still on 3 L oxygen via nasal cannula  Patient still reports of low back pain, difficulty with moving and ambulating secondary to pain  Objective:     Vitals:   Temp (24hrs), Av 4 °F (36 3 °C), Min:97 3 °F (36 3 °C), Max:97 7 °F (36 5 °C)    Temp:  [97 3 °F (36 3 °C)-97 7 °F (36 5 °C)] 97 3 °F (36 3 °C)  HR:  [73-87] 75  Resp:  [19-20] 19  BP: (114-138)/(45-70) 137/70  SpO2:  [92 %-100 %] 95 %  Body mass index is 20 37 kg/m²  Input and Output Summary (last 24 hours): Intake/Output Summary (Last 24 hours) at 2023 1454  Last data filed at 2023 1240  Gross per 24 hour   Intake 120 ml   Output --   Net 120 ml       Physical Exam:     Physical Exam  Vitals and nursing note reviewed  Constitutional:       General: She is not in acute distress  Appearance: She is well-developed  She is ill-appearing  HENT:      Head: Normocephalic and atraumatic  Eyes:      Conjunctiva/sclera: Conjunctivae normal    Cardiovascular:      Rate and Rhythm: Normal rate and regular rhythm  Heart sounds: No murmur heard  Pulmonary:      Effort: Pulmonary effort is normal  No respiratory distress  Breath sounds: Rhonchi present  Comments: Patient has a rhonchi, worse on right lower lobe  Currently on 3 L oxygen via nasal cannula  Abdominal:      Palpations: Abdomen is soft  Tenderness: There is no abdominal tenderness  Musculoskeletal:         General: No swelling  Cervical back: Neck supple  Skin:     General: Skin is warm and dry  Capillary Refill: Capillary refill takes less than 2 seconds  Neurological:      Mental Status: She is alert  Psychiatric:         Mood and Affect: Mood normal          Additional Data:     Labs:    Results from last 7 days   Lab Units 05/05/23 0347 05/04/23  1318 05/04/23  1309   WBC Thousand/uL 5 38  --  6 99   HEMOGLOBIN g/dL 9 9*  --  10 7*   I STAT HEMOGLOBIN   --    < >  --    HEMATOCRIT % 29 8*  --  31 8*   HEMATOCRIT, ISTAT   --    < >  --    PLATELETS Thousands/uL 273  --  322   NEUTROS PCT %  --   --  81*   LYMPHS PCT %  --   --  11*   LYMPHO PCT % 7*  --   --    MONOS PCT %  --   --  7   MONO PCT % 1*  --   --    EOS PCT % 0  --  1    < > = values in this interval not displayed  Results from last 7 days   Lab Units 05/05/23 0347 05/04/23  1318 05/04/23  1309   SODIUM mmol/L 136  --  137   POTASSIUM mmol/L 4 7  --  4 6   CHLORIDE mmol/L 100  --  99   CO2 mmol/L 33*  --  34*   CO2, I-STAT   --    < >  --    BUN mg/dL 26*  --  26*   CREATININE mg/dL 0 70  --  0 81   ANION GAP mmol/L 3*  --  4   CALCIUM mg/dL 9 0  --  9 6   ALBUMIN g/dL  --   --  3 5   TOTAL BILIRUBIN mg/dL  --   --  0 73   ALK PHOS U/L  --   --  121*   ALT U/L  --   --  13   AST U/L  --   --  18   GLUCOSE RANDOM mg/dL 134  --  90    < > = values in this interval not displayed  Results from last 7 days   Lab Units 05/05/23  0347   INR  4 30*             Results from last 7 days   Lab Units 05/05/23  0348 05/04/23  1856 05/04/23  1309   LACTIC ACID mmol/L  --   --  0 8   PROCALCITONIN ng/ml 0 22 0 28* 0 27*             Recent Cultures (last 7 days):     Results from last 7 days   Lab Units 05/05/23  0516 05/04/23  1309   BLOOD CULTURE   --  Received in Microbiology Lab  Culture in Progress  Received in Microbiology Lab  Culture in Progress     LEGIONELLA URINARY ANTIGEN  Negative  --        Last 24 Hours Medication List:   Current Facility-Administered Medications   Medication Dose Route Frequency Provider Last Rate   • acetaminophen  975 mg Oral Q8H [facility] Sharee SUE PA-C     • vitamin C  1,000 mg Oral Daily Sharee SUE PA-C     • azithromycin  500 mg Oral Q24H Rosita Mcfarland MD     • cholecalciferol  1,000 Units Oral Daily Sharee SUE PA-C     • diltiazem  240 mg Oral Daily Sharee SUE PA-C     • Fluticasone Furoate-Vilanterol  1 puff Inhalation Daily Sharee SUE PA-C     • guaiFENesin  600 mg Oral BID Sharee SUE PA-C     • ipratropium  0 5 mg Nebulization Q6H Najma Dobson MD     • levalbuterol  1 25 mg Nebulization Q6H Najma Dobson MD     • levothyroxine  25 mcg Oral Early Morning Sharee SUE PA-C     • lidocaine  1 patch Topical Daily Sharee SUE PA-C     • loratadine  10 mg Oral Daily Sharee SUE PA-C     • losartan  25 mg Oral Daily Sharee SUE PA-C     • mirtazapine  7 5 mg Oral HS Sharee SUE PA-C     • ondansetron  4 mg Intravenous Q6H PRN Sharee SUE PA-C     • predniSONE  40 mg Oral Daily Stephanie Álvarez MD     • senna-docusate sodium  1 tablet Oral HS Sharee SUE PA-C     • sertraline  50 mg Oral Daily Sharee SUE PA-C     • spironolactone  25 mg Oral Daily Sharee SUE PA-C     • tiZANidine  2 mg Oral BID PRN Jolly SUE PA-C     • umeclidinium  1 puff Inhalation Daily Sharee SUE PA-C          Today, Patient Was Seen By: Rosita Mcfarland MD    ** Please Note: Dictation voice to text software may have been used in the creation of this document   **

## 2023-05-05 NOTE — ASSESSMENT & PLAN NOTE
· Maintained on Advair and Spiriva outpatient with prn albuterol inhaler   · Requiring 2L NC in ED to maintain saturations, Wean as tolerated  · Chest x-ray negative  · Patient was given 1 dose of IV Solu-Medrol 60 mg in ED    · Start prednisone 40 mg daily for 5 days  · Complete azithromycin for 3 days  · Discontinue Rocephin, procalcitonin negative

## 2023-05-05 NOTE — PLAN OF CARE
Problem: OCCUPATIONAL THERAPY ADULT  Goal: Performs self-care activities at highest level of function for planned discharge setting  See evaluation for individualized goals  Description: Treatment Interventions: ADL retraining, Functional transfer training, Patient/family training, Compensatory technique education, Energy conservation, Endurance training          See flowsheet documentation for full assessment, interventions and recommendations  Note: Limitation: Decreased ADL status, Decreased self-care trans, Decreased high-level ADLs, Decreased endurance, Decreased cognition  Prognosis: Good  Assessment: Pt is a 79 y o  female seen for OT evaluation at [facility], admitted 5/4/2023 w/ Chronic obstructive pulmonary disease with acute exacerbation ([facility] )  Comorbidities affecting pt's functional performance at time of assessment include: low back pain, COPD, atrial fibrillation, antiphospholipid antibody syndrome, hypertension  Personal factors affecting pt at time of IE include:steps to enter environment, limited home support, difficulty performing ADLS, difficulty performing IADLS  and decreased functional mobility  Prior to admission, pt was living alone in a house with steps to manage  Pt was I w/  ADLS and required some assist with IADLS  Required walker PTA  Upon evaluation: Pt requires mod A x 1 for bed mobility, mod Ax 1 for functional mobility/transfers, sup-min A for UB ADLs and mod A for LB ADLS 2* the following deficits impacting occupational performance: decreased strength, decreased balance, decreased tolerance and impaired problem solving  Full objective findings from OT assessment regarding body systems outlined above  These impairments, as well as pt's decreased caregiver support and risk for falls  limit pt's ability to safely engage in all baseline areas of occupation and mobility   Pt to benefit from continued skilled OT tx while in the hospital to address deficits as defined above and maximize level of functional independence w ADL's and functional mobility  Occupational Performance areas to address include: bathing/shower, toilet hygiene, dressing and functional mobility  This evaluation required an extensive review of medical and/or therapy records and additional review of physical, cognitive and psychosocial history related to functional performance  Based upon functional performance deficits and assessments, this evaluation has been identified as a high complexity evaluation  The patient's raw score on the AM-PAC Daily Activity inpatient short form is 17, standardized score is 37 26, less than 39 4  Patients at this level are likely to benefit from DC to post-acute rehabilitation services  However please refer to therapist recommendation for discharge planning given other factors that may influence destination

## 2023-05-05 NOTE — ASSESSMENT & PLAN NOTE
· Was recently discharged from physical therapy, no acute fall noted    · Patient recently with worsening back pain, started on Zanaflex day prior to admission  · Patient and family discussed with ED who recommended admission with orthopedic consult  · Orthopedic consult appreciated    · Likely secondary to sacroiliitis

## 2023-05-05 NOTE — PLAN OF CARE
Problem: Potential for Falls  Goal: Patient will remain free of falls  Description: INTERVENTIONS:  - Educate patient/family on patient safety including physical limitations  - Instruct patient to call for assistance with activity   - Consult OT/PT to assist with strengthening/mobility   - Keep Call bell within reach  - Keep bed low and locked with side rails adjusted as appropriate  - Keep care items and personal belongings within reach  - Initiate and maintain comfort rounds  - Make Fall Risk Sign visible to staff  - Offer Toileting every  Hours, in advance of need  - Initiate/Maintain alarm  - Obtain necessary fall risk management equipment:   - Apply yellow socks and bracelet for high fall risk patients  - Consider moving patient to room near nurses station  5/5/2023 1744 by Vahe Sams RN  Outcome: Progressing  5/5/2023 1743 by Vahe Sams RN  Outcome: Progressing     Problem: MOBILITY - ADULT  Goal: Maintain or return to baseline ADL function  Description: INTERVENTIONS:  -  Assess patient's ability to carry out ADLs; assess patient's baseline for ADL function and identify physical deficits which impact ability to perform ADLs (bathing, care of mouth/teeth, toileting, grooming, dressing, etc )  - Assess/evaluate cause of self-care deficits   - Assess range of motion  - Assess patient's mobility; develop plan if impaired  - Assess patient's need for assistive devices and provide as appropriate  - Encourage maximum independence but intervene and supervise when necessary  - Involve family in performance of ADLs  - Assess for home care needs following discharge   - Consider OT consult to assist with ADL evaluation and planning for discharge  - Provide patient education as appropriate  5/5/2023 1744 by Vahe Sams RN  Outcome: Progressing  5/5/2023 1743 by Vahe Sams RN  Outcome: Progressing  Goal: Maintains/Returns to pre admission functional level  Description: INTERVENTIONS:  - Perform BMAT or MOVE assessment daily    - Set and communicate daily mobility goal to care team and patient/family/caregiver  - Collaborate with rehabilitation services on mobility goals if consulted  - Perform Range of Motion  times a day  - Reposition patient every  hours    - Dangle patient  times a day  - Stand patient  times a day  - Ambulate patient  times a day  - Out of bed to chair  times a day   - Out of bed for meal times a day  - Out of bed for toileting  - Record patient progress and toleration of activity level   5/5/2023 1744 by Javed Curiel RN  Outcome: Progressing  5/5/2023 1743 by Javed Curiel RN  Outcome: Progressing     Problem: Prexisting or High Potential for Compromised Skin Integrity  Goal: Skin integrity is maintained or improved  Description: INTERVENTIONS:  - Identify patients at risk for skin breakdown  - Assess and monitor skin integrity  - Assess and monitor nutrition and hydration status  - Monitor labs   - Assess for incontinence   - Turn and reposition patient  - Assist with mobility/ambulation  - Relieve pressure over bony prominences  - Avoid friction and shearing  - Provide appropriate hygiene as needed including keeping skin clean and dry  - Evaluate need for skin moisturizer/barrier cream  - Collaborate with interdisciplinary team   - Patient/family teaching  - Consider wound care consult   5/5/2023 1744 by Javed Curiel RN  Outcome: Progressing  5/5/2023 1743 by Javed Curiel RN  Outcome: Progressing

## 2023-05-05 NOTE — PHYSICAL THERAPY NOTE
PHYSICAL THERAPY EVALUATION NOTE    Patient Name: Amilcar Doherty  TDYSG'B Date: 2023    AGE:   79 y o  Mrn:   [id number]  ADMIT DX:  Shortness of breath [R06 02]  Back pain [M54 9]  Hypoxia [R09 02]  COPD exacerbation (HCC) [J44 1]  Ambulatory dysfunction [R26 2]    Past Medical History:   Diagnosis Date    Anti-phospholipid syndrome (HCC)     Asthma     Blood type O+     Cardiac disease     Chronic pain     COPD (chronic obstructive pulmonary disease) (HCC)     Fracture of ankle     left    Hyperlipidemia     Hypertension     Hypokalemia 2023    Osteoporosis      Length Of Stay: 0  PHYSICAL THERAPY EVALUATION :   Patient's identity confirmed via 2 patient identifiers (full name and ) at start of session       23 1335   PT Last Visit   PT Visit Date 23   Note Type   Note type Evaluation;Orthotic Management/Training   Type of Brace   Brace Applied   ([employer]yeD [employer])   Patient Position When Brace Applied Seated   Bracing Recommendations Recommendation (comment)  (for comfort)   Education   Education Provided [employer] or social support of family present for education by provider  (handout provided)   Pain Assessment   Pain Assessment Tool 0-10   Pain Score 2   Pain Location/Orientation Orientation: Lower; Location: Back   Restrictions/Precautions   Weight Bearing Precautions Per Order No   Braces or Orthoses LSO   Other Precautions Chair Alarm; Bed Alarm; Fall Risk;Pain   Home Living   Type of Home House  (Split level)   Home Layout Two level  (0 SWETA, 7+7 to access living areas)   Bathroom Shower/Tub Tub/shower unit   [employer] Commode;Hand-held shower; Shower chair   Home Equipment Walker;Cane   Additional Comments Pt reports ambulatory w/ her RW at home   Prior Function   Level of [place] Independent with functional mobility   Lives With Alone   Receives Help From Family  (sons are local)   IADLs Family/Friend/Other provides transportation; Family/Friend/Other provides meals; Family/Friend/Other provides medication management   Falls in the last 6 months   (pt declines)   Vocational Retired   General   Family/Caregiver Present No   Cognition   Arousal/Participation Alert   Orientation Level Oriented to person;Oriented to place;Oriented to situation;Disoriented to time   Memory Decreased recall of recent events;Decreased short term memory   Following Commands Follows one step commands with increased time or repetition   Comments Pt agreeable to participate in PT eval   RLE Assessment   RLE Assessment WFL   Strength RLE   RLE Overall Strength 3+/5   LLE Assessment   LLE Assessment WFL   Strength LLE   LLE Overall Strength 3+/5   Vision-Basic Assessment   Current Vision Wears glasses all the time   Bed Mobility   Rolling R 3  Moderate assistance   Additional items Assist x 1; Increased time required;Verbal cues   Supine to Sit 3  Moderate assistance   Additional items Assist x 1; Increased time required;Verbal cues   Transfers   Sit to Stand 3  Moderate assistance   Additional items Assist x 1; Increased time required;Verbal cues   Stand to Sit 3  Moderate assistance   Additional items Assist x 1; Increased time required;Verbal cues   Ambulation/Elevation   Gait pattern Decreased foot clearance; Wide EMIGDIO; Excessively slow   Gait Assistance 3  Moderate assist   Additional items Assist x 1   Assistive Device Rolling walker   Distance 5 ft  (additional not possible due to pain)   Balance   Static Sitting Poor +   Static Standing Poor +   Ambulatory Poor   Activity Tolerance   Activity Tolerance Patient limited by pain   Medical Staff Made Aware ALBERT Wen   Nurse Made Aware CIPRIANO Rowe   Assessment   Problem List Decreased strength;Decreased endurance; Impaired balance;Decreased mobility; Decreased cognition;Pain   Assessment Myesha Goyal is a 79 y o   Female who presents to [facility] on 5/5/2023 from home w/ c/o SOB and LBP and diagnosis of COPD w/ acute exacerbation  Orders for PT eval and treat received  Pt presents w/ comorbidities of COPD, Afib, HTN, anxiety   At baseline, pt mobilizes S w/ RW, and reports 0 falls in the last 6 months  Upon evaluation, pt presents w/ the following deficits: weakness, impaired balance, decreased endurance and pain limiting functional mobility  Upon eval, pt requires mod A x 1 for bed mobility, mod A x 1 for transfers, and mod A x 1 for gait  PT evaluation recommendation is for Level II  During this admission, pt would benefit from continued skilled inpatient PT in the acute care setting in order to address the abovementioned deficits to maximize function and mobility before DC from acute care  Goals   Patient Goals to go home to her catGlenna   STG Expiration Date 05/15/23   Short Term Goal #1 Patient will: Perform all bed mobility tasks w/ supervision to improve pt's independence w/ repositioning for decrease risk of skin breakdown, Perform all transfers w/ supervision consistently from various height surfaces in order to improve I w/ engagement w/ real-world environments/situations, Ambulate at least 50 ft  with roller walker w/ supervision w/o LOB to facilitate return and engagement w/ previous living environment, Navigate 3 stairs w/ supervision with unilateral handrail to either improve independence w/ entering home and/or so patient can fully access living areas in home, Increase all balance 1/2 grade to decrease risk for falls, Complete exercise program independently and Tolerate 3 hr OOB to faciliate upright tolerance, be able to don/doff LSO w/ S   Plan   Treatment/Interventions Functional transfer training;LE strengthening/ROM; Elevations; Therapeutic exercise;Cognitive reorientation;Patient/family training; Endurance training;Equipment eval/education; Bed mobility;Gait training   PT Frequency 3-5x/wk   Recommendation   UB Rehab Discharge Recommendation (PT/OT) Level 2   Equipment Recommended Walker   AM-PAC Basic Mobility Inpatient   Turning in Flat Bed Without Bedrails 2   Lying on Back to Sitting on Edge of Flat Bed Without Bedrails 2   Moving Bed to Chair 2   Standing Up From Chair Using Arms 2   Walk in Room 2   Climb 3-5 Stairs With Railing 2   Basic Mobility Inpatient Raw Score 12   Basic Mobility Standardized Score 32 23   Highest Level Of Mobility   -[facility] Goal 4: Move to chair/commode   -[facility] Achieved 5: Stand (1 or more minutes)   End of Consult   Patient Position at End of Consult Bedside chair;Bed/Chair alarm activated; All needs within reach         The patient's AM-PAC Basic Mobility Inpatient Short Form Raw Score is 12, Standardized Score is 32 23  A standardized score less than 38 32 (raw score of 16) suggests the patient may benefit from discharge to post-acute rehabilitation services which may not coincide with above PT recommendations  However please refer to therapist recommendation for discharge planning given other factors that may influence destination      Pt would benefit from skilled inpatient PT during this admission in order to facilitate progress towards goals to maximize functional independence    Marlin Gibson, PT, DPT

## 2023-05-05 NOTE — PLAN OF CARE
Problem: PHYSICAL THERAPY ADULT  Goal: Performs mobility at highest level of function for planned discharge setting  See evaluation for individualized goals  Description: Treatment/Interventions: Functional transfer training, LE strengthening/ROM, Elevations, Therapeutic exercise, Cognitive reorientation, Patient/family training, Endurance training, Equipment eval/education, Bed mobility, Gait training  Equipment Recommended: Bunny Diaz       See flowsheet documentation for full assessment, interventions and recommendations  5/5/2023 1530 by Megan Mason, PT  Note:    Problem List: Decreased strength, Decreased endurance, Impaired balance, Decreased mobility, Decreased cognition, Pain  Assessment: Desiree Solo is a 79 y o  Female who presents to [address] [place] [address] on 5/5/2023 from home w/ c/o SOB and LBP and diagnosis of COPD w/ acute exacerbation  Orders for PT eval and treat received  Pt presents w/ comorbidities of COPD, Afib, HTN, anxiety   At baseline, pt mobilizes S w/ RW, and reports 0 falls in the last 6 months  Upon evaluation, pt presents w/ the following deficits: weakness, impaired balance, decreased endurance and pain limiting functional mobility  Upon eval, pt requires mod A x 1 for bed mobility, mod A x 1 for transfers, and mod A x 1 for gait  PT evaluation recommendation is for Level II  During this admission, pt would benefit from continued skilled inpatient PT in the acute care setting in order to address the abovementioned deficits to maximize function and mobility before DC from acute care  See flowsheet documentation for full assessment

## 2023-05-06 LAB
ANION GAP SERPL CALCULATED.3IONS-SCNC: 3 MMOL/L (ref 4–13)
ATRIAL RATE: 71 BPM
BASOPHILS # BLD MANUAL: 0 THOUSAND/UL (ref 0–0.1)
BASOPHILS NFR MAR MANUAL: 0 % (ref 0–1)
BUN SERPL-MCNC: 29 MG/DL (ref 5–25)
CALCIUM SERPL-MCNC: 9.3 MG/DL (ref 8.4–10.2)
CHLORIDE SERPL-SCNC: 100 MMOL/L (ref 96–108)
CO2 SERPL-SCNC: 34 MMOL/L (ref 21–32)
CREAT SERPL-MCNC: 0.73 MG/DL (ref 0.6–1.3)
EOSINOPHIL # BLD MANUAL: 0 THOUSAND/UL (ref 0–0.4)
EOSINOPHIL NFR BLD MANUAL: 0 % (ref 0–6)
ERYTHROCYTE [DISTWIDTH] IN BLOOD BY AUTOMATED COUNT: 15.5 % (ref 11.6–15.1)
GFR SERPL CREATININE-BSD FRML MDRD: 75 ML/MIN/1.73SQ M
GLUCOSE SERPL-MCNC: 142 MG/DL (ref 65–140)
HCT VFR BLD AUTO: 31 % (ref 34.8–46.1)
HGB BLD-MCNC: 10.4 G/DL (ref 11.5–15.4)
INR PPP: 4.3 (ref 0.84–1.19)
LYMPHOCYTES # BLD AUTO: 0.29 THOUSAND/UL (ref 0.6–4.47)
LYMPHOCYTES # BLD AUTO: 4 % (ref 14–44)
MCH RBC QN AUTO: 35.4 PG (ref 26.8–34.3)
MCHC RBC AUTO-ENTMCNC: 33.5 G/DL (ref 31.4–37.4)
MCV RBC AUTO: 105 FL (ref 82–98)
MONOCYTES # BLD AUTO: 0.14 THOUSAND/UL (ref 0–1.22)
MONOCYTES NFR BLD: 2 % (ref 4–12)
NEUTROPHILS # BLD MANUAL: 6.73 THOUSAND/UL (ref 1.85–7.62)
NEUTS SEG NFR BLD AUTO: 94 % (ref 43–75)
P AXIS: 61 DEGREES
PLATELET # BLD AUTO: 323 THOUSANDS/UL (ref 149–390)
PLATELET BLD QL SMEAR: ADEQUATE
PMV BLD AUTO: 9.2 FL (ref 8.9–12.7)
POIKILOCYTOSIS BLD QL SMEAR: PRESENT
POTASSIUM SERPL-SCNC: 4.5 MMOL/L (ref 3.5–5.3)
PR INTERVAL: 156 MS
PROCALCITONIN SERPL-MCNC: 0.19 NG/ML
PROTHROMBIN TIME: 43.1 SECONDS (ref 11.6–14.5)
QRS AXIS: 23 DEGREES
QRSD INTERVAL: 86 MS
QT INTERVAL: 354 MS
QTC INTERVAL: 384 MS
RBC # BLD AUTO: 2.94 MILLION/UL (ref 3.81–5.12)
SODIUM SERPL-SCNC: 137 MMOL/L (ref 135–147)
T WAVE AXIS: 55 DEGREES
VENTRICULAR RATE: 71 BPM
WBC # BLD AUTO: 7.16 THOUSAND/UL (ref 4.31–10.16)

## 2023-05-06 RX ORDER — LEVALBUTEROL INHALATION SOLUTION 1.25 MG/3ML
1.25 SOLUTION RESPIRATORY (INHALATION)
Status: DISCONTINUED | OUTPATIENT
Start: 2023-05-07 | End: 2023-05-08 | Stop reason: HOSPADM

## 2023-05-06 RX ORDER — DIGOXIN 125 MCG
125 TABLET ORAL EVERY OTHER DAY
Status: DISCONTINUED | OUTPATIENT
Start: 2023-05-06 | End: 2023-05-08 | Stop reason: HOSPADM

## 2023-05-06 RX ADMIN — DIGOXIN 125 MCG: 125 TABLET ORAL at 13:24

## 2023-05-06 RX ADMIN — LOSARTAN POTASSIUM 25 MG: 25 TABLET, FILM COATED ORAL at 08:38

## 2023-05-06 RX ADMIN — OXYCODONE HYDROCHLORIDE AND ACETAMINOPHEN 1000 MG: 500 TABLET ORAL at 08:37

## 2023-05-06 RX ADMIN — DILTIAZEM HYDROCHLORIDE 240 MG: 120 CAPSULE, COATED, EXTENDED RELEASE ORAL at 08:37

## 2023-05-06 RX ADMIN — LEVALBUTEROL HYDROCHLORIDE 1.25 MG: 1.25 SOLUTION RESPIRATORY (INHALATION) at 07:10

## 2023-05-06 RX ADMIN — LEVALBUTEROL HYDROCHLORIDE 1.25 MG: 1.25 SOLUTION RESPIRATORY (INHALATION) at 19:14

## 2023-05-06 RX ADMIN — PREDNISONE 40 MG: 20 TABLET ORAL at 08:37

## 2023-05-06 RX ADMIN — LEVALBUTEROL HYDROCHLORIDE 1.25 MG: 1.25 SOLUTION RESPIRATORY (INHALATION) at 13:29

## 2023-05-06 RX ADMIN — AZITHROMYCIN MONOHYDRATE 500 MG: 500 TABLET ORAL at 17:18

## 2023-05-06 RX ADMIN — SPIRONOLACTONE 25 MG: 25 TABLET ORAL at 08:38

## 2023-05-06 RX ADMIN — IPRATROPIUM BROMIDE 0.5 MG: 0.5 SOLUTION RESPIRATORY (INHALATION) at 19:14

## 2023-05-06 RX ADMIN — LEVOTHYROXINE SODIUM 25 MCG: 25 TABLET ORAL at 06:15

## 2023-05-06 RX ADMIN — IPRATROPIUM BROMIDE 0.5 MG: 0.5 SOLUTION RESPIRATORY (INHALATION) at 07:10

## 2023-05-06 RX ADMIN — SENNOSIDES AND DOCUSATE SODIUM 1 TABLET: 8.6; 5 TABLET ORAL at 22:17

## 2023-05-06 RX ADMIN — LORATADINE 10 MG: 10 TABLET ORAL at 08:37

## 2023-05-06 RX ADMIN — GUAIFENESIN 600 MG: 600 TABLET ORAL at 08:37

## 2023-05-06 RX ADMIN — GUAIFENESIN 600 MG: 600 TABLET ORAL at 17:18

## 2023-05-06 RX ADMIN — MIRTAZAPINE 7.5 MG: 15 TABLET, FILM COATED ORAL at 22:17

## 2023-05-06 RX ADMIN — IPRATROPIUM BROMIDE 0.5 MG: 0.5 SOLUTION RESPIRATORY (INHALATION) at 13:29

## 2023-05-06 RX ADMIN — UMECLIDINIUM 1 PUFF: 62.5 AEROSOL, POWDER ORAL at 08:55

## 2023-05-06 RX ADMIN — SERTRALINE 50 MG: 50 TABLET, FILM COATED ORAL at 08:38

## 2023-05-06 RX ADMIN — ACETAMINOPHEN 325MG 975 MG: 325 TABLET ORAL at 06:15

## 2023-05-06 RX ADMIN — FLUTICASONE FUROATE AND VILANTEROL TRIFENATATE 1 PUFF: 100; 25 POWDER RESPIRATORY (INHALATION) at 08:55

## 2023-05-06 RX ADMIN — ACETAMINOPHEN 325MG 975 MG: 325 TABLET ORAL at 13:24

## 2023-05-06 RX ADMIN — Medication 1000 UNITS: at 08:38

## 2023-05-06 RX ADMIN — ACETAMINOPHEN 325MG 975 MG: 325 TABLET ORAL at 22:17

## 2023-05-06 NOTE — PROGRESS NOTES
[place]  Progress Note  Name: Jared Garza  MRN: [id number]  Unit/Bed#: -01 I Date of Admission: 5/4/2023   Date of Service: 5/6/2023 I Hospital Day: 1    Assessment/Plan   Essential hypertension  Assessment & Plan  · Maintained on losartan outpatient, continue    Hypothyroidism  Assessment & Plan  · Maintained on levothyroxine 25mcg daily, continue    Low back pain without sciatica  Assessment & Plan  · Was recently discharged from physical therapy, no acute fall noted  · Patient recently with worsening back pain, started on Zanaflex day prior to admission  · Patient and family discussed with ED who recommended admission with orthopedic consult  · Orthopedic consult appreciated    · Likely secondary to sacroiliitis    Atrial fibrillation ([facility] )  Assessment & Plan  · Maintained on digoxin and diltiazem outpatient, continue  · Maintained on Coumadin  · INR currently supratherapeutic, hold Coumadin    Antiphospholipid antibody syndrome (HCC)  Assessment & Plan  · Maintained on Coumadin outpatient, with a goal of 2-3  · INR 4 3 today, hold coumadin tonight   · Trend INR daily    * Chronic obstructive pulmonary disease with acute exacerbation ([facility] [address] )  Assessment & Plan  · Maintained on Advair and Spiriva outpatient with prn albuterol inhaler   · Requiring 2L NC in ED to maintain saturations, Wean as tolerated  · Chest x-ray negative  · Patient was given 1 dose of IV Solu-Medrol 60 mg in ED  · On prednisone 40 mg daily for 5 days  · Complete azithromycin for 3 days  · Discontinue Rocephin, procalcitonin negative        Labs & Imaging: I have personally reviewed pertinent reports  VTE Prophylaxis: in place  Code Status:   Level 2 - DNAR: but accepts endotracheal intubation    Patient Centered Rounds: I have performed bedside rounds with nursing staff today      Discussions with Specialists or Other Care Team Provider: CM    Education and Discussions with Family / Patient: Son [place]    Current Length of Stay: 1 day(s)    Current Patient Status: Inpatient   Certification Statement: The patient will continue to require additional inpatient hospital stay due to see my assessment and plan  Subjective:   Patient is seen and examined at bedside  Complains of lower back pain  Denies any other complaint  Afebrile  All other ROS are negative  Objective:    Vitals: Blood pressure (!) 122/45, pulse 80, temperature 97 9 °F (36 6 °C), resp  rate 14, height 5' (1 524 m), weight 47 3 kg (104 lb 4 4 oz), SpO2 96 %, not currently breastfeeding  ,Body mass index is 20 37 kg/m²  SPO2 RA Rest    Flowsheet Row ED to Hosp-Admission (Current) from 5/4/2023 in [address] Med Surg Unit   SpO2 96 %   SpO2 Activity At Rest   O2 Device Nasal cannula   O2 Flow Rate --        I&O:     Intake/Output Summary (Last 24 hours) at 5/6/2023 0925  Last data filed at 5/6/2023 0824  Gross per 24 hour   Intake 660 ml   Output 836 ml   Net -176 ml       Physical Exam:    General- Alert, lying comfortably in bed  Not in any acute distress  Neck- Supple, No JVD  CVS- regular, S1 and S2 normal   Chest- Bilateral Air entry, decreased at bases  Abdomen- soft, nontender, not distended, no guarding or rigidity, BS+  Extremities-  No pedal edema, No calf tenderness  CNS-   Alert, awake and orientedx3  No focal deficits present      Invasive Devices     Peripheral Intravenous Line  Duration           Peripheral IV 05/04/23 Right Antecubital 1 day          Drain  Duration           External Urinary Catheter 1 day                      Social History  reviewed  Family History   Problem Relation Age of Onset   • Hypertension Mother    • Skin cancer Mother    • Hypertension Father     reviewed    Meds:  Current Facility-Administered Medications   Medication Dose Route Frequency Provider Last Rate Last Admin   • acetaminophen (TYLENOL) tablet 975 mg  975 mg Oral [facility] Sharee SUE PA-C   975 mg at 05/06/23 7947   • ascorbic acid (VITAMIN C) tablet 1,000 mg  1,000 mg Oral Daily Sharee SUE PA-C   1,000 mg at 05/06/23 1181   • azithromycin (ZITHROMAX) tablet 500 mg  500 mg Oral Q24H Ezio Carroll MD   500 mg at 05/05/23 1540   • cholecalciferol (VITAMIN D3) tablet 1,000 Units  1,000 Units Oral Daily Sharee SUE PA-C   1,000 Units at 05/06/23 4246   • diltiazem (CARDIZEM CD) 24 hr capsule 240 mg  240 mg Oral Daily Sharee SUE PA-C   240 mg at 05/06/23 5418   • Fluticasone Furoate-Vilanterol 100-25 mcg/actuation 1 puff  1 puff Inhalation Daily Sharee SUE PA-C   1 puff at 05/06/23 0855   • guaiFENesin (MUCINEX) 12 hr tablet 600 mg  600 mg Oral BID Sharee SUE PA-C   600 mg at 05/06/23 1588   • ipratropium (ATROVENT) 0 02 % inhalation solution 0 5 mg  0 5 mg Nebulization Q6H Jaems Newman MD   0 5 mg at 05/06/23 0710   • levalbuterol (Stacy James) inhalation solution 1 25 mg  1 25 mg Nebulization Q6H James Newman MD   1 25 mg at 05/06/23 0710   • levothyroxine tablet 25 mcg  25 mcg Oral Early Morning Sharee SUE PA-C   25 mcg at 05/06/23 0615   • lidocaine (LIDODERM) 5 % patch 1 patch  1 patch Topical Daily Sharee SUE PA-C   1 patch at 05/06/23 6610   • loratadine (CLARITIN) tablet 10 mg  10 mg Oral Daily Sharee SUE PA-C   10 mg at 05/06/23 1122   • losartan (COZAAR) tablet 25 mg  25 mg Oral Daily Sharee SUE PA-C   25 mg at 05/06/23 4984   • mirtazapine (REMERON) tablet 7 5 mg  7 5 mg Oral HS Sharee SUE PA-C   7 5 mg at 05/05/23 2233   • ondansetron (ZOFRAN) injection 4 mg  4 mg Intravenous Q6H PRN Sharee SUE PA-C       • predniSONE tablet 40 mg  40 mg Oral Daily Stephanie Álvarez MD   40 mg at 05/06/23 0837   • senna-docusate sodium (SENOKOT S) 8 6-50 mg per tablet 1 tablet  1 tablet Oral HS Sharee SUE PA-C   1 tablet at 05/05/23 2233   • sertraline (ZOLOFT) tablet 50 mg  50 mg Oral Daily Sharee SUE PA-C   50 mg at 05/06/23 0136   • spironolactone (ALDACTONE) tablet 25 mg  25 mg Oral Daily Sharee SUE PA-C   25 mg at 05/06/23 9832   • tiZANidine (ZANAFLEX) tablet 2 mg  2 mg Oral BID PRN Padmini SUE PA-C   2 mg at 05/05/23 1815   • umeclidinium 62 5 mcg/actuation inhaler AEPB 1 puff  1 puff Inhalation Daily Sharee SUE PA-C   1 puff at 05/06/23 7587      Medications Prior to Admission   Medication   • acetaminophen (TYLENOL) 325 mg tablet   • Advair Diskus 250-50 MCG/DOSE inhaler   • albuterol (2 5 mg/3 mL) 0 083 % nebulizer solution   • Ascorbic Acid (VITAMIN C) 1000 MG tablet   • Cholecalciferol (VITAMIN D3) 20 MCG (800 UNIT) TABS   • Cholecalciferol (Vitamin D3) 25 MCG TABS   • denosumab (PROLIA) 60 mg/mL   • digoxin (LANOXIN) 0 25 mg tablet   • diltiazem (CARDIZEM CD) 240 mg 24 hr capsule   • Ferrous Sulfate 220 (44 Fe) MG/5ML LIQD   • levothyroxine 25 mcg tablet   • lidocaine (LIDODERM) 5 %   • loratadine (CLARITIN) 10 mg tablet   • losartan (COZAAR) 25 mg tablet   • mirtazapine (REMERON) 7 5 MG tablet   • multivitamin (THERAGRAN) TABS   • senna-docusate sodium (SENOKOT S) 8 6-50 mg per tablet   • sertraline (Zoloft) 50 mg tablet   • spironolactone (ALDACTONE) 25 mg tablet   • tiotropium (Spiriva HandiHaler) 18 mcg inhalation capsule   • tiZANidine (ZANAFLEX) 2 mg tablet   • warfarin (COUMADIN) 2 mg tablet       Labs:  Results from last 7 days   Lab Units 05/06/23  0255 05/05/23  0347 05/04/23  1318 05/04/23  1309   WBC Thousand/uL 7 16 5 38  --  6 99   HEMOGLOBIN g/dL 10 4* 9 9*  --  10 7*   I STAT HEMOGLOBIN g/dl  --   --  10 9*  --    HEMATOCRIT % 31 0* 29 8*  --  31 8*   HEMATOCRIT, ISTAT %  --   --  32*  --    PLATELETS Thousands/uL 323 273  --  322   NEUTROS PCT %  --   --   --  81*   LYMPHS PCT %  --   --   --  11*   LYMPHO PCT % 4* 7*  --   --    MONOS PCT %  --   --   --  7   MONO PCT % 2* 1*  --   --    EOS PCT % 0 0  --  1     Results from last 7 days   Lab Units 05/06/23  0255 05/05/23  0347 05/04/23  1318 05/04/23  1304 POTASSIUM mmol/L 4 5 4 7  --  4 6   CHLORIDE mmol/L 100 100  --  99   CO2 mmol/L 34* 33*  --  34*   CO2, I-STAT mmol/L  --   --  36*  --    BUN mg/dL 29* 26*  --  26*   CREATININE mg/dL 0 73 0 70  --  0 81   CALCIUM mg/dL 9 3 9 0  --  9 6   ALK PHOS U/L  --   --   --  121*   ALT U/L  --   --   --  13   AST U/L  --   --   --  18   GLUCOSE, ISTAT mg/dl  --   --  89  --      Lab Results   Component Value Date    TROPONINI <0 02 11/22/2017    CKTOTAL 26 02/27/2023     Results from last 7 days   Lab Units 05/06/23  0255 05/05/23  0347 05/04/23  1309   INR  4 30* 4 30* 3 80*     Lab Results   Component Value Date    BLOODCX No Growth at 24 hrs  05/04/2023    BLOODCX No Growth at 24 hrs  05/04/2023    BLOODCX No Growth After 5 Days  02/27/2023    URINECX 50,000-59,000 cfu/ml Enterococcus faecalis (A) 02/28/2023    URINECX <10,000 cfu/ml 02/28/2023         Imaging:  Results for orders placed during the hospital encounter of 02/27/23    XR chest portable    Narrative  CHEST    INDICATION:  Tachypnea on IVF, rales B/L , r o pulm vas congestion  COMPARISON:  3/3/2023, CT chest, abdomen and pelvis 2/27/2023    EXAM PERFORMED/VIEWS:  XR CHEST PORTABLE      FINDINGS:  The patient is rotated to the right  Cardiomediastinal silhouette appears unremarkable  Small focus of right basilar atelectasis or infiltrate  No evidence of pulmonary edema  No pneumothorax or pleural effusion  Dextroscoliosis and degenerative changes visualized lumbar spine  Old right lower posterolateral rib fracture  Impression  Small focus of right basilar atelectasis or infiltrate  Workstation performed: [id number]    Results for orders placed during the hospital encounter of 05/04/23    XR chest pa & lateral    Narrative  CHEST    INDICATION:   Shortness of breath      COMPARISON: Chest radiograph March 6, 2023    EXAM PERFORMED/VIEWS:  XR CHEST PA & LATERAL      FINDINGS:    Cardiomediastinal silhouette appears unremarkable  Linear atelectasis in the right lower lung  No focal consolidation, pleural effusion or pneumothorax  Osseous structures appear within normal limits for patient age  Impression  No focal consolidation, pleural effusion, or pneumothorax  Workstation performed: [id number]      Last 24 Hours Medication List:   Current Facility-Administered Medications   Medication Dose Route Frequency Provider Last Rate   • acetaminophen  975 mg Oral Q8H [address] Sharee SUE PA-C     • vitamin C  1,000 mg Oral Daily Sharee SUE PA-C     • azithromycin  500 mg Oral Q24H Daniela Nash MD     • cholecalciferol  1,000 Units Oral Daily Sharee SUE PA-C     • diltiazem  240 mg Oral Daily Sharee SUE PA-C     • Fluticasone Furoate-Vilanterol  1 puff Inhalation Daily Sharee SUE PA-C     • guaiFENesin  600 mg Oral BID Sharee SUE PA-C     • ipratropium  0 5 mg Nebulization Q6H Roselia Randolph MD     • levalbuterol  1 25 mg Nebulization Q6H Roselia Randolph MD     • levothyroxine  25 mcg Oral Early Morning Sharee SUE PA-C     • lidocaine  1 patch Topical Daily Sharee SUE PA-C     • loratadine  10 mg Oral Daily Sharee SUE PA-C     • losartan  25 mg Oral Daily Sharee SUE PA-C     • mirtazapine  7 5 mg Oral HS Sharee SUE PA-C     • ondansetron  4 mg Intravenous Q6H PRN Sharee SUE PA-C     • predniSONE  40 mg Oral Daily Stephanie Álvarez MD     • senna-docusate sodium  1 tablet Oral HS Sharee SUE PA-C     • sertraline  50 mg Oral Daily Sharee SUE PA-C     • spironolactone  25 mg Oral Daily Sharee SUE PA-C     • tiZANidine  2 mg Oral BID PRN Panfilo Notch SIGRID SUE     • umeclidinium  1 puff Inhalation Daily Sharee SUE PA-C          Today, Patient Was Seen By: Royal Caryl MD    ** Please Note: Dictation voice to text software may have been used in the creation of this document   **

## 2023-05-06 NOTE — ASSESSMENT & PLAN NOTE
· Maintained on Advair and Spiriva outpatient with prn albuterol inhaler   · Requiring 2L NC in ED to maintain saturations, Wean as tolerated  · Chest x-ray negative  · Patient was given 1 dose of IV Solu-Medrol 60 mg in ED    · On prednisone 40 mg daily for 5 days  · Complete azithromycin for 3 days  · Discontinue Rocephin, procalcitonin negative

## 2023-05-07 LAB
ANION GAP SERPL CALCULATED.3IONS-SCNC: 2 MMOL/L (ref 4–13)
BASOPHILS # BLD AUTO: 0 THOUSANDS/ÂΜL (ref 0–0.1)
BASOPHILS NFR BLD AUTO: 0 % (ref 0–1)
BUN SERPL-MCNC: 22 MG/DL (ref 5–25)
CALCIUM SERPL-MCNC: 9.1 MG/DL (ref 8.4–10.2)
CHLORIDE SERPL-SCNC: 100 MMOL/L (ref 96–108)
CO2 SERPL-SCNC: 34 MMOL/L (ref 21–32)
CREAT SERPL-MCNC: 0.63 MG/DL (ref 0.6–1.3)
EOSINOPHIL # BLD AUTO: 0.01 THOUSAND/ÂΜL (ref 0–0.61)
EOSINOPHIL NFR BLD AUTO: 0 % (ref 0–6)
ERYTHROCYTE [DISTWIDTH] IN BLOOD BY AUTOMATED COUNT: 15.3 % (ref 11.6–15.1)
GFR SERPL CREATININE-BSD FRML MDRD: 82 ML/MIN/1.73SQ M
GLUCOSE SERPL-MCNC: 91 MG/DL (ref 65–140)
HCT VFR BLD AUTO: 29.4 % (ref 34.8–46.1)
HGB BLD-MCNC: 9.7 G/DL (ref 11.5–15.4)
IMM GRANULOCYTES # BLD AUTO: 0.02 THOUSAND/UL (ref 0–0.2)
IMM GRANULOCYTES NFR BLD AUTO: 0 % (ref 0–2)
INR PPP: 3.47 (ref 0.84–1.19)
LYMPHOCYTES # BLD AUTO: 0.98 THOUSANDS/ÂΜL (ref 0.6–4.47)
LYMPHOCYTES NFR BLD AUTO: 13 % (ref 14–44)
MCH RBC QN AUTO: 34.4 PG (ref 26.8–34.3)
MCHC RBC AUTO-ENTMCNC: 33 G/DL (ref 31.4–37.4)
MCV RBC AUTO: 104 FL (ref 82–98)
MONOCYTES # BLD AUTO: 0.49 THOUSAND/ÂΜL (ref 0.17–1.22)
MONOCYTES NFR BLD AUTO: 7 % (ref 4–12)
MRSA NOSE QL CULT: NORMAL
NEUTROPHILS # BLD AUTO: 5.93 THOUSANDS/ÂΜL (ref 1.85–7.62)
NEUTS SEG NFR BLD AUTO: 80 % (ref 43–75)
NRBC BLD AUTO-RTO: 0 /100 WBCS
PLATELET # BLD AUTO: 311 THOUSANDS/UL (ref 149–390)
PMV BLD AUTO: 8.3 FL (ref 8.9–12.7)
POTASSIUM SERPL-SCNC: 4.6 MMOL/L (ref 3.5–5.3)
PROTHROMBIN TIME: 36.5 SECONDS (ref 11.6–14.5)
RBC # BLD AUTO: 2.82 MILLION/UL (ref 3.81–5.12)
SODIUM SERPL-SCNC: 136 MMOL/L (ref 135–147)
WBC # BLD AUTO: 7.43 THOUSAND/UL (ref 4.31–10.16)

## 2023-05-07 RX ADMIN — SERTRALINE 50 MG: 50 TABLET, FILM COATED ORAL at 08:50

## 2023-05-07 RX ADMIN — GUAIFENESIN 600 MG: 600 TABLET ORAL at 17:00

## 2023-05-07 RX ADMIN — LEVALBUTEROL HYDROCHLORIDE 1.25 MG: 1.25 SOLUTION RESPIRATORY (INHALATION) at 07:09

## 2023-05-07 RX ADMIN — MIRTAZAPINE 7.5 MG: 15 TABLET, FILM COATED ORAL at 21:46

## 2023-05-07 RX ADMIN — ACETAMINOPHEN 325MG 975 MG: 325 TABLET ORAL at 05:12

## 2023-05-07 RX ADMIN — SENNOSIDES AND DOCUSATE SODIUM 1 TABLET: 8.6; 5 TABLET ORAL at 21:46

## 2023-05-07 RX ADMIN — FLUTICASONE FUROATE AND VILANTEROL TRIFENATATE 1 PUFF: 100; 25 POWDER RESPIRATORY (INHALATION) at 08:57

## 2023-05-07 RX ADMIN — UMECLIDINIUM 1 PUFF: 62.5 AEROSOL, POWDER ORAL at 08:57

## 2023-05-07 RX ADMIN — PREDNISONE 40 MG: 20 TABLET ORAL at 08:50

## 2023-05-07 RX ADMIN — ACETAMINOPHEN 325MG 975 MG: 325 TABLET ORAL at 21:46

## 2023-05-07 RX ADMIN — LIDOCAINE 1 PATCH: 50 PATCH TOPICAL at 08:46

## 2023-05-07 RX ADMIN — LORATADINE 10 MG: 10 TABLET ORAL at 08:50

## 2023-05-07 RX ADMIN — GUAIFENESIN 600 MG: 600 TABLET ORAL at 08:50

## 2023-05-07 RX ADMIN — Medication 1000 UNITS: at 08:50

## 2023-05-07 RX ADMIN — IPRATROPIUM BROMIDE 0.5 MG: 0.5 SOLUTION RESPIRATORY (INHALATION) at 19:48

## 2023-05-07 RX ADMIN — LEVOTHYROXINE SODIUM 25 MCG: 25 TABLET ORAL at 05:12

## 2023-05-07 RX ADMIN — LOSARTAN POTASSIUM 25 MG: 25 TABLET, FILM COATED ORAL at 08:50

## 2023-05-07 RX ADMIN — IPRATROPIUM BROMIDE 0.5 MG: 0.5 SOLUTION RESPIRATORY (INHALATION) at 07:09

## 2023-05-07 RX ADMIN — LEVALBUTEROL HYDROCHLORIDE 1.25 MG: 1.25 SOLUTION RESPIRATORY (INHALATION) at 19:48

## 2023-05-07 RX ADMIN — SPIRONOLACTONE 25 MG: 25 TABLET ORAL at 08:50

## 2023-05-07 RX ADMIN — OXYCODONE HYDROCHLORIDE AND ACETAMINOPHEN 1000 MG: 500 TABLET ORAL at 08:50

## 2023-05-07 RX ADMIN — IPRATROPIUM BROMIDE 0.5 MG: 0.5 SOLUTION RESPIRATORY (INHALATION) at 13:06

## 2023-05-07 RX ADMIN — LEVALBUTEROL HYDROCHLORIDE 1.25 MG: 1.25 SOLUTION RESPIRATORY (INHALATION) at 13:06

## 2023-05-07 RX ADMIN — ACETAMINOPHEN 325MG 975 MG: 325 TABLET ORAL at 13:58

## 2023-05-07 RX ADMIN — DILTIAZEM HYDROCHLORIDE 240 MG: 120 CAPSULE, COATED, EXTENDED RELEASE ORAL at 08:50

## 2023-05-07 NOTE — PLAN OF CARE
Problem: Potential for Falls  Goal: Patient will remain free of falls  Description: INTERVENTIONS:  - Educate patient/family on patient safety including physical limitations  - Instruct patient to call for assistance with activity   - Consult OT/PT to assist with strengthening/mobility   - Keep Call bell within reach  - Keep bed low and locked with side rails adjusted as appropriate  - Keep care items and personal belongings within reach  - Initiate and maintain comfort rounds  - Make Fall Risk Sign visible to staff  - Offer Toileting every 2 Hours, in advance of need  - Initiate/Maintain alarm  - Obtain necessary fall risk management equipment  - Apply yellow socks and bracelet for high fall risk patients  - Consider moving patient to room near nurses station  Outcome: Progressing     Problem: MOBILITY - ADULT  Goal: Maintain or return to baseline ADL function  Description: INTERVENTIONS:  -  Assess patient's ability to carry out ADLs; assess patient's baseline for ADL function and identify physical deficits which impact ability to perform ADLs (bathing, care of mouth/teeth, toileting, grooming, dressing, etc )  - Assess/evaluate cause of self-care deficits   - Assess range of motion  - Assess patient's mobility; develop plan if impaired  - Assess patient's need for assistive devices and provide as appropriate  - Encourage maximum independence but intervene and supervise when necessary  - Involve family in performance of ADLs  - Assess for home care needs following discharge   - Consider OT consult to assist with ADL evaluation and planning for discharge  - Provide patient education as appropriate  Outcome: Progressing  Goal: Maintains/Returns to pre admission functional level  Description: INTERVENTIONS:  - Perform BMAT or MOVE assessment daily    - Set and communicate daily mobility goal to care team and patient/family/caregiver     - Collaborate with rehabilitation services on mobility goals if consulted  - Perform Range of Motion 3 times a day  - Reposition patient every 3 hours    - Dangle patient 3 times a day  - Stand patient 3 times a day  - Ambulate patient 3 times a day  - Out of bed to chair 3 times a day   - Out of bed for meals 3 times a day  - Out of bed for toileting  - Record patient progress and toleration of activity level   Outcome: Progressing     Problem: Prexisting or High Potential for Compromised Skin Integrity  Goal: Skin integrity is maintained or improved  Description: INTERVENTIONS:  - Identify patients at risk for skin breakdown  - Assess and monitor skin integrity  - Assess and monitor nutrition and hydration status  - Monitor labs   - Assess for incontinence   - Turn and reposition patient  - Assist with mobility/ambulation  - Relieve pressure over bony prominences  - Avoid friction and shearing  - Provide appropriate hygiene as needed including keeping skin clean and dry  - Evaluate need for skin moisturizer/barrier cream  - Collaborate with interdisciplinary team   - Patient/family teaching  - Consider wound care consult   Outcome: Progressing

## 2023-05-07 NOTE — PROGRESS NOTES
[place]  Progress Note  Name: Christelle Abreu  MRN: [id number]  Unit/Bed#: -01 I Date of Admission: 5/4/2023   Date of Service: 5/7/2023 I Hospital Day: 2    Assessment/Plan   Essential hypertension  Assessment & Plan  · Maintained on losartan outpatient, continue    Hypothyroidism  Assessment & Plan  · Maintained on levothyroxine 25mcg daily, continue    Low back pain without sciatica  Assessment & Plan  · Was recently discharged from physical therapy, no acute fall noted  · Patient recently with worsening back pain, started on Zanaflex day prior to admission  · Patient and family discussed with ED who recommended admission with orthopedic consult  · Orthopedic consult appreciated    · Likely secondary to sacroiliitis    Atrial fibrillation ([facility] [address] )  Assessment & Plan  · Maintained on digoxin and diltiazem outpatient, continue  · Maintained on Coumadin  · INR currently supratherapeutic, hold Coumadin    Antiphospholipid antibody syndrome (HCC)  Assessment & Plan  · Maintained on Coumadin outpatient, with a goal of 2-3  · INR 3 47 today, hold coumadin tonight   · Trend INR daily    * Chronic obstructive pulmonary disease with acute exacerbation ([facility] [address] )  Assessment & Plan  · Maintained on Advair and Spiriva outpatient with prn albuterol inhaler   · Requiring 2L NC in ED to maintain saturations, Wean as tolerated  · Chest x-ray negative  · Patient was given 1 dose of IV Solu-Medrol 60 mg in ED  · On prednisone 40 mg daily for 5 days  · Complete azithromycin for 3 days  · Discontinue Rocephin, procalcitonin negative           Labs & Imaging: I have personally reviewed pertinent reports  VTE Prophylaxis: in place  Code Status:   Level 2 - DNAR: but accepts endotracheal intubation    Patient Centered Rounds: I have performed bedside rounds with nursing staff today      Discussions with Specialists or Other Care Team Provider: CM    Education and Discussions with Family / Patient: Son Willy Summers voicemail    Current Length of Stay: 2 day(s)    Current Patient Status: Inpatient   Certification Statement: The patient will continue to require additional inpatient hospital stay due to see my assessment and plan  Subjective:   Patient is seen and examined at bedside  Denies any new complaints  Afebrile  All other ROS are negative  Objective:    Vitals: Blood pressure 153/75, pulse 77, temperature 98 2 °F (36 8 °C), resp  rate 16, height 5' (1 524 m), weight 47 3 kg (104 lb 4 4 oz), SpO2 92 %, not currently breastfeeding  ,Body mass index is 20 37 kg/m²  SPO2 RA Rest    Flowsheet Row ED to Hosp-Admission (Current) from 5/4/2023 in [address] Med Surg Unit   SpO2 92 %   SpO2 Activity At Rest   O2 Device None (Room air)   O2 Flow Rate --        I&O:     Intake/Output Summary (Last 24 hours) at 5/7/2023 0836  Last data filed at 5/7/2023 0835  Gross per 24 hour   Intake 420 ml   Output 900 ml   Net -480 ml       Physical Exam:    General- Alert, lying comfortably in bed  Not in any acute distress  Neck- Supple, No JVD  CVS- regular, S1 and S2 normal  Chest- Bilateral Air entry, No rhochi, crackles or wheezing present  Abdomen- soft, nontender, not distended, no guarding or rigidity, BS+  Extremities-  No pedal edema, No calf tenderness                         Normal ROM in all extremities  CNS-   Alert, awake and orientedx3  No focal deficits present      Invasive Devices     Peripheral Intravenous Line  Duration           Peripheral IV 05/04/23 Right Antecubital 2 days          Drain  Duration           External Urinary Catheter 2 days                      Social History  reviewed  Family History   Problem Relation Age of Onset   • Hypertension Mother    • Skin cancer Mother    • Hypertension Father     reviewed    Meds:  Current Facility-Administered Medications   Medication Dose Route Frequency Provider Last Rate Last Admin   • acetaminophen (TYLENOL) tablet 975 mg  975 mg Oral [facility] Sharee SUE PA-C   975 mg at 05/07/23 8527   • ascorbic acid (VITAMIN C) tablet 1,000 mg  1,000 mg Oral Daily Sharee SUE PA-C   1,000 mg at 05/06/23 0220   • cholecalciferol (VITAMIN D3) tablet 1,000 Units  1,000 Units Oral Daily Sharee SUE PA-C   1,000 Units at 05/06/23 5010   • digoxin (LANOXIN) tablet 125 mcg  125 mcg Oral Every Other Day Jamee Diamond MD   125 mcg at 05/06/23 1324   • diltiazem (CARDIZEM CD) 24 hr capsule 240 mg  240 mg Oral Daily Sharee SUE PA-C   240 mg at 05/06/23 3446   • Fluticasone Furoate-Vilanterol 100-25 mcg/actuation 1 puff  1 puff Inhalation Daily Sharee SUE PA-C   1 puff at 05/06/23 0855   • guaiFENesin (MUCINEX) 12 hr tablet 600 mg  600 mg Oral BID Sharee SUE PA-C   600 mg at 05/06/23 1718   • ipratropium (ATROVENT) 0 02 % inhalation solution 0 5 mg  0 5 mg Nebulization TID Jamee Diamond MD   0 5 mg at 05/07/23 0709   • levalbuterol (Belvia Boga) inhalation solution 1 25 mg  1 25 mg Nebulization TID Jamee Diamond MD   1 25 mg at 05/07/23 9336   • levothyroxine tablet 25 mcg  25 mcg Oral Early Morning Sharee SUE PA-C   25 mcg at 05/07/23 9979   • lidocaine (LIDODERM) 5 % patch 1 patch  1 patch Topical Daily Sharee SUE PA-C   1 patch at 05/05/23 0090   • loratadine (CLARITIN) tablet 10 mg  10 mg Oral Daily Sharee SUE PA-C   10 mg at 05/06/23 9247   • losartan (COZAAR) tablet 25 mg  25 mg Oral Daily Sharee SUE PA-C   25 mg at 05/06/23 2211   • mirtazapine (REMERON) tablet 7 5 mg  7 5 mg Oral HS Sharee SUE PA-C   7 5 mg at 05/06/23 2217   • ondansetron (ZOFRAN) injection 4 mg  4 mg Intravenous Q6H PRN Sharee SUE PA-C       • predniSONE tablet 40 mg  40 mg Oral Daily Stephanie Álvarez MD   40 mg at 05/06/23 0837   • senna-docusate sodium (SENOKOT S) 8 6-50 mg per tablet 1 tablet  1 tablet Oral HS Sharee SUE PA-C   1 tablet at 05/06/23 2217   • sertraline (ZOLOFT) tablet 50 mg  50 mg Oral Daily Sharee SUE PA-C   50 mg at 05/06/23 8529   • spironolactone (ALDACTONE) tablet 25 mg  25 mg Oral Daily Sharee SUE PA-C   25 mg at 05/06/23 6938   • tiZANidine (ZANAFLEX) tablet 2 mg  2 mg Oral BID PRN Jad Shay SUE PA-C   2 mg at 05/05/23 1815   • umeclidinium 62 5 mcg/actuation inhaler AEPB 1 puff  1 puff Inhalation Daily Sharee SUE PA-C   1 puff at 05/06/23 2243      Medications Prior to Admission   Medication   • acetaminophen (TYLENOL) 325 mg tablet   • Advair Diskus 250-50 MCG/DOSE inhaler   • albuterol (2 5 mg/3 mL) 0 083 % nebulizer solution   • Ascorbic Acid (VITAMIN C) 1000 MG tablet   • Cholecalciferol (VITAMIN D3) 20 MCG (800 UNIT) TABS   • Cholecalciferol (Vitamin D3) 25 MCG TABS   • denosumab (PROLIA) 60 mg/mL   • digoxin (LANOXIN) 0 25 mg tablet   • diltiazem (CARDIZEM CD) 240 mg 24 hr capsule   • Ferrous Sulfate 220 (44 Fe) MG/5ML LIQD   • levothyroxine 25 mcg tablet   • lidocaine (LIDODERM) 5 %   • loratadine (CLARITIN) 10 mg tablet   • losartan (COZAAR) 25 mg tablet   • mirtazapine (REMERON) 7 5 MG tablet   • multivitamin (THERAGRAN) TABS   • senna-docusate sodium (SENOKOT S) 8 6-50 mg per tablet   • sertraline (Zoloft) 50 mg tablet   • spironolactone (ALDACTONE) 25 mg tablet   • tiotropium (Spiriva HandiHaler) 18 mcg inhalation capsule   • tiZANidine (ZANAFLEX) 2 mg tablet   • warfarin (COUMADIN) 2 mg tablet       Labs:  Results from last 7 days   Lab Units 05/07/23  0452 05/06/23  0255 05/05/23  0347 05/04/23  1318 05/04/23  1309   WBC Thousand/uL 7 43 7 16 5 38  --  6 99   HEMOGLOBIN g/dL 9 7* 10 4* 9 9*  --  10 7*   I STAT HEMOGLOBIN   --   --   --    < >  --    HEMATOCRIT % 29 4* 31 0* 29 8*  --  31 8*   HEMATOCRIT, ISTAT   --   --   --    < >  --    PLATELETS Thousands/uL 311 323 273  --  322   NEUTROS PCT % 80*  --   --   --  81*   LYMPHS PCT % 13*  --   --   --  11*   LYMPHO PCT %  --  4* 7*  --   --    MONOS PCT % 7  --   --   --  7   MONO PCT %  --  2* 1* --   --    EOS PCT % 0 0 0  --  1    < > = values in this interval not displayed  Results from last 7 days   Lab Units 05/07/23  0452 05/06/23  0255 05/05/23  0347 05/04/23  1318 05/04/23  1309   POTASSIUM mmol/L 4 6 4 5 4 7  --  4 6   CHLORIDE mmol/L 100 100 100  --  99   CO2 mmol/L 34* 34* 33*  --  34*   CO2, I-STAT mmol/L  --   --   --  36*  --    BUN mg/dL 22 29* 26*  --  26*   CREATININE mg/dL 0 63 0 73 0 70  --  0 81   CALCIUM mg/dL 9 1 9 3 9 0  --  9 6   ALK PHOS U/L  --   --   --   --  121*   ALT U/L  --   --   --   --  13   AST U/L  --   --   --   --  18   GLUCOSE, ISTAT mg/dl  --   --   --  89  --      Lab Results   Component Value Date    TROPONINI <0 02 11/22/2017    CKTOTAL 26 02/27/2023     Results from last 7 days   Lab Units 05/07/23  0452 05/06/23  0255 05/05/23  0347   INR  3 47* 4 30* 4 30*     Lab Results   Component Value Date    BLOODCX No Growth at 48 hrs  05/04/2023    BLOODCX No Growth at 48 hrs  05/04/2023    BLOODCX No Growth After 5 Days  02/27/2023    URINECX 50,000-59,000 cfu/ml Enterococcus faecalis (A) 02/28/2023    URINECX <10,000 cfu/ml 02/28/2023         Imaging:  Results for orders placed during the hospital encounter of 02/27/23    XR chest portable    Narrative  CHEST    INDICATION:  Tachypnea on IVF, rales B/L , r o pulm vas congestion  COMPARISON:  3/3/2023, CT chest, abdomen and pelvis 2/27/2023    EXAM PERFORMED/VIEWS:  XR CHEST PORTABLE      FINDINGS:  The patient is rotated to the right  Cardiomediastinal silhouette appears unremarkable  Small focus of right basilar atelectasis or infiltrate  No evidence of pulmonary edema  No pneumothorax or pleural effusion  Dextroscoliosis and degenerative changes visualized lumbar spine  Old right lower posterolateral rib fracture  Impression  Small focus of right basilar atelectasis or infiltrate              Workstation performed: [id number]    Results for orders placed during the hospital encounter of 05/04/23    XR chest pa & lateral    Narrative  CHEST    INDICATION:   Shortness of breath  COMPARISON: Chest radiograph March 6, 2023    EXAM PERFORMED/VIEWS:  XR CHEST PA & LATERAL      FINDINGS:    Cardiomediastinal silhouette appears unremarkable  Linear atelectasis in the right lower lung  No focal consolidation, pleural effusion or pneumothorax  Osseous structures appear within normal limits for patient age  Impression  No focal consolidation, pleural effusion, or pneumothorax              Workstation performed: [id number]      Last 24 Hours Medication List:   Current Facility-Administered Medications   Medication Dose Route Frequency Provider Last Rate   • acetaminophen  975 mg Oral Q8H [address] Sharee SUE PA-C     • vitamin C  1,000 mg Oral Daily Sharee SUE PA-C     • cholecalciferol  1,000 Units Oral Daily Sharee SUE PA-C     • digoxin  125 mcg Oral Every Other Day Pamalee Boas, MD     • diltiazem  240 mg Oral Daily Sharee SUE PA-C     • Fluticasone Furoate-Vilanterol  1 puff Inhalation Daily Sharee SUE PA-C     • guaiFENesin  600 mg Oral BID Sharee SUE PA-C     • ipratropium  0 5 mg Nebulization TID Pamalee Boas, MD     • levalbuterol  1 25 mg Nebulization TID Pamalee Boas, MD     • levothyroxine  25 mcg Oral Early Morning Sharee SUE PA-C     • lidocaine  1 patch Topical Daily Sharee SUE PA-C     • loratadine  10 mg Oral Daily Sharee SUE PA-C     • losartan  25 mg Oral Daily Sharee SUE PA-C     • mirtazapine  7 5 mg Oral HS Sharee SUE PA-C     • ondansetron  4 mg Intravenous Q6H PRN Sharee SUE PA-C     • predniSONE  40 mg Oral Daily Stephanie Álvarez MD     • senna-docusate sodium  1 tablet Oral HS Sharee SUE PA-C     • sertraline  50 mg Oral Daily Sharee SUE PA-C     • spironolactone  25 mg Oral Daily Sharee SUE PA-C     • tiZANidine  2 mg Oral BID PRN Sharee SUE PA-C     • umeclidinium  1 puff Inhalation Daily Sharee Escobedo PA-C          Today, Patient Was Seen By: Holley Anaya MD    ** Please Note: Dictation voice to text software may have been used in the creation of this document   **

## 2023-05-07 NOTE — ASSESSMENT & PLAN NOTE
· Maintained on Coumadin outpatient, with a goal of 2-3  · INR 3 47 today, hold coumadin tonight   · Trend INR daily

## 2023-05-08 VITALS
SYSTOLIC BLOOD PRESSURE: 111 MMHG | DIASTOLIC BLOOD PRESSURE: 50 MMHG | TEMPERATURE: 98.1 F | HEIGHT: 60 IN | WEIGHT: 104.28 LBS | BODY MASS INDEX: 20.47 KG/M2 | OXYGEN SATURATION: 91 % | RESPIRATION RATE: 18 BRPM | HEART RATE: 89 BPM

## 2023-05-08 LAB
ANION GAP SERPL CALCULATED.3IONS-SCNC: 3 MMOL/L (ref 4–13)
BASOPHILS # BLD AUTO: 0 THOUSANDS/ÂΜL (ref 0–0.1)
BASOPHILS NFR BLD AUTO: 0 % (ref 0–1)
BUN SERPL-MCNC: 23 MG/DL (ref 5–25)
CALCIUM SERPL-MCNC: 9.2 MG/DL (ref 8.4–10.2)
CHLORIDE SERPL-SCNC: 98 MMOL/L (ref 96–108)
CO2 SERPL-SCNC: 35 MMOL/L (ref 21–32)
CREAT SERPL-MCNC: 0.54 MG/DL (ref 0.6–1.3)
EOSINOPHIL # BLD AUTO: 0.02 THOUSAND/ÂΜL (ref 0–0.61)
EOSINOPHIL NFR BLD AUTO: 0 % (ref 0–6)
ERYTHROCYTE [DISTWIDTH] IN BLOOD BY AUTOMATED COUNT: 14.7 % (ref 11.6–15.1)
FLUAV RNA RESP QL NAA+PROBE: NEGATIVE
FLUBV RNA RESP QL NAA+PROBE: NEGATIVE
GFR SERPL CREATININE-BSD FRML MDRD: 86 ML/MIN/1.73SQ M
GLUCOSE SERPL-MCNC: 73 MG/DL (ref 65–140)
HCT VFR BLD AUTO: 31.7 % (ref 34.8–46.1)
HGB BLD-MCNC: 10.4 G/DL (ref 11.5–15.4)
IMM GRANULOCYTES # BLD AUTO: 0.03 THOUSAND/UL (ref 0–0.2)
IMM GRANULOCYTES NFR BLD AUTO: 1 % (ref 0–2)
INR PPP: 2.18 (ref 0.84–1.19)
LYMPHOCYTES # BLD AUTO: 1.18 THOUSANDS/ÂΜL (ref 0.6–4.47)
LYMPHOCYTES NFR BLD AUTO: 19 % (ref 14–44)
MCH RBC QN AUTO: 33.7 PG (ref 26.8–34.3)
MCHC RBC AUTO-ENTMCNC: 32.8 G/DL (ref 31.4–37.4)
MCV RBC AUTO: 103 FL (ref 82–98)
MONOCYTES # BLD AUTO: 0.51 THOUSAND/ÂΜL (ref 0.17–1.22)
MONOCYTES NFR BLD AUTO: 8 % (ref 4–12)
NEUTROPHILS # BLD AUTO: 4.64 THOUSANDS/ÂΜL (ref 1.85–7.62)
NEUTS SEG NFR BLD AUTO: 72 % (ref 43–75)
NRBC BLD AUTO-RTO: 0 /100 WBCS
PLATELET # BLD AUTO: 329 THOUSANDS/UL (ref 149–390)
PMV BLD AUTO: 8.4 FL (ref 8.9–12.7)
POTASSIUM SERPL-SCNC: 4.2 MMOL/L (ref 3.5–5.3)
PROTHROMBIN TIME: 25.4 SECONDS (ref 11.6–14.5)
RBC # BLD AUTO: 3.09 MILLION/UL (ref 3.81–5.12)
RSV RNA RESP QL NAA+PROBE: NEGATIVE
SARS-COV-2 RNA RESP QL NAA+PROBE: NEGATIVE
SODIUM SERPL-SCNC: 136 MMOL/L (ref 135–147)
WBC # BLD AUTO: 6.38 THOUSAND/UL (ref 4.31–10.16)

## 2023-05-08 RX ORDER — WARFARIN SODIUM 2 MG/1
2 TABLET ORAL
Status: DISCONTINUED | OUTPATIENT
Start: 2023-05-08 | End: 2023-05-08 | Stop reason: HOSPADM

## 2023-05-08 RX ORDER — PREDNISONE 20 MG/1
40 TABLET ORAL DAILY
Qty: 2 TABLET | Refills: 0
Start: 2023-05-09 | End: 2023-05-10

## 2023-05-08 RX ORDER — DIGOXIN 125 MCG
125 TABLET ORAL EVERY OTHER DAY
Refills: 0 | Status: ON HOLD
Start: 2023-05-10

## 2023-05-08 RX ADMIN — IPRATROPIUM BROMIDE 0.5 MG: 0.5 SOLUTION RESPIRATORY (INHALATION) at 07:30

## 2023-05-08 RX ADMIN — DIGOXIN 125 MCG: 125 TABLET ORAL at 09:29

## 2023-05-08 RX ADMIN — GUAIFENESIN 600 MG: 600 TABLET ORAL at 09:29

## 2023-05-08 RX ADMIN — LEVALBUTEROL HYDROCHLORIDE 1.25 MG: 1.25 SOLUTION RESPIRATORY (INHALATION) at 13:40

## 2023-05-08 RX ADMIN — IPRATROPIUM BROMIDE 0.5 MG: 0.5 SOLUTION RESPIRATORY (INHALATION) at 13:40

## 2023-05-08 RX ADMIN — Medication 1000 UNITS: at 09:29

## 2023-05-08 RX ADMIN — ACETAMINOPHEN 325MG 975 MG: 325 TABLET ORAL at 05:20

## 2023-05-08 RX ADMIN — GUAIFENESIN 600 MG: 600 TABLET ORAL at 17:59

## 2023-05-08 RX ADMIN — FLUTICASONE FUROATE AND VILANTEROL TRIFENATATE 1 PUFF: 100; 25 POWDER RESPIRATORY (INHALATION) at 09:34

## 2023-05-08 RX ADMIN — DILTIAZEM HYDROCHLORIDE 240 MG: 120 CAPSULE, COATED, EXTENDED RELEASE ORAL at 09:29

## 2023-05-08 RX ADMIN — SPIRONOLACTONE 25 MG: 25 TABLET ORAL at 09:29

## 2023-05-08 RX ADMIN — LEVOTHYROXINE SODIUM 25 MCG: 25 TABLET ORAL at 05:20

## 2023-05-08 RX ADMIN — WARFARIN SODIUM 2 MG: 2 TABLET ORAL at 17:59

## 2023-05-08 RX ADMIN — LORATADINE 10 MG: 10 TABLET ORAL at 09:29

## 2023-05-08 RX ADMIN — SERTRALINE 50 MG: 50 TABLET, FILM COATED ORAL at 09:29

## 2023-05-08 RX ADMIN — LEVALBUTEROL HYDROCHLORIDE 1.25 MG: 1.25 SOLUTION RESPIRATORY (INHALATION) at 07:30

## 2023-05-08 RX ADMIN — UMECLIDINIUM 1 PUFF: 62.5 AEROSOL, POWDER ORAL at 09:34

## 2023-05-08 RX ADMIN — PREDNISONE 40 MG: 20 TABLET ORAL at 09:29

## 2023-05-08 RX ADMIN — OXYCODONE HYDROCHLORIDE AND ACETAMINOPHEN 1000 MG: 500 TABLET ORAL at 09:29

## 2023-05-08 RX ADMIN — LOSARTAN POTASSIUM 25 MG: 25 TABLET, FILM COATED ORAL at 09:29

## 2023-05-08 RX ADMIN — ACETAMINOPHEN 325MG 975 MG: 325 TABLET ORAL at 14:41

## 2023-05-08 NOTE — ASSESSMENT & PLAN NOTE
· Maintained on Advair and Spiriva outpatient with prn albuterol inhaler   · Requiring 2L NC in ED to maintain saturations, Wean as tolerated  · Chest x-ray negative  · Patient was given 1 dose of IV Solu-Medrol 60 mg in ED    · On prednisone 40 mg daily for 5 days  · Completed azithromycin for 3 days  · Discontinue Rocephin, procalcitonin negative

## 2023-05-08 NOTE — CASE MANAGEMENT
Case Management Discharge Planning Note    Patient name Mary Mazariegos  Location /-72 MRN [id number]  : 1940 Date 2023       Current Admission Date: 2023  Current Admission Diagnosis:Chronic obstructive pulmonary disease with acute exacerbation [facility])   Patient Active Problem List    Diagnosis Date Noted   • Cerebral aneurysm, nonruptured 2023   • Abdominal distention/back pain 2023   • Urinary retention 2023   • MDD with persistant bereavement complex 2023   • Abnormal CT of the chest 2023   • 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[facility] [address] ) 2023   • Thyroid nodule 2023   • Severe protein-calorie malnutrition ([address] ) 2023   • Unspecified mood (affective) disorder ([facility] [address] ) 2023   • Failure to thrive in adult 2023   • Pulmonary nodule 02/15/2023   • Onychomycosis 02/15/2023   • Essential hypertension 02/10/2023   • Anxiety 02/10/2023   • Hypothyroidism 2022   • Peripheral edema 2021   • Low back pain without sciatica 2021   • Chronic obstructive pulmonary disease with acute exacerbation ([address] ) 2017   • Hypertension 2017   • Hypercoagulable state ([address] ) 2017   • 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   • Vitamin D deficiency 2016   • Osteoporosis 2015   • Antiphospholipid antibody syndrome ([address] ) 2012   • Atrial fibrillation ([address] ) 2012   • Hereditary hemolytic anemia ([facility] [address] ) 2012   • Asthma 2012      LOS (days): 3  Geometric Mean LOS (GMLOS) (days): 2 70  Days to GMLOS:-0 3     OBJECTIVE:  Risk of Unplanned Readmission Score: 26 04         Current admission status: Inpatient   Preferred Pharmacy:   [address] [place] [address] #[id number] Kaylin Gill [address] [facility] [address] [address]  Phone: [phone] Fax: [address] [address]  Phone: [phone] Fax: [phone]    Primary Care Provider: Tenisha Weir MD    Primary Insurance: MEDICARE  Secondary Insurance: BLUE CROSS    DISCHARGE DETAILS:     Left message for [facility] requesting a call back regarding accepting patient today  Spoke with patient and son, Joshua Villalta and let them know that we were waiting on a response from [facility] first choice but they were agreeable to a referral to [facility] Ref made in [address]  Spoke with Brianna Bhatt from [facility] and they cannot accept any admissions today but should be able to take patient tomorrow if other facility cannot   Updated MD via TT

## 2023-05-08 NOTE — PROGRESS NOTES
-- Patient:  -- MRN: [id number]  -- Aidin Request ID: [id number]  -- Level of care reserved: Jeffery Ken  -- Partner Reserved: [address] [facility], [address] [phone]  -- Clinical needs requested:  -- Geography searched: 10 miles around [phone]  -- Start of Service:  -- Request sent: 9:22am EDT on 5/8/2023 by Sandra Segovia  -- Partner reserved: 3:21pm EDT on 5/8/2023 by Sandra Segovia  -- Choice list shared:

## 2023-05-08 NOTE — PROGRESS NOTES
[place]  Progress Note  Name: Fabiola Beltran  MRN: [id number]  Unit/Bed#: -01 I Date of Admission: 5/4/2023   Date of Service: 5/8/2023 I Hospital Day: 3    Assessment/Plan   Essential hypertension  Assessment & Plan  · Maintained on losartan outpatient, continue    Hypothyroidism  Assessment & Plan  · Maintained on levothyroxine 25mcg daily, continue    Low back pain without sciatica  Assessment & Plan  · Was recently discharged from physical therapy, no acute fall noted  · Patient recently with worsening back pain, started on Zanaflex day prior to admission  · Patient and family discussed with ED who recommended admission with orthopedic consult  · Orthopedic consult appreciated    · Likely secondary to sacroiliitis  · Pain management as needed    Atrial fibrillation ([address] )  Assessment & Plan  · Maintained on digoxin and diltiazem outpatient, continue  · Maintained on Coumadin  · INR is 2 18  Restart on Coumadin    Antiphospholipid antibody syndrome (HCC)  Assessment & Plan  · Maintained on Coumadin outpatient, with a goal of 2-3  · INR is 2 18  Restart Coumadin  · Trend INR daily    * Chronic obstructive pulmonary disease with acute exacerbation (HCC)  Assessment & Plan  · Maintained on Advair and Spiriva outpatient with prn albuterol inhaler   · Requiring 2L NC in ED to maintain saturations, Wean as tolerated  · Chest x-ray negative  · Patient was given 1 dose of IV Solu-Medrol 60 mg in ED  · On prednisone 40 mg daily for 5 days  · Completed azithromycin for 3 days  · Discontinue Rocephin, procalcitonin negative             Labs & Imaging: I have personally reviewed pertinent reports  VTE Prophylaxis: in place  Code Status:   Level 2 - DNAR: but accepts endotracheal intubation    Patient Centered Rounds: I have performed bedside rounds with nursing staff today      Discussions with Specialists or Other Care Team Provider: CM    Education and Discussions with Family / Patient: Son    Current Length of Stay: 3 day(s)    Current Patient Status: Inpatient   Certification Statement: The patient will continue to require additional inpatient hospital stay due to see my assessment and plan  Subjective:   Patient is seen and examined at bedside  Denies any new complaints  Afebrile  All other ROS are negative  Objective:    Vitals: Blood pressure 131/57, pulse 87, temperature 97 7 °F (36 5 °C), resp  rate 18, height 5' (1 524 m), weight 47 3 kg (104 lb 4 4 oz), SpO2 (!) 87 %, not currently breastfeeding  ,Body mass index is 20 37 kg/m²  SPO2 RA Rest    Flowsheet Row ED to Hosp-Admission (Current) from 5/4/2023 in [address] Med Surg Unit   SpO2 87 %   SpO2 Activity At Rest   O2 Device None (Room air)   O2 Flow Rate --        I&O:     Intake/Output Summary (Last 24 hours) at 5/8/2023 1037  Last data filed at 5/8/2023 0827  Gross per 24 hour   Intake 640 ml   Output 1950 ml   Net -1310 ml       Physical Exam:    General- Alert, lying comfortably in bed  Not in any acute distress  Neck- Supple, No JVD  CVS- regular, S1 and S2 normal  Chest- Bilateral Air entry, No rhochi, crackles or wheezing present  Abdomen- soft, nontender, not distended, no guarding or rigidity, BS+  Extremities-  No pedal edema, No calf tenderness  CNS-   Alert, awake and orientedx3  No focal deficits present      Invasive Devices     Peripheral Intravenous Line  Duration           Peripheral IV 05/08/23 Left;Ventral (anterior) Forearm <1 day          Drain  Duration           External Urinary Catheter 3 days                      Social History  reviewed  Family History   Problem Relation Age of Onset   • Hypertension Mother    • Skin cancer Mother    • Hypertension Father     reviewed    Meds:  Current Facility-Administered Medications   Medication Dose Route Frequency Provider Last Rate Last Admin   • acetaminophen (TYLENOL) tablet 975 mg  975 mg Oral [facility] Sharee SUE PA-C 975 mg at 05/08/23 0759   • ascorbic acid (VITAMIN C) tablet 1,000 mg  1,000 mg Oral Daily Sharee SUE PA-C   1,000 mg at 05/08/23 9141   • cholecalciferol (VITAMIN D3) tablet 1,000 Units  1,000 Units Oral Daily Sharee SUE PA-C   1,000 Units at 05/08/23 5009   • digoxin (LANOXIN) tablet 125 mcg  125 mcg Oral Every Other Day [place] MD Caryl   125 mcg at 05/08/23 4898   • diltiazem (CARDIZEM CD) 24 hr capsule 240 mg  240 mg Oral Daily Sharee SUE PA-C   240 mg at 05/08/23 9274   • Fluticasone Furoate-Vilanterol 100-25 mcg/actuation 1 puff  1 puff Inhalation Daily Sharee SUE PA-C   1 puff at 05/08/23 2159   • guaiFENesin (MUCINEX) 12 hr tablet 600 mg  600 mg Oral BID Sharee SUE PA-C   600 mg at 05/08/23 0347   • ipratropium (ATROVENT) 0 02 % inhalation solution 0 5 mg  0 5 mg Nebulization TID Royal Caryl MD   0 5 mg at 05/08/23 0730   • levalbuterol (Lonza Quails) inhalation solution 1 25 mg  1 25 mg Nebulization TID [place] MD Caryl   1 25 mg at 05/08/23 0730   • levothyroxine tablet 25 mcg  25 mcg Oral Early Morning Sharee SUE PA-C   25 mcg at 05/08/23 0520   • lidocaine (LIDODERM) 5 % patch 1 patch  1 patch Topical Daily Sharee SUE PA-C   1 patch at 05/07/23 0846   • loratadine (CLARITIN) tablet 10 mg  10 mg Oral Daily Sharee SUE PA-C   10 mg at 05/08/23 9926   • losartan (COZAAR) tablet 25 mg  25 mg Oral Daily Sharee SUE PA-C   25 mg at 05/08/23 9059   • mirtazapine (REMERON) tablet 7 5 mg  7 5 mg Oral HS Sharee SUE PA-C   7 5 mg at 05/07/23 2146   • ondansetron (ZOFRAN) injection 4 mg  4 mg Intravenous Q6H PRN Sharee SUE PA-C       • predniSONE tablet 40 mg  40 mg Oral Daily Stephanie Álvarez MD   40 mg at 05/08/23 0929   • senna-docusate sodium (SENOKOT S) 8 6-50 mg per tablet 1 tablet  1 tablet Oral HS Sharee SUE PA-C   1 tablet at 05/07/23 2146   • sertraline (ZOLOFT) tablet 50 mg  50 mg Oral Daily Sharee SUE PA-C   50 mg at 05/08/23 1355   • spironolactone (ALDACTONE) tablet 25 mg  25 mg Oral Daily Sharee SUE PA-C   25 mg at 05/08/23 0213   • tiZANidine (ZANAFLEX) tablet 2 mg  2 mg Oral BID PRN Aurelio SUE PA-C   2 mg at 05/05/23 1815   • umeclidinium 62 5 mcg/actuation inhaler AEPB 1 puff  1 puff Inhalation Daily Sharee SUE PA-C   1 puff at 05/08/23 4256      Medications Prior to Admission   Medication   • acetaminophen (TYLENOL) 325 mg tablet   • Advair Diskus 250-50 MCG/DOSE inhaler   • albuterol (2 5 mg/3 mL) 0 083 % nebulizer solution   • Ascorbic Acid (VITAMIN C) 1000 MG tablet   • Cholecalciferol (VITAMIN D3) 20 MCG (800 UNIT) TABS   • Cholecalciferol (Vitamin D3) 25 MCG TABS   • denosumab (PROLIA) 60 mg/mL   • digoxin (LANOXIN) 0 25 mg tablet   • diltiazem (CARDIZEM CD) 240 mg 24 hr capsule   • Ferrous Sulfate 220 (44 Fe) MG/5ML LIQD   • levothyroxine 25 mcg tablet   • lidocaine (LIDODERM) 5 %   • loratadine (CLARITIN) 10 mg tablet   • losartan (COZAAR) 25 mg tablet   • mirtazapine (REMERON) 7 5 MG tablet   • multivitamin (THERAGRAN) TABS   • senna-docusate sodium (SENOKOT S) 8 6-50 mg per tablet   • sertraline (Zoloft) 50 mg tablet   • spironolactone (ALDACTONE) 25 mg tablet   • tiotropium (Spiriva HandiHaler) 18 mcg inhalation capsule   • tiZANidine (ZANAFLEX) 2 mg tablet   • warfarin (COUMADIN) 2 mg tablet       Labs:  Results from last 7 days   Lab Units 05/08/23  0517 05/07/23  0452 05/06/23  0255 05/04/23  1318 05/04/23  1309   WBC Thousand/uL 6 38 7 43 7 16   < > 6 99   HEMOGLOBIN g/dL 10 4* 9 7* 10 4*   < > 10 7*   I STAT HEMOGLOBIN   --   --   --    < >  --    HEMATOCRIT % 31 7* 29 4* 31 0*   < > 31 8*   HEMATOCRIT, ISTAT   --   --   --    < >  --    PLATELETS Thousands/uL 329 311 323   < > 322   NEUTROS PCT % 72 80*  --   --  81*   LYMPHS PCT % 19 13*  --   --  11*   LYMPHO PCT %  --   --  4*   < >  --    MONOS PCT % 8 7  --   --  7   MONO PCT %  --   --  2*   < >  --    EOS PCT % 0 0 0   < > 1    < > = values in this interval not displayed  Results from last 7 days   Lab Units 05/08/23  0517 05/07/23  0452 05/06/23  0255 05/05/23  0347 05/04/23  1318 05/04/23  1309   POTASSIUM mmol/L 4 2 4 6 4 5   < >  --  4 6   CHLORIDE mmol/L 98 100 100   < >  --  99   CO2 mmol/L 35* 34* 34*   < >  --  34*   CO2, I-STAT mmol/L  --   --   --   --  36*  --    BUN mg/dL 23 22 29*   < >  --  26*   CREATININE mg/dL 0 54* 0 63 0 73   < >  --  0 81   CALCIUM mg/dL 9 2 9 1 9 3   < >  --  9 6   ALK PHOS U/L  --   --   --   --   --  121*   ALT U/L  --   --   --   --   --  13   AST U/L  --   --   --   --   --  18   GLUCOSE, ISTAT mg/dl  --   --   --   --  89  --     < > = values in this interval not displayed  Lab Results   Component Value Date    TROPONINI <0 02 11/22/2017    CKTOTAL 26 02/27/2023     Results from last 7 days   Lab Units 05/08/23 0517 05/07/23 0452 05/06/23  0255   INR  2 18* 3 47* 4 30*     Lab Results   Component Value Date    BLOODCX No Growth at 72 hrs  05/04/2023    BLOODCX No Growth at 72 hrs  05/04/2023    BLOODCX No Growth After 5 Days  02/27/2023    URINECX 50,000-59,000 cfu/ml Enterococcus faecalis (A) 02/28/2023    URINECX <10,000 cfu/ml 02/28/2023         Imaging:  Results for orders placed during the hospital encounter of 02/27/23    XR chest portable    Narrative  CHEST    INDICATION:  Tachypnea on IVF, rales B/L , r o pulm vas congestion  COMPARISON:  3/3/2023, CT chest, abdomen and pelvis 2/27/2023    EXAM PERFORMED/VIEWS:  XR CHEST PORTABLE      FINDINGS:  The patient is rotated to the right  Cardiomediastinal silhouette appears unremarkable  Small focus of right basilar atelectasis or infiltrate  No evidence of pulmonary edema  No pneumothorax or pleural effusion  Dextroscoliosis and degenerative changes visualized lumbar spine  Old right lower posterolateral rib fracture  Impression  Small focus of right basilar atelectasis or infiltrate              Workstation performed: [id number]    Results for orders placed during the hospital encounter of 05/04/23    XR chest pa & lateral    Narrative  CHEST    INDICATION:   Shortness of breath  COMPARISON: Chest radiograph March 6, 2023    EXAM PERFORMED/VIEWS:  XR CHEST PA & LATERAL      FINDINGS:    Cardiomediastinal silhouette appears unremarkable  Linear atelectasis in the right lower lung  No focal consolidation, pleural effusion or pneumothorax  Osseous structures appear within normal limits for patient age  Impression  No focal consolidation, pleural effusion, or pneumothorax              Workstation performed: [id number]      Last 24 Hours Medication List:   Current Facility-Administered Medications   Medication Dose Route Frequency Provider Last Rate   • acetaminophen  975 mg Oral Q8H [address] Sharee SUE PA-C     • vitamin C  1,000 mg Oral Daily Sharee SUE PA-C     • cholecalciferol  1,000 Units Oral Daily Sharee SUE PA-C     • digoxin  125 mcg Oral Every Other Day Nola Perales MD     • diltiazem  240 mg Oral Daily Sharee SUE PA-C     • Fluticasone Furoate-Vilanterol  1 puff Inhalation Daily Sharee SUE PA-C     • guaiFENesin  600 mg Oral BID Sharee SUE PA-C     • ipratropium  0 5 mg Nebulization TID Nola Perales MD     • levalbuterol  1 25 mg Nebulization TID Nola Perales MD     • levothyroxine  25 mcg Oral Early Morning Sharee SUE PA-C     • lidocaine  1 patch Topical Daily Sharee SUE PA-C     • loratadine  10 mg Oral Daily Sharee SUE PA-C     • losartan  25 mg Oral Daily Sharee SUE PA-C     • mirtazapine  7 5 mg Oral HS Sharee SUE PA-C     • ondansetron  4 mg Intravenous Q6H PRN Sharee SUE PA-C     • predniSONE  40 mg Oral Daily Judy Coello MD     • senna-docusate sodium  1 tablet Oral HS Sharee SUE PA-C     • sertraline  50 mg Oral Daily Sharee SUE PA-C     • spironolactone  25 mg Oral Daily Sharee SUE PA-C     • tiZANidine  2 mg Oral BID PRN Sheri SUE PA-C     • umeclidinium  1 puff Inhalation Daily Sharee SUE PA-C          Today, Patient Was Seen By: Maricarmen Saavedra MD    ** Please Note: Dictation voice to text software may have been used in the creation of this document   **

## 2023-05-08 NOTE — ASSESSMENT & PLAN NOTE
· Maintained on digoxin and diltiazem outpatient, continue  · Maintained on Coumadin  · INR is 2 18    Restart on Coumadin

## 2023-05-08 NOTE — CASE MANAGEMENT
Case Management Discharge Planning Note    Patient name Dell Teresa  Location /-35 MRN [id number]  : 1940 Date 2023       Current Admission Date: 2023  Current Admission Diagnosis:Chronic obstructive pulmonary disease with acute exacerbation [facility])   Patient Active Problem List    Diagnosis Date Noted   • Cerebral aneurysm, nonruptured 2023   • Abdominal distention/back pain 2023   • Urinary retention 2023   • MDD with persistant bereavement complex 2023   • Abnormal CT of the chest 2023   • 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[facility] [address] ) 2023   • Thyroid nodule 2023   • Severe protein-calorie malnutrition ([address] ) 2023   • Unspecified mood (affective) disorder ([address] ) 2023   • Failure to thrive in adult 2023   • Pulmonary nodule 02/15/2023   • Onychomycosis 02/15/2023   • Essential hypertension 02/10/2023   • Anxiety 02/10/2023   • Hypothyroidism 2022   • Peripheral edema 2021   • Low back pain without sciatica 2021   • Chronic obstructive pulmonary disease with acute exacerbation ([address] ) 2017   • Hypertension 2017   • Hypercoagulable state ([address] ) 2017   • 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 [address] [address]  Phone: [phone] Fax: [address], [address] [address]  Phone: [phone] Fax: [phone]    Primary Care Provider: Chapis Field MD    Primary Insurance: MEDICARE  Secondary Insurance: BLUE CROSS    DISCHARGE DETAILS:           [facility] can accept patient today  Requested transport for 430p  Notified sonLynn via phone and he is in agreement with discharge plan  Notified MD via TT  Awaiting confirmed  time

## 2023-05-08 NOTE — ASSESSMENT & PLAN NOTE
· Maintained on Coumadin outpatient, with a goal of 2-3  · INR is 2 18    Restart Coumadin  · Trend INR daily

## 2023-05-08 NOTE — DISCHARGE SUMMARY
[facility]  Discharge- Mila Drew 1940, 79 y o  female MRN: [id number]  Unit/Bed#: -01 Encounter: [id number]  Primary Care Provider: Kalyani Case MD   Date and time admitted to hospital: 5/4/2023 12:21 PM    Essential hypertension  Assessment & Plan  · Maintained on losartan outpatient, continue    Hypothyroidism  Assessment & Plan  · Maintained on levothyroxine 25mcg daily, continue    Low back pain without sciatica  Assessment & Plan  · Was recently discharged from physical therapy, no acute fall noted  · Patient recently with worsening back pain, started on Zanaflex day prior to admission  · Patient and family discussed with ED who recommended admission with orthopedic consult  · Orthopedic consult appreciated    · Likely secondary to sacroiliitis  · Pain management as needed  · She was seen by physical therapy and  will be discharged to skilled nursing rehab    Atrial fibrillation [facility])  Assessment & Plan  · Maintained on digoxin and diltiazem outpatient, continue  · Maintained on Coumadin  · INR is 2 18  Restart on Coumadin    Antiphospholipid antibody syndrome (HCC)  Assessment & Plan  · Maintained on Coumadin outpatient, with a goal of 2-3  · INR is 2 18  Restart Coumadin  · Trend INR daily    * Chronic obstructive pulmonary disease with acute exacerbation (HCC)  Assessment & Plan  · Maintained on Advair and Spiriva outpatient with prn albuterol inhaler   · Requiring 2L NC in ED to maintain saturations, Wean as tolerated  · Chest x-ray negative  · Patient was given 1 dose of IV Solu-Medrol 60 mg in ED    · On prednisone 40 mg daily for 5 days  · Completed azithromycin for 3 days  · Discontinue Rocephin, procalcitonin negative    Hospital Course:     Mila Drew is a 79 y o  female patient who originally presented to the hospital on   Admission Orders (From admission, onward)     Ordered        05/05/23 1454  Inpatient Admission  Once 05/04/23 1630  Place in Observation  Once                     due to back pain and shortness of breath  Patient was admitted with lower back pain, COPD constipation  Patient was started on Solu-Medrol  Patient was requiring 2 L of supplemental oxygen which was weaned off during this admission  Patient was seen by orthopedics they recommended low back brace and outpatient follow-up for cortisone injection  Patient is feeling back to baseline  Patient Coumadin was held as INR was supratherapeutic  Patient is restarted on Coumadin once INR is back to baseline  Patient was seen by physical therapy and recommended skilled nursing rehab  Patient will be discharged to [facility] for skilled nursing rehab  On Exam-  Chest-bilateral air entry, clear to auscultation  Abdomen-soft, nontender  Heart-S1 S2 regular  Extremities-no pedal edema or calf tenderness  Neuro-alert awake oriented x3  No focal deficits    Please see above list of diagnoses and related plan for additional information  Follow-up with PCP in 1 week  Follow-up with orthopedics/pain management as outpatient  Lower back brace for comfort  Return to ER with any worsening abdominal pain, back pain, shortness of breath or any other alarming symptoms    Condition at Discharge:  good      Discharge instructions/Information to patient and family:   See after visit summary for information provided to patient and family  Provisions for Follow-Up Care:  See after visit summary for information related to follow-up care and any pertinent home health orders  Disposition:    [facility]       Discharge Statement:  I spent 40 minutes discharging the patient  This time was spent on the day of discharge  I had direct contact with the patient on the day of discharge   Greater than 50% of the total time was spent examining patient, answering all patient questions, arranging and discussing plan of care with patient as well as directly providing post-discharge instructions  Additional time then spent on discharge activities  Discharge Medications:  See after visit summary for reconciled discharge medications provided to patient and family        ** Please Note: This note has been constructed using a voice recognition system **

## 2023-05-08 NOTE — CASE MANAGEMENT
Case Management Discharge Planning Note    [facility]  Location /-61 MRN [id number]  : 1940 Date 2023       Current Admission Date: 2023  Current Admission Diagnosis:Chronic obstructive pulmonary disease with acute exacerbation [facility])   Patient Active Problem List    Diagnosis Date Noted   • Cerebral aneurysm, nonruptured 2023   • Abdominal distention/back pain 2023   • Urinary retention 2023   • MDD with persistant bereavement complex 2023   • Abnormal CT of the chest 2023   • 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 [address] ) 2012   • Asthma 2012      LOS (days): 3  Geometric Mean LOS (GMLOS) (days): 2 70  Days to GMLOS:-0 1     OBJECTIVE:  Risk of Unplanned Readmission Score: 25 98         Current admission status: Inpatient   Preferred Pharmacy:   [address] [place] [address] #[id number] AmberRudi Foxma - [address] [address]  Phone: [phone] Fax: [address], [address] [address]  Phone: [phone] Fax: [phone]    Primary Care Provider: Davide Suárez MD    Primary Insurance: MEDICARE  Secondary Insurance: BLUE CROSS    DISCHARGE DETAILS:     Per provider, patient and son, Thelma Donaldson are in agreement with patient going to [facility]  [facility]  Referral made in [place]

## 2023-05-08 NOTE — ASSESSMENT & PLAN NOTE
· Was recently discharged from physical therapy, no acute fall noted    · Patient recently with worsening back pain, started on Zanaflex day prior to admission  · Patient and family discussed with ED who recommended admission with orthopedic consult  · Orthopedic consult appreciated    · Likely secondary to sacroiliitis  · Pain management as needed  · She was seen by physical therapy and  will be discharged to skilled nursing rehab

## 2023-05-08 NOTE — DISCHARGE INSTR - AVS FIRST PAGE
Follow-up with PCP in 1 week  Follow-up with orthopedics/pain management as outpatient  Lower back brace for comfort  Return to ER with any worsening abdominal pain, back pain, shortness of breath or any other alarming symptoms

## 2023-05-08 NOTE — ASSESSMENT & PLAN NOTE
· Was recently discharged from physical therapy, no acute fall noted    · Patient recently with worsening back pain, started on Zanaflex day prior to admission  · Patient and family discussed with ED who recommended admission with orthopedic consult  · Orthopedic consult appreciated    · Likely secondary to sacroiliitis  · Pain management as needed

## 2023-05-08 NOTE — CASE MANAGEMENT
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   • Thyroid nodule 2023   • Severe protein-calorie malnutrition ([address] ) 2023   • Unspecified mood (affective) disorder ([facility] [address] ) 2023   • Failure to thrive in adult 2023   • Pulmonary nodule 02/15/2023   • Onychomycosis 02/15/2023   • Essential hypertension 02/10/2023   • Anxiety 02/10/2023   • Hypothyroidism 2022   • Peripheral edema 2021   • Low back pain without sciatica 2021   • Chronic obstructive pulmonary disease with acute exacerbation ([address] ) 2017   • Hypertension 2017   • Hypercoagulable state ([address] ) 2017   • 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   • Vitamin D deficiency 2016   • Osteoporosis 2015   • Antiphospholipid antibody syndrome ([address] ) 2012   • Atrial fibrillation ([address] ) 2012   • Hereditary hemolytic anemia ([facility] [address] ) 2012   • Asthma 2012      LOS (days): 3  Geometric Mean LOS (GMLOS) (days): 2 70  Days to GMLOS:-0 2     OBJECTIVE:  Risk of Unplanned Readmission Score: 26 04         Current admission status: Inpatient   Preferred Pharmacy:   [address] [place] [address] [id number] [place], [address] [facility] [address] [address]  Phone: [phone] Fax: [address], [address] [facility] [address]  Phone: [phone] Fax: [phone]    Primary Care Provider: Drake Worrell MD    Primary Insurance: MEDICARE  Secondary Insurance: BLUE CROSS    DISCHARGE DETAILS:     [facility] if [facility] could [facility] a [facility]

## 2023-05-08 NOTE — PLAN OF CARE
Problem: Potential for Falls  Goal: Patient will remain free of falls  Description: INTERVENTIONS:  - Educate patient/family on patient safety including physical limitations  - Instruct patient to call for assistance with activity   - Consult OT/PT to assist with strengthening/mobility   - Keep Call bell within reach  - Keep bed low and locked with side rails adjusted as appropriate  - Keep care items and personal belongings within reach  - Initiate and maintain comfort rounds  - Make Fall Risk Sign visible to staff  -Toileting every 2 hours   - Obtain necessary fall risk management equipment:   - Apply yellow socks and bracelet for high fall risk patients  - Consider moving patient to room near nurses station  Outcome: Adequate for Discharge     Problem: MOBILITY - ADULT  Goal: Maintain or return to baseline ADL function  Description: INTERVENTIONS:  -  Assess patient's ability to carry out ADLs; assess patient's baseline for ADL function and identify physical deficits which impact ability to perform ADLs (bathing, care of mouth/teeth, toileting, grooming, dressing, etc )  - Assess/evaluate cause of self-care deficits   - Assess range of motion  - Assess patient's mobility; develop plan if impaired  - Assess patient's need for assistive devices and provide as appropriate  - Encourage maximum independence but intervene and supervise when necessary  - Involve family in performance of ADLs  - Assess for home care needs following discharge   - Consider OT consult to assist with ADL evaluation and planning for discharge  - Provide patient education as appropriate  Outcome: Adequate for Discharge  Goal: Maintains/Returns to pre admission functional level  Description: INTERVENTIONS:  - Perform BMAT or MOVE assessment daily    - Set and communicate daily mobility goal to care team and patient/family/caregiver     - Collaborate with rehabilitation services on mobility goals if consulted  - Perform Range of Motion 2 times a day   - Reposition patient every 2 hours    - Dangle patient 2 times a day  - Stand patient 2 times a day  - Ambulate patient 2 times a day  - Out of bed to chair 2 times a day   - Out of bed for meals 2 times a day  - Out of bed for toileting  - Record patient progress and toleration of activity level   Outcome: Adequate for Discharge     Problem: Prexisting or High Potential for Compromised Skin Integrity  Goal: Skin integrity is maintained or improved  Description: INTERVENTIONS:  - Identify patients at risk for skin breakdown  - Assess and monitor skin integrity  - Assess and monitor nutrition and hydration status  - Monitor labs   - Assess for incontinence   - Turn and reposition patient  - Assist with mobility/ambulation  - Relieve pressure over bony prominences  - Avoid friction and shearing  - Provide appropriate hygiene as needed including keeping skin clean and dry  - Evaluate need for skin moisturizer/barrier cream  - Collaborate with interdisciplinary team   - Patient/family teaching  - Consider wound care consult   Outcome: Adequate for Discharge     Problem: PAIN - ADULT  Goal: Verbalizes/displays adequate comfort level or baseline comfort level  Description: Interventions:  - Encourage patient to monitor pain and request assistance  - Assess pain using appropriate pain scale  - Administer analgesics based on type and severity of pain and evaluate response  - Implement non-pharmacological measures as appropriate and evaluate response  - Consider cultural and social influences on pain and pain management  - Notify physician/advanced practitioner if interventions unsuccessful or patient reports new pain  Outcome: Adequate for Discharge

## 2023-05-08 NOTE — PLAN OF CARE
Problem: Potential for Falls  Goal: Patient will remain free of falls  Description: INTERVENTIONS:  - Educate patient/family on patient safety including physical limitations  - Instruct patient to call for assistance with activity   - Consult OT/PT to assist with strengthening/mobility   - Keep Call bell within reach  - Keep bed low and locked with side rails adjusted as appropriate  - Keep care items and personal belongings within reach  - Initiate and maintain comfort rounds  - Make Fall Risk Sign visible to staff  -Toileting every 2 hours   - Obtain necessary fall risk management equipment:   - Apply yellow socks and bracelet for high fall risk patients  - Consider moving patient to room near nurses station  5/8/2023 1422 by Paul Lind RN  Outcome: Progressing  5/8/2023 1420 by Paul Lind RN  Outcome: Progressing  5/8/2023 1420 by Paul Lind RN  Outcome: Progressing     Problem: MOBILITY - ADULT  Goal: Maintain or return to baseline ADL function  Description: INTERVENTIONS:  -  Assess patient's ability to carry out ADLs; assess patient's baseline for ADL function and identify physical deficits which impact ability to perform ADLs (bathing, care of mouth/teeth, toileting, grooming, dressing, etc )  - Assess/evaluate cause of self-care deficits   - Assess range of motion  - Assess patient's mobility; develop plan if impaired  - Assess patient's need for assistive devices and provide as appropriate  - Encourage maximum independence but intervene and supervise when necessary  - Involve family in performance of ADLs  - Assess for home care needs following discharge   - Consider OT consult to assist with ADL evaluation and planning for discharge  - Provide patient education as appropriate  5/8/2023 1422 by Paul Lind RN  Outcome: Progressing  5/8/2023 1420 by Paul Lind RN  Outcome: Progressing  5/8/2023 1420 by Paul Lind RN  Outcome: Progressing  Goal: Maintains/Returns to pre admission functional level  Description: INTERVENTIONS:  - Perform BMAT or MOVE assessment daily    - Set and communicate daily mobility goal to care team and patient/family/caregiver  - Collaborate with rehabilitation services on mobility goals if consulted  - Perform Range of Motion 2 times a day  - Reposition patient every 2 hours    - Dangle patient 2 times a day  - Stand patient 2 times a day  - Ambulate patient 2 times a day  - Out of bed to chair 2 times a day   - Out of bed for meals 2 times a day  - Out of bed for toileting  - Record patient progress and toleration of activity level   5/8/2023 1422 by Vita Lindsey RN  Outcome: Progressing  5/8/2023 1420 by Vita Lindsey RN  Outcome: Progressing  5/8/2023 1420 by Vita Lindsey RN  Outcome: Progressing     Problem: Prexisting or High Potential for Compromised Skin Integrity  Goal: Skin integrity is maintained or improved  Description: INTERVENTIONS:  - Identify patients at risk for skin breakdown  - Assess and monitor skin integrity  - Assess and monitor nutrition and hydration status  - Monitor labs   - Assess for incontinence   - Turn and reposition patient  - Assist with mobility/ambulation  - Relieve pressure over bony prominences  - Avoid friction and shearing  - Provide appropriate hygiene as needed including keeping skin clean and dry  - Evaluate need for skin moisturizer/barrier cream  - Collaborate with interdisciplinary team   - Patient/family teaching  - Consider wound care consult   5/8/2023 1422 by Vita Lindsey RN  Outcome: Progressing  5/8/2023 1420 by Vita Lindsey RN  Outcome: Progressing  5/8/2023 1420 by Vita Lindsey RN  Outcome: Progressing     Problem: PAIN - ADULT  Goal: Verbalizes/displays adequate comfort level or baseline comfort level  Description: Interventions:  - Encourage patient to monitor pain and request assistance  - Assess pain using appropriate pain scale  - Administer analgesics based on type and severity of pain and evaluate response  - Implement non-pharmacological measures as appropriate and evaluate response  - Consider cultural and social influences on pain and pain management  - Notify physician/advanced practitioner if interventions unsuccessful or patient reports new pain  5/8/2023 1422 by Hema Segura RN  Outcome: Progressing  5/8/2023 1420 by Hema Segura RN  Outcome: Progressing  5/8/2023 1420 by Hema Segura RN  Outcome: Progressing

## 2023-05-09 ENCOUNTER — NURSING HOME VISIT (OUTPATIENT)
Dept: FAMILY MEDICINE CLINIC | Facility: CLINIC | Age: 83
End: 2023-05-09

## 2023-05-09 DIAGNOSIS — E03.9 HYPOTHYROIDISM, UNSPECIFIED TYPE: ICD-10-CM

## 2023-05-09 DIAGNOSIS — D58.9 HEREDITARY HEMOLYTIC ANEMIA (HCC): ICD-10-CM

## 2023-05-09 DIAGNOSIS — E04.1 THYROID NODULE: ICD-10-CM

## 2023-05-09 DIAGNOSIS — R33.9 URINARY RETENTION: ICD-10-CM

## 2023-05-09 DIAGNOSIS — I48.91 ATRIAL FIBRILLATION, UNSPECIFIED TYPE (HCC): ICD-10-CM

## 2023-05-09 DIAGNOSIS — R62.7 FAILURE TO THRIVE IN ADULT: ICD-10-CM

## 2023-05-09 DIAGNOSIS — F41.9 ANXIETY: ICD-10-CM

## 2023-05-09 DIAGNOSIS — D68.61 ANTIPHOSPHOLIPID ANTIBODY SYNDROME (HCC): ICD-10-CM

## 2023-05-09 DIAGNOSIS — M54.50 LOW BACK PAIN WITHOUT SCIATICA, UNSPECIFIED BACK PAIN LATERALITY, UNSPECIFIED CHRONICITY: ICD-10-CM

## 2023-05-09 DIAGNOSIS — E43 SEVERE PROTEIN-CALORIE MALNUTRITION (HCC): ICD-10-CM

## 2023-05-09 DIAGNOSIS — J44.1 CHRONIC OBSTRUCTIVE PULMONARY DISEASE WITH ACUTE EXACERBATION (HCC): Primary | ICD-10-CM

## 2023-05-09 NOTE — PROGRESS NOTES
[address] [place] [address]  Facility[facility]    NAME: Desiree Solo  AGE: 79 y o  SEX: female    DATE OF ENCOUNTER: 5/9/2023    Code status:  Full Code    Assessment and Plan     1  Chronic obstructive pulmonary disease with acute exacerbation ([facility] [address] )    2  Hypothyroidism, unspecified type    3  Thyroid nodule    4  Atrial fibrillation, unspecified type ([facility] [address] )    5  Antiphospholipid antibody syndrome (HCC)    6  Urinary retention    7  Failure to thrive in adult    8  Severe protein-calorie malnutrition ([facility] [address] )    9  Low back pain without sciatica, unspecified back pain laterality, unspecified chronicity    10  Hereditary hemolytic anemia (HCC)    11  Anxiety        All medications and routine orders were reviewed and updated as needed  Plan discussed with: Patient    Chief Complaint     Seen for admission at [address] [place] [address]    History of Present Illness     79-year-old female who lives alone here after hospitalization for an exacerbation of COPD and worsening low back pain  The patient denies any recent falls  She gets pain in her back that radiates to her leg but not all the way down to the foot  She has discomfort when she moves her right hip  She reports her bowel habits are stable  She has no dysuria  She normally ambulates with a walker  He has 3 children who check on her regularly  She maintains herself mainly on 1 floor in her home  She sees her doctor every 6 months  She reports to be pound loss over the last year  She has had a significant mood disorder with a diminished appetite  She is noted to have atrial fibrillation as well  She is anticoagulated with Coumadin      HISTORY:  Past Medical History:   Diagnosis Date   • Anti-phospholipid syndrome (HCC)    • Asthma    • Blood type O+    • Cardiac disease    • Chronic pain    • COPD (chronic obstructive pulmonary disease) (HCC)    • Fracture of ankle     left   • Hyperlipidemia    • Hypertension    • Hypokalemia 2/27/2023   • Osteoporosis      Past Surgical History:   Procedure Laterality Date   • APPENDECTOMY     • BILATERAL OOPHORECTOMY Bilateral    • ORIF TIBIAL SHAFT FRACTURE W/ PLATES AND SCREWS Right      Family History   Problem Relation Age of Onset   • Hypertension Mother    • Skin cancer Mother    • Hypertension Father      Social History     Socioeconomic History   • Marital status:      Spouse name: None   • Number of children: None   • Years of education: None   • Highest education level: None   Occupational History   • None   Tobacco Use   • Smoking status: Former     Types: Cigarettes   • Smokeless tobacco: Never   • Tobacco comments:     Never smoker per [employer]   Vaping Use   • Vaping Use: Never used   Substance and Sexual Activity   • Alcohol use: Not Currently   • Drug use: No   • Sexual activity: Not Currently   Other Topics Concern   • None   Social History Narrative   • None     Social Determinants of Health     Financial Resource Strain: Low Risk    • Difficulty of Paying Living Expenses: Not very hard   Food Insecurity: No Food Insecurity   • Worried About Running Out of Food in the Last Year: Never true   • Ran Out of Food in the Last Year: Never true   Transportation Needs: No Transportation Needs   • Lack of Transportation (Medical): No   • Lack of Transportation (Non-Medical): No   Physical Activity: Not on file   Stress: Not on file   Social Connections: Not on file   Intimate Partner Violence: Not on file   Housing Stability: Low Risk    • Unable to Pay for Housing in the Last Year: No   • Number of Places Lived in the Last Year: 1   • Unstable Housing in the Last Year: No       Allergies: Allergies   Allergen Reactions   • Aspirin Other (See Comments)   • Penicillins Rash and Hives   • Sulfa Antibiotics Rash       Review of Systems     Review of Systems   Constitutional: Positive for appetite change and unexpected weight change   Negative for activity change, chills, diaphoresis and fatigue  HENT: Negative for congestion, ear discharge, ear pain, hearing loss, nosebleeds and rhinorrhea  Eyes: Negative for pain, redness, itching and visual disturbance  Respiratory: Positive for cough and shortness of breath  Negative for choking and chest tightness  Cardiovascular: Positive for leg swelling  Negative for chest pain  Gastrointestinal: Negative for abdominal pain, blood in stool, constipation, diarrhea and nausea  Endocrine: Negative for cold intolerance, polydipsia and polyphagia  Genitourinary: Negative for dysuria, frequency, hematuria and urgency  Musculoskeletal: Positive for back pain and gait problem  Negative for arthralgias, joint swelling, neck pain and neck stiffness  Skin: Negative for color change and rash  Allergic/Immunologic: Negative for environmental allergies and food allergies  Neurological: Negative for dizziness, tremors, seizures, speech difficulty, numbness and headaches  Hematological: Negative for adenopathy  Does not bruise/bleed easily  Psychiatric/Behavioral: Positive for dysphoric mood  Negative for behavioral problems, hallucinations and self-injury  Medications and orders     All medications reviewed and updated in Nursing Home EMR  Objective     Vitals: per nursing home record    Physical Exam  Constitutional:       Appearance: She is well-developed  HENT:      Head: Normocephalic and atraumatic  Right Ear: External ear normal       Left Ear: External ear normal       Mouth/Throat:      Pharynx: No oropharyngeal exudate  Eyes:      General: No scleral icterus  Right eye: No discharge  Left eye: No discharge  Conjunctiva/sclera: Conjunctivae normal       Pupils: Pupils are equal, round, and reactive to light  Neck:      Thyroid: No thyromegaly  Cardiovascular:      Rate and Rhythm: Normal rate  Rhythm irregular  Heart sounds: Normal heart sounds  No murmur heard      No gallop  Pulmonary:      Effort: Pulmonary effort is normal  No respiratory distress  Breath sounds: Wheezing present  No rales  Abdominal:      General: Bowel sounds are normal       Palpations: Abdomen is soft  There is no mass  Tenderness: There is no guarding or rebound  Musculoskeletal:         General: No tenderness or deformity  Normal range of motion  Cervical back: Normal range of motion and neck supple  Lymphadenopathy:      Cervical: No cervical adenopathy  Skin:     General: Skin is warm and dry  Findings: No rash  Neurological:      Mental Status: She is alert and oriented to person, place, and time  Cranial Nerves: No cranial nerve deficit  Motor: Weakness present  Coordination: Coordination normal       Gait: Gait abnormal       Deep Tendon Reflexes: Reflexes are normal and symmetric  Reflexes normal          Pertinent Laboratory/Diagnostic Studies: The following labs/studies were reviewed please see chart or hospital paperwork for details  Diagnostic studies from the hospital were reviewed    - Admit for PT OT and medical therapy  We will extend her prozone taper over the next 6-day  She will benefit from continued nebulizer treatments  She will be maintained on 2 mg of Coumadin and we will monitor her labs      Celeste Ewing DO  5/9/2023 1:29 PM

## 2023-05-10 LAB
BACTERIA BLD CULT: NORMAL
BACTERIA BLD CULT: NORMAL

## 2023-05-11 ENCOUNTER — NURSING HOME VISIT (OUTPATIENT)
Dept: FAMILY MEDICINE CLINIC | Facility: CLINIC | Age: 83
End: 2023-05-11

## 2023-05-11 DIAGNOSIS — R62.7 FAILURE TO THRIVE IN ADULT: ICD-10-CM

## 2023-05-11 DIAGNOSIS — M81.0 AGE RELATED OSTEOPOROSIS, UNSPECIFIED PATHOLOGICAL FRACTURE PRESENCE: ICD-10-CM

## 2023-05-11 DIAGNOSIS — M54.50 LOW BACK PAIN WITHOUT SCIATICA, UNSPECIFIED BACK PAIN LATERALITY, UNSPECIFIED CHRONICITY: Primary | ICD-10-CM

## 2023-05-11 DIAGNOSIS — I48.91 ATRIAL FIBRILLATION, UNSPECIFIED TYPE (HCC): ICD-10-CM

## 2023-05-11 DIAGNOSIS — J44.1 CHRONIC OBSTRUCTIVE PULMONARY DISEASE WITH ACUTE EXACERBATION (HCC): ICD-10-CM

## 2023-05-11 DIAGNOSIS — D68.61 ANTIPHOSPHOLIPID ANTIBODY SYNDROME (HCC): ICD-10-CM

## 2023-05-11 DIAGNOSIS — R41.82 ALTERED MENTAL STATUS, UNSPECIFIED ALTERED MENTAL STATUS TYPE: ICD-10-CM

## 2023-05-11 DIAGNOSIS — D58.9 HEREDITARY HEMOLYTIC ANEMIA (HCC): ICD-10-CM

## 2023-05-11 NOTE — PROGRESS NOTES
[address]  [facility]: [facility]    NAME: Gabo Bustos  AGE: 79 y o  SEX: female    DATE OF ENCOUNTER: 5/11/2023    Code status:  DNR w/ Hospitalization    Assessment and Plan     1  Low back pain without sciatica, unspecified back pain laterality, unspecified chronicity    2  Altered mental status, unspecified altered mental status type    3  Hereditary hemolytic anemia (HCC)    4  Failure to thrive in adult    5  Antiphospholipid antibody syndrome (HCC)    6  Age related osteoporosis, unspecified pathological fracture presence    7  Atrial fibrillation, unspecified type ([facility] [address] )    8  Chronic obstructive pulmonary disease with acute exacerbation (HCC)        All medications and routine orders were reviewed and updated as needed  Plan discussed with: Family member    Chief Complaint     Interim evaluation    History of Present Illness     The patient reports that she is feeling stronger  She is not having as much pain in her low back  Her bowels have moved  She gets dyspnea with exertion  Her appetite is at baseline  She continues to feel full easily  Her fluid intake is adequate  She has no dysuria  The following portions of the patient's history were reviewed and updated as appropriate: current medications, past family history, past medical history, past social history, past surgical history and problem list     Allergies: Allergies   Allergen Reactions   • Aspirin Other (See Comments)   • Penicillins Rash and Hives   • Sulfa Antibiotics Rash       Review of Systems     Review of Systems   Constitutional: Negative for activity change, appetite change, chills, diaphoresis, fatigue and unexpected weight change  HENT: Positive for trouble swallowing  Negative for congestion, ear discharge, ear pain, hearing loss, nosebleeds and rhinorrhea  Eyes: Negative for pain, redness, itching and visual disturbance     Respiratory: Positive for cough and shortness of breath  Negative for choking and chest tightness  Cardiovascular: Negative for chest pain and leg swelling  Gastrointestinal: Negative for abdominal pain, blood in stool, constipation, diarrhea and nausea  Endocrine: Negative for cold intolerance, polydipsia and polyphagia  Genitourinary: Negative for dysuria, frequency, hematuria and urgency  Musculoskeletal: Positive for back pain and gait problem  Negative for arthralgias, joint swelling, neck pain and neck stiffness  Skin: Negative for color change and rash  Allergic/Immunologic: Negative for environmental allergies and food allergies  Neurological: Positive for weakness  Negative for dizziness, tremors, seizures, speech difficulty, numbness and headaches  Hematological: Negative for adenopathy  Does not bruise/bleed easily  Psychiatric/Behavioral: Negative for behavioral problems, dysphoric mood, hallucinations and self-injury  Medications and orders     All medications reviewed and updated in Nursing Home EMR  Objective     Vitals: per nursing home records    Physical Exam  Constitutional:       Appearance: She is well-developed  HENT:      Head: Normocephalic and atraumatic  Right Ear: External ear normal       Left Ear: External ear normal       Mouth/Throat:      Pharynx: No oropharyngeal exudate  Eyes:      General: No scleral icterus  Right eye: No discharge  Left eye: No discharge  Conjunctiva/sclera: Conjunctivae normal       Pupils: Pupils are equal, round, and reactive to light  Neck:      Thyroid: No thyromegaly  Cardiovascular:      Rate and Rhythm: Normal rate  Rhythm irregular  Heart sounds: Normal heart sounds  No murmur heard  No gallop  Pulmonary:      Effort: Pulmonary effort is normal  No respiratory distress  Breath sounds: No wheezing or rales        Comments: Diminished breath sounds bilaterally  Abdominal:      General: Bowel sounds are normal       Palpations: Abdomen is soft  There is no mass  Tenderness: There is no guarding or rebound  Musculoskeletal:         General: Tenderness present  No deformity  Cervical back: Normal range of motion and neck supple  Lymphadenopathy:      Cervical: No cervical adenopathy  Skin:     General: Skin is warm and dry  Findings: No rash  Neurological:      Mental Status: She is alert and oriented to person, place, and time  Cranial Nerves: No cranial nerve deficit  Motor: Weakness present  Coordination: Coordination normal       Gait: Gait abnormal       Deep Tendon Reflexes: Reflexes are normal and symmetric  Reflexes normal    Psychiatric:         Behavior: Behavior normal          Thought Content: Thought content normal          Judgment: Judgment normal          Pertinent Laboratory/Diagnostic Studies: The following studies were reviewed please see chart or hospital paperwork for details  Space for lab dictation no new diagnostic studies    - Continue the current therapy plan and medication regimen      Jim Primrose, DO  5/11/2023 10:53 AM

## 2023-05-15 ENCOUNTER — NURSING HOME VISIT (OUTPATIENT)
Dept: FAMILY MEDICINE CLINIC | Facility: CLINIC | Age: 83
End: 2023-05-15

## 2023-05-15 DIAGNOSIS — M81.0 AGE RELATED OSTEOPOROSIS, UNSPECIFIED PATHOLOGICAL FRACTURE PRESENCE: ICD-10-CM

## 2023-05-15 DIAGNOSIS — E43 SEVERE PROTEIN-CALORIE MALNUTRITION (HCC): ICD-10-CM

## 2023-05-15 DIAGNOSIS — I10 ESSENTIAL HYPERTENSION: ICD-10-CM

## 2023-05-15 DIAGNOSIS — D68.61 ANTIPHOSPHOLIPID ANTIBODY SYNDROME (HCC): ICD-10-CM

## 2023-05-15 DIAGNOSIS — I48.91 ATRIAL FIBRILLATION, UNSPECIFIED TYPE (HCC): ICD-10-CM

## 2023-05-15 DIAGNOSIS — R62.7 FAILURE TO THRIVE IN ADULT: ICD-10-CM

## 2023-05-15 DIAGNOSIS — J44.1 CHRONIC OBSTRUCTIVE PULMONARY DISEASE WITH ACUTE EXACERBATION (HCC): Primary | ICD-10-CM

## 2023-05-15 NOTE — PROGRESS NOTES
[address]  Facility: [facility]    NAME: Paulo Chin  AGE: 79 y o  SEX: female    DATE OF ENCOUNTER: 5/15/2023    Code status:  DNR w/ Hospitalization    Assessment and Plan     1  Chronic obstructive pulmonary disease with acute exacerbation ([facility] [address] )    2  Atrial fibrillation, unspecified type ([facility] [address] )    3  Antiphospholipid antibody syndrome (HCC)    4  Essential hypertension    5  Age related osteoporosis, unspecified pathological fracture presence    6  Failure to thrive in adult    7  Severe protein-calorie malnutrition ([facility] [address] )        All medications and routine orders were reviewed and updated as needed  Plan discussed with: Family member    Chief Complaint     Interim evaluation    History of Present Illness     Patient reports that she gets easily winded when she performs therapy  She also gets pain in her hip and low back with activity  Her mood is good  Her bowels are moving  She denies any dysuria  Her appetite is at baseline  She is satisfied with the progress she is making with her therapy  The following portions of the patient's history were reviewed and updated as appropriate: current medications, past family history, past medical history, past social history, past surgical history and problem list     Allergies: Allergies   Allergen Reactions   • Aspirin Other (See Comments)   • Penicillins Rash and Hives   • Sulfa Antibiotics Rash       Review of Systems     Review of Systems   Constitutional: Negative for activity change, appetite change, chills, diaphoresis, fatigue and unexpected weight change  HENT: Negative for congestion, ear discharge, ear pain, hearing loss, nosebleeds and rhinorrhea  Eyes: Negative for pain, redness, itching and visual disturbance  Respiratory: Positive for shortness of breath  Negative for cough, choking and chest tightness  Cardiovascular: Negative for chest pain and leg swelling  Gastrointestinal: Negative for abdominal pain, blood in stool, constipation, diarrhea and nausea  Endocrine: Negative for cold intolerance, polydipsia and polyphagia  Genitourinary: Negative for dysuria, frequency, hematuria and urgency  Musculoskeletal: Positive for arthralgias, back pain and gait problem  Negative for joint swelling, neck pain and neck stiffness  Skin: Negative for color change and rash  Allergic/Immunologic: Negative for environmental allergies and food allergies  Neurological: Positive for weakness  Negative for dizziness, tremors, seizures, speech difficulty, numbness and headaches  Hematological: Negative for adenopathy  Does not bruise/bleed easily  Psychiatric/Behavioral: Positive for dysphoric mood  Negative for behavioral problems, hallucinations and self-injury  Medications and orders     All medications reviewed and updated in Nursing Home EMR  Objective     Vitals: per nursing home records    Physical Exam  Constitutional:       Appearance: She is well-developed  HENT:      Head: Normocephalic and atraumatic  Right Ear: External ear normal       Left Ear: External ear normal       Mouth/Throat:      Pharynx: No oropharyngeal exudate  Eyes:      General: No scleral icterus  Right eye: No discharge  Left eye: No discharge  Conjunctiva/sclera: Conjunctivae normal       Pupils: Pupils are equal, round, and reactive to light  Neck:      Thyroid: No thyromegaly  Cardiovascular:      Rate and Rhythm: Normal rate  Rhythm irregular  Heart sounds: Normal heart sounds  No murmur heard  No gallop  Pulmonary:      Effort: Pulmonary effort is normal  No respiratory distress  Breath sounds: No wheezing or rales  Comments: Diminished breath sounds bilaterally  Abdominal:      General: Bowel sounds are normal       Palpations: Abdomen is soft  There is no mass  Tenderness: There is no guarding or rebound  Musculoskeletal:         General: Tenderness present  No deformity  Normal range of motion  Cervical back: Normal range of motion and neck supple  Lymphadenopathy:      Cervical: No cervical adenopathy  Skin:     General: Skin is warm and dry  Findings: No rash  Neurological:      Mental Status: She is alert and oriented to person, place, and time  Cranial Nerves: No cranial nerve deficit  Coordination: Coordination normal       Deep Tendon Reflexes: Reflexes are normal and symmetric  Reflexes normal    Psychiatric:         Behavior: Behavior normal          Thought Content: Thought content normal          Judgment: Judgment normal          Pertinent Laboratory/Diagnostic Studies: The following studies were reviewed please see chart or hospital paperwork for details      Space for lab dictation no new diagnostic studies    - Maintain the current medical regimen    Elisa Delgado DO  5/15/2023 11:04 AM

## 2023-05-17 ENCOUNTER — APPOINTMENT (EMERGENCY)
Dept: CT IMAGING | Facility: HOSPITAL | Age: 83
End: 2023-05-17

## 2023-05-17 ENCOUNTER — HOSPITAL ENCOUNTER (INPATIENT)
Facility: HOSPITAL | Age: 83
LOS: 8 days | Discharge: NURSING, REHAB OR LONG-TERM CARE | End: 2023-05-25
Attending: EMERGENCY MEDICINE | Admitting: STUDENT IN AN ORGANIZED HEALTH CARE EDUCATION/TRAINING PROGRAM

## 2023-05-17 ENCOUNTER — APPOINTMENT (EMERGENCY)
Dept: RADIOLOGY | Facility: HOSPITAL | Age: 83
End: 2023-05-17

## 2023-05-17 DIAGNOSIS — K92.2 UPPER GI BLEED: Primary | ICD-10-CM

## 2023-05-17 DIAGNOSIS — J44.1 CHRONIC OBSTRUCTIVE PULMONARY DISEASE WITH ACUTE EXACERBATION (HCC): ICD-10-CM

## 2023-05-17 DIAGNOSIS — R62.7 FAILURE TO THRIVE IN ADULT: ICD-10-CM

## 2023-05-17 DIAGNOSIS — I48.91 ATRIAL FIBRILLATION, UNSPECIFIED TYPE (HCC): ICD-10-CM

## 2023-05-17 DIAGNOSIS — J96.01 ACUTE RESPIRATORY FAILURE WITH HYPOXIA (HCC): ICD-10-CM

## 2023-05-17 DIAGNOSIS — E43 SEVERE PROTEIN-CALORIE MALNUTRITION (HCC): ICD-10-CM

## 2023-05-17 DIAGNOSIS — T45.515A WARFARIN-INDUCED COAGULOPATHY (HCC): ICD-10-CM

## 2023-05-17 DIAGNOSIS — J18.9 PNEUMONIA: ICD-10-CM

## 2023-05-17 DIAGNOSIS — D68.32 WARFARIN-INDUCED COAGULOPATHY (HCC): ICD-10-CM

## 2023-05-17 DIAGNOSIS — J18.9 PNEUMONIA OF RIGHT LOWER LOBE DUE TO INFECTIOUS ORGANISM: ICD-10-CM

## 2023-05-17 PROBLEM — K52.9 GASTROENTERITIS: Status: ACTIVE | Noted: 2023-05-17

## 2023-05-17 PROBLEM — N17.9 SEPSIS WITH ACUTE RENAL FAILURE WITHOUT SEPTIC SHOCK (HCC): Status: ACTIVE | Noted: 2017-11-22

## 2023-05-17 PROBLEM — J42 CHRONIC BRONCHITIS (HCC): Status: ACTIVE | Noted: 2017-11-22

## 2023-05-17 PROBLEM — R65.20 SEPSIS WITH ACUTE RENAL FAILURE WITHOUT SEPTIC SHOCK (HCC): Status: ACTIVE | Noted: 2017-11-22

## 2023-05-17 PROBLEM — E03.8 OTHER SPECIFIED HYPOTHYROIDISM: Status: ACTIVE | Noted: 2022-06-30

## 2023-05-17 PROBLEM — E87.1 HYPONATREMIA: Status: ACTIVE | Noted: 2023-05-17

## 2023-05-17 LAB
ALBUMIN SERPL BCP-MCNC: 3.9 G/DL (ref 3.5–5)
ALP SERPL-CCNC: 267 U/L (ref 34–104)
ALT SERPL W P-5'-P-CCNC: 22 U/L (ref 7–52)
ANION GAP SERPL CALCULATED.3IONS-SCNC: 7 MMOL/L (ref 4–13)
ANION GAP SERPL CALCULATED.3IONS-SCNC: 9 MMOL/L (ref 4–13)
APTT PPP: 40 SECONDS (ref 23–37)
AST SERPL W P-5'-P-CCNC: 28 U/L (ref 13–39)
BASOPHILS # BLD AUTO: 0.03 THOUSANDS/ÂΜL (ref 0–0.1)
BASOPHILS NFR BLD AUTO: 0 % (ref 0–1)
BILIRUB SERPL-MCNC: 0.97 MG/DL (ref 0.2–1)
BUN SERPL-MCNC: 48 MG/DL (ref 5–25)
BUN SERPL-MCNC: 48 MG/DL (ref 5–25)
CALCIUM SERPL-MCNC: 7.7 MG/DL (ref 8.4–10.2)
CALCIUM SERPL-MCNC: 9.6 MG/DL (ref 8.4–10.2)
CHLORIDE SERPL-SCNC: 91 MMOL/L (ref 96–108)
CHLORIDE SERPL-SCNC: 94 MMOL/L (ref 96–108)
CO2 SERPL-SCNC: 28 MMOL/L (ref 21–32)
CO2 SERPL-SCNC: 32 MMOL/L (ref 21–32)
CREAT SERPL-MCNC: 1.18 MG/DL (ref 0.6–1.3)
CREAT SERPL-MCNC: 1.37 MG/DL (ref 0.6–1.3)
EOSINOPHIL # BLD AUTO: 0 THOUSAND/ÂΜL (ref 0–0.61)
EOSINOPHIL NFR BLD AUTO: 0 % (ref 0–6)
ERYTHROCYTE [DISTWIDTH] IN BLOOD BY AUTOMATED COUNT: 15.1 % (ref 11.6–15.1)
GFR SERPL CREATININE-BSD FRML MDRD: 35 ML/MIN/1.73SQ M
GFR SERPL CREATININE-BSD FRML MDRD: 42 ML/MIN/1.73SQ M
GLUCOSE SERPL-MCNC: 144 MG/DL (ref 65–140)
GLUCOSE SERPL-MCNC: 179 MG/DL (ref 65–140)
HCT VFR BLD AUTO: 34.9 % (ref 34.8–46.1)
HCT VFR BLD AUTO: 44.5 % (ref 34.8–46.1)
HGB BLD-MCNC: 11.1 G/DL (ref 11.5–15.4)
HGB BLD-MCNC: 14 G/DL (ref 11.5–15.4)
IMM GRANULOCYTES # BLD AUTO: 0.15 THOUSAND/UL (ref 0–0.2)
IMM GRANULOCYTES NFR BLD AUTO: 1 % (ref 0–2)
INR PPP: 1.7 (ref 0.84–1.19)
L PNEUMO1 AG UR QL IA.RAPID: NEGATIVE
LACTATE SERPL-SCNC: 1.3 MMOL/L (ref 0.5–2)
LACTATE SERPL-SCNC: 2.8 MMOL/L (ref 0.5–2)
LACTATE SERPL-SCNC: 3.9 MMOL/L (ref 0.5–2)
LIPASE SERPL-CCNC: 34 U/L (ref 11–82)
LYMPHOCYTES # BLD AUTO: 0.58 THOUSANDS/ÂΜL (ref 0.6–4.47)
LYMPHOCYTES NFR BLD AUTO: 3 % (ref 14–44)
MCH RBC QN AUTO: 32 PG (ref 26.8–34.3)
MCHC RBC AUTO-ENTMCNC: 31.5 G/DL (ref 31.4–37.4)
MCV RBC AUTO: 102 FL (ref 82–98)
MONOCYTES # BLD AUTO: 1.03 THOUSAND/ÂΜL (ref 0.17–1.22)
MONOCYTES NFR BLD AUTO: 5 % (ref 4–12)
NEUTROPHILS # BLD AUTO: 20.23 THOUSANDS/ÂΜL (ref 1.85–7.62)
NEUTS SEG NFR BLD AUTO: 91 % (ref 43–75)
NRBC BLD AUTO-RTO: 0 /100 WBCS
PLATELET # BLD AUTO: 459 THOUSANDS/UL (ref 149–390)
PMV BLD AUTO: 8.3 FL (ref 8.9–12.7)
POTASSIUM SERPL-SCNC: 5.5 MMOL/L (ref 3.5–5.3)
POTASSIUM SERPL-SCNC: 5.5 MMOL/L (ref 3.5–5.3)
PROCALCITONIN SERPL-MCNC: 0.61 NG/ML
PROT SERPL-MCNC: 7.6 G/DL (ref 6.4–8.4)
PROTHROMBIN TIME: 21 SECONDS (ref 11.6–14.5)
RBC # BLD AUTO: 4.37 MILLION/UL (ref 3.81–5.12)
S PNEUM AG UR QL: NEGATIVE
SARS-COV-2 RNA RESP QL NAA+PROBE: NEGATIVE
SODIUM SERPL-SCNC: 129 MMOL/L (ref 135–147)
SODIUM SERPL-SCNC: 132 MMOL/L (ref 135–147)
T4 FREE SERPL-MCNC: 1.25 NG/DL (ref 0.61–1.12)
TSH SERPL DL<=0.05 MIU/L-ACNC: 4.84 UIU/ML (ref 0.45–4.5)
WBC # BLD AUTO: 22.02 THOUSAND/UL (ref 4.31–10.16)

## 2023-05-17 RX ORDER — GUAIFENESIN 600 MG/1
600 TABLET, EXTENDED RELEASE ORAL EVERY 12 HOURS SCHEDULED
Status: DISCONTINUED | OUTPATIENT
Start: 2023-05-17 | End: 2023-05-17

## 2023-05-17 RX ORDER — UMECLIDINIUM 62.5 UG/1
1 AEROSOL, POWDER ORAL DAILY
COMMUNITY

## 2023-05-17 RX ORDER — METOPROLOL TARTRATE 5 MG/5ML
5 INJECTION INTRAVENOUS EVERY 6 HOURS PRN
Status: DISCONTINUED | OUTPATIENT
Start: 2023-05-17 | End: 2023-05-25 | Stop reason: HOSPADM

## 2023-05-17 RX ORDER — LIDOCAINE 50 MG/G
1 PATCH TOPICAL DAILY
Status: DISCONTINUED | OUTPATIENT
Start: 2023-05-18 | End: 2023-05-25 | Stop reason: HOSPADM

## 2023-05-17 RX ORDER — ONDANSETRON 2 MG/ML
4 INJECTION INTRAMUSCULAR; INTRAVENOUS EVERY 4 HOURS PRN
Status: DISCONTINUED | OUTPATIENT
Start: 2023-05-17 | End: 2023-05-25 | Stop reason: HOSPADM

## 2023-05-17 RX ORDER — BENZONATATE 100 MG/1
100 CAPSULE ORAL 3 TIMES DAILY PRN
Status: DISCONTINUED | OUTPATIENT
Start: 2023-05-17 | End: 2023-05-17

## 2023-05-17 RX ORDER — METRONIDAZOLE 500 MG/100ML
500 INJECTION, SOLUTION INTRAVENOUS EVERY 8 HOURS
Status: DISCONTINUED | OUTPATIENT
Start: 2023-05-17 | End: 2023-05-19

## 2023-05-17 RX ORDER — SODIUM CHLORIDE, SODIUM GLUCONATE, SODIUM ACETATE, POTASSIUM CHLORIDE, MAGNESIUM CHLORIDE, SODIUM PHOSPHATE, DIBASIC, AND POTASSIUM PHOSPHATE .53; .5; .37; .037; .03; .012; .00082 G/100ML; G/100ML; G/100ML; G/100ML; G/100ML; G/100ML; G/100ML
50 INJECTION, SOLUTION INTRAVENOUS CONTINUOUS
Status: DISCONTINUED | OUTPATIENT
Start: 2023-05-17 | End: 2023-05-17

## 2023-05-17 RX ORDER — FLUTICASONE FUROATE AND VILANTEROL 200; 25 UG/1; UG/1
1 POWDER RESPIRATORY (INHALATION)
Status: DISCONTINUED | OUTPATIENT
Start: 2023-05-18 | End: 2023-05-20

## 2023-05-17 RX ORDER — CEFTRIAXONE 1 G/50ML
1000 INJECTION, SOLUTION INTRAVENOUS ONCE
Status: COMPLETED | OUTPATIENT
Start: 2023-05-17 | End: 2023-05-17

## 2023-05-17 RX ORDER — SODIUM CHLORIDE 9 MG/ML
50 INJECTION, SOLUTION INTRAVENOUS CONTINUOUS
Status: DISCONTINUED | OUTPATIENT
Start: 2023-05-17 | End: 2023-05-19

## 2023-05-17 RX ORDER — ACETAMINOPHEN 325 MG/1
650 TABLET ORAL EVERY 6 HOURS PRN
Status: DISCONTINUED | OUTPATIENT
Start: 2023-05-17 | End: 2023-05-25 | Stop reason: HOSPADM

## 2023-05-17 RX ORDER — ALBUMIN, HUMAN INJ 5% 5 %
12.5 SOLUTION INTRAVENOUS ONCE
Status: COMPLETED | OUTPATIENT
Start: 2023-05-17 | End: 2023-05-17

## 2023-05-17 RX ORDER — DIGOXIN 125 MCG
125 TABLET ORAL EVERY OTHER DAY
Status: DISCONTINUED | OUTPATIENT
Start: 2023-05-18 | End: 2023-05-25 | Stop reason: HOSPADM

## 2023-05-17 RX ORDER — LEVOTHYROXINE SODIUM 0.03 MG/1
25 TABLET ORAL DAILY
Status: DISCONTINUED | OUTPATIENT
Start: 2023-05-18 | End: 2023-05-25 | Stop reason: HOSPADM

## 2023-05-17 RX ORDER — METOPROLOL TARTRATE 5 MG/5ML
5 INJECTION INTRAVENOUS EVERY 6 HOURS
Status: DISCONTINUED | OUTPATIENT
Start: 2023-05-17 | End: 2023-05-17

## 2023-05-17 RX ORDER — METOPROLOL TARTRATE 5 MG/5ML
2.5 INJECTION INTRAVENOUS EVERY 6 HOURS
Status: DISCONTINUED | OUTPATIENT
Start: 2023-05-17 | End: 2023-05-18

## 2023-05-17 RX ADMIN — METRONIDAZOLE 500 MG: 500 INJECTION, SOLUTION INTRAVENOUS at 13:35

## 2023-05-17 RX ADMIN — SODIUM CHLORIDE 1000 ML: 0.9 INJECTION, SOLUTION INTRAVENOUS at 12:40

## 2023-05-17 RX ADMIN — SODIUM CHLORIDE 1000 ML: 0.9 INJECTION, SOLUTION INTRAVENOUS at 09:06

## 2023-05-17 RX ADMIN — THIAMINE HYDROCHLORIDE 100 MG: 100 INJECTION, SOLUTION INTRAMUSCULAR; INTRAVENOUS at 15:06

## 2023-05-17 RX ADMIN — SODIUM CHLORIDE 50 ML/HR: 0.9 INJECTION, SOLUTION INTRAVENOUS at 18:01

## 2023-05-17 RX ADMIN — METOPROLOL TARTRATE 2.5 MG: 5 INJECTION INTRAVENOUS at 23:32

## 2023-05-17 RX ADMIN — PANTOPRAZOLE SODIUM 8 MG/HR: 40 INJECTION, POWDER, FOR SOLUTION INTRAVENOUS at 21:20

## 2023-05-17 RX ADMIN — PANTOPRAZOLE SODIUM 8 MG/HR: 40 INJECTION, POWDER, FOR SOLUTION INTRAVENOUS at 15:16

## 2023-05-17 RX ADMIN — CEFTRIAXONE 1000 MG: 1 INJECTION, SOLUTION INTRAVENOUS at 12:42

## 2023-05-17 RX ADMIN — ALBUMIN (HUMAN) 12.5 G: 12.5 INJECTION, SOLUTION INTRAVENOUS at 14:13

## 2023-05-17 RX ADMIN — SODIUM CHLORIDE, SODIUM GLUCONATE, SODIUM ACETATE, POTASSIUM CHLORIDE, MAGNESIUM CHLORIDE, SODIUM PHOSPHATE, DIBASIC, AND POTASSIUM PHOSPHATE 50 ML/HR: .53; .5; .37; .037; .03; .012; .00082 INJECTION, SOLUTION INTRAVENOUS at 13:34

## 2023-05-17 RX ADMIN — METRONIDAZOLE 500 MG: 500 INJECTION, SOLUTION INTRAVENOUS at 21:23

## 2023-05-17 NOTE — CONSULTS
Consultation - [id number]     Magno Alamo 79 y o  female MRN: [id number]  Unit/Bed#: -01 Encounter: [id number]    Consults    ASSESSMENT and PLAN    Patient is a 76-year-old female past medical history of antiphospholipid syndrome, A-fib on Coumadin, hypertension, COPD, severe protein calorie malnutrition who comes with nausea vomiting with possible coffee-ground emesis  Found to be in sepsis likely in the setting of gastroenteritis and pneumonia  1   Upper GI bleeding  Patient presenting with likely acute viral GI syndrome causing nausea vomiting diarrhea  Also noted with sick contacts  Patient with dark coffee grounds from emesis  Last episode was around 6 AM   Likely in the setting of acute viral gastroenteritis causing gastritis or Mayra-Sargent tear  Hemodynamically stable  Hemoglobin 14  Likely hemoconcentrated  BUN of 4 8 and creatinine of 1 3    Trend H&H  Protonix 40 mg IV twice daily  N p o  past midnight  Hold warfarin at this time  Transfuse if hemoglobin is less than 7  If patient has any further episodes more with significant anemia we will plan for EGD tomorrow after stabilization and treatment of sepsis  Chief Complaint   Patient presents with   • GI Bleeding     Patient presents to the ED via EMS from [facility] with c/o vomiting last night, staff states hem + in vomit, had no changes to stool until arriving to department, loose stool on arrival         Physician Requesting Consult: Jessica Banks MD    HPI  Patient is a 76-year-old female past medical history of antiphospholipid syndrome, A-fib on Coumadin, hypertension, COPD, severe protein calorie malnutrition who comes with nausea vomiting  Patient stated it happened acutely and was in her usual state of health until yesterday or gradually gotten worse  Patient stated that there was possible dark coffee grounds in the emesis    Did also have diarrhea with loose stools this morning without blood or melena  Patient states that she has 2 granddaughters who are also sick with acute viral GI illnesses  Which are now improved  She presented hemodynamically stable  Hypoxia noted  Creatinine of 1 37 BUN of 48 WBC of 22 hemoglobin of 14 platelets of 027  INR 1 7    CT scan showed nonspecific gastroenteritis  Right lower lobe infiltrates concerning for pneumonia      Historical Information   Past Medical History:   Diagnosis Date   • Anti-phospholipid syndrome (HCC)    • Asthma    • Blood type O+    • Cardiac disease    • Chronic pain    • COPD (chronic obstructive pulmonary disease) (HCC)    • Fracture of ankle     left   • Hyperlipidemia    • Hypertension    • Hypokalemia 2/27/2023   • Osteoporosis      Past Surgical History:   Procedure Laterality Date   • APPENDECTOMY     • BILATERAL OOPHORECTOMY Bilateral    • ORIF TIBIAL SHAFT FRACTURE W/ PLATES AND SCREWS Right      Social History   Social History     Substance and Sexual Activity   Alcohol Use Not Currently     Social History     Substance and Sexual Activity   Drug Use No     Social History     Tobacco Use   Smoking Status Former   • Types: Cigarettes   Smokeless Tobacco Never   Tobacco Comments    Never smoker per [employer]     Family History   Problem Relation Age of Onset   • Hypertension Mother    • Skin cancer Mother    • Hypertension Father        Meds/Allergies     Current Facility-Administered Medications   Medication Dose Route Frequency   • benzonatate (TESSALON PERLES) capsule 100 mg  100 mg Oral TID PRN   • guaiFENesin (MUCINEX) 12 hr tablet 600 mg  600 mg Oral Q12H CARMEN   • metroNIDAZOLE (FLAGYL) IVPB (premix) 500 mg 100 mL  500 mg Intravenous Q8H   • multi-electrolyte (PLASMALYTE-A/ISOLYTE-S PH 7 4) IV solution  50 mL/hr Intravenous Continuous   • ondansetron (ZOFRAN) injection 4 mg  4 mg Intravenous Q4H PRN   • pantoprazole (PROTONIX) 80 mg in sodium chloride 0 9 % 100 mL infusion  8 mg/hr Intravenous Continuous   • thiamine (VITAMIN B1) 100 mg in sodium chloride 0 9 % 50 mL IVPB  100 mg Intravenous Once     Medications Prior to Admission   Medication   • acetaminophen (TYLENOL) 325 mg tablet   • Advair Diskus 250-50 MCG/DOSE inhaler   • albuterol (2 5 mg/3 mL) 0 083 % nebulizer solution   • Ascorbic Acid (VITAMIN C) 1000 MG tablet   • Cholecalciferol (VITAMIN D3) 20 MCG (800 UNIT) TABS   • digoxin (LANOXIN) 0 125 mg tablet   • diltiazem (CARDIZEM CD) 240 mg 24 hr capsule   • Ferrous Sulfate 220 (44 Fe) MG/5ML LIQD   • levothyroxine 25 mcg tablet   • lidocaine (LIDODERM) 5 %   • loratadine (CLARITIN) 10 mg tablet   • losartan (COZAAR) 25 mg tablet   • mirtazapine (REMERON) 7 5 MG tablet   • multivitamin (THERAGRAN) TABS   • senna-docusate sodium (SENOKOT S) 8 6-50 mg per tablet   • sertraline (Zoloft) 50 mg tablet   • spironolactone (ALDACTONE) 25 mg tablet   • tiotropium (Spiriva HandiHaler) 18 mcg inhalation capsule   • tiZANidine (ZANAFLEX) 2 mg tablet   • warfarin (COUMADIN) 2 mg tablet       Allergies   Allergen Reactions   • Aspirin Other (See Comments)   • Penicillins Rash and Hives   • Sulfa Antibiotics Rash       PHYSICAL EXAM    Temp:  [97 6 °F (36 4 °C)-97 8 °F (36 6 °C)] 97 6 °F (36 4 °C)  HR:  [86-96] 87  Resp:  [18-24] 24  BP: ()/(51-65) 124/57   There is no height or weight on file to calculate BMI  General Appearance: NAD, cooperative, alert  Eyes: Anicteric  GI:  Soft, non-tender, non-distended; normal bowel sounds; no masses, no organomegaly   Rectal: Deferred  Musculoskeletal: No edema    Skin:  No jaundice    Lab Results   Component Value Date    GLUCOSE 89 05/04/2023    CALCIUM 9 6 05/17/2023     03/17/2015    K 5 5 (H) 05/17/2023    CO2 32 05/17/2023    CL 91 (L) 05/17/2023    BUN 48 (H) 05/17/2023    CREATININE 1 37 (H) 05/17/2023    CREATININE 0 54 (L) 05/08/2023    CREATININE 0 63 05/07/2023     Lab Results   Component Value Date    WBC 22 02 (H) 05/17/2023    WBC 6 38 05/08/2023    WBC 7 43 05/07/2023    HGB 14 0 05/17/2023    HGB 10 4 (L) 05/08/2023    HGB 9 7 (L) 05/07/2023     (H) 05/17/2023     (H) 05/17/2023     05/08/2023     05/07/2023     Lab Results   Component Value Date    ALT 22 05/17/2023    ALT 13 05/04/2023    ALT 19 03/11/2023    AST 28 05/17/2023    AST 18 05/04/2023    AST 20 03/11/2023    ALKPHOS 267 (H) 05/17/2023    ALKPHOS 121 (H) 05/04/2023    ALKPHOS 70 03/11/2023    TBILI 0 97 05/17/2023    TBILI 0 73 05/04/2023    TBILI 0 46 03/11/2023     No results found for: AMYLASE  Lab Results   Component Value Date    LIPASE 34 05/17/2023     Lab Results   Component Value Date    IRON 53 06/30/2022    TIBC 266 06/30/2022    FERRITIN 360 06/30/2022     Lab Results   Component Value Date    INR 1 70 (H) 05/17/2023    INR 2 18 (H) 05/08/2023    INR 3 47 (H) 05/07/2023       CT abdomen pelvis wo contrast    Result Date: 5/17/2023  Narrative: CT ABDOMEN AND PELVIS WITHOUT IV CONTRAST INDICATION:   vomiting, epigastric abd pain  COMPARISON: CT 3/9/2023  TECHNIQUE:  CT examination of the abdomen and pelvis was performed without intravenous contrast  Multiplanar 2D reformatted images were created from the source data  This examination, like all CT scans performed in the [facility], was performed utilizing techniques to minimize radiation dose exposure, including the use of iterative reconstruction and automated exposure control  Radiation dose length product (DLP) for this visit:  415 mGy-cm Enteric contrast was not administered  FINDINGS: Absence of intravenous contrast enhancement limits evaluation of the abdominal viscera  ABDOMEN LOWER CHEST: Partially imaged right lower lobe bronchial wall thickening and scattered patchy densities  Right lower lobe subsegmental atelectasis also noted  No effusions  LIVER/BILIARY TREE:  Unremarkable  GALLBLADDER: There are gallstone(s) within the gallbladder, without pericholecystic inflammatory changes   SPLEEN: Unremarkable  PANCREAS:  Unremarkable  ADRENAL GLANDS:  Unremarkable  KIDNEYS/URETERS:  Unremarkable  No hydronephrosis  STOMACH AND BOWEL: Diffusely fluid-filled loops of small bowel and colon with no evidence of obstruction or acute inflammatory changes  Sigmoid diverticulosis without diverticulitis  APPENDIX:  No findings to suggest appendicitis  ABDOMINOPELVIC CAVITY:  No ascites  No pneumoperitoneum  No lymphadenopathy  VESSELS: Atherosclerotic changes are present  No evidence of aneurysm  PELVIS REPRODUCTIVE ORGANS: Calcified uterine fibroid(s) reidentified  URINARY BLADDER:  Unremarkable  ABDOMINAL WALL/INGUINAL REGIONS: Stable small right inguinal hernia containing nonobstructed bowel loop  OSSEOUS STRUCTURES:  No acute fracture or destructive osseous lesion  Chronic compression deformities of L2-L4  Spinal degenerative changes are noted  Impression: Diffusely fluid-filled small bowel and colon suggestive of nonspecific gastroenteritis  Partially imaged right lower lobe infiltrates and bronchitis  Workstation performed: [id number]     XR chest 1 view portable    Result Date: 5/17/2023  Narrative: CHEST INDICATION:   sob  COMPARISON: CXR 5/4/2023, abdomen CT 5/17/2023, chest CT 2/27/2023  EXAM PERFORMED/VIEWS:  XR CHEST PORTABLE  FINDINGS: Cardiomediastinal silhouette normal  Moderate consolidation throughout the right lung  No definite effusion  No pneumothorax  Upper abdomen normal  Bones normal for age  Impression: Moderate consolidation throughout the right lung compatible with pneumonia  Workstation performed: [id number]     XR chest pa & lateral    Result Date: 5/5/2023  Narrative: CHEST INDICATION:   Shortness of breath  COMPARISON: Chest radiograph March 6, 2023 EXAM PERFORMED/VIEWS:  XR CHEST PA & LATERAL FINDINGS: Cardiomediastinal silhouette appears unremarkable  Linear atelectasis in the right lower lung  No focal consolidation, pleural effusion or pneumothorax   Osseous structures appear within normal limits for patient age  Impression: No focal consolidation, pleural effusion, or pneumothorax  Workstation performed: [id number]     XR spine lumbar minimum 4 views non injury    Result Date: 5/8/2023  Narrative: LUMBAR SPINE INDICATION:   lumbar spine pain  COMPARISON: Comparison from March 9, 2023 CT, VIEWS:  XR SPINE LUMBAR MINIMUM 4 VIEWS NON INJURY Images: 5 FINDINGS: There are 5 non rib bearing lumbar vertebral bodies  Biconcavity of the L4 vertebra seen, unchanged there is superior endplate depression of the L3 vertebra, unchanged Wedge compression deformity of the L2 vertebra with about 50% loss of vertebral height, unchanged Mild wedge compression deformity of the T11 vertebra, unchanged Mild S-shaped scoliosis  Degenerative disc disease seen at L4-5, L3-4 The pedicles appear intact  Mineralized vasculature seen Mild wedge compression deformity of the T12 vertebral Fibroid uterus    Impression: Comparison fracture of the L4 vertebra, L3 vertebra, L2 vertebra, T11 vertebral, unchanged from the previous CT from March 9, 2023  If concern for acute fracture persists consider MRI Workstation performed: [id number]     XR hip/pelv 2-3 vws right    Result Date: 5/5/2023  Narrative: RIGHT HIP INDICATION:   Right hip pain  COMPARISON: Radiographs of the right hip October 25, 2013  Correlation CT abdomen pelvis March 9, 2023 VIEWS:  XR HIP/PELV 2-3 VWS RIGHT W PELVIS IF PERFORMED FINDINGS: There is no acute fracture or dislocation  No significant hip degenerative changes  No lytic or blastic osseous lesion  Round pelvic calcification(s) which may represent calcified fibroids  Degenerative changes visualized lower lumbar spine  Impression: No acute osseous abnormality  Workstation performed: [id number]       Imaging Studies: I have personally reviewed pertinent reports  Pathology, and Other Studies: I have personally reviewed pertinent reports        REVIEW OF SYSTEMS    CONSTITUTIONAL: negative unless stated in HPI  GASTROINTESTINAL: As noted in the HPI

## 2023-05-17 NOTE — ASSESSMENT & PLAN NOTE
See mgx above for sepsis    JENNIE  Cr on admission 1 37  Baseline 0 6-0 7  IVF  I/O  Avoid nephrotoxic agents hypotension and contrast  Continue to hold aldactone and ARB

## 2023-05-17 NOTE — ASSESSMENT & PLAN NOTE
On Cardizem, digoxin, spironolactone, losartan  Holding blood pressure medications due to hypotension

## 2023-05-17 NOTE — ASSESSMENT & PLAN NOTE
Patient presenting from [place] with nausea, vomiting with heme positive emesis and loose stool    In the ED CT showing gastroenteritis    Patient meeting SIRS criteria with tachycardia, tachypnea low blood pressures  Meeting severe sepsis with elevated lactic and JENNIE  Source being gastroenteritis and pneumonia  Received 2L in the ED and therefore 30cc/kg    Hold warfarin  IV fluids  NPO  H&H  Protonix drip  GI consult  C  difficile and stool cultures oredered  fobt  transfuse when <7    BC x2 pending  procal pending  LA downward trending-> continue to trend till cleared  Continue CTX  Will add flagyl

## 2023-05-17 NOTE — PLAN OF CARE
Problem: MOBILITY - ADULT  Goal: Maintain or return to baseline ADL function  Description: INTERVENTIONS:  -  Assess patient's ability to carry out ADLs; assess patient's baseline for ADL function and identify physical deficits which impact ability to perform ADLs (bathing, care of mouth/teeth, toileting, grooming, dressing, etc )  - Assess/evaluate cause of self-care deficits   - Assess range of motion  - Assess patient's mobility; develop plan if impaired  - Assess patient's need for assistive devices and provide as appropriate  - Encourage maximum independence but intervene and supervise when necessary  - Involve family in performance of ADLs  - Assess for home care needs following discharge   - Consider OT consult to assist with ADL evaluation and planning for discharge  - Provide patient education as appropriate  Outcome: Progressing  Goal: Maintains/Returns to pre admission functional level  Description: INTERVENTIONS:  - Perform BMAT or MOVE assessment daily    - Set and communicate daily mobility goal to care team and patient/family/caregiver  - Collaborate with rehabilitation services on mobility goals if consulted  - Perform Range of Motion x times a day  - Reposition patient every x hours    - Dangle patient x times a day  - Stand patient x times a day  - Ambulate patient x times a day  - Out of bed to chair x times a day   - Out of bed for meals x times a day  - Out of bed for toileting  - Record patient progress and toleration of activity level   Outcome: Progressing     Problem: PAIN - ADULT  Goal: Verbalizes/displays adequate comfort level or baseline comfort level  Description: Interventions:  - Encourage patient to monitor pain and request assistance  - Assess pain using appropriate pain scale  - Administer analgesics based on type and severity of pain and evaluate response  - Implement non-pharmacological measures as appropriate and evaluate response  - Consider cultural and social influences on pain and pain management  - Notify physician/advanced practitioner if interventions unsuccessful or patient reports new pain  Outcome: Progressing     Problem: INFECTION - ADULT  Goal: Absence or prevention of progression during hospitalization  Description: INTERVENTIONS:  - Assess and monitor for signs and symptoms of infection  - Monitor lab/diagnostic results  - Monitor all insertion sites, i e  indwelling lines, tubes, and drains  - Monitor endotracheal if appropriate and nasal secretions for changes in amount and color  - [place] appropriate cooling/warming therapies per order  - Administer medications as ordered  - Instruct and encourage patient and family to use good hand hygiene technique  - Identify and instruct in appropriate isolation precautions for identified infection/condition  Outcome: Progressing     Problem: SAFETY ADULT  Goal: Patient will remain free of falls  Description: INTERVENTIONS:  - Educate patient/family on patient safety including physical limitations  - Instruct patient to call for assistance with activity   - Consult OT/PT to assist with strengthening/mobility   - Keep Call bell within reach  - Keep bed low and locked with side rails adjusted as appropriate  - Keep care items and personal belongings within reach  - Initiate and maintain comfort rounds  - Make Fall Risk Sign visible to staff  - Offer Toileting every x Hours, in advance of need  - Initiate/Maintain xalarm  - Obtain necessary fall risk management equipment: x  - Apply yellow socks and bracelet for high fall risk patients  - Consider moving patient to room near nurses station  Outcome: Progressing     Problem: DISCHARGE PLANNING  Goal: Discharge to home or other facility with appropriate resources  Description: INTERVENTIONS:  - Identify barriers to discharge w/patient and caregiver  - Arrange for needed discharge resources and transportation as appropriate  - Identify discharge learning needs (meds, wound care, etc )  - Arrange for interpretive services to assist at discharge as needed  - Refer to Case Management Department for coordinating discharge planning if the patient needs post-hospital services based on physician/advanced practitioner order or complex needs related to functional status, cognitive ability, or social support system  Outcome: Progressing     Problem: Knowledge Deficit  Goal: Patient/family/caregiver demonstrates understanding of disease process, treatment plan, medications, and discharge instructions  Description: Complete learning assessment and assess knowledge base  Interventions:  - Provide teaching at level of understanding  - Provide teaching via preferred learning methods  Outcome: Progressing     Problem: Nutrition/Hydration-ADULT  Goal: Nutrient/Hydration intake appropriate for improving, restoring or maintaining nutritional needs  Description: Monitor and assess patient's nutrition/hydration status for malnutrition  Collaborate with interdisciplinary team and initiate plan and interventions as ordered  Monitor patient's weight and dietary intake as ordered or per policy  Utilize nutrition screening tool and intervene as necessary  Determine patient's food preferences and provide high-protein, high-caloric foods as appropriate       INTERVENTIONS:  - Monitor oral intake, urinary output, labs, and treatment plans  - Assess nutrition and hydration status and recommend course of action  - Evaluate amount of meals eaten  - Assist patient with eating if necessary   - Allow adequate time for meals  - Recommend/ encourage appropriate diets, oral nutritional supplements, and vitamin/mineral supplements  - Order, calculate, and assess calorie counts as needed  - Recommend, monitor, and adjust tube feedings and TPN/PPN based on assessed needs  - Assess need for intravenous fluids  - Provide specific nutrition/hydration education as appropriate  - Include patient/family/caregiver in decisions related to nutrition  Outcome: Progressing     Problem: Prexisting or High Potential for Compromised Skin Integrity  Goal: Skin integrity is maintained or improved  Description: INTERVENTIONS:  - Identify patients at risk for skin breakdown  - Assess and monitor skin integrity  - Assess and monitor nutrition and hydration status  - Monitor labs   - Assess for incontinence   - Turn and reposition patient  - Assist with mobility/ambulation  - Relieve pressure over bony prominences  - Avoid friction and shearing  - Provide appropriate hygiene as needed including keeping skin clean and dry  - Evaluate need for skin moisturizer/barrier cream  - Collaborate with interdisciplinary team   - Patient/family teaching  - Consider wound care consult   Outcome: Progressing     Problem: Potential for Falls  Goal: Patient will remain free of falls  Description: INTERVENTIONS:  - Educate patient/family on patient safety including physical limitations  - Instruct patient to call for assistance with activity   - Consult OT/PT to assist with strengthening/mobility   - Keep Call bell within reach  - Keep bed low and locked with side rails adjusted as appropriate  - Keep care items and personal belongings within reach  - Initiate and maintain comfort rounds  - Make Fall Risk Sign visible to staff  - Offer Toileting every x Hours, in advance of need  - Initiate/Maintain xalarm  - Obtain necessary fall risk management equipment: x  - Apply yellow socks and bracelet for high fall risk patients  - Consider moving patient to room near nurses station  Outcome: Progressing     Problem: RESPIRATORY - ADULT  Goal: Achieves optimal ventilation and oxygenation  Description: INTERVENTIONS:  - Assess for changes in respiratory status  - Assess for changes in mentation and behavior  - Position to facilitate oxygenation and minimize respiratory effort  - Oxygen administered by appropriate delivery if ordered  - Initiate smoking cessation education as indicated  - Encourage broncho-pulmonary hygiene including cough, deep breathe, Incentive Spirometry  - Assess the need for suctioning and aspirate as needed  - Assess and instruct to report SOB or any respiratory difficulty  - Respiratory Therapy support as indicated  Outcome: Progressing     Problem: GASTROINTESTINAL - ADULT  Goal: Minimal or absence of nausea and/or vomiting  Description: INTERVENTIONS:  - Administer IV fluids if ordered to ensure adequate hydration  - Maintain NPO status until nausea and vomiting are resolved  - Nasogastric tube if ordered  - Administer ordered antiemetic medications as needed  - Provide nonpharmacologic comfort measures as appropriate  - Advance diet as tolerated, if ordered  - Consider nutrition services referral to assist patient with adequate nutrition and appropriate food choices  Outcome: Progressing  Goal: Maintains or returns to baseline bowel function  Description: INTERVENTIONS:  - Assess bowel function  - Encourage oral fluids to ensure adequate hydration  - Administer IV fluids if ordered to ensure adequate hydration  - Administer ordered medications as needed  - Encourage mobilization and activity  - Consider nutritional services referral to assist patient with adequate nutrition and appropriate food choices  Outcome: Progressing  Goal: Maintains adequate nutritional intake  Description: INTERVENTIONS:  - Monitor percentage of each meal consumed  - Identify factors contributing to decreased intake, treat as appropriate  - Assist with meals as needed  - Monitor I&O, weight, and lab values if indicated  - Obtain nutrition services referral as needed  Outcome: Progressing     Problem: METABOLIC, FLUID AND ELECTROLYTES - ADULT  Goal: Electrolytes maintained within normal limits  Description: INTERVENTIONS:  - Monitor labs and assess patient for signs and symptoms of electrolyte imbalances  - Administer electrolyte replacement as ordered  - Monitor response to electrolyte replacements, including repeat lab results as appropriate  - Instruct patient on fluid and nutrition as appropriate  Outcome: Progressing  Goal: Fluid balance maintained  Description: INTERVENTIONS:  - Monitor labs   - Monitor I/O and WT  - Instruct patient on fluid and nutrition as appropriate  - Assess for signs & symptoms of volume excess or deficit  Outcome: Progressing     Problem: SKIN/TISSUE INTEGRITY - ADULT  Goal: Skin Integrity remains intact(Skin Breakdown Prevention)  Description: Assess:  -Perform [place] assessment every x  -Clean and moisturize skin every x  -Inspect skin when repositioning, toileting, and assisting with ADLS  -Assess under medical devices such as x every x  -Assess extremities for adequate circulation and sensation     Bed Management:  -Have minimal linens on bed & keep smooth, unwrinkled  -Change linens as needed when moist or perspiring  -Avoid sitting or lying in one position for more than x hours while in bed  -Keep [facility]OB at [facility]     Toileting:  -Offer bedside commode  -Assess for incontinence every x  -Use incontinent care products after each incontinent episode such as x    Activity:  -Mobilize patient xx times a day  -Encourage activity and walks on unit  -Encourage or provide ROM exercises   -Turn and reposition patient every x Hours  -Use appropriate equipment to lift or move patient in bed  -Instruct/ Assist with weight shifting every x when out of bed in chair  -Consider limitation of chair time x hour intervals    Skin Care:  -Avoid use of baby powder, tape, friction and shearing, hot water or constrictive clothing  -Relieve pressure over bony prominences using xx  -Do not massage red bony areas    Next Steps:  -Teach patient strategies to minimize risks such as x   -Consider consults to  interdisciplinary teams such as x  Outcome: Progressing     Problem: HEMATOLOGIC - ADULT  Goal: Maintains hematologic stability  Description: INTERVENTIONS  - Assess for signs and symptoms of bleeding or hemorrhage  - Monitor labs  - Administer supportive blood products/factors as ordered and appropriate  Outcome: Progressing     Problem: MUSCULOSKELETAL - ADULT  Goal: Maintain or return mobility to safest level of function  Description: INTERVENTIONS:  - Assess patient's ability to carry out ADLs; assess patient's baseline for ADL function and identify physical deficits which impact ability to perform ADLs (bathing, care of mouth/teeth, toileting, grooming, dressing, etc )  - Assess/evaluate cause of self-care deficits   - Assess range of motion  - Assess patient's mobility  - Assess patient's need for assistive devices and provide as appropriate  - Encourage maximum independence but intervene and supervise when necessary  - Involve family in performance of ADLs  - Assess for home care needs following discharge   - Consider OT consult to assist with ADL evaluation and planning for discharge  - Provide patient education as appropriate  Outcome: Progressing

## 2023-05-17 NOTE — ASSESSMENT & PLAN NOTE
Right lower lobe pneumonia found on CT  Continue ceftriaxone  MRSA culture  Sputum culture  Urine antigens pending  I-S, ACP, Mucinex, Tessalon Perles  Aspiration precaution  Elevate HOB

## 2023-05-17 NOTE — H&P
Juan Coleman  H&P  Name: Jessica Vasquez 79 y o  female I MRN: [id number]  Unit/Bed#: -01 I Date of Admission: 5/17/2023   Date of Service: 5/17/2023 I Hospital Day: 0      Assessment/Plan   * GI bleeding  Assessment & Plan  Patient presenting from [facility] with nausea, vomiting with heme positive emesis and loose stool    In the ED CT showing gastroenteritis  Patient meeting SIRS criteria with tachycardia, tachypnea low blood pressures  Meeting severe sepsis with elevated lactic and JENNIE  Source being gastroenteritis and pneumonia  Received 2L in the ED and therefore 30cc/kg    Hold warfarin  IV fluids  NPO  H&H  Protonix drip  GI consult  C  difficile and stool cultures oredered  fobt  transfuse when <7    BC x2 pending  procal pending  LA downward trending-> continue to trend till cleared  Continue CTX  Will add flagyl    Sepsis with acute renal failure without septic shock ([facility] [address] )  Assessment & Plan  See mgx above for sepsis    JENNIE  Cr on admission 1 37  Baseline 0 6-0 7  IVF  I/O  Avoid nephrotoxic agents hypotension and contrast  Continue to hold aldactone and ARB    Pneumonia of right lower lobe due to infectious organism  Assessment & Plan  Right lower lobe pneumonia found on CT  Continue ceftriaxone  MRSA culture  Sputum culture  Urine antigens pending  I-S, ACP, Mucinex, Tessalon Perles  Aspiration precaution  Elevate HOB    Gastroenteritis  Assessment & Plan  See mgx of GIB    Hyponatremia  Assessment & Plan  Most likely hypovolemic hyponatremia  Continue IV fluids  Continue to trend    Severe protein-calorie malnutrition (HCC)  Assessment & Plan  Malnutrition Findings:                                 BMI Findings: There is no height or weight on file to calculate BMI         Consult nutrition    Essential hypertension  Assessment & Plan  On Cardizem, digoxin, spironolactone, losartan  Holding blood pressure medications due to hypotension    Other specified hypothyroidism  Assessment & Plan  Repeat TSH  Continue levo 25 mcg daily    Atrial fibrillation (HCC)  Assessment & Plan  RC with Cardizem and digoxin  AC with warfarin-> hold warfarin d/t GIB  Continue to trend INR    Chronic bronchitis (HCC)  Assessment & Plan  No in exacerbation  Continue home inhalers   IS acp mucinex tessalon       VTE Pharmacologic Prophylaxis: VTE Score: 6 High Risk (Score >/= 5) - Pharmacological DVT Prophylaxis Contraindicated  Sequential Compression Devices Ordered  Code Status: Prior level     Anticipated Length of Stay: Patient will be admitted on an inpatient basis with an anticipated length of stay of greater than 2 midnights secondary to sepsis and GI BLeed  Total Time Spent on Date of Encounter in care of patient: 75 minutes This time was spent on one or more of the following: performing physical exam; counseling and coordination of care; obtaining or reviewing history; documenting in the medical record; reviewing/ordering tests, medications or procedures; communicating with other healthcare professionals and discussing with patient's family/caregivers  Chief Complaint: vomiting    History of Present Illness:  Dariel Hotron is a 79 y o  female with a PMH of antiphospholipid syndrome, A-fib, hypertension, hypothyroidism, COPD, failure to thrive, severe protein calorie malnutrition who presents with vomiting  This occurred starting yesterday and had gradually gotten worse  Patient comes from [facility] and was noted to have emesis associated with blood however nonbilious  Initially did not have diarrhea however did have loose stools in the morning  Patient meeting severe sepsis criteria due to gastroenteritis and pneumonia also found to have an upper GI bleed  Review of Systems:  Review of Systems   All other systems reviewed and are negative        Past Medical and Surgical History:   Past Medical History:   Diagnosis Date   • Anti-phospholipid syndrome ([facility] [address] )    • Asthma • Blood type O+    • Cardiac disease    • Chronic pain    • COPD (chronic obstructive pulmonary disease) ([facility])    • Fracture of ankle     left   • Hyperlipidemia    • Hypertension    • Hypokalemia 2/27/2023   • Osteoporosis        Past Surgical History:   Procedure Laterality Date   • APPENDECTOMY     • BILATERAL OOPHORECTOMY Bilateral    • ORIF TIBIAL SHAFT FRACTURE W/ PLATES AND SCREWS Right        Meds/Allergies:  Prior to Admission medications    Medication Sig Start Date End Date Taking?  Authorizing Provider   acetaminophen (TYLENOL) 325 mg tablet Take 3 tablets (975 mg total) by mouth every 8 (eight) hours 3/13/23   Mindi Nieto MD   Advair Diskus 250-50 MCG/DOSE inhaler USE 1 INHALATION TWICE A DAY 2/6/97   Nisha Gupta MD   albuterol (2 5 mg/3 mL) 0 083 % nebulizer solution USE 1 VIAL IN NEBULIZER EVERY 4 TO 6 HOURS AS NEEDED FOR COUGH AND WHEEZE 5/27/04   Nisha Gupta MD   Ascorbic Acid (VITAMIN C) 1000 MG tablet Take 1,000 mg by mouth daily    Historical Provider, MD   Cholecalciferol (VITAMIN D3) 20 MCG (800 UNIT) TABS Take by mouth    Historical Provider, MD   digoxin (LANOXIN) 0 125 mg tablet Take 1 tablet (125 mcg total) by mouth every other day Do not start before May 10, 2023  5/10/23   Joe Jovel MD   diltiazem (CARDIZEM CD) 240 mg 24 hr capsule Take 1 capsule (240 mg total) by mouth daily 0/85/96   Nisha Gupta MD   Ferrous Sulfate 220 (44 Fe) MG/5ML LIQD take 1 teaspoonful by mouth once daily 6/85/54   Nisha Gupta MD   levothyroxine 25 mcg tablet TAKE 1 TABLET DAILY 82/26/03   Nisha Gupta MD   lidocaine (LIDODERM) 5 % Apply 1 patch topically over 12 hours daily Remove & Discard patch within 12 hours or as directed by MD 1/65/30   Nisha Gupta MD   loratadine (CLARITIN) 10 mg tablet Take 10 mg by mouth daily    Historical Provider, MD   losartan (COZAAR) 25 mg tablet Take 1 tablet (25 mg total) by mouth daily Do not start before February 25, 2023 2/25/23   Jessie Ventura MD   mirtazapine (REMERON) 7 5 MG tablet Take 1 tablet (7 5 mg total) by mouth daily at bedtime 2/85/01   Lesa Grey MD   multivitamin [facility]) TABS Take 1 tablet by mouth daily    Historical Provider, MD   senna-docusate sodium (SENOKOT S) 8 6-50 mg per tablet Take 1 tablet by mouth daily at bedtime 3/13/23   Nestor Moore MD   sertraline (Zoloft) 50 mg tablet Take 1 tablet (50 mg total) by mouth daily 9/72/70   Lesa Grey MD   spironolactone (ALDACTONE) 25 mg tablet Take 1 tablet (25 mg total) by mouth daily Do not start before March 14, 2023  3/14/23   Nestor Moore MD   tiotropium (Spiriva HandiHaler) 18 mcg inhalation capsule Place 1 capsule (18 mcg total) into inhaler and inhale daily As directed 0/88/41   Lesa Grey MD   tiZANidine (ZANAFLEX) 2 mg tablet Take 1 tablet (2 mg total) by mouth 2 (two) times a day as needed for muscle spasms 2/3/79   Lesa Grey MD   warfarin (COUMADIN) 2 mg tablet Take 2 mg daily, adjust to keep INR therapeutic, between 2-3 3/13/23   Nestor Moore MD     I have reviewed home medications using recent Epic encounter  Allergies: Allergies   Allergen Reactions   • Aspirin Other (See Comments)   • Penicillins Rash and Hives   • Sulfa Antibiotics Rash       Social History:  Marital Status:     Patient Pre-hospital Living Situation: Assisted Living  Patient Pre-hospital Level of Mobility: unable to be assessed at time of evaluation  Patient Pre-hospital Diet Restrictions: none  Substance Use History:   Social History     Substance and Sexual Activity   Alcohol Use Not Currently     Social History     Tobacco Use   Smoking Status Former   • Types: Cigarettes   Smokeless Tobacco Never   Tobacco Comments    Never smoker per [employer]     Social History     Substance and Sexual Activity   Drug Use No       Family History:  Family History   Problem Relation Age of Onset   • Hypertension Mother    • Skin cancer Mother    • Hypertension Father        Physical Exam:     Vitals:   Blood Pressure: 151/65 (05/17/23 1230)  Pulse: 86 (05/17/23 1245)  Temperature: 97 8 °F (36 6 °C) (05/17/23 0724)  Temp Source: Oral (05/17/23 0724)  Respirations: (!) 24 (05/17/23 1245)  SpO2: 95 % (05/17/23 1245)    Physical Exam  Vitals and nursing note reviewed  Constitutional:       General: She is not in acute distress  Appearance: She is ill-appearing  Comments: Thin frail   HENT:      Head: Normocephalic and atraumatic  Cardiovascular:      Rate and Rhythm: Tachycardia present  Rhythm irregular  Pulses: Normal pulses  Heart sounds: Normal heart sounds  Pulmonary:      Effort: Pulmonary effort is normal       Breath sounds: Normal breath sounds  Abdominal:      General: Abdomen is flat  Bowel sounds are normal       Palpations: Abdomen is soft  Tenderness: There is no abdominal tenderness  Musculoskeletal:      Right lower leg: No edema  Left lower leg: No edema  Skin:     General: Skin is warm  Neurological:      General: No focal deficit present  Mental Status: She is alert and oriented to person, place, and time            Additional Data:     Lab Results:  Results from last 7 days   Lab Units 05/17/23  0741   WBC Thousand/uL 22 02*   HEMOGLOBIN g/dL 14 0   HEMATOCRIT % 44 5   PLATELETS Thousands/uL 459*   NEUTROS PCT % 91*   LYMPHS PCT % 3*   MONOS PCT % 5   EOS PCT % 0     Results from last 7 days   Lab Units 05/17/23  0741   SODIUM mmol/L 132*   POTASSIUM mmol/L 5 5*   CHLORIDE mmol/L 91*   CO2 mmol/L 32   BUN mg/dL 48*   CREATININE mg/dL 1 37*   ANION GAP mmol/L 9   CALCIUM mg/dL 9 6   ALBUMIN g/dL 3 9   TOTAL BILIRUBIN mg/dL 0 97   ALK PHOS U/L 267*   ALT U/L 22   AST U/L 28   GLUCOSE RANDOM mg/dL 179*     Results from last 7 days   Lab Units 05/17/23  0741   INR  1 70*             Results from last 7 days   Lab Units 05/17/23  1036 05/17/23  0741   LACTIC ACID mmol/L 2 8* 3 9*   PROCALCITONIN ng/ml  --  0 61* Lines/Drains:  Invasive Devices     Peripheral Intravenous Line  Duration           Peripheral IV 05/17/23 Right;Ventral (anterior) Forearm <1 day                    Imaging:   XR chest 1 view portable   Final Result by Araceli Vincent MD (05/17 7345)      Moderate consolidation throughout the right lung compatible with pneumonia  Workstation performed: [id number]         CT abdomen pelvis wo contrast   Final Result by Niall Cotter MD (05/17 5371)      Diffusely fluid-filled small bowel and colon suggestive of nonspecific gastroenteritis  Partially imaged right lower lobe infiltrates and bronchitis  Workstation performed: [id number]             EKG and Other Studies Reviewed on Admission:   · EKG: No EKG obtained  ** Please Note: This note has been constructed using a voice recognition system   **

## 2023-05-17 NOTE — ASSESSMENT & PLAN NOTE
· Home regimen includes Cardizem and digoxin  · Continue with hold parameters  · AC with warfarin-> hold warfarin d/t GIB  · Continue to trend INR

## 2023-05-17 NOTE — ASSESSMENT & PLAN NOTE
Malnutrition Findings:                                 BMI Findings: There is no height or weight on file to calculate BMI         Consult nutrition

## 2023-05-18 LAB
ALBUMIN SERPL BCP-MCNC: 2.8 G/DL (ref 3.5–5)
ALP SERPL-CCNC: 147 U/L (ref 34–104)
ALT SERPL W P-5'-P-CCNC: 12 U/L (ref 7–52)
ANION GAP SERPL CALCULATED.3IONS-SCNC: 2 MMOL/L (ref 4–13)
ANION GAP SERPL CALCULATED.3IONS-SCNC: 3 MMOL/L (ref 4–13)
AST SERPL W P-5'-P-CCNC: 15 U/L (ref 13–39)
BASOPHILS # BLD MANUAL: 0 THOUSAND/UL (ref 0–0.1)
BASOPHILS NFR MAR MANUAL: 0 % (ref 0–1)
BILIRUB SERPL-MCNC: 0.8 MG/DL (ref 0.2–1)
BUN SERPL-MCNC: 29 MG/DL (ref 5–25)
BUN SERPL-MCNC: 37 MG/DL (ref 5–25)
CALCIUM ALBUM COR SERPL-MCNC: 9.2 MG/DL (ref 8.3–10.1)
CALCIUM SERPL-MCNC: 8.1 MG/DL (ref 8.4–10.2)
CALCIUM SERPL-MCNC: 8.2 MG/DL (ref 8.4–10.2)
CHLORIDE SERPL-SCNC: 100 MMOL/L (ref 96–108)
CHLORIDE SERPL-SCNC: 99 MMOL/L (ref 96–108)
CO2 SERPL-SCNC: 30 MMOL/L (ref 21–32)
CO2 SERPL-SCNC: 31 MMOL/L (ref 21–32)
CREAT SERPL-MCNC: 0.56 MG/DL (ref 0.6–1.3)
CREAT SERPL-MCNC: 0.74 MG/DL (ref 0.6–1.3)
EOSINOPHIL # BLD MANUAL: 0 THOUSAND/UL (ref 0–0.4)
EOSINOPHIL NFR BLD MANUAL: 0 % (ref 0–6)
ERYTHROCYTE [DISTWIDTH] IN BLOOD BY AUTOMATED COUNT: 14.3 % (ref 11.6–15.1)
GFR SERPL CREATININE-BSD FRML MDRD: 74 ML/MIN/1.73SQ M
GFR SERPL CREATININE-BSD FRML MDRD: 85 ML/MIN/1.73SQ M
GLUCOSE SERPL-MCNC: 101 MG/DL (ref 65–140)
GLUCOSE SERPL-MCNC: 86 MG/DL (ref 65–140)
HCT VFR BLD AUTO: 29.3 % (ref 34.8–46.1)
HCT VFR BLD AUTO: 30.8 % (ref 34.8–46.1)
HGB BLD-MCNC: 9.2 G/DL (ref 11.5–15.4)
HGB BLD-MCNC: 9.7 G/DL (ref 11.5–15.4)
INR PPP: 2.38 (ref 0.84–1.19)
LYMPHOCYTES # BLD AUTO: 0.62 THOUSAND/UL (ref 0.6–4.47)
LYMPHOCYTES # BLD AUTO: 4 % (ref 14–44)
MCH RBC QN AUTO: 32.3 PG (ref 26.8–34.3)
MCHC RBC AUTO-ENTMCNC: 31.4 G/DL (ref 31.4–37.4)
MCV RBC AUTO: 103 FL (ref 82–98)
MONOCYTES # BLD AUTO: 0.47 THOUSAND/UL (ref 0–1.22)
MONOCYTES NFR BLD: 3 % (ref 4–12)
NEUTROPHILS # BLD MANUAL: 14.53 THOUSAND/UL (ref 1.85–7.62)
NEUTS SEG NFR BLD AUTO: 93 % (ref 43–75)
PLATELET # BLD AUTO: 296 THOUSANDS/UL (ref 149–390)
PLATELET BLD QL SMEAR: ADEQUATE
PMV BLD AUTO: 8.4 FL (ref 8.9–12.7)
POTASSIUM SERPL-SCNC: 4.1 MMOL/L (ref 3.5–5.3)
POTASSIUM SERPL-SCNC: 4.3 MMOL/L (ref 3.5–5.3)
PROCALCITONIN SERPL-MCNC: 2.66 NG/ML
PROT SERPL-MCNC: 5.4 G/DL (ref 6.4–8.4)
PROTHROMBIN TIME: 27.2 SECONDS (ref 11.6–14.5)
RBC # BLD AUTO: 2.85 MILLION/UL (ref 3.81–5.12)
RBC MORPH BLD: NORMAL
SODIUM SERPL-SCNC: 132 MMOL/L (ref 135–147)
SODIUM SERPL-SCNC: 133 MMOL/L (ref 135–147)
WBC # BLD AUTO: 15.62 THOUSAND/UL (ref 4.31–10.16)

## 2023-05-18 RX ORDER — DILTIAZEM HYDROCHLORIDE 240 MG/1
240 CAPSULE, COATED, EXTENDED RELEASE ORAL DAILY
Status: DISCONTINUED | OUTPATIENT
Start: 2023-05-18 | End: 2023-05-25 | Stop reason: HOSPADM

## 2023-05-18 RX ORDER — DILTIAZEM HYDROCHLORIDE 240 MG/1
240 CAPSULE, COATED, EXTENDED RELEASE ORAL DAILY
Status: DISCONTINUED | OUTPATIENT
Start: 2023-05-18 | End: 2023-05-18

## 2023-05-18 RX ORDER — PANTOPRAZOLE SODIUM 40 MG/10ML
40 INJECTION, POWDER, LYOPHILIZED, FOR SOLUTION INTRAVENOUS EVERY 12 HOURS SCHEDULED
Status: DISCONTINUED | OUTPATIENT
Start: 2023-05-18 | End: 2023-05-23

## 2023-05-18 RX ORDER — CEFTRIAXONE 1 G/50ML
1000 INJECTION, SOLUTION INTRAVENOUS EVERY 24 HOURS
Status: DISCONTINUED | OUTPATIENT
Start: 2023-05-18 | End: 2023-05-25 | Stop reason: HOSPADM

## 2023-05-18 RX ADMIN — DILTIAZEM HYDROCHLORIDE 240 MG: 240 CAPSULE, EXTENDED RELEASE ORAL at 17:07

## 2023-05-18 RX ADMIN — LEVOTHYROXINE SODIUM 25 MCG: 25 TABLET ORAL at 05:39

## 2023-05-18 RX ADMIN — PANTOPRAZOLE SODIUM 8 MG/HR: 40 INJECTION, POWDER, FOR SOLUTION INTRAVENOUS at 08:23

## 2023-05-18 RX ADMIN — METOPROLOL TARTRATE 2.5 MG: 5 INJECTION INTRAVENOUS at 10:26

## 2023-05-18 RX ADMIN — METRONIDAZOLE 500 MG: 500 INJECTION, SOLUTION INTRAVENOUS at 05:39

## 2023-05-18 RX ADMIN — PANTOPRAZOLE SODIUM 40 MG: 40 INJECTION, POWDER, FOR SOLUTION INTRAVENOUS at 10:23

## 2023-05-18 RX ADMIN — METOPROLOL TARTRATE 2.5 MG: 5 INJECTION INTRAVENOUS at 05:39

## 2023-05-18 RX ADMIN — METRONIDAZOLE 500 MG: 500 INJECTION, SOLUTION INTRAVENOUS at 12:54

## 2023-05-18 RX ADMIN — FLUTICASONE FUROATE AND VILANTEROL TRIFENATATE 1 PUFF: 200; 25 POWDER RESPIRATORY (INHALATION) at 08:21

## 2023-05-18 RX ADMIN — SODIUM CHLORIDE 50 ML/HR: 0.9 INJECTION, SOLUTION INTRAVENOUS at 11:41

## 2023-05-18 RX ADMIN — METRONIDAZOLE 500 MG: 500 INJECTION, SOLUTION INTRAVENOUS at 21:21

## 2023-05-18 RX ADMIN — LIDOCAINE 1 PATCH: 50 PATCH TOPICAL at 08:24

## 2023-05-18 RX ADMIN — CEFTRIAXONE 1000 MG: 1 INJECTION, SOLUTION INTRAVENOUS at 11:38

## 2023-05-18 RX ADMIN — UMECLIDINIUM 1 PUFF: 62.5 AEROSOL, POWDER ORAL at 08:21

## 2023-05-18 RX ADMIN — PANTOPRAZOLE SODIUM 40 MG: 40 INJECTION, POWDER, FOR SOLUTION INTRAVENOUS at 21:20

## 2023-05-18 RX ADMIN — DIGOXIN 125 MCG: 125 TABLET ORAL at 08:21

## 2023-05-18 NOTE — PLAN OF CARE
Problem: MOBILITY - ADULT  Goal: Maintain or return to baseline ADL function  Description: INTERVENTIONS:  -  Assess patient's ability to carry out ADLs; assess patient's baseline for ADL function and identify physical deficits which impact ability to perform ADLs (bathing, care of mouth/teeth, toileting, grooming, dressing, etc )  - Assess/evaluate cause of self-care deficits   - Assess range of motion  - Assess patient's mobility; develop plan if impaired  - Assess patient's need for assistive devices and provide as appropriate  - Encourage maximum independence but intervene and supervise when necessary  - Involve family in performance of ADLs  - Assess for home care needs following discharge   - Consider OT consult to assist with ADL evaluation and planning for discharge  - Provide patient education as appropriate  Outcome: Progressing  Goal: Maintains/Returns to pre admission functional level  Description: INTERVENTIONS:  - Perform BMAT or MOVE assessment daily    - Set and communicate daily mobility goal to care team and patient/family/caregiver  - Collaborate with rehabilitation services on mobility goals if consulted  - Perform Range of Motion x times a day  - Reposition patient every x hours    - Dangle patient x times a day  - Stand patient x times a day  - Ambulate patient x times a day  - Out of bed to chair x times a day   - Out of bed for meals x times a day  - Out of bed for toileting  - Record patient progress and toleration of activity level   Outcome: Progressing     Problem: PAIN - ADULT  Goal: Verbalizes/displays adequate comfort level or baseline comfort level  Description: Interventions:  - Encourage patient to monitor pain and request assistance  - Assess pain using appropriate pain scale  - Administer analgesics based on type and severity of pain and evaluate response  - Implement non-pharmacological measures as appropriate and evaluate response  - Consider cultural and social influences on pain and pain management  - Notify physician/advanced practitioner if interventions unsuccessful or patient reports new pain  Outcome: Progressing     Problem: INFECTION - ADULT  Goal: Absence or prevention of progression during hospitalization  Description: INTERVENTIONS:  - Assess and monitor for signs and symptoms of infection  - Monitor lab/diagnostic results  - Monitor all insertion sites, i e  indwelling lines, tubes, and drains  - Monitor endotracheal if appropriate and nasal secretions for changes in amount and color  - [place] appropriate cooling/warming therapies per order  - Administer medications as ordered  - Instruct and encourage patient and family to use good hand hygiene technique  - Identify and instruct in appropriate isolation precautions for identified infection/condition  Outcome: Progressing     Problem: SAFETY ADULT  Goal: Patient will remain free of falls  Description: INTERVENTIONS:  - Educate patient/family on patient safety including physical limitations  - Instruct patient to call for assistance with activity   - Consult OT/PT to assist with strengthening/mobility   - Keep Call bell within reach  - Keep bed low and locked with side rails adjusted as appropriate  - Keep care items and personal belongings within reach  - Initiate and maintain comfort rounds  - Make Fall Risk Sign visible to staff  - Offer Toileting every x Hours, in advance of need  - Initiate/Maintain xalarm  - Obtain necessary fall risk management equipment: x  - Apply yellow socks and bracelet for high fall risk patients  - Consider moving patient to room near nurses station  Outcome: Progressing     Problem: DISCHARGE PLANNING  Goal: Discharge to home or other facility with appropriate resources  Description: INTERVENTIONS:  - Identify barriers to discharge w/patient and caregiver  - Arrange for needed discharge resources and transportation as appropriate  - Identify discharge learning needs (meds, wound care, etc )  - Arrange for interpretive services to assist at discharge as needed  - Refer to Case Management Department for coordinating discharge planning if the patient needs post-hospital services based on physician/advanced practitioner order or complex needs related to functional status, cognitive ability, or social support system  Outcome: Progressing     Problem: Knowledge Deficit  Goal: Patient/family/caregiver demonstrates understanding of disease process, treatment plan, medications, and discharge instructions  Description: Complete learning assessment and assess knowledge base  Interventions:  - Provide teaching at level of understanding  - Provide teaching via preferred learning methods  Outcome: Progressing     Problem: Nutrition/Hydration-ADULT  Goal: Nutrient/Hydration intake appropriate for improving, restoring or maintaining nutritional needs  Description: Monitor and assess patient's nutrition/hydration status for malnutrition  Collaborate with interdisciplinary team and initiate plan and interventions as ordered  Monitor patient's weight and dietary intake as ordered or per policy  Utilize nutrition screening tool and intervene as necessary  Determine patient's food preferences and provide high-protein, high-caloric foods as appropriate       INTERVENTIONS:  - Monitor oral intake, urinary output, labs, and treatment plans  - Assess nutrition and hydration status and recommend course of action  - Evaluate amount of meals eaten  - Assist patient with eating if necessary   - Allow adequate time for meals  - Recommend/ encourage appropriate diets, oral nutritional supplements, and vitamin/mineral supplements  - Order, calculate, and assess calorie counts as needed  - Recommend, monitor, and adjust tube feedings and TPN/PPN based on assessed needs  - Assess need for intravenous fluids  - Provide specific nutrition/hydration education as appropriate  - Include patient/family/caregiver in decisions related to nutrition  Outcome: Progressing     Problem: Prexisting or High Potential for Compromised Skin Integrity  Goal: Skin integrity is maintained or improved  Description: INTERVENTIONS:  - Identify patients at risk for skin breakdown  - Assess and monitor skin integrity  - Assess and monitor nutrition and hydration status  - Monitor labs   - Assess for incontinence   - Turn and reposition patient  - Assist with mobility/ambulation  - Relieve pressure over bony prominences  - Avoid friction and shearing  - Provide appropriate hygiene as needed including keeping skin clean and dry  - Evaluate need for skin moisturizer/barrier cream  - Collaborate with interdisciplinary team   - Patient/family teaching  - Consider wound care consult   Outcome: Progressing     Problem: Potential for Falls  Goal: Patient will remain free of falls  Description: INTERVENTIONS:  - Educate patient/family on patient safety including physical limitations  - Instruct patient to call for assistance with activity   - Consult OT/PT to assist with strengthening/mobility   - Keep Call bell within reach  - Keep bed low and locked with side rails adjusted as appropriate  - Keep care items and personal belongings within reach  - Initiate and maintain comfort rounds  - Make Fall Risk Sign visible to staff  - Offer Toileting every x Hours, in advance of need  - Initiate/Maintain xalarm  - Obtain necessary fall risk management equipment: x  - Apply yellow socks and bracelet for high fall risk patients  - Consider moving patient to room near nurses station  Outcome: Progressing     Problem: RESPIRATORY - ADULT  Goal: Achieves optimal ventilation and oxygenation  Description: INTERVENTIONS:  - Assess for changes in respiratory status  - Assess for changes in mentation and behavior  - Position to facilitate oxygenation and minimize respiratory effort  - Oxygen administered by appropriate delivery if ordered  - Initiate smoking cessation education as indicated  - Encourage broncho-pulmonary hygiene including cough, deep breathe, Incentive Spirometry  - Assess the need for suctioning and aspirate as needed  - Assess and instruct to report SOB or any respiratory difficulty  - Respiratory Therapy support as indicated  Outcome: Progressing     Problem: GASTROINTESTINAL - ADULT  Goal: Minimal or absence of nausea and/or vomiting  Description: INTERVENTIONS:  - Administer IV fluids if ordered to ensure adequate hydration  - Maintain NPO status until nausea and vomiting are resolved  - Nasogastric tube if ordered  - Administer ordered antiemetic medications as needed  - Provide nonpharmacologic comfort measures as appropriate  - Advance diet as tolerated, if ordered  - Consider nutrition services referral to assist patient with adequate nutrition and appropriate food choices  Outcome: Progressing  Goal: Maintains or returns to baseline bowel function  Description: INTERVENTIONS:  - Assess bowel function  - Encourage oral fluids to ensure adequate hydration  - Administer IV fluids if ordered to ensure adequate hydration  - Administer ordered medications as needed  - Encourage mobilization and activity  - Consider nutritional services referral to assist patient with adequate nutrition and appropriate food choices  Outcome: Progressing  Goal: Maintains adequate nutritional intake  Description: INTERVENTIONS:  - Monitor percentage of each meal consumed  - Identify factors contributing to decreased intake, treat as appropriate  - Assist with meals as needed  - Monitor I&O, weight, and lab values if indicated  - Obtain nutrition services referral as needed  Outcome: Progressing     Problem: METABOLIC, FLUID AND ELECTROLYTES - ADULT  Goal: Electrolytes maintained within normal limits  Description: INTERVENTIONS:  - Monitor labs and assess patient for signs and symptoms of electrolyte imbalances  - Administer electrolyte replacement as ordered  - Monitor response to electrolyte replacements, including repeat lab results as appropriate  - Instruct patient on fluid and nutrition as appropriate  Outcome: Progressing  Goal: Fluid balance maintained  Description: INTERVENTIONS:  - Monitor labs   - Monitor I/O and WT  - Instruct patient on fluid and nutrition as appropriate  - Assess for signs & symptoms of volume excess or deficit  Outcome: Progressing     Problem: SKIN/TISSUE INTEGRITY - ADULT  Goal: Skin Integrity remains intact(Skin Breakdown Prevention)  Description: Assess:  -Perform [place] assessment every x  -Clean and moisturize skin every x  -Inspect skin when repositioning, toileting, and assisting with ADLS  -Assess under medical devices such as x every x  -Assess extremities for adequate circulation and sensation     Bed Management:  -Have minimal linens on bed & keep smooth, unwrinkled  -Change linens as needed when moist or perspiring  -Avoid sitting or lying in one position for more than x hours while in bed  -Keep HOB at [facility]     Toileting:  -Offer bedside commode  -Assess for incontinence every x  -Use incontinent care products after each incontinent episode such as x    Activity:  -Mobilize patient x times a day  -Encourage activity and walks on unit  -Encourage or provide ROM exercises   -Turn and reposition patient every x Hours  -Use appropriate equipment to lift or move patient in bed  -Instruct/ Assist with weight shifting every x when out of bed in chair  -Consider limitation of chair time x hour intervals    Skin Care:  -Avoid use of baby powder, tape, friction and shearing, hot water or constrictive clothing  -Relieve pressure over bony prominences using x  -Do not massage red bony areas    Next Steps:  -Teach patient strategies to minimize risks such as x   -Consider consults to  interdisciplinary teams such as x  Outcome: Progressing     Problem: HEMATOLOGIC - ADULT  Goal: Maintains hematologic stability  Description: INTERVENTIONS  - Assess for signs and symptoms of bleeding or hemorrhage  - Monitor labs  - Administer supportive blood products/factors as ordered and appropriate  Outcome: Progressing     Problem: MUSCULOSKELETAL - ADULT  Goal: Maintain or return mobility to safest level of function  Description: INTERVENTIONS:  - Assess patient's ability to carry out ADLs; assess patient's baseline for ADL function and identify physical deficits which impact ability to perform ADLs (bathing, care of mouth/teeth, toileting, grooming, dressing, etc )  - Assess/evaluate cause of self-care deficits   - Assess range of motion  - Assess patient's mobility  - Assess patient's need for assistive devices and provide as appropriate  - Encourage maximum independence but intervene and supervise when necessary  - Involve family in performance of ADLs  - Assess for home care needs following discharge   - Consider OT consult to assist with ADL evaluation and planning for discharge  - Provide patient education as appropriate  Outcome: Progressing

## 2023-05-18 NOTE — PROGRESS NOTES
Progress note - [address] [facility]   Taya Dc 79 y o  female MRN: [id number]  Unit/Bed#: -01 Encounter: [id number]    ASSESSMENT and PLAN      Patient is a 79-year-old female past medical history of antiphospholipid syndrome, A-fib on Coumadin, hypertension, COPD, severe protein calorie malnutrition who comes with nausea vomiting with possible coffee-ground emesis  Found to be in sepsis likely in the setting of gastroenteritis and pneumonia      1  Upper GI bleeding  Patient presenting with likely acute viral GI syndrome causing nausea vomiting diarrhea  Also noted with sick contacts  Patient with dark coffee grounds from emesis  Last episode was around 6 AM   Likely in the setting of acute viral gastroenteritis causing gastritis or Mayra-Sargent tear      Hemodynamically stable  Hemoglobin 14 on admission  Likely hemoconcentrated  Hemoglobin down trended to 9 2 this morning  All cell lines dropped  Likely dilutional with IV fluid therapy  It seems like her baseline is around 9 7-10 4     Trend H&H  Protonix 40 mg IV twice daily  Advance diet today  N p o  past midnight  Hold warfarin at this time  Transfuse if hemoglobin is less than 7  Currently the patient wants to be more conservative at this time after discussion of risks and benefits of not proceeding with EGD  Currently without symptoms of nausea vomiting or GI bleeding over the past 24 hours  Hemoglobin around her baseline  If hemoglobin trends down further we will consider EGD tomorrow  If stable can restart warfarin tomorrow and advance diet           Chief Complaint   Patient presents with   • GI Bleeding     Patient presents to the ED via EMS from [facility] with c/o vomiting last night, staff states hem + in vomit, had no changes to stool until arriving to department, loose stool on arrival         SUBJECTIVE  Patient did well overnight  No more episodes of nausea vomiting    No bowel movements with melena or GI bleeding  Patient did have downtrending of hemoglobin  Temp:  [97 5 °F (36 4 °C)-98 8 °F (37 1 °C)] 98 8 °F (37 1 °C)  HR:  [75-92] 85  Resp:  [16-24] 16  BP: ()/(51-65) 127/53     PHYSICAL EXAM  General Appearance: NAD, cooperative, alert  Eyes: Anicteric  GI:  Soft, non-tender, non-distended; normal bowel sounds; no masses, no organomegaly   Rectal: Deferred  Musculoskeletal: No edema  Skin:  No jaundice    LABS & STUDIES  Lab Results   Component Value Date    GLUCOSE 89 05/04/2023    CALCIUM 8 2 (L) 05/18/2023     03/17/2015    K 4 1 05/18/2023    CO2 31 05/18/2023     05/18/2023    BUN 29 (H) 05/18/2023    CREATININE 0 56 (L) 05/18/2023    CREATININE 0 74 05/18/2023    CREATININE 1 18 05/17/2023     Lab Results   Component Value Date    WBC 15 62 (H) 05/18/2023    WBC 22 02 (H) 05/17/2023    WBC 6 38 05/08/2023    HGB 9 2 (L) 05/18/2023    HGB 9 7 (L) 05/18/2023    HGB 11 1 (L) 05/17/2023     (H) 05/18/2023     05/18/2023     (H) 05/17/2023     05/08/2023     Lab Results   Component Value Date    ALT 12 05/18/2023    ALT 22 05/17/2023    ALT 13 05/04/2023    AST 15 05/18/2023    AST 28 05/17/2023    AST 18 05/04/2023    ALKPHOS 147 (H) 05/18/2023    ALKPHOS 267 (H) 05/17/2023    ALKPHOS 121 (H) 05/04/2023    TBILI 0 80 05/18/2023    TBILI 0 97 05/17/2023    TBILI 0 73 05/04/2023     No results found for: AMYLASE  Lab Results   Component Value Date    LIPASE 34 05/17/2023     Lab Results   Component Value Date    IRON 53 06/30/2022    TIBC 266 06/30/2022    FERRITIN 360 06/30/2022     Lab Results   Component Value Date    INR 2 38 (H) 05/18/2023    INR 1 70 (H) 05/17/2023    INR 2 18 (H) 05/08/2023       CT abdomen pelvis wo contrast    Result Date: 5/17/2023  Narrative: CT ABDOMEN AND PELVIS WITHOUT IV CONTRAST INDICATION:   vomiting, epigastric abd pain  COMPARISON: CT 3/9/2023   TECHNIQUE:  CT examination of the abdomen and pelvis was performed without intravenous contrast  Multiplanar 2D reformatted images were created from the source data  This examination, like all CT scans performed in the [facility], was performed utilizing techniques to minimize radiation dose exposure, including the use of iterative reconstruction and automated exposure control  Radiation dose length product (DLP) for this visit:  415 mGy-cm Enteric contrast was not administered  FINDINGS: Absence of intravenous contrast enhancement limits evaluation of the abdominal viscera  ABDOMEN LOWER CHEST: Partially imaged right lower lobe bronchial wall thickening and scattered patchy densities  Right lower lobe subsegmental atelectasis also noted  No effusions  LIVER/BILIARY TREE:  Unremarkable  GALLBLADDER: There are gallstone(s) within the gallbladder, without pericholecystic inflammatory changes  SPLEEN:  Unremarkable  PANCREAS:  Unremarkable  ADRENAL GLANDS:  Unremarkable  KIDNEYS/URETERS:  Unremarkable  No hydronephrosis  STOMACH AND BOWEL: Diffusely fluid-filled loops of small bowel and colon with no evidence of obstruction or acute inflammatory changes  Sigmoid diverticulosis without diverticulitis  APPENDIX:  No findings to suggest appendicitis  ABDOMINOPELVIC CAVITY:  No ascites  No pneumoperitoneum  No lymphadenopathy  VESSELS: Atherosclerotic changes are present  No evidence of aneurysm  PELVIS REPRODUCTIVE ORGANS: Calcified uterine fibroid(s) reidentified  URINARY BLADDER:  Unremarkable  ABDOMINAL WALL/INGUINAL REGIONS: Stable small right inguinal hernia containing nonobstructed bowel loop  OSSEOUS STRUCTURES:  No acute fracture or destructive osseous lesion  Chronic compression deformities of L2-L4  Spinal degenerative changes are noted  Impression: Diffusely fluid-filled small bowel and colon suggestive of nonspecific gastroenteritis  Partially imaged right lower lobe infiltrates and bronchitis   Workstation performed: [id number]     XR chest 1 view portable    Result Date: 5/17/2023  Narrative: CHEST INDICATION:   sob  COMPARISON: CXR 5/4/2023, abdomen CT 5/17/2023, chest CT 2/27/2023  EXAM PERFORMED/VIEWS:  XR CHEST PORTABLE  FINDINGS: Cardiomediastinal silhouette normal  Moderate consolidation throughout the right lung  No definite effusion  No pneumothorax  Upper abdomen normal  Bones normal for age  Impression: Moderate consolidation throughout the right lung compatible with pneumonia  Workstation performed: [id number]     XR chest pa & lateral    Result Date: 5/5/2023  Narrative: CHEST INDICATION:   Shortness of breath  COMPARISON: Chest radiograph March 6, 2023 EXAM PERFORMED/VIEWS:  XR CHEST PA & LATERAL FINDINGS: Cardiomediastinal silhouette appears unremarkable  Linear atelectasis in the right lower lung  No focal consolidation, pleural effusion or pneumothorax  Osseous structures appear within normal limits for patient age  Impression: No focal consolidation, pleural effusion, or pneumothorax  Workstation performed: [id number]     XR spine lumbar minimum 4 views non injury    Result Date: 5/8/2023  Narrative: LUMBAR SPINE INDICATION:   lumbar spine pain  COMPARISON: Comparison from March 9, 2023 CT, VIEWS:  XR SPINE LUMBAR MINIMUM 4 VIEWS NON INJURY Images: 5 FINDINGS: There are 5 non rib bearing lumbar vertebral bodies  Biconcavity of the L4 vertebra seen, unchanged there is superior endplate depression of the L3 vertebra, unchanged Wedge compression deformity of the L2 vertebra with about 50% loss of vertebral height, unchanged Mild wedge compression deformity of the T11 vertebra, unchanged Mild S-shaped scoliosis  Degenerative disc disease seen at L4-5, L3-4 The pedicles appear intact  Mineralized vasculature seen Mild wedge compression deformity of the T12 vertebral Fibroid uterus    Impression: Comparison fracture of the L4 vertebra, L3 vertebra, L2 vertebra, T11 vertebral, unchanged from the previous CT from March 9, 2023   If concern for acute fracture persists consider MRI Workstation performed: [id number]     XR hip/pelv 2-3 vws right    Result Date: 5/5/2023  Narrative: RIGHT HIP INDICATION:   Right hip pain  COMPARISON: Radiographs of the right hip October 25, 2013  Correlation CT abdomen pelvis March 9, 2023 VIEWS:  XR HIP/PELV 2-3 VWS RIGHT W PELVIS IF PERFORMED FINDINGS: There is no acute fracture or dislocation  No significant hip degenerative changes  No lytic or blastic osseous lesion  Round pelvic calcification(s) which may represent calcified fibroids  Degenerative changes visualized lower lumbar spine  Impression: No acute osseous abnormality   Workstation performed: [id number]

## 2023-05-18 NOTE — ASSESSMENT & PLAN NOTE
· On Cardizem, digoxin, spironolactone, losartan   · Blood pressure improved today, resume Cardizem    Continue digoxin

## 2023-05-18 NOTE — PLAN OF CARE
Problem: OCCUPATIONAL THERAPY ADULT  Goal: Performs self-care activities at highest level of function for planned discharge setting  See evaluation for individualized goals  Description: Treatment Interventions: ADL retraining, Functional transfer training, Patient/family training, Compensatory technique education, Activityengagement, Endurance training          See flowsheet documentation for full assessment, interventions and recommendations  Note: Limitation: Decreased ADL status, Decreased self-care trans, Decreased high-level ADLs, Decreased endurance  Prognosis: Fair  Assessment: Pt is a 79 y o  female seen for OT evaluation at [facility], admitted 5/17/2023 w/ GI bleeding  Comorbidities affecting pt's functional performance at time of assessment include: antiphospholipid syndrome, A-fib, hypertension, hypothyroidism, COPD, failure to thrive, severe protein calorie malnutrition   Personal factors affecting pt at time of IE include:limited home support, difficulty performing ADLS, difficulty performing IADLS  and decreased functional mobility  Prior to admission, pt was undergoing STR  Prior to that was living home alone and and was I with ADLs  Required assist with IADLs  Upon evaluation: Pt requires sup for bed mobility, min A x 2 for functional mobility/transfers, sup for UB ADLs and mod A for LB ADLS 2* the following deficits impacting occupational performance: weakness, decreased strength, decreased balance and decreased tolerance  Full objective findings from OT assessment regarding body systems outlined above  These impairments, as well as pt's decreased caregiver support and risk for falls  limit pt's ability to safely engage in all baseline areas of occupation and mobility  Pt to benefit from continued skilled OT tx while in the hospital to address deficits as defined above and maximize level of functional independence w ADL's and functional mobility   Occupational Performance areas to address include: bathing/shower, toilet hygiene, dressing and functional mobility  This evaluation required an extensive review of medical and/or therapy records and additional review of physical, cognitive and psychosocial history related to functional performance  Based upon functional performance deficits and assessments, this evaluation has been identified as a [facility] complexity evaluation  The patient's raw score on the AM-PAC Daily Activity inpatient short form is 18, standardized score is 38 66, less than 39 4  Patients at this level are likely to benefit from DC to post-acute rehabilitation services  However please refer to therapist recommendation for discharge planning given other factors that may influence destination  At this time, OT recommendations at time of discharge are level 2 resources

## 2023-05-18 NOTE — OCCUPATIONAL THERAPY NOTE
Occupational Therapy Evaluation     Patient Name: Antonio Al  [id number] Date: 5/18/2023  Problem List  Principal Problem:    GI bleeding  Active Problems:    Pneumonia of right lower lobe due to infectious organism    Chronic bronchitis ([facility] [address] )    Sepsis with acute renal failure without septic shock (HCC)    Atrial fibrillation (HCC)    Other specified hypothyroidism    Essential hypertension    Severe protein-calorie malnutrition ([facility] [address] )    Gastroenteritis    Hyponatremia    Past Medical History  Past Medical History:   Diagnosis Date    Anti-phospholipid syndrome (HCC)     Asthma     Blood type O+     Cardiac disease     Chronic pain     COPD (chronic obstructive pulmonary disease) ([facility])     Fracture of ankle     left    Hyperlipidemia     Hypertension     Hypokalemia 2/27/2023    Osteoporosis      Past Surgical History  Past Surgical History:   Procedure Laterality Date    APPENDECTOMY      BILATERAL OOPHORECTOMY Bilateral     ORIF TIBIAL SHAFT FRACTURE W/ PLATES AND SCREWS Right          05/18/23 1250   OT Last Visit   OT Visit Date 05/18/23   Note Type   Note type Evaluation   Pain Assessment   Pain Assessment Tool 0-10   Pain Score 4   Pain Location/Orientation Orientation: Bilateral;Location: Leg   Patient's Stated Pain Goal No pain   Restrictions/Precautions   Weight Bearing Precautions Per Order No   Other Precautions Fall Risk;Pain; Chair Alarm; Bed Alarm   Home Living   Type of Home SNF  [facility])   Additional Comments Pt presents from [facility]  Prior to rehab, pt lived in house alone and was I with ADLs  Relied on a RW/cane  Prior Function   Level of [place] Needs assistance with ADLs; Needs assistance with IADLS   Comments Prior to rehab was I with ADLs  Relied on family for assist with IADLs   Subjective   Subjective Pt received in supine position   Pt agreeable to session   ADL   Eating Assistance 7  Independent   Grooming Assistance 7  Independent   UB Bathing Assistance 5  Supervision/Setup LB Bathing Assistance 3  Moderate Assistance   UB Dressing Assistance 5  Supervision/Setup   LB Dressing Assistance 3  Moderate Assistance   Toileting Assistance  3  Moderate Assistance   Bed Mobility   Supine to Sit 5  Supervision   Additional items Verbal cues   Additional Comments Pt remained seated in recliner by end of session  Transfers   Sit to Stand 4  Minimal assistance   Additional items Assist x 2;Verbal cues   Stand to Sit 4  Minimal assistance   Additional items Assist x 2;Verbal cues   Functional Mobility   Functional Mobility 4  Minimal assistance   Additional Comments x 2, Rw   Balance   Static Sitting Good   Dynamic Sitting Fair +   Static Standing Fair +   Dynamic Standing Fair +   Activity Tolerance   Activity Tolerance Patient limited by pain   Medical Staff Made Aware PT Gisel   Nurse Made Aware RN Emanuel   RUE Assessment   RUE Assessment WFL   LUE Assessment   LUE Assessment WFL   Cognition   Overall Cognitive Status WFL   Arousal/Participation Alert   Attention Attends with cues to redirect   Orientation Level Oriented X4   Memory Within functional limits   Following Commands Follows all commands and directions without difficulty   Assessment   Limitation Decreased ADL status; Decreased self-care trans;Decreased high-level ADLs; Decreased endurance   Prognosis Fair   Assessment Pt is a 79 y o  female seen for OT evaluation at [facility], admitted 5/17/2023 w/ GI bleeding  Comorbidities affecting pt's functional performance at time of assessment include: antiphospholipid syndrome, A-fib, hypertension, hypothyroidism, COPD, failure to thrive, severe protein calorie malnutrition   Personal factors affecting pt at time of IE include:limited home support, difficulty performing ADLS, difficulty performing IADLS  and decreased functional mobility  Prior to admission, pt was undergoing STR  Prior to that was living home alone and and was I with ADLs  Required assist with IADLs   Upon evaluation: Pt requires sup for bed mobility, min A x 2 for functional mobility/transfers, sup for UB ADLs and mod A for LB ADLS 2* the following deficits impacting occupational performance: weakness, decreased strength, decreased balance and decreased tolerance  Full objective findings from OT assessment regarding body systems outlined above  These impairments, as well as pt's decreased caregiver support and risk for falls  limit pt's ability to safely engage in all baseline areas of occupation and mobility  Pt to benefit from continued skilled OT tx while in the hospital to address deficits as defined above and maximize level of functional independence w ADL's and functional mobility  Occupational Performance areas to address include: bathing/shower, toilet hygiene, dressing and functional mobility  This evaluation required an extensive review of medical and/or therapy records and additional review of physical, cognitive and psychosocial history related to functional performance  Based upon functional performance deficits and assessments, this evaluation has been identified as a [facility] complexity evaluation  The patient's raw score on the AM-PAC Daily Activity inpatient short form is 18, standardized score is 38 66, less than 39 4  Patients at this level are likely to benefit from DC to post-acute rehabilitation services  However please refer to therapist recommendation for discharge planning given other factors that may influence destination  At this time, OT recommendations at time of discharge are level 2 resources  Goals   Patient Goals Pt wishes to get better   Plan   Treatment Interventions ADL retraining;Functional transfer training;Patient/family training; Compensatory technique education; Activityengagement; Endurance training   Goal Expiration Date 05/28/23   OT Treatment Day 0   OT Frequency 3-5x/wk   Recommendation   UB Rehab Discharge Recommendation (PT/OT) Level 2   AM-PAC Daily Activity Inpatient Lower Body Dressing 2   Bathing 2   Toileting 2   Upper Body Dressing 4   Grooming 4   Eating 4   Daily Activity Raw Score 18   Daily Activity Standardized Score (Calc for Raw Score >=11) 38 66   AM-PAC Applied Cognition Inpatient   Following a Speech/Presentation 3   Understanding Ordinary Conversation 4   Taking Medications 4   Remembering Where Things Are Placed or Put Away 4   Remembering List of 4-5 Errands 4   Taking Care of Complicated Tasks 3   Applied Cognition Raw Score 22   Applied Cognition Standardized Score 47 83   End of Consult   Education Provided Yes   Patient Position at End of Consult Seated edge of bed;Bed/Chair alarm activated; All needs within reach   Nurse Communication Nurse aware of consult           Pt will achieve the following goals within 10 days  *Pt will complete UB bathing and dressing with mod I     *Pt will complete LB bathing and dressing with mod I      * Pt will complete toileting w/ mod I w/ G hygiene/thoroughness using DME PRN    *Pt will complete bed mobility with mod I, with bed flat and no side rail to prep for purposeful tasks    *Pt will perform functional transfers with on/off all surfaces with mod I using DME as needed w/ G balance/safety  *Pt will increase standing tolerance x 5  minutes for inc'd tolerance with standing purposeful tasks  *Patient will verbalize 3 safety awareness/ principles to prevent falls in the home setting  *Pt will improve functional mobility during ADL/IADL/leisure tasks to mod I using DME as needed w/ G balance/safety       Diony Barriga, OTR/L

## 2023-05-18 NOTE — ASSESSMENT & PLAN NOTE
· Patient presenting from [place] with nausea, vomiting with heme positive emesis and loose stool  · Suspect gastroenteritis contributing, vomiting  · In the ED CT showing gastroenteritis  · Hemoglobin 14 0 on presentation however suspect this was hemoconcentrated  Baseline hemoglobin 9-10  · Hold warfarin  · Continue IVF  · Patient clinically improving, okay to advance diet  NPO after midnight for possible EGD tomorrow however patient is hopeful to avoid EGD if possible    Gastroenterology following  · Continue Protonix  · Trend H/H

## 2023-05-18 NOTE — ASSESSMENT & PLAN NOTE
· Right lower lobe pneumonia found on CT  · MRSA culture pending  · Sputum culture pending  · Urine antigens negative  · Blood cultures pending x2  · Continue supportive care with Mucinex, Tessalon Perles  · Aspiration precautions -did have episodes of vomiting at nursing facility, question if possible aspiration event  · Leukocytosis downtrending    Procalcitonin elevated, continue IV Rocephin

## 2023-05-18 NOTE — ASSESSMENT & PLAN NOTE
· Presents with episodes of nausea/vomiting, diarrhea now appears resolved  · Gastroenterology consulted -appreciate recommendations  · Advance diet as tolerated

## 2023-05-18 NOTE — ASSESSMENT & PLAN NOTE
· In setting of gastroenteritis, pneumonia  · Baseline creatinine around 0 6-0 9; creatinine was 1 37 on presentation  · Continue IV Rocephin    Afebrile, leukocytosis downtrending  · Trend WBC and fever curve  · Blood cultures pending x2  · Urine antigens negative

## 2023-05-18 NOTE — PHYSICAL THERAPY NOTE
PHYSICAL THERAPY EVAL/TX  Physical Therapy Evaluation    Performed at least 2 patient identifiers during session:  Patient Active Problem List   Diagnosis    Pneumonia of right lower lobe due to infectious organism    Chronic bronchitis ([facility] [address] )    Sepsis with acute renal failure without septic shock (HCC)    Hypertension    Hypercoagulable state ([facility] [address] )    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HCC)    Antiphospholipid antibody syndrome (HCC)    Atrial fibrillation (HCC)    Hereditary hemolytic anemia (HCC)    Asthma    Vitamin D deficiency    Osteoporosis    Low back pain without sciatica    Peripheral edema    Other specified hypothyroidism    Essential hypertension    Anxiety    Pulmonary nodule    Onychomycosis    Failure to thrive in adult    Severe protein-calorie malnutrition (HCC)    Unspecified mood (affective) disorder (HCC)    MDD with persistant bereavement complex    Abnormal CT of the chest    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HCC)    Thyroid nodule    Urinary retention    Abdominal distention/back pain    Cerebral aneurysm, nonruptured    GI bleeding    Gastroenteritis    Hyponatremia       Past Medical History:   Diagnosis Date    Anti-phospholipid syndrome (HCC)     Asthma     Blood type O+     Cardiac disease     Chronic pain     COPD (chronic obstructive pulmonary disease) (HCC)     Fracture of ankle     left    Hyperlipidemia     Hypertension     Hypokalemia 2/27/2023    Osteoporosis        Past Surgical History:   Procedure Laterality Date    APPENDECTOMY      BILATERAL OOPHORECTOMY Bilateral     ORIF TIBIAL SHAFT FRACTURE W/ PLATES AND SCREWS Right             05/18/23 1258   PT Last Visit   PT Visit Date 05/18/23   Note Type   Note type Evaluation   Pain Assessment   Pain Assessment Tool 0-10   Pain Score 4   Pain Location/Orientation Orientation: Bilateral;Location: Leg;Location: Back   Hospital Pain Intervention(s) Repositioned "  Restrictions/Precautions   Weight Bearing Precautions Per Order No   Other Precautions Pain; Fall Risk;O2;Multiple lines; Bed Alarm; Chair Alarm   Home Living   Type of Home House  (bi-level)   Home Layout   (Patient unable to clarify if she has steps to enter the home  There are steps inside the home )   [address]   Prior Function   Lives With Alone  (Currently at [facility] for [employer])   Vocational Retired   Comments Patient reports that she has been ambulatory with a RW at rehab  General   Family/Caregiver Present No   Cognition   Overall Cognitive Status WFL   Arousal/Participation Alert   Orientation Level Oriented X4   Memory Within functional limits   Following Commands Follows all commands and directions without difficulty   Comments Needed encouragement to participate   Subjective   Subjective \"I didn't get OOB because of the pain  \"   RLE Assessment   RLE Assessment   (Not formally tested but patient with decreased foot clearance)   LLE Assessment   LLE Assessment WFL   Bed Mobility   Supine to Sit 5  Supervision   Additional items HOB elevated; Bedrails; Increased time required;Verbal cues   Transfers   Sit to Stand 4  Minimal assistance   Additional items Armrests;Assist x 2;Verbal cues   Stand to Sit 4  Minimal assistance   Additional items Armrests; Increased time required;Verbal cues; Assist x 1   Additional Comments Refer to treatment session for addtitional mobility   Balance   Static Sitting Good   Dynamic Sitting Fair +   Static Standing Fair   Dynamic Standing Fair   Ambulatory Fair   Endurance Deficit   Endurance Deficit Yes   Endurance Deficit Description Limited by pain   Activity Tolerance   Activity Tolerance Patient limited by pain   Medical Staff Made Aware OT, [facility]   Nurse Made Aware Emanuel COTA   Assessment   Prognosis Good   Problem List Decreased strength;Decreased endurance; Impaired balance;Decreased mobility;Pain   Assessment Patient is an 78y/o F with a GI bleed, sepsis, pneumonia, " hyponatremia, gastroenteritis  Patient normally resides alone in a bilevel home but has been at [facility] for rehab  She reports that she has been ambulating with assistance with a RW  Current medical status includes pain, bed/chair alarm, fall risk, decreased motivation, kyphosis, O2, SOB, fall risk, decreased strength, balance, endurance and mobility  Patient required encouragement to participate  She needed assist of 1-2 for all mobility  She ambulate a short distance in the room  Patient very fatigued and with mild SOB after ambulation  Patient is deconditioned and is not at her functional baseline  Recommending level 2  The patient's AM-[facility] Basic Mobility Inpatient Short Form Raw Score is 16  A Raw score of less than or equal to 17 suggests the patient may benefit from discharge to post-acute rehabilitation services  Please also refer to the recommendation of the Physical Therapist for safe discharge planning  Barriers to Discharge Inaccessible home environment;Decreased caregiver support   Goals   Patient Goals To have less pain   STG Expiration Date 06/01/23   Short Term Goal #1 1  perform supine<>sit with HOB flat without the use of bedrails ind 2  Perform sit<>stand transfers mod I 3  Ambulate 150ft with a RW mod I level 4  Ascend/descend 6 steps with railing nonreciprocal pattern mod I   PT Treatment Day 1   Plan   Treatment/Interventions Functional transfer training;LE strengthening/ROM; Elevations; Therapeutic exercise; Endurance training;Patient/family training;Equipment eval/education; Bed mobility;Gait training;Spoke to nursing;OT   PT Frequency 3-5x/wk   Recommendation   UB Rehab Discharge Recommendation (PT/OT) Level 2   AM-PAC Basic Mobility Inpatient   Turning in Flat Bed Without Bedrails 3   Lying on Back to Sitting on Edge of Flat Bed Without Bedrails 3   Moving Bed to Chair 3   Standing Up From Chair Using Arms 2   Walk in Room 3   Climb 3-5 Stairs With Railing 2   Basic Mobility Inpatient Raw Score 16   Basic Mobility Standardized Score 38 32   Highest Level Of Mobility   -HLM Goal 5: Stand one or more mins   -HLM Achieved 7: Walk 25 feet or more   Additional Treatment Session   Start Time 1248   End Time 1258   Treatment Assessment Ambulated 25ft with RW with min A x 2  Flexed,kyphotic posture  Decreased step clearance on thr R, Slow paced gait  Equipment Use RW   End of Consult   Patient Position at End of Consult Bedside chair;Bed/Chair alarm activated; All needs within reach     Jacque Martinez, PT             Patient Name: Padmini Ashley  BGSFV'N Date: 5/18/2023

## 2023-05-18 NOTE — CASE MANAGEMENT
Case Management Assessment & Discharge Planning Note    Patient name Axel Monique  Location [address] [id number] MRN [id number]  : 1940 Date 2023       Current Admission Date: 2023  Current Admission Diagnosis:GI bleeding   Patient Active Problem List    Diagnosis Date Noted   • GI bleeding 2023   • Gastroenteritis 2023   • Hyponatremia 2023   • Cerebral aneurysm, nonruptured 2023   • Abdominal distention/back pain 2023   • Urinary retention 2023   • MDD with persistant bereavement complex 2023   • Abnormal CT of the chest 2023   • 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[address] ) 2023   • Thyroid nodule 2023   • Severe protein-calorie malnutrition ([facility] ) 2023   • Unspecified mood (affective) disorder ([facility] ) 2023   • Failure to thrive in adult 2023   • Pulmonary nodule 02/15/2023   • Onychomycosis 02/15/2023   • Essential hypertension 02/10/2023   • Anxiety 02/10/2023   • Other specified hypothyroidism 2022   • Peripheral edema 2021   • Low back pain without sciatica 2021   • Pneumonia of right lower lobe due to infectious organism 2017   • Chronic bronchitis ([facility] ) 2017   • Sepsis with acute renal failure without septic shock ([facility] ) 2017   • Hypertension 2017   • Hypercoagulable state ([address] ) 2017   • 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   • Vitamin D deficiency 2016   • Osteoporosis 2015   • Antiphospholipid antibody syndrome ([facility] ) 2012   • Atrial fibrillation ([address] ) 2012   • Hereditary hemolytic anemia ([facility] ) 2012   • Asthma 2012      LOS (days): 1  Geometric Mean LOS (GMLOS) (days):   Days to GMLOS:     OBJECTIVE:  PATIENT READMITTED TO HOSPITAL  Risk of Unplanned Readmission Score: 34 69     Current admission status: Inpatient    Preferred Pharmacy:   [address] [place] [address] 702, [address] [facility] [address] [address]  Phone: [phone] Fax: [address], [place] [address]  Phone: [phone] Fax: [phone]    Primary Care Provider: Mort Ormond, MD    Primary Insurance: MEDICARE  Secondary Insurance: BLUE CROSS    ASSESSMENT:  Active Health Care Proxies    There are no active Health Care Proxies on file  Advance Directives  Does patient have a [address]?: Yes  Does patient have Advance Directives?: Yes  Advance Directives: Living will, Power of  for health care  Primary Contact: Lawyer Watson: son: [phone]    Readmission Root Cause  30 Day Readmission: Yes  Who directed you to return to the hospital?: Other (comment) [facility])  Did you understand whom to contact if you had questions or problems?: Yes  Did you get your prescriptions before you left the hospital?: No  Reason[de-identified] Preference for own pharmacy (currently using pharmacy at [facility])  Were you able to get your prescriptions filled when you left the hospital?: Yes  Did you take your medications as prescribed?: Yes  Were you able to get to your follow-up appointments?: Yes  During previous admission, was a post-acute recommendation made?: Yes  What post-acute resources were offered?: STR  Patient was readmitted due to: nausea, vomiting  Action Plan: GI consulted, possible EGD    Patient Information  Admitted from[de-identified] Facility [facility] for [employer])  Mental Status: Alert  During Assessment patient was accompanied by: Not accompanied during assessment  Assessment information provided by[de-identified] Patient  Primary Caregiver: Other (Comment) (Currently at [facility] for [employer])  Support Systems: Self, Children, Brooke 46 of Residence: [address] do you live in?: [address] [facility] entry access options   Select all that apply : No steps to enter home  Type of Current Residence: Facility  Upon entering residence, is there a bedroom on the main floor (no further steps)?: Yes  Upon entering residence, is there a bathroom on the main floor (no further steps)?: Yes  In the last 12 months, was there a time when you were not able to pay the mortgage or rent on time?: No  In the last 12 months, how many places have you lived?: 1  In the last 12 months, was there a time when you did not have a steady place to sleep or slept in a shelter (including now)?: No  Homeless/housing insecurity resource given?: N/A  Living Arrangements: Other (Comment) (Lives alone but currently at [facility] for [facility])    Activities of Daily Living Prior to Admission  Functional Status: Assistance  Completes ADLs independently?: No  Level of ADL dependence: Assistance  Ambulates independently?: No  Level of ambulatory dependence: Assistance  Does patient use assisted devices?: Yes  Assisted Devices (DME) used: Raj Coello  Does patient currently own DME?: Yes  What DME does the patient currently own?: Michael Omalley  Does patient have a history of Outpatient Therapy (PT/OT)?: No  Does the patient have a history of Short-Term Rehab?: Yes ([place])  Does patient have a history of HHC?: Yes  Does patient currently have [facility]?: No    Patient Information Continued  Income Source: Pension/FCI  Does patient have prescription coverage?: Yes  Within the past 12 months, you worried that your food would run out before you got the money to buy more : Never true  Within the past 12 months, the food you bought just didn't last and you didn't have money to get more : Never true  Food insecurity resource given?: N/A  Does patient receive dialysis treatments?: No  Does patient have a history of substance abuse?: No  Does patient have a history of Mental Health Diagnosis?: No      Means of Transportation  Means of Transport to Appts[de-identified] Family transport  In the past 12 months, has lack of transportation kept you from medical appointments or from getting medications?: No  In the past 12 months, has lack of transportation kept you from meetings, work, or from getting things needed for daily living?: No  Was application for public transport provided?: N/A    DISCHARGE DETAILS:    Discharge planning discussed with[de-identified] patient  Freedom of Choice: Yes  Comments - Freedom of Choice: Plans to return to [address] [place] [address] at [facility]  Referral sent Rome Joya via [facility] contacted family/caregiver?: No- see comments (Pt presents AA&Ox3  Declined CM to call her son at this time)    Additional Comments: Met with Pt  Pt presents AA&Ox3  Discussed role of case management  Pt has been at [facility] for [address] since 5/8  Pt uses walker and is min-mod assist for ambulation and transfers  Pt reports prior to STR, she lives alone in bi-level home, [address]  Has walker and cane at home  Pt reports she had been at [facility] for SNF in past and hx of VNA  Pt reports her son(Solomon) is POA and she has living will  Pt reports her plan is to return to [facility] upon discharge  Referral sent to [facility] via [facility] to follow

## 2023-05-18 NOTE — PROGRESS NOTES
[place]  Progress Note  Name: Sharda Niño  MRN: [id number]  Unit/Bed#: -01 I Date of Admission: 5/17/2023   Date of Service: 5/18/2023 I Hospital Day: 1    Assessment/Plan   * GI bleeding  Assessment & Plan  · Patient presenting from [facility] with nausea, vomiting with heme positive emesis and loose stool  · Suspect gastroenteritis contributing, vomiting  · In the ED CT showing gastroenteritis  · Hemoglobin 14 0 on presentation however suspect this was hemoconcentrated  Baseline hemoglobin 9-10  · Hold warfarin  · Continue IVF  · Patient clinically improving, okay to advance diet  NPO after midnight for possible EGD tomorrow however patient is hopeful to avoid EGD if possible  Gastroenterology following  · Continue Protonix  · Trend H/H    Sepsis with acute renal failure without septic shock ([facility] [address] )  Assessment & Plan  · In setting of gastroenteritis, pneumonia  · Baseline creatinine around 0 6-0 9; creatinine was 1 37 on presentation  · Continue IV Rocephin  Afebrile, leukocytosis downtrending  · Trend WBC and fever curve  · Blood cultures pending x2  · Urine antigens negative    Pneumonia of right lower lobe due to infectious organism  Assessment & Plan  · Right lower lobe pneumonia found on CT  · MRSA culture pending  · Sputum culture pending  · Urine antigens negative  · Blood cultures pending x2  · Continue supportive care with Mucinex, Tessalon Perles  · Aspiration precautions -did have episodes of vomiting at nursing facility, question if possible aspiration event  · Leukocytosis downtrending    Procalcitonin elevated, continue IV Rocephin    Hyponatremia  Assessment & Plan  · Most likely hypovolemic hyponatremia  · Continue IV fluids -improving to 133  · Continue to trend    Gastroenteritis  Assessment & Plan  · Presents with episodes of nausea/vomiting, diarrhea now appears resolved  · Gastroenterology consulted -appreciate recommendations  · Advance diet as tolerated    Severe protein-calorie malnutrition ([facility] [address] )  Assessment & Plan  Malnutrition Findings:    BMI Findings: There is no height or weight on file to calculate BMI  Consult nutrition    Essential hypertension  Assessment & Plan  · On Cardizem, digoxin, spironolactone, losartan   · Blood pressure improved today, resume Cardizem  Continue digoxin    Other specified hypothyroidism  Assessment & Plan  · Continue Synthroid    Atrial fibrillation (HCC)  Assessment & Plan  · Home regimen includes Cardizem and digoxin  · Continue with hold parameters  · AC with warfarin-> hold warfarin d/t GIB  · Continue to trend INR    Chronic bronchitis (HCC)  Assessment & Plan  · No in exacerbation  · Continue home inhalers         VTE Pharmacologic Prophylaxis: VTE Score: 6 High Risk (Score >/= 5) - Pharmacological DVT Prophylaxis Contraindicated  Sequential Compression Devices Ordered  Patient Centered Rounds: I performed bedside rounds with nursing staff today  Discussions with Specialists or Other Care Team Provider: jorge l BOYLE GI input    Education and Discussions with Family / Patient: Updated  (son) via phone  Total Time Spent on Date of Encounter in care of patient: 45 minutes This time was spent on one or more of the following: performing physical exam; counseling and coordination of care; obtaining or reviewing history; documenting in the medical record; reviewing/ordering tests, medications or procedures; communicating with other healthcare professionals and discussing with patient's family/caregivers  Current Length of Stay: 1 day(s)  Current Patient Status: Inpatient   Certification Statement: The patient will continue to require additional inpatient hospital stay due to Monitoring hemoglobin, follow-up culture results, diet advancement, ongoing gastroenterology recommendations  Discharge Plan: Anticipate discharge in 24-48 hrs to rehab facility      Code Status: Level 1 - Full Code    Subjective:   Patient feels much better  No further episodes of nausea/vomiting or diarrhea  Denies chest pain/palpitations, shortness of breath, abdominal pain  Objective:     Vitals:   Temp (24hrs), Av 2 °F (36 8 °C), Min:97 5 °F (36 4 °C), Max:98 8 °F (37 1 °C)    Temp:  [97 5 °F (36 4 °C)-98 8 °F (37 1 °C)] 98 8 °F (37 1 °C)  HR:  [75-87] 84  Resp:  [16-24] 16  BP: (118-128)/(51-57) 128/52  SpO2:  [90 %-96 %] 96 %  There is no height or weight on file to calculate BMI  Input and Output Summary (last 24 hours): Intake/Output Summary (Last 24 hours) at 2023 1244  Last data filed at 2023 1147  Gross per 24 hour   Intake 360 ml   Output 900 ml   Net -540 ml       Physical Exam:   Physical Exam  Vitals and nursing note reviewed  Constitutional:       Appearance: Normal appearance  Interventions: Nasal cannula in place  Comments: No acute distress   HENT:      Head: Normocephalic  Eyes:      General: No scleral icterus  Extraocular Movements: Extraocular movements intact  Conjunctiva/sclera: Conjunctivae normal    Cardiovascular:      Rate and Rhythm: Normal rate and regular rhythm  Pulmonary:      Effort: Pulmonary effort is normal       Breath sounds: Examination of the right-middle field reveals rhonchi  Examination of the right-lower field reveals rhonchi  Rhonchi present  No wheezing or rales  Abdominal:      General: Bowel sounds are normal       Palpations: Abdomen is soft  Tenderness: There is no abdominal tenderness  There is no guarding or rebound  Musculoskeletal:         General: No swelling, tenderness or deformity  Cervical back: Normal range of motion  Comments: Able to move upper/lower extremities bilaterally, no edema   Skin:     General: Skin is warm and dry  Neurological:      Mental Status: She is alert and oriented to person, place, and time     Psychiatric:         Mood and Affect: Mood normal          Speech: Speech normal          Behavior: Behavior normal           Additional Data:     Labs:  Results from last 7 days   Lab Units 05/18/23  0748 05/17/23  1420 05/17/23  0741   WBC Thousand/uL 15 62*  --  22 02*   HEMOGLOBIN g/dL 9 2*   < > 14 0   HEMATOCRIT % 29 3*   < > 44 5   PLATELETS Thousands/uL 296  --  459*   NEUTROS PCT %  --   --  91*   LYMPHS PCT %  --   --  3*   LYMPHO PCT % 4*  --   --    MONOS PCT %  --   --  5   MONO PCT % 3*  --   --    EOS PCT % 0  --  0    < > = values in this interval not displayed  Results from last 7 days   Lab Units 05/18/23  0746   SODIUM mmol/L 133*   POTASSIUM mmol/L 4 1   CHLORIDE mmol/L 100   CO2 mmol/L 31   BUN mg/dL 29*   CREATININE mg/dL 0 56*   ANION GAP mmol/L 2*   CALCIUM mg/dL 8 2*   ALBUMIN g/dL 2 8*   TOTAL BILIRUBIN mg/dL 0 80   ALK PHOS U/L 147*   ALT U/L 12   AST U/L 15   GLUCOSE RANDOM mg/dL 86     Results from last 7 days   Lab Units 05/18/23  0540   INR  2 38*             Results from last 7 days   Lab Units 05/18/23  0540 05/17/23  1648 05/17/23  1036 05/17/23  0741   LACTIC ACID mmol/L  --  1 3 2 8* 3 9*   PROCALCITONIN ng/ml 2 66*  --   --  0 61*       Lines/Drains:  Invasive Devices     Peripheral Intravenous Line  Duration           Peripheral IV 05/17/23 Right;Ventral (anterior) Forearm 1 day    Peripheral IV 05/17/23 Distal;Right;Upper;Ventral (anterior) Arm <1 day          Drain  Duration           External Urinary Catheter <1 day                      Imaging: Reviewed radiology reports from this admission including: chest xray and abdominal/pelvic CT    Recent Cultures (last 7 days):   Results from last 7 days   Lab Units 05/17/23  1653 05/17/23  1234   BLOOD CULTURE   --  Received in Microbiology Lab  Culture in Progress  Received in Microbiology Lab  Culture in Progress     LEGIONELLA URINARY ANTIGEN  Negative  --        Last 24 Hours Medication List:   Current Facility-Administered Medications   Medication Dose Route Frequency Provider Last Rate • acetaminophen  650 mg Oral Q6H PRN Zelda Canela MD     • cefTRIAXone  1,000 mg Intravenous Q24H Leonor Mayer PA-C 1,000 mg (05/18/23 1138)   • digoxin  125 mcg Oral Every Other Day Zelda Canela MD     • diltiazem  240 mg Oral Daily Amada Neal PA-C     • fluticasone-vilanterol  1 puff Inhalation Daily Zelda Canela MD     • levothyroxine  25 mcg Oral Daily Zelda Canela MD     • lidocaine  1 patch Topical Daily Zelda Canela MD     • metoprolol  5 mg Intravenous Q6H PRN Zelda Canela MD     • metroNIDAZOLE  500 mg Intravenous Q8H Zelda Canela  mg (05/18/23 0539)   • ondansetron  4 mg Intravenous Q4H PRN Zelda Canela MD     • pantoprazole  40 mg Intravenous Q12H [address], [place]     • sodium chloride  50 mL/hr Intravenous Continuous Amada Neal PA-C 50 mL/hr (05/18/23 1141)   • umeclidinium  1 puff Inhalation Daily Zelda Canela MD          Today, Patient Was Seen By: Amada Neal PA-C    **Please Note: This note may have been constructed using a voice recognition system  **

## 2023-05-18 NOTE — PLAN OF CARE
Problem: PHYSICAL THERAPY ADULT  Goal: Performs mobility at highest level of function for planned discharge setting  See evaluation for individualized goals  Description: Treatment/Interventions: Functional transfer training, LE strengthening/ROM, Elevations, Therapeutic exercise, Endurance training, Patient/family training, Equipment eval/education, Bed mobility, Gait training, Spoke to nursing, OT          See flowsheet documentation for full assessment, interventions and recommendations  Note: Prognosis: Good  Problem List: Decreased strength, Decreased endurance, Impaired balance, Decreased mobility, Pain  Assessment: Patient is an 78y/o F with a GI bleed, sepsis, pneumonia, hyponatremia, gastroenteritis  Patient normally resides alone in a bilevel home but [place] [facility] [place]  She reports that she has been ambulating with assistance with a RW  Current medical status includes pain, bed/chair alarm, fall risk, decreased motivation, kyphosis, O2, SOB, fall risk, decreased strength, balance, endurance and mobility  Patient required encouragement to participate  She needed assist of 1-2 for all mobility  She ambulate a short distance in the room  Patient very fatigued and with mild SOB after ambulation  Patient is deconditioned and is not at her functional baseline  Recommending level 2  The patient's AM-PAC Basic Mobility Inpatient Short Form Raw Score is 16  A Raw score of less than or equal to 17 suggests the patient may benefit from discharge to post-acute rehabilitation services  Please also refer to the recommendation of the Physical Therapist for safe discharge planning  Barriers to Discharge: Inaccessible home environment, Decreased caregiver support          See flowsheet documentation for full assessment

## 2023-05-19 LAB
ANION GAP SERPL CALCULATED.3IONS-SCNC: 3 MMOL/L (ref 4–13)
BASOPHILS # BLD AUTO: 0.01 THOUSANDS/ÂΜL (ref 0–0.1)
BASOPHILS NFR BLD AUTO: 0 % (ref 0–1)
BUN SERPL-MCNC: 28 MG/DL (ref 5–25)
C DIFF TOX GENS STL QL NAA+PROBE: NEGATIVE
CALCIUM SERPL-MCNC: 8.3 MG/DL (ref 8.4–10.2)
CAMPYLOBACTER DNA SPEC NAA+PROBE: NORMAL
CHLORIDE SERPL-SCNC: 103 MMOL/L (ref 96–108)
CO2 SERPL-SCNC: 29 MMOL/L (ref 21–32)
CREAT SERPL-MCNC: 0.52 MG/DL (ref 0.6–1.3)
EOSINOPHIL # BLD AUTO: 0.01 THOUSAND/ÂΜL (ref 0–0.61)
EOSINOPHIL NFR BLD AUTO: 0 % (ref 0–6)
ERYTHROCYTE [DISTWIDTH] IN BLOOD BY AUTOMATED COUNT: 14.6 % (ref 11.6–15.1)
GFR SERPL CREATININE-BSD FRML MDRD: 88 ML/MIN/1.73SQ M
GLUCOSE SERPL-MCNC: 112 MG/DL (ref 65–140)
HCT VFR BLD AUTO: 26 % (ref 34.8–46.1)
HGB BLD-MCNC: 8.5 G/DL (ref 11.5–15.4)
IMM GRANULOCYTES # BLD AUTO: 0.07 THOUSAND/UL (ref 0–0.2)
IMM GRANULOCYTES NFR BLD AUTO: 1 % (ref 0–2)
INR PPP: 2.25 (ref 0.84–1.19)
LYMPHOCYTES # BLD AUTO: 0.52 THOUSANDS/ÂΜL (ref 0.6–4.47)
LYMPHOCYTES NFR BLD AUTO: 4 % (ref 14–44)
MCH RBC QN AUTO: 33.5 PG (ref 26.8–34.3)
MCHC RBC AUTO-ENTMCNC: 32.7 G/DL (ref 31.4–37.4)
MCV RBC AUTO: 102 FL (ref 82–98)
MONOCYTES # BLD AUTO: 0.71 THOUSAND/ÂΜL (ref 0.17–1.22)
MONOCYTES NFR BLD AUTO: 6 % (ref 4–12)
MRSA NOSE QL CULT: NORMAL
NEUTROPHILS # BLD AUTO: 11.2 THOUSANDS/ÂΜL (ref 1.85–7.62)
NEUTS SEG NFR BLD AUTO: 89 % (ref 43–75)
NRBC BLD AUTO-RTO: 0 /100 WBCS
PLATELET # BLD AUTO: 290 THOUSANDS/UL (ref 149–390)
PMV BLD AUTO: 8.7 FL (ref 8.9–12.7)
POTASSIUM SERPL-SCNC: 3.4 MMOL/L (ref 3.5–5.3)
PROTHROMBIN TIME: 26 SECONDS (ref 11.6–14.5)
RBC # BLD AUTO: 2.54 MILLION/UL (ref 3.81–5.12)
SALMONELLA DNA SPEC QL NAA+PROBE: NORMAL
SHIGA TOXIN STX GENE SPEC NAA+PROBE: NORMAL
SHIGELLA DNA SPEC QL NAA+PROBE: NORMAL
SODIUM SERPL-SCNC: 135 MMOL/L (ref 135–147)
WBC # BLD AUTO: 12.52 THOUSAND/UL (ref 4.31–10.16)

## 2023-05-19 RX ORDER — POTASSIUM CHLORIDE 20 MEQ/1
40 TABLET, EXTENDED RELEASE ORAL ONCE
Status: COMPLETED | OUTPATIENT
Start: 2023-05-19 | End: 2023-05-19

## 2023-05-19 RX ORDER — WARFARIN SODIUM 2 MG/1
2 TABLET ORAL
Status: DISCONTINUED | OUTPATIENT
Start: 2023-05-19 | End: 2023-05-25 | Stop reason: HOSPADM

## 2023-05-19 RX ORDER — LEVALBUTEROL INHALATION SOLUTION 1.25 MG/3ML
1.25 SOLUTION RESPIRATORY (INHALATION) EVERY 8 HOURS PRN
Status: DISCONTINUED | OUTPATIENT
Start: 2023-05-19 | End: 2023-05-20

## 2023-05-19 RX ADMIN — UMECLIDINIUM 1 PUFF: 62.5 AEROSOL, POWDER ORAL at 08:26

## 2023-05-19 RX ADMIN — WARFARIN SODIUM 2 MG: 2 TABLET ORAL at 17:22

## 2023-05-19 RX ADMIN — METRONIDAZOLE 500 MG: 500 INJECTION, SOLUTION INTRAVENOUS at 05:19

## 2023-05-19 RX ADMIN — PANTOPRAZOLE SODIUM 40 MG: 40 INJECTION, POWDER, FOR SOLUTION INTRAVENOUS at 21:46

## 2023-05-19 RX ADMIN — LEVOTHYROXINE SODIUM 25 MCG: 25 TABLET ORAL at 05:19

## 2023-05-19 RX ADMIN — FLUTICASONE FUROATE AND VILANTEROL TRIFENATATE 1 PUFF: 200; 25 POWDER RESPIRATORY (INHALATION) at 08:28

## 2023-05-19 RX ADMIN — LIDOCAINE 1 PATCH: 50 PATCH TOPICAL at 08:17

## 2023-05-19 RX ADMIN — POTASSIUM CHLORIDE 40 MEQ: 1500 TABLET, EXTENDED RELEASE ORAL at 08:30

## 2023-05-19 RX ADMIN — PANTOPRAZOLE SODIUM 40 MG: 40 INJECTION, POWDER, FOR SOLUTION INTRAVENOUS at 08:23

## 2023-05-19 RX ADMIN — CEFTRIAXONE 1000 MG: 1 INJECTION, SOLUTION INTRAVENOUS at 11:42

## 2023-05-19 RX ADMIN — DILTIAZEM HYDROCHLORIDE 240 MG: 240 CAPSULE, EXTENDED RELEASE ORAL at 08:30

## 2023-05-19 RX ADMIN — METRONIDAZOLE 500 MG: 500 INJECTION, SOLUTION INTRAVENOUS at 12:58

## 2023-05-19 NOTE — ED PROVIDER NOTES
History  Chief Complaint   Patient presents with   • GI Bleeding     Patient presents to the ED via EMS from [facility] with c/o vomiting last night, staff states hem + in vomit, had no changes to stool until arriving to department, loose stool on arrival       78-year-old female presents for vomiting  Reportedly patient started vomiting a few hours ago  The facility where she is living tested found to be blood  The patient on Coumadin for A-fib  Patient has not had any vomiting for the past few hours and feels slightly better  She does have diarrhea as well  She is nauseated  No chest pain no associated symptoms otherwise  She was just admitted earlier this month for COPD exacerbation was on steroids and weaned off oxygen  Currently she is 86% on room air requiring 2 L nasal cannula of oxygen she normally does not wear oxygen  Exam she is tender in the epigastrium no peritonitis she has normal vital signs otherwise      Assessment and plan: Vomiting likely Mayra-Sargent tear given she has probably gastroenteritis  But she is on Coumadin high risk for worsening bleeding  She was just on steroids which could cause gastritis as well  Hypoxia likely secondary to COPD she is mildly wheezing but not tachypneic  Continue oxygen  Prior to Admission Medications   Prescriptions Last Dose Informant Patient Reported? Taking?    Advair Diskus 250-50 MCG/DOSE inhaler   No No   Sig: USE 1 INHALATION TWICE A DAY   Ascorbic Acid (VITAMIN C) 1000 MG tablet   Yes No   Sig: Take 1,000 mg by mouth daily   Cholecalciferol (VITAMIN D3) 20 MCG (800 UNIT) TABS   Yes No   Sig: Take by mouth in the morning   Ferrous Sulfate 220 (44 Fe) MG/5ML LIQD   No No   Sig: take 1 teaspoonful by mouth once daily   acetaminophen (TYLENOL) 325 mg tablet   No No   Sig: Take 3 tablets (975 mg total) by mouth every 8 (eight) hours   albuterol (2 5 mg/3 mL) 0 083 % nebulizer solution   No No   Sig: USE 1 VIAL IN NEBULIZER EVERY 4 TO 6 HOURS AS NEEDED FOR COUGH AND WHEEZE   digoxin (LANOXIN) 0 125 mg tablet   No No   Sig: Take 1 tablet (125 mcg total) by mouth every other day Do not start before May 10, 2023  diltiazem (CARDIZEM CD) 240 mg 24 hr capsule   No No   Sig: Take 1 capsule (240 mg total) by mouth daily   levothyroxine 25 mcg tablet   No No   Sig: TAKE 1 TABLET DAILY   lidocaine (LIDODERM) 5 %   No No   Sig: Apply 1 patch topically over 12 hours daily Remove & Discard patch within 12 hours or as directed by MD   loratadine (CLARITIN) 10 mg tablet   Yes No   Sig: Take 10 mg by mouth daily   losartan (COZAAR) 25 mg tablet   No No   Sig: Take 1 tablet (25 mg total) by mouth daily Do not start before February 25, 2023  mirtazapine (REMERON) 7 5 MG tablet   No No   Sig: Take 1 tablet (7 5 mg total) by mouth daily at bedtime   multivitamin (THERAGRAN) TABS   Yes No   Sig: Take 1 tablet by mouth daily   senna-docusate sodium (SENOKOT S) 8 6-50 mg per tablet   No No   Sig: Take 1 tablet by mouth daily at bedtime   sertraline (Zoloft) 50 mg tablet   No No   Sig: Take 1 tablet (50 mg total) by mouth daily   spironolactone (ALDACTONE) 25 mg tablet   No No   Sig: Take 1 tablet (25 mg total) by mouth daily Do not start before March 14, 2023     tiZANidine (ZANAFLEX) 2 mg tablet   No No   Sig: Take 1 tablet (2 mg total) by mouth 2 (two) times a day as needed for muscle spasms   umeclidinium (Incruse Ellipta) 62 5 mcg/actuation AEPB inhaler   Yes Yes   Sig: Inhale 1 puff daily   warfarin (COUMADIN) 2 mg tablet   No No   Sig: Take 2 mg daily, adjust to keep INR therapeutic, between 2-3      Facility-Administered Medications: None       Past Medical History:   Diagnosis Date   • Anti-phospholipid syndrome (HCC)    • Asthma    • Blood type O+    • Cardiac disease    • Chronic pain    • COPD (chronic obstructive pulmonary disease) (HCC)    • Fracture of ankle     left   • Hyperlipidemia    • Hypertension    • Hypokalemia 2/27/2023   • Osteoporosis Past Surgical History:   Procedure Laterality Date   • APPENDECTOMY     • BILATERAL OOPHORECTOMY Bilateral    • ORIF TIBIAL SHAFT FRACTURE W/ PLATES AND SCREWS Right        Family History   Problem Relation Age of Onset   • Hypertension Mother    • Skin cancer Mother    • Hypertension Father      I have reviewed and agree with the history as documented  E-Cigarette/Vaping   • E-Cigarette Use Never User      E-Cigarette/Vaping Substances   • Nicotine No    • THC No      Social History     Tobacco Use   • Smoking status: Former     Types: Cigarettes   • Smokeless tobacco: Never   • Tobacco comments:     Never smoker per [employer]   Vaping Use   • Vaping Use: Never used   Substance Use Topics   • Alcohol use: Not Currently   • Drug use: No       Review of Systems   Constitutional: Negative for chills, fatigue and fever  Eyes: Negative for photophobia and visual disturbance  Respiratory: Negative for cough and shortness of breath  Cardiovascular: Negative for chest pain, palpitations and leg swelling  Gastrointestinal: Negative for diarrhea, nausea and vomiting  Endocrine: Negative for polydipsia and polyuria  Genitourinary: Negative for decreased urine volume, difficulty urinating, dysuria and frequency  Musculoskeletal: Negative for back pain, neck pain and neck stiffness  Skin: Negative for color change and rash  Allergic/Immunologic: Negative for environmental allergies and immunocompromised state  Neurological: Negative for dizziness and headaches  Hematological: Negative for adenopathy  Does not bruise/bleed easily  Psychiatric/Behavioral: Negative for dysphoric mood  The patient is not nervous/anxious  Physical Exam  Physical Exam  Vitals and nursing note reviewed  Constitutional:       General: She is not in acute distress  Appearance: She is well-developed  She is not diaphoretic  HENT:      Head: Normocephalic and atraumatic        Nose: Nose normal    Eyes: General: No scleral icterus  Conjunctiva/sclera: Conjunctivae normal       Pupils: Pupils are equal, round, and reactive to light  Neck:      Thyroid: No thyromegaly  Vascular: No JVD  Trachea: No tracheal deviation  Cardiovascular:      Rate and Rhythm: Normal rate and regular rhythm  Heart sounds: Normal heart sounds  No friction rub  No gallop  Pulmonary:      Effort: Pulmonary effort is normal  No respiratory distress  Breath sounds: Normal breath sounds  No wheezing or rales  Chest:      Chest wall: No tenderness  Abdominal:      General: Bowel sounds are normal  There is no distension  Palpations: Abdomen is soft  There is no mass  Tenderness: There is abdominal tenderness  There is no guarding or rebound  Hernia: No hernia is present  Musculoskeletal:         General: No tenderness or deformity  Normal range of motion  Cervical back: Normal range of motion and neck supple  Skin:     General: Skin is warm and dry  Findings: No erythema  Neurological:      Mental Status: She is alert and oriented to person, place, and time  Cranial Nerves: No cranial nerve deficit  Coordination: Coordination normal       Deep Tendon Reflexes: Reflexes are normal and symmetric     Psychiatric:         Behavior: Behavior normal          Vital Signs  ED Triage Vitals   Temperature Pulse Respirations Blood Pressure SpO2   05/17/23 0724 05/17/23 0731 05/17/23 0731 05/17/23 0731 05/17/23 0731   97 8 °F (36 6 °C) 95 18 114/51 (!) 88 %      Temp Source Heart Rate Source Patient Position - Orthostatic VS BP Location FiO2 (%)   05/17/23 0724 05/17/23 0731 05/17/23 0731 05/17/23 0731 --   Oral Monitor Sitting Left arm       Pain Score       05/17/23 0719       No Pain           Vitals:    05/18/23 1025 05/18/23 1419 05/18/23 2040 05/19/23 0834   BP: 128/52 128/51 (!) 110/44 139/50   Pulse: 84 83 78 73   Patient Position - Orthostatic VS: Lying Sitting Visual Acuity      ED Medications  Medications   ondansetron (ZOFRAN) injection 4 mg (has no administration in time range)   metroNIDAZOLE (FLAGYL) IVPB (premix) 500 mg 100 mL (500 mg Intravenous New Bag 5/19/23 0519)   acetaminophen (TYLENOL) tablet 650 mg (has no administration in time range)   fluticasone-vilanterol 200-25 mcg/actuation 1 puff (1 puff Inhalation Given 5/19/23 0828)   digoxin (LANOXIN) tablet 125 mcg (125 mcg Oral Given 5/18/23 0821)   levothyroxine tablet 25 mcg (25 mcg Oral Given 5/19/23 0519)   lidocaine (LIDODERM) 5 % patch 1 patch (1 patch Topical Medication Applied 5/19/23 0817)   metoprolol (LOPRESSOR) injection 5 mg (has no administration in time range)   umeclidinium 62 5 mcg/actuation inhaler AEPB 1 puff (1 puff Inhalation Given 5/19/23 0826)   sodium chloride 0 9 % infusion (50 mL/hr Intravenous New Bag 5/18/23 1141)   cefTRIAXone (ROCEPHIN) IVPB (premix in dextrose) 1,000 mg 50 mL (1,000 mg Intravenous New Bag 5/18/23 1138)   pantoprazole (PROTONIX) injection 40 mg (40 mg Intravenous Given 5/19/23 0823)   diltiazem (CARDIZEM CD) 24 hr capsule 240 mg (240 mg Oral Given 5/19/23 0830)   levalbuterol (XOPENEX) inhalation solution 1 25 mg (has no administration in time range)   ipratropium (ATROVENT) 0 02 % inhalation solution 0 5 mg (has no administration in time range)   sodium chloride 0 9 % bolus 1,000 mL (0 mL Intravenous Stopped 5/17/23 1006)   cefTRIAXone (ROCEPHIN) IVPB (premix in dextrose) 1,000 mg 50 mL (1,000 mg Intravenous New Bag 5/17/23 1242)   sodium chloride 0 9 % bolus 1,000 mL (1,000 mL Intravenous New Bag 5/17/23 1240)   albumin human (FLEXBUMIN) 5 % injection 12 5 g (12 5 g Intravenous New Bag 5/17/23 1413)   thiamine (VITAMIN B1) 100 mg in sodium chloride 0 9 % 50 mL IVPB (100 mg Intravenous New Bag 5/17/23 1506)   potassium chloride (K-DUR,KLOR-CON) CR tablet 40 mEq (40 mEq Oral Given 5/19/23 0830)       Diagnostic Studies  Results Reviewed     Procedure Component Value Units Date/Time    Protime-INR [[id number]]  (Abnormal) Collected: 05/19/23 0621    Lab Status: Final result Specimen: Blood from Arm, Left Updated: 05/19/23 0653     Protime 26 0 seconds      INR 2 25    Stool Enteric Bacterial Panel by PCR [[id number]] Collected: 05/19/23 5402    Lab Status: In process Specimen: Stool from Per Rectum Updated: 05/19/23 0634    Clostridium difficile toxin by PCR with EIA [[id number]] Collected: 05/18/23 1722    Lab Status: In process Specimen: Stool from Per Rectum Updated: 05/18/23 1740    Blood culture [[id number]] Collected: 05/17/23 1234    Lab Status: Preliminary result Specimen: Blood from Arm, Right Updated: 05/18/23 1701     Blood Culture No Growth at 24 hrs  Blood culture [[id number]] Collected: 05/17/23 1234    Lab Status: Preliminary result Specimen: Blood from Arm, Left Updated: 05/18/23 1701     Blood Culture No Growth at 24 hrs  Protime-INR [[id number]]  (Abnormal) Collected: 05/18/23 0540    Lab Status: Final result Specimen: Blood from Arm, Left Updated: 05/18/23 0715     Protime 27 2 seconds      INR 2 38    Hemoglobin and hematocrit, blood [[id number]]  (Abnormal) Collected: 05/18/23 0011    Lab Status: Final result Specimen: Blood from Arm, Left Updated: 05/18/23 0021     Hemoglobin 9 7 g/dL      Hematocrit 30 8 %     Strep Pneumoniae, Urine [[id number]]  (Normal) Collected: 05/17/23 1653    Lab Status: Final result Specimen: Urine, Clean Catch Updated: 05/17/23 2303     Strep pneumoniae antigen, urine Negative    Legionella antigen, urine [[id number]]  (Normal) Collected: 05/17/23 1653    Lab Status: Final result Specimen: Urine, Clean Catch Updated: 05/17/23 2303     Legionella Urinary Antigen Negative    T4, free [[id number]]  (Abnormal) Collected: 05/17/23 0741    Lab Status: Final result Specimen: Blood from Arm, Right Updated: 05/17/23 2245     Free T4 1 25 ng/dL     MRSA culture [[id number]] Collected: 05/17/23 1652    Lab Status:  In process Specimen: Nares from Nose Updated: 05/17/23 1658    Hemoglobin and hematocrit, blood [[id number]]  (Abnormal) Collected: 05/17/23 1420    Lab Status: Final result Specimen: Blood from Arm, Right Updated: 05/17/23 1425     Hemoglobin 11 1 g/dL      Hematocrit 34 9 %     COVID only [[id number]]  (Normal) Collected: 05/17/23 1300    Lab Status: Final result Specimen: Nares from Nose Updated: 05/17/23 1355     SARS-CoV-2 Negative    Narrative:      FOR PEDIATRIC PATIENTS - copy/paste COVID Guidelines URL to browser: [email or web address]    SARS-CoV-2 assay is a Nucleic Acid Amplification assay intended for the  qualitative detection of nucleic acid from SARS-CoV-2 in nasopharyngeal  swabs  Results are for the presumptive identification of SARS-CoV-2 RNA  Positive results are indicative of infection with SARS-CoV-2, the virus  causing COVID-19, but do not rule out bacterial infection or co-infection  with other viruses  Laboratories within the [place] and its  territories are required to report all positive results to the appropriate  public health authorities  Negative results do not preclude SARS-CoV-2  infection and should not be used as the sole basis for treatment or other  patient management decisions  Negative results must be combined with  clinical observations, patient history, and epidemiological information  This test has not been FDA cleared or approved  This test has been authorized by FDA under an Emergency Use Authorization  (EUA)  This test is only authorized for the duration of time the  declaration that circumstances exist justifying the authorization of the  emergency use of an in vitro diagnostic tests for detection of SARS-CoV-2  virus and/or diagnosis of COVID-19 infection under section 564(b)(1) of  the Act, 21 U  S C  351ELG-3(C)(3), unless the authorization is terminated  or revoked sooner   The test has been validated but independent review by FDA  and CLIA is pending  Test performed using [employer] GeneXpert: This RT-PCR assay targets N2,  a region unique to SARS-CoV-2  A conserved region in the E-gene was chosen  for pan-Sarbecovirus detection which includes SARS-CoV-2  According to CMS-2020-01-R, this platform meets the definition of high-throughput technology  TSH, 3rd generation with Free T4 reflex [[id number]]  (Abnormal) Collected: 05/17/23 0741    Lab Status: Final result Specimen: Blood from Arm, Right Updated: 05/17/23 1348     TSH 3RD GENERATON 4 841 uIU/mL     Procalcitonin [[id number]]  (Abnormal) Collected: 05/17/23 0741    Lab Status: Final result Specimen: Blood from Arm, Right Updated: 05/17/23 1313     Procalcitonin 0 61 ng/ml     Sputum culture and Gram stain [[id number]]     Lab Status: No result Specimen: Sputum     Lactic acid 2 Hours [[id number]]  (Abnormal) Collected: 05/17/23 1036    Lab Status: Final result Specimen: Blood from Arm, Right Updated: 05/17/23 1135     LACTIC ACID 2 8 mmol/L     Narrative:      Result may be elevated if tourniquet was used during collection  CBC and differential [[id number]]  (Abnormal) Collected: 05/17/23 0741    Lab Status: Final result Specimen: Blood from Arm, Right Updated: 05/17/23 0838     WBC 22 02 Thousand/uL      RBC 4 37 Million/uL      Hemoglobin 14 0 g/dL      Hematocrit 44 5 %       fL      MCH 32 0 pg      MCHC 31 5 g/dL      RDW 15 1 %      MPV 8 3 fL      Platelets 417 Thousands/uL      nRBC 0 /100 WBCs      Neutrophils Relative 91 %      Immat GRANS % 1 %      Lymphocytes Relative 3 %      Monocytes Relative 5 %      Eosinophils Relative 0 %      Basophils Relative 0 %      Neutrophils Absolute 20 23 Thousands/µL      Immature Grans Absolute 0 15 Thousand/uL      Lymphocytes Absolute 0 58 Thousands/µL      Monocytes Absolute 1 03 Thousand/µL      Eosinophils Absolute 0 00 Thousand/µL      Basophils Absolute 0 03 Thousands/µL     Narrative:       This is an appended report  These results have been appended to a previously verified report  Lactic acid, plasma (w/reflex if result > 2 0) [[id number]]  (Abnormal) Collected: 05/17/23 0741    Lab Status: Final result Specimen: Blood from Arm, Right Updated: 05/17/23 0837     LACTIC ACID 3 9 mmol/L     Narrative:      Result may be elevated if tourniquet was used during collection      Protime-INR [[id number]]  (Abnormal) Collected: 05/17/23 0741    Lab Status: Final result Specimen: Blood from Arm, Right Updated: 05/17/23 0824     Protime 21 0 seconds      INR 1 70    APTT [[id number]]  (Abnormal) Collected: 05/17/23 0741    Lab Status: Final result Specimen: Blood from Arm, Right Updated: 05/17/23 0824     PTT 40 seconds     Comprehensive metabolic panel [[id number]]  (Abnormal) Collected: 05/17/23 0741    Lab Status: Final result Specimen: Blood from Arm, Right Updated: 05/17/23 0814     Sodium 132 mmol/L      Potassium 5 5 mmol/L      Chloride 91 mmol/L      CO2 32 mmol/L      ANION GAP 9 mmol/L      BUN 48 mg/dL      Creatinine 1 37 mg/dL      Glucose 179 mg/dL      Calcium 9 6 mg/dL      AST 28 U/L      ALT 22 U/L      Alkaline Phosphatase 267 U/L      Total Protein 7 6 g/dL      Albumin 3 9 g/dL      Total Bilirubin 0 97 mg/dL      eGFR 35 ml/min/1 73sq m     Narrative:      [place] guidelines for Chronic Kidney Disease (CKD):   •  Stage 1 with normal or high GFR (GFR > 90 mL/min/1 73 square meters)  •  Stage 2 Mild CKD (GFR = 60-89 mL/min/1 73 square meters)  •  Stage 3A Moderate CKD (GFR = 45-59 mL/min/1 73 square meters)  •  Stage 3B Moderate CKD (GFR = 30-44 mL/min/1 73 square meters)  •  Stage 4 Severe CKD (GFR = 15-29 mL/min/1 73 square meters)  •  Stage 5 End Stage CKD (GFR <15 mL/min/1 73 square meters)  Note: GFR calculation is accurate only with a steady state creatinine    Lipase [[id number]]  (Normal) Collected: 05/17/23 0741    Lab Status: Final result Specimen: Blood from Arm, Right Updated: 05/17/23 0814     Lipase 34 u/L                  XR chest 1 view portable   Final Result by Katie Avelar MD (05/17 1236)      Moderate consolidation throughout the right lung compatible with pneumonia  Workstation performed: [id number]         CT abdomen pelvis wo contrast   Final Result by Myesha Loza MD (05/17 5741)      Diffusely fluid-filled small bowel and colon suggestive of nonspecific gastroenteritis  Partially imaged right lower lobe infiltrates and bronchitis  Workstation performed: [id number]                    Procedures  CriticalCare Time    Date/Time: 5/19/2023 9:53 AM  Performed by: Lillie Lancaster DO  Authorized by: Lillie Lancaster DO     Critical care provider statement:     Critical care time (minutes):  30    Critical care time was exclusive of:  Separately billable procedures and treating other patients and teaching time    Critical care was necessary to treat or prevent imminent or life-threatening deterioration of the following conditions:  Respiratory failure    Critical care was time spent personally by me on the following activities:  Blood draw for specimens, obtaining history from patient or surrogate, re-evaluation of patient's condition, review of old charts, evaluation of patient's response to treatment, examination of patient, ordering and review of laboratory studies, ordering and performing treatments and interventions, development of treatment plan with patient or surrogate and ordering and review of radiographic studies             ED Course  ED Course as of 05/19/23 0953   Wed May 17, 2023   0850 SpO2(!): 85 %  Putting on 2 LNC                               SBIRT 22yo+    Flowsheet Row Most Recent Value   Initial Alcohol Screen: US AUDIT-C     1  How often do you have a drink containing alcohol? 0 Filed at: 05/17/2023 0721   2   How many drinks containing alcohol do you have on a typical day you are drinking?  0 Filed at: 05/17/2023 0721   3a  Male UNDER 65: How often do you have five or more drinks on one occasion? 0 Filed at: 05/17/2023 0721   3b  FEMALE Any Age, or MALE 65+: How often do you have 4 or more drinks on one occassion? 0 Filed at: 05/17/2023 0721   Audit-C Score 0 Filed at: 05/17/2023 1497   [place]: How many times in the past year have you    Used an illegal drug or used a prescription medication for non-medical reasons? Never Filed at: 05/17/2023 6101                    Medical Decision Making  Patient found to have pneumonia on CT  Also concern for upper GI bleed but likely mild Sargent tear hemoglobin is normal lactic elevated likely secondary to malperfusion but also could be infection  We will send blood cultures treat with Rocephin admit    Amount and/or Complexity of Data Reviewed  Labs: ordered  Radiology: ordered  Risk  Prescription drug management  Decision regarding hospitalization            Disposition  Final diagnoses:   Upper GI bleed   Pneumonia   Acute respiratory failure with hypoxia ([facility] [address] )   Warfarin-induced coagulopathy ([facility] [address] )     Time reflects when diagnosis was documented in both MDM as applicable and the Disposition within this note     Time User Action Codes Description Comment    5/17/2023 12:28 PM Donnamarie Distad Add [K92 2] Upper GI bleed     5/17/2023 12:29 PM Ila Cowiche F Add [J18 9] Pneumonia     5/17/2023  1:14 PM Anell Sanjay Add [R62 7] Failure to thrive in adult     5/17/2023  1:14 PM Anell Sanjay Add [E43] Severe protein-calorie malnutrition ([facility] [address] )     5/19/2023  9:52 AM Ila Cowiche F Add [J96 01] Acute respiratory failure with hypoxia ([facility] [address] )     5/19/2023  9:52 AM Donnamarie Distad Add [M18 44,  T45 515A] Warfarin-induced coagulopathy [facility])       ED Disposition     ED Disposition   Admit    Condition   Stable    Date/Time   Wed May 17, 2023 12:28 PM    Comment   Case was discussed with Marvin Lamas and the patient's admission status was agreed to be Admission Status: inpatient status to the service of Dr Jose Gallo   Follow-up Information    None         Current Discharge Medication List      CONTINUE these medications which have NOT CHANGED    Details   umeclidinium (Incruse Ellipta) 62 5 mcg/actuation AEPB inhaler Inhale 1 puff daily      acetaminophen (TYLENOL) 325 mg tablet Take 3 tablets (975 mg total) by mouth every 8 (eight) hours  Refills: 0    Associated Diagnoses: Low back pain without sciatica, unspecified back pain laterality, unspecified chronicity      Advair Diskus 250-50 MCG/DOSE inhaler USE 1 INHALATION TWICE A DAY  Qty: 180 blister, Refills: 3    Associated Diagnoses: Chronic obstructive pulmonary disease, unspecified COPD type (formerly [facility])      albuterol (2 5 mg/3 mL) 0 083 % nebulizer solution USE 1 VIAL IN NEBULIZER EVERY 4 TO 6 HOURS AS NEEDED FOR COUGH AND WHEEZE  Qty: 75 vial, Refills: 3    Associated Diagnoses: Moderate asthma, unspecified whether complicated, unspecified whether persistent      Ascorbic Acid (VITAMIN C) 1000 MG tablet Take 1,000 mg by mouth daily      Cholecalciferol (VITAMIN D3) 20 MCG (800 UNIT) TABS Take by mouth in the morning      digoxin (LANOXIN) 0 125 mg tablet Take 1 tablet (125 mcg total) by mouth every other day Do not start before May 10, 2023  Refills: 0    Associated Diagnoses: Atrial fibrillation, unspecified type (formerly [facility])      diltiazem (CARDIZEM CD) 240 mg 24 hr capsule Take 1 capsule (240 mg total) by mouth daily  Qty: 90 capsule, Refills: 3    Associated Diagnoses: Atrial fibrillation, unspecified type ([address] );  Primary hypertension      Ferrous Sulfate 220 (44 Fe) MG/5ML LIQD take 1 teaspoonful by mouth once daily  Qty: 150 mL, Refills: 5    Associated Diagnoses: Anemia, unspecified type      levothyroxine 25 mcg tablet TAKE 1 TABLET DAILY  Qty: 90 tablet, Refills: 3    Associated Diagnoses: Hypothyroidism, unspecified type      lidocaine (LIDODERM) 5 % Apply 1 patch topically over 12 hours daily Remove & Discard patch within 12 hours or as directed by MD  Qty: 90 patch, Refills: 3    Associated Diagnoses: Osteoporosis, unspecified osteoporosis type, unspecified pathological fracture presence; Low back pain without sciatica, unspecified back pain laterality, unspecified chronicity      loratadine (CLARITIN) 10 mg tablet Take 10 mg by mouth daily      losartan (COZAAR) 25 mg tablet Take 1 tablet (25 mg total) by mouth daily Do not start before February 25, 2023  Qty: 30 tablet, Refills: 0    Associated Diagnoses: Primary hypertension      mirtazapine (REMERON) 7 5 MG tablet Take 1 tablet (7 5 mg total) by mouth daily at bedtime  Qty: 90 tablet, Refills: 3    Associated Diagnoses: Adult failure to thrive      multivitamin (THERAGRAN) TABS Take 1 tablet by mouth daily      senna-docusate sodium (SENOKOT S) 8 6-50 mg per tablet Take 1 tablet by mouth daily at bedtime  Refills: 0    Associated Diagnoses: Constipation, unspecified constipation type      sertraline (Zoloft) 50 mg tablet Take 1 tablet (50 mg total) by mouth daily  Qty: 90 tablet, Refills: 3    Associated Diagnoses: Anxiety      spironolactone (ALDACTONE) 25 mg tablet Take 1 tablet (25 mg total) by mouth daily Do not start before March 14, 2023  Refills: 0    Associated Diagnoses: Essential hypertension      tiZANidine (ZANAFLEX) 2 mg tablet Take 1 tablet (2 mg total) by mouth 2 (two) times a day as needed for muscle spasms  Qty: 30 tablet, Refills: 0    Associated Diagnoses: Low back pain without sciatica, unspecified back pain laterality, unspecified chronicity      warfarin (COUMADIN) 2 mg tablet Take 2 mg daily, adjust to keep INR therapeutic, between 2-3  Refills: 0    Associated Diagnoses: Chronic atrial fibrillation (HCC)         STOP taking these medications       tiotropium (Spiriva HandiHaler) 18 mcg inhalation capsule Comments:   Reason for Stopping:               No discharge procedures on file      PDMP Review       Value Time User    PDMP Reviewed  Yes 5/8/2023  1:11 PM Doe Correa MD          ED Provider  Electronically Signed by           Gale Brunner, DO  05/19/23 9252

## 2023-05-19 NOTE — ASSESSMENT & PLAN NOTE
· Most likely hypovolemic hyponatremia  · Had received IVF-improving to 135  · Diet advanced    Will discontinue IVF and monitor

## 2023-05-19 NOTE — MALNUTRITION/BMI
This medical record reflects one or more clinical indicators suggestive of malnutrition and/or morbid obesity  Malnutrition Findings:   Adult Malnutrition type: Chronic illness  Adult Degree of Malnutrition: Other severe protein calorie malnutrition  Malnutrition Characteristics: Fat loss, Muscle loss                  360 Statement: Pt presents with severe protein calorie malnutrition as evidenced by sunken orbitals, prominent clavicle bone and temporal scooping  Treat with regular diet  Can consider Magic Cup TID on diet advance, but pt currently refusing supplements  BMI Findings: Body mass index is 20 37 kg/m²  See Nutrition note dated 5/18/23 for additional details  Completed nutrition assessment is viewable in the nutrition documentation

## 2023-05-19 NOTE — PROGRESS NOTES
Progress Note - Jennyfer Lawson 79 y o  female MRN: [id number]    Unit/Bed#: -01 Encounter: [id number]    Assessment and Plan:    21-year-old female with antiphospholipid syndrome, A-fib on Coumadin, hypertension, COPD, severe protein calorie malnutrition presenting with nausea, vomiting, coffee-ground emesis found to be septic likely in the setting of gastroenteritis and pneumonia  1) Upper GI bleeding: Patient presented with symptoms consistent with viral gastroenteritis and was noted to have coffee-ground emesis  Differential diagnosis includes erosive gastritis, esophagitis, peptic ulcer disease, Mayra-Sargent tear related to vomiting  Hemoglobin 8 5 down from 9 2 yesterday  Vital signs are stable  She has had no further overt GI bleeding  She has brown stool on rectal exam     -Patient prefers to avoid EGD unless absolutely necessary  -This is very reasonable given her age and comorbidities  -Since she has no overt bleeding, we can advance diet to regular  -Monitor hemoglobin and transfuse if less than 7  -Continue PPI twice daily  -Coumadin on hold currently    2) N/V/D: Likely viral gastroenteritis  C  difficile toxin negative  Stool enteric panel is pending  Diarrhea appears to be improving  Continue supportive care  3) Sepsis: In the setting of gastroenteritis and pneumonia  Blood cultures negative  Continue IV antibiotics  Appreciate input from internal medicine  Subjective:     Patient seen and examined at bedside  She denies any abdominal pain  No further vomiting or coffee-ground emesis  She had a small BM this morning which was collected  No overt bleeding has been reported  Objective:     Vitals: Blood pressure 139/50, pulse 73, temperature 98 4 °F (36 9 °C), resp  rate 16, height 5' (1 524 m), SpO2 91 %, not currently breastfeeding  ,Body mass index is 20 37 kg/m²        Intake/Output Summary (Last 24 hours) at 5/19/2023 1103  Last data filed at 5/19/2023 0404  Gross per 24 hour Intake 360 ml   Output 200 ml   Net 160 ml       Physical Exam:     General Appearance: Alert, pleasant elderly female, no acute distress  HEENT: Nasal cannula  Lungs: Rhonchi bilaterally  Heart: Regular rate and rhythm  Abdomen: Soft, non-tender, non-distended; bowel sounds normal  Extremities: No cyanosis, edema  Rectal exam: Brown stool  Exam chaperoned by Irma Landeros RN  Invasive Devices     Peripheral Intravenous Line  Duration           Peripheral IV 05/17/23 Right;Ventral (anterior) Forearm 2 days    Peripheral IV 05/17/23 Distal;Right;Upper;Ventral (anterior) Arm 1 day          Drain  Duration           External Urinary Catheter 1 day                Lab Results:  Results from last 7 days   Lab Units 05/19/23  0512   WBC Thousand/uL 12 52*   HEMOGLOBIN g/dL 8 5*   HEMATOCRIT % 26 0*   PLATELETS Thousands/uL 290   NEUTROS PCT % 89*   LYMPHS PCT % 4*   MONOS PCT % 6   EOS PCT % 0     Results from last 7 days   Lab Units 05/19/23  0512 05/18/23  0746   POTASSIUM mmol/L 3 4* 4 1   CHLORIDE mmol/L 103 100   CO2 mmol/L 29 31   BUN mg/dL 28* 29*   CREATININE mg/dL 0 52* 0 56*   CALCIUM mg/dL 8 3* 8 2*   ALK PHOS U/L  --  147*   ALT U/L  --  12   AST U/L  --  15     Results from last 7 days   Lab Units 05/19/23  0621   INR  2 25*     Results from last 7 days   Lab Units 05/17/23  0741   LIPASE u/L 34       Imaging Studies: I have personally reviewed pertinent imaging studies  CT abdomen pelvis wo contrast    Result Date: 5/17/2023  Impression: Diffusely fluid-filled small bowel and colon suggestive of nonspecific gastroenteritis  Partially imaged right lower lobe infiltrates and bronchitis  Workstation performed: [id number]     XR chest 1 view portable    Result Date: 5/17/2023  Impression: Moderate consolidation throughout the right lung compatible with pneumonia   Workstation performed: [id number]

## 2023-05-19 NOTE — PROGRESS NOTES
[facility]  Progress Note  Name: Willie Tran  MRN: [id number]  Unit/Bed#: -01 I Date of Admission: 5/17/2023   Date of Service: 5/19/2023 I Hospital Day: 2    Assessment/Plan   * GI bleeding  Assessment & Plan  · [facility] with [facility], vomiting with heme positive emesis and loose stool  · Suspect gastroenteritis contributing, vomiting  · In the ED CT showing gastroenteritis  · Hemoglobin 14 0 on presentation however suspect this was hemoconcentrated  Baseline hemoglobin 9-10  · Hold warfarin  · Diet advanced - hold IVF  · Patient clinically improving, okay to advance diet  Will hold off on EGD for now  · Continue Protonix  · Trend H/H    Sepsis with acute renal failure without septic shock (HCC)  Assessment & Plan  · In setting of gastroenteritis, pneumonia  · Baseline creatinine around 0 6-0 9; creatinine was 1 37 on presentation  · Continue IV Rocephin  Afebrile, leukocytosis downtrending  · Trend WBC and fever curve  · Blood cultures negative x2 after 24 hours  · Urine antigens negative    Pneumonia of right lower lobe due to infectious organism  Assessment & Plan  · Right lower lobe pneumonia found on CT  · MRSA culture pending  · Sputum culture pending  · Urine antigens negative  · Blood cultures negative x2 after 24 hours  · Continue supportive care with Mucinex, Tessalon Perles  · Aspiration precautions -did have episodes of vomiting at nursing facility, question if possible aspiration event  · Leukocytosis downtrending  Procalcitonin elevated, continue IV Rocephin    Hyponatremia  Assessment & Plan  · Most likely hypovolemic hyponatremia  · Had received IVF-improving to 135  · Diet advanced    Will discontinue IVF and monitor    Gastroenteritis  Assessment & Plan  · Presents with episodes of nausea/vomiting, diarrhea now appears resolved  · Gastroenterology consulted -appreciate recommendations  · Advance diet as tolerated    Severe protein-calorie malnutrition ([facility] [address] )  Assessment & Plan  Malnutrition Findings:   Adult Malnutrition type: Chronic illnessBMI Findings: Body mass index is 20 37 kg/m²  Consult nutrition    Essential hypertension  Assessment & Plan  · On Cardizem, digoxin, spironolactone, losartan   · Blood pressure improved today, resume Cardizem  Continue digoxin    Other specified hypothyroidism  Assessment & Plan  · Continue Synthroid    Atrial fibrillation (HCC)  Assessment & Plan  · Home regimen includes Cardizem and digoxin  · Continue with hold parameters  · AC with warfarin-> hold warfarin d/t GIB; will discuss with GI regarding when to resume  · Continue to trend INR    Chronic bronchitis (HCC)  Assessment & Plan  · Not in exacerbation  · Continue home inhalers         VTE Pharmacologic Prophylaxis: VTE Score: 6 High Risk (Score >/= 5) - Pharmacological DVT Prophylaxis Contraindicated  Sequential Compression Devices Ordered  Patient Centered Rounds: I performed bedside rounds with nursing staff today  Discussions with Specialists or Other Care Team Provider: CM, GI    Education and Discussions with Family / Patient: Updated  (son) at bedside  Total Time Spent on Date of Encounter in care of patient: 45 minutes This time was spent on one or more of the following: performing physical exam; counseling and coordination of care; obtaining or reviewing history; documenting in the medical record; reviewing/ordering tests, medications or procedures; communicating with other healthcare professionals and discussing with patient's family/caregivers  Current Length of Stay: 2 day(s)  Current Patient Status: Inpatient   Certification Statement: The patient will continue to require additional inpatient hospital stay due to Monitoring diet advancement, weaning O2 requirement  Discharge Plan: Anticipate discharge tomorrow to rehab facility      Code Status: Level 1 - Full Code    Subjective:   Patient feels well, eager to trial oral diet  Hopeful for discharge soon  Denies chest pain/palpitations, shortness of breath, nausea/vomiting or abdominal pain  Objective:     Vitals:   Temp (24hrs), Av 2 °F (36 8 °C), Min:97 9 °F (36 6 °C), Max:98 4 °F (36 9 °C)    Temp:  [97 9 °F (36 6 °C)-98 4 °F (36 9 °C)] 98 4 °F (36 9 °C)  HR:  [73-83] 73  Resp:  [16] 16  BP: (110-139)/(44-51) 139/50  SpO2:  [91 %-94 %] 91 %  Body mass index is 20 37 kg/m²  Input and Output Summary (last 24 hours): Intake/Output Summary (Last 24 hours) at 2023 1253  Last data filed at 2023 0404  Gross per 24 hour   Intake --   Output 200 ml   Net -200 ml       Physical Exam:   Physical Exam  Vitals and nursing note reviewed  Constitutional:       Appearance: Normal appearance  Interventions: Nasal cannula in place  Comments: No acute distress   HENT:      Head: Normocephalic  Eyes:      General: No scleral icterus  Extraocular Movements: Extraocular movements intact  Conjunctiva/sclera: Conjunctivae normal    Cardiovascular:      Rate and Rhythm: Normal rate and regular rhythm  Pulmonary:      Effort: Pulmonary effort is normal       Breath sounds: Examination of the right-middle field reveals rhonchi  Examination of the right-lower field reveals rhonchi  Rhonchi present  No wheezing or rales  Abdominal:      General: Bowel sounds are normal       Palpations: Abdomen is soft  Tenderness: There is no abdominal tenderness  There is no guarding or rebound  Musculoskeletal:         General: No swelling, tenderness or deformity  Cervical back: Normal range of motion  Comments: Able to move upper/lower extremities bilaterally, no edema   Skin:     General: Skin is warm and dry  Neurological:      Mental Status: She is alert and oriented to person, place, and time     Psychiatric:         Mood and Affect: Mood normal          Speech: Speech normal          Behavior: Behavior normal  Additional Data:     Labs:  Results from last 7 days   Lab Units 05/19/23  0512   WBC Thousand/uL 12 52*   HEMOGLOBIN g/dL 8 5*   HEMATOCRIT % 26 0*   PLATELETS Thousands/uL 290   NEUTROS PCT % 89*   LYMPHS PCT % 4*   MONOS PCT % 6   EOS PCT % 0     Results from last 7 days   Lab Units 05/19/23  0512 05/18/23  0746   SODIUM mmol/L 135 133*   POTASSIUM mmol/L 3 4* 4 1   CHLORIDE mmol/L 103 100   CO2 mmol/L 29 31   BUN mg/dL 28* 29*   CREATININE mg/dL 0 52* 0 56*   ANION GAP mmol/L 3* 2*   CALCIUM mg/dL 8 3* 8 2*   ALBUMIN g/dL  --  2 8*   TOTAL BILIRUBIN mg/dL  --  0 80   ALK PHOS U/L  --  147*   ALT U/L  --  12   AST U/L  --  15   GLUCOSE RANDOM mg/dL 112 86     Results from last 7 days   Lab Units 05/19/23  0621   INR  2 25*             Results from last 7 days   Lab Units 05/18/23  0540 05/17/23  1648 05/17/23  1036 05/17/23  0741   LACTIC ACID mmol/L  --  1 3 2 8* 3 9*   PROCALCITONIN ng/ml 2 66*  --   --  0 61*       Lines/Drains:  Invasive Devices     Peripheral Intravenous Line  Duration           Peripheral IV 05/17/23 Right;Ventral (anterior) Forearm 2 days    Peripheral IV 05/17/23 Distal;Right;Upper;Ventral (anterior) Arm 1 day          Drain  Duration           External Urinary Catheter 1 day                      Imaging: No pertinent imaging reviewed  Recent Cultures (last 7 days):   Results from last 7 days   Lab Units 05/18/23  1722 05/17/23  1653 05/17/23  1234   BLOOD CULTURE   --   --  No Growth at 24 hrs  No Growth at 24 hrs     LEGIONELLA URINARY ANTIGEN   --  Negative  --    C DIFF TOXIN B BY PCR  Negative  --   --        Last 24 Hours Medication List:   Current Facility-Administered Medications   Medication Dose Route Frequency Provider Last Rate   • acetaminophen  650 mg Oral Q6H PRN Nader Robertson MD     • cefTRIAXone  1,000 mg Intravenous Q24H Ezekiel Sen PA-C 1,000 mg (05/19/23 1142)   • digoxin  125 mcg Oral Every Other Day Nader Robertson MD     • diltiazem  240 mg Oral Daily Doreen Hanna PA-C     • fluticasone-vilanterol  1 puff Inhalation Daily Luke Chan MD     • ipratropium  0 5 mg Nebulization Q8H PRN Doreen Hanna PA-C     • levalbuterol  1 25 mg Nebulization Q8H PRN Doreen Hanna PA-C     • levothyroxine  25 mcg Oral Daily Luke Chan MD     • lidocaine  1 patch Topical Daily Luke Chan MD     • metoprolol  5 mg Intravenous Q6H PRN Luke Chan MD     • metroNIDAZOLE  500 mg Intravenous Q8H Luke Chan  mg (05/19/23 0519)   • ondansetron  4 mg Intravenous Q4H PRN Luke Chan MD     • pantoprazole  40 mg Intravenous Q12H 200 Seema Alexis MD     • umeclidinium  1 puff Inhalation Daily Luke Chan MD          Today, Patient Was Seen By: Doreen Hanna PA-C    **Please Note: This note may have been constructed using a voice recognition system  **

## 2023-05-19 NOTE — CASE MANAGEMENT
Case Management Progress Note    Patient name Josep Burk  Location [address] [id number] MRN [id number]  : 1940 Date 2023       LOS (days): 2  Geometric Mean LOS (GMLOS) (days): 5 00  Days to GMLOS:2 9        OBJECTIVE:        Current admission status: Inpatient  Preferred Pharmacy:   [facility] [address]  [address] [place] [address]  Phone: [phone] Fax: [address] , [place] [address]  Phone: [phone] Fax: [phone]    Primary Care Provider: Dafne Stapleton MD    Primary Insurance: MEDICARE  Secondary Insurance: BLUE CROSS    PROGRESS NOTE:  Patient for tentative discharge tomorrow  Bed is available at [facility]  Called son Robert Faith to notify him of above  He is agreeable

## 2023-05-19 NOTE — ASSESSMENT & PLAN NOTE
· Right lower lobe pneumonia found on CT  · MRSA culture pending  · Sputum culture pending  · Urine antigens negative  · Blood cultures negative x2 after 24 hours  · Continue supportive care with Mucinex, Tessalon Perles  · Aspiration precautions -did have episodes of vomiting at nursing facility, question if possible aspiration event  · Leukocytosis downtrending    Procalcitonin elevated, continue IV Rocephin

## 2023-05-19 NOTE — ASSESSMENT & PLAN NOTE
Malnutrition Findings:   Adult Malnutrition type: Chronic illnessBMI Findings: Body mass index is 20 37 kg/m²         Consult nutrition

## 2023-05-19 NOTE — ASSESSMENT & PLAN NOTE
· In setting of gastroenteritis, pneumonia  · Baseline creatinine around 0 6-0 9; creatinine was 1 37 on presentation  · Continue IV Rocephin    Afebrile, leukocytosis downtrending  · Trend WBC and fever curve  · Blood cultures negative x2 after 24 hours  · Urine antigens negative

## 2023-05-19 NOTE — ASSESSMENT & PLAN NOTE
· Home regimen includes Cardizem and digoxin  · Continue with hold parameters  · AC with warfarin-> hold warfarin d/t GIB; will discuss with GI regarding when to resume  · Continue to trend INR

## 2023-05-19 NOTE — ASSESSMENT & PLAN NOTE
· [facility] presenting [facility] with [facility], vomiting with heme positive emesis and loose stool  · Suspect gastroenteritis contributing, vomiting  · In the ED CT showing gastroenteritis  · Hemoglobin 14 0 on presentation however suspect this was hemoconcentrated  Baseline hemoglobin 9-10  · Hold warfarin  · Diet advanced - hold IVF  · Patient clinically improving, okay to advance diet  Will hold off on EGD for now    · Continue Protonix  · Trend H/H

## 2023-05-20 ENCOUNTER — APPOINTMENT (INPATIENT)
Dept: CT IMAGING | Facility: HOSPITAL | Age: 83
End: 2023-05-20

## 2023-05-20 PROBLEM — J96.01 ACUTE RESPIRATORY FAILURE WITH HYPOXIA AND HYPERCAPNIA (HCC): Status: ACTIVE | Noted: 2023-05-20

## 2023-05-20 PROBLEM — J96.02 ACUTE RESPIRATORY FAILURE WITH HYPOXIA AND HYPERCAPNIA (HCC): Status: ACTIVE | Noted: 2023-05-20

## 2023-05-20 PROBLEM — E87.1 HYPONATREMIA: Status: RESOLVED | Noted: 2023-05-17 | Resolved: 2023-05-20

## 2023-05-20 LAB
ANION GAP SERPL CALCULATED.3IONS-SCNC: 3 MMOL/L (ref 4–13)
ARTERIAL PATENCY WRIST A: YES
BASE EXCESS BLDA CALC-SCNC: 2.8 MMOL/L
BASE EXCESS BLDA CALC-SCNC: 3 MMOL/L (ref -2–3)
BASOPHILS # BLD AUTO: 0.01 THOUSANDS/ÂΜL (ref 0–0.1)
BASOPHILS NFR BLD AUTO: 0 % (ref 0–1)
BUN SERPL-MCNC: 17 MG/DL (ref 5–25)
CA-I BLD-SCNC: 1.34 MMOL/L (ref 1.12–1.32)
CALCIUM SERPL-MCNC: 8.6 MG/DL (ref 8.4–10.2)
CHLORIDE SERPL-SCNC: 103 MMOL/L (ref 96–108)
CO2 SERPL-SCNC: 30 MMOL/L (ref 21–32)
CREAT SERPL-MCNC: 0.37 MG/DL (ref 0.6–1.3)
EOSINOPHIL # BLD AUTO: 0.09 THOUSAND/ÂΜL (ref 0–0.61)
EOSINOPHIL NFR BLD AUTO: 1 % (ref 0–6)
ERYTHROCYTE [DISTWIDTH] IN BLOOD BY AUTOMATED COUNT: 14.7 % (ref 11.6–15.1)
FIO2 GAS DIL.REBREATH: 32 L
GFR SERPL CREATININE-BSD FRML MDRD: 98 ML/MIN/1.73SQ M
GLUCOSE SERPL-MCNC: 210 MG/DL (ref 65–140)
GLUCOSE SERPL-MCNC: 89 MG/DL (ref 65–140)
HCO3 BLDA-SCNC: 29 MMOL/L (ref 22–28)
HCO3 BLDA-SCNC: 30.9 MMOL/L (ref 22–28)
HCT VFR BLD AUTO: 31.7 % (ref 34.8–46.1)
HCT VFR BLD CALC: 32 % (ref 34.8–46.1)
HGB BLD-MCNC: 10 G/DL (ref 11.5–15.4)
HGB BLDA-MCNC: 10.9 G/DL (ref 11.5–15.4)
IMM GRANULOCYTES # BLD AUTO: 0.08 THOUSAND/UL (ref 0–0.2)
IMM GRANULOCYTES NFR BLD AUTO: 1 % (ref 0–2)
INR PPP: 1.76 (ref 0.84–1.19)
LACTATE SERPL-SCNC: 0.6 MMOL/L (ref 0.5–2)
LYMPHOCYTES # BLD AUTO: 0.8 THOUSANDS/ÂΜL (ref 0.6–4.47)
LYMPHOCYTES NFR BLD AUTO: 6 % (ref 14–44)
MCH RBC QN AUTO: 32.9 PG (ref 26.8–34.3)
MCHC RBC AUTO-ENTMCNC: 31.5 G/DL (ref 31.4–37.4)
MCV RBC AUTO: 104 FL (ref 82–98)
MONOCYTES # BLD AUTO: 0.56 THOUSAND/ÂΜL (ref 0.17–1.22)
MONOCYTES NFR BLD AUTO: 4 % (ref 4–12)
NEUTROPHILS # BLD AUTO: 11.32 THOUSANDS/ÂΜL (ref 1.85–7.62)
NEUTS SEG NFR BLD AUTO: 88 % (ref 43–75)
NRBC BLD AUTO-RTO: 0 /100 WBCS
O2 CT BLDA-SCNC: 16.7 ML/DL (ref 16–23)
OTHER FIO2: 0.4 %
OXYHGB MFR BLDA: 95.9 % (ref 94–97)
PCO2 BLD: 33 MMOL/L (ref 21–32)
PCO2 BLD: 61.3 MM HG (ref 36–44)
PCO2 BLDA: 52.1 MM HG (ref 36–44)
PH BLD: 7.31 [PH] (ref 7.35–7.45)
PH BLDA: 7.36 [PH] (ref 7.35–7.45)
PLATELET # BLD AUTO: 320 THOUSANDS/UL (ref 149–390)
PMV BLD AUTO: 8.6 FL (ref 8.9–12.7)
PO2 BLD: 74 MM HG (ref 75–129)
PO2 BLDA: 94.6 MM HG (ref 75–129)
POTASSIUM BLD-SCNC: 3.8 MMOL/L (ref 3.5–5.3)
POTASSIUM SERPL-SCNC: 3.8 MMOL/L (ref 3.5–5.3)
PROCALCITONIN SERPL-MCNC: 0.68 NG/ML
PROTHROMBIN TIME: 21.5 SECONDS (ref 11.6–14.5)
RBC # BLD AUTO: 3.04 MILLION/UL (ref 3.81–5.12)
SAO2 % BLD FROM PO2: 93 % (ref 60–85)
SARS-COV-2 RNA RESP QL NAA+PROBE: NEGATIVE
SODIUM BLD-SCNC: 136 MMOL/L (ref 136–145)
SODIUM SERPL-SCNC: 136 MMOL/L (ref 135–147)
SPECIMEN SOURCE: ABNORMAL
SPECIMEN SOURCE: ABNORMAL
WBC # BLD AUTO: 12.86 THOUSAND/UL (ref 4.31–10.16)

## 2023-05-20 RX ORDER — FUROSEMIDE 10 MG/ML
40 INJECTION INTRAMUSCULAR; INTRAVENOUS ONCE
Status: COMPLETED | OUTPATIENT
Start: 2023-05-20 | End: 2023-05-20

## 2023-05-20 RX ORDER — MAGNESIUM SULFATE HEPTAHYDRATE 40 MG/ML
2 INJECTION, SOLUTION INTRAVENOUS ONCE
Status: COMPLETED | OUTPATIENT
Start: 2023-05-20 | End: 2023-05-20

## 2023-05-20 RX ORDER — METHYLPREDNISOLONE SODIUM SUCCINATE 40 MG/ML
40 INJECTION, POWDER, LYOPHILIZED, FOR SOLUTION INTRAMUSCULAR; INTRAVENOUS EVERY 8 HOURS
Status: DISCONTINUED | OUTPATIENT
Start: 2023-05-20 | End: 2023-05-22

## 2023-05-20 RX ORDER — FORMOTEROL FUMARATE 20 UG/2ML
20 SOLUTION RESPIRATORY (INHALATION)
Status: DISCONTINUED | OUTPATIENT
Start: 2023-05-20 | End: 2023-05-25 | Stop reason: HOSPADM

## 2023-05-20 RX ORDER — BUDESONIDE 0.5 MG/2ML
0.5 INHALANT ORAL
Status: DISCONTINUED | OUTPATIENT
Start: 2023-05-20 | End: 2023-05-25 | Stop reason: HOSPADM

## 2023-05-20 RX ORDER — LEVALBUTEROL INHALATION SOLUTION 1.25 MG/3ML
1.25 SOLUTION RESPIRATORY (INHALATION)
Status: DISCONTINUED | OUTPATIENT
Start: 2023-05-20 | End: 2023-05-25 | Stop reason: HOSPADM

## 2023-05-20 RX ORDER — METHYLPREDNISOLONE SODIUM SUCCINATE 125 MG/2ML
80 INJECTION, POWDER, LYOPHILIZED, FOR SOLUTION INTRAMUSCULAR; INTRAVENOUS ONCE
Status: COMPLETED | OUTPATIENT
Start: 2023-05-20 | End: 2023-05-20

## 2023-05-20 RX ADMIN — IPRATROPIUM BROMIDE 0.5 MG: 0.5 SOLUTION RESPIRATORY (INHALATION) at 08:07

## 2023-05-20 RX ADMIN — BUDESONIDE 0.5 MG: 0.5 INHALANT ORAL at 21:08

## 2023-05-20 RX ADMIN — VANCOMYCIN HYDROCHLORIDE 1250 MG: 5 INJECTION, POWDER, LYOPHILIZED, FOR SOLUTION INTRAVENOUS at 11:17

## 2023-05-20 RX ADMIN — WARFARIN SODIUM 2 MG: 2 TABLET ORAL at 18:14

## 2023-05-20 RX ADMIN — CEFTRIAXONE 1000 MG: 1 INJECTION, SOLUTION INTRAVENOUS at 13:13

## 2023-05-20 RX ADMIN — DIGOXIN 125 MCG: 125 TABLET ORAL at 09:13

## 2023-05-20 RX ADMIN — MAGNESIUM SULFATE HEPTAHYDRATE 2 G: 2 INJECTION, SOLUTION INTRAVENOUS at 09:15

## 2023-05-20 RX ADMIN — LEVALBUTEROL HYDROCHLORIDE 1.25 MG: 1.25 SOLUTION RESPIRATORY (INHALATION) at 08:07

## 2023-05-20 RX ADMIN — FORMOTEROL FUMARATE DIHYDRATE 20 MCG: 20 SOLUTION RESPIRATORY (INHALATION) at 21:08

## 2023-05-20 RX ADMIN — LEVOTHYROXINE SODIUM 25 MCG: 25 TABLET ORAL at 05:05

## 2023-05-20 RX ADMIN — DOXYCYCLINE 100 MG: 100 INJECTION, POWDER, LYOPHILIZED, FOR SOLUTION INTRAVENOUS at 15:41

## 2023-05-20 RX ADMIN — PANTOPRAZOLE SODIUM 40 MG: 40 INJECTION, POWDER, FOR SOLUTION INTRAVENOUS at 09:17

## 2023-05-20 RX ADMIN — LEVALBUTEROL HYDROCHLORIDE 1.25 MG: 1.25 SOLUTION RESPIRATORY (INHALATION) at 21:08

## 2023-05-20 RX ADMIN — METHYLPREDNISOLONE SODIUM SUCCINATE 80 MG: 125 INJECTION, POWDER, FOR SOLUTION INTRAMUSCULAR; INTRAVENOUS at 09:17

## 2023-05-20 RX ADMIN — FUROSEMIDE 40 MG: 10 INJECTION, SOLUTION INTRAVENOUS at 13:07

## 2023-05-20 RX ADMIN — IPRATROPIUM BROMIDE 0.5 MG: 0.5 SOLUTION RESPIRATORY (INHALATION) at 21:08

## 2023-05-20 RX ADMIN — LEVALBUTEROL HYDROCHLORIDE 1.25 MG: 1.25 SOLUTION RESPIRATORY (INHALATION) at 14:36

## 2023-05-20 RX ADMIN — FLUTICASONE FUROATE AND VILANTEROL TRIFENATATE 1 PUFF: 200; 25 POWDER RESPIRATORY (INHALATION) at 07:52

## 2023-05-20 RX ADMIN — FORMOTEROL FUMARATE DIHYDRATE 20 MCG: 20 SOLUTION RESPIRATORY (INHALATION) at 09:22

## 2023-05-20 RX ADMIN — IOHEXOL 85 ML: 350 INJECTION, SOLUTION INTRAVENOUS at 12:10

## 2023-05-20 RX ADMIN — LIDOCAINE 1 PATCH: 50 PATCH TOPICAL at 09:14

## 2023-05-20 RX ADMIN — PANTOPRAZOLE SODIUM 40 MG: 40 INJECTION, POWDER, FOR SOLUTION INTRAVENOUS at 21:52

## 2023-05-20 RX ADMIN — BUDESONIDE 0.5 MG: 0.5 INHALANT ORAL at 09:06

## 2023-05-20 RX ADMIN — DILTIAZEM HYDROCHLORIDE 240 MG: 240 CAPSULE, EXTENDED RELEASE ORAL at 09:13

## 2023-05-20 RX ADMIN — IPRATROPIUM BROMIDE 0.5 MG: 0.5 SOLUTION RESPIRATORY (INHALATION) at 14:36

## 2023-05-20 RX ADMIN — METHYLPREDNISOLONE SODIUM SUCCINATE 40 MG: 40 INJECTION, POWDER, FOR SOLUTION INTRAMUSCULAR; INTRAVENOUS at 16:58

## 2023-05-20 NOTE — ASSESSMENT & PLAN NOTE
· Right lower lobe pneumonia found on CT  · MRSA culture pending  · Sputum culture pending  · Urine antigens negative  · Blood cultures negative x2 after 24 hours  · Continue supportive care with Mucinex, Tessalon Perles  · Aspiration precautions -did have episodes of vomiting at nursing facility, question if possible aspiration event  · Leukocytosis downtrending    Procalcitonin elevated, continue IV abx

## 2023-05-20 NOTE — ASSESSMENT & PLAN NOTE
· Right lower lobe pneumonia found on CT  · MRSA culture pending  · Sputum culture pending  · Urine antigens negative  · Blood cultures negative x2 after 24 hours  · Continue supportive care with Mucinex, Tessalon Perles  · Aspiration precautions -did have episodes of vomiting at nursing facility, question if possible aspiration event  · Leukocytosis downtrending    Procalcitonin elevated, continue IV Rocephin day 3

## 2023-05-20 NOTE — ASSESSMENT & PLAN NOTE
· Home regimen includes Cardizem and digoxin  · Continue with hold parameters  · AC with warfarin-> warfarin resumed  · Continue to trend INR

## 2023-05-20 NOTE — ASSESSMENT & PLAN NOTE
· Presents with episodes of nausea/vomiting, diarrhea now appears resolved  · Gastroenterology following  · Advance diet as tolerated

## 2023-05-20 NOTE — PROGRESS NOTES
Progress note - Gastroenterology   Arden Lulu 79 y o  female MRN: [id number]  Unit/Bed#: -01 Encounter: [id number]    ASSESSMENT and PLAN    44-year-old female with antiphospholipid syndrome, A-fib on Coumadin, hypertension, COPD, severe protein calorie malnutrition presenting with nausea, vomiting, coffee-ground emesis found to be septic likely in the setting of gastroenteritis and pneumonia     1) Coffee Ground Emesis: Patient presented with symptoms consistent with viral gastroenteritis and was noted to have coffee-ground emesis  Differential diagnosis includes erosive gastritis, esophagitis, peptic ulcer disease, Mayra-Sargent tear related to vomiting  She has had no further overt GI bleeding and hgb stable      -Patient prefers to avoid EGD unless absolutely necessary, which is very reasonable given her age and comorbidities  -Tolerating diet  -Monitor hemoglobin and transfuse if less than 7  -Continue PPI twice daily  -Coumadin back on board   -At this point, given no further GIB, would recommend continue treating sepsis/pneumonia and transfuse prn  Please call GI for any other questions  Thank you      2) N/V/D: Likely viral gastroenteritis  C  difficile toxin negative  Stool enteric panel is negative  Diarrhea appears to be improving  Continue supportive care     3) Sepsis: In the setting of gastroenteritis and pneumonia  Blood cultures negative  Continue IV antibiotics  Appreciate input from internal medicine      4) Pneumonia of RLL on CT    Chief Complaint   Patient presents with   • GI Bleeding     Patient presents to the ED via EMS from [facility] with c/o vomiting last night, staff states hem + in vomit, had no changes to stool until arriving to department, loose stool on arrival         SUBJECTIVE/HPI   Feels SOB    /61   Pulse 87   Temp 97 9 °F (36 6 °C)   Resp 16   Ht 5' (1 524 m)   SpO2 92%   BMI 20 37 kg/m²     PHYSICALEXAM  General appearance: alert, appears stated age and cooperative, appears chronically ill  Eyes: BRIEN, EOMI, no scleral icterus   Head: Normocephalic, without obvious abnormality, atraumatic  Lungs: decreased breath sounds, b/l crackles, no wheezing  Heart: regular rate and rhythm, S1, S2 normal, no murmur, click, rub or gallop  Abdomen: soft, non-tender, non-distended; bowel sounds normal; no masses,  no organomegaly  Extremities: extremities normal, atraumatic, no clubbing or cyanosis   No LE edema  Neurologic: Grossly normal, AAOx3, no asterixis    Lab Results   Component Value Date    GLUCOSE 89 05/04/2023    CALCIUM 8 6 05/20/2023     03/17/2015    K 3 8 05/20/2023    CO2 30 05/20/2023     05/20/2023    BUN 17 05/20/2023    CREATININE 0 37 (L) 05/20/2023     Lab Results   Component Value Date    WBC 12 86 (H) 05/20/2023    HGB 10 0 (L) 05/20/2023    HCT 31 7 (L) 05/20/2023     (H) 05/20/2023     05/20/2023     Lab Results   Component Value Date    ALT 12 05/18/2023    AST 15 05/18/2023    ALKPHOS 147 (H) 05/18/2023    BILITOT 0 40 03/17/2015     No results found for: AMYLASE  Lab Results   Component Value Date    LIPASE 34 05/17/2023     Lab Results   Component Value Date    IRON 53 06/30/2022    TIBC 266 06/30/2022    FERRITIN 360 06/30/2022     Lab Results   Component Value Date    INR 1 76 (H) 05/20/2023

## 2023-05-20 NOTE — CONSULTS
Pulmonary Consultation   Alpa Kelly 79 y o  female MRN: [id number]  Unit/Bed#: -01 Encounter: [id number]      Reason for consultation: abnormal CT chest     Requesting physician: Dr Aroldo Rene    Impressions:   1  Acute hypoxic respiratory failure on top of chronic hypercapnic respiratory failure  2  Volume overload with bilateral pleural effusions  3  Likely acute on chronic diastolic heart failure  4   R middle and lower lobe pneumonia  5  COPD with acute exacerbation   6  Possible GRACIELA  7  Gastroenteritis and GI bleeding  8  Afib on Coumadin  9  Severe protein calorie malnutrition     Recommendations:  1  There was some clinical improvement with the diuresis with Lasix  I would continue intermittent dosing of lasix  2  I would continue Rocephin and Doxycycline for pneumonia  3  Continue Solumedrol 40mg IV q8   4  Continue Xopenex and Atrovent TID  5  Continue Budesonide and Perforomist twice daily   6  We had a long discussion about BIPAP qHS  She is very hesitant to move forward with BIPAP  I explained my concern due to what seems to be chronic hypercapnia on ABG  7  Would consider qualification for autoBIPAP on discharge  8   She may benefit from a palliative care consult     History of Present Illness   HPI:  Alpa Kelly is a 79 y o  female who comes in for vomiting and gastroenteritis  She also was found to have blood in her stool  She did have diarrhea and also had work of breathing  She was placed on 2L of oxygen but through the hospitalization had an increased oxygen requirement and increase work of breathing  Consultation was placed to assess the hypoxic respiratory failure  She had a CTA chest which demonstrated bibasilar effusions and RLL pneumonia  She also had an ABG which demonstrated chronic hypercapnia as well  She continues to have work of breathing with minimal exertion  She has noted some lower extremity edema    She denies fever, chills, night sweats or fatigue  She has been struggling with her breathing especially at night  Review of systems:  12 point review of systems was completed and was otherwise negative except as listed in HPI  Historical Information   Past Medical History:   Diagnosis Date   • Anti-phospholipid syndrome (HCC)    • Asthma    • Blood type O+    • Cardiac disease    • Chronic pain    • COPD (chronic obstructive pulmonary disease) (HCC)    • Fracture of ankle     left   • Hyperlipidemia    • Hypertension    • Hypokalemia 2/27/2023   • Osteoporosis      Past Surgical History:   Procedure Laterality Date   • APPENDECTOMY     • BILATERAL OOPHORECTOMY Bilateral    • ORIF TIBIAL SHAFT FRACTURE W/ PLATES AND SCREWS Right      Family History   Problem Relation Age of Onset   • Hypertension Mother    • Skin cancer Mother    • Hypertension Father        Occupational History: not currently, no occupational exposures    Social History     Socioeconomic History   • Marital status:      Spouse name: None   • Number of children: None   • Years of education: None   • Highest education level: None   Occupational History   • None   Tobacco Use   • Smoking status: Former     Types: Cigarettes   • Smokeless tobacco: Never   • Tobacco comments:     Never smoker per [employer]   Vaping Use   • Vaping Use: Never used   Substance and Sexual Activity   • Alcohol use: Not Currently   • Drug use: No   • Sexual activity: Not Currently   Other Topics Concern   • None   Social History Narrative   • None     Social Determinants of Health     Financial Resource Strain: Low Risk    • Difficulty of Paying Living Expenses: Not very hard   Food Insecurity: No Food Insecurity   • Worried About Running Out of Food in the Last Year: Never true   • Ran Out of Food in the Last Year: Never true   Transportation Needs: No Transportation Needs   • Lack of Transportation (Medical): No   • Lack of Transportation (Non-Medical):  No   Physical Activity: Not on file Stress: Not on file   Social Connections: Not on file   Intimate Partner Violence: Not on file   Housing Stability: Low Risk    • Unable to Pay for Housing in the Last Year: No   • Number of Places Lived in the Last Year: 1   • Unstable Housing in the Last Year: No         Meds/Allergies   Current Facility-Administered Medications   Medication Dose Route Frequency   • acetaminophen (TYLENOL) tablet 650 mg  650 mg Oral Q6H PRN   • budesonide (PULMICORT) inhalation solution 0 5 mg  0 5 mg Nebulization Q12H   • cefTRIAXone (ROCEPHIN) IVPB (premix in dextrose) 1,000 mg 50 mL  1,000 mg Intravenous Q24H   • digoxin (LANOXIN) tablet 125 mcg  125 mcg Oral Every Other Day   • diltiazem (CARDIZEM CD) 24 hr capsule 240 mg  240 mg Oral Daily   • doxycycline (VIBRAMYCIN) 100 mg in sodium chloride 0 9 % 100 mL IVPB  100 mg Intravenous Q12H   • formoterol (PERFOROMIST) nebulizer solution 20 mcg  20 mcg Nebulization Q12H   • ipratropium (ATROVENT) 0 02 % inhalation solution 0 5 mg  0 5 mg Nebulization TID   • levalbuterol (XOPENEX) inhalation solution 1 25 mg  1 25 mg Nebulization TID   • levothyroxine tablet 25 mcg  25 mcg Oral Daily   • lidocaine (LIDODERM) 5 % patch 1 patch  1 patch Topical Daily   • methylPREDNISolone sodium succinate (Solu-MEDROL) injection 40 mg  40 mg Intravenous Q8H   • metoprolol (LOPRESSOR) injection 5 mg  5 mg Intravenous Q6H PRN   • ondansetron (ZOFRAN) injection 4 mg  4 mg Intravenous Q4H PRN   • pantoprazole (PROTONIX) injection 40 mg  40 mg Intravenous Q12H CARMEN   • warfarin (COUMADIN) tablet 2 mg  2 mg Oral Daily (warfarin)     Medications Prior to Admission   Medication   • umeclidinium (Incruse Ellipta) 62 5 mcg/actuation AEPB inhaler   • acetaminophen (TYLENOL) 325 mg tablet   • Advair Diskus 250-50 MCG/DOSE inhaler   • albuterol (2 5 mg/3 mL) 0 083 % nebulizer solution   • Ascorbic Acid (VITAMIN C) 1000 MG tablet   • Cholecalciferol (VITAMIN D3) 20 MCG (800 UNIT) TABS   • digoxin (LANOXIN) 0 125 mg tablet   • diltiazem (CARDIZEM CD) 240 mg 24 hr capsule   • Ferrous Sulfate 220 (44 Fe) MG/5ML LIQD   • levothyroxine 25 mcg tablet   • lidocaine (LIDODERM) 5 %   • loratadine (CLARITIN) 10 mg tablet   • losartan (COZAAR) 25 mg tablet   • mirtazapine (REMERON) 7 5 MG tablet   • multivitamin (THERAGRAN) TABS   • senna-docusate sodium (SENOKOT S) 8 6-50 mg per tablet   • sertraline (Zoloft) 50 mg tablet   • spironolactone (ALDACTONE) 25 mg tablet   • tiZANidine (ZANAFLEX) 2 mg tablet   • warfarin (COUMADIN) 2 mg tablet     Allergies   Allergen Reactions   • Aspirin Other (See Comments)   • Penicillins Rash and Hives   • Sulfa Antibiotics Rash       Home medications:  Prior to Admission medications    Medication Sig Start Date End Date Taking?  Authorizing Provider   umeclidinium (Incruse Ellipta) 62 5 mcg/actuation AEPB inhaler Inhale 1 puff daily   Yes Historical Provider, MD   acetaminophen (TYLENOL) 325 mg tablet Take 3 tablets (975 mg total) by mouth every 8 (eight) hours 3/13/23   Perla Mcgregor MD   Advair Diskus 250-50 MCG/DOSE inhaler USE 1 INHALATION TWICE A DAY 1/2/98   Steve Moon MD   albuterol (2 5 mg/3 mL) 0 083 % nebulizer solution USE 1 VIAL IN NEBULIZER EVERY 4 TO 6 HOURS AS NEEDED FOR COUGH AND WHEEZE 7/06/34   Steve Moon MD   Ascorbic Acid (VITAMIN C) 1000 MG tablet Take 1,000 mg by mouth daily    Historical Provider, MD   Cholecalciferol (VITAMIN D3) 20 MCG (800 UNIT) TABS Take by mouth in the morning    Historical Provider, MD   digoxin (LANOXIN) 0 125 mg tablet Take 1 tablet (125 mcg total) by mouth every other day Do not start before May 10, 2023  5/10/23   Krysta Bland MD   diltiazem (CARDIZEM CD) 240 mg 24 hr capsule Take 1 capsule (240 mg total) by mouth daily 4/81/88   Steve Moon MD   Ferrous Sulfate 220 (44 Fe) MG/5ML LIQD take 1 teaspoonful by mouth once daily 9/06/75   Steve Moon MD   levothyroxine 25 mcg tablet TAKE 1 TABLET DAILY 69/29/40   Steve Moon, MD   lidocaine (LIDODERM) 5 % Apply 1 patch topically over 12 hours daily Remove & Discard patch within 12 hours or as directed by MD 60   Chika Abreu MD   loratadine (CLARITIN) 10 mg tablet Take 10 mg by mouth daily    Historical Provider, MD   losartan (COZAAR) 25 mg tablet Take 1 tablet (25 mg total) by mouth daily Do not start before 23   Enid Hughes MD   mirtazapine (REMERON) 7 5 MG tablet Take 1 tablet (7 5 mg total) by mouth daily at bedtime    Chika Abreu MD   multivitamin [facility]) TABS Take 1 tablet by mouth daily    Historical Provider, MD   senna-docusate sodium (SENOKOT S) 8 6-50 mg per tablet Take 1 tablet by mouth daily at bedtime 3/13/23   Vandana Rodriguez MD   sertraline (Zoloft) 50 mg tablet Take 1 tablet (50 mg total) by mouth daily    Chika Abreu MD   spironolactone (ALDACTONE) 25 mg tablet Take 1 tablet (25 mg total) by mouth daily Do not start before 2023  3/14/23   Vandana Rodriguez MD   tiZANidine (ZANAFLEX) 2 mg tablet Take 1 tablet (2 mg total) by mouth 2 (two) times a day as needed for muscle spasms 83   Chika Abreu MD   warfarin (COUMADIN) 2 mg tablet Take 2 mg daily, adjust to keep INR therapeutic, between 2-3 3/13/23   Vandana Rodriguez MD       Vitals: Tmax Temp (24hrs), Av 2 °F (36 8 °C), Min:97 9 °F (36 6 °C), Max:98 4 °F (36 9 °C)    Blood pressure 165/61, pulse 87, temperature 97 9 °F (36 6 °C), resp  rate 16, height 5' (1 524 m), SpO2 92 %, not currently breastfeeding  , 3L NC, Body mass index is 20 37 kg/m²        Intake/Output Summary (Last 24 hours) at 2023 1358  Last data filed at 2023 0911  Gross per 24 hour   Intake 120 ml   Output 225 ml   Net -105 ml       Physical Exam  General: Pleasant, Awake alert and oriented x 3, conversant with some conversational dyspnea, NAD, normal affect  HEENT:  PERRL, Sclera noninjected, nonicteric OU, Nares patent, no nasal flaring, no nasal drainage, Mucous membranes, moist, no oral lesions, normal dentition  NECK: Trachea midline, no accessory muscle use, no stridor, no cervical or supraclavicular adenopathy, JVP not elevated  CARDIAC: Reg, single s1/S2, no m/r/g  PULM: Crackles in lung bases, expiratory wheezing in the upper lung fields, scattered rhonchi in R lung  CHEST: No gross deformities, equal chest expansion on inspiration bilaterally  ABD: Normoactive bowel sounds, soft nontender, nondistended, no rebound, no rigidity, no guarding  EXT: No cyanosis, no clubbing, no edema, normal capillary refill  SKIN:  No rashes, no lesions  NEURO: no focal neurologic deficits, AAOx3, moving all extremities appropriately    Labs: I have personally reviewed pertinent lab results  Results from last 7 days   Lab Units 05/20/23  1249 05/20/23  0504 05/19/23  0512 05/18/23  0748 05/17/23  1420 05/17/23  0741   WBC Thousand/uL  --  12 86* 12 52* 15 62*  --  22 02*   HEMOGLOBIN g/dL  --  10 0* 8 5* 9 2*   < > 14 0   I STAT HEMOGLOBIN g/dl 10 9*  --   --   --   --   --    HEMATOCRIT %  --  31 7* 26 0* 29 3*   < > 44 5   HEMATOCRIT, ISTAT % 32*  --   --   --   --   --    PLATELETS Thousands/uL  --  320 290 296  --  459*   NEUTROS PCT %  --  88* 89*  --   --  91*   MONOS PCT %  --  4 6  --   --  5   MONO PCT %  --   --   --  3*  --   --     < > = values in this interval not displayed        Results from last 7 days   Lab Units 05/20/23  1249 05/20/23  0504 05/19/23  0512 05/18/23  0746 05/17/23  1420 05/17/23  0741   POTASSIUM mmol/L  --  3 8 3 4* 4 1   < > 5 5*   CHLORIDE mmol/L  --  103 103 100   < > 91*   CO2 mmol/L  --  30 29 31   < > 32   CO2, I-STAT mmol/L 33*  --   --   --   --   --    BUN mg/dL  --  17 28* 29*   < > 48*   CREATININE mg/dL  --  0 37* 0 52* 0 56*   < > 1 37*   CALCIUM mg/dL  --  8 6 8 3* 8 2*   < > 9 6   ALK PHOS U/L  --   --   --  147*  --  267*   ALT U/L  --   --   --  12  --  22   AST U/L  --   --   --  15  --  28   GLUCOSE, ISTAT mg/dl 210* --   --   --   --   --     < > = values in this interval not displayed  Results from last 7 days   Lab Units 05/20/23  0504 05/19/23  0621 05/18/23  0540 05/17/23  0741   INR  1 76* 2 25* 2 38* 1 70*   PTT seconds  --   --   --  40*     Results from last 7 days   Lab Units 05/20/23  0902   LACTIC ACID mmol/L 0 6     0   Lab Value Date/Time    TROPONINI <0 02 11/22/2017 1207       Micro:  Lab Results   Component Value Date    BLOODCX No Growth at 48 hrs  05/17/2023    BLOODCX No Growth at 48 hrs  05/17/2023    BLOODCX No Growth After 5 Days  05/04/2023    BLOODCX No Growth After 5 Days  05/04/2023    URINECX 50,000-59,000 cfu/ml Enterococcus faecalis (A) 02/28/2023    URINECX <10,000 cfu/ml 02/28/2023       Imaging and other studies: I have personally reviewed pertinent reports  CTA chest - IMPRESSION:  Multilobar pneumonia most prominent in the right lower lobe  New moderate T10 vertebral body compression deformity  Bilateral pleural effusions (per my read)    Pulmonary function testing: None    EKG, Pathology, and Other Studies: I have personally reviewed pertinent reports      No echo    Code Status: Level 1 - Full Code      DO [facility]

## 2023-05-20 NOTE — PROGRESS NOTES
[place]  Progress Note  Name: James Monreal  MRN: [id number]  Unit/Bed#: -01 I Date of Admission: 5/17/2023   Date of Service: 5/20/2023 I Hospital Day: 3    Assessment/Plan   Chronic obstructive pulmonary disease with acute exacerbation ([facility] [address] )  Assessment & Plan  · 5/20 patient now in COPD exacerbation with increased work of breathing despite being on 4 L nasal cannula  · CTA PE pending  · Initiated Doxy continue ceftriaxone  · Xopenex, Atrovent, Pulmicort, Perforomist  · ABG pending-> may need BiPAP  · COVID-negative  · Lactic acid negative  · Pro-Oscar elevated continue to trend  · Status post IV Solu-Medrol 80 mg and magnesium  · Continue Solu-Medrol 40 mg every 8 hours  · CC aware    * GI bleeding  Assessment & Plan  · Patient presenting from [facility] with nausea, vomiting with heme positive emesis and loose stool  · Suspect gastroenteritis contributing, vomiting  · In the ED CT showing gastroenteritis  · Hemoglobin 14 0 on presentation however suspect this was hemoconcentrated  Baseline hemoglobin 9-10  · Warfarin resumed  · Diet advanced  · Patient clinically improving  Will hold off on EGD for now  · Continue Protonix  · Trend H/H  · resolved    Sepsis with acute renal failure without septic shock (HCC)  Assessment & Plan  · In setting of gastroenteritis, pneumonia  · Baseline creatinine around 0 6-0 9; creatinine was 1 37 on presentation  · Continue IV Rocephin and doxy    Afebrile, leukocytosis downtrending  · Trend WBC and fever curve  · Blood cultures negative x2 after 24 hours  · Urine antigens negative    Pneumonia of right lower lobe due to infectious organism  Assessment & Plan  · Right lower lobe pneumonia found on CT  · MRSA culture pending  · Sputum culture pending  · Urine antigens negative  · Blood cultures negative x2 after 24 hours  · Continue supportive care with Mucinex, Tessalon Perles  · Aspiration precautions -did have episodes of vomiting at nursing facility, question if possible aspiration event  · Leukocytosis downtrending  Procalcitonin elevated, continue IV Rocephin day 3    Gastroenteritis  Assessment & Plan  · Presents with episodes of nausea/vomiting, diarrhea now appears resolved  · Gastroenterology following  · Advance diet as tolerated    Hyponatremia-resolved as of 5/20/2023  Assessment & Plan  · Most likely hypovolemic hyponatremia  · IVF dc'd  · resolved    Severe protein-calorie malnutrition ([facility] [address] )  Assessment & Plan  Malnutrition Findings:   Adult Malnutrition type: Chronic illnessBMI Findings: Body mass index is 20 37 kg/m²  Consult nutrition    Essential hypertension  Assessment & Plan  · On Cardizem, digoxin, spironolactone, losartan   · Blood pressure improved today, resume Cardizem  Continue digoxin    Other specified hypothyroidism  Assessment & Plan  · Continue Synthroid    Atrial fibrillation (HCC)  Assessment & Plan  · Home regimen includes Cardizem and digoxin  · Continue with hold parameters  · AC with warfarin-> warfarin resumed  · Continue to trend INR        VTE Pharmacologic Prophylaxis: VTE Score: 6 High Risk (Score >/= 5) - Pharmacological DVT Prophylaxis Ordered: warfarin (Coumadin)  Sequential Compression Devices Ordered  Patient Centered Rounds: I performed bedside rounds with nursing staff today  Discussions with Specialists or Other Care Team Provider: GI, CM, Pt, OT    Education and Discussions with Family / Patient: Updated  (son) via phone  Total Time Spent on Date of Encounter in care of patient: 35 minutes This time was spent on one or more of the following: performing physical exam; counseling and coordination of care; obtaining or reviewing history; documenting in the medical record; reviewing/ordering tests, medications or procedures; communicating with other healthcare professionals and discussing with patient's family/caregivers      Current Length of Stay: 3 day(s)  Current Patient Status: Inpatient   Certification Statement: The patient will continue to require additional inpatient hospital stay due to acopde  Discharge Plan: Anticipate discharge in >72 hrs to rehab facility  Code Status: Level 1 - Full Code    Subjective:   Simin was seen and examined at bedside  No acute events overnight  Discussed plan of care  All questions and concerns were answered and addressed  Starting this morning patient has been in respiratory distress and has had increased work of breathing  Has belly breathing despite increasing oxygenation as well as magnesium and steroid initiation  Lactic acid negative Pro-Oscar elevated COVID-negative CTA PE pending  ABG ordered and pending  May need BiPAP  Objective:     Vitals:   Temp (24hrs), Av 2 °F (36 8 °C), Min:97 9 °F (36 6 °C), Max:98 4 °F (36 9 °C)    Temp:  [97 9 °F (36 6 °C)-98 4 °F (36 9 °C)] 97 9 °F (36 6 °C)  HR:  [66-87] 87  Resp:  [16-20] 16  BP: (135-165)/(48-61) 165/61  SpO2:  [92 %-94 %] 92 %  Body mass index is 20 37 kg/m²  Input and Output Summary (last 24 hours): Intake/Output Summary (Last 24 hours) at 2023 1146  Last data filed at 2023 0911  Gross per 24 hour   Intake 120 ml   Output 225 ml   Net -105 ml       Physical Exam:   Physical Exam  Vitals and nursing note reviewed  Constitutional:       General: She is in acute distress  Appearance: She is ill-appearing  Comments: Thin frail   HENT:      Head: Normocephalic and atraumatic  Cardiovascular:      Rate and Rhythm: Normal rate and regular rhythm  Pulses: Normal pulses  Heart sounds: Normal heart sounds  Pulmonary:      Effort: Respiratory distress present  Breath sounds: Wheezing present  Comments: 4L NC  Abdominal:      General: Abdomen is flat  Bowel sounds are normal       Palpations: Abdomen is soft  Musculoskeletal:      Right lower leg: No edema  Left lower leg: No edema     Skin: General: Skin is warm  Neurological:      General: No focal deficit present  Mental Status: She is alert and oriented to person, place, and time  Additional Data:     Labs:  Results from last 7 days   Lab Units 05/20/23  0504   WBC Thousand/uL 12 86*   HEMOGLOBIN g/dL 10 0*   HEMATOCRIT % 31 7*   PLATELETS Thousands/uL 320   NEUTROS PCT % 88*   LYMPHS PCT % 6*   MONOS PCT % 4   EOS PCT % 1     Results from last 7 days   Lab Units 05/20/23  0504 05/19/23  0512 05/18/23  0746   SODIUM mmol/L 136   < > 133*   POTASSIUM mmol/L 3 8   < > 4 1   CHLORIDE mmol/L 103   < > 100   CO2 mmol/L 30   < > 31   BUN mg/dL 17   < > 29*   CREATININE mg/dL 0 37*   < > 0 56*   ANION GAP mmol/L 3*   < > 2*   CALCIUM mg/dL 8 6   < > 8 2*   ALBUMIN g/dL  --   --  2 8*   TOTAL BILIRUBIN mg/dL  --   --  0 80   ALK PHOS U/L  --   --  147*   ALT U/L  --   --  12   AST U/L  --   --  15   GLUCOSE RANDOM mg/dL 89   < > 86    < > = values in this interval not displayed  Results from last 7 days   Lab Units 05/20/23  0504   INR  1 76*             Results from last 7 days   Lab Units 05/20/23  0902 05/18/23  0540 05/17/23  1648 05/17/23  1036 05/17/23  0741   LACTIC ACID mmol/L 0 6  --  1 3 2 8* 3 9*   PROCALCITONIN ng/ml 0 68* 2 66*  --   --  0 61*       Lines/Drains:  Invasive Devices     Peripheral Intravenous Line  Duration           Peripheral IV 05/17/23 Right;Ventral (anterior) Forearm 3 days    Peripheral IV 05/17/23 Distal;Right;Upper;Ventral (anterior) Arm 2 days          Drain  Duration           External Urinary Catheter 2 days                      Imaging: No pertinent imaging reviewed  Recent Cultures (last 7 days):   Results from last 7 days   Lab Units 05/18/23  1722 05/17/23  1653 05/17/23  1234   BLOOD CULTURE   --   --  No Growth at 48 hrs  No Growth at 48 hrs     LEGIONELLA URINARY ANTIGEN   --  Negative  --    C DIFF TOXIN B BY PCR  Negative  --   --        Last 24 Hours Medication List:   Current Facility-Administered Medications   Medication Dose Route Frequency Provider Last Rate   • acetaminophen  650 mg Oral Q6H PRN Nader Robertson MD     • budesonide  0 5 mg Nebulization Q12H Nader Robertson MD     • cefTRIAXone  1,000 mg Intravenous Q24H Ezekiel Sen PA-C 1,000 mg (05/19/23 1142)   • digoxin  125 mcg Oral Every Other Day Nader Robertson MD     • diltiazem  240 mg Oral Daily Ezekiel Sen PA-C     • doxycycline  100 mg Intravenous Q12H Nader Robertson MD     • formoterol  20 mcg Nebulization Q12H Nader Robertson MD     • ipratropium  0 5 mg Nebulization TID Nader Robertson MD     • levalbuterol  1 25 mg Nebulization TID Nader Robertson MD     • levothyroxine  25 mcg Oral Daily Nader Robertson MD     • lidocaine  1 patch Topical Daily Nader Robertson MD     • methylPREDNISolone sodium succinate  40 mg Intravenous Q8H Megan Lee MD     • metoprolol  5 mg Intravenous Q6H PRN Nader Robertson MD     • ondansetron  4 mg Intravenous Q4H PRN Nader Robertson MD     • pantoprazole  40 mg Intravenous Q12H 200 Seema Alexis MD     • warfarin  2 mg Oral Daily (warfarin) Ezekiel Sen PA-C          Today, Patient Was Seen By: Nader Robertson MD    **Please Note: This note may have been constructed using a voice recognition system  **

## 2023-05-20 NOTE — PLAN OF CARE
Problem: MOBILITY - ADULT  Goal: Maintain or return to baseline ADL function  Description: INTERVENTIONS:  -  Assess patient's ability to carry out ADLs; assess patient's baseline for ADL function and identify physical deficits which impact ability to perform ADLs (bathing, care of mouth/teeth, toileting, grooming, dressing, etc )  - Assess/evaluate cause of self-care deficits   - Assess range of motion  - Assess patient's mobility; develop plan if impaired  - Assess patient's need for assistive devices and provide as appropriate  - Encourage maximum independence but intervene and supervise when necessary  - Involve family in performance of ADLs  - Assess for home care needs following discharge   - Consider OT consult to assist with ADL evaluation and planning for discharge  - Provide patient education as appropriate  Outcome: Progressing  Goal: Maintains/Returns to pre admission functional level  Description: INTERVENTIONS:  - Perform BMAT or MOVE assessment daily    - Set and communicate daily mobility goal to care team and patient/family/caregiver  - Collaborate with rehabilitation services on mobility goals if consulted  - Perform Range of Motion 3 times a day  - Reposition patient every 2 hours    - Dangle patient 3 times a day  - Stand patient 3 times a day  - Ambulate patient 3 times a day  - Out of bed to chair 3 times a day   - Out of bed for meals 3 times a day  - Out of bed for toileting  - Record patient progress and toleration of activity level   Outcome: Progressing     Problem: PAIN - ADULT  Goal: Verbalizes/displays adequate comfort level or baseline comfort level  Description: Interventions:  - Encourage patient to monitor pain and request assistance  - Assess pain using appropriate pain scale  - Administer analgesics based on type and severity of pain and evaluate response  - Implement non-pharmacological measures as appropriate and evaluate response  - Consider cultural and social influences on pain and pain management  - Notify physician/advanced practitioner if interventions unsuccessful or patient reports new pain  Outcome: Progressing     Problem: INFECTION - ADULT  Goal: Absence or prevention of progression during hospitalization  Description: INTERVENTIONS:  - Assess and monitor for signs and symptoms of infection  - Monitor lab/diagnostic results  - Monitor all insertion sites, i e  indwelling lines, tubes, and drains  - Monitor endotracheal if appropriate and nasal secretions for changes in amount and color  - [place] appropriate cooling/warming therapies per order  - Administer medications as ordered  - Instruct and encourage patient and family to use good hand hygiene technique  - Identify and instruct in appropriate isolation precautions for identified infection/condition  Outcome: Progressing     Problem: SAFETY ADULT  Goal: Patient will remain free of falls  Description: INTERVENTIONS:  - Educate patient/family on patient safety including physical limitations  - Instruct patient to call for assistance with activity   - Consult OT/PT to assist with strengthening/mobility   - Keep Call bell within reach  - Keep bed low and locked with side rails adjusted as appropriate  - Keep care items and personal belongings within reach  - Initiate and maintain comfort rounds  - Make Fall Risk Sign visible to staff  - Offer Toileting every 2 Hours, in advance of need  - Initiate/Maintain bed/chair alarm  - Obtain necessary fall risk management equipment: walker  - Apply yellow socks and bracelet for high fall risk patients  - Consider moving patient to room near nurses station  Outcome: Progressing     Problem: DISCHARGE PLANNING  Goal: Discharge to home or other facility with appropriate resources  Description: INTERVENTIONS:  - Identify barriers to discharge w/patient and caregiver  - Arrange for needed discharge resources and transportation as appropriate  - Identify discharge learning needs (meds, wound care, etc )  - Arrange for interpretive services to assist at discharge as needed  - Refer to Case Management Department for coordinating discharge planning if the patient needs post-hospital services based on physician/advanced practitioner order or complex needs related to functional status, cognitive ability, or social support system  Outcome: Progressing     Problem: Knowledge Deficit  Goal: Patient/family/caregiver demonstrates understanding of disease process, treatment plan, medications, and discharge instructions  Description: Complete learning assessment and assess knowledge base  Interventions:  - Provide teaching at level of understanding  - Provide teaching via preferred learning methods  Outcome: Progressing     Problem: Nutrition/Hydration-ADULT  Goal: Nutrient/Hydration intake appropriate for improving, restoring or maintaining nutritional needs  Description: Monitor and assess patient's nutrition/hydration status for malnutrition  Collaborate with interdisciplinary team and initiate plan and interventions as ordered  Monitor patient's weight and dietary intake as ordered or per policy  Utilize nutrition screening tool and intervene as necessary  Determine patient's food preferences and provide high-protein, high-caloric foods as appropriate       INTERVENTIONS:  - Monitor oral intake, urinary output, labs, and treatment plans  - Assess nutrition and hydration status and recommend course of action  - Evaluate amount of meals eaten  - Assist patient with eating if necessary   - Allow adequate time for meals  - Recommend/ encourage appropriate diets, oral nutritional supplements, and vitamin/mineral supplements  - Order, calculate, and assess calorie counts as needed  - Recommend, monitor, and adjust tube feedings and TPN/PPN based on assessed needs  - Assess need for intravenous fluids  - Provide specific nutrition/hydration education as appropriate  - Include patient/family/caregiver in decisions related to nutrition  Outcome: Progressing     Problem: Prexisting or High Potential for Compromised Skin Integrity  Goal: Skin integrity is maintained or improved  Description: INTERVENTIONS:  - Identify patients at risk for skin breakdown  - Assess and monitor skin integrity  - Assess and monitor nutrition and hydration status  - Monitor labs   - Assess for incontinence   - Turn and reposition patient  - Assist with mobility/ambulation  - Relieve pressure over bony prominences  - Avoid friction and shearing  - Provide appropriate hygiene as needed including keeping skin clean and dry  - Evaluate need for skin moisturizer/barrier cream  - Collaborate with interdisciplinary team   - Patient/family teaching  - Consider wound care consult   Outcome: Progressing     Problem: Potential for Falls  Goal: Patient will remain free of falls  Description: INTERVENTIONS:  - Educate patient/family on patient safety including physical limitations  - Instruct patient to call for assistance with activity   - Consult OT/PT to assist with strengthening/mobility   - Keep Call bell within reach  - Keep bed low and locked with side rails adjusted as appropriate  - Keep care items and personal belongings within reach  - Initiate and maintain comfort rounds  - Make Fall Risk Sign visible to staff  - Offer Toileting every 2 Hours, in advance of need  - Initiate/Maintain bed/chair alarm  - Obtain necessary fall risk management equipment: yellow socks/bracelet  - Apply yellow socks and bracelet for high fall risk patients  - Consider moving patient to room near nurses station  Outcome: Progressing     Problem: RESPIRATORY - ADULT  Goal: Achieves optimal ventilation and oxygenation  Description: INTERVENTIONS:  - Assess for changes in respiratory status  - Assess for changes in mentation and behavior  - Position to facilitate oxygenation and minimize respiratory effort  - Oxygen administered by appropriate delivery if ordered  - Initiate smoking cessation education as indicated  - Encourage broncho-pulmonary hygiene including cough, deep breathe, Incentive Spirometry  - Assess the need for suctioning and aspirate as needed  - Assess and instruct to report SOB or any respiratory difficulty  - Respiratory Therapy support as indicated  Outcome: Progressing     Problem: GASTROINTESTINAL - ADULT  Goal: Minimal or absence of nausea and/or vomiting  Description: INTERVENTIONS:  - Administer IV fluids if ordered to ensure adequate hydration  - Maintain NPO status until nausea and vomiting are resolved  - Nasogastric tube if ordered  - Administer ordered antiemetic medications as needed  - Provide nonpharmacologic comfort measures as appropriate  - Advance diet as tolerated, if ordered  - Consider nutrition services referral to assist patient with adequate nutrition and appropriate food choices  Outcome: Progressing  Goal: Maintains or returns to baseline bowel function  Description: INTERVENTIONS:  - Assess bowel function  - Encourage oral fluids to ensure adequate hydration  - Administer IV fluids if ordered to ensure adequate hydration  - Administer ordered medications as needed  - Encourage mobilization and activity  - Consider nutritional services referral to assist patient with adequate nutrition and appropriate food choices  Outcome: Progressing  Goal: Maintains adequate nutritional intake  Description: INTERVENTIONS:  - Monitor percentage of each meal consumed  - Identify factors contributing to decreased intake, treat as appropriate  - Assist with meals as needed  - Monitor I&O, weight, and lab values if indicated  - Obtain nutrition services referral as needed  Outcome: Progressing     Problem: METABOLIC, FLUID AND ELECTROLYTES - ADULT  Goal: Electrolytes maintained within normal limits  Description: INTERVENTIONS:  - Monitor labs and assess patient for signs and symptoms of electrolyte imbalances  - Administer electrolyte replacement as ordered  - Monitor response to electrolyte replacements, including repeat lab results as appropriate  - Instruct patient on fluid and nutrition as appropriate  Outcome: Progressing  Goal: Fluid balance maintained  Description: INTERVENTIONS:  - Monitor labs   - Monitor I/O and WT  - Instruct patient on fluid and nutrition as appropriate  - Assess for signs & symptoms of volume excess or deficit  Outcome: Progressing     Problem: SKIN/TISSUE INTEGRITY - ADULT  Goal: Skin Integrity remains intact(Skin Breakdown Prevention)  Description: Assess:  -Perform [place] assessment every qS  -Clean and moisturize skin every qS  -Inspect skin when repositioning, toileting, and assisting with ADLS  -Assess under medical devices such as nasal canula every q3h  -Assess extremities for adequate circulation and sensation     Bed Management:  -Have minimal linens on bed & keep smooth, unwrinkled  -Change linens as needed when moist or perspiring  -Avoid sitting or lying in one position for more than 2 hours while in bed  -Keep HOB at 30-60 degrees     Toileting:  -Offer bedside commode  -Assess for incontinence every 2 hours  -Use incontinent care products after each incontinent episode such as calazime    Activity:  -Mobilize patient 3-4 times a day  -Encourage activity and walks on unit  -Encourage or provide ROM exercises   -Turn and reposition patient every 2 Hours  -Use appropriate equipment to lift or move patient in bed  -Instruct/ Assist with weight shifting every q15min when out of bed in chair  -Consider limitation of chair time 2 hour intervals    Skin Care:  -Avoid use of baby powder, tape, friction and shearing, hot water or constrictive clothing  -Relieve pressure over bony prominences using q2h turn  -Do not massage red bony areas    Next Steps:  -Teach patient strategies to minimize risks such as use call bell   -Consider consults to  interdisciplinary teams such as PT/OT  Outcome: Progressing     Problem: HEMATOLOGIC - ADULT  Goal: Maintains hematologic stability  Description: INTERVENTIONS  - Assess for signs and symptoms of bleeding or hemorrhage  - Monitor labs  - Administer supportive blood products/factors as ordered and appropriate  Outcome: Progressing     Problem: MUSCULOSKELETAL - ADULT  Goal: Maintain or return mobility to safest level of function  Description: INTERVENTIONS:  - Assess patient's ability to carry out ADLs; assess patient's baseline for ADL function and identify physical deficits which impact ability to perform ADLs (bathing, care of mouth/teeth, toileting, grooming, dressing, etc )  - Assess/evaluate cause of self-care deficits   - Assess range of motion  - Assess patient's mobility  - Assess patient's need for assistive devices and provide as appropriate  - Encourage maximum independence but intervene and supervise when necessary  - Involve family in performance of ADLs  - Assess for home care needs following discharge   - Consider OT consult to assist with ADL evaluation and planning for discharge  - Provide patient education as appropriate  Outcome: Progressing

## 2023-05-20 NOTE — ASSESSMENT & PLAN NOTE
· Patient presenting from [facility] with nausea, vomiting with heme positive emesis and loose stool  · Suspect gastroenteritis contributing, vomiting  · In the ED CT showing gastroenteritis  · Hemoglobin 14 0 on presentation however suspect this was hemoconcentrated  Baseline hemoglobin 9-10  · Warfarin resumed  · Diet advanced  · Patient clinically improving  Will hold off on EGD for now    · Continue Protonix  · Trend H/H  · resolved

## 2023-05-20 NOTE — ASSESSMENT & PLAN NOTE
Patient usually on room air at baseline  5/20/2023 required 4 L nasal cannula  ABG showing respiratory acidosis with metabolic compensation alkalosis  Transitioned to SD2  Currently on 3L NC

## 2023-05-20 NOTE — ASSESSMENT & PLAN NOTE
· 5/20 patient now in COPD exacerbation with increased work of breathing despite being on 4 L nasal cannula  · CTA PE negative for PE  · Continue Doxy day 1 continue ceftriaxone 4/10  · Xopenex, Atrovent, Pulmicort, Perforomist  · ABG   · COVID-negative  · Lactic acid negative  · Pro-Oscar elevated continue to trend  · Status post IV Solu-Medrol 80 mg and magnesium  · Continue Solu-Medrol 40 mg every 8 hours

## 2023-05-20 NOTE — PLAN OF CARE
Problem: MOBILITY - ADULT  Goal: Maintain or return to baseline ADL function  Description: INTERVENTIONS:  -  Assess patient's ability to carry out ADLs; assess patient's baseline for ADL function and identify physical deficits which impact ability to perform ADLs (bathing, care of mouth/teeth, toileting, grooming, dressing, etc )  - Assess/evaluate cause of self-care deficits   - Assess range of motion  - Assess patient's mobility; develop plan if impaired  - Assess patient's need for assistive devices and provide as appropriate  - Encourage maximum independence but intervene and supervise when necessary  - Involve family in performance of ADLs  - Assess for home care needs following discharge   - Consider OT consult to assist with ADL evaluation and planning for discharge  - Provide patient education as appropriate  Outcome: Progressing  Goal: Maintains/Returns to pre admission functional level  Description: INTERVENTIONS:  - Perform BMAT or MOVE assessment daily    - Set and communicate daily mobility goal to care team and patient/family/caregiver  - Collaborate with rehabilitation services on mobility goals if consulted  - Perform Range of Motion 2 times a day  - Reposition patient every 2 hours    - Dangle patient 2 times a day  - Stand patient 2 times a day  - Ambulate patient 2 times a day  - Out of bed to chair 2 times a day   - Out of bed for meals 2 times a day  - Out of bed for toileting  - Record patient progress and toleration of activity level   Outcome: Progressing     Problem: PAIN - ADULT  Goal: Verbalizes/displays adequate comfort level or baseline comfort level  Description: Interventions:  - Encourage patient to monitor pain and request assistance  - Assess pain using appropriate pain scale  - Administer analgesics based on type and severity of pain and evaluate response  - Implement non-pharmacological measures as appropriate and evaluate response  - Consider cultural and social influences on pain and pain management  - Notify physician/advanced practitioner if interventions unsuccessful or patient reports new pain  Outcome: Progressing     Problem: INFECTION - ADULT  Goal: Absence or prevention of progression during hospitalization  Description: INTERVENTIONS:  - Assess and monitor for signs and symptoms of infection  - Monitor lab/diagnostic results  - Monitor all insertion sites, i e  indwelling lines, tubes, and drains  - Monitor endotracheal if appropriate and nasal secretions for changes in amount and color  - [place] appropriate cooling/warming therapies per order  - Administer medications as ordered  - Instruct and encourage patient and family to use good hand hygiene technique  - Identify and instruct in appropriate isolation precautions for identified infection/condition  Outcome: Progressing     Problem: SAFETY ADULT  Goal: Patient will remain free of falls  Description: INTERVENTIONS:  - Educate patient/family on patient safety including physical limitations  - Instruct patient to call for assistance with activity   - Consult OT/PT to assist with strengthening/mobility   - Keep Call bell within reach  - Keep bed low and locked with side rails adjusted as appropriate  - Keep care items and personal belongings within reach  - Initiate and maintain comfort rounds  - Make Fall Risk Sign visible to staff  - Offer Toileting every 2 Hours, in advance of need  - Initiate/Maintain bed alarm  - Obtain necessary fall risk management equipment: yellow socks, yellow bracelet  - Apply yellow socks and bracelet for high fall risk patients  - Consider moving patient to room near nurses station  Outcome: Progressing     Problem: DISCHARGE PLANNING  Goal: Discharge to home or other facility with appropriate resources  Description: INTERVENTIONS:  - Identify barriers to discharge w/patient and caregiver  - Arrange for needed discharge resources and transportation as appropriate  - Identify discharge learning needs (meds, wound care, etc )  - Arrange for interpretive services to assist at discharge as needed  - Refer to Case Management Department for coordinating discharge planning if the patient needs post-hospital services based on physician/advanced practitioner order or complex needs related to functional status, cognitive ability, or social support system  Outcome: Progressing     Problem: Knowledge Deficit  Goal: Patient/family/caregiver demonstrates understanding of disease process, treatment plan, medications, and discharge instructions  Description: Complete learning assessment and assess knowledge base  Interventions:  - Provide teaching at level of understanding  - Provide teaching via preferred learning methods  Outcome: Progressing     Problem: Nutrition/Hydration-ADULT  Goal: Nutrient/Hydration intake appropriate for improving, restoring or maintaining nutritional needs  Description: Monitor and assess patient's nutrition/hydration status for malnutrition  Collaborate with interdisciplinary team and initiate plan and interventions as ordered  Monitor patient's weight and dietary intake as ordered or per policy  Utilize nutrition screening tool and intervene as necessary  Determine patient's food preferences and provide high-protein, high-caloric foods as appropriate       INTERVENTIONS:  - Monitor oral intake, urinary output, labs, and treatment plans  - Assess nutrition and hydration status and recommend course of action  - Evaluate amount of meals eaten  - Assist patient with eating if necessary   - Allow adequate time for meals  - Recommend/ encourage appropriate diets, oral nutritional supplements, and vitamin/mineral supplements  - Order, calculate, and assess calorie counts as needed  - Recommend, monitor, and adjust tube feedings and TPN/PPN based on assessed needs  - Assess need for intravenous fluids  - Provide specific nutrition/hydration education as appropriate  - Include patient/family/caregiver in decisions related to nutrition  Outcome: Progressing     Problem: Prexisting or High Potential for Compromised Skin Integrity  Goal: Skin integrity is maintained or improved  Description: INTERVENTIONS:  - Identify patients at risk for skin breakdown  - Assess and monitor skin integrity  - Assess and monitor nutrition and hydration status  - Monitor labs   - Assess for incontinence   - Turn and reposition patient  - Assist with mobility/ambulation  - Relieve pressure over bony prominences  - Avoid friction and shearing  - Provide appropriate hygiene as needed including keeping skin clean and dry  - Evaluate need for skin moisturizer/barrier cream  - Collaborate with interdisciplinary team   - Patient/family teaching  - Consider wound care consult   Outcome: Progressing     Problem: Potential for Falls  Goal: Patient will remain free of falls  Description: INTERVENTIONS:  - Educate patient/family on patient safety including physical limitations  - Instruct patient to call for assistance with activity   - Consult OT/PT to assist with strengthening/mobility   - Keep Call bell within reach  - Keep bed low and locked with side rails adjusted as appropriate  - Keep care items and personal belongings within reach  - Initiate and maintain comfort rounds  - Make Fall Risk Sign visible to staff  - Offer Toileting every 2 Hours, in advance of need  - Initiate/Maintain bed alarm  - Obtain necessary fall risk management equipment: yellow socks, yellow bracelet  - Apply yellow socks and bracelet for high fall risk patients  - Consider moving patient to room near nurses station  Outcome: Progressing     Problem: RESPIRATORY - ADULT  Goal: Achieves optimal ventilation and oxygenation  Description: INTERVENTIONS:  - Assess for changes in respiratory status  - Assess for changes in mentation and behavior  - Position to facilitate oxygenation and minimize respiratory effort  - Oxygen administered by appropriate delivery if ordered  - Initiate smoking cessation education as indicated  - Encourage broncho-pulmonary hygiene including cough, deep breathe, Incentive Spirometry  - Assess the need for suctioning and aspirate as needed  - Assess and instruct to report SOB or any respiratory difficulty  - Respiratory Therapy support as indicated  Outcome: Progressing     Problem: GASTROINTESTINAL - ADULT  Goal: Minimal or absence of nausea and/or vomiting  Description: INTERVENTIONS:  - Administer IV fluids if ordered to ensure adequate hydration  - Maintain NPO status until nausea and vomiting are resolved  - Nasogastric tube if ordered  - Administer ordered antiemetic medications as needed  - Provide nonpharmacologic comfort measures as appropriate  - Advance diet as tolerated, if ordered  - Consider nutrition services referral to assist patient with adequate nutrition and appropriate food choices  Outcome: Progressing  Goal: Maintains or returns to baseline bowel function  Description: INTERVENTIONS:  - Assess bowel function  - Encourage oral fluids to ensure adequate hydration  - Administer IV fluids if ordered to ensure adequate hydration  - Administer ordered medications as needed  - Encourage mobilization and activity  - Consider nutritional services referral to assist patient with adequate nutrition and appropriate food choices  Outcome: Progressing  Goal: Maintains adequate nutritional intake  Description: INTERVENTIONS:  - Monitor percentage of each meal consumed  - Identify factors contributing to decreased intake, treat as appropriate  - Assist with meals as needed  - Monitor I&O, weight, and lab values if indicated  - Obtain nutrition services referral as needed  Outcome: Progressing     Problem: METABOLIC, FLUID AND ELECTROLYTES - ADULT  Goal: Electrolytes maintained within normal limits  Description: INTERVENTIONS:  - Monitor labs and assess patient for signs and symptoms of electrolyte imbalances  - Administer electrolyte replacement as ordered  - Monitor response to electrolyte replacements, including repeat lab results as appropriate  - Instruct patient on fluid and nutrition as appropriate  Outcome: Progressing  Goal: Fluid balance maintained  Description: INTERVENTIONS:  - Monitor labs   - Monitor I/O and WT  - Instruct patient on fluid and nutrition as appropriate  - Assess for signs & symptoms of volume excess or deficit  Outcome: Progressing       Problem: HEMATOLOGIC - ADULT  Goal: Maintains hematologic stability  Description: INTERVENTIONS  - Assess for signs and symptoms of bleeding or hemorrhage  - Monitor labs  - Administer supportive blood products/factors as ordered and appropriate  Outcome: Progressing     Problem: MUSCULOSKELETAL - ADULT  Goal: Maintain or return mobility to safest level of function  Description: INTERVENTIONS:  - Assess patient's ability to carry out ADLs; assess patient's baseline for ADL function and identify physical deficits which impact ability to perform ADLs (bathing, care of mouth/teeth, toileting, grooming, dressing, etc )  - Assess/evaluate cause of self-care deficits   - Assess range of motion  - Assess patient's mobility  - Assess patient's need for assistive devices and provide as appropriate  - Encourage maximum independence but intervene and supervise when necessary  - Involve family in performance of ADLs  - Assess for home care needs following discharge   - Consider OT consult to assist with ADL evaluation and planning for discharge  - Provide patient education as appropriate  Outcome: Progressing

## 2023-05-20 NOTE — PLAN OF CARE
Problem: MOBILITY - ADULT  Goal: Maintain or return to baseline ADL function  Description: INTERVENTIONS:  -  Assess patient's ability to carry out ADLs; assess patient's baseline for ADL function and identify physical deficits which impact ability to perform ADLs (bathing, care of mouth/teeth, toileting, grooming, dressing, etc )  - Assess/evaluate cause of self-care deficits   - Assess range of motion  - Assess patient's mobility; develop plan if impaired  - Assess patient's need for assistive devices and provide as appropriate  - Encourage maximum independence but intervene and supervise when necessary  - Involve family in performance of ADLs  - Assess for home care needs following discharge   - Consider OT consult to assist with ADL evaluation and planning for discharge  - Provide patient education as appropriate  Outcome: Progressing  Goal: Maintains/Returns to pre admission functional level  Description: INTERVENTIONS:  - Perform BMAT or MOVE assessment daily    - Set and communicate daily mobility goal to care team and patient/family/caregiver     - Collaborate with rehabilitation services on mobility goals if consulted  - Out of bed for toileting  - Record patient progress and toleration of activity level   Outcome: Progressing     Problem: PAIN - ADULT  Goal: Verbalizes/displays adequate comfort level or baseline comfort level  Description: Interventions:  - Encourage patient to monitor pain and request assistance  - Assess pain using appropriate pain scale  - Administer analgesics based on type and severity of pain and evaluate response  - Implement non-pharmacological measures as appropriate and evaluate response  - Consider cultural and social influences on pain and pain management  - Notify physician/advanced practitioner if interventions unsuccessful or patient reports new pain  Outcome: Progressing     Problem: INFECTION - ADULT  Goal: Absence or prevention of progression during hospitalization  Description: INTERVENTIONS:  - Assess and monitor for signs and symptoms of infection  - Monitor lab/diagnostic results  - Monitor all insertion sites, i e  indwelling lines, tubes, and drains  - Monitor endotracheal if appropriate and nasal secretions for changes in amount and color  - [place] appropriate cooling/warming therapies per order  - Administer medications as ordered  - Instruct and encourage patient and family to use good hand hygiene technique  - Identify and instruct in appropriate isolation precautions for identified infection/condition  Outcome: Progressing     Problem: SAFETY ADULT  Goal: Patient will remain free of falls  Description: INTERVENTIONS:  - Educate patient/family on patient safety including physical limitations  - Instruct patient to call for assistance with activity   - Consult OT/PT to assist with strengthening/mobility   - Keep Call bell within reach  - Keep bed low and locked with side rails adjusted as appropriate  - Keep care items and personal belongings within reach  - Initiate and maintain comfort rounds  - Make Fall Risk Sign visible to staff  - Apply yellow socks and bracelet for high fall risk patients  - Consider moving patient to room near nurses station  Outcome: Progressing     Problem: DISCHARGE PLANNING  Goal: Discharge to home or other facility with appropriate resources  Description: INTERVENTIONS:  - Identify barriers to discharge w/patient and caregiver  - Arrange for needed discharge resources and transportation as appropriate  - Identify discharge learning needs (meds, wound care, etc )  - Arrange for interpretive services to assist at discharge as needed  - Refer to Case Management Department for coordinating discharge planning if the patient needs post-hospital services based on physician/advanced practitioner order or complex needs related to functional status, cognitive ability, or social support system  Outcome: Progressing     Problem: Knowledge Deficit  Goal: Patient/family/caregiver demonstrates understanding of disease process, treatment plan, medications, and discharge instructions  Description: Complete learning assessment and assess knowledge base  Interventions:  - Provide teaching at level of understanding  - Provide teaching via preferred learning methods  Outcome: Progressing     Problem: Nutrition/Hydration-ADULT  Goal: Nutrient/Hydration intake appropriate for improving, restoring or maintaining nutritional needs  Description: Monitor and assess patient's nutrition/hydration status for malnutrition  Collaborate with interdisciplinary team and initiate plan and interventions as ordered  Monitor patient's weight and dietary intake as ordered or per policy  Utilize nutrition screening tool and intervene as necessary  Determine patient's food preferences and provide high-protein, high-caloric foods as appropriate       INTERVENTIONS:  - Monitor oral intake, urinary output, labs, and treatment plans  - Assess nutrition and hydration status and recommend course of action  - Evaluate amount of meals eaten  - Assist patient with eating if necessary   - Allow adequate time for meals  - Recommend/ encourage appropriate diets, oral nutritional supplements, and vitamin/mineral supplements  - Order, calculate, and assess calorie counts as needed  - Recommend, monitor, and adjust tube feedings and TPN/PPN based on assessed needs  - Assess need for intravenous fluids  - Provide specific nutrition/hydration education as appropriate  - Include patient/family/caregiver in decisions related to nutrition  Outcome: Progressing     Problem: Prexisting or High Potential for Compromised Skin Integrity  Goal: Skin integrity is maintained or improved  Description: INTERVENTIONS:  - Identify patients at risk for skin breakdown  - Assess and monitor skin integrity  - Assess and monitor nutrition and hydration status  - Monitor labs   - Assess for incontinence - Turn and reposition patient  - Assist with mobility/ambulation  - Relieve pressure over bony prominences  - Avoid friction and shearing  - Provide appropriate hygiene as needed including keeping skin clean and dry  - Evaluate need for skin moisturizer/barrier cream  - Collaborate with interdisciplinary team   - Patient/family teaching  - Consider wound care consult   Outcome: Progressing     Problem: Potential for Falls  Goal: Patient will remain free of falls  Description: INTERVENTIONS:  - Educate patient/family on patient safety including physical limitations  - Instruct patient to call for assistance with activity   - Consult OT/PT to assist with strengthening/mobility   - Keep Call bell within reach  - Keep bed low and locked with side rails adjusted as appropriate  - Keep care items and personal belongings within reach  - Initiate and maintain comfort rounds  - Make Fall Risk Sign visible to staff  - Apply yellow socks and bracelet for high fall risk patients  - Consider moving patient to room near nurses station  Outcome: Progressing     Problem: RESPIRATORY - ADULT  Goal: Achieves optimal ventilation and oxygenation  Description: INTERVENTIONS:  - Assess for changes in respiratory status  - Assess for changes in mentation and behavior  - Position to facilitate oxygenation and minimize respiratory effort  - Oxygen administered by appropriate delivery if ordered  - Initiate smoking cessation education as indicated  - Encourage broncho-pulmonary hygiene including cough, deep breathe, Incentive Spirometry  - Assess the need for suctioning and aspirate as needed  - Assess and instruct to report SOB or any respiratory difficulty  - Respiratory Therapy support as indicated  Outcome: Progressing     Problem: GASTROINTESTINAL - ADULT  Goal: Minimal or absence of nausea and/or vomiting  Description: INTERVENTIONS:  - Administer IV fluids if ordered to ensure adequate hydration  - Maintain NPO status until nausea and vomiting are resolved  - Nasogastric tube if ordered  - Administer ordered antiemetic medications as needed  - Provide nonpharmacologic comfort measures as appropriate  - Advance diet as tolerated, if ordered  - Consider nutrition services referral to assist patient with adequate nutrition and appropriate food choices  Outcome: Progressing  Goal: Maintains or returns to baseline bowel function  Description: INTERVENTIONS:  - Assess bowel function  - Encourage oral fluids to ensure adequate hydration  - Administer IV fluids if ordered to ensure adequate hydration  - Administer ordered medications as needed  - Encourage mobilization and activity  - Consider nutritional services referral to assist patient with adequate nutrition and appropriate food choices  Outcome: Progressing  Goal: Maintains adequate nutritional intake  Description: INTERVENTIONS:  - Monitor percentage of each meal consumed  - Identify factors contributing to decreased intake, treat as appropriate  - Assist with meals as needed  - Monitor I&O, weight, and lab values if indicated  - Obtain nutrition services referral as needed  Outcome: Progressing     Problem: METABOLIC, FLUID AND ELECTROLYTES - ADULT  Goal: Electrolytes maintained within normal limits  Description: INTERVENTIONS:  - Monitor labs and assess patient for signs and symptoms of electrolyte imbalances  - Administer electrolyte replacement as ordered  - Monitor response to electrolyte replacements, including repeat lab results as appropriate  - Instruct patient on fluid and nutrition as appropriate  Outcome: Progressing  Goal: Fluid balance maintained  Description: INTERVENTIONS:  - Monitor labs   - Monitor I/O and WT  - Instruct patient on fluid and nutrition as appropriate  - Assess for signs & symptoms of volume excess or deficit  Outcome: Progressing     Problem: SKIN/TISSUE INTEGRITY - ADULT  Goal: Skin Integrity remains intact(Skin Breakdown Prevention)  Description: Assess:  -Inspect skin when repositioning, toileting, and assisting with ADLS  -Assess extremities for adequate circulation and sensation     Bed Management:  -Have minimal linens on bed & keep smooth, unwrinkled  -Change linens as needed when moist or perspiring    Toileting:  -Offer bedside commode  -Use incontinent care products after each incontinent episode such as     Activity:  -Encourage activity and walks on unit  -Encourage or provide ROM exercises   -Use appropriate equipment to lift or move patient in bed    Skin Care:  -Avoid use of baby powder, tape, friction and shearing, hot water or constrictive clothing  -Do not massage red bony areas    Next Steps:  Outcome: Progressing     Problem: HEMATOLOGIC - ADULT  Goal: Maintains hematologic stability  Description: INTERVENTIONS  - Assess for signs and symptoms of bleeding or hemorrhage  - Monitor labs  - Administer supportive blood products/factors as ordered and appropriate  Outcome: Progressing     Problem: MUSCULOSKELETAL - ADULT  Goal: Maintain or return mobility to safest level of function  Description: INTERVENTIONS:  - Assess patient's ability to carry out ADLs; assess patient's baseline for ADL function and identify physical deficits which impact ability to perform ADLs (bathing, care of mouth/teeth, toileting, grooming, dressing, etc )  - Assess/evaluate cause of self-care deficits   - Assess range of motion  - Assess patient's mobility  - Assess patient's need for assistive devices and provide as appropriate  - Encourage maximum independence but intervene and supervise when necessary  - Involve family in performance of ADLs  - Assess for home care needs following discharge   - Consider OT consult to assist with ADL evaluation and planning for discharge  - Provide patient education as appropriate  Outcome: Progressing

## 2023-05-20 NOTE — ASSESSMENT & PLAN NOTE
· In setting of gastroenteritis, pneumonia  · Baseline creatinine around 0 6-0 9; creatinine was 1 37 on presentation  · Continue IV abx    Afebrile, leukocytosis downtrending  · Trend WBC and fever curve  · Blood cultures negative x2 after 24 hours  · Urine antigens negative

## 2023-05-20 NOTE — ASSESSMENT & PLAN NOTE
· In setting of gastroenteritis, pneumonia  · Baseline creatinine around 0 6-0 9; creatinine was 1 37 on presentation  · Continue IV Rocephin and doxy    Afebrile, leukocytosis downtrending  · Trend WBC and fever curve  · Blood cultures negative x2 after 24 hours  · Urine antigens negative

## 2023-05-20 NOTE — ASSESSMENT & PLAN NOTE
· 5/20 patient now in COPD exacerbation with increased work of breathing despite being on 4 L nasal cannula  · CTA PE pending  · Initiated Doxy continue ceftriaxone  · Xopenex, Atrovent, Pulmicort, Perforomist  · ABG pending-> may need BiPAP  · COVID-negative  · Lactic acid negative  · Pro-Oscar elevated continue to trend  · Status post IV Solu-Medrol 80 mg and magnesium  · Continue Solu-Medrol 40 mg every 8 hours  · CC aware

## 2023-05-21 PROBLEM — J90 BILATERAL PLEURAL EFFUSION: Status: ACTIVE | Noted: 2023-05-21

## 2023-05-21 LAB
ANION GAP SERPL CALCULATED.3IONS-SCNC: 3 MMOL/L (ref 4–13)
BUN SERPL-MCNC: 19 MG/DL (ref 5–25)
CALCIUM SERPL-MCNC: 8.7 MG/DL (ref 8.4–10.2)
CHLORIDE SERPL-SCNC: 100 MMOL/L (ref 96–108)
CO2 SERPL-SCNC: 33 MMOL/L (ref 21–32)
CREAT SERPL-MCNC: 0.46 MG/DL (ref 0.6–1.3)
ERYTHROCYTE [DISTWIDTH] IN BLOOD BY AUTOMATED COUNT: 13.6 % (ref 11.6–15.1)
GFR SERPL CREATININE-BSD FRML MDRD: 91 ML/MIN/1.73SQ M
GLUCOSE SERPL-MCNC: 162 MG/DL (ref 65–140)
HCT VFR BLD AUTO: 29.4 % (ref 34.8–46.1)
HEMOCCULT STL QL: ABNORMAL
HEMOCCULT STL QL: ABNORMAL
HEMOCCULT STL QL: POSITIVE
HGB BLD-MCNC: 9.3 G/DL (ref 11.5–15.4)
INR PPP: 1.95 (ref 0.84–1.19)
MAGNESIUM SERPL-MCNC: 1.9 MG/DL (ref 1.9–2.7)
MCH RBC QN AUTO: 32.7 PG (ref 26.8–34.3)
MCHC RBC AUTO-ENTMCNC: 31.6 G/DL (ref 31.4–37.4)
MCV RBC AUTO: 104 FL (ref 82–98)
PLATELET # BLD AUTO: 286 THOUSANDS/UL (ref 149–390)
PMV BLD AUTO: 8.7 FL (ref 8.9–12.7)
POTASSIUM SERPL-SCNC: 3.6 MMOL/L (ref 3.5–5.3)
PROCALCITONIN SERPL-MCNC: 0.53 NG/ML
PROTHROMBIN TIME: 23.3 SECONDS (ref 11.6–14.5)
RBC # BLD AUTO: 2.84 MILLION/UL (ref 3.81–5.12)
SODIUM SERPL-SCNC: 136 MMOL/L (ref 135–147)
WBC # BLD AUTO: 5.92 THOUSAND/UL (ref 4.31–10.16)

## 2023-05-21 RX ORDER — POTASSIUM CHLORIDE 20 MEQ/1
40 TABLET, EXTENDED RELEASE ORAL ONCE
Status: COMPLETED | OUTPATIENT
Start: 2023-05-21 | End: 2023-05-21

## 2023-05-21 RX ORDER — FUROSEMIDE 10 MG/ML
40 INJECTION INTRAMUSCULAR; INTRAVENOUS
Status: DISCONTINUED | OUTPATIENT
Start: 2023-05-21 | End: 2023-05-24

## 2023-05-21 RX ADMIN — PANTOPRAZOLE SODIUM 40 MG: 40 INJECTION, POWDER, FOR SOLUTION INTRAVENOUS at 20:36

## 2023-05-21 RX ADMIN — FUROSEMIDE 40 MG: 10 INJECTION, SOLUTION INTRAMUSCULAR; INTRAVENOUS at 08:07

## 2023-05-21 RX ADMIN — METHYLPREDNISOLONE SODIUM SUCCINATE 40 MG: 40 INJECTION, POWDER, FOR SOLUTION INTRAMUSCULAR; INTRAVENOUS at 08:09

## 2023-05-21 RX ADMIN — LEVALBUTEROL HYDROCHLORIDE 1.25 MG: 1.25 SOLUTION RESPIRATORY (INHALATION) at 20:26

## 2023-05-21 RX ADMIN — IPRATROPIUM BROMIDE 0.5 MG: 0.5 SOLUTION RESPIRATORY (INHALATION) at 13:32

## 2023-05-21 RX ADMIN — WARFARIN SODIUM 2 MG: 2 TABLET ORAL at 19:21

## 2023-05-21 RX ADMIN — FUROSEMIDE 40 MG: 10 INJECTION, SOLUTION INTRAMUSCULAR; INTRAVENOUS at 15:37

## 2023-05-21 RX ADMIN — LEVOTHYROXINE SODIUM 25 MCG: 25 TABLET ORAL at 06:45

## 2023-05-21 RX ADMIN — CEFTRIAXONE 1000 MG: 1 INJECTION, SOLUTION INTRAVENOUS at 12:25

## 2023-05-21 RX ADMIN — DOXYCYCLINE 100 MG: 100 INJECTION, POWDER, LYOPHILIZED, FOR SOLUTION INTRAVENOUS at 02:33

## 2023-05-21 RX ADMIN — BUDESONIDE 0.5 MG: 0.5 INHALANT ORAL at 20:25

## 2023-05-21 RX ADMIN — FORMOTEROL FUMARATE DIHYDRATE 20 MCG: 20 SOLUTION RESPIRATORY (INHALATION) at 20:25

## 2023-05-21 RX ADMIN — IPRATROPIUM BROMIDE 0.5 MG: 0.5 SOLUTION RESPIRATORY (INHALATION) at 20:25

## 2023-05-21 RX ADMIN — IPRATROPIUM BROMIDE 0.5 MG: 0.5 SOLUTION RESPIRATORY (INHALATION) at 07:45

## 2023-05-21 RX ADMIN — LIDOCAINE 1 PATCH: 50 PATCH TOPICAL at 08:08

## 2023-05-21 RX ADMIN — FORMOTEROL FUMARATE DIHYDRATE 20 MCG: 20 SOLUTION RESPIRATORY (INHALATION) at 07:45

## 2023-05-21 RX ADMIN — POTASSIUM CHLORIDE 40 MEQ: 1500 TABLET, EXTENDED RELEASE ORAL at 08:09

## 2023-05-21 RX ADMIN — METHYLPREDNISOLONE SODIUM SUCCINATE 40 MG: 40 INJECTION, POWDER, FOR SOLUTION INTRAMUSCULAR; INTRAVENOUS at 02:32

## 2023-05-21 RX ADMIN — DILTIAZEM HYDROCHLORIDE 240 MG: 240 CAPSULE, EXTENDED RELEASE ORAL at 08:06

## 2023-05-21 RX ADMIN — LEVALBUTEROL HYDROCHLORIDE 1.25 MG: 1.25 SOLUTION RESPIRATORY (INHALATION) at 07:45

## 2023-05-21 RX ADMIN — PANTOPRAZOLE SODIUM 40 MG: 40 INJECTION, POWDER, FOR SOLUTION INTRAVENOUS at 08:09

## 2023-05-21 RX ADMIN — LEVALBUTEROL HYDROCHLORIDE 1.25 MG: 1.25 SOLUTION RESPIRATORY (INHALATION) at 13:32

## 2023-05-21 RX ADMIN — BUDESONIDE 0.5 MG: 0.5 INHALANT ORAL at 07:45

## 2023-05-21 NOTE — ASSESSMENT & PLAN NOTE
Lasix 40 bid-> continue  Echo done on 2/28/2023 showed an EF of 23% grade 1 diastolic dysfunction left atrium mildly dilated   I/o's  Monitor resp status

## 2023-05-21 NOTE — PROGRESS NOTES
Progress Note - Pulmonary   Justino Lambert 79 y o  female MRN: [id number]  Unit/Bed#: -01 Encounter: [id number]      Assessment:  1  Acute hypoxic respiratory failure on top of chronic hypercapnic respiratory failure  2  Volume overload with bilateral pleural effusions  3  Likely acute on chronic diastolic heart failure  4   R middle and lower lobe pneumonia  5  COPD with acute exacerbation   6  Possible GRACIELA  7  Gastroenteritis and GI bleeding  8  Afib on Coumadin  9  Severe protein calorie malnutrition     Plan:  1  There was some clinical improvement with the diuresis with Lasix  Continue diuresis per primary team     2   Continue Rocephin and Doxycycline for pneumonia  3  Continue Solumedrol 40mg IV q8, wean tomorrow to q12     4   Continue Xopenex and Atrovent TID  5  Continue Budesonide and Perforomist twice daily   6  She did tolerate BIPAP last night for 3 hrs  ABG showed clinical improvement  7   However, if she is still open to it, I would consider qualification for autoBIPAP on discharge  8   She may benefit from a palliative care consult    Subjective:   She states that she is feeling better today  No new events overnight  Objective:   Vitals: Blood pressure (!) 176/76, pulse 74, temperature 97 5 °F (36 4 °C), temperature source Oral, resp  rate 16, height 5' (1 524 m), SpO2 93 %, not currently breastfeeding , RA, Body mass index is 20 37 kg/m²  Intake/Output Summary (Last 24 hours) at 5/21/2023 0950  Last data filed at 5/21/2023 0601  Gross per 24 hour   Intake 320 ml   Output 1100 ml   Net -780 ml         Physical Exam  Gen: Awake, alert, oriented x 3, no acute distress  HEENT: Mucous membranes moist, no oral lesions, no thrush  NECK: No accessory muscle use, JVP not elevated  Cardiac: Regular, single S1, single S2, no murmurs, no rubs, no gallops  Lungs: Decreased breath sounds, improved wheezing      Abdomen: normoactive bowel sounds, soft nontender, nondistended, no rebound or rigidity, no guarding  Extremities: no cyanosis, no clubbing, no edema    Labs: I have personally reviewed pertinent lab results  Results from last 7 days   Lab Units 05/21/23  0403 05/20/23  1249 05/20/23  0504 05/19/23  0512 05/18/23  0748 05/17/23  1420 05/17/23  0741   WBC Thousand/uL 5 92  --  12 86* 12 52* 15 62*  --  22 02*   HEMOGLOBIN g/dL 9 3*  --  10 0* 8 5* 9 2*   < > 14 0   I STAT HEMOGLOBIN g/dl  --  10 9*  --   --   --   --   --    HEMATOCRIT % 29 4*  --  31 7* 26 0* 29 3*   < > 44 5   HEMATOCRIT, ISTAT %  --  32*  --   --   --   --   --    PLATELETS Thousands/uL 286  --  320 290 296  --  459*   NEUTROS PCT %  --   --  88* 89*  --   --  91*   MONOS PCT %  --   --  4 6  --   --  5   MONO PCT %  --   --   --   --  3*  --   --     < > = values in this interval not displayed  Results from last 7 days   Lab Units 05/21/23  0403 05/20/23  1249 05/20/23  0504 05/19/23  0512 05/18/23  0746 05/17/23  1420 05/17/23  0741   POTASSIUM mmol/L 3 6  --  3 8 3 4* 4 1   < > 5 5*   CHLORIDE mmol/L 100  --  103 103 100   < > 91*   CO2 mmol/L 33*  --  30 29 31   < > 32   CO2, I-STAT mmol/L  --  33*  --   --   --   --   --    BUN mg/dL 19  --  17 28* 29*   < > 48*   CREATININE mg/dL 0 46*  --  0 37* 0 52* 0 56*   < > 1 37*   CALCIUM mg/dL 8 7  --  8 6 8 3* 8 2*   < > 9 6   ALK PHOS U/L  --   --   --   --  147*  --  267*   ALT U/L  --   --   --   --  12  --  22   AST U/L  --   --   --   --  15  --  28   GLUCOSE, ISTAT mg/dl  --  210*  --   --   --   --   --     < > = values in this interval not displayed  Results from last 7 days   Lab Units 05/21/23  0403   MAGNESIUM mg/dL 1 9          Results from last 7 days   Lab Units 05/21/23  0510 05/20/23  0504 05/19/23  0621 05/18/23  0540 05/17/23  0741   INR  1 95* 1 76* 2 25*   < > 1 70*   PTT seconds  --   --   --   --  40*    < > = values in this interval not displayed       Results from last 7 days   Lab Units 05/20/23  0902   LACTIC ACID mmol/L 0 6     0 Lab Value Date/Time    TROPONINI <0 02 11/22/2017 1207         Meds/Allergies   Current Facility-Administered Medications   Medication Dose Route Frequency   • acetaminophen (TYLENOL) tablet 650 mg  650 mg Oral Q6H PRN   • budesonide (PULMICORT) inhalation solution 0 5 mg  0 5 mg Nebulization Q12H   • cefTRIAXone (ROCEPHIN) IVPB (premix in dextrose) 1,000 mg 50 mL  1,000 mg Intravenous Q24H   • digoxin (LANOXIN) tablet 125 mcg  125 mcg Oral Every Other Day   • diltiazem (CARDIZEM CD) 24 hr capsule 240 mg  240 mg Oral Daily   • doxycycline (VIBRAMYCIN) 100 mg in sodium chloride 0 9 % 100 mL IVPB  100 mg Intravenous Q12H   • formoterol (PERFOROMIST) nebulizer solution 20 mcg  20 mcg Nebulization Q12H   • furosemide (LASIX) injection 40 mg  40 mg Intravenous BID (diuretic)   • ipratropium (ATROVENT) 0 02 % inhalation solution 0 5 mg  0 5 mg Nebulization TID   • levalbuterol (XOPENEX) inhalation solution 1 25 mg  1 25 mg Nebulization TID   • levothyroxine tablet 25 mcg  25 mcg Oral Daily   • lidocaine (LIDODERM) 5 % patch 1 patch  1 patch Topical Daily   • methylPREDNISolone sodium succinate (Solu-MEDROL) injection 40 mg  40 mg Intravenous Q8H   • metoprolol (LOPRESSOR) injection 5 mg  5 mg Intravenous Q6H PRN   • ondansetron (ZOFRAN) injection 4 mg  4 mg Intravenous Q4H PRN   • pantoprazole (PROTONIX) injection 40 mg  40 mg Intravenous Q12H CARMEN   • warfarin (COUMADIN) tablet 2 mg  2 mg Oral Daily (warfarin)     Medications Prior to Admission   Medication   • umeclidinium (Incruse Ellipta) 62 5 mcg/actuation AEPB inhaler   • acetaminophen (TYLENOL) 325 mg tablet   • Advair Diskus 250-50 MCG/DOSE inhaler   • albuterol (2 5 mg/3 mL) 0 083 % nebulizer solution   • Ascorbic Acid (VITAMIN C) 1000 MG tablet   • Cholecalciferol (VITAMIN D3) 20 MCG (800 UNIT) TABS   • digoxin (LANOXIN) 0 125 mg tablet   • diltiazem (CARDIZEM CD) 240 mg 24 hr capsule   • Ferrous Sulfate 220 (44 Fe) MG/5ML LIQD   • levothyroxine 25 mcg tablet • lidocaine (LIDODERM) 5 %   • loratadine (CLARITIN) 10 mg tablet   • losartan (COZAAR) 25 mg tablet   • mirtazapine (REMERON) 7 5 MG tablet   • multivitamin (THERAGRAN) TABS   • senna-docusate sodium (SENOKOT S) 8 6-50 mg per tablet   • sertraline (Zoloft) 50 mg tablet   • spironolactone (ALDACTONE) 25 mg tablet   • tiZANidine (ZANAFLEX) 2 mg tablet   • warfarin (COUMADIN) 2 mg tablet         Microbiology:  Lab Results   Component Value Date    BLOODCX No Growth at 72 hrs  05/17/2023    BLOODCX No Growth at 72 hrs  05/17/2023    BLOODCX No Growth After 5 Days  05/04/2023    BLOODCX No Growth After 5 Days  05/04/2023    URINECX 50,000-59,000 cfu/ml Enterococcus faecalis (A) 02/28/2023    URINECX <10,000 cfu/ml 02/28/2023         Imaging and other studies: I have personally reviewed pertinent reports       No new images    DO [facility]

## 2023-05-21 NOTE — PROGRESS NOTES
[place]  Progress Note  Name: Gaby Mukherjee  MRN: [id number]  Unit/Bed#: -01 I Date of Admission: 5/17/2023   Date of Service: 5/21/2023 I Hospital Day: 4    Assessment/Plan   Chronic obstructive pulmonary disease with acute exacerbation ([facility] [address] )  Assessment & Plan  · 5/20 patient now in COPD exacerbation with increased work of breathing despite being on 4 L nasal cannula  · CTA PE negative for PE  · Continue Doxy day 1 continue ceftriaxone 4/10  · Xopenex, Atrovent, Pulmicort, Perforomist  · ABG   · COVID-negative  · Lactic acid negative  · Pro-Oscar elevated continue to trend  · Status post IV Solu-Medrol 80 mg and magnesium  · Continue Solu-Medrol 40 mg every 8 hours    * GI bleeding  Assessment & Plan  · Patient presenting from [place] with nausea, vomiting with heme positive emesis and loose stool  · Suspect gastroenteritis contributing, vomiting  · In the ED CT showing gastroenteritis  · Hemoglobin 14 0 on presentation however suspect this was hemoconcentrated  Baseline hemoglobin 9-10  · Warfarin resumed  · Diet advanced  · Patient clinically improving  Will hold off on EGD for now  · Continue Protonix  · Trend H/H  · resolved    Sepsis with acute renal failure without septic shock (HCC)  Assessment & Plan  · In setting of gastroenteritis, pneumonia  · Baseline creatinine around 0 6-0 9; creatinine was 1 37 on presentation  · Continue IV abx    Afebrile, leukocytosis downtrending  · Trend WBC and fever curve  · Blood cultures negative x2 after 24 hours  · Urine antigens negative    Pneumonia of right lower lobe due to infectious organism  Assessment & Plan  · Right lower lobe pneumonia found on CT  · MRSA culture pending  · Sputum culture pending  · Urine antigens negative  · Blood cultures negative x2 after 24 hours  · Continue supportive care with Mucinex, Tessalon Perles  · Aspiration precautions -did have episodes of vomiting at nursing facility, question if possible aspiration event  · Leukocytosis downtrending  Procalcitonin elevated, continue IV abx    Gastroenteritis  Assessment & Plan  · Presents with episodes of nausea/vomiting, diarrhea now appears resolved  · Gastroenterology following  · Advance diet as tolerated    Hyponatremia-resolved as of 5/20/2023  Assessment & Plan  · Most likely hypovolemic hyponatremia  · IVF dc'd  · resolved    Bilateral pleural effusion  Assessment & Plan  Lasix 40 bid  Echo done on 2/28/2023 showed an EF of 40% grade 1 diastolic dysfunction left atrium mildly dilated   I/o's  Monitor resp status    Acute respiratory failure with hypoxia and hypercapnia ([facility] [address] )  Assessment & Plan  Patient usually on room air at baseline  5/20/2023 required 4 L nasal cannula  ABG showing respiratory acidosis with metabolic compensation alkalosis  Transitioned to SD2  Currently on 3L NC      Severe protein-calorie malnutrition ([facility] [address] )  Assessment & Plan  Malnutrition Findings:   Adult Malnutrition type: Chronic illnessBMI Findings: Body mass index is 20 37 kg/m²  Consult nutrition    Essential hypertension  Assessment & Plan  · On Cardizem, digoxin, spironolactone, losartan   · Blood pressure improved today, resume Cardizem  Continue digoxin    Other specified hypothyroidism  Assessment & Plan  · Continue Synthroid    Atrial fibrillation (HCC)  Assessment & Plan  · Home regimen includes Cardizem and digoxin  · Continue with hold parameters  · AC with warfarin-> warfarin resumed  · Continue to trend INR           VTE Pharmacologic Prophylaxis: VTE Score: 6 High Risk (Score >/= 5) - Pharmacological DVT Prophylaxis Ordered: warfarin (Coumadin)  Sequential Compression Devices Ordered  Patient Centered Rounds: I performed bedside rounds with nursing staff today    Discussions with Specialists or Other Care Team Provider: GI, pulm, CM, Pt, Ot    Education and Discussions with Family / Patient: Attempted to update  (son) via phone  Left voicemail  Total Time Spent on Date of Encounter in care of patient: 35 minutes This time was spent on one or more of the following: performing physical exam; counseling and coordination of care; obtaining or reviewing history; documenting in the medical record; reviewing/ordering tests, medications or procedures; communicating with other healthcare professionals and discussing with patient's family/caregivers  Current Length of Stay: 4 day(s)  Current Patient Status: Inpatient   Certification Statement: The patient will continue to require additional inpatient hospital stay due to acopde  Discharge Plan: Anticipate discharge in >72 hrs to rehab facility  Code Status: Level 1 - Full Code    Subjective:   Simin was seen and examined at bedside  No acute events overnight  Discussed plan of care  All questions and concerns were answered and addressed  Patient states that she feels slightly better  Is eager to leave however needed reassurance that she is not medically stable as she is still requiring oxygen for which she usually is on room air at baseline  Patient looks clinically improved since yesterday  Objective:     Vitals:   Temp (24hrs), Av 1 °F (36 7 °C), Min:97 5 °F (36 4 °C), Max:98 4 °F (36 9 °C)    Temp:  [97 5 °F (36 4 °C)-98 4 °F (36 9 °C)] 97 5 °F (36 4 °C)  HR:  [67-76] 74  Resp:  [16-40] 16  BP: (126-176)/(58-76) 176/76  SpO2:  [92 %-98 %] 93 %  Body mass index is 20 37 kg/m²  Input and Output Summary (last 24 hours): Intake/Output Summary (Last 24 hours) at 2023 1221  Last data filed at 2023 0601  Gross per 24 hour   Intake 320 ml   Output 1100 ml   Net -780 ml       Physical Exam:   Physical Exam   Vitals and nursing note reviewed  Constitutional:       General: She is no longer in acute distress     Appearance: She is chronically ill-appearing  Comments: Thin frail   HENT:      Head: Normocephalic and atraumatic     Cardiovascular:      Rate and Rhythm: Normal rate and regular rhythm  Pulses: Normal pulses  Heart sounds: Normal heart sounds  Pulmonary:      Effort: Normal effort     Breath sounds: Wheezing present  However improved     Comments: 3L NC  Abdominal:      General: Abdomen is flat  Bowel sounds are normal       Palpations: Abdomen is soft  Musculoskeletal:      Right lower leg: No edema  Left lower leg: No edema  Skin:     General: Skin is warm  Neurological:      General: No focal deficit present  Mental Status: She is alert and oriented to person, place, and time  Additional Data:     Labs:  Results from last 7 days   Lab Units 05/21/23  0403 05/20/23  1249 05/20/23  0504   WBC Thousand/uL 5 92  --  12 86*   HEMOGLOBIN g/dL 9 3*  --  10 0*   I STAT HEMOGLOBIN   --    < >  --    HEMATOCRIT % 29 4*  --  31 7*   HEMATOCRIT, ISTAT   --    < >  --    PLATELETS Thousands/uL 286  --  320   NEUTROS PCT %  --   --  88*   LYMPHS PCT %  --   --  6*   MONOS PCT %  --   --  4   EOS PCT %  --   --  1    < > = values in this interval not displayed  Results from last 7 days   Lab Units 05/21/23  0403 05/19/23  0512 05/18/23  0746   SODIUM mmol/L 136   < > 133*   POTASSIUM mmol/L 3 6   < > 4 1   CHLORIDE mmol/L 100   < > 100   CO2 mmol/L 33*   < > 31   CO2, I-STAT   --    < >  --    BUN mg/dL 19   < > 29*   CREATININE mg/dL 0 46*   < > 0 56*   ANION GAP mmol/L 3*   < > 2*   CALCIUM mg/dL 8 7   < > 8 2*   ALBUMIN g/dL  --   --  2 8*   TOTAL BILIRUBIN mg/dL  --   --  0 80   ALK PHOS U/L  --   --  147*   ALT U/L  --   --  12   AST U/L  --   --  15   GLUCOSE RANDOM mg/dL 162*   < > 86    < > = values in this interval not displayed       Results from last 7 days   Lab Units 05/21/23  0510   INR  1 95*             Results from last 7 days   Lab Units 05/21/23  0403 05/20/23  0902 05/18/23  0540 05/17/23  1648 05/17/23  1036 05/17/23  0741   LACTIC ACID mmol/L  --  0 6  --  1 3 2 8* 3 9*   PROCALCITONIN ng/ml 0 53* 0 68* 2 66* --   --  0 61*       Lines/Drains:  Invasive Devices     Peripheral Intravenous Line  Duration           Peripheral IV 05/20/23 Left Forearm 1 day    Peripheral IV 05/21/23 Distal;Left Forearm <1 day                      Imaging: Reviewed radiology reports from this admission including: chest CT scan    Recent Cultures (last 7 days):   Results from last 7 days   Lab Units 05/18/23  1722 05/17/23  1653 05/17/23  1234   BLOOD CULTURE   --   --  No Growth at 72 hrs  No Growth at 72 hrs     LEGIONELLA URINARY ANTIGEN   --  Negative  --    C DIFF TOXIN B BY PCR  Negative  --   --        Last 24 Hours Medication List:   Current Facility-Administered Medications   Medication Dose Route Frequency Provider Last Rate   • acetaminophen  650 mg Oral Q6H PRN Shelly Slaughter MD     • budesonide  0 5 mg Nebulization Q12H Shelly Slaughter MD     • cefTRIAXone  1,000 mg Intravenous Q24H Hari Kc PA-C 1,000 mg (05/20/23 1313)   • digoxin  125 mcg Oral Every Other Day Shelly Slaughter MD     • diltiazem  240 mg Oral Daily Hari Kc PA-C     • doxycycline  100 mg Intravenous Q12H Shelly Slaughter MD Stopped (05/21/23 0648)   • formoterol  20 mcg Nebulization Q12H Shelly Slaughter MD     • furosemide  40 mg Intravenous BID (diuretic) Shelly Slaughter MD     • ipratropium  0 5 mg Nebulization TID Shelly Slaughter MD     • levalbuterol  1 25 mg Nebulization TID Shelly Slaughter MD     • levothyroxine  25 mcg Oral Daily Shelly Slaughter MD     • lidocaine  1 patch Topical Daily Shelly Slaugther MD     • methylPREDNISolone sodium succinate  40 mg Intravenous Merlin Fields MD     • metoprolol  5 mg Intravenous Q6H PRN Shelly Slaughter MD     • ondansetron  4 mg Intravenous Q4H PRN Shelly Slaughter MD     • pantoprazole  40 mg Intravenous Q12H 200 Seema Alexis MD     • warfarin  2 mg Oral Daily (warfarin) Hari Kc PA-C          Today, Patient Was Seen By: Shelly Slaughter MD    **Please Note: This note may have been constructed using a voice recognition system  **

## 2023-05-21 NOTE — ASSESSMENT & PLAN NOTE
· 5/20 patient now in COPD exacerbation with increased work of breathing despite being on 4 L nasal cannula  · CTA PE negative for PE  · Continue Doxy day 2 continue ceftriaxone 5  · Xopenex, Atrovent, Pulmicort, Perforomist  · ABG   · COVID-negative  · Lactic acid negative  · Pro-Oscar downward trending  · Status post IV Solu-Medrol 80 mg and magnesium  · Solu-Medrol 40 mg decreased to every 12 hours

## 2023-05-21 NOTE — ASSESSMENT & PLAN NOTE
Patient usually on room air at baseline  5/20/2023 required 4 L nasal cannula  ABG showing respiratory acidosis with metabolic compensation alkalosis  Transitioned to SD2  Currently on 2L NC

## 2023-05-21 NOTE — ASSESSMENT & PLAN NOTE
Lasix 40 bid  Echo done on 2/28/2023 showed an EF of 37% grade 1 diastolic dysfunction left atrium mildly dilated   I/o's  Monitor resp status

## 2023-05-21 NOTE — ASSESSMENT & PLAN NOTE
· Patient presenting from [place] with nausea, vomiting with heme positive emesis and loose stool  · Suspect gastroenteritis contributing, vomiting  · In the ED CT showing gastroenteritis  · Hemoglobin 14 0 on presentation however suspect this was hemoconcentrated  Baseline hemoglobin 9-10  · Warfarin resumed  · Diet advanced  · Patient clinically improving  Will hold off on EGD for now    · Continue Protonix  · Trend H/H  · resolved

## 2023-05-21 NOTE — PLAN OF CARE
Problem: MOBILITY - ADULT  Goal: Maintain or return to baseline ADL function  Description: INTERVENTIONS:  -  Assess patient's ability to carry out ADLs; assess patient's baseline for ADL function and identify physical deficits which impact ability to perform ADLs (bathing, care of mouth/teeth, toileting, grooming, dressing, etc )  - Assess/evaluate cause of self-care deficits   - Assess range of motion  - Assess patient's mobility; develop plan if impaired  - Assess patient's need for assistive devices and provide as appropriate  - Encourage maximum independence but intervene and supervise when necessary  - Involve family in performance of ADLs  - Assess for home care needs following discharge   - Consider OT consult to assist with ADL evaluation and planning for discharge  - Provide patient education as appropriate  Outcome: Progressing  Goal: Maintains/Returns to pre admission functional level  Description: INTERVENTIONS:  - Perform BMAT or MOVE assessment daily    - Set and communicate daily mobility goal to care team and patient/family/caregiver  - Collaborate with rehabilitation services on mobility goals if consulted  - Perform Range of Motion 2 times a day  - Reposition patient every 2 hours    - Dangle patient 2 times a day  - Stand patient 2 times a day  - Ambulate patient 2 times a day  - Out of bed to chair 2 times a day   - Out of bed for meals 2  Problem: PAIN - ADULT  Goal: Verbalizes/displays adequate comfort level or baseline comfort level  Description: Interventions:  - Encourage patient to monitor pain and request assistance  - Assess pain using appropriate pain scale  - Administer analgesics based on type and severity of pain and evaluate response  - Implement non-pharmacological measures as appropriate and evaluate response  - Consider cultural and social influences on pain and pain management  - Notify physician/advanced practitioner if interventions unsuccessful or patient reports new pain  Outcome: Progressing     Problem: INFECTION - ADULT  Goal: Absence or prevention of progression during hospitalization  Description: INTERVENTIONS:  - Assess and monitor for signs and symptoms of infection  - Monitor lab/diagnostic results  - Monitor all insertion sites, i e  indwelling lines, tubes, and drains  - Monitor endotracheal if appropriate and nasal secretions for changes in amount and color  - [place] appropriate cooling/warming therapies per order  - Administer medications as ordered  - Instruct and encourage patient and family to use good hand hygiene technique  - Identify and instruct in appropriate isolation precautions for identified infection/condition  Outcome: Progressing     Problem: SAFETY ADULT  Goal: Patient will remain free of falls  Description: INTERVENTIONS:  - Educate patient/family on patient safety including physical limitations  - Instruct patient to call for assistance with activity   - Consult OT/PT to assist with strengthening/mobility   - Keep Call bell within reach  - Keep bed low and locked with side rails adjusted as appropriate  - Keep care items and personal belongings within reach  - Initiate and maintain comfort rounds  - Make Fall Risk Sign visible to staff  - Offer Toileting ever 2 Hours, in advance of need  - Initiate/Maintain bed alarm  - Obtain necessary fall risk management equipment: socks  Problem: DISCHARGE PLANNING  Goal: Discharge to home or other facility with appropriate resources  Description: INTERVENTIONS:  - Identify barriers to discharge w/patient and caregiver  - Arrange for needed discharge resources and transportation as appropriate  - Identify discharge learning needs (meds, wound care, etc )  - Arrange for interpretive services to assist at discharge as needed  - Refer to Case Management Department for coordinating discharge planning if the patient needs post-hospital services based on physician/advanced practitioner order or complex needs related to functional status, cognitive ability, or social support system  Outcome: Progressing     Problem: Knowledge Deficit  Goal: Patient/family/caregiver demonstrates understanding of disease process, treatment plan, medications, and discharge instructions  Description: Complete learning assessment and assess knowledge base  Interventions:  - Provide teaching at level of understanding  - Provide teaching via preferred learning methods  Outcome: Progressing     Problem: Nutrition/Hydration-ADULT  Goal: Nutrient/Hydration intake appropriate for improving, restoring or maintaining nutritional needs  Description: Monitor and assess patient's nutrition/hydration status for malnutrition  Collaborate with interdisciplinary team and initiate plan and interventions as ordered  Monitor patient's weight and dietary intake as ordered or per policy  Utilize nutrition screening tool and intervene as necessary  Determine patient's food preferences and provide high-protein, high-caloric foods as appropriate       INTERVENTIONS:  - Monitor oral intake, urinary output, labs, and treatment plans  - Assess nutrition and hydration status and recommend course of action  - Evaluate amount of meals eaten  - Assist patient with eating if necessary   - Allow adequate time for meals  - Recommend/ encourage appropriate diets, oral nutritional supplements, and vitamin/mineral supplements  - Order, calculate, and assess calorie counts as needed  - Recommend, monitor, and adjust tube feedings and TPN/PPN based on assessed needs  - Assess need for intravenous fluids  - Provide specific nutrition/hydration education as appropriate  - Include patient/family/caregiver in decisions related to nutrition  Outcome: Progressing     Problem: Prexisting or High Potential for Compromised Skin Integrity  Goal: Skin integrity is maintained or improved  Description: INTERVENTIONS:  - Identify patients at risk for skin breakdown  - Assess and monitor skin integrity  - Assess and monitor nutrition and hydration status  - Monitor labs   - Assess for incontinence   - Turn and reposition patient  - Assist with mobility/ambulation  - Relieve pressure over bony prominences  - Avoid friction and shearing  - Provide appropriate hygiene as needed including keeping skin clean and dry  - Evaluate need for skin moisturizer/barrier cream  - Collaborate with interdisciplinary team   - Patient/family teaching  - Consider wound care consult   Outcome: Progressing     Problem: Potential for Falls  Goal: Patient will remain free of falls  Description: INTERVENTIONS:  - Educate patient/family on patient safety including physical limitations  - Instruct patient to call for assistance with activity   - Consult OT/PT to assist with strengthening/mobility   - Keep Call bell within reach  - Keep bed low and locked with side rails adjusted as appropriate  - Keep care items and personal belongings within reach  - Initiate and maintain comfort rounds  - Make Fall Risk Sign visible to staff  - Offer Toileting every 2 Hours, in advance of need  - Initiate/Maintain bed alarm  - Obtain necessary fall risk management equipment: socks  Problem: RESPIRATORY - ADULT  Goal: Achieves optimal ventilation and oxygenation  Description: INTERVENTIONS:  - Assess for changes in respiratory status  - Assess for changes in mentation and behavior  - Position to facilitate oxygenation and minimize respiratory effort  - Oxygen administered by appropriate delivery if ordered  - Initiate smoking cessation education as indicated  - Encourage broncho-pulmonary hygiene including cough, deep breathe, Incentive Spirometry  - Assess the need for suctioning and aspirate as needed  - Assess and instruct to report SOB or any respiratory difficulty  - Respiratory Therapy support as indicated  Outcome: Progressing     Problem: GASTROINTESTINAL - ADULT  Goal: Minimal or absence of nausea and/or vomiting  Description: INTERVENTIONS:  - Administer IV fluids if ordered to ensure adequate hydration  - Maintain NPO status until nausea and vomiting are resolved  - Nasogastric tube if ordered  - Administer ordered antiemetic medications as needed  - Provide nonpharmacologic comfort measures as appropriate  - Advance diet as tolerated, if ordered  - Consider nutrition services referral to assist patient with adequate nutrition and appropriate food choices  Outcome: Progressing  Goal: Maintains or returns to baseline bowel function  Description: INTERVENTIONS:  - Assess bowel function  - Encourage oral fluids to ensure adequate hydration  - Administer IV fluids if ordered to ensure adequate hydration  - Administer ordered medications as needed  - Encourage mobilization and activity  - Consider nutritional services referral to assist patient with adequate nutrition and appropriate food choices  Outcome: Progressing  Goal: Maintains adequate nutritional intake  Description: INTERVENTIONS:  - Monitor percentage of each meal consumed  - Identify factors contributing to decreased intake, treat as appropriate  - Assist with meals as needed  - Monitor I&O, weight, and lab values if indicated  - Obtain nutrition services referral as needed  Outcome: Progressing     Problem: METABOLIC, FLUID AND ELECTROLYTES - ADULT  Goal: Electrolytes maintained within normal limits  Description: INTERVENTIONS:  - Monitor labs and assess patient for signs and symptoms of electrolyte imbalances  - Administer electrolyte replacement as ordered  - Monitor response to electrolyte replacements, including repeat lab results as appropriate  - Instruct patient on fluid and nutrition as appropriate  Outcome: Progressing  Goal: Fluid balance maintained  Description: INTERVENTIONS:  - Monitor labs   - Monitor I/O and WT  - Instruct patient on fluid and nutrition as appropriate  - Assess for signs & symptoms of volume excess or deficit  Outcome: Progressing     Problem: SKIN/TISSUE INTEGRITY - ADULT  Goal: Skin Integrity remains intact(Skin Breakdown Prevention)  Description: Assess:  -Perform [place] assessment every shift  -Clean and moisturize skin every shift  -Inspect skin when repositioning, toileting, and assisting with ADLS  -Assess extremities for adequate circulation and sensation     Bed Management:  -Have minimal linens on bed & keep smooth, unwrinkled  -Change linens as needed when moist or perspiring  -Avoid sitting or lying in one position for more than 2 hours while in bed  -Keep HOB at 30 degrees     Toileting:  -Offer bedside commode  -Assess for incontinence every shift  -Use incontinent care products after each incontinent episode such as weight shifting    Activity:  -Mobilize patient 2 times a day  -Encourage activity and walks on unit  -Encourage or provide ROM exercises   -Turn and reposition patient every 2 Hours  -Use appropriate equipment to lift or move patient in bed  -Instruct/ Assist with weight shifting every shift when out of bed in chair  -Consider limitation of chair time 2 hour intervals    Skin Care:  -Avoid use of baby powder, tape, friction and shearing, hot water or constrictive clothing  -Relieve pressure over bony prominences using weight shifting  -Do not massage red bony areas    Next Steps:  -Teach patient strategies to minimize risks such as weight shifting   -Consider consults to  interdisciplinary teams such as   Problem: HEMATOLOGIC - ADULT  Goal: Maintains hematologic stability  Description: INTERVENTIONS  - Assess for signs and symptoms of bleeding or hemorrhage  - Monitor labs  - Administer supportive blood products/factors as ordered and appropriate  Outcome: Progressing     Problem: MUSCULOSKELETAL - ADULT  Goal: Maintain or return mobility to safest level of function  Description: INTERVENTIONS:  - Assess patient's ability to carry out ADLs; assess patient's baseline for ADL function and identify physical deficits which impact ability to perform ADLs (bathing, care of mouth/teeth, toileting, grooming, dressing, etc )  - Assess/evaluate cause of self-care deficits   - Assess range of motion  - Assess patient's mobility  - Assess patient's need for assistive devices and provide as appropriate  - Encourage maximum independence but intervene and supervise when necessary  - Involve family in performance of ADLs  - Assess for home care needs following discharge   - Consider OT consult to assist with ADL evaluation and planning for discharge  - Provide patient education as appropriate  Outcome: Progressing     Outcome: Progressing     - Apply yellow socks and bracelet for high fall risk patients  - Consider moving patient to room near nurses station  Outcome: Progressing     - Apply yellow socks and bracelet for high fall risk patients  - Consider moving patient to room near nurses station  Outcome: Progressing    times a day  - Out of bed for toileting  - Record patient progress and toleration of activity level   Outcome: Progressing

## 2023-05-22 PROBLEM — K92.2 GI BLEEDING: Status: RESOLVED | Noted: 2023-05-17 | Resolved: 2023-05-22

## 2023-05-22 LAB
ANION GAP SERPL CALCULATED.3IONS-SCNC: 6 MMOL/L (ref 4–13)
BACTERIA BLD CULT: NORMAL
BACTERIA BLD CULT: NORMAL
BUN SERPL-MCNC: 26 MG/DL (ref 5–25)
CALCIUM SERPL-MCNC: 9 MG/DL (ref 8.4–10.2)
CHLORIDE SERPL-SCNC: 98 MMOL/L (ref 96–108)
CO2 SERPL-SCNC: 32 MMOL/L (ref 21–32)
CREAT SERPL-MCNC: 0.47 MG/DL (ref 0.6–1.3)
ERYTHROCYTE [DISTWIDTH] IN BLOOD BY AUTOMATED COUNT: 13.5 % (ref 11.6–15.1)
GFR SERPL CREATININE-BSD FRML MDRD: 90 ML/MIN/1.73SQ M
GLUCOSE SERPL-MCNC: 154 MG/DL (ref 65–140)
HCT VFR BLD AUTO: 30.5 % (ref 34.8–46.1)
HGB BLD-MCNC: 9.8 G/DL (ref 11.5–15.4)
INR PPP: 2.22 (ref 0.84–1.19)
MCH RBC QN AUTO: 32.8 PG (ref 26.8–34.3)
MCHC RBC AUTO-ENTMCNC: 32.1 G/DL (ref 31.4–37.4)
MCV RBC AUTO: 102 FL (ref 82–98)
PLATELET # BLD AUTO: 274 THOUSANDS/UL (ref 149–390)
PMV BLD AUTO: 8.6 FL (ref 8.9–12.7)
POTASSIUM SERPL-SCNC: 4 MMOL/L (ref 3.5–5.3)
PROTHROMBIN TIME: 25.8 SECONDS (ref 11.6–14.5)
RBC # BLD AUTO: 2.99 MILLION/UL (ref 3.81–5.12)
SODIUM SERPL-SCNC: 136 MMOL/L (ref 135–147)
WBC # BLD AUTO: 10.56 THOUSAND/UL (ref 4.31–10.16)

## 2023-05-22 RX ORDER — PREDNISONE 20 MG/1
40 TABLET ORAL DAILY
Status: DISCONTINUED | OUTPATIENT
Start: 2023-05-23 | End: 2023-05-25 | Stop reason: HOSPADM

## 2023-05-22 RX ORDER — METHYLPREDNISOLONE SODIUM SUCCINATE 40 MG/ML
40 INJECTION, POWDER, LYOPHILIZED, FOR SOLUTION INTRAMUSCULAR; INTRAVENOUS EVERY 12 HOURS SCHEDULED
Status: COMPLETED | OUTPATIENT
Start: 2023-05-22 | End: 2023-05-22

## 2023-05-22 RX ADMIN — IPRATROPIUM BROMIDE 0.5 MG: 0.5 SOLUTION RESPIRATORY (INHALATION) at 14:42

## 2023-05-22 RX ADMIN — IPRATROPIUM BROMIDE 0.5 MG: 0.5 SOLUTION RESPIRATORY (INHALATION) at 20:32

## 2023-05-22 RX ADMIN — FORMOTEROL FUMARATE DIHYDRATE 20 MCG: 20 SOLUTION RESPIRATORY (INHALATION) at 20:32

## 2023-05-22 RX ADMIN — LEVALBUTEROL HYDROCHLORIDE 1.25 MG: 1.25 SOLUTION RESPIRATORY (INHALATION) at 14:42

## 2023-05-22 RX ADMIN — DOXYCYCLINE 100 MG: 100 INJECTION, POWDER, LYOPHILIZED, FOR SOLUTION INTRAVENOUS at 16:56

## 2023-05-22 RX ADMIN — METHYLPREDNISOLONE SODIUM SUCCINATE 40 MG: 40 INJECTION, POWDER, FOR SOLUTION INTRAMUSCULAR; INTRAVENOUS at 01:09

## 2023-05-22 RX ADMIN — BUDESONIDE 0.5 MG: 0.5 INHALANT ORAL at 20:32

## 2023-05-22 RX ADMIN — METHYLPREDNISOLONE SODIUM SUCCINATE 40 MG: 40 INJECTION, POWDER, FOR SOLUTION INTRAMUSCULAR; INTRAVENOUS at 08:08

## 2023-05-22 RX ADMIN — PANTOPRAZOLE SODIUM 40 MG: 40 INJECTION, POWDER, FOR SOLUTION INTRAVENOUS at 08:24

## 2023-05-22 RX ADMIN — DIGOXIN 125 MCG: 125 TABLET ORAL at 08:08

## 2023-05-22 RX ADMIN — DILTIAZEM HYDROCHLORIDE 240 MG: 240 CAPSULE, EXTENDED RELEASE ORAL at 08:24

## 2023-05-22 RX ADMIN — FORMOTEROL FUMARATE DIHYDRATE 20 MCG: 20 SOLUTION RESPIRATORY (INHALATION) at 08:47

## 2023-05-22 RX ADMIN — LEVOTHYROXINE SODIUM 25 MCG: 25 TABLET ORAL at 05:09

## 2023-05-22 RX ADMIN — IPRATROPIUM BROMIDE 0.5 MG: 0.5 SOLUTION RESPIRATORY (INHALATION) at 08:20

## 2023-05-22 RX ADMIN — PANTOPRAZOLE SODIUM 40 MG: 40 INJECTION, POWDER, FOR SOLUTION INTRAVENOUS at 23:51

## 2023-05-22 RX ADMIN — METHYLPREDNISOLONE SODIUM SUCCINATE 40 MG: 40 INJECTION, POWDER, FOR SOLUTION INTRAMUSCULAR; INTRAVENOUS at 23:51

## 2023-05-22 RX ADMIN — WARFARIN SODIUM 2 MG: 2 TABLET ORAL at 16:56

## 2023-05-22 RX ADMIN — FUROSEMIDE 40 MG: 10 INJECTION, SOLUTION INTRAMUSCULAR; INTRAVENOUS at 08:08

## 2023-05-22 RX ADMIN — DOXYCYCLINE 100 MG: 100 INJECTION, POWDER, LYOPHILIZED, FOR SOLUTION INTRAVENOUS at 04:59

## 2023-05-22 RX ADMIN — LEVALBUTEROL HYDROCHLORIDE 1.25 MG: 1.25 SOLUTION RESPIRATORY (INHALATION) at 20:32

## 2023-05-22 RX ADMIN — CEFTRIAXONE 1000 MG: 1 INJECTION, SOLUTION INTRAVENOUS at 14:10

## 2023-05-22 RX ADMIN — LEVALBUTEROL HYDROCHLORIDE 1.25 MG: 1.25 SOLUTION RESPIRATORY (INHALATION) at 08:20

## 2023-05-22 RX ADMIN — FUROSEMIDE 40 MG: 10 INJECTION, SOLUTION INTRAMUSCULAR; INTRAVENOUS at 17:13

## 2023-05-22 RX ADMIN — BUDESONIDE 0.5 MG: 0.5 INHALANT ORAL at 08:21

## 2023-05-22 NOTE — PLAN OF CARE
Problem: MOBILITY - ADULT  Goal: Maintain or return to baseline ADL function  Description: INTERVENTIONS:  -  Assess patient's ability to carry out ADLs; assess patient's baseline for ADL function and identify physical deficits which impact ability to perform ADLs (bathing, care of mouth/teeth, toileting, grooming, dressing, etc )  - Assess/evaluate cause of self-care deficits   - Assess range of motion  - Assess patient's mobility; develop plan if impaired  - Assess patient's need for assistive devices and provide as appropriate  - Encourage maximum independence but intervene and supervise when necessary  - Involve family in performance of ADLs  - Assess for home care needs following discharge   - Consider OT consult to assist with ADL evaluation and planning for discharge  - Provide patient education as appropriate  Outcome: Progressing  Goal: Maintains/Returns to pre admission functional level  Description: INTERVENTIONS:  - Perform BMAT or MOVE assessment daily    - Set and communicate daily mobility goal to care team and patient/family/caregiver  - Collaborate with rehabilitation services on mobility goals if consulted  - Perform Range of Motion 2 times a day  - Reposition patient every 2 hours    - Dangle patient 2 times a day  - Stand patient 2 times a day  - Ambulate patient 2 times a day  - Out of bed to chair 2 times a day   - Out of bed for meals 2 times a day  - Out of bed for toileting  - Record patient progress and toleration of activity level   Outcome: Progressing     Problem: PAIN - ADULT  Goal: Verbalizes/displays adequate comfort level or baseline comfort level  Description: Interventions:  - Encourage patient to monitor pain and request assistance  - Assess pain using appropriate pain scale  - Administer analgesics based on type and severity of pain and evaluate response  - Implement non-pharmacological measures as appropriate and evaluate response  - Consider cultural and social influences on pain and pain management  - Notify physician/advanced practitioner if interventions unsuccessful or patient reports new pain  Outcome: Progressing     Problem: INFECTION - ADULT  Goal: Absence or prevention of progression during hospitalization  Description: INTERVENTIONS:  - Assess and monitor for signs and symptoms of infection  - Monitor lab/diagnostic results  - Monitor all insertion sites, i e  indwelling lines, tubes, and drains  - Monitor endotracheal if appropriate and nasal secretions for changes in amount and color  - [place] appropriate cooling/warming therapies per order  - Administer medications as ordered  - Instruct and encourage patient and family to use good hand hygiene technique  - Identify and instruct in appropriate isolation precautions for identified infection/condition  Outcome: Progressing     Problem: SAFETY ADULT  Goal: Patient will remain free of falls  Description: INTERVENTIONS:  - Educate patient/family on patient safety including physical limitations  - Instruct patient to call for assistance with activity   - Consult OT/PT to assist with strengthening/mobility   - Keep Call bell within reach  - Keep bed low and locked with side rails adjusted as appropriate  - Keep care items and personal belongings within reach  - Initiate and maintain comfort rounds  - Make Fall Risk Sign visible to staff  - Offer Toileting every 2 Hours, in advance of need  - Initiate/Maintain bed alarm  - Obtain necessary fall risk management equipment: call bell usage  - Apply yellow socks and bracelet for high fall risk patients  - Consider moving patient to room near nurses station  Outcome: Progressing     Problem: Nutrition/Hydration-ADULT  Goal: Nutrient/Hydration intake appropriate for improving, restoring or maintaining nutritional needs  Description: Monitor and assess patient's nutrition/hydration status for malnutrition   Collaborate with interdisciplinary team and initiate plan and interventions as ordered  Monitor patient's weight and dietary intake as ordered or per policy  Utilize nutrition screening tool and intervene as necessary  Determine patient's food preferences and provide high-protein, high-caloric foods as appropriate       INTERVENTIONS:  - Monitor oral intake, urinary output, labs, and treatment plans  - Assess nutrition and hydration status and recommend course of action  - Evaluate amount of meals eaten  - Assist patient with eating if necessary   - Allow adequate time for meals  - Recommend/ encourage appropriate diets, oral nutritional supplements, and vitamin/mineral supplements  - Order, calculate, and assess calorie counts as needed  - Recommend, monitor, and adjust tube feedings and TPN/PPN based on assessed needs  - Assess need for intravenous fluids  - Provide specific nutrition/hydration education as appropriate  - Include patient/family/caregiver in decisions related to nutrition  Outcome: Progressing

## 2023-05-22 NOTE — PROGRESS NOTES
Progress Note - Pulmonary   Kyrie Abarca 79 y o  female MRN: [id number]  Unit/Bed#: -01 Encounter: [id number]      Assessment & Recommendations:  1   Acute hypoxic respiratory failure with acute/chronic hypercapnic respiratory failure  · Titrate O2 to keep SpO2 >88%, IS, OOB-chair  · Will likely need home O2 at d/c, would perform ambulatory SpO2 evaluation prior to discharge  · Refused further BiPAP trials or evaluations    2   Volume overload with bilateral pleural effusions  · Improved with some diuresis, appears euvolemic currently  · Further diuresis per primary service    3   Acute on chronic diastolic heart failure - as above    4   R middle and lower lobe pneumonia  · D5/7 Ceftriaxone, D3/5 doxycycline  · Would complete course as above, can transition to PO at time of discharge  · Would repeat CXR in 6-8 weeks    5   Reported COPD with acute exacerbation   · Agree with solumedrol taper, d/c after today  · Start prednisone 40mg daily tomorrow and taper by 10mg every 3 days until off  · Continue pulmicort/perforomist BID and xopenex/atrovent TID while admitted  · PFTs as outpatient  · Will arrange for pulmonary follow up after discharge  · Would discharge on triple therapy - Incruse and Advair or Trelegy 100mcg daily    6   Possible GRACIELA    8   Afib on Coumadin    9   Mod-Severe protein calorie malnutrition     Hopeful for d/c in next 24-48hrs      Subjective:   She reports she does not want to use PAP therapy at night  She refused last night and states she will not utilize tonight  She reports she is feeling better but wants to get of steroids quickly  She reports she did not require oxygen prior to admission, has not been seen by pulmonary previously    Objective:     Vitals: Blood pressure 143/55, pulse 69, temperature 98 1 °F (36 7 °C), resp  rate 16, height 5' (1 524 m), SpO2 95 %, not currently breastfeeding  , 95% 2LNC, Body mass index is 20 37 kg/m²        Intake/Output Summary (Last 24 hours) at 5/22/2023 1016  Last data filed at 5/22/2023 0601  Gross per 24 hour   Intake 240 ml   Output 1600 ml   Net -1360 ml         Physical Exam  Gen: Thin older woman, awake, alert, oriented x 3, no acute distress  HEENT: Mucous membranes moist, no oral lesions, no thrush  NECK: No accessory muscle use, JVP not elevated  Cardiac: Regular, single S1, single S2, no murmurs, no rubs, no gallops  Lungs: diminished BS diffusely with very mild scattered expiratory wheeze, no rales, no rhonchi  Abdomen: normoactive bowel sounds, soft nontender, nondistended, no rebound or rigidity, no guarding  Extremities: no cyanosis, no clubbing, no sig LE edema    Labs: I have personally reviewed pertinent lab results  Laboratory and Diagnostics  Results from last 7 days   Lab Units 05/22/23  0647 05/21/23  0403 05/20/23  1249 05/20/23  0504 05/19/23  0512 05/18/23  0748 05/18/23  0011 05/17/23  1420 05/17/23  0741   WBC Thousand/uL 10 56* 5 92  --  12 86* 12 52* 15 62*  --   --  22 02*   HEMOGLOBIN g/dL 9 8* 9 3*  --  10 0* 8 5* 9 2* 9 7*   < > 14 0   I STAT HEMOGLOBIN g/dl  --   --  10 9*  --   --   --   --   --   --    HEMATOCRIT % 30 5* 29 4*  --  31 7* 26 0* 29 3* 30 8*   < > 44 5   HEMATOCRIT, ISTAT %  --   --  32*  --   --   --   --   --   --    PLATELETS Thousands/uL 274 286  --  320 290 296  --   --  459*   NEUTROS PCT %  --   --   --  88* 89*  --   --   --  91*   MONOS PCT %  --   --   --  4 6  --   --   --  5   MONO PCT %  --   --   --   --   --  3*  --   --   --     < > = values in this interval not displayed       Results from last 7 days   Lab Units 05/22/23  0510 05/21/23  0403 05/20/23  1249 05/20/23  0504 05/19/23  0512 05/18/23  0746 05/18/23  0011 05/17/23  1420 05/17/23  0741   SODIUM mmol/L 136 136  --  136 135 133* 132* 129* 132*   POTASSIUM mmol/L 4 0 3 6  --  3 8 3 4* 4 1 4 3 5 5* 5 5*   CHLORIDE mmol/L 98 100  --  103 103 100 99 94* 91*   CO2 mmol/L 32 33*  --  30 29 31 30 28 32   CO2, I-STAT mmol/L  --   --  33* --   --   --   --   --   --    ANION GAP mmol/L 6 3*  --  3* 3* 2* 3* 7 9   BUN mg/dL 26* 19  --  17 28* 29* 37* 48* 48*   CREATININE mg/dL 0 47* 0 46*  --  0 37* 0 52* 0 56* 0 74 1 18 1 37*   CALCIUM mg/dL 9 0 8 7  --  8 6 8 3* 8 2* 8 1* 7 7* 9 6   GLUCOSE RANDOM mg/dL 154* 162*  --  89 112 86 101 144* 179*   ALT U/L  --   --   --   --   --  12  --   --  22   AST U/L  --   --   --   --   --  15  --   --  28   ALK PHOS U/L  --   --   --   --   --  147*  --   --  267*   ALBUMIN g/dL  --   --   --   --   --  2 8*  --   --  3 9   TOTAL BILIRUBIN mg/dL  --   --   --   --   --  0 80  --   --  0 97     Results from last 7 days   Lab Units 05/21/23  0403   MAGNESIUM mg/dL 1 9      Results from last 7 days   Lab Units 05/22/23  0510 05/21/23  0510 05/20/23  0504 05/19/23  0621 05/18/23  0540 05/17/23  0741   INR  2 22* 1 95* 1 76* 2 25* 2 38* 1 70*   PTT seconds  --   --   --   --   --  40*          Results from last 7 days   Lab Units 05/20/23  0902 05/17/23  1648 05/17/23  1036 05/17/23  0741   LACTIC ACID mmol/L 0 6 1 3 2 8* 3 9*                     Results from last 7 days   Lab Units 05/21/23  0403 05/20/23  0902 05/18/23  0540 05/17/23  0741   PROCALCITONIN ng/ml 0 53* 0 68* 2 66* 0 61*       ABG:   Results from last 7 days   Lab Units 05/20/23  1739   PH ART  7 364   PCO2 ART mm Hg 52 1*   PO2 ART mm Hg 94 6   HCO3 ART mmol/L 29 0*   BASE EXC ART mmol/L 2 8   ABG SOURCE  Radial, Left       Microbiology:  COVID neg 5/20, 5/17  Cdiff neg    Imaging and other studies: I have personally reviewed pertinent reports  and I have personally reviewed pertinent films in PACS  CT angio 5/20 - no PE, scattered R>L nodular alveolar infiltrates with right basilar consolidation, bilateral effusions, no PTX    TTE 5/2023 EF 50%, grade I diastolic dysfunction, normal RV      Santiago Cortez DO, [facility]

## 2023-05-22 NOTE — PLAN OF CARE
Problem: MOBILITY - ADULT  Goal: Maintain or return to baseline ADL function  Description: INTERVENTIONS:  -  Assess patient's ability to carry out ADLs; assess patient's baseline for ADL function and identify physical deficits which impact ability to perform ADLs (bathing, care of mouth/teeth, toileting, grooming, dressing, etc )  - Assess/evaluate cause of self-care deficits   - Assess range of motion  - Assess patient's mobility; develop plan if impaired  - Assess patient's need for assistive devices and provide as appropriate  - Encourage maximum independence but intervene and supervise when necessary  - Involve family in performance of ADLs  - Assess for home care needs following discharge   - Consider OT consult to assist with ADL evaluation and planning for discharge  - Provide patient education as appropriate  Outcome: Progressing  Goal: Maintains/Returns to pre admission functional level  Description: INTERVENTIONS:  - Perform BMAT or MOVE assessment daily    - Set and communicate daily mobility goal to care team and patient/family/caregiver  - Collaborate with rehabilitation services on mobility goals if consulted  - Perform Range of Motion 3 times a day  - Reposition patient every 2 hours    - Dangle patient 3 times a day  - Stand patient 3 times a day  - Ambulate patient 3 times a day  - Out of bed to chair 3 times a day   - Out of bed for meals 3 times a day  - Out of bed for toileting  - Record patient progress and toleration of activity level   Outcome: Progressing     Problem: PAIN - ADULT  Goal: Verbalizes/displays adequate comfort level or baseline comfort level  Description: Interventions:  - Encourage patient to monitor pain and request assistance  - Assess pain using appropriate pain scale  - Administer analgesics based on type and severity of pain and evaluate response  - Implement non-pharmacological measures as appropriate and evaluate response  - Consider cultural and social influences on pain and pain management  - Notify physician/advanced practitioner if interventions unsuccessful or patient reports new pain  Outcome: Progressing     Problem: INFECTION - ADULT  Goal: Absence or prevention of progression during hospitalization  Description: INTERVENTIONS:  - Assess and monitor for signs and symptoms of infection  - Monitor lab/diagnostic results  - Monitor all insertion sites, i e  indwelling lines, tubes, and drains  - Monitor endotracheal if appropriate and nasal secretions for changes in amount and color  - Rhodes appropriate cooling/warming therapies per order  - Administer medications as ordered  - Instruct and encourage patient and family to use good hand hygiene technique  - Identify and instruct in appropriate isolation precautions for identified infection/condition  Outcome: Progressing     Problem: SAFETY ADULT  Goal: Patient will remain free of falls  Description: INTERVENTIONS:  - Educate patient/family on patient safety including physical limitations  - Instruct patient to call for assistance with activity   - Consult OT/PT to assist with strengthening/mobility   - Keep Call bell within reach  - Keep bed low and locked with side rails adjusted as appropriate  - Keep care items and personal belongings within reach  - Initiate and maintain comfort rounds  - Make Fall Risk Sign visible to staff  - Offer Toileting every 2 Hours, in advance of need  - Initiate/Maintain alarm  - Obtain necessary fall risk management equipment:   - Apply yellow socks and bracelet for high fall risk patients  - Consider moving patient to room near nurses station  Outcome: Progressing     Problem: DISCHARGE PLANNING  Goal: Discharge to home or other facility with appropriate resources  Description: INTERVENTIONS:  - Identify barriers to discharge w/patient and caregiver  - Arrange for needed discharge resources and transportation as appropriate  - Identify discharge learning needs (meds, wound care, etc )  - Arrange for interpretive services to assist at discharge as needed  - Refer to Case Management Department for coordinating discharge planning if the patient needs post-hospital services based on physician/advanced practitioner order or complex needs related to functional status, cognitive ability, or social support system  Outcome: Progressing     Problem: Knowledge Deficit  Goal: Patient/family/caregiver demonstrates understanding of disease process, treatment plan, medications, and discharge instructions  Description: Complete learning assessment and assess knowledge base  Interventions:  - Provide teaching at level of understanding  - Provide teaching via preferred learning methods  Outcome: Progressing     Problem: Nutrition/Hydration-ADULT  Goal: Nutrient/Hydration intake appropriate for improving, restoring or maintaining nutritional needs  Description: Monitor and assess patient's nutrition/hydration status for malnutrition  Collaborate with interdisciplinary team and initiate plan and interventions as ordered  Monitor patient's weight and dietary intake as ordered or per policy  Utilize nutrition screening tool and intervene as necessary  Determine patient's food preferences and provide high-protein, high-caloric foods as appropriate       INTERVENTIONS:  - Monitor oral intake, urinary output, labs, and treatment plans  - Assess nutrition and hydration status and recommend course of action  - Evaluate amount of meals eaten  - Assist patient with eating if necessary   - Allow adequate time for meals  - Recommend/ encourage appropriate diets, oral nutritional supplements, and vitamin/mineral supplements  - Order, calculate, and assess calorie counts as needed  - Recommend, monitor, and adjust tube feedings and TPN/PPN based on assessed needs  - Assess need for intravenous fluids  - Provide specific nutrition/hydration education as appropriate  - Include patient/family/caregiver in decisions related to nutrition  Outcome: Progressing     Problem: Prexisting or High Potential for Compromised Skin Integrity  Goal: Skin integrity is maintained or improved  Description: INTERVENTIONS:  - Identify patients at risk for skin breakdown  - Assess and monitor skin integrity  - Assess and monitor nutrition and hydration status  - Monitor labs   - Assess for incontinence   - Turn and reposition patient  - Assist with mobility/ambulation  - Relieve pressure over bony prominences  - Avoid friction and shearing  - Provide appropriate hygiene as needed including keeping skin clean and dry  - Evaluate need for skin moisturizer/barrier cream  - Collaborate with interdisciplinary team   - Patient/family teaching  - Consider wound care consult   Outcome: Progressing     Problem: Potential for Falls  Goal: Patient will remain free of falls  Description: INTERVENTIONS:  - Educate patient/family on patient safety including physical limitations  - Instruct patient to call for assistance with activity   - Consult OT/PT to assist with strengthening/mobility   - Keep Call bell within reach  - Keep bed low and locked with side rails adjusted as appropriate  - Keep care items and personal belongings within reach  - Initiate and maintain comfort rounds  - Make Fall Risk Sign visible to staff  - Offer Toileting every 2 Hours, in advance of need  - Initiate/Maintain alarm  - Obtain necessary fall risk management equipment:   - Apply yellow socks and bracelet for high fall risk patients  - Consider moving patient to room near nurses station  Outcome: Progressing     Problem: RESPIRATORY - ADULT  Goal: Achieves optimal ventilation and oxygenation  Description: INTERVENTIONS:  - Assess for changes in respiratory status  - Assess for changes in mentation and behavior  - Position to facilitate oxygenation and minimize respiratory effort  - Oxygen administered by appropriate delivery if ordered  - Initiate smoking cessation education as indicated  - Encourage broncho-pulmonary hygiene including cough, deep breathe, Incentive Spirometry  - Assess the need for suctioning and aspirate as needed  - Assess and instruct to report SOB or any respiratory difficulty  - Respiratory Therapy support as indicated  Outcome: Progressing     Problem: GASTROINTESTINAL - ADULT  Goal: Minimal or absence of nausea and/or vomiting  Description: INTERVENTIONS:  - Administer IV fluids if ordered to ensure adequate hydration  - Maintain NPO status until nausea and vomiting are resolved  - Nasogastric tube if ordered  - Administer ordered antiemetic medications as needed  - Provide nonpharmacologic comfort measures as appropriate  - Advance diet as tolerated, if ordered  - Consider nutrition services referral to assist patient with adequate nutrition and appropriate food choices  Outcome: Progressing  Goal: Maintains or returns to baseline bowel function  Description: INTERVENTIONS:  - Assess bowel function  - Encourage oral fluids to ensure adequate hydration  - Administer IV fluids if ordered to ensure adequate hydration  - Administer ordered medications as needed  - Encourage mobilization and activity  - Consider nutritional services referral to assist patient with adequate nutrition and appropriate food choices  Outcome: Progressing  Goal: Maintains adequate nutritional intake  Description: INTERVENTIONS:  - Monitor percentage of each meal consumed  - Identify factors contributing to decreased intake, treat as appropriate  - Assist with meals as needed  - Monitor I&O, weight, and lab values if indicated  - Obtain nutrition services referral as needed  Outcome: Progressing     Problem: METABOLIC, FLUID AND ELECTROLYTES - ADULT  Goal: Electrolytes maintained within normal limits  Description: INTERVENTIONS:  - Monitor labs and assess patient for signs and symptoms of electrolyte imbalances  - Administer electrolyte replacement as ordered  - Monitor response to electrolyte replacements, including repeat lab results as appropriate  - Instruct patient on fluid and nutrition as appropriate  Outcome: Progressing  Goal: Fluid balance maintained  Description: INTERVENTIONS:  - Monitor labs   - Monitor I/O and WT  - Instruct patient on fluid and nutrition as appropriate  - Assess for signs & symptoms of volume excess or deficit  Outcome: Progressing     Problem: SKIN/TISSUE INTEGRITY - ADULT  Goal: Skin Integrity remains intact(Skin Breakdown Prevention)  Description: Assess:  -Perform Everardo assessment every shift  -Clean and moisturize skin every shift  -Inspect skin when repositioning, toileting, and assisting with ADLS  -Assess extremities for adequate circulation and sensation     Bed Management:  -Have minimal linens on bed & keep smooth, unwrinkled  -Change linens as needed when moist or perspiring  -Avoid sitting or lying in one position for more than 2 hours while in bed  -Keep HOB at 30 degrees     Toileting:  -Offer bedside commode  -Assess for incontinence every 2  hrs  -Use incontinent care products after each incontinent episode such as powders/ creams    Activity:  -Mobilize patient 3 times a day  -Encourage activity and walks on unit  -Encourage or provide ROM exercises   -Turn and reposition patient every 2 Hours  -Use appropriate equipment to lift or move patient in bed  -Instruct/ Assist with weight shifting every hr when out of bed in chair  -Consider limitation of chair time 2 hour intervals    Skin Care:  -Avoid use of baby powder, tape, friction and shearing, hot water or constrictive clothing  -Relieve pressure over bony prominences using weight shift  -Do not massage red bony areas  Outcome: Progressing     Problem: HEMATOLOGIC - ADULT  Goal: Maintains hematologic stability  Description: INTERVENTIONS  - Assess for signs and symptoms of bleeding or hemorrhage  - Monitor labs  - Administer supportive blood products/factors as ordered and appropriate  Outcome: Progressing     Problem: MUSCULOSKELETAL - ADULT  Goal: Maintain or return mobility to safest level of function  Description: INTERVENTIONS:  - Assess patient's ability to carry out ADLs; assess patient's baseline for ADL function and identify physical deficits which impact ability to perform ADLs (bathing, care of mouth/teeth, toileting, grooming, dressing, etc )  - Assess/evaluate cause of self-care deficits   - Assess range of motion  - Assess patient's mobility  - Assess patient's need for assistive devices and provide as appropriate  - Encourage maximum independence but intervene and supervise when necessary  - Involve family in performance of ADLs  - Assess for home care needs following discharge   - Consider OT consult to assist with ADL evaluation and planning for discharge  - Provide patient education as appropriate  Outcome: Progressing

## 2023-05-22 NOTE — PROGRESS NOTES
[place]  Progress Note  Name: Merlyn Sanchez  MRN: [id number]  Unit/Bed#: -01 I Date of Admission: 5/17/2023   Date of Service: 5/22/2023 I Hospital Day: 5    Assessment/Plan   Acute respiratory failure with hypoxia and hypercapnia [facility])  Assessment & Plan  Patient usually on room air at baseline  5/20/2023 required 4 L nasal cannula  ABG showing respiratory acidosis with metabolic compensation alkalosis  Transitioned to SD2  Currently on 2L NC      Chronic obstructive pulmonary disease with acute exacerbation ([facility] [address] )  Assessment & Plan  · 5/20 patient now in COPD exacerbation with increased work of breathing despite being on 4 L nasal cannula  · CTA PE negative for PE  · Continue Doxy day 2 continue ceftriaxone 5  · Xopenex, Atrovent, Pulmicort, Perforomist  · ABG   · COVID-negative  · Lactic acid negative  · Pro-Oscar downward trending  · Status post IV Solu-Medrol 80 mg and magnesium  · Solu-Medrol 40 mg decreased to every 12 hours    * GI bleeding-resolved as of 5/22/2023  Assessment & Plan  · Patient presenting from [facility] with nausea, vomiting with heme positive emesis and loose stool  · Suspect gastroenteritis contributing, vomiting  · In the ED CT showing gastroenteritis  · Hemoglobin 14 0 on presentation however suspect this was hemoconcentrated  Baseline hemoglobin 9-10  · Warfarin resumed  · Diet advanced  · Patient clinically improving  Will hold off on EGD for now  · Continue Protonix  · Trend H/H  · resolved    Sepsis with acute renal failure without septic shock (HCC)  Assessment & Plan  · In setting of gastroenteritis, pneumonia  · Baseline creatinine around 0 6-0 9; creatinine was 1 37 on presentation  · Continue IV abx    Afebrile, leukocytosis downtrending  · Trend WBC and fever curve  · Blood cultures negative x2 after 24 hours  · Urine antigens negative    Pneumonia of right lower lobe due to infectious organism  Assessment & Plan  · Right lower lobe pneumonia found on CT  · MRSA culture pending  · Sputum culture pending  · Urine antigens negative  · Blood cultures negative x2 after 24 hours  · Continue supportive care with Mucinex, Tessalon Perles  · Aspiration precautions -did have episodes of vomiting at nursing facility, question if possible aspiration event  · Leukocytosis downtrending  Procalcitonin elevated, continue IV abx    Gastroenteritis  Assessment & Plan  · Presents with episodes of nausea/vomiting, diarrhea now appears resolved  · Gastroenterology following  · Advance diet as tolerated    Hyponatremia-resolved as of 5/20/2023  Assessment & Plan  · Most likely hypovolemic hyponatremia  · IVF dc'd  · resolved    Bilateral pleural effusion  Assessment & Plan  Lasix 40 bid-> continue  Echo done on 2/28/2023 showed an EF of 52% grade 1 diastolic dysfunction left atrium mildly dilated   I/o's  Monitor resp status    Severe protein-calorie malnutrition ([address] )  Assessment & Plan  Malnutrition Findings:   Adult Malnutrition type: Chronic illnessBMI Findings: Body mass index is 20 37 kg/m²  Consult nutrition    Essential hypertension  Assessment & Plan  · On Cardizem, digoxin, spironolactone, losartan   · Blood pressure improved today, resume Cardizem  Continue digoxin    Other specified hypothyroidism  Assessment & Plan  · Continue Synthroid    Atrial fibrillation (HCC)  Assessment & Plan  · Home regimen includes Cardizem and digoxin  · Continue with hold parameters  · AC with warfarin-> warfarin resumed  · Continue to trend INR           VTE Pharmacologic Prophylaxis: VTE Score: 6 High Risk (Score >/= 5) - Pharmacological DVT Prophylaxis Ordered: warfarin (Coumadin)  Sequential Compression Devices Ordered  Patient Centered Rounds: I performed bedside rounds with nursing staff today    Discussions with Specialists or Other Care Team Provider: Pulm, CM, Pt, OT    Education and Discussions with Family / Patient: Updated  (son) via phone  Total Time Spent on Date of Encounter in care of patient: 35 minutes This time was spent on one or more of the following: performing physical exam; counseling and coordination of care; obtaining or reviewing history; documenting in the medical record; reviewing/ordering tests, medications or procedures; communicating with other healthcare professionals and discussing with patient's family/caregivers  Current Length of Stay: 5 day(s)  Current Patient Status: Inpatient   Certification Statement: The patient will continue to require additional inpatient hospital stay due to [address]  Discharge Plan: Anticipate discharge in 48-72 hrs to prior assisted or independent living facility  Code Status: Level 1 - Full Code    Subjective:   Simin was seen and examined at bedside  No acute events overnight  Discussed plan of care  All questions and concerns were answered and addressed  States that she feels slightly better however feels like she is going to have a bowel movement right now  No acute complaints at this time  Has significantly improved clinically    Objective:     Vitals:   Temp (24hrs), Av °F (36 7 °C), Min:97 9 °F (36 6 °C), Max:98 1 °F (36 7 °C)    Temp:  [97 9 °F (36 6 °C)-98 1 °F (36 7 °C)] 98 1 °F (36 7 °C)  HR:  [69-76] 69  Resp:  [16-19] 16  BP: (132-143)/(55-63) 143/55  SpO2:  [93 %-99 %] 95 %  Body mass index is 20 37 kg/m²  Input and Output Summary (last 24 hours): Intake/Output Summary (Last 24 hours) at 2023 1007  Last data filed at 2023 0601  Gross per 24 hour   Intake 240 ml   Output 1600 ml   Net -1360 ml       Physical Exam:   Physical Exam   Vitals and nursing note reviewed  Constitutional:       General: She is no longer in acute distress     Appearance: She is chronically ill-appearing       Comments: Thin frail   HENT:      Head: Normocephalic and atraumatic  Cardiovascular:      Rate and Rhythm: Normal rate and regular rhythm     Pulses: Normal pulses       Heart sounds: Normal heart sounds  Pulmonary:      Effort: Normal effort     Breath sounds: Wheezing present  However improved     Comments: 2L NC  Abdominal:      General: Abdomen is flat  Bowel sounds are normal       Palpations: Abdomen is soft  Musculoskeletal:      Right lower leg: No edema       Left lower leg: No edema  Skin:     General: Skin is warm  Neurological:      General: No focal deficit present       Mental Status: She is alert and oriented to person, place, and time      Additional Data:     Labs:  Results from last 7 days   Lab Units 05/22/23  0647 05/20/23  1249 05/20/23  0504   WBC Thousand/uL 10 56*   < > 12 86*   HEMOGLOBIN g/dL 9 8*   < > 10 0*   I STAT HEMOGLOBIN   --    < >  --    HEMATOCRIT % 30 5*   < > 31 7*   HEMATOCRIT, ISTAT   --    < >  --    PLATELETS Thousands/uL 274   < > 320   NEUTROS PCT %  --   --  88*   LYMPHS PCT %  --   --  6*   MONOS PCT %  --   --  4   EOS PCT %  --   --  1    < > = values in this interval not displayed  Results from last 7 days   Lab Units 05/22/23  0510 05/19/23  0512 05/18/23  0746   SODIUM mmol/L 136   < > 133*   POTASSIUM mmol/L 4 0   < > 4 1   CHLORIDE mmol/L 98   < > 100   CO2 mmol/L 32   < > 31   CO2, I-STAT   --    < >  --    BUN mg/dL 26*   < > 29*   CREATININE mg/dL 0 47*   < > 0 56*   ANION GAP mmol/L 6   < > 2*   CALCIUM mg/dL 9 0   < > 8 2*   ALBUMIN g/dL  --   --  2 8*   TOTAL BILIRUBIN mg/dL  --   --  0 80   ALK PHOS U/L  --   --  147*   ALT U/L  --   --  12   AST U/L  --   --  15   GLUCOSE RANDOM mg/dL 154*   < > 86    < > = values in this interval not displayed       Results from last 7 days   Lab Units 05/22/23  0510   INR  2 22*             Results from last 7 days   Lab Units 05/21/23  0403 05/20/23  0902 05/18/23  0540 05/17/23  1648 05/17/23  1036 05/17/23  0741   LACTIC ACID mmol/L  --  0 6  --  1 3 2 8* 3 9*   PROCALCITONIN ng/ml 0 53* 0 68* 2 66*  --   --  0 61* Lines/Drains:  Invasive Devices     Peripheral Intravenous Line  Duration           Peripheral IV 05/20/23 Left Forearm 2 days    Peripheral IV 05/21/23 Distal;Left Forearm 1 day                      Imaging: No pertinent imaging reviewed  Recent Cultures (last 7 days):   Results from last 7 days   Lab Units 05/18/23  1722 05/17/23  1653 05/17/23  1234   BLOOD CULTURE   --   --  No Growth After 4 Days  No Growth After 4 Days  LEGIONELLA URINARY ANTIGEN   --  Negative  --    C DIFF TOXIN B BY PCR  Negative  --   --        Last 24 Hours Medication List:   Current Facility-Administered Medications   Medication Dose Route Frequency Provider Last Rate   • acetaminophen  650 mg Oral Q6H PRN Deidre Mcfarlane MD     • budesonide  0 5 mg Nebulization Q12H Deidre Mcfarlane MD     • cefTRIAXone  1,000 mg Intravenous Q24H Deidre Mcfarlane MD 1,000 mg (05/21/23 1225)   • digoxin  125 mcg Oral Every Other Day Deidre Mcfarlane MD     • diltiazem  240 mg Oral Daily Deidre Mcfarlane MD     • doxycycline  100 mg Intravenous Q12H Deidre Mcfarlane  mg (05/22/23 0453)   • formoterol  20 mcg Nebulization Q12H Deidre Mcfarlane MD     • furosemide  40 mg Intravenous BID (diuretic) Deidre Mcfarlane MD     • ipratropium  0 5 mg Nebulization TID Deidre Mcfarlane MD     • levalbuterol  1 25 mg Nebulization TID eDidre Mcfarlane MD     • levothyroxine  25 mcg Oral Daily Deidre Mcfarlane MD     • lidocaine  1 patch Topical Daily Deidre Mcfarlane MD     • methylPREDNISolone sodium succinate  40 mg Intravenous Q12H Korina Murphy MD     • metoprolol  5 mg Intravenous Q6H PRN Deidre Mcfarlane MD     • ondansetron  4 mg Intravenous Q4H PRN Deidre Mcfarlane MD     • pantoprazole  40 mg Intravenous Q12H Korina Murphy MD     • warfarin  2 mg Oral Daily (warfarin) Deidre Mcfarlane MD          Today, Patient Was Seen By: Deidre Mcfarlane MD    **Please Note: This note may have been constructed using a voice recognition system  **

## 2023-05-23 ENCOUNTER — TELEPHONE (OUTPATIENT)
Dept: PAIN MEDICINE | Facility: CLINIC | Age: 83
End: 2023-05-23

## 2023-05-23 LAB
FLUAV RNA RESP QL NAA+PROBE: NEGATIVE
FLUBV RNA RESP QL NAA+PROBE: NEGATIVE
INR PPP: 2.77 (ref 0.84–1.19)
PROTHROMBIN TIME: 30.6 SECONDS (ref 11.6–14.5)
RSV RNA RESP QL NAA+PROBE: NEGATIVE
SARS-COV-2 RNA RESP QL NAA+PROBE: NEGATIVE

## 2023-05-23 RX ORDER — PANTOPRAZOLE SODIUM 40 MG/1
40 TABLET, DELAYED RELEASE ORAL
Status: DISCONTINUED | OUTPATIENT
Start: 2023-05-23 | End: 2023-05-25 | Stop reason: HOSPADM

## 2023-05-23 RX ADMIN — PREDNISONE 40 MG: 20 TABLET ORAL at 08:41

## 2023-05-23 RX ADMIN — CEFTRIAXONE 1000 MG: 1 INJECTION, SOLUTION INTRAVENOUS at 11:50

## 2023-05-23 RX ADMIN — IPRATROPIUM BROMIDE 0.5 MG: 0.5 SOLUTION RESPIRATORY (INHALATION) at 20:58

## 2023-05-23 RX ADMIN — LEVOTHYROXINE SODIUM 25 MCG: 25 TABLET ORAL at 06:10

## 2023-05-23 RX ADMIN — BUDESONIDE 0.5 MG: 0.5 INHALANT ORAL at 20:58

## 2023-05-23 RX ADMIN — FORMOTEROL FUMARATE DIHYDRATE 20 MCG: 20 SOLUTION RESPIRATORY (INHALATION) at 07:07

## 2023-05-23 RX ADMIN — PANTOPRAZOLE SODIUM 40 MG: 40 TABLET, DELAYED RELEASE ORAL at 16:52

## 2023-05-23 RX ADMIN — LIDOCAINE 1 PATCH: 50 PATCH TOPICAL at 08:41

## 2023-05-23 RX ADMIN — DILTIAZEM HYDROCHLORIDE 240 MG: 240 CAPSULE, EXTENDED RELEASE ORAL at 08:46

## 2023-05-23 RX ADMIN — DOXYCYCLINE 100 MG: 100 INJECTION, POWDER, LYOPHILIZED, FOR SOLUTION INTRAVENOUS at 18:28

## 2023-05-23 RX ADMIN — LEVALBUTEROL HYDROCHLORIDE 1.25 MG: 1.25 SOLUTION RESPIRATORY (INHALATION) at 07:07

## 2023-05-23 RX ADMIN — FORMOTEROL FUMARATE DIHYDRATE 20 MCG: 20 SOLUTION RESPIRATORY (INHALATION) at 20:58

## 2023-05-23 RX ADMIN — FUROSEMIDE 40 MG: 10 INJECTION, SOLUTION INTRAMUSCULAR; INTRAVENOUS at 08:41

## 2023-05-23 RX ADMIN — DOXYCYCLINE 100 MG: 100 INJECTION, POWDER, LYOPHILIZED, FOR SOLUTION INTRAVENOUS at 06:11

## 2023-05-23 RX ADMIN — BUDESONIDE 0.5 MG: 0.5 INHALANT ORAL at 07:07

## 2023-05-23 RX ADMIN — WARFARIN SODIUM 2 MG: 2 TABLET ORAL at 16:52

## 2023-05-23 RX ADMIN — LEVALBUTEROL HYDROCHLORIDE 1.25 MG: 1.25 SOLUTION RESPIRATORY (INHALATION) at 13:23

## 2023-05-23 RX ADMIN — PANTOPRAZOLE SODIUM 40 MG: 40 INJECTION, POWDER, FOR SOLUTION INTRAVENOUS at 08:41

## 2023-05-23 RX ADMIN — IPRATROPIUM BROMIDE 0.5 MG: 0.5 SOLUTION RESPIRATORY (INHALATION) at 07:07

## 2023-05-23 RX ADMIN — LEVALBUTEROL HYDROCHLORIDE 1.25 MG: 1.25 SOLUTION RESPIRATORY (INHALATION) at 20:58

## 2023-05-23 RX ADMIN — IPRATROPIUM BROMIDE 0.5 MG: 0.5 SOLUTION RESPIRATORY (INHALATION) at 13:23

## 2023-05-23 RX ADMIN — FUROSEMIDE 40 MG: 10 INJECTION, SOLUTION INTRAMUSCULAR; INTRAVENOUS at 18:03

## 2023-05-23 NOTE — TELEPHONE ENCOUNTER
LVM to see if Pt had flexibility to come in today or tomorrow at 3:15pm    If not Pt can keep original appt time

## 2023-05-23 NOTE — PLAN OF CARE
Problem: MOBILITY - ADULT  Goal: Maintain or return to baseline ADL function  Description: INTERVENTIONS:  -  Assess patient's ability to carry out ADLs; assess patient's baseline for ADL function and identify physical deficits which impact ability to perform ADLs (bathing, care of mouth/teeth, toileting, grooming, dressing, etc )  - Assess/evaluate cause of self-care deficits   - Assess range of motion  - Assess patient's mobility; develop plan if impaired  - Assess patient's need for assistive devices and provide as appropriate  - Encourage maximum independence but intervene and supervise when necessary  - Involve family in performance of ADLs  - Assess for home care needs following discharge   - Consider OT consult to assist with ADL evaluation and planning for discharge  - Provide patient education as appropriate  Outcome: Progressing  Goal: Maintains/Returns to pre admission functional level  Description: INTERVENTIONS:  - Perform BMAT or MOVE assessment daily    - Set and communicate daily mobility goal to care team and patient/family/caregiver  - Collaborate with rehabilitation services on mobility goals if consulted  - Perform Range of Motion 3 times a day  - Reposition patient every 2 hours    - Dangle patient 3 times a day  - Stand patient 3 times a day  - Ambulate patient 3 times a day  - Out of bed to chair 3 times a day   - Out of bed for meals 3 times a day  - Out of bed for toileting  - Record patient progress and toleration of activity level   Outcome: Progressing     Problem: PAIN - ADULT  Goal: Verbalizes/displays adequate comfort level or baseline comfort level  Description: Interventions:  - Encourage patient to monitor pain and request assistance  - Assess pain using appropriate pain scale  - Administer analgesics based on type and severity of pain and evaluate response  - Implement non-pharmacological measures as appropriate and evaluate response  - Consider cultural and social influences on pain and pain management  - Notify physician/advanced practitioner if interventions unsuccessful or patient reports new pain  Outcome: Progressing     Problem: INFECTION - ADULT  Goal: Absence or prevention of progression during hospitalization  Description: INTERVENTIONS:  - Assess and monitor for signs and symptoms of infection  - Monitor lab/diagnostic results  - Monitor all insertion sites, i e  indwelling lines, tubes, and drains  - Monitor endotracheal if appropriate and nasal secretions for changes in amount and color  - Points appropriate cooling/warming therapies per order  - Administer medications as ordered  - Instruct and encourage patient and family to use good hand hygiene technique  - Identify and instruct in appropriate isolation precautions for identified infection/condition  Outcome: Progressing     Problem: SAFETY ADULT  Goal: Patient will remain free of falls  Description: INTERVENTIONS:  - Educate patient/family on patient safety including physical limitations  - Instruct patient to call for assistance with activity   - Consult OT/PT to assist with strengthening/mobility   - Keep Call bell within reach  - Keep bed low and locked with side rails adjusted as appropriate  - Keep care items and personal belongings within reach  - Initiate and maintain comfort rounds  - Make Fall Risk Sign visible to staff  - Offer Toileting every 2 Hours, in advance of need  - Initiate/Maintain alarm  - Obtain necessary fall risk management equipment:   - Apply yellow socks and bracelet for high fall risk patients  - Consider moving patient to room near nurses station  Outcome: Progressing     Problem: DISCHARGE PLANNING  Goal: Discharge to home or other facility with appropriate resources  Description: INTERVENTIONS:  - Identify barriers to discharge w/patient and caregiver  - Arrange for needed discharge resources and transportation as appropriate  - Identify discharge learning needs (meds, wound care, etc )  - Arrange for interpretive services to assist at discharge as needed  - Refer to Case Management Department for coordinating discharge planning if the patient needs post-hospital services based on physician/advanced practitioner order or complex needs related to functional status, cognitive ability, or social support system  Outcome: Progressing     Problem: Knowledge Deficit  Goal: Patient/family/caregiver demonstrates understanding of disease process, treatment plan, medications, and discharge instructions  Description: Complete learning assessment and assess knowledge base  Interventions:  - Provide teaching at level of understanding  - Provide teaching via preferred learning methods  Outcome: Progressing     Problem: Nutrition/Hydration-ADULT  Goal: Nutrient/Hydration intake appropriate for improving, restoring or maintaining nutritional needs  Description: Monitor and assess patient's nutrition/hydration status for malnutrition  Collaborate with interdisciplinary team and initiate plan and interventions as ordered  Monitor patient's weight and dietary intake as ordered or per policy  Utilize nutrition screening tool and intervene as necessary  Determine patient's food preferences and provide high-protein, high-caloric foods as appropriate       INTERVENTIONS:  - Monitor oral intake, urinary output, labs, and treatment plans  - Assess nutrition and hydration status and recommend course of action  - Evaluate amount of meals eaten  - Assist patient with eating if necessary   - Allow adequate time for meals  - Recommend/ encourage appropriate diets, oral nutritional supplements, and vitamin/mineral supplements  - Order, calculate, and assess calorie counts as needed  - Recommend, monitor, and adjust tube feedings and TPN/PPN based on assessed needs  - Assess need for intravenous fluids  - Provide specific nutrition/hydration education as appropriate  - Include patient/family/caregiver in decisions related to nutrition  Outcome: Progressing     Problem: Prexisting or High Potential for Compromised Skin Integrity  Goal: Skin integrity is maintained or improved  Description: INTERVENTIONS:  - Identify patients at risk for skin breakdown  - Assess and monitor skin integrity  - Assess and monitor nutrition and hydration status  - Monitor labs   - Assess for incontinence   - Turn and reposition patient  - Assist with mobility/ambulation  - Relieve pressure over bony prominences  - Avoid friction and shearing  - Provide appropriate hygiene as needed including keeping skin clean and dry  - Evaluate need for skin moisturizer/barrier cream  - Collaborate with interdisciplinary team   - Patient/family teaching  - Consider wound care consult   Outcome: Progressing     Problem: Potential for Falls  Goal: Patient will remain free of falls  Description: INTERVENTIONS:  - Educate patient/family on patient safety including physical limitations  - Instruct patient to call for assistance with activity   - Consult OT/PT to assist with strengthening/mobility   - Keep Call bell within reach  - Keep bed low and locked with side rails adjusted as appropriate  - Keep care items and personal belongings within reach  - Initiate and maintain comfort rounds  - Make Fall Risk Sign visible to staff  - Offer Toileting every 2 Hours, in advance of need  - Initiate/Maintain alarm  - Obtain necessary fall risk management equipment:   - Apply yellow socks and bracelet for high fall risk patients  - Consider moving patient to room near nurses station  Outcome: Progressing     Problem: RESPIRATORY - ADULT  Goal: Achieves optimal ventilation and oxygenation  Description: INTERVENTIONS:  - Assess for changes in respiratory status  - Assess for changes in mentation and behavior  - Position to facilitate oxygenation and minimize respiratory effort  - Oxygen administered by appropriate delivery if ordered  - Initiate smoking cessation education as indicated  - Encourage broncho-pulmonary hygiene including cough, deep breathe, Incentive Spirometry  - Assess the need for suctioning and aspirate as needed  - Assess and instruct to report SOB or any respiratory difficulty  - Respiratory Therapy support as indicated  Outcome: Progressing     Problem: GASTROINTESTINAL - ADULT  Goal: Minimal or absence of nausea and/or vomiting  Description: INTERVENTIONS:  - Administer IV fluids if ordered to ensure adequate hydration  - Maintain NPO status until nausea and vomiting are resolved  - Nasogastric tube if ordered  - Administer ordered antiemetic medications as needed  - Provide nonpharmacologic comfort measures as appropriate  - Advance diet as tolerated, if ordered  - Consider nutrition services referral to assist patient with adequate nutrition and appropriate food choices  Outcome: Progressing  Goal: Maintains or returns to baseline bowel function  Description: INTERVENTIONS:  - Assess bowel function  - Encourage oral fluids to ensure adequate hydration  - Administer IV fluids if ordered to ensure adequate hydration  - Administer ordered medications as needed  - Encourage mobilization and activity  - Consider nutritional services referral to assist patient with adequate nutrition and appropriate food choices  Outcome: Progressing  Goal: Maintains adequate nutritional intake  Description: INTERVENTIONS:  - Monitor percentage of each meal consumed  - Identify factors contributing to decreased intake, treat as appropriate  - Assist with meals as needed  - Monitor I&O, weight, and lab values if indicated  - Obtain nutrition services referral as needed  Outcome: Progressing     Problem: METABOLIC, FLUID AND ELECTROLYTES - ADULT  Goal: Electrolytes maintained within normal limits  Description: INTERVENTIONS:  - Monitor labs and assess patient for signs and symptoms of electrolyte imbalances  - Administer electrolyte replacement as ordered  - Monitor response to electrolyte replacements, including repeat lab results as appropriate  - Instruct patient on fluid and nutrition as appropriate  Outcome: Progressing  Goal: Fluid balance maintained  Description: INTERVENTIONS:  - Monitor labs   - Monitor I/O and WT  - Instruct patient on fluid and nutrition as appropriate  - Assess for signs & symptoms of volume excess or deficit  Outcome: Progressing     Problem: SKIN/TISSUE INTEGRITY - ADULT  Goal: Skin Integrity remains intact(Skin Breakdown Prevention)  Description: Assess:  -Perform Everardo assessment every shift  -Clean and moisturize skin every shift  -Inspect skin when repositioning, toileting, and assisting with ADLS  -Assess extremities for adequate circulation and sensation     Bed Management:  -Have minimal linens on bed & keep smooth, unwrinkled  -Change linens as needed when moist or perspiring  -Avoid sitting or lying in one position for more than 2 hours while in bed  -Keep HOB at 30 degrees     Toileting:  -Offer bedside commode  -Assess for incontinence every 2 hrs  -Use incontinent care products after each incontinent episode such as powders/ creams    Activity:  -Mobilize patient 3 times a day  -Encourage activity and walks on unit  -Encourage or provide ROM exercises   -Turn and reposition patient every 2 Hours  -Use appropriate equipment to lift or move patient in bed  -Instruct/ Assist with weight shifting every hr when out of bed in chair  -Consider limitation of chair time 2 hour intervals    Skin Care:  -Avoid use of baby powder, tape, friction and shearing, hot water or constrictive clothing  -Relieve pressure over bony prominences using weight shift  -Do not massage red bony areas  Outcome: Progressing     Problem: HEMATOLOGIC - ADULT  Goal: Maintains hematologic stability  Description: INTERVENTIONS  - Assess for signs and symptoms of bleeding or hemorrhage  - Monitor labs  - Administer supportive blood products/factors as ordered and appropriate  Outcome: Progressing     Problem: MUSCULOSKELETAL - ADULT  Goal: Maintain or return mobility to safest level of function  Description: INTERVENTIONS:  - Assess patient's ability to carry out ADLs; assess patient's baseline for ADL function and identify physical deficits which impact ability to perform ADLs (bathing, care of mouth/teeth, toileting, grooming, dressing, etc )  - Assess/evaluate cause of self-care deficits   - Assess range of motion  - Assess patient's mobility  - Assess patient's need for assistive devices and provide as appropriate  - Encourage maximum independence but intervene and supervise when necessary  - Involve family in performance of ADLs  - Assess for home care needs following discharge   - Consider OT consult to assist with ADL evaluation and planning for discharge  - Provide patient education as appropriate  Outcome: Progressing

## 2023-05-23 NOTE — ASSESSMENT & PLAN NOTE
· 5/20 patient now in COPD exacerbation with increased work of breathing despite being on 4 L nasal cannula  · CTA PE negative for PE  · Continue Rocephin and doxycycline  · Xopenex, Atrovent, Pulmicort, Perforomist  · ABG   · COVID-negative  · Lactic acid negative  · Pro-Osacr downward trending  · Status post IV Solu-Medrol 80 mg and magnesium  · Solu-Medrol 40 mg switch to prednisone 40 mg and continue to taper

## 2023-05-23 NOTE — OCCUPATIONAL THERAPY NOTE
Occupational Therapy Tx Note     Patient Name: Tiffanie COURTNEY Date: 5/23/2023  Problem List  Active Problems:    Pneumonia of right lower lobe due to infectious organism    Chronic obstructive pulmonary disease with [facility] exacerbation ([facility] )    Sepsis with acute renal failure without septic shock (HCC)    Atrial fibrillation ([facility] )    Other specified hypothyroidism    Essential hypertension    Severe protein-calorie malnutrition ([facility] )    Gastroenteritis    Acute respiratory failure with hypoxia and hypercapnia (HCC)    Bilateral pleural effusion            05/23/23 1322   OT Last Visit   OT Visit Date 05/23/23   Note Type   Note Type Treatment   Pain Assessment   Pain Assessment Tool Camarena-Baker FACES   Camarena-Baker FACES Pain Rating 4   Pain Location/Orientation Location: Back   Hospital Pain Intervention(s) Repositioned   Restrictions/Precautions   Weight Bearing Precautions Per Order No   Other Precautions Fall Risk;Pain;O2;Chair Alarm; Bed Alarm   ADL   Where Assessed Edge of bed   LB Dressing Assistance 2  Maximal Assistance   LB Dressing Deficit Don/doff R sock; Don/doff L sock   Bed Mobility   Supine to Sit 5  Supervision   Additional items Verbal cues; Bedrails   Transfers   Sit to Stand 4  Minimal assistance   Additional items Assist x 2;Verbal cues   Stand to Sit 4  Minimal assistance   Additional items Assist x 1;Verbal cues   Functional Mobility   Functional Mobility 4  Minimal assistance   Additional Comments x 1, RW  Cognition   Overall Cognitive Status WFL   Arousal/Participation Alert   Attention Attends with cues to redirect   Orientation Level Oriented X4   Memory Within functional limits   Following Commands Follows one step commands without difficulty   Activity Tolerance   Activity Tolerance Patient limited by fatigue  (Pt on 1L O2  Pt desat down to 86%   Improved with seated rest break )   Medical Staff Made Aware PT CIPRIANO Batrse Letters   Assessment   Assessment Pt seen for OT tx session with focus on functional balance, functional mobility, ADL status, and transfer safety  Patient agreeable to OT treatment session  Pt received supine in bed  Performed bed mobility with sup  Performed transfers and functional mobility with min A x 1-2 with RW  Required mod A for LB ADL task  Continues to be be limited by fatigue and back pain  Patient continues to be functioning below baseline level, occupational performance remains limited secondary to factors listed above, and pt at increased risk for falls and injury  The patient's raw score on the AM-PAC Daily Activity inpatient short form is 18, standardized score is 38 66, less than 39 4  Patients at this level are likely to benefit from DC to post-acute rehabilitation services  Please refer to the recommendation of the Occupational Therapist for safe DC planning  From OT standpoint, recommendation at time of d/c would be level 2 resources  Patient to benefit from continued Occupational Therapy treatment while in the hospital to address deficits as defined above and maximize level of functional independence with ADLs and functional mobility  Pt left with call bell in reach, tray table in reach, needs met, chair alarm activated, SCDs on  Plan   Treatment Interventions ADL retraining;Functional transfer training;Patient/family training; Compensatory technique education; Endurance training;Energy conservation   Goal Expiration Date 05/28/23   OT Treatment Day 1   OT Frequency 3-5x/wk   Recommendation   UB Rehab Discharge Recommendation (PT/OT) Level 2   AM-PAC Daily Activity Inpatient   Lower Body Dressing 2   Bathing 2   Toileting 2   Upper Body Dressing 4   Grooming 4   Eating 4   Daily Activity Raw Score 18   Daily Activity Standardized Score (Calc for Raw Score >=11) 38 66   AM-PAC Applied Cognition Inpatient   Following a Speech/Presentation 3   Understanding Ordinary Conversation 4   Taking Medications 4   Remembering Where Things Are Placed or Put Away 4 Remembering List of 4-5 Errands 4   Taking Care of Complicated Tasks 3   Applied Cognition Raw Score 22   Applied Cognition Standardized Score 47 83   End of Consult   Education Provided Yes   Patient Position at End of Consult Seated edge of bed   Nurse Communication Nurse aware of consult           Natalie De Guzman, OTR/L

## 2023-05-23 NOTE — PROGRESS NOTES
The pantoprazole has / have been converted to Oral [facility] for Adults as approved by the Pharmacy and Therapeutics Committee  The patient met all eligible criteria:  3 Age = 24years old   2) Received at least one dose of the IV form   3) Receiving at least one other scheduled oral/enteral medication   4) Tolerating an oral/enteral diet   and did not have any exclusions:   1) Critical care patient   2) Active GI bleed (IF assessing H2RAs or PPIs)   3) Continuous tube feeding (IF assessing cipro, doxycycline, levofloxacin, minocycline, rifampin, or voriconazole)   4) Receiving PO vancomycin (IF assessing metronidazole)   5) Persistent nausea and/or vomiting   6) Ileus or gastrointestinal obstruction   7) Iliana/nasogastric tube set for continuous suction   8) Specific order not to automatically convert to PO (in the order's comments or if discussed in the most recent Infectious Disease or primary team's progress notes)

## 2023-05-23 NOTE — RESPIRATORY THERAPY NOTE
RT Protocol Note  Siddharth Haywood 79 y o  female MRN: [id number]  Unit/Bed#: -01 Encounter: [id number]    Assessment    Active Problems:    Pneumonia of right lower lobe due to infectious organism    Chronic obstructive pulmonary disease with acute exacerbation ([address] )    Sepsis with acute renal failure without septic shock (HCC)    Atrial fibrillation (HCC)    Other specified hypothyroidism    Essential hypertension    Severe protein-calorie malnutrition (HCC)    Gastroenteritis    Acute respiratory failure with hypoxia and hypercapnia (HCC)    Bilateral pleural effusion      Home Pulmonary Medications:  Albuterol, Advair  Home Devices/Therapy: (P) Other (Comment) (Albuterol, Advair)    Past Medical History:   Diagnosis Date    Anti-phospholipid syndrome (HCC)     Asthma     Blood type O+     Cardiac disease     Chronic pain     COPD (chronic obstructive pulmonary disease) (HCC)     Fracture of ankle     left    Hyperlipidemia     Hypertension     Hypokalemia 2/27/2023    Osteoporosis      Social History     Socioeconomic History    Marital status:      Spouse name: None    Number of children: None    Years of education: None    Highest education level: None   Occupational History    None   Tobacco Use    Smoking status: Former     Types: Cigarettes    Smokeless tobacco: Never    Tobacco comments:     Never smoker per [employer]   Vaping Use    Vaping Use: Never used   Substance and Sexual Activity    Alcohol use: Not Currently    Drug use: No    Sexual activity: Not Currently   Other Topics Concern    None   Social History Narrative    None     Social Determinants of Health     Financial Resource Strain: Low Risk     Difficulty of Paying Living Expenses: Not very hard   Food Insecurity: No Food Insecurity    Worried About Running Out of Food in the Last Year: Never true    Ran Out of Food in the Last Year: Never true   Transportation Needs: No Transportation Needs    Lack of Transportation (Medical):  No Lack of Transportation (Non-Medical): No   Physical Activity: Not on file   Stress: Not on file   Social Connections: Not on file   Intimate Partner Violence: Not on file   Housing Stability: Low Risk     Unable to Pay for Housing in the Last Year: No    Number of Places Lived in the Last Year: 1    Unstable Housing in the Last Year: No       Subjective         Objective    Physical Exam:   Assessment Type: (P) Assess only  General Appearance: (P) Awake, Alert  Respiratory Pattern: (P) Normal  Chest Assessment: (P) Chest expansion symmetrical  Bilateral Breath Sounds: (P) Diminished  R Breath Sounds: Diminished, Rhonchi  L Breath Sounds: Diminished  Cough: Non-productive    Vitals:  Blood pressure 149/57, pulse 80, temperature 98 1 °F (36 7 °C), resp  rate 16, height 5' (1 524 m), SpO2 92 %, not currently breastfeeding  Results from last 7 days   Lab Units 05/20/23  1739   PH ART  7 364   PCO2 ART mm Hg 52 1*   PO2 ART mm Hg 94 6   HCO3 ART mmol/L 29 0*   BASE EXC ART mmol/L 2 8   O2 CONTENT ART mL/dL 16 7   O2 HGB, ARTERIAL % 95 9   ABG SOURCE  Radial, Left   MARYLIN TEST  Yes       Imaging and other studies: I have personally reviewed pertinent reports        O2 Device: bipap     Plan    Respiratory Plan: (P) Home Bronchodilator Patient pathway  Airway Clearance Plan: (P) Flutter     Resp Comments: tx given, flutter and IS done, refused BIPAP

## 2023-05-23 NOTE — ASSESSMENT & PLAN NOTE
· In setting of gastroenteritis, pneumonia  · Baseline creatinine around 0 6-0 9; creatinine was 1 37 on presentation  · Continue IV abx    Afebrile  · Trend WBC and fever curve  · Urine antigens negative

## 2023-05-23 NOTE — ASSESSMENT & PLAN NOTE
Lasix 40 bid-> continue  Echo done on 2/28/2023 showed an EF of 21% grade 1 diastolic dysfunction left atrium mildly dilated   I/o's  Monitor resp status

## 2023-05-23 NOTE — PROGRESS NOTES
"Progress Note - Pulmonary   Sae Baires 79 y o  female MRN: [id number]  Unit/Bed#: -01 Encounter: [id number]    Assessment/Plan:    1  Acute hypoxic on acute on chronic hypercapnic respiratory failure likely multifaceted as listed below         -Currently on 1 L-92%, does not wear home O2         -Maintain saturations greater than 88%         -Pulmonary toileting: Deep breathing cough, OOB as tolerated, IS Q 1 hr         -Recommend ambulatory pulse ox prior to discharge    2  Acute volume overload w/ B/L effusions likely secondary to acute on chronic grade 1 diastolic CHF and A-fib         -2/28/2023-Echo-EF 65%, mild AV stenosis         -Currently maintained on IV Lasix 40 mg twice daily         -We will continue I's and O's and daily weights         -Maintained on Coumadin    3  RML and RLL PNA       -Procalcitonin- 0 61-- 2 66-- 0 68-- 0 53       -Day # 6/7-ceftriaxone, Day # 4/5-doxycycline       -WBC 10 56 remains afebrile       -We will need repeat imaging 4 to 6 weeks    4  Reported COPD of unknown severity with acute exacerbation        -Inpatient: Day # 1/3-prednisone 40 mg decrease by 10 mg every 3 days        -We will continue with budesonide/Perforomist twice daily, Xopenex/Atrovent 3 times daily        -Patient will need close pulmonary follow-up and formal PFT testing        -Please discharge patient on this home regimen: Trelegy 100/62 5/25 MCG 1 puff daily, Proventil 2 puffs every 6 as needed, albuterol nebulizer every 6 as needed    5  Possible GRACIELA        -At this time patient is refusing any NIVV        -We will revisit at outpatient      Chief Complaint:    \" I am feeling a lot better\"    Subjective:    Lakisha Abdul was comfortably sitting in her bed  She reports overall she feels a lot better since admission  No significant overnight events reported  Patient currently denies any fever, chills, abscess, headaches, night sweats, pleuritic chest pain, or palpations      Objective:    Vitals: " Blood pressure 149/57, pulse 80, temperature 98 1 °F (36 7 °C), resp  rate 16, height 5' (1 524 m), SpO2 92 %, not currently breastfeeding  1,Body mass index is 20 37 kg/m²  Intake/Output Summary (Last 24 hours) at 5/23/2023 0817  Last data filed at 5/22/2023 1656  Gross per 24 hour   Intake 240 ml   Output 800 ml   Net -560 ml       Invasive Devices     Peripheral Intravenous Line  Duration           Peripheral IV 05/20/23 Left Forearm 3 days    Peripheral IV 05/21/23 Distal;Left Forearm 2 days          Drain  Duration           External Urinary Catheter <1 day                Physical Exam:   Physical Exam  Constitutional:       General: She is not in acute distress  Appearance: Normal appearance  She is normal weight  She is not ill-appearing  HENT:      Head: Normocephalic and atraumatic  Nose: Nose normal  No congestion or rhinorrhea  Mouth/Throat:      Mouth: Mucous membranes are moist       Pharynx: Oropharynx is clear  No oropharyngeal exudate or posterior oropharyngeal erythema  Cardiovascular:      Rate and Rhythm: Normal rate and regular rhythm  Pulses: Normal pulses  Heart sounds: Normal heart sounds  No murmur heard  No friction rub  No gallop  Pulmonary:      Effort: Pulmonary effort is normal  No tachypnea, bradypnea or respiratory distress  Breath sounds: Decreased air movement present  No stridor  Decreased breath sounds, wheezing and rhonchi present  No rales  Comments: Scattered wheezing and rhonchi  Chest:      Chest wall: No tenderness  Abdominal:      General: Abdomen is flat  Bowel sounds are normal       Palpations: Abdomen is soft  Musculoskeletal:         General: No swelling or tenderness  Normal range of motion  Cervical back: Normal range of motion  No rigidity or tenderness  Skin:     General: Skin is warm  Coloration: Skin is not jaundiced or pale  Neurological:      General: No focal deficit present        Mental Status: She is alert and oriented to person, place, and time  Mental status is at baseline  Psychiatric:         Mood and Affect: Mood normal          Behavior: Behavior normal          Labs: I have personally reviewed pertinent lab results  , ABG: No results found for: PHART, ZMD6EVO, PO2ART, FGQ7IPE, E1YNTKMA, BEART, SOURCE, BNP: No results found for: BNP, CBC: No results found for: WBC, HGB, HCT, MCV, PLT, ADJUSTEDWBC, MCH, MCHC, RDW, MPV, NRBC, CMP: No results found for: SODIUM, K, CL, CO2, ANIONGAP, BUN, CREATININE, GLUCOSE, CALCIUM, AST, ALT, ALKPHOS, PROT, BILITOT, EGFR, PT/INR:   Lab Results   Component Value Date    INR 2 77 (H) 05/23/2023           Imaging and other studies: I have personally reviewed pertinent films in PACS     CTA chest multilobular pneumonia

## 2023-05-23 NOTE — CASE MANAGEMENT
Case Management Discharge Planning Note    Patient name Jessica Montoya Luite Omer [id number] MRN [id number]  : 1940 Date 2023       Current Admission Date: 2023  Current Admission Diagnosis:Sepsis with acute renal failure without septic shock [facility])   Patient Active Problem List    Diagnosis Date Noted   • Bilateral pleural effusion 2023   • Acute respiratory failure with hypoxia and hypercapnia ([facility] ) 2023   • Gastroenteritis 2023   • Cerebral aneurysm, nonruptured 2023   • Abdominal distention/back pain 2023   • Urinary retention 2023   • MDD with persistant bereavement complex 2023   • Abnormal CT of the chest 2023   • 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   • Thyroid nodule 2023   • Severe protein-calorie malnutrition ([facility] ) 2023   • Unspecified mood (affective) disorder ([facility] ) 2023   • Failure to thrive in adult 2023   • Pulmonary nodule 02/15/2023   • Onychomycosis 02/15/2023   • Essential hypertension 02/10/2023   • Anxiety 02/10/2023   • Other specified hypothyroidism 2022   • Peripheral edema 2021   • Low back pain without sciatica 2021   • Pneumonia of right lower lobe due to infectious organism 2017   • Chronic obstructive pulmonary disease with acute exacerbation ([facility] ) 2017   • Sepsis with acute renal failure without septic shock ([facility] ) 2017   • Hypertension 2017   • Hypercoagulable state ([facility] ) 2017   • 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   • Vitamin D deficiency 2016   • Osteoporosis 2015   • Antiphospholipid antibody syndrome ([facility] ) 2012   • Atrial fibrillation ([facility] ) 2012   • Hereditary hemolytic anemia ([facility] ) 2012   • Asthma 2012      LOS (days): 6  Geometric Mean LOS (GMLOS) (days): 5 00  Days to GMLOS:-1     OBJECTIVE:  Risk of Unplanned Readmission Score: 32 82         Current admission status: Inpatient   Preferred Pharmacy:   [address] [place] [address] #[id number] [id number] [facility] [address]  Phone: [phone] Fax: [address], [address] [facility] [address]  Phone: [phone] Fax: [phone]    Primary Care Provider: Ibis Reilly MD    Primary Insurance: MEDICARE  Secondary Insurance: BLUE CROSS    DISCHARGE DETAILS:   CM met the pt to review IMM and discharge plan  Pt in agreement for d/c to [place], [place]  Pt voiced understanding of IMM and signed form  CM offered to contact the pt's son, pt stated that she will update her son when he visits today        IMM Given (Date):: 05/23/23 (12:29pm)  IMM Given to[de-identified] Patient

## 2023-05-23 NOTE — PLAN OF CARE
Problem: MOBILITY - ADULT  Goal: Maintain or return to baseline ADL function  Description: INTERVENTIONS:  -  Assess patient's ability to carry out ADLs; assess patient's baseline for ADL function and identify physical deficits which impact ability to perform ADLs (bathing, care of mouth/teeth, toileting, grooming, dressing, etc )  - Assess/evaluate cause of self-care deficits   - Assess range of motion  - Assess patient's mobility; develop plan if impaired  - Assess patient's need for assistive devices and provide as appropriate  - Encourage maximum independence but intervene and supervise when necessary  - Involve family in performance of ADLs  - Assess for home care needs following discharge   - Consider OT consult to assist with ADL evaluation and planning for discharge  - Provide patient education as appropriate  Outcome: Progressing  Goal: Maintains/Returns to pre admission functional level  Description: INTERVENTIONS:  - Perform BMAT or MOVE assessment daily    - Set and communicate daily mobility goal to care team and patient/family/caregiver  - Collaborate with rehabilitation services on mobility goals if consulted  - Perform Range of Motion  times a day  - Reposition patient every  hours    - Dangle patient  times a day  - Stand patient  times a day  - Ambulate patient  times a day  - Out of bed to chair  times a day   - Out of bed for meals  times a day  - Out of bed for toileting  - Record patient progress and toleration of activity level   Outcome: Progressing     Problem: PAIN - ADULT  Goal: Verbalizes/displays adequate comfort level or baseline comfort level  Description: Interventions:  - Encourage patient to monitor pain and request assistance  - Assess pain using appropriate pain scale  - Administer analgesics based on type and severity of pain and evaluate response  - Implement non-pharmacological measures as appropriate and evaluate response  - Consider cultural and social influences on pain and pain management  - Notify physician/advanced practitioner if interventions unsuccessful or patient reports new pain  Outcome: Progressing     Problem: INFECTION - ADULT  Goal: Absence or prevention of progression during hospitalization  Description: INTERVENTIONS:  - Assess and monitor for signs and symptoms of infection  - Monitor lab/diagnostic results  - Monitor all insertion sites, i e  indwelling lines, tubes, and drains  - Monitor endotracheal if appropriate and nasal secretions for changes in amount and color  - [place] appropriate cooling/warming therapies per order  - Administer medications as ordered  - Instruct and encourage patient and family to use good hand hygiene technique  - Identify and instruct in appropriate isolation precautions for identified infection/condition  Outcome: Progressing     Problem: SAFETY ADULT  Goal: Patient will remain free of falls  Description: INTERVENTIONS:  - Educate patient/family on patient safety including physical limitations  - Instruct patient to call for assistance with activity   - Consult OT/PT to assist with strengthening/mobility   - Keep Call bell within reach  - Keep bed low and locked with side rails adjusted as appropriate  - Keep care items and personal belongings within reach  - Initiate and maintain comfort rounds  - Make Fall Risk Sign visible to staff  - Offer Toileting every  Hours, in advance of need  - Initiate/Maintain alarm  - Obtain necessary fall risk management equipment:   - Apply yellow socks and bracelet for high fall risk patients  - Consider moving patient to room near nurses station  Outcome: Progressing     Problem: DISCHARGE PLANNING  Goal: Discharge to home or other facility with appropriate resources  Description: INTERVENTIONS:  - Identify barriers to discharge w/patient and caregiver  - Arrange for needed discharge resources and transportation as appropriate  - Identify discharge learning needs (meds, wound care, etc )  - Arrange for interpretive services to assist at discharge as needed  - Refer to Case Management Department for coordinating discharge planning if the patient needs post-hospital services based on physician/advanced practitioner order or complex needs related to functional status, cognitive ability, or social support system  Outcome: Progressing     Problem: Knowledge Deficit  Goal: Patient/family/caregiver demonstrates understanding of disease process, treatment plan, medications, and discharge instructions  Description: Complete learning assessment and assess knowledge base  Interventions:  - Provide teaching at level of understanding  - Provide teaching via preferred learning methods  Outcome: Progressing     Problem: Nutrition/Hydration-ADULT  Goal: Nutrient/Hydration intake appropriate for improving, restoring or maintaining nutritional needs  Description: Monitor and assess patient's nutrition/hydration status for malnutrition  Collaborate with interdisciplinary team and initiate plan and interventions as ordered  Monitor patient's weight and dietary intake as ordered or per policy  Utilize nutrition screening tool and intervene as necessary  Determine patient's food preferences and provide high-protein, high-caloric foods as appropriate       INTERVENTIONS:  - Monitor oral intake, urinary output, labs, and treatment plans  - Assess nutrition and hydration status and recommend course of action  - Evaluate amount of meals eaten  - Assist patient with eating if necessary   - Allow adequate time for meals  - Recommend/ encourage appropriate diets, oral nutritional supplements, and vitamin/mineral supplements  - Order, calculate, and assess calorie counts as needed  - Recommend, monitor, and adjust tube feedings and TPN/PPN based on assessed needs  - Assess need for intravenous fluids  - Provide specific nutrition/hydration education as appropriate  - Include patient/family/caregiver in decisions related to nutrition  Outcome: Progressing     Problem: Prexisting or High Potential for Compromised Skin Integrity  Goal: Skin integrity is maintained or improved  Description: INTERVENTIONS:  - Identify patients at risk for skin breakdown  - Assess and monitor skin integrity  - Assess and monitor nutrition and hydration status  - Monitor labs   - Assess for incontinence   - Turn and reposition patient  - Assist with mobility/ambulation  - Relieve pressure over bony prominences  - Avoid friction and shearing  - Provide appropriate hygiene as needed including keeping skin clean and dry  - Evaluate need for skin moisturizer/barrier cream  - Collaborate with interdisciplinary team   - Patient/family teaching  - Consider wound care consult   Outcome: Progressing     Problem: Potential for Falls  Goal: Patient will remain free of falls  Description: INTERVENTIONS:  - Educate patient/family on patient safety including physical limitations  - Instruct patient to call for assistance with activity   - Consult OT/PT to assist with strengthening/mobility   - Keep Call bell within reach  - Keep bed low and locked with side rails adjusted as appropriate  - Keep care items and personal belongings within reach  - Initiate and maintain comfort rounds  - Make Fall Risk Sign visible to staff  - Offer Toileting every  Hours, in advance of need  - Initiate/Maintain alarm  - Obtain necessary fall risk management equipment:   - Apply yellow socks and bracelet for high fall risk patients  - Consider moving patient to room near nurses station  Outcome: Progressing     Problem: RESPIRATORY - ADULT  Goal: Achieves optimal ventilation and oxygenation  Description: INTERVENTIONS:  - Assess for changes in respiratory status  - Assess for changes in mentation and behavior  - Position to facilitate oxygenation and minimize respiratory effort  - Oxygen administered by appropriate delivery if ordered  - Initiate smoking cessation education as indicated  - Encourage broncho-pulmonary hygiene including cough, deep breathe, Incentive Spirometry  - Assess the need for suctioning and aspirate as needed  - Assess and instruct to report SOB or any respiratory difficulty  - Respiratory Therapy support as indicated  Outcome: Progressing     Problem: GASTROINTESTINAL - ADULT  Goal: Minimal or absence of nausea and/or vomiting  Description: INTERVENTIONS:  - Administer IV fluids if ordered to ensure adequate hydration  - Maintain NPO status until nausea and vomiting are resolved  - Nasogastric tube if ordered  - Administer ordered antiemetic medications as needed  - Provide nonpharmacologic comfort measures as appropriate  - Advance diet as tolerated, if ordered  - Consider nutrition services referral to assist patient with adequate nutrition and appropriate food choices  Outcome: Progressing  Goal: Maintains or returns to baseline bowel function  Description: INTERVENTIONS:  - Assess bowel function  - Encourage oral fluids to ensure adequate hydration  - Administer IV fluids if ordered to ensure adequate hydration  - Administer ordered medications as needed  - Encourage mobilization and activity  - Consider nutritional services referral to assist patient with adequate nutrition and appropriate food choices  Outcome: Progressing  Goal: Maintains adequate nutritional intake  Description: INTERVENTIONS:  - Monitor percentage of each meal consumed  - Identify factors contributing to decreased intake, treat as appropriate  - Assist with meals as needed  - Monitor I&O, weight, and lab values if indicated  - Obtain nutrition services referral as needed  Outcome: Progressing     Problem: METABOLIC, FLUID AND ELECTROLYTES - ADULT  Goal: Electrolytes maintained within normal limits  Description: INTERVENTIONS:  - Monitor labs and assess patient for signs and symptoms of electrolyte imbalances  - Administer electrolyte replacement as ordered  - Monitor response to electrolyte replacements, including repeat lab results as appropriate  - Instruct patient on fluid and nutrition as appropriate  Outcome: Progressing  Goal: Fluid balance maintained  Description: INTERVENTIONS:  - Monitor labs   - Monitor I/O and WT  - Instruct patient on fluid and nutrition as appropriate  - Assess for signs & symptoms of volume excess or deficit  Outcome: Progressing     Problem: SKIN/TISSUE INTEGRITY - ADULT  Goal: Skin Integrity remains intact(Skin Breakdown Prevention)  Description: Assess:  -Perform Everardo assessment every   -Clean and moisturize skin every   -Inspect skin when repositioning, toileting, and assisting with ADLS  -Assess under medical devices such as  every   -Assess extremities for adequate circulation and sensation     Bed Management:  -Have minimal linens on bed & keep smooth, unwrinkled  -Change linens as needed when moist or perspiring  -Avoid sitting or lying in one position for more than  hours while in bed  -Keep HOB at degrees     Toileting:  -Offer bedside commode  -Assess for incontinence every   -Use incontinent care products after each incontinent episode such as     Activity:  -Mobilize patient  times a day  -Encourage activity and walks on unit  -Encourage or provide ROM exercises   -Turn and reposition patient every  Hours  -Use appropriate equipment to lift or move patient in bed  -Instruct/ Assist with weight shifting every  when out of bed in chair  -Consider limitation of chair time  hour intervals    Skin Care:  -Avoid use of baby powder, tape, friction and shearing, hot water or constrictive clothing  -Relieve pressure over bony prominences using   -Do not massage red bony areas    Next Steps:  -Teach patient strategies to minimize risks such as    -Consider consults to  interdisciplinary teams such   Outcome: Progressing     Problem: HEMATOLOGIC - ADULT  Goal: Maintains hematologic stability  Description: INTERVENTIONS  - Assess for signs and symptoms of bleeding or hemorrhage  - Monitor labs  - Administer supportive blood products/factors as ordered and appropriate  Outcome: Progressing     Problem: MUSCULOSKELETAL - ADULT  Goal: Maintain or return mobility to safest level of function  Description: INTERVENTIONS:  - Assess patient's ability to carry out ADLs; assess patient's baseline for ADL function and identify physical deficits which impact ability to perform ADLs (bathing, care of mouth/teeth, toileting, grooming, dressing, etc )  - Assess/evaluate cause of self-care deficits   - Assess range of motion  - Assess patient's mobility  - Assess patient's need for assistive devices and provide as appropriate  - Encourage maximum independence but intervene and supervise when necessary  - Involve family in performance of ADLs  - Assess for home care needs following discharge   - Consider OT consult to assist with ADL evaluation and planning for discharge  - Provide patient education as appropriate  Outcome: Progressing

## 2023-05-23 NOTE — PHYSICAL THERAPY NOTE
"                                                                                  PHYSICAL THERAPY NOTE     05/23/23 1321   PT Last Visit   PT Visit Date 05/23/23   Note Type   Note Type Treatment   Pain Assessment   Pain Assessment Tool Camarena-Baker FACES   Camarena-Baker FACES Pain Rating 4   Pain Location/Orientation Location: Back   Hospital Pain Intervention(s) Ambulation/increased activity   Restrictions/Precautions   Weight Bearing Precautions Per Order No   Braces or Orthoses   (Patient has a high back LSO from previous admission 5/5/23  MD aware )   Other Precautions Pain; Fall Risk;O2;Chair Alarm; Bed Alarm   General   Chart Reviewed Yes   Response to Previous Treatment Patient with no complaints from previous session  Family/Caregiver Present No   Cognition   Overall Cognitive Status WFL   Arousal/Participation Alert   Attention Attends with cues to redirect   Memory Within functional limits   Following Commands Follows one step commands without difficulty   Subjective   Subjective \"I have not beed OOB today  \"   Bed Mobility   Supine to Sit 5  Supervision   Additional items HOB elevated; Bedrails; Increased time required;Verbal cues   Transfers   Sit to Stand 4  Minimal assistance   Additional items Assist x 2;Armrests; Increased time required;Verbal cues   Stand to Sit 4  Minimal assistance   Additional items Assist x 1; Armrests; Increased time required;Verbal cues   Ambulation/Elevation   Gait pattern Forward Flexion;Decreased foot clearance; Antalgic   Gait Assistance 4  Minimal assist   Additional items Assist x 1;Verbal cues; Tactile cues   Assistive Device Rolling walker   Distance 15ft   Stair Management Assistance Not tested   Ambulation/Elevation Additional Comments Very slow paced gait   Mild ROMANO   Balance   Static Sitting Good   Dynamic Sitting Fair +   Static Standing Fair   Dynamic Standing Fair   Ambulatory Fair   Endurance Deficit   Endurance Deficit Yes   Endurance Deficit Description Limited by pain " and fatigue   Activity Tolerance   Activity Tolerance Patient limited by fatigue;Patient limited by pain   Medical Staff Made Aware Co-treat with Quin PRICE   Nurse Made Aware RNMayra   Assessment   Prognosis Good   Problem List Decreased strength;Decreased endurance; Impaired balance;Decreased mobility;Pain   Assessment Patient was received supine in bed on RA  O2 saturation at rest 88%  Patient performed bed mobility with supervision with O2 desat to 86%  Placed on 1L  Stable at 93%  Patient required min A of 1-2 for all mobility  Very slow paced gait  Limited by SOB and pain  Patient is deconditioned  Education and encouragement provided for importance of OOB activity  Recommending level 2  The patient's AM-PAC Basic Mobility Inpatient Short Form Raw Score is 15  A Raw score of less than or equal to 17 suggests the patient may benefit from discharge to post-acute rehabilitation services  Please also refer to the recommendation of the Physical Therapist for safe discharge planning  Barriers to Discharge Inaccessible home environment;Decreased caregiver support   Goals   Patient Goals To feel better   STG Expiration Date 06/01/23   PT Treatment Day 2   Plan   Treatment/Interventions Functional transfer training;LE strengthening/ROM; Therapeutic exercise; Endurance training;Equipment eval/education;Patient/family training;Bed mobility;Gait training;Spoke to nursing;OT   Progress Slow progress, medical status limitations   PT Frequency 3-5x/wk   Recommendation   UB Rehab Discharge Recommendation (PT/OT) Level 2   AM-PAC Basic Mobility Inpatient   Turning in Flat Bed Without Bedrails 3   Lying on Back to Sitting on Edge of Flat Bed Without Bedrails 3   Moving Bed to Chair 3   Standing Up From Chair Using Arms 2   Walk in Room 3   Climb 3-5 Stairs With Railing 1   Basic Mobility Inpatient Raw Score 15   Basic Mobility Standardized Score 36 97   Highest Level Of Mobility   -[facility] Goal 4: Move to chair/commode ALESSIA-RUSS Achieved 6: Walk 10 steps or more   Education   Education Provided Mobility training;Assistive device   Patient Reinforcement needed   End of Consult   Patient Position at End of Consult Bedside chair;Bed/Chair alarm activated; All needs within reach   Jacque Martinez            Patient Name: Alyssa Chin  VMNIO'B Date: 5/23/2023

## 2023-05-23 NOTE — PLAN OF CARE
Problem: PHYSICAL THERAPY ADULT  Goal: Performs mobility at highest level of function for planned discharge setting  See evaluation for individualized goals  Description: Treatment/Interventions: Functional transfer training, LE strengthening/ROM, Elevations, Therapeutic exercise, Endurance training, Patient/family training, Equipment eval/education, Bed mobility, Gait training, Spoke to nursing, OT          See flowsheet documentation for full assessment, interventions and recommendations  Outcome: Not Progressing  Note: Prognosis: Good  Problem List: Decreased strength, Decreased endurance, Impaired balance, Decreased mobility, Pain  Assessment: Patient was received supine in bed on RA  O2 saturation at rest 88%  Patient performed bed mobility with supervision with O2 desat to 86%  Placed on 1L  Stable at 93%  Patient required min A of 1-2 for all mobility  Very slow paced gait  Limited by SOB and pain  Patient is deconditioned  Education and encouragement provided for importance of OOB activity  Recommending level 2  The patient's AM-PAC Basic Mobility Inpatient Short Form Raw Score is 15  A Raw score of less than or equal to 17 suggests the patient may benefit from discharge to post-acute rehabilitation services  Please also refer to the recommendation of the Physical Therapist for safe discharge planning  Barriers to Discharge: Inaccessible home environment, Decreased caregiver support          See flowsheet documentation for full assessment

## 2023-05-23 NOTE — PLAN OF CARE
Problem: OCCUPATIONAL THERAPY ADULT  Goal: Performs self-care activities at highest level of function for planned discharge setting  See evaluation for individualized goals  Description: Treatment Interventions: ADL retraining, Functional transfer training, Patient/family training, Compensatory technique education, Endurance training, Energy conservation          See flowsheet documentation for full assessment, interventions and recommendations  Note: Limitation: Decreased ADL status, Decreased self-care trans, Decreased high-level ADLs, Decreased endurance  Prognosis: Fair  Assessment: Pt seen for OT tx session with focus on functional balance, functional mobility, ADL status, and transfer safety  Patient agreeable to OT treatment session  Pt received supine in bed  Performed bed mobility with sup  Performed transfers and functional mobility with min A x 1-2 with RW  Required mod A for LB ADL task  Continues to be be limited by fatigue and back pain  Patient continues to be functioning below baseline level, occupational performance remains limited secondary to factors listed above, and pt at increased risk for falls and injury  The patient's raw score on the AM-PAC Daily Activity inpatient short form is 18, standardized score is 38 66, less than 39 4  Patients at this level are likely to benefit from DC to post-acute rehabilitation services  Please refer to the recommendation of the Occupational Therapist for safe DC planning  From OT standpoint, recommendation at time of d/c would be level 2 resources  Patient to benefit from continued Occupational Therapy treatment while in the hospital to address deficits as defined above and maximize level of functional independence with ADLs and functional mobility  Pt left with call bell in reach, tray table in reach, needs met, chair alarm activated, SCDs on

## 2023-05-23 NOTE — CASE MANAGEMENT
Case Management Progress Note    Patient name Magno Alamo  Location [id number] MRN [id number]  : 1940 Date 2023       LOS (days): 6  Geometric Mean LOS (GMLOS) (days): 5 00  Days to GMLOS:-1 2        OBJECTIVE:        Current admission status: Inpatient  Preferred Pharmacy:   [address] [place] [id number]  Phone: [phone] [facility] [place] [address]  Phone: [phone] Fax: [phone]    Primary Care Provider: Arturo Chan MD    Primary Insurance: MEDICARE  Secondary Insurance: BLUE CROSS    PROGRESS NOTE:  S transportation is confirmed via [facility] for 12pm on 23

## 2023-05-23 NOTE — PROGRESS NOTES
[facility]  [facility]  Name: Lacho Espino  MRN: [id number]  Unit/Bed#: -01 I Date of Admission: 5/17/2023   Date of Service: 5/23/2023 I Hospital Day: 6    Assessment/Plan   Bilateral pleural effusion  Assessment & Plan  Lasix 40 bid-> continue  Echo done on 2/28/2023 showed an EF of 61% grade 1 diastolic dysfunction left atrium mildly dilated   I/o's  Monitor resp status    Acute respiratory failure with hypoxia and hypercapnia (HCC)  Assessment & Plan  Patient usually on room air at baseline  5/20/2023 required 4 L nasal cannula  ABG showing respiratory acidosis with metabolic compensation alkalosis  Transitioned to SD2  Currently on 1L NC  Patient refuses to wear BiPAP  Gastroenteritis  Assessment & Plan  · Presents with episodes of nausea/vomiting, diarrhea now appears resolved  · Gastroenterology following  · Advance diet as tolerated    Severe protein-calorie malnutrition ([facility] [address] )  Assessment & Plan  Malnutrition Findings:   Adult Malnutrition type: Chronic illnessBMI Findings: Body mass index is 20 37 kg/m²  Consult nutrition    Essential hypertension  Assessment & Plan  · On Cardizem, digoxin, spironolactone, losartan   · Blood pressure improved today, resume Cardizem  Continue digoxin    Other specified hypothyroidism  Assessment & Plan  · Continue Synthroid    Atrial fibrillation (HCC)  Assessment & Plan  · Home regimen includes Cardizem and digoxin  · Continue with hold parameters  · AC with warfarin-> warfarin resumed  · Continue to trend INR    Sepsis with acute renal failure without septic shock (HCC)  Assessment & Plan  · In setting of gastroenteritis, pneumonia  · Baseline creatinine around 0 6-0 9; creatinine was 1 37 on presentation  · Continue IV abx    Afebrile  · Trend WBC and fever curve  · Urine antigens negative    Chronic obstructive pulmonary disease with acute exacerbation [facility])  Assessment & Plan  · 5/20 patient now in COPD exacerbation with increased work of breathing despite being on 4 L nasal cannula  · CTA PE negative for PE  · Continue Rocephin and doxycycline  · Xopenex, Atrovent, Pulmicort, Perforomist  · ABG   · COVID-negative  · Lactic acid negative  · Pro-Oscar downward trending  · Status post IV Solu-Medrol 80 mg and magnesium  · Solu-Medrol 40 mg switch to prednisone 40 mg and continue to taper    Pneumonia of right lower lobe due to infectious organism  Assessment & Plan  · Right lower lobe pneumonia found on CT  · MRSA culture negative  · Urine antigens negative  · Blood cultures negative x2   · Continue supportive care with Mucinex, Tessalon Perles  · Aspiration precautions -did have episodes of vomiting at nursing facility, question if possible aspiration event  · Procalcitonin elevated, continue IV abx             Labs & Imaging: I have personally reviewed pertinent reports  VTE Prophylaxis: in place  Code Status:   Level 1 - Full Code    Patient Centered Rounds: I have performed bedside rounds with nursing staff today  Discussions with Specialists or Other Care Team Provider: CM    Education and Discussions with Family / Patient: Onel Razo on phone    Current Length of Stay: 6 day(s)    Current Patient Status: Inpatient   Certification Statement: The patient will continue to require additional inpatient hospital stay due to see my assessment and plan  Subjective:   Patient is seen and examined at bedside  No new complaints  Afebrile  All other ROS are negative  Objective:    Vitals: Blood pressure 149/57, pulse 80, temperature 98 1 °F (36 7 °C), resp  rate 16, height 5' (1 524 m), SpO2 92 %, not currently breastfeeding  ,Body mass index is 20 37 kg/m²    SPO2 RA Rest    Flowsheet Row ED to Hosp-Admission (Current) from 5/17/2023 in [address] Med [address]   SpO2 92 %   SpO2 Activity At Rest   O2 Device Nasal cannula   O2 Flow Rate 3 L/min        I&O:     Intake/Output Summary (Last 24 hours) at 5/23/2023 3804  Last data filed at 5/22/2023 1656  Gross per 24 hour   Intake 240 ml   Output 800 ml   Net -560 ml       Physical Exam:    General- Alert, lying comfortably in bed  Not in any acute distress  Neck- Supple, No JVD  CVS- regular, S1 and S2 normal  Chest- Bilateral Air entry, decreased at bases  Abdomen- soft, nontender, not distended, no guarding or rigidity, BS+  Extremities-  No pedal edema, No calf tenderness                         Normal ROM in all extremities  CNS-   Alert, awake and orientedx3  No focal deficits present      Invasive Devices     Peripheral Intravenous Line  Duration           Peripheral IV 05/20/23 Left Forearm 3 days    Peripheral IV 05/21/23 Distal;Left Forearm 2 days          Drain  Duration           External Urinary Catheter <1 day                      Social History  reviewed  Family History   Problem Relation Age of Onset   • Hypertension Mother    • Skin cancer Mother    • Hypertension Father     reviewed    Meds:  Current Facility-Administered Medications   Medication Dose Route Frequency Provider Last Rate Last Admin   • acetaminophen (TYLENOL) tablet 650 mg  650 mg Oral Q6H PRN Ebenezer Hernandez MD       • budesonide (PULMICORT) inhalation solution 0 5 mg  0 5 mg Nebulization Q12H Ebenezer Hernandez MD   0 5 mg at 05/23/23 0707   • cefTRIAXone (ROCEPHIN) IVPB (premix in dextrose) 1,000 mg 50 mL  1,000 mg Intravenous Q24H Ebenezer Hernandez  mL/hr at 05/22/23 1410 1,000 mg at 05/22/23 1410   • digoxin (LANOXIN) tablet 125 mcg  125 mcg Oral Every Other Day Ebenezer Hernandez MD   125 mcg at 05/22/23 5901   • diltiazem (CARDIZEM CD) 24 hr capsule 240 mg  240 mg Oral Daily Ebenezer Hernandez MD   240 mg at 05/23/23 0846   • doxycycline (VIBRAMYCIN) 100 mg in sodium chloride 0 9 % 100 mL IVPB  100 mg Intravenous Q12H Ebenezer Hernandez  mL/hr at 05/23/23 0611 100 mg at 05/23/23 0611   • formoterol (PERFOROMIST) nebulizer solution 20 mcg  20 mcg Nebulization Q12H Romy Remy MD   20 mcg at 05/23/23 3272   • furosemide (LASIX) injection 40 mg  40 mg Intravenous BID (diuretic) Romy Remy MD   40 mg at 05/23/23 0841   • ipratropium (ATROVENT) 0 02 % inhalation solution 0 5 mg  0 5 mg Nebulization TID Romy Remy MD   0 5 mg at 05/23/23 2545   • levalbuterol (Leonard Bojorquez) inhalation solution 1 25 mg  1 25 mg Nebulization TID Romy Remy MD   1 25 mg at 05/23/23 7123   • levothyroxine tablet 25 mcg  25 mcg Oral Daily Romy Remy MD   25 mcg at 05/23/23 0610   • lidocaine (LIDODERM) 5 % patch 1 patch  1 patch Topical Daily Romy Remy MD   1 patch at 05/23/23 0841   • metoprolol (LOPRESSOR) injection 5 mg  5 mg Intravenous Q6H PRN Romy Remy MD       • ondansetron (ZOFRAN) injection 4 mg  4 mg Intravenous Q4H PRN Romy Remy MD       • pantoprazole (PROTONIX) injection 40 mg  40 mg Intravenous Q12H [facility] Romy Remy MD   40 mg at 05/23/23 0841   • predniSONE tablet 40 mg  40 mg Oral Daily Vanessa Cortez DO   40 mg at 05/23/23 0944   • warfarin (COUMADIN) tablet 2 mg  2 mg Oral Daily (warfarin) Romy Remy MD   2 mg at 05/22/23 1656      Medications Prior to Admission   Medication   • umeclidinium (Incruse Ellipta) 62 5 mcg/actuation AEPB inhaler   • acetaminophen (TYLENOL) 325 mg tablet   • Advair Diskus 250-50 MCG/DOSE inhaler   • albuterol (2 5 mg/3 mL) 0 083 % nebulizer solution   • Ascorbic Acid (VITAMIN C) 1000 MG tablet   • Cholecalciferol (VITAMIN D3) 20 MCG (800 UNIT) TABS   • digoxin (LANOXIN) 0 125 mg tablet   • diltiazem (CARDIZEM CD) 240 mg 24 hr capsule   • Ferrous Sulfate 220 (44 Fe) MG/5ML LIQD   • levothyroxine 25 mcg tablet   • lidocaine (LIDODERM) 5 %   • loratadine (CLARITIN) 10 mg tablet   • losartan (COZAAR) 25 mg tablet   • mirtazapine (REMERON) 7 5 MG tablet   • multivitamin (THERAGRAN) TABS   • senna-docusate sodium (SENOKOT S) 8 6-50 mg per tablet   • sertraline (Zoloft) 50 mg tablet   • spironolactone (ALDACTONE) 25 mg tablet   • tiZANidine (ZANAFLEX) 2 mg tablet   • warfarin (COUMADIN) 2 mg tablet       Labs:  Results from last 7 days   Lab Units 05/22/23  0647 05/21/23  0403 05/20/23  1249 05/20/23  0504 05/19/23  0512 05/18/23  0748 05/17/23  1420 05/17/23  0741   WBC Thousand/uL 10 56* 5 92  --  12 86* 12 52* 15 62*  --  22 02*   HEMOGLOBIN g/dL 9 8* 9 3*  --  10 0* 8 5* 9 2*   < > 14 0   I STAT HEMOGLOBIN g/dl  --   --  10 9*  --   --   --   --   --    HEMATOCRIT % 30 5* 29 4*  --  31 7* 26 0* 29 3*   < > 44 5   HEMATOCRIT, ISTAT %  --   --  32*  --   --   --   --   --    PLATELETS Thousands/uL 274 286  --  320 290 296  --  459*   NEUTROS PCT %  --   --   --  88* 89*  --   --  91*   LYMPHS PCT %  --   --   --  6* 4*  --   --  3*   LYMPHO PCT %  --   --   --   --   --  4*  --   --    MONOS PCT %  --   --   --  4 6  --   --  5   MONO PCT %  --   --   --   --   --  3*  --   --    EOS PCT %  --   --   --  1 0 0  --  0    < > = values in this interval not displayed  Results from last 7 days   Lab Units 05/22/23  0510 05/21/23  0403 05/20/23  1249 05/20/23  0504 05/19/23  0512 05/18/23  0746 05/17/23  1420 05/17/23  0741   POTASSIUM mmol/L 4 0 3 6  --  3 8   < > 4 1   < > 5 5*   CHLORIDE mmol/L 98 100  --  103   < > 100   < > 91*   CO2 mmol/L 32 33*  --  30   < > 31   < > 32   CO2, I-STAT mmol/L  --   --  33*  --   --   --   --   --    BUN mg/dL 26* 19  --  17   < > 29*   < > 48*   CREATININE mg/dL 0 47* 0 46*  --  0 37*   < > 0 56*   < > 1 37*   CALCIUM mg/dL 9 0 8 7  --  8 6   < > 8 2*   < > 9 6   ALK PHOS U/L  --   --   --   --   --  147*  --  267*   ALT U/L  --   --   --   --   --  12  --  22   AST U/L  --   --   --   --   --  15  --  28   GLUCOSE, ISTAT mg/dl  --   --  210*  --   --   --   --   --     < > = values in this interval not displayed       Lab Results   Component Value Date    TROPONINI <0 02 11/22/2017    CKTOTAL 26 02/27/2023     Results from last 7 days   Lab Units 05/23/23  0458 05/22/23  0510 05/21/23  0510   INR  2 77* 2 22* 1 95*     Lab Results   Component Value Date    BLOODCX No Growth After 5 Days  05/17/2023    BLOODCX No Growth After 5 Days  05/17/2023    BLOODCX No Growth After 5 Days  05/04/2023    BLOODCX No Growth After 5 Days  05/04/2023    URINECX 50,000-59,000 cfu/ml Enterococcus faecalis (A) 02/28/2023    URINECX <10,000 cfu/ml 02/28/2023         Imaging:  Results for orders placed during the hospital encounter of 05/17/23    XR chest 1 view portable    Narrative  CHEST    INDICATION:   sob  COMPARISON: CXR 5/4/2023, abdomen CT 5/17/2023, chest CT 2/27/2023  EXAM PERFORMED/VIEWS:  XR CHEST PORTABLE  FINDINGS:    Cardiomediastinal silhouette normal     Moderate consolidation throughout the right lung  No definite effusion  No pneumothorax  Upper abdomen normal  Bones normal for age  Impression  Moderate consolidation throughout the right lung compatible with pneumonia  Workstation performed: [id number]    Results for orders placed during the hospital encounter of 05/04/23    XR chest pa & lateral    Narrative  CHEST    INDICATION:   Shortness of breath  COMPARISON: Chest radiograph March 6, 2023    EXAM PERFORMED/VIEWS:  XR CHEST PA & LATERAL      FINDINGS:    Cardiomediastinal silhouette appears unremarkable  Linear atelectasis in the right lower lung  No focal consolidation, pleural effusion or pneumothorax  Osseous structures appear within normal limits for patient age  Impression  No focal consolidation, pleural effusion, or pneumothorax              Workstation performed: [id number]      Last 24 Hours Medication List:   Current Facility-Administered Medications   Medication Dose Route Frequency Provider Last Rate   • acetaminophen  650 mg Oral Q6H PRN Lyla Lopez MD     • budesonide  0 5 mg Nebulization Q12H Lyla Lopez MD     • cefTRIAXone  1,000 mg Intravenous Q24H Lyla Lopez MD 1,000 mg (05/22/23 1410)   • digoxin  125 mcg Oral Every Other Seun Sam MD     • diltiazem  240 mg Oral Daily Shelly Slaughter MD     • doxycycline  100 mg Intravenous Q12H Shelly Slaughter  mg (05/23/23 3159)   • formoterol  20 mcg Nebulization Q12H Shelly Slaughter MD     • furosemide  40 mg Intravenous BID (diuretic) Shelly Slaughter MD     • ipratropium  0 5 mg Nebulization TID Shelly Slaughter MD     • levalbuterol  1 25 mg Nebulization TID Shelly Slaughter MD     • levothyroxine  25 mcg Oral Daily Shelly Slaughter MD     • lidocaine  1 patch Topical Daily Shelly Slaughter MD     • metoprolol  5 mg Intravenous Q6H PRN Shelly Slaughter MD     • ondansetron  4 mg Intravenous Q4H PRN Shelly Slaughter MD     • pantoprazole  40 mg Intravenous Q12H Carlton Adorno MD     • predniSONE  40 mg Oral Daily Nile Davila DO     • warfarin  2 mg Oral Daily (warfarin) Shelly Slaughter MD          Today, Patient Was Seen By: Andrae Balderas MD    ** Please Note: Dictation voice to text software may have been used in the creation of this document   **

## 2023-05-23 NOTE — ASSESSMENT & PLAN NOTE
Patient usually on room air at baseline  5/20/2023 required 4 L nasal cannula  ABG showing respiratory acidosis with metabolic compensation alkalosis  Transitioned to SD2  Currently on 1L NC  Patient refuses to wear BiPAP

## 2023-05-23 NOTE — ASSESSMENT & PLAN NOTE
· Right lower lobe pneumonia found on CT  · MRSA culture negative  · Urine antigens negative  · Blood cultures negative x2   · Continue supportive care with Mucinex, Tessalon Perles  · Aspiration precautions -did have episodes of vomiting at nursing facility, question if possible aspiration event  · Procalcitonin elevated, continue IV abx

## 2023-05-24 LAB
ANION GAP SERPL CALCULATED.3IONS-SCNC: 4 MMOL/L (ref 4–13)
ANION GAP SERPL CALCULATED.3IONS-SCNC: 4 MMOL/L (ref 4–13)
ANION GAP SERPL CALCULATED.3IONS-SCNC: 7 MMOL/L (ref 4–13)
BASOPHILS # BLD AUTO: 0.01 THOUSANDS/ÂΜL (ref 0–0.1)
BASOPHILS NFR BLD AUTO: 0 % (ref 0–1)
BUN SERPL-MCNC: 22 MG/DL (ref 5–25)
BUN SERPL-MCNC: 23 MG/DL (ref 5–25)
BUN SERPL-MCNC: 27 MG/DL (ref 5–25)
CALCIUM SERPL-MCNC: 8.8 MG/DL (ref 8.4–10.2)
CALCIUM SERPL-MCNC: 8.9 MG/DL (ref 8.4–10.2)
CALCIUM SERPL-MCNC: 9 MG/DL (ref 8.4–10.2)
CHLORIDE SERPL-SCNC: 91 MMOL/L (ref 96–108)
CHLORIDE SERPL-SCNC: 92 MMOL/L (ref 96–108)
CHLORIDE SERPL-SCNC: 96 MMOL/L (ref 96–108)
CO2 SERPL-SCNC: 37 MMOL/L (ref 21–32)
CO2 SERPL-SCNC: 39 MMOL/L (ref 21–32)
CO2 SERPL-SCNC: 42 MMOL/L (ref 21–32)
CREAT SERPL-MCNC: 0.46 MG/DL (ref 0.6–1.3)
CREAT SERPL-MCNC: 0.46 MG/DL (ref 0.6–1.3)
CREAT SERPL-MCNC: 0.65 MG/DL (ref 0.6–1.3)
EOSINOPHIL # BLD AUTO: 0 THOUSAND/ÂΜL (ref 0–0.61)
EOSINOPHIL NFR BLD AUTO: 0 % (ref 0–6)
ERYTHROCYTE [DISTWIDTH] IN BLOOD BY AUTOMATED COUNT: 13.4 % (ref 11.6–15.1)
GFR SERPL CREATININE-BSD FRML MDRD: 81 ML/MIN/1.73SQ M
GFR SERPL CREATININE-BSD FRML MDRD: 91 ML/MIN/1.73SQ M
GFR SERPL CREATININE-BSD FRML MDRD: 91 ML/MIN/1.73SQ M
GLUCOSE SERPL-MCNC: 103 MG/DL (ref 65–140)
GLUCOSE SERPL-MCNC: 109 MG/DL (ref 65–140)
GLUCOSE SERPL-MCNC: 122 MG/DL (ref 65–140)
GLUCOSE SERPL-MCNC: 162 MG/DL (ref 65–140)
HCT VFR BLD AUTO: 32.3 % (ref 34.8–46.1)
HGB BLD-MCNC: 10.4 G/DL (ref 11.5–15.4)
IMM GRANULOCYTES # BLD AUTO: 0.11 THOUSAND/UL (ref 0–0.2)
IMM GRANULOCYTES NFR BLD AUTO: 1 % (ref 0–2)
INR PPP: 2.91 (ref 0.84–1.19)
LYMPHOCYTES # BLD AUTO: 0.59 THOUSANDS/ÂΜL (ref 0.6–4.47)
LYMPHOCYTES NFR BLD AUTO: 7 % (ref 14–44)
MCH RBC QN AUTO: 32.1 PG (ref 26.8–34.3)
MCHC RBC AUTO-ENTMCNC: 32.2 G/DL (ref 31.4–37.4)
MCV RBC AUTO: 100 FL (ref 82–98)
MONOCYTES # BLD AUTO: 0.52 THOUSAND/ÂΜL (ref 0.17–1.22)
MONOCYTES NFR BLD AUTO: 6 % (ref 4–12)
NEUTROPHILS # BLD AUTO: 6.9 THOUSANDS/ÂΜL (ref 1.85–7.62)
NEUTS SEG NFR BLD AUTO: 86 % (ref 43–75)
NRBC BLD AUTO-RTO: 0 /100 WBCS
PLATELET # BLD AUTO: 283 THOUSANDS/UL (ref 149–390)
PMV BLD AUTO: 8.8 FL (ref 8.9–12.7)
POTASSIUM SERPL-SCNC: 2.8 MMOL/L (ref 3.5–5.3)
POTASSIUM SERPL-SCNC: 2.9 MMOL/L (ref 3.5–5.3)
POTASSIUM SERPL-SCNC: 5.2 MMOL/L (ref 3.5–5.3)
PROCALCITONIN SERPL-MCNC: 0.16 NG/ML
PROTHROMBIN TIME: 31.8 SECONDS (ref 11.6–14.5)
RBC # BLD AUTO: 3.24 MILLION/UL (ref 3.81–5.12)
SODIUM SERPL-SCNC: 137 MMOL/L (ref 135–147)
SODIUM SERPL-SCNC: 137 MMOL/L (ref 135–147)
SODIUM SERPL-SCNC: 138 MMOL/L (ref 135–147)
WBC # BLD AUTO: 8.13 THOUSAND/UL (ref 4.31–10.16)

## 2023-05-24 RX ORDER — POTASSIUM CHLORIDE 20 MEQ/1
40 TABLET, EXTENDED RELEASE ORAL EVERY 4 HOURS
Status: DISCONTINUED | OUTPATIENT
Start: 2023-05-24 | End: 2023-05-24

## 2023-05-24 RX ORDER — POTASSIUM CHLORIDE 14.9 MG/ML
20 INJECTION INTRAVENOUS ONCE
Status: COMPLETED | OUTPATIENT
Start: 2023-05-24 | End: 2023-05-25

## 2023-05-24 RX ORDER — POTASSIUM CHLORIDE 20 MEQ/1
40 TABLET, EXTENDED RELEASE ORAL EVERY 4 HOURS
Status: COMPLETED | OUTPATIENT
Start: 2023-05-24 | End: 2023-05-24

## 2023-05-24 RX ADMIN — POTASSIUM CHLORIDE 20 MEQ: 14.9 INJECTION, SOLUTION INTRAVENOUS at 11:59

## 2023-05-24 RX ADMIN — IPRATROPIUM BROMIDE 0.5 MG: 0.5 SOLUTION RESPIRATORY (INHALATION) at 13:12

## 2023-05-24 RX ADMIN — BUDESONIDE 0.5 MG: 0.5 INHALANT ORAL at 20:33

## 2023-05-24 RX ADMIN — LEVALBUTEROL HYDROCHLORIDE 1.25 MG: 1.25 SOLUTION RESPIRATORY (INHALATION) at 20:33

## 2023-05-24 RX ADMIN — DOXYCYCLINE 100 MG: 100 INJECTION, POWDER, LYOPHILIZED, FOR SOLUTION INTRAVENOUS at 05:42

## 2023-05-24 RX ADMIN — POTASSIUM CHLORIDE 40 MEQ: 1500 TABLET, EXTENDED RELEASE ORAL at 10:45

## 2023-05-24 RX ADMIN — IPRATROPIUM BROMIDE 0.5 MG: 0.5 SOLUTION RESPIRATORY (INHALATION) at 20:33

## 2023-05-24 RX ADMIN — PREDNISONE 40 MG: 20 TABLET ORAL at 08:23

## 2023-05-24 RX ADMIN — DOXYCYCLINE 100 MG: 100 INJECTION, POWDER, LYOPHILIZED, FOR SOLUTION INTRAVENOUS at 17:55

## 2023-05-24 RX ADMIN — FORMOTEROL FUMARATE DIHYDRATE 20 MCG: 20 SOLUTION RESPIRATORY (INHALATION) at 07:07

## 2023-05-24 RX ADMIN — PANTOPRAZOLE SODIUM 40 MG: 40 TABLET, DELAYED RELEASE ORAL at 05:42

## 2023-05-24 RX ADMIN — IPRATROPIUM BROMIDE 0.5 MG: 0.5 SOLUTION RESPIRATORY (INHALATION) at 07:07

## 2023-05-24 RX ADMIN — DILTIAZEM HYDROCHLORIDE 240 MG: 240 CAPSULE, EXTENDED RELEASE ORAL at 08:30

## 2023-05-24 RX ADMIN — PANTOPRAZOLE SODIUM 40 MG: 40 TABLET, DELAYED RELEASE ORAL at 17:00

## 2023-05-24 RX ADMIN — LIDOCAINE 1 PATCH: 50 PATCH TOPICAL at 08:23

## 2023-05-24 RX ADMIN — LEVALBUTEROL HYDROCHLORIDE 1.25 MG: 1.25 SOLUTION RESPIRATORY (INHALATION) at 07:07

## 2023-05-24 RX ADMIN — CEFTRIAXONE 1000 MG: 1 INJECTION, SOLUTION INTRAVENOUS at 10:47

## 2023-05-24 RX ADMIN — POTASSIUM CHLORIDE 40 MEQ: 1500 TABLET, EXTENDED RELEASE ORAL at 08:23

## 2023-05-24 RX ADMIN — BUDESONIDE 0.5 MG: 0.5 INHALANT ORAL at 07:07

## 2023-05-24 RX ADMIN — FORMOTEROL FUMARATE DIHYDRATE 20 MCG: 20 SOLUTION RESPIRATORY (INHALATION) at 20:33

## 2023-05-24 RX ADMIN — LEVOTHYROXINE SODIUM 25 MCG: 25 TABLET ORAL at 05:42

## 2023-05-24 RX ADMIN — WARFARIN SODIUM 2 MG: 2 TABLET ORAL at 17:55

## 2023-05-24 RX ADMIN — POTASSIUM CHLORIDE 40 MEQ: 1500 TABLET, EXTENDED RELEASE ORAL at 17:00

## 2023-05-24 RX ADMIN — DIGOXIN 125 MCG: 125 TABLET ORAL at 08:23

## 2023-05-24 RX ADMIN — LEVALBUTEROL HYDROCHLORIDE 1.25 MG: 1.25 SOLUTION RESPIRATORY (INHALATION) at 13:12

## 2023-05-24 NOTE — PLAN OF CARE
Problem: MOBILITY - ADULT  Goal: Maintain or return to baseline ADL function  Description: INTERVENTIONS:  -  Assess patient's ability to car out ADLs; assess patient's baseline for ADL function and identify physical deficits which impact ability to perform ADLs (bathing, care of mouth/teeth, toileting, grooming, dressing, etc )  - Assess/evaluate cause of self-care deficits   - Assess range of motion  - Assess patient's mobility; develop plan if impa  - Assess patient's need for assistive devices and provide as appropriate  - Encourage maximum independence but intervene and supervise when necessary  - Involve family in performance of ADLs  - Assess for home care needs following discharge   - Consider OT consult to assist with ADL evaluation and planning for discharge  - Provide patient education as appropriate  Outcome: Progressing  Goal: Maintains/Returns to pre admission functional level  Description: INTERVENTIONS:  - Perform BMAT or MOVE assessment daily    - Set and communicate daily mobility goal to care team and patient/family/caregiver  - Collaborate with rehabilitation services on mobility goals if consulted  - Perform Range of Motion  times a day  - Reposition patient every  hours    - Dangle patient  times a day  - Stand patient  times a day  - Ambulate patient  times a day  - Out of bed to chair  times a day   - Out of bed for meals  times a day  - Out of bed for toileting  - Record patient progress and toleration of activity level   Outcome: Progressing     Problem: PAIN - ADULT  Goal: Verbalizes/displays adequate comfort level or baseline comfort level  Description: Interventions:  - Encourage patient to monitor pain and request assistance  - Assess pain using appropriate pain scale  - Administer analgesics based on type and severity of pain and evaluate response  - Implement non-pharmacological measures as appropriate and evaluate response  - Consider cultural and social influences on pain and pain management  - Notify physician/advanced practitioner if interventions unsuccessful or patient reports new pain  Outcome: Progressing     Problem: INFECTION - ADULT  Goal: Absence or prevention of progression during hospitalization  Description: INTERVENTIONS:  - Assess and monitor for signs and symptoms of infection  - Monitor lab/diagnostic results  - Monitor all insertion sites, i e  indwelling lines, tubes, and drains  - Monitor endotracheal if appropriate and nasal secretions for changes in amount and color  - Hoffman appropriate cooling/warming therapies per order  - Administer medications as ordered  - Instruct and encourage patient and family to use good hand hygiene technique  - Identify and instruct in appropriate isolation precautions for identified infection/condition  Outcome: Progressing     Problem: SAFETY ADULT  Goal: Patient will remain free of falls  Description: INTERVENTIONS:  - Educate patient/family on patient safety including physical limitations  - Instruct patient to call for assistance with activity   - Consult OT/PT to assist with strengthening/mobility   - Keep Call bell within reach  - Keep bed low and locked with side rails adjusted as appropriate  - Keep care items and personal belongings within reach  - Initiate and maintain comfort rounds  - Make Fall Risk Sign visible to staff  - Offer Toileting every  Hours, in advance of need  - Initiate/Maintain alarm  - Obtain necessary fall risk management equipment:   - Apply yellow socks and bracelet for high fall risk patients  - Consider moving patient to room near nurses station  Outcome: Progressing     Problem: DISCHARGE PLANNING  Goal: Discharge to home or other facility with appropriate resources  Description: INTERVENTIONS:  - Identify barriers to discharge w/patient and caregiver  - Arrange for needed discharge resources and transportation as appropriate  - Identify discharge learning needs (meds, wound care, etc )  - Arrange for interpretive services to assist at discharge as needed  - Refer to Case Management Department for coordinating discharge planning if the patient needs post-hospital services based on physician/advanced practitioner order or complex needs related to functional status, cognitive ability, or social support system  Outcome: Progressing     Problem: Knowledge Deficit  Goal: Patient/family/caregiver demonstrates understanding of disease process, treatment plan, medications, and discharge instructions  Description: Complete learning assessment and assess knowledge base  Interventions:  - Provide teaching at level of understanding  - Provide teaching via preferred learning methods  Outcome: Progressing     Problem: Nutrition/Hydration-ADULT  Goal: Nutrient/Hydration intake appropriate for improving, restoring or maintaining nutritional needs  Description: Monitor and assess patient's nutrition/hydration status for malnutrition  Collaborate with interdisciplinary team and initiate plan and interventions as ordered  Monitor patient's weight and dietary intake as ordered or per policy  Utilize nutrition screening tool and intervene as necessary  Determine patient's food preferences and provide high-protein, high-caloric foods as appropriate       INTERVENTIONS:  - Monitor oral intake, urinary output, labs, and treatment plans  - Assess nutrition and hydration status and recommend course of action  - Evaluate amount of meals eaten  - Assist patient with eating if necessary   - Allow adequate time for meals  - Recommend/ encourage appropriate diets, oral nutritional supplements, and vitamin/mineral supplements  - Order, calculate, and assess calorie counts as needed  - Recommend, monitor, and adjust tube feedings and TPN/PPN based on assessed needs  - Assess need for intravenous fluids  - Provide specific nutrition/hydration education as appropriate  - Include patient/family/caregiver in decisions related to nutrition  Outcome: Progressing     Problem: Prexisting or High Potential for Compromised Skin Integrity  Goal: Skin integrity is maintained or improved  Description: INTERVENTIONS:  - Identify patients at risk for skin breakdown  - Assess and monitor skin integrity  - Assess and monitor nutrition and hydration status  - Monitor labs   - Assess for incontinence   - Turn and reposition patient  - Assist with mobility/ambulation  - Relieve pressure over bony prominences  - Avoid friction and shearing  - Provide appropriate hygiene as needed including keeping skin clean and dry  - Evaluate need for skin moisturizer/barrier cream  - Collaborate with interdisciplinary team   - Patient/family teaching  - Consider wound care consult   Outcome: Progressing     Problem: Potential for Falls  Goal: Patient will remain free of falls  Description: INTERVENTIONS:  - Educate patient/family on patient safety including physical limitations  - Instruct patient to call for assistance with activity   - Consult OT/PT to assist with strengthening/mobility   - Keep Call bell within reach  - Keep bed low and locked with side rails adjusted as appropriate  - Keep care items and personal belongings within reach  - Initiate and maintain comfort rounds  - Make Fall Risk Sign visible to staff  - Offer Toileting every  Hours, in advance of need  - Initiate/Maintain alarm  - Obtain necessary fall risk management equipment:  - Apply yellow socks and bracelet for high fall risk patients  - Consider moving patient to room near nurses station  Outcome: Progressing     Problem: RESPIRATORY - ADULT  Goal: Achieves optimal ventilation and oxygenation  Description: INTERVENTIONS:  - Assess for changes in respiratory status  - Assess for changes in mentation and behavior  - Position to facilitate oxygenation and minimize respiratory effort  - Oxygen administered by appropriate delivery if ordered  - Initiate smoking cessation education as indicated  - Encourage broncho-pulmonary hygiene including cough, deep breathe, Incentive Spirometry  - Assess the need for suctioning and aspirate as needed  - Assess and instruct to report SOB or any respiratory difficulty  - Respiratory Therapy support as indicated  Outcome: Progressing     Problem: GASTROINTESTINAL - ADULT  Goal: Minimal or absence of nausea and/or vomiting  Description: INTERVENTIONS:  - Administer IV fluids if ordered to ensure adequate hydration  - Maintain NPO status until nausea and vomiting are resolved  - Nasogastric tube if ordered  - Administer ordered antiemetic medications as needed  - Provide nonpharmacologic comfort measures as appropriate  - Advance diet as tolerated, if ordered  - Consider nutrition services referral to assist patient with adequate nutrition and appropriate food choices  Outcome: Progressing  Goal: Maintains or returns to baseline bowel function  Description: INTERVENTIONS:  - Assess bowel function  - Encourage oral fluids to ensure adequate hydration  - Administer IV fluids if ordered to ensure adequate hydration  - Administer ordered medications as needed  - Encourage mobilization and activity  - Consider nutritional services referral to assist patient with adequate nutrition and appropriate food choices  Outcome: Progressing  Goal: Maintains adequate nutritional intake  Description: INTERVENTIONS:  - Monitor percentage of each meal consumed  - Identify factors contributing to decreased intake, treat as appropriate  - Assist with meals as needed  - Monitor I&O, weight, and lab values if indicated  - Obtain nutrition services referral as needed  Outcome: Progressing     Problem: METABOLIC, FLUID AND ELECTROLYTES - ADULT  Goal: Electrolytes maintained within normal limits  Description: INTERVENTIONS:  - Monitor labs and assess patient for signs and symptoms of electrolyte imbalances  - Administer electrolyte replacement as ordered  - Monitor response to electrolyte replacements, including repeat lab results as appropriate  - Instruct patient on fluid and nutrition as appropriate  Outcome: Progressing  Goal: Fluid balance maintained  Description: INTERVENTIONS:  - Monitor labs   - Monitor I/O and WT  - Instruct patient on fluid and nutrition as appropriate  - Assess for signs & symptoms of volume excess or deficit  Outcome: Progressing     Problem: SKIN/TISSUE INTEGRITY - ADULT  Goal: Skin Integrity remains intact(Skin Breakdown Prevention)  Description: Assess:  -Perform Everardo assessment every   -Clean and moisturize skin every   -Inspect skin when repositioning, toileting, and assisting with ADLS  -Assess under medical devices such as  every   -Assess extremities for adequate circulation and sensation     Bed Management:  -Have minimal linens on bed & keep smooth, unwrinkled  -Change linens as needed when moist or perspiring  -Avoid sitting or lying in one position for more than  hours while in bed  -Keep HOB at degrees     Toileting:  -Offer bedside commode  -Assess for incontinence every   -Use incontinent care products after each incontinent episode such as     Activity:  -Mobilize patient  times a day  -Encourage activity and walks on unit  -Encourage or provide ROM exercises   -Turn and reposition patient every  Hours  -Use appropriate equipment to lift or move patient in bed  -Instruct/ Assist with weight shifting every  when out of bed in chair  -Consider limitation of chair time  hour intervals    Skin Care:  -Avoid use of baby powder, tape, friction and shearing, hot water or constrictive clothing  -Relieve pressure over bony prominences using   -Do not massage red bony areas    Next Steps:  -Teach patient strategies to minimize risks such as    -Consider consults to  interdisciplinary teams such a  Outcome: Progressing     Problem: HEMATOLOGIC - ADULT  Goal: Maintains hematologic stability  Description: INTERVENTIONS  - Assess for signs and symptoms of bleeding or hemorrhage  - Monitor labs  - Administer supportive blood products/factors as ordered and appropriate  Outcome: Progressing     Problem: MUSCULOSKELETAL - ADULT  Goal: Maintain or return mobility to safest level of function  Description: INTERVENTIONS:  - Assess patient's ability to carry out ADLs; assess patient's baseline for ADL function and identify physical deficits which impact ability to perform ADLs (bathing, care of mouth/teeth, toileting, grooming, dressing, etc )  - Assess/evaluate cause of self-care deficits   - Assess range of motion  - Assess patient's mobility  - Assess patient's need for assistive devices and provide as appropriate  - Encourage maximum independence but intervene and supervise when necessary  - Involve family in performance of ADLs  - Assess for home care needs following discharge   - Consider OT consult to assist with ADL evaluation and planning for discharge  - Provide patient education as appropriate  Outcome: Progressing

## 2023-05-24 NOTE — PROGRESS NOTES
[place]  Progress Note  Name: Merlyn Sanchez  MRN: [id number]  Unit/Bed#: -01 I Date of Admission: 5/17/2023   Date of Service: 5/24/2023 I Hospital Day: 7    Assessment/Plan   Bilateral pleural effusion  Assessment & Plan  Lasix 40 bid-> continue  Echo done on 2/28/2023 showed an EF of 81% grade 1 diastolic dysfunction left atrium mildly dilated   I/o's  Monitor resp status    Acute respiratory failure with hypoxia and hypercapnia (HCC)  Assessment & Plan  Patient usually on room air at baseline  5/20/2023 required 4 L nasal cannula  ABG showing respiratory acidosis with metabolic compensation alkalosis  Transitioned to SD2  Currently on 1L NC  Patient refuses to wear BiPAP  Gastroenteritis  Assessment & Plan  · Presents with episodes of nausea/vomiting, diarrhea now appears resolved  · Gastroenterology following  · Advance diet as tolerated    Severe protein-calorie malnutrition ([facility] [address] )  Assessment & Plan  Malnutrition Findings:   Adult Malnutrition type: Chronic illnessBMI Findings: Body mass index is 20 37 kg/m²  Consult nutrition    Essential hypertension  Assessment & Plan  · On Cardizem, digoxin, spironolactone, losartan   · Resume Cardizem  Continue digoxin    Other specified hypothyroidism  Assessment & Plan  · Continue Synthroid    Atrial fibrillation (HCC)  Assessment & Plan  · Home regimen includes Cardizem and digoxin  · Continue with hold parameters  · AC with warfarin-> warfarin resumed  · Continue to trend INR    Sepsis with acute renal failure without septic shock (HCC)  Assessment & Plan  · In setting of gastroenteritis, pneumonia  · Baseline creatinine around 0 6-0 9; creatinine was 1 37 on presentation  · Continue IV abx    Afebrile  · Trend WBC and fever curve  · Urine antigens negative    Chronic obstructive pulmonary disease with acute exacerbation (HCC)  Assessment & Plan  · 5/20 patient now in COPD exacerbation with increased work of breathing despite being on 4 L nasal cannula  · CTA PE negative for PE  · Continue Rocephin and doxycycline  · Xopenex, Atrovent, Pulmicort, Perforomist  · ABG   · COVID-negative  · Lactic acid negative  · Pro-Oscar downward trending  · Status post IV Solu-Medrol 80 mg and magnesium  · Solu-Medrol 40 mg switch to prednisone 40 mg and continue to taper    Pneumonia of right lower lobe due to infectious organism  Assessment & Plan  · Right lower lobe pneumonia found on CT  · MRSA culture negative  · Urine antigens negative  · Blood cultures negative x2   · Continue supportive care with Mucinex, Tessalon Perles  · Aspiration precautions -did have episodes of vomiting at nursing facility, question if possible aspiration event  · Procalcitonin elevated, continue IV abx             Labs & Imaging: I have personally reviewed pertinent reports  VTE Prophylaxis: in place  Code Status:   Level 1 - Full Code    Patient Centered Rounds: I have performed bedside rounds with nursing staff today  Discussions with Specialists or Other Care Team Provider: CM    Education and Discussions with Family / Patient: Shannon Walters    Current Length of Stay: 7 day(s)    Current Patient Status: Inpatient   Certification Statement: The patient will continue to require additional inpatient hospital stay due to see my assessment and plan  Subjective:   Patient is seen and examined at bedside  No new complaints  Afebrile  All other ROS are negative  Objective:    Vitals: Blood pressure 157/61, pulse 70, temperature 97 5 °F (36 4 °C), resp  rate 19, height 5' (1 524 m), SpO2 99 %, not currently breastfeeding  ,Body mass index is 20 37 kg/m²    SPO2 RA Rest    Flowsheet Row ED to Hosp-Admission (Current) from 5/17/2023 in [address] Med [facility]   SpO2 99 %   SpO2 Activity At Rest   O2 Device Nasal cannula   O2 Flow Rate 1 L/min        I&O:     Intake/Output Summary (Last 24 hours) at 5/24/2023 7479  Last data filed at 5/23/2023 2145  Gross per 24 hour   Intake 290 ml   Output 1850 ml   Net -1560 ml       Physical Exam:    General- Alert, lying comfortably in bed  Not in any acute distress  Neck- Supple, No JVD  CVS- regular, S1 and S2 normal  Chest- Bilateral Air entry, decreased at bases  Abdomen- soft, nontender, not distended, no guarding or rigidity, BS+  Extremities-  No pedal edema, No calf tenderness                         Normal ROM in all extremities  CNS-   Alert, awake and orientedx3  No focal deficits present  Invasive Devices     Peripheral Intravenous Line  Duration           Peripheral IV 05/23/23 Dorsal (posterior); Right Forearm <1 day          Drain  Duration           External Urinary Catheter 1 day                      Social History  reviewed  Family History   Problem Relation Age of Onset   • Hypertension Mother    • Skin cancer Mother    • Hypertension Father     reviewed    Meds:  Current Facility-Administered Medications   Medication Dose Route Frequency Provider Last Rate Last Admin   • acetaminophen (TYLENOL) tablet 650 mg  650 mg Oral Q6H PRN Marichuy Yanes MD       • budesonide (PULMICORT) inhalation solution 0 5 mg  0 5 mg Nebulization Q12H Marichuy Yanes MD   0 5 mg at 05/24/23 0707   • cefTRIAXone (ROCEPHIN) IVPB (premix in dextrose) 1,000 mg 50 mL  1,000 mg Intravenous Q24H Marichuy Yanes  mL/hr at 05/24/23 1047 1,000 mg at 05/24/23 1047   • digoxin (LANOXIN) tablet 125 mcg  125 mcg Oral Every Other Day Marichuy Yanes MD   125 mcg at 05/24/23 0823   • diltiazem (CARDIZEM CD) 24 hr capsule 240 mg  240 mg Oral Daily Marichuy Yanes MD   240 mg at 05/24/23 0830   • doxycycline (VIBRAMYCIN) 100 mg in sodium chloride 0 9 % 100 mL IVPB  100 mg Intravenous Q12H Marichuy Yanes  mL/hr at 05/24/23 0542 100 mg at 05/24/23 0542   • formoterol (PERFOROMIST) nebulizer solution 20 mcg  20 mcg Nebulization Q12H Marichuy Yanes MD   20 mcg at 05/24/23 0707   • ipratropium (ATROVENT) 0 02 % inhalation solution 0 5 mg  0 5 mg Nebulization TID Grisel Tapia MD   0 5 mg at 05/24/23 5710   • levalbuterol (Janelle Hughs) inhalation solution 1 25 mg  1 25 mg Nebulization TID Grisel Tapia MD   1 25 mg at 05/24/23 1245   • levothyroxine tablet 25 mcg  25 mcg Oral Daily Grisel Tapia MD   25 mcg at 05/24/23 0542   • lidocaine (LIDODERM) 5 % patch 1 patch  1 patch Topical Daily Grisel Tapia MD   1 patch at 05/24/23 2121   • metoprolol (LOPRESSOR) injection 5 mg  5 mg Intravenous Q6H PRN Grisel Tapia MD       • ondansetron (ZOFRAN) injection 4 mg  4 mg Intravenous Q4H PRN Grisel Tapia MD       • pantoprazole (PROTONIX) EC tablet 40 mg  40 mg Oral BID AC Sae English MD   40 mg at 05/24/23 0542   • potassium chloride (K-DUR,KLOR-CON) CR tablet 40 mEq  40 mEq Oral Q4H Sae English MD   40 mEq at 05/24/23 1045   • potassium chloride 20 mEq IVPB (premix)  20 mEq Intravenous Once Sae English MD       • predniSONE tablet 40 mg  40 mg Oral Daily Wilbert Cortez DO   40 mg at 05/24/23 8212   • warfarin (COUMADIN) tablet 2 mg  2 mg Oral Daily (warfarin) Grisel Tapia MD   2 mg at 05/23/23 1652      Medications Prior to Admission   Medication   • umeclidinium (Incruse Ellipta) 62 5 mcg/actuation AEPB inhaler   • acetaminophen (TYLENOL) 325 mg tablet   • Advair Diskus 250-50 MCG/DOSE inhaler   • albuterol (2 5 mg/3 mL) 0 083 % nebulizer solution   • Ascorbic Acid (VITAMIN C) 1000 MG tablet   • Cholecalciferol (VITAMIN D3) 20 MCG (800 UNIT) TABS   • digoxin (LANOXIN) 0 125 mg tablet   • diltiazem (CARDIZEM CD) 240 mg 24 hr capsule   • Ferrous Sulfate 220 (44 Fe) MG/5ML LIQD   • levothyroxine 25 mcg tablet   • lidocaine (LIDODERM) 5 %   • loratadine (CLARITIN) 10 mg tablet   • losartan (COZAAR) 25 mg tablet   • mirtazapine (REMERON) 7 5 MG tablet   • multivitamin (THERAGRAN) TABS   • senna-docusate sodium (SENOKOT S) 8 6-50 mg per tablet   • sertraline (Zoloft) 50 mg tablet   • spironolactone (ALDACTONE) 25 mg tablet   • tiZANidine (ZANAFLEX) 2 mg tablet   • warfarin (COUMADIN) 2 mg tablet       Labs:  Results from last 7 days   Lab Units 05/24/23  0458 05/22/23  0647 05/21/23  0403 05/20/23  1249 05/20/23  0504 05/19/23  0512   EOS PCT % 0  --   --   --  1 0   HEMATOCRIT % 32 3* 30 5* 29 4*  --  31 7* 26 0*   HEMATOCRIT, ISTAT   --   --   --    < >  --   --    HEMOGLOBIN g/dL 10 4* 9 8* 9 3*  --  10 0* 8 5*   I STAT HEMOGLOBIN   --   --   --    < >  --   --    LYMPHS PCT % 7*  --   --   --  6* 4*   MONOS PCT % 6  --   --   --  4 6   NEUTROS PCT % 86*  --   --   --  88* 89*   PLATELETS Thousands/uL 283 274 286  --  320 290   WBC Thousand/uL 8 13 10 56* 5 92  --  12 86* 12 52*    < > = values in this interval not displayed  Results from last 7 days   Lab Units 05/24/23  0844 05/24/23  0458 05/22/23  0510 05/21/23  0403 05/20/23  1249 05/19/23  0512 05/18/23  0746   ALK PHOS U/L  --   --   --   --   --   --  147*   ALT U/L  --   --   --   --   --   --  12   AST U/L  --   --   --   --   --   --  15   BUN mg/dL 22 23 26*   < >  --    < > 29*   CALCIUM mg/dL 9 0 8 8 9 0   < >  --    < > 8 2*   CHLORIDE mmol/L 92* 91* 98   < >  --    < > 100   CO2 mmol/L 39* 42* 32   < >  --    < > 31   CO2, I-STAT mmol/L  --   --   --   --  33*  --   --    CREATININE mg/dL 0 46* 0 46* 0 47*   < >  --    < > 0 56*   GLUCOSE, ISTAT mg/dl  --   --   --   --  210*  --   --    POTASSIUM mmol/L 2 8* 2 9* 4 0   < >  --    < > 4 1    < > = values in this interval not displayed  Lab Results   Component Value Date    CKTOTAL 26 02/27/2023    TROPONINI <0 02 11/22/2017     Results from last 7 days   Lab Units 05/24/23  0458 05/23/23  0458 05/22/23  0510   INR  2 91* 2 77* 2 22*     Lab Results   Component Value Date    BLOODCX No Growth After 5 Days  05/17/2023    BLOODCX No Growth After 5 Days  05/17/2023    BLOODCX No Growth After 5 Days  05/04/2023    BLOODCX No Growth After 5 Days   05/04/2023    URINECX 50,000-59,000 cfu/ml Enterococcus faecalis (A) 02/28/2023    URINECX <10,000 cfu/ml 02/28/2023         Imaging:  Results for orders placed during the hospital encounter of 05/17/23    XR chest 1 view portable    Narrative  CHEST    INDICATION:   sob  COMPARISON: CXR 5/4/2023, abdomen CT 5/17/2023, chest CT 2/27/2023  EXAM PERFORMED/VIEWS:  XR CHEST PORTABLE  FINDINGS:    Cardiomediastinal silhouette normal     Moderate consolidation throughout the right lung  No definite effusion  No pneumothorax  Upper abdomen normal  Bones normal for age  Impression  Moderate consolidation throughout the right lung compatible with pneumonia  Workstation performed: [id number]    Results for orders placed during the hospital encounter of 05/04/23    XR chest pa & lateral    Narrative  CHEST    INDICATION:   Shortness of breath  COMPARISON: Chest radiograph March 6, 2023    EXAM PERFORMED/VIEWS:  XR CHEST PA & LATERAL      FINDINGS:    Cardiomediastinal silhouette appears unremarkable  Linear atelectasis in the right lower lung  No focal consolidation, pleural effusion or pneumothorax  Osseous structures appear within normal limits for patient age  Impression  No focal consolidation, pleural effusion, or pneumothorax              Workstation performed: [id number]      Last 24 Hours Medication List:   Current Facility-Administered Medications   Medication Dose Route Frequency Provider Last Rate   • acetaminophen  650 mg Oral Q6H PRN Nader Robertson MD     • budesonide  0 5 mg Nebulization Q12H Nader Robertson MD     • cefTRIAXone  1,000 mg Intravenous Q24H Nader Robertson MD 1,000 mg (05/24/23 1047)   • digoxin  125 mcg Oral Every Other Day Nader Robertson MD     • diltiazem  240 mg Oral Daily Nader Robertson MD     • doxycycline  100 mg Intravenous Q12H Nader Robertson  mg (05/24/23 0542)   • formoterol  20 mcg Nebulization Q12H Nader Robertson MD     • ipratropium  0 5 mg Nebulization TID Nader Robertson MD     • levalbuterol  1 25 mg Nebulization TID Vincent Holstein, MD     • levothyroxine  25 mcg Oral Daily Vincent Holstein, MD     • lidocaine  1 patch Topical Daily Vincent Holstein, MD     • metoprolol  5 mg Intravenous Q6H PRN Vincent Holstein, MD     • ondansetron  4 mg Intravenous Q4H PRN Vincent Holstein, MD     • pantoprazole  40 mg Oral BID AC Greg Romano MD     • potassium chloride  40 mEq Oral Q4H Greg Romano MD     • potassium chloride  20 mEq Intravenous Once Greg Romano MD     • predniSONE  40 mg Oral Daily Gamal Tran DO     • warfarin  2 mg Oral Daily (warfarin) Vincent Holstein, MD          Today, Patient Was Seen By: Greg Romano MD    ** Please Note: Dictation voice to text software may have been used in the creation of this document   **

## 2023-05-24 NOTE — ASSESSMENT & PLAN NOTE
Lasix 40 bid-> continue  Echo done on 2/28/2023 showed an EF of 45% grade 1 diastolic dysfunction left atrium mildly dilated   I/o's  Monitor resp status

## 2023-05-24 NOTE — ASSESSMENT & PLAN NOTE
· 5/20 patient now in COPD exacerbation with increased work of breathing despite being on 4 L nasal cannula  · CTA PE negative for PE  · Continue Rocephin and doxycycline  · Xopenex, Atrovent, Pulmicort, Perforomist  · ABG   · COVID-negative  · Lactic acid negative  · Pro-Oscar downward trending  · Status post IV Solu-Medrol 80 mg and magnesium  · Solu-Medrol 40 mg switch to prednisone 40 mg and continue to taper

## 2023-05-24 NOTE — CASE MANAGEMENT
Case Management Discharge Planning Note    Patient name Josep Burk  Location [address] 214/-36 MRN [id number]  : 1940 Date 2023       Current Admission Date: 2023  Current Admission Diagnosis:Sepsis with acute renal failure without septic shock [facility])   Patient Active Problem List    Diagnosis Date Noted   • Bilateral pleural effusion 2023   • Acute respiratory failure with hypoxia and hypercapnia ([address] ) 2023   • Gastroenteritis 2023   • Cerebral aneurysm, nonruptured 2023   • Abdominal distention/back pain 2023   • Urinary retention 2023   • MDD with persistant bereavement complex 2023   • Abnormal CT of the chest 2023   • 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[facility] [address] ) 2023   • Thyroid nodule 2023   • Severe protein-calorie malnutrition ([address] ) 2023   • Unspecified mood (affective) disorder ([facility] [address] ) 2023   • Failure to thrive in adult 2023   • Pulmonary nodule 02/15/2023   • Onychomycosis 02/15/2023   • Essential hypertension 02/10/2023   • Anxiety 02/10/2023   • Other specified hypothyroidism 2022   • Peripheral edema 2021   • Low back pain without sciatica 2021   • Pneumonia of right lower lobe due to infectious organism 2017   • Chronic obstructive pulmonary disease with acute exacerbation ([facility] [address] ) 2017   • Sepsis with acute renal failure without septic shock ([facility] [address] ) 2017   • Hypertension 2017   • Hypercoagulable state ([address] ) 2017   • 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   • Vitamin D deficiency 2016   • Osteoporosis 2015   • Antiphospholipid antibody syndrome ([address] ) 2012   • Atrial fibrillation ([address] ) 2012   • Hereditary hemolytic anemia ([facility] [address] ) 2012   • Asthma 2012      LOS (days): 7  Geometric Mean LOS (GMLOS) (days): 5 00  Days to GMLOS:-1 9     OBJECTIVE:  Risk of Unplanned Readmission Score: 29 79         Current admission status: Inpatient   Preferred Pharmacy:   [address] [place] [address] [id number]  [address] [facility] [address]  Phone: [phone] Fax: [address], [address] [place] [address]  Phone: [phone] Fax: [phone]    Primary Care Provider: Jay Kan MD    Primary Insurance: MEDICARE  Secondary Insurance: BLUE CROSS    DISCHARGE DETAILS:           Updated provider and bedside nurse via TT and facility via [facility] with confirmed transport time of 12p  Pt' potassium dropped per provider and discharge is on hold   Notified nurse and facility and canceled ride in [address]

## 2023-05-25 VITALS
OXYGEN SATURATION: 97 % | SYSTOLIC BLOOD PRESSURE: 122 MMHG | HEART RATE: 70 BPM | TEMPERATURE: 97.9 F | DIASTOLIC BLOOD PRESSURE: 60 MMHG | RESPIRATION RATE: 14 BRPM | BODY MASS INDEX: 20.37 KG/M2 | HEIGHT: 60 IN

## 2023-05-25 LAB
ANION GAP SERPL CALCULATED.3IONS-SCNC: 3 MMOL/L (ref 4–13)
BASOPHILS # BLD AUTO: 0.01 THOUSANDS/ÂΜL (ref 0–0.1)
BASOPHILS NFR BLD AUTO: 0 % (ref 0–1)
BUN SERPL-MCNC: 24 MG/DL (ref 5–25)
CALCIUM SERPL-MCNC: 9.1 MG/DL (ref 8.4–10.2)
CHLORIDE SERPL-SCNC: 97 MMOL/L (ref 96–108)
CO2 SERPL-SCNC: 38 MMOL/L (ref 21–32)
CREAT SERPL-MCNC: 0.48 MG/DL (ref 0.6–1.3)
EOSINOPHIL # BLD AUTO: 0.01 THOUSAND/ÂΜL (ref 0–0.61)
EOSINOPHIL NFR BLD AUTO: 0 % (ref 0–6)
ERYTHROCYTE [DISTWIDTH] IN BLOOD BY AUTOMATED COUNT: 13.7 % (ref 11.6–15.1)
GFR SERPL CREATININE-BSD FRML MDRD: 90 ML/MIN/1.73SQ M
GLUCOSE SERPL-MCNC: 85 MG/DL (ref 65–140)
GLUCOSE SERPL-MCNC: 88 MG/DL (ref 65–140)
HCT VFR BLD AUTO: 31.4 % (ref 34.8–46.1)
HGB BLD-MCNC: 10.1 G/DL (ref 11.5–15.4)
IMM GRANULOCYTES # BLD AUTO: 0.04 THOUSAND/UL (ref 0–0.2)
IMM GRANULOCYTES NFR BLD AUTO: 1 % (ref 0–2)
LYMPHOCYTES # BLD AUTO: 0.56 THOUSANDS/ÂΜL (ref 0.6–4.47)
LYMPHOCYTES NFR BLD AUTO: 8 % (ref 14–44)
MCH RBC QN AUTO: 32.8 PG (ref 26.8–34.3)
MCHC RBC AUTO-ENTMCNC: 32.2 G/DL (ref 31.4–37.4)
MCV RBC AUTO: 102 FL (ref 82–98)
MONOCYTES # BLD AUTO: 0.46 THOUSAND/ÂΜL (ref 0.17–1.22)
MONOCYTES NFR BLD AUTO: 7 % (ref 4–12)
NEUTROPHILS # BLD AUTO: 5.91 THOUSANDS/ÂΜL (ref 1.85–7.62)
NEUTS SEG NFR BLD AUTO: 84 % (ref 43–75)
NRBC BLD AUTO-RTO: 0 /100 WBCS
PLATELET # BLD AUTO: 290 THOUSANDS/UL (ref 149–390)
PMV BLD AUTO: 8.8 FL (ref 8.9–12.7)
POTASSIUM SERPL-SCNC: 4.9 MMOL/L (ref 3.5–5.3)
RBC # BLD AUTO: 3.08 MILLION/UL (ref 3.81–5.12)
SODIUM SERPL-SCNC: 138 MMOL/L (ref 135–147)
WBC # BLD AUTO: 6.99 THOUSAND/UL (ref 4.31–10.16)

## 2023-05-25 RX ORDER — FUROSEMIDE 20 MG/1
20 TABLET ORAL DAILY
Status: DISCONTINUED | OUTPATIENT
Start: 2023-05-25 | End: 2023-05-25 | Stop reason: HOSPADM

## 2023-05-25 RX ORDER — CEFADROXIL 500 MG/1
500 CAPSULE ORAL EVERY 12 HOURS SCHEDULED
Refills: 0
Start: 2023-05-25 | End: 2023-05-28

## 2023-05-25 RX ORDER — FUROSEMIDE 20 MG/1
20 TABLET ORAL DAILY
Refills: 0
Start: 2023-05-26

## 2023-05-25 RX ORDER — SPIRONOLACTONE 25 MG/1
25 TABLET ORAL DAILY
Status: DISCONTINUED | OUTPATIENT
Start: 2023-05-25 | End: 2023-05-25

## 2023-05-25 RX ORDER — PREDNISONE 10 MG/1
TABLET ORAL
Qty: 18 TABLET | Refills: 0
Start: 2023-05-25 | End: 2023-06-03

## 2023-05-25 RX ORDER — LOSARTAN POTASSIUM 25 MG/1
25 TABLET ORAL DAILY
Status: DISCONTINUED | OUTPATIENT
Start: 2023-05-25 | End: 2023-05-25 | Stop reason: HOSPADM

## 2023-05-25 RX ADMIN — LEVALBUTEROL HYDROCHLORIDE 1.25 MG: 1.25 SOLUTION RESPIRATORY (INHALATION) at 07:39

## 2023-05-25 RX ADMIN — LOSARTAN POTASSIUM 25 MG: 25 TABLET, FILM COATED ORAL at 08:20

## 2023-05-25 RX ADMIN — FUROSEMIDE 20 MG: 20 TABLET ORAL at 08:14

## 2023-05-25 RX ADMIN — IPRATROPIUM BROMIDE 0.5 MG: 0.5 SOLUTION RESPIRATORY (INHALATION) at 07:39

## 2023-05-25 RX ADMIN — DILTIAZEM HYDROCHLORIDE 240 MG: 240 CAPSULE, EXTENDED RELEASE ORAL at 08:14

## 2023-05-25 RX ADMIN — LEVOTHYROXINE SODIUM 25 MCG: 25 TABLET ORAL at 06:37

## 2023-05-25 RX ADMIN — FORMOTEROL FUMARATE DIHYDRATE 20 MCG: 20 SOLUTION RESPIRATORY (INHALATION) at 07:39

## 2023-05-25 RX ADMIN — LIDOCAINE 1 PATCH: 50 PATCH TOPICAL at 08:15

## 2023-05-25 RX ADMIN — PANTOPRAZOLE SODIUM 40 MG: 40 TABLET, DELAYED RELEASE ORAL at 06:38

## 2023-05-25 RX ADMIN — BUDESONIDE 0.5 MG: 0.5 INHALANT ORAL at 07:39

## 2023-05-25 RX ADMIN — DOXYCYCLINE 100 MG: 100 INJECTION, POWDER, LYOPHILIZED, FOR SOLUTION INTRAVENOUS at 04:24

## 2023-05-25 RX ADMIN — PREDNISONE 40 MG: 20 TABLET ORAL at 08:14

## 2023-05-25 NOTE — CASE MANAGEMENT
Case Management Discharge Planning Note    Patient name Cathy Simmons  Location [address] 214/-69 MRN [id number]  : 1940 Date 2023       Current Admission Date: 2023  Current Admission Diagnosis:Sepsis with acute renal failure without septic shock [facility])   Patient Active Problem List    Diagnosis Date Noted   • Bilateral pleural effusion 2023   • Acute respiratory failure with hypoxia and hypercapnia ([address] ) 2023   • Gastroenteritis 2023   • Cerebral aneurysm, nonruptured 2023   • Abdominal distention/back pain 2023   • Urinary retention 2023   • MDD with persistant bereavement complex 2023   • Abnormal CT of the chest 2023   • 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[facility] [address] ) 2023   • Thyroid nodule 2023   • Severe protein-calorie malnutrition ([address] ) 2023   • Unspecified mood (affective) disorder ([facility] [address] ) 2023   • Failure to thrive in adult 2023   • Pulmonary nodule 02/15/2023   • Onychomycosis 02/15/2023   • Essential hypertension 02/10/2023   • Anxiety 02/10/2023   • Other specified hypothyroidism 2022   • Peripheral edema 2021   • Low back pain without sciatica 2021   • Pneumonia of right lower lobe due to infectious organism 2017   • Chronic obstructive pulmonary disease with acute exacerbation ([address] ) 2017   • Sepsis with acute renal failure without septic shock ([facility] [address] ) 2017   • Hypertension 2017   • Hypercoagulable state ([address] ) 2017   • 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   • Vitamin D deficiency 2016   • Osteoporosis 2015   • Antiphospholipid antibody syndrome ([address] ) 2012   • Atrial fibrillation ([address] ) 2012   • Hereditary hemolytic anemia ([facility] [address] ) 2012   • Asthma 2012      LOS (days): 8  Geometric Mean LOS (GMLOS) (days): 5 00  Days to GMLOS:-2 9     OBJECTIVE:  Risk of Unplanned Readmission Score: 30 95         Current admission status: Inpatient   Preferred Pharmacy:   [address] [place] [address] [id number]con [address]  [address] [place] [id number]  Phone: [phone] Fax: [address], [facility] [address]  Phone: [phone] Fax: [phone]    Primary Care Provider: Nisha Gupta MD    Primary Insurance: MEDICARE  Secondary Insurance: BLUE CROSS    DISCHARGE DETAILS:  Received information from [facility] that [facility] ambulance can transport patient to [facility] today at 10:30  Notified Minor [employer]er of Son at [phone],[id number], patient's nurse and left MOM for patient's son Donita Lopez at [phone] of transport time

## 2023-05-25 NOTE — ASSESSMENT & PLAN NOTE
· Right lower lobe pneumonia found on CT  · MRSA culture negative  · Urine antigens negative  · Blood cultures negative x2   · Continue supportive care with Mucinex, Tessalon Perles  · Aspiration precautions -did have episodes of vomiting at nursing facility, question if possible aspiration event  · Procalcitonin elevated, continue IV abx  · Switch to Duricef to complete the course

## 2023-05-25 NOTE — PLAN OF CARE
Problem: MOBILITY - ADULT  Goal: Maintain or return to baseline ADL function  Description: INTERVENTIONS:  -  Assess patient's ability to carry out ADLs; assess patient's baseline for ADL function and identify physical deficits which impact ability to perform ADLs (bathing, care of mouth/teeth, toileting, grooming, dressing, etc )  - Assess/evaluate cause of self-care deficits   - Assess range of motion  - Assess patient's mobility; develop plan if impaired  - Assess patient's need for assistive devices and provide as appropriate  - Encourage maximum independence but intervene and supervise when necessary  - Involve family in performance of ADLs  - Assess for home care needs following discharge   - Consider OT consult to assist with ADL evaluation and planning for discharge  - Provide patient education as appropriate  5/25/2023 0949 by Rosalie Santos RN  Outcome: Adequate for Discharge  5/25/2023 0949 by Rosalie Santos RN  Outcome: Progressing  Goal: Maintains/Returns to pre admission functional level  Description: INTERVENTIONS:  - Perform BMAT or MOVE assessment daily    - Set and communicate daily mobility goal to care team and patient/family/caregiver  - Collaborate with rehabilitation services on mobility goals if consulted  - Perform Range of Motion  times a day  - Reposition patient every  hours    - Dangle patient  times a day  - Stand patient  times a day  - Ambulate patient  times a day  - Out of bed to chair  times a day   - Out of bed for meals  times a day  - Out of bed for toileting  - Record patient progress and toleration of activity level   5/25/2023 0949 by Rosalie Santos RN  Outcome: Adequate for Discharge  5/25/2023 0949 by Rosalie Santos RN  Outcome: Progressing     Problem: PAIN - ADULT  Goal: Verbalizes/displays adequate comfort level or baseline comfort level  Description: Interventions:  - Encourage patient to monitor pain and request assistance  - Assess pain using appropriate pain scale  - Administer analgesics based on type and severity of pain and evaluate response  - Implement non-pharmacological measures as appropriate and evaluate response  - Consider cultural and social influences on pain and pain management  - Notify physician/advanced practitioner if interventions unsuccessful or patient reports new pain  5/25/2023 0949 by Elsi Bernard RN  Outcome: Adequate for Discharge  5/25/2023 0949 by Elsi Bernard RN  Outcome: Progressing     Problem: INFECTION - ADULT  Goal: Absence or prevention of progression during hospitalization  Description: INTERVENTIONS:  - Assess and monitor for signs and symptoms of infection  - Monitor lab/diagnostic results  - Monitor all insertion sites, i e  indwelling lines, tubes, and drains  - Monitor endotracheal if appropriate and nasal secretions for changes in amount and color  - [place] appropriate cooling/warming therapies per order  - Administer medications as ordered  - Instruct and encourage patient and family to use good hand hygiene technique  - Identify and instruct in appropriate isolation precautions for identified infection/condition  5/25/2023 0949 by Elsi Bernard RN  Outcome: Adequate for Discharge  5/25/2023 0949 by Elsi Bernard RN  Outcome: Progressing     Problem: SAFETY ADULT  Goal: Patient will remain free of falls  Description: INTERVENTIONS:  - Educate patient/family on patient safety including physical limitations  - Instruct patient to call for assistance with activity   - Consult OT/PT to assist with strengthening/mobility   - Keep Call bell within reach  - Keep bed low and locked with side rails adjusted as appropriate  - Keep care items and personal belongings within reach  - Initiate and maintain comfort rounds  - Make Fall Risk Sign visible to staff  - Offer Toileting every Hours, in advance of need  - Initiate/Maintain alarm  - Obtain necessary fall risk management equipment:   - Apply yellow socks and bracelet for high fall risk patients  - Consider moving patient to room near nurses station  5/25/2023 0949 by Telly Perez RN  Outcome: Adequate for Discharge  5/25/2023 5554 by Telly Perez RN  Outcome: Progressing     Problem: DISCHARGE PLANNING  Goal: Discharge to home or other facility with appropriate resources  Description: INTERVENTIONS:  - Identify barriers to discharge w/patient and caregiver  - Arrange for needed discharge resources and transportation as appropriate  - Identify discharge learning needs (meds, wound care, etc )  - Arrange for interpretive services to assist at discharge as needed  - Refer to Case Management Department for coordinating discharge planning if the patient needs post-hospital services based on physician/advanced practitioner order or complex needs related to functional status, cognitive ability, or social support system  5/25/2023 0949 by Telly Perez RN  Outcome: Adequate for Discharge  5/25/2023 0949 by Telly Perez RN  Outcome: Progressing     Problem: Knowledge Deficit  Goal: Patient/family/caregiver demonstrates understanding of disease process, treatment plan, medications, and discharge instructions  Description: Complete learning assessment and assess knowledge base  Interventions:  - Provide teaching at level of understanding  - Provide teaching via preferred learning methods  5/25/2023 0949 by Telly Perez RN  Outcome: Adequate for Discharge  5/25/2023 0949 by Telly Perez RN  Outcome: Progressing     Problem: Nutrition/Hydration-ADULT  Goal: Nutrient/Hydration intake appropriate for improving, restoring or maintaining nutritional needs  Description: Monitor and assess patient's nutrition/hydration status for malnutrition  Collaborate with interdisciplinary team and initiate plan and interventions as ordered  Monitor patient's weight and dietary intake as ordered or per policy  Utilize nutrition screening tool and intervene as necessary   Determine patient's food preferences and provide high-protein, high-caloric foods as appropriate       INTERVENTIONS:  - Monitor oral intake, urinary output, labs, and treatment plans  - Assess nutrition and hydration status and recommend course of action  - Evaluate amount of meals eaten  - Assist patient with eating if necessary   - Allow adequate time for meals  - Recommend/ encourage appropriate diets, oral nutritional supplements, and vitamin/mineral supplements  - Order, calculate, and assess calorie counts as needed  - Recommend, monitor, and adjust tube feedings and TPN/PPN based on assessed needs  - Assess need for intravenous fluids  - Provide specific nutrition/hydration education as appropriate  - Include patient/family/caregiver in decisions related to nutrition  5/25/2023 0949 by Vianney Flynn RN  Outcome: Adequate for Discharge  5/25/2023 0949 by Vianney Flynn RN  Outcome: Progressing     Problem: Prexisting or High Potential for Compromised Skin Integrity  Goal: Skin integrity is maintained or improved  Description: INTERVENTIONS:  - Identify patients at risk for skin breakdown  - Assess and monitor skin integrity  - Assess and monitor nutrition and hydration status  - Monitor labs   - Assess for incontinence   - Turn and reposition patient  - Assist with mobility/ambulation  - Relieve pressure over bony prominences  - Avoid friction and shearing  - Provide appropriate hygiene as needed including keeping skin clean and dry  - Evaluate need for skin moisturizer/barrier cream  - Collaborate with interdisciplinary team   - Patient/family teaching  - Consider wound care consult   5/25/2023 0949 by Vianney Flynn RN  Outcome: Adequate for Discharge  5/25/2023 0949 by Vianney Flynn RN  Outcome: Progressing     Problem: Potential for Falls  Goal: Patient will remain free of falls  Description: INTERVENTIONS:  - Educate patient/family on patient safety including physical limitations  - Instruct patient to call for assistance with activity   - Consult OT/PT to assist with strengthening/mobility   - Keep Call bell within reach  - Keep bed low and locked with side rails adjusted as appropriate  - Keep care items and personal belongings within reach  - Initiate and maintain comfort rounds  - Make Fall Risk Sign visible to staff  - Offer Toileting every  Hours, in advance of need  - Initiate/Maintain alarm  - Obtain necessary fall risk management equipment:   - Apply yellow socks and bracelet for high fall risk patients  - Consider moving patient to room near nurses station  5/25/2023 0949 by Taz Arcos RN  Outcome: Adequate for Discharge  5/25/2023 0949 by Taz Arcos RN  Outcome: Progressing     Problem: RESPIRATORY - ADULT  Goal: Achieves optimal ventilation and oxygenation  Description: INTERVENTIONS:  - Assess for changes in respiratory status  - Assess for changes in mentation and behavior  - Position to facilitate oxygenation and minimize respiratory effort  - Oxygen administered by appropriate delivery if ordered  - Initiate smoking cessation education as indicated  - Encourage broncho-pulmonary hygiene including cough, deep breathe, Incentive Spirometry  - Assess the need for suctioning and aspirate as needed  - Assess and instruct to report SOB or any respiratory difficulty  - Respiratory Therapy support as indicated  5/25/2023 0949 by Taz Arcos RN  Outcome: Adequate for Discharge  5/25/2023 0949 by Taz Arcos RN  Outcome: Progressing     Problem: GASTROINTESTINAL - ADULT  Goal: Minimal or absence of nausea and/or vomiting  Description: INTERVENTIONS:  - Administer IV fluids if ordered to ensure adequate hydration  - Maintain NPO status until nausea and vomiting are resolved  - Nasogastric tube if ordered  - Administer ordered antiemetic medications as needed  - Provide nonpharmacologic comfort measures as appropriate  - Advance diet as tolerated, if ordered  - Consider nutrition services referral to assist patient with adequate nutrition and appropriate food choices  5/25/2023 0949 by Rodney Fraser RN  Outcome: Adequate for Discharge  5/25/2023 2201 by Rodney Fraser RN  Outcome: Progressing  Goal: Maintains or returns to baseline bowel function  Description: INTERVENTIONS:  - Assess bowel function  - Encourage oral fluids to ensure adequate hydration  - Administer IV fluids if ordered to ensure adequate hydration  - Administer ordered medications as needed  - Encourage mobilization and activity  - Consider nutritional services referral to assist patient with adequate nutrition and appropriate food choices  5/25/2023 0949 by Rodney Fraser RN  Outcome: Adequate for Discharge  5/25/2023 0949 by Rodney Fraser RN  Outcome: Progressing  Goal: Maintains adequate nutritional intake  Description: INTERVENTIONS:  - Monitor percentage of each meal consumed  - Identify factors contributing to decreased intake, treat as appropriate  - Assist with meals as needed  - Monitor I&O, weight, and lab values if indicated  - Obtain nutrition services referral as needed  5/25/2023 0949 by Rodney Fraser RN  Outcome: Adequate for Discharge  5/25/2023 0949 by Rodney Fraser RN  Outcome: Progressing     Problem: METABOLIC, FLUID AND ELECTROLYTES - ADULT  Goal: Electrolytes maintained within normal limits  Description: INTERVENTIONS:  - Monitor labs and assess patient for signs and symptoms of electrolyte imbalances  - Administer electrolyte replacement as ordered  - Monitor response to electrolyte replacements, including repeat lab results as appropriate  - Instruct patient on fluid and nutrition as appropriate  5/25/2023 0949 by Rodney Fraser RN  Outcome: Adequate for Discharge  5/25/2023 0949 by Rodney Fraser RN  Outcome: Progressing  Goal: Fluid balance maintained  Description: INTERVENTIONS:  - Monitor labs   - Monitor I/O and WT  - Instruct patient on fluid and nutrition as appropriate  - Assess for signs & symptoms of volume excess or deficit  5/25/2023 0949 by Rodney Fraser RN  Outcome: Adequate for Discharge  5/25/2023 0949 by Nile Membreno RN  Outcome: Progressing     Problem: SKIN/TISSUE INTEGRITY - ADULT  Goal: Skin Integrity remains intact(Skin Breakdown Prevention)  Description: Assess:  -Perform [place] assessment every  -Clean and moisturize skin every   -Inspect skin when repositioning, toileting, and assisting with ADLS  -Assess under medical devices such as  every  -Assess extremities for adequate circulation and sensation     Bed Management:  -Have minimal linens on bed & keep smooth, unwrinkled  -Change linens as needed when moist or perspiring  -Avoid sitting or lying in one position for more than  hours while in bed  -Keep HOB at degrees     Toileting:  -Offer bedside commode  -Assess for incontinence every   -Use incontinent care products after each incontinent episode such as     Activity:  -Mobilize patient times a day  -Encourage activity and walks on unit  -Encourage or provide ROM exercises   -Turn and reposition patient every  Hours  -Use appropriate equipment to lift or move patient in bed  -Instruct/ Assist with weight shifting every  when out of bed in chair  -Consider limitation of chair time  hour intervals    Skin Care:  -Avoid use of baby powder, tape, friction and shearing, hot water or constrictive clothing  -Relieve pressure over bony prominences using   -Do not massage red bony areas    Next Steps:  -Teach patient strategies to minimize risks such as   -Consider consults to  interdisciplinary teams such   5/25/2023 0949 by Nile Membreno RN  Outcome: Adequate for Discharge  5/25/2023 0949 by Nile Membreno RN  Outcome: Progressing     Problem: HEMATOLOGIC - ADULT  Goal: Maintains hematologic stability  Description: INTERVENTIONS  - Assess for signs and symptoms of bleeding or hemorrhage  - Monitor labs  - Administer supportive blood products/factors as ordered and appropriate  5/25/2023 0949 by Nile Membreno RN  Outcome: Adequate for Discharge  5/25/2023 [id number] by Tessa Moon RN  Outcome: Progressing     Problem: MUSCULOSKELETAL - ADULT  Goal: Maintain or return mobility to safest level of function  Description: INTERVENTIONS:  - Assess patient's ability to carry out ADLs; assess patient's baseline for ADL function and identify physical deficits which impact ability to perform ADLs (bathing, care of mouth/teeth, toileting, grooming, dressing, etc )  - Assess/evaluate cause of self-care deficits   - Assess range of motion  - Assess patient's mobility  - Assess patient's need for assistive devices and provide as appropriate  - Encourage maximum independence but intervene and supervise when necessary  - Involve family in performance of ADLs  - Assess for home care needs following discharge   - Consider OT consult to assist with ADL evaluation and planning for discharge  - Provide patient education as appropriate  5/25/2023 0949 by Tessa Moon RN  Outcome: Adequate for Discharge  5/25/2023 0949 by Tessa Moon RN  Outcome: Progressing

## 2023-05-25 NOTE — ASSESSMENT & PLAN NOTE
· Home regimen includes Cardizem and digoxin  · Continue with hold parameters  · AC with warfarin-> warfarin resumed  · INR is therapeutic

## 2023-05-25 NOTE — DISCHARGE INSTR - AVS FIRST PAGE
Follow-up with PCP in 1 week  Follow-up with pulmonary and cardiology as outpatient  Outpatient PFTs  Return to ER with any worsening fever, chills, shortness of breath, chest pain, palpitation or any other alarming symptoms

## 2023-05-25 NOTE — PLAN OF CARE
Problem: PAIN - ADULT  Goal: Verbalizes/displays adequate comfort level or baseline comfort level  Description: Interventions:  - Encourage patient to monitor pain and request assistance  - Assess pain using appropriate pain scale  - Administer analgesics based on type and severity of pain and evaluate response  - Implement non-pharmacological measures as appropriate and evaluate response  - Consider cultural and social influences on pain and pain management  - Notify physician/advanced practitioner if interventions unsuccessful or patient reports new pain  Outcome: Progressing     Problem: INFECTION - ADULT  Goal: Absence or prevention of progression during hospitalization  Description: INTERVENTIONS:  - Assess and monitor for signs and symptoms of infection  - Monitor lab/diagnostic results  - Monitor all insertion sites, i e  indwelling lines, tubes, and drains  - Monitor endotracheal if appropriate and nasal secretions for changes in amount and color  - [place] appropriate cooling/warming therapies per order  - Administer medications as ordered  - Instruct and encourage patient and family to use good hand hygiene technique  - Identify and instruct in appropriate isolation precautions for identified infection/condition  Outcome: Progressing     Problem: Nutrition/Hydration-ADULT  Goal: Nutrient/Hydration intake appropriate for improving, restoring or maintaining nutritional needs  Description: Monitor and assess patient's nutrition/hydration status for malnutrition  Collaborate with interdisciplinary team and initiate plan and interventions as ordered  Monitor patient's weight and dietary intake as ordered or per policy  Utilize nutrition screening tool and intervene as necessary  Determine patient's food preferences and provide high-protein, high-caloric foods as appropriate       INTERVENTIONS:  - Monitor oral intake, urinary output, labs, and treatment plans  - Assess nutrition and hydration status and recommend course of action  - Evaluate amount of meals eaten  - Assist patient with eating if necessary   - Allow adequate time for meals  - Recommend/ encourage appropriate diets, oral nutritional supplements, and vitamin/mineral supplements  - Order, calculate, and assess calorie counts as needed  - Recommend, monitor, and adjust tube feedings and TPN/PPN based on assessed needs  - Assess need for intravenous fluids  - Provide specific nutrition/hydration education as appropriate  - Include patient/family/caregiver in decisions related to nutrition  Outcome: Progressing     Problem: Knowledge Deficit  Goal: Patient/family/caregiver demonstrates understanding of disease process, treatment plan, medications, and discharge instructions  Description: Complete learning assessment and assess knowledge base    Interventions:  - Provide teaching at level of understanding  - Provide teaching via preferred learning methods  Outcome: Progressing

## 2023-05-25 NOTE — CASE MANAGEMENT
Case Management Discharge Planning Note    Patient name Suzan Torres  Location [address] [id number] MRN [id number]  : 1940 Date 2023       Current Admission Date: 2023  Current Admission Diagnosis:Sepsis with acute renal failure without septic shock [facility])   Patient Active Problem List    Diagnosis Date Noted   • Bilateral pleural effusion 2023   • Acute respiratory failure with hypoxia and hypercapnia ([facility] ) 2023   • Gastroenteritis 2023   • Cerebral aneurysm, nonruptured 2023   • Abdominal distention/back pain 2023   • Urinary retention 2023   • MDD with persistant bereavement complex 2023   • Abnormal CT of the chest 2023   • 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[facility] [address] ) 2023   • Thyroid nodule 2023   • Severe protein-calorie malnutrition ([facility] [address] ) 2023   • Unspecified [facility]affective) disorder [facility] ) 2023   • Failure to thrive in adult 2023   • Pulmonary nodule 02/15/2023   • Onychomycosis 02/15/2023   • Essential hypertension 02/10/2023   • Anxiety 02/10/2023   • Other specified hypothyroidism 2022   • Peripheral edema 2021   • Low back pain without sciatica 2021   • Pneumonia of right lower lobe due to infectious organism 2017   • Chronic obstructive pulmonary disease with acute exacerbation ([facility] [facility] ) 2017   • Sepsis with acute renal failure without septic shock ([facility] ) 2017   • Hypertension 2017   • Hypercoagulable state ([facility] [address] ) 2017   • 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   • Vitamin D deficiency 2016   • Osteoporosis 2015   • Antiphospholipid antibody syndrome ([address] ) 2012   • Atrial fibrillation ([facility] [address] ) 2012   • Hereditary hemolytic anemia ([facility] [address] ) 2012   • Asthma 2012      LOS (days): 8  Geometric Mean LOS (GMLOS) (days): 5 00  Days to GMLOS:-2 8     OBJECTIVE:  Risk of Unplanned Readmission Score: 30 95         Current admission status: Inpatient   Preferred Pharmacy:   [address] [place] [address] [id number]  [address] [facility] [address]  Phone: [phone] Fax: Jake Good , [facility] [address]  Phone: [phone] Fax: [phone]    Primary Care Provider: Becca An MD    Primary Insurance: MEDICARE  Secondary Insurance: BLUE CROSS    DISCHARGE DETAILS:  As per Dr Aubrey Mckay, patient's potassium is normal and patient ready for discharge today to return to [facility]  Request for S transport made via roundtrip   Wait transport time,

## 2023-05-25 NOTE — ASSESSMENT & PLAN NOTE
Was on Lasix 40 mg IV twice daily  Echo done on 2/28/2023 showed an EF of 14% grade 1 diastolic dysfunction left atrium mildly dilated   I/o's  Monitor resp status  Switch to Lasix 20 mg daily

## 2023-05-25 NOTE — ASSESSMENT & PLAN NOTE
· In setting of gastroenteritis, pneumonia  · Baseline creatinine around 0 6-0 9; creatinine was 1 37 on presentation  · Continue IV abx    Afebrile  · Trend WBC and fever curve  · Urine antigens negative  · Switch to oral antibiotic to complete the course

## 2023-05-25 NOTE — DISCHARGE SUMMARY
[facility]  Discharge- Maranda Zapien 1940, 79 y o  female MRN: [id number]  Unit/Bed#: -01 Encounter: [id number]  Primary Care Provider: Lesa Grey MD   Date and time admitted to hospital: 5/17/2023  7:22 AM    Bilateral pleural effusion  Assessment & Plan  Was on Lasix 40 mg IV twice daily  Echo done on 2/28/2023 showed an EF of 99% grade 1 diastolic dysfunction left atrium mildly dilated   I/o's  Monitor resp status  Switch to Lasix 20 mg daily    Acute respiratory failure with hypoxia and hypercapnia ([facility] [address] )  Assessment & Plan  Patient usually on room air at baseline  5/20/2023 required 4 L nasal cannula  ABG showing respiratory acidosis with metabolic compensation alkalosis  Transitioned to SD2  Currently on 1L NC  Patient refuses to wear BiPAP  Gastroenteritis  Assessment & Plan  · Presents with episodes of nausea/vomiting, diarrhea now appears resolved  · Gastroenterology following  · Advance diet as tolerated    Severe protein-calorie malnutrition ([facility] [address] )  Assessment & Plan  Malnutrition Findings:   Adult Malnutrition type: Chronic illnessBMI Findings: Body mass index is 20 37 kg/m²  Consult nutrition    Essential hypertension  Assessment & Plan  · On Cardizem, digoxin, spironolactone, losartan   · Resume Cardizem  Continue digoxin    Other specified hypothyroidism  Assessment & Plan  · Continue Synthroid    Atrial fibrillation (HCC)  Assessment & Plan  · Home regimen includes Cardizem and digoxin  · Continue with hold parameters  · AC with warfarin-> warfarin resumed  · INR is therapeutic    Sepsis with acute renal failure without septic shock (HCC)  Assessment & Plan  · In setting of gastroenteritis, pneumonia  · Baseline creatinine around 0 6-0 9; creatinine was 1 37 on presentation  · Continue IV abx    Afebrile  · Trend WBC and fever curve  · Urine antigens negative  · Switch to oral antibiotic to complete the course    Chronic obstructive pulmonary disease with acute exacerbation ([facility] [address] )  Assessment & Plan  · 5/20 patient now in COPD exacerbation with increased work of breathing despite being on 4 L nasal cannula  · CTA PE negative for PE  · Continue Rocephin and doxycycline  · Xopenex, Atrovent, Pulmicort, Perforomist  · ABG   · COVID-negative  · Lactic acid negative  · Pro-Oscar downward trending  · Status post IV Solu-Medrol 80 mg and magnesium  · Solu-Medrol 40 mg switch to prednisone 40 mg and continue to taper    Pneumonia of right lower lobe due to infectious organism  Assessment & Plan  · Right lower lobe pneumonia found on CT  · MRSA culture negative  · Urine antigens negative  · Blood cultures negative x2   · Continue supportive care with Mucinex, Tessalon Perles  · Aspiration precautions -did have episodes of vomiting at nursing facility, question if possible aspiration event  · Procalcitonin elevated, continue IV abx  · Switch to Duricef to complete the course      Hospital Course:     Jamari Dumont is a 79 y o  female patient who originally presented to the hospital on   Admission Orders (From admission, onward)     Ordered        05/17/23 1231  INPATIENT ADMISSION  Once                     due to  vomiting  This occurred starting yesterday and had gradually gotten worse  Patient comes from [facility] and was noted to have emesis associated with blood however nonbilious  Initially did not have diarrhea however did have loose stools in the morning  Patient meeting severe sepsis criteria due to gastroenteritis and pneumonia also found to have an upper GI bleed  Patient was admitted with sepsis secondary to pneumonia  Patient was admitted with possible GI bleed  Patient was seen by GI  She was started on Protonix and IV antibiotics  GI recommended observation and if hemoglobin trends down they will consider EGD  Patient preferred to avoid EGD unless it was absolutely necessary    Hemoglobin is stable and GI recommended to restart Coumadin  Patient was treated with Rocephin  Patient was seen by pulmonary for acute hypoxic respiratory failure secondary to pneumonia with COPD exacerbation  Patient was started on Solu-Medrol and doxycycline was added  Pulmonary switched patient to tapering dose of prednisone and recommended to taper by 10 mg every 3 days  Patient refused BiPAP  Outpatient PFTs  Patient is hemodynamically stable from pulmonary standpoint and can be discharged on oral antibiotics  She was diuresed with Lasix and is switched to oral Lasix at discharge  Patient was seen by physical therapy and  will be discharged to [facility] for skilled nursing rehab  On Exam-  Chest-bilateral air entry, clear to auscultation, decreased at bases  Abdomen-soft, nontender  Heart-S1 S2 regular  Extremities-no pedal edema or calf tenderness  Neuro-alert awake oriented x3  No focal deficits    Please see above list of diagnoses and related plan for additional information  Follow-up with PCP in 1 week  Follow-up with pulmonary and cardiology as outpatient  Return to ER with any worsening fever, chills, shortness of breath, chest pain, palpitation or any other alarming symptoms    Condition at Discharge:  good      Discharge instructions/Information to patient and family:   See after visit summary for information provided to patient and family  Provisions for Follow-Up Care:  See after visit summary for information related to follow-up care and any pertinent home health orders  Disposition:     Other [place] [facility] facility       Discharge Statement:  I spent 40 minutes discharging the patient  This time was spent on the day of discharge  I had direct contact with the patient on the day of discharge   Greater than 50% of the total time was spent examining patient, answering all patient questions, arranging and discussing plan of care with patient as well as directly providing post-discharge instructions  Additional time then spent on discharge activities  Discharge Medications:  See after visit summary for reconciled discharge medications provided to patient and family        ** Please Note: This note has been constructed using a voice recognition system **

## 2023-05-26 ENCOUNTER — NURSING HOME VISIT (OUTPATIENT)
Dept: FAMILY MEDICINE CLINIC | Facility: CLINIC | Age: 83
End: 2023-05-26

## 2023-05-26 ENCOUNTER — TELEPHONE (OUTPATIENT)
Dept: PAIN MEDICINE | Facility: CLINIC | Age: 83
End: 2023-05-26

## 2023-05-26 DIAGNOSIS — J96.01 ACUTE RESPIRATORY FAILURE WITH HYPOXIA AND HYPERCAPNIA (HCC): ICD-10-CM

## 2023-05-26 DIAGNOSIS — E43 SEVERE PROTEIN-CALORIE MALNUTRITION (HCC): ICD-10-CM

## 2023-05-26 DIAGNOSIS — A41.9 SEPSIS WITH ACUTE RENAL FAILURE WITHOUT SEPTIC SHOCK, DUE TO UNSPECIFIED ORGANISM, UNSPECIFIED ACUTE RENAL FAILURE TYPE (HCC): ICD-10-CM

## 2023-05-26 DIAGNOSIS — D68.61 ANTIPHOSPHOLIPID ANTIBODY SYNDROME (HCC): ICD-10-CM

## 2023-05-26 DIAGNOSIS — N17.9 SEPSIS WITH ACUTE RENAL FAILURE WITHOUT SEPTIC SHOCK, DUE TO UNSPECIFIED ORGANISM, UNSPECIFIED ACUTE RENAL FAILURE TYPE (HCC): ICD-10-CM

## 2023-05-26 DIAGNOSIS — J18.9 PNEUMONIA OF RIGHT LOWER LOBE DUE TO INFECTIOUS ORGANISM: ICD-10-CM

## 2023-05-26 DIAGNOSIS — J44.1 CHRONIC OBSTRUCTIVE PULMONARY DISEASE WITH ACUTE EXACERBATION (HCC): ICD-10-CM

## 2023-05-26 DIAGNOSIS — K92.2 UGI BLEED: ICD-10-CM

## 2023-05-26 DIAGNOSIS — J90 BILATERAL PLEURAL EFFUSION: ICD-10-CM

## 2023-05-26 DIAGNOSIS — J96.02 ACUTE RESPIRATORY FAILURE WITH HYPOXIA AND HYPERCAPNIA (HCC): ICD-10-CM

## 2023-05-26 DIAGNOSIS — K52.9 GASTROENTERITIS: Primary | ICD-10-CM

## 2023-05-26 DIAGNOSIS — R62.7 FAILURE TO THRIVE IN ADULT: ICD-10-CM

## 2023-05-26 DIAGNOSIS — R65.20 SEPSIS WITH ACUTE RENAL FAILURE WITHOUT SEPTIC SHOCK, DUE TO UNSPECIFIED ORGANISM, UNSPECIFIED ACUTE RENAL FAILURE TYPE (HCC): ICD-10-CM

## 2023-05-26 DIAGNOSIS — I48.91 ATRIAL FIBRILLATION, UNSPECIFIED TYPE (HCC): ICD-10-CM

## 2023-05-26 NOTE — PROGRESS NOTES
[address]  [facility]: [facility]    NAME: Siddharth Haywood  AGE: 79 y o  SEX: female    DATE OF ENCOUNTER: 5/26/2023    Code status:  Full Code    Assessment and Plan     1  Gastroenteritis    2  Acute respiratory failure with hypoxia and hypercapnia ([facility])    3  Bilateral pleural effusion    4  Chronic obstructive pulmonary disease with acute exacerbation ([facility] [address] )    5  Pneumonia of right lower lobe due to infectious organism    6  Atrial fibrillation, unspecified type ([facility] [address] )    7  Antiphospholipid antibody syndrome ([facility])    8  Sepsis with acute renal failure without septic shock, due to unspecified organism, unspecified acute renal failure type ([facility] [address] )    9  Failure to thrive in adult    10  Severe protein-calorie malnutrition ([facility] [address] )    11  UGI bleed        All medications and routine orders were reviewed and updated as needed  Plan discussed with: Family member    Chief Complaint     Seen for admission at [address] [place] [id number]    History of Present Illness     21-year-old female returns after hospitalization acute upper GI bleed  Her Coumadin was held and she was monitored carefully  She did not have EGD as she preferred to have no active inventions  She was also noted to have pneumonia as well exacerbation of her COPD  Is a history of chronic respiratory failure with hypoxia and hypercapnia  Her been initially restarted  He is on a p o  of prednisone will complete here      HISTORY:  Past Medical History:   Diagnosis Date   • Anti-phospholipid syndrome (HCC)    • Asthma    • Blood type O+    • Cardiac disease    • Chronic pain    • COPD (chronic obstructive pulmonary disease) ([facility])    • Fracture of ankle     left   • Hyperlipidemia    • Hypertension    • Hypokalemia 2/27/2023   • Osteoporosis      Past Surgical History:   Procedure Laterality Date   • APPENDECTOMY     • BILATERAL OOPHORECTOMY Bilateral    • ORIF TIBIAL SHAFT FRACTURE W/ PLATES AND SCREWS Right Family History   Problem Relation Age of Onset   • Hypertension Mother    • Skin cancer Mother    • Hypertension Father      Social History     Socioeconomic History   • Marital status:      Spouse name: None   • Number of children: None   • Years of education: None   • Highest education level: None   Occupational History   • None   Tobacco Use   • Smoking status: Former     Types: Cigarettes   • Smokeless tobacco: Never   • Tobacco comments:     Never smoker per [employer]   Vaping Use   • Vaping Use: Never used   Substance and Sexual Activity   • Alcohol use: Not Currently   • Drug use: No   • Sexual activity: Not Currently   Other Topics Concern   • None   Social History Narrative   • None     Social Determinants of Health     Financial Resource Strain: Low Risk  (4/11/2023)    Overall Financial Resource Strain (CARDIA)    • Difficulty of Paying Living Expenses: Not very hard   Food Insecurity: No Food Insecurity (5/18/2023)    Hunger Vital Sign    • Worried About Running Out of Food in the Last Year: Never true    • Ran Out of Food in the Last Year: Never true   Transportation Needs: No Transportation Needs (5/18/2023)    PRAPARE - Transportation    • Lack of Transportation (Medical): No    • Lack of Transportation (Non-Medical): No   Physical Activity: Not on file   Stress: Not on file   Social Connections: Not on file   Intimate Partner Violence: Not on file   Housing Stability: Low Risk  (5/18/2023)    Housing Stability Vital Sign    • Unable to Pay for Housing in the Last Year: No    • Number of Places Lived in the Last Year: 1    • Unstable Housing in the Last Year: No       Allergies: Allergies   Allergen Reactions   • Aspirin Other (See Comments)   • Penicillins Rash and Hives   • Sulfa Antibiotics Rash       Review of Systems     Review of Systems   Constitutional: Positive for appetite change  Negative for activity change, chills, diaphoresis, fatigue and unexpected weight change     HENT: Negative for congestion, ear discharge, ear pain, hearing loss, nosebleeds and rhinorrhea  Eyes: Negative for pain, redness, itching and visual disturbance  Respiratory: Positive for shortness of breath  Negative for cough, choking and chest tightness  Cardiovascular: Negative for chest pain and leg swelling  Gastrointestinal: Positive for diarrhea, nausea and vomiting  Negative for abdominal pain, blood in stool and constipation  Endocrine: Negative for cold intolerance, polydipsia and polyphagia  Genitourinary: Negative for dysuria, frequency, hematuria and urgency  Musculoskeletal: Negative for arthralgias, back pain, gait problem, joint swelling, neck pain and neck stiffness  Skin: Negative for color change and rash  Allergic/Immunologic: Negative for environmental allergies and food allergies  Neurological: Positive for weakness  Negative for dizziness, tremors, seizures, speech difficulty, numbness and headaches  Hematological: Negative for adenopathy  Does not bruise/bleed easily  Psychiatric/Behavioral: Negative for behavioral problems, dysphoric mood, hallucinations and self-injury  Medications and orders     All medications reviewed and updated in Nursing Home EMR  Objective     Vitals: per nursing home record    Physical Exam  Constitutional:       Appearance: She is well-developed  HENT:      Head: Normocephalic and atraumatic  Right Ear: External ear normal       Left Ear: External ear normal       Mouth/Throat:      Pharynx: No oropharyngeal exudate  Eyes:      General: No scleral icterus  Right eye: No discharge  Left eye: No discharge  Conjunctiva/sclera: Conjunctivae normal       Pupils: Pupils are equal, round, and reactive to light  Neck:      Thyroid: No thyromegaly  Cardiovascular:      Rate and Rhythm: Normal rate  Rhythm irregular  Heart sounds: Normal heart sounds  No murmur heard  No gallop     Pulmonary: Effort: Pulmonary effort is normal  No respiratory distress  Breath sounds: No wheezing or rales  Comments: Diminished breath sounds bilaterally  Abdominal:      General: Bowel sounds are normal       Palpations: Abdomen is soft  There is no mass  Tenderness: There is no guarding or rebound  Musculoskeletal:         General: No tenderness or deformity  Normal range of motion  Cervical back: Normal range of motion and neck supple  Lymphadenopathy:      Cervical: No cervical adenopathy  Skin:     General: Skin is warm and dry  Findings: Bruising present  No rash  Neurological:      Mental Status: She is alert and oriented to person, place, and time  Cranial Nerves: No cranial nerve deficit  Coordination: Coordination normal       Deep Tendon Reflexes: Reflexes are normal and symmetric  Reflexes normal    Psychiatric:         Behavior: Behavior normal          Thought Content: Thought content normal          Judgment: Judgment normal          Pertinent Laboratory/Diagnostic Studies: The following labs/studies were reviewed please see chart or hospital paperwork for details    Studies reviewed    - Admit physical therapy and to monitor her labs and renal has    Domo Kothari DO  5/26/2023 11:02 AM

## 2023-05-26 NOTE — TELEPHONE ENCOUNTER
Caller: chloé moore    Doctor: tony    Reason for call: the son wants to know if pt should come for upcoming appt  She is in rehab      Call back#: [phone]

## 2023-05-26 NOTE — TELEPHONE ENCOUNTER
S/w pt's son, Jayda Paula, per medical communication consent on file  Confirmed the pt is new to rehab, the cons appt was made while the pt was in the hospital and anticipate to be dc to home  Advised oscar, it may be better to put off the cons and let the pt start rehab before pursuing interventional treatments as the pt's medications will likely be managed by the rehab facility  [facility] verbalized understanding and agreement  Tuesdays cons has been cancelled

## 2023-05-29 DIAGNOSIS — I10 PRIMARY HYPERTENSION: ICD-10-CM

## 2023-05-29 DIAGNOSIS — I48.91 ATRIAL FIBRILLATION, UNSPECIFIED TYPE (HCC): ICD-10-CM

## 2023-05-29 RX ORDER — DILTIAZEM HYDROCHLORIDE 240 MG/1
CAPSULE, COATED, EXTENDED RELEASE ORAL
Qty: 30 CAPSULE | Refills: 0 | Status: SHIPPED | OUTPATIENT
Start: 2023-05-29

## 2023-05-30 ENCOUNTER — PATIENT OUTREACH (OUTPATIENT)
Dept: CASE MANAGEMENT | Facility: OTHER | Age: 83
End: 2023-05-30

## 2023-05-30 NOTE — PROGRESS NOTES
Outpatient Care Management [facility]  Discharged 5/25/23 from [facility] to [address] [place] [address]  Email sent to facility to inform them the patient is on the [facility] and I will be following them during their skilled stay

## 2023-06-01 ENCOUNTER — NURSING HOME VISIT (OUTPATIENT)
Dept: FAMILY MEDICINE CLINIC | Facility: CLINIC | Age: 83
End: 2023-06-01

## 2023-06-01 DIAGNOSIS — J18.9 PNEUMONIA OF RIGHT LOWER LOBE DUE TO INFECTIOUS ORGANISM: ICD-10-CM

## 2023-06-01 DIAGNOSIS — R62.7 FAILURE TO THRIVE IN ADULT: ICD-10-CM

## 2023-06-01 DIAGNOSIS — J44.1 CHRONIC OBSTRUCTIVE PULMONARY DISEASE WITH ACUTE EXACERBATION (HCC): ICD-10-CM

## 2023-06-01 DIAGNOSIS — E43 SEVERE PROTEIN-CALORIE MALNUTRITION (HCC): ICD-10-CM

## 2023-06-01 DIAGNOSIS — I48.91 ATRIAL FIBRILLATION, UNSPECIFIED TYPE (HCC): ICD-10-CM

## 2023-06-01 DIAGNOSIS — J96.01 ACUTE RESPIRATORY FAILURE WITH HYPOXIA AND HYPERCAPNIA (HCC): Primary | ICD-10-CM

## 2023-06-01 DIAGNOSIS — J96.02 ACUTE RESPIRATORY FAILURE WITH HYPOXIA AND HYPERCAPNIA (HCC): Primary | ICD-10-CM

## 2023-06-01 NOTE — PROGRESS NOTES
[address]  Facility: [facility]    NAME: Ridge Arceo  AGE: 79 y o  SEX: female    DATE OF ENCOUNTER: 6/1/2023    Code status:  Full Code    Assessment and Plan     1  Acute respiratory failure with hypoxia and hypercapnia (HCC)    2  Chronic obstructive pulmonary disease with acute exacerbation ([address] )    3  Pneumonia of right lower lobe due to infectious organism    4  Atrial fibrillation, unspecified type ([facility] [address] )    5  Failure to thrive in adult    6  Severe protein-calorie malnutrition ([facility] [address] )        All medications and routine orders were reviewed and updated as needed  Plan discussed with: Family member    Chief Complaint     Interim evaluation    History of Present Illness     The patient continues to find benefit from the scheduled nebulizer treatments  She has been receiving DuoNebs with good benefit  She reports her bowel habits are stable  Her appetite remains poor  Her performance status is suboptimal   She feels very weak and continues to require therapy and assistance with activities of daily living  The following portions of the patient's history were reviewed and updated as appropriate: current medications, past family history, past medical history, past social history, past surgical history and problem list     Allergies: Allergies   Allergen Reactions   • Aspirin Other (See Comments)   • Penicillins Rash and Hives   • Sulfa Antibiotics Rash       Review of Systems     Review of Systems   Constitutional: Positive for appetite change  Negative for activity change, chills, diaphoresis, fatigue and unexpected weight change  HENT: Negative for congestion, ear discharge, ear pain, hearing loss, nosebleeds and rhinorrhea  Eyes: Negative for pain, redness, itching and visual disturbance  Respiratory: Positive for cough and shortness of breath  Negative for choking and chest tightness      Cardiovascular: Negative for chest pain and leg swelling  Gastrointestinal: Negative for abdominal pain, blood in stool, constipation, diarrhea and nausea  Endocrine: Negative for cold intolerance, polydipsia and polyphagia  Genitourinary: Negative for dysuria, frequency, hematuria and urgency  Musculoskeletal: Negative for arthralgias, back pain, gait problem, joint swelling, neck pain and neck stiffness  Skin: Negative for color change and rash  Allergic/Immunologic: Negative for environmental allergies and food allergies  Neurological: Positive for weakness  Negative for dizziness, tremors, seizures, speech difficulty, numbness and headaches  Hematological: Negative for adenopathy  Does not bruise/bleed easily  Psychiatric/Behavioral: Negative for behavioral problems, dysphoric mood, hallucinations and self-injury  Medications and orders     All medications reviewed and updated in Nursing Home EMR  Objective     Vitals: per nursing home records    Physical Exam  Constitutional:       Appearance: She is well-developed  HENT:      Head: Normocephalic and atraumatic  Right Ear: External ear normal       Left Ear: External ear normal       Mouth/Throat:      Pharynx: No oropharyngeal exudate  Eyes:      General: No scleral icterus  Right eye: No discharge  Left eye: No discharge  Conjunctiva/sclera: Conjunctivae normal       Pupils: Pupils are equal, round, and reactive to light  Neck:      Thyroid: No thyromegaly  Cardiovascular:      Rate and Rhythm: Normal rate  Rhythm irregular  Heart sounds: Normal heart sounds  No murmur heard  No gallop  Pulmonary:      Effort: Pulmonary effort is normal  No respiratory distress  Breath sounds: No wheezing or rales  Comments: Decreased breath sounds bilaterally  Abdominal:      General: Bowel sounds are normal       Palpations: Abdomen is soft  There is no mass  Tenderness: There is no guarding or rebound     Musculoskeletal: General: No tenderness or deformity  Normal range of motion  Cervical back: Normal range of motion and neck supple  Lymphadenopathy:      Cervical: No cervical adenopathy  Skin:     General: Skin is warm and dry  Findings: No rash  Neurological:      Mental Status: She is alert and oriented to person, place, and time  Cranial Nerves: No cranial nerve deficit  Coordination: Coordination normal       Deep Tendon Reflexes: Reflexes are normal and symmetric  Reflexes normal    Psychiatric:         Behavior: Behavior normal          Thought Content: Thought content normal          Judgment: Judgment normal          Pertinent Laboratory/Diagnostic Studies: The following studies were reviewed please see chart or hospital paperwork for details  Space for lab dictation no new diagnostic studies    - Maintain the current medical regimen  Recheck labs    Encourage fluid intake and improve nutrition    Miky Rosenberg DO  6/1/2023 10:43 AM

## 2023-06-02 ENCOUNTER — OFFICE VISIT (OUTPATIENT)
Dept: PULMONOLOGY | Facility: HOSPITAL | Age: 83
End: 2023-06-02

## 2023-06-02 VITALS
OXYGEN SATURATION: 95 % | WEIGHT: 104 LBS | DIASTOLIC BLOOD PRESSURE: 52 MMHG | TEMPERATURE: 96.7 F | BODY MASS INDEX: 20.31 KG/M2 | SYSTOLIC BLOOD PRESSURE: 104 MMHG | HEART RATE: 76 BPM

## 2023-06-02 DIAGNOSIS — J44.1 COPD EXACERBATION (HCC): ICD-10-CM

## 2023-06-02 DIAGNOSIS — J90 BILATERAL PLEURAL EFFUSION: ICD-10-CM

## 2023-06-02 DIAGNOSIS — J45.909 UNCOMPLICATED ASTHMA, UNSPECIFIED ASTHMA SEVERITY, UNSPECIFIED WHETHER PERSISTENT: ICD-10-CM

## 2023-06-02 DIAGNOSIS — J18.9 PNEUMONIA OF RIGHT LOWER LOBE DUE TO INFECTIOUS ORGANISM: Primary | ICD-10-CM

## 2023-06-02 DIAGNOSIS — E43 SEVERE PROTEIN-CALORIE MALNUTRITION (HCC): ICD-10-CM

## 2023-06-02 DIAGNOSIS — J69.0 ASPIRATION PNEUMONIA OF RIGHT LUNG, UNSPECIFIED ASPIRATION PNEUMONIA TYPE, UNSPECIFIED PART OF LUNG (HCC): ICD-10-CM

## 2023-06-02 DIAGNOSIS — R93.89 ABNORMAL CT OF THE CHEST: ICD-10-CM

## 2023-06-02 PROBLEM — J96.11 CHRONIC RESPIRATORY FAILURE WITH HYPOXIA AND HYPERCAPNIA (HCC): Status: ACTIVE | Noted: 2023-05-20

## 2023-06-02 PROBLEM — J96.12 CHRONIC RESPIRATORY FAILURE WITH HYPOXIA AND HYPERCAPNIA (HCC): Status: ACTIVE | Noted: 2023-05-20

## 2023-06-02 NOTE — PROGRESS NOTES
Pulmonary/Sleep Follow Up Note   Suzan Torres 79 y o  female MRN: [id number]  6/2/2023      Assessment and Plan:    1  Pneumonia of right lower lobe due to infectious organism  Assessment & Plan:  Recent admission, she is currently on her home inhalation regimen she completed antibiotic therapy today the last day of her steroid taper, no signs of recurrent infection or pneumonia or fever  I ordered the chest x-ray repeated today  Speech/swallow therapy evaluation to rule out chronic aspiration or aspiration pneumonias    Orders:  -     Ambulatory Referral to Speech Therapy; Future  -     XR chest pa & lateral; Future; Expected date: 06/02/2023    2  Aspiration pneumonia of right lung, unspecified aspiration pneumonia type, unspecified part of lung ([place] [address] )  -     Ambulatory Referral to Speech Therapy; Future  -     XR chest pa & lateral; Future; Expected date: 06/02/2023    3  Uncomplicated asthma, unspecified asthma severity, unspecified whether persistent  Assessment & Plan:  And COPD overlap, she is on Advair, and Spiriva along with albuterol nebulizer as needed      4  Bilateral pleural effusion  Assessment & Plan:  On Lasix and repeat chest x-ray today      5  Abnormal CT of the chest  Assessment & Plan:  Reviewed images on PACS, she does have significant right middle and lower lobe pneumonia which was treated with completed course of antibiotics and bilateral pleural effusions  I ordered repeat chest x-ray today      6  Severe protein-calorie malnutrition ([place] [address] )  Assessment & Plan:  Malnutrition Findings:    Recommend a nutrition consult and supplements                             BMI Findings: Body mass index is 20 31 kg/m²  7  COPD exacerbation ([place] [address] )  Assessment & Plan:  Recent exacerbation requiring hospital admission on 5/20/2023  Last day of steroid taper today 10 mg, on maximal hydration therapy of Advair and Spiriva  Return in about 3 months (around 9/2/2023)      History of Present Illness   HPI:  Tiffanie Huang is a 79 y o  female who is here accompanied by her daughter for hospital follow-up she was recently admitted in [facility] on 5/20/2023 due to worsening shortness of breath found to have right middle and lower lobe pneumonia and acute on chronic hypoxic and hypercapnic respiratory failure she was started on antibiotics and IV steroids eventually she improved and transition to oral steroids with tapering dose today's last dose was 2 mg  She is also on oxygen currently 2 L that was more recently tapered down to 1 L at rest   She still has significant shortness of breath however she is not ambulating as much she is using the wheelchair for the most part and she is undergoing a rehab  She has also chronic cough which comes and episodes productive think white phlegm sometimes  She experiences difficulty sometimes finishing her meals because of shortness of breath but also her appetite is very low  Review of Systems   All other systems reviewed and are negative        Historical Information   Past Medical History:   Diagnosis Date   • Anti-phospholipid syndrome (HCC)    • Asthma    • Blood type O+    • Cardiac disease    • Chronic pain    • COPD (chronic obstructive pulmonary disease) (HCC)    • Fracture of ankle     left   • Hyperlipidemia    • Hypertension    • Hypokalemia 2/27/2023   • Osteoporosis      Past Surgical History:   Procedure Laterality Date   • APPENDECTOMY     • BILATERAL OOPHORECTOMY Bilateral    • ORIF TIBIAL SHAFT FRACTURE W/ PLATES AND SCREWS Right      Family History   Problem Relation Age of Onset   • Hypertension Mother    • Skin cancer Mother    • Hypertension Father          Meds/Allergies     Current Outpatient Medications:   •  acetaminophen (TYLENOL) 325 mg tablet, Take 3 tablets (975 mg total) by mouth every 8 (eight) hours, Disp: , Rfl: 0  •  Advair Diskus 250-50 MCG/DOSE inhaler, USE 1 INHALATION TWICE A DAY, Disp: 180 blister, Rfl: 3  • albuterol (2 5 mg/3 mL) 0 083 % nebulizer solution, USE 1 VIAL IN NEBULIZER EVERY 4 TO 6 HOURS AS NEEDED FOR COUGH AND WHEEZE, Disp: 75 vial, Rfl: 3  •  Ascorbic Acid (VITAMIN C) 1000 MG tablet, Take 1,000 mg by mouth daily, Disp: , Rfl:   •  Cholecalciferol (VITAMIN D3) 20 MCG (800 UNIT) TABS, Take by mouth in the morning, Disp: , Rfl:   •  digoxin (LANOXIN) 0 125 mg tablet, Take 1 tablet (125 mcg total) by mouth every other day Do not start before May 10, 2023 , Disp: , Rfl: 0  •  diltiazem (CARDIZEM CD) 240 mg 24 hr capsule, take 1 capsule by mouth once daily for blood pressure, Disp: 30 capsule, Rfl: 0  •  Ferrous Sulfate 220 (44 Fe) MG/5ML LIQD, take 1 teaspoonful by mouth once daily, Disp: 150 mL, Rfl: 5  •  furosemide (LASIX) 20 mg tablet, Take 1 tablet (20 mg total) by mouth daily Do not start before May 26, 2023 , Disp: , Rfl: 0  •  levothyroxine 25 mcg tablet, TAKE 1 TABLET DAILY, Disp: 90 tablet, Rfl: 3  •  lidocaine (LIDODERM) 5 %, Apply 1 patch topically over 12 hours daily Remove & Discard patch within 12 hours or as directed by MD, Disp: 90 patch, Rfl: 3  •  loratadine (CLARITIN) 10 mg tablet, Take 10 mg by mouth daily, Disp: , Rfl:   •  losartan (COZAAR) 25 mg tablet, Take 1 tablet (25 mg total) by mouth daily Do not start before February 25, 2023 , Disp: 30 tablet, Rfl: 0  •  mirtazapine (REMERON) 7 5 MG tablet, Take 1 tablet (7 5 mg total) by mouth daily at bedtime, Disp: 90 tablet, Rfl: 3  •  multivitamin (THERAGRAN) TABS, Take 1 tablet by mouth daily, Disp: , Rfl:   •  senna-docusate sodium (SENOKOT S) 8 6-50 mg per tablet, Take 1 tablet by mouth daily at bedtime, Disp: , Rfl: 0  •  sertraline (Zoloft) 50 mg tablet, Take 1 tablet (50 mg total) by mouth daily, Disp: 90 tablet, Rfl: 3  •  tiZANidine (ZANAFLEX) 2 mg tablet, Take 1 tablet (2 mg total) by mouth 2 (two) times a day as needed for muscle spasms, Disp: 30 tablet, Rfl: 0  •  umeclidinium (Incruse Ellipta) 62 5 mcg/actuation AEPB inhaler, "Inhale 1 puff daily, Disp: , Rfl:   •  warfarin (COUMADIN) 2 mg tablet, Take 2 mg daily, adjust to keep INR therapeutic, between 2-3, Disp: , Rfl: 0  •  predniSONE 10 mg tablet, Take 3 tablets (30 mg total) by mouth daily for 3 days, THEN 2 tablets (20 mg total) daily for 3 days, THEN 1 tablet (10 mg total) daily for 3 days  , Disp: 18 tablet, Rfl: 0  Allergies   Allergen Reactions   • Aspirin Other (See Comments)   • Penicillins Rash and Hives   • Sulfa Antibiotics Rash       Vitals: Blood pressure 104/52, pulse 76, temperature (!) 96 7 °F (35 9 °C), temperature source Temporal, weight 47 2 kg (104 lb), SpO2 95 %, not currently breastfeeding  Body mass index is 20 31 kg/m²  Oxygen Therapy  SpO2: 95 %  Oxygen Therapy: Supplemental oxygen  O2 Delivery Method: Nasal cannula  O2 Flow Rate (L/min): 1 L/min      Physical Exam  General:  Awake alert and oriented x 3, conversant without conversational dyspnea, NAD, normal affect  HEENT:   Sclera noninjected, nonicteric OU, Nares patent,  no craniofacial abnormalities  NECK:  Trachea midline, no accessory muscle use, no stridor,  JVP not elevated  CARDIAC: Reg, single s1/S2, no m/r/g  PULM: Bilateral significantly diminished air entry, bilateral lower lung zones Rales/crackles  ABD: Soft nontender  EXT: No cyanosis, no clubbing, no edema  NEURO: no focal neurologic deficits, AAOx3      Labs: I have personally reviewed pertinent lab results  , ABG: No results found for: \"BEART\", \"GIW7YLZ\", \"D2INRILX\", \"MNQ8ACO\", \"PHART\", \"PO2ART\", \"SOURCE\", BNP: No results found for: \"BNP\", CBC: No results found for: \"ADJUSTEDWBC\", \"HCT\", \"HGB\", \"MCH\", \"MCHC\", \"MCV\", \"MPV\", \"NRBC\", \"PLT\", \"RBC\", \"RDW\", \"WBC\", CMP: No results found for: \"ALKPHOS\", \"ALT\", \"ANIONGAP\", \"AST\", \"BILITOT\", \"BUN\", \"CALCIUM\", \"CL\", \"CO2\", \"CREATININE\", \"EGFR\", \"GLUCOSE\", \"K\", \"PROT\", \"SODIUM\", PT/INR: No results found for: \"INR\", \"PT\", Troponin: No results found for: \"TROPONINI\"  Lab Results   Component Value Date    " "HCT 31 4 (L) 05/25/2023    HGB 10 1 (L) 05/25/2023     (H) 05/25/2023     05/25/2023    WBC 6 99 05/25/2023     Lab Results   Component Value Date    BUN 24 05/25/2023    CALCIUM 9 1 05/25/2023    CL 97 05/25/2023    CO2 38 (H) 05/25/2023    CREATININE 0 48 (L) 05/25/2023    GLUCOSE 210 (H) 05/20/2023    K 4 9 05/25/2023     03/17/2015     No results found for: \"IGE\"  Lab Results   Component Value Date    ALKPHOS 147 (H) 05/18/2023    ALT 12 05/18/2023    AST 15 05/18/2023    BILITOT 0 40 03/17/2015         Imaging and other studies:   XR chest pa & lateral    (Results Pending)     CT chest 5/2023     IMPRESSION:     Multilobar pneumonia most prominent in the right lower lobe      New moderate T10 vertebral body compression deformity  Franco Louis MD  [facility]       Portions of the record may have been created with voice recognition software  Occasional wrong word or \"sound a like\" substitutions may have occurred due to the inherent limitations of voice recognition software  Read the chart carefully and recognize, using context, where substitutions have occurred    "

## 2023-06-02 NOTE — ASSESSMENT & PLAN NOTE
Reviewed images on PACS, she does have significant right middle and lower lobe pneumonia which was treated with completed course of antibiotics and bilateral pleural effusions    I ordered repeat chest x-ray today

## 2023-06-02 NOTE — ASSESSMENT & PLAN NOTE
She continues to use oxygen she has been tapered gradually in the rehab facility currently on 1 L at rest

## 2023-06-02 NOTE — ASSESSMENT & PLAN NOTE
Malnutrition Findings:    Recommend a nutrition consult and supplements                             BMI Findings: Body mass index is 20 31 kg/m²

## 2023-06-02 NOTE — ASSESSMENT & PLAN NOTE
Recent admission, she is currently on her home inhalation regimen she completed antibiotic therapy today the last day of her steroid taper, no signs of recurrent infection or pneumonia or fever  I ordered the chest x-ray repeated today  Speech/swallow therapy evaluation to rule out chronic aspiration or aspiration pneumonias

## 2023-06-02 NOTE — ASSESSMENT & PLAN NOTE
Recent exacerbation requiring hospital admission on 5/20/2023  Last day of steroid taper today 10 mg, on maximal hydration therapy of Advair and Spiriva

## 2023-06-05 ENCOUNTER — NURSING HOME VISIT (OUTPATIENT)
Dept: FAMILY MEDICINE CLINIC | Facility: CLINIC | Age: 83
End: 2023-06-05
Payer: MEDICARE

## 2023-06-05 DIAGNOSIS — J96.11 CHRONIC RESPIRATORY FAILURE WITH HYPOXIA AND HYPERCAPNIA (HCC): ICD-10-CM

## 2023-06-05 DIAGNOSIS — E43 SEVERE PROTEIN-CALORIE MALNUTRITION (HCC): ICD-10-CM

## 2023-06-05 DIAGNOSIS — D68.61 ANTIPHOSPHOLIPID ANTIBODY SYNDROME (HCC): ICD-10-CM

## 2023-06-05 DIAGNOSIS — J90 BILATERAL PLEURAL EFFUSION: ICD-10-CM

## 2023-06-05 DIAGNOSIS — J96.12 CHRONIC RESPIRATORY FAILURE WITH HYPOXIA AND HYPERCAPNIA (HCC): ICD-10-CM

## 2023-06-05 DIAGNOSIS — R62.7 FAILURE TO THRIVE IN ADULT: ICD-10-CM

## 2023-06-05 DIAGNOSIS — I48.91 ATRIAL FIBRILLATION, UNSPECIFIED TYPE (HCC): ICD-10-CM

## 2023-06-05 DIAGNOSIS — K92.2 UGI BLEED: Primary | ICD-10-CM

## 2023-06-05 DIAGNOSIS — J69.0 ASPIRATION PNEUMONIA OF RIGHT LUNG, UNSPECIFIED ASPIRATION PNEUMONIA TYPE, UNSPECIFIED PART OF LUNG (HCC): ICD-10-CM

## 2023-06-05 PROCEDURE — 99308 SBSQ NF CARE LOW MDM 20: CPT | Performed by: FAMILY MEDICINE

## 2023-06-05 NOTE — PROGRESS NOTES
[address]  [facility]ility: [facility]    NAME: Tom   AGE: 79 y o  SEX: female    DATE OF ENCOUNTER: 6/5/2023    Code status:  Full Code    Assessment and Plan     1  UGI bleed    2  Aspiration pneumonia of right lung, unspecified aspiration pneumonia type, unspecified part of lung ([facility] [address] )    3  Bilateral pleural effusion    4  Chronic respiratory failure with hypoxia and hypercapnia ([facility])    5  Atrial fibrillation, unspecified type ([facility] [address] )    6  Antiphospholipid antibody syndrome ([facility])    7  Failure to thrive in adult    8  Severe protein-calorie malnutrition ([facility] [address] )        All medications and routine orders were reviewed and updated as needed  Plan discussed with: Patient    Chief Complaint     Interim evaluation    History of Present Illness     The patient reports feeling stronger  She notes an improved appetite  Her bowel habits are stable  She had follow-up chest x-ray over the weekend which demonstrated the persistence of bilateral pleural effusions  She is performing well in therapy  She has had no recurrence of her GI bleeding  She denies any dysuria  The following portions of the patient's history were reviewed and updated as appropriate: current medications, past family history, past medical history, past social history, past surgical history and problem list     Allergies: Allergies   Allergen Reactions   • Aspirin Other (See Comments)   • Penicillins Rash and Hives   • Sulfa Antibiotics Rash       Review of Systems     Review of Systems   Constitutional: Negative for activity change, appetite change, chills, diaphoresis, fatigue and unexpected weight change  HENT: Negative for congestion, ear discharge, ear pain, hearing loss, nosebleeds and rhinorrhea  Eyes: Negative for pain, redness, itching and visual disturbance  Respiratory: Positive for shortness of breath  Negative for cough, choking and chest tightness  Cardiovascular: Negative for chest pain and leg swelling  Gastrointestinal: Negative for abdominal pain, blood in stool, constipation, diarrhea and nausea  Endocrine: Negative for cold intolerance, polydipsia and polyphagia  Genitourinary: Negative for dysuria, frequency, hematuria and urgency  Musculoskeletal: Negative for arthralgias, back pain, gait problem, joint swelling, neck pain and neck stiffness  Skin: Negative for color change and rash  Allergic/Immunologic: Negative for environmental allergies and food allergies  Neurological: Positive for weakness  Negative for dizziness, tremors, seizures, speech difficulty, numbness and headaches  Hematological: Negative for adenopathy  Does not bruise/bleed easily  Psychiatric/Behavioral: Negative for behavioral problems, dysphoric mood, hallucinations and self-injury  Medications and orders     All medications reviewed and updated in Nursing Home EMR  Objective     Vitals: per nursing home records    Physical Exam  Constitutional:       Appearance: She is well-developed  HENT:      Head: Normocephalic and atraumatic  Right Ear: External ear normal       Left Ear: External ear normal       Mouth/Throat:      Pharynx: No oropharyngeal exudate  Eyes:      General: No scleral icterus  Right eye: No discharge  Left eye: No discharge  Conjunctiva/sclera: Conjunctivae normal       Pupils: Pupils are equal, round, and reactive to light  Neck:      Thyroid: No thyromegaly  Cardiovascular:      Rate and Rhythm: Normal rate  Rhythm irregular  Heart sounds: Normal heart sounds  No murmur heard  No gallop  Pulmonary:      Effort: Pulmonary effort is normal  No respiratory distress  Breath sounds: No wheezing or rales  Comments: Diminished breath sounds bilaterally  Abdominal:      General: Bowel sounds are normal       Palpations: Abdomen is soft  There is no mass  Tenderness:  There is no guarding or rebound  Musculoskeletal:         General: No tenderness or deformity  Normal range of motion  Cervical back: Normal range of motion and neck supple  Lymphadenopathy:      Cervical: No cervical adenopathy  Skin:     General: Skin is warm and dry  Findings: No rash  Neurological:      Mental Status: She is alert and oriented to person, place, and time  Cranial Nerves: No cranial nerve deficit  Coordination: Coordination normal       Deep Tendon Reflexes: Reflexes are normal and symmetric  Reflexes normal    Psychiatric:         Behavior: Behavior normal          Thought Content: Thought content normal          Judgment: Judgment normal          Pertinent Laboratory/Diagnostic Studies: The following studies were reviewed please see chart or hospital paperwork for details      Space for lab dictation chest ray with bilateral pleural effusions    - Maintain therapy plan and medication regimen    Gabriella Paredes DO  6/5/2023 9:47 AM

## 2023-06-06 ENCOUNTER — PATIENT OUTREACH (OUTPATIENT)
Dept: CASE MANAGEMENT | Facility: OTHER | Age: 83
End: 2023-06-06

## 2023-06-06 NOTE — PROGRESS NOTES
Chart review completed  Email sent to facility requesting update on patient  This Admin Coordinator will continue to monitor via chart review throughout episode  Per realtime the patient is currently admitted to [facility] receiving skilled care  The patient participates in PT & OT  The patient wants to discharge from the SNF ASAP  The patient will discharge to Home with [facility] no definite date yet

## 2023-06-08 ENCOUNTER — NURSING HOME VISIT (OUTPATIENT)
Dept: FAMILY MEDICINE CLINIC | Facility: CLINIC | Age: 83
End: 2023-06-08
Payer: MEDICARE

## 2023-06-08 DIAGNOSIS — J18.9 PNEUMONIA OF RIGHT LOWER LOBE DUE TO INFECTIOUS ORGANISM: ICD-10-CM

## 2023-06-08 DIAGNOSIS — J96.11 CHRONIC RESPIRATORY FAILURE WITH HYPOXIA AND HYPERCAPNIA (HCC): ICD-10-CM

## 2023-06-08 DIAGNOSIS — E43 SEVERE PROTEIN-CALORIE MALNUTRITION (HCC): ICD-10-CM

## 2023-06-08 DIAGNOSIS — J45.909 UNCOMPLICATED ASTHMA, UNSPECIFIED ASTHMA SEVERITY, UNSPECIFIED WHETHER PERSISTENT: ICD-10-CM

## 2023-06-08 DIAGNOSIS — J44.1 COPD EXACERBATION (HCC): ICD-10-CM

## 2023-06-08 DIAGNOSIS — J96.12 CHRONIC RESPIRATORY FAILURE WITH HYPOXIA AND HYPERCAPNIA (HCC): ICD-10-CM

## 2023-06-08 DIAGNOSIS — K92.2 UGI BLEED: Primary | ICD-10-CM

## 2023-06-08 DIAGNOSIS — I48.0 PAROXYSMAL ATRIAL FIBRILLATION (HCC): ICD-10-CM

## 2023-06-08 PROCEDURE — 99309 SBSQ NF CARE MODERATE MDM 30: CPT | Performed by: NURSE PRACTITIONER

## 2023-06-08 NOTE — PROGRESS NOTES
[address]  [facility]: [facility]    NAME: Lillie Neville  AGE: 79 y o  SEX: female    DATE OF ENCOUNTER: 6/8/2023    Code status:  Full Code    Assessment and Plan     1  UGI bleed    2  Pneumonia of right lower lobe due to infectious organism    3  COPD exacerbation ([address] )    4  Uncomplicated asthma, unspecified asthma severity, unspecified whether persistent    5  Chronic respiratory failure with hypoxia and hypercapnia (HCC)    6  Severe protein-calorie malnutrition (HCC)    7  Paroxysmal atrial fibrillation (HCC)        All medications and routine orders were reviewed and updated as needed  Plan discussed with: Patient, nursing staff    Chief Complaint     Interim evaluation    History of Present Illness     Simin is assessed for follow up  She has been weaned to RA, sats low 90s  She notes overall fatigue with limited activity tolerance  Occasional cough productive for white phlegm  CO2 is climbing again  Repeat CXR with persistent pleural effusions and interstitial edema on 6/3/23  Unsure if she is using her acapella consistently  Increased BLE edema noted, albumin 2 8 on 5/18/23 which could be contributing  Bowels are moving, no s/s melena  Urinating without concerns for dysuria  Sleeping well at night  Noted inpatient stay she refused BIPAP and is not always wearing O2 HS  Afebrile, VSS  Weight stable  The following portions of the patient's history were reviewed and updated as appropriate: current medications, past family history, past medical history, past social history, past surgical history and problem list     Allergies: Allergies   Allergen Reactions   • Aspirin Other (See Comments)   • Penicillins Rash and Hives   • Sulfa Antibiotics Rash       Review of Systems     Review of Systems   Constitutional: Positive for fatigue  Negative for activity change, appetite change, chills and fever  HENT: Negative  Negative for congestion, ear pain, postnasal drip and sinus pain  Eyes: Negative  Respiratory: Positive for shortness of breath  Negative for cough  ROMANO   Cardiovascular: Positive for leg swelling  Negative for chest pain  Gastrointestinal: Negative  Negative for constipation and diarrhea  Endocrine: Negative  Genitourinary: Negative  Negative for dysuria  Musculoskeletal: Positive for gait problem  Skin: Negative  Allergic/Immunologic: Negative  Negative for immunocompromised state  Neurological: Positive for weakness  Negative for dizziness and light-headedness  Hematological: Negative  Psychiatric/Behavioral: Negative  Negative for sleep disturbance  Medications and orders     All medications reviewed and updated in Nursing Home EMR  Objective     Vitals: per nursing home records    Physical Exam  Vitals and nursing note reviewed  Constitutional:       General: She is awake  Appearance: She is cachectic  She is ill-appearing  HENT:      Head: Normocephalic and atraumatic  Right Ear: Tympanic membrane, ear canal and external ear normal       Left Ear: Tympanic membrane, ear canal and external ear normal       Nose: Nose normal       Mouth/Throat:      Mouth: Mucous membranes are moist       Pharynx: Oropharynx is clear  Eyes:      Extraocular Movements: Extraocular movements intact  Conjunctiva/sclera: Conjunctivae normal       Pupils: Pupils are equal, round, and reactive to light  Cardiovascular:      Rate and Rhythm: Normal rate and regular rhythm  Pulses: Normal pulses  Heart sounds: Normal heart sounds  Pulmonary:      Effort: Pulmonary effort is normal       Breath sounds: Normal breath sounds  Comments: Lungs diminished bases with fine crackles  Productive cough occasionally for white phlegm    Abdominal:      General: Bowel sounds are normal       Palpations: Abdomen is soft     Musculoskeletal:         General: Normal range of motion  Cervical back: Normal range of motion and neck supple  Right lower leg: Edema present  Left lower leg: Edema present  Comments: +2 pitting LLE, +1 pitting RLE edema with compression stockings in place, legs dependent  Skin:     General: Skin is warm and dry  Coloration: Skin is pale  Neurological:      General: No focal deficit present  Mental Status: She is alert and oriented to person, place, and time  Motor: Weakness present  Gait: Gait abnormal    Psychiatric:         Mood and Affect: Mood normal          Speech: Speech normal          Behavior: Behavior normal  Behavior is cooperative  Thought Content: Thought content normal          Judgment: Judgment normal          Pertinent Laboratory/Diagnostic Studies: The following labs and studies were reviewed please see chart or hospital paperwork for details  Overnight pulse ox  Nursing to ensure Simin is using acapella consistently  Increase lasix to 40mg po daily x3days and kdur 10mEq po daily x3 days    Repeat CMP Monday 6/12/23    Porsche [facility] [place] [place]  6/8/2023 10:46 AM

## 2023-06-12 ENCOUNTER — NURSING HOME VISIT (OUTPATIENT)
Dept: FAMILY MEDICINE CLINIC | Facility: CLINIC | Age: 83
End: 2023-06-12
Payer: MEDICARE

## 2023-06-12 DIAGNOSIS — J45.909 UNCOMPLICATED ASTHMA, UNSPECIFIED ASTHMA SEVERITY, UNSPECIFIED WHETHER PERSISTENT: ICD-10-CM

## 2023-06-12 DIAGNOSIS — J96.11 CHRONIC RESPIRATORY FAILURE WITH HYPOXIA AND HYPERCAPNIA (HCC): ICD-10-CM

## 2023-06-12 DIAGNOSIS — J96.12 CHRONIC RESPIRATORY FAILURE WITH HYPOXIA AND HYPERCAPNIA (HCC): ICD-10-CM

## 2023-06-12 DIAGNOSIS — I48.0 PAROXYSMAL ATRIAL FIBRILLATION (HCC): ICD-10-CM

## 2023-06-12 DIAGNOSIS — J18.9 PNEUMONIA OF RIGHT LOWER LOBE DUE TO INFECTIOUS ORGANISM: ICD-10-CM

## 2023-06-12 DIAGNOSIS — K92.2 UGI BLEED: Primary | ICD-10-CM

## 2023-06-12 DIAGNOSIS — E43 SEVERE PROTEIN-CALORIE MALNUTRITION (HCC): ICD-10-CM

## 2023-06-12 DIAGNOSIS — J44.1 COPD EXACERBATION (HCC): ICD-10-CM

## 2023-06-12 PROCEDURE — 99309 SBSQ NF CARE MODERATE MDM 30: CPT | Performed by: NURSE PRACTITIONER

## 2023-06-12 NOTE — PROGRESS NOTES
[address]  Facility: [facility]    NAME: Helen Talbot  AGE: 79 y o  SEX: female    DATE OF ENCOUNTER: 6/12/2023    Code status:  Full Code    Assessment and Plan     1  UGI bleed    2  Pneumonia of right lower lobe due to infectious organism    3  COPD exacerbation ([facility] [address] )    4  Uncomplicated asthma, unspecified asthma severity, unspecified whether persistent    5  Chronic respiratory failure with hypoxia and hypercapnia (HCC)    6  Paroxysmal atrial fibrillation ([facility] [address] )    7  Severe protein-calorie malnutrition ([facility] [address] )        All medications and routine orders were reviewed and updated as needed  Plan discussed with: Patient, nursing staff    Chief Complaint     Interim evaluation    History of Present Illness     Simin is assessed for follow up  She continues to struggle with acapella use despite reinforced education  Diuretic pulse effective, weight down 2 lb with diminished BLE edema, repeat CMP pending for today  Slow progress with therapy  Appetite fair, reports dairy causing GI distress  Staying hydrated  ROMANO at baseline, occasional MPC with white sputum  Bowels are moving, denies dysuria  Sleeping well at night  Afebrile, VSS  Remains on RA  INR supratheraputic, no s/s bleeding  The following portions of the patient's history were reviewed and updated as appropriate: current medications, past family history, past medical history, past social history, past surgical history and problem list     Allergies: Allergies   Allergen Reactions   • Aspirin Other (See Comments)   • Penicillins Rash and Hives   • Sulfa Antibiotics Rash       Review of Systems     Review of Systems   Constitutional: Negative  Negative for activity change, appetite change, chills, fatigue and fever  HENT: Negative  Negative for congestion, ear pain, postnasal drip and sinus pain  Eyes: Negative  Respiratory: Positive for cough   Negative for shortness of breath  ROMANO   Cardiovascular: Positive for leg swelling  Negative for chest pain  Gastrointestinal: Negative  Negative for constipation and diarrhea  Endocrine: Negative  Genitourinary: Negative  Negative for dysuria  Musculoskeletal: Positive for arthralgias and gait problem  Skin: Negative  Allergic/Immunologic: Negative  Negative for immunocompromised state  Neurological: Positive for weakness  Negative for dizziness and light-headedness  Hematological: Negative  Psychiatric/Behavioral: Negative  Medications and orders     All medications reviewed and updated in Nursing Home EMR  Objective     Vitals: per nursing home records    Physical Exam  Vitals and nursing note reviewed  Constitutional:       General: She is awake  Appearance: She is cachectic  She is ill-appearing  HENT:      Head: Normocephalic and atraumatic  Right Ear: Tympanic membrane, ear canal and external ear normal       Left Ear: Tympanic membrane, ear canal and external ear normal       Nose: Nose normal       Mouth/Throat:      Mouth: Mucous membranes are moist       Pharynx: Oropharynx is clear  Eyes:      Extraocular Movements: Extraocular movements intact  Conjunctiva/sclera: Conjunctivae normal       Pupils: Pupils are equal, round, and reactive to light  Cardiovascular:      Rate and Rhythm: Normal rate and regular rhythm  Pulses: Normal pulses  Heart sounds: Normal heart sounds  Pulmonary:      Effort: Pulmonary effort is normal       Breath sounds: Normal breath sounds  Comments: Diminished bases with fine crackles  Productive cough with white sputum  Abdominal:      General: Bowel sounds are normal       Palpations: Abdomen is soft  Musculoskeletal:         General: Normal range of motion  Cervical back: Normal range of motion and neck supple  Right lower leg: Edema present  Left lower leg: Edema present        Comments: +1 BLE edema with compression stockings in place, legs dependent  Skin:     General: Skin is warm and dry  Coloration: Skin is pale  Neurological:      General: No focal deficit present  Mental Status: She is alert and oriented to person, place, and time  Motor: Weakness present  Gait: Gait abnormal    Psychiatric:         Mood and Affect: Mood normal          Speech: Speech normal          Behavior: Behavior normal  Behavior is cooperative  Thought Content: Thought content normal          Judgment: Judgment normal       Comments: Limited insight/judgment          Pertinent Laboratory/Diagnostic Studies: The following labs and studies were reviewed please see chart or hospital paperwork for details  Hold warfarin, recheck INR on Wednesday 6/14/23        YUE Grady  6/12/2023 12:01 PM

## 2023-06-13 ENCOUNTER — PATIENT OUTREACH (OUTPATIENT)
Dept: CASE MANAGEMENT | Facility: OTHER | Age: 83
End: 2023-06-13

## 2023-06-13 NOTE — PROGRESS NOTES
Chart review completed  Email sent to facility requesting update on patient  This Admin Coordinator will continue to monitor via chart review throughout episode  Update obtained from realtime the patient is currently admitted to [facility] and is receiving skilled care  Update received the patient has a LCD of 6/15/23 and discharge 6/16/23

## 2023-06-15 ENCOUNTER — HOME HEALTH ADMISSION (OUTPATIENT)
Dept: HOME HEALTH SERVICES | Facility: HOME HEALTHCARE | Age: 83
End: 2023-06-15
Payer: MEDICARE

## 2023-06-15 ENCOUNTER — NURSING HOME VISIT (OUTPATIENT)
Dept: FAMILY MEDICINE CLINIC | Facility: CLINIC | Age: 83
End: 2023-06-15
Payer: MEDICARE

## 2023-06-15 DIAGNOSIS — J44.1 COPD EXACERBATION (HCC): ICD-10-CM

## 2023-06-15 DIAGNOSIS — J18.9 PNEUMONIA OF RIGHT LOWER LOBE DUE TO INFECTIOUS ORGANISM: ICD-10-CM

## 2023-06-15 DIAGNOSIS — J96.12 CHRONIC RESPIRATORY FAILURE WITH HYPOXIA AND HYPERCAPNIA (HCC): ICD-10-CM

## 2023-06-15 DIAGNOSIS — J96.11 CHRONIC RESPIRATORY FAILURE WITH HYPOXIA AND HYPERCAPNIA (HCC): ICD-10-CM

## 2023-06-15 DIAGNOSIS — E43 SEVERE PROTEIN-CALORIE MALNUTRITION (HCC): ICD-10-CM

## 2023-06-15 DIAGNOSIS — K92.2 UGI BLEED: Primary | ICD-10-CM

## 2023-06-15 DIAGNOSIS — I48.0 PAROXYSMAL ATRIAL FIBRILLATION (HCC): ICD-10-CM

## 2023-06-15 DIAGNOSIS — D68.59 HYPERCOAGULABLE STATE (HCC): ICD-10-CM

## 2023-06-15 PROCEDURE — 99316 NF DSCHRG MGMT 30 MIN+: CPT | Performed by: NURSE PRACTITIONER

## 2023-06-15 NOTE — PROGRESS NOTES
[address]  Facility: [facility]    NAME: Roxanne Post  AGE: 79 y o  SEX: female    DATE OF ENCOUNTER: 6/15/2023    Code status:  Full Code    Assessment and Plan     1  UGI bleed    2  Pneumonia of right lower lobe due to infectious organism    3  COPD exacerbation ([facility] [address] )    4  Chronic respiratory failure with hypoxia and hypercapnia (HCC)    5  Hypercoagulable state ([facility] [address] )    6  Severe protein-calorie malnutrition (HCC)    7  Paroxysmal atrial fibrillation (HCC)        All medications and routine orders were reviewed and updated as needed  Plan discussed with: Patient, nursing staff    Chief Complaint     Interim evaluation    History of Present Illness     Simin is assessed for follow-up  She is ADC home for Saturday, 6/17/2023  Her children will be assisting her at home and her son is actively looking for home health aides  She repeated using the Acapella more and finds her activity tolerance does improve with use  She does feel she is gaining strength with therapy  Her dyspnea is at baseline and she denies increased cough or sputum production  Her appetite continues to be fair, she is staying hydrated  Her bowels are moving and she denies dysuria  She is sleeping well at night  Afebrile, vitals are stable  Her Coumadin has been on hold all week due to supratherapeutic levels  Her PT/INR will be checked again tomorrow and will need to be monitored closely upon discharge  She continues to denies any signs/symptoms of bleeding  The following portions of the patient's history were reviewed and updated as appropriate: current medications, past family history, past medical history, past social history, past surgical history and problem list     Allergies:   Allergies   Allergen Reactions   • Aspirin Other (See Comments)   • Penicillins Rash and Hives   • Sulfa Antibiotics Rash       Review of Systems     Review of Systems Constitutional: Negative  Negative for activity change, appetite change, chills, fatigue and fever  HENT: Negative  Negative for congestion, ear pain, postnasal drip and sinus pain  Eyes: Negative  Respiratory: Positive for cough  Negative for shortness of breath  ROMANO   Cardiovascular: Positive for leg swelling  Negative for chest pain  Gastrointestinal: Negative  Negative for blood in stool, constipation and diarrhea  Endocrine: Negative  Genitourinary: Negative  Negative for dysuria and hematuria  Musculoskeletal: Positive for arthralgias and gait problem  Skin: Negative  Allergic/Immunologic: Negative  Negative for immunocompromised state  Neurological: Positive for weakness  Negative for dizziness and light-headedness  Hematological: Negative  Psychiatric/Behavioral: Negative  Negative for sleep disturbance  Medications and orders     All medications reviewed and updated in Nursing Home EMR  Objective     Vitals: per nursing home records    Physical Exam  Vitals and nursing note reviewed  Constitutional:       General: She is awake  Appearance: She is cachectic  She is ill-appearing  HENT:      Head: Normocephalic and atraumatic  Right Ear: Tympanic membrane, ear canal and external ear normal       Left Ear: Tympanic membrane, ear canal and external ear normal       Nose: Nose normal       Mouth/Throat:      Mouth: Mucous membranes are moist       Pharynx: Oropharynx is clear  Eyes:      Extraocular Movements: Extraocular movements intact  Conjunctiva/sclera: Conjunctivae normal       Pupils: Pupils are equal, round, and reactive to light  Cardiovascular:      Rate and Rhythm: Normal rate  Pulses: Normal pulses  Heart sounds: Normal heart sounds  Pulmonary:      Effort: Pulmonary effort is normal       Breath sounds: Normal breath sounds  Comments: Diminished throughout  Occasional dry cough noted     Abdominal: General: Bowel sounds are normal       Palpations: Abdomen is soft  Musculoskeletal:         General: Normal range of motion  Cervical back: Normal range of motion and neck supple  Right lower leg: Edema present  Left lower leg: Edema present  Comments: +1 pitting BLE edema with compression stockings in place, legs dependent   Skin:     General: Skin is warm and dry  Coloration: Skin is pale  Neurological:      General: No focal deficit present  Mental Status: She is alert and oriented to person, place, and time  Sensory: Sensory deficit present  Motor: Weakness present  Gait: Gait abnormal    Psychiatric:         Mood and Affect: Mood normal          Speech: Speech normal          Behavior: Behavior normal  Behavior is cooperative  Thought Content: Thought content normal          Judgment: Judgment normal       Comments: Limited insight/judgement         Pertinent Laboratory/Diagnostic Studies: The following labs and studies were reviewed please see chart or hospital paperwork for details  Discharge planning  She will need a repeat PT/INR tomorrow        YUE Connolly  6/15/2023 10:58 AM

## 2023-06-18 ENCOUNTER — HOME CARE VISIT (OUTPATIENT)
Dept: HOME HEALTH SERVICES | Facility: HOME HEALTHCARE | Age: 83
End: 2023-06-18
Payer: MEDICARE

## 2023-06-18 VITALS
HEART RATE: 78 BPM | RESPIRATION RATE: 20 BRPM | SYSTOLIC BLOOD PRESSURE: 112 MMHG | OXYGEN SATURATION: 92 % | DIASTOLIC BLOOD PRESSURE: 60 MMHG | TEMPERATURE: 98 F

## 2023-06-18 PROCEDURE — G0299 HHS/HOSPICE OF RN EA 15 MIN: HCPCS

## 2023-06-18 PROCEDURE — 10330081 VN NO-PAY CLAIM PROCEDURE

## 2023-06-18 PROCEDURE — 400013 VN SOC

## 2023-06-18 NOTE — CASE COMMUNICATION
"[facility] has admitted your patient to [facility] service with the following disciplines:      SN and PT  This report is informational only, no responses is needed  Primary focus of home health care: pulmonary  Patient stated goals of care \"to be more independent\"  Anticipated visit pattern and next visit date: 2w2, 1w7, next anticipate SN visit 6/21/23    See medication list - meds in home differ from AVS: NA  Please be advised,  EMR/EPIC identified drug interactions for this patient based on the current medication profile  Significant clinical findings: A&Ox4, pleasant  Lungs clear, decreased  O2sat 93% on RA  SOB with mod exertion  +2 BLLE edema  LBM today, soft brown  incont urine at times  Bruising to BL UE, sacrum r/b  Potential barriers to goal achievement: decreased nutritional intake, fall risk, pain      Thank you for allowing us to participate i n the care of your patient        [facility]      "

## 2023-06-19 ENCOUNTER — TRANSITIONAL CARE MANAGEMENT (OUTPATIENT)
Dept: FAMILY MEDICINE CLINIC | Facility: CLINIC | Age: 83
End: 2023-06-19

## 2023-06-19 ENCOUNTER — PATIENT OUTREACH (OUTPATIENT)
Dept: CASE MANAGEMENT | Facility: OTHER | Age: 83
End: 2023-06-19

## 2023-06-19 DIAGNOSIS — J44.1 COPD EXACERBATION (HCC): Primary | ICD-10-CM

## 2023-06-19 NOTE — PROGRESS NOTES
Update obtained from realtime the patient discharged 6/17/23 to Home with [facility]  A email was sent to the facility requesting discharge instructions  When CM assistant has received the Discharge paperwork CM assistant will attach to this encounter  I have removed myself off of the care team, added the CM to the care team who will follow the patient through the episode, sent the care manager an inbasket notifying them of the [facility]ENNIE/SNF Pathway  Ambulatory referral placed for complex care management

## 2023-06-20 ENCOUNTER — ANTICOAG VISIT (OUTPATIENT)
Dept: FAMILY MEDICINE CLINIC | Facility: CLINIC | Age: 83
End: 2023-06-20

## 2023-06-20 ENCOUNTER — TELEPHONE (OUTPATIENT)
Dept: LAB | Facility: HOSPITAL | Age: 83
End: 2023-06-20

## 2023-06-20 ENCOUNTER — HOME CARE VISIT (OUTPATIENT)
Dept: HOME HEALTH SERVICES | Facility: HOME HEALTHCARE | Age: 83
End: 2023-06-20
Payer: MEDICARE

## 2023-06-20 VITALS
RESPIRATION RATE: 20 BRPM | SYSTOLIC BLOOD PRESSURE: 112 MMHG | OXYGEN SATURATION: 97 % | DIASTOLIC BLOOD PRESSURE: 50 MMHG | HEART RATE: 80 BPM

## 2023-06-20 VITALS
OXYGEN SATURATION: 95 % | SYSTOLIC BLOOD PRESSURE: 112 MMHG | DIASTOLIC BLOOD PRESSURE: 48 MMHG | TEMPERATURE: 98.2 F | HEART RATE: 80 BPM

## 2023-06-20 DIAGNOSIS — I48.0 PAROXYSMAL ATRIAL FIBRILLATION (HCC): Primary | ICD-10-CM

## 2023-06-20 LAB — INR PPP: 1.5 (ref 0.84–1.19)

## 2023-06-20 PROCEDURE — G0299 HHS/HOSPICE OF RN EA 15 MIN: HCPCS

## 2023-06-20 PROCEDURE — G0151 HHCP-SERV OF PT,EA 15 MIN: HCPCS

## 2023-06-20 NOTE — PROGRESS NOTES
Increase Coumadin to 5 mg on Mondays, Wednesdays, and Fridays   2 5 mg all other days   Recheck INR in 1 week   Pt aware

## 2023-06-22 ENCOUNTER — APPOINTMENT (EMERGENCY)
Dept: RADIOLOGY | Facility: HOSPITAL | Age: 83
DRG: 521 | End: 2023-06-22
Payer: MEDICARE

## 2023-06-22 ENCOUNTER — APPOINTMENT (EMERGENCY)
Dept: CT IMAGING | Facility: HOSPITAL | Age: 83
DRG: 521 | End: 2023-06-22
Payer: MEDICARE

## 2023-06-22 ENCOUNTER — HOME CARE VISIT (OUTPATIENT)
Dept: HOME HEALTH SERVICES | Facility: HOME HEALTHCARE | Age: 83
End: 2023-06-22
Payer: MEDICARE

## 2023-06-22 ENCOUNTER — HOSPITAL ENCOUNTER (INPATIENT)
Facility: HOSPITAL | Age: 83
LOS: 6 days | Discharge: NURSING, REHAB OR LONG-TERM CARE | DRG: 521 | End: 2023-06-28
Attending: EMERGENCY MEDICINE | Admitting: HOSPITALIST
Payer: MEDICARE

## 2023-06-22 ENCOUNTER — PATIENT OUTREACH (OUTPATIENT)
Dept: CASE MANAGEMENT | Facility: OTHER | Age: 83
End: 2023-06-22

## 2023-06-22 DIAGNOSIS — I48.20 CHRONIC ATRIAL FIBRILLATION (HCC): ICD-10-CM

## 2023-06-22 DIAGNOSIS — S72.002A CLOSED FRACTURE OF LEFT HIP, INITIAL ENCOUNTER (HCC): ICD-10-CM

## 2023-06-22 DIAGNOSIS — J96.01 ACUTE RESPIRATORY FAILURE WITH HYPOXIA (HCC): ICD-10-CM

## 2023-06-22 DIAGNOSIS — I48.0 PAROXYSMAL ATRIAL FIBRILLATION (HCC): ICD-10-CM

## 2023-06-22 DIAGNOSIS — R65.20 SEPSIS WITH ACUTE RENAL FAILURE WITHOUT SEPTIC SHOCK, DUE TO UNSPECIFIED ORGANISM, UNSPECIFIED ACUTE RENAL FAILURE TYPE (HCC): Primary | ICD-10-CM

## 2023-06-22 DIAGNOSIS — A41.9 SEPSIS WITH ACUTE RENAL FAILURE WITHOUT SEPTIC SHOCK, DUE TO UNSPECIFIED ORGANISM, UNSPECIFIED ACUTE RENAL FAILURE TYPE (HCC): Primary | ICD-10-CM

## 2023-06-22 DIAGNOSIS — J96.12 CHRONIC RESPIRATORY FAILURE WITH HYPOXIA AND HYPERCAPNIA (HCC): ICD-10-CM

## 2023-06-22 DIAGNOSIS — N17.9 SEPSIS WITH ACUTE RENAL FAILURE WITHOUT SEPTIC SHOCK, DUE TO UNSPECIFIED ORGANISM, UNSPECIFIED ACUTE RENAL FAILURE TYPE (HCC): Primary | ICD-10-CM

## 2023-06-22 DIAGNOSIS — J69.0 ASPIRATION PNEUMONIA OF RIGHT LUNG, UNSPECIFIED ASPIRATION PNEUMONIA TYPE, UNSPECIFIED PART OF LUNG (HCC): ICD-10-CM

## 2023-06-22 DIAGNOSIS — R77.8 ELEVATED TROPONIN: ICD-10-CM

## 2023-06-22 DIAGNOSIS — K59.00 CONSTIPATION: ICD-10-CM

## 2023-06-22 DIAGNOSIS — J96.11 CHRONIC RESPIRATORY FAILURE WITH HYPOXIA AND HYPERCAPNIA (HCC): ICD-10-CM

## 2023-06-22 DIAGNOSIS — S72.92XA FEMUR FRACTURE, LEFT (HCC): ICD-10-CM

## 2023-06-22 DIAGNOSIS — J44.1 COPD WITH ACUTE EXACERBATION (HCC): Primary | ICD-10-CM

## 2023-06-22 PROBLEM — R79.89 ELEVATED TROPONIN: Status: ACTIVE | Noted: 2023-06-22
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
## 2023-06-22 LAB
2HR DELTA HS TROPONIN: 89 NG/L
4HR DELTA HS TROPONIN: 123 NG/L
ABO GROUP BLD: NORMAL
ALBUMIN SERPL BCP-MCNC: 3.5 G/DL (ref 3.5–5)
ALP SERPL-CCNC: 163 U/L (ref 34–104)
ALT SERPL W P-5'-P-CCNC: 18 U/L (ref 7–52)
ANION GAP SERPL CALCULATED.3IONS-SCNC: 5 MMOL/L
APTT PPP: 40 SECONDS (ref 23–37)
AST SERPL W P-5'-P-CCNC: 28 U/L (ref 13–39)
ATRIAL RATE: 101 BPM
BACTERIA UR QL AUTO: NORMAL /HPF
BASOPHILS # BLD AUTO: 0.03 THOUSANDS/ÂΜL (ref 0–0.1)
BASOPHILS NFR BLD AUTO: 0 % (ref 0–1)
BILIRUB SERPL-MCNC: 0.4 MG/DL (ref 0.2–1)
BILIRUB UR QL STRIP: NEGATIVE
BLD GP AB SCN SERPL QL: POSITIVE
BUN SERPL-MCNC: 21 MG/DL (ref 5–25)
CALCIUM SERPL-MCNC: 9 MG/DL (ref 8.4–10.2)
CARDIAC TROPONIN I PNL SERPL HS: 113 NG/L
CARDIAC TROPONIN I PNL SERPL HS: 202 NG/L
CARDIAC TROPONIN I PNL SERPL HS: 236 NG/L
CHLORIDE SERPL-SCNC: 100 MMOL/L (ref 96–108)
CK SERPL-CCNC: 73 U/L (ref 26–192)
CLARITY UR: CLEAR
CO2 SERPL-SCNC: 31 MMOL/L (ref 21–32)
COLOR UR: YELLOW
CREAT SERPL-MCNC: 0.6 MG/DL (ref 0.6–1.3)
EOSINOPHIL # BLD AUTO: 0.01 THOUSAND/ÂΜL (ref 0–0.61)
EOSINOPHIL NFR BLD AUTO: 0 % (ref 0–6)
ERYTHROCYTE [DISTWIDTH] IN BLOOD BY AUTOMATED COUNT: 13.9 % (ref 11.6–15.1)
GFR SERPL CREATININE-BSD FRML MDRD: 83 ML/MIN/1.73SQ M
GLUCOSE SERPL-MCNC: 112 MG/DL (ref 65–140)
GLUCOSE UR STRIP-MCNC: NEGATIVE MG/DL
HCT VFR BLD AUTO: 34.2 % (ref 34.8–46.1)
HGB BLD-MCNC: 10.8 G/DL (ref 11.5–15.4)
HGB UR QL STRIP.AUTO: NEGATIVE
IMM GRANULOCYTES # BLD AUTO: 0.09 THOUSAND/UL (ref 0–0.2)
IMM GRANULOCYTES NFR BLD AUTO: 1 % (ref 0–2)
INR PPP: 2.19 (ref 0.84–1.19)
KETONES UR STRIP-MCNC: ABNORMAL MG/DL
LACTATE SERPL-SCNC: 1.7 MMOL/L (ref 0.5–2)
LEUKOCYTE ESTERASE UR QL STRIP: NEGATIVE
LYMPHOCYTES # BLD AUTO: 0.49 THOUSANDS/ÂΜL (ref 0.6–4.47)
LYMPHOCYTES NFR BLD AUTO: 4 % (ref 14–44)
MCH RBC QN AUTO: 31.9 PG (ref 26.8–34.3)
MCHC RBC AUTO-ENTMCNC: 31.6 G/DL (ref 31.4–37.4)
MCV RBC AUTO: 101 FL (ref 82–98)
MONOCYTES # BLD AUTO: 0.7 THOUSAND/ÂΜL (ref 0.17–1.22)
MONOCYTES NFR BLD AUTO: 6 % (ref 4–12)
NEUTROPHILS # BLD AUTO: 10.35 THOUSANDS/ÂΜL (ref 1.85–7.62)
NEUTS SEG NFR BLD AUTO: 89 % (ref 43–75)
NITRITE UR QL STRIP: NEGATIVE
NON-SQ EPI CELLS URNS QL MICRO: NORMAL /HPF
NRBC BLD AUTO-RTO: 0 /100 WBCS
P AXIS: 68 DEGREES
PH UR STRIP.AUTO: 8 [PH]
PLATELET # BLD AUTO: 297 THOUSANDS/UL (ref 149–390)
PMV BLD AUTO: 8.5 FL (ref 8.9–12.7)
POTASSIUM SERPL-SCNC: 6 MMOL/L (ref 3.5–5.3)
PR INTERVAL: 160 MS
PROCALCITONIN SERPL-MCNC: 0.2 NG/ML
PROT SERPL-MCNC: 6.7 G/DL (ref 6.4–8.4)
PROT UR STRIP-MCNC: ABNORMAL MG/DL
PROTHROMBIN TIME: 25.5 SECONDS (ref 11.6–14.5)
QRS AXIS: 10 DEGREES
QRSD INTERVAL: 82 MS
QT INTERVAL: 312 MS
QTC INTERVAL: 404 MS
RBC # BLD AUTO: 3.39 MILLION/UL (ref 3.81–5.12)
RBC #/AREA URNS AUTO: NORMAL /HPF
RH BLD: POSITIVE
SODIUM SERPL-SCNC: 136 MMOL/L (ref 135–147)
SP GR UR STRIP.AUTO: <1.005 (ref 1–1.03)
SPECIMEN EXPIRATION DATE: NORMAL
T WAVE AXIS: 51 DEGREES
UROBILINOGEN UR STRIP-ACNC: <2 MG/DL
VENTRICULAR RATE: 101 BPM
WBC # BLD AUTO: 11.67 THOUSAND/UL (ref 4.31–10.16)
WBC #/AREA URNS AUTO: NORMAL /HPF

## 2023-06-22 PROCEDURE — 93010 ELECTROCARDIOGRAM REPORT: CPT | Performed by: INTERNAL MEDICINE

## 2023-06-22 PROCEDURE — 86870 RBC ANTIBODY IDENTIFICATION: CPT | Performed by: STUDENT IN AN ORGANIZED HEALTH CARE EDUCATION/TRAINING PROGRAM

## 2023-06-22 PROCEDURE — 72125 CT NECK SPINE W/O DYE: CPT

## 2023-06-22 PROCEDURE — 72170 X-RAY EXAM OF PELVIS: CPT

## 2023-06-22 PROCEDURE — 71260 CT THORAX DX C+: CPT

## 2023-06-22 PROCEDURE — 85025 COMPLETE CBC W/AUTO DIFF WBC: CPT | Performed by: EMERGENCY MEDICINE

## 2023-06-22 PROCEDURE — 81001 URINALYSIS AUTO W/SCOPE: CPT | Performed by: EMERGENCY MEDICINE

## 2023-06-22 PROCEDURE — 86850 RBC ANTIBODY SCREEN: CPT | Performed by: HOSPITALIST

## 2023-06-22 PROCEDURE — 93005 ELECTROCARDIOGRAM TRACING: CPT

## 2023-06-22 PROCEDURE — 84484 ASSAY OF TROPONIN QUANT: CPT | Performed by: EMERGENCY MEDICINE

## 2023-06-22 PROCEDURE — 85610 PROTHROMBIN TIME: CPT | Performed by: EMERGENCY MEDICINE

## 2023-06-22 PROCEDURE — 85730 THROMBOPLASTIN TIME PARTIAL: CPT | Performed by: EMERGENCY MEDICINE

## 2023-06-22 PROCEDURE — 99285 EMERGENCY DEPT VISIT HI MDM: CPT

## 2023-06-22 PROCEDURE — 87147 CULTURE TYPE IMMUNOLOGIC: CPT | Performed by: HOSPITALIST

## 2023-06-22 PROCEDURE — 99223 1ST HOSP IP/OBS HIGH 75: CPT | Performed by: STUDENT IN AN ORGANIZED HEALTH CARE EDUCATION/TRAINING PROGRAM

## 2023-06-22 PROCEDURE — 84145 PROCALCITONIN (PCT): CPT | Performed by: EMERGENCY MEDICINE

## 2023-06-22 PROCEDURE — 70450 CT HEAD/BRAIN W/O DYE: CPT

## 2023-06-22 PROCEDURE — 94640 AIRWAY INHALATION TREATMENT: CPT

## 2023-06-22 PROCEDURE — 87081 CULTURE SCREEN ONLY: CPT | Performed by: HOSPITALIST

## 2023-06-22 PROCEDURE — 86901 BLOOD TYPING SEROLOGIC RH(D): CPT | Performed by: HOSPITALIST

## 2023-06-22 PROCEDURE — 86900 BLOOD TYPING SEROLOGIC ABO: CPT | Performed by: HOSPITALIST

## 2023-06-22 PROCEDURE — 74177 CT ABD & PELVIS W/CONTRAST: CPT

## 2023-06-22 PROCEDURE — 94760 N-INVAS EAR/PLS OXIMETRY 1: CPT

## 2023-06-22 PROCEDURE — 36415 COLL VENOUS BLD VENIPUNCTURE: CPT | Performed by: EMERGENCY MEDICINE

## 2023-06-22 PROCEDURE — 87040 BLOOD CULTURE FOR BACTERIA: CPT | Performed by: EMERGENCY MEDICINE

## 2023-06-22 PROCEDURE — 94664 DEMO&/EVAL PT USE INHALER: CPT

## 2023-06-22 PROCEDURE — 82550 ASSAY OF CK (CPK): CPT | Performed by: EMERGENCY MEDICINE

## 2023-06-22 PROCEDURE — 80053 COMPREHEN METABOLIC PANEL: CPT | Performed by: EMERGENCY MEDICINE

## 2023-06-22 PROCEDURE — 83605 ASSAY OF LACTIC ACID: CPT | Performed by: EMERGENCY MEDICINE

## 2023-06-22 PROCEDURE — 71045 X-RAY EXAM CHEST 1 VIEW: CPT

## 2023-06-22 PROCEDURE — 96374 THER/PROPH/DIAG INJ IV PUSH: CPT

## 2023-06-22 RX ORDER — METHYLPREDNISOLONE SODIUM SUCCINATE 40 MG/ML
40 INJECTION, POWDER, LYOPHILIZED, FOR SOLUTION INTRAMUSCULAR; INTRAVENOUS EVERY 8 HOURS
Status: DISCONTINUED | OUTPATIENT
Start: 2023-06-22 | End: 2023-06-23

## 2023-06-22 RX ORDER — TIZANIDINE 2 MG/1
2 TABLET ORAL 2 TIMES DAILY PRN
Status: DISCONTINUED | OUTPATIENT
Start: 2023-06-22 | End: 2023-06-28 | Stop reason: HOSPADM

## 2023-06-22 RX ORDER — LEVALBUTEROL INHALATION SOLUTION 1.25 MG/3ML
1.25 SOLUTION RESPIRATORY (INHALATION)
Status: DISCONTINUED | OUTPATIENT
Start: 2023-06-22 | End: 2023-06-22

## 2023-06-22 RX ORDER — ACETAMINOPHEN 325 MG/1
650 TABLET ORAL EVERY 6 HOURS PRN
Status: DISCONTINUED | OUTPATIENT
Start: 2023-06-22 | End: 2023-06-23

## 2023-06-22 RX ORDER — ACETAMINOPHEN 325 MG/1
975 TABLET ORAL ONCE
Status: COMPLETED | OUTPATIENT
Start: 2023-06-22 | End: 2023-06-22

## 2023-06-22 RX ORDER — METHYLPREDNISOLONE SODIUM SUCCINATE 125 MG/2ML
125 INJECTION, POWDER, LYOPHILIZED, FOR SOLUTION INTRAMUSCULAR; INTRAVENOUS ONCE
Status: COMPLETED | OUTPATIENT
Start: 2023-06-22 | End: 2023-06-22

## 2023-06-22 RX ORDER — ACETAMINOPHEN 325 MG/1
975 TABLET ORAL EVERY 8 HOURS SCHEDULED
Status: DISCONTINUED | OUTPATIENT
Start: 2023-06-22 | End: 2023-06-28 | Stop reason: HOSPADM

## 2023-06-22 RX ORDER — OXYCODONE HYDROCHLORIDE 5 MG/1
5 TABLET ORAL EVERY 6 HOURS PRN
Status: DISCONTINUED | OUTPATIENT
Start: 2023-06-22 | End: 2023-06-28 | Stop reason: HOSPADM

## 2023-06-22 RX ORDER — ASPIRIN 81 MG/1
324 TABLET, CHEWABLE ORAL ONCE
Status: COMPLETED | OUTPATIENT
Start: 2023-06-22 | End: 2023-06-22

## 2023-06-22 RX ORDER — DILTIAZEM HYDROCHLORIDE 240 MG/1
240 CAPSULE, COATED, EXTENDED RELEASE ORAL DAILY
Status: DISCONTINUED | OUTPATIENT
Start: 2023-06-22 | End: 2023-06-28 | Stop reason: HOSPADM

## 2023-06-22 RX ORDER — BUDESONIDE 0.5 MG/2ML
0.5 INHALANT ORAL
Status: DISCONTINUED | OUTPATIENT
Start: 2023-06-22 | End: 2023-06-28 | Stop reason: HOSPADM

## 2023-06-22 RX ORDER — AZITHROMYCIN 500 MG/1
500 TABLET, FILM COATED ORAL EVERY 24 HOURS
Status: DISCONTINUED | OUTPATIENT
Start: 2023-06-22 | End: 2023-06-22

## 2023-06-22 RX ORDER — GUAIFENESIN 600 MG/1
600 TABLET, EXTENDED RELEASE ORAL 2 TIMES DAILY
Status: DISCONTINUED | OUTPATIENT
Start: 2023-06-22 | End: 2023-06-28 | Stop reason: HOSPADM

## 2023-06-22 RX ORDER — IPRATROPIUM BROMIDE AND ALBUTEROL SULFATE 2.5; .5 MG/3ML; MG/3ML
3 SOLUTION RESPIRATORY (INHALATION) ONCE
Status: COMPLETED | OUTPATIENT
Start: 2023-06-22 | End: 2023-06-22

## 2023-06-22 RX ORDER — SODIUM CHLORIDE FOR INHALATION 0.9 %
3 VIAL, NEBULIZER (ML) INHALATION
Status: DISCONTINUED | OUTPATIENT
Start: 2023-06-22 | End: 2023-06-22

## 2023-06-22 RX ORDER — LEVOTHYROXINE SODIUM 0.03 MG/1
25 TABLET ORAL DAILY
Status: DISCONTINUED | OUTPATIENT
Start: 2023-06-22 | End: 2023-06-28 | Stop reason: HOSPADM

## 2023-06-22 RX ORDER — DIGOXIN 125 MCG
125 TABLET ORAL EVERY OTHER DAY
Status: DISCONTINUED | OUTPATIENT
Start: 2023-06-22 | End: 2023-06-28 | Stop reason: HOSPADM

## 2023-06-22 RX ORDER — MIRTAZAPINE 15 MG/1
7.5 TABLET, FILM COATED ORAL
Status: DISCONTINUED | OUTPATIENT
Start: 2023-06-22 | End: 2023-06-28 | Stop reason: HOSPADM

## 2023-06-22 RX ORDER — LOSARTAN POTASSIUM 25 MG/1
25 TABLET ORAL DAILY
Status: DISCONTINUED | OUTPATIENT
Start: 2023-06-22 | End: 2023-06-28 | Stop reason: HOSPADM

## 2023-06-22 RX ORDER — LEVALBUTEROL INHALATION SOLUTION 1.25 MG/3ML
1.25 SOLUTION RESPIRATORY (INHALATION)
Status: DISCONTINUED | OUTPATIENT
Start: 2023-06-22 | End: 2023-06-28 | Stop reason: HOSPADM

## 2023-06-22 RX ADMIN — ACETAMINOPHEN 975 MG: 325 TABLET, FILM COATED ORAL at 16:04

## 2023-06-22 RX ADMIN — PHYTONADIONE 5 MG: 10 INJECTION, EMULSION INTRAMUSCULAR; INTRAVENOUS; SUBCUTANEOUS at 16:09

## 2023-06-22 RX ADMIN — TRANEXAMIC ACID 510 MG: 100 INJECTION, SOLUTION INTRAVENOUS at 15:58

## 2023-06-22 RX ADMIN — IPRATROPIUM BROMIDE AND ALBUTEROL SULFATE 3 ML: 2.5; .5 SOLUTION RESPIRATORY (INHALATION) at 12:24

## 2023-06-22 RX ADMIN — LEVALBUTEROL HYDROCHLORIDE 1.25 MG: 1.25 SOLUTION RESPIRATORY (INHALATION) at 19:29

## 2023-06-22 RX ADMIN — ASPIRIN 81 MG CHEWABLE TABLET 324 MG: 81 TABLET CHEWABLE at 14:35

## 2023-06-22 RX ADMIN — ACETAMINOPHEN 975 MG: 325 TABLET, FILM COATED ORAL at 21:02

## 2023-06-22 RX ADMIN — DILTIAZEM HYDROCHLORIDE 240 MG: 240 CAPSULE, COATED, EXTENDED RELEASE ORAL at 16:07

## 2023-06-22 RX ADMIN — METHYLPREDNISOLONE SODIUM SUCCINATE 125 MG: 125 INJECTION, POWDER, FOR SOLUTION INTRAMUSCULAR; INTRAVENOUS at 11:19

## 2023-06-22 RX ADMIN — IOHEXOL 100 ML: 350 INJECTION, SOLUTION INTRAVENOUS at 11:50

## 2023-06-22 RX ADMIN — IPRATROPIUM BROMIDE 0.5 MG: 0.5 SOLUTION RESPIRATORY (INHALATION) at 19:29

## 2023-06-22 RX ADMIN — GUAIFENESIN 600 MG: 600 TABLET ORAL at 18:08

## 2023-06-22 RX ADMIN — MIRTAZAPINE 7.5 MG: 15 TABLET, FILM COATED ORAL at 21:03

## 2023-06-22 RX ADMIN — LOSARTAN POTASSIUM 25 MG: 25 TABLET, FILM COATED ORAL at 15:58

## 2023-06-22 RX ADMIN — METHYLPREDNISOLONE SODIUM SUCCINATE 40 MG: 40 INJECTION, POWDER, FOR SOLUTION INTRAMUSCULAR; INTRAVENOUS at 18:08

## 2023-06-22 RX ADMIN — ACETAMINOPHEN 975 MG: 325 TABLET ORAL at 13:08

## 2023-06-22 RX ADMIN — BUDESONIDE 0.5 MG: 0.5 INHALANT RESPIRATORY (INHALATION) at 19:29

## 2023-06-22 NOTE — H&P
[facility]  H&P  Name: Kamron Stokes 79 y o  female I MRN: [id number]  Unit/Bed#: [facility] I Date of Admission: 6/22/2023   Date of Service: 6/22/2023 I Hospital Day: 0      Assessment/Plan   * Fall  Assessment & Plan  79year old female with PMH of COPD, pAfib, HTN and osteoporosis presented with fall  CT imaging showed new displaced left subcapital femur fracture, left 11th rib fracture  Ortho consulted for surgery  NPO midnight  Pain meds  Will need PT and OT evaluation post surgery    Elevated troponin  Assessment & Plan  Elevated troponin, suspect likely due to fall and rib fracture  Currently without chest pains  EKG without changes  Complete trend trops, monitor on tele    Essential hypertension  Assessment & Plan  Resume home medications, BP elevated on admission    Atrial fibrillation (HCC)  Assessment & Plan  Hx of Pafib, on coumadin for anticoagulation  Continue digoxin, cardizem  Hold coumadin with possible surgery with ortho tmr    COPD exacerbation ([address] )  Assessment & Plan  Noted to be hypoxic, SOB on admission  Wean O2 as able, currently satting in mid 90s on 3-4L NC  Continue IV steroids, nebs         VTE Prophylaxis: Warfarin (Coumadin)  / sequential compression device   Code Status: Level 2 DNR, ok for Intubate  POLST: There is no POLST form on file for this patient (pre-hospital)  Discussion with family: son at bedside    Anticipated Length of Stay:  Patient will be admitted on an Inpatient basis with an anticipated length of stay of 2 midnights  Justification for Hospital Stay: pending ortho evaluation for surgery    Chief Complaint:   Fall    History of Present Illness:    Kamron Stokes is a 79 y o  female who presents with fall  Past medical history of COPD, A-fib on Coumadin, hypertension and osteoporosis  Patient recently completed a rehab stay and has been home for the past several days    Son came to see patient to pick her up for an appointment, found on the ground for which she had been for the past 2 hours  She reportedly was trying to get up, stated her legs just gave out and she fell on her left side  CT imaging shows left femoral subcapital fracture, left subacute fracture of her ribs also noted  She was hypoxic, required nasal cannula with wheezing noted  She denies any chest pain, diaphoresis, nausea or vomiting  Review of Systems:    Review of Systems   Respiratory: Positive for wheezing  Negative for chest tightness and shortness of breath  Cardiovascular: Negative for chest pain, palpitations and leg swelling  Musculoskeletal: Positive for gait problem, joint swelling and myalgias  All other systems reviewed and are negative  Past Medical and Surgical History:     Past Medical History:   Diagnosis Date   • Anti-phospholipid syndrome ([facility] [address] )    • Asthma    • Blood type O+    • Cardiac disease    • Chronic pain    • COPD (chronic obstructive pulmonary disease) ([facility])    • Fracture of ankle     left   • Hyperlipidemia    • Hypertension    • Hypokalemia 2/27/2023   • Osteoporosis        Past Surgical History:   Procedure Laterality Date   • APPENDECTOMY     • BILATERAL OOPHORECTOMY Bilateral    • ORIF TIBIAL SHAFT FRACTURE W/ PLATES AND SCREWS Right        Meds/Allergies:    Prior to Admission medications    Medication Sig Start Date End Date Taking?  Authorizing Provider   acetaminophen (TYLENOL) 325 mg tablet Take 3 tablets (975 mg total) by mouth every 8 (eight) hours 3/13/23   Jayleen Mcnally MD   Advair Diskus 250-50 MCG/DOSE inhaler USE 1 INHALATION TWICE A DAY 8/6/66   David Daniels MD   albuterol (2 5 mg/3 mL) 0 083 % nebulizer solution USE 1 VIAL IN NEBULIZER EVERY 4 TO 6 HOURS AS NEEDED FOR COUGH AND WHEEZE 3/59/22   David Daniels MD   Ascorbic Acid (VITAMIN C) 1000 MG tablet Take 1,000 mg by mouth daily    Historical Provider, MD   Cholecalciferol (VITAMIN D3) 20 MCG (800 UNIT) TABS Take by mouth in the morning    Historical Provider, MD   digoxin (LANOXIN) 0 125 mg tablet Take 1 tablet (125 mcg total) by mouth every other day Do not start before May 10, 2023  5/10/23   Pawel Horton MD   diltiazem (CARDIZEM CD) 240 mg 24 hr capsule take 1 capsule by mouth once daily for blood pressure 0/52/68   Trudi Vang MD   Ferrous Sulfate 220 (44 Fe) MG/5ML LIQD take 1 teaspoonful by mouth once daily 6/16/92   Trudi Vang MD   furosemide (LASIX) 20 mg tablet Take 1 tablet (20 mg total) by mouth daily Do not start before May 26, 2023  5/26/23   Pawel Horton MD   levothyroxine 25 mcg tablet TAKE 1 TABLET DAILY 28/81/47   Trudi Vang MD   lidocaine (LIDODERM) 5 % Apply 1 patch topically over 12 hours daily Remove & Discard patch within 12 hours or as directed by MD 3/24/39   Trudi Vang MD   loratadine (CLARITIN) 10 mg tablet Take 10 mg by mouth daily    Historical Provider, MD   losartan (COZAAR) 25 mg tablet Take 1 tablet (25 mg total) by mouth daily Do not start before February 25, 2023 2/25/23   Yadiel Garrison MD   mirtazapine (REMERON) 7 5 MG tablet Take 1 tablet (7 5 mg total) by mouth daily at bedtime 7/53/82   Trudi Vang MD   multivitamin [facility]) TABS Take 1 tablet by mouth daily    Historical Provider, MD   senna-docusate sodium (SENOKOT S) 8 6-50 mg per tablet Take 1 tablet by mouth daily at bedtime 3/13/23   Margy Wharton MD   sertraline (Zoloft) 50 mg tablet Take 1 tablet (50 mg total) by mouth daily 7/62/11   Trudi Vang MD   tiZANidine (ZANAFLEX) 2 mg tablet Take 1 tablet (2 mg total) by mouth 2 (two) times a day as needed for muscle spasms 9/5/91   Trudi Vang MD   umeclidinium (Incruse Ellipta) 62 5 mcg/actuation AEPB inhaler Inhale 1 puff daily    Historical Provider, MD   warfarin (COUMADIN) 2 mg tablet Take 2 mg daily, adjust to keep INR therapeutic, between 2-3 3/13/23   Margy Wharton MD     I have reviewed home medications with patient family member  Allergies:    Allergies Allergen Reactions   • Aspirin Other (See Comments)   • Penicillins Rash and Hives   • Sulfa Antibiotics Rash       Social History:     Marital Status:    Occupation: retired  Patient Pre-hospital Living Situation: home alone  Patient Pre-hospital Level of Mobility: rolling walker  Patient Pre-hospital Diet Restrictions: none  Substance Use History:   Social History     Substance and Sexual Activity   Alcohol Use Not Currently     Social History     Tobacco Use   Smoking Status Former   • Types: Cigarettes   Smokeless Tobacco Never   Tobacco Comments    Never smoker per [employer]     Social History     Substance and Sexual Activity   Drug Use No       Family History:    Family History   Problem Relation Age of Onset   • Hypertension Mother    • Skin cancer Mother    • Hypertension Father        Physical Exam:     Vitals:   Blood Pressure: (!) 179/74 (06/22/23 1400)  Pulse: 103 (06/22/23 1400)  Temperature: 98 8 °F (37 1 °C) (06/22/23 1104)  Temp Source: Oral (06/22/23 1104)  Respirations: 18 (06/22/23 1400)  Height: 5' (152 4 cm) (06/22/23 1058)  Weight - Scale: 50 5 kg (111 lb 5 3 oz) (06/22/23 1058)  SpO2: 96 % (06/22/23 1400)    Physical Exam  Vitals and nursing note reviewed  Constitutional:       General: She is in acute distress  Appearance: She is not ill-appearing  Comments: frail   HENT:      Head: Normocephalic and atraumatic  Eyes:      Conjunctiva/sclera: Conjunctivae normal    Cardiovascular:      Rate and Rhythm: Regular rhythm  Tachycardia present  Pulmonary:      Effort: Pulmonary effort is normal       Breath sounds: Wheezing present  No rhonchi  Comments: Decreased breath sounds bilaterally  Abdominal:      General: Bowel sounds are normal  There is no distension  Palpations: Abdomen is soft  Musculoskeletal:         General: Tenderness ( left hip) present  No swelling  Skin:     General: Skin is warm and dry     Neurological:      General: No focal deficit present  Mental Status: She is alert  Mental status is at baseline  Psychiatric:         Mood and Affect: Mood normal          Additional Data:     Lab Results: I have personally reviewed pertinent reports  Results from last 7 days   Lab Units 06/22/23  1105   WBC Thousand/uL 11 67*   HEMOGLOBIN g/dL 10 8*   HEMATOCRIT % 34 2*   PLATELETS Thousands/uL 297   NEUTROS PCT % 89*   LYMPHS PCT % 4*   MONOS PCT % 6   EOS PCT % 0     Results from last 7 days   Lab Units 06/22/23  1105   SODIUM mmol/L 136   POTASSIUM mmol/L 6 0*   CHLORIDE mmol/L 100   CO2 mmol/L 31   BUN mg/dL 21   CREATININE mg/dL 0 60   ANION GAP mmol/L 5   CALCIUM mg/dL 9 0   ALBUMIN g/dL 3 5   TOTAL BILIRUBIN mg/dL 0 40   ALK PHOS U/L 163*   ALT U/L 18   AST U/L 28   GLUCOSE RANDOM mg/dL 112     Results from last 7 days   Lab Units 06/22/23  1224   INR  2 19*             Results from last 7 days   Lab Units 06/22/23  1136   LACTIC ACID mmol/L 1 7   PROCALCITONIN ng/ml 0 20       Imaging: I have personally reviewed pertinent reports  TRAUMA - CT head wo contrast   Final Result by Ede Holt MD (06/22 1222)      No acute intracranial abnormality  Workstation performed: [id number]         TRAUMA - CT spine cervical wo contrast   Final Result by Ede Holt MD (06/22 1250)      No cervical spine fracture or traumatic malalignment  Workstation performed: [id number]         TRAUMA - CT chest abdomen pelvis w contrast   Final Result by Ede Holt MD (06/22 1232)      Displaced left subcapital fracture  Bilateral sacral insufficiency fractures  Chronic right pubic symphysis fracture  Subacute left 11th rib fracture  Workstation performed: [id number]         XR Trauma chest portable   Final Result by Ede Holt MD (06/22 1253)      No acute cardiopulmonary disease                    Workstation performed: [id number]         XR Trauma pelvis ap only 1 or 2 vw ED Interpretation by Dominik Godwin DO (06/22 1207)   Abnormal   Acute hip fracture as interpreted by me independently       Final Result by Paola Hawley MD (06/22 1301)      Displaced left subcapital fracture  Workstation performed: [id number]             EKG, Pathology, and Other Studies Reviewed on Admission:   · EKG: sinus tachycardia    Allscripts / Epic Records Reviewed: Yes     ** Please Note: This note has been constructed using a voice recognition system   **

## 2023-06-22 NOTE — PLAN OF CARE
Problem: MOBILITY - ADULT  Goal: Maintain or return to baseline ADL function  Description: INTERVENTIONS:  -  Assess patient's ability to carry out ADLs; assess patient's baseline for ADL function and identify physical deficits which impact ability to perform ADLs (bathing, care of mouth/teeth, toileting, grooming, dressing, etc )  - Assess/evaluate cause of self-care deficits   - Assess range of motion  - Assess patient's mobility; develop plan if impaired  - Assess patient's need for assistive devices and provide as appropriate  - Encourage maximum independence but intervene and supervise when necessary  - Involve family in performance of ADLs  - Assess for home care needs following discharge   - Consider OT consult to assist with ADL evaluation and planning for discharge  - Provide patient education as appropriate  Outcome: Progressing  Goal: Maintains/Returns to pre admission functional level  Description: INTERVENTIONS:  - Perform BMAT or MOVE assessment daily    - Set and communicate daily mobility goal to care team and patient/family/caregiver  - Collaborate with rehabilitation services on mobility goals if consulted  - Perform Range of Motion  times a day  - Reposition patient every  hours    - Dangle patient  times a day  - Stand patient  times a day  - Ambulate patient  times a day  - Out of bed to chair  times a day   - Out of bed for meals times a day  - Out of bed for toileting  - Record patient progress and toleration of activity level   Outcome: Progressing     Problem: PAIN - ADULT  Goal: Verbalizes/displays adequate comfort level or baseline comfort level  Description: Interventions:  - Encourage patient to monitor pain and request assistance  - Assess pain using appropriate pain scale  - Administer analgesics based on type and severity of pain and evaluate response  - Implement non-pharmacological measures as appropriate and evaluate response  - Consider cultural and social influences on pain and pain management  - Notify physician/advanced practitioner if interventions unsuccessful or patient reports new pain  Outcome: Progressing     Problem: INFECTION - ADULT  Goal: Absence or prevention of progression during hospitalization  Description: INTERVENTIONS:  - Assess and monitor for signs and symptoms of infection  - Monitor lab/diagnostic results  - Monitor all insertion sites, i e  indwelling lines, tubes, and drains  - Monitor endotracheal if appropriate and nasal secretions for changes in amount and color  - [place] appropriate cooling/warming therapies per order  - Administer medications as ordered  - Instruct and encourage patient and family to use good hand hygiene technique  - Identify and instruct in appropriate isolation precautions for identified infection/condition  Outcome: Progressing  Goal: Absence of fever/infection during neutropenic period  Description: INTERVENTIONS:  - Monitor WBC    Outcome: Progressing     Problem: SAFETY ADULT  Goal: Maintain or return to baseline ADL function  Description: INTERVENTIONS:  -  Assess patient's ability to carry out ADLs; assess patient's baseline for ADL function and identify physical deficits which impact ability to perform ADLs (bathing, care of mouth/teeth, toileting, grooming, dressing, etc )  - Assess/evaluate cause of self-care deficits   - Assess range of motion  - Assess patient's mobility; develop plan if impaired  - Assess patient's need for assistive devices and provide as appropriate  - Encourage maximum independence but intervene and supervise when necessary  - Involve family in performance of ADLs  - Assess for home care needs following discharge   - Consider OT consult to assist with ADL evaluation and planning for discharge  - Provide patient education as appropriate  Outcome: Progressing  Goal: Maintains/Returns to pre admission functional level  Description: INTERVENTIONS:  - Perform BMAT or MOVE assessment daily    - Set and communicate daily mobility goal to care team and patient/family/caregiver  - Collaborate with rehabilitation services on mobility goals if consulted  - Perform Range of Motion  times a day  - Reposition patient every  hours    - Dangle patient  times a day  - Stand patient  times a day  - Ambulate patient  times a day  - Out of bed to chair  times a day   - Out of bed for meals times a day  - Out of bed for toileting  - Record patient progress and toleration of activity level   Outcome: Progressing  Goal: Patient will remain free of falls  Description: INTERVENTIONS:  - Educate patient/family on patient safety including physical limitations  - Instruct patient to call for assistance with activity   - Consult OT/PT to assist with strengthening/mobility   - Keep Call bell within reach  - Keep bed low and locked with side rails adjusted as appropriate  - Keep care items and personal belongings within reach  - Initiate and maintain comfort rounds  - Make Fall Risk Sign visible to staff  - Offer Toileting every  Hours, in advance of need  - Initiate/Maintain alarm  - Obtain necessary fall risk management equipment:   - Apply yellow socks and bracelet for high fall risk patients  - Consider moving patient to room near nurses station  Outcome: Progressing     Problem: DISCHARGE PLANNING  Goal: Discharge to home or other facility with appropriate resources  Description: INTERVENTIONS:  - Identify barriers to discharge w/patient and caregiver  - Arrange for needed discharge resources and transportation as appropriate  - Identify discharge learning needs (meds, wound care, etc )  - Arrange for interpretive services to assist at discharge as needed  - Refer to Case Management Department for coordinating discharge planning if the patient needs post-hospital services based on physician/advanced practitioner order or complex needs related to functional status, cognitive ability, or social support system  Outcome: Progressing Problem: Knowledge Deficit  Goal: Patient/family/caregiver demonstrates understanding of disease process, treatment plan, medications, and discharge instructions  Description: Complete learning assessment and assess knowledge base    Interventions:  - Provide teaching at level of understanding  - Provide teaching via preferred learning methods  Outcome: Progressing

## 2023-06-22 NOTE — ED PROVIDER NOTES
Emergency Department Trauma Note  Jessi Grimaldo 79 y o  female MRN: [id number]  Unit/Bed#: /-01 Encounter: [id number]      Trauma Alert: Trauma Acuity: Trauma Evaluation  Model of Arrival: Mode of Arrival: BLS via    Trauma Team: Current Providers  Attending Provider: Matilde Quintero DO  Attending Provider: Fannie Barrera MD  Attending Provider: Laurie Tiwari MD  Attending Provider: Fannie Barrera MD  Attending Provider: Laurie Tiwari MD  Registered Nurse: Reggie Bermudez RN  Patient Care Assistant: Shivani Robertson  Registered Nurse: Johanne Menjivar RN  Consultants:Orthopedics, Myesha Chavez       History of Present Illness     Chief Complaint:   Chief Complaint   Patient presents with   • Hip Pain     Patient presents to the ED with c/o left hip pain, states tried to roll for a blanket and feels as though she may have injured her hip, recent right hip replacement and recent d/c from rehab facility      HPI:  Jessi Grimaldo is a 79 y o  female on chronic anticoagulation due to atrial fibrillation, COPD, hypertension was brought in by ambulance due to fall  Patient was discharged from a rehab facility a few days ago  She tried to walk without her walker which resulted in falling onto the left hip  She tried to get up but was unable to do so and stayed on her knees for approximately 2 hours until her son was able to lift her up  She also complains of some shortness of breath, but does not state that it is significantly worse than her baseline     Mechanism:Details of Incident: patient rolled and injured hip, states also fell to knees and onto buttocks Injury Date: 06/20/23        HPI  Review of Systems    Historical Information     Immunizations:   Immunization History   Administered Date(s) Administered   • COVID-19 PFIZER VACCINE 0 3 ML IM 03/18/2021, 04/09/2021   • INFLUENZA 12/14/2004, 10/17/2018, 10/15/2020, 09/24/2021   • Influenza Split High Dose Preservative Free IM 09/17/2015, 12/19/2016, 10/23/2017, 09/01/2022   • Influenza, high dose seasonal 0 7 mL 10/16/2019   • Influenza, seasonal, injectable 11/01/2011, 11/16/2012, 10/25/2013   • Pneumococcal Conjugate 13-Valent 03/17/2016   • Pneumococcal Polysaccharide PPV23 12/23/2005       Past Medical History:   Diagnosis Date   • Anti-phospholipid syndrome (HCC)    • Asthma    • Blood type O+    • Cardiac disease    • Chronic pain    • COPD (chronic obstructive pulmonary disease) (HCC)    • Fracture of ankle     left   • Hyperlipidemia    • Hypertension    • Hypokalemia 2/27/2023   • Osteoporosis        Family History   Problem Relation Age of Onset   • Hypertension Mother    • Skin cancer Mother    • Hypertension Father      Past Surgical History:   Procedure Laterality Date   • APPENDECTOMY     • BILATERAL OOPHORECTOMY Bilateral    • ORIF TIBIAL SHAFT FRACTURE W/ PLATES AND SCREWS Right      Social History     Tobacco Use   • Smoking status: Former     Types: Cigarettes   • Smokeless tobacco: Never   • Tobacco comments:     Never smoker per [employer]   Vaping Use   • Vaping Use: Never used   Substance Use Topics   • Alcohol use: Not Currently   • Drug use: No     E-Cigarette/Vaping   • E-Cigarette Use Never User      E-Cigarette/Vaping Substances   • Nicotine No    • THC No        Family History: non-contributory    Meds/Allergies   Prior to Admission Medications   Prescriptions Last Dose Informant Patient Reported? Taking?    Advair Diskus 250-50 MCG/DOSE inhaler 6/21/2023  No Yes   Sig: USE 1 INHALATION TWICE A DAY   Ascorbic Acid (VITAMIN C) 1000 MG tablet 6/21/2023  Yes Yes   Sig: Take 1,000 mg by mouth daily   Cholecalciferol (VITAMIN D3) 20 MCG (800 UNIT) TABS 6/21/2023  Yes Yes   Sig: Take by mouth in the morning   Ferrous Sulfate 220 (44 Fe) MG/5ML LIQD 6/21/2023  No Yes   Sig: take 1 teaspoonful by mouth once daily   acetaminophen (TYLENOL) 325 mg tablet   No No   Sig: Take 3 tablets (975 mg total) by mouth every 8 (eight) hours   albuterol (2 5 mg/3 mL) 0 083 % nebulizer solution   No No   Sig: USE 1 VIAL IN NEBULIZER EVERY 4 TO 6 HOURS AS NEEDED FOR COUGH AND WHEEZE   digoxin (LANOXIN) 0 125 mg tablet 6/21/2023  No Yes   Sig: Take 1 tablet (125 mcg total) by mouth every other day Do not start before May 10, 2023  diltiazem (CARDIZEM CD) 240 mg 24 hr capsule 6/21/2023  No Yes   Sig: take 1 capsule by mouth once daily for blood pressure   furosemide (LASIX) 20 mg tablet 6/21/2023  No Yes   Sig: Take 1 tablet (20 mg total) by mouth daily Do not start before May 26, 2023  levothyroxine 25 mcg tablet 6/22/2023  No Yes   Sig: TAKE 1 TABLET DAILY   lidocaine (LIDODERM) 5 %   No No   Sig: Apply 1 patch topically over 12 hours daily Remove & Discard patch within 12 hours or as directed by MD   loratadine (CLARITIN) 10 mg tablet  Self Yes No   Sig: Take 10 mg by mouth daily   losartan (COZAAR) 25 mg tablet 6/21/2023  No Yes   Sig: Take 1 tablet (25 mg total) by mouth daily Do not start before February 25, 2023     mirtazapine (REMERON) 7 5 MG tablet 6/21/2023  No Yes   Sig: Take 1 tablet (7 5 mg total) by mouth daily at bedtime   multivitamin [facility]) TABS 6/21/2023 Self Yes Yes   Sig: Take 1 tablet by mouth daily   senna-docusate sodium (SENOKOT S) 8 6-50 mg per tablet   No No   Sig: Take 1 tablet by mouth daily at bedtime   sertraline (Zoloft) 50 mg tablet 6/21/2023  No Yes   Sig: Take 1 tablet (50 mg total) by mouth daily   tiZANidine (ZANAFLEX) 2 mg tablet   No No   Sig: Take 1 tablet (2 mg total) by mouth 2 (two) times a day as needed for muscle spasms   umeclidinium (Incruse Ellipta) 62 5 mcg/actuation AEPB inhaler Unknown  Yes No   Sig: Inhale 1 puff daily   warfarin (COUMADIN) 2 mg tablet 6/21/2023  No Yes   Sig: Take 2 mg daily, adjust to keep INR therapeutic, between 2-3      Facility-Administered Medications: None       Allergies   Allergen Reactions   • Aspirin Other (See Comments)   • Penicillins Rash and Hives   • Sulfa Antibiotics Rash       PHYSICAL EXAM    PE limited by: N/A    Objective   Vitals:   First set: Temperature: 98 8 °F (37 1 °C) (06/22/23 1104)  Pulse: 103 (06/22/23 1058)  Respirations: (!) 24 (06/22/23 1058)  Blood Pressure: 139/62 (06/22/23 1101)  SpO2: (!) 73 % (placed on oxygen) (06/22/23 1058)    Primary Survey:   (A) Airway: intact  (B) Breathing: CTABL  (C) Circulation: Pulses:   normal  (D) Disabliity:  GCS Total:  15  (E) Expose:  Completed    Secondary Survey: (Click on Physical Exam tab above)  Physical Exam    Cervical spine cleared by clinical criteria?  No (imaging required)      Invasive Devices     Peripheral Intravenous Line  Duration           Peripheral IV 06/22/23 Left;Proximal;Ventral (anterior) Forearm <1 day    Peripheral IV 06/22/23 Proximal;Right;Ventral (anterior) Forearm <1 day                Lab Results:   Results Reviewed     Procedure Component Value Units Date/Time    HS Troponin I 4hr [[id number]]  (Abnormal) Collected: 06/22/23 1634    Lab Status: Final result Specimen: Blood Updated: 06/22/23 1701     hs TnI 4hr 236 ng/L      Delta 4hr hsTnI 123 ng/L     Urine Microscopic [[id number]]  (Normal) Collected: 06/22/23 1437    Lab Status: Final result Specimen: Urine, Clean Catch Updated: 06/22/23 1528     RBC, UA 0-1 /hpf      WBC, UA 0-1 /hpf      Epithelial Cells None Seen /hpf      Bacteria, UA Occasional /hpf     UA w Reflex to Microscopic w Reflex to Culture [[id number]]  (Abnormal) Collected: 06/22/23 1437    Lab Status: Final result Specimen: Urine, Clean Catch Updated: 06/22/23 1527     Color, UA Yellow     Clarity, UA Clear     Specific Gravity, UA <1 005     pH, UA 8 0     Leukocytes, UA Negative     Nitrite, UA Negative     Protein, UA Trace mg/dl      Glucose, UA Negative mg/dl      Ketones, UA 10 (1+) mg/dl      Urobilinogen, UA <2 0 mg/dl      Bilirubin, UA Negative     Occult Blood, UA Negative    HS Troponin I 2hr [[id number]]  (Abnormal) Collected: 06/22/23 1351    Lab Status: Final result Specimen: Blood from Arm, Right Updated: 06/22/23 1433     hs TnI 2hr 202 ng/L      Delta 2hr hsTnI 89 ng/L     Protime-INR [[id number]]  (Abnormal) Collected: 06/22/23 1224    Lab Status: Final result Specimen: Blood from Arm, Right Updated: 06/22/23 1259     Protime 25 5 seconds      INR 2 19    APTT [[id number]]  (Abnormal) Collected: 06/22/23 1224    Lab Status: Final result Specimen: Blood from Arm, Right Updated: 06/22/23 1259     PTT 40 seconds     Procalcitonin [[id number]]  (Normal) Collected: 06/22/23 1136    Lab Status: Final result Specimen: Blood from Arm, Left Updated: 06/22/23 1233     Procalcitonin 0 20 ng/ml     HS Troponin 0hr (reflex protocol) [[id number]]  (Abnormal) Collected: 06/22/23 1105    Lab Status: Final result Specimen: Blood from Arm, Right Updated: 06/22/23 1211     hs TnI 0hr 113 ng/L     Comprehensive metabolic panel [[id number]]  (Abnormal) Collected: 06/22/23 1105    Lab Status: Final result Specimen: Blood from Arm, Right Updated: 06/22/23 1205     Sodium 136 mmol/L      Potassium 6 0 mmol/L      Chloride 100 mmol/L      CO2 31 mmol/L      ANION GAP 5 mmol/L      BUN 21 mg/dL      Creatinine 0 60 mg/dL      Glucose 112 mg/dL      Calcium 9 0 mg/dL      AST 28 U/L      ALT 18 U/L      Alkaline Phosphatase 163 U/L      Total Protein 6 7 g/dL      Albumin 3 5 g/dL      Total Bilirubin 0 40 mg/dL      eGFR 83 ml/min/1 73sq m     Narrative:      [place] guidelines for Chronic Kidney Disease (CKD):   •  Stage 1 with normal or high GFR (GFR > 90 mL/min/1 73 square meters)  •  Stage 2 Mild CKD (GFR = 60-89 mL/min/1 73 square meters)  •  Stage 3A Moderate CKD (GFR = 45-59 mL/min/1 73 square meters)  •  Stage 3B Moderate CKD (GFR = 30-44 mL/min/1 73 square meters)  •  Stage 4 Severe CKD (GFR = 15-29 mL/min/1 73 square meters)  •  Stage 5 End Stage CKD (GFR <15 mL/min/1 73 square meters)  Note: GFR calculation is accurate only with a steady state creatinine    CK [[id number]]  (Normal) Collected: 06/22/23 1105    Lab Status: Final result Specimen: Blood from Arm, Right Updated: 06/22/23 1205     Total CK 73 U/L     Lactic acid, plasma (w/reflex if result > 2 0) [[id number]]  (Normal) Collected: 06/22/23 1136    Lab Status: Final result Specimen: Blood from Arm, Left Updated: 06/22/23 1205     LACTIC ACID 1 7 mmol/L     Narrative:      Result may be elevated if tourniquet was used during collection  CBC and differential [[id number]]  (Abnormal) Collected: 06/22/23 1105    Lab Status: Final result Specimen: Blood from Arm, Right Updated: 06/22/23 1146     WBC 11 67 Thousand/uL      RBC 3 39 Million/uL      Hemoglobin 10 8 g/dL      Hematocrit 34 2 %       fL      MCH 31 9 pg      MCHC 31 6 g/dL      RDW 13 9 %      MPV 8 5 fL      Platelets 736 Thousands/uL      nRBC 0 /100 WBCs      Neutrophils Relative 89 %      Immat GRANS % 1 %      Lymphocytes Relative 4 %      Monocytes Relative 6 %      Eosinophils Relative 0 %      Basophils Relative 0 %      Neutrophils Absolute 10 35 Thousands/µL      Immature Grans Absolute 0 09 Thousand/uL      Lymphocytes Absolute 0 49 Thousands/µL      Monocytes Absolute 0 70 Thousand/µL      Eosinophils Absolute 0 01 Thousand/µL      Basophils Absolute 0 03 Thousands/µL     Blood culture #2 [[id number]] Collected: 06/22/23 1136    Lab Status: In process Specimen: Blood from Arm, Left Updated: 06/22/23 1141    Blood culture #1 [[id number]] Collected: 06/22/23 1114    Lab Status: In process Specimen: Blood from Arm, Left Updated: 06/22/23 1122                 Imaging Studies:   Direct to CT: Yes  TRAUMA - CT head wo contrast   Final Result by Dinora Lepe MD (06/22 1222)      No acute intracranial abnormality  Workstation performed: [id number]         TRAUMA - CT spine cervical wo contrast   Final Result by Dinora Lepe MD (06/22 1250)      No cervical spine fracture or traumatic malalignment  Workstation performed: [id number]         TRAUMA - CT chest abdomen pelvis w contrast   Final Result by Evelyne Paez MD (06/22 1232)      Displaced left subcapital fracture  Bilateral sacral insufficiency fractures  Chronic right pubic symphysis fracture  Subacute left 11th rib fracture  Workstation performed: [id number]         XR Trauma chest portable   Final Result by Evelyne Paez MD (06/22 1253)      No acute cardiopulmonary disease  Workstation performed: [id number]         XR Trauma pelvis ap only 1 or 2 vw   ED Interpretation by Anita Keyes DO (06/22 1204)   Abnormal   Acute hip fracture as interpreted by me independently       Final Result by Evelyne Paez MD (06/22 1302)      Displaced left subcapital fracture        Workstation performed: [id number]               Procedures  ECG 12 Lead Documentation Only    Date/Time: 6/22/2023 2:43 PM    Performed by: Anita Keyes DO  Authorized by: Anita Keyes DO    Indications / Diagnosis:  Sob  ECG reviewed by me, the ED Provider: yes    Patient location:  ED  Previous ECG:     Previous ECG:  Compared to current    Comparison ECG info:  5/4/2023    Similarity:  No change  Interpretation:     Interpretation: normal    Rate:     ECG rate:  101    ECG rate assessment: tachycardic    Rhythm:     Rhythm: sinus tachycardia    Ectopy:     Ectopy: none    QRS:     QRS axis:  Normal    QRS intervals:  Normal  Conduction:     Conduction: normal    ST segments:     ST segments:  Normal  T waves:     T waves: normal    Comments:      qtc [id number]    CriticalCare Time    Date/Time: 6/22/2023 5:20 PM    Performed by: Anita Keyes DO  Authorized by: Anita Keyes DO    Critical care provider statement:     Critical care time (minutes):  30    Critical care start time:  6/22/2023 11:02 AM    Critical care end time:  6/22/2023 12:30 PM    Critical care time was exclusive of:  Separately billable procedures and treating other patients and teaching time    Critical care was necessary to treat or prevent imminent or life-threatening deterioration of the following conditions:  Respiratory failure    Critical care was time spent personally by me on the following activities:  Blood draw for specimens, obtaining history from patient or surrogate, development of treatment plan with patient or surrogate, discussions with consultants, discussions with primary provider, evaluation of patient's response to treatment, examination of patient, review of old charts, re-evaluation of patient's condition, ordering and review of radiographic studies, ordering and review of laboratory studies and ordering and performing treatments and interventions    I assumed direction of critical care for this patient from another provider in my specialty: no               ED Course  ED Course as of 06/22/23 1717   Thu Jun 22, 2023   1204 TT orthopedics, Christina Ricci to make aware of hip fracture  1205 Potassium(!): 6 0  hemolyzed   1206 Total CK: 73   1217 hs TnI 0hr(!): 113   1310 POCT INR(!): 2 19   1355 Patient's troponin is elevated at 113 today  Ekg unchanged  No CP  Could be from increased WOB and hypoxia related to COPD exacerbation, but PE is on the differential  Patient got a significant dye load with the trauma scans earlier, so cannot get CTA pe study at this time  PE likelihood low as patient chronically anticoagualted, no calf pain BL, no h/o DVT/PE, and clinically appears to have COPD exacerbation  Concern is the elevated troponin  INR is > 2, so she is anticoagulated presently and does not need heparin drip  Discussed this with Dr Rajani Whiting, hospitalist             Medical Decision Making  Assessment and plan:  Differential includes traumatic injury plus COPD exacerbation versus CHF versus pneumonia versus PE versus ACS      CT scan of the head to rule out intracranial hemorrhage, CT cervical spine to rule out fracture, CT scan chest/abdomen/pelvis to assess for blunt internal injury as; chest x-ray to rule out pneumothorax; pelvis x-ray to evaluate for hip fracture/dislocation; urinalysis to evaluate for urinary tract infection; labs to evaluate for electrolyte abnormalities, anemia, leukocytosis, kidney and liver function; EKG/troponin to evaluate for arrhythmia/ischemia  On exam, the patient is hypoxic to the low 70s on room air, has wheezing bilaterally  Patient placed on supplemental oxygen via nasal cannula and DuoNebs/steroids ordered  Amount and/or Complexity of Data Reviewed  Labs: ordered  Decision-making details documented in ED Course  Radiology: ordered and independent interpretation performed  Risk  OTC drugs  Prescription drug management  Decision regarding hospitalization  Disposition  [facility] Transfer: No  Final diagnoses:   COPD with acute exacerbation ([place] [address] )   Closed fracture of left hip, initial encounter ([place] [address] )   Acute respiratory failure with hypoxia (HCC)   Elevated troponin     Time reflects when diagnosis was documented in both MDM as applicable and the Disposition within this note     Time User Action Codes Description Comment    6/22/2023 12:54 PM Beatriz Franklin Add [J44 1] COPD with acute exacerbation ([place] [address] )     6/22/2023 12:54 PM Beatriz Franklin Add [S72 002A] Closed fracture of left hip, initial encounter ([place] [address] )     6/22/2023 12:55 PM Beatriz Franklin Add [J96 01] Acute respiratory failure with hypoxia ([place] [address] )     6/22/2023 12:55 PM Beatriz Franklin Add [R77 8] Elevated troponin       ED Disposition     ED Disposition   Admit    Condition   Stable    Date/Time   Thu Jun 22, 2023 12:54 PM    Comment   Case was discussed with hospitalist and the patient's admission status was agreed to be Admission Status: inpatient status to the service of Dr Cheikh Christine              Follow-up Information    None       Current Discharge Medication List      CONTINUE these medications which have NOT CHANGED    Details   Advair Diskus 250-50 MCG/DOSE inhaler USE 1 INHALATION TWICE A DAY  Qty: 180 blister, Refills: 3    Associated Diagnoses: Chronic obstructive pulmonary disease, unspecified COPD type ([facility])      Ascorbic Acid (VITAMIN C) 1000 MG tablet Take 1,000 mg by mouth daily      Cholecalciferol (VITAMIN D3) 20 MCG (800 UNIT) TABS Take by mouth in the morning      digoxin (LANOXIN) 0 125 mg tablet Take 1 tablet (125 mcg total) by mouth every other day Do not start before May 10, 2023  Refills: 0    Associated Diagnoses: Atrial fibrillation, unspecified type ([facility])      diltiazem (CARDIZEM CD) 240 mg 24 hr capsule take 1 capsule by mouth once daily for blood pressure  Qty: 30 capsule, Refills: 0    Associated Diagnoses: Atrial fibrillation, unspecified type ([address] ); Primary hypertension      Ferrous Sulfate 220 (44 Fe) MG/5ML LIQD take 1 teaspoonful by mouth once daily  Qty: 150 mL, Refills: 5    Associated Diagnoses: Anemia, unspecified type      furosemide (LASIX) 20 mg tablet Take 1 tablet (20 mg total) by mouth daily Do not start before May 26, 2023  Refills: 0    Associated Diagnoses: Atrial fibrillation, unspecified type ([facility])      levothyroxine 25 mcg tablet TAKE 1 TABLET DAILY  Qty: 90 tablet, Refills: 3    Associated Diagnoses: Hypothyroidism, unspecified type      losartan (COZAAR) 25 mg tablet Take 1 tablet (25 mg total) by mouth daily Do not start before February 25, 2023    Qty: 30 tablet, Refills: 0    Associated Diagnoses: Primary hypertension      mirtazapine (REMERON) 7 5 MG tablet Take 1 tablet (7 5 mg total) by mouth daily at bedtime  Qty: 90 tablet, Refills: 3    Associated Diagnoses: Adult failure to thrive      multivitamin (THERAGRAN) TABS Take 1 tablet by mouth daily      sertraline (Zoloft) 50 mg tablet Take 1 tablet (50 mg total) by mouth daily  Qty: 90 tablet, Refills: 3    Associated Diagnoses: Anxiety      warfarin (COUMADIN) 2 mg tablet Take 2 mg daily, adjust to keep INR therapeutic, between 2-3  Refills: 0    Associated Diagnoses: Chronic atrial fibrillation (HCC)      acetaminophen (TYLENOL) 325 mg tablet Take 3 tablets (975 mg total) by mouth every 8 (eight) hours  Refills: 0    Associated Diagnoses: Low back pain without sciatica, unspecified back pain laterality, unspecified chronicity      albuterol (2 5 mg/3 mL) 0 083 % nebulizer solution USE 1 VIAL IN NEBULIZER EVERY 4 TO 6 HOURS AS NEEDED FOR COUGH AND WHEEZE  Qty: 75 vial, Refills: 3    Associated Diagnoses: Moderate asthma, unspecified whether complicated, unspecified whether persistent      lidocaine (LIDODERM) 5 % Apply 1 patch topically over 12 hours daily Remove & Discard patch within 12 hours or as directed by MD  Qty: 90 patch, Refills: 3    Associated Diagnoses: Osteoporosis, unspecified osteoporosis type, unspecified pathological fracture presence; Low back pain without sciatica, unspecified back pain laterality, unspecified chronicity      loratadine (CLARITIN) 10 mg tablet Take 10 mg by mouth daily      senna-docusate sodium (SENOKOT S) 8 6-50 mg per tablet Take 1 tablet by mouth daily at bedtime  Refills: 0    Associated Diagnoses: Constipation, unspecified constipation type      tiZANidine (ZANAFLEX) 2 mg tablet Take 1 tablet (2 mg total) by mouth 2 (two) times a day as needed for muscle spasms  Qty: 30 tablet, Refills: 0    Associated Diagnoses: Low back pain without sciatica, unspecified back pain laterality, unspecified chronicity      umeclidinium (Incruse Ellipta) 62 5 mcg/actuation AEPB inhaler Inhale 1 puff daily           No discharge procedures on file      PDMP Review       Value Time User    PDMP Reviewed  Yes 5/25/2023  9:50 AM Berl Cowden, MD          ED Provider  Electronically Signed by         Melanie Adhikari DO  06/22/23 6247

## 2023-06-22 NOTE — PROGRESS NOTES
Outreach TC to patient for CM assessment  No answer to call  Left message on voicemail requesting a return call from patient to Rossana Link 12, BSN; Outpatient Care Manager @ [phone]  First unanswered call to patient  Chart review completed  Patient is being followed under [facility] episode so BMP was ordered for [facility] to complete  In-basket messgae sent to [facility] and also PCP

## 2023-06-22 NOTE — ASSESSMENT & PLAN NOTE
Noted to be hypoxic, SOB on admission  Wean O2 as able, currently satting in mid 90s on 3-4L NC  Continue IV steroids, nebs

## 2023-06-22 NOTE — ASSESSMENT & PLAN NOTE
Elevated troponin, suspect likely due to fall and rib fracture  Currently without chest pains  EKG without changes  Complete trend trops, monitor on tele

## 2023-06-22 NOTE — PROGRESS NOTES
Incoming in-basket message regarding a patient on [facility]/S[facility] pathway that was re-admitted to the hospital   Patient presented to the ED at [place] [address] after a fall at home  Patient lay on the floor for 2 hours  Oxygen saturation in the ED was 73% L subcapital fracture noted and 11th rib fracture noted as well as chronic R pubic symphysis fracture noted   Chart review will continue to assess progress toward D/C and ELIANA

## 2023-06-22 NOTE — RESPIRATORY THERAPY NOTE
RT Protocol Note  Ricardo Biswas 79 y o  female MRN: [id number]  Unit/Bed#: -01 Encounter: [id number]    Assessment    Principal Problem:    Fall  Active Problems:    COPD exacerbation ([address] )    Atrial fibrillation ([address] )    Essential hypertension    Elevated troponin      Home Pulmonary Medications:  Nebulized Albuterol as needed     Home Devices/Therapy: (P) Other (Comment)    Past Medical History:   Diagnosis Date    Anti-phospholipid syndrome (HCC)     Asthma     Blood type O+     Cardiac disease     Chronic pain     COPD (chronic obstructive pulmonary disease) (HCC)     Fracture of ankle     left    Hyperlipidemia     Hypertension     Hypokalemia 2/27/2023    Osteoporosis      Social History     Socioeconomic History    Marital status:      Spouse name: None    Number of children: None    Years of education: None    Highest education level: None   Occupational History    None   Tobacco Use    Smoking status: Former     Types: Cigarettes    Smokeless tobacco: Never    Tobacco comments:     Never smoker per [employer]   Vaping Use    Vaping Use: Never used   Substance and Sexual Activity    Alcohol use: Not Currently    Drug use: No    Sexual activity: Not Currently   Other Topics Concern    None   Social History Narrative    None     Social Determinants of Health     Financial Resource Strain: Low Risk  (4/11/2023)    Overall Financial Resource Strain (CARDIA)     Difficulty of Paying Living Expenses: Not very hard   Food Insecurity: No Food Insecurity (5/18/2023)    Hunger Vital Sign     Worried About Running Out of Food in the Last Year: Never true     Ran Out of Food in the Last Year: Never true   Transportation Needs: No Transportation Needs (5/18/2023)    PRAPARE - Transportation     Lack of Transportation (Medical): No     Lack of Transportation (Non-Medical):  No   Physical Activity: Not on file   Stress: Not on file   Social Connections: Not on file   Intimate Partner Violence: Not on file Housing Stability: Low Risk  (5/18/2023)    Housing Stability Vital Sign     Unable to Pay for Housing in the Last Year: No     Number of Places Lived in the Last Year: 1     Unstable Housing in the Last Year: No       Subjective         Objective    Physical Exam:   Assessment Type: (P) Pre-treatment  General Appearance: (P) Alert, Awake  Respiratory Pattern: (P) Normal  Chest Assessment: (P) Chest expansion symmetrical  R Breath Sounds: (P) Diminished  L Breath Sounds: (P) Diminished  Location Specific: (P) No  Cough: (P) Congested, Strong    Vitals:  Blood pressure 155/80, pulse 101, temperature 98 8 °F (37 1 °C), temperature source Oral, resp  rate 18, height 5' (1 524 m), weight 50 5 kg (111 lb 5 3 oz), SpO2 96 %, not currently breastfeeding                    Plan    Respiratory Plan: (P) Home Bronchodilator Patient pathway  Airway Clearance Plan: (P) Flutter

## 2023-06-22 NOTE — ASSESSMENT & PLAN NOTE
Hx of Pafib, on coumadin for anticoagulation  Continue digoxin, cardizem  Hold coumadin with possible surgery with ortho tmr

## 2023-06-22 NOTE — ASSESSMENT & PLAN NOTE
79year old female with PMH of COPD, pAfib, HTN and osteoporosis presented with fall  CT imaging showed new displaced left subcapital femur fracture, left 11th rib fracture  Ortho consulted for surgery  S/p TXA in ED  Hold coumadin  NPO midnight  Pain meds  Will need PT and OT evaluation post surgery

## 2023-06-23 ENCOUNTER — APPOINTMENT (INPATIENT)
Dept: RADIOLOGY | Facility: HOSPITAL | Age: 83
DRG: 521 | End: 2023-06-23
Payer: MEDICARE

## 2023-06-23 ENCOUNTER — TELEPHONE (OUTPATIENT)
Dept: LAB | Facility: HOSPITAL | Age: 83
End: 2023-06-23

## 2023-06-23 ENCOUNTER — HOME CARE VISIT (OUTPATIENT)
Dept: HOME HEALTH SERVICES | Facility: HOME HEALTHCARE | Age: 83
End: 2023-06-23
Payer: MEDICARE

## 2023-06-23 ENCOUNTER — ANESTHESIA (INPATIENT)
Dept: PERIOP | Facility: HOSPITAL | Age: 83
DRG: 521 | End: 2023-06-23
Payer: MEDICARE

## 2023-06-23 ENCOUNTER — ANESTHESIA EVENT (INPATIENT)
Dept: PERIOP | Facility: HOSPITAL | Age: 83
DRG: 521 | End: 2023-06-23
Payer: MEDICARE

## 2023-06-23 LAB
ALBUMIN SERPL BCP-MCNC: 3.2 G/DL (ref 3.5–5)
ALP SERPL-CCNC: 134 U/L (ref 34–104)
ALT SERPL W P-5'-P-CCNC: 18 U/L (ref 7–52)
ANION GAP SERPL CALCULATED.3IONS-SCNC: 7 MMOL/L
AST SERPL W P-5'-P-CCNC: 15 U/L (ref 13–39)
BASOPHILS # BLD AUTO: 0.01 THOUSANDS/ÂΜL (ref 0–0.1)
BASOPHILS NFR BLD AUTO: 0 % (ref 0–1)
BILIRUB SERPL-MCNC: 0.4 MG/DL (ref 0.2–1)
BUN SERPL-MCNC: 27 MG/DL (ref 5–25)
CALCIUM ALBUM COR SERPL-MCNC: 9.4 MG/DL (ref 8.3–10.1)
CALCIUM SERPL-MCNC: 8.8 MG/DL (ref 8.4–10.2)
CHLORIDE SERPL-SCNC: 98 MMOL/L (ref 96–108)
CO2 SERPL-SCNC: 30 MMOL/L (ref 21–32)
CREAT SERPL-MCNC: 0.68 MG/DL (ref 0.6–1.3)
EOSINOPHIL # BLD AUTO: 0 THOUSAND/ÂΜL (ref 0–0.61)
EOSINOPHIL NFR BLD AUTO: 0 % (ref 0–6)
ERYTHROCYTE [DISTWIDTH] IN BLOOD BY AUTOMATED COUNT: 13.8 % (ref 11.6–15.1)
GFR SERPL CREATININE-BSD FRML MDRD: 80 ML/MIN/1.73SQ M
GLUCOSE SERPL-MCNC: 203 MG/DL (ref 65–140)
HCT VFR BLD AUTO: 34.1 % (ref 34.8–46.1)
HGB BLD-MCNC: 11 G/DL (ref 11.5–15.4)
IMM GRANULOCYTES # BLD AUTO: 0.05 THOUSAND/UL (ref 0–0.2)
IMM GRANULOCYTES NFR BLD AUTO: 1 % (ref 0–2)
INR PPP: 1.21 (ref 0.84–1.19)
LYMPHOCYTES # BLD AUTO: 0.47 THOUSANDS/ÂΜL (ref 0.6–4.47)
LYMPHOCYTES NFR BLD AUTO: 5 % (ref 14–44)
MCH RBC QN AUTO: 32.1 PG (ref 26.8–34.3)
MCHC RBC AUTO-ENTMCNC: 32.3 G/DL (ref 31.4–37.4)
MCV RBC AUTO: 99 FL (ref 82–98)
MONOCYTES # BLD AUTO: 0.42 THOUSAND/ÂΜL (ref 0.17–1.22)
MONOCYTES NFR BLD AUTO: 5 % (ref 4–12)
NEUTROPHILS # BLD AUTO: 8.01 THOUSANDS/ÂΜL (ref 1.85–7.62)
NEUTS SEG NFR BLD AUTO: 89 % (ref 43–75)
NRBC BLD AUTO-RTO: 0 /100 WBCS
PLATELET # BLD AUTO: 244 THOUSANDS/UL (ref 149–390)
PMV BLD AUTO: 8.5 FL (ref 8.9–12.7)
POTASSIUM SERPL-SCNC: 3.7 MMOL/L (ref 3.5–5.3)
PROCALCITONIN SERPL-MCNC: 0.42 NG/ML
PROT SERPL-MCNC: 6.1 G/DL (ref 6.4–8.4)
PROTHROMBIN TIME: 16.1 SECONDS (ref 11.6–14.5)
RBC # BLD AUTO: 3.43 MILLION/UL (ref 3.81–5.12)
SODIUM SERPL-SCNC: 135 MMOL/L (ref 135–147)
WBC # BLD AUTO: 8.96 THOUSAND/UL (ref 4.31–10.16)

## 2023-06-23 PROCEDURE — C1776 JOINT DEVICE (IMPLANTABLE): HCPCS | Performed by: ORTHOPAEDIC SURGERY

## 2023-06-23 PROCEDURE — 85610 PROTHROMBIN TIME: CPT | Performed by: STUDENT IN AN ORGANIZED HEALTH CARE EDUCATION/TRAINING PROGRAM

## 2023-06-23 PROCEDURE — 88305 TISSUE EXAM BY PATHOLOGIST: CPT | Performed by: PATHOLOGY

## 2023-06-23 PROCEDURE — 94640 AIRWAY INHALATION TREATMENT: CPT

## 2023-06-23 PROCEDURE — C1713 ANCHOR/SCREW BN/BN,TIS/BN: HCPCS | Performed by: ORTHOPAEDIC SURGERY

## 2023-06-23 PROCEDURE — 80053 COMPREHEN METABOLIC PANEL: CPT | Performed by: STUDENT IN AN ORGANIZED HEALTH CARE EDUCATION/TRAINING PROGRAM

## 2023-06-23 PROCEDURE — 0SRS0J9 REPLACEMENT OF LEFT HIP JOINT, FEMORAL SURFACE WITH SYNTHETIC SUBSTITUTE, CEMENTED, OPEN APPROACH: ICD-10-PCS | Performed by: ORTHOPAEDIC SURGERY

## 2023-06-23 PROCEDURE — 85025 COMPLETE CBC W/AUTO DIFF WBC: CPT | Performed by: STUDENT IN AN ORGANIZED HEALTH CARE EDUCATION/TRAINING PROGRAM

## 2023-06-23 PROCEDURE — 84145 PROCALCITONIN (PCT): CPT | Performed by: STUDENT IN AN ORGANIZED HEALTH CARE EDUCATION/TRAINING PROGRAM

## 2023-06-23 PROCEDURE — 94760 N-INVAS EAR/PLS OXIMETRY 1: CPT

## 2023-06-23 PROCEDURE — 99232 SBSQ HOSP IP/OBS MODERATE 35: CPT | Performed by: STUDENT IN AN ORGANIZED HEALTH CARE EDUCATION/TRAINING PROGRAM

## 2023-06-23 PROCEDURE — 99223 1ST HOSP IP/OBS HIGH 75: CPT | Performed by: ORTHOPAEDIC SURGERY

## 2023-06-23 PROCEDURE — 88311 DECALCIFY TISSUE: CPT | Performed by: PATHOLOGY

## 2023-06-23 PROCEDURE — 72170 X-RAY EXAM OF PELVIS: CPT

## 2023-06-23 DEVICE — SELF CENTERING BI-POLAR HEAD 28MM ID 48MM OD
Type: IMPLANTABLE DEVICE | Site: HIP | Status: FUNCTIONAL
Brand: SELF CENTERING

## 2023-06-23 DEVICE — SMARTSET HIGH PERFORMANCE MV MEDIUM VISCOSITY BONE CEMENT 40G
Type: IMPLANTABLE DEVICE | Site: HIP | Status: FUNCTIONAL
Brand: SMARTSET

## 2023-06-23 DEVICE — SUMMIT FEMORAL STEM 12/14 TAPER CEMENTED SIZE 5 HI 120MM
Type: IMPLANTABLE DEVICE | Site: HIP | Status: FUNCTIONAL
Brand: SUMMIT

## 2023-06-23 DEVICE — CEMENTRALIZER STEM CENTRALIZER 9.25MM CEMENTED
Type: IMPLANTABLE DEVICE | Site: HIP | Status: FUNCTIONAL
Brand: CEMENTRALIZER

## 2023-06-23 DEVICE — CEMENT RESTRICTOR SIZE 3 13.25MM: Type: IMPLANTABLE DEVICE | Site: HIP | Status: FUNCTIONAL

## 2023-06-23 DEVICE — ARTICUL/EZE FEMORAL HEAD DIAMETER 28MM +5 12/14 TAPER
Type: IMPLANTABLE DEVICE | Site: HIP | Status: FUNCTIONAL
Brand: ARTICUL/EZE

## 2023-06-23 RX ORDER — CEFAZOLIN SODIUM 2 G/50ML
2000 SOLUTION INTRAVENOUS EVERY 8 HOURS
Status: COMPLETED | OUTPATIENT
Start: 2023-06-23 | End: 2023-06-23

## 2023-06-23 RX ORDER — CHLORHEXIDINE GLUCONATE 0.12 MG/ML
15 RINSE ORAL ONCE
Status: DISCONTINUED | OUTPATIENT
Start: 2023-06-23 | End: 2023-06-23

## 2023-06-23 RX ORDER — ROCURONIUM BROMIDE 10 MG/ML
INJECTION, SOLUTION INTRAVENOUS AS NEEDED
Status: DISCONTINUED | OUTPATIENT
Start: 2023-06-23 | End: 2023-06-23

## 2023-06-23 RX ORDER — TRANEXAMIC ACID 10 MG/ML
1000 INJECTION, SOLUTION INTRAVENOUS ONCE
Status: COMPLETED | OUTPATIENT
Start: 2023-06-23 | End: 2023-06-23

## 2023-06-23 RX ORDER — SODIUM CHLORIDE, SODIUM LACTATE, POTASSIUM CHLORIDE, CALCIUM CHLORIDE 600; 310; 30; 20 MG/100ML; MG/100ML; MG/100ML; MG/100ML
75 INJECTION, SOLUTION INTRAVENOUS CONTINUOUS
Status: DISCONTINUED | OUTPATIENT
Start: 2023-06-23 | End: 2023-06-24

## 2023-06-23 RX ORDER — ONDANSETRON 2 MG/ML
INJECTION INTRAMUSCULAR; INTRAVENOUS AS NEEDED
Status: DISCONTINUED | OUTPATIENT
Start: 2023-06-23 | End: 2023-06-23

## 2023-06-23 RX ORDER — LIDOCAINE HYDROCHLORIDE 10 MG/ML
INJECTION, SOLUTION EPIDURAL; INFILTRATION; INTRACAUDAL; PERINEURAL AS NEEDED
Status: DISCONTINUED | OUTPATIENT
Start: 2023-06-23 | End: 2023-06-23

## 2023-06-23 RX ORDER — FAMOTIDINE 20 MG/1
20 TABLET, FILM COATED ORAL EVERY EVENING
Status: DISCONTINUED | OUTPATIENT
Start: 2023-06-23 | End: 2023-06-28 | Stop reason: HOSPADM

## 2023-06-23 RX ORDER — SODIUM CHLORIDE, SODIUM LACTATE, POTASSIUM CHLORIDE, CALCIUM CHLORIDE 600; 310; 30; 20 MG/100ML; MG/100ML; MG/100ML; MG/100ML
INJECTION, SOLUTION INTRAVENOUS CONTINUOUS PRN
Status: DISCONTINUED | OUTPATIENT
Start: 2023-06-23 | End: 2023-06-23

## 2023-06-23 RX ORDER — CEFAZOLIN SODIUM 2 G/50ML
SOLUTION INTRAVENOUS AS NEEDED
Status: DISCONTINUED | OUTPATIENT
Start: 2023-06-23 | End: 2023-06-23

## 2023-06-23 RX ORDER — PREDNISONE 20 MG/1
40 TABLET ORAL DAILY
Status: COMPLETED | OUTPATIENT
Start: 2023-06-24 | End: 2023-06-25

## 2023-06-23 RX ORDER — ALBUMIN, HUMAN INJ 5% 5 %
SOLUTION INTRAVENOUS CONTINUOUS PRN
Status: DISCONTINUED | OUTPATIENT
Start: 2023-06-23 | End: 2023-06-23

## 2023-06-23 RX ORDER — FENTANYL CITRATE 50 UG/ML
INJECTION, SOLUTION INTRAMUSCULAR; INTRAVENOUS AS NEEDED
Status: DISCONTINUED | OUTPATIENT
Start: 2023-06-23 | End: 2023-06-23

## 2023-06-23 RX ORDER — PROPOFOL 10 MG/ML
INJECTION, EMULSION INTRAVENOUS AS NEEDED
Status: DISCONTINUED | OUTPATIENT
Start: 2023-06-23 | End: 2023-06-23

## 2023-06-23 RX ORDER — CEFAZOLIN SODIUM 2 G/50ML
2000 SOLUTION INTRAVENOUS ONCE
Status: DISCONTINUED | OUTPATIENT
Start: 2023-06-23 | End: 2023-06-23

## 2023-06-23 RX ORDER — ALBUTEROL SULFATE 90 UG/1
AEROSOL, METERED RESPIRATORY (INHALATION) AS NEEDED
Status: DISCONTINUED | OUTPATIENT
Start: 2023-06-23 | End: 2023-06-23

## 2023-06-23 RX ORDER — TRANEXAMIC ACID 10 MG/ML
INJECTION, SOLUTION INTRAVENOUS AS NEEDED
Status: DISCONTINUED | OUTPATIENT
Start: 2023-06-23 | End: 2023-06-23

## 2023-06-23 RX ORDER — DEXAMETHASONE SODIUM PHOSPHATE 10 MG/ML
INJECTION, SOLUTION INTRAMUSCULAR; INTRAVENOUS AS NEEDED
Status: DISCONTINUED | OUTPATIENT
Start: 2023-06-23 | End: 2023-06-23

## 2023-06-23 RX ORDER — HYDROMORPHONE HCL IN WATER/PF 6 MG/30 ML
0.2 PATIENT CONTROLLED ANALGESIA SYRINGE INTRAVENOUS
Status: DISCONTINUED | OUTPATIENT
Start: 2023-06-23 | End: 2023-06-23 | Stop reason: HOSPADM

## 2023-06-23 RX ADMIN — FENTANYL CITRATE 50 MCG: 50 INJECTION INTRAMUSCULAR; INTRAVENOUS at 13:08

## 2023-06-23 RX ADMIN — SODIUM CHLORIDE, SODIUM LACTATE, POTASSIUM CHLORIDE, AND CALCIUM CHLORIDE: .6; .31; .03; .02 INJECTION, SOLUTION INTRAVENOUS at 12:02

## 2023-06-23 RX ADMIN — SODIUM CHLORIDE, SODIUM LACTATE, POTASSIUM CHLORIDE, AND CALCIUM CHLORIDE 75 ML/HR: .6; .31; .03; .02 INJECTION, SOLUTION INTRAVENOUS at 19:23

## 2023-06-23 RX ADMIN — LEVALBUTEROL HYDROCHLORIDE 1.25 MG: 1.25 SOLUTION RESPIRATORY (INHALATION) at 07:32

## 2023-06-23 RX ADMIN — LEVALBUTEROL HYDROCHLORIDE 1.25 MG: 1.25 SOLUTION RESPIRATORY (INHALATION) at 19:34

## 2023-06-23 RX ADMIN — ALBUMIN (HUMAN): 12.5 INJECTION, SOLUTION INTRAVENOUS at 14:12

## 2023-06-23 RX ADMIN — METHYLPREDNISOLONE SODIUM SUCCINATE 40 MG: 40 INJECTION, POWDER, FOR SOLUTION INTRAMUSCULAR; INTRAVENOUS at 04:03

## 2023-06-23 RX ADMIN — FENTANYL CITRATE 25 MCG: 50 INJECTION INTRAMUSCULAR; INTRAVENOUS at 13:21

## 2023-06-23 RX ADMIN — FAMOTIDINE 20 MG: 20 TABLET, FILM COATED ORAL at 17:16

## 2023-06-23 RX ADMIN — BUDESONIDE 0.5 MG: 0.5 INHALANT RESPIRATORY (INHALATION) at 19:34

## 2023-06-23 RX ADMIN — SUGAMMADEX 200 MG: 100 INJECTION, SOLUTION INTRAVENOUS at 14:36

## 2023-06-23 RX ADMIN — LOSARTAN POTASSIUM 25 MG: 25 TABLET, FILM COATED ORAL at 08:09

## 2023-06-23 RX ADMIN — DEXAMETHASONE SODIUM PHOSPHATE 10 MG: 10 INJECTION INTRAMUSCULAR; INTRAVENOUS at 12:44

## 2023-06-23 RX ADMIN — ROCURONIUM BROMIDE 30 MG: 10 INJECTION INTRAVENOUS at 12:44

## 2023-06-23 RX ADMIN — TRANEXAMIC ACID 1000 MG: 10 INJECTION, SOLUTION INTRAVENOUS at 12:38

## 2023-06-23 RX ADMIN — GUAIFENESIN 600 MG: 600 TABLET ORAL at 17:16

## 2023-06-23 RX ADMIN — PHENYLEPHRINE HYDROCHLORIDE 20 MCG/MIN: 10 INJECTION, SOLUTION INTRAVENOUS at 13:04

## 2023-06-23 RX ADMIN — ALBUTEROL SULFATE 3 PUFF: 90 AEROSOL, METERED RESPIRATORY (INHALATION) at 14:12

## 2023-06-23 RX ADMIN — LIDOCAINE HYDROCHLORIDE 50 MG: 10 INJECTION, SOLUTION EPIDURAL; INFILTRATION; INTRACAUDAL; PERINEURAL at 12:44

## 2023-06-23 RX ADMIN — LEVOTHYROXINE SODIUM 25 MCG: 25 TABLET ORAL at 04:02

## 2023-06-23 RX ADMIN — ACETAMINOPHEN 975 MG: 325 TABLET, FILM COATED ORAL at 04:02

## 2023-06-23 RX ADMIN — SERTRALINE 50 MG: 50 TABLET, FILM COATED ORAL at 08:08

## 2023-06-23 RX ADMIN — ACETAMINOPHEN 975 MG: 325 TABLET, FILM COATED ORAL at 22:15

## 2023-06-23 RX ADMIN — PROPOFOL 70 MG: 10 INJECTION, EMULSION INTRAVENOUS at 12:44

## 2023-06-23 RX ADMIN — IPRATROPIUM BROMIDE 0.5 MG: 0.5 SOLUTION RESPIRATORY (INHALATION) at 07:32

## 2023-06-23 RX ADMIN — SODIUM CHLORIDE, SODIUM LACTATE, POTASSIUM CHLORIDE, AND CALCIUM CHLORIDE 1000 ML: .6; .31; .03; .02 INJECTION, SOLUTION INTRAVENOUS at 20:41

## 2023-06-23 RX ADMIN — MIRTAZAPINE 7.5 MG: 15 TABLET, FILM COATED ORAL at 22:15

## 2023-06-23 RX ADMIN — CEFAZOLIN SODIUM 2000 MG: 2 SOLUTION INTRAVENOUS at 22:18

## 2023-06-23 RX ADMIN — CEFAZOLIN SODIUM 2000 MG: 2 SOLUTION INTRAVENOUS at 12:52

## 2023-06-23 RX ADMIN — GUAIFENESIN 600 MG: 600 TABLET ORAL at 08:08

## 2023-06-23 RX ADMIN — ONDANSETRON 4 MG: 2 INJECTION INTRAMUSCULAR; INTRAVENOUS at 12:44

## 2023-06-23 RX ADMIN — ROCURONIUM BROMIDE 20 MG: 10 INJECTION INTRAVENOUS at 13:07

## 2023-06-23 RX ADMIN — DILTIAZEM HYDROCHLORIDE 240 MG: 240 CAPSULE, COATED, EXTENDED RELEASE ORAL at 08:11

## 2023-06-23 RX ADMIN — CEFAZOLIN SODIUM 2000 MG: 2 SOLUTION INTRAVENOUS at 15:15

## 2023-06-23 RX ADMIN — BUDESONIDE 0.5 MG: 0.5 INHALANT RESPIRATORY (INHALATION) at 07:32

## 2023-06-23 RX ADMIN — TRANEXAMIC ACID 1000 MG: 10 INJECTION, SOLUTION INTRAVENOUS at 11:31

## 2023-06-23 RX ADMIN — IPRATROPIUM BROMIDE 0.5 MG: 0.5 SOLUTION RESPIRATORY (INHALATION) at 19:34

## 2023-06-23 RX ADMIN — FENTANYL CITRATE 25 MCG: 50 INJECTION INTRAMUSCULAR; INTRAVENOUS at 12:44

## 2023-06-23 NOTE — PROGRESS NOTES
[facility]  Progress Note  Name: Boston Abdul  MRN: [id number]  Unit/Bed#: -01 I Date of Admission: 6/22/2023   Date of Service: 6/23/2023 I Hospital Day: 1    Assessment/Plan   * Fall  Assessment & Plan  79year old female with PMH of COPD, pAfib, HTN and osteoporosis presented with fall  CT imaging showed new displaced left subcapital femur fracture, left 11th rib fracture  Ortho consulted for surgery  S/p TXA in ED  Hold coumadin, give IV vit K, INR 1 2 today  NPO for surgery today with ortho for left hip arthroplasty  Pain controlled  PT and OT to evaluate    Elevated troponin  Assessment & Plan  Elevated troponin, suspect likely due to fall and rib fracture  Currently without chest pains  EKG without changes    Essential hypertension  Assessment & Plan  Continue cardizem and losartan  Blood pressure controlled    Atrial fibrillation (HCC)  Assessment & Plan  Hx of Pafib, on coumadin for anticoagulation  Continue digoxin, cardizem  Hold coumadin, resume anticoagulation when ok with ortho, likely tmr    COPD exacerbation (HCC)  Assessment & Plan  Noted to be hypoxic, SOB on admission  Currently at baseline o2, no further wheezing  Quick taper on prednisone 40mg for 2 additional days  Continue nebs         Non-ischemic myocardial injury due to fall and rib fractures, as evidenced by elevated troponins without evidence of myocardial ischemia, requiring trending of troponins and monitoring on telemetry         Findings: H&P: Elevated troponin, suspect likely due to fall and rib fracture  No chest pain  EKG without changes  Troponins: 113, 202, 236      VTE Pharmacologic Prophylaxis:   Pharmacologic: Warfarin (Coumadin)  Mechanical VTE Prophylaxis in Place: Yes    Patient Centered Rounds: I have performed bedside rounds with nursing staff today      Discussions with Specialists or Other Care Team Provider: ortho    Education and Discussions with Family / Patient: son at bedside      Current Length of Stay: 1 day(s)    Current Patient Status: Inpatient   Certification Statement: The patient will continue to require additional inpatient hospital stay due to pending surgery with ortho this afternoon    Discharge Plan: pending    Code Status: Level 2 - DNAR: but accepts endotracheal intubation      Subjective:   No events overnight  Pain controlled  Breathing has improved  No complaints  Objective:     Vitals:   Temp (24hrs), Av 8 °F (37 1 °C), Min:98 8 °F (37 1 °C), Max:98 8 °F (37 1 °C)    Temp:  [98 8 °F (37 1 °C)] 98 8 °F (37 1 °C)  HR:  [] 61  Resp:  [16-24] 18  BP: (129-189)/(61-92) 150/68  SpO2:  [73 %-100 %] 100 %  Body mass index is 21 74 kg/m²  Input and Output Summary (last 24 hours):     No intake or output data in the 24 hours ending 23 1045    Physical Exam:     Physical Exam  Vitals and nursing note reviewed  Constitutional:       General: She is not in acute distress  Appearance: She is not ill-appearing  Comments: frail   HENT:      Head: Normocephalic and atraumatic  Eyes:      Conjunctiva/sclera: Conjunctivae normal    Cardiovascular:      Rate and Rhythm: Normal rate and regular rhythm  Pulmonary:      Effort: Pulmonary effort is normal       Breath sounds: No wheezing or rhonchi  Comments: Decreased breath sounds bilaterally  Abdominal:      General: Bowel sounds are normal  There is no distension  Palpations: Abdomen is soft  Musculoskeletal:         General: Tenderness ( left hip) present  No swelling  Skin:     General: Skin is warm and dry  Neurological:      General: No focal deficit present  Mental Status: She is alert  Mental status is at baseline     Psychiatric:         Mood and Affect: Mood normal          Additional Data:     Labs:    Results from last 7 days   Lab Units 23  0510   WBC Thousand/uL 8 96   HEMOGLOBIN g/dL 11 0*   HEMATOCRIT % 34 1*   PLATELETS Thousands/uL 244   NEUTROS PCT % 89*   LYMPHS PCT % 5*   MONOS PCT % 5   EOS PCT % 0     Results from last 7 days   Lab Units 06/23/23  0510   SODIUM mmol/L 135   POTASSIUM mmol/L 3 7   CHLORIDE mmol/L 98   CO2 mmol/L 30   BUN mg/dL 27*   CREATININE mg/dL 0 68   ANION GAP mmol/L 7   CALCIUM mg/dL 8 8   ALBUMIN g/dL 3 2*   TOTAL BILIRUBIN mg/dL 0 40   ALK PHOS U/L 134*   ALT U/L 18   AST U/L 15   GLUCOSE RANDOM mg/dL 203*     Results from last 7 days   Lab Units 06/23/23  0510   INR  1 21*             Results from last 7 days   Lab Units 06/23/23  0510 06/22/23  1136   LACTIC ACID mmol/L  --  1 7   PROCALCITONIN ng/ml 0 42* 0 20           * I Have Reviewed All Lab Data Listed Above  * Additional Pertinent Lab Tests Reviewed: All [place] Admission Reviewed    Imaging:    XR Trauma pelvis ap only 1 or 2 vw    Result Date: 6/22/2023  Impression: Displaced left subcapital fracture  Workstation performed: [id number]     XR Trauma chest portable    Result Date: 6/22/2023  Impression: No acute cardiopulmonary disease  Workstation performed: [id number]     TRAUMA - CT spine cervical wo contrast    Result Date: 6/22/2023  Impression: No cervical spine fracture or traumatic malalignment  Workstation performed: [id number]     TRAUMA - CT chest abdomen pelvis w contrast    Result Date: 6/22/2023  Impression: Displaced left subcapital fracture  Bilateral sacral insufficiency fractures  Chronic right pubic symphysis fracture  Subacute left 11th rib fracture  Workstation performed: [id number]     TRAUMA - CT head wo contrast    Result Date: 6/22/2023  Impression: No acute intracranial abnormality  Workstation performed: [id number]       Recent Cultures (last 7 days):     Results from last 7 days   Lab Units 06/22/23  1136 06/22/23  1114   BLOOD CULTURE  Received in Microbiology Lab  Culture in Progress  Received in Microbiology Lab  Culture in Progress         Last 24 Hours Medication List:   Current Facility-Administered Medications   Medication Dose Route Frequency Provider Last Rate   • acetaminophen  650 mg Oral Q6H PRN Anneliese Gunn MD     • acetaminophen  975 mg Oral [facility] Ezekiel Mcnally MD     • budesonide  0 5 mg Nebulization Q12H Anneliese Gunn MD     • digoxin  125 mcg Oral Every Other Day Anneliese Gunn MD     • diltiazem  240 mg Oral Daily Anneliese Gunn MD     • famotidine  20 mg Oral QPM Anneliese Gunn MD     • guaiFENesin  600 mg Oral BID Anneliese Gunn MD     • ipratropium  0 5 mg Nebulization TID Anneliese Gunn MD     • levalbuterol  1 25 mg Nebulization TID Anneliese Gunn MD     • levothyroxine  25 mcg Oral Daily Anneliese Gunn MD     • losartan  25 mg Oral Daily Anneliese Gunn MD     • mirtazapine  7 5 mg Oral HS Anneliese Gunn MD     • morphine injection  2 mg Intravenous Q6H PRN Anneliese Gunn MD     • oxyCODONE  5 mg Oral Q6H PRN Anneliese Gunn MD     • oxyCODONE  2 5 mg Oral Q6H PRN Anneliese Gunn MD     • [START ON 6/24/2023] predniSONE  40 mg Oral Daily Anneliese Gunn MD     • sertraline  50 mg Oral Daily Anneliese Gunn MD     • tiZANidine  2 mg Oral BID PRN Anneliese Gunn MD          Today, Patient Was Seen By: Anneliese Gunn MD    ** Please Note: Dictation voice to text software may have been used in the creation of this document   **

## 2023-06-23 NOTE — ASSESSMENT & PLAN NOTE
Noted to be hypoxic, SOB on admission  Currently at baseline o2, no further wheezing  Quick taper on prednisone 40mg for 2 additional days  Continue nebs

## 2023-06-23 NOTE — ANESTHESIA POSTPROCEDURE EVALUATION
Post-Op Assessment Note    CV Status:  Stable  Pain Score: 0    Pain management: adequate     Mental Status:  Sleepy   Hydration Status:  Stable   PONV Controlled:  None   Airway Patency:  Patent      Post Op Vitals Reviewed: Yes      Staff: CRNA         No notable events documented      BP   127/57   Temp      Pulse  66   Resp   18   SpO2   99

## 2023-06-23 NOTE — ASSESSMENT & PLAN NOTE
79year old female with PMH of COPD, pAfib, HTN and osteoporosis presented with fall  CT imaging showed new displaced left subcapital femur fracture, left 11th rib fracture  Ortho consulted for surgery  S/p TXA in ED  Hold coumadin, give IV vit K, INR 1 2 today  NPO for surgery today with ortho for left hip arthroplasty  Pain controlled  PT and OT to evaluate

## 2023-06-23 NOTE — PLAN OF CARE
Problem: MOBILITY - ADULT  Goal: Maintain or return to baseline ADL function  Description: INTERVENTIONS:  -  Assess patient's ability to carry out ADLs; assess patient's baseline for ADL function and identify physical deficits which impact ability to perform ADLs (bathing, care of mouth/teeth, toileting, grooming, dressing, etc )  - Assess/evaluate cause of self-care deficits   - Assess range of motion  - Assess patient's mobility; develop plan if impaired  - Assess patient's need for assistive devices and provide as appropriate  - Encourage maximum independence but intervene and supervise when necessary  - Involve family in performance of ADLs  - Assess for home care needs following discharge   - Consider OT consult to assist with ADL evaluation and planning for discharge  - Provide patient education as appropriate  Outcome: Progressing  Goal: Maintains/Returns to pre admission functional level  Description: INTERVENTIONS:  - Perform BMAT or MOVE assessment daily    - Set and communicate daily mobility goal to care team and patient/family/caregiver  - Collaborate with rehabilitation services on mobility goals if consulted  - Perform Range of Motion  times a day  - Reposition patient every  hours    - Dangle patient  times a day  - Stand patient  times a day  - Ambulate patient  times a day  - Out of bed to chair  times a day   - Out of bed for meals times a day  - Out of bed for toileting  - Record patient progress and toleration of activity level   Outcome: Progressing     Problem: PAIN - ADULT  Goal: Verbalizes/displays adequate comfort level or baseline comfort level  Description: Interventions:  - Encourage patient to monitor pain and request assistance  - Assess pain using appropriate pain scale  - Administer analgesics based on type and severity of pain and evaluate response  - Implement non-pharmacological measures as appropriate and evaluate response  - Consider cultural and social influences on pain and pain management  - Notify physician/advanced practitioner if interventions unsuccessful or patient reports new pain  Outcome: Progressing     Problem: INFECTION - ADULT  Goal: Absence or prevention of progression during hospitalization  Description: INTERVENTIONS:  - Assess and monitor for signs and symptoms of infection  - Monitor lab/diagnostic results  - Monitor all insertion sites, i e  indwelling lines, tubes, and drains  - Monitor endotracheal if appropriate and nasal secretions for changes in amount and color  - [place] appropriate cooling/warming therapies per order  - Administer medications as ordered  - Instruct and encourage patient and family to use good hand hygiene technique  - Identify and instruct in appropriate isolation precautions for identified infection/condition  Outcome: Progressing  Goal: Absence of fever/infection during neutropenic period  Description: INTERVENTIONS:  - Monitor WBC    Outcome: Progressing     Problem: SAFETY ADULT  Goal: Maintain or return to baseline ADL function  Description: INTERVENTIONS:  -  Assess patient's ability to carry out ADLs; assess patient's baseline for ADL function and identify physical deficits which impact ability to perform ADLs (bathing, care of mouth/teeth, toileting, grooming, dressing, etc )  - Assess/evaluate cause of self-care deficits   - Assess range of motion  - Assess patient's mobility; develop plan if impaired  - Assess patient's need for assistive devices and provide as appropriate  - Encourage maximum independence but intervene and supervise when necessary  - Involve family in performance of ADLs  - Assess for home care needs following discharge   - Consider OT consult to assist with ADL evaluation and planning for discharge  - Provide patient education as appropriate  Outcome: Progressing  Goal: Maintains/Returns to pre admission functional level  Description: INTERVENTIONS:  - Perform BMAT or MOVE assessment daily    - Set and communicate daily mobility goal to care team and patient/family/caregiver  - Collaborate with rehabilitation services on mobility goals if consulted  - Perform Range of Motion  times a day  - Reposition patient every  hours    - Dangle patient  times a day  - Stand patient  times a day  - Ambulate patient  times a day  - Out of bed to chair  times a day   - Out of bed for meals times a day  - Out of bed for toileting  - Record patient progress and toleration of activity level   Outcome: Progressing  Goal: Patient will remain free of falls  Description: INTERVENTIONS:  - Educate patient/family on patient safety including physical limitations  - Instruct patient to call for assistance with activity   - Consult OT/PT to assist with strengthening/mobility   - Keep Call bell within reach  - Keep bed low and locked with side rails adjusted as appropriate  - Keep care items and personal belongings within reach  - Initiate and maintain comfort rounds  - Make Fall Risk Sign visible to staff  - Offer Toileting every  Hours, in advance of need  - Initiate/Maintain alarm  - Obtain necessary fall risk management equipment:   - Apply yellow socks and bracelet for high fall risk patients  - Consider moving patient to room near nurses station  Outcome: Progressing     Problem: DISCHARGE PLANNING  Goal: Discharge to home or other facility with appropriate resources  Description: INTERVENTIONS:  - Identify barriers to discharge w/patient and caregiver  - Arrange for needed discharge resources and transportation as appropriate  - Identify discharge learning needs (meds, wound care, etc )  - Arrange for interpretive services to assist at discharge as needed  - Refer to Case Management Department for coordinating discharge planning if the patient needs post-hospital services based on physician/advanced practitioner order or complex needs related to functional status, cognitive ability, or social support system  Outcome: Progressing Problem: Knowledge Deficit  Goal: Patient/family/caregiver demonstrates understanding of disease process, treatment plan, medications, and discharge instructions  Description: Complete learning assessment and assess knowledge base    Interventions:  - Provide teaching at level of understanding  - Provide teaching via preferred learning methods  Outcome: Progressing     Problem: Prexisting or High Potential for Compromised Skin Integrity  Goal: Skin integrity is maintained or improved  Description: INTERVENTIONS:  - Identify patients at risk for skin breakdown  - Assess and monitor skin integrity  - Assess and monitor nutrition and hydration status  - Monitor labs   - Assess for incontinence   - Turn and reposition patient  - Assist with mobility/ambulation  - Relieve pressure over bony prominences  - Avoid friction and shearing  - Provide appropriate hygiene as needed including keeping skin clean and dry  - Evaluate need for skin moisturizer/barrier cream  - Collaborate with interdisciplinary team   - Patient/family teaching  - Consider wound care consult   Outcome: Progressing

## 2023-06-23 NOTE — ASSESSMENT & PLAN NOTE
Elevated troponin, suspect likely due to fall and rib fracture  Currently without chest pains  EKG without changes

## 2023-06-23 NOTE — ANESTHESIA PREPROCEDURE EVALUATION
Procedure:  HEMIARTHROPLASTY HIP (BIPOLAR) (Left: Hip)    Relevant Problems   CARDIO   (+) Atrial fibrillation (HCC)   (+) Essential hypertension      ENDO   (+) Other specified hypothyroidism      GI/HEPATIC   (+) UGI bleed      /RENAL   (+) Sepsis with acute renal failure without septic shock (HCC)      HEMATOLOGY   (+) Hereditary hemolytic anemia (HCC)   (+) Hypercoagulable state (HCC)      MUSCULOSKELETAL   (+) Low back pain without sciatica      NEURO/PSYCH   (+) Anxiety      PULMONARY   (+) Aspiration pneumonia of right lung (HCC)   (+) Asthma   (+) Bilateral pleural effusion   (+) COPD exacerbation (HCC)   (+) Chronic respiratory failure with hypoxia and hypercapnia (HCC)   (+) Pneumonia of right lower lobe due to infectious organism      Hematopoietic and Hemostatic   (+) Antiphospholipid antibody syndrome (HCC)      Other   (+) Fall      On coumadin for AFIB  CT imaging showed new displaced left subcapital femur fracture, left 11th rib fracture  INR 1 2 today (received vit K)  Physical Exam    Airway    Mallampati score: II  TM Distance: >3 FB  Neck ROM: full     Dental   upper dentures and lower dentures,     Cardiovascular      Pulmonary      Other Findings        Anesthesia Plan  ASA Score- 3     Anesthesia Type- general with ASA Monitors  Additional Monitors:   Airway Plan: LMA  Comment: GA with LMA, IV, antiemetics  Plan Factors-    Chart reviewed  Patient summary reviewed  Patient is not a current smoker  Patient did not smoke on day of surgery  Induction- intravenous  Postoperative Plan-   Planned trial extubation    Informed Consent- Anesthetic plan and risks discussed with patient  I personally reviewed this patient with the CRNA  Discussed and agreed on the Anesthesia Plan with the CRNA  Phi Licea

## 2023-06-23 NOTE — ASSESSMENT & PLAN NOTE
Hx of Pafib, on coumadin for anticoagulation  Continue digoxin, cardizem  Hold coumadin, resume anticoagulation when ok with ortho, likely tmr

## 2023-06-23 NOTE — OCCUPATIONAL THERAPY NOTE
Occupational Therapy Cx Note     Patient Name: Rubén BALLESTEROSIQMSAMAN Date: 6/23/2023  Problem List  Principal Problem:    Fall  Active Problems:    COPD exacerbation [facility])    Atrial fibrillation [facility])    Essential hypertension    Elevated troponin            06/23/23 1055   OT Last Visit   OT Visit Date 06/23/23   Note Type   Note type Cancelled Session   Cancel Reasons Patient to operating room   Additional Comments Pt to OR today for left hip arthroplasty     Jules Oliver, OTR/L

## 2023-06-23 NOTE — DISCHARGE INSTR - AVS FIRST PAGE
Dr Livan Orozco MD  Department of [address] [place] [address]  [address] Crystal Subramanian , [address] [facility], [address] [place] [address]  [phone]                                        PATIENT INSTRUCTIONS AFTER HIP HEMIARTHROPLASTY    Diet: You may resume your normal diet  It is important to maintain a healthy, balanced diet  Include plenty of fluids, as pain medicines tend to cause constipation  Medications  Pain Medications: A prescription for pain medication has been provided to upon your hospital discharge  Your goal is reduce your pain medication gradually over the next 4-6 weeks  Take your pain medicine with food to avoid stomach discomfort  Please allow at least 24-48 hours (Monday through Friday) from the time of your request to the time a will need the prescription  Constipation: To avoid constipation, take an over-the-counter stool softener (i e  docusate sodium) twice daily such as docusate sodium or Senna S twice daily and Miralax laxative once daily while on pain medication  If the combination does not alleviate your symptoms after 3 days, use a rectal suppository such as dulcolax  Blood Clot Prevention as below:   Continue Coumadin as prescribed                 Activity  Rest Periods: Gradually increase your activity on a daily basis  The amount of time you spend out of bed and the number and distance of your walks should gradually increase each day  Between activities such as walking, meals, exercises, etc , take a rest period for approximately 1 hour in the morning, afternoon and evening  Limit sitting to 1 hour intervals for the next 4 - 6 weeks  Weight-bearing: You may put as much weight as is comfortable on your operative leg with activity  Use a walker or crutches while walking for at least 3 weeks  At that time, it is advised to use a cane until you are able to apply full weight to the operated leg    Impact Loading: Low-impact activities such as golf, stationary bicycling and slow dancing may begin in 6 weeks, while swimming is allowed at 3 weeks after surgery  All activities involving quick starts and stops, or impact loading, such as tennis, should be avoided to lower your risk of early loosening of the prosthesis  Bathing:  Dressing is waterproof and can get wet in shower  Keep on until seen in office  Driving: You should not drive until approximately 4 weeks after surgery  While you are traveling as a passenger for the first 4 weeks following surgery, it is advised that you get out of the car at least hourly and take a short walk  Returning to work: The decision to return to work will be based on the type of work you do, your physical stamina, and whether you have other medical conditions  We recommend that you avoid making any major changes in your work or [facility] plans until your recovery is complete  Dislocation Precautions No crossing your legs for 3 months  Wound Care  Your incision is covered with a Mepilex dressing  It can stay on until seen in office  It is waterproof  Do NOT scrub or rub the incision  No creams or ointments on the incision for 4 weeks  Your incision may be warm, itchy, and slightly red for several weeks after surgery  Excessive redness, soreness, or drainage from the incision area should be reported to our office  Common Problems  Leg & ankle swelling: You may have some swelling in your operated leg that should gradually decrease  If swelling occurs, lie down, elevate your legs, and rest   Pain:  Pain may be a result of over-activity  When you are in pain, sit or lie down, elevated your legs, and rest   If the pain does not subside, take the pain medication prescribed for you  Pain is a protective mechanism that helps to prevent over-usage and should not be ignored  Return Appointments: You should have a scheduled appointment for followup    If you do not already have a followup appointment scheduled, please call the office at [phone] to schedule an appointment  Call our office if you have:  Temperature of 101o or higher  Drainage from your incision  Increasing redness around your incision  Increasing  pain around the incision, unrelieved by pain medication  Excessive calf or thigh pain & swelling that does not go away with elevation and rest      ANTIBIOTIC PROPHYLAXIS AFTER TOTAL JOINT REPLACEMENT  For protection against the remote possibility of blood-borne bacteria, carried from the mouth during a dental procedure, creating an infection in a total joint replacement, a combined task force of [employer] and the [address] [place] [address] has made the following guideline recommendations: Following total joint replacement, all patients are advised to take an antibiotic regimen for the following dental procedures AS LIFETIME THERAPY:  Prophylactic cleaning of teeth or implants  Intraligamentary local anesthetic injections  Periodontal procedures  Root canal procedures  Dental extractions  Dental implant procedures  Implantation of avulsed teeth  Initial placement of orthodontic bands    The recommended antibiotic regimen (if not allergic to penicillin) is:  Amoxicillin, Cephalexin (e g  Keflex), or Cephradine two (2 0) grams orally one (1) hour prior to the dental procedure  For patients with a penicillin allergy, the recommended antibiotic is:  Clindamycin (e g  Cleocin) 600 mg orally one hour prior to the dental procedure  Antibiotic prophylaxis is not warranted for dental procedures for patients with previously placed orthopedic pins, plates or screws  The above recommendations are considered minimum guidelines  Your doctor and/or dentist are ultimately responsible for making individual treatment recommendations to you based on their clinical judgment

## 2023-06-23 NOTE — OP NOTE
OPERATIVE REPORT  PATIENT NAME: Ivonne Holman  : 1940  MRN: [id number]  Pt Location:   OR ROOM 02    Surgery Date: 2023    Surgeon(s) and Role:     * Sunny Damon MD - Primary     * Michoacano Alvarez PA-C - Assisting     Preop Diagnosis:  Closed fracture of left hip, initial encounter ([facility] ) [place]    Post-Op Diagnosis Codes:     * Closed fracture of left hip, initial encounter ([place] [address] ) [S72 002A]    Procedure(s):  Left - HEMIARTHROPLASTY HIP (BIPOLAR)    Specimens:  ID Type Source Tests Collected by Time Destination   1 :  Tissue Femoral Head TISSUE EXAM Sunny Damon MD 2023 1406      Estimated Blood Loss:   100 mL    Drains:  * No LDAs found *    Anesthesia Type:   General     Operative Indications:  Closed fracture of left hip, initial encounter ([place] [address] ) [S72 002A]    Prosthesis: Depuy summit cemented femur size 5 hi-offset, Femoral head size 48 bipolar +5 mm  Ivonne Holman is a 79y o  years old female diagnosed with left hip femoral neck displaced  Surgical intervention was recommended  The risks and complications were discussed with the patient  The patient consented to the procedure  Hip Approach: Posterior    Procedure: Patient was brought into the OR and anethetized with spinal anesthesia without any complications  Patient was placed in lateral decubitus position with left up  Patient's left hip and leg were prepped and draped in sterile fashion  A time out was called and identified the left hip was the operating site  A posterior-lateral incision was made over the left hip  Dissection was then carried down to the Gluteus hilario and fascia sommer  The short external rotators was detached from the posterior aspect of the greater trochanter  Bleeders were controlled with electro-Bovie  Care was make sure the Siatic nerve was protected and retracted out of way  An arthrotomy was then done in the posterior capsule in a T-fashion    The femoral neck fracture was identified within the joint  Femoral neck osteotomy was done about 2 cm proximal to the lesser trochanter  The femoral head was then removed from the acetabulum  The femoral head was measured to be size 48  A trial bipolar head was then placed in the acetabulum, which appeared to have to good fit  The attention was then turned to the proximal femur  The leg was then internally rotated to expose the proximal femur  A sequential broach system is used to enlarge the femoral canal   Size 5 broach appeared to be fitting the femoral canal well  Care was made sure while broaching the femoral canal about 15 degree of anteversion was created  A size 48 bipolar femoral head trial with hi-offset +5 neck length was used for trial   The trial was reduced and range of motion was carried out  Patient appeared to have a good stability with the hip in full flexion, internal rotation to more than 50 degree, and adduction of the hip  The femoral stem trial was then removed  A cement restrictor was placed distally and the cement was then placed into the canal with cement gun  Patient has no problems with placement of the cement  The femoral prosthesis was inserted into the femoral canal   Excess cement was removed  Again, about 15 of anteversion was placed with the femoral component  Once the cement is hardened, the bipolar femoral head component was placed onto the femoral stem with taper lock mechanism  The prosthesis was reduced and range of motion was carried out  The hip appeared to be stable just like the trial  Wound was thoroughly irrigated with Irricept solution for 1 mins and then irrigated with NS   30 cc of Duramorph was injected around the joint capsule  Aquamantys was used to treat the soft tissue around the joint  The posterior capsule was repaired with #1 Vicryl in an interrupted fashion  The short external rotator was reattached to the greater trochanter posteriorly    The gluteus hilario and fascia sommer were repair with a #1 Vicryl   0 and 2-0 Vicryl were used to close the subcutaneous tissue  Incision was painted with betadine and Stratafix was used to close the skin  Steri-Strips was then applied  Sterile bulky dressing was applied over the incision  The patient tolerated the procedure well without any complications  An abduction pillow was placed between patient's legs  Patient was transferred to recovery room for post-op care  The family was contacted  There was no qualified resident available to assist     Mrs Steve Bahena was required in the OR is helping manipulate the hip, exposure of the joint, placement of the prosthesis, and reduction of the hip joint      Complications:   None    Patient Disposition:  PACU     SIGNATURE: Frank Church MD  DATE: June 23, 2023  TIME: 2:21 PM

## 2023-06-24 PROBLEM — S72.92XA FEMUR FRACTURE, LEFT (HCC): Status: ACTIVE | Noted: 2023-06-22

## 2023-06-24 LAB
ALBUMIN SERPL BCP-MCNC: 2.9 G/DL (ref 3.5–5)
ALP SERPL-CCNC: 102 U/L (ref 34–104)
ALT SERPL W P-5'-P-CCNC: 11 U/L (ref 7–52)
ANION GAP SERPL CALCULATED.3IONS-SCNC: 1 MMOL/L
ANISOCYTOSIS BLD QL SMEAR: PRESENT
AST SERPL W P-5'-P-CCNC: 12 U/L (ref 13–39)
BASOPHILS # BLD MANUAL: 0 THOUSAND/UL (ref 0–0.1)
BASOPHILS NFR MAR MANUAL: 0 % (ref 0–1)
BILIRUB SERPL-MCNC: 0.25 MG/DL (ref 0.2–1)
BUN SERPL-MCNC: 21 MG/DL (ref 5–25)
CALCIUM ALBUM COR SERPL-MCNC: 9.4 MG/DL (ref 8.3–10.1)
CALCIUM SERPL-MCNC: 8.5 MG/DL (ref 8.4–10.2)
CHLORIDE SERPL-SCNC: 101 MMOL/L (ref 96–108)
CO2 SERPL-SCNC: 34 MMOL/L (ref 21–32)
CREAT SERPL-MCNC: 0.62 MG/DL (ref 0.6–1.3)
EOSINOPHIL # BLD MANUAL: 0 THOUSAND/UL (ref 0–0.4)
EOSINOPHIL NFR BLD MANUAL: 0 % (ref 0–6)
ERYTHROCYTE [DISTWIDTH] IN BLOOD BY AUTOMATED COUNT: 13.7 % (ref 11.6–15.1)
GFR SERPL CREATININE-BSD FRML MDRD: 83 ML/MIN/1.73SQ M
GLUCOSE SERPL-MCNC: 149 MG/DL (ref 65–140)
HCT VFR BLD AUTO: 28.8 % (ref 34.8–46.1)
HGB BLD-MCNC: 9 G/DL (ref 11.5–15.4)
INR PPP: 1.55 (ref 0.84–1.19)
LYMPHOCYTES # BLD AUTO: 0.23 THOUSAND/UL (ref 0.6–4.47)
LYMPHOCYTES # BLD AUTO: 2 % (ref 14–44)
MCH RBC QN AUTO: 31.7 PG (ref 26.8–34.3)
MCHC RBC AUTO-ENTMCNC: 31.3 G/DL (ref 31.4–37.4)
MCV RBC AUTO: 101 FL (ref 82–98)
MONOCYTES # BLD AUTO: 0.68 THOUSAND/UL (ref 0–1.22)
MONOCYTES NFR BLD: 6 % (ref 4–12)
NEUTROPHILS # BLD MANUAL: 10.48 THOUSAND/UL (ref 1.85–7.62)
NEUTS BAND NFR BLD MANUAL: 4 % (ref 0–8)
NEUTS SEG NFR BLD AUTO: 88 % (ref 43–75)
PLATELET # BLD AUTO: 248 THOUSANDS/UL (ref 149–390)
PLATELET BLD QL SMEAR: ADEQUATE
PMV BLD AUTO: 8.8 FL (ref 8.9–12.7)
POTASSIUM SERPL-SCNC: 4.6 MMOL/L (ref 3.5–5.3)
PROT SERPL-MCNC: 5.4 G/DL (ref 6.4–8.4)
PROTHROMBIN TIME: 19.5 SECONDS (ref 11.6–14.5)
RBC # BLD AUTO: 2.84 MILLION/UL (ref 3.81–5.12)
SODIUM SERPL-SCNC: 136 MMOL/L (ref 135–147)
WBC # BLD AUTO: 11.39 THOUSAND/UL (ref 4.31–10.16)

## 2023-06-24 PROCEDURE — 94760 N-INVAS EAR/PLS OXIMETRY 1: CPT

## 2023-06-24 PROCEDURE — 99232 SBSQ HOSP IP/OBS MODERATE 35: CPT | Performed by: INTERNAL MEDICINE

## 2023-06-24 PROCEDURE — 85007 BL SMEAR W/DIFF WBC COUNT: CPT | Performed by: STUDENT IN AN ORGANIZED HEALTH CARE EDUCATION/TRAINING PROGRAM

## 2023-06-24 PROCEDURE — 80053 COMPREHEN METABOLIC PANEL: CPT | Performed by: STUDENT IN AN ORGANIZED HEALTH CARE EDUCATION/TRAINING PROGRAM

## 2023-06-24 PROCEDURE — 94640 AIRWAY INHALATION TREATMENT: CPT

## 2023-06-24 PROCEDURE — 99024 POSTOP FOLLOW-UP VISIT: CPT | Performed by: STUDENT IN AN ORGANIZED HEALTH CARE EDUCATION/TRAINING PROGRAM

## 2023-06-24 PROCEDURE — 85027 COMPLETE CBC AUTOMATED: CPT | Performed by: STUDENT IN AN ORGANIZED HEALTH CARE EDUCATION/TRAINING PROGRAM

## 2023-06-24 PROCEDURE — 85610 PROTHROMBIN TIME: CPT | Performed by: STUDENT IN AN ORGANIZED HEALTH CARE EDUCATION/TRAINING PROGRAM

## 2023-06-24 PROCEDURE — 97163 PT EVAL HIGH COMPLEX 45 MIN: CPT

## 2023-06-24 PROCEDURE — 97530 THERAPEUTIC ACTIVITIES: CPT

## 2023-06-24 RX ORDER — WARFARIN SODIUM 5 MG/1
5 TABLET ORAL EVERY OTHER DAY
Status: DISCONTINUED | OUTPATIENT
Start: 2023-06-24 | End: 2023-06-26

## 2023-06-24 RX ORDER — WARFARIN SODIUM 2.5 MG/1
2.5 TABLET ORAL EVERY OTHER DAY
Status: DISCONTINUED | OUTPATIENT
Start: 2023-06-25 | End: 2023-06-26

## 2023-06-24 RX ADMIN — LEVALBUTEROL HYDROCHLORIDE 1.25 MG: 1.25 SOLUTION RESPIRATORY (INHALATION) at 13:45

## 2023-06-24 RX ADMIN — WARFARIN SODIUM 5 MG: 5 TABLET ORAL at 17:13

## 2023-06-24 RX ADMIN — LEVALBUTEROL HYDROCHLORIDE 1.25 MG: 1.25 SOLUTION RESPIRATORY (INHALATION) at 07:41

## 2023-06-24 RX ADMIN — LEVALBUTEROL HYDROCHLORIDE 1.25 MG: 1.25 SOLUTION RESPIRATORY (INHALATION) at 19:25

## 2023-06-24 RX ADMIN — DILTIAZEM HYDROCHLORIDE 240 MG: 240 CAPSULE, COATED, EXTENDED RELEASE ORAL at 08:55

## 2023-06-24 RX ADMIN — SERTRALINE 50 MG: 50 TABLET, FILM COATED ORAL at 08:55

## 2023-06-24 RX ADMIN — LEVOTHYROXINE SODIUM 25 MCG: 25 TABLET ORAL at 05:15

## 2023-06-24 RX ADMIN — MIRTAZAPINE 7.5 MG: 15 TABLET, FILM COATED ORAL at 21:46

## 2023-06-24 RX ADMIN — SODIUM CHLORIDE, SODIUM LACTATE, POTASSIUM CHLORIDE, AND CALCIUM CHLORIDE 75 ML/HR: .6; .31; .03; .02 INJECTION, SOLUTION INTRAVENOUS at 08:52

## 2023-06-24 RX ADMIN — Medication 2.5 MG: at 21:46

## 2023-06-24 RX ADMIN — BUDESONIDE 0.5 MG: 0.5 INHALANT RESPIRATORY (INHALATION) at 19:25

## 2023-06-24 RX ADMIN — OXYCODONE HYDROCHLORIDE 5 MG: 5 TABLET ORAL at 09:46

## 2023-06-24 RX ADMIN — ACETAMINOPHEN 975 MG: 325 TABLET, FILM COATED ORAL at 13:11

## 2023-06-24 RX ADMIN — ACETAMINOPHEN 975 MG: 325 TABLET, FILM COATED ORAL at 05:15

## 2023-06-24 RX ADMIN — IPRATROPIUM BROMIDE 0.5 MG: 0.5 SOLUTION RESPIRATORY (INHALATION) at 07:41

## 2023-06-24 RX ADMIN — DIGOXIN 125 MCG: 125 TABLET ORAL at 08:54

## 2023-06-24 RX ADMIN — ACETAMINOPHEN 975 MG: 325 TABLET, FILM COATED ORAL at 21:46

## 2023-06-24 RX ADMIN — GUAIFENESIN 600 MG: 600 TABLET ORAL at 17:12

## 2023-06-24 RX ADMIN — FAMOTIDINE 20 MG: 20 TABLET, FILM COATED ORAL at 17:12

## 2023-06-24 RX ADMIN — PREDNISONE 40 MG: 20 TABLET ORAL at 08:55

## 2023-06-24 RX ADMIN — GUAIFENESIN 600 MG: 600 TABLET ORAL at 08:54

## 2023-06-24 RX ADMIN — BUDESONIDE 0.5 MG: 0.5 INHALANT RESPIRATORY (INHALATION) at 07:41

## 2023-06-24 RX ADMIN — LOSARTAN POTASSIUM 25 MG: 25 TABLET, FILM COATED ORAL at 08:57

## 2023-06-24 RX ADMIN — IPRATROPIUM BROMIDE 0.5 MG: 0.5 SOLUTION RESPIRATORY (INHALATION) at 13:45

## 2023-06-24 RX ADMIN — IPRATROPIUM BROMIDE 0.5 MG: 0.5 SOLUTION RESPIRATORY (INHALATION) at 19:25

## 2023-06-24 NOTE — PROGRESS NOTES
[place]  Progress Note  Name: Ugo Krause  MRN: [id number]  Unit/Bed#: -01 I Date of Admission: 6/22/2023   Date of Service: 6/24/2023 I Hospital Day: 2    Assessment/Plan   * Femur fracture, left [facility])  Assessment & Plan  Background: History of COPD atrial fibrillation hypertension presents for a fall found to have left femur and rib fractures  · Underwent ORIF 6/23/2023 doing well   · Warfarin on hold, will restart  · PT/OT recommending rehab  Patient agreeable  Elevated troponin  Assessment & Plan  · Non-MI elevation of troponin secondary to fall    Lab Results   Component Value Date    HSTNI0 113 (H) 06/22/2023    HSTNI2 202 (H) 06/22/2023    HSTNI4 236 (H) 06/22/2023       Essential hypertension  Assessment & Plan  · Blood pressure stable continue losartan and diltiazem    Atrial fibrillation (HCC)  Assessment & Plan  · Paroxysmal atrial fibrillation on diltiazem and digoxin  · Warfarin on hold for surgery will restart tonight    Results from last 7 days   Lab Units 06/24/23  0339 06/23/23  0510 06/22/23  1224 06/20/23  0000   INR  1 55* 1 21* 2 19* 1 50*       COPD exacerbation (HCC)  Assessment & Plan  · Mild COPD exacerbation on admission  · Now stable  Continue bronchodilators  · Systemic steroids: Was on IV methylprednisolone for 2 days  Will continue 3 days total of prednisone for 5-day course    VTE Pharmacologic Prophylaxis:   High Risk (Score >/= 5) - Pharmacological DVT Prophylaxis Ordered: Restart warfarin  Sequential Compression Devices Ordered  Patient Centered Rounds: I have performed bedside rounds with nursing staff today  Discussions with Specialists or Other Care Team Provider:     Education and Discussions with Family / Patient: Updated  (son) at bedside  Time Spent for Care:    This time was spent on one or more of the following: performing physical exam; counseling and coordination of care; obtaining or reviewing "history; documenting in the medical record; reviewing/ordering tests, medications or procedures; communicating with other healthcare professionals and discussing with patient's family/caregivers  Current Length of Stay: 2 day(s)  Current Patient Status: Inpatient   Certification Statement: The patient will continue to require additional inpatient hospital stay due to Hip fracture needs rehab placement  Discharge Plan: Anticipate discharge in 24-48 hrs to rehab facility  Code Status: Level 2 - DNAR: but accepts endotracheal intubation      Subjective:   Patient seen and examined  \"Hip hurts like the dickens\"    Objective:   Vitals: Blood pressure 125/53, pulse 74, temperature (!) 96 9 °F (36 1 °C), resp  rate 18, height 5' (1 524 m), weight 54 2 kg (119 lb 7 8 oz), SpO2 96 %, not currently breastfeeding  Intake/Output Summary (Last 24 hours) at 6/24/2023 1450  Last data filed at 6/24/2023 0936  Gross per 24 hour   Intake 90 ml   Output 450 ml   Net -360 ml       Physical Exam  Vitals reviewed  Constitutional:       General: She is not in acute distress  Appearance: Normal appearance  HENT:      Head: Atraumatic  Cardiovascular:      Rate and Rhythm: Regular rhythm  Heart sounds: Normal heart sounds  Pulmonary:      Breath sounds: Decreased breath sounds present  No wheezing  Abdominal:      General: Bowel sounds are normal       Palpations: Abdomen is soft  Tenderness: There is no guarding or rebound  Musculoskeletal:         General: No swelling  Skin:     General: Skin is warm  Neurological:      Mental Status: She is alert     Psychiatric:         Mood and Affect: Mood normal        Additional Data:   Labs:  Results from last 7 days   Lab Units 06/24/23  0339 06/23/23  0510 06/22/23  1224 06/22/23  1105   WBC Thousand/uL 11 39* 8 96  --  11 67*   HEMOGLOBIN g/dL 9 0* 11 0*  --  10 8*   PLATELETS Thousands/uL 248 244  --  297   MCV fL 101* 99*  --  101*   TOTAL NEUT ABS " Thousand/uL 10 48*  --   --   --    BANDS PCT % 4  --   --   --    INR  1 55* 1 21* 2 19*  --      Results from last 7 days   Lab Units 23  0339 23  0510 23  1105   SODIUM mmol/L 136 135 136   POTASSIUM mmol/L 4 6 3 7 6 0*   CHLORIDE mmol/L 101 98 100   CO2 mmol/L 34* 30 31   ANION GAP mmol/L 1 7 5   BUN mg/dL 21 27* 21   CREATININE mg/dL 0 62 0 68 0 60   CALCIUM mg/dL 8 5 8 8 9 0   ALBUMIN g/dL 2 9* 3 2* 3 5   TOTAL BILIRUBIN mg/dL 0 25 0 40 0 40   ALK PHOS U/L 102 134* 163*   ALT U/L 11 18 18   AST U/L 12* 15 28   EGFR ml/min/1 73sq m 83 80 83   GLUCOSE RANDOM mg/dL 149* 203* 112         Results from last 7 days   Lab Units 23  1105   CK TOTAL U/L 73     Results from last 7 days   Lab Units 23  1634 23  1351 23  1105   HS TNI 0HR ng/L  --   --  113*   HS TNI 2HR ng/L  --  202*  --    HS TNI 4HR ng/L 236*  --   --           Results from last 7 days   Lab Units 23  0510 23  1136   LACTIC ACID mmol/L  --  1 7   PROCALCITONIN ng/ml 0 42* 0 20                 * I Have Reviewed All Lab Data Listed Above  Cultures:   Results from last 7 days   Lab Units 23  1136 23  1114   BLOOD CULTURE  No Growth at 24 hrs  No Growth at 24 hrs  Lines/Drains:  Invasive Devices     Peripheral Intravenous Line  Duration           Peripheral IV 23 Dorsal (posterior); Right Hand 1 day              Telemetry:   Telemetry Orders (From admission, onward)             24 Hour Telemetry Monitoring  (ED Bridging Orders Panel)  Continuous x 24 Hours (Telem)           Question:  Reason for 24 Hour Telemetry  Answer:  PCI/EP study (including pacer and ICD implementation), Cardiac surgery, MI, abnormal cardiac cath, and chest pain- rule out MI                  Imaging:  Imaging Reports Reviewed Today Include:   XR pelvis ap only 1 or 2 vw    Result Date: 2023  Impression: Left hip replacement Workstation performed: [id number]     XR Trauma pelvis ap only 1 or 2 vw    Result Date: 6/22/2023  Impression: Displaced left subcapital fracture  Workstation performed: [id number]     XR Trauma chest portable    Result Date: 6/22/2023  Impression: No acute cardiopulmonary disease  Workstation performed: [id number]     TRAUMA - CT spine cervical wo contrast    Result Date: 6/22/2023  Impression: No cervical spine fracture or traumatic malalignment  Workstation performed: [id number]     TRAUMA - CT chest abdomen pelvis w contrast    Result Date: 6/22/2023  Impression: Displaced left subcapital fracture  Bilateral sacral insufficiency fractures  Chronic right pubic symphysis fracture  Subacute left 11th rib fracture  Workstation performed: [id number]     TRAUMA - CT head wo contrast    Result Date: 6/22/2023  Impression: No acute intracranial abnormality   Workstation performed: [id number]       Scheduled Meds:  Current Facility-Administered Medications   Medication Dose Route Frequency Provider Last Rate   • acetaminophen  975 mg Oral [facility] Armando Burk PA-C     • budesonide  0 5 mg Nebulization Q12H Armando Burk PA-C     • digoxin  125 mcg Oral Every Other Day Armando Burk PA-C     • diltiazem  240 mg Oral Daily Armando Burk PA-C     • famotidine  20 mg Oral QPM Armando Burk PA-C     • guaiFENesin  600 mg Oral BID Armando Burk PA-C     • ipratropium  0 5 mg Nebulization TID Armando Burk PA-C     • lactated ringers  1,000 mL Intravenous Once PRN Armando Burk PA-C      And   • lactated ringers  1,000 mL Intravenous Once PRN Armando Burk PA-C 1,000 mL (06/23/23 2041)   • lactated ringers  75 mL/hr Intravenous Continuous Armando Burk PA-C 75 mL/hr (06/24/23 1233)   • levalbuterol  1 25 mg Nebulization TID Armando Burk PA-C     • levothyroxine  25 mcg Oral Daily Armando Burk PA-C     • losartan  25 mg Oral Daily Armando Burk PA-C     • mirtazapine  7 5 mg Oral HS Armando Burk PA-C     • morphine injection  2 mg Intravenous Q6H PRN Armando Burk PA-C     • oxyCODONE  5 mg Oral Q6H PRN Armando Burk, SIGRID     • oxyCODONE  2 5 mg Oral Q6H PRN Burnis SIGRID Spivey     • predniSONE  40 mg Oral Daily Anushka Spivey PA-C     • sertraline  50 mg Oral Daily Burakis SIGRID Spivey     • sodium chloride  1,000 mL Intravenous Once PRN Anushka Spivey PA-C      And   • sodium chloride  1,000 mL Intravenous Once PRN Burnis Allyssa, PA-MEETA     • tiZANidine  2 mg Oral BID PRN Burnangely Spivey PA-C         Today, Patient Was Seen By: Patricia Duggan DO    ** Please Note: Dictation voice to text software may have been used in the creation of this document   **

## 2023-06-24 NOTE — PLAN OF CARE
Problem: MOBILITY - ADULT  Goal: Maintain or return to baseline ADL function  Description: INTERVENTIONS:  -  Assess patient's ability to carry out ADLs; assess patient's baseline for ADL function and identify physical deficits which impact ability to perform ADLs (bathing, care of mouth/teeth, toileting, grooming, dressing, etc )  - Assess/evaluate cause of self-care deficits   - Assess range of motion  - Assess patient's mobility; develop plan if impaired  - Assess patient's need for assistive devices and provide as appropriate  - Encourage maximum independence but intervene and supervise when necessary  - Involve family in performance of ADLs  - Assess for home care needs following discharge   - Consider OT consult to assist with ADL evaluation and planning for discharge  - Provide patient education as appropriate  Outcome: Progressing  Goal: Maintains/Returns to pre admission functional level  Description: INTERVENTIONS:  - Perform BMAT or MOVE assessment daily    - Set and communicate daily mobility goal to care team and patient/family/caregiver  - Collaborate with rehabilitation services on mobility goals if consulted  - Perform Range of Motion  times a day  - Reposition patient every  hours    - Dangle patient  times a day  - Stand patient  times a day  - Ambulate patient  times a day  - Out of bed to chair  times a day   - Out of bed for meals times a day  - Out of bed for toileting  - Record patient progress and toleration of activity level   Outcome: Progressing     Problem: PAIN - ADULT  Goal: Verbalizes/displays adequate comfort level or baseline comfort level  Description: Interventions:  - Encourage patient to monitor pain and request assistance  - Assess pain using appropriate pain scale  - Administer analgesics based on type and severity of pain and evaluate response  - Implement non-pharmacological measures as appropriate and evaluate response  - Consider cultural and social influences on pain and pain management  - Notify physician/advanced practitioner if interventions unsuccessful or patient reports new pain  Outcome: Progressing     Problem: INFECTION - ADULT  Goal: Absence or prevention of progression during hospitalization  Description: INTERVENTIONS:  - Assess and monitor for signs and symptoms of infection  - Monitor lab/diagnostic results  - Monitor all insertion sites, i e  indwelling lines, tubes, and drains  - Monitor endotracheal if appropriate and nasal secretions for changes in amount and color  - [place] appropriate cooling/warming therapies per order  - Administer medications as ordered  - Instruct and encourage patient and family to use good hand hygiene technique  - Identify and instruct in appropriate isolation precautions for identified infection/condition  Outcome: Progressing  Goal: Absence of fever/infection during neutropenic period  Description: INTERVENTIONS:  - Monitor WBC    Outcome: Progressing     Problem: SAFETY ADULT  Goal: Maintain or return to baseline ADL function  Description: INTERVENTIONS:  -  Assess patient's ability to carry out ADLs; assess patient's baseline for ADL function and identify physical deficits which impact ability to perform ADLs (bathing, care of mouth/teeth, toileting, grooming, dressing, etc )  - Assess/evaluate cause of self-care deficits   - Assess range of motion  - Assess patient's mobility; develop plan if impaired  - Assess patient's need for assistive devices and provide as appropriate  - Encourage maximum independence but intervene and supervise when necessary  - Involve family in performance of ADLs  - Assess for home care needs following discharge   - Consider OT consult to assist with ADL evaluation and planning for discharge  - Provide patient education as appropriate  Outcome: Progressing  Goal: Maintains/Returns to pre admission functional level  Description: INTERVENTIONS:  - Perform BMAT or MOVE assessment daily    - Set and communicate daily mobility goal to care team and patient/family/caregiver  - Collaborate with rehabilitation services on mobility goals if consulted  - Perform Range of Motion  times a day  - Reposition patient every  hours    - Dangle patient  times a day  - Stand patient  times a day  - Ambulate patient  times a day  - Out of bed to chair  times a day   - Out of bed for meals times a day  - Out of bed for toileting  - Record patient progress and toleration of activity level   Outcome: Progressing  Goal: Patient will remain free of falls  Description: INTERVENTIONS:  - Educate patient/family on patient safety including physical limitations  - Instruct patient to call for assistance with activity   - Consult OT/PT to assist with strengthening/mobility   - Keep Call bell within reach  - Keep bed low and locked with side rails adjusted as appropriate  - Keep care items and personal belongings within reach  - Initiate and maintain comfort rounds  - Make Fall Risk Sign visible to staff  - Offer Toileting every  Hours, in advance of need  - Initiate/Maintain alarm  - Obtain necessary fall risk management equipment:   - Apply yellow socks and bracelet for high fall risk patients  - Consider moving patient to room near nurses station  Outcome: Progressing     Problem: DISCHARGE PLANNING  Goal: Discharge to home or other facility with appropriate resources  Description: INTERVENTIONS:  - Identify barriers to discharge w/patient and caregiver  - Arrange for needed discharge resources and transportation as appropriate  - Identify discharge learning needs (meds, wound care, etc )  - Arrange for interpretive services to assist at discharge as needed  - Refer to Case Management Department for coordinating discharge planning if the patient needs post-hospital services based on physician/advanced practitioner order or complex needs related to functional status, cognitive ability, or social support system  Outcome: Progressing Problem: Knowledge Deficit  Goal: Patient/family/caregiver demonstrates understanding of disease process, treatment plan, medications, and discharge instructions  Description: Complete learning assessment and assess knowledge base  Interventions:  - Provide teaching at level of understanding  - Provide teaching via preferred learning methods  Outcome: Progressing     Problem: Prexisting or High Potential for Compromised Skin Integrity  Goal: Skin integrity is maintained or improved  Description: INTERVENTIONS:  - Identify patients at risk for skin breakdown  - Assess and monitor skin integrity  - Assess and monitor nutrition and hydration status  - Monitor labs   - Assess for incontinence   - Turn and reposition patient  - Assist with mobility/ambulation  - Relieve pressure over bony prominences  - Avoid friction and shearing  - Provide appropriate hygiene as needed including keeping skin clean and dry  - Evaluate need for skin moisturizer/barrier cream  - Collaborate with interdisciplinary team   - Patient/family teaching  - Consider wound care consult   Outcome: Progressing     Problem: Nutrition/Hydration-ADULT  Goal: Nutrient/Hydration intake appropriate for improving, restoring or maintaining nutritional needs  Description: Monitor and assess patient's nutrition/hydration status for malnutrition  Collaborate with interdisciplinary team and initiate plan and interventions as ordered  Monitor patient's weight and dietary intake as ordered or per policy  Utilize nutrition screening tool and intervene as necessary  Determine patient's food preferences and provide high-protein, high-caloric foods as appropriate       INTERVENTIONS:  - Monitor oral intake, urinary output, labs, and treatment plans  - Assess nutrition and hydration status and recommend course of action  - Evaluate amount of meals eaten  - Assist patient with eating if necessary   - Allow adequate time for meals  - Recommend/ encourage appropriate diets, oral nutritional supplements, and vitamin/mineral supplements  - Order, calculate, and assess calorie counts as needed  - Recommend, monitor, and adjust tube feedings and TPN/PPN based on assessed needs  - Assess need for intravenous fluids  - Provide specific nutrition/hydration education as appropriate  - Include patient/family/caregiver in decisions related to nutrition  Outcome: Progressing

## 2023-06-24 NOTE — PHYSICAL THERAPY NOTE
PHYSICAL THERAPY EVAL/TX  Physical Therapy Evaluation    Performed at least 2 patient identifiers during session:  Patient Active Problem List   Diagnosis    Pneumonia of right lower lobe due to infectious organism    COPD exacerbation ([address] )    Sepsis with acute renal failure without septic shock (HCC)    Hypertension    Hypercoagulable state ([address] )    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HCC)    Antiphospholipid antibody syndrome (HCC)    Atrial fibrillation (HCC)    Hereditary hemolytic anemia (HCC)    Asthma    Vitamin D deficiency    Osteoporosis    Low back pain without sciatica    Peripheral edema    Other specified hypothyroidism    Essential hypertension    Anxiety    Pulmonary nodule    Onychomycosis    Failure to thrive in adult    Severe protein-calorie malnutrition (HCC)    Unspecified mood (affective) disorder (HCC)    MDD with persistant bereavement complex    Abnormal CT of the chest    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HCC)    Thyroid nodule    Urinary retention    Abdominal distention/back pain    Cerebral aneurysm, nonruptured    UGI bleed    Gastroenteritis    Chronic respiratory failure with hypoxia and hypercapnia (HCC)    Bilateral pleural effusion    Aspiration pneumonia of right lung (HCC)    Fall    Elevated troponin       Past Medical History:   Diagnosis Date    Anti-phospholipid syndrome (HCC)     Asthma     Blood type O+     Cardiac disease     Chronic pain     COPD (chronic obstructive pulmonary disease) (HCC)     Fracture of ankle     left    Hyperlipidemia     Hypertension     Hypokalemia 2/27/2023    Osteoporosis        Past Surgical History:   Procedure Laterality Date    APPENDECTOMY      BILATERAL OOPHORECTOMY Bilateral     ORIF TIBIAL SHAFT FRACTURE W/ PLATES AND SCREWS Right           06/24/23 0910   PT Last Visit   PT Visit Date 06/24/23   Note Type   Note type Evaluation   Pain Assessment   Pain Assessment Tool 0-10 "  Pain Score 1  (Pain increased to a 6/10 with movement)   Pain Location/Orientation Orientation: Left; Location: Hip   Hospital Pain Intervention(s) Repositioned   Restrictions/Precautions   Weight Bearing Precautions Per Order Yes   LLE Weight Bearing Per Order WBAT   Other Precautions Hard of hearing;Pain; Fall Risk;O2;Telemetry;Multiple lines;THR;WBS; Bed Alarm   Home Living   Type of Home House  (Bi-level)   Home Layout Multi-level  (7+2+7 steps inside the home)   Home Equipment Walker;Cane   Additional Comments Has been using the RW   Prior Function   Level of [place] Independent with ADLs; Independent with functional mobility; Needs assistance with IADLS   Lives With DILSHAD [address] 89 in the last 6 months 1 to 4   Comments Per patient, son checks in daily via phone or visit  General   Additional Pertinent History (S)  Patient with [facility]  Family/Caregiver Present No   Cognition   Overall Cognitive Status WFL   Arousal/Participation Alert   Orientation Level Oriented to person;Oriented to place;Oriented to time;Oriented to situation   Memory Within functional limits   Following Commands Follows one step commands without difficulty   Comments Hard of hearing   Subjective   Subjective \"Can I fast forward to 6 months? \"   RLE Assessment   RLE Assessment WFL   LLE Assessment   LLE Assessment   (Limited assessment due to pain )   Light Touch   RLE Light Touch Grossly intact   LLE Light Touch Grossly intact   Bed Mobility   Supine to Sit 3  Moderate assistance   Additional items Assist x 1;HOB elevated; Bedrails; Increased time required;Verbal cues;LE management   Additional Comments Patient was able to sit edge of bed for approx 2-3 minutes  Supervision level  Heavy posterior lean  BP checked in supine and sitting stable  However, patient reporting nausea and lightheadedness    (Refer to treatment session for additional mobility )   Balance   Static Sitting Fair + " Dynamic Sitting Fair   Endurance Deficit   Endurance Deficit Yes   Endurance Deficit Description Limited by pain, nausea   Activity Tolerance   Activity Tolerance Patient limited by fatigue;Patient limited by pain;Treatment limited secondary to medical complications (Comment)   Nurse Made Aware Antoinette OCTA   Assessment   Prognosis Guarded   Problem List Decreased strength;Decreased endurance;Decreased range of motion; Impaired balance;Decreased mobility; Impaired hearing;Orthopedic restrictions;Pain   Assessment Patient is an 82y/o F POD 1 L hip hemiarthroplasty  Fall at home resulting in L subcapital fracture  Also with B/L sacral fx's, subacute 11th rib fx on the L  Patient with recent stay at SNF  Has been home since 6/17  Patient resides alone in a bi-level home with steps inside  She is normally independent with a RW  Current medical status includes telemetry, multiple lines, total hip precautions, WBAT, fall risk, bed alarm, nausea, lightheadedness, hard of hearing, decreased ROM, strength, balance, endurance and mobility  Patient was thoroughly educated on hip precautions and weight bearing status  She required assistance for bed mobility  She tolerated sitting edge of bed for a short time before becoming lightheaded and nauseous  BP stable  Patient is limited by pain  She is not at her functional baseline and level 2 is recommended  The patient's AM-PAC Basic Mobility Inpatient Short Form Raw Score is 8  A Raw score of less than or equal to 17 suggests the patient may benefit from discharge to post-acute rehabilitation services  Please also refer to the recommendation of the Physical Therapist for safe discharge planning  Barriers to Discharge Inaccessible home environment;Decreased caregiver support   Barriers to Discharge Comments Lives alone, steps inside the home  Goals   Patient Goals To have less pain   STG Expiration Date 07/08/23   Short Term Goal #1 1   Perform supine<>sit with HOB flat without the use of bedrails ind 2  Perform sit<>stand transfers with min A x 1 3  Ambulate 25ft with a RW with min A x 1   Short Term Goal #2 4  Recall Posterior hip precautions 100% of the time  PT Treatment Day 1   Plan   Treatment/Interventions Functional transfer training;LE strengthening/ROM; Elevations; Therapeutic exercise; Endurance training;Patient/family training;Equipment eval/education; Bed mobility;Gait training;Spoke to nursing;Spoke to case management   PT Frequency 4-6x/wk   Recommendation   UB Rehab Discharge Recommendation (PT/OT) Level 2   AM-PAC Basic Mobility Inpatient   Turning in Flat Bed Without Bedrails 2   Lying on Back to Sitting on Edge of Flat Bed Without Bedrails 2   Moving Bed to Chair 1   Standing Up From Chair Using Arms 1   Walk in Room 1   Climb 3-5 Stairs With Railing 1   Basic Mobility Inpatient Raw Score 8   Turning Head Towards Sound 4   Follow Simple Instructions 3   Low Function Basic Mobility Raw Score  15   Low Function Basic Mobility Standardized Score  23 9   Highest Level Of Mobility   JH-HLM Goal 3: Sit at edge of bed   JH-HLM Achieved 3: Sit at edge of bed   Additional Treatment Session   Start Time 0928   End Time 0938   Treatment Assessment Sit to supine with mod A x 1  Assistance for LE's  Verbal cues for technique  Max A to move towards [address] while in supine position  Bed placed in trendelenburg  Abduction pillow in place  End of Consult   Patient Position at End of Consult Supine;Bed/Chair alarm activated; All needs within reach     Jacque Martinez, PT             Patient Name: Gaston Morel  UADYF'G Date: 6/24/2023

## 2023-06-24 NOTE — PROGRESS NOTES
"  Austyn Albarado  79 y o   female  MR#: [id number]  6/24/2023    Post-op days: 1  Extremity: LLE    Subjective: Patient seen and examined at bedside  Patient states that she attempted to get out of bed this morning was unable to make it to the corner of the bed  She complains of pain in the left hip  Denies numbness or tingling left lower extremity  Denies fevers or chills  Vitals:   Vitals:    06/24/23 0928   BP: 125/53   Pulse:    Resp:    Temp:    SpO2:        Exam:   General: NAD  Left lower extremity:  Dressing clean dry and intact  Compartment soft and compressible  Gross motor intact TA/EHL/GSC/peroneals  SI LT DP/SP/saphenous/sural/tibial  Less than 2 seconds cap refill    X-rays:  AP pelvis: Status post cemented left hip hemiarthroplasty, components in good position, no fracture or dislocation  Labs:   WBC   Recent Labs     06/22/23  1105 06/23/23  0510 06/24/23  0339   WBC 11 67* 8 96 11 39*     H/H   Recent Labs     06/22/23  1105 06/23/23  0510 06/24/23  0339   HGB 10 8* 11 0* 9 0*   /  Recent Labs     06/22/23  1105 06/23/23  0510 06/24/23  0339   HCT 34 2* 34 1* 28 8*     Sed Rate No results for input(s): \"SEDRATE\" in the last 72 hours  CRP No results for input(s): \"CRP\" in the last 72 hours      Assessment:   59-year-old female status post left hip hemiarthroplasty 6/24    Plan:   Pain control  Perioperative antibiotics x24 hours  DVT prophylaxis: Bridge back to Coumadin  PT/OT  Weight-bear as tolerated  Posterior precautions  Greater than 2 g drop in hemoglobin is associated with acute blood loss anemia  Follow-up with Dr Nolan Mccabe in 10 to 14 days    "

## 2023-06-24 NOTE — CASE MANAGEMENT
Case Management Discharge Planning Note    Patient name Oscar Givens  Location /-54 MRN [id number]  : 1940 Date 2023       Current Admission Date: 2023  Current Admission Diagnosis:Fall   Patient Active Problem List    Diagnosis Date Noted   • Fall 2023   • Elevated troponin 2023   • Aspiration pneumonia of right lung ([address] ) 2023   • Bilateral pleural effusion 2023   • Chronic respiratory failure with hypoxia and hypercapnia ([address] ) 2023   • UGI bleed 2023   • Gastroenteritis 2023   • Cerebral aneurysm, nonruptured 2023   • Abdominal distention/back pain 2023   • Urinary retention 2023   • MDD with persistant bereavement complex 2023   • Abnormal CT of the chest 2023   • 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[address] ) 2023   • Thyroid nodule 2023   • Severe protein-calorie malnutrition ([address] ) 2023   • Unspecified mood (affective) disorder ([facility] ) 2023   • Failure to thrive in adult 2023   • Pulmonary nodule 02/15/2023   • Onychomycosis 02/15/2023   • Essential hypertension 02/10/2023   • Anxiety 02/10/2023   • Other specified hypothyroidism 2022   • Peripheral edema 2021   • Low back pain without sciatica 2021   • Pneumonia of right lower lobe due to infectious organism 2017   • COPD exacerbation ([address] ) 2017   • Sepsis with acute renal failure without septic shock ([facility] ) 2017   • Hypertension 2017   • Hypercoagulable state ([address] ) 2017   • 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   • Vitamin D deficiency 2016   • Osteoporosis 2015   • Antiphospholipid antibody syndrome ([facility] ) 2012   • Atrial fibrillation ([address] ) 2012   • Hereditary hemolytic anemia ([facility] ) 2012   • Asthma 2012      LOS (days): 2  Geometric Mean LOS (GMLOS) (days): 4 00  Days to GMLOS:2     OBJECTIVE:  Risk of Unplanned Readmission Score: 33 65      Current admission status: Inpatient   Preferred Pharmacy:   [address] [place] [address] #[id number] - [id number] [facility] [address]  Phone: [phone] Fax: Jake Good , [facility] [address]  Phone: [phone] Fax: [phone]    Primary Care Provider: Shabana Marcos MD    Primary Insurance: MEDICARE  Secondary Insurance: BLUE CROSS    DISCHARGE DETAILS:    Discharge planning discussed with[de-identified] pt and her son  Freedom of Choice: Yes     CM contacted family/caregiver?: Yes  Were Treatment Team discharge recommendations reviewed with patient/caregiver?: Yes  Did patient/caregiver verbalize understanding of patient care needs?: Yes  Were patient/caregiver advised of the risks associated with not following Treatment Team discharge recommendations?: Yes    Contacts  Patient Contacts: son-Solomon  Relationship to Patient[de-identified] Family  Contact Method: Phone  Phone Number: [phone]  Reason/Outcome: Continuity of Care, Discharge [address]         Is the patient interested in Nila Lam at discharge?: No    DME Referral Provided  Referral made for DME?: No    Other Referral/Resources/Interventions Provided:  Interventions: Short Term Rehab    Treatment Team Recommendation: Short Term Rehab  Discharge Destination Plan[de-identified] Short Term Rehab       Additional Comments: Pt recommended for STR again  Pt and her son are agreeable  Referrals placed in [place]

## 2023-06-24 NOTE — ASSESSMENT & PLAN NOTE
Background: History of COPD atrial fibrillation hypertension presents for a fall found to have left femur and rib fractures  · Underwent ORIF 6/23/2023 doing well   · Warfarin on hold, will restart  · PT/OT recommending rehab  Patient agreeable

## 2023-06-24 NOTE — PLAN OF CARE
Problem: MOBILITY - ADULT  Goal: Maintain or return to baseline ADL function  Description: INTERVENTIONS:  -  Assess patient's ability to carry out ADLs; assess patient's baseline for ADL function and identify physical deficits which impact ability to perform ADLs (bathing, care of mouth/teeth, toileting, grooming, dressing, etc )  - Assess/evaluate cause of self-care deficits   - Assess range of motion  - Assess patient's mobility; develop plan if impaired  - Assess patient's need for assistive devices and provide as appropriate  - Encourage maximum independence but intervene and supervise when necessary  - Involve family in performance of ADLs  - Assess for home care needs following discharge   - Consider OT consult to assist with ADL evaluation and planning for discharge  - Provide patient education as appropriate  Outcome: Progressing  Goal: Maintains/Returns to pre admission functional level  Description: INTERVENTIONS:  - Perform BMAT or MOVE assessment daily    - Set and communicate daily mobility goal to care team and patient/family/caregiver  - Collaborate with rehabilitation services on mobility goals if consulted  - Perform Range of Motion  times a day  - Reposition patient every  hours    - Dangle patient  times a day  - Stand patient  times a day  - Ambulate patient  times a day  - Out of bed to chair  times a day   - Out of bed for meals times a day  - Out of bed for toileting  - Record patient progress and toleration of activity level   Outcome: Progressing     Problem: PAIN - ADULT  Goal: Verbalizes/displays adequate comfort level or baseline comfort level  Description: Interventions:  - Encourage patient to monitor pain and request assistance  - Assess pain using appropriate pain scale  - Administer analgesics based on type and severity of pain and evaluate response  - Implement non-pharmacological measures as appropriate and evaluate response  - Consider cultural and social influences on pain and pain management  - Notify physician/advanced practitioner if interventions unsuccessful or patient reports new pain  Outcome: Progressing     Problem: INFECTION - ADULT  Goal: Absence or prevention of progression during hospitalization  Description: INTERVENTIONS:  - Assess and monitor for signs and symptoms of infection  - Monitor lab/diagnostic results  - Monitor all insertion sites, i e  indwelling lines, tubes, and drains  - Monitor endotracheal if appropriate and nasal secretions for changes in amount and color  - [place] appropriate cooling/warming therapies per order  - Administer medications as ordered  - Instruct and encourage patient and family to use good hand hygiene technique  - Identify and instruct in appropriate isolation precautions for identified infection/condition  Outcome: Progressing  Goal: Absence of fever/infection during neutropenic period  Description: INTERVENTIONS:  - Monitor WBC    Outcome: Progressing     Problem: SAFETY ADULT  Goal: Maintain or return to baseline ADL function  Description: INTERVENTIONS:  -  Assess patient's ability to carry out ADLs; assess patient's baseline for ADL function and identify physical deficits which impact ability to perform ADLs (bathing, care of mouth/teeth, toileting, grooming, dressing, etc )  - Assess/evaluate cause of self-care deficits   - Assess range of motion  - Assess patient's mobility; develop plan if impaired  - Assess patient's need for assistive devices and provide as appropriate  - Encourage maximum independence but intervene and supervise when necessary  - Involve family in performance of ADLs  - Assess for home care needs following discharge   - Consider OT consult to assist with ADL evaluation and planning for discharge  - Provide patient education as appropriate  Outcome: Progressing  Goal: Maintains/Returns to pre admission functional level  Description: INTERVENTIONS:  - Perform BMAT or MOVE assessment daily    - Set and communicate daily mobility goal to care team and patient/family/caregiver  - Collaborate with rehabilitation services on mobility goals if consulted  - Perform Range of Motion  times a day  - Reposition patient every  hours    - Dangle patient  times a day  - Stand patient  times a day  - Ambulate patient  times a day  - Out of bed to chair  times a day   - Out of bed for meals times a day  - Out of bed for toileting  - Record patient progress and toleration of activity level   Outcome: Progressing  Goal: Patient will remain free of falls  Description: INTERVENTIONS:  - Educate patient/family on patient safety including physical limitations  - Instruct patient to call for assistance with activity   - Consult OT/PT to assist with strengthening/mobility   - Keep Call bell within reach  - Keep bed low and locked with side rails adjusted as appropriate  - Keep care items and personal belongings within reach  - Initiate and maintain comfort rounds  - Make Fall Risk Sign visible to staff  - Offer Toileting every  Hours, in advance of need  - Initiate/Maintain alarm  - Obtain necessary fall risk management equipment:   - Apply yellow socks and bracelet for high fall risk patients  - Consider moving patient to room near nurses station  Outcome: Progressing     Problem: DISCHARGE PLANNING  Goal: Discharge to home or other facility with appropriate resources  Description: INTERVENTIONS:  - Identify barriers to discharge w/patient and caregiver  - Arrange for needed discharge resources and transportation as appropriate  - Identify discharge learning needs (meds, wound care, etc )  - Arrange for interpretive services to assist at discharge as needed  - Refer to Case Management Department for coordinating discharge planning if the patient needs post-hospital services based on physician/advanced practitioner order or complex needs related to functional status, cognitive ability, or social support system  Outcome: Progressing Problem: Knowledge Deficit  Goal: Patient/family/caregiver demonstrates understanding of disease process, treatment plan, medications, and discharge instructions  Description: Complete learning assessment and assess knowledge base  Interventions:  - Provide teaching at level of understanding  - Provide teaching via preferred learning methods  Outcome: Progressing     Problem: Prexisting or High Potential for Compromised Skin Integrity  Goal: Skin integrity is maintained or improved  Description: INTERVENTIONS:  - Identify patients at risk for skin breakdown  - Assess and monitor skin integrity  - Assess and monitor nutrition and hydration status  - Monitor labs   - Assess for incontinence   - Turn and reposition patient  - Assist with mobility/ambulation  - Relieve pressure over bony prominences  - Avoid friction and shearing  - Provide appropriate hygiene as needed including keeping skin clean and dry  - Evaluate need for skin moisturizer/barrier cream  - Collaborate with interdisciplinary team   - Patient/family teaching  - Consider wound care consult   Outcome: Progressing     Problem: Nutrition/Hydration-ADULT  Goal: Nutrient/Hydration intake appropriate for improving, restoring or maintaining nutritional needs  Description: Monitor and assess patient's nutrition/hydration status for malnutrition  Collaborate with interdisciplinary team and initiate plan and interventions as ordered  Monitor patient's weight and dietary intake as ordered or per policy  Utilize nutrition screening tool and intervene as necessary  Determine patient's food preferences and provide high-protein, high-caloric foods as appropriate       INTERVENTIONS:  - Monitor oral intake, urinary output, labs, and treatment plans  - Assess nutrition and hydration status and recommend course of action  - Evaluate amount of meals eaten  - Assist patient with eating if necessary   - Allow adequate time for meals  - Recommend/ encourage appropriate diets, oral nutritional supplements, and vitamin/mineral supplements  - Order, calculate, and assess calorie counts as needed  - Recommend, monitor, and adjust tube feedings and TPN/PPN based on assessed needs  - Assess need for intravenous fluids  - Provide specific nutrition/hydration education as appropriate  - Include patient/family/caregiver in decisions related to nutrition  Outcome: Progressing

## 2023-06-24 NOTE — ASSESSMENT & PLAN NOTE
· Paroxysmal atrial fibrillation on diltiazem and digoxin    · Warfarin on hold for surgery will restart tonight    Results from last 7 days   Lab Units 06/24/23  0339 06/23/23  0510 06/22/23  1224 06/20/23  0000   INR  1 55* 1 21* 2 19* 1 50*

## 2023-06-24 NOTE — PLAN OF CARE
Problem: PHYSICAL THERAPY ADULT  Goal: Performs mobility at highest level of function for planned discharge setting  See evaluation for individualized goals  Description:                                                                  See flowsheet documentation for full assessment, interventions and recommendations  Note: Prognosis: Guarded  Problem List: Decreased strength, Decreased endurance, Decreased range of motion, Impaired balance, Decreased mobility, Impaired hearing, Orthopedic restrictions, Pain  Assessment: Patient is an 82y/o F POD 1 L hip hemiarthroplasty  Fall at home resulting in L subcapital fracture  Also with B/L sacral fx's, subacute 11th rib fx on the L  Patient with recent stay at SNF  Has been home since 6/17  Patient resides alone in a bi-level home with steps inside  She is normally independent with a RW  Current medical status includes telemetry, multiple lines, total hip precautions, WBAT, fall risk, bed alarm, nausea, lightheadedness, hard of hearing, decreased ROM, strength, balance, endurance and mobility  Patient was thoroughly educated on hip precautions and weight bearing status  She required assistance for bed mobility  She tolerated sitting edge of bed for a short time before becoming lightheaded and nauseous  BP stable  Patient is limited by pain  She is not at her functional baseline and level 2 is recommended  The patient's AM-PAC Basic Mobility Inpatient Short Form Raw Score is 8  A Raw score of less than or equal to 17 suggests the patient may benefit from discharge to post-acute rehabilitation services  Please also refer to the recommendation of the Physical Therapist for safe discharge planning  Barriers to Discharge: Inaccessible home environment, Decreased caregiver support  Barriers to Discharge Comments: Lives alone, steps inside the home  See flowsheet documentation for full assessment

## 2023-06-24 NOTE — ASSESSMENT & PLAN NOTE
· Mild COPD exacerbation on admission  · Now stable  Continue bronchodilators  · Systemic steroids: Was on IV methylprednisolone for 2 days    Will continue 3 days total of prednisone for 5-day course

## 2023-06-24 NOTE — ASSESSMENT & PLAN NOTE
· Non-MI elevation of troponin secondary to fall    Lab Results   Component Value Date    HSTNI0 113 (H) 06/22/2023    HSTNI2 202 (H) 06/22/2023    HSTNI4 236 (H) 06/22/2023

## 2023-06-25 LAB
ANION GAP SERPL CALCULATED.3IONS-SCNC: 3 MMOL/L
BLOOD GROUP ANTIBODIES SERPL: NORMAL
BLOOD GROUP ANTIBODIES SERPL: NORMAL
BUN SERPL-MCNC: 21 MG/DL (ref 5–25)
CALCIUM SERPL-MCNC: 8.6 MG/DL (ref 8.4–10.2)
CHLORIDE SERPL-SCNC: 101 MMOL/L (ref 96–108)
CO2 SERPL-SCNC: 35 MMOL/L (ref 21–32)
CREAT SERPL-MCNC: 0.51 MG/DL (ref 0.6–1.3)
ERYTHROCYTE [DISTWIDTH] IN BLOOD BY AUTOMATED COUNT: 13.7 % (ref 11.6–15.1)
GFR SERPL CREATININE-BSD FRML MDRD: 88 ML/MIN/1.73SQ M
GLUCOSE SERPL-MCNC: 146 MG/DL (ref 65–140)
HCT VFR BLD AUTO: 27.9 % (ref 34.8–46.1)
HGB BLD-MCNC: 8.9 G/DL (ref 11.5–15.4)
INR PPP: 2.43 (ref 0.84–1.19)
MCH RBC QN AUTO: 32.8 PG (ref 26.8–34.3)
MCHC RBC AUTO-ENTMCNC: 31.9 G/DL (ref 31.4–37.4)
MCV RBC AUTO: 103 FL (ref 82–98)
MRSA NOSE QL CULT: NORMAL
PLATELET # BLD AUTO: 268 THOUSANDS/UL (ref 149–390)
PMV BLD AUTO: 9 FL (ref 8.9–12.7)
POTASSIUM SERPL-SCNC: 4.5 MMOL/L (ref 3.5–5.3)
PROTHROMBIN TIME: 27.7 SECONDS (ref 11.6–14.5)
RBC # BLD AUTO: 2.71 MILLION/UL (ref 3.81–5.12)
SODIUM SERPL-SCNC: 139 MMOL/L (ref 135–147)
WBC # BLD AUTO: 8.42 THOUSAND/UL (ref 4.31–10.16)

## 2023-06-25 PROCEDURE — 80048 BASIC METABOLIC PNL TOTAL CA: CPT | Performed by: INTERNAL MEDICINE

## 2023-06-25 PROCEDURE — 94760 N-INVAS EAR/PLS OXIMETRY 1: CPT

## 2023-06-25 PROCEDURE — 94640 AIRWAY INHALATION TREATMENT: CPT

## 2023-06-25 PROCEDURE — 85027 COMPLETE CBC AUTOMATED: CPT | Performed by: INTERNAL MEDICINE

## 2023-06-25 PROCEDURE — 85610 PROTHROMBIN TIME: CPT | Performed by: INTERNAL MEDICINE

## 2023-06-25 PROCEDURE — 99232 SBSQ HOSP IP/OBS MODERATE 35: CPT | Performed by: INTERNAL MEDICINE

## 2023-06-25 PROCEDURE — 99024 POSTOP FOLLOW-UP VISIT: CPT | Performed by: PHYSICIAN ASSISTANT

## 2023-06-25 RX ADMIN — FAMOTIDINE 20 MG: 20 TABLET, FILM COATED ORAL at 17:25

## 2023-06-25 RX ADMIN — OXYCODONE HYDROCHLORIDE 5 MG: 5 TABLET ORAL at 21:19

## 2023-06-25 RX ADMIN — LEVALBUTEROL HYDROCHLORIDE 1.25 MG: 1.25 SOLUTION RESPIRATORY (INHALATION) at 19:36

## 2023-06-25 RX ADMIN — IPRATROPIUM BROMIDE 0.5 MG: 0.5 SOLUTION RESPIRATORY (INHALATION) at 19:35

## 2023-06-25 RX ADMIN — BUDESONIDE 0.5 MG: 0.5 INHALANT RESPIRATORY (INHALATION) at 19:36

## 2023-06-25 RX ADMIN — GUAIFENESIN 600 MG: 600 TABLET ORAL at 17:25

## 2023-06-25 RX ADMIN — ACETAMINOPHEN 975 MG: 325 TABLET, FILM COATED ORAL at 21:19

## 2023-06-25 RX ADMIN — ACETAMINOPHEN 975 MG: 325 TABLET, FILM COATED ORAL at 05:12

## 2023-06-25 RX ADMIN — BUDESONIDE 0.5 MG: 0.5 INHALANT RESPIRATORY (INHALATION) at 07:53

## 2023-06-25 RX ADMIN — MIRTAZAPINE 7.5 MG: 15 TABLET, FILM COATED ORAL at 21:20

## 2023-06-25 RX ADMIN — LEVALBUTEROL HYDROCHLORIDE 1.25 MG: 1.25 SOLUTION RESPIRATORY (INHALATION) at 07:53

## 2023-06-25 RX ADMIN — WARFARIN SODIUM 2.5 MG: 2.5 TABLET ORAL at 17:25

## 2023-06-25 RX ADMIN — SERTRALINE 50 MG: 50 TABLET, FILM COATED ORAL at 09:16

## 2023-06-25 RX ADMIN — DILTIAZEM HYDROCHLORIDE 240 MG: 240 CAPSULE, COATED, EXTENDED RELEASE ORAL at 09:16

## 2023-06-25 RX ADMIN — LEVALBUTEROL HYDROCHLORIDE 1.25 MG: 1.25 SOLUTION RESPIRATORY (INHALATION) at 14:28

## 2023-06-25 RX ADMIN — ACETAMINOPHEN 975 MG: 325 TABLET, FILM COATED ORAL at 13:24

## 2023-06-25 RX ADMIN — GUAIFENESIN 600 MG: 600 TABLET ORAL at 09:15

## 2023-06-25 RX ADMIN — LOSARTAN POTASSIUM 25 MG: 25 TABLET, FILM COATED ORAL at 09:15

## 2023-06-25 RX ADMIN — LEVOTHYROXINE SODIUM 25 MCG: 25 TABLET ORAL at 05:12

## 2023-06-25 RX ADMIN — IPRATROPIUM BROMIDE 0.5 MG: 0.5 SOLUTION RESPIRATORY (INHALATION) at 14:28

## 2023-06-25 RX ADMIN — PREDNISONE 40 MG: 20 TABLET ORAL at 09:16

## 2023-06-25 RX ADMIN — IPRATROPIUM BROMIDE 0.5 MG: 0.5 SOLUTION RESPIRATORY (INHALATION) at 07:53

## 2023-06-25 NOTE — ASSESSMENT & PLAN NOTE
· Paroxysmal atrial fibrillation on diltiazem and digoxin    · Warfarin was on hold for surgery has been restarted at alternating 2 5/5 mg    Results from last 7 days   Lab Units 06/25/23  0429 06/24/23  0339 06/23/23  0510 06/22/23  1224   INR  2 43* 1 55* 1 21* 2 19*

## 2023-06-25 NOTE — ASSESSMENT & PLAN NOTE
· Mild COPD exacerbation on admission  · Now stable  Continue bronchodilators  · Systemic steroids: Was on IV methylprednisolone for 2 days  Continuing 3 days total of prednisone for 5-day course  · Oxygen: Currently on 4 L  Tried to wean down to 2 L but then patient became hypoxic    Advised continued use of incentive spirometer

## 2023-06-25 NOTE — ASSESSMENT & PLAN NOTE
Background: History of COPD atrial fibrillation hypertension presents for a fall found to have left femur and rib fractures  · Underwent ORIF 6/23/2023 doing well   · Warfarin was on hold and has been restarted  · PT/OT recommending rehab    Patient agreeable and awaiting placement

## 2023-06-25 NOTE — PLAN OF CARE
Problem: MOBILITY - ADULT  Goal: Maintain or return to baseline ADL function  Description: INTERVENTIONS:  -  Assess patient's ability to carry out ADLs; assess patient's baseline for ADL function and identify physical deficits which impact ability to perform ADLs (bathing, care of mouth/teeth, toileting, grooming, dressing, etc )  - Assess/evaluate cause of self-care deficits   - Assess range of motion  - Assess patient's mobility; develop plan if impaired  - Assess patient's need for assistive devices and provide as appropriate  - Encourage maximum independence but intervene and supervise when necessary  - Involve family in performance of ADLs  - Assess for home care needs following discharge   - Consider OT consult to assist with ADL evaluation and planning for discharge  - Provide patient education as appropriate  Outcome: Progressing  Goal: Maintains/Returns to pre admission functional level  Description: INTERVENTIONS:  - Perform BMAT or MOVE assessment daily    - Set and communicate daily mobility goal to care team and patient/family/caregiver  - Collaborate with rehabilitation services on mobility goals if consulted  - Perform Range of Motion  times a day  - Reposition patient every  hours    - Dangle patient  times a day  - Stand patient  times a day  - Ambulate patient  times a day  - Out of bed to chair  times a day   - Out of bed for meals times a day  - Out of bed for toileting  - Record patient progress and toleration of activity level   Outcome: Progressing     Problem: PAIN - ADULT  Goal: Verbalizes/displays adequate comfort level or baseline comfort level  Description: Interventions:  - Encourage patient to monitor pain and request assistance  - Assess pain using appropriate pain scale  - Administer analgesics based on type and severity of pain and evaluate response  - Implement non-pharmacological measures as appropriate and evaluate response  - Consider cultural and social influences on pain and pain management  - Notify physician/advanced practitioner if interventions unsuccessful or patient reports new pain  Outcome: Progressing     Problem: INFECTION - ADULT  Goal: Absence or prevention of progression during hospitalization  Description: INTERVENTIONS:  - Assess and monitor for signs and symptoms of infection  - Monitor lab/diagnostic results  - Monitor all insertion sites, i e  indwelling lines, tubes, and drains  - Monitor endotracheal if appropriate and nasal secretions for changes in amount and color  - [place] appropriate cooling/warming therapies per order  - Administer medications as ordered  - Instruct and encourage patient and family to use good hand hygiene technique  - Identify and instruct in appropriate isolation precautions for identified infection/condition  Outcome: Progressing  Goal: Absence of fever/infection during neutropenic period  Description: INTERVENTIONS:  - Monitor WBC    Outcome: Progressing     Problem: SAFETY ADULT  Goal: Maintain or return to baseline ADL function  Description: INTERVENTIONS:  -  Assess patient's ability to carry out ADLs; assess patient's baseline for ADL function and identify physical deficits which impact ability to perform ADLs (bathing, care of mouth/teeth, toileting, grooming, dressing, etc )  - Assess/evaluate cause of self-care deficits   - Assess range of motion  - Assess patient's mobility; develop plan if impaired  - Assess patient's need for assistive devices and provide as appropriate  - Encourage maximum independence but intervene and supervise when necessary  - Involve family in performance of ADLs  - Assess for home care needs following discharge   - Consider OT consult to assist with ADL evaluation and planning for discharge  - Provide patient education as appropriate  Outcome: Progressing  Goal: Maintains/Returns to pre admission functional level  Description: INTERVENTIONS:  - Perform BMAT or MOVE assessment daily    - Set and communicate daily mobility goal to care team and patient/family/caregiver  - Collaborate with rehabilitation services on mobility goals if consulted  - Perform Range of Motion  times a day  - Reposition patient every  hours    - Dangle patient  times a day  - Stand patient  times a day  - Ambulate patient  times a day  - Out of bed to chair  times a day   - Out of bed for meals times a day  - Out of bed for toileting  - Record patient progress and toleration of activity level   Outcome: Progressing  Goal: Patient will remain free of falls  Description: INTERVENTIONS:  - Educate patient/family on patient safety including physical limitations  - Instruct patient to call for assistance with activity   - Consult OT/PT to assist with strengthening/mobility   - Keep Call bell within reach  - Keep bed low and locked with side rails adjusted as appropriate  - Keep care items and personal belongings within reach  - Initiate and maintain comfort rounds  - Make Fall Risk Sign visible to staff  - Offer Toileting every  Hours, in advance of need  - Initiate/Maintain alarm  - Obtain necessary fall risk management equipment:   - Apply yellow socks and bracelet for high fall risk patients  - Consider moving patient to room near nurses station  Outcome: Progressing     Problem: DISCHARGE PLANNING  Goal: Discharge to home or other facility with appropriate resources  Description: INTERVENTIONS:  - Identify barriers to discharge w/patient and caregiver  - Arrange for needed discharge resources and transportation as appropriate  - Identify discharge learning needs (meds, wound care, etc )  - Arrange for interpretive services to assist at discharge as needed  - Refer to Case Management Department for coordinating discharge planning if the patient needs post-hospital services based on physician/advanced practitioner order or complex needs related to functional status, cognitive ability, or social support system  Outcome: Progressing Problem: Knowledge Deficit  Goal: Patient/family/caregiver demonstrates understanding of disease process, treatment plan, medications, and discharge instructions  Description: Complete learning assessment and assess knowledge base  Interventions:  - Provide teaching at level of understanding  - Provide teaching via preferred learning methods  Outcome: Progressing     Problem: Prexisting or High Potential for Compromised Skin Integrity  Goal: Skin integrity is maintained or improved  Description: INTERVENTIONS:  - Identify patients at risk for skin breakdown  - Assess and monitor skin integrity  - Assess and monitor nutrition and hydration status  - Monitor labs   - Assess for incontinence   - Turn and reposition patient  - Assist with mobility/ambulation  - Relieve pressure over bony prominences  - Avoid friction and shearing  - Provide appropriate hygiene as needed including keeping skin clean and dry  - Evaluate need for skin moisturizer/barrier cream  - Collaborate with interdisciplinary team   - Patient/family teaching  - Consider wound care consult   Outcome: Progressing     Problem: Nutrition/Hydration-ADULT  Goal: Nutrient/Hydration intake appropriate for improving, restoring or maintaining nutritional needs  Description: Monitor and assess patient's nutrition/hydration status for malnutrition  Collaborate with interdisciplinary team and initiate plan and interventions as ordered  Monitor patient's weight and dietary intake as ordered or per policy  Utilize nutrition screening tool and intervene as necessary  Determine patient's food preferences and provide high-protein, high-caloric foods as appropriate       INTERVENTIONS:  - Monitor oral intake, urinary output, labs, and treatment plans  - Assess nutrition and hydration status and recommend course of action  - Evaluate amount of meals eaten  - Assist patient with eating if necessary   - Allow adequate time for meals  - Recommend/ encourage appropriate diets, oral nutritional supplements, and vitamin/mineral supplements  - Order, calculate, and assess calorie counts as needed  - Recommend, monitor, and adjust tube feedings and TPN/PPN based on assessed needs  - Assess need for intravenous fluids  - Provide specific nutrition/hydration education as appropriate  - Include patient/family/caregiver in decisions related to nutrition  Outcome: Progressing

## 2023-06-25 NOTE — PLAN OF CARE
Problem: MOBILITY - ADULT  Goal: Maintain or return to baseline ADL function  Description: INTERVENTIONS:  -  Assess patient's ability to carry out ADLs; assess patient's baseline for ADL function and identify physical deficits which impact ability to perform ADLs (bathing, care of mouth/teeth, toileting, grooming, dressing, etc )  - Assess/evaluate cause of self-care deficits   - Assess range of motion  - Assess patient's mobility; develop plan if impaired  - Assess patient's need for assistive devices and provide as appropriate  - Encourage maximum independence but intervene and supervise when necessary  - Involve family in performance of ADLs  - Assess for home care needs following discharge   - Consider OT consult to assist with ADL evaluation and planning for discharge  - Provide patient education as appropriate  Outcome: Progressing  Goal: Maintains/Returns to pre admission functional level  Description: INTERVENTIONS:  - Perform BMAT or MOVE assessment daily    - Set and communicate daily mobility goal to care team and patient/family/caregiver  - Collaborate with rehabilitation services on mobility goals if consulted  - Perform Range of Motion  times a day  - Reposition patient every  hours    - Dangle patient  times a day  - Stand patient  times a day  - Ambulate patient  times a day  - Out of bed to chair  times a day   - Out of bed for meals times a day  - Out of bed for toileting  - Record patient progress and toleration of activity level   Outcome: Progressing     Problem: PAIN - ADULT  Goal: Verbalizes/displays adequate comfort level or baseline comfort level  Description: Interventions:  - Encourage patient to monitor pain and request assistance  - Assess pain using appropriate pain scale  - Administer analgesics based on type and severity of pain and evaluate response  - Implement non-pharmacological measures as appropriate and evaluate response  - Consider cultural and social influences on pain and pain management  - Notify physician/advanced practitioner if interventions unsuccessful or patient reports new pain  Outcome: Progressing     Problem: INFECTION - ADULT  Goal: Absence or prevention of progression during hospitalization  Description: INTERVENTIONS:  - Assess and monitor for signs and symptoms of infection  - Monitor lab/diagnostic results  - Monitor all insertion sites, i e  indwelling lines, tubes, and drains  - Monitor endotracheal if appropriate and nasal secretions for changes in amount and color  - [place] appropriate cooling/warming therapies per order  - Administer medications as ordered  - Instruct and encourage patient and family to use good hand hygiene technique  - Identify and instruct in appropriate isolation precautions for identified infection/condition  Outcome: Progressing  Goal: Absence of fever/infection during neutropenic period  Description: INTERVENTIONS:  - Monitor WBC    Outcome: Progressing     Problem: SAFETY ADULT  Goal: Maintain or return to baseline ADL function  Description: INTERVENTIONS:  -  Assess patient's ability to carry out ADLs; assess patient's baseline for ADL function and identify physical deficits which impact ability to perform ADLs (bathing, care of mouth/teeth, toileting, grooming, dressing, etc )  - Assess/evaluate cause of self-care deficits   - Assess range of motion  - Assess patient's mobility; develop plan if impaired  - Assess patient's need for assistive devices and provide as appropriate  - Encourage maximum independence but intervene and supervise when necessary  - Involve family in performance of ADLs  - Assess for home care needs following discharge   - Consider OT consult to assist with ADL evaluation and planning for discharge  - Provide patient education as appropriate  Outcome: Progressing  Goal: Maintains/Returns to pre admission functional level  Description: INTERVENTIONS:  - Perform BMAT or MOVE assessment daily    - Set and communicate daily mobility goal to care team and patient/family/caregiver  - Collaborate with rehabilitation services on mobility goals if consulted  - Perform Range of Motion  times a day  - Reposition patient every  hours    - Dangle patient  times a day  - Stand patient  times a day  - Ambulate patient  times a day  - Out of bed to chair  times a day   - Out of bed for meals times a day  - Out of bed for toileting  - Record patient progress and toleration of activity level   Outcome: Progressing  Goal: Patient will remain free of falls  Description: INTERVENTIONS:  - Educate patient/family on patient safety including physical limitations  - Instruct patient to call for assistance with activity   - Consult OT/PT to assist with strengthening/mobility   - Keep Call bell within reach  - Keep bed low and locked with side rails adjusted as appropriate  - Keep care items and personal belongings within reach  - Initiate and maintain comfort rounds  - Make Fall Risk Sign visible to staff  - Offer Toileting every  Hours, in advance of need  - Initiate/Maintain alarm  - Obtain necessary fall risk management equipment:   - Apply yellow socks and bracelet for high fall risk patients  - Consider moving patient to room near nurses station  Outcome: Progressing     Problem: DISCHARGE PLANNING  Goal: Discharge to home or other facility with appropriate resources  Description: INTERVENTIONS:  - Identify barriers to discharge w/patient and caregiver  - Arrange for needed discharge resources and transportation as appropriate  - Identify discharge learning needs (meds, wound care, etc )  - Arrange for interpretive services to assist at discharge as needed  - Refer to Case Management Department for coordinating discharge planning if the patient needs post-hospital services based on physician/advanced practitioner order or complex needs related to functional status, cognitive ability, or social support system  Outcome: Progressing Problem: Knowledge Deficit  Goal: Patient/family/caregiver demonstrates understanding of disease process, treatment plan, medications, and discharge instructions  Description: Complete learning assessment and assess knowledge base  Interventions:  - Provide teaching at level of understanding  - Provide teaching via preferred learning methods  Outcome: Progressing     Problem: Nutrition/Hydration-ADULT  Goal: Nutrient/Hydration intake appropriate for improving, restoring or maintaining nutritional needs  Description: Monitor and assess patient's nutrition/hydration status for malnutrition  Collaborate with interdisciplinary team and initiate plan and interventions as ordered  Monitor patient's weight and dietary intake as ordered or per policy  Utilize nutrition screening tool and intervene as necessary  Determine patient's food preferences and provide high-protein, high-caloric foods as appropriate       INTERVENTIONS:  - Monitor oral intake, urinary output, labs, and treatment plans  - Assess nutrition and hydration status and recommend course of action  - Evaluate amount of meals eaten  - Assist patient with eating if necessary   - Allow adequate time for meals  - Recommend/ encourage appropriate diets, oral nutritional supplements, and vitamin/mineral supplements  - Order, calculate, and assess calorie counts as needed  - Recommend, monitor, and adjust tube feedings and TPN/PPN based on assessed needs  - Assess need for intravenous fluids  - Provide specific nutrition/hydration education as appropriate  - Include patient/family/caregiver in decisions related to nutrition  Outcome: Progressing

## 2023-06-25 NOTE — PROGRESS NOTES
[place]  Progress Note  Name: Ping Bennett  MRN: [id number]  Unit/Bed#: -01 I Date of Admission: 6/22/2023   Date of Service: 6/25/2023 I Hospital Day: 3    Assessment/Plan   * Femur fracture, left [facility])  Assessment & Plan  Background: History of COPD atrial fibrillation hypertension presents for a fall found to have left femur and rib fractures  · Underwent ORIF 6/23/2023 doing well   · Warfarin was on hold and has been restarted  · PT/OT recommending rehab  Patient agreeable and awaiting placement    COPD exacerbation ([facility] [address] )  Assessment & Plan  · Mild COPD exacerbation on admission  · Now stable  Continue bronchodilators  · Systemic steroids: Was on IV methylprednisolone for 2 days  Continuing 3 days total of prednisone for 5-day course  · Oxygen: Currently on 4 L  Tried to wean down to 2 L but then patient became hypoxic  Advised continued use of incentive spirometer    Elevated troponin  Assessment & Plan  · Non-MI elevation of troponin secondary to fall    Lab Results   Component Value Date    HSTNI0 113 (H) 06/22/2023    HSTNI2 202 (H) 06/22/2023    HSTNI4 236 (H) 06/22/2023       Anxiety  Assessment & Plan  · Stable continue sertraline     Essential hypertension  Assessment & Plan  · Blood pressure stable continue losartan and diltiazem    Atrial fibrillation (HCC)  Assessment & Plan  · Paroxysmal atrial fibrillation on diltiazem and digoxin  · Warfarin was on hold for surgery has been restarted at alternating 2 5/5 mg    Results from last 7 days   Lab Units 06/25/23  0429 06/24/23  0339 06/23/23  0510 06/22/23  1224   INR  2 43* 1 55* 1 21* 2 19*     VTE Pharmacologic Prophylaxis:   High Risk (Score >/= 5) - Pharmacological DVT Prophylaxis Ordered: warfarin (Coumadin)  Sequential Compression Devices Ordered  Patient Centered Rounds: I have performed bedside rounds with nursing staff today    Discussions with Specialists or Other Care Team Provider: Education and Discussions with Family / Patient: Updated  (son) at bedside  Time Spent for Care: This time was spent on one or more of the following: performing physical exam; counseling and coordination of care; obtaining or reviewing history; documenting in the medical record; reviewing/ordering tests, medications or procedures; communicating with other healthcare professionals and discussing with patient's family/caregivers  Current Length of Stay: 3 day(s)  Current Patient Status: Inpatient   Certification Statement: The patient will continue to require additional inpatient hospital stay due to Weaning oxygen and rehab placement  Discharge Plan: Anticipate discharge in 24-48 hrs to rehab facility  Code Status: Level 2 - DNAR: but accepts endotracheal intubation      Subjective:   Patient seen and examined  Still having hip pains but no chest pain  Shortness of breath getting better  Objective:   Vitals: Blood pressure (!) 172/68, pulse 79, temperature (!) 97 2 °F (36 2 °C), temperature source Oral, resp  rate 16, height 5' (1 524 m), weight 51 8 kg (114 lb 3 2 oz), SpO2 100 %, not currently breastfeeding  Intake/Output Summary (Last 24 hours) at 6/25/2023 1356  Last data filed at 6/25/2023 0901  Gross per 24 hour   Intake --   Output 1300 ml   Net -1300 ml       Physical Exam  Vitals reviewed  Constitutional:       General: She is not in acute distress  Appearance: Normal appearance  HENT:      Head: Atraumatic  Cardiovascular:      Rate and Rhythm: Regular rhythm  Pulmonary:      Breath sounds: Decreased breath sounds and rhonchi (Bibasilar crackles) present  No wheezing  Abdominal:      General: Bowel sounds are normal       Palpations: Abdomen is soft  Tenderness: There is no guarding or rebound  Musculoskeletal:         General: No swelling  Cervical back: No rigidity or tenderness  Skin:     General: Skin is warm     Neurological:      Mental Status: She is alert  Mental status is at baseline  Psychiatric:         Mood and Affect: Mood normal        Additional Data:   Labs:  Results from last 7 days   Lab Units 06/25/23  0429 06/24/23  0339 06/23/23  0510   WBC Thousand/uL 8 42 11 39* 8 96   HEMOGLOBIN g/dL 8 9* 9 0* 11 0*   PLATELETS Thousands/uL 268 248 244   MCV fL 103* 101* 99*   TOTAL NEUT ABS Thousand/uL  --  10 48*  --    BANDS PCT %  --  4  --    INR  2 43* 1 55* 1 21*     Results from last 7 days   Lab Units 06/25/23  0429 06/24/23  0339 06/23/23  0510 06/22/23  1105   SODIUM mmol/L 139 136 135 136   POTASSIUM mmol/L 4 5 4 6 3 7 6 0*   CHLORIDE mmol/L 101 101 98 100   CO2 mmol/L 35* 34* 30 31   ANION GAP mmol/L 3 1 7 5   BUN mg/dL 21 21 27* 21   CREATININE mg/dL 0 51* 0 62 0 68 0 60   CALCIUM mg/dL 8 6 8 5 8 8 9 0   ALBUMIN g/dL  --  2 9* 3 2* 3 5   TOTAL BILIRUBIN mg/dL  --  0 25 0 40 0 40   ALK PHOS U/L  --  102 134* 163*   ALT U/L  --  11 18 18   AST U/L  --  12* 15 28   EGFR ml/min/1 73sq m 88 83 80 83   GLUCOSE RANDOM mg/dL 146* 149* 203* 112         Results from last 7 days   Lab Units 06/22/23  1105   CK TOTAL U/L 73     Results from last 7 days   Lab Units 06/22/23  1634 06/22/23  1351 06/22/23  1105   HS TNI 0HR ng/L  --   --  113*   HS TNI 2HR ng/L  --  202*  --    HS TNI 4HR ng/L 236*  --   --           Results from last 7 days   Lab Units 06/23/23  0510 06/22/23  1136   LACTIC ACID mmol/L  --  1 7   PROCALCITONIN ng/ml 0 42* 0 20                 * I Have Reviewed All Lab Data Listed Above  Cultures:   Results from last 7 days   Lab Units 06/22/23  1136 06/22/23  1114   BLOOD CULTURE  No Growth at 48 hrs  No Growth at 48 hrs  Lines/Drains:  Invasive Devices     Peripheral Intravenous Line  Duration           Peripheral IV 06/23/23 Dorsal (posterior); Right Hand 1 day              Telemetry:      Imaging:  Imaging Reports Reviewed Today Include:   XR pelvis ap only 1 or 2 vw    Result Date: 6/23/2023  Impression: Left hip replacement Workstation performed: [id number]     XR Trauma pelvis ap only 1 or 2 vw    Result Date: 6/22/2023  Impression: Displaced left subcapital fracture  Workstation performed: [id number]     XR Trauma chest portable    Result Date: 6/22/2023  Impression: No acute cardiopulmonary disease  Workstation performed: [id number]     TRAUMA - CT spine cervical wo contrast    Result Date: 6/22/2023  Impression: No cervical spine fracture or traumatic malalignment  Workstation performed: [id number]     TRAUMA - CT chest abdomen pelvis w contrast    Result Date: 6/22/2023  Impression: Displaced left subcapital fracture  Bilateral sacral insufficiency fractures  Chronic right pubic symphysis fracture  Subacute left 11th rib fracture  Workstation performed: [id number]     TRAUMA - CT head wo contrast    Result Date: 6/22/2023  Impression: No acute intracranial abnormality   Workstation performed: [id number]       Scheduled Meds:  Current Facility-Administered Medications   Medication Dose Route Frequency Provider Last Rate   • acetaminophen  975 mg Oral [facility] Zakiya Rodriguez PA-C     • budesonide  0 5 mg Nebulization Q12H Zakiya Rodriguez PA-C     • digoxin  125 mcg Oral Every Other Day Zakiya Rodriguez PA-C     • diltiazem  240 mg Oral Daily Zakiya Rodriguez PA-C     • famotidine  20 mg Oral QPM Zakiya Rodriguez PA-C     • guaiFENesin  600 mg Oral BID Zakiya Rodriguez PA-C     • ipratropium  0 5 mg Nebulization TID Zakiya Rodriguez PA-C     • levalbuterol  1 25 mg Nebulization TID Zakiya Rodriguez PA-C     • levothyroxine  25 mcg Oral Daily Zakiya Rodriguez PA-C     • losartan  25 mg Oral Daily Zakiya Rodriguez PA-C     • mirtazapine  7 5 mg Oral HS Zakiya Rodriguez PA-C     • morphine injection  2 mg Intravenous Q6H PRN Zakiya Rodriguez PA-C     • oxyCODONE  5 mg Oral Q6H PRN Zakiya Rodriguez PA-C     • oxyCODONE  2 5 mg Oral Q6H PRN Zakiya Rodriguez PA-C     • sertraline  50 mg Oral Daily Sherleen Sleek, PA-C     • tiZANidine  2 mg Oral BID PRN Zakiya Rodriguez PA-C     • warfarin  2 5 mg Oral Every Other Day Alin Carlos DO      And   • warfarin  5 mg Oral Every Other Day Alin Carlos DO         Today, Patient Was Seen By: Alin Carlos DO    ** Please Note: Dictation voice to text software may have been used in the creation of this document   **

## 2023-06-25 NOTE — PROGRESS NOTES
"  Xiomara Dunham  79 y o   female  MR#: [id number]  6/25/2023    Post-op days: 2  Extremity: LLE    Subjective: 69-year-old female postop day 2 status post left hip cemented hemiarthroplasty  Patient was seen and examined at bedside  Patient states that she is overall doing well  She denies having left hip pain as long as she does not move around the bed  Patient states that any slight movement of the lower leg does create pain about the left hip  She denies pain radiating down the leg  She denies having numbness or tingling in left lower extremity  Denies have any fevers chills or sweats  Patient does express concerns with her recovery and due to experiencing right lower leg weakness prior to left femur fracture  Vitals:   Vitals:    06/25/23 0807   BP: (!) 172/68   Pulse: 79   Resp:    Temp: (!) 97 2 °F (36 2 °C)   SpO2: 100%       Exam:   General: NAD  Left lower extremity:  Dressing clean dry and intact  Compartment soft and compressible  Gross motor intact TA/EHL/GSC/peroneals  SI LT DP/SP/saphenous/sural/tibial  Less than 2 seconds cap refill    X-rays:  AP pelvis: Status post cemented left hip hemiarthroplasty, components in good position, no fracture or dislocation  Labs:   WBC   Recent Labs     06/22/23  1105 06/23/23  0510 06/24/23  0339 06/25/23  0429   WBC 11 67* 8 96 11 39* 8 42     H/H   Recent Labs     06/22/23  1105 06/23/23  0510 06/24/23  0339 06/25/23  0429   HGB 10 8* 11 0* 9 0* 8 9*   /  Recent Labs     06/22/23  1105 06/23/23  0510 06/24/23  0339 06/25/23  0429   HCT 34 2* 34 1* 28 8* 27 9*     Sed Rate No results for input(s): \"SEDRATE\" in the last 72 hours  CRP No results for input(s): \"CRP\" in the last 72 hours      Assessment:   69-year-old female status post left hip hemiarthroplasty 6/23    Plan:   Pain control  DVT prophylaxis: Bridge back to Coumadin  PT/OT  Weight-bear as tolerated  Posterior precautions  Greater than 2 g drop in hemoglobin is associated with acute blood " loss anemia  Patient is to be DC to [facility] pending acceptance to facility  Follow-up with Dr Arya Rascon in 10 to 14 days    [address], [place]

## 2023-06-26 ENCOUNTER — PATIENT OUTREACH (OUTPATIENT)
Dept: CASE MANAGEMENT | Facility: OTHER | Age: 83
End: 2023-06-26

## 2023-06-26 PROBLEM — J95.2 ACUTE POSTOPERATIVE PULMONARY INSUFFICIENCY (HCC): Status: ACTIVE | Noted: 2023-06-26

## 2023-06-26 LAB
ANION GAP SERPL CALCULATED.3IONS-SCNC: 4 MMOL/L
BUN SERPL-MCNC: 19 MG/DL (ref 5–25)
CALCIUM SERPL-MCNC: 8.6 MG/DL (ref 8.4–10.2)
CHLORIDE SERPL-SCNC: 101 MMOL/L (ref 96–108)
CO2 SERPL-SCNC: 34 MMOL/L (ref 21–32)
CREAT SERPL-MCNC: 0.43 MG/DL (ref 0.6–1.3)
ERYTHROCYTE [DISTWIDTH] IN BLOOD BY AUTOMATED COUNT: 13.4 % (ref 11.6–15.1)
GFR SERPL CREATININE-BSD FRML MDRD: 93 ML/MIN/1.73SQ M
GLUCOSE SERPL-MCNC: 121 MG/DL (ref 65–140)
HCT VFR BLD AUTO: 29.7 % (ref 34.8–46.1)
HGB BLD-MCNC: 9.4 G/DL (ref 11.5–15.4)
INR PPP: 3.35 (ref 0.84–1.19)
MCH RBC QN AUTO: 32.6 PG (ref 26.8–34.3)
MCHC RBC AUTO-ENTMCNC: 31.6 G/DL (ref 31.4–37.4)
MCV RBC AUTO: 103 FL (ref 82–98)
PLATELET # BLD AUTO: 298 THOUSANDS/UL (ref 149–390)
PMV BLD AUTO: 8.9 FL (ref 8.9–12.7)
POTASSIUM SERPL-SCNC: 4.5 MMOL/L (ref 3.5–5.3)
PROTHROMBIN TIME: 35.4 SECONDS (ref 11.6–14.5)
RBC # BLD AUTO: 2.88 MILLION/UL (ref 3.81–5.12)
SODIUM SERPL-SCNC: 139 MMOL/L (ref 135–147)
WBC # BLD AUTO: 7.8 THOUSAND/UL (ref 4.31–10.16)

## 2023-06-26 PROCEDURE — 94640 AIRWAY INHALATION TREATMENT: CPT

## 2023-06-26 PROCEDURE — 85027 COMPLETE CBC AUTOMATED: CPT | Performed by: INTERNAL MEDICINE

## 2023-06-26 PROCEDURE — 94760 N-INVAS EAR/PLS OXIMETRY 1: CPT

## 2023-06-26 PROCEDURE — 80048 BASIC METABOLIC PNL TOTAL CA: CPT | Performed by: INTERNAL MEDICINE

## 2023-06-26 PROCEDURE — 94668 MNPJ CHEST WALL SBSQ: CPT

## 2023-06-26 PROCEDURE — G0180 MD CERTIFICATION HHA PATIENT: HCPCS | Performed by: FAMILY MEDICINE

## 2023-06-26 PROCEDURE — 85610 PROTHROMBIN TIME: CPT | Performed by: INTERNAL MEDICINE

## 2023-06-26 PROCEDURE — 99024 POSTOP FOLLOW-UP VISIT: CPT

## 2023-06-26 PROCEDURE — 99232 SBSQ HOSP IP/OBS MODERATE 35: CPT | Performed by: INTERNAL MEDICINE

## 2023-06-26 RX ORDER — FUROSEMIDE 20 MG/1
20 TABLET ORAL DAILY
Status: DISCONTINUED | OUTPATIENT
Start: 2023-06-27 | End: 2023-06-28 | Stop reason: HOSPADM

## 2023-06-26 RX ORDER — AMOXICILLIN 250 MG
1 CAPSULE ORAL 2 TIMES DAILY
Status: DISCONTINUED | OUTPATIENT
Start: 2023-06-26 | End: 2023-06-28 | Stop reason: HOSPADM

## 2023-06-26 RX ORDER — POLYETHYLENE GLYCOL 3350 17 G/17G
17 POWDER, FOR SOLUTION ORAL DAILY
Status: DISCONTINUED | OUTPATIENT
Start: 2023-06-26 | End: 2023-06-28 | Stop reason: HOSPADM

## 2023-06-26 RX ORDER — FUROSEMIDE 10 MG/ML
40 INJECTION INTRAMUSCULAR; INTRAVENOUS ONCE
Status: COMPLETED | OUTPATIENT
Start: 2023-06-26 | End: 2023-06-26

## 2023-06-26 RX ADMIN — LEVALBUTEROL HYDROCHLORIDE 1.25 MG: 1.25 SOLUTION RESPIRATORY (INHALATION) at 07:17

## 2023-06-26 RX ADMIN — SENNOSIDES AND DOCUSATE SODIUM 1 TABLET: 8.6; 5 TABLET ORAL at 17:36

## 2023-06-26 RX ADMIN — ACETAMINOPHEN 975 MG: 325 TABLET, FILM COATED ORAL at 05:59

## 2023-06-26 RX ADMIN — FUROSEMIDE 40 MG: 10 INJECTION, SOLUTION INTRAMUSCULAR; INTRAVENOUS at 10:14

## 2023-06-26 RX ADMIN — IPRATROPIUM BROMIDE 0.5 MG: 0.5 SOLUTION RESPIRATORY (INHALATION) at 20:53

## 2023-06-26 RX ADMIN — DIGOXIN 125 MCG: 125 TABLET ORAL at 09:29

## 2023-06-26 RX ADMIN — ACETAMINOPHEN 975 MG: 325 TABLET, FILM COATED ORAL at 13:27

## 2023-06-26 RX ADMIN — ACETAMINOPHEN 975 MG: 325 TABLET, FILM COATED ORAL at 20:35

## 2023-06-26 RX ADMIN — MIRTAZAPINE 7.5 MG: 15 TABLET, FILM COATED ORAL at 20:35

## 2023-06-26 RX ADMIN — POLYETHYLENE GLYCOL 3350 17 G: 17 POWDER, FOR SOLUTION ORAL at 11:15

## 2023-06-26 RX ADMIN — LOSARTAN POTASSIUM 25 MG: 25 TABLET, FILM COATED ORAL at 09:29

## 2023-06-26 RX ADMIN — DILTIAZEM HYDROCHLORIDE 240 MG: 240 CAPSULE, COATED, EXTENDED RELEASE ORAL at 09:29

## 2023-06-26 RX ADMIN — BUDESONIDE 0.5 MG: 0.5 INHALANT RESPIRATORY (INHALATION) at 07:18

## 2023-06-26 RX ADMIN — LEVALBUTEROL HYDROCHLORIDE 1.25 MG: 1.25 SOLUTION RESPIRATORY (INHALATION) at 20:53

## 2023-06-26 RX ADMIN — IPRATROPIUM BROMIDE 0.5 MG: 0.5 SOLUTION RESPIRATORY (INHALATION) at 07:18

## 2023-06-26 RX ADMIN — LEVOTHYROXINE SODIUM 25 MCG: 25 TABLET ORAL at 05:59

## 2023-06-26 RX ADMIN — FAMOTIDINE 20 MG: 20 TABLET, FILM COATED ORAL at 17:36

## 2023-06-26 RX ADMIN — GUAIFENESIN 600 MG: 600 TABLET ORAL at 09:29

## 2023-06-26 RX ADMIN — SENNOSIDES AND DOCUSATE SODIUM 1 TABLET: 8.6; 5 TABLET ORAL at 11:15

## 2023-06-26 RX ADMIN — SERTRALINE 50 MG: 50 TABLET, FILM COATED ORAL at 09:29

## 2023-06-26 RX ADMIN — IPRATROPIUM BROMIDE 0.5 MG: 0.5 SOLUTION RESPIRATORY (INHALATION) at 13:46

## 2023-06-26 RX ADMIN — BUDESONIDE 0.5 MG: 0.5 INHALANT RESPIRATORY (INHALATION) at 20:53

## 2023-06-26 RX ADMIN — GUAIFENESIN 600 MG: 600 TABLET ORAL at 17:36

## 2023-06-26 RX ADMIN — LEVALBUTEROL HYDROCHLORIDE 1.25 MG: 1.25 SOLUTION RESPIRATORY (INHALATION) at 13:46

## 2023-06-26 NOTE — PROGRESS NOTES
Progress Note - Orthopedics   Fortino Baig 79 y o  female MRN: [id number]  Unit/Bed#: -01      Assessment:  79 y  o female s/p left hip hemiarthoplasty with Dr Beryle Rama on 6/23 for displaced left femoral neck fracture  POD3      Plan:  · WBAT LLE w/ posterior hip precautions  · Hgb 8 9  Yesterday Hgb was 9 0   Continue to monitor H&H   · PT/OT  · Pain control per primary  · DVT ppx per primary  · Follow up with Dr Beryle Rama in 10-14 weeks  · Dispo: Ortho will follow      Subjective:    79 y  o female POD3  Seen and examined at bedside  Wedge pillow in place  Patient states her leg is stiff but believes this will improve with physical therapy  Patient states no acute events or complaints overnight  Denies fevers, chills, CP, SOB, N/V, numbness or tingling       Labs:  0   Lab Value Date/Time    HCT 27 9 (L) 06/25/2023 0429    HCT 28 8 (L) 06/24/2023 0339    HCT 34 1 (L) 06/23/2023 0510    HCT 39 9 03/17/2015 1247    HCT 39 4 08/20/2014 0810    HGB 8 9 (L) 06/25/2023 0429    HGB 9 0 (L) 06/24/2023 0339    HGB 11 0 (L) 06/23/2023 0510    HGB 12 9 03/17/2015 1247    HGB 12 9 08/20/2014 0810    INR 2 43 (H) 06/25/2023 0429    INR 3 04 (H) 09/17/2015 1038    WBC 8 42 06/25/2023 0429    WBC 11 39 (H) 06/24/2023 0339    WBC 8 96 06/23/2023 0510    WBC 4 34 03/17/2015 1247    WBC 4 14 (L) 08/20/2014 0810    CRP 61 9 (H) 05/04/2023 1309       Meds:    Current Facility-Administered Medications:   •  acetaminophen (TYLENOL) tablet 975 mg, 975 mg, Oral, Q8H [facility], Anushka Spivey PA-C, 975 mg at 06/25/23 1324  •  budesonide (PULMICORT) inhalation solution 0 5 mg, 0 5 mg, Nebulization, Q12H, Anushka Spivey PA-C, 0 5 mg at 06/25/23 1936  •  digoxin (LANOXIN) tablet 125 mcg, 125 mcg, Oral, Every Other Day, Anushka Spivey PA-C, 125 mcg at 06/24/23 9678  •  diltiazem (CARDIZEM CD) 24 hr capsule 240 mg, 240 mg, Oral, Daily, Anushka Spivey PA-C, 240 mg at 06/25/23 4697  •  famotidine (PEPCID) tablet 20 mg, 20 mg, Oral, QPM, Anushka Spivey PA-C, 20 mg at "06/25/23 1725  •  guaiFENesin (MUCINEX) 12 hr tablet 600 mg, 600 mg, Oral, BID, Calvin Garza PA-C, 600 mg at 06/25/23 1725  •  ipratropium (ATROVENT) 0 02 % inhalation solution 0 5 mg, 0 5 mg, Nebulization, TID, Calvin Garza PA-C, 0 5 mg at 06/25/23 1935  •  levalbuterol ([place] Yovanny) inhalation solution 1 25 mg, 1 25 mg, Nebulization, TID, 1 25 mg at 06/25/23 1936 **AND** [DISCONTINUED] sodium chloride 0 9 % inhalation solution 3 mL, 3 mL, Nebulization, TID, Carlo Velez MD  •  levothyroxine tablet 25 mcg, 25 mcg, Oral, Daily, Calvin Garza PA-C, 25 mcg at 06/25/23 3777  •  losartan (COZAAR) tablet 25 mg, 25 mg, Oral, Daily, Calvin Garza PA-C, 25 mg at 06/25/23 0915  •  mirtazapine (REMERON) tablet 7 5 mg, 7 5 mg, Oral, HS, Calvin Garza PA-C, 7 5 mg at 06/24/23 2146  •  morphine injection 2 mg, 2 mg, Intravenous, Q6H PRN, Calvin Garza PA-C  •  oxyCODONE (ROXICODONE) IR tablet 5 mg, 5 mg, Oral, Q6H PRN, Calvin Garza PA-C, 5 mg at 06/24/23 7981  •  oxyCODONE (ROXICODONE) split tablet 2 5 mg, 2 5 mg, Oral, Q6H PRN, Calvin Garza PA-C, 2 5 mg at 06/24/23 2146  •  sertraline (ZOLOFT) tablet 50 mg, 50 mg, Oral, Daily, Calvin Garza PA-C, 50 mg at 06/25/23 4165  •  tiZANidine (ZANAFLEX) tablet 2 mg, 2 mg, Oral, BID PRN, Calvin Garza PA-C  •  warfarin (COUMADIN) tablet 2 5 mg, 2 5 mg, Oral, Every Other Day, 2 5 mg at 06/25/23 1725 **AND** warfarin (COUMADIN) tablet 5 mg, 5 mg, Oral, Every Other Day, Donell DO Venkat, 5 mg at 06/24/23 1713    Blood Culture:   Lab Results   Component Value Date    BLOODCX No Growth at 72 hrs  06/22/2023       Wound Culture:   No results found for: \"WOUNDCULT\"    Ins and Outs:  I/O last 24 hours: In: 300 [P O :300]  Out: 1300 [Urine:1300]    Physical:  Vitals:    06/25/23 1936   BP:    Pulse:    Resp:    Temp:    SpO2: 93%     Musculoskeletal: left Lower Extremity    · Skin -  No erythema or ecchymosis  Compartments soft and compressible     · Dressing - c/d/I, covered with   · TTP at incision site and " surrounding soft tissue  · Sensation intact to saphenous, sural, tibial, superficial peroneal nerve, and deep peroneal  · Motor intact to +FHL/EHL, +ankle dorsi/plantar flexion  · 2+ DP pulse, symmetric bilaterally  · Digits warm and well perfused  · Capillary refill < 2 seconds      Lauri French PA-C

## 2023-06-26 NOTE — ASSESSMENT & PLAN NOTE
· Pulmonary insufficiency postop remains on 3 L nasal cannula  · Finishing up steroids  S/p IV lasix

## 2023-06-26 NOTE — PLAN OF CARE
Problem: MOBILITY - ADULT  Goal: Maintain or return to baseline ADL function  Description: INTERVENTIONS:  -  Assess patient's ability to carry out ADLs; assess patient's baseline for ADL function and identify physical deficits which impact ability to perform ADLs (bathing, care of mouth/teeth, toileting, grooming, dressing, etc )  - Assess/evaluate cause of self-care deficits   - Assess range of motion  - Assess patient's mobility; develop plan if impaired  - Assess patient's need for assistive devices and provide as appropriate  - Encourage maximum independence but intervene and supervise when necessary  - Involve family in performance of ADLs  - Assess for home care needs following discharge   - Consider OT consult to assist with ADL evaluation and planning for discharge  - Provide patient education as appropriate  Outcome: Progressing  Goal: Maintains/Returns to pre admission functional level  Description: INTERVENTIONS:  - Perform BMAT or MOVE assessment daily    - Set and communicate daily mobility goal to care team and patient/family/caregiver  - Collaborate with rehabilitation services on mobility goals if consulted  - Perform Range of Motion  times a day  - Reposition patient every  hours    - Dangle patient  times a day  - Stand patient  times a day  - Ambulate patient  times a day  - Out of bed to chair  times a day   - Out of bed for meals times a day  - Out of bed for toileting  - Record patient progress and toleration of activity level   Outcome: Progressing     Problem: PAIN - ADULT  Goal: Verbalizes/displays adequate comfort level or baseline comfort level  Description: Interventions:  - Encourage patient to monitor pain and request assistance  - Assess pain using appropriate pain scale  - Administer analgesics based on type and severity of pain and evaluate response  - Implement non-pharmacological measures as appropriate and evaluate response  - Consider cultural and social influences on pain and pain management  - Notify physician/advanced practitioner if interventions unsuccessful or patient reports new pain  Outcome: Progressing     Problem: INFECTION - ADULT  Goal: Absence or prevention of progression during hospitalization  Description: INTERVENTIONS:  - Assess and monitor for signs and symptoms of infection  - Monitor lab/diagnostic results  - Monitor all insertion sites, i e  indwelling lines, tubes, and drains  - Monitor endotracheal if appropriate and nasal secretions for changes in amount and color  - [place] appropriate cooling/warming therapies per order  - Administer medications as ordered  - Instruct and encourage patient and family to use good hand hygiene technique  - Identify and instruct in appropriate isolation precautions for identified infection/condition  Outcome: Progressing  Goal: Absence of fever/infection during neutropenic period  Description: INTERVENTIONS:  - Monitor WBC    Outcome: Progressing     Problem: SAFETY ADULT  Goal: Maintain or return to baseline ADL function  Description: INTERVENTIONS:  -  Assess patient's ability to carry out ADLs; assess patient's baseline for ADL function and identify physical deficits which impact ability to perform ADLs (bathing, care of mouth/teeth, toileting, grooming, dressing, etc )  - Assess/evaluate cause of self-care deficits   - Assess range of motion  - Assess patient's mobility; develop plan if impaired  - Assess patient's need for assistive devices and provide as appropriate  - Encourage maximum independence but intervene and supervise when necessary  - Involve family in performance of ADLs  - Assess for home care needs following discharge   - Consider OT consult to assist with ADL evaluation and planning for discharge  - Provide patient education as appropriate  Outcome: Progressing  Goal: Maintains/Returns to pre admission functional level  Description: INTERVENTIONS:  - Perform BMAT or MOVE assessment daily    - Set and communicate daily mobility goal to care team and patient/family/caregiver  - Collaborate with rehabilitation services on mobility goals if consulted  - Perform Range of Motion  times a day  - Reposition patient every  hours    - Dangle patient  times a day  - Stand patient  times a day  - Ambulate patient  times a day  - Out of bed to chair  times a day   - Out of bed for meals times a day  - Out of bed for toileting  - Record patient progress and toleration of activity level   Outcome: Progressing  Goal: Patient will remain free of falls  Description: INTERVENTIONS:  - Educate patient/family on patient safety including physical limitations  - Instruct patient to call for assistance with activity   - Consult OT/PT to assist with strengthening/mobility   - Keep Call bell within reach  - Keep bed low and locked with side rails adjusted as appropriate  - Keep care items and personal belongings within reach  - Initiate and maintain comfort rounds  - Make Fall Risk Sign visible to staff  - Offer Toileting every  Hours, in advance of need  - Initiate/Maintain alarm  - Obtain necessary fall risk management equipment:   - Apply yellow socks and bracelet for high fall risk patients  - Consider moving patient to room near nurses station  Outcome: Progressing     Problem: DISCHARGE PLANNING  Goal: Discharge to home or other facility with appropriate resources  Description: INTERVENTIONS:  - Identify barriers to discharge w/patient and caregiver  - Arrange for needed discharge resources and transportation as appropriate  - Identify discharge learning needs (meds, wound care, etc )  - Arrange for interpretive services to assist at discharge as needed  - Refer to Case Management Department for coordinating discharge planning if the patient needs post-hospital services based on physician/advanced practitioner order or complex needs related to functional status, cognitive ability, or social support system  Outcome: Progressing Problem: Knowledge Deficit  Goal: Patient/family/caregiver demonstrates understanding of disease process, treatment plan, medications, and discharge instructions  Description: Complete learning assessment and assess knowledge base  Interventions:  - Provide teaching at level of understanding  - Provide teaching via preferred learning methods  Outcome: Progressing     Problem: Prexisting or High Potential for Compromised Skin Integrity  Goal: Skin integrity is maintained or improved  Description: INTERVENTIONS:  - Identify patients at risk for skin breakdown  - Assess and monitor skin integrity  - Assess and monitor nutrition and hydration status  - Monitor labs   - Assess for incontinence   - Turn and reposition patient  - Assist with mobility/ambulation  - Relieve pressure over bony prominences  - Avoid friction and shearing  - Provide appropriate hygiene as needed including keeping skin clean and dry  - Evaluate need for skin moisturizer/barrier cream  - Collaborate with interdisciplinary team   - Patient/family teaching  - Consider wound care consult   Outcome: Progressing     Problem: Nutrition/Hydration-ADULT  Goal: Nutrient/Hydration intake appropriate for improving, restoring or maintaining nutritional needs  Description: Monitor and assess patient's nutrition/hydration status for malnutrition  Collaborate with interdisciplinary team and initiate plan and interventions as ordered  Monitor patient's weight and dietary intake as ordered or per policy  Utilize nutrition screening tool and intervene as necessary  Determine patient's food preferences and provide high-protein, high-caloric foods as appropriate       INTERVENTIONS:  - Monitor oral intake, urinary output, labs, and treatment plans  - Assess nutrition and hydration status and recommend course of action  - Evaluate amount of meals eaten  - Assist patient with eating if necessary   - Allow adequate time for meals  - Recommend/ encourage appropriate diets, oral nutritional supplements, and vitamin/mineral supplements  - Order, calculate, and assess calorie counts as needed  - Recommend, monitor, and adjust tube feedings and TPN/PPN based on assessed needs  - Assess need for intravenous fluids  - Provide specific nutrition/hydration education as appropriate  - Include patient/family/caregiver in decisions related to nutrition  Outcome: Progressing

## 2023-06-26 NOTE — CASE MANAGEMENT
Case Management Discharge Planning Note    Patient name Austyn Albarado  Location /-00 MRN [id number]  : 1940 Date 2023       Current Admission Date: 2023  Current Admission Diagnosis:Femur fracture, left [facility])   Patient Active Problem List    Diagnosis Date Noted   • Femur fracture, left ([facility] ) 2023   • Elevated troponin 2023   • Aspiration pneumonia of right lung ([address] ) 2023   • Bilateral pleural effusion 2023   • Chronic respiratory failure with hypoxia and hypercapnia ([address] ) 2023   • UGI bleed 2023   • Gastroenteritis 2023   • Cerebral aneurysm, nonruptured 2023   • Abdominal distention/back pain 2023   • Urinary retention 2023   • MDD with persistant bereavement complex 2023   • Abnormal CT of the chest 2023   • 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   • Thyroid nodule 2023   • Severe protein-calorie malnutrition ([address] ) 2023   • Unspecified mood (affective) disorder ([facility] ) 2023   • Failure to thrive in adult 2023   • Pulmonary nodule 02/15/2023   • Onychomycosis 02/15/2023   • Essential hypertension 02/10/2023   • Anxiety 02/10/2023   • Other specified hypothyroidism 2022   • Peripheral edema 2021   • Low back pain without sciatica 2021   • Pneumonia of right lower lobe due to infectious organism 2017   • COPD exacerbation ([facility] ) 2017   • Sepsis with acute renal failure without septic shock ([facility] [address] ) 2017   • Hypertension 2017   • Hypercoagulable state ([address] ) 2017   • 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   • Vitamin D deficiency 2016   • Osteoporosis 2015   • Antiphospholipid antibody syndrome ([place] [address] ) 2012   • Atrial fibrillation ([place] [address] ) 2012   • Hereditary hemolytic anemia ([place] [address] ) 2012   • Asthma 2012      LOS (days): 4  Geometric Mean LOS (GMLOS) (days): 4 00  Days to GMLOS:-0 1     OBJECTIVE:  Risk of Unplanned Readmission Score: 31 58         Current admission status: Inpatient   Preferred Pharmacy:   [address] [place] [address] #[id number] - [address]  [address] [place], [place] [id number]  Phone: [phone] Fax: [address], [place] [address]  Phone: [phone] Fax: [phone]    Primary Care Provider: Jose Guzman MD    Primary Insurance: MEDICARE  Secondary Insurance: BLUE CROSS    DISCHARGE DETAILS:  List of accepting SNF given to patient's son  His preference is [facility] reserved on [address]  Patient  will need BLS                                                                                      IMM Given (Date):: 06/26/23  IMM Given to[de-identified] Family

## 2023-06-26 NOTE — PROGRESS NOTES
Documentation clarification  Acute postoperative pulmonary insufficiency (HCC)  Assessment & Plan  · Pulmonary insufficiency postop remains on 3 L nasal cannula  · Finishing up steroids    Trialing IV furosemide

## 2023-06-26 NOTE — PLAN OF CARE
Problem: MOBILITY - ADULT  Goal: Maintain or return to baseline ADL function  Description: INTERVENTIONS:  -  Assess patient's ability to carry out ADLs; assess patient's baseline for ADL function and identify physical deficits which impact ability to perform ADLs (bathing, care of mouth/teeth, toileting, grooming, dressing, etc )  - Assess/evaluate cause of self-care deficits   - Assess range of motion  - Assess patient's mobility; develop plan if impaired  - Assess patient's need for assistive devices and provide as appropriate  - Encourage maximum independence but intervene and supervise when necessary  - Involve family in performance of ADLs  - Assess for home care needs following discharge   - Consider OT consult to assist with ADL evaluation and planning for discharge  - Provide patient education as appropriate  Outcome: Progressing  Goal: Maintains/Returns to pre admission functional level  Description: INTERVENTIONS:  - Perform BMAT or MOVE assessment daily    - Set and communicate daily mobility goal to care team and patient/family/caregiver  - Collaborate with rehabilitation services on mobility goals if consulted  - Perform Range of Motion  times a day  - Reposition patient every  hours    - Dangle patient  times a day  - Stand patient  times a day  - Ambulate patient  times a day  - Out of bed to chair  times a day   - Out of bed for meals times a day  - Out of bed for toileting  - Record patient progress and toleration of activity level   Outcome: Progressing     Problem: PAIN - ADULT  Goal: Verbalizes/displays adequate comfort level or baseline comfort level  Description: Interventions:  - Encourage patient to monitor pain and request assistance  - Assess pain using appropriate pain scale  - Administer analgesics based on type and severity of pain and evaluate response  - Implement non-pharmacological measures as appropriate and evaluate response  - Consider cultural and social influences on pain and pain management  - Notify physician/advanced practitioner if interventions unsuccessful or patient reports new pain  Outcome: Progressing     Problem: INFECTION - ADULT  Goal: Absence or prevention of progression during hospitalization  Description: INTERVENTIONS:  - Assess and monitor for signs and symptoms of infection  - Monitor lab/diagnostic results  - Monitor all insertion sites, i e  indwelling lines, tubes, and drains  - Monitor endotracheal if appropriate and nasal secretions for changes in amount and color  - [place] appropriate cooling/warming therapies per order  - Administer medications as ordered  - Instruct and encourage patient and family to use good hand hygiene technique  - Identify and instruct in appropriate isolation precautions for identified infection/condition  Outcome: Progressing  Goal: Absence of fever/infection during neutropenic period  Description: INTERVENTIONS:  - Monitor WBC    Outcome: Progressing     Problem: SAFETY ADULT  Goal: Maintain or return to baseline ADL function  Description: INTERVENTIONS:  -  Assess patient's ability to carry out ADLs; assess patient's baseline for ADL function and identify physical deficits which impact ability to perform ADLs (bathing, care of mouth/teeth, toileting, grooming, dressing, etc )  - Assess/evaluate cause of self-care deficits   - Assess range of motion  - Assess patient's mobility; develop plan if impaired  - Assess patient's need for assistive devices and provide as appropriate  - Encourage maximum independence but intervene and supervise when necessary  - Involve family in performance of ADLs  - Assess for home care needs following discharge   - Consider OT consult to assist with ADL evaluation and planning for discharge  - Provide patient education as appropriate  Outcome: Progressing  Goal: Maintains/Returns to pre admission functional level  Description: INTERVENTIONS:  - Perform BMAT or MOVE assessment daily    - Set and communicate daily mobility goal to care team and patient/family/caregiver  - Collaborate with rehabilitation services on mobility goals if consulted  - Perform Range of Motion  times a day  - Reposition patient every  hours    - Dangle patient  times a day  - Stand patient  times a day  - Ambulate patient  times a day  - Out of bed to chair  times a day   - Out of bed for meals times a day  - Out of bed for toileting  - Record patient progress and toleration of activity level   Outcome: Progressing  Goal: Patient will remain free of falls  Description: INTERVENTIONS:  - Educate patient/family on patient safety including physical limitations  - Instruct patient to call for assistance with activity   - Consult OT/PT to assist with strengthening/mobility   - Keep Call bell within reach  - Keep bed low and locked with side rails adjusted as appropriate  - Keep care items and personal belongings within reach  - Initiate and maintain comfort rounds  - Make Fall Risk Sign visible to staff  - Offer Toileting every  Hours, in advance of need  - Initiate/Maintain alarm  - Obtain necessary fall risk management equipment:   - Apply yellow socks and bracelet for high fall risk patients  - Consider moving patient to room near nurses station  Outcome: Progressing     Problem: DISCHARGE PLANNING  Goal: Discharge to home or other facility with appropriate resources  Description: INTERVENTIONS:  - Identify barriers to discharge w/patient and caregiver  - Arrange for needed discharge resources and transportation as appropriate  - Identify discharge learning needs (meds, wound care, etc )  - Arrange for interpretive services to assist at discharge as needed  - Refer to Case Management Department for coordinating discharge planning if the patient needs post-hospital services based on physician/advanced practitioner order or complex needs related to functional status, cognitive ability, or social support system  Outcome: Progressing Problem: Knowledge Deficit  Goal: Patient/family/caregiver demonstrates understanding of disease process, treatment plan, medications, and discharge instructions  Description: Complete learning assessment and assess knowledge base  Interventions:  - Provide teaching at level of understanding  - Provide teaching via preferred learning methods  Outcome: Progressing     Problem: Prexisting or High Potential for Compromised Skin Integrity  Goal: Skin integrity is maintained or improved  Description: INTERVENTIONS:  - Identify patients at risk for skin breakdown  - Assess and monitor skin integrity  - Assess and monitor nutrition and hydration status  - Monitor labs   - Assess for incontinence   - Turn and reposition patient  - Assist with mobility/ambulation  - Relieve pressure over bony prominences  - Avoid friction and shearing  - Provide appropriate hygiene as needed including keeping skin clean and dry  - Evaluate need for skin moisturizer/barrier cream  - Collaborate with interdisciplinary team   - Patient/family teaching  - Consider wound care consult   Outcome: Progressing     Problem: Nutrition/Hydration-ADULT  Goal: Nutrient/Hydration intake appropriate for improving, restoring or maintaining nutritional needs  Description: Monitor and assess patient's nutrition/hydration status for malnutrition  Collaborate with interdisciplinary team and initiate plan and interventions as ordered  Monitor patient's weight and dietary intake as ordered or per policy  Utilize nutrition screening tool and intervene as necessary  Determine patient's food preferences and provide high-protein, high-caloric foods as appropriate       INTERVENTIONS:  - Monitor oral intake, urinary output, labs, and treatment plans  - Assess nutrition and hydration status and recommend course of action  - Evaluate amount of meals eaten  - Assist patient with eating if necessary   - Allow adequate time for meals  - Recommend/ encourage appropriate diets, oral nutritional supplements, and vitamin/mineral supplements  - Order, calculate, and assess calorie counts as needed  - Recommend, monitor, and adjust tube feedings and TPN/PPN based on assessed needs  - Assess need for intravenous fluids  - Provide specific nutrition/hydration education as appropriate  - Include patient/family/caregiver in decisions related to nutrition  Outcome: Progressing

## 2023-06-26 NOTE — ASSESSMENT & PLAN NOTE
· Mild COPD exacerbation on admission  · Continue bronchodilators  · Systemic steroids: Was on IV methylprednisolone for 2 days and currently finishing up prednisone for a 5-day course  · Oxygen: Currently on 3 L, not on oxygen at baseline  Tried to wean down to 2 L but then patient became hypoxic  Is on furosemide PTA for peripheral edema    We will give 1 dose IV 40 mg and reassess respiratory status tomorrow

## 2023-06-26 NOTE — ASSESSMENT & PLAN NOTE
Background: History of COPD atrial fibrillation hypertension presents for a fall found to have left femur and rib fractures  · Underwent ORIF 6/23/2023 doing well   · Warfarin was on hold and has been restarted  · PT/OT recommending rehab    Patient agreeable and planning for placement possibly next 24 hours when medically stable

## 2023-06-26 NOTE — PROGRESS NOTES
[place]  Progress Note  Name: Boston Abdul  MRN: [id number]  Unit/Bed#: -01 I Date of Admission: 6/22/2023   Date of Service: 6/26/2023 I Hospital Day: 4    Assessment/Plan   * Femur fracture, left [facility])  Assessment & Plan  Background: History of COPD atrial fibrillation hypertension presents for a fall found to have left femur and rib fractures  · Underwent ORIF 6/23/2023 doing well   · Warfarin was on hold and has been restarted  · PT/OT recommending rehab  Patient agreeable and planning for placement possibly next 24 hours when medically stable    COPD exacerbation (HCC)  Assessment & Plan  · Mild COPD exacerbation on admission  · Continue bronchodilators  · Systemic steroids: Was on IV methylprednisolone for 2 days and currently finishing up prednisone for a 5-day course  · Oxygen: Currently on 3 L, not on oxygen at baseline  Tried to wean down to 2 L but then patient became hypoxic  Is on furosemide PTA for peripheral edema  We will give 1 dose IV 40 mg and reassess respiratory status tomorrow    Elevated troponin  Assessment & Plan  · Non-MI elevation of troponin secondary to fall    Lab Results   Component Value Date    HSTNI0 113 (H) 06/22/2023    HSTNI2 202 (H) 06/22/2023    HSTNI4 236 (H) 06/22/2023       Anxiety  Assessment & Plan  · Stable continue sertraline     Essential hypertension  Assessment & Plan  · Blood pressure stable continue losartan and diltiazem    Other specified hypothyroidism  Assessment & Plan  · Continue levothyroxine    Peripheral edema  Assessment & Plan  · On furosemide prior to admission  Will give 1 dose IV today and restart oral tomorrow    Atrial fibrillation (HCC)  Assessment & Plan  · Paroxysmal atrial fibrillation on diltiazem and digoxin    · Warfarin was on hold for surgery has been restarted at alternating 2 5/5 mg  · Due to rapid rise in INR, hold tonight's dosing and restart when appropriate    Results from last 7 days Lab Units 06/26/23  0334 06/25/23  0429 06/24/23  0339 06/23/23  0510   INR  3 35* 2 43* 1 55* 1 21*     VTE Pharmacologic Prophylaxis:   High Risk (Score >/= 5) - Pharmacological DVT Prophylaxis Ordered: warfarin (Coumadin)  Sequential Compression Devices Ordered  Patient Centered Rounds: I have performed bedside rounds with nursing staff today  Discussions with Specialists or Other Care Team Provider: Case management    Education and Discussions with Family / Patient: Updated  (son) at bedside  Time Spent for Care: This time was spent on one or more of the following: performing physical exam; counseling and coordination of care; obtaining or reviewing history; documenting in the medical record; reviewing/ordering tests, medications or procedures; communicating with other healthcare professionals and discussing with patient's family/caregivers  Current Length of Stay: 4 day(s)  Current Patient Status: Inpatient   Certification Statement: The patient will continue to require additional inpatient hospital stay due to IV Lasix and elevated INR  Discharge Plan: Anticipate discharge tomorrow to rehab facility  Code Status: Level 2 - DNAR: but accepts endotracheal intubation      Subjective:   Patient seen and examined  Feeling about the same and is constipated  Objective:   Vitals: Blood pressure 151/65, pulse 89, temperature 98 3 °F (36 8 °C), resp  rate 17, height 5' (1 524 m), weight 52 6 kg (115 lb 15 4 oz), SpO2 96 %, not currently breastfeeding  Intake/Output Summary (Last 24 hours) at 6/26/2023 0942  Last data filed at 6/26/2023 0936  Gross per 24 hour   Intake 300 ml   Output 950 ml   Net -650 ml       Physical Exam  Vitals reviewed  Constitutional:       General: She is not in acute distress  Appearance: Normal appearance  HENT:      Head: Normocephalic and atraumatic  Eyes:      General: No scleral icterus  Cardiovascular:      Rate and Rhythm: Regular rhythm  Heart sounds: Normal heart sounds  Pulmonary:      Breath sounds: Decreased breath sounds and rhonchi present  No wheezing or rales  Comments: Crackles bibasilar  Abdominal:      General: Bowel sounds are normal       Palpations: Abdomen is soft  Tenderness: There is no guarding or rebound  Musculoskeletal:         General: No swelling  Right lower leg: Edema present  Left lower leg: Edema present  Skin:     General: Skin is warm  Neurological:      Mental Status: She is alert and oriented to person, place, and time  Mental status is at baseline     Psychiatric:         Mood and Affect: Mood normal        Additional Data:   Labs:  Results from last 7 days   Lab Units 06/26/23 0334 06/25/23  0429 06/24/23  0339   WBC Thousand/uL 7 80 8 42 11 39*   HEMOGLOBIN g/dL 9 4* 8 9* 9 0*   PLATELETS Thousands/uL 298 268 248   MCV fL 103* 103* 101*   TOTAL NEUT ABS Thousand/uL  --   --  10 48*   BANDS PCT %  --   --  4   INR  3 35* 2 43* 1 55*     Results from last 7 days   Lab Units 06/26/23  0334 06/25/23  0429 06/24/23  0339 06/23/23  0510 06/22/23  1105   SODIUM mmol/L 139 139 136 135 136   POTASSIUM mmol/L 4 5 4 5 4 6 3 7 6 0*   CHLORIDE mmol/L 101 101 101 98 100   CO2 mmol/L 34* 35* 34* 30 31   ANION GAP mmol/L 4 3 1 7 5   BUN mg/dL 19 21 21 27* 21   CREATININE mg/dL 0 43* 0 51* 0 62 0 68 0 60   CALCIUM mg/dL 8 6 8 6 8 5 8 8 9 0   ALBUMIN g/dL  --   --  2 9* 3 2* 3 5   TOTAL BILIRUBIN mg/dL  --   --  0 25 0 40 0 40   ALK PHOS U/L  --   --  102 134* 163*   ALT U/L  --   --  11 18 18   AST U/L  --   --  12* 15 28   EGFR ml/min/1 73sq m 93 88 83 80 83   GLUCOSE RANDOM mg/dL 121 146* 149* 203* 112         Results from last 7 days   Lab Units 06/22/23  1105   CK TOTAL U/L 73     Results from last 7 days   Lab Units 06/22/23  1634 06/22/23  1351 06/22/23  1105   HS TNI 0HR ng/L  --   --  113*   HS TNI 2HR ng/L  --  202*  --    HS TNI 4HR ng/L 236*  --   --           Results from last 7 days Lab Units 06/23/23  0510 06/22/23  1136   LACTIC ACID mmol/L  --  1 7   PROCALCITONIN ng/ml 0 42* 0 20                 * I Have Reviewed All Lab Data Listed Above  Cultures:   Results from last 7 days   Lab Units 06/22/23  1136 06/22/23  1114   BLOOD CULTURE  No Growth at 72 hrs  No Growth at 72 hrs  Lines/Drains:  Invasive Devices     Peripheral Intravenous Line  Duration           Peripheral IV 06/23/23 Dorsal (posterior); Right Hand 2 days          Drain  Duration           External Urinary Catheter <1 day              Telemetry:      Imaging:  Imaging Reports Reviewed Today Include:   XR pelvis ap only 1 or 2 vw    Result Date: 6/23/2023  Impression: Left hip replacement Workstation performed: [id number]     XR Trauma pelvis ap only 1 or 2 vw    Result Date: 6/22/2023  Impression: Displaced left subcapital fracture  Workstation performed: [id number]     XR Trauma chest portable    Result Date: 6/22/2023  Impression: No acute cardiopulmonary disease  Workstation performed: [id number]     TRAUMA - CT spine cervical wo contrast    Result Date: 6/22/2023  Impression: No cervical spine fracture or traumatic malalignment  Workstation performed: [id number]     TRAUMA - CT chest abdomen pelvis w contrast    Result Date: 6/22/2023  Impression: Displaced left subcapital fracture  Bilateral sacral insufficiency fractures  Chronic right pubic symphysis fracture  Subacute left 11th rib fracture  Workstation performed: [id number]     TRAUMA - CT head wo contrast    Result Date: 6/22/2023  Impression: No acute intracranial abnormality   Workstation performed: [id number]       Scheduled Meds:  Current Facility-Administered Medications   Medication Dose Route Frequency Provider Last Rate   • acetaminophen  975 mg Oral [facility] Malka Quiros PA-C     • budesonide  0 5 mg Nebulization Q12H Malka Quiros PA-C     • digoxin  125 mcg Oral Every Other Day Malka Quiros PA-C     • diltiazem  240 mg Oral Daily Malka Quiros PA-C • famotidine  20 mg Oral QPM Blenda Morning, PA-C     • guaiFENesin  600 mg Oral BID Blenda Morning, PA-C     • ipratropium  0 5 mg Nebulization TID Blenda Morning, PA-C     • levalbuterol  1 25 mg Nebulization TID Blenda Morning, PA-C     • levothyroxine  25 mcg Oral Daily Blenda Morning, PA-C     • losartan  25 mg Oral Daily Blenda Morning, PA-C     • mirtazapine  7 5 mg Oral HS Blenda Morning, PA-C     • morphine injection  2 mg Intravenous Q6H PRN Blenda Morning, PA-C     • oxyCODONE  5 mg Oral Q6H PRN Blenda Morning, PA-C     • oxyCODONE  2 5 mg Oral Q6H PRN Blenda Morning, PA-C     • sertraline  50 mg Oral Daily Blenda Morning, PA-C     • tiZANidine  2 mg Oral BID PRN Blenda Morning, PA-C     • warfarin  2 5 mg Oral Every Other Day Donell Robledo DO      And   • warfarin  5 mg Oral Every Other Day Evelio Tovar DO         Today, Patient Was Seen By: Evelio Tovar DO    ** Please Note: Dictation voice to text software may have been used in the creation of this document   **

## 2023-06-26 NOTE — ASSESSMENT & PLAN NOTE
· Paroxysmal atrial fibrillation on diltiazem and digoxin    · Warfarin was on hold for surgery has been restarted at alternating 2 5/5 mg  · Due to rapid rise in INR, hold tonight's dosing and restart when appropriate    Results from last 7 days   Lab Units 06/26/23  0334 06/25/23  0429 06/24/23  0339 06/23/23  0510   INR  3 35* 2 43* 1 55* 1 21*

## 2023-06-27 PROBLEM — K59.00 CONSTIPATION: Status: ACTIVE | Noted: 2023-06-27

## 2023-06-27 LAB
ANION GAP SERPL CALCULATED.3IONS-SCNC: 2 MMOL/L
BACTERIA BLD CULT: NORMAL
BACTERIA BLD CULT: NORMAL
BUN SERPL-MCNC: 20 MG/DL (ref 5–25)
CALCIUM SERPL-MCNC: 8.3 MG/DL (ref 8.4–10.2)
CHLORIDE SERPL-SCNC: 100 MMOL/L (ref 96–108)
CO2 SERPL-SCNC: 37 MMOL/L (ref 21–32)
CREAT SERPL-MCNC: 0.49 MG/DL (ref 0.6–1.3)
ERYTHROCYTE [DISTWIDTH] IN BLOOD BY AUTOMATED COUNT: 13.5 % (ref 11.6–15.1)
GFR SERPL CREATININE-BSD FRML MDRD: 89 ML/MIN/1.73SQ M
GLUCOSE SERPL-MCNC: 95 MG/DL (ref 65–140)
HCT VFR BLD AUTO: 27.6 % (ref 34.8–46.1)
HGB BLD-MCNC: 8.4 G/DL (ref 11.5–15.4)
INR PPP: 3.41 (ref 0.84–1.19)
MCH RBC QN AUTO: 31.2 PG (ref 26.8–34.3)
MCHC RBC AUTO-ENTMCNC: 30.4 G/DL (ref 31.4–37.4)
MCV RBC AUTO: 103 FL (ref 82–98)
PLATELET # BLD AUTO: 310 THOUSANDS/UL (ref 149–390)
PMV BLD AUTO: 8.6 FL (ref 8.9–12.7)
POTASSIUM SERPL-SCNC: 4 MMOL/L (ref 3.5–5.3)
PROTHROMBIN TIME: 35.9 SECONDS (ref 11.6–14.5)
RBC # BLD AUTO: 2.69 MILLION/UL (ref 3.81–5.12)
SODIUM SERPL-SCNC: 139 MMOL/L (ref 135–147)
WBC # BLD AUTO: 8.12 THOUSAND/UL (ref 4.31–10.16)

## 2023-06-27 PROCEDURE — 80048 BASIC METABOLIC PNL TOTAL CA: CPT | Performed by: INTERNAL MEDICINE

## 2023-06-27 PROCEDURE — 99024 POSTOP FOLLOW-UP VISIT: CPT | Performed by: PHYSICIAN ASSISTANT

## 2023-06-27 PROCEDURE — 85027 COMPLETE CBC AUTOMATED: CPT | Performed by: INTERNAL MEDICINE

## 2023-06-27 PROCEDURE — 94668 MNPJ CHEST WALL SBSQ: CPT

## 2023-06-27 PROCEDURE — 99232 SBSQ HOSP IP/OBS MODERATE 35: CPT | Performed by: HOSPITALIST

## 2023-06-27 PROCEDURE — 94640 AIRWAY INHALATION TREATMENT: CPT

## 2023-06-27 PROCEDURE — 94760 N-INVAS EAR/PLS OXIMETRY 1: CPT

## 2023-06-27 PROCEDURE — 85610 PROTHROMBIN TIME: CPT | Performed by: INTERNAL MEDICINE

## 2023-06-27 RX ORDER — ONDANSETRON 2 MG/ML
4 INJECTION INTRAMUSCULAR; INTRAVENOUS EVERY 6 HOURS PRN
Status: DISCONTINUED | OUTPATIENT
Start: 2023-06-27 | End: 2023-06-28 | Stop reason: HOSPADM

## 2023-06-27 RX ORDER — BISACODYL 10 MG
10 SUPPOSITORY, RECTAL RECTAL ONCE
Status: DISCONTINUED | OUTPATIENT
Start: 2023-06-27 | End: 2023-06-28 | Stop reason: HOSPADM

## 2023-06-27 RX ADMIN — BUDESONIDE 0.5 MG: 0.5 INHALANT RESPIRATORY (INHALATION) at 19:31

## 2023-06-27 RX ADMIN — Medication 2.5 MG: at 10:22

## 2023-06-27 RX ADMIN — ACETAMINOPHEN 975 MG: 325 TABLET, FILM COATED ORAL at 05:47

## 2023-06-27 RX ADMIN — SENNOSIDES AND DOCUSATE SODIUM 1 TABLET: 8.6; 5 TABLET ORAL at 09:03

## 2023-06-27 RX ADMIN — LEVALBUTEROL HYDROCHLORIDE 1.25 MG: 1.25 SOLUTION RESPIRATORY (INHALATION) at 13:42

## 2023-06-27 RX ADMIN — DILTIAZEM HYDROCHLORIDE 240 MG: 240 CAPSULE, COATED, EXTENDED RELEASE ORAL at 09:04

## 2023-06-27 RX ADMIN — POLYETHYLENE GLYCOL 3350 17 G: 17 POWDER, FOR SOLUTION ORAL at 09:03

## 2023-06-27 RX ADMIN — LEVALBUTEROL HYDROCHLORIDE 1.25 MG: 1.25 SOLUTION RESPIRATORY (INHALATION) at 19:31

## 2023-06-27 RX ADMIN — LOSARTAN POTASSIUM 25 MG: 25 TABLET, FILM COATED ORAL at 09:03

## 2023-06-27 RX ADMIN — SERTRALINE 50 MG: 50 TABLET, FILM COATED ORAL at 09:03

## 2023-06-27 RX ADMIN — BUDESONIDE 0.5 MG: 0.5 INHALANT RESPIRATORY (INHALATION) at 07:31

## 2023-06-27 RX ADMIN — IPRATROPIUM BROMIDE 0.5 MG: 0.5 SOLUTION RESPIRATORY (INHALATION) at 19:31

## 2023-06-27 RX ADMIN — LEVOTHYROXINE SODIUM 25 MCG: 25 TABLET ORAL at 05:47

## 2023-06-27 RX ADMIN — IPRATROPIUM BROMIDE 0.5 MG: 0.5 SOLUTION RESPIRATORY (INHALATION) at 07:31

## 2023-06-27 RX ADMIN — IPRATROPIUM BROMIDE 0.5 MG: 0.5 SOLUTION RESPIRATORY (INHALATION) at 13:42

## 2023-06-27 RX ADMIN — ONDANSETRON 4 MG: 2 INJECTION INTRAMUSCULAR; INTRAVENOUS at 14:34

## 2023-06-27 RX ADMIN — SENNOSIDES AND DOCUSATE SODIUM 1 TABLET: 8.6; 5 TABLET ORAL at 17:38

## 2023-06-27 RX ADMIN — ACETAMINOPHEN 975 MG: 325 TABLET, FILM COATED ORAL at 22:48

## 2023-06-27 RX ADMIN — ACETAMINOPHEN 975 MG: 325 TABLET, FILM COATED ORAL at 14:34

## 2023-06-27 RX ADMIN — GUAIFENESIN 600 MG: 600 TABLET ORAL at 17:38

## 2023-06-27 RX ADMIN — FAMOTIDINE 20 MG: 20 TABLET, FILM COATED ORAL at 17:38

## 2023-06-27 RX ADMIN — GUAIFENESIN 600 MG: 600 TABLET ORAL at 09:03

## 2023-06-27 RX ADMIN — FUROSEMIDE 20 MG: 20 TABLET ORAL at 09:03

## 2023-06-27 RX ADMIN — MIRTAZAPINE 7.5 MG: 15 TABLET, FILM COATED ORAL at 22:48

## 2023-06-27 RX ADMIN — LEVALBUTEROL HYDROCHLORIDE 1.25 MG: 1.25 SOLUTION RESPIRATORY (INHALATION) at 07:31

## 2023-06-27 NOTE — ASSESSMENT & PLAN NOTE
Background: History of COPD atrial fibrillation hypertension presents for a fall found to have left femur and rib fractures  · Underwent ORIF 6/23/2023 doing well   · Warfarin was on hold and has been restarted but now on hold d/t supratherapeutic inr  · PT/OT recommending rehab    Patient agreeable and planning for placement possibly next 24 hours when medically stable

## 2023-06-27 NOTE — ASSESSMENT & PLAN NOTE
· Mild COPD exacerbation on admission  · Continue bronchodilators  · Systemic steroids: Was on IV methylprednisolone for 2 days and currently finishing up prednisone for a 5-day course  · Oxygen: Currently on 3 L, not on oxygen at baseline  Tried to wean down to 2 L but then patient became hypoxic  Is on furosemide PTA for peripheral edema  S/p IV lasix with improvement  Transition to home lasix

## 2023-06-27 NOTE — ASSESSMENT & PLAN NOTE
· Paroxysmal atrial fibrillation on diltiazem and digoxin  · Warfarin was on hold for surgery has been restarted at alternating 2 5/5 mg  · Due to rapid rise in INR, cont to hold coumadin       Results from last 7 days   Lab Units 06/27/23  0338 06/26/23  0334 06/25/23  0429 06/24/23  0339   INR  3 41* 3 35* 2 43* 1 55*

## 2023-06-27 NOTE — OCCUPATIONAL THERAPY NOTE
"    Occupational Therapy Cx Note  Patient Name: Fortino Baig  NRNVH'G Date: 6/27/2023  Problem List  Principal Problem:    Femur fracture, left ([address] )  Active Problems:    COPD exacerbation (HCC)    Atrial fibrillation (HCC)    Peripheral edema    Other specified hypothyroidism    Essential hypertension    Anxiety    Elevated troponin    Acute postoperative pulmonary insufficiency (HCC)    Constipation    Past Medical History  Past Medical History:   Diagnosis Date    Anti-phospholipid syndrome (HCC)     Asthma     Blood type O+     Cardiac disease     Chronic pain     COPD (chronic obstructive pulmonary disease) (HCC)     Fracture of ankle     left    Hyperlipidemia     Hypertension     Hypokalemia 2/27/2023    Osteoporosis      Past Surgical History  Past Surgical History:   Procedure Laterality Date    APPENDECTOMY      BILATERAL OOPHORECTOMY Bilateral     ORIF TIBIAL SHAFT FRACTURE W/ PLATES AND SCREWS Right     LA HEMIARTHROPLASTY HIP PARTIAL Left 6/23/2023    Procedure: HEMIARTHROPLASTY HIP (BIPOLAR); Surgeon: Otoniel Deng MD;  Location: [facility];  Service: Orthopedics           06/27/23 7846   OT Last Visit   OT Visit Date 06/27/23   Note Type   Note type Cancelled Session   Cancel Reasons Refusal   Additional Comments Attempted to see pt for OT evaluation  Pt declined participating despite encouragement  States \"I'm wiped out  Maybe tomorrow  \" Will attempt at later date  Per RN, pt refuses to sit up in recliner, however able to get to commode today             Cristian Burnette OTR/L   "

## 2023-06-27 NOTE — PLAN OF CARE
Problem: MOBILITY - ADULT  Goal: Maintain or return to baseline ADL function  Description: INTERVENTIONS:  -  Assess patient's ability to carry out ADLs; assess patient's baseline for ADL function and identify physical deficits which impact ability to perform ADLs (bathing, care of mouth/teeth, toileting, grooming, dressing, etc )  - Assess/evaluate cause of self-care deficits   - Assess range of motion  - Assess patient's mobility; develop plan if impaired  - Assess patient's need for assistive devices and provide as appropriate  - Encourage maximum independence but intervene and supervise when necessary  - Involve family in performance of ADLs  - Assess for home care needs following discharge   - Consider OT consult to assist with ADL evaluation and planning for discharge  - Provide patient education as appropriate  Outcome: Progressing  Goal: Maintains/Returns to pre admission functional level  Description: INTERVENTIONS:  - Perform BMAT or MOVE assessment daily    - Set and communicate daily mobility goal to care team and patient/family/caregiver  - Collaborate with rehabilitation services on mobility goals if consulted  - Perform Range of Motion  times a day  - Reposition patient every  hours    - Dangle patient  times a day  - Stand patient  times a day  - Ambulate patient  times a day  - Out of bed to chair  times a day   - Out of bed for meals times a day  - Out of bed for toileting  - Record patient progress and toleration of activity level   Outcome: Progressing     Problem: PAIN - ADULT  Goal: Verbalizes/displays adequate comfort level or baseline comfort level  Description: Interventions:  - Encourage patient to monitor pain and request assistance  - Assess pain using appropriate pain scale  - Administer analgesics based on type and severity of pain and evaluate response  - Implement non-pharmacological measures as appropriate and evaluate response  - Consider cultural and social influences on pain and pain management  - Notify physician/advanced practitioner if interventions unsuccessful or patient reports new pain  Outcome: Progressing     Problem: INFECTION - ADULT  Goal: Absence or prevention of progression during hospitalization  Description: INTERVENTIONS:  - Assess and monitor for signs and symptoms of infection  - Monitor lab/diagnostic results  - Monitor all insertion sites, i e  indwelling lines, tubes, and drains  - Monitor endotracheal if appropriate and nasal secretions for changes in amount and color  - Wise appropriate cooling/warming therapies per order  - Administer medications as ordered  - Instruct and encourage patient and family to use good hand hygiene technique  - Identify and instruct in appropriate isolation precautions for identified infection/condition  Outcome: Progressing  Goal: Absence of fever/infection during neutropenic period  Description: INTERVENTIONS:  - Monitor WBC    Outcome: Progressing     Problem: SAFETY ADULT  Goal: Maintain or return to baseline ADL function  Description: INTERVENTIONS:  -  Assess patient's ability to carry out ADLs; assess patient's baseline for ADL function and identify physical deficits which impact ability to perform ADLs (bathing, care of mouth/teeth, toileting, grooming, dressing, etc )  - Assess/evaluate cause of self-care deficits   - Assess range of motion  - Assess patient's mobility; develop plan if impaired  - Assess patient's need for assistive devices and provide as appropriate  - Encourage maximum independence but intervene and supervise when necessary  - Involve family in performance of ADLs  - Assess for home care needs following discharge   - Consider OT consult to assist with ADL evaluation and planning for discharge  - Provide patient education as appropriate  Outcome: Progressing  Goal: Maintains/Returns to pre admission functional level  Description: INTERVENTIONS:  - Perform BMAT or MOVE assessment daily    - Set and communicate daily mobility goal to care team and patient/family/caregiver  - Collaborate with rehabilitation services on mobility goals if consulted  - Perform Range of Motion  times a day  - Reposition patient every  hours    - Dangle patient  times a day  - Stand patient  times a day  - Ambulate patient  times a day  - Out of bed to chair  times a day   - Out of bed for meals times a day  - Out of bed for toileting  - Record patient progress and toleration of activity level   Outcome: Progressing  Goal: Patient will remain free of falls  Description: INTERVENTIONS:  - Educate patient/family on patient safety including physical limitations  - Instruct patient to call for assistance with activity   - Consult OT/PT to assist with strengthening/mobility   - Keep Call bell within reach  - Keep bed low and locked with side rails adjusted as appropriate  - Keep care items and personal belongings within reach  - Initiate and maintain comfort rounds  - Make Fall Risk Sign visible to staff  - Offer Toileting every  Hours, in advance of need  - Initiate/Maintain alarm  - Obtain necessary fall risk management equipment:   - Apply yellow socks and bracelet for high fall risk patients  - Consider moving patient to room near nurses station  Outcome: Progressing     Problem: DISCHARGE PLANNING  Goal: Discharge to home or other facility with appropriate resources  Description: INTERVENTIONS:  - Identify barriers to discharge w/patient and caregiver  - Arrange for needed discharge resources and transportation as appropriate  - Identify discharge learning needs (meds, wound care, etc )  - Arrange for interpretive services to assist at discharge as needed  - Refer to Case Management Department for coordinating discharge planning if the patient needs post-hospital services based on physician/advanced practitioner order or complex needs related to functional status, cognitive ability, or social support system  Outcome: Progressing Problem: Knowledge Deficit  Goal: Patient/family/caregiver demonstrates understanding of disease process, treatment plan, medications, and discharge instructions  Description: Complete learning assessment and assess knowledge base  Interventions:  - Provide teaching at level of understanding  - Provide teaching via preferred learning methods  Outcome: Progressing     Problem: Prexisting or High Potential for Compromised Skin Integrity  Goal: Skin integrity is maintained or improved  Description: INTERVENTIONS:  - Identify patients at risk for skin breakdown  - Assess and monitor skin integrity  - Assess and monitor nutrition and hydration status  - Monitor labs   - Assess for incontinence   - Turn and reposition patient  - Assist with mobility/ambulation  - Relieve pressure over bony prominences  - Avoid friction and shearing  - Provide appropriate hygiene as needed including keeping skin clean and dry  - Evaluate need for skin moisturizer/barrier cream  - Collaborate with interdisciplinary team   - Patient/family teaching  - Consider wound care consult   Outcome: Progressing     Problem: Nutrition/Hydration-ADULT  Goal: Nutrient/Hydration intake appropriate for improving, restoring or maintaining nutritional needs  Description: Monitor and assess patient's nutrition/hydration status for malnutrition  Collaborate with interdisciplinary team and initiate plan and interventions as ordered  Monitor patient's weight and dietary intake as ordered or per policy  Utilize nutrition screening tool and intervene as necessary  Determine patient's food preferences and provide high-protein, high-caloric foods as appropriate       INTERVENTIONS:  - Monitor oral intake, urinary output, labs, and treatment plans  - Assess nutrition and hydration status and recommend course of action  - Evaluate amount of meals eaten  - Assist patient with eating if necessary   - Allow adequate time for meals  - Recommend/ encourage appropriate diets, oral nutritional supplements, and vitamin/mineral supplements  - Order, calculate, and assess calorie counts as needed  - Recommend, monitor, and adjust tube feedings and TPN/PPN based on assessed needs  - Assess need for intravenous fluids  - Provide specific nutrition/hydration education as appropriate  - Include patient/family/caregiver in decisions related to nutrition  Outcome: Progressing     Problem: Potential for Falls  Goal: Patient will remain free of falls  Description: INTERVENTIONS:  - Educate patient/family on patient safety including physical limitations  - Instruct patient to call for assistance with activity   - Consult OT/PT to assist with strengthening/mobility   - Keep Call bell within reach  - Keep bed low and locked with side rails adjusted as appropriate  - Keep care items and personal belongings within reach  - Initiate and maintain comfort rounds  - Make Fall Risk Sign visible to staff  - Offer Toileting every  Hours, in advance of need  - Initiate/Maintain alarm  - Obtain necessary fall risk management equipment:   - Apply yellow socks and bracelet for high fall risk patients  - Consider moving patient to room near nurses station  Outcome: Progressing

## 2023-06-27 NOTE — ASSESSMENT & PLAN NOTE
Pt reports to me no BM ~6 days  Cont miralax  Will give suppository as pt vomited, suspect d/t constipation  Minimize narcs as tolerated

## 2023-06-27 NOTE — PROGRESS NOTES
"Smith Mendieta  79 y o   female  MR#: [id number]  6/27/2023    Post-op days: 4  Extremity: left hip    Subjective: Patient seen and examined this morning  Lying comfortably in bed  Easily arousable from sleep  No issues overnight  She denies any pain at this time  Denies any chest pain, shortness of breath, fevers or chills  Vitals:   Vitals:    06/26/23 2141   BP: (!) 122/49   Pulse: 81   Resp: 12   Temp: (!) 97 1 °F (36 2 °C)   SpO2: 93%       Exam:   A&O x 3 NAD  Left hip:  Mepilex dressing clean, dry and intact  Thigh and calf compartments are soft and compressible  Hip abduction pillow in place  Actively moving ankle and toes  Sensation intact to light touch  DP 2+    Labs:   WBC   Recent Labs     06/25/23  0429 06/26/23  0334 06/27/23  0338   WBC 8 42 7 80 8 12     H/H   Recent Labs     06/25/23  0429 06/26/23  0334 06/27/23  0338   HGB 8 9* 9 4* 8 4*   /  Recent Labs     06/25/23  0429 06/26/23  0334 06/27/23  0338   HCT 27 9* 29 7* 27 6*     Sed Rate No results for input(s): \"SEDRATE\" in the last 72 hours  CRP No results for input(s): \"CRP\" in the last 72 hours  Assessment:   S/p left hip cemented hemiarthroplasty by Dr Ean Rollins:   Weightbearing as tolerated to left lower extremity with assistance  PT/OT  Posterior hip precautions  Pain control  DVT prophylaxis: Bridging back to Coumadin as per primary team  Discharge planning: Orthopedically stable for discharge to rehab once placement complete and medically cleared  Follow-up as outpatient with Dr Reina Rudolph in 10 to 14 days    "

## 2023-06-27 NOTE — PROGRESS NOTES
[place]  Progress Note  Name: Fernando Perez  MRN: [id number]  Unit/Bed#: -01 I Date of Admission: 6/22/2023   Date of Service: 6/27/2023 I Hospital Day: 5    Assessment/Plan   Constipation  Assessment & Plan  Pt reports to me no BM ~6 days  Cont miralax  Will give suppository as pt vomited, suspect d/t constipation  Minimize narcs as tolerated  Acute postoperative pulmonary insufficiency (HCC)  Assessment & Plan  · Pulmonary insufficiency postop remains on 3 L nasal cannula  · Finishing up steroids  S/p IV lasix  Elevated troponin  Assessment & Plan  · Non-MI elevation of troponin secondary to fall    Lab Results   Component Value Date    HSTNI0 113 (H) 06/22/2023    HSTNI2 202 (H) 06/22/2023    HSTNI4 236 (H) 06/22/2023       Anxiety  Assessment & Plan  · Stable continue sertraline     Essential hypertension  Assessment & Plan  · Blood pressure stable continue losartan and diltiazem    Other specified hypothyroidism  Assessment & Plan  · Continue levothyroxine    Peripheral edema  Assessment & Plan  · On furosemide prior to admission  Improved  Cont home lasix    Atrial fibrillation (HCC)  Assessment & Plan  · Paroxysmal atrial fibrillation on diltiazem and digoxin  · Warfarin was on hold for surgery has been restarted at alternating 2 5/5 mg  · Due to rapid rise in INR, cont to hold coumadin  Results from last 7 days   Lab Units 06/27/23  0338 06/26/23  0334 06/25/23  0429 06/24/23  0339   INR  3 41* 3 35* 2 43* 1 55*       COPD exacerbation (HCC)  Assessment & Plan  · Mild COPD exacerbation on admission  · Continue bronchodilators  · Systemic steroids: Was on IV methylprednisolone for 2 days and currently finishing up prednisone for a 5-day course  · Oxygen: Currently on 3 L, not on oxygen at baseline  Tried to wean down to 2 L but then patient became hypoxic  Is on furosemide PTA for peripheral edema  S/p IV lasix with improvement   Transition to home lasix  * Femur fracture, left ([address] )  Assessment & Plan  Background: History of COPD atrial fibrillation hypertension presents for a fall found to have left femur and rib fractures  · Underwent ORIF 2023 doing well   · Warfarin was on hold and has been restarted but now on hold d/t supratherapeutic inr  · PT/OT recommending rehab  Patient agreeable and planning for placement possibly next 24 hours when medically stable              VTE  Prophylaxis:   Pharmacologic: in place  Mechanical VTE Prophylaxis in Place: Yes    Patient Centered Rounds: I have performed bedside rounds with nursing staff today  Discussions with Specialists or Other Care Team Provider: case management    Education and Discussions with Family / Patient: pt      Current Length of Stay: 5 day(s)    Current Patient Status: Inpatient        Code Status: Level 2 - DNAR: but accepts endotracheal intubation    Discharge Plan: Pt will require continued inpatient hospitalization  Subjective:   Pt endorsing constipation and not able to eat 2/2 this  Then had episode of NBNB emesis  Patient is seen and examined at bedside  All other ROS are negative  Objective:     Vitals:   Temp (24hrs), Av 4 °F (36 9 °C), Min:97 1 °F (36 2 °C), Max:99 1 °F (37 3 °C)    Temp:  [97 1 °F (36 2 °C)-99 1 °F (37 3 °C)] 98 9 °F (37 2 °C)  HR:  [] 101  Resp:  [12-20] 20  BP: (122-152)/(49-77) 151/77  SpO2:  [89 %-99 %] 90 %  Body mass index is 23 25 kg/m²  Input and Output Summary (last 24 hours): Intake/Output Summary (Last 24 hours) at 2023 1156  Last data filed at 2023 1101  Gross per 24 hour   Intake 390 ml   Output 900 ml   Net -510 ml       Physical Exam:       GEN: No acute distress, comfortable elderly female on o2    HEEENT: No JVD, PERRLA, no scleral icterus  RESP: Lungs clear to auscultation bilaterally  CV: RRR, +s1/s2   ABD: SOFT NON TENDER, POSITIVE BOWEL SOUNDS, NO DISTENTION  PSYCH: CALM  NEURO: Mentation baseline, NO FOCAL DEFICITS  SKIN: NO RASH  EXTREM: NO EDEMA    Additional Data:     Labs:    Results from last 7 days   Lab Units 06/27/23  0338 06/25/23  0429 06/24/23  0339 06/23/23  0510   WBC Thousand/uL 8 12   < > 11 39* 8 96   HEMOGLOBIN g/dL 8 4*   < > 9 0* 11 0*   HEMATOCRIT % 27 6*   < > 28 8* 34 1*   PLATELETS Thousands/uL 310   < > 248 244   BANDS PCT %  --   --  4  --    NEUTROS PCT %  --   --   --  89*   LYMPHS PCT %  --   --   --  5*   LYMPHO PCT %  --   --  2*  --    MONOS PCT %  --   --   --  5   MONO PCT %  --   --  6  --    EOS PCT %  --   --  0 0    < > = values in this interval not displayed  Results from last 7 days   Lab Units 06/27/23 0338 06/25/23 0429 06/24/23  0339   SODIUM mmol/L 139   < > 136   POTASSIUM mmol/L 4 0   < > 4 6   CHLORIDE mmol/L 100   < > 101   CO2 mmol/L 37*   < > 34*   BUN mg/dL 20   < > 21   CREATININE mg/dL 0 49*   < > 0 62   ANION GAP mmol/L 2   < > 1   CALCIUM mg/dL 8 3*   < > 8 5   ALBUMIN g/dL  --   --  2 9*   TOTAL BILIRUBIN mg/dL  --   --  0 25   ALK PHOS U/L  --   --  102   ALT U/L  --   --  11   AST U/L  --   --  12*   GLUCOSE RANDOM mg/dL 95   < > 149*    < > = values in this interval not displayed  Results from last 7 days   Lab Units 06/27/23 0338   INR  3 41*             Results from last 7 days   Lab Units 06/23/23  0510 06/22/23  1136   LACTIC ACID mmol/L  --  1 7   PROCALCITONIN ng/ml 0 42* 0 20       Lines/Drains:  Invasive Devices     Peripheral Intravenous Line  Duration           Peripheral IV 06/23/23 Dorsal (posterior); Right Hand 3 days          Drain  Duration           External Urinary Catheter 1 day                Telemetry:        * I Have Reviewed All Lab Data Listed Above  Imaging:     Results for orders placed during the hospital encounter of 06/22/23    XR Trauma chest portable    Narrative  CHEST    INDICATION:   TRAUMA      COMPARISON: 5/17/2023    EXAM PERFORMED/VIEWS:  XR CHEST PORTABLE      FINDINGS:    Cardiomediastinal silhouette appears unremarkable  The lungs are clear  No pneumothorax or pleural effusion  Osseous structures appear within normal limits for patient age  Impression  No acute cardiopulmonary disease  Workstation performed: [id number]    Results for orders placed during the hospital encounter of 05/04/23    XR chest pa & lateral    Narrative  CHEST    INDICATION:   Shortness of breath  COMPARISON: Chest radiograph March 6, 2023    EXAM PERFORMED/VIEWS:  XR CHEST PA & LATERAL      FINDINGS:    Cardiomediastinal silhouette appears unremarkable  Linear atelectasis in the right lower lung  No focal consolidation, pleural effusion or pneumothorax  Osseous structures appear within normal limits for patient age  Impression  No focal consolidation, pleural effusion, or pneumothorax  Workstation performed: [id number]      *I have reviewed all imaging reports listed above      Recent Cultures (last 7 days):     Results from last 7 days   Lab Units 06/22/23  1136 06/22/23  1114   BLOOD CULTURE  No Growth After 4 Days  No Growth After 4 Days         Last 24 Hours Medication List:   Current Facility-Administered Medications   Medication Dose Route Frequency Provider Last Rate   • acetaminophen  975 mg Oral [facility] Blenda Morning, PA-C     • bisacodyl  10 mg Rectal Once Lucrecia Cortez MD     • budesonide  0 5 mg Nebulization Q12H Blenda Morning, PA-C     • digoxin  125 mcg Oral Every Other Day Blenda Morning, PA-C     • diltiazem  240 mg Oral Daily Blenda Morning, PA-C     • famotidine  20 mg Oral QPM Blenda Morning, PA-C     • furosemide  20 mg Oral Daily Evelio Tovar DO     • guaiFENesin  600 mg Oral BID Blenda Morning, PA-C     • ipratropium  0 5 mg Nebulization TID Blenda Morning, PA-C     • levalbuterol  1 25 mg Nebulization TID Blenda Morning, PA-C     • levothyroxine  25 mcg Oral Daily Blenda Morning, PA-C     • losartan  25 mg Oral Daily Blenda Morning, PA-C     • mirtazapine  7 5 mg Oral HS Michoacano Busbys, PA-C     • morphine injection  2 mg Intravenous Q6H PRN Michoacano Busbys, PA-C     • oxyCODONE  5 mg Oral Q6H PRN Michoacano Busbys, PA-C     • oxyCODONE  2 5 mg Oral Q6H PRN Michoacano Busbys, PA-C     • polyethylene glycol  17 g Oral Daily Pedro Knowles DO     • senna-docusate sodium  1 tablet Oral BID Pedro Knowles DO     • sertraline  50 mg Oral Daily Michoacano Alvarez, PA-C     • tiZANidine  2 mg Oral BID PRN Michoacano Alvarez, PA-C          Today, Patient Was Seen By: Shen Wilcox MD    ** Please Note: Dictation voice to text software may have been used in the creation of this document   **

## 2023-06-28 ENCOUNTER — TRANSITIONAL CARE MANAGEMENT (OUTPATIENT)
Dept: FAMILY MEDICINE CLINIC | Facility: CLINIC | Age: 83
End: 2023-06-28

## 2023-06-28 VITALS
BODY MASS INDEX: 22.85 KG/M2 | HEIGHT: 60 IN | OXYGEN SATURATION: 91 % | RESPIRATION RATE: 16 BRPM | SYSTOLIC BLOOD PRESSURE: 112 MMHG | WEIGHT: 116.4 LBS | DIASTOLIC BLOOD PRESSURE: 46 MMHG | HEART RATE: 92 BPM | TEMPERATURE: 99.1 F

## 2023-06-28 DIAGNOSIS — J44.9 CHRONIC OBSTRUCTIVE PULMONARY DISEASE, UNSPECIFIED COPD TYPE (HCC): ICD-10-CM

## 2023-06-28 LAB
ALBUMIN SERPL BCP-MCNC: 2.6 G/DL (ref 3.5–5)
ALP SERPL-CCNC: 107 U/L (ref 34–104)
ALT SERPL W P-5'-P-CCNC: 10 U/L (ref 7–52)
ANION GAP SERPL CALCULATED.3IONS-SCNC: 2 MMOL/L
AST SERPL W P-5'-P-CCNC: 13 U/L (ref 13–39)
BASOPHILS # BLD AUTO: 0.01 THOUSANDS/ÂΜL (ref 0–0.1)
BASOPHILS NFR BLD AUTO: 0 % (ref 0–1)
BILIRUB SERPL-MCNC: 0.44 MG/DL (ref 0.2–1)
BUN SERPL-MCNC: 30 MG/DL (ref 5–25)
CALCIUM ALBUM COR SERPL-MCNC: 9.2 MG/DL (ref 8.3–10.1)
CALCIUM SERPL-MCNC: 8.1 MG/DL (ref 8.4–10.2)
CHLORIDE SERPL-SCNC: 97 MMOL/L (ref 96–108)
CO2 SERPL-SCNC: 37 MMOL/L (ref 21–32)
CREAT SERPL-MCNC: 0.56 MG/DL (ref 0.6–1.3)
EOSINOPHIL # BLD AUTO: 0.02 THOUSAND/ÂΜL (ref 0–0.61)
EOSINOPHIL NFR BLD AUTO: 0 % (ref 0–6)
ERYTHROCYTE [DISTWIDTH] IN BLOOD BY AUTOMATED COUNT: 14 % (ref 11.6–15.1)
GFR SERPL CREATININE-BSD FRML MDRD: 85 ML/MIN/1.73SQ M
GLUCOSE SERPL-MCNC: 116 MG/DL (ref 65–140)
HCT VFR BLD AUTO: 27.4 % (ref 34.8–46.1)
HGB BLD-MCNC: 8.6 G/DL (ref 11.5–15.4)
IMM GRANULOCYTES # BLD AUTO: 0.16 THOUSAND/UL (ref 0–0.2)
IMM GRANULOCYTES NFR BLD AUTO: 1 % (ref 0–2)
LYMPHOCYTES # BLD AUTO: 0.62 THOUSANDS/ÂΜL (ref 0.6–4.47)
LYMPHOCYTES NFR BLD AUTO: 4 % (ref 14–44)
MACROCYTES BLD QL AUTO: PRESENT
MCH RBC QN AUTO: 31.7 PG (ref 26.8–34.3)
MCHC RBC AUTO-ENTMCNC: 31.4 G/DL (ref 31.4–37.4)
MCV RBC AUTO: 101 FL (ref 82–98)
MONOCYTES # BLD AUTO: 0.62 THOUSAND/ÂΜL (ref 0.17–1.22)
MONOCYTES NFR BLD AUTO: 4 % (ref 4–12)
NEUTROPHILS # BLD AUTO: 15.15 THOUSANDS/ÂΜL (ref 1.85–7.62)
NEUTS SEG NFR BLD AUTO: 91 % (ref 43–75)
NRBC BLD AUTO-RTO: 0 /100 WBCS
PLATELET # BLD AUTO: 347 THOUSANDS/UL (ref 149–390)
PLATELET BLD QL SMEAR: ADEQUATE
PMV BLD AUTO: 9.2 FL (ref 8.9–12.7)
POTASSIUM SERPL-SCNC: 4.1 MMOL/L (ref 3.5–5.3)
PROT SERPL-MCNC: 4.9 G/DL (ref 6.4–8.4)
RBC # BLD AUTO: 2.71 MILLION/UL (ref 3.81–5.12)
RBC MORPH BLD: PRESENT
SODIUM SERPL-SCNC: 136 MMOL/L (ref 135–147)
WBC # BLD AUTO: 16.58 THOUSAND/UL (ref 4.31–10.16)

## 2023-06-28 PROCEDURE — 94668 MNPJ CHEST WALL SBSQ: CPT

## 2023-06-28 PROCEDURE — 99239 HOSP IP/OBS DSCHRG MGMT >30: CPT | Performed by: INTERNAL MEDICINE

## 2023-06-28 PROCEDURE — 85025 COMPLETE CBC W/AUTO DIFF WBC: CPT | Performed by: HOSPITALIST

## 2023-06-28 PROCEDURE — 94760 N-INVAS EAR/PLS OXIMETRY 1: CPT

## 2023-06-28 PROCEDURE — 80053 COMPREHEN METABOLIC PANEL: CPT | Performed by: HOSPITALIST

## 2023-06-28 PROCEDURE — 94640 AIRWAY INHALATION TREATMENT: CPT

## 2023-06-28 RX ORDER — OXYCODONE HYDROCHLORIDE 5 MG/1
2.5 TABLET ORAL EVERY 6 HOURS PRN
Qty: 6 TABLET | Refills: 0 | Status: SHIPPED | OUTPATIENT
Start: 2023-06-28 | End: 2023-07-08

## 2023-06-28 RX ORDER — BUDESONIDE 0.5 MG/2ML
0.5 INHALANT ORAL
Qty: 120 ML | Refills: 0 | Status: SHIPPED | OUTPATIENT
Start: 2023-06-28 | End: 2023-07-28

## 2023-06-28 RX ORDER — POLYETHYLENE GLYCOL 3350 17 G/17G
17 POWDER, FOR SOLUTION ORAL DAILY
Qty: 85 G | Refills: 0 | Status: SHIPPED | OUTPATIENT
Start: 2023-06-29 | End: 2023-07-07

## 2023-06-28 RX ORDER — GUAIFENESIN 600 MG/1
600 TABLET, EXTENDED RELEASE ORAL 2 TIMES DAILY
Refills: 0
Start: 2023-06-28

## 2023-06-28 RX ORDER — OXYCODONE HYDROCHLORIDE 5 MG/1
5 TABLET ORAL EVERY 6 HOURS PRN
Qty: 10 TABLET | Refills: 0 | Status: SHIPPED | OUTPATIENT
Start: 2023-06-28 | End: 2023-07-08

## 2023-06-28 RX ORDER — AZITHROMYCIN 500 MG/1
500 TABLET, FILM COATED ORAL ONCE
Status: COMPLETED | OUTPATIENT
Start: 2023-06-28 | End: 2023-06-28

## 2023-06-28 RX ORDER — AZITHROMYCIN 250 MG/1
TABLET, FILM COATED ORAL
Qty: 5 TABLET | Refills: 0 | Status: SHIPPED | OUTPATIENT
Start: 2023-06-29 | End: 2023-07-02

## 2023-06-28 RX ORDER — AZITHROMYCIN 250 MG/1
TABLET, FILM COATED ORAL
Qty: 6 TABLET | Refills: 0 | Status: SHIPPED | OUTPATIENT
Start: 2023-06-28 | End: 2023-06-28 | Stop reason: SDUPTHER

## 2023-06-28 RX ORDER — FAMOTIDINE 20 MG/1
20 TABLET, FILM COATED ORAL EVERY EVENING
Refills: 0
Start: 2023-06-28

## 2023-06-28 RX ORDER — FLUTICASONE PROPIONATE AND SALMETEROL 250; 50 UG/1; UG/1
POWDER RESPIRATORY (INHALATION)
Qty: 180 BLISTER | Refills: 3 | Status: SHIPPED | OUTPATIENT
Start: 2023-06-28

## 2023-06-28 RX ADMIN — LEVALBUTEROL HYDROCHLORIDE 1.25 MG: 1.25 SOLUTION RESPIRATORY (INHALATION) at 13:14

## 2023-06-28 RX ADMIN — DIGOXIN 125 MCG: 125 TABLET ORAL at 08:59

## 2023-06-28 RX ADMIN — SENNOSIDES AND DOCUSATE SODIUM 1 TABLET: 8.6; 5 TABLET ORAL at 08:59

## 2023-06-28 RX ADMIN — FUROSEMIDE 20 MG: 20 TABLET ORAL at 08:59

## 2023-06-28 RX ADMIN — POLYETHYLENE GLYCOL 3350 17 G: 17 POWDER, FOR SOLUTION ORAL at 09:02

## 2023-06-28 RX ADMIN — IPRATROPIUM BROMIDE 0.5 MG: 0.5 SOLUTION RESPIRATORY (INHALATION) at 07:19

## 2023-06-28 RX ADMIN — AZITHROMYCIN MONOHYDRATE 500 MG: 500 TABLET ORAL at 15:45

## 2023-06-28 RX ADMIN — ACETAMINOPHEN 975 MG: 325 TABLET, FILM COATED ORAL at 13:47

## 2023-06-28 RX ADMIN — ACETAMINOPHEN 975 MG: 325 TABLET, FILM COATED ORAL at 06:01

## 2023-06-28 RX ADMIN — SERTRALINE 50 MG: 50 TABLET, FILM COATED ORAL at 08:59

## 2023-06-28 RX ADMIN — DILTIAZEM HYDROCHLORIDE 240 MG: 240 CAPSULE, COATED, EXTENDED RELEASE ORAL at 09:02

## 2023-06-28 RX ADMIN — BUDESONIDE 0.5 MG: 0.5 INHALANT RESPIRATORY (INHALATION) at 07:20

## 2023-06-28 RX ADMIN — LEVALBUTEROL HYDROCHLORIDE 1.25 MG: 1.25 SOLUTION RESPIRATORY (INHALATION) at 07:19

## 2023-06-28 RX ADMIN — LEVOTHYROXINE SODIUM 25 MCG: 25 TABLET ORAL at 06:01

## 2023-06-28 RX ADMIN — IPRATROPIUM BROMIDE 0.5 MG: 0.5 SOLUTION RESPIRATORY (INHALATION) at 13:14

## 2023-06-28 RX ADMIN — GUAIFENESIN 600 MG: 600 TABLET ORAL at 08:59

## 2023-06-28 RX ADMIN — LOSARTAN POTASSIUM 25 MG: 25 TABLET, FILM COATED ORAL at 08:59

## 2023-06-28 NOTE — DISCHARGE SUMMARY
[place]  Discharge- Ricardo Biswas 1940, 79 y o  female MRN: [id number]  Unit/Bed#: -01 Encounter: [id number]  Primary Care Provider: Dee Pratt MD   Date and time admitted to hospital: 6/22/2023 10:57 AM      Admitting Provider:  Tk Velásquez MD  Discharge Provider:  Trevon Cifuentes MD  Admission Date: 6/22/2023       Discharge Date: 06/28/23   LOS: 6  Primary Care Physician at Discharge: Dee Pratt [phone]    HOSPITAL COURSE:  Ricardo Biswas is a 79 y o  female who presented with femur fracture and postoperatively had pulmonary insufficiency and COPD exacerbation  Patient was treated with antibiotics and improved  Patient did develop leukocytosis which is likely secondary to parenteral steroids  Parenteral steroids were stopped and patient started on budesonide at discharge  Patient is weak but otherwise stable  No signs of any active infection  REASON FOR ADMISSION/ ADMISSION DIAGNOSES    Femur fracture, postop pulmonary insufficiency with COPD exacerbation    DISCHARGE DIAGNOSES  * Femur fracture, left (HCC)  Assessment & Plan  Background: History of COPD atrial fibrillation hypertension presents for a fall found to have left femur and rib fractures  · Underwent ORIF 6/23/2023 doing well   · Warfarin was on hold and has been restarted but now on hold d/t supratherapeutic inr  · PT/OT recommending rehab  Patient agreeable and placement being done by case management  Constipation  Assessment & Plan  Cont miralax  Minimize narcs as tolerated  Acute postoperative pulmonary insufficiency (HCC)  Assessment & Plan  · Pulmonary insufficiency postop remains on 2-3 L nasal cannula at discharge  Improved with parenteral steroids, transitioning to inhaled steroids    Elevated troponin  Assessment & Plan  · Non-MI elevation of troponin secondary to fall      Lab Results   Component Value Date    HSTNI0 113 (H) 06/22/2023    HSTNI2 202 (H) 06/22/2023    HSTNI4 236 (H) 06/22/2023       Anxiety  Assessment & Plan  · Stable continue sertraline  Essential hypertension  Assessment & Plan  · Blood pressure stable continue losartan and diltiazem  Peripheral edema  Assessment & Plan  · On furosemide prior to admission  Improved  Cont home lasix  Atrial fibrillation (HCC)  Assessment & Plan  · Paroxysmal atrial fibrillation on diltiazem and digoxin  · Warfarin was on hold for surgery has been restarted at alternating 2 5/5 mg  · Due to rapid rise in INR, continue to hold coumadin  Results from last 7 days   Lab Units 06/27/23  0338 06/26/23  0334 06/25/23  0429 06/24/23  0339   INR  3 41* 3 35* 2 43* 1 55*       COPD exacerbation (HCC)  Assessment & Plan  · Mild COPD exacerbation on admission  · Continue bronchodilators  · Systemic steroids: Was given and patient improved  Transition to budesonide   · oxygen: Currently on 2-3 L, not on oxygen at baseline  · Patient did get leukocytosis likely secondary to steroids  He is being discharged on a course of azithromycin  · Recheck labs while at [facility]  Procedure(s) (LRB):  HEMIARTHROPLASTY HIP (BIPOLAR) (Left)    RADIOLOGY RESULTS  XR pelvis ap only 1 or 2 vw    Result Date: 6/23/2023  Impression: Left hip replacement Workstation performed: [id number]     XR Trauma pelvis ap only 1 or 2 vw    Result Date: 6/22/2023  Impression: Displaced left subcapital fracture  Workstation performed: [id number]     XR Trauma chest portable    Result Date: 6/22/2023  Impression: No acute cardiopulmonary disease  Workstation performed: [id number]     TRAUMA - CT spine cervical wo contrast    Result Date: 6/22/2023  Impression: No cervical spine fracture or traumatic malalignment  Workstation performed: [id number]     TRAUMA - CT chest abdomen pelvis w contrast    Result Date: 6/22/2023  Impression: Displaced left subcapital fracture  Bilateral sacral insufficiency fractures  Chronic right pubic symphysis fracture  Subacute left 11th rib fracture  Workstation performed: [id number]     TRAUMA - CT head wo contrast    Result Date: 6/22/2023  Impression: No acute intracranial abnormality   Workstation performed: [id number]       LABS  Results from last 7 days   Lab Units 06/28/23  0550 06/27/23  0338 06/26/23  0334 06/25/23  0429 06/24/23  0339 06/23/23  0510 06/22/23  1224 06/22/23  1105   WBC Thousand/uL 16 58* 8 12 7 80 8 42 11 39* 8 96  --  11 67*   HEMOGLOBIN g/dL 8 6* 8 4* 9 4* 8 9* 9 0* 11 0*  --  10 8*   HEMATOCRIT % 27 4* 27 6* 29 7* 27 9* 28 8* 34 1*  --  34 2*   MCV fL 101* 103* 103* 103* 101* 99*  --  101*   TOTAL NEUT ABS Thousand/uL  --   --   --   --  10 48*  --   --   --    BANDS PCT %  --   --   --   --  4  --   --   --    PLATELETS Thousands/uL 347 310 298 268 248 244  --  297   INR   --  3 41* 3 35* 2 43* 1 55* 1 21* 2 19*  --      Results from last 7 days   Lab Units 06/28/23  0550 06/27/23  0338 06/26/23  0334 06/25/23  0429 06/24/23  0339 06/23/23  0510 06/22/23  1105   SODIUM mmol/L 136 139 139 139 136 135 136   POTASSIUM mmol/L 4 1 4 0 4 5 4 5 4 6 3 7 6 0*   CHLORIDE mmol/L 97 100 101 101 101 98 100   CO2 mmol/L 37* 37* 34* 35* 34* 30 31   BUN mg/dL 30* 20 19 21 21 27* 21   CREATININE mg/dL 0 56* 0 49* 0 43* 0 51* 0 62 0 68 0 60   CALCIUM mg/dL 8 1* 8 3* 8 6 8 6 8 5 8 8 9 0   ALBUMIN g/dL 2 6*  --   --   --  2 9* 3 2* 3 5   TOTAL BILIRUBIN mg/dL 0 44  --   --   --  0 25 0 40 0 40   ALK PHOS U/L 107*  --   --   --  102 134* 163*   ALT U/L 10  --   --   --  11 18 18   AST U/L 13  --   --   --  12* 15 28   EGFR ml/min/1 73sq m 85 89 93 88 83 80 83   GLUCOSE RANDOM mg/dL 116 95 121 146* 149* 203* 112     Results from last 7 days   Lab Units 06/22/23  1105   CK TOTAL U/L 73                          Results from last 7 days   Lab Units 06/23/23  0510 06/22/23  1136   LACTIC ACID mmol/L  --  1 7   PROCALCITONIN ng/ml 0 42* 0 20 Cultures:   Results from last 7 days   Lab Units 06/22/23  1437   COLOR UA  Yellow   CLARITY UA  Clear   SPEC GRAV UA  <1 005*   PH UA  8 0   LEUKOCYTES UA  Negative   NITRITE UA  Negative   GLUCOSE UA mg/dl Negative   KETONES UA mg/dl 10 (1+)*   BILIRUBIN UA  Negative   BLOOD UA  Negative      Results from last 7 days   Lab Units 06/22/23  1437   RBC UA /hpf 0-1   WBC UA /hpf 0-1   EPITHELIAL CELLS WET PREP /hpf None Seen   BACTERIA UA /hpf Occasional      Results from last 7 days   Lab Units 06/22/23  1136 06/22/23  1114   BLOOD CULTURE  No Growth After 5 Days  No Growth After 5 Days  PHYSICAL EXAM:  Vitals:   Blood Pressure: (!) 112/46 (06/28/23 1509)  Pulse: 92 (06/28/23 1509)  Temperature: 99 1 °F (37 3 °C) (06/28/23 1509)  Temp Source: Oral (06/28/23 1509)  Respirations: 16 (06/28/23 0718)  Height: 5' (152 4 cm) (06/22/23 1058)  Weight - Scale: 52 8 kg (116 lb 6 5 oz) (06/28/23 0543)  SpO2: 91 % (06/28/23 1509)    General appearance: alert, fatigued, appears stated age and cooperative  HEENT - atraumatic and normocephalic  Neck- supple  Skin - no fresh rash  Extremities no fresh focal deformities  Cardiovascular- S1-S2 heard  Respiratory- bilateral air entry present, no crackles or rhonchi  Skin - no fresh rash  Abdomen - normal bowel sounds present, no rebound tenderness  CNS- No fresh focal deficits  Psych- no acute psychosis     Planned Re-admission: No  Discharge Disposition: Discharged/Transferred to Long Term Care/Personal Care Home/Assisted Living    Test Results Pending at Discharge:   Pending Labs     Order Current Status    Tissue Exam In process      Incidental findings: None    Medications   · Summary of Medication Adjustments made as a result of this hospitalization: Azithromycin, budesonide, oxycodone as needed  · Medication Dosing Tapers - Please refer to Discharge Medication List for details on any medication dosing tapers (if applicable to patient)    · Discharge Medication List: See after visit summary for reconciled discharge medications  Diet restrictions: None       Diet Orders   (From admission, onward)             Start     Ordered    06/27/23 1139  Dietary nutrition supplements  Once        Question Answer Comment   Select Supplement: Ensure Plus High Protein Vanilla    Frequency Breakfast        06/27/23 1138    06/27/23 1139  Dietary nutrition supplements  Once        Question Answer Comment   Select Supplement: Ensure Plus High Protein Chocolate    Frequency Dinner        06/27/23 1138    06/23/23 1713  Diet Regular; Regular House  Diet effective now        References:    Adult Nutrition Support Algorithm    RD Therapeutic Diet Order Protocol   Question Answer Comment   Diet Type Regular    Regular Regular House    RD to adjust diet per protocol? Yes        06/23/23 1712              Activity restrictions: No strenuous activity  Discharge Condition: fair    Outpatient Follow-Up and Discharge Instructions  See after visit summary section titled Discharge Instructions for information provided to patient and family  Code Status: Level 2 - DNAR: but accepts endotracheal intubation  Discharge Statement   I spent 35 minutes discharging the patient  This time was spent on the day of discharge  Greater than 50% of total time was spent with the patient and / or family counseling and / or coordination of care  Humera Lunsford MD  [place] [address] Internal Medicine    ** Please Note: This note has been constructed using a voice recognition system   **

## 2023-06-28 NOTE — ASSESSMENT & PLAN NOTE
Background: History of COPD atrial fibrillation hypertension presents for a fall found to have left femur and rib fractures  · Underwent ORIF 6/23/2023 doing well   · Warfarin was on hold and has been restarted but now on hold d/t supratherapeutic inr  · PT/OT recommending rehab  Patient agreeable and placement being done by case management

## 2023-06-28 NOTE — ASSESSMENT & PLAN NOTE
· Pulmonary insufficiency postop remains on 2-3 L nasal cannula at discharge  Improved with parenteral steroids, transitioning to inhaled steroids

## 2023-06-28 NOTE — ASSESSMENT & PLAN NOTE
· Paroxysmal atrial fibrillation on diltiazem and digoxin  · Warfarin was on hold for surgery has been restarted at alternating 2 5/5 mg  · Due to rapid rise in INR, continue to hold coumadin       Results from last 7 days   Lab Units 06/27/23  0338 06/26/23  0334 06/25/23  0429 06/24/23  0339   INR  3 41* 3 35* 2 43* 1 55*

## 2023-06-28 NOTE — CASE MANAGEMENT
Case Management Discharge Planning Note    Patient name Avanell Fat  Location /-40 MRN [id number]  : 1940 Date 2023       Current Admission Date: 2023  Current Admission Diagnosis:Femur fracture, left [facility])   Patient Active Problem List    Diagnosis Date Noted   • Constipation 2023   • Acute postoperative pulmonary insufficiency ([address] ) 2023   • Femur fracture, left ([address] ) 2023   • Elevated troponin 2023   • Aspiration pneumonia of right lung ([address] ) 2023   • Bilateral pleural effusion 2023   • Chronic respiratory failure with hypoxia and hypercapnia ([address] ) 2023   • UGI bleed 2023   • Gastroenteritis 2023   • Cerebral aneurysm, nonruptured 2023   • Abdominal distention/back pain 2023   • Urinary retention 2023   • MDD with persistant bereavement complex 2023   • Abnormal CT of the chest 2023   • 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 ) 2023   • Failure to thrive in adult 2023   • Pulmonary nodule 02/15/2023   • Onychomycosis 02/15/2023   • Essential hypertension 02/10/2023   • Anxiety 02/10/2023   • Other specified hypothyroidism 2022   • Peripheral edema 2021   • Low back pain without sciatica 2021   • Pneumonia of right lower lobe due to infectious organism 2017   • COPD exacerbation ([address] ) 2017   • Sepsis with acute renal failure without septic shock (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   • Vitamin D deficiency 2016   • Osteoporosis 2015   • Antiphospholipid antibody syndrome ([place] [address] ) 2012   • Atrial fibrillation ([place] [address] ) 2012   • Hereditary hemolytic anemia ([place] [address] ) 2012   • Asthma 2012      LOS (days): 6  Geometric Mean LOS (GMLOS) (days): 6 20  Days to GMLOS:0 1     OBJECTIVE:  Risk of Unplanned Readmission Score: 42 54         Current admission status: Inpatient   Preferred Pharmacy:   [address] [place] [address] #[id number]  [address]  [address] [place] [id number]  Phone: [phone] Fax: [address], [place] [address]  Phone: [phone] Fax: [phone]    Primary Care Provider: Pedro Mckinney MD    Primary Insurance: MEDICARE  Secondary Insurance: BLUE CROSS    DISCHARGE DETAILS:    Additional Comments: [facility] to transport Pt to [facility]  Pickup is 5pm  Pt's nurse(Demetrio), son(Solomon) and [facility] liaison informed of transport time to [facility]  All in agreement

## 2023-06-28 NOTE — PLAN OF CARE
Problem: MOBILITY - ADULT  Goal: Maintain or return to baseline ADL function  Description: INTERVENTIONS:  -  Assess patient's ability to carry out ADLs; assess patient's baseline for ADL function and identify physical deficits which impact ability to perform ADLs (bathing, care of mouth/teeth, toileting, grooming, dressing, etc )  - Assess/evaluate cause of self-care deficits   - Assess range of motion  - Assess patient's mobility; develop plan if impaired  - Assess patient's need for assistive devices and provide as appropriate  - Encourage maximum independence but intervene and supervise when necessary  - Involve family in performance of ADLs  - Assess for home care needs following discharge   - Consider OT consult to assist with ADL evaluation and planning for discharge  - Provide patient education as appropriate  Outcome: Progressing  Goal: Maintains/Returns to pre admission functional level  Description: INTERVENTIONS:  - Perform BMAT or MOVE assessment daily    - Set and communicate daily mobility goal to care team and patient/family/caregiver  - Collaborate with rehabilitation services on mobility goals if consulted  - Perform Range of Motion  times a day  - Reposition patient every  hours    - Dangle patient  times a day  - Stand patient  times a day  - Ambulate patient  times a day  - Out of bed to chair  times a day   - Out of bed for meals times a day  - Out of bed for toileting  - Record patient progress and toleration of activity level   Outcome: Progressing     Problem: PAIN - ADULT  Goal: Verbalizes/displays adequate comfort level or baseline comfort level  Description: Interventions:  - Encourage patient to monitor pain and request assistance  - Assess pain using appropriate pain scale  - Administer analgesics based on type and severity of pain and evaluate response  - Implement non-pharmacological measures as appropriate and evaluate response  - Consider cultural and social influences on pain and pain management  - Notify physician/advanced practitioner if interventions unsuccessful or patient reports new pain  Outcome: Progressing     Problem: INFECTION - ADULT  Goal: Absence or prevention of progression during hospitalization  Description: INTERVENTIONS:  - Assess and monitor for signs and symptoms of infection  - Monitor lab/diagnostic results  - Monitor all insertion sites, i e  indwelling lines, tubes, and drains  - Monitor endotracheal if appropriate and nasal secretions for changes in amount and color  - [place] appropriate cooling/warming therapies per order  - Administer medications as ordered  - Instruct and encourage patient and family to use good hand hygiene technique  - Identify and instruct in appropriate isolation precautions for identified infection/condition  Outcome: Progressing  Goal: Absence of fever/infection during neutropenic period  Description: INTERVENTIONS:  - Monitor WBC    Outcome: Progressing     Problem: SAFETY ADULT  Goal: Maintain or return to baseline ADL function  Description: INTERVENTIONS:  -  Assess patient's ability to carry out ADLs; assess patient's baseline for ADL function and identify physical deficits which impact ability to perform ADLs (bathing, care of mouth/teeth, toileting, grooming, dressing, etc )  - Assess/evaluate cause of self-care deficits   - Assess range of motion  - Assess patient's mobility; develop plan if impaired  - Assess patient's need for assistive devices and provide as appropriate  - Encourage maximum independence but intervene and supervise when necessary  - Involve family in performance of ADLs  - Assess for home care needs following discharge   - Consider OT consult to assist with ADL evaluation and planning for discharge  - Provide patient education as appropriate  Outcome: Progressing  Goal: Maintains/Returns to pre admission functional level  Description: INTERVENTIONS:  - Perform BMAT or MOVE assessment daily    - Set and communicate daily mobility goal to care team and patient/family/caregiver  - Collaborate with rehabilitation services on mobility goals if consulted  - Perform Range of Motion  times a day  - Reposition patient every  hours    - Dangle patient  times a day  - Stand patient  times a day  - Ambulate patient  times a day  - Out of bed to chair  times a day   - Out of bed for meals times a day  - Out of bed for toileting  - Record patient progress and toleration of activity level   Outcome: Progressing  Goal: Patient will remain free of falls  Description: INTERVENTIONS:  - Educate patient/family on patient safety including physical limitations  - Instruct patient to call for assistance with activity   - Consult OT/PT to assist with strengthening/mobility   - Keep Call bell within reach  - Keep bed low and locked with side rails adjusted as appropriate  - Keep care items and personal belongings within reach  - Initiate and maintain comfort rounds  - Make Fall Risk Sign visible to staff  - Offer Toileting every  Hours, in advance of need  - Initiate/Maintain alarm  - Obtain necessary fall risk management equipment:   - Apply yellow socks and bracelet for high fall risk patients  - Consider moving patient to room near nurses station  Outcome: Progressing     Problem: DISCHARGE PLANNING  Goal: Discharge to home or other facility with appropriate resources  Description: INTERVENTIONS:  - Identify barriers to discharge w/patient and caregiver  - Arrange for needed discharge resources and transportation as appropriate  - Identify discharge learning needs (meds, wound care, etc )  - Arrange for interpretive services to assist at discharge as needed  - Refer to Case Management Department for coordinating discharge planning if the patient needs post-hospital services based on physician/advanced practitioner order or complex needs related to functional status, cognitive ability, or social support system  Outcome: Progressing Problem: Knowledge Deficit  Goal: Patient/family/caregiver demonstrates understanding of disease process, treatment plan, medications, and discharge instructions  Description: Complete learning assessment and assess knowledge base  Interventions:  - Provide teaching at level of understanding  - Provide teaching via preferred learning methods  Outcome: Progressing     Problem: Prexisting or High Potential for Compromised Skin Integrity  Goal: Skin integrity is maintained or improved  Description: INTERVENTIONS:  - Identify patients at risk for skin breakdown  - Assess and monitor skin integrity  - Assess and monitor nutrition and hydration status  - Monitor labs   - Assess for incontinence   - Turn and reposition patient  - Assist with mobility/ambulation  - Relieve pressure over bony prominences  - Avoid friction and shearing  - Provide appropriate hygiene as needed including keeping skin clean and dry  - Evaluate need for skin moisturizer/barrier cream  - Collaborate with interdisciplinary team   - Patient/family teaching  - Consider wound care consult   Outcome: Progressing     Problem: Nutrition/Hydration-ADULT  Goal: Nutrient/Hydration intake appropriate for improving, restoring or maintaining nutritional needs  Description: Monitor and assess patient's nutrition/hydration status for malnutrition  Collaborate with interdisciplinary team and initiate plan and interventions as ordered  Monitor patient's weight and dietary intake as ordered or per policy  Utilize nutrition screening tool and intervene as necessary  Determine patient's food preferences and provide high-protein, high-caloric foods as appropriate       INTERVENTIONS:  - Monitor oral intake, urinary output, labs, and treatment plans  - Assess nutrition and hydration status and recommend course of action  - Evaluate amount of meals eaten  - Assist patient with eating if necessary   - Allow adequate time for meals  - Recommend/ encourage appropriate diets, oral nutritional supplements, and vitamin/mineral supplements  - Order, calculate, and assess calorie counts as needed  - Recommend, monitor, and adjust tube feedings and TPN/PPN based on assessed needs  - Assess need for intravenous fluids  - Provide specific nutrition/hydration education as appropriate  - Include patient/family/caregiver in decisions related to nutrition  Outcome: Progressing     Problem: Potential for Falls  Goal: Patient will remain free of falls  Description: INTERVENTIONS:  - Educate patient/family on patient safety including physical limitations  - Instruct patient to call for assistance with activity   - Consult OT/PT to assist with strengthening/mobility   - Keep Call bell within reach  - Keep bed low and locked with side rails adjusted as appropriate  - Keep care items and personal belongings within reach  - Initiate and maintain comfort rounds  - Make Fall Risk Sign visible to staff  - Offer Toileting every  Hours, in advance of need  - Initiate/Maintain alarm  - Obtain necessary fall risk management equipment:   - Apply yellow socks and bracelet for high fall risk patients  - Consider moving patient to room near nurses station  Outcome: Progressing

## 2023-06-28 NOTE — ASSESSMENT & PLAN NOTE
· Mild COPD exacerbation on admission  · Continue bronchodilators  · Systemic steroids: Was given and patient improved  Transition to budesonide   · oxygen: Currently on 2-3 L, not on oxygen at baseline  · Patient did get leukocytosis likely secondary to steroids    He is being discharged on a course of azithromycin  · Recheck labs while at [facility]

## 2023-06-29 ENCOUNTER — PATIENT OUTREACH (OUTPATIENT)
Dept: CASE MANAGEMENT | Facility: OTHER | Age: 83
End: 2023-06-29

## 2023-06-29 NOTE — PROGRESS NOTES
Chart review complete the patient is on the Outpatient Care Management JENNIE/SNF Pathway  Discharged 6/28/23 to [facility]  Email sent to facility to inform them the patient is on the JENNIE Pathway and I will be following them during their skilled stay  Inbasket sent to the [facility] who is following the patient  This Admin Coordinator will continue to monitor via chart review

## 2023-06-30 PROCEDURE — 88311 DECALCIFY TISSUE: CPT | Performed by: PATHOLOGY

## 2023-06-30 PROCEDURE — 88305 TISSUE EXAM BY PATHOLOGIST: CPT | Performed by: PATHOLOGY

## 2023-07-03 ENCOUNTER — PATIENT OUTREACH (OUTPATIENT)
Dept: CASE MANAGEMENT | Facility: OTHER | Age: 83
End: 2023-07-03

## 2023-07-03 NOTE — LETTER
Date: 07/03/23    Dear Micah Vee,   My name is Jane Mazariegos am a registered nurse care manager working with [address] [place] [address] . I have not been able to reach you and would like to set a time that I can talk with you over the phone. My work is to help patients that have complex medical conditions get the care they need. This includes patients who may have been in the hospital or emergency room. Please call me with any questions you may have. I look forward to speaking with you.   Sincerely,  Natty Roblero RN  [phone]  Outpatient Care Manager

## 2023-07-03 NOTE — PROGRESS NOTES
Chart review completed. Email sent to facility requesting update on patient. This Admin Coordinator will continue to monitor via chart review throughout episode. Update obtained from realtime the patient is currently admitted to the SNF and is receiving skilled care. Update received the patient has a anticipated discharge of 7/24/23.

## 2023-07-05 ENCOUNTER — APPOINTMENT (OUTPATIENT)
Dept: RADIOLOGY | Facility: CLINIC | Age: 83
End: 2023-07-05
Payer: MEDICARE

## 2023-07-05 ENCOUNTER — OFFICE VISIT (OUTPATIENT)
Dept: OBGYN CLINIC | Facility: CLINIC | Age: 83
End: 2023-07-05

## 2023-07-05 VITALS — SYSTOLIC BLOOD PRESSURE: 120 MMHG | DIASTOLIC BLOOD PRESSURE: 68 MMHG

## 2023-07-05 DIAGNOSIS — Z47.89 AFTERCARE FOLLOWING SURGERY OF THE MUSCULOSKELETAL SYSTEM: ICD-10-CM

## 2023-07-05 DIAGNOSIS — Z47.89 AFTERCARE FOLLOWING SURGERY OF THE MUSCULOSKELETAL SYSTEM: Primary | ICD-10-CM

## 2023-07-05 PROCEDURE — 99024 POSTOP FOLLOW-UP VISIT: CPT | Performed by: ORTHOPAEDIC SURGERY

## 2023-07-05 PROCEDURE — 73502 X-RAY EXAM HIP UNI 2-3 VIEWS: CPT

## 2023-07-05 NOTE — PROGRESS NOTES
Assessment:     1. Aftercare following surgery of the musculoskeletal system        Plan:     Problem List Items Addressed This Visit        Other    Aftercare following surgery of the musculoskeletal system - Primary     Kiel Cerda is doing well status post left hip cemented hemiarthroplasty on June 23, 2023. Incision is healing well with no evidence of infection. X-rays were reviewed with her that revealed a well aligned prosthesis with no evidence of loosening. Continue physical therapy with posterior hip precautions for a total of 3 months. Follow-up in 3 months with repeat x-rays. All questions were answered to patient's satisfaction. Relevant Orders    XR hip/pelv 2-3 vws left if performed      Subjective:     Patient ID: Beronica Hoover is a 79 y.o. female. Chief Complaint: This is an 69-year-old white female who is status post left hip cemented hemiarthroplasty on June 23, 2023. She currently resides in a rehab facility. She arrives in a wheelchair, but ambulates with a walker in the office. She is doing well overall. Pain is well controlled. She denies any fevers or chills.     Allergy:  Allergies   Allergen Reactions   • Aspirin Other (See Comments)   • Penicillins Rash and Hives   • Sulfa Antibiotics Rash     Medications:  all current active meds have been reviewed  Past Medical History:  Past Medical History:   Diagnosis Date   • Anti-phospholipid syndrome (HCC)    • Asthma    • Blood type O+    • Cardiac disease    • Chronic pain    • COPD (chronic obstructive pulmonary disease) ([facility])    • Fracture of ankle     left   • Hyperlipidemia    • Hypertension    • Hypokalemia 2/27/2023   • Osteoporosis      Past Surgical History:  Past Surgical History:   Procedure Laterality Date   • APPENDECTOMY     • BILATERAL OOPHORECTOMY Bilateral    • ORIF TIBIAL SHAFT FRACTURE W/ PLATES AND SCREWS Right    • NV HEMIARTHROPLASTY HIP PARTIAL Left 6/23/2023    Procedure: HEMIARTHROPLASTY HIP (BIPOLAR); Surgeon: Mily Waggoner MD;  Location:  MAIN OR;  Service: Orthopedics     Family History:  Family History   Problem Relation Age of Onset   • Hypertension Mother    • Skin cancer Mother    • Hypertension Father      Social History:  Social History     Substance and Sexual Activity   Alcohol Use Not Currently     Social History     Substance and Sexual Activity   Drug Use No     Social History     Tobacco Use   Smoking Status Former   • Types: Cigarettes   Smokeless Tobacco Never   Tobacco Comments    Never smoker per [employer]     Review of Systems   Constitutional: Negative. HENT: Negative. Eyes: Negative. Respiratory: Negative. Cardiovascular: Negative. Gastrointestinal: Negative. Endocrine: Negative. Genitourinary: Negative. Musculoskeletal: Positive for arthralgias and gait problem (in a wheelchair). Skin: Negative. Allergic/Immunologic: Negative. Hematological: Negative. Psychiatric/Behavioral: Negative. Objective:  BP Readings from Last 1 Encounters:   07/05/23 120/68      Wt Readings from Last 1 Encounters:   06/28/23 52.8 kg (116 lb 6.5 oz)      BMI:   Estimated body mass index is 22.73 kg/m² as calculated from the following:    Height as of 6/22/23: 5' (1.524 m). Weight as of 6/28/23: 52.8 kg (116 lb 6.5 oz). BSA:   Estimated body surface area is 1.48 meters squared as calculated from the following:    Height as of 6/22/23: 5' (1.524 m). Weight as of 6/28/23: 52.8 kg (116 lb 6.5 oz). Physical Exam  Constitutional:       General: She is not in acute distress. Appearance: She is well-developed. HENT:      Head: Normocephalic. Eyes:      Conjunctiva/sclera: Conjunctivae normal.      Pupils: Pupils are equal, round, and reactive to light. Pulmonary:      Effort: Pulmonary effort is normal. No respiratory distress. Skin:     General: Skin is warm and dry. Neurological:      Mental Status: She is alert and oriented to person, place, and time. Psychiatric:         Behavior: Behavior normal.       Left Hip Exam     Other   Erythema: absent  Scars: present (Well-healing incision with no evidence of infection. No active drainage.)  Sensation: normal  Pulse: present    Comments:  Good passive range of motion with no pain  Calf soft, nontender  Moving ankle and toes            I have personally reviewed pertinent films in PACS and my interpretation is X-rays of the left hip reveal a well aligned prosthesis with no evidence of loosening.

## 2023-07-05 NOTE — ASSESSMENT & PLAN NOTE
Delfino Valerio is doing well status post left hip cemented hemiarthroplasty on June 23, 2023. Incision is healing well with no evidence of infection. X-rays were reviewed with her that revealed a well aligned prosthesis with no evidence of loosening. Continue physical therapy with posterior hip precautions for a total of 3 months. Follow-up in 3 months with repeat x-rays. All questions were answered to patient's satisfaction.

## 2023-07-06 LAB
BLOOD GROUP ANTIBODIES SERPL: NORMAL
BLOOD GROUP ANTIBODIES SERPL: NORMAL

## 2023-07-07 ENCOUNTER — OFFICE VISIT (OUTPATIENT)
Dept: CARDIOLOGY CLINIC | Facility: CLINIC | Age: 83
End: 2023-07-07
Payer: MEDICARE

## 2023-07-07 VITALS
WEIGHT: 105.8 LBS | SYSTOLIC BLOOD PRESSURE: 122 MMHG | HEART RATE: 72 BPM | DIASTOLIC BLOOD PRESSURE: 56 MMHG | BODY MASS INDEX: 20.77 KG/M2 | HEIGHT: 60 IN

## 2023-07-07 DIAGNOSIS — J96.11 CHRONIC RESPIRATORY FAILURE WITH HYPOXIA AND HYPERCAPNIA (HCC): Primary | ICD-10-CM

## 2023-07-07 DIAGNOSIS — I10 ESSENTIAL HYPERTENSION: ICD-10-CM

## 2023-07-07 DIAGNOSIS — I48.0 PAROXYSMAL ATRIAL FIBRILLATION (HCC): ICD-10-CM

## 2023-07-07 DIAGNOSIS — R60.9 PERIPHERAL EDEMA: ICD-10-CM

## 2023-07-07 DIAGNOSIS — J96.12 CHRONIC RESPIRATORY FAILURE WITH HYPOXIA AND HYPERCAPNIA (HCC): Primary | ICD-10-CM

## 2023-07-07 PROCEDURE — 99204 OFFICE O/P NEW MOD 45 MIN: CPT | Performed by: INTERNAL MEDICINE

## 2023-07-07 RX ORDER — ENEMA 19; 7 G/133ML; G/133ML
1 ENEMA RECTAL
COMMUNITY

## 2023-07-07 RX ORDER — BISACODYL 5 MG/1
10 TABLET, DELAYED RELEASE ORAL DAILY PRN
COMMUNITY

## 2023-07-07 NOTE — PROGRESS NOTES
[facility] Cardiology  Office Consultation  Brianne Conner 79 y.o. female MRN: [id number]        Chief Complaint    Chief Complaint   Patient presents with   • New Patient Visit     Est. Care    • Dizziness     Occurs with positional changes     • Edema     B/l leg and feet, right tends to be worse        Referring Provider: Melissa Moon MD    Impression & Plan:    1. Chronic respiratory failure with hypoxia and hypercapnia (HCC)  Likely multifactorial due to COPD and mild hypervolemia. Would recommend continuing Lasix 20 mg daily. Continue oxygen as needed. 2. Essential hypertension  Her blood pressure is lower than it needs to be given her increased risk for falls. Continue diltiazem 240 mg daily, furosemide 20 mg daily. Stop losartan. Goal blood pressure under 150/90.    3. Paroxysmal atrial fibrillation ([address])  Controlled without symptomatic paroxysms. Continue diltiazem 240 mg daily, digoxin 0.125 mcg daily. On thromboembolic prophylaxis with warfarin, managed by family medicine. Last INR 2 although warfarin has been on hold. Okay to resume warfarin. 4. Peripheral edema  Likely a combination of hypoalbuminemia, venous insufficiency, diastolic dysfunction. Continue Lasix 20 mg daily. Patient has been adequately managed for her chronic cardiac conditions by her primary care physician. For simplicity and per patient preference, she will continue to have the above managed by her PCP. We will see Brianne Conner back as needed. HPI: Brianne Conner is a 79y.o. year old female with hypertension, paroxysmal atrial fibrillation, COPD who presents today to establish care with cardiology. She was admitted to [address] [place] [address] for 6 days in June 2023 in the setting of a femur fracture. (Discharge summary 6/22/2023 to 6/28/2023 reviewed). She underwent ORIF left femur.     During her hospitalization she was started on lasix for lower extremity edema and persistent O2 requirement. She states her edema is better with the lasix. Cardiac testing:     Echo 2/28/23 Interpretation Summary       •  Left Ventricle: Left ventricular cavity size is normal. Wall thickness is mildly increased. There is moderate concentric hypertrophy. The left ventricular ejection fraction is 65%. Systolic function is normal. Wall motion is normal. Diastolic function is mildly abnormal, consistent with grade I (abnormal) relaxation. •  Left Atrium: The atrium is mildly dilated. •  Aortic Valve: There is mild stenosis. •  Mitral Valve: There is moderate annular calcification. •  Tricuspid Valve: There is mild regurgitation.           EKG reviewed personally:   EKG during her hospitalization 6/22/2023 shows sinus tachycardia, 101 bpm, otherwise normal study      Relevant Laboratory Studies:  (Laboratory studies personally reviewed)  CMP 6/28/23 -- hypoalbuminemia 2.6; Cr 0.56, BUN 30, CO2 37  HS TNI 6/22/23 -- 113 -> 202 --> 236  CBC 6/28/23 -- WBC 16.58, Hgb 8.6  INR on 7/4/23 -- 1.91   BMP 6/30/23 -- K 3.85, Cr 0.39        Review of Systems   Constitutional: Negative for activity change and fatigue. HENT: Negative for facial swelling. Eyes: Negative for visual disturbance. Respiratory: Negative for chest tightness and shortness of breath. Cardiovascular: Positive for leg swelling. Negative for chest pain and palpitations. Gastrointestinal: Negative for nausea and vomiting. Musculoskeletal: Negative for myalgias. Skin: Negative for pallor. Allergic/Immunologic: Negative for immunocompromised state. Neurological: Negative for dizziness, syncope and light-headedness. Psychiatric/Behavioral: Negative for agitation and confusion. The patient is not nervous/anxious.           Past Medical History:   Diagnosis Date   • Anti-phospholipid syndrome (HCC)    • Asthma    • Blood type O+    • Cardiac disease    • Chronic pain    • COPD (chronic obstructive pulmonary disease) (HCC)    • Fracture of ankle     left   • Hyperlipidemia    • Hypertension    • Hypokalemia 2/27/2023   • Osteoporosis      Past Surgical History:   Procedure Laterality Date   • APPENDECTOMY     • BILATERAL OOPHORECTOMY Bilateral    • ORIF TIBIAL SHAFT FRACTURE W/ PLATES AND SCREWS Right    • OK HEMIARTHROPLASTY HIP PARTIAL Left 6/23/2023    Procedure: HEMIARTHROPLASTY HIP (BIPOLAR); Surgeon: Martine Sosa MD;  Location:  MAIN OR;  Service: Orthopedics     Social History     Substance and Sexual Activity   Alcohol Use Not Currently     Social History     Substance and Sexual Activity   Drug Use No     Social History     Tobacco Use   Smoking Status Former   • Types: Cigarettes   Smokeless Tobacco Never   Tobacco Comments    Never smoker per [employer]     Family History   Problem Relation Age of Onset   • Hypertension Mother    • Skin cancer Mother    • Hypertension Father        Allergies: Allergies   Allergen Reactions   • Aspirin Other (See Comments)   • Penicillins Rash and Hives   • Sulfa Antibiotics Rash       Medications (as of START of this encounter): Outpatient Medications Prior to Visit   Medication Sig Dispense Refill   • acetaminophen (TYLENOL) 325 mg tablet Take 3 tablets (975 mg total) by mouth every 8 (eight) hours  0   • albuterol (2.5 mg/3 mL) 0.083 % nebulizer solution USE 1 VIAL IN NEBULIZER EVERY 4 TO 6 HOURS AS NEEDED FOR COUGH AND WHEEZE 75 vial 3   • Ascorbic Acid (VITAMIN C) 1000 MG tablet Take 1,000 mg by mouth daily     • bisacodyl (DULCOLAX) 5 mg EC tablet Take 10 mg by mouth daily as needed for constipation     • budesonide (PULMICORT) 0.5 mg/2 mL nebulizer solution Take 2 mL (0.5 mg total) by nebulization every 12 (twelve) hours Rinse mouth after use.  120 mL 0   • Cholecalciferol (VITAMIN D3) 20 MCG (800 UNIT) TABS Take by mouth in the morning     • digoxin (LANOXIN) 0.125 mg tablet Take 1 tablet (125 mcg total) by mouth every other day Do not start before May 10, 2023.  0   • diltiazem (CARDIZEM CD) 240 mg 24 hr capsule take 1 capsule by mouth once daily for blood pressure 30 capsule 0   • famotidine (PEPCID) 20 mg tablet Take 1 tablet (20 mg total) by mouth every evening  0   • Ferrous Sulfate 220 (44 Fe) MG/5ML LIQD take 1 teaspoonful by mouth once daily 150 mL 5   • Fluticasone-Salmeterol (Advair) 250-50 mcg/dose inhaler USE 1 INHALATION TWICE A  blister 3   • furosemide (LASIX) 20 mg tablet Take 1 tablet (20 mg total) by mouth daily Do not start before May 26, 2023.  0   • guaiFENesin (MUCINEX) 600 mg 12 hr tablet Take 1 tablet (600 mg total) by mouth 2 (two) times a day  0   • levothyroxine 25 mcg tablet TAKE 1 TABLET DAILY 90 tablet 3   • lidocaine (LIDODERM) 5 % Apply 1 patch topically over 12 hours daily Remove & Discard patch within 12 hours or as directed by MD 90 patch 3   • loratadine (CLARITIN) 10 mg tablet Take 10 mg by mouth daily     • mirtazapine (REMERON) 7.5 MG tablet Take 1 tablet (7.5 mg total) by mouth daily at bedtime 90 tablet 3   • multivitamin (THERAGRAN) TABS Take 1 tablet by mouth daily     • oxyCODONE (ROXICODONE) 5 immediate release tablet Take 1 tablet (5 mg total) by mouth every 6 (six) hours as needed for severe pain for up to 10 days Max Daily Amount: 20 mg 10 tablet 0   • oxyCODONE (ROXICODONE) 5 immediate release tablet Take 0.5 tablets (2.5 mg total) by mouth every 6 (six) hours as needed for moderate pain for up to 10 days Max Daily Amount: 10 mg 6 tablet 0   • polyethylene glycol (MIRALAX) 17 g packet Take 17 g by mouth daily for 5 days Do not start before June 29, 2023. 85 g 0   • senna-docusate sodium (SENOKOT S) 8.6-50 mg per tablet Take 1 tablet by mouth daily at bedtime  0   • sertraline (Zoloft) 50 mg tablet Take 1 tablet (50 mg total) by mouth daily 90 tablet 3   • sodium phosphate-biphosphate (FLEET) 7-19 g 118 mL enema Insert 1 enema into the rectum     • tiZANidine (ZANAFLEX) 2 mg tablet Take 1 tablet (2 mg total) by mouth 2 (two) times a day as needed for muscle spasms 30 tablet 0   • umeclidinium (Incruse Ellipta) 62.5 mcg/actuation AEPB inhaler Inhale 1 puff daily     • losartan (COZAAR) 25 mg tablet Take 1 tablet (25 mg total) by mouth daily Do not start before February 25, 2023. 30 tablet 0   • warfarin (COUMADIN) 2 mg tablet Take 2 mg daily, adjust to keep INR therapeutic, between 2-3 (Patient not taking: Reported on 7/7/2023)  0     No facility-administered medications prior to visit. Vitals:    07/07/23 0906   BP: 122/56   Pulse: 72     Weight (last 2 days)     Date/Time Weight    07/07/23 0906 48 (105.8)          General: My Pratt is a frail elderly female in a wheelchair, with nasal cannula oxygen  HEENT: moist mucous membranes, EOMI  Neck:  No JVD, supple, trachea midline   Cardiovascular: Soft S1/S2, regular rate and rhythm, 2/6 SM  Pulmonary: normal respiratory effort, bibasilar inspiratory rales  Abdomen: soft and nondistended  Extremities: +1 bilateral lower extremity edema. Warm and well perfused extremities. Neuro:  AAOx3 (person, place, time)  Psych: Normal mood and affect, cooperative        Time Spent:  Total time (face-to-face and non-face-to-face) spent on today's visit was 45 minutes. This includes preparation for the visits (i.e. reviewing test results), performance of a medically appropriate history and examination, and orders for medications, tests or other procedures. This time is exclusive of procedures performed and time spent teaching. Sheryl Castellanos MD    This note was completed in part utilizing [employer] recognition software. Grammatical errors, random word insertion, spelling mistakes, occasional wrong word or "sound-alike" substitutions and incomplete sentences may be an occasional consequence of the system secondary to software limitations, ambient noise and hardware issues. At the time of dictation, efforts were made to edit, clarify and /or correct errors.   Please read the chart carefully and recognize, using context, where substitutions have occurred. If you have any questions or concerns about the context, text or information contained within the body of this dictation, please contact myself, the provider, for further clarification.

## 2023-07-10 ENCOUNTER — PATIENT OUTREACH (OUTPATIENT)
Dept: CASE MANAGEMENT | Facility: OTHER | Age: 83
End: 2023-07-10

## 2023-07-11 ENCOUNTER — PATIENT OUTREACH (OUTPATIENT)
Dept: CASE MANAGEMENT | Facility: OTHER | Age: 83
End: 2023-07-11

## 2023-07-11 NOTE — PROGRESS NOTES
Update received the patient is currently admitted to [facility]. Home eval is to be completed today by SNF. MBI increased 45 to 72. Goal MBI =75  PT/OT anticipating LTD 7/24. D/C home alone 7/25. Children are very involved with her and her care. This Admin Coordinator will continue to monitor via chart review.

## 2023-07-16 PROBLEM — K52.9 GASTROENTERITIS: Status: RESOLVED | Noted: 2023-05-17 | Resolved: 2023-07-16

## 2023-07-17 PROBLEM — J18.9 PNEUMONIA OF RIGHT LOWER LOBE DUE TO INFECTIOUS ORGANISM: Status: RESOLVED | Noted: 2017-11-22 | Resolved: 2023-07-17

## 2023-07-17 PROBLEM — R65.20 SEPSIS WITH ACUTE RENAL FAILURE WITHOUT SEPTIC SHOCK (HCC): Status: RESOLVED | Noted: 2017-11-22 | Resolved: 2023-07-17

## 2023-07-17 PROBLEM — N17.9 SEPSIS WITH ACUTE RENAL FAILURE WITHOUT SEPTIC SHOCK (HCC): Status: RESOLVED | Noted: 2017-11-22 | Resolved: 2023-07-17

## 2023-07-17 PROBLEM — A41.9 SEPSIS WITH ACUTE RENAL FAILURE WITHOUT SEPTIC SHOCK (HCC): Status: RESOLVED | Noted: 2017-11-22 | Resolved: 2023-07-17

## 2023-07-18 ENCOUNTER — PATIENT OUTREACH (OUTPATIENT)
Dept: CASE MANAGEMENT | Facility: OTHER | Age: 83
End: 2023-07-18

## 2023-07-18 NOTE — PROGRESS NOTES
Chart review completed. Anticipated discharge of 7/25/23. Email sent to facility requesting update on patient. This Admin Coordinator will continue to monitor via chart review throughout episode. Update received the patient had a home eval on 7/11 and it went well. She is discharging 7/25/23 to Home.

## 2023-07-21 ENCOUNTER — HOME HEALTH ADMISSION (OUTPATIENT)
Dept: HOME HEALTH SERVICES | Facility: HOME HEALTHCARE | Age: 83
End: 2023-07-21
Payer: MEDICARE

## 2023-07-25 ENCOUNTER — PATIENT OUTREACH (OUTPATIENT)
Dept: CASE MANAGEMENT | Facility: OTHER | Age: 83
End: 2023-07-25

## 2023-07-25 NOTE — PROGRESS NOTES
Chart review complete the patient has a anticipated discharge of 7/25/23. Chart review completed. Email sent to facility requesting update on patient. This Admin Coordinator will continue to monitor via chart review throughout episode. Update received the patient discharged today 7/25/23 to Home with [facility]. A email was sent to the facility requesting discharge instructions. When CM assistant has received the Discharge paperwork CM assistant will attach to this encounter. I have removed myself off of the care team, added the CM to the care team who will follow the patient through the episode, sent the care manager an inbasket notifying them of the [facility] Pathway. Anna Fontana

## 2023-07-26 ENCOUNTER — HOME CARE VISIT (OUTPATIENT)
Dept: HOME HEALTH SERVICES | Facility: HOME HEALTHCARE | Age: 83
End: 2023-07-26

## 2023-07-27 ENCOUNTER — TELEPHONE (OUTPATIENT)
Dept: FAMILY MEDICINE CLINIC | Facility: CLINIC | Age: 83
End: 2023-07-27

## 2023-07-27 ENCOUNTER — PATIENT OUTREACH (OUTPATIENT)
Dept: CASE MANAGEMENT | Facility: OTHER | Age: 83
End: 2023-07-27

## 2023-07-27 ENCOUNTER — HOME CARE VISIT (OUTPATIENT)
Dept: HOME HEALTH SERVICES | Facility: HOME HEALTHCARE | Age: 83
End: 2023-07-27
Payer: MEDICARE

## 2023-07-27 VITALS
OXYGEN SATURATION: 96 % | HEART RATE: 78 BPM | WEIGHT: 112 LBS | TEMPERATURE: 98.1 F | HEIGHT: 61 IN | SYSTOLIC BLOOD PRESSURE: 108 MMHG | BODY MASS INDEX: 21.14 KG/M2 | RESPIRATION RATE: 20 BRPM | DIASTOLIC BLOOD PRESSURE: 50 MMHG

## 2023-07-27 DIAGNOSIS — N17.9 AKI (ACUTE KIDNEY INJURY) (HCC): Primary | ICD-10-CM

## 2023-07-27 PROCEDURE — 10330081 VN NO-PAY CLAIM PROCEDURE

## 2023-07-27 PROCEDURE — 400013 VN SOC

## 2023-07-27 PROCEDURE — G0299 HHS/HOSPICE OF RN EA 15 MIN: HCPCS

## 2023-07-27 NOTE — PROGRESS NOTES
Outpatient Care Manager Note: Patient identified for SNF/[facility] Pathway. BMP order placed and PCP notified. Chart review completed. Patient discharged to home with  [facility]. In basket message sent to [facility] health RN to obtain blood work and to add pathway interventions/goals to patient care plan.

## 2023-07-28 ENCOUNTER — HOME CARE VISIT (OUTPATIENT)
Dept: HOME HEALTH SERVICES | Facility: HOME HEALTHCARE | Age: 83
End: 2023-07-28
Payer: MEDICARE

## 2023-07-28 VITALS — OXYGEN SATURATION: 95 % | HEART RATE: 87 BPM | SYSTOLIC BLOOD PRESSURE: 120 MMHG | DIASTOLIC BLOOD PRESSURE: 68 MMHG

## 2023-07-28 PROCEDURE — G0151 HHCP-SERV OF PT,EA 15 MIN: HCPCS

## 2023-07-28 PROCEDURE — G0152 HHCP-SERV OF OT,EA 15 MIN: HCPCS

## 2023-07-31 ENCOUNTER — HOME CARE VISIT (OUTPATIENT)
Dept: HOME HEALTH SERVICES | Facility: HOME HEALTHCARE | Age: 83
End: 2023-07-31
Payer: MEDICARE

## 2023-07-31 VITALS
RESPIRATION RATE: 16 BRPM | TEMPERATURE: 98.7 F | HEART RATE: 80 BPM | OXYGEN SATURATION: 93 % | SYSTOLIC BLOOD PRESSURE: 110 MMHG | DIASTOLIC BLOOD PRESSURE: 60 MMHG

## 2023-07-31 PROCEDURE — G0300 HHS/HOSPICE OF LPN EA 15 MIN: HCPCS

## 2023-07-31 PROCEDURE — G0151 HHCP-SERV OF PT,EA 15 MIN: HCPCS

## 2023-08-01 ENCOUNTER — PATIENT OUTREACH (OUTPATIENT)
Dept: CASE MANAGEMENT | Facility: OTHER | Age: 83
End: 2023-08-01

## 2023-08-01 ENCOUNTER — HOME CARE VISIT (OUTPATIENT)
Dept: HOME HEALTH SERVICES | Facility: HOME HEALTHCARE | Age: 83
End: 2023-08-01
Payer: MEDICARE

## 2023-08-01 VITALS — SYSTOLIC BLOOD PRESSURE: 128 MMHG | DIASTOLIC BLOOD PRESSURE: 76 MMHG | OXYGEN SATURATION: 95 % | HEART RATE: 85 BPM

## 2023-08-01 PROBLEM — J69.0 ASPIRATION PNEUMONIA OF RIGHT LUNG (HCC): Status: RESOLVED | Noted: 2023-06-02 | Resolved: 2023-08-01

## 2023-08-01 PROCEDURE — G0152 HHCP-SERV OF OT,EA 15 MIN: HCPCS

## 2023-08-01 NOTE — PROGRESS NOTES
I spoke with patient's son Hari Steve who reports patient is not feeling well today. She has fatigue. He is on his way to her house now but reports his sister is with patient. Patient has [address] nursing physical therapy and OT. Son reports patient has little to no pain in left hip. She has her home oxygen. She has her medications and a list from the rehab facility. He has no questions. She has no needs at this time. He declined care management but did take my contact information (saved it on his phone).

## 2023-08-02 ENCOUNTER — APPOINTMENT (OUTPATIENT)
Dept: LAB | Facility: CLINIC | Age: 83
End: 2023-08-02
Payer: MEDICARE

## 2023-08-02 ENCOUNTER — TELEPHONE (OUTPATIENT)
Dept: FAMILY MEDICINE CLINIC | Facility: CLINIC | Age: 83
End: 2023-08-02

## 2023-08-02 ENCOUNTER — HOME CARE VISIT (OUTPATIENT)
Dept: HOME HEALTH SERVICES | Facility: HOME HEALTHCARE | Age: 83
End: 2023-08-02
Payer: MEDICARE

## 2023-08-02 VITALS
RESPIRATION RATE: 16 BRPM | OXYGEN SATURATION: 96 % | HEART RATE: 76 BPM | DIASTOLIC BLOOD PRESSURE: 64 MMHG | SYSTOLIC BLOOD PRESSURE: 120 MMHG | TEMPERATURE: 98 F

## 2023-08-02 DIAGNOSIS — A41.9 SEPSIS WITH ACUTE RENAL FAILURE WITHOUT SEPTIC SHOCK, DUE TO UNSPECIFIED ORGANISM, UNSPECIFIED ACUTE RENAL FAILURE TYPE (HCC): ICD-10-CM

## 2023-08-02 DIAGNOSIS — J96.12 CHRONIC RESPIRATORY FAILURE WITH HYPOXIA AND HYPERCAPNIA (HCC): ICD-10-CM

## 2023-08-02 DIAGNOSIS — J69.0 ASPIRATION PNEUMONIA OF RIGHT LUNG, UNSPECIFIED ASPIRATION PNEUMONIA TYPE, UNSPECIFIED PART OF LUNG (HCC): ICD-10-CM

## 2023-08-02 DIAGNOSIS — N17.9 AKI (ACUTE KIDNEY INJURY) (HCC): ICD-10-CM

## 2023-08-02 DIAGNOSIS — N17.9 SEPSIS WITH ACUTE RENAL FAILURE WITHOUT SEPTIC SHOCK, DUE TO UNSPECIFIED ORGANISM, UNSPECIFIED ACUTE RENAL FAILURE TYPE (HCC): ICD-10-CM

## 2023-08-02 DIAGNOSIS — J96.11 CHRONIC RESPIRATORY FAILURE WITH HYPOXIA AND HYPERCAPNIA (HCC): ICD-10-CM

## 2023-08-02 DIAGNOSIS — R65.20 SEPSIS WITH ACUTE RENAL FAILURE WITHOUT SEPTIC SHOCK, DUE TO UNSPECIFIED ORGANISM, UNSPECIFIED ACUTE RENAL FAILURE TYPE (HCC): ICD-10-CM

## 2023-08-02 LAB
ANION GAP SERPL CALCULATED.3IONS-SCNC: 7 MMOL/L
BUN SERPL-MCNC: 34 MG/DL (ref 5–25)
CALCIUM SERPL-MCNC: 9.6 MG/DL (ref 8.3–10.1)
CHLORIDE SERPL-SCNC: 97 MMOL/L (ref 96–108)
CO2 SERPL-SCNC: 34 MMOL/L (ref 21–32)
CREAT SERPL-MCNC: 0.95 MG/DL (ref 0.6–1.3)
GFR SERPL CREATININE-BSD FRML MDRD: 55 ML/MIN/1.73SQ M
GLUCOSE SERPL-MCNC: 76 MG/DL (ref 65–140)
POTASSIUM SERPL-SCNC: 4.6 MMOL/L (ref 3.5–5.3)
SODIUM SERPL-SCNC: 138 MMOL/L (ref 135–147)

## 2023-08-02 PROCEDURE — G0299 HHS/HOSPICE OF RN EA 15 MIN: HCPCS

## 2023-08-02 PROCEDURE — G0151 HHCP-SERV OF PT,EA 15 MIN: HCPCS

## 2023-08-02 PROCEDURE — 80048 BASIC METABOLIC PNL TOTAL CA: CPT

## 2023-08-02 PROCEDURE — 36415 COLL VENOUS BLD VENIPUNCTURE: CPT

## 2023-08-02 NOTE — TELEPHONE ENCOUNTER
Justen Robertson, visiting nurse, came in to state she just dropped off BMP for patient at lab and wanted to make you aware that Mamie Tavera has been recently nauseous, having diarrhea, and not eating much. She did have some crackers today. Estela advised Simin if things were to get worse to follow up with office or ED.

## 2023-08-03 ENCOUNTER — HOSPITAL ENCOUNTER (INPATIENT)
Facility: HOSPITAL | Age: 83
LOS: 3 days | Discharge: NURSING, REHAB OR LONG-TERM CARE | DRG: 389 | End: 2023-08-07
Attending: EMERGENCY MEDICINE | Admitting: INTERNAL MEDICINE
Payer: MEDICARE

## 2023-08-03 ENCOUNTER — APPOINTMENT (EMERGENCY)
Dept: CT IMAGING | Facility: HOSPITAL | Age: 83
DRG: 389 | End: 2023-08-03
Payer: MEDICARE

## 2023-08-03 ENCOUNTER — HOME CARE VISIT (OUTPATIENT)
Dept: HOME HEALTH SERVICES | Facility: HOME HEALTHCARE | Age: 83
End: 2023-08-03
Payer: MEDICARE

## 2023-08-03 ENCOUNTER — TELEMEDICINE (OUTPATIENT)
Dept: FAMILY MEDICINE CLINIC | Facility: CLINIC | Age: 83
End: 2023-08-03
Payer: MEDICARE

## 2023-08-03 ENCOUNTER — APPOINTMENT (OUTPATIENT)
Dept: RADIOLOGY | Facility: HOSPITAL | Age: 83
DRG: 389 | End: 2023-08-03
Payer: MEDICARE

## 2023-08-03 DIAGNOSIS — R14.0 ABDOMINAL DISTENTION: ICD-10-CM

## 2023-08-03 DIAGNOSIS — R11.2 NAUSEA VOMITING AND DIARRHEA: Primary | ICD-10-CM

## 2023-08-03 DIAGNOSIS — E86.0 DEHYDRATION: Primary | ICD-10-CM

## 2023-08-03 DIAGNOSIS — R19.7 NAUSEA VOMITING AND DIARRHEA: Primary | ICD-10-CM

## 2023-08-03 DIAGNOSIS — R10.9 ABDOMINAL PAIN: ICD-10-CM

## 2023-08-03 PROBLEM — S32.000A COMPRESSION FRACTURE OF LUMBAR VERTEBRA (HCC): Status: ACTIVE | Noted: 2023-08-03

## 2023-08-03 LAB
ALBUMIN SERPL BCP-MCNC: 3.6 G/DL (ref 3.5–5)
ALP SERPL-CCNC: 122 U/L (ref 34–104)
ALT SERPL W P-5'-P-CCNC: 6 U/L (ref 7–52)
ANION GAP SERPL CALCULATED.3IONS-SCNC: 10 MMOL/L
AST SERPL W P-5'-P-CCNC: 20 U/L (ref 13–39)
BACTERIA UR QL AUTO: ABNORMAL /HPF
BASOPHILS # BLD AUTO: 0.02 THOUSANDS/ÂΜL (ref 0–0.1)
BASOPHILS NFR BLD AUTO: 0 % (ref 0–1)
BILIRUB SERPL-MCNC: 0.63 MG/DL (ref 0.2–1)
BILIRUB UR QL STRIP: NEGATIVE
BUN SERPL-MCNC: 31 MG/DL (ref 5–25)
CALCIUM SERPL-MCNC: 9 MG/DL (ref 8.4–10.2)
CHLORIDE SERPL-SCNC: 96 MMOL/L (ref 96–108)
CLARITY UR: CLEAR
CO2 SERPL-SCNC: 29 MMOL/L (ref 21–32)
COLOR UR: YELLOW
CREAT SERPL-MCNC: 0.72 MG/DL (ref 0.6–1.3)
EOSINOPHIL # BLD AUTO: 0.02 THOUSAND/ÂΜL (ref 0–0.61)
EOSINOPHIL NFR BLD AUTO: 0 % (ref 0–6)
ERYTHROCYTE [DISTWIDTH] IN BLOOD BY AUTOMATED COUNT: 14.4 % (ref 11.6–15.1)
GFR SERPL CREATININE-BSD FRML MDRD: 77 ML/MIN/1.73SQ M
GLUCOSE SERPL-MCNC: 97 MG/DL (ref 65–140)
GLUCOSE UR STRIP-MCNC: NEGATIVE MG/DL
HCT VFR BLD AUTO: 32.5 % (ref 34.8–46.1)
HGB BLD-MCNC: 10.3 G/DL (ref 11.5–15.4)
HGB UR QL STRIP.AUTO: NEGATIVE
IMM GRANULOCYTES # BLD AUTO: 0.03 THOUSAND/UL (ref 0–0.2)
IMM GRANULOCYTES NFR BLD AUTO: 0 % (ref 0–2)
INR PPP: 3.36 (ref 0.84–1.19)
KETONES UR STRIP-MCNC: ABNORMAL MG/DL
LEUKOCYTE ESTERASE UR QL STRIP: ABNORMAL
LIPASE SERPL-CCNC: 17 U/L (ref 11–82)
LYMPHOCYTES # BLD AUTO: 1.02 THOUSANDS/ÂΜL (ref 0.6–4.47)
LYMPHOCYTES NFR BLD AUTO: 10 % (ref 14–44)
MCH RBC QN AUTO: 31.2 PG (ref 26.8–34.3)
MCHC RBC AUTO-ENTMCNC: 31.7 G/DL (ref 31.4–37.4)
MCV RBC AUTO: 99 FL (ref 82–98)
MONOCYTES # BLD AUTO: 0.68 THOUSAND/ÂΜL (ref 0.17–1.22)
MONOCYTES NFR BLD AUTO: 7 % (ref 4–12)
NEUTROPHILS # BLD AUTO: 8.05 THOUSANDS/ÂΜL (ref 1.85–7.62)
NEUTS SEG NFR BLD AUTO: 83 % (ref 43–75)
NITRITE UR QL STRIP: NEGATIVE
NON-SQ EPI CELLS URNS QL MICRO: ABNORMAL /HPF
NRBC BLD AUTO-RTO: 0 /100 WBCS
PH UR STRIP.AUTO: 5.5 [PH]
PLATELET # BLD AUTO: 324 THOUSANDS/UL (ref 149–390)
PMV BLD AUTO: 8.6 FL (ref 8.9–12.7)
POTASSIUM SERPL-SCNC: 3.7 MMOL/L (ref 3.5–5.3)
PROT SERPL-MCNC: 6.8 G/DL (ref 6.4–8.4)
PROT UR STRIP-MCNC: ABNORMAL MG/DL
PROTHROMBIN TIME: 35.6 SECONDS (ref 11.6–14.5)
RBC # BLD AUTO: 3.3 MILLION/UL (ref 3.81–5.12)
RBC #/AREA URNS AUTO: ABNORMAL /HPF
SODIUM SERPL-SCNC: 135 MMOL/L (ref 135–147)
SP GR UR STRIP.AUTO: >=1.03 (ref 1–1.03)
UROBILINOGEN UR STRIP-ACNC: <2 MG/DL
WBC # BLD AUTO: 9.82 THOUSAND/UL (ref 4.31–10.16)
WBC #/AREA URNS AUTO: ABNORMAL /HPF

## 2023-08-03 PROCEDURE — 99223 1ST HOSP IP/OBS HIGH 75: CPT | Performed by: INTERNAL MEDICINE

## 2023-08-03 PROCEDURE — 80053 COMPREHEN METABOLIC PANEL: CPT | Performed by: EMERGENCY MEDICINE

## 2023-08-03 PROCEDURE — 99285 EMERGENCY DEPT VISIT HI MDM: CPT

## 2023-08-03 PROCEDURE — 71045 X-RAY EXAM CHEST 1 VIEW: CPT

## 2023-08-03 PROCEDURE — 94640 AIRWAY INHALATION TREATMENT: CPT

## 2023-08-03 PROCEDURE — 96374 THER/PROPH/DIAG INJ IV PUSH: CPT

## 2023-08-03 PROCEDURE — 85025 COMPLETE CBC W/AUTO DIFF WBC: CPT | Performed by: EMERGENCY MEDICINE

## 2023-08-03 PROCEDURE — 85610 PROTHROMBIN TIME: CPT | Performed by: INTERNAL MEDICINE

## 2023-08-03 PROCEDURE — 99285 EMERGENCY DEPT VISIT HI MDM: CPT | Performed by: EMERGENCY MEDICINE

## 2023-08-03 PROCEDURE — 94760 N-INVAS EAR/PLS OXIMETRY 1: CPT

## 2023-08-03 PROCEDURE — 74177 CT ABD & PELVIS W/CONTRAST: CPT

## 2023-08-03 PROCEDURE — 99442 PR PHYS/QHP TELEPHONE EVALUATION 11-20 MIN: CPT | Performed by: FAMILY MEDICINE

## 2023-08-03 PROCEDURE — 83690 ASSAY OF LIPASE: CPT | Performed by: EMERGENCY MEDICINE

## 2023-08-03 PROCEDURE — 36415 COLL VENOUS BLD VENIPUNCTURE: CPT | Performed by: EMERGENCY MEDICINE

## 2023-08-03 PROCEDURE — G1004 CDSM NDSC: HCPCS

## 2023-08-03 PROCEDURE — 81001 URINALYSIS AUTO W/SCOPE: CPT | Performed by: EMERGENCY MEDICINE

## 2023-08-03 PROCEDURE — 94762 N-INVAS EAR/PLS OXIMTRY CONT: CPT

## 2023-08-03 PROCEDURE — 94664 DEMO&/EVAL PT USE INHALER: CPT

## 2023-08-03 PROCEDURE — 99204 OFFICE O/P NEW MOD 45 MIN: CPT | Performed by: SURGERY

## 2023-08-03 PROCEDURE — 96361 HYDRATE IV INFUSION ADD-ON: CPT

## 2023-08-03 RX ORDER — FLUTICASONE FUROATE AND VILANTEROL 200; 25 UG/1; UG/1
1 POWDER RESPIRATORY (INHALATION)
Status: DISCONTINUED | OUTPATIENT
Start: 2023-08-04 | End: 2023-08-07 | Stop reason: HOSPADM

## 2023-08-03 RX ORDER — MIRTAZAPINE 15 MG/1
7.5 TABLET, FILM COATED ORAL
Status: DISCONTINUED | OUTPATIENT
Start: 2023-08-03 | End: 2023-08-07 | Stop reason: HOSPADM

## 2023-08-03 RX ORDER — DILTIAZEM HYDROCHLORIDE 240 MG/1
240 CAPSULE, COATED, EXTENDED RELEASE ORAL DAILY
Status: DISCONTINUED | OUTPATIENT
Start: 2023-08-04 | End: 2023-08-07 | Stop reason: HOSPADM

## 2023-08-03 RX ORDER — ONDANSETRON 2 MG/ML
4 INJECTION INTRAMUSCULAR; INTRAVENOUS EVERY 6 HOURS PRN
Status: DISCONTINUED | OUTPATIENT
Start: 2023-08-03 | End: 2023-08-07 | Stop reason: HOSPADM

## 2023-08-03 RX ORDER — SODIUM CHLORIDE 9 MG/ML
75 INJECTION, SOLUTION INTRAVENOUS CONTINUOUS
Status: DISCONTINUED | OUTPATIENT
Start: 2023-08-03 | End: 2023-08-04

## 2023-08-03 RX ORDER — LEVOTHYROXINE SODIUM 0.03 MG/1
25 TABLET ORAL
Status: DISCONTINUED | OUTPATIENT
Start: 2023-08-04 | End: 2023-08-07 | Stop reason: HOSPADM

## 2023-08-03 RX ORDER — AMOXICILLIN 250 MG
1 CAPSULE ORAL
Status: DISCONTINUED | OUTPATIENT
Start: 2023-08-03 | End: 2023-08-07 | Stop reason: HOSPADM

## 2023-08-03 RX ORDER — ACETAMINOPHEN 325 MG/1
650 TABLET ORAL EVERY 6 HOURS PRN
Status: DISCONTINUED | OUTPATIENT
Start: 2023-08-03 | End: 2023-08-07 | Stop reason: HOSPADM

## 2023-08-03 RX ORDER — FUROSEMIDE 20 MG/1
20 TABLET ORAL DAILY
Status: DISCONTINUED | OUTPATIENT
Start: 2023-08-04 | End: 2023-08-07 | Stop reason: HOSPADM

## 2023-08-03 RX ORDER — GUAIFENESIN 600 MG/1
600 TABLET, EXTENDED RELEASE ORAL 2 TIMES DAILY
Status: DISCONTINUED | OUTPATIENT
Start: 2023-08-03 | End: 2023-08-07 | Stop reason: HOSPADM

## 2023-08-03 RX ORDER — BUDESONIDE 0.5 MG/2ML
0.5 INHALANT ORAL
Status: DISCONTINUED | OUTPATIENT
Start: 2023-08-03 | End: 2023-08-07 | Stop reason: HOSPADM

## 2023-08-03 RX ORDER — ONDANSETRON 2 MG/ML
4 INJECTION INTRAMUSCULAR; INTRAVENOUS ONCE
Status: COMPLETED | OUTPATIENT
Start: 2023-08-03 | End: 2023-08-03

## 2023-08-03 RX ORDER — WARFARIN SODIUM 5 MG/1
2.5 TABLET ORAL
Status: DISCONTINUED | OUTPATIENT
Start: 2023-08-03 | End: 2023-08-03

## 2023-08-03 RX ORDER — ALBUTEROL SULFATE 2.5 MG/3ML
5 SOLUTION RESPIRATORY (INHALATION) EVERY 4 HOURS PRN
Status: DISCONTINUED | OUTPATIENT
Start: 2023-08-03 | End: 2023-08-07 | Stop reason: HOSPADM

## 2023-08-03 RX ORDER — DIGOXIN 125 MCG
125 TABLET ORAL EVERY OTHER DAY
Status: DISCONTINUED | OUTPATIENT
Start: 2023-08-03 | End: 2023-08-07 | Stop reason: HOSPADM

## 2023-08-03 RX ORDER — WARFARIN SODIUM 2 MG/1
2 TABLET ORAL
Status: DISCONTINUED | OUTPATIENT
Start: 2023-08-04 | End: 2023-08-04

## 2023-08-03 RX ORDER — LIDOCAINE 50 MG/G
1 PATCH TOPICAL DAILY
Status: DISCONTINUED | OUTPATIENT
Start: 2023-08-04 | End: 2023-08-07 | Stop reason: HOSPADM

## 2023-08-03 RX ADMIN — GUAIFENESIN 600 MG: 600 TABLET ORAL at 21:58

## 2023-08-03 RX ADMIN — ONDANSETRON 4 MG: 2 INJECTION INTRAMUSCULAR; INTRAVENOUS at 12:54

## 2023-08-03 RX ADMIN — MIRTAZAPINE 7.5 MG: 15 TABLET, FILM COATED ORAL at 21:57

## 2023-08-03 RX ADMIN — SODIUM CHLORIDE 1000 ML: 0.9 INJECTION, SOLUTION INTRAVENOUS at 12:54

## 2023-08-03 RX ADMIN — BUDESONIDE 0.5 MG: 0.5 INHALANT ORAL at 19:59

## 2023-08-03 RX ADMIN — SENNOSIDES AND DOCUSATE SODIUM 1 TABLET: 50; 8.6 TABLET ORAL at 21:58

## 2023-08-03 RX ADMIN — DIGOXIN 125 MCG: 125 TABLET ORAL at 21:58

## 2023-08-03 RX ADMIN — SODIUM CHLORIDE 75 ML/HR: 0.9 INJECTION, SOLUTION INTRAVENOUS at 18:20

## 2023-08-03 RX ADMIN — IOHEXOL 100 ML: 350 INJECTION, SOLUTION INTRAVENOUS at 14:42

## 2023-08-03 NOTE — ASSESSMENT & PLAN NOTE
Patient with abdominal pain  CT showed- Diffuse bowel obstruction with additional wall thickening of small bowel more so in the right lower quadrant. There is a femoral hernia present making obstruction related to hernia most likely with some change in caliber of the exiting loop. The additional wall thickening more diffusely in the pelvic cavity does raise possibility of perhaps some additional venous congestion/ischemia perhaps related to mesenteric twisting. Urgent surgical evaluation is advised.   ER spoke with surgery and they recommended medical admission  Surgical consult  N.p.o. and IV fluids  Pain management as needed

## 2023-08-03 NOTE — ASSESSMENT & PLAN NOTE
Patient presented with nausea, vomiting and diarrhea for the past few days. Will order stool work-up including C. difficile and cultures  CT abdomen pelvis showed-- " Diffuse bowel obstruction with additional wall thickening of small bowel more so in the right lower quadrant. There is a femoral hernia present making obstruction related to hernia most likely with some change in caliber of the exiting loop. The additional wall thickening more diffusely in the pelvic cavity does raise possibility of perhaps some additional venous congestion/ischemia perhaps related to mesenteric twisting. Urgent surgical evaluation is advised."  Trend WBC and fever curve  UA did not show any bacteriuria with WBC 4-7.   Patient is asymptomatic  Hold off on antibiotics

## 2023-08-03 NOTE — ASSESSMENT & PLAN NOTE
Due to several days of vomiting and profuse diarrhea, symptomatic with weakness, fatigue, and dizziness. Advised to hold Lasix this morning and go for ED evaluation and likely IVF hydration, anti-emetic and anti-diarrheal. BMP drawn yesterday showed Cr elevated from her baseline and GFR diminished.

## 2023-08-03 NOTE — H&P
[address]  H&P  Name: Nico Rodríguez 79 y.o. female I MRN: [id number]  Unit/Bed#: OVR 02 I Date of Admission: 8/3/2023   Date of Service: 8/3/2023 I Hospital Day: 0      Assessment/Plan   * Nausea vomiting and diarrhea  Assessment & Plan  Patient presented with nausea, vomiting and diarrhea for the past few days. Will order stool work-up including C. difficile and cultures  CT abdomen pelvis showed-- " Diffuse bowel obstruction with additional wall thickening of small bowel more so in the right lower quadrant. There is a femoral hernia present making obstruction related to hernia most likely with some change in caliber of the exiting loop. The additional wall thickening more diffusely in the pelvic cavity does raise possibility of perhaps some additional venous congestion/ischemia perhaps related to mesenteric twisting. Urgent surgical evaluation is advised."  Trend WBC and fever curve  UA did not show any bacteriuria with WBC 4-7. Patient is asymptomatic  Hold off on antibiotics       Abdominal pain  Assessment & Plan  Patient with abdominal pain  CT showed- Diffuse bowel obstruction with additional wall thickening of small bowel more so in the right lower quadrant. There is a femoral hernia present making obstruction related to hernia most likely with some change in caliber of the exiting loop. The additional wall thickening more diffusely in the pelvic cavity does raise possibility of perhaps some additional venous congestion/ischemia perhaps related to mesenteric twisting. Urgent surgical evaluation is advised.   ER spoke with surgery and they recommended medical admission  Surgical consult  N.p.o. and IV fluids  Pain management as needed    Compression fracture of lumbar vertebra [facility])  Assessment & Plan  Patient on CT scan noted to have a compression fracture of L2 and L4-chronic  Also noted to have compression fracture of T10 and T11 which are chronic  Pain management  PT/OT    Chronic respiratory failure with hypoxia and hypercapnia [facility])  Assessment & Plan  Patient has history of COPD and is on chronic oxygen. Patient uses 1 L of supplemental oxygen at home  Continue on bronchodilators  Continue oxygen supplementation    Other specified hypothyroidism  Assessment & Plan  Continue Synthroid    Atrial fibrillation (HCC)  Assessment & Plan  Continue on Cardizem CD and digoxin  On anticoagulation with Coumadin  Trend PT/INR    Antiphospholipid antibody syndrome (HCC)  Assessment & Plan  Continue on Coumadin  Monitor PT/INR    Hypertension  Assessment & Plan  Continue Cardizem CD and Lasix  Monitor vitals as per protocol      Chief Complaint   Patient presents with   • Abdominal Pain     Patient presents to the ED with c/o generalized abdominal pain, nausea and diarrhea x3 days. States she had a telehealth visit today and they were concerned for dehydration         HPI:  Zaria Duncan is a 79 y.o. female with past medical history of COPD, chronic respiratory failure with hypoxia, hypertension, hypothyroidism who presented to the emergency department with nausea, vomiting and diarrhea. Patient also complained of some generalized abdominal pain associated with it. Patient states that she has been having vomiting and diarrhea for the past few days which is nonbloody, watery. Patient's daughter brought her to the ER after they had a telehealth evaluation and was recommended to go to ER  Patient complains of generalized weakness and fatigue but denies any chest pain, shortness of breath, cough, melena, rectal bleeding, dizziness. ER did CT scan which was concerning for bowel obstruction and surgery was consulted.   Surgery recommended patient to be admitted under medical service and they will consult    Historical Information   Past Medical History:   Diagnosis Date   • Anti-phospholipid syndrome ([address])    • Asthma    • Blood type O+    • Cardiac disease    • Chronic pain • COPD (chronic obstructive pulmonary disease) (HCC)    • Fracture of ankle     left   • Hyperlipidemia    • Hypertension    • Hypokalemia 2/27/2023   • Osteoporosis      Past Surgical History:   Procedure Laterality Date   • APPENDECTOMY     • BILATERAL OOPHORECTOMY Bilateral    • ORIF TIBIAL SHAFT FRACTURE W/ PLATES AND SCREWS Right    • HI HEMIARTHROPLASTY HIP PARTIAL Left 6/23/2023    Procedure: HEMIARTHROPLASTY HIP (BIPOLAR);   Surgeon: Michael Miranda MD;  Location:  MAIN OR;  Service: Orthopedics     Social History   Social History     Substance and Sexual Activity   Alcohol Use Not Currently     Social History     Substance and Sexual Activity   Drug Use No     Social History     Tobacco Use   Smoking Status Former   • Types: Cigarettes   Smokeless Tobacco Never   Tobacco Comments    Never smoker per [employer]     Family History   Problem Relation Age of Onset   • Hypertension Mother    • Skin cancer Mother    • Hypertension Father        Meds/Allergies   Allergies   Allergen Reactions   • Aspirin Other (See Comments)   • Penicillins Rash and Hives   • Sulfa Antibiotics Rash       Meds:    Current Facility-Administered Medications:   •  acetaminophen (TYLENOL) tablet 650 mg, 650 mg, Oral, Q6H PRN, Regulo Garcia MD  •  albuterol inhalation solution 5 mg, 5 mg, Nebulization, Q4H PRN, Regulo Garcia MD  •  budesonide (PULMICORT) inhalation solution 0.5 mg, 0.5 mg, Nebulization, Q12H, Regulo Garcia MD  •  digoxin (LANOXIN) tablet 125 mcg, 125 mcg, Oral, Every Other Day, Regulo Garcia MD  •  [START ON 8/4/2023] diltiazem (CARDIZEM CD) 24 hr capsule 240 mg, 240 mg, Oral, Daily, Regulo Garcia MD  •  [START ON 8/4/2023] fluticasone-vilanterol 200-25 mcg/actuation 1 puff, 1 puff, Inhalation, Daily, Regulo Garcia MD  •  [START ON 8/4/2023] furosemide (LASIX) tablet 20 mg, 20 mg, Oral, Daily, Regulo Garcia MD  •  guaiFENesin (MUCINEX) 12 hr tablet 600 mg, 600 mg, Oral, BID, Leandro JOHNSON Ruby Sierra MD  •  [START ON 8/4/2023] levothyroxine tablet 25 mcg, 25 mcg, Oral, Daily, Doron Gabriel MD  •  [START ON 8/4/2023] lidocaine (LIDODERM) 5 % patch 1 patch, 1 patch, Topical, Daily, Doron Gabriel MD  •  mirtazapine (REMERON) tablet 7.5 mg, 7.5 mg, Oral, HS, Doron Gabriel MD  •  ondansetron (ZOFRAN) injection 4 mg, 4 mg, Intravenous, Q6H PRN, Doron Gabriel MD  •  senna-docusate sodium (SENOKOT S) 8.6-50 mg per tablet 1 tablet, 1 tablet, Oral, HS, Droon Gabriel MD  •  [START ON 8/4/2023] sertraline (ZOLOFT) tablet 50 mg, 50 mg, Oral, Daily, Doron Gabriel MD  •  sodium chloride 0.9 % infusion, 75 mL/hr, Intravenous, Continuous, Doron Gabriel MD  •  [START ON 8/4/2023] umeclidinium 62.5 mcg/actuation inhaler AEPB 1 puff, 1 puff, Inhalation, Daily, Doron Gabriel MD  •  warfarin (COUMADIN) tablet 2.5 mg, 2.5 mg, Oral, Daily (warfarin), Doron Gabriel MD    Current Outpatient Medications:   •  acetaminophen (TYLENOL) 325 mg tablet, Take 3 tablets (975 mg total) by mouth every 8 (eight) hours, Disp: , Rfl: 0  •  albuterol (2.5 mg/3 mL) 0.083 % nebulizer solution, USE 1 VIAL IN NEBULIZER EVERY 4 TO 6 HOURS AS NEEDED FOR COUGH AND WHEEZE, Disp: 75 vial, Rfl: 3  •  Ascorbic Acid (VITAMIN C) 1000 MG tablet, Take 1,000 mg by mouth daily, Disp: , Rfl:   •  bisacodyl (DULCOLAX) 5 mg EC tablet, Take 10 mg by mouth daily as needed for constipation (Patient not taking: Reported on 8/3/2023), Disp: , Rfl:   •  budesonide (PULMICORT) 0.5 mg/2 mL nebulizer solution, Take 2 mL (0.5 mg total) by nebulization every 12 (twelve) hours Rinse mouth after use., Disp: 120 mL, Rfl: 0  •  Cholecalciferol (VITAMIN D3) 20 MCG (800 UNIT) TABS, Take by mouth in the morning, Disp: , Rfl:   •  digoxin (LANOXIN) 0.125 mg tablet, Take 1 tablet (125 mcg total) by mouth every other day Do not start before May 10, 2023., Disp: , Rfl: 0  •  diltiazem (CARDIZEM CD) 240 mg 24 hr capsule, take 1 capsule by mouth once daily for blood pressure, Disp: 30 capsule, Rfl: 0  •  famotidine (PEPCID) 20 mg tablet, Take 1 tablet (20 mg total) by mouth every evening (Patient not taking: Reported on 8/3/2023), Disp: , Rfl: 0  •  Ferrous Sulfate 220 (44 Fe) MG/5ML LIQD, take 1 teaspoonful by mouth once daily, Disp: 150 mL, Rfl: 5  •  Fluticasone-Salmeterol (Advair) 250-50 mcg/dose inhaler, USE 1 INHALATION TWICE A DAY, Disp: 180 blister, Rfl: 3  •  furosemide (LASIX) 20 mg tablet, Take 1 tablet (20 mg total) by mouth daily Do not start before May 26, 2023., Disp: , Rfl: 0  •  guaiFENesin (MUCINEX) 600 mg 12 hr tablet, Take 1 tablet (600 mg total) by mouth 2 (two) times a day, Disp: , Rfl: 0  •  levothyroxine 25 mcg tablet, TAKE 1 TABLET DAILY, Disp: 90 tablet, Rfl: 3  •  lidocaine (LIDODERM) 5 %, Apply 1 patch topically over 12 hours daily Remove & Discard patch within 12 hours or as directed by MD, Disp: 90 patch, Rfl: 3  •  loratadine (CLARITIN) 10 mg tablet, Take 10 mg by mouth daily, Disp: , Rfl:   •  mirtazapine (REMERON) 7.5 MG tablet, Take 1 tablet (7.5 mg total) by mouth daily at bedtime, Disp: 90 tablet, Rfl: 3  •  multivitamin (THERAGRAN) TABS, Take 1 tablet by mouth daily, Disp: , Rfl:   •  oxygen gas, Inhale 1 L/min continuous.  Indications: Difficulty Breathing, Disp: , Rfl:   •  polyethylene glycol (MIRALAX) 17 g packet, Take 17 g by mouth daily for 5 days Do not start before June 29, 2023., Disp: 85 g, Rfl: 0  •  senna-docusate sodium (SENOKOT S) 8.6-50 mg per tablet, Take 1 tablet by mouth daily at bedtime, Disp: , Rfl: 0  •  sertraline (Zoloft) 50 mg tablet, Take 1 tablet (50 mg total) by mouth daily, Disp: 90 tablet, Rfl: 3  •  sodium phosphate-biphosphate (FLEET) 7-19 g 118 mL enema, Insert 1 enema into the rectum, Disp: , Rfl:   •  tiZANidine (ZANAFLEX) 2 mg tablet, Take 1 tablet (2 mg total) by mouth 2 (two) times a day as needed for muscle spasms, Disp: 30 tablet, Rfl: 0  •  umeclidinium (Incruse Ellipta) 62.5 mcg/actuation AEPB inhaler, Inhale 1 puff daily, Disp: , Rfl:   •  warfarin (COUMADIN) 2 mg tablet, Take 2 mg daily, adjust to keep INR therapeutic, between 2-3, Disp: , Rfl: 0    (Not in a hospital admission)        Review of Systems   Constitutional: Positive for activity change and fatigue. HENT: Negative. Eyes: Negative. Respiratory: Negative. Cardiovascular: Negative. Gastrointestinal: Positive for abdominal pain, diarrhea, nausea and vomiting. Endocrine: Negative. Genitourinary: Negative. Musculoskeletal: Negative. Skin: Negative. Allergic/Immunologic: Negative. Neurological: Negative. Hematological: Negative. Psychiatric/Behavioral: Negative. Current Vitals:   Blood Pressure: 137/63 (08/03/23 1700)  Pulse: 85 (08/03/23 1700)  Temperature: 98.9 °F (37.2 °C) (08/03/23 1140)  Temp Source: Temporal (08/03/23 1140)  Respirations: 17 (08/03/23 1700)  Height: 5' (152.4 cm) (08/03/23 1140)  SpO2: 97 % (08/03/23 1700)  SPO2 RA Rest    Flowsheet Row ED from 8/3/2023 in  [facility] Emergency Department   SpO2 97 %   SpO2 Activity At Rest   O2 Device Nasal cannula   O2 Flow Rate --          Intake/Output Summary (Last 24 hours) at 8/3/2023 1751  Last data filed at 8/3/2023 1539  Gross per 24 hour   Intake 1000 ml   Output --   Net 1000 ml     Body mass index is 21.87 kg/m². Physical Exam  Vitals and nursing note reviewed. Constitutional:       General: She is not in acute distress. Appearance: She is well-developed. HENT:      Head: Normocephalic and atraumatic. Nose: Nose normal.   Eyes:      General: No scleral icterus. Conjunctiva/sclera: Conjunctivae normal.      Pupils: Pupils are equal, round, and reactive to light. Neck:      Thyroid: No thyromegaly. Vascular: No JVD. Trachea: No tracheal deviation. Cardiovascular:      Rate and Rhythm: Normal rate. Rhythm irregular. Heart sounds: Normal heart sounds. Pulmonary:      Effort: Pulmonary effort is normal. No respiratory distress. Breath sounds: Normal breath sounds. No wheezing or rales. Chest:      Chest wall: No tenderness. Abdominal:      General: Bowel sounds are normal. There is no distension. Palpations: Abdomen is soft. There is no mass. Tenderness: There is abdominal tenderness. There is no guarding or rebound. Musculoskeletal:         General: No tenderness or deformity. Normal range of motion. Cervical back: Normal range of motion and neck supple. Lymphadenopathy:      Cervical: No cervical adenopathy. Skin:     General: Skin is warm. Coloration: Skin is not pale. Findings: No erythema or rash. Neurological:      General: No focal deficit present. Mental Status: She is alert and oriented to person, place, and time. Mental status is at baseline. Cranial Nerves: No cranial nerve deficit. Coordination: Coordination normal.   Psychiatric:         Behavior: Behavior normal.         Thought Content:  Thought content normal.         Judgment: Judgment normal.         Lab Results:   CBC:   Lab Results   Component Value Date    WBC 9.82 08/03/2023    HGB 10.3 (L) 08/03/2023    HCT 32.5 (L) 08/03/2023    MCV 99 (H) 08/03/2023     08/03/2023    RBC 3.30 (L) 08/03/2023    MCH 31.2 08/03/2023    MCHC 31.7 08/03/2023    RDW 14.4 08/03/2023    MPV 8.6 (L) 08/03/2023    NRBC 0 08/03/2023     CMP:  Lab Results   Component Value Date     03/17/2015    CL 96 08/03/2023    CL 99 (L) 03/17/2015    CO2 29 08/03/2023    CO2 33 (H) 05/20/2023    ANIONGAP 4 03/17/2015    BUN 31 (H) 08/03/2023    BUN 8 03/17/2015    CREATININE 0.72 08/03/2023    CREATININE 0.71 03/17/2015    GLUCOSE 210 (H) 05/20/2023    GLUCOSE 96 03/17/2015    CALCIUM 9.0 08/03/2023    CALCIUM 9.0 03/17/2015    AST 20 08/03/2023    AST 23 03/17/2015    ALT 6 (L) 08/03/2023    ALT 23 03/17/2015    ALKPHOS 122 (H) 08/03/2023    ALKPHOS 82 03/17/2015    PROT 7.6 03/17/2015    BILITOT 0.40 03/17/2015    EGFR 77 08/03/2023     Lab Results   Component Value Date    TROPONINI <0.02 11/22/2017    CKTOTAL 73 06/22/2023     Coagulation:   Lab Results   Component Value Date    INR 3.41 (H) 06/27/2023    INR 3.04 (H) 09/17/2015    Urinalysis:  Lab Results   Component Value Date    COLORU Yellow 08/03/2023    CLARITYU Clear 08/03/2023    SPECGRAV >=1.030 08/03/2023    PHUR 5.5 08/03/2023    PHUR 6.0 11/22/2017    LEUKOCYTESUR Small (A) 08/03/2023    NITRITE Negative 08/03/2023    GLUCOSEU Negative 08/03/2023    KETONESU 10 (1+) (A) 08/03/2023    BILIRUBINUR Negative 08/03/2023    BLOODU Negative 08/03/2023      Amylase: No results found for: "AMYLASE"  Lipase:   Lab Results   Component Value Date    LIPASE 17 08/03/2023        Imaging: CT abdomen pelvis with contrast    Result Date: 8/3/2023  Narrative: CT ABDOMEN AND PELVIS WITH IV CONTRAST INDICATION:   Abdominal pain, acute, nonlocalized abdominal pain, diffuse. COMPARISON: 6/22/2023 TECHNIQUE:  CT examination of the abdomen and pelvis was performed. Multiplanar 2D reformatted images were created from the source data. This examination, like all CT scans performed in the [facility], was performed utilizing techniques to minimize radiation dose exposure, including the use of iterative reconstruction and automated exposure control. Radiation dose length product (DLP) for this visit:  406.31 mGy-cm IV Contrast:  100 mL of iohexol (OMNIPAQUE) Enteric Contrast:  Enteric contrast was not administered. FINDINGS: ABDOMEN LOWER CHEST: Right lower lobe infiltrate is identified with occlusion of the subsegmental bronchus from mucous debris or aspiration. Some mild bronchial occlusion on the left is also seen. Small effusions left greater than right. [facility] LIVER/BILIARY TREE:  Unremarkable. GALLBLADDER:  No calcified gallstones. No pericholecystic inflammatory change. SPLEEN:  Unremarkable.  PANCREAS: Unremarkable. ADRENAL GLANDS:  Unremarkable. KIDNEYS/URETERS: No hydronephrosis. Circumscribed hypodensities in the kidneys probably represent small cysts although too small to characterize. Leo Jain STOMACH AND BOWEL: Probable small hiatus hernia is present. There is diffuse small bowel dilatation identified. Appears to be some wall thickening and edema in the region of the right pelvis. This edematous loop extends into a hernia sac which appears to  partially compress the femoral vein suggesting a femoral hernia rather than an inguinal hernia. This is also more laterally positioned. Some fluid is seen within this hernia sac. This does appear to be the cause of obstruction with the colon being decompressed. There does appear to be some thickened bowel loops within the peritoneal cavity however. These loops are both before and after the hernia. Of note, there is a vascular pedicle extending into the hernia and this may be causing some lymphatic or venous congestion more broadly accounting for some of this small bowel thickening not evident in the left abdomen abdomen. There is some swirling of the mesentery. The ileocecal junction is difficult to identify due to the decompressed status and perhaps some twisting of the mesentery. There is diverticulosis in the distal sigmoid colon. APPENDIX: There are expected postoperative changes of appendectomy. ABDOMINOPELVIC CAVITY:  No ascites. No pneumoperitoneum. No lymphadenopathy. VESSELS: Atherosclerotic changes are present. No evidence of aneurysm. There appears to be narrowing of the celiac axis at its origin. Mild SMA narrowing is seen near its origin although there is still enhancement of the SMA more distally. The AFSHIN is not well seen. PELVIS REPRODUCTIVE ORGANS: Coarse calcification uterus probably represents a fibroid. URINARY BLADDER:  Unremarkable. ABDOMINAL WALL/INGUINAL REGIONS:  Unremarkable. OSSEOUS STRUCTURES:  No acute fracture or destructive osseous lesion. Degenerative changes of the spine. The patient is status post left hip replacement. There is a nonhealed fracture with fragmentation involving the right pubic bone. The appearance is similar to June although there is some additional callus formation as well as reparative changes near the fracture line with some additional lucency. Compression fracture of L2 and L4 are identified as well as T10 and T11 these are similar to the study of June. Some healed rib fractures appear to be present bilaterally. Impression: Diffuse bowel obstruction with additional wall thickening of small bowel more so in the right lower quadrant. There is a femoral hernia present making obstruction related to hernia most likely with some change in caliber of the exiting loop. The additional wall thickening more diffusely in the pelvic cavity does raise possibility of perhaps some additional venous congestion/ischemia perhaps related to mesenteric twisting. Urgent surgical evaluation is advised. Atherosclerotic changes are seen. There is some celiac and SMA narrowing although these vessels do enhance. Multiple fractures as described above are again noted. This was discussed with Dr. Jonette Denver at 3:15 p.m. Workstation performed: [id number]     XR hip/pelv 2-3 vws left if performed    Result Date: 7/12/2023  Narrative: LEFT HIP INDICATION:   Z47.89: Encounter for other orthopedic aftercare. COMPARISON: Pelvis x-ray 6/23/2023 VIEWS:  XR HIP/PELV 2-3 VWS LEFT  W PELVIS IF PERFORMED Images: 3 FINDINGS: There is no acute fracture or dislocation. Left hip hemiarthroplasty is identified in satisfactory position without evidence of hardware complication. No lytic or blastic osseous lesion. Round pelvic calcification which may represent calcified fibroids. Vascular calcifications. Degenerative changes visualized lower lumbar spine. Impression: Unremarkable appearance of left hip hemiarthroplasty.  Workstation performed: [id number]     EKG, Pathology, and Other Studies: I have personally reviewed the results. VTE Pharmacologic Prophylaxis: Warfarin (Coumadin)  VTE Mechanical Prophylaxis: sequential compression device    Code Status: Level 1 - Full Code      Counseling / Coordination of Care  Total floor / unit time spent today 75 minutes. Greater than 50% of total time was spent with the patient and / or family counseling and / or coordination of care.      "This note has been constructed using a voice recognition system"      Camilo Johnson MD  8/3/2023, 5:51 PM

## 2023-08-03 NOTE — ED PROVIDER NOTES
History  Chief Complaint   Patient presents with   • Abdominal Pain     Patient presents to the ED with c/o generalized abdominal pain, nausea and diarrhea x3 days. States she had a telehealth visit today and they were concerned for dehydration      79year-old female history of COPD, hypertension, presenting due to nausea vomiting and diarrhea. States symptoms have been present for the past few days and she had multiple episodes of nonbloody nonbilious vomit and watery diarrhea. Saw her doctor on telehealth today who recommend that she comes to the emergency department for evaluation for dehydration. States she is still eating and drinking but with decreased appetite and feels overall weak. Said she has some intermittent abdominal cramping. Denies any objective fever, headache, chest pain or dyspnea, urinary symptoms. Prior to Admission Medications   Prescriptions Last Dose Informant Patient Reported? Taking?    Ascorbic Acid (VITAMIN C) 1000 MG tablet  Outside Facility (Specify) Yes Yes   Sig: Take 1,000 mg by mouth daily   Cholecalciferol (VITAMIN D3) 20 MCG (800 UNIT) TABS  Outside Facility (Specify) Yes Yes   Sig: Take by mouth in the morning   Ferrous Sulfate 220 (44 Fe) MG/5ML LIQD  Outside Facility (Specify) No Yes   Sig: take 1 teaspoonful by mouth once daily   Fluticasone-Salmeterol (Advair) 250-50 mcg/dose inhaler  Outside Facility (Specify) No Yes   Sig: USE 1 INHALATION TWICE A DAY   acetaminophen (TYLENOL) 325 mg tablet  Outside Facility (Specify) No Yes   Sig: Take 3 tablets (975 mg total) by mouth every 8 (eight) hours   albuterol (2.5 mg/3 mL) 0.083 % nebulizer solution  Outside Facility (Specify) No Yes   Sig: USE 1 VIAL IN NEBULIZER EVERY 4 TO 6 HOURS AS NEEDED FOR COUGH AND WHEEZE   bisacodyl (DULCOLAX) 5 mg EC tablet  Outside Facility (Specify) Yes No   Sig: Take 10 mg by mouth daily as needed for constipation   Patient not taking: Reported on 8/3/2023   budesonide (PULMICORT) 0.5 mg/2 mL nebulizer solution Not Taking Outside Facility (Specify) No No   Sig: Take 2 mL (0.5 mg total) by nebulization every 12 (twelve) hours Rinse mouth after use. Patient not taking: Reported on 8/3/2023   digoxin (LANOXIN) 0.125 mg tablet  Outside Facility (Specify) No Yes   Sig: Take 1 tablet (125 mcg total) by mouth every other day Do not start before May 10, 2023. diltiazem (CARDIZEM CD) 240 mg 24 hr capsule  Outside Facility (Specify) No Yes   Sig: take 1 capsule by mouth once daily for blood pressure   famotidine (PEPCID) 20 mg tablet  Outside Facility (Specify) No No   Sig: Take 1 tablet (20 mg total) by mouth every evening   Patient not taking: Reported on 8/3/2023   furosemide (LASIX) 20 mg tablet  Outside Facility (Specify) No Yes   Sig: Take 1 tablet (20 mg total) by mouth daily Do not start before May 26, 2023. guaiFENesin (MUCINEX) 600 mg 12 hr tablet  Outside Facility (Specify) No Yes   Sig: Take 1 tablet (600 mg total) by mouth 2 (two) times a day   levothyroxine 25 mcg tablet  Outside Facility (Specify) No Yes   Sig: TAKE 1 TABLET DAILY   lidocaine (LIDODERM) 5 %  Outside Facility (Specify) No Yes   Sig: Apply 1 patch topically over 12 hours daily Remove & Discard patch within 12 hours or as directed by MD   loratadine (CLARITIN) 10 mg tablet  Self, Outside Facility (Specify) Yes Yes   Sig: Take 10 mg by mouth daily   mirtazapine (REMERON) 7.5 MG tablet  Outside Facility (Specify) No Yes   Sig: Take 1 tablet (7.5 mg total) by mouth daily at bedtime   multivitamin (THERAGRAN) TABS  Self, Outside Facility (Specify) Yes Yes   Sig: Take 1 tablet by mouth daily   oxygen gas   Yes No   Sig: Inhale 1 L/min continuous. Indications: Difficulty Breathing   polyethylene glycol (MIRALAX) 17 g packet Not Taking  No No   Sig: Take 17 g by mouth daily for 5 days Do not start before June 29, 2023.    Patient not taking: Reported on 8/3/2023   senna-docusate sodium (SENOKOT S) 8.6-50 mg per tablet Not Taking Outside Facility [facility]) No No   Sig: Take 1 tablet by mouth daily at bedtime   Patient not taking: Reported on 8/3/2023   sertraline (Zoloft) 50 mg tablet  Outside Facility (Specify) No Yes   Sig: Take 1 tablet (50 mg total) by mouth daily   sodium phosphate-biphosphate (FLEET) 7-19 g 118 mL enema Not Taking Outside Facility (Specify) Yes No   Sig: Insert 1 enema into the rectum   Patient not taking: Reported on 8/3/2023   tiZANidine (ZANAFLEX) 2 mg tablet  Outside Facility (Specify) No Yes   Sig: Take 1 tablet (2 mg total) by mouth 2 (two) times a day as needed for muscle spasms   umeclidinium (Incruse Ellipta) 62.5 mcg/actuation AEPB inhaler  Outside Facility (Specify) Yes Yes   Sig: Inhale 1 puff daily   warfarin (COUMADIN) 2 mg tablet  Outside Facility (Specify) No Yes   Sig: Take 2 mg daily, adjust to keep INR therapeutic, between 2-3      Facility-Administered Medications: None       Past Medical History:   Diagnosis Date   • Anti-phospholipid syndrome (HCC)    • Asthma    • Blood type O+    • Cardiac disease    • Chronic pain    • COPD (chronic obstructive pulmonary disease) (HCC)    • Fracture of ankle     left   • Hyperlipidemia    • Hypertension    • Hypokalemia 2/27/2023   • Osteoporosis        Past Surgical History:   Procedure Laterality Date   • APPENDECTOMY     • BILATERAL OOPHORECTOMY Bilateral    • ORIF TIBIAL SHAFT FRACTURE W/ PLATES AND SCREWS Right    • [place] HEMIARTHROPLASTY HIP PARTIAL Left 6/23/2023    Procedure: HEMIARTHROPLASTY HIP (BIPOLAR); Surgeon: Fly Benavidez MD;  Location:  MAIN OR;  Service: Orthopedics       Family History   Problem Relation Age of Onset   • Hypertension Mother    • Skin cancer Mother    • Hypertension Father      I have reviewed and agree with the history as documented.     E-Cigarette/Vaping   • E-Cigarette Use Never User      E-Cigarette/Vaping Substances   • Nicotine No    • THC No      Social History     Tobacco Use   • Smoking status: Former     Types: Cigarettes   • Smokeless tobacco: Never   • Tobacco comments:     Never smoker per [employer]   Vaping Use   • Vaping Use: Never used   Substance Use Topics   • Alcohol use: Not Currently   • Drug use: No       Review of Systems   Constitutional: Negative for activity change, appetite change, chills, fatigue and fever. HENT: Negative for congestion and rhinorrhea. Eyes: Negative for visual disturbance. Respiratory: Negative for cough, chest tightness, shortness of breath and wheezing. Cardiovascular: Negative for chest pain, palpitations and leg swelling. Gastrointestinal: Positive for abdominal pain, diarrhea, nausea and vomiting. Genitourinary: Negative for flank pain. Musculoskeletal: Negative for arthralgias and myalgias. Skin: Negative for rash and wound. Neurological: Negative for syncope, weakness and headaches. All other systems reviewed and are negative. Physical Exam  Physical Exam  Vitals and nursing note reviewed. Constitutional:       General: She is not in acute distress. Appearance: She is well-developed. HENT:      Head: Normocephalic and atraumatic. Eyes:      Conjunctiva/sclera: Conjunctivae normal.   Cardiovascular:      Rate and Rhythm: Normal rate and regular rhythm. Heart sounds: No murmur heard. Pulmonary:      Effort: Pulmonary effort is normal. No respiratory distress. Breath sounds: Normal breath sounds. Abdominal:      Palpations: Abdomen is soft. Tenderness: There is generalized abdominal tenderness. Musculoskeletal:         General: No swelling. Cervical back: Neck supple. Skin:     General: Skin is warm and dry. Capillary Refill: Capillary refill takes less than 2 seconds. Neurological:      Mental Status: She is alert.    Psychiatric:         Mood and Affect: Mood normal.         Vital Signs  ED Triage Vitals [08/03/23 1140]   Temperature Pulse Respirations Blood Pressure SpO2   98.9 °F (37.2 °C) 78 18 128/61 92 % Temp Source Heart Rate Source Patient Position - Orthostatic VS BP Location FiO2 (%)   Temporal Monitor Sitting Right arm --      Pain Score       5           Vitals:    08/03/23 1935 08/03/23 2156 08/04/23 0728 08/04/23 1528   BP: 133/58 126/57 138/63 134/52   Pulse: 78 76 79 71   Patient Position - Orthostatic VS:    Lying         Visual Acuity      ED Medications  Medications   albuterol inhalation solution 5 mg (has no administration in time range)   budesonide (PULMICORT) inhalation solution 0.5 mg (0.5 mg Nebulization Given 8/4/23 0755)   digoxin (LANOXIN) tablet 125 mcg (125 mcg Oral Given 8/3/23 2158)   diltiazem (CARDIZEM CD) 24 hr capsule 240 mg (240 mg Oral Given 8/4/23 0907)   fluticasone-vilanterol 200-25 mcg/actuation 1 puff (1 puff Inhalation Given 8/4/23 0934)   furosemide (LASIX) tablet 20 mg (20 mg Oral Given 8/4/23 0907)   guaiFENesin (MUCINEX) 12 hr tablet 600 mg (600 mg Oral Given 8/4/23 1903)   levothyroxine tablet 25 mcg (25 mcg Oral Given 8/4/23 0522)   lidocaine (LIDODERM) 5 % patch 1 patch (1 patch Topical Medication Applied 8/4/23 0909)   mirtazapine (REMERON) tablet 7.5 mg (7.5 mg Oral Given 8/3/23 2157)   senna-docusate sodium (SENOKOT S) 8.6-50 mg per tablet 1 tablet (1 tablet Oral Given 8/3/23 2158)   sertraline (ZOLOFT) tablet 50 mg (50 mg Oral Given 8/4/23 0907)   umeclidinium 62.5 mcg/actuation inhaler AEPB 1 puff (1 puff Inhalation Given 8/4/23 0934)   ondansetron (ZOFRAN) injection 4 mg (has no administration in time range)   acetaminophen (TYLENOL) tablet 650 mg (has no administration in time range)   sodium chloride 0.9 % bolus 1,000 mL (0 mL Intravenous Stopped 8/3/23 1539)   ondansetron (ZOFRAN) injection 4 mg (4 mg Intravenous Given 8/3/23 1254)   iohexol (OMNIPAQUE) 350 MG/ML injection (SINGLE-DOSE) 100 mL (100 mL Intravenous Given 8/3/23 1442)   potassium chloride 20 mEq IVPB (premix) (20 mEq Intravenous New Bag 8/4/23 0906)       Diagnostic Studies  Results Reviewed Procedure Component Value Units Date/Time    Stool Enteric Bacterial Panel by PCR [[id number]]  (Normal) Collected: 08/04/23 0512    Lab Status: Final result Specimen: Stool from Rectum Updated: 08/04/23 1534     Salmonella sp PCR None Detected     Shigella sp/Enteroinvasive E. coli (EIEC) PCR None Detected     Campylobacter sp (jejuni and coli) PCR None Detected     Shiga toxin 1/Shiga toxin 2 genes PCR None Detected    Clostridium difficile toxin by PCR with EIA [[id number]]  (Normal) Collected: 08/04/23 0512    Lab Status: Final result Specimen: Stool from Per Rectum Updated: 08/04/23 1511     C.difficile toxin by PCR Negative    Comprehensive metabolic panel [[id number]]  (Abnormal) Collected: 08/04/23 0512    Lab Status: Final result Specimen: Blood from Arm, Right Updated: 08/04/23 0549     Sodium 140 mmol/L      Potassium 3.5 mmol/L      Chloride 103 mmol/L      CO2 34 mmol/L      ANION GAP 3 mmol/L      BUN 19 mg/dL      Creatinine 0.48 mg/dL      Glucose 86 mg/dL      Glucose, Fasting 86 mg/dL      Calcium 8.4 mg/dL      Corrected Calcium 9.1 mg/dL      AST 15 U/L      ALT 7 U/L      Alkaline Phosphatase 97 U/L      Total Protein 5.6 g/dL      Albumin 3.1 g/dL      Total Bilirubin 0.44 mg/dL      eGFR 90 ml/min/1.73sq m     Narrative:      [facility] (CKD):   •  Stage 1 with normal or high GFR (GFR > 90 mL/min/1.73 square meters)  •  Stage 2 Mild CKD (GFR = 60-89 mL/min/1.73 square meters)  •  Stage 3A Moderate CKD (GFR = 45-59 mL/min/1.73 square meters)  •  Stage 3B Moderate CKD (GFR = 30-44 mL/min/1.73 square meters)  •  Stage 4 Severe CKD (GFR = 15-29 mL/min/1.73 square meters)  •  Stage 5 End Stage CKD (GFR <15 mL/min/1.73 square meters)  Note: GFR calculation is accurate only with a steady state creatinine    Magnesium [[id number]]  (Normal) Collected: 08/04/23 0512    Lab Status: Final result Specimen: Blood from Arm, Right Updated: 08/04/23 0549     Magnesium 1.9 mg/dL     Phosphorus [[id number]]  (Normal) Collected: 08/04/23 0512    Lab Status: Final result Specimen: Blood from Arm, Right Updated: 08/04/23 0549     Phosphorus 2.4 mg/dL     Protime-INR [[id number]]  (Abnormal) Collected: 08/04/23 0512    Lab Status: Final result Specimen: Blood from Arm, Right Updated: 08/04/23 0538     Protime 35.2 seconds      INR 3.32    CBC and differential [[id number]]  (Abnormal) Collected: 08/04/23 0512    Lab Status: Final result Specimen: Blood from Arm, Right Updated: 08/04/23 0529     WBC 7.46 Thousand/uL      RBC 2.96 Million/uL      Hemoglobin 9.1 g/dL      Hematocrit 29.4 %      MCV 99 fL      MCH 30.7 pg      MCHC 31.0 g/dL      RDW 14.3 %      MPV 8.5 fL      Platelets 074 Thousands/uL      nRBC 0 /100 WBCs      Neutrophils Relative 78 %      Immat GRANS % 0 %      Lymphocytes Relative 12 %      Monocytes Relative 9 %      Eosinophils Relative 1 %      Basophils Relative 0 %      Neutrophils Absolute 5.78 Thousands/µL      Immature Grans Absolute 0.02 Thousand/uL      Lymphocytes Absolute 0.87 Thousands/µL      Monocytes Absolute 0.68 Thousand/µL      Eosinophils Absolute 0.08 Thousand/µL      Basophils Absolute 0.03 Thousands/µL     Protime-INR [[id number]]  (Abnormal) Collected: 08/03/23 1829    Lab Status: Final result Specimen: Blood from Arm, Left Updated: 08/03/23 1905     Protime 35.6 seconds      INR 3.36    Urine Microscopic [[id number]]  (Abnormal) Collected: 08/03/23 1411    Lab Status: Final result Specimen: Urine, Clean Catch Updated: 08/03/23 1445     RBC, UA 2-4 /hpf      WBC, UA 4-10 /hpf      Epithelial Cells Moderate /hpf      Bacteria, UA Occasional /hpf     UA w Reflex to Microscopic w Reflex to Culture [[id number]]  (Abnormal) Collected: 08/03/23 1411    Lab Status: Final result Specimen: Urine, Clean Catch Updated: 08/03/23 1427     Color, UA Yellow     Clarity, UA Clear     Specific Gravity, UA >=1.030     pH, UA 5.5     Leukocytes, UA Small     Nitrite, UA Negative     Protein, UA 30 (1+) mg/dl      Glucose, UA Negative mg/dl      Ketones, UA 10 (1+) mg/dl      Urobilinogen, UA <2.0 mg/dl      Bilirubin, UA Negative     Occult Blood, UA Negative    Comprehensive metabolic panel [[id number]]  (Abnormal) Collected: 08/03/23 1145    Lab Status: Final result Specimen: Blood from Arm, Right Updated: 08/03/23 1219     Sodium 135 mmol/L      Potassium 3.7 mmol/L      Chloride 96 mmol/L      CO2 29 mmol/L      ANION GAP 10 mmol/L      BUN 31 mg/dL      Creatinine 0.72 mg/dL      Glucose 97 mg/dL      Calcium 9.0 mg/dL      AST 20 U/L      ALT 6 U/L      Alkaline Phosphatase 122 U/L      Total Protein 6.8 g/dL      Albumin 3.6 g/dL      Total Bilirubin 0.63 mg/dL      eGFR 77 ml/min/1.73sq m     Narrative:      [facility] ([facility]D):   •  Stage 1 with normal or high GFR (GFR > 90 mL/min/1.73 square meters)  •  Stage 2 Mild CKD (GFR = 60-89 mL/min/1.73 square meters)  •  Stage 3A Moderate CKD (GFR = 45-59 mL/min/1.73 square meters)  •  Stage 3B Moderate CKD (GFR = 30-44 mL/min/1.73 square meters)  •  Stage 4 Severe CKD (GFR = 15-29 mL/min/1.73 square meters)  •  Stage 5 End Stage CKD (GFR <15 mL/min/1.73 square meters)  Note: GFR calculation is accurate only with a steady state creatinine    Lipase [[id number]]  (Normal) Collected: 08/03/23 1145    Lab Status: Final result Specimen: Blood from Arm, Right Updated: 08/03/23 1219     Lipase 17 u/L     CBC and differential [[id number]]  (Abnormal) Collected: 08/03/23 1145    Lab Status: Final result Specimen: Blood from Arm, Right Updated: 08/03/23 1150     WBC 9.82 Thousand/uL      RBC 3.30 Million/uL      Hemoglobin 10.3 g/dL      Hematocrit 32.5 %      MCV 99 fL      MCH 31.2 pg      MCHC 31.7 g/dL      RDW 14.4 %      MPV 8.6 fL      Platelets 587 Thousands/uL      nRBC 0 /100 WBCs      Neutrophils Relative 83 %      Immat GRANS % 0 %      Lymphocytes Relative 10 %      Monocytes Relative 7 %      Eosinophils Relative 0 %      Basophils Relative 0 %      Neutrophils Absolute 8.05 Thousands/µL      Immature Grans Absolute 0.03 Thousand/uL      Lymphocytes Absolute 1.02 Thousands/µL      Monocytes Absolute 0.68 Thousand/µL      Eosinophils Absolute 0.02 Thousand/µL      Basophils Absolute 0.02 Thousands/µL                  XR abdomen 1 view kub   Final Result by Khadijah Cisneros MD (08/04 1556)      Ingested contrast is in the colon indicating interval improvement since prior. Persistent dilated small bowel loops. Cannot exclude partial obstruction. Recommend continued follow-up if symptoms persist.         Resident: Carolina Izquierdo, the attending radiologist, have reviewed the images and agree with the final report above. Workstation performed: [id number]         XR chest portable   Final Result by Nesha Brown DO (08/04 1113)      1. New bibasilar airspace opacities with trace left effusion. Opacities may represent atelectasis versus developing pneumonia. 2. Mild pulmonary vascular congestion            Workstation performed: [id number]         CT abdomen pelvis with contrast   Final Result by Jess Sanabria MD (08/03 1517)      Diffuse bowel obstruction with additional wall thickening of small bowel more so in the right lower quadrant. There is a femoral hernia present making obstruction related to hernia most likely with some change in caliber of the exiting loop. The    additional wall thickening more diffusely in the pelvic cavity does raise possibility of perhaps some additional venous congestion/ischemia perhaps related to mesenteric twisting. Urgent surgical evaluation is advised. Atherosclerotic changes are seen. There is some celiac and SMA narrowing although these vessels do enhance. Multiple fractures as described above are again noted. This was discussed with Dr. Nilda Tim at 3:15 p.m.             Workstation performed: [id number]                    Procedures  Procedures         ED Course  ED Course as of 08/04/23 2026   Thu Aug 03, 2023   1516 Surgery contacted for evaluation   1625 Surgery evaluated and do not think the patient has sign of obstruction. They believe patient has enteritis and hernia is easily reducible. Recommend observing patient due to symptoms and states that he will get contrast and x-ray in the morning to confirm there is no obstruction. Recommend admission to medicine                               SBIRT 20yo+    Flowsheet Row Most Recent Value   Initial Alcohol Screen: US AUDIT-C     1. How often do you have a drink containing alcohol? 0 Filed at: 08/03/2023 1143   2. How many drinks containing alcohol do you have on a typical day you are drinking? 0 Filed at: 08/03/2023 1143   3a. Male UNDER 65: How often do you have five or more drinks on one occasion? 0 Filed at: 08/03/2023 1143   3b. FEMALE Any Age, or MALE 65+: How often do you have 4 or more drinks on one occassion? 0 Filed at: 08/03/2023 1143   Audit-C Score 0 Filed at: 08/03/2023 1143   [place]: How many times in the past year have you. .. Used an illegal drug or used a prescription medication for non-medical reasons? Never Filed at: 08/03/2023 1143                    Medical Decision Making  Low suspicion for bowel obstruction per surgery. Will admit to medicine for further work up and evaluation and likely repeat imaging tomorrow to confirm no blockage. Admitted in stable condition    Amount and/or Complexity of Data Reviewed  Labs: ordered. Radiology: ordered. Risk  Prescription drug management. Decision regarding hospitalization.           Disposition  Final diagnoses:   Nausea vomiting and diarrhea   Abdominal pain     Time reflects when diagnosis was documented in both MDM as applicable and the Disposition within this note     Time User Action Codes Description Comment    8/3/2023  4:34 PM Nito Aponte Add [R11.2,  R19.7] Nausea vomiting and diarrhea     8/3/2023  4:34 PM Jeffrey Aponte Add [R10.9] Abdominal pain     8/3/2023  9:55 PM Christine Small Add [R14.0] Abdominal distention/back pain       ED Disposition     ED Disposition   Admit    Condition   Stable    Date/Time   Thu Aug 3, 2023  5:09 PM    Comment   Case was discussed with SUSAN and the patient's admission status was agreed to be Admission Status: observation status to the service of Dr. Kym Lewis . Follow-up Information    None         Current Discharge Medication List      CONTINUE these medications which have NOT CHANGED    Details   acetaminophen (TYLENOL) 325 mg tablet Take 3 tablets (975 mg total) by mouth every 8 (eight) hours  Refills: 0    Associated Diagnoses: Low back pain without sciatica, unspecified back pain laterality, unspecified chronicity      albuterol (2.5 mg/3 mL) 0.083 % nebulizer solution USE 1 VIAL IN NEBULIZER EVERY 4 TO 6 HOURS AS NEEDED FOR COUGH AND WHEEZE  Qty: 75 vial, Refills: 3    Associated Diagnoses: Moderate asthma, unspecified whether complicated, unspecified whether persistent      Ascorbic Acid (VITAMIN C) 1000 MG tablet Take 1,000 mg by mouth daily      Cholecalciferol (VITAMIN D3) 20 MCG (800 UNIT) TABS Take by mouth in the morning      digoxin (LANOXIN) 0.125 mg tablet Take 1 tablet (125 mcg total) by mouth every other day Do not start before May 10, 2023. Refills: 0    Associated Diagnoses: Atrial fibrillation, unspecified type (HCC)      diltiazem (CARDIZEM CD) 240 mg 24 hr capsule take 1 capsule by mouth once daily for blood pressure  Qty: 30 capsule, Refills: 0    Associated Diagnoses: Atrial fibrillation, unspecified type ([address]);  Primary hypertension      Ferrous Sulfate 220 (44 Fe) MG/5ML LIQD take 1 teaspoonful by mouth once daily  Qty: 150 mL, Refills: 5    Associated Diagnoses: Anemia, unspecified type      Fluticasone-Salmeterol (Advair) 250-50 mcg/dose inhaler USE 1 INHALATION TWICE A DAY  Qty: 180 blister, Refills: 3    Comments: Substitution to a formulary equivalent within the same pharmaceutical class is authorized. Associated Diagnoses: Chronic obstructive pulmonary disease, unspecified COPD type ([facility])      furosemide (LASIX) 20 mg tablet Take 1 tablet (20 mg total) by mouth daily Do not start before May 26, 2023. Refills: 0    Associated Diagnoses: Atrial fibrillation, unspecified type ([facility])      guaiFENesin (MUCINEX) 600 mg 12 hr tablet Take 1 tablet (600 mg total) by mouth 2 (two) times a day  Refills: 0    Associated Diagnoses: Femur fracture, left ([facility])      levothyroxine 25 mcg tablet TAKE 1 TABLET DAILY  Qty: 90 tablet, Refills: 3    Associated Diagnoses: Hypothyroidism, unspecified type      lidocaine (LIDODERM) 5 % Apply 1 patch topically over 12 hours daily Remove & Discard patch within 12 hours or as directed by MD  Qty: 90 patch, Refills: 3    Associated Diagnoses: Osteoporosis, unspecified osteoporosis type, unspecified pathological fracture presence;  Low back pain without sciatica, unspecified back pain laterality, unspecified chronicity      loratadine (CLARITIN) 10 mg tablet Take 10 mg by mouth daily      mirtazapine (REMERON) 7.5 MG tablet Take 1 tablet (7.5 mg total) by mouth daily at bedtime  Qty: 90 tablet, Refills: 3    Associated Diagnoses: Adult failure to thrive      multivitamin (THERAGRAN) TABS Take 1 tablet by mouth daily      sertraline (Zoloft) 50 mg tablet Take 1 tablet (50 mg total) by mouth daily  Qty: 90 tablet, Refills: 3    Associated Diagnoses: Anxiety      tiZANidine (ZANAFLEX) 2 mg tablet Take 1 tablet (2 mg total) by mouth 2 (two) times a day as needed for muscle spasms  Qty: 30 tablet, Refills: 0    Associated Diagnoses: Low back pain without sciatica, unspecified back pain laterality, unspecified chronicity      umeclidinium (Incruse Ellipta) 62.5 mcg/actuation AEPB inhaler Inhale 1 puff daily      warfarin (COUMADIN) 2 mg tablet Take 2 mg daily, adjust to keep INR therapeutic, between 2-3  Refills: 0    Associated Diagnoses: Chronic atrial fibrillation (HCC)      bisacodyl (DULCOLAX) 5 mg EC tablet Take 10 mg by mouth daily as needed for constipation      budesonide (PULMICORT) 0.5 mg/2 mL nebulizer solution Take 2 mL (0.5 mg total) by nebulization every 12 (twelve) hours Rinse mouth after use. Qty: 120 mL, Refills: 0    Associated Diagnoses: COPD with acute exacerbation (HCC)      famotidine (PEPCID) 20 mg tablet Take 1 tablet (20 mg total) by mouth every evening  Refills: 0    Associated Diagnoses: Femur fracture, left (HCC)      oxygen gas Inhale 1 L/min continuous. Indications: Difficulty Breathing      polyethylene glycol (MIRALAX) 17 g packet Take 17 g by mouth daily for 5 days Do not start before June 29, 2023. Qty: 85 g, Refills: 0    Associated Diagnoses: Constipation      senna-docusate sodium (SENOKOT S) 8.6-50 mg per tablet Take 1 tablet by mouth daily at bedtime  Refills: 0    Associated Diagnoses: Constipation, unspecified constipation type      sodium phosphate-biphosphate (FLEET) 7-19 g 118 mL enema Insert 1 enema into the rectum             No discharge procedures on file.     PDMP Review       Value Time User    PDMP Reviewed  Yes 8/3/2023  5:37 PM Adam Salinas MD          ED Provider  Electronically Signed by           Vinicio Trammell DO  08/04/23 2026

## 2023-08-03 NOTE — CONSULTS
Consultation - General Surgery   Barry Orellana 79 y.o. female MRN: [id number]  Unit/Bed#: OVR 02 Encounter: [id number]    Assessment/Plan     Abdominal pain  -patient c/o abdominal pain, nausea/vomiting, diarrhea for 3 days  -CT abd/pelvis: "Diffuse bowel obstruction with additional wall thickening of small bowel more so in the right lower quadrant. There is a femoral hernia present making obstruction related to hernia most likely with some change in caliber of the exiting loop. The additional wall thickening more diffusely in the pelvic cavity does raise possibility of perhaps some additional venous congestion/ischemia perhaps related to mesenteric twisting."  -Discussed with Dr Saul Howard, who also saw patient and reviewed imaging. Patient has dilated and inflammed bowel without air/fluid levels proximal and distal to hernia, and symptoms/labs/exam clinically more consistent with enteritis. Her hernia is easily reducible and not likely cause of dilated loops of bowel. No urgent surgical intervention indicated at this time.   -Will order KUB for tomorrow morning with oral contrast. Contrast given to patient to sip tonight. Otherwise NPO/sips with meds. -Pain/nausea control prn  -IV fluids for hydration  -Serial abdominal exam  -Trend labs/vitals  -C diff/stool cultures pending    Chronic femoral hernia  -Outpatient follow up    Other medical problems: Compression fracture of lumbar vertebrae, chronic respiratory failure, hypothyroidism, atrial fibrillation, antiphospholipid antibody syndrome, hypertension- care per primary team    History of Present Illness     HPI:  Barry Orellana is a 79 y.o. female with history of COPD on supplemental home O2, antiphosholipid syndrome and atrial fibrillation on coumadin, HTN, who presents with abdominal pain, nausea, vomiting, loose stools, fatigue for the past 3 days. Nausea resolved in ED with medication. She has remote history of hernia repair and open appendectomy.   Last food was about an hour before consult. She reports long history of hernia in groin without change. Denies fever, chills, chest pain, change in baseline shortness of breath or chronic cough, or other changes in baseline health conditions. Inpatient consult to Acute Care Surgery  Consult performed by: Nieves Lira PA-C  Consult ordered by: Ede Cummings DO          Review of Systems   Constitutional: Positive for appetite change and fatigue. Negative for chills and fever. Respiratory: Positive for cough (chronic) and shortness of breath (chronic). Cardiovascular: Negative for chest pain. Gastrointestinal: Positive for abdominal distention, abdominal pain, diarrhea, nausea and vomiting. Genitourinary: Negative for difficulty urinating. Neurological: Positive for weakness. All other systems reviewed and are negative. Historical Information   Past Medical History:   Diagnosis Date   • Anti-phospholipid syndrome (HCC)    • Asthma    • Blood type O+    • Cardiac disease    • Chronic pain    • COPD (chronic obstructive pulmonary disease) (HCC)    • Fracture of ankle     left   • Hyperlipidemia    • Hypertension    • Hypokalemia 2/27/2023   • Osteoporosis      Past Surgical History:   Procedure Laterality Date   • APPENDECTOMY     • BILATERAL OOPHORECTOMY Bilateral    • ORIF TIBIAL SHAFT FRACTURE W/ PLATES AND SCREWS Right    • [place] HEMIARTHROPLASTY HIP PARTIAL Left 6/23/2023    Procedure: HEMIARTHROPLASTY HIP (BIPOLAR);   Surgeon: Jason Lane MD;  Location:  MAIN OR;  Service: Orthopedics     Social History   Social History     Substance and Sexual Activity   Alcohol Use Not Currently     Social History     Substance and Sexual Activity   Drug Use No     E-Cigarette/Vaping   • E-Cigarette Use Never User      E-Cigarette/Vaping Substances   • Nicotine No    • THC No      Social History     Tobacco Use   Smoking Status Former   • Types: Cigarettes   Smokeless Tobacco Never   Tobacco Comments    Never smoker per [employer]     Family History:   Family History   Problem Relation Age of Onset   • Hypertension Mother    • Skin cancer Mother    • Hypertension Father        Meds/Allergies   PTA meds:   Prior to Admission Medications   Prescriptions Last Dose Informant Patient Reported? Taking? Ascorbic Acid (VITAMIN C) 1000 MG tablet  Outside Facility (Specify) Yes No   Sig: Take 1,000 mg by mouth daily   Cholecalciferol (VITAMIN D3) 20 MCG (800 UNIT) TABS  Outside Facility (Specify) Yes No   Sig: Take by mouth in the morning   Ferrous Sulfate 220 (44 Fe) MG/5ML LIQD  Outside Facility (Specify) No No   Sig: take 1 teaspoonful by mouth once daily   Fluticasone-Salmeterol (Advair) 250-50 mcg/dose inhaler  Outside Facility (Specify) No No   Sig: USE 1 INHALATION TWICE A DAY   acetaminophen (TYLENOL) 325 mg tablet  Outside Facility (Specify) No No   Sig: Take 3 tablets (975 mg total) by mouth every 8 (eight) hours   albuterol (2.5 mg/3 mL) 0.083 % nebulizer solution  Outside Facility (Specify) No No   Sig: USE 1 VIAL IN NEBULIZER EVERY 4 TO 6 HOURS AS NEEDED FOR COUGH AND WHEEZE   bisacodyl (DULCOLAX) 5 mg EC tablet  Outside Facility (Specify) Yes No   Sig: Take 10 mg by mouth daily as needed for constipation   Patient not taking: Reported on 8/3/2023   budesonide (PULMICORT) 0.5 mg/2 mL nebulizer solution  Outside Facility (Specify) No No   Sig: Take 2 mL (0.5 mg total) by nebulization every 12 (twelve) hours Rinse mouth after use. digoxin (LANOXIN) 0.125 mg tablet  Outside Facility (Specify) No No   Sig: Take 1 tablet (125 mcg total) by mouth every other day Do not start before May 10, 2023.    diltiazem (CARDIZEM CD) 240 mg 24 hr capsule  Outside Facility (Specify) No No   Sig: take 1 capsule by mouth once daily for blood pressure   famotidine (PEPCID) 20 mg tablet  Outside Facility (Specify) No No   Sig: Take 1 tablet (20 mg total) by mouth every evening   Patient not taking: Reported on 8/3/2023   furosemide (LASIX) 20 mg tablet  Outside Facility (Specify) No No   Sig: Take 1 tablet (20 mg total) by mouth daily Do not start before May 26, 2023. guaiFENesin (MUCINEX) 600 mg 12 hr tablet  Outside Facility (Specify) No No   Sig: Take 1 tablet (600 mg total) by mouth 2 (two) times a day   levothyroxine 25 mcg tablet  Outside Facility (Specify) No No   Sig: TAKE 1 TABLET DAILY   lidocaine (LIDODERM) 5 %  Outside Facility (Specify) No No   Sig: Apply 1 patch topically over 12 hours daily Remove & Discard patch within 12 hours or as directed by MD   loratadine (CLARITIN) 10 mg tablet  Self, Outside Facility (Specify) Yes No   Sig: Take 10 mg by mouth daily   mirtazapine (REMERON) 7.5 MG tablet  Outside Facility (Specify) No No   Sig: Take 1 tablet (7.5 mg total) by mouth daily at bedtime   multivitamin (THERAGRAN) TABS  Self, Outside Facility (Specify) Yes No   Sig: Take 1 tablet by mouth daily   oxygen gas   Yes No   Sig: Inhale 1 L/min continuous. Indications: Difficulty Breathing   polyethylene glycol (MIRALAX) 17 g packet   No No   Sig: Take 17 g by mouth daily for 5 days Do not start before June 29, 2023.    senna-docusate sodium (SENOKOT S) 8.6-50 mg per tablet  Outside Facility (Specify) No No   Sig: Take 1 tablet by mouth daily at bedtime   sertraline (Zoloft) 50 mg tablet  Outside Facility (Specify) No No   Sig: Take 1 tablet (50 mg total) by mouth daily   sodium phosphate-biphosphate (FLEET) 7-19 g 118 mL enema  Outside Facility (Specify) Yes No   Sig: Insert 1 enema into the rectum   tiZANidine (ZANAFLEX) 2 mg tablet  Outside Facility (Specify) No No   Sig: Take 1 tablet (2 mg total) by mouth 2 (two) times a day as needed for muscle spasms   umeclidinium (Incruse Ellipta) 62.5 mcg/actuation AEPB inhaler  Outside Facility (Specify) Yes No   Sig: Inhale 1 puff daily   warfarin (COUMADIN) 2 mg tablet  Outside Facility (Specify) No No   Sig: Take 2 mg daily, adjust to keep INR therapeutic, between 2-3 Facility-Administered Medications: None     Allergies   Allergen Reactions   • Aspirin Other (See Comments)   • Penicillins Rash and Hives   • Sulfa Antibiotics Rash       Objective   First Vitals:   Blood Pressure: 128/61 (08/03/23 1140)  Pulse: 78 (08/03/23 1140)  Temperature: 98.9 °F (37.2 °C) (08/03/23 1140)  Temp Source: Temporal (08/03/23 1140)  Respirations: 18 (08/03/23 1140)  Height: 5' (152.4 cm) (08/03/23 1140)  SpO2: 92 % (hx of COPD, patient reports this is a normal sat for her) (08/03/23 1140)    Current Vitals:   Blood Pressure: 142/65 (08/03/23 1630)  Pulse: 89 (08/03/23 1630)  Temperature: 98.9 °F (37.2 °C) (08/03/23 1140)  Temp Source: Temporal (08/03/23 1140)  Respirations: 17 (08/03/23 1630)  Height: 5' (152.4 cm) (08/03/23 1140)  SpO2: 96 % (08/03/23 1630)      Intake/Output Summary (Last 24 hours) at 8/3/2023 1707  Last data filed at 8/3/2023 1539  Gross per 24 hour   Intake 1000 ml   Output --   Net 1000 ml       Invasive Devices     Peripheral Intravenous Line  Duration           Peripheral IV 08/03/23 Left Antecubital <1 day                Physical Exam  Vitals reviewed. Constitutional:       General: She is not in acute distress. Appearance: She is not ill-appearing, toxic-appearing or diaphoretic. HENT:      Head: Normocephalic. Mouth/Throat:      Mouth: Mucous membranes are moist.   Eyes:      Extraocular Movements: Extraocular movements intact. Cardiovascular:      Rate and Rhythm: Normal rate and regular rhythm. Heart sounds: Normal heart sounds. No murmur heard. No friction rub. No gallop. Pulmonary:      Effort: Pulmonary effort is normal. No respiratory distress. Breath sounds: Rhonchi (occasional rhonchi cleared with coughing) present. Abdominal:      General: There is distension (mild). Palpations: Abdomen is soft. Tenderness: There is abdominal tenderness (right abdomen). There is no guarding or rebound.       Hernia: A hernia (femoral hernia easily reducible) is present. Musculoskeletal:      Right lower leg: Edema present. Left lower leg: Edema present. Skin:     General: Skin is warm and dry. Neurological:      General: No focal deficit present. Mental Status: She is alert and oriented to person, place, and time. Psychiatric:         Mood and Affect: Mood normal.         Behavior: Behavior normal.         Thought Content: Thought content normal.         Judgment: Judgment normal.         Lab Results:   I have personally reviewed pertinent lab results. , CBC:   Lab Results   Component Value Date    WBC 9.82 08/03/2023    HGB 10.3 (L) 08/03/2023    HCT 32.5 (L) 08/03/2023    MCV 99 (H) 08/03/2023     08/03/2023    RBC 3.30 (L) 08/03/2023    MCH 31.2 08/03/2023    MCHC 31.7 08/03/2023    RDW 14.4 08/03/2023    MPV 8.6 (L) 08/03/2023    NRBC 0 08/03/2023   , CMP:   Lab Results   Component Value Date    SODIUM 135 08/03/2023    K 3.7 08/03/2023    CL 96 08/03/2023    CO2 29 08/03/2023    BUN 31 (H) 08/03/2023    CREATININE 0.72 08/03/2023    CALCIUM 9.0 08/03/2023    AST 20 08/03/2023    ALT 6 (L) 08/03/2023    ALKPHOS 122 (H) 08/03/2023    EGFR 77 08/03/2023   , Urinalysis:   Lab Results   Component Value Date    COLORU Yellow 08/03/2023    CLARITYU Clear 08/03/2023    SPECGRAV >=1.030 08/03/2023    PHUR 5.5 08/03/2023    LEUKOCYTESUR Small (A) 08/03/2023    NITRITE Negative 08/03/2023    GLUCOSEU Negative 08/03/2023    KETONESU 10 (1+) (A) 08/03/2023    BILIRUBINUR Negative 08/03/2023    BLOODU Negative 08/03/2023   , Lipase:   Lab Results   Component Value Date    LIPASE 17 08/03/2023     Imaging: I have personally reviewed pertinent reports. EKG, Pathology, and Other Studies: I have personally reviewed pertinent reports.

## 2023-08-03 NOTE — ASSESSMENT & PLAN NOTE
Patient has history of COPD and is on chronic oxygen.   Patient uses 1 L of supplemental oxygen at home  Continue on bronchodilators  Continue oxygen supplementation

## 2023-08-03 NOTE — PROGRESS NOTES
Virtual Regular Visit    Verification of patient location:    Patient is located at Home in the following state in which I hold an active license [place]      Assessment/Plan:    Problem List Items Addressed This Visit        Other    Dehydration - Primary     Due to several days of vomiting and profuse diarrhea, symptomatic with weakness, fatigue, and dizziness. Advised to hold Lasix this morning and go for ED evaluation and likely IVF hydration, anti-emetic and anti-diarrheal. BMP drawn yesterday showed Cr elevated from her baseline and GFR diminished. Relevant Orders    Transfer to other facility (Completed)            Reason for visit is   Chief Complaint   Patient presents with   • Nausea     Since Monday   • Diarrhea     Since Monday    • Virtual Regular Visit        Encounter provider Marycarmen Lopez DO    Provider located at [address] [place] [address]      Recent Visits  Date Type Provider Dept   08/02/23 Telephone Brigitte Urena, [address] [place] [address] [facility]   07/27/23 Telephone Brigitte Urena MD [address] recent visits within past 7 days and meeting all other requirements  Today's Visits  Date Type Provider Dept   08/03/23 1601 [facility], [address] today's visits and meeting all other requirements  Future Appointments  No visits were found meeting these conditions. Showing future appointments within next 150 days and meeting all other requirements       The patient was identified by name and date of birth. Adrian Miller was informed that this is a telemedicine visit and that the visit is being conducted through Telephone. My office door was closed. No one else was in the room. She acknowledged consent and understanding of privacy and security of the video platform. The patient has agreed to participate and understands they can discontinue the visit at any time.     Patient is aware this is a billable service. Subjective  Elizabeth Haro is a 79 y.o. female. HPI   Patient reports diarrhea for the last 5 days, better than yesterday. Has been nauseous with vomiting. Everything she eats or drinks comes right out. She feels weak and tired, waiting for dizziness to pass before moving around her home. Has chills, no fever. Also with abdominal pain in the epigastric region. No blood in the stool or emesis. She was able to tolerate a sip of coffee this morning and has not yet attempted food. She was recently discharged from rehab after hip surgery. Her daughter is supportive but patient does not want to go to the hospital for evaluation. Past Medical History:   Diagnosis Date   • Anti-phospholipid syndrome (HCC)    • Asthma    • Blood type O+    • Cardiac disease    • Chronic pain    • COPD (chronic obstructive pulmonary disease) (HCC)    • Fracture of ankle     left   • Hyperlipidemia    • Hypertension    • Hypokalemia 2/27/2023   • Osteoporosis        Past Surgical History:   Procedure Laterality Date   • APPENDECTOMY     • BILATERAL OOPHORECTOMY Bilateral    • ORIF TIBIAL SHAFT FRACTURE W/ PLATES AND SCREWS Right    • [place] HEMIARTHROPLASTY HIP PARTIAL Left 6/23/2023    Procedure: HEMIARTHROPLASTY HIP (BIPOLAR); Surgeon: Bridgette Hammond MD;  Location:  MAIN OR;  Service: Orthopedics       Current Outpatient Medications   Medication Sig Dispense Refill   • acetaminophen (TYLENOL) 325 mg tablet Take 3 tablets (975 mg total) by mouth every 8 (eight) hours  0   • albuterol (2.5 mg/3 mL) 0.083 % nebulizer solution USE 1 VIAL IN NEBULIZER EVERY 4 TO 6 HOURS AS NEEDED FOR COUGH AND WHEEZE 75 vial 3   • Ascorbic Acid (VITAMIN C) 1000 MG tablet Take 1,000 mg by mouth daily     • budesonide (PULMICORT) 0.5 mg/2 mL nebulizer solution Take 2 mL (0.5 mg total) by nebulization every 12 (twelve) hours Rinse mouth after use.  120 mL 0   • Cholecalciferol (VITAMIN D3) 20 MCG (800 UNIT) TABS Take by mouth in the morning     • digoxin (LANOXIN) 0.125 mg tablet Take 1 tablet (125 mcg total) by mouth every other day Do not start before May 10, 2023.  0   • diltiazem (CARDIZEM CD) 240 mg 24 hr capsule take 1 capsule by mouth once daily for blood pressure 30 capsule 0   • Ferrous Sulfate 220 (44 Fe) MG/5ML LIQD take 1 teaspoonful by mouth once daily 150 mL 5   • Fluticasone-Salmeterol (Advair) 250-50 mcg/dose inhaler USE 1 INHALATION TWICE A  blister 3   • furosemide (LASIX) 20 mg tablet Take 1 tablet (20 mg total) by mouth daily Do not start before May 26, 2023.  0   • guaiFENesin (MUCINEX) 600 mg 12 hr tablet Take 1 tablet (600 mg total) by mouth 2 (two) times a day  0   • levothyroxine 25 mcg tablet TAKE 1 TABLET DAILY 90 tablet 3   • lidocaine (LIDODERM) 5 % Apply 1 patch topically over 12 hours daily Remove & Discard patch within 12 hours or as directed by MD 90 patch 3   • loratadine (CLARITIN) 10 mg tablet Take 10 mg by mouth daily     • mirtazapine (REMERON) 7.5 MG tablet Take 1 tablet (7.5 mg total) by mouth daily at bedtime 90 tablet 3   • multivitamin (THERAGRAN) TABS Take 1 tablet by mouth daily     • oxygen gas Inhale 1 L/min continuous.  Indications: Difficulty Breathing     • polyethylene glycol (MIRALAX) 17 g packet Take 17 g by mouth daily for 5 days Do not start before June 29, 2023. 85 g 0   • senna-docusate sodium (SENOKOT S) 8.6-50 mg per tablet Take 1 tablet by mouth daily at bedtime  0   • sertraline (Zoloft) 50 mg tablet Take 1 tablet (50 mg total) by mouth daily 90 tablet 3   • sodium phosphate-biphosphate (FLEET) 7-19 g 118 mL enema Insert 1 enema into the rectum     • tiZANidine (ZANAFLEX) 2 mg tablet Take 1 tablet (2 mg total) by mouth 2 (two) times a day as needed for muscle spasms 30 tablet 0   • umeclidinium (Incruse Ellipta) 62.5 mcg/actuation AEPB inhaler Inhale 1 puff daily     • warfarin (COUMADIN) 2 mg tablet Take 2 mg daily, adjust to keep INR therapeutic, between 2-3  0   • bisacodyl (DULCOLAX) 5 mg EC tablet Take 10 mg by mouth daily as needed for constipation (Patient not taking: Reported on 8/3/2023)     • famotidine (PEPCID) 20 mg tablet Take 1 tablet (20 mg total) by mouth every evening (Patient not taking: Reported on 8/3/2023)  0     No current facility-administered medications for this visit. Allergies   Allergen Reactions   • Aspirin Other (See Comments)   • Penicillins Rash and Hives   • Sulfa Antibiotics Rash       Review of Systems   Constitutional: Positive for chills and fatigue. Negative for fever. HENT: Negative. Respiratory: Negative. Cardiovascular: Negative. Gastrointestinal: Positive for abdominal pain, diarrhea, nausea and vomiting. Negative for blood in stool and constipation. Neurological: Positive for dizziness, weakness and light-headedness. Video Exam    Vitals:       Physical Exam   It was my intent to perform this visit via video technology but the patient was not able to do a video connection so the visit was completed via audio telephone only.       Visit Time  Total Visit Duration: 18

## 2023-08-03 NOTE — ASSESSMENT & PLAN NOTE
Patient on CT scan noted to have a compression fracture of L2 and L4-chronic  Also noted to have compression fracture of T10 and T11 which are chronic  Pain management  PT/OT

## 2023-08-04 ENCOUNTER — APPOINTMENT (OUTPATIENT)
Dept: RADIOLOGY | Facility: HOSPITAL | Age: 83
DRG: 389 | End: 2023-08-04
Payer: MEDICARE

## 2023-08-04 ENCOUNTER — HOME CARE VISIT (OUTPATIENT)
Dept: HOME HEALTH SERVICES | Facility: HOME HEALTHCARE | Age: 83
End: 2023-08-04
Payer: MEDICARE

## 2023-08-04 LAB
ALBUMIN SERPL BCP-MCNC: 3.1 G/DL (ref 3.5–5)
ALP SERPL-CCNC: 97 U/L (ref 34–104)
ALT SERPL W P-5'-P-CCNC: 7 U/L (ref 7–52)
ANION GAP SERPL CALCULATED.3IONS-SCNC: 3 MMOL/L
AST SERPL W P-5'-P-CCNC: 15 U/L (ref 13–39)
BASOPHILS # BLD AUTO: 0.03 THOUSANDS/ÂΜL (ref 0–0.1)
BASOPHILS NFR BLD AUTO: 0 % (ref 0–1)
BILIRUB SERPL-MCNC: 0.44 MG/DL (ref 0.2–1)
BUN SERPL-MCNC: 19 MG/DL (ref 5–25)
C DIFF TOX GENS STL QL NAA+PROBE: NEGATIVE
CALCIUM ALBUM COR SERPL-MCNC: 9.1 MG/DL (ref 8.3–10.1)
CALCIUM SERPL-MCNC: 8.4 MG/DL (ref 8.4–10.2)
CAMPYLOBACTER DNA SPEC NAA+PROBE: NORMAL
CHLORIDE SERPL-SCNC: 103 MMOL/L (ref 96–108)
CO2 SERPL-SCNC: 34 MMOL/L (ref 21–32)
CREAT SERPL-MCNC: 0.48 MG/DL (ref 0.6–1.3)
EOSINOPHIL # BLD AUTO: 0.08 THOUSAND/ÂΜL (ref 0–0.61)
EOSINOPHIL NFR BLD AUTO: 1 % (ref 0–6)
ERYTHROCYTE [DISTWIDTH] IN BLOOD BY AUTOMATED COUNT: 14.3 % (ref 11.6–15.1)
GFR SERPL CREATININE-BSD FRML MDRD: 90 ML/MIN/1.73SQ M
GLUCOSE P FAST SERPL-MCNC: 86 MG/DL (ref 65–99)
GLUCOSE SERPL-MCNC: 86 MG/DL (ref 65–140)
HCT VFR BLD AUTO: 29.4 % (ref 34.8–46.1)
HGB BLD-MCNC: 9.1 G/DL (ref 11.5–15.4)
IMM GRANULOCYTES # BLD AUTO: 0.02 THOUSAND/UL (ref 0–0.2)
IMM GRANULOCYTES NFR BLD AUTO: 0 % (ref 0–2)
INR PPP: 3.32 (ref 0.84–1.19)
LYMPHOCYTES # BLD AUTO: 0.87 THOUSANDS/ÂΜL (ref 0.6–4.47)
LYMPHOCYTES NFR BLD AUTO: 12 % (ref 14–44)
MAGNESIUM SERPL-MCNC: 1.9 MG/DL (ref 1.9–2.7)
MCH RBC QN AUTO: 30.7 PG (ref 26.8–34.3)
MCHC RBC AUTO-ENTMCNC: 31 G/DL (ref 31.4–37.4)
MCV RBC AUTO: 99 FL (ref 82–98)
MONOCYTES # BLD AUTO: 0.68 THOUSAND/ÂΜL (ref 0.17–1.22)
MONOCYTES NFR BLD AUTO: 9 % (ref 4–12)
NEUTROPHILS # BLD AUTO: 5.78 THOUSANDS/ÂΜL (ref 1.85–7.62)
NEUTS SEG NFR BLD AUTO: 78 % (ref 43–75)
NRBC BLD AUTO-RTO: 0 /100 WBCS
PHOSPHATE SERPL-MCNC: 2.4 MG/DL (ref 2.3–4.1)
PLATELET # BLD AUTO: 288 THOUSANDS/UL (ref 149–390)
PMV BLD AUTO: 8.5 FL (ref 8.9–12.7)
POTASSIUM SERPL-SCNC: 3.5 MMOL/L (ref 3.5–5.3)
PROT SERPL-MCNC: 5.6 G/DL (ref 6.4–8.4)
PROTHROMBIN TIME: 35.2 SECONDS (ref 11.6–14.5)
RBC # BLD AUTO: 2.96 MILLION/UL (ref 3.81–5.12)
SALMONELLA DNA SPEC QL NAA+PROBE: NORMAL
SHIGA TOXIN STX GENE SPEC NAA+PROBE: NORMAL
SHIGELLA DNA SPEC QL NAA+PROBE: NORMAL
SODIUM SERPL-SCNC: 140 MMOL/L (ref 135–147)
WBC # BLD AUTO: 7.46 THOUSAND/UL (ref 4.31–10.16)

## 2023-08-04 PROCEDURE — 80053 COMPREHEN METABOLIC PANEL: CPT | Performed by: INTERNAL MEDICINE

## 2023-08-04 PROCEDURE — 99232 SBSQ HOSP IP/OBS MODERATE 35: CPT | Performed by: SURGERY

## 2023-08-04 PROCEDURE — 94760 N-INVAS EAR/PLS OXIMETRY 1: CPT

## 2023-08-04 PROCEDURE — 94640 AIRWAY INHALATION TREATMENT: CPT

## 2023-08-04 PROCEDURE — 84100 ASSAY OF PHOSPHORUS: CPT | Performed by: INTERNAL MEDICINE

## 2023-08-04 PROCEDURE — 85610 PROTHROMBIN TIME: CPT | Performed by: INTERNAL MEDICINE

## 2023-08-04 PROCEDURE — 83735 ASSAY OF MAGNESIUM: CPT | Performed by: INTERNAL MEDICINE

## 2023-08-04 PROCEDURE — 99222 1ST HOSP IP/OBS MODERATE 55: CPT | Performed by: INTERNAL MEDICINE

## 2023-08-04 PROCEDURE — 87493 C DIFF AMPLIFIED PROBE: CPT | Performed by: INTERNAL MEDICINE

## 2023-08-04 PROCEDURE — 87505 NFCT AGENT DETECTION GI: CPT | Performed by: INTERNAL MEDICINE

## 2023-08-04 PROCEDURE — 99232 SBSQ HOSP IP/OBS MODERATE 35: CPT | Performed by: INTERNAL MEDICINE

## 2023-08-04 PROCEDURE — 74018 RADEX ABDOMEN 1 VIEW: CPT

## 2023-08-04 PROCEDURE — 85025 COMPLETE CBC W/AUTO DIFF WBC: CPT | Performed by: INTERNAL MEDICINE

## 2023-08-04 RX ORDER — POTASSIUM CHLORIDE 14.9 MG/ML
20 INJECTION INTRAVENOUS ONCE
Status: COMPLETED | OUTPATIENT
Start: 2023-08-04 | End: 2023-08-04

## 2023-08-04 RX ADMIN — LIDOCAINE 5% 1 PATCH: 700 PATCH TOPICAL at 09:09

## 2023-08-04 RX ADMIN — FLUTICASONE FUROATE AND VILANTEROL TRIFENATATE 1 PUFF: 200; 25 POWDER RESPIRATORY (INHALATION) at 09:34

## 2023-08-04 RX ADMIN — BUDESONIDE 0.5 MG: 0.5 INHALANT ORAL at 07:55

## 2023-08-04 RX ADMIN — DILTIAZEM HYDROCHLORIDE 240 MG: 240 CAPSULE, COATED, EXTENDED RELEASE ORAL at 09:07

## 2023-08-04 RX ADMIN — FUROSEMIDE 20 MG: 20 TABLET ORAL at 09:07

## 2023-08-04 RX ADMIN — BUDESONIDE 0.5 MG: 0.5 INHALANT ORAL at 20:00

## 2023-08-04 RX ADMIN — MIRTAZAPINE 7.5 MG: 15 TABLET, FILM COATED ORAL at 21:11

## 2023-08-04 RX ADMIN — UMECLIDINIUM 1 PUFF: 62.5 AEROSOL, POWDER ORAL at 09:34

## 2023-08-04 RX ADMIN — LEVOTHYROXINE SODIUM 25 MCG: 25 TABLET ORAL at 05:22

## 2023-08-04 RX ADMIN — GUAIFENESIN 600 MG: 600 TABLET ORAL at 19:03

## 2023-08-04 RX ADMIN — GUAIFENESIN 600 MG: 600 TABLET ORAL at 09:06

## 2023-08-04 RX ADMIN — SERTRALINE HYDROCHLORIDE 50 MG: 50 TABLET ORAL at 09:07

## 2023-08-04 RX ADMIN — POTASSIUM CHLORIDE 20 MEQ: 14.9 INJECTION, SOLUTION INTRAVENOUS at 09:06

## 2023-08-04 NOTE — CONSULTS
Consultation - [address] [place] [address] Gastroenterology     Adrian Miller 79 y.o. female MRN: [id number]  Unit/Bed#: -01 Encounter: [id number]    Inpatient consult to gastroenterology  Consult performed by: Meliton Schaefer PA-C  Consult ordered by: Jennifer Burns MD          ASSESSMENT and PLAN  1. Nausea vomiting diarrhea  2. Abdominal pain  3. Abnormal CT scan  4. Macrocytic anemia with history of iron deficiency anemia  5. A-fib and antiphospholipid antibody syndrome on Coumadin  6. Hypercapnic respiratory failure on oxygen    Adrian Miller is a 79y.o. year old female with a past medical history of Afib and antiphospholipid and on Coumadin, hypertension, hypothyroidism presents with 4 days of nausea vomiting diarrhea abdominal pain. CT A/P with contrast showed "Diffuse bowel obstruction with additional wall thickening of small bowel more so in the right lower quadrant. There is a femoral hernia present making obstruction related to hernia most likely with some change in caliber of the exiting loop. The   additional wall thickening more diffusely in the pelvic cavity does raise possibility of perhaps some additional venous congestion/ischemia perhaps related to mesenteric twisting. Urgent surgical evaluation is advised. Atherosclerotic changes are seen. There is some celiac and SMA narrowing although these vessels do enhance. Multiple fractures as described above are again noted"    Evaluated by surgery symptoms thought to be more consistent with a enteritis as hernia reducible no surgical intervention recommended. Infectious enteritis as well as inflammatory and ischemic in differential diagnosis although less likely. -Recommend supportive care with NPO,IV fluids, antiemetics, pain control, PPI. If vomits consider placing NG tube.   -Agree with checking stool studies  - consider checking lactic acid to evaluate for underlying ischemia  -Recommend daily labs, monitoring and correcting electrolyte abnormalities and serial abdominal exams, surgery following  -Would not perform endoscopic evaluation in the setting of obstruction given risk of perforation  -Would monitor CBC transfuse as needed consider checking iron studies B12 and folate.  -defer to primary team for management of Hypercapnic respiratory failure, on 1 L O2            Chief Complaint   Patient presents with   • Abdominal Pain     Patient presents to the ED with c/o generalized abdominal pain, nausea and diarrhea x3 days. States she had a telehealth visit today and they were concerned for dehydration        Physician Requesting Consult: Ольга Smith MD    HPI    Elizabeth Haro is a 79y.o. year old female with a past medical history of Afib and antiphospholipid and on Coumadin,, hypertension, hypothyroidism presents with 4 days of nausea vomiting diarrhea abdominal pain. Patient reports emesis contains mucus, no bile or blood, last episode yesterday. Last BM 4 days ago but is passing gas. Normally has bowel movements every 1 to 2 days. Denies recent sick contacts, antibiotic use, travel overseas. Complaining of lower abdominal pain but cannot identify any aggravating or relieving factors. She has never had an endoscopy or colonoscopy. At this admission CT A/P with contrast showed "Diffuse bowel obstruction with additional wall thickening of small bowel more so in the right lower quadrant. There is a femoral hernia present making obstruction related to hernia most likely with some change in caliber of the exiting loop. The   additional wall thickening more diffusely in the pelvic cavity does raise possibility of perhaps some additional venous congestion/ischemia perhaps related to mesenteric twisting. Urgent surgical evaluation is advised. Atherosclerotic changes are seen. There is some celiac and SMA narrowing although these vessels do enhance.  Multiple fractures as described above are again noted"    Evaluated by surgery symptoms thought to be more consistent with a enteritis as hernia reducible no surgical intervention recommended. Evaluated by our service in May 2023 at [address] for coffee-ground emesis in the setting of sepsis and pneumonia. CT consistent with gastroenteritis patient preferred to be conservative no endoscopic evaluation performed. ROS:   Constitutional: denies fatigue, fever. HEENT: denies visual disturbance, postnasal drip, sore throat. Respiratory: denies cough, shortness of breath. Cardiovascular: denies chest pain, leg swelling. Gastrointestinal: as noted above in HPI. : denies difficulty urinating, dysuria. Musculoskeletal:+ arthralgias, back pain. Neurological: denies dizziness, syncope. Psychiatric: denies confusion, anxiety. Historical Information   Past Medical History:   Diagnosis Date   • Anti-phospholipid syndrome (HCC)    • Asthma    • Blood type O+    • Cardiac disease    • Chronic pain    • COPD (chronic obstructive pulmonary disease) (HCC)    • Fracture of ankle     left   • Hyperlipidemia    • Hypertension    • Hypokalemia 2/27/2023   • Osteoporosis      Past Surgical History:   Procedure Laterality Date   • APPENDECTOMY     • BILATERAL OOPHORECTOMY Bilateral    • ORIF TIBIAL SHAFT FRACTURE W/ PLATES AND SCREWS Right    • NM HEMIARTHROPLASTY HIP PARTIAL Left 6/23/2023    Procedure: HEMIARTHROPLASTY HIP (BIPOLAR);   Surgeon: Jeremiah Zacarias MD;  Location:  MAIN OR;  Service: Orthopedics     Social History   Social History     Substance and Sexual Activity   Alcohol Use Not Currently     Social History     Substance and Sexual Activity   Drug Use No     Social History     Tobacco Use   Smoking Status Former   • Types: Cigarettes   Smokeless Tobacco Never   Tobacco Comments    Never smoker per [employer]     Family History   Problem Relation Age of Onset   • Hypertension Mother    • Skin cancer Mother    • Hypertension Father        Meds/Allergies     Current Facility-Administered Medications   Medication Dose Route Frequency   • acetaminophen (TYLENOL) tablet 650 mg  650 mg Oral Q6H PRN   • albuterol inhalation solution 5 mg  5 mg Nebulization Q4H PRN   • budesonide (PULMICORT) inhalation solution 0.5 mg  0.5 mg Nebulization Q12H   • digoxin (LANOXIN) tablet 125 mcg  125 mcg Oral Every Other Day   • diltiazem (CARDIZEM CD) 24 hr capsule 240 mg  240 mg Oral Daily   • fluticasone-vilanterol 200-25 mcg/actuation 1 puff  1 puff Inhalation Daily   • furosemide (LASIX) tablet 20 mg  20 mg Oral Daily   • guaiFENesin (MUCINEX) 12 hr tablet 600 mg  600 mg Oral BID   • levothyroxine tablet 25 mcg  25 mcg Oral Early Morning   • lidocaine (LIDODERM) 5 % patch 1 patch  1 patch Topical Daily   • mirtazapine (REMERON) tablet 7.5 mg  7.5 mg Oral HS   • ondansetron (ZOFRAN) injection 4 mg  4 mg Intravenous Q6H PRN   • potassium chloride 20 mEq IVPB (premix)  20 mEq Intravenous Once   • senna-docusate sodium (SENOKOT S) 8.6-50 mg per tablet 1 tablet  1 tablet Oral HS   • sertraline (ZOLOFT) tablet 50 mg  50 mg Oral Daily   • sodium chloride 0.9 % infusion  75 mL/hr Intravenous Continuous   • umeclidinium 62.5 mcg/actuation inhaler AEPB 1 puff  1 puff Inhalation Daily   • warfarin (COUMADIN) tablet 2 mg  2 mg Oral Daily (warfarin)     Medications Prior to Admission   Medication   • acetaminophen (TYLENOL) 325 mg tablet   • albuterol (2.5 mg/3 mL) 0.083 % nebulizer solution   • Ascorbic Acid (VITAMIN C) 1000 MG tablet   • Cholecalciferol (VITAMIN D3) 20 MCG (800 UNIT) TABS   • digoxin (LANOXIN) 0.125 mg tablet   • diltiazem (CARDIZEM CD) 240 mg 24 hr capsule   • Ferrous Sulfate 220 (44 Fe) MG/5ML LIQD   • Fluticasone-Salmeterol (Advair) 250-50 mcg/dose inhaler   • furosemide (LASIX) 20 mg tablet   • guaiFENesin (MUCINEX) 600 mg 12 hr tablet   • levothyroxine 25 mcg tablet   • lidocaine (LIDODERM) 5 %   • loratadine (CLARITIN) 10 mg tablet   • mirtazapine (REMERON) 7.5 MG tablet   • multivitamin (THERAGRAN) TABS • sertraline (Zoloft) 50 mg tablet   • tiZANidine (ZANAFLEX) 2 mg tablet   • umeclidinium (Incruse Ellipta) 62.5 mcg/actuation AEPB inhaler   • warfarin (COUMADIN) 2 mg tablet   • bisacodyl (DULCOLAX) 5 mg EC tablet   • budesonide (PULMICORT) 0.5 mg/2 mL nebulizer solution   • famotidine (PEPCID) 20 mg tablet   • oxygen gas   • polyethylene glycol (MIRALAX) 17 g packet   • senna-docusate sodium (SENOKOT S) 8.6-50 mg per tablet   • sodium phosphate-biphosphate (FLEET) 7-19 g 118 mL enema       Allergies   Allergen Reactions   • Aspirin Other (See Comments)   • Penicillins Rash and Hives   • Sulfa Antibiotics Rash       PHYSICAL EXAM:    Constitutional: Well-developed, no acute distress, elderly  HEENT: normocephalic, mucous membranes moist.  Neck: Supple  Skin: warm and dry  Respiratory: Lungs are clear to auscultation B/L. Cardiovascular: Heart is regular rate and rhythm. Gastrointestinal: Soft, mild distention, + lower abd tenderness, nondistended with normal active bowel sounds. No masses, guarding, rebound. Rectal Exam: Deferred. Integumentary: Warm and dry  Extremities: No edema. Neurologic: Nonfocal. A & O ×3. Psychiatric: Normal affect.       Lab Results   Component Value Date    GLUCOSE 210 (H) 05/20/2023    CALCIUM 8.4 08/04/2023     03/17/2015    K 3.5 08/04/2023    CO2 34 (H) 08/04/2023     08/04/2023    BUN 19 08/04/2023    CREATININE 0.48 (L) 08/04/2023    CREATININE 0.72 08/03/2023    CREATININE 0.95 08/02/2023     Lab Results   Component Value Date    WBC 7.46 08/04/2023    WBC 9.82 08/03/2023    WBC 16.58 (H) 06/28/2023    HGB 9.1 (L) 08/04/2023    HGB 10.3 (L) 08/03/2023    HGB 8.6 (L) 06/28/2023    MCV 99 (H) 08/04/2023     08/04/2023     08/03/2023     06/28/2023     Lab Results   Component Value Date    ALT 7 08/04/2023    ALT 6 (L) 08/03/2023    ALT 10 06/28/2023    AST 15 08/04/2023    AST 20 08/03/2023    AST 13 06/28/2023    ALKPHOS 97 08/04/2023 ALKPHOS 122 (H) 08/03/2023    ALKPHOS 107 (H) 06/28/2023    TBILI 0.44 08/04/2023    TBILI 0.63 08/03/2023    TBILI 0.44 06/28/2023     No results found for: "AMYLASE"  Lab Results   Component Value Date    LIPASE 17 08/03/2023     Lab Results   Component Value Date    IRON 53 06/30/2022    TIBC 266 06/30/2022    FERRITIN 360 06/30/2022     Lab Results   Component Value Date    INR 3.32 (H) 08/04/2023    INR 3.36 (H) 08/03/2023    INR 3.41 (H) 06/27/2023       CT abdomen pelvis with contrast    Result Date: 8/3/2023  Narrative: CT ABDOMEN AND PELVIS WITH IV CONTRAST INDICATION:   Abdominal pain, acute, nonlocalized abdominal pain, diffuse. COMPARISON: 6/22/2023 TECHNIQUE:  CT examination of the abdomen and pelvis was performed. Multiplanar 2D reformatted images were created from the source data. This examination, like all CT scans performed in the [facility], was performed utilizing techniques to minimize radiation dose exposure, including the use of iterative reconstruction and automated exposure control. Radiation dose length product (DLP) for this visit:  406.31 mGy-cm IV Contrast:  100 mL of iohexol (OMNIPAQUE) Enteric Contrast:  Enteric contrast was not administered. FINDINGS: ABDOMEN LOWER CHEST: Right lower lobe infiltrate is identified with occlusion of the subsegmental bronchus from mucous debris or aspiration. Some mild bronchial occlusion on the left is also seen. Small effusions left greater than right. [place] LIVER/BILIARY TREE:  Unremarkable. GALLBLADDER:  No calcified gallstones. No pericholecystic inflammatory change. SPLEEN:  Unremarkable. PANCREAS:  Unremarkable. ADRENAL GLANDS:  Unremarkable. KIDNEYS/URETERS: No hydronephrosis. Circumscribed hypodensities in the kidneys probably represent small cysts although too small to characterize. [place] STOMACH AND BOWEL: Probable small hiatus hernia is present. There is diffuse small bowel dilatation identified.  Appears to be some wall thickening and edema in the region of the right pelvis. This edematous loop extends into a hernia sac which appears to  partially compress the femoral vein suggesting a femoral hernia rather than an inguinal hernia. This is also more laterally positioned. Some fluid is seen within this hernia sac. This does appear to be the cause of obstruction with the colon being decompressed. There does appear to be some thickened bowel loops within the peritoneal cavity however. These loops are both before and after the hernia. Of note, there is a vascular pedicle extending into the hernia and this may be causing some lymphatic or venous congestion more broadly accounting for some of this small bowel thickening not evident in the left abdomen abdomen. There is some swirling of the mesentery. The ileocecal junction is difficult to identify due to the decompressed status and perhaps some twisting of the mesentery. There is diverticulosis in the distal sigmoid colon. APPENDIX: There are expected postoperative changes of appendectomy. ABDOMINOPELVIC CAVITY:  No ascites. No pneumoperitoneum. No lymphadenopathy. VESSELS: Atherosclerotic changes are present. No evidence of aneurysm. There appears to be narrowing of the celiac axis at its origin. Mild SMA narrowing is seen near its origin although there is still enhancement of the SMA more distally. The AFSHIN is not well seen. PELVIS REPRODUCTIVE ORGANS: Coarse calcification uterus probably represents a fibroid. URINARY BLADDER:  Unremarkable. ABDOMINAL WALL/INGUINAL REGIONS:  Unremarkable. OSSEOUS STRUCTURES:  No acute fracture or destructive osseous lesion. Degenerative changes of the spine. The patient is status post left hip replacement. There is a nonhealed fracture with fragmentation involving the right pubic bone. The appearance is similar to Alisha although there is some additional callus formation as well as reparative changes near the fracture line with some additional lucency.  Compression fracture of L2 and L4 are identified as well as T10 and T11 these are similar to the study of June. Some healed rib fractures appear to be present bilaterally. Impression: Diffuse bowel obstruction with additional wall thickening of small bowel more so in the right lower quadrant. There is a femoral hernia present making obstruction related to hernia most likely with some change in caliber of the exiting loop. The additional wall thickening more diffusely in the pelvic cavity does raise possibility of perhaps some additional venous congestion/ischemia perhaps related to mesenteric twisting. Urgent surgical evaluation is advised. Atherosclerotic changes are seen. There is some celiac and SMA narrowing although these vessels do enhance. Multiple fractures as described above are again noted. This was discussed with Dr. No Morgan at 3:15 p.m. Workstation performed: [id number]     XR hip/pelv 2-3 vws left if performed    Result Date: 7/12/2023  Narrative: LEFT HIP INDICATION:   Z47.89: Encounter for other orthopedic aftercare. COMPARISON: Pelvis x-ray 6/23/2023 VIEWS:  XR HIP/PELV 2-3 VWS LEFT  W PELVIS IF PERFORMED Images: 3 FINDINGS: There is no acute fracture or dislocation. Left hip hemiarthroplasty is identified in satisfactory position without evidence of hardware complication. No lytic or blastic osseous lesion. Round pelvic calcification which may represent calcified fibroids. Vascular calcifications. Degenerative changes visualized lower lumbar spine. Impression: Unremarkable appearance of left hip hemiarthroplasty. Workstation performed: [id number]       Imaging Studies: I have personally reviewed pertinent reports. Pathology, and Other Studies: I have personally reviewed pertinent reports. Patient expressed understanding and had all questions and concerns addressed. Newton Chong PA-C  08/04/23   10:08 AM     Counseling / Coordination of Care  Total floor / unit time spent today 45 minutes. This chart was completed in part utilizing [employer] speech voice recognition software. Random word insertions, pronoun errors, and incomplete sentences are an occasional consequence of this system due to software limitations, and ambient noise. Any questions or concerns about the content, text, or information contained within the body of this dictation should be directly addressed to the provider for clarification.

## 2023-08-04 NOTE — PROGRESS NOTES
Progress Note - Alfredo Marmolejo 79 y.o. female MRN: [id number]    Unit/Bed#: -01 Encounter: [id number]      Assessment/Plan    Femoral hernia  CT abd/pelvis: "Diffuse bowel obstruction with additional wall thickening of small bowel more so in the right lower quadrant. There is a femoral hernia present making obstruction related to hernia most likely with some change in caliber of the exiting loop. The additional wall thickening more diffusely in the pelvic cavity does raise possibility of perhaps some additional venous congestion/ischemia perhaps related to mesenteric twisting  -contrast in colon this am  -pt with bowel movements  -no nausea or vomiting  -ok for diet from surgical standpoint  -C diff and stool cx as per slim  -will sign off, no surgical indication emergently. Pt can follow up op    Compression fracture of lumbar vertebrae, chronic respiratory failure, hypothyroidism, atrial fibrillation, antiphospholipid antibody syndrome, hypertension  -mngt per primary    Subjective:   Denies any complains  No nausea or vomiting  No abdominal pain    Objective:     Vitals: Blood pressure 138/63, pulse 79, temperature 97.9 °F (36.6 °C), resp. rate 19, height 5' 6" (1.676 m), weight 52 kg (114 lb 10.2 oz), SpO2 96 %, not currently breastfeeding. ,Body mass index is 18.5 kg/m². Intake/Output Summary (Last 24 hours) at 8/4/2023 1155  Last data filed at 8/3/2023 2301  Gross per 24 hour   Intake 1000 ml   Output 200 ml   Net 800 ml       Physical Exam:     Awake, alert NAD  NC, AT, MMM  EOMI  CTAB, RRR  No distention  Reducible hernia  +BS      Invasive Devices     Peripheral Intravenous Line  Duration           Peripheral IV 08/03/23 Left Antecubital <1 day                Lab, Imaging and other studies: I have personally reviewed pertinent reports.     VTE Pharmacologic Prophylaxis: Heparin  VTE Mechanical Prophylaxis: sequential compression device

## 2023-08-04 NOTE — PROGRESS NOTES
[facility]  Progress Note  Name: Radha Bland  MRN: [id number]  Unit/Bed#: -01 I Date of Admission: 8/3/2023   Date of Service: 8/4/2023 I Hospital Day: 0    Assessment/Plan   * Nausea vomiting and diarrhea  Assessment & Plan  Patient presented with nausea, vomiting and diarrhea for the past few days. Will order stool work-up including C. difficile and cultures  CT abdomen pelvis showed-- " Diffuse bowel obstruction with additional wall thickening of small bowel more so in the right lower quadrant. There is a femoral hernia present making obstruction related to hernia most likely with some change in caliber of the exiting loop. The additional wall thickening more diffusely in the pelvic cavity does raise possibility of perhaps some additional venous congestion/ischemia perhaps related to mesenteric twisting. Urgent surgical evaluation is advised."  Trend WBC and fever curve  UA did not show any bacteriuria with WBC 4-7. Patient is asymptomatic  Hold off on antibiotics       Abdominal pain  Assessment & Plan  Patient with abdominal pain  CT showed- Diffuse bowel obstruction with additional wall thickening of small bowel more so in the right lower quadrant. There is a femoral hernia present making obstruction related to hernia most likely with some change in caliber of the exiting loop. The additional wall thickening more diffusely in the pelvic cavity does raise possibility of perhaps some additional venous congestion/ischemia perhaps related to mesenteric twisting. Urgent surgical evaluation is advised. Likely enteritis  ER spoke with surgery and they recommended medical admission  Surgical and GI consult appreciated  Surgical team gave oral contrast and check KUB. As per surgical team patient contrast is passed in colon and from their perspective patient does not have any surgical needs.   Start on clear liquid diet  Stool work-up is still pending  Pain management as needed    Compression fracture of lumbar vertebra [facility])  Assessment & Plan  Patient on CT scan noted to have a compression fracture of L2 and L4-chronic  Also noted to have compression fracture of T10 and T11 which are chronic  Pain management  PT/OT    Chronic respiratory failure with hypoxia and hypercapnia [facility])  Assessment & Plan  Patient has history of COPD and is on chronic oxygen. Patient uses 1 L of supplemental oxygen at home  Continue on bronchodilators  Continue oxygen supplementation    Other specified hypothyroidism  Assessment & Plan  Continue Synthroid    Atrial fibrillation (HCC)  Assessment & Plan  Continue on Cardizem CD and digoxin  On anticoagulation with Coumadin  Trend PT/INR    Antiphospholipid antibody syndrome (HCC)  Assessment & Plan  Continue on Coumadin  Monitor PT/INR    Hypertension  Assessment & Plan  Continue Cardizem CD and Lasix  Monitor vitals as per protocol             Labs & Imaging: I have personally reviewed pertinent reports. VTE Prophylaxis: in place. Code Status:   Level 1 - Full Code    Patient Centered Rounds: I have performed bedside rounds with nursing staff today. Discussions with Specialists or Other Care Team Provider: Sx and GI    Education and Discussions with Family / Patient: Daughter Carina Boss on phone    Current Length of Stay: 0 day(s)    Current Patient Status: Inpatient   Certification Statement: The patient will continue to require additional inpatient hospital stay due to see my assessment and plan. Subjective:   Patient is seen and examined at bedside. Denies any new complaints. No diarrhea. Afebrile  All other ROS are negative. Objective:    Vitals: Blood pressure 138/63, pulse 79, temperature 97.9 °F (36.6 °C), resp. rate 19, height 5' 6" (1.676 m), weight 52 kg (114 lb 10.2 oz), SpO2 96 %, not currently breastfeeding. ,Body mass index is 18.5 kg/m².   SPO2 RA Rest    Flowsheet Row ED to Hosp-Admission (Current) from 8/3/2023 in [facility] [address] Med Surg Unit   SpO2 96 %   SpO2 Activity At Rest   O2 Device Nasal cannula   O2 Flow Rate --        I&O:     Intake/Output Summary (Last 24 hours) at 8/4/2023 1209  Last data filed at 8/3/2023 2301  Gross per 24 hour   Intake 1000 ml   Output 200 ml   Net 800 ml       Physical Exam:    General- Alert, lying comfortably in bed. Not in any acute distress. Neck- Supple, No JVD  CVS- regular, S1 and S2 normal  Chest- Bilateral Air entry, No rhochi, crackles or wheezing present. Abdomen- soft, nontender, not distended, no guarding or rigidity, BS+  Extremities-  No pedal edema, No calf tenderness  Has reducible femoral hernia  CNS-   Alert, awake and orientedx3. No focal deficits present.     Invasive Devices     Peripheral Intravenous Line  Duration           Peripheral IV 08/03/23 Left Antecubital <1 day                      Social History  reviewed  Family History   Problem Relation Age of Onset   • Hypertension Mother    • Skin cancer Mother    • Hypertension Father     reviewed    Meds:  Current Facility-Administered Medications   Medication Dose Route Frequency Provider Last Rate Last Admin   • acetaminophen (TYLENOL) tablet 650 mg  650 mg Oral Q6H PRN Radha Ruiz MD       • albuterol inhalation solution 5 mg  5 mg Nebulization Q4H PRN Radha Ruiz MD       • budesonide (PULMICORT) inhalation solution 0.5 mg  0.5 mg Nebulization Q12H Radha Ruiz MD   0.5 mg at 08/04/23 0755   • digoxin (LANOXIN) tablet 125 mcg  125 mcg Oral Every Other Day Radha Ruiz MD   125 mcg at 08/03/23 2158   • diltiazem (CARDIZEM CD) 24 hr capsule 240 mg  240 mg Oral Daily Radha Ruiz MD   240 mg at 08/04/23 0763   • fluticasone-vilanterol 200-25 mcg/actuation 1 puff  1 puff Inhalation Daily Radha Ruiz MD   1 puff at 08/04/23 0934   • furosemide (LASIX) tablet 20 mg  20 mg Oral Daily Radha Ruiz MD   20 mg at 08/04/23 0907   • guaiFENesin (MUCINEX) 12 hr tablet 600 mg  600 mg Oral BID Jorge Heath MD   600 mg at 08/04/23 1319   • levothyroxine tablet 25 mcg  25 mcg Oral Early Morning Jorge Heath MD   25 mcg at 08/04/23 0522   • lidocaine (LIDODERM) 5 % patch 1 patch  1 patch Topical Daily Jorge Heath MD   1 patch at 08/04/23 4284   • mirtazapine (REMERON) tablet 7.5 mg  7.5 mg Oral HS Jorge Heath MD   7.5 mg at 08/03/23 2157   • ondansetron (ZOFRAN) injection 4 mg  4 mg Intravenous Q6H PRN Jorge Heath MD       • senna-docusate sodium (SENOKOT S) 8.6-50 mg per tablet 1 tablet  1 tablet Oral HS Jorge Heath MD   1 tablet at 08/03/23 2152   • sertraline (ZOLOFT) tablet 50 mg  50 mg Oral Daily Jorge Heath MD   50 mg at 08/04/23 6968   • umeclidinium 62.5 mcg/actuation inhaler AEPB 1 puff  1 puff Inhalation Daily Jorge Heath MD   1 puff at 08/04/23 0934      Medications Prior to Admission   Medication   • acetaminophen (TYLENOL) 325 mg tablet   • albuterol (2.5 mg/3 mL) 0.083 % nebulizer solution   • Ascorbic Acid (VITAMIN C) 1000 MG tablet   • Cholecalciferol (VITAMIN D3) 20 MCG (800 UNIT) TABS   • digoxin (LANOXIN) 0.125 mg tablet   • diltiazem (CARDIZEM CD) 240 mg 24 hr capsule   • Ferrous Sulfate 220 (44 Fe) MG/5ML LIQD   • Fluticasone-Salmeterol (Advair) 250-50 mcg/dose inhaler   • furosemide (LASIX) 20 mg tablet   • guaiFENesin (MUCINEX) 600 mg 12 hr tablet   • levothyroxine 25 mcg tablet   • lidocaine (LIDODERM) 5 %   • loratadine (CLARITIN) 10 mg tablet   • mirtazapine (REMERON) 7.5 MG tablet   • multivitamin (THERAGRAN) TABS   • sertraline (Zoloft) 50 mg tablet   • tiZANidine (ZANAFLEX) 2 mg tablet   • umeclidinium (Incruse Ellipta) 62.5 mcg/actuation AEPB inhaler   • warfarin (COUMADIN) 2 mg tablet   • bisacodyl (DULCOLAX) 5 mg EC tablet   • budesonide (PULMICORT) 0.5 mg/2 mL nebulizer solution   • famotidine (PEPCID) 20 mg tablet   • oxygen gas   • polyethylene glycol (MIRALAX) 17 g packet   • senna-docusate sodium (SENOKOT S) 8.6-50 mg per tablet   • sodium phosphate-biphosphate (FLEET) 7-19 g 118 mL enema       Labs:  Results from last 7 days   Lab Units 08/04/23  0512 08/03/23  1145   WBC Thousand/uL 7.46 9.82   HEMOGLOBIN g/dL 9.1* 10.3*   HEMATOCRIT % 29.4* 32.5*   PLATELETS Thousands/uL 288 324   NEUTROS PCT % 78* 83*   LYMPHS PCT % 12* 10*   MONOS PCT % 9 7   EOS PCT % 1 0     Results from last 7 days   Lab Units 08/04/23  0512 08/03/23  1145 08/02/23  1149   POTASSIUM mmol/L 3.5 3.7 4.6   CHLORIDE mmol/L 103 96 97   CO2 mmol/L 34* 29 34*   BUN mg/dL 19 31* 34*   CREATININE mg/dL 0.48* 0.72 0.95   CALCIUM mg/dL 8.4 9.0 9.6   ALK PHOS U/L 97 122*  --    ALT U/L 7 6*  --    AST U/L 15 20  --      Lab Results   Component Value Date    TROPONINI <0.02 11/22/2017    CKTOTAL 73 06/22/2023    CKTOTAL 26 02/27/2023     Results from last 7 days   Lab Units 08/04/23  0512 08/03/23  1829   INR  3.32* 3.36*     Lab Results   Component Value Date    BLOODCX No Growth After 5 Days. 06/22/2023    BLOODCX No Growth After 5 Days. 06/22/2023    BLOODCX No Growth After 5 Days. 05/17/2023    BLOODCX No Growth After 5 Days. 05/17/2023    URINECX 50,000-59,000 cfu/ml Enterococcus faecalis (A) 02/28/2023    URINECX <10,000 cfu/ml 02/28/2023         Imaging:  Results for orders placed during the hospital encounter of 08/03/23    XR chest portable    Narrative  CHEST    INDICATION:   SOB.    COMPARISON: CT chest and chest x-ray 6/22/2023. EXAM PERFORMED/VIEWS:  XR CHEST PORTABLE      FINDINGS:    Cardiomediastinal silhouette appears unremarkable. Oral contrast is seen within the stomach and distal esophagus. Small hiatal hernia suggested. There is mild pulmonary vascular congestion. Hazy bibasilar airspace opacities are new from prior exam and likely represent atelectasis versus developing pneumonia. There is a trace left effusion. No appreciable pneumothorax. No evidence of acute osseous abnormality. Impression  1.  New bibasilar airspace opacities with trace left effusion. Opacities may represent atelectasis versus developing pneumonia. 2. Mild pulmonary vascular congestion        Workstation performed: [id number]    Results for orders placed during the hospital encounter of 05/04/23    XR chest pa & lateral    Narrative  CHEST    INDICATION:   Shortness of breath. COMPARISON: Chest radiograph March 6, 2023    EXAM PERFORMED/VIEWS:  XR CHEST PA & LATERAL      FINDINGS:    Cardiomediastinal silhouette appears unremarkable. Linear atelectasis in the right lower lung. No focal consolidation, pleural effusion or pneumothorax. Osseous structures appear within normal limits for patient age. Impression  No focal consolidation, pleural effusion, or pneumothorax.             Workstation performed: [id number]      Last 24 Hours Medication List:   Current Facility-Administered Medications   Medication Dose Route Frequency Provider Last Rate   • acetaminophen  650 mg Oral Q6H PRN Jennifer Burns MD     • albuterol  5 mg Nebulization Q4H PRN Jennifer Burns MD     • budesonide  0.5 mg Nebulization Q12H Jennifer Burns MD     • digoxin  125 mcg Oral Every Other Day Jennifer Burns MD     • diltiazem  240 mg Oral Daily Jennifer Burns MD     • fluticasone-vilanterol  1 puff Inhalation Daily Jennifer Burns MD     • furosemide  20 mg Oral Daily Jennifer Burns MD     • guaiFENesin  600 mg Oral BID Jennifer Burns MD     • levothyroxine  25 mcg Oral Early Morning Jennifer Burns MD     • lidocaine  1 patch Topical Daily Jennifer Burns MD     • mirtazapine  7.5 mg Oral HS Jennifer Burns MD     • ondansetron  4 mg Intravenous Q6H PRN Jennifer Burns MD     • senna-docusate sodium  1 tablet Oral HS Jennifer Burns MD     • sertraline  50 mg Oral Daily Jennifer Burns MD     • umeclidinium  1 puff Inhalation Daily Jennifer Burns MD          Today, Patient Was Seen By: Jennifer Burns MD    ** Please Note: Dictation voice to text software may have been used in the creation of this document.  **

## 2023-08-04 NOTE — OCCUPATIONAL THERAPY NOTE
Occupational Therapy Cx Note     Patient Name: Renuka VANGR Date: 8/4/2023  Problem List  Principal Problem:    Nausea vomiting and diarrhea  Active Problems:    Hypertension    Antiphospholipid antibody syndrome [facility])    Atrial fibrillation (HCC)    Other specified hypothyroidism    Chronic respiratory failure with hypoxia and hypercapnia (HCC)    Abdominal pain    Compression fracture of lumbar vertebra ([address])          08/04/23 1430   OT Last Visit   OT Visit Date 08/04/23   Note Type   Note type Cancelled Session   Cancel Reasons Refusal   Additional Comments Attempted to see pt for OT evaluation, however despite encouragement, pt refused. Endorses that she is too fatigued from the multiple episodes of diarrhea. Reports that she will try to work with therapy tomorrow. Will follow at later time/date.            Bao Garcia, OTR/L

## 2023-08-04 NOTE — ASSESSMENT & PLAN NOTE
Patient with abdominal pain  CT showed- Diffuse bowel obstruction with additional wall thickening of small bowel more so in the right lower quadrant. There is a femoral hernia present making obstruction related to hernia most likely with some change in caliber of the exiting loop. The additional wall thickening more diffusely in the pelvic cavity does raise possibility of perhaps some additional venous congestion/ischemia perhaps related to mesenteric twisting. Urgent surgical evaluation is advised. Likely enteritis  ER spoke with surgery and they recommended medical admission  Surgical and GI consult appreciated  Surgical team gave oral contrast and check KUB. As per surgical team patient contrast is passed in colon and from their perspective patient does not have any surgical needs.   Start on clear liquid diet  Stool work-up is still pending  Pain management as needed

## 2023-08-04 NOTE — CASE MANAGEMENT
Case Management Assessment & Discharge Planning Note    Patient name Alfredo Marmolejo  Location /-84 MRN [id number]  : 1940 Date 2023       Current Admission Date: 8/3/2023  Current Admission Diagnosis:Nausea vomiting and diarrhea   Patient Active Problem List    Diagnosis Date Noted   • Dehydration 2023   • Abdominal pain 2023   • Nausea vomiting and diarrhea 2023   • Compression fracture of lumbar vertebra ([address]) 2023   • Aftercare following surgery of the musculoskeletal system 2023   • Constipation 2023   • Acute postoperative pulmonary insufficiency ([address]) 2023   • Femur fracture, left ([address]) 2023   • Elevated troponin 2023   • Bilateral pleural effusion 2023   • Chronic respiratory failure with hypoxia and hypercapnia ([address]) 2023   • UGI bleed 2023   • Cerebral aneurysm, nonruptured 2023   • Abdominal distention/back pain 2023   • Urinary retention 2023   • MDD with persistant bereavement complex 2023   • Abnormal CT of the chest 2023   • 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   • Thyroid nodule 2023   • Severe protein-calorie malnutrition ([address]) 2023   • Unspecified mood (affective) disorder ([address]) 2023   • Failure to thrive in adult 2023   • Pulmonary nodule 02/15/2023   • Onychomycosis 02/15/2023   • Essential hypertension 02/10/2023   • Anxiety 02/10/2023   • Other specified hypothyroidism 2022   • Peripheral edema 2021   • Low back pain without sciatica 2021   • COPD exacerbation ([address]) 2017   • Hypertension 2017   • Hypercoagulable state ([address]) 2017   • 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   • Vitamin D deficiency 2016   • Osteoporosis 2015   • Antiphospholipid antibody syndrome ([address] [facility]) 2012   • Atrial fibrillation ([address] [facility]) 2012   • Hereditary hemolytic anemia ([address] [facility]) 2012   • Asthma 2012 LOS (days): 0  Geometric Mean LOS (GMLOS) (days): 3.00  Days to GMLOS:2.9     OBJECTIVE:    Risk of Unplanned Readmission Score: 41.48         Current admission status: Inpatient       Preferred Pharmacy:   [address] [place] [address] [id number] [facility], [address] [place] [address]  [address] [address]  Phone: [phone] Fax: [phone]    Primary Care Provider: [place] Cali MD    Primary Insurance: MEDICARE  Secondary Insurance: BLUE CROSS    ASSESSMENT:  [address], [address] [facility]   Primary Phone: [phone] (Home)               Advance Directives  Does patient have a [address]?: Yes  Does patient have Advance Directives?: Yes  Advance Directives: Living will, Power of  for health care  Primary Contact: SonOsorio         Readmission Root Cause  30 Day Readmission: No    Patient Information  Admitted from[de-identified] Home  Mental Status: Alert  During Assessment patient was accompanied by: Not accompanied during assessment  Assessment information provided by[de-identified] Patient  Primary Caregiver: Self  Support Systems: Children, Daughter, [address] of Residence: [facility] do you live in?: [address] [place] [address] entry access options.  Select all that apply.: Stairs  Number of steps to enter home.: 7  Do the steps have railings?: Yes  Type of Current Residence: Bi-level  Upon entering residence, is there a bedroom on the main floor (no further steps)?: No  A bedroom is located on the following floor levels of residence (select all that apply):: 2nd Floor  Upon entering residence, is there a bathroom on the main floor (no further steps)?: No  Indicate which floors of current residence have a bathroom (select all the apply):: 2nd Floor  Number of steps to 2nd floor from main floor: 7  In the last 12 months, was there a time when you were not able to pay the mortgage or rent on time?: No  In the last 12 months, how many places have you lived?: 1  In the last 12 months, was there a time when you did not have a steady place to sleep or slept in a shelter (including now)?: No  Homeless/housing insecurity resource given?: N/A  Living Arrangements: Lives Alone    Activities of Daily Living Prior to Admission  Functional Status: Independent  Completes ADLs independently?: Yes  Ambulates independently?: Yes  Does patient use assisted devices?: Yes  Assisted Devices (DME) used: Rylee Parrish  Does patient currently own DME?: Yes  What DME does the patient currently own?: Michael N[facility] Israel  Does patient have a history of Outpatient Therapy (PT/OT)?: No  Does the patient have a history of Short-Term Rehab?: Yes ([facility])  Does patient have a history of HHC?: Yes (Unable to recall name)  Does patient currently have [address]?: No         Patient Information Continued  Income Source: Pension/FPC  Does patient have prescription coverage?: Yes  Within the past 12 months, you worried that your food would run out before you got the money to buy more.: Never true  Within the past 12 months, the food you bought just didn't last and you didn't have money to get more.: Never true  Food insecurity resource given?: N/A  Does patient receive dialysis treatments?: No  Does patient have a history of substance abuse?: No  Does patient have a history of Mental Health Diagnosis?: No         Means of Transportation  Means of Transport to Appts[de-identified] Family transport  In the past 12 months, has lack of transportation kept you from medical appointments or from getting medications?: No  In the past 12 months, has lack of transportation kept you from meetings, work, or from getting things needed for daily living?: No  Was application for public transport provided?: N/A        DISCHARGE DETAILS:    Discharge planning discussed with[de-identified] Patient  Freedom of Choice: Yes     CM contacted family/caregiver?: No- see comments (declined)  Were Treatment Team discharge recommendations reviewed with patient/caregiver?: Yes  Did patient/caregiver verbalize understanding of patient care needs?: Yes  Were patient/caregiver advised of the risks associated with not following Treatment Team discharge recommendations?: Yes         Requested [address]         Is the patient interested in [facility] at discharge?: No    DME Referral Provided  Referral made for DME?: No              Treatment Team Recommendation: Home  Discharge Destination Plan[de-identified] Home  Transport at Discharge : Family                                      Additional Comments: Met with pt to discuss role of CM and any needs prior to discharge. Pt resides alone in a bi-level home w/ 7 SWETA. Indp PTA. Pt owns/uses cane, walker. [facility]x [facility] at [facility] and [facility]. Hx HH. No hx OP PT/DA/MH. PPt prefers [employer] in [place] and is vaccinated for Covid. Family will transport at discharge.

## 2023-08-04 NOTE — PLAN OF CARE
Problem: Potential for Falls  Goal: Patient will remain free of falls  Description: INTERVENTIONS:  - Educate patient/family on patient safety including physical limitations  - Instruct patient to call for assistance with activity   - Consult OT/PT to assist with strengthening/mobility   - Keep Call bell within reach  - Keep bed low and locked with side rails adjusted as appropriate  - Keep care items and personal belongings within reach  - Initiate and maintain comfort rounds  - Make Fall Risk Sign visible to staff  - Offer Toileting every 2 Hours, in advance of need  - Initiate/Maintain bed alarm  - Obtain necessary fall risk management equipment: yellow bracelet and socks  - Apply yellow socks and bracelet for high fall risk patients  - Consider moving patient to room near nurses station  Outcome: Progressing     Problem: MOBILITY - ADULT  Goal: Maintain or return to baseline ADL function  Description: INTERVENTIONS:  -  Assess patient's ability to carry out ADLs; assess patient's baseline for ADL function and identify physical deficits which impact ability to perform ADLs (bathing, care of mouth/teeth, toileting, grooming, dressing, etc.)  - Assess/evaluate cause of self-care deficits   - Assess range of motion  - Assess patient's mobility; develop plan if impaired  - Assess patient's need for assistive devices and provide as appropriate  - Encourage maximum independence but intervene and supervise when necessary  - Involve family in performance of ADLs  - Assess for home care needs following discharge   - Consider OT consult to assist with ADL evaluation and planning for discharge  - Provide patient education as appropriate  Outcome: Progressing  Goal: Maintains/Returns to pre admission functional level  Description: INTERVENTIONS:  - Perform BMAT or MOVE assessment daily.   - Set and communicate daily mobility goal to care team and patient/family/caregiver.    - Collaborate with rehabilitation services on mobility goals if consulted  - Reposition patient every 2 hours.   - Out of bed to chair 3 times a day   - Out of bed for toileting  - Record patient progress and toleration of activity level   Outcome: Progressing     Problem: Prexisting or High Potential for Compromised Skin Integrity  Goal: Skin integrity is maintained or improved  Description: INTERVENTIONS:  - Identify patients at risk for skin breakdown  - Assess and monitor skin integrity  - Assess and monitor nutrition and hydration status  - Monitor labs   - Assess for incontinence   - Turn and reposition patient  - Assist with mobility/ambulation  - Relieve pressure over bony prominences  - Avoid friction and shearing  - Provide appropriate hygiene as needed including keeping skin clean and dry  - Evaluate need for skin moisturizer/barrier cream  - Collaborate with interdisciplinary team   - Patient/family teaching  - Consider wound care consult   Outcome: Progressing     Problem: PAIN - ADULT  Goal: Verbalizes/displays adequate comfort level or baseline comfort level  Description: Interventions:  - Encourage patient to monitor pain and request assistance  - Assess pain using appropriate pain scale  - Administer analgesics based on type and severity of pain and evaluate response  - Implement non-pharmacological measures as appropriate and evaluate response  - Consider cultural and social influences on pain and pain management  - Notify physician/advanced practitioner if interventions unsuccessful or patient reports new pain  Outcome: Progressing     Problem: INFECTION - ADULT  Goal: Absence or prevention of progression during hospitalization  Description: INTERVENTIONS:  - Assess and monitor for signs and symptoms of infection  - Monitor lab/diagnostic results  - Monitor all insertion sites, i.e. indwelling lines, tubes, and drains  - Monitor endotracheal if appropriate and nasal secretions for changes in amount and color  - Mouthcard appropriate cooling/warming therapies per order  - Administer medications as ordered  - Instruct and encourage patient and family to use good hand hygiene technique  - Identify and instruct in appropriate isolation precautions for identified infection/condition  Outcome: Progressing     Problem: SAFETY ADULT  Goal: Patient will remain free of falls  Description: INTERVENTIONS:  - Educate patient/family on patient safety including physical limitations  - Instruct patient to call for assistance with activity   - Consult OT/PT to assist with strengthening/mobility   - Keep Call bell within reach  - Keep bed low and locked with side rails adjusted as appropriate  - Keep care items and personal belongings within reach  - Initiate and maintain comfort rounds  - Make Fall Risk Sign visible to staff  - Offer Toileting every 2 Hours, in advance of need  - Initiate/Maintain bed alarm  - Obtain necessary fall risk management equipment: yellow bracelet and socks  - Apply yellow socks and bracelet for high fall risk patients  - Consider moving patient to room near nurses station  Outcome: Progressing     Problem: DISCHARGE PLANNING  Goal: Discharge to home or other facility with appropriate resources  Description: INTERVENTIONS:  - Identify barriers to discharge w/patient and caregiver  - Arrange for needed discharge resources and transportation as appropriate  - Identify discharge learning needs (meds, wound care, etc.)  - Arrange for interpretive services to assist at discharge as needed  - Refer to Case Management Department for coordinating discharge planning if the patient needs post-hospital services based on physician/advanced practitioner order or complex needs related to functional status, cognitive ability, or social support system  Outcome: Progressing     Problem: Knowledge Deficit  Goal: Patient/family/caregiver demonstrates understanding of disease process, treatment plan, medications, and discharge instructions  Description: Complete learning assessment and assess knowledge base.   Interventions:  - Provide teaching at level of understanding  - Provide teaching via preferred learning methods  Outcome: Progressing     Problem: GASTROINTESTINAL - ADULT  Goal: Minimal or absence of nausea and/or vomiting  Description: INTERVENTIONS:  - Administer IV fluids if ordered to ensure adequate hydration  - Maintain NPO status until nausea and vomiting are resolved  - Nasogastric tube if ordered  - Administer ordered antiemetic medications as needed  - Provide nonpharmacologic comfort measures as appropriate  - Advance diet as tolerated, if ordered  - Consider nutrition services referral to assist patient with adequate nutrition and appropriate food choices  Outcome: Progressing  Goal: Maintains or returns to baseline bowel function  Description: INTERVENTIONS:  - Assess bowel function  - Encourage oral fluids to ensure adequate hydration  - Administer IV fluids if ordered to ensure adequate hydration  - Administer ordered medications as needed  - Encourage mobilization and activity  - Consider nutritional services referral to assist patient with adequate nutrition and appropriate food choices  Outcome: Progressing

## 2023-08-05 LAB
ANION GAP SERPL CALCULATED.3IONS-SCNC: 6 MMOL/L
BASOPHILS # BLD AUTO: 0.03 THOUSANDS/ÂΜL (ref 0–0.1)
BASOPHILS NFR BLD AUTO: 0 % (ref 0–1)
BUN SERPL-MCNC: 12 MG/DL (ref 5–25)
CALCIUM SERPL-MCNC: 8.6 MG/DL (ref 8.4–10.2)
CHLORIDE SERPL-SCNC: 101 MMOL/L (ref 96–108)
CO2 SERPL-SCNC: 31 MMOL/L (ref 21–32)
CREAT SERPL-MCNC: 0.43 MG/DL (ref 0.6–1.3)
EOSINOPHIL # BLD AUTO: 0.16 THOUSAND/ÂΜL (ref 0–0.61)
EOSINOPHIL NFR BLD AUTO: 2 % (ref 0–6)
ERYTHROCYTE [DISTWIDTH] IN BLOOD BY AUTOMATED COUNT: 14 % (ref 11.6–15.1)
GFR SERPL CREATININE-BSD FRML MDRD: 93 ML/MIN/1.73SQ M
GLUCOSE SERPL-MCNC: 79 MG/DL (ref 65–140)
HCT VFR BLD AUTO: 30 % (ref 34.8–46.1)
HGB BLD-MCNC: 9.3 G/DL (ref 11.5–15.4)
IMM GRANULOCYTES # BLD AUTO: 0.05 THOUSAND/UL (ref 0–0.2)
IMM GRANULOCYTES NFR BLD AUTO: 1 % (ref 0–2)
INR PPP: 3.28 (ref 0.84–1.19)
LACTATE SERPL-SCNC: 0.6 MMOL/L (ref 0.5–2)
LYMPHOCYTES # BLD AUTO: 1.03 THOUSANDS/ÂΜL (ref 0.6–4.47)
LYMPHOCYTES NFR BLD AUTO: 13 % (ref 14–44)
MCH RBC QN AUTO: 30.5 PG (ref 26.8–34.3)
MCHC RBC AUTO-ENTMCNC: 31 G/DL (ref 31.4–37.4)
MCV RBC AUTO: 98 FL (ref 82–98)
MONOCYTES # BLD AUTO: 0.52 THOUSAND/ÂΜL (ref 0.17–1.22)
MONOCYTES NFR BLD AUTO: 7 % (ref 4–12)
NEUTROPHILS # BLD AUTO: 6.05 THOUSANDS/ÂΜL (ref 1.85–7.62)
NEUTS SEG NFR BLD AUTO: 77 % (ref 43–75)
NRBC BLD AUTO-RTO: 0 /100 WBCS
PLATELET # BLD AUTO: 293 THOUSANDS/UL (ref 149–390)
PMV BLD AUTO: 8.4 FL (ref 8.9–12.7)
POTASSIUM SERPL-SCNC: 3.7 MMOL/L (ref 3.5–5.3)
PROTHROMBIN TIME: 34.9 SECONDS (ref 11.6–14.5)
RBC # BLD AUTO: 3.05 MILLION/UL (ref 3.81–5.12)
SODIUM SERPL-SCNC: 138 MMOL/L (ref 135–147)
WBC # BLD AUTO: 7.84 THOUSAND/UL (ref 4.31–10.16)

## 2023-08-05 PROCEDURE — 97163 PT EVAL HIGH COMPLEX 45 MIN: CPT

## 2023-08-05 PROCEDURE — 85025 COMPLETE CBC W/AUTO DIFF WBC: CPT | Performed by: INTERNAL MEDICINE

## 2023-08-05 PROCEDURE — 94640 AIRWAY INHALATION TREATMENT: CPT

## 2023-08-05 PROCEDURE — 97167 OT EVAL HIGH COMPLEX 60 MIN: CPT

## 2023-08-05 PROCEDURE — 80048 BASIC METABOLIC PNL TOTAL CA: CPT | Performed by: INTERNAL MEDICINE

## 2023-08-05 PROCEDURE — 85610 PROTHROMBIN TIME: CPT | Performed by: INTERNAL MEDICINE

## 2023-08-05 PROCEDURE — 99232 SBSQ HOSP IP/OBS MODERATE 35: CPT | Performed by: INTERNAL MEDICINE

## 2023-08-05 PROCEDURE — 97116 GAIT TRAINING THERAPY: CPT

## 2023-08-05 PROCEDURE — 94760 N-INVAS EAR/PLS OXIMETRY 1: CPT

## 2023-08-05 PROCEDURE — 83605 ASSAY OF LACTIC ACID: CPT | Performed by: PHYSICIAN ASSISTANT

## 2023-08-05 RX ADMIN — LEVOTHYROXINE SODIUM 25 MCG: 25 TABLET ORAL at 05:56

## 2023-08-05 RX ADMIN — SERTRALINE HYDROCHLORIDE 50 MG: 50 TABLET ORAL at 09:06

## 2023-08-05 RX ADMIN — DIGOXIN 125 MCG: 125 TABLET ORAL at 09:06

## 2023-08-05 RX ADMIN — GUAIFENESIN 600 MG: 600 TABLET ORAL at 09:06

## 2023-08-05 RX ADMIN — DILTIAZEM HYDROCHLORIDE 240 MG: 240 CAPSULE, COATED, EXTENDED RELEASE ORAL at 09:14

## 2023-08-05 RX ADMIN — GUAIFENESIN 600 MG: 600 TABLET ORAL at 17:17

## 2023-08-05 RX ADMIN — UMECLIDINIUM 1 PUFF: 62.5 AEROSOL, POWDER ORAL at 09:07

## 2023-08-05 RX ADMIN — MIRTAZAPINE 7.5 MG: 15 TABLET, FILM COATED ORAL at 21:10

## 2023-08-05 RX ADMIN — FLUTICASONE FUROATE AND VILANTEROL TRIFENATATE 1 PUFF: 200; 25 POWDER RESPIRATORY (INHALATION) at 09:07

## 2023-08-05 RX ADMIN — FUROSEMIDE 20 MG: 20 TABLET ORAL at 09:06

## 2023-08-05 RX ADMIN — BUDESONIDE 0.5 MG: 0.5 INHALANT ORAL at 07:53

## 2023-08-05 RX ADMIN — BUDESONIDE 0.5 MG: 0.5 INHALANT ORAL at 20:33

## 2023-08-05 NOTE — ASSESSMENT & PLAN NOTE
Patient on CT scan noted to have a compression fracture of L2 and L4-chronic  Also noted to have compression fracture of T10 and T11 which are chronic  Pain management  PT/OT recommended rehab

## 2023-08-05 NOTE — PHYSICAL THERAPY NOTE
PHYSICAL THERAPY EVALUATION  NAME: Jaswant Quinteros  AGE:   79 y.o. MRN:  [id number]  ADMIT DX: Abdominal pain [R10.9]  Nausea vomiting and diarrhea [R11.2, R19.7]    PMH:   Past Medical History:   Diagnosis Date    Anti-phospholipid syndrome (HCC)     Asthma     Blood type O+     Cardiac disease     Chronic pain     COPD (chronic obstructive pulmonary disease) (HCC)     Fracture of ankle     left    Hyperlipidemia     Hypertension     Hypokalemia 2/27/2023    Osteoporosis      LENGTH OF STAY: 1       08/05/23 1106   PT Last Visit   PT Visit Date 08/05/23   Note Type   Note type Evaluation   Pain Assessment   Pain Assessment Tool 0-10   Pain Score No Pain   Restrictions/Precautions   Weight Bearing Precautions Per Order Yes   LLE Weight Bearing Per Order WBAT   Other Precautions THR; Cognitive; Chair Alarm; Bed Alarm; Fall Risk  (WBAT and posterior hip precautions per last ortho note)   Home Living   Type of [address] [place] [address] Two level;Stairs to enter with rails  (bi-level 8+8 SWETA)   Bathroom Shower/Tub Tub/shower unit   Bathroom Equipment Commode;Grab bars in shower;Hand-held shower; Shower chair   Home Equipment Walker;Cane   Additional Comments Ambulates with mod I and RW at baseline, uses SPC for stair negotiation. Prior Function   Level of [place] Independent with ADLs; Independent with functional mobility   Lives With Alone   Receives Help From Family   IADLs Family/Friend/Other provides meals; Family/Friend/Other provides transportation; Family/Friend/Other provides medication management   Falls in the last 6 months 1 to 4   Vocational Retired   General   Family/Caregiver Present No   Cognition   Overall Cognitive Status Impaired   Arousal/Participation Cooperative   Attention Attends with cues to redirect   Orientation Level Oriented to person;Oriented to place  ((+) general time)   Memory Decreased recall of precautions;Decreased recall of recent events;Decreased short term memory   Following Commands Follows one step commands with increased time or repetition   Comments Pt identified by name and . Subjective   Subjective Agrees to PT evaluation and is pleasant and cooperative throughout session. RLE Assessment   RLE Assessment X   Strength RLE   RLE Overall Strength 4-/5  (functionally)   LLE Assessment   LLE Assessment X   Strength LLE   LLE Overall Strength 4-/5  (functionally)   Bed Mobility   Supine to Sit 5  Supervision   Additional items HOB elevated; Bedrails; Increased time required;Verbal cues   Sit to Supine Unable to assess  (left OOB in chair post session with alarm intact)   Transfers   Sit to Stand 4  Minimal assistance   Additional items Assist x 1; Increased time required;Verbal cues   Stand to Sit 4  Minimal assistance   Additional items Assist x 1; Increased time required;Verbal cues   Ambulation/Elevation   Gait pattern Improper Weight shift;Decreased foot clearance; Forward Flexion;Decreased L stance; Antalgic; Short stride; Excessively slow   Gait Assistance 4  Minimal assist   Additional items Assist x 1;Verbal cues   Assistive Device Rolling walker   Distance 15` x1   Stair Management Assistance Not tested   Ambulation/Elevation Additional Comments Pt requiring occasional assist for RW negotiation; verbal cues for keeping feet inside of RW   Balance   Static Sitting Fair +   Dynamic Sitting Fair   Static Standing Fair -   Dynamic Standing Fair -   Ambulatory Poor +   Endurance Deficit   Endurance Deficit Yes   Endurance Deficit Description limited ambulation distance, fatigue   Activity Tolerance   Activity Tolerance Patient limited by fatigue   Medical Staff Made Aware OT Marilee   Nurse Made Aware Per RN, pt appropriate to evaluate   Assessment   Problem List Decreased strength;Decreased endurance; Impaired balance;Decreased mobility; Decreased cognition; Impaired judgement;Decreased safety awareness;Pain   Assessment Pt is a 79 y.o. female seen for PT evaluation s/p admit to [facility] on 8/18/2022 w/ Nausea vomiting and diarrhea. Order placed for PT. Comorbidities affecting pt's physical performance at time of assessment include: HTN, COPD and chronic back pain. Personal factors affecting pt at time of IE include: steps to enter environment, limited home support, advanced age, inability to perform IADLs, inability to perform ADLs, inability to ambulate household distances and recent fall(s). Prior to admission, pt was was independent w/ all functional mobility w/ RW or SPC as needed, lived in multi-level home, had 8+8 SWETA (+) railing and lived alone. Upon evaluation: Pt requires supervision for bed mobility, min A for sit to stand, and min A for ambulation with RW. (Please find full objective findings from PT assessment regarding body systems outlined above). Impairments and limitations also listed above, especially due to  weakness, impaired balance, decreased endurance, gait deviations, decreased activity tolerance, decreased safety awareness and fall risk. Pt's clinical presentation is currently unstable/unpredictable seen in pt's presentation of fall risk, significant decline in functional mobility compared to baseline, and limited insight into deficits. Pt to benefit from continued skilled PT tx while in hospital and upon DC to address deficits as defined above and maximize level of functional mobility. Recommend progression of ambulation and stair negotiation as appropriate.    Goals   Patient Goals to go home   STG Expiration Date 08/15/23   Short Term Goal #1 Pt will be able to: (1) perform bed mobility with supervision to promote OOB activity (2) perform sit to stand with supervision to decrease burden of care (3) ambulate at least 200` with supervision and least restrictive AD to increase activity tolerance (4) increase standing balance by 1 grade to decrease risk of falls (5) negotiate at least 8+8 stairs with supervision and use of handrail to allow safe access into home   PT Treatment Day 1   Plan   Treatment/Interventions Functional transfer training;LE strengthening/ROM; Elevations; Therapeutic exercise; Endurance training;Patient/family training;Equipment eval/education; Bed mobility;Gait training   PT Frequency 3-5x/wk   Recommendation   UB Rehab Discharge Recommendation (PT/OT) Level 2   Equipment Recommended [facility] Recommended Wheeled walker   AM-PAC Basic Mobility Inpatient   Turning in Flat Bed Without Bedrails 3   Lying on Back to Sitting on Edge of Flat Bed Without Bedrails 3   Moving Bed to Chair 3   Standing Up From Chair Using Arms 3   Walk in Room 3   Climb 3-5 Stairs With Railing 2   Basic Mobility Inpatient Raw Score 17   Basic Mobility Standardized Score 39.67   Highest Level Of Mobility   -HLM Goal 5: Stand one or more mins   -HLM Achieved 7: Walk 25 feet or more   Additional Treatment Session   Start Time 1055   End Time 1106   Treatment Assessment Pt agrees to PT treatment and is pleasant and cooperative throughout session. Able to ambulate an additional ~15` x1 with min A and use of RW. Pt noted to have both feet outside of RW proximity while attempting to navigate around bed. Requires verbal cues for safe hand placement and positioning during transfers. Able to perform static stance with CG/min A and BUE support of RW. Will continue to benefit from ongoing skilled PT to maximize her functional mobility and increase her level of independence. Equipment Use RW   Additional Treatment Day 1   End of Consult   Patient Position at End of Consult Bedside chair;Bed/Chair alarm activated; All needs within reach   Pt was seen for a co-eval with OT due to potential need for significant physical assist, poor pain control, impaired mental status, limiting behaviors, and poor adherence to precautions. The patient's AM-PAC Basic Mobility Inpatient Short Form Raw Score is 17, Standardized Score is 39.67.   A Raw Score of greater than or equal to 16 suggests the patient may benefit from discharge to home. However please refer to therapist recommendation for discharge planning given other factors that may influence destination. Adapted from [employer] [facility] “6-Clicks” Basic Mobility and Daily Activity Scores With Discharge Destination. Physical Therapy, 2021;101:1-9.  DOI: [email or web address]      Cha Vicente PT,RITESHT

## 2023-08-05 NOTE — CASE MANAGEMENT
Case Management Discharge Planning Note    Patient name Brayan Montoya /-89 MRN [id number]  : 1940 Date 2023       Current Admission Date: 8/3/2023  Current Admission Diagnosis:Nausea vomiting and diarrhea   Patient Active Problem List    Diagnosis Date Noted   • Dehydration 2023   • Abdominal pain 2023   • Nausea vomiting and diarrhea 2023   • Compression fracture of lumbar vertebra ([address]) 2023   • Aftercare following surgery of the musculoskeletal system 2023   • Constipation 2023   • Acute postoperative pulmonary insufficiency ([address]) 2023   • Femur fracture, left ([address]) 2023   • Elevated troponin 2023   • Bilateral pleural effusion 2023   • Chronic respiratory failure with hypoxia and hypercapnia ([address]) 2023   • UGI bleed 2023   • Cerebral aneurysm, nonruptured 2023   • Abdominal distention/back pain 2023   • Urinary retention 2023   • MDD with persistant bereavement complex 2023   • Abnormal CT of the chest 2023   • 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   • Thyroid nodule 2023   • Severe protein-calorie malnutrition ([address]) 2023   • Unspecified mood (affective) disorder ([address]) 2023   • Failure to thrive in adult 2023   • Pulmonary nodule 02/15/2023   • Onychomycosis 02/15/2023   • Essential hypertension 02/10/2023   • Anxiety 02/10/2023   • Other specified hypothyroidism 2022   • Peripheral edema 2021   • Low back pain without sciatica 2021   • COPD exacerbation ([address]) 2017   • Hypertension 2017   • Hypercoagulable state ([address]) 2017   • 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   • Vitamin D deficiency 2016   • Osteoporosis 2015   • Antiphospholipid antibody syndrome ([address]) 2012   • Atrial fibrillation ([address]) 2012   • Hereditary hemolytic anemia ([facility]) 2012   • Asthma 2012      LOS (days): 1  Geometric Mean LOS (GMLOS) (days): 3.00  Days to GMLOS:1.9     OBJECTIVE:  Risk of Unplanned Readmission Score: 40.69         Current admission status: Inpatient   Preferred Pharmacy:   [address] [place] [address] [id number] - [place]ari Donohue, [address]  [address] [address]  Phone: [phone] Fax: [phone]    Primary Care Provider: Merritt Brumfield MD    Primary Insurance: MEDICARE  Secondary Insurance: BLUE CROSS    DISCHARGE DETAILS:     Met patient with her son [facility] present to review the recommendation of PT/OT for SNF rehab. They both re agreeable. [place] referral to SNF's within  20 mile radius made via [facility]. Wait availability and patient preference.

## 2023-08-05 NOTE — ASSESSMENT & PLAN NOTE
Patient with abdominal pain  CT showed- Diffuse bowel obstruction with additional wall thickening of small bowel more so in the right lower quadrant. There is a femoral hernia present making obstruction related to hernia most likely with some change in caliber of the exiting loop. The additional wall thickening more diffusely in the pelvic cavity does raise possibility of perhaps some additional venous congestion/ischemia perhaps related to mesenteric twisting. Urgent surgical evaluation is advised. Likely enteritis  ER spoke with surgery and they recommended medical admission  Surgical and GI consult appreciated  Surgical team gave oral contrast and check KUB. As per surgical team patient contrast is passed in colon and from their perspective patient does not have any surgical needs. On clear liquid diet. Patient does not have any diarrhea or nausea or vomiting  Will advance diet.   Cleared by GI and surgery for discharge  Stool work-up is negative  Pain management as needed

## 2023-08-05 NOTE — PLAN OF CARE
Problem: Potential for Falls  Goal: Patient will remain free of falls  Description: INTERVENTIONS:  - Educate patient/family on patient safety including physical limitations  - Instruct patient to call for assistance with activity   - Consult OT/PT to assist with strengthening/mobility   - Keep Call bell within reach  - Keep bed low and locked with side rails adjusted as appropriate  - Keep care items and personal belongings within reach  - Initiate and maintain comfort rounds  - Make Fall Risk Sign visible to staff  - Offer Toileting every 2 Hours, in advance of need  - Initiate/Maintain alarm  - Obtain necessary fall risk management equipment:   - Apply yellow socks and bracelet for high fall risk patients  - Consider moving patient to room near nurses station  Outcome: Adequate for Discharge     Problem: MOBILITY - ADULT  Goal: Maintain or return to baseline ADL function  Description: INTERVENTIONS:  -  Assess patient's ability to carry out ADLs; assess patient's baseline for ADL function and identify physical deficits which impact ability to perform ADLs (bathing, care of mouth/teeth, toileting, grooming, dressing, etc.)  - Assess/evaluate cause of self-care deficits   - Assess range of motion  - Assess patient's mobility; develop plan if impaired  - Assess patient's need for assistive devices and provide as appropriate  - Encourage maximum independence but intervene and supervise when necessary  - Involve family in performance of ADLs  - Assess for home care needs following discharge   - Consider OT consult to assist with ADL evaluation and planning for discharge  - Provide patient education as appropriate  Outcome: Adequate for Discharge  Goal: Maintains/Returns to pre admission functional level  Description: INTERVENTIONS:  - Perform BMAT or MOVE assessment daily.   - Set and communicate daily mobility goal to care team and patient/family/caregiver.    - Collaborate with rehabilitation services on mobility goals if consulted  - Out of bed for toileting  - Record patient progress and toleration of activity level   Outcome: Adequate for Discharge     Problem: Prexisting or High Potential for Compromised Skin Integrity  Goal: Skin integrity is maintained or improved  Description: INTERVENTIONS:  - Identify patients at risk for skin breakdown  - Assess and monitor skin integrity  - Assess and monitor nutrition and hydration status  - Monitor labs   - Assess for incontinence   - Turn and reposition patient  - Assist with mobility/ambulation  - Relieve pressure over bony prominences  - Avoid friction and shearing  - Provide appropriate hygiene as needed including keeping skin clean and dry  - Evaluate need for skin moisturizer/barrier cream  - Collaborate with interdisciplinary team   - Patient/family teaching  - Consider wound care consult   Outcome: Adequate for Discharge     Problem: PAIN - ADULT  Goal: Verbalizes/displays adequate comfort level or baseline comfort level  Description: Interventions:  - Encourage patient to monitor pain and request assistance  - Assess pain using appropriate pain scale  - Administer analgesics based on type and severity of pain and evaluate response  - Implement non-pharmacological measures as appropriate and evaluate response  - Consider cultural and social influences on pain and pain management  - Notify physician/advanced practitioner if interventions unsuccessful or patient reports new pain  Outcome: Adequate for Discharge     Problem: INFECTION - ADULT  Goal: Absence or prevention of progression during hospitalization  Description: INTERVENTIONS:  - Assess and monitor for signs and symptoms of infection  - Monitor lab/diagnostic results  - Monitor all insertion sites, i.e. indwelling lines, tubes, and drains  - Monitor endotracheal if appropriate and nasal secretions for changes in amount and color  - [place] appropriate cooling/warming therapies per order  - Administer medications as ordered  - Instruct and encourage patient and family to use good hand hygiene technique  - Identify and instruct in appropriate isolation precautions for identified infection/condition  Outcome: Adequate for Discharge     Problem: SAFETY ADULT  Goal: Patient will remain free of falls  Description: INTERVENTIONS:  - Educate patient/family on patient safety including physical limitations  - Instruct patient to call for assistance with activity   - Consult OT/PT to assist with strengthening/mobility   - Keep Call bell within reach  - Keep bed low and locked with side rails adjusted as appropriate  - Keep care items and personal belongings within reach  - Initiate and maintain comfort rounds  - Make Fall Risk Sign visible to staff  - Offer Toileting every 2 Hours, in advance of need  - Initiate/Maintain alarm  - Obtain necessary fall risk management equipment:   - Apply yellow socks and bracelet for high fall risk patients  - Consider moving patient to room near nurses station  Outcome: Adequate for Discharge     Problem: DISCHARGE PLANNING  Goal: Discharge to home or other facility with appropriate resources  Description: INTERVENTIONS:  - Identify barriers to discharge w/patient and caregiver  - Arrange for needed discharge resources and transportation as appropriate  - Identify discharge learning needs (meds, wound care, etc.)  - Arrange for interpretive services to assist at discharge as needed  - Refer to Case Management Department for coordinating discharge planning if the patient needs post-hospital services based on physician/advanced practitioner order or complex needs related to functional status, cognitive ability, or social support system  Outcome: Adequate for Discharge     Problem: Knowledge Deficit  Goal: Patient/family/caregiver demonstrates understanding of disease process, treatment plan, medications, and discharge instructions  Description: Complete learning assessment and assess knowledge base.  Interventions:  - Provide teaching at level of understanding  - Provide teaching via preferred learning methods  Outcome: Adequate for Discharge     Problem: GASTROINTESTINAL - ADULT  Goal: Minimal or absence of nausea and/or vomiting  Description: INTERVENTIONS:  - Administer IV fluids if ordered to ensure adequate hydration  - Maintain NPO status until nausea and vomiting are resolved  - Nasogastric tube if ordered  - Administer ordered antiemetic medications as needed  - Provide nonpharmacologic comfort measures as appropriate  - Advance diet as tolerated, if ordered  - Consider nutrition services referral to assist patient with adequate nutrition and appropriate food choices  Outcome: Adequate for Discharge  Goal: Maintains or returns to baseline bowel function  Description: INTERVENTIONS:  - Assess bowel function  - Encourage oral fluids to ensure adequate hydration  - Administer IV fluids if ordered to ensure adequate hydration  - Administer ordered medications as needed  - Encourage mobilization and activity  - Consider nutritional services referral to assist patient with adequate nutrition and appropriate food choices  Outcome: Adequate for Discharge

## 2023-08-05 NOTE — PLAN OF CARE
Problem: Potential for Falls  Goal: Patient will remain free of falls  Description: INTERVENTIONS:  - Educate patient/family on patient safety including physical limitations  - Instruct patient to call for assistance with activity   - Consult OT/PT to assist with strengthening/mobility   - Keep Call bell within reach  - Keep bed low and locked with side rails adjusted as appropriate  - Keep care items and personal belongings within reach  - Initiate and maintain comfort rounds  - Make Fall Risk Sign visible to staff  - Offer Toileting every 2 Hours, in advance of need  - Initiate/Maintain bed alarm  - Obtain necessary fall risk management equipment: socks   - Apply yellow socks and bracelet for high fall risk patients  - Consider moving patient to room near nurses station  Outcome: Progressing

## 2023-08-05 NOTE — OCCUPATIONAL THERAPY NOTE
Occupational Therapy Evaluation     Patient Name: Alfredo Marmolejo  GGTBZ'H Date: 8/5/2023  Problem List  Principal Problem:    Nausea vomiting and diarrhea  Active Problems:    Hypertension    Antiphospholipid antibody syndrome (HCC)    Atrial fibrillation (HCC)    Other specified hypothyroidism    Chronic respiratory failure with hypoxia and hypercapnia (HCC)    Abdominal pain    Compression fracture of lumbar vertebra [facility])    Past Medical History  Past Medical History:   Diagnosis Date    Anti-phospholipid syndrome (HCC)     Asthma     Blood type O+     Cardiac disease     Chronic pain     COPD (chronic obstructive pulmonary disease) (HCC)     Fracture of ankle     left    Hyperlipidemia     Hypertension     Hypokalemia 2/27/2023    Osteoporosis      Past Surgical History  Past Surgical History:   Procedure Laterality Date    APPENDECTOMY      BILATERAL OOPHORECTOMY Bilateral     ORIF TIBIAL SHAFT FRACTURE W/ PLATES AND SCREWS Right     DE HEMIARTHROPLASTY HIP PARTIAL Left 6/23/2023    Procedure: HEMIARTHROPLASTY HIP (BIPOLAR); Surgeon: Prabhjot Chappell MD;  Location:  MAIN OR;  Service: Orthopedics             08/05/23 1043   OT Last Visit   OT Visit Date 08/05/23   Note Type   Note type Evaluation   Pain Assessment   Pain Assessment Tool 0-10   Pain Score No Pain   Restrictions/Precautions   Weight Bearing Precautions Per Order No   Other Precautions O2;Chair Alarm; Bed Alarm; Fall Risk  (1L chronically - on 2L currently)   Home Living   Type of [address] [place] [address] Dr Two level;Stairs to enter with rails  (bi-level - 8+8 SWETA)   Bathroom Shower/Tub Tub/shower unit   Bathroom Equipment Commode;Grab bars in shower;Hand-held shower; Shower chair   Home Equipment Walker;Cane;Sock aid;Reacher;Long-handled shoehorn; Other (Comment)  (Dressing stick)   Additional Comments SPC for stairs, RW otherwise   Prior Function   Level of [place] Independent with ADLs; Independent with functional mobility   Lives With (S) Alone   Receives Help From Family   IADLs Family/Friend/Other provides meals; Family/Friend/Other provides transportation; Family/Friend/Other provides medication management  (Family does laundry, family preps meals & pt either heats up in microwave or oven)   Falls in the last 6 months 1 to 4   Vocational Retired   Comments pt reports having been getting dressed with [place] recently d/t posterior hip precautions   General   Additional Pertinent History Pt with recent L hip fx s/p sx. Per ortho note 6/30 - pt is WBAT to LLE with posterior hip precautions x3 months. Pt [facility] DC[facility], being in & out of STR frequently over the last few months   Family/Caregiver Present No   Subjective   Subjective Pt received supine in bed, agreeable to session. Pt appeared sleepy but perks up with mobility   ADL   Eating Assistance 7  Independent   Grooming Assistance 5  Supervision/Setup   UB Bathing Assistance 5  Supervision/Setup   LB Bathing Assistance 3  Moderate Assistance   [address] 2  Maximal Assistance   LB Dressing Deficit Other (Comment)  (Pt uses LHAD)   Toileting Assistance  2  Maximal Assistance   Toileting Deficit Perineal hygiene; Bedside commode   Bed Mobility   Supine to Sit 5  Supervision   Additional items Increased time required;Verbal cues;HOB elevated; Bedrails   Transfers   Sit to Stand 4  Minimal assistance   Additional items Assist x 1; Increased time required;Verbal cues;Armrests  (CGA)   Stand to Sit 4  Minimal assistance   Additional items Assist x 1; Increased time required;Verbal cues;Armrests  (CGA)   Toilet transfer 4  Minimal assistance   Additional items Assist x 1;Trapeze bar;Commode;Armrests   Functional Mobility   Functional Mobility 4  Minimal assistance   Additional Comments x1 - forward flexed, at point pt has RLE outside of RW & required VCs to correct   Additional items Rolling walker   Balance   Static Sitting Fair +   Dynamic Sitting Fair   Static Standing Fair -   Dynamic Standing [address] [facility] -   Activity Tolerance   Activity Tolerance Patient tolerated treatment well   Medical Staff Made Aware PT Robe Causey   Nurse Made Aware RN Madelaine Robertson Assessment   RUE Assessment WFL   LUE Assessment   LUE Assessment WFL   Cognition   Overall Cognitive Status Impaired   Arousal/Participation Alert   Attention Attends with cues to redirect   Orientation Level Oriented X4   Memory Decreased recall of precautions;Decreased recall of recent events;Decreased short term memory   Following Commands Follows one step commands with increased time or repetition   Comments Oriented but cognition/memory is questionable. Pt states her hip sx was in February, however, was in June. Unable to remember when she was [facility] from STR/hip precautions, names of objects, etc.   Assessment   Limitation Decreased ADL status; Decreased UE strength;Decreased Safe judgement during ADL;Decreased endurance;Decreased self-care trans;Decreased high-level ADLs   Prognosis Fair   Assessment Pt is a 79 y.o. female seen for OT evaluation at [facility], admitted 8/3/2023 w/ Nausea vomiting and diarrhea. OT completed extensive review of pt's medical and social history. Comorbidities affecting pt's functional performance at time of assessment include: HTN, A-fib, chronic respiratory failure with hypoxia & hypercapnia, abdominal pain, compression fx of lumbar vertebra, COPD, h/o falls, L femur fx s/p sx, dehydration, h/o failure to thrive, cerebral aneurysm, mood disorders, asthma, h/o osteoporisis. Personal factors affecting pt at time of IE include: steps to enter environment, limited home support, difficulty performing ADLS, difficulty performing IADLS , limited insight into deficits, flat affect, decreased initiation and engagement  and health management . Prior to admission, pt was living alone in a bi-level home with 6 SWETA.   Pt was Mod I w/  ADLS and A w/ IADLS, (-) drove, & required use of long handled equipment  and RW PTA. Upon evaluation: Pt requires S for bed mobility, Min Ax1 for functional mobility/transfers, Min A for UB ADLs and Max A for LB ADLS 2* the following deficits impacting occupational performance: weakness, decreased strength, decreased balance, decreased tolerance, impaired memory, decreased safety awareness and orthopedic restrictions. Full objective findings from OT assessment regarding body systems outlined above. Pt to benefit from continued skilled OT tx while in the hospital to address deficits as defined above and maximize level of functional independence w/ ADL's and functional mobility. Occupational Performance areas to address include: bathing/shower, toilet hygiene, dressing, functional mobility, community mobility and clothing management. Based on findings, pt is of high complexity. The patient's raw score on the AM-PAC Daily Activity inpatient short form is 16, standardized score is 35.96, less than 39.4. Patients at this level are likely to benefit from DC to post-acute rehabilitation services. However, please refer to therapist recommendation for discharge planning given other factors that may influence destination. At this time, OT recommendations at time of discharge are DC with Level II resources. Goals   Patient Goals Pt wants to go home   Plan   Treatment Interventions ADL retraining;Functional transfer training; Endurance training;Patient/family training;Equipment evaluation/education; Compensatory technique education;Continued evaluation;UE strengthening/ROM   Goal Expiration Date 08/15/23   OT Treatment Day 0   OT Frequency 3-5x/wk   Recommendation   UB Rehab Discharge Recommendation (PT/OT) Level 2   AM-PAC Daily Activity Inpatient   Lower Body Dressing 2   Bathing 2   Toileting 2   Upper Body Dressing 3   Grooming 3   Eating 4   Daily Activity Raw Score 16   Daily Activity Standardized Score (Calc for Raw Score >=11) 35.96 AM-PAC Applied Cognition Inpatient   Following a Speech/Presentation 3   Understanding Ordinary Conversation 4   Taking Medications 3   Remembering Where Things Are Placed or Put Away 3   Remembering List of 4-5 Errands 3   Taking Care of Complicated Tasks 2   Applied Cognition Raw Score 18   Applied Cognition Standardized Score 38.07   End of Consult   Education Provided Yes   Patient Position at End of Consult Bedside chair;Bed/Chair alarm activated; All needs within reach   Nurse Communication Nurse aware of consult     Pt will achieve the following goals within 10 days. *Pt will complete UB bathing and dressing with S.    *Pt will complete LB bathing and dressing with S & DME PRN. *Pt will complete toileting w/ S w/ G hygiene/thoroughness using DME PRN    *Pt will perform functional transfers with on/off all surfaces with Mod I using DME as needed w/ G balance/safety. *Pt will independently identify 3-5 fall risks during ADL routine to ensure home safety upon discharge. *Pt will improve functional mobility during ADL/IADL/leisure tasks to Mod I using DME as needed w/ G balance/safety. *Pt will demonstrate 100% carryover of precautions s/p review w/o cues w/ Mod I w/ G tolerance/participation t/o functional ADL/IADL/leisure tasks. *Pt will verbalize and demonstrate understanding in use of compensatory techniques and use of long handled AE for increased safety and independence during LB ADL tasks.        Clement Martinez, OTR/L

## 2023-08-05 NOTE — ASSESSMENT & PLAN NOTE
Patient presented with nausea, vomiting and diarrhea for the past few days. Will order stool work-up including C. difficile and cultures  CT abdomen pelvis showed-- " Diffuse bowel obstruction with additional wall thickening of small bowel more so in the right lower quadrant. There is a femoral hernia present making obstruction related to hernia most likely with some change in caliber of the exiting loop. The additional wall thickening more diffusely in the pelvic cavity does raise possibility of perhaps some additional venous congestion/ischemia perhaps related to mesenteric twisting. Urgent surgical evaluation is advised."  Trend WBC and fever curve  UA did not show any bacteriuria with WBC 4-7.   Patient is asymptomatic  Hold off on antibiotics  Will advance diet

## 2023-08-05 NOTE — ASSESSMENT & PLAN NOTE
Continue on Cardizem CD and digoxin  On anticoagulation with Coumadin  We will hold Coumadin as INR is 3.28  Trend PT/INR

## 2023-08-05 NOTE — PROGRESS NOTES
[facility]  Progress Note  Name: Luc Hill  MRN: [id number]  Unit/Bed#: -01 I Date of Admission: 8/3/2023   Date of Service: 8/5/2023 I Hospital Day: 1    Assessment/Plan   * Nausea vomiting and diarrhea  Assessment & Plan  Patient presented with nausea, vomiting and diarrhea for the past few days. Will order stool work-up including C. difficile and cultures  CT abdomen pelvis showed-- " Diffuse bowel obstruction with additional wall thickening of small bowel more so in the right lower quadrant. There is a femoral hernia present making obstruction related to hernia most likely with some change in caliber of the exiting loop. The additional wall thickening more diffusely in the pelvic cavity does raise possibility of perhaps some additional venous congestion/ischemia perhaps related to mesenteric twisting. Urgent surgical evaluation is advised."  Trend WBC and fever curve  UA did not show any bacteriuria with WBC 4-7. Patient is asymptomatic  Hold off on antibiotics  Will advance diet    Abdominal pain  Assessment & Plan  Patient with abdominal pain  CT showed- Diffuse bowel obstruction with additional wall thickening of small bowel more so in the right lower quadrant. There is a femoral hernia present making obstruction related to hernia most likely with some change in caliber of the exiting loop. The additional wall thickening more diffusely in the pelvic cavity does raise possibility of perhaps some additional venous congestion/ischemia perhaps related to mesenteric twisting. Urgent surgical evaluation is advised. Likely enteritis  ER spoke with surgery and they recommended medical admission  Surgical and GI consult appreciated  Surgical team gave oral contrast and check KUB. As per surgical team patient contrast is passed in colon and from their perspective patient does not have any surgical needs. On clear liquid diet.   Patient does not have any diarrhea or nausea or vomiting  Will advance diet. Cleared by GI and surgery for discharge  Stool work-up is negative  Pain management as needed    Compression fracture of lumbar vertebra [facility])  Assessment & Plan  Patient on CT scan noted to have a compression fracture of L2 and L4-chronic  Also noted to have compression fracture of T10 and T11 which are chronic  Pain management  PT/OT recommended rehab    Chronic respiratory failure with hypoxia and hypercapnia [facility])  Assessment & Plan  Patient has history of COPD and is on chronic oxygen. Patient uses 1 L of supplemental oxygen at home  Continue on bronchodilators  Continue oxygen supplementation    Other specified hypothyroidism  Assessment & Plan  Continue Synthroid    Atrial fibrillation (HCC)  Assessment & Plan  Continue on Cardizem CD and digoxin  On anticoagulation with Coumadin  We will hold Coumadin as INR is 3.28  Trend PT/INR    Antiphospholipid antibody syndrome (HCC)  Assessment & Plan  Continue on Coumadin  Monitor PT/INR    Hypertension  Assessment & Plan  Continue Cardizem CD and Lasix  Monitor vitals as per protocol           Labs & Imaging: I have personally reviewed pertinent reports. VTE Prophylaxis: in place. Code Status:   Level 1 - Full Code    Patient Centered Rounds: I have performed bedside rounds with nursing staff today. Discussions with Specialists or Other Care Team Provider: GI    Education and Discussions with Family / Patient: Son     Current Length of Stay: 1 day(s)    Current Patient Status: Inpatient   Certification Statement: The patient will continue to require additional inpatient hospital stay due to see my assessment and plan. Subjective:   Patient is seen and examined at bedside. Denies any nausea, vomiting, diarrhea. No abdominal pain. Afebrile  All other ROS are negative. Objective:    Vitals: Blood pressure 159/60, pulse 68, temperature 97.9 °F (36.6 °C), temperature source Temporal, resp.  rate 16, height 5' 6" (1.676 m), weight 53.5 kg (117 lb 15.1 oz), SpO2 95 %, not currently breastfeeding. ,Body mass index is 19.04 kg/m². SPO2 RA Rest    Flowsheet Row ED to Hosp-Admission (Current) from 8/3/2023 in [facility] Med Surg [facility]   SpO2 95 %   SpO2 Activity At Rest   O2 Device Nasal cannula   O2 Flow Rate --        I&O:     Intake/Output Summary (Last 24 hours) at 8/5/2023 1309  Last data filed at 8/5/2023 1201  Gross per 24 hour   Intake 1020 ml   Output 75 ml   Net 945 ml       Physical Exam:    General- Alert, sitting comfortably in chair. Not in any acute distress. Neck- Supple, No JVD  CVS- regular, S1 and S2 normal  Chest- Bilateral Air entry, No rhochi, crackles or wheezing present. Abdomen- soft, nontender, not distended, no guarding or rigidity, BS+  Extremities-  No pedal edema, No calf tenderness  CNS-   Alert, awake and orientedx3. No focal deficits present.     Invasive Devices     Peripheral Intravenous Line  Duration           Peripheral IV 08/03/23 Left Antecubital 2 days                      Social History  reviewed  Family History   Problem Relation Age of Onset   • Hypertension Mother    • Skin cancer Mother    • Hypertension Father     reviewed    Meds:  Current Facility-Administered Medications   Medication Dose Route Frequency Provider Last Rate Last Admin   • acetaminophen (TYLENOL) tablet 650 mg  650 mg Oral Q6H PRN Isha Perales MD       • albuterol inhalation solution 5 mg  5 mg Nebulization Q4H PRN Isha Perales MD       • budesonide (PULMICORT) inhalation solution 0.5 mg  0.5 mg Nebulization Q12H Isha Perales MD   0.5 mg at 08/05/23 0753   • digoxin (LANOXIN) tablet 125 mcg  125 mcg Oral Every Other Day Isha Perales MD   125 mcg at 08/05/23 9058   • diltiazem (CARDIZEM CD) 24 hr capsule 240 mg  240 mg Oral Daily Isha Perales MD   240 mg at 08/05/23 0914   • fluticasone-vilanterol 200-25 mcg/actuation 1 puff  1 puff Inhalation Daily Isha Perales MD   1 puff at 08/05/23 3974   • furosemide (LASIX) tablet 20 mg  20 mg Oral Daily Mile Griffin MD   20 mg at 08/05/23 3835   • guaiFENesin (MUCINEX) 12 hr tablet 600 mg  600 mg Oral BID Mile Griffin MD   600 mg at 08/05/23 8267   • levothyroxine tablet 25 mcg  25 mcg Oral Early Morning Mile Griffin MD   25 mcg at 08/05/23 0556   • lidocaine (LIDODERM) 5 % patch 1 patch  1 patch Topical Daily Mile Griffin MD   1 patch at 08/04/23 3579   • mirtazapine (REMERON) tablet 7.5 mg  7.5 mg Oral HS Mile Griffin MD   7.5 mg at 08/04/23 2111   • ondansetron (ZOFRAN) injection 4 mg  4 mg Intravenous Q6H PRN Mile Griffin MD       • senna-docusate sodium (SENOKOT S) 8.6-50 mg per tablet 1 tablet  1 tablet Oral HS Mile Griffin MD   1 tablet at 08/03/23 2158   • sertraline (ZOLOFT) tablet 50 mg  50 mg Oral Daily Mile Griffin MD   50 mg at 08/05/23 7499   • umeclidinium 62.5 mcg/actuation inhaler AEPB 1 puff  1 puff Inhalation Daily Mile Griffin MD   1 puff at 08/05/23 0907      Medications Prior to Admission   Medication   • acetaminophen (TYLENOL) 325 mg tablet   • albuterol (2.5 mg/3 mL) 0.083 % nebulizer solution   • Ascorbic Acid (VITAMIN C) 1000 MG tablet   • Cholecalciferol (VITAMIN D3) 20 MCG (800 UNIT) TABS   • digoxin (LANOXIN) 0.125 mg tablet   • diltiazem (CARDIZEM CD) 240 mg 24 hr capsule   • Ferrous Sulfate 220 (44 Fe) MG/5ML LIQD   • Fluticasone-Salmeterol (Advair) 250-50 mcg/dose inhaler   • furosemide (LASIX) 20 mg tablet   • guaiFENesin (MUCINEX) 600 mg 12 hr tablet   • levothyroxine 25 mcg tablet   • lidocaine (LIDODERM) 5 %   • loratadine (CLARITIN) 10 mg tablet   • mirtazapine (REMERON) 7.5 MG tablet   • multivitamin (THERAGRAN) TABS   • sertraline (Zoloft) 50 mg tablet   • tiZANidine (ZANAFLEX) 2 mg tablet   • umeclidinium (Incruse Ellipta) 62.5 mcg/actuation AEPB inhaler   • warfarin (COUMADIN) 2 mg tablet   • bisacodyl (DULCOLAX) 5 mg EC tablet   • budesonide (PULMICORT) 0.5 mg/2 mL nebulizer solution   • famotidine (PEPCID) 20 mg tablet   • oxygen gas   • polyethylene glycol (MIRALAX) 17 g packet   • senna-docusate sodium (SENOKOT S) 8.6-50 mg per tablet   • sodium phosphate-biphosphate (FLEET) 7-19 g 118 mL enema       Labs:  Results from last 7 days   Lab Units 08/05/23  0601 08/04/23  0512 08/03/23  1145   WBC Thousand/uL 7.84 7.46 9.82   HEMOGLOBIN g/dL 9.3* 9.1* 10.3*   HEMATOCRIT % 30.0* 29.4* 32.5*   PLATELETS Thousands/uL 293 288 324   NEUTROS PCT % 77* 78* 83*   LYMPHS PCT % 13* 12* 10*   MONOS PCT % 7 9 7   EOS PCT % 2 1 0     Results from last 7 days   Lab Units 08/05/23  0601 08/04/23  0512 08/03/23  1145   POTASSIUM mmol/L 3.7 3.5 3.7   CHLORIDE mmol/L 101 103 96   CO2 mmol/L 31 34* 29   BUN mg/dL 12 19 31*   CREATININE mg/dL 0.43* 0.48* 0.72   CALCIUM mg/dL 8.6 8.4 9.0   ALK PHOS U/L  --  97 122*   ALT U/L  --  7 6*   AST U/L  --  15 20     Lab Results   Component Value Date    TROPONINI <0.02 11/22/2017    CKTOTAL 73 06/22/2023    CKTOTAL 26 02/27/2023     Results from last 7 days   Lab Units 08/05/23  0601 08/04/23  0512 08/03/23  1829   INR  3.28* 3.32* 3.36*     Lab Results   Component Value Date    BLOODCX No Growth After 5 Days. 06/22/2023    BLOODCX No Growth After 5 Days. 06/22/2023    BLOODCX No Growth After 5 Days. 05/17/2023    BLOODCX No Growth After 5 Days. 05/17/2023    URINECX 50,000-59,000 cfu/ml Enterococcus faecalis (A) 02/28/2023    URINECX <10,000 cfu/ml 02/28/2023         Imaging:  Results for orders placed during the hospital encounter of 08/03/23    XR chest portable    Narrative  CHEST    INDICATION:   SOB.    COMPARISON: CT chest and chest x-ray 6/22/2023. EXAM PERFORMED/VIEWS:  XR CHEST PORTABLE      FINDINGS:    Cardiomediastinal silhouette appears unremarkable. Oral contrast is seen within the stomach and distal esophagus. Small hiatal hernia suggested. There is mild pulmonary vascular congestion.  Hazy bibasilar airspace opacities are new from prior exam and likely represent atelectasis versus developing pneumonia. There is a trace left effusion. No appreciable pneumothorax. No evidence of acute osseous abnormality. Impression  1. New bibasilar airspace opacities with trace left effusion. Opacities may represent atelectasis versus developing pneumonia. 2. Mild pulmonary vascular congestion        Workstation performed: [id number]    Results for orders placed during the hospital encounter of 05/04/23    XR chest pa & lateral    Narrative  CHEST    INDICATION:   Shortness of breath. COMPARISON: Chest radiograph March 6, 2023    EXAM PERFORMED/VIEWS:  XR CHEST PA & LATERAL      FINDINGS:    Cardiomediastinal silhouette appears unremarkable. Linear atelectasis in the right lower lung. No focal consolidation, pleural effusion or pneumothorax. Osseous structures appear within normal limits for patient age. Impression  No focal consolidation, pleural effusion, or pneumothorax.             Workstation performed: [id number]      Last 24 Hours Medication List:   Current Facility-Administered Medications   Medication Dose Route Frequency Provider Last Rate   • acetaminophen  650 mg Oral Q6H PRN Adam Salinas MD     • albuterol  5 mg Nebulization Q4H PRN Adam Salinas MD     • budesonide  0.5 mg Nebulization Q12H Adam Salinas MD     • digoxin  125 mcg Oral Every Other Day Adam Salinas MD     • diltiazem  240 mg Oral Daily Adam Salinas MD     • fluticasone-vilanterol  1 puff Inhalation Daily Adam Salinas MD     • furosemide  20 mg Oral Daily Adam Salinas MD     • guaiFENesin  600 mg Oral BID Adam Salinas MD     • levothyroxine  25 mcg Oral Early Morning Adam Salinas MD     • lidocaine  1 patch Topical Daily Adam Salinas MD     • mirtazapine  7.5 mg Oral HS Adam Salinas MD     • ondansetron  4 mg Intravenous Q6H PRN Adam Salinas MD     • senna-docusate sodium  1 tablet Oral HS Per Guerra MD     • sertraline  50 mg Oral Daily Per Guerra MD     • umeclidinium  1 puff Inhalation Daily Per Guerra MD          Today, Patient Was Seen By: Per Guerra MD    ** Please Note: Dictation voice to text software may have been used in the creation of this document.  **

## 2023-08-05 NOTE — PLAN OF CARE
Problem: OCCUPATIONAL THERAPY ADULT  Goal: Performs self-care activities at highest level of function for planned discharge setting. See evaluation for individualized goals. Description: Treatment Interventions: ADL retraining, Functional transfer training, Endurance training, Patient/family training, Equipment evaluation/education, Compensatory technique education, Continued evaluation, UE strengthening/ROM          See flowsheet documentation for full assessment, interventions and recommendations. Note: Limitation: Decreased ADL status, Decreased UE strength, Decreased Safe judgement during ADL, Decreased endurance, Decreased self-care trans, Decreased high-level ADLs  Prognosis: Fair  Assessment: Pt is a 79 y.o. female seen for OT evaluation at [facility], admitted 8/3/2023 w/ Nausea vomiting and diarrhea. OT completed extensive review of pt's medical and social history. Comorbidities affecting pt's functional performance at time of assessment include: HTN, A-fib, chronic respiratory failure with hypoxia & hypercapnia, abdominal pain, compression fx of lumbar vertebra, COPD, h/o falls, L femur fx s/p sx, dehydration, h/o failure to thrive, cerebral aneurysm, mood disorders, asthma, h/o osteoporisis. Personal factors affecting pt at time of IE include: steps to enter environment, limited home support, difficulty performing ADLS, difficulty performing IADLS , limited insight into deficits, flat affect, decreased initiation and engagement  and health management . Prior to admission, pt was living alone in a bi-level home with 6 SWETA. Pt was Mod I w/  ADLS and A w/ IADLS, (-) drove, & required use of long handled equipment  and RW PTA.  Upon evaluation: Pt requires S for bed mobility, Min Ax1 for functional mobility/transfers, Min A for UB ADLs and Max A for LB ADLS 2* the following deficits impacting occupational performance: weakness, decreased strength, decreased balance, decreased tolerance, impaired memory, decreased safety awareness and orthopedic restrictions. Full objective findings from OT assessment regarding body systems outlined above. Pt to benefit from continued skilled OT tx while in the hospital to address deficits as defined above and maximize level of functional independence w/ ADL's and functional mobility. Occupational Performance areas to address include: bathing/shower, toilet hygiene, dressing, functional mobility, community mobility and clothing management. Based on findings, pt is of high complexity. The patient's raw score on the -PAC Daily Activity inpatient short form is 16, standardized score is 35.96, less than 39.4. Patients at this level are likely to benefit from DC to post-acute rehabilitation services. However, please refer to therapist recommendation for discharge planning given other factors that may influence destination. At this time, OT recommendations at time of discharge are DC with Level II resources.

## 2023-08-05 NOTE — PLAN OF CARE
Problem: PHYSICAL THERAPY ADULT  Goal: Performs mobility at highest level of function for planned discharge setting. See evaluation for individualized goals. Description: Treatment/Interventions: Functional transfer training, LE strengthening/ROM, Elevations, Therapeutic exercise, Endurance training, Patient/family training, Equipment eval/education, Bed mobility, Gait training  Equipment Recommended: Neeta Orr       See flowsheet documentation for full assessment, interventions and recommendations. Note:    Problem List: Decreased strength, Decreased endurance, Impaired balance, Decreased mobility, Decreased cognition, Impaired judgement, Decreased safety awareness, Pain  Assessment: Pt is a 79 y.o. female seen for PT evaluation s/p admit to [address] [place] [address] on 8/18/2022 w/ Nausea vomiting and diarrhea. Order placed for PT. Comorbidities affecting pt's physical performance at time of assessment include: HTN, COPD and chronic back pain. Personal factors affecting pt at time of IE include: steps to enter environment, limited home support, advanced age, inability to perform IADLs, inability to perform ADLs, inability to ambulate household distances and recent fall(s). Prior to admission, pt was was independent w/ all functional mobility w/ RW or SPC as needed, lived in multi-level home, had 8+8 SWETA (+) railing and lived alone. Upon evaluation: Pt requires supervision for bed mobility, min A for sit to stand, and min A for ambulation with RW. (Please find full objective findings from PT assessment regarding body systems outlined above). Impairments and limitations also listed above, especially due to  weakness, impaired balance, decreased endurance, gait deviations, decreased activity tolerance, decreased safety awareness and fall risk.   Pt's clinical presentation is currently unstable/unpredictable seen in pt's presentation of fall risk, significant decline in functional mobility compared to baseline, and limited insight into deficits. Pt to benefit from continued skilled PT tx while in hospital and upon DC to address deficits as defined above and maximize level of functional mobility. Recommend progression of ambulation and stair negotiation as appropriate. See flowsheet documentation for full assessment.

## 2023-08-05 NOTE — PROGRESS NOTES
Progress note - [facility]   My Pratt 79 y.o. female MRN: [id number]  Unit/Bed#: -01 Encounter: [id number]    ASSESSMENT and PLAN    54-year-old female past medical history of A-fib and antiphospholipid syndrome on Coumadin, presenting with acute onset nausea vomiting diarrhea abdominal pain found to have bowel obstruction     1.  Nausea vomiting diarrhea  2.  Abdominal pain  3.  Abnormal CT scan  4.  Macrocytic anemia with history of iron deficiency anemia  5.  A-fib and antiphospholipid antibody syndrome on Coumadin  6.  Hypercapnic respiratory failure on oxygen     CT scan reviewed by me notable with diffuse bowel obstruction  Additional wall thickening of the small bowel in the right lower quadrant. History of a femoral hernia. Easily reducible on exam.  Mild abdominal pain. Possible venous congestion related to mesenteric twisting. Some celiac and SMA narrowing of the vessels.     Diet per surgical team.    IV fluids and antiemetics. If continues to vomit consider placing NG tube  Follow-up stool studies and C. difficile although less likely as she is not having active diarrhea at this time  Lactate is normal.  Daily exams. If with peritonitis notify surgery  No plans for inpatient endoscopy at this time due to risk of perforation. Chief Complaint   Patient presents with   • Abdominal Pain     Patient presents to the ED with c/o generalized abdominal pain, nausea and diarrhea x3 days. States she had a telehealth visit today and they were concerned for dehydration        SUBJECTIVE  Patient is doing better this morning. Denies any nausea vomiting or diarrhea.       Temp:  [97.9 °F (36.6 °C)-98.6 °F (37 °C)] 97.9 °F (36.6 °C)  HR:  [67-71] 68  Resp:  [16-18] 16  BP: (130-159)/(43-60) 159/60     PHYSICAL EXAM  General Appearance: NAD, cooperative, alert  Eyes: Anicteric  GI:  Soft, non-tender, non-distended; normal bowel sounds; no masses, no organomegaly   Rectal: Deferred  Musculoskeletal: No edema. Skin:  No jaundice    LABS & STUDIES  Lab Results   Component Value Date    GLUCOSE 210 (H) 05/20/2023    CALCIUM 8.6 08/05/2023     03/17/2015    K 3.7 08/05/2023    CO2 31 08/05/2023     08/05/2023    BUN 12 08/05/2023    CREATININE 0.43 (L) 08/05/2023    CREATININE 0.48 (L) 08/04/2023    CREATININE 0.72 08/03/2023     Lab Results   Component Value Date    WBC 7.84 08/05/2023    WBC 7.46 08/04/2023    WBC 9.82 08/03/2023    HGB 9.3 (L) 08/05/2023    HGB 9.1 (L) 08/04/2023    HGB 10.3 (L) 08/03/2023    MCV 98 08/05/2023     08/05/2023     08/04/2023     08/03/2023     Lab Results   Component Value Date    ALT 7 08/04/2023    ALT 6 (L) 08/03/2023    ALT 10 06/28/2023    AST 15 08/04/2023    AST 20 08/03/2023    AST 13 06/28/2023    ALKPHOS 97 08/04/2023    ALKPHOS 122 (H) 08/03/2023    ALKPHOS 107 (H) 06/28/2023    TBILI 0.44 08/04/2023    TBILI 0.63 08/03/2023    TBILI 0.44 06/28/2023     No results found for: "AMYLASE"  Lab Results   Component Value Date    LIPASE 17 08/03/2023     Lab Results   Component Value Date    IRON 53 06/30/2022    TIBC 266 06/30/2022    FERRITIN 360 06/30/2022     Lab Results   Component Value Date    INR 3.28 (H) 08/05/2023    INR 3.32 (H) 08/04/2023    INR 3.36 (H) 08/03/2023       XR abdomen 1 view kub    Result Date: 8/4/2023  Narrative: ABDOMEN INDICATION:   N/V, rule out bowel obstruction/ileus, patient had oral contrast. COMPARISON: CT abdomen and pelvis with contrast dated August 3, 2023. VIEWS:  AP supine. 2 images. FINDINGS: Mildly dilated small bowel, improved when compared to CT study from 1 day ago. There is oral contrast material visualized throughout the large bowel to include the sigmoid colon and rectum. No discernible free air on this supine study. Upright or left lateral decubitus imaging is more sensitive to detect subtle free air in the appropriate setting. No soft tissue masses.  Mild atelectasis visualized lung bases. No acute osseous abnormalities. Degenerative changes of the spine. Left hip hemiarthroplasty present. Redemonstration of right pubic deformity. Impression: Ingested contrast is in the colon indicating interval improvement since prior. Persistent dilated small bowel loops. Cannot exclude partial obstruction. Recommend continued follow-up if symptoms persist. Resident: Latrice Yoo, the attending radiologist, have reviewed the images and agree with the final report above. Workstation performed: [id number]     XR chest portable    Result Date: 8/4/2023  Narrative: CHEST INDICATION:   SOB. COMPARISON: CT chest and chest x-ray 6/22/2023. EXAM PERFORMED/VIEWS:  XR CHEST PORTABLE FINDINGS: Cardiomediastinal silhouette appears unremarkable. Oral contrast is seen within the stomach and distal esophagus. Small hiatal hernia suggested. There is mild pulmonary vascular congestion. Hazy bibasilar airspace opacities are new from prior exam and likely represent atelectasis versus developing pneumonia. There is a trace left effusion. No appreciable pneumothorax. No evidence of acute osseous abnormality. Impression: 1. New bibasilar airspace opacities with trace left effusion. Opacities may represent atelectasis versus developing pneumonia. 2. Mild pulmonary vascular congestion Workstation performed: [id number]     CT abdomen pelvis with contrast    Result Date: 8/3/2023  Narrative: CT ABDOMEN AND PELVIS WITH IV CONTRAST INDICATION:   Abdominal pain, acute, nonlocalized abdominal pain, diffuse. COMPARISON: 6/22/2023 TECHNIQUE:  CT examination of the abdomen and pelvis was performed. Multiplanar 2D reformatted images were created from the source data. This examination, like all CT scans performed in the [facility], was performed utilizing techniques to minimize radiation dose exposure, including the use of iterative reconstruction and automated exposure control.  Radiation dose length product (DLP) for this visit:  406.31 mGy-cm IV Contrast:  100 mL of iohexol (OMNIPAQUE) Enteric Contrast:  Enteric contrast was not administered. FINDINGS: ABDOMEN LOWER CHEST: Right lower lobe infiltrate is identified with occlusion of the subsegmental bronchus from mucous debris or aspiration. Some mild bronchial occlusion on the left is also seen. Small effusions left greater than right. Kenard Carson LIVER/BILIARY TREE:  Unremarkable. GALLBLADDER:  No calcified gallstones. No pericholecystic inflammatory change. SPLEEN:  Unremarkable. PANCREAS:  Unremarkable. ADRENAL GLANDS:  Unremarkable. KIDNEYS/URETERS: No hydronephrosis. Circumscribed hypodensities in the kidneys probably represent small cysts although too small to characterize. Kenard Carson STOMACH AND BOWEL: Probable small hiatus hernia is present. There is diffuse small bowel dilatation identified. Appears to be some wall thickening and edema in the region of the right pelvis. This edematous loop extends into a hernia sac which appears to  partially compress the femoral vein suggesting a femoral hernia rather than an inguinal hernia. This is also more laterally positioned. Some fluid is seen within this hernia sac. This does appear to be the cause of obstruction with the colon being decompressed. There does appear to be some thickened bowel loops within the peritoneal cavity however. These loops are both before and after the hernia. Of note, there is a vascular pedicle extending into the hernia and this may be causing some lymphatic or venous congestion more broadly accounting for some of this small bowel thickening not evident in the left abdomen abdomen. There is some swirling of the mesentery. The ileocecal junction is difficult to identify due to the decompressed status and perhaps some twisting of the mesentery. There is diverticulosis in the distal sigmoid colon. APPENDIX: There are expected postoperative changes of appendectomy. ABDOMINOPELVIC CAVITY:  No ascites. No pneumoperitoneum. No lymphadenopathy. VESSELS: Atherosclerotic changes are present. No evidence of aneurysm. There appears to be narrowing of the celiac axis at its origin. Mild SMA narrowing is seen near its origin although there is still enhancement of the SMA more distally. The AFSHIN is not well seen. PELVIS REPRODUCTIVE ORGANS: Coarse calcification uterus probably represents a fibroid. URINARY BLADDER:  Unremarkable. ABDOMINAL WALL/INGUINAL REGIONS:  Unremarkable. OSSEOUS STRUCTURES:  No acute fracture or destructive osseous lesion. Degenerative changes of the spine. The patient is status post left hip replacement. There is a nonhealed fracture with fragmentation involving the right pubic bone. The appearance is similar to June although there is some additional callus formation as well as reparative changes near the fracture line with some additional lucency. Compression fracture of L2 and L4 are identified as well as T10 and T11 these are similar to the study of June. Some healed rib fractures appear to be present bilaterally. Impression: Diffuse bowel obstruction with additional wall thickening of small bowel more so in the right lower quadrant. There is a femoral hernia present making obstruction related to hernia most likely with some change in caliber of the exiting loop. The additional wall thickening more diffusely in the pelvic cavity does raise possibility of perhaps some additional venous congestion/ischemia perhaps related to mesenteric twisting. Urgent surgical evaluation is advised. Atherosclerotic changes are seen. There is some celiac and SMA narrowing although these vessels do enhance. Multiple fractures as described above are again noted. This was discussed with Dr. Nilda Tim at 3:15 p.m.  Workstation performed: [id number]

## 2023-08-06 LAB
ANION GAP SERPL CALCULATED.3IONS-SCNC: 1 MMOL/L
BASOPHILS # BLD AUTO: 0.02 THOUSANDS/ÂΜL (ref 0–0.1)
BASOPHILS NFR BLD AUTO: 0 % (ref 0–1)
BUN SERPL-MCNC: 10 MG/DL (ref 5–25)
CALCIUM SERPL-MCNC: 8.6 MG/DL (ref 8.4–10.2)
CHLORIDE SERPL-SCNC: 102 MMOL/L (ref 96–108)
CO2 SERPL-SCNC: 35 MMOL/L (ref 21–32)
CREAT SERPL-MCNC: 0.42 MG/DL (ref 0.6–1.3)
EOSINOPHIL # BLD AUTO: 0.22 THOUSAND/ÂΜL (ref 0–0.61)
EOSINOPHIL NFR BLD AUTO: 3 % (ref 0–6)
ERYTHROCYTE [DISTWIDTH] IN BLOOD BY AUTOMATED COUNT: 13.9 % (ref 11.6–15.1)
GFR SERPL CREATININE-BSD FRML MDRD: 94 ML/MIN/1.73SQ M
GLUCOSE SERPL-MCNC: 94 MG/DL (ref 65–140)
HCT VFR BLD AUTO: 31.2 % (ref 34.8–46.1)
HGB BLD-MCNC: 9.8 G/DL (ref 11.5–15.4)
IMM GRANULOCYTES # BLD AUTO: 0.04 THOUSAND/UL (ref 0–0.2)
IMM GRANULOCYTES NFR BLD AUTO: 1 % (ref 0–2)
INR PPP: 3.15 (ref 0.84–1.19)
LYMPHOCYTES # BLD AUTO: 0.89 THOUSANDS/ÂΜL (ref 0.6–4.47)
LYMPHOCYTES NFR BLD AUTO: 12 % (ref 14–44)
MCH RBC QN AUTO: 30.9 PG (ref 26.8–34.3)
MCHC RBC AUTO-ENTMCNC: 31.4 G/DL (ref 31.4–37.4)
MCV RBC AUTO: 98 FL (ref 82–98)
MONOCYTES # BLD AUTO: 0.63 THOUSAND/ÂΜL (ref 0.17–1.22)
MONOCYTES NFR BLD AUTO: 9 % (ref 4–12)
NEUTROPHILS # BLD AUTO: 5.57 THOUSANDS/ÂΜL (ref 1.85–7.62)
NEUTS SEG NFR BLD AUTO: 75 % (ref 43–75)
NRBC BLD AUTO-RTO: 0 /100 WBCS
PLATELET # BLD AUTO: 310 THOUSANDS/UL (ref 149–390)
PMV BLD AUTO: 8.6 FL (ref 8.9–12.7)
POTASSIUM SERPL-SCNC: 3.6 MMOL/L (ref 3.5–5.3)
PROTHROMBIN TIME: 33.8 SECONDS (ref 11.6–14.5)
RBC # BLD AUTO: 3.17 MILLION/UL (ref 3.81–5.12)
SODIUM SERPL-SCNC: 138 MMOL/L (ref 135–147)
WBC # BLD AUTO: 7.37 THOUSAND/UL (ref 4.31–10.16)

## 2023-08-06 PROCEDURE — 85610 PROTHROMBIN TIME: CPT | Performed by: INTERNAL MEDICINE

## 2023-08-06 PROCEDURE — 94760 N-INVAS EAR/PLS OXIMETRY 1: CPT

## 2023-08-06 PROCEDURE — 99232 SBSQ HOSP IP/OBS MODERATE 35: CPT | Performed by: INTERNAL MEDICINE

## 2023-08-06 PROCEDURE — 85025 COMPLETE CBC W/AUTO DIFF WBC: CPT | Performed by: INTERNAL MEDICINE

## 2023-08-06 PROCEDURE — 80048 BASIC METABOLIC PNL TOTAL CA: CPT | Performed by: INTERNAL MEDICINE

## 2023-08-06 PROCEDURE — 94640 AIRWAY INHALATION TREATMENT: CPT

## 2023-08-06 RX ADMIN — LEVOTHYROXINE SODIUM 25 MCG: 25 TABLET ORAL at 05:23

## 2023-08-06 RX ADMIN — DILTIAZEM HYDROCHLORIDE 240 MG: 240 CAPSULE, COATED, EXTENDED RELEASE ORAL at 09:43

## 2023-08-06 RX ADMIN — UMECLIDINIUM 1 PUFF: 62.5 AEROSOL, POWDER ORAL at 09:37

## 2023-08-06 RX ADMIN — GUAIFENESIN 600 MG: 600 TABLET ORAL at 09:37

## 2023-08-06 RX ADMIN — FLUTICASONE FUROATE AND VILANTEROL TRIFENATATE 1 PUFF: 200; 25 POWDER RESPIRATORY (INHALATION) at 09:37

## 2023-08-06 RX ADMIN — SERTRALINE HYDROCHLORIDE 50 MG: 50 TABLET ORAL at 09:37

## 2023-08-06 RX ADMIN — BUDESONIDE 0.5 MG: 0.5 INHALANT ORAL at 07:23

## 2023-08-06 RX ADMIN — BUDESONIDE 0.5 MG: 0.5 INHALANT ORAL at 20:22

## 2023-08-06 RX ADMIN — FUROSEMIDE 20 MG: 20 TABLET ORAL at 09:37

## 2023-08-06 NOTE — PLAN OF CARE
Problem: Potential for Falls  Goal: Patient will remain free of falls  Description: INTERVENTIONS:  - Educate patient/family on patient safety including physical limitations  - Instruct patient to call for assistance with activity   - Consult OT/PT to assist with strengthening/mobility   - Keep Call bell within reach  - Keep bed low and locked with side rails adjusted as appropriate  - Keep care items and personal belongings within reach  - Initiate and maintain comfort rounds  - Make Fall Risk Sign visible to staff  - Offer Toileting every 2 Hours, in advance of need  - Initiate/Maintain bed alarm  - Obtain necessary fall risk management equipment: yellow socks  - Apply yellow socks and bracelet for high fall risk patients  - Consider moving patient to room near nurses station  Outcome: Progressing     Problem: PAIN - ADULT  Goal: Verbalizes/displays adequate comfort level or baseline comfort level  Description: Interventions:  - Encourage patient to monitor pain and request assistance  - Assess pain using appropriate pain scale  - Administer analgesics based on type and severity of pain and evaluate response  - Implement non-pharmacological measures as appropriate and evaluate response  - Consider cultural and social influences on pain and pain management  - Notify physician/advanced practitioner if interventions unsuccessful or patient reports new pain  Outcome: Progressing     Problem: INFECTION - ADULT  Goal: Absence or prevention of progression during hospitalization  Description: INTERVENTIONS:  - Assess and monitor for signs and symptoms of infection  - Monitor lab/diagnostic results  - Monitor all insertion sites, i.e. indwelling lines, tubes, and drains  - Monitor endotracheal if appropriate and nasal secretions for changes in amount and color  - [place] appropriate cooling/warming therapies per order  - Administer medications as ordered  - Instruct and encourage patient and family to use good hand hygiene technique  - Identify and instruct in appropriate isolation precautions for identified infection/condition  Outcome: Progressing     Problem: SAFETY ADULT  Goal: Patient will remain free of falls  Description: INTERVENTIONS:  - Educate patient/family on patient safety including physical limitations  - Instruct patient to call for assistance with activity   - Consult OT/PT to assist with strengthening/mobility   - Keep Call bell within reach  - Keep bed low and locked with side rails adjusted as appropriate  - Keep care items and personal belongings within reach  - Initiate and maintain comfort rounds  - Make Fall Risk Sign visible to staff  - Offer Toileting every 2 Hours, in advance of need  - Initiate/Maintain bed alarm  - Obtain necessary fall risk management equipment: yellow socks  - Apply yellow socks and bracelet for high fall risk patients  - Consider moving patient to room near nurses station  Outcome: Progressing

## 2023-08-06 NOTE — PLAN OF CARE
Problem: PAIN - ADULT  Goal: Verbalizes/displays adequate comfort level or baseline comfort level  Description: Interventions:  - Encourage patient to monitor pain and request assistance  - Assess pain using appropriate pain scale  - Administer analgesics based on type and severity of pain and evaluate response  - Implement non-pharmacological measures as appropriate and evaluate response  - Consider cultural and social influences on pain and pain management  - Notify physician/advanced practitioner if interventions unsuccessful or patient reports new pain  Outcome: Progressing     Problem: INFECTION - ADULT  Goal: Absence or prevention of progression during hospitalization  Description: INTERVENTIONS:  - Assess and monitor for signs and symptoms of infection  - Monitor lab/diagnostic results  - Monitor all insertion sites, i.e. indwelling lines, tubes, and drains  - Monitor endotracheal if appropriate and nasal secretions for changes in amount and color  - [place] appropriate cooling/warming therapies per order  - Administer medications as ordered  - Instruct and encourage patient and family to use good hand hygiene technique  - Identify and instruct in appropriate isolation precautions for identified infection/condition  Outcome: Progressing     Problem: GASTROINTESTINAL - ADULT  Goal: Minimal or absence of nausea and/or vomiting  Description: INTERVENTIONS:  - Administer IV fluids if ordered to ensure adequate hydration  - Maintain NPO status until nausea and vomiting are resolved  - Nasogastric tube if ordered  - Administer ordered antiemetic medications as needed  - Provide nonpharmacologic comfort measures as appropriate  - Advance diet as tolerated, if ordered  - Consider nutrition services referral to assist patient with adequate nutrition and appropriate food choices  Outcome: Progressing     Problem: Knowledge Deficit  Goal: Patient/family/caregiver demonstrates understanding of disease process, treatment plan, medications, and discharge instructions  Description: Complete learning assessment and assess knowledge base.   Interventions:  - Provide teaching at level of understanding  - Provide teaching via preferred learning methods  Outcome: Progressing

## 2023-08-06 NOTE — PROGRESS NOTES
[address] [facility]  Progress Note  Name: Floreen Baumgarten  MRN: [id number]  Unit/Bed#: -01 I Date of Admission: 8/3/2023   Date of Service: 8/6/2023 I Hospital Day: 2    Assessment/Plan   * Nausea vomiting and diarrhea  Assessment & Plan  Patient presented with nausea, vomiting and diarrhea for the past few days. Will order stool work-up including C. difficile and cultures  CT abdomen pelvis showed-- " Diffuse bowel obstruction with additional wall thickening of small bowel more so in the right lower quadrant. There is a femoral hernia present making obstruction related to hernia most likely with some change in caliber of the exiting loop. The additional wall thickening more diffusely in the pelvic cavity does raise possibility of perhaps some additional venous congestion/ischemia perhaps related to mesenteric twisting. Urgent surgical evaluation is advised."  Trend WBC and fever curve  UA did not show any bacteriuria with WBC 4-7. Patient is asymptomatic  Hold off on antibiotics  Patient diet was advanced. Tolerating it well    Abdominal pain  Assessment & Plan  Patient with abdominal pain  CT showed- Diffuse bowel obstruction with additional wall thickening of small bowel more so in the right lower quadrant. There is a femoral hernia present making obstruction related to hernia most likely with some change in caliber of the exiting loop. The additional wall thickening more diffusely in the pelvic cavity does raise possibility of perhaps some additional venous congestion/ischemia perhaps related to mesenteric twisting. Urgent surgical evaluation is advised. Likely enteritis  ER spoke with surgery and they recommended medical admission  Surgical and GI consult appreciated  Surgical team gave oral contrast and check KUB. As per surgical team patient contrast is passed in colon and from their perspective patient does not have any surgical needs. On clear liquid diet.   Patient does not have any diarrhea or nausea or vomiting  Will advance diet. Patient is tolerating diet  Cleared by GI and surgery for discharge  Stool work-up is negative  Pain management as needed  Resolved    Compression fracture of lumbar vertebra [facility])  Assessment & Plan  Patient on CT scan noted to have a compression fracture of L2 and L4-chronic  Also noted to have compression fracture of T10 and T11 which are chronic  Pain management  PT/OT recommended rehab. Awaiting placement    Chronic respiratory failure with hypoxia and hypercapnia [facility])  Assessment & Plan  Patient has history of COPD and is on chronic oxygen. Patient uses 1 L of supplemental oxygen at home  Continue on bronchodilators  Continue oxygen supplementation    Other specified hypothyroidism  Assessment & Plan  Continue Synthroid    Atrial fibrillation (HCC)  Assessment & Plan  Continue on Cardizem CD and digoxin  On anticoagulation with Coumadin  Will hold Coumadin as INR is 3.15  Trend PT/INR    Antiphospholipid antibody syndrome (HCC)  Assessment & Plan  On Coumadin  Hold Coumadin as INR is 3.15  Monitor PT/INR    Hypertension  Assessment & Plan  Continue Cardizem CD and Lasix  Monitor vitals as per protocol           Labs & Imaging: I have personally reviewed pertinent reports. VTE Prophylaxis: in place. Code Status:   Level 1 - Full Code    Patient Centered Rounds: I have performed bedside rounds with nursing staff today. Discussions with Specialists or Other Care Team Provider: GI    Education and Discussions with Family / Patient: Daughter Abbey Arita    Current Length of Stay: 2 day(s)    Current Patient Status: Inpatient   Certification Statement: The patient will continue to require additional inpatient hospital stay due to see my assessment and plan. Subjective:   Patient is seen and examined at bedside. Denies any new complaints. Afebrile.   No nausea, vomiting, diarrhea or abdominal pain  All other ROS are negative. Objective:    Vitals: Blood pressure 145/54, pulse 64, temperature 97.7 °F (36.5 °C), resp. rate 16, height 5' 6" (1.676 m), weight 54.4 kg (119 lb 14.9 oz), SpO2 95 %, not currently breastfeeding. ,Body mass index is 19.36 kg/m². SPO2 RA Rest    Flowsheet Row ED to Hosp-Admission (Current) from 8/3/2023 in [address] Med Surg [facility]   SpO2 95 %   SpO2 Activity At Rest   O2 Device Nasal cannula   O2 Flow Rate --        I&O:     Intake/Output Summary (Last 24 hours) at 8/6/2023 0719  Last data filed at 8/5/2023 1934  Gross per 24 hour   Intake 720 ml   Output 450 ml   Net 270 ml       Physical Exam:    General- Alert, sitting comfortably in chair. Not in any acute distress. Neck- Supple, No JVD  CVS- regular, S1 and S2 normal  Chest- Bilateral Air entry, No rhochi, crackles or wheezing present. Abdomen- soft, nontender, not distended, no guarding or rigidity, BS+  Extremities-  No pedal edema, No calf tenderness                         Normal ROM in all extremities. CNS-   Alert, awake and orientedx3. No focal deficits present.     Invasive Devices     Peripheral Intravenous Line  Duration           Peripheral IV 08/03/23 Left Antecubital 2 days                      Social History  reviewed  Family History   Problem Relation Age of Onset   • Hypertension Mother    • Skin cancer Mother    • Hypertension Father     reviewed    Meds:  Current Facility-Administered Medications   Medication Dose Route Frequency Provider Last Rate Last Admin   • acetaminophen (TYLENOL) tablet 650 mg  650 mg Oral Q6H PRN Gomez Roy MD       • albuterol inhalation solution 5 mg  5 mg Nebulization Q4H PRN Gomez Roy MD       • budesonide (PULMICORT) inhalation solution 0.5 mg  0.5 mg Nebulization Q12H Gomez Roy MD   0.5 mg at 08/05/23 2033   • digoxin (LANOXIN) tablet 125 mcg  125 mcg Oral Every Other Day Gomez Roy MD   125 mcg at 08/05/23 0906   • diltiazem (CARDIZEM CD) 24 hr capsule 240 mg  240 mg Oral Daily Sara Dumont MD   240 mg at 08/05/23 0914   • fluticasone-vilanterol 200-25 mcg/actuation 1 puff  1 puff Inhalation Daily Sara Dumont MD   1 puff at 08/05/23 3413   • furosemide (LASIX) tablet 20 mg  20 mg Oral Daily Sara Dumont MD   20 mg at 08/05/23 1803   • guaiFENesin (MUCINEX) 12 hr tablet 600 mg  600 mg Oral BID Sara Dumont MD   600 mg at 08/05/23 1717   • levothyroxine tablet 25 mcg  25 mcg Oral Early Morning Sara Dumont MD   25 mcg at 08/06/23 0523   • lidocaine (LIDODERM) 5 % patch 1 patch  1 patch Topical Daily Sara Dumont MD   1 patch at 08/04/23 1290   • mirtazapine (REMERON) tablet 7.5 mg  7.5 mg Oral HS Sara Dumont MD   7.5 mg at 08/05/23 2110   • ondansetron (ZOFRAN) injection 4 mg  4 mg Intravenous Q6H PRN Sara Dumont MD       • senna-docusate sodium (SENOKOT S) 8.6-50 mg per tablet 1 tablet  1 tablet Oral HS Sara Dumont MD   1 tablet at 08/03/23 2158   • sertraline (ZOLOFT) tablet 50 mg  50 mg Oral Daily Sara Dumont MD   50 mg at 08/05/23 1641   • umeclidinium 62.5 mcg/actuation inhaler AEPB 1 puff  1 puff Inhalation Daily Sara Dumont MD   1 puff at 08/05/23 0907      Medications Prior to Admission   Medication   • acetaminophen (TYLENOL) 325 mg tablet   • albuterol (2.5 mg/3 mL) 0.083 % nebulizer solution   • Ascorbic Acid (VITAMIN C) 1000 MG tablet   • Cholecalciferol (VITAMIN D3) 20 MCG (800 UNIT) TABS   • digoxin (LANOXIN) 0.125 mg tablet   • diltiazem (CARDIZEM CD) 240 mg 24 hr capsule   • Ferrous Sulfate 220 (44 Fe) MG/5ML LIQD   • Fluticasone-Salmeterol (Advair) 250-50 mcg/dose inhaler   • furosemide (LASIX) 20 mg tablet   • guaiFENesin (MUCINEX) 600 mg 12 hr tablet   • levothyroxine 25 mcg tablet   • lidocaine (LIDODERM) 5 %   • loratadine (CLARITIN) 10 mg tablet   • mirtazapine (REMERON) 7.5 MG tablet   • multivitamin (THERAGRAN) TABS   • sertraline (Zoloft) 50 mg tablet   • tiZANidine (ZANAFLEX) 2 mg tablet   • umeclidinium (Incruse Ellipta) 62.5 mcg/actuation AEPB inhaler   • warfarin (COUMADIN) 2 mg tablet   • bisacodyl (DULCOLAX) 5 mg EC tablet   • budesonide (PULMICORT) 0.5 mg/2 mL nebulizer solution   • famotidine (PEPCID) 20 mg tablet   • oxygen gas   • polyethylene glycol (MIRALAX) 17 g packet   • senna-docusate sodium (SENOKOT S) 8.6-50 mg per tablet   • sodium phosphate-biphosphate (FLEET) 7-19 g 118 mL enema       Labs:  Results from last 7 days   Lab Units 08/06/23 0527 08/05/23 0601 08/04/23 0512   WBC Thousand/uL 7.37 7.84 7.46   HEMOGLOBIN g/dL 9.8* 9.3* 9.1*   HEMATOCRIT % 31.2* 30.0* 29.4*   PLATELETS Thousands/uL 310 293 288   NEUTROS PCT % 75 77* 78*   LYMPHS PCT % 12* 13* 12*   MONOS PCT % 9 7 9   EOS PCT % 3 2 1     Results from last 7 days   Lab Units 08/06/23 0527 08/05/23  0601 08/04/23  0512 08/03/23  1145   POTASSIUM mmol/L 3.6 3.7 3.5 3.7   CHLORIDE mmol/L 102 101 103 96   CO2 mmol/L 35* 31 34* 29   BUN mg/dL 10 12 19 31*   CREATININE mg/dL 0.42* 0.43* 0.48* 0.72   CALCIUM mg/dL 8.6 8.6 8.4 9.0   ALK PHOS U/L  --   --  97 122*   ALT U/L  --   --  7 6*   AST U/L  --   --  15 20     Lab Results   Component Value Date    TROPONINI <0.02 11/22/2017    CKTOTAL 73 06/22/2023    CKTOTAL 26 02/27/2023     Results from last 7 days   Lab Units 08/06/23 0527 08/05/23  0601 08/04/23 0512   INR  3.15* 3.28* 3.32*     Lab Results   Component Value Date    BLOODCX No Growth After 5 Days. 06/22/2023    BLOODCX No Growth After 5 Days. 06/22/2023    BLOODCX No Growth After 5 Days. 05/17/2023    BLOODCX No Growth After 5 Days. 05/17/2023    URINECX 50,000-59,000 cfu/ml Enterococcus faecalis (A) 02/28/2023    URINECX <10,000 cfu/ml 02/28/2023         Imaging:  Results for orders placed during the hospital encounter of 08/03/23    XR chest portable    Narrative  CHEST    INDICATION:   SOB.    COMPARISON: CT chest and chest x-ray 6/22/2023.     EXAM PERFORMED/VIEWS:  XR CHEST PORTABLE      FINDINGS:    Cardiomediastinal silhouette appears unremarkable. Oral contrast is seen within the stomach and distal esophagus. Small hiatal hernia suggested. There is mild pulmonary vascular congestion. Hazy bibasilar airspace opacities are new from prior exam and likely represent atelectasis versus developing pneumonia. There is a trace left effusion. No appreciable pneumothorax. No evidence of acute osseous abnormality. Impression  1. New bibasilar airspace opacities with trace left effusion. Opacities may represent atelectasis versus developing pneumonia. 2. Mild pulmonary vascular congestion        Workstation performed: [id number]    Results for orders placed during the hospital encounter of 05/04/23    XR chest pa & lateral    Narrative  CHEST    INDICATION:   Shortness of breath. COMPARISON: Chest radiograph March 6, 2023    EXAM PERFORMED/VIEWS:  XR CHEST PA & LATERAL      FINDINGS:    Cardiomediastinal silhouette appears unremarkable. Linear atelectasis in the right lower lung. No focal consolidation, pleural effusion or pneumothorax. Osseous structures appear within normal limits for patient age. Impression  No focal consolidation, pleural effusion, or pneumothorax.             Workstation performed: [id number]      Last 24 Hours Medication List:   Current Facility-Administered Medications   Medication Dose Route Frequency Provider Last Rate   • acetaminophen  650 mg Oral Q6H PRN Pako Yuan MD     • albuterol  5 mg Nebulization Q4H PRN Pako Yuan MD     • budesonide  0.5 mg Nebulization Q12H Pako Yuan MD     • digoxin  125 mcg Oral Every Other Day Pako Yuan MD     • diltiazem  240 mg Oral Daily Pako Yuan MD     • fluticasone-vilanterol  1 puff Inhalation Daily Pako Yuan MD     • furosemide  20 mg Oral Daily Pako Yuan MD     • guaiFENesin  600 mg Oral BID Pako Yuan MD     • levothyroxine  25 mcg Oral Early Morning Donald Jain MD     • lidocaine  1 patch Topical Daily Donald Jain MD     • mirtazapine  7.5 mg Oral HS Donald Jain MD     • ondansetron  4 mg Intravenous Q6H PRN Donald Jain MD     • senna-docusate sodium  1 tablet Oral HS Donald Jain MD     • sertraline  50 mg Oral Daily Donald Jain MD     • umeclidinium  1 puff Inhalation Daily Donald Jain MD          Today, Patient Was Seen By: Donald Jain MD    ** Please Note: Dictation voice to text software may have been used in the creation of this document.  **

## 2023-08-06 NOTE — ASSESSMENT & PLAN NOTE
Patient on CT scan noted to have a compression fracture of L2 and L4-chronic  Also noted to have compression fracture of T10 and T11 which are chronic  Pain management  PT/OT recommended rehab.   Awaiting placement

## 2023-08-06 NOTE — ASSESSMENT & PLAN NOTE
Patient with abdominal pain  CT showed- Diffuse bowel obstruction with additional wall thickening of small bowel more so in the right lower quadrant. There is a femoral hernia present making obstruction related to hernia most likely with some change in caliber of the exiting loop. The additional wall thickening more diffusely in the pelvic cavity does raise possibility of perhaps some additional venous congestion/ischemia perhaps related to mesenteric twisting. Urgent surgical evaluation is advised. Likely enteritis  ER spoke with surgery and they recommended medical admission  Surgical and GI consult appreciated  Surgical team gave oral contrast and check KUB. As per surgical team patient contrast is passed in colon and from their perspective patient does not have any surgical needs. On clear liquid diet. Patient does not have any diarrhea or nausea or vomiting  Will advance diet.   Patient is tolerating diet  Cleared by GI and surgery for discharge  Stool work-up is negative  Pain management as needed  Resolved

## 2023-08-06 NOTE — ASSESSMENT & PLAN NOTE
Patient presented with nausea, vomiting and diarrhea for the past few days. Will order stool work-up including C. difficile and cultures  CT abdomen pelvis showed-- " Diffuse bowel obstruction with additional wall thickening of small bowel more so in the right lower quadrant. There is a femoral hernia present making obstruction related to hernia most likely with some change in caliber of the exiting loop. The additional wall thickening more diffusely in the pelvic cavity does raise possibility of perhaps some additional venous congestion/ischemia perhaps related to mesenteric twisting. Urgent surgical evaluation is advised."  Trend WBC and fever curve  UA did not show any bacteriuria with WBC 4-7. Patient is asymptomatic  Hold off on antibiotics  Patient diet was advanced.   Tolerating it well

## 2023-08-06 NOTE — PROGRESS NOTES
Progress note - [address] [facility]   Leslie Sanchez 79 y.o. female MRN: [id number]  Unit/Bed#: -01 Encounter: [id number]    ASSESSMENT and PLAN    59-year-old female past medical history of A-fib and antiphospholipid syndrome on Coumadin, presenting with acute onset nausea vomiting diarrhea abdominal pain found to have bowel obstruction     1.  Nausea vomiting diarrhea  2.  Abdominal pain  3.  Abnormal CT scan  4.  Macrocytic anemia with history of iron deficiency anemia  5.  A-fib and antiphospholipid antibody syndrome on Coumadin  6.  Hypercapnic respiratory failure on oxygen     CT scan reviewed by me notable with diffuse bowel obstruction  Additional wall thickening of the small bowel in the right lower quadrant. History of a femoral hernia. Easily reducible on exam.  Mild abdominal pain. Possible venous congestion related to mesenteric twisting. Some celiac and SMA narrowing of the vessels.     ADAT   IV fluids and antiemetics. If continues to vomit consider placing NG tube  Follow-up stool studies and C. difficile although less likely as she is not having active diarrhea at this time  Lactate is normal.  Daily exams. If with peritonitis notify surgery  No plans for inpatient endoscopy at this time due to risk of perforation. No barriers to discharge per GI. Thank you for this consult. We will sign off at this time. If there are any questions please feel free to reach out to us. Chief Complaint   Patient presents with   • Abdominal Pain     Patient presents to the ED with c/o generalized abdominal pain, nausea and diarrhea x3 days.  States she had a telehealth visit today and they were concerned for dehydration        SUBJECTIVE  No NVD or AP    Temp:  [97.5 °F (36.4 °C)-99 °F (37.2 °C)] 99 °F (37.2 °C)  HR:  [63-65] 63  Resp:  [20] 20  BP: (141-154)/(54-58) 154/58     PHYSICAL EXAM  General Appearance: NAD, cooperative, alert  Eyes: Anicteric  GI:  Soft, non-tender, non-distended; normal bowel sounds; no masses, no organomegaly   Rectal: Deferred  Musculoskeletal: No edema. Skin:  No jaundice    LABS & STUDIES  Lab Results   Component Value Date    GLUCOSE 210 (H) 05/20/2023    CALCIUM 8.6 08/06/2023     03/17/2015    K 3.6 08/06/2023    CO2 35 (H) 08/06/2023     08/06/2023    BUN 10 08/06/2023    CREATININE 0.42 (L) 08/06/2023    CREATININE 0.43 (L) 08/05/2023    CREATININE 0.48 (L) 08/04/2023     Lab Results   Component Value Date    WBC 7.37 08/06/2023    WBC 7.84 08/05/2023    WBC 7.46 08/04/2023    HGB 9.8 (L) 08/06/2023    HGB 9.3 (L) 08/05/2023    HGB 9.1 (L) 08/04/2023    MCV 98 08/06/2023     08/06/2023     08/05/2023     08/04/2023     Lab Results   Component Value Date    ALT 7 08/04/2023    ALT 6 (L) 08/03/2023    ALT 10 06/28/2023    AST 15 08/04/2023    AST 20 08/03/2023    AST 13 06/28/2023    ALKPHOS 97 08/04/2023    ALKPHOS 122 (H) 08/03/2023    ALKPHOS 107 (H) 06/28/2023    TBILI 0.44 08/04/2023    TBILI 0.63 08/03/2023    TBILI 0.44 06/28/2023     No results found for: "AMYLASE"  Lab Results   Component Value Date    LIPASE 17 08/03/2023     Lab Results   Component Value Date    IRON 53 06/30/2022    TIBC 266 06/30/2022    FERRITIN 360 06/30/2022     Lab Results   Component Value Date    INR 3.15 (H) 08/06/2023    INR 3.28 (H) 08/05/2023    INR 3.32 (H) 08/04/2023       XR abdomen 1 view kub    Result Date: 8/4/2023  Narrative: ABDOMEN INDICATION:   N/V, rule out bowel obstruction/ileus, patient had oral contrast. COMPARISON: CT abdomen and pelvis with contrast dated August 3, 2023. VIEWS:  AP supine. 2 images. FINDINGS: Mildly dilated small bowel, improved when compared to CT study from 1 day ago. There is oral contrast material visualized throughout the large bowel to include the sigmoid colon and rectum. No discernible free air on this supine study.   Upright or left lateral decubitus imaging is more sensitive to detect subtle free air in the appropriate setting. No soft tissue masses. Mild atelectasis visualized lung bases. No acute osseous abnormalities. Degenerative changes of the spine. Left hip hemiarthroplasty present. Redemonstration of right pubic deformity. Impression: Ingested contrast is in the colon indicating interval improvement since prior. Persistent dilated small bowel loops. Cannot exclude partial obstruction. Recommend continued follow-up if symptoms persist. Resident: Hafsa Duvall, the attending radiologist, have reviewed the images and agree with the final report above. Workstation performed: [id number]     XR chest portable    Result Date: 8/4/2023  Narrative: CHEST INDICATION:   SOB. COMPARISON: CT chest and chest x-ray 6/22/2023. EXAM PERFORMED/VIEWS:  XR CHEST PORTABLE FINDINGS: Cardiomediastinal silhouette appears unremarkable. Oral contrast is seen within the stomach and distal esophagus. Small hiatal hernia suggested. There is mild pulmonary vascular congestion. Hazy bibasilar airspace opacities are new from prior exam and likely represent atelectasis versus developing pneumonia. There is a trace left effusion. No appreciable pneumothorax. No evidence of acute osseous abnormality. Impression: 1. New bibasilar airspace opacities with trace left effusion. Opacities may represent atelectasis versus developing pneumonia. 2. Mild pulmonary vascular congestion Workstation performed: [id number]     CT abdomen pelvis with contrast    Result Date: 8/3/2023  Narrative: CT ABDOMEN AND PELVIS WITH IV CONTRAST INDICATION:   Abdominal pain, acute, nonlocalized abdominal pain, diffuse. COMPARISON: 6/22/2023 TECHNIQUE:  CT examination of the abdomen and pelvis was performed. Multiplanar 2D reformatted images were created from the source data.  This examination, like all CT scans performed in the [facility], was performed utilizing techniques to minimize radiation dose exposure, including the use of iterative reconstruction and automated exposure control. Radiation dose length product (DLP) for this visit:  406.31 mGy-cm IV Contrast:  100 mL of iohexol (OMNIPAQUE) Enteric Contrast:  Enteric contrast was not administered. FINDINGS: ABDOMEN LOWER CHEST: Right lower lobe infiltrate is identified with occlusion of the subsegmental bronchus from mucous debris or aspiration. Some mild bronchial occlusion on the left is also seen. Small effusions left greater than right. [facility] LIVER/BILIARY TREE:  Unremarkable. GALLBLADDER:  No calcified gallstones. No pericholecystic inflammatory change. SPLEEN:  Unremarkable. PANCREAS:  Unremarkable. ADRENAL GLANDS:  Unremarkable. KIDNEYS/URETERS: No hydronephrosis. Circumscribed hypodensities in the kidneys probably represent small cysts although too small to characterize. [facility] STOMACH AND BOWEL: Probable small hiatus hernia is present. There is diffuse small bowel dilatation identified. Appears to be some wall thickening and edema in the region of the right pelvis. This edematous loop extends into a hernia sac which appears to  partially compress the femoral vein suggesting a femoral hernia rather than an inguinal hernia. This is also more laterally positioned. Some fluid is seen within this hernia sac. This does appear to be the cause of obstruction with the colon being decompressed. There does appear to be some thickened bowel loops within the peritoneal cavity however. These loops are both before and after the hernia. Of note, there is a vascular pedicle extending into the hernia and this may be causing some lymphatic or venous congestion more broadly accounting for some of this small bowel thickening not evident in the left abdomen abdomen. There is some swirling of the mesentery. The ileocecal junction is difficult to identify due to the decompressed status and perhaps some twisting of the mesentery. There is diverticulosis in the distal sigmoid colon.  APPENDIX: There are expected postoperative changes of appendectomy. ABDOMINOPELVIC CAVITY:  No ascites. No pneumoperitoneum. No lymphadenopathy. VESSELS: Atherosclerotic changes are present. No evidence of aneurysm. There appears to be narrowing of the celiac axis at its origin. Mild SMA narrowing is seen near its origin although there is still enhancement of the SMA more distally. The AFSHIN is not well seen. PELVIS REPRODUCTIVE ORGANS: Coarse calcification uterus probably represents a fibroid. URINARY BLADDER:  Unremarkable. ABDOMINAL WALL/INGUINAL REGIONS:  Unremarkable. OSSEOUS STRUCTURES:  No acute fracture or destructive osseous lesion. Degenerative changes of the spine. The patient is status post left hip replacement. There is a nonhealed fracture with fragmentation involving the right pubic bone. The appearance is similar to June although there is some additional callus formation as well as reparative changes near the fracture line with some additional lucency. Compression fracture of L2 and L4 are identified as well as T10 and T11 these are similar to the study of June. Some healed rib fractures appear to be present bilaterally. Impression: Diffuse bowel obstruction with additional wall thickening of small bowel more so in the right lower quadrant. There is a femoral hernia present making obstruction related to hernia most likely with some change in caliber of the exiting loop. The additional wall thickening more diffusely in the pelvic cavity does raise possibility of perhaps some additional venous congestion/ischemia perhaps related to mesenteric twisting. Urgent surgical evaluation is advised. Atherosclerotic changes are seen. There is some celiac and SMA narrowing although these vessels do enhance. Multiple fractures as described above are again noted. This was discussed with Dr. Mohit Barnes at 3:15 p.m.  Workstation performed: [id number]

## 2023-08-06 NOTE — PLAN OF CARE
Problem: Potential for Falls  Goal: Patient will remain free of falls  Description: INTERVENTIONS:  - Educate patient/family on patient safety including physical limitations  - Instruct patient to call for assistance with activity   - Consult OT/PT to assist with strengthening/mobility   - Keep Call bell within reach  - Keep bed low and locked with side rails adjusted as appropriate  - Keep care items and personal belongings within reach  - Initiate and maintain comfort rounds  - Make Fall Risk Sign visible to staff  - Offer Toileting every 2 Hours, in advance of need  - Initiate/Maintain alarm  - Obtain necessary fall risk management equipment:   - Apply yellow socks and bracelet for high fall risk patients  - Consider moving patient to room near nurses station  Outcome: Progressing     Problem: MOBILITY - ADULT  Goal: Maintain or return to baseline ADL function  Description: INTERVENTIONS:  -  Assess patient's ability to carry out ADLs; assess patient's baseline for ADL function and identify physical deficits which impact ability to perform ADLs (bathing, care of mouth/teeth, toileting, grooming, dressing, etc.)  - Assess/evaluate cause of self-care deficits   - Assess range of motion  - Assess patient's mobility; develop plan if impaired  - Assess patient's need for assistive devices and provide as appropriate  - Encourage maximum independence but intervene and supervise when necessary  - Involve family in performance of ADLs  - Assess for home care needs following discharge   - Consider OT consult to assist with ADL evaluation and planning for discharge  - Provide patient education as appropriate  Outcome: Progressing  Goal: Maintains/Returns to pre admission functional level  Description: INTERVENTIONS:  - Perform BMAT or MOVE assessment daily.   - Set and communicate daily mobility goal to care team and patient/family/caregiver.    - Collaborate with rehabilitation services on mobility goals if consulted  - Perform Range of Motion 4 times a day. - Reposition patient every 2 hours.   - Dangle patient 4 times a day  - Stand patient 4 times a day  - Ambulate patient 4 times a day  - Out of bed to chair 4 times a day   - Out of bed for meals 4 times a day  - Out of bed for toileting  - Record patient progress and toleration of activity level   Outcome: Progressing     Problem: Prexisting or High Potential for Compromised Skin Integrity  Goal: Skin integrity is maintained or improved  Description: INTERVENTIONS:  - Identify patients at risk for skin breakdown  - Assess and monitor skin integrity  - Assess and monitor nutrition and hydration status  - Monitor labs   - Assess for incontinence   - Turn and reposition patient  - Assist with mobility/ambulation  - Relieve pressure over bony prominences  - Avoid friction and shearing  - Provide appropriate hygiene as needed including keeping skin clean and dry  - Evaluate need for skin moisturizer/barrier cream  - Collaborate with interdisciplinary team   - Patient/family teaching  - Consider wound care consult   Outcome: Progressing     Problem: PAIN - ADULT  Goal: Verbalizes/displays adequate comfort level or baseline comfort level  Description: Interventions:  - Encourage patient to monitor pain and request assistance  - Assess pain using appropriate pain scale  - Administer analgesics based on type and severity of pain and evaluate response  - Implement non-pharmacological measures as appropriate and evaluate response  - Consider cultural and social influences on pain and pain management  - Notify physician/advanced practitioner if interventions unsuccessful or patient reports new pain  Outcome: Progressing     Problem: INFECTION - ADULT  Goal: Absence or prevention of progression during hospitalization  Description: INTERVENTIONS:  - Assess and monitor for signs and symptoms of infection  - Monitor lab/diagnostic results  - Monitor all insertion sites, i.e. indwelling lines, tubes, and drains  - Monitor endotracheal if appropriate and nasal secretions for changes in amount and color  - [place] appropriate cooling/warming therapies per order  - Administer medications as ordered  - Instruct and encourage patient and family to use good hand hygiene technique  - Identify and instruct in appropriate isolation precautions for identified infection/condition  Outcome: Progressing     Problem: SAFETY ADULT  Goal: Patient will remain free of falls  Description: INTERVENTIONS:  - Educate patient/family on patient safety including physical limitations  - Instruct patient to call for assistance with activity   - Consult OT/PT to assist with strengthening/mobility   - Keep Call bell within reach  - Keep bed low and locked with side rails adjusted as appropriate  - Keep care items and personal belongings within reach  - Initiate and maintain comfort rounds  - Make Fall Risk Sign visible to staff  - Offer Toileting every 2 Hours, in advance of need  - Initiate/Maintain alarm  - Obtain necessary fall risk management equipment:   - Apply yellow socks and bracelet for high fall risk patients  - Consider moving patient to room near nurses station  Outcome: Progressing     Problem: DISCHARGE PLANNING  Goal: Discharge to home or other facility with appropriate resources  Description: INTERVENTIONS:  - Identify barriers to discharge w/patient and caregiver  - Arrange for needed discharge resources and transportation as appropriate  - Identify discharge learning needs (meds, wound care, etc.)  - Arrange for interpretive services to assist at discharge as needed  - Refer to Case Management Department for coordinating discharge planning if the patient needs post-hospital services based on physician/advanced practitioner order or complex needs related to functional status, cognitive ability, or social support system  Outcome: Progressing     Problem: Knowledge Deficit  Goal: Patient/family/caregiver demonstrates understanding of disease process, treatment plan, medications, and discharge instructions  Description: Complete learning assessment and assess knowledge base.   Interventions:  - Provide teaching at level of understanding  - Provide teaching via preferred learning methods  Outcome: Progressing     Problem: GASTROINTESTINAL - ADULT  Goal: Minimal or absence of nausea and/or vomiting  Description: INTERVENTIONS:  - Administer IV fluids if ordered to ensure adequate hydration  - Maintain NPO status until nausea and vomiting are resolved  - Nasogastric tube if ordered  - Administer ordered antiemetic medications as needed  - Provide nonpharmacologic comfort measures as appropriate  - Advance diet as tolerated, if ordered  - Consider nutrition services referral to assist patient with adequate nutrition and appropriate food choices  Outcome: Progressing  Goal: Maintains or returns to baseline bowel function  Description: INTERVENTIONS:  - Assess bowel function  - Encourage oral fluids to ensure adequate hydration  - Administer IV fluids if ordered to ensure adequate hydration  - Administer ordered medications as needed  - Encourage mobilization and activity  - Consider nutritional services referral to assist patient with adequate nutrition and appropriate food choices  Outcome: Progressing

## 2023-08-06 NOTE — ASSESSMENT & PLAN NOTE
Continue on Cardizem CD and digoxin  On anticoagulation with Coumadin  Will hold Coumadin as INR is 3.15  Trend PT/INR

## 2023-08-07 ENCOUNTER — NURSING HOME VISIT (OUTPATIENT)
Dept: FAMILY MEDICINE CLINIC | Facility: CLINIC | Age: 83
End: 2023-08-07
Payer: MEDICARE

## 2023-08-07 VITALS
SYSTOLIC BLOOD PRESSURE: 151 MMHG | RESPIRATION RATE: 19 BRPM | BODY MASS INDEX: 19.27 KG/M2 | OXYGEN SATURATION: 98 % | TEMPERATURE: 98.1 F | DIASTOLIC BLOOD PRESSURE: 62 MMHG | WEIGHT: 119.93 LBS | HEART RATE: 70 BPM | HEIGHT: 66 IN

## 2023-08-07 DIAGNOSIS — I10 ESSENTIAL HYPERTENSION: ICD-10-CM

## 2023-08-07 DIAGNOSIS — I48.0 PAROXYSMAL ATRIAL FIBRILLATION (HCC): ICD-10-CM

## 2023-08-07 DIAGNOSIS — J96.11 CHRONIC RESPIRATORY FAILURE WITH HYPOXIA AND HYPERCAPNIA (HCC): Primary | ICD-10-CM

## 2023-08-07 DIAGNOSIS — J44.1 COPD EXACERBATION (HCC): ICD-10-CM

## 2023-08-07 DIAGNOSIS — F41.9 ANXIETY: ICD-10-CM

## 2023-08-07 DIAGNOSIS — D68.61 ANTIPHOSPHOLIPID ANTIBODY SYNDROME (HCC): ICD-10-CM

## 2023-08-07 DIAGNOSIS — R62.7 FAILURE TO THRIVE IN ADULT: ICD-10-CM

## 2023-08-07 DIAGNOSIS — E03.8 OTHER SPECIFIED HYPOTHYROIDISM: ICD-10-CM

## 2023-08-07 DIAGNOSIS — E43 SEVERE PROTEIN-CALORIE MALNUTRITION (HCC): ICD-10-CM

## 2023-08-07 DIAGNOSIS — J96.12 CHRONIC RESPIRATORY FAILURE WITH HYPOXIA AND HYPERCAPNIA (HCC): Primary | ICD-10-CM

## 2023-08-07 LAB
ANION GAP SERPL CALCULATED.3IONS-SCNC: 2 MMOL/L
BASOPHILS # BLD AUTO: 0.02 THOUSANDS/ÂΜL (ref 0–0.1)
BASOPHILS NFR BLD AUTO: 0 % (ref 0–1)
BUN SERPL-MCNC: 8 MG/DL (ref 5–25)
CALCIUM SERPL-MCNC: 8.4 MG/DL (ref 8.4–10.2)
CHLORIDE SERPL-SCNC: 100 MMOL/L (ref 96–108)
CO2 SERPL-SCNC: 37 MMOL/L (ref 21–32)
CREAT SERPL-MCNC: 0.41 MG/DL (ref 0.6–1.3)
EOSINOPHIL # BLD AUTO: 0.21 THOUSAND/ÂΜL (ref 0–0.61)
EOSINOPHIL NFR BLD AUTO: 3 % (ref 0–6)
ERYTHROCYTE [DISTWIDTH] IN BLOOD BY AUTOMATED COUNT: 13.9 % (ref 11.6–15.1)
FLUAV RNA RESP QL NAA+PROBE: NEGATIVE
FLUBV RNA RESP QL NAA+PROBE: NEGATIVE
GFR SERPL CREATININE-BSD FRML MDRD: 95 ML/MIN/1.73SQ M
GLUCOSE SERPL-MCNC: 88 MG/DL (ref 65–140)
HCT VFR BLD AUTO: 29 % (ref 34.8–46.1)
HGB BLD-MCNC: 9 G/DL (ref 11.5–15.4)
IMM GRANULOCYTES # BLD AUTO: 0.03 THOUSAND/UL (ref 0–0.2)
IMM GRANULOCYTES NFR BLD AUTO: 0 % (ref 0–2)
INR PPP: 2.64 (ref 0.84–1.19)
LYMPHOCYTES # BLD AUTO: 0.81 THOUSANDS/ÂΜL (ref 0.6–4.47)
LYMPHOCYTES NFR BLD AUTO: 12 % (ref 14–44)
MCH RBC QN AUTO: 30.9 PG (ref 26.8–34.3)
MCHC RBC AUTO-ENTMCNC: 31 G/DL (ref 31.4–37.4)
MCV RBC AUTO: 100 FL (ref 82–98)
MONOCYTES # BLD AUTO: 0.59 THOUSAND/ÂΜL (ref 0.17–1.22)
MONOCYTES NFR BLD AUTO: 9 % (ref 4–12)
NEUTROPHILS # BLD AUTO: 5.2 THOUSANDS/ÂΜL (ref 1.85–7.62)
NEUTS SEG NFR BLD AUTO: 76 % (ref 43–75)
NRBC BLD AUTO-RTO: 0 /100 WBCS
PLATELET # BLD AUTO: 335 THOUSANDS/UL (ref 149–390)
PMV BLD AUTO: 9 FL (ref 8.9–12.7)
POTASSIUM SERPL-SCNC: 3.5 MMOL/L (ref 3.5–5.3)
PROTHROMBIN TIME: 29.5 SECONDS (ref 11.6–14.5)
RBC # BLD AUTO: 2.91 MILLION/UL (ref 3.81–5.12)
RSV RNA RESP QL NAA+PROBE: NEGATIVE
SARS-COV-2 RNA RESP QL NAA+PROBE: NEGATIVE
SODIUM SERPL-SCNC: 139 MMOL/L (ref 135–147)
WBC # BLD AUTO: 6.86 THOUSAND/UL (ref 4.31–10.16)

## 2023-08-07 PROCEDURE — 80048 BASIC METABOLIC PNL TOTAL CA: CPT | Performed by: INTERNAL MEDICINE

## 2023-08-07 PROCEDURE — 99305 1ST NF CARE MODERATE MDM 35: CPT | Performed by: FAMILY MEDICINE

## 2023-08-07 PROCEDURE — 85610 PROTHROMBIN TIME: CPT | Performed by: INTERNAL MEDICINE

## 2023-08-07 PROCEDURE — 94640 AIRWAY INHALATION TREATMENT: CPT

## 2023-08-07 PROCEDURE — 94760 N-INVAS EAR/PLS OXIMETRY 1: CPT

## 2023-08-07 PROCEDURE — 85025 COMPLETE CBC W/AUTO DIFF WBC: CPT | Performed by: INTERNAL MEDICINE

## 2023-08-07 PROCEDURE — 0241U HB NFCT DS VIR RESP RNA 4 TRGT: CPT | Performed by: INTERNAL MEDICINE

## 2023-08-07 PROCEDURE — 99239 HOSP IP/OBS DSCHRG MGMT >30: CPT | Performed by: INTERNAL MEDICINE

## 2023-08-07 RX ADMIN — DIGOXIN 125 MCG: 125 TABLET ORAL at 08:45

## 2023-08-07 RX ADMIN — LIDOCAINE 5% 1 PATCH: 700 PATCH TOPICAL at 08:45

## 2023-08-07 RX ADMIN — SERTRALINE HYDROCHLORIDE 50 MG: 50 TABLET ORAL at 08:45

## 2023-08-07 RX ADMIN — BUDESONIDE 0.5 MG: 0.5 INHALANT ORAL at 09:07

## 2023-08-07 RX ADMIN — DILTIAZEM HYDROCHLORIDE 240 MG: 240 CAPSULE, COATED, EXTENDED RELEASE ORAL at 08:52

## 2023-08-07 RX ADMIN — FUROSEMIDE 20 MG: 20 TABLET ORAL at 08:45

## 2023-08-07 RX ADMIN — UMECLIDINIUM 1 PUFF: 62.5 AEROSOL, POWDER ORAL at 08:46

## 2023-08-07 RX ADMIN — FLUTICASONE FUROATE AND VILANTEROL TRIFENATATE 1 PUFF: 200; 25 POWDER RESPIRATORY (INHALATION) at 08:46

## 2023-08-07 RX ADMIN — GUAIFENESIN 600 MG: 600 TABLET ORAL at 08:45

## 2023-08-07 RX ADMIN — LEVOTHYROXINE SODIUM 25 MCG: 25 TABLET ORAL at 05:20

## 2023-08-07 NOTE — ASSESSMENT & PLAN NOTE
Continue on Cardizem CD and digoxin  On anticoagulation with Coumadin  INR is 2.64  Restart Coumadin  PT/INR 2 to 3 days at nursing facility

## 2023-08-07 NOTE — CASE MANAGEMENT
Case Management Discharge Planning Note    Patient name Brianne Montoya /-19 MRN [id number]  : 1940 Date 2023       Current Admission Date: 8/3/2023  Current Admission Diagnosis:Nausea vomiting and diarrhea   Patient Active Problem List    Diagnosis Date Noted   • Dehydration 2023   • Abdominal pain 2023   • Nausea vomiting and diarrhea 2023   • Compression fracture of lumbar vertebra ([address]) 2023   • Aftercare following surgery of the musculoskeletal system 2023   • Constipation 2023   • Acute postoperative pulmonary insufficiency ([address]) 2023   • Femur fracture, left ([address]) 2023   • Elevated troponin 2023   • Bilateral pleural effusion 2023   • Chronic respiratory failure with hypoxia and hypercapnia ([address]) 2023   • UGI bleed 2023   • Cerebral aneurysm, nonruptured 2023   • Abdominal distention/back pain 2023   • Urinary retention 2023   • MDD with persistant bereavement complex 2023   • Abnormal CT of the chest 2023   • 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   • Thyroid nodule 2023   • Severe protein-calorie malnutrition ([address]) 2023   • Unspecified mood (affective) disorder ([address]) 2023   • Failure to thrive in adult 2023   • Pulmonary nodule 02/15/2023   • Onychomycosis 02/15/2023   • Essential hypertension 02/10/2023   • Anxiety 02/10/2023   • Other specified hypothyroidism 2022   • Peripheral edema 2021   • Low back pain without sciatica 2021   • COPD exacerbation ([address]) 2017   • Hypertension 2017   • Hypercoagulable state ([address]) 2017   • 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) 2012   • Asthma 2012      LOS (days): 3  Geometric Mean LOS (GMLOS) (days): 3.00  Days to GMLOS:0.1     OBJECTIVE:  Risk of Unplanned Readmission Score: 38.56         Current admission status: Inpatient   Preferred Pharmacy:   [address] [place] [address] [id number] Keith Sandoval, [address] [address] [place] [address]  Phone: [phone] Fax: [phone]    Primary Care Provider: Lino Miranda MD    Primary Insurance: MEDICARE  Secondary Insurance: BLUE CROSS    DISCHARGE DETAILS:           Reviewed discharge plan and IMM with patient. Patient in agreement with discharge to [facility] and signed IMM. CM notified son, Maria Teresa Alcantara, as well, via phone.                                 Other Referral/Resources/Interventions Provided:  Interventions: Short Term Rehab         Treatment Team Recommendation: Short Term Rehab  Discharge Destination Plan[de-identified] Short Term Rehab  Transport at Discharge : BLS Ambulance                             IMM Given (Date):: 08/07/23 (9:26 am)  IMM Given to[de-identified] Patient

## 2023-08-07 NOTE — PLAN OF CARE
Problem: Potential for Falls  Goal: Patient will remain free of falls  Description: INTERVENTIONS:  - Educate patient/family on patient safety including physical limitations  - Instruct patient to call for assistance with activity   - Consult OT/PT to assist with strengthening/mobility   - Keep Call bell within reach  - Keep bed low and locked with side rails adjusted as appropriate  - Keep care items and personal belongings within reach  - Initiate and maintain comfort rounds  - Make Fall Risk Sign visible to staff  - Offer Toileting every 2 Hours, in advance of need  - Initiate/Maintain bed/ chair alarm  - Obtain necessary fall risk management equipment: n/a  - Apply yellow socks and bracelet for high fall risk patients  - Consider moving patient to room near nurses station  Outcome: Progressing     Problem: MOBILITY - ADULT  Goal: Maintain or return to baseline ADL function  Description: INTERVENTIONS:  -  Assess patient's ability to carry out ADLs; assess patient's baseline for ADL function and identify physical deficits which impact ability to perform ADLs (bathing, care of mouth/teeth, toileting, grooming, dressing, etc.)  - Assess/evaluate cause of self-care deficits   - Assess range of motion  - Assess patient's mobility; develop plan if impaired  - Assess patient's need for assistive devices and provide as appropriate  - Encourage maximum independence but intervene and supervise when necessary  - Involve family in performance of ADLs  - Assess for home care needs following discharge   - Consider OT consult to assist with ADL evaluation and planning for discharge  - Provide patient education as appropriate  Outcome: Progressing  Goal: Maintains/Returns to pre admission functional level  Description: INTERVENTIONS:  - Perform BMAT or MOVE assessment daily.   - Set and communicate daily mobility goal to care team and patient/family/caregiver.    - Collaborate with rehabilitation services on mobility goals if consulted  - Perform Range of Motion 3 times a day. - Reposition patient every 2 hours.   - Dangle patient 3 times a day  - Stand patient 3 times a day  - Ambulate patient 3 times a day  - Out of bed to chair 3 times a day   - Out of bed for meals 3 times a day  - Out of bed for toileting  - Record patient progress and toleration of activity level   Outcome: Progressing     Problem: Prexisting or High Potential for Compromised Skin Integrity  Goal: Skin integrity is maintained or improved  Description: INTERVENTIONS:  - Identify patients at risk for skin breakdown  - Assess and monitor skin integrity  - Assess and monitor nutrition and hydration status  - Monitor labs   - Assess for incontinence   - Turn and reposition patient  - Assist with mobility/ambulation  - Relieve pressure over bony prominences  - Avoid friction and shearing  - Provide appropriate hygiene as needed including keeping skin clean and dry  - Evaluate need for skin moisturizer/barrier cream  - Collaborate with interdisciplinary team   - Patient/family teaching  - Consider wound care consult   Outcome: Progressing     Problem: PAIN - ADULT  Goal: Verbalizes/displays adequate comfort level or baseline comfort level  Description: Interventions:  - Encourage patient to monitor pain and request assistance  - Assess pain using appropriate pain scale  - Administer analgesics based on type and severity of pain and evaluate response  - Implement non-pharmacological measures as appropriate and evaluate response  - Consider cultural and social influences on pain and pain management  - Notify physician/advanced practitioner if interventions unsuccessful or patient reports new pain  Outcome: Progressing     Problem: INFECTION - ADULT  Goal: Absence or prevention of progression during hospitalization  Description: INTERVENTIONS:  - Assess and monitor for signs and symptoms of infection  - Monitor lab/diagnostic results  - Monitor all insertion sites, i.e. indwelling lines, tubes, and drains  - Monitor endotracheal if appropriate and nasal secretions for changes in amount and color  - [place] appropriate cooling/warming therapies per order  - Administer medications as ordered  - Instruct and encourage patient and family to use good hand hygiene technique  - Identify and instruct in appropriate isolation precautions for identified infection/condition  Outcome: Progressing     Problem: DISCHARGE PLANNING  Goal: Discharge to home or other facility with appropriate resources  Description: INTERVENTIONS:  - Identify barriers to discharge w/patient and caregiver  - Arrange for needed discharge resources and transportation as appropriate  - Identify discharge learning needs (meds, wound care, etc.)  - Arrange for interpretive services to assist at discharge as needed  - Refer to Case Management Department for coordinating discharge planning if the patient needs post-hospital services based on physician/advanced practitioner order or complex needs related to functional status, cognitive ability, or social support system  Outcome: Progressing     Problem: SAFETY ADULT  Goal: Patient will remain free of falls  Description: INTERVENTIONS:  - Educate patient/family on patient safety including physical limitations  - Instruct patient to call for assistance with activity   - Consult OT/PT to assist with strengthening/mobility   - Keep Call bell within reach  - Keep bed low and locked with side rails adjusted as appropriate  - Keep care items and personal belongings within reach  - Initiate and maintain comfort rounds  - Make Fall Risk Sign visible to staff  - Offer Toileting every 2 Hours, in advance of need  - Initiate/Maintain bed/ chair alarm  - Obtain necessary fall risk management equipment: n/a  - Apply yellow socks and bracelet for high fall risk patients  - Consider moving patient to room near nurses station  Outcome: Progressing     Problem: Knowledge Deficit  Goal: Patient/family/caregiver demonstrates understanding of disease process, treatment plan, medications, and discharge instructions  Description: Complete learning assessment and assess knowledge base.   Interventions:  - Provide teaching at level of understanding  - Provide teaching via preferred learning methods  Outcome: Progressing     Problem: GASTROINTESTINAL - ADULT  Goal: Minimal or absence of nausea and/or vomiting  Description: INTERVENTIONS:  - Administer IV fluids if ordered to ensure adequate hydration  - Maintain NPO status until nausea and vomiting are resolved  - Nasogastric tube if ordered  - Administer ordered antiemetic medications as needed  - Provide nonpharmacologic comfort measures as appropriate  - Advance diet as tolerated, if ordered  - Consider nutrition services referral to assist patient with adequate nutrition and appropriate food choices  Outcome: Progressing  Goal: Maintains or returns to baseline bowel function  Description: INTERVENTIONS:  - Assess bowel function  - Encourage oral fluids to ensure adequate hydration  - Administer IV fluids if ordered to ensure adequate hydration  - Administer ordered medications as needed  - Encourage mobilization and activity  - Consider nutritional services referral to assist patient with adequate nutrition and appropriate food choices  Outcome: Progressing

## 2023-08-07 NOTE — PROGRESS NOTES
-- Patient: Aniceto Villatoro  -- MRN: [id number]  -- Aidin Request ID: [id number]  -- Level of care reserved: [address]  -- Partner Reserved: [place], [place], [address] [place] [address] [phone]  -- Clinical needs requested:  -- Geography searched: 15 miles around [address] [place] [address]  -- Start of Service:  -- Request sent: 1:33pm EDT on 8/5/2023 by Crystal Starks  -- Partner reserved: 8:41am EDT on 8/7/2023 by Avis Poe  -- Choice list shared: 8:41am EDT on 8/7/2023 by Avis Poe

## 2023-08-07 NOTE — CASE MANAGEMENT
Case Management Discharge Planning Note    Patient name Leslie Montoya /-37 MRN [id number]  : 1940 Date 2023       Current Admission Date: 8/3/2023  Current Admission Diagnosis:Nausea vomiting and diarrhea   Patient Active Problem List    Diagnosis Date Noted   • Dehydration 2023   • Abdominal pain 2023   • Nausea vomiting and diarrhea 2023   • Compression fracture of lumbar vertebra ([address]) 2023   • Aftercare following surgery of the musculoskeletal system 2023   • Constipation 2023   • Acute postoperative pulmonary insufficiency ([address]) 2023   • Femur fracture, left ([address]) 2023   • Elevated troponin 2023   • Bilateral pleural effusion 2023   • Chronic respiratory failure with hypoxia and hypercapnia ([address]) 2023   • UGI bleed 2023   • Cerebral aneurysm, nonruptured 2023   • Abdominal distention/back pain 2023   • Urinary retention 2023   • MDD with persistant bereavement complex 2023   • Abnormal CT of the chest 2023   • 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   • Thyroid nodule 2023   • Severe protein-calorie malnutrition ([address]) 2023   • Unspecified mood (affective) disorder ([address]) 2023   • Failure to thrive in adult 2023   • Pulmonary nodule 02/15/2023   • Onychomycosis 02/15/2023   • Essential hypertension 02/10/2023   • Anxiety 02/10/2023   • Other specified hypothyroidism 2022   • Peripheral edema 2021   • Low back pain without sciatica 2021   • COPD exacerbation ([address]) 2017   • Hypertension 2017   • Hypercoagulable state ([address]) 2017   • 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   • Vitamin D deficiency 2016   • Osteoporosis 2015   • Antiphospholipid antibody syndrome ([address]) 2012   • Atrial fibrillation ([address]) 2012   • Hereditary hemolytic anemia ([facility]) 2012   • Asthma 2012      LOS (days): 3  Geometric Mean LOS (GMLOS) (days): 3.00  Days to GMLOS:0.1     OBJECTIVE:  Risk of Unplanned Readmission Score: 38.56         Current admission status: Inpatient   Preferred Pharmacy:   [address] [place] [address] #[id number] - Liz Cote, [address] [address] [place] [address]  Phone: [phone] Fax: [phone]    Primary Care Provider: Elisa Stahl MD    Primary Insurance: MEDICARE  Secondary Insurance: BLUE CROSS    DISCHARGE DETAILS:      CM received a 11am confirmed  time for 213 to go to [facility]. Notified MD & RN via TT, facility via Aidin, and patient in person.

## 2023-08-07 NOTE — ASSESSMENT & PLAN NOTE
Patient on CT scan noted to have a compression fracture of L2 and L4-chronic  Also noted to have compression fracture of T10 and T11 which are chronic  Pain management  PT/OT recommended rehab.     She will be discharged to [facility] for skilled nursing rehab

## 2023-08-07 NOTE — DISCHARGE SUMMARY
[address]  Discharge- Marlon Gerber 1940, 79 y.o. female MRN: [id number]  Unit/Bed#: -Demetrice Encounter: [id number]  Primary Care Provider: Lino Miranda MD   Date and time admitted to hospital: 8/3/2023 12:30 PM    * Nausea vomiting and diarrhea  Assessment & Plan  Patient presented with nausea, vomiting and diarrhea for the past few days. Will order stool work-up including C. difficile and cultures  CT abdomen pelvis showed-- " Diffuse bowel obstruction with additional wall thickening of small bowel more so in the right lower quadrant. There is a femoral hernia present making obstruction related to hernia most likely with some change in caliber of the exiting loop. The additional wall thickening more diffusely in the pelvic cavity does raise possibility of perhaps some additional venous congestion/ischemia perhaps related to mesenteric twisting. Urgent surgical evaluation is advised."  Trend WBC and fever curve  UA did not show any bacteriuria with WBC 4-7. Patient is asymptomatic  Hold off on antibiotics  Patient diet was advanced. Tolerating it well  Resolved    Abdominal pain  Assessment & Plan  Patient with abdominal pain  CT showed- Diffuse bowel obstruction with additional wall thickening of small bowel more so in the right lower quadrant. There is a femoral hernia present making obstruction related to hernia most likely with some change in caliber of the exiting loop. The additional wall thickening more diffusely in the pelvic cavity does raise possibility of perhaps some additional venous congestion/ischemia perhaps related to mesenteric twisting. Urgent surgical evaluation is advised. Likely enteritis  ER spoke with surgery and they recommended medical admission  Surgical and GI consult appreciated  Surgical team gave oral contrast and check KUB.   As per surgical team patient contrast is passed in colon and from their perspective patient does not have any surgical needs. On clear liquid diet. Patient does not have any diarrhea or nausea or vomiting  Will advance diet. Patient is tolerating diet  Cleared by GI and surgery for discharge  Stool work-up is negative  Pain management as needed  Resolved    Compression fracture of lumbar vertebra [facility])  Assessment & Plan  Patient on CT scan noted to have a compression fracture of L2 and L4-chronic  Also noted to have compression fracture of T10 and T11 which are chronic  Pain management  PT/OT recommended rehab. She will be discharged to [facility] for skilled nursing rehab    Chronic respiratory failure with hypoxia and hypercapnia [facility])  Assessment & Plan  Patient has history of COPD and is on chronic oxygen. Patient uses 1 L of supplemental oxygen at home  Continue on bronchodilators  Continue oxygen supplementation    Other specified hypothyroidism  Assessment & Plan  Continue Synthroid    Atrial fibrillation (HCC)  Assessment & Plan  Continue on Cardizem CD and digoxin  On anticoagulation with Coumadin  INR is 2.64  Restart Coumadin  PT/INR 2 to 3 days at nursing facility    Antiphospholipid antibody syndrome (HCC)  Assessment & Plan  On Coumadin  INR is 2.64. Monitor PT/INR    Hypertension  Assessment & Plan  Continue Cardizem CD and Lasix  Monitor vitals as per protocol      Hospital Course:     Svetlana Romo is a 79 y.o. female patient who originally presented to the hospital on   Admission Orders (From admission, onward)     Ordered        08/04/23 1116  Inpatient Admission  Once            08/03/23 1709  Place in Observation  Once                     due to nausea, vomiting and diarrhea. Patient also complained of some generalized abdominal pain associated with it. Patient states that she has been having vomiting and diarrhea for the past few days which is nonbloody, watery.   Patient's daughter brought her to the ER after they had a telehealth evaluation and was recommended to go to ER  Patient complains of generalized weakness and fatigue   Patient was admitted with abdominal pain, nausea, vomiting and diarrhea. Patient was seen by surgery. Had CT abdomen pelvis and abdominal x-ray with contrast.  As per surgery no evidence of obstruction. Patient likely with enteritis. Patient was seen by GI. Patient stool work-up is negative. Patient is started on diet and is tolerating it well. Patient symptoms have resolved. Patient was seen by physical therapy and  recommended skilled nursing rehab. It is hemodynamically stable for discharge. She will be discharged to [facility] for skilled nursing rehab  On Exam-  Chest-bilateral air entry, clear to auscultation  Abdomen-soft, nontender  Heart-S1 S2 regular  Extremities-no pedal edema or calf tenderness  Neuro-alert awake oriented x3. No focal deficits    Please see above list of diagnoses and related plan for additional information. Follow-up with PCP in 1 week  Follow-up with GI as outpatient  Repeat PT/INR in 2 to 3 days at the facility    Condition at Discharge:  good      Discharge instructions/Information to patient and family:   See after visit summary for information provided to patient and family. Provisions for Follow-Up Care:  See after visit summary for information related to follow-up care and any pertinent home health orders. Disposition:     Other [facility] at [facility] skilled nursing rehab       Discharge Statement:  I spent 40 minutes discharging the patient. This time was spent on the day of discharge. I had direct contact with the patient on the day of discharge. Greater than 50% of the total time was spent examining patient, answering all patient questions, arranging and discussing plan of care with patient as well as directly providing post-discharge instructions. Additional time then spent on discharge activities.     Discharge Medications:  See after visit summary for reconciled discharge medications provided to patient and family.       ** Please Note: This note has been constructed using a voice recognition system **

## 2023-08-07 NOTE — DISCHARGE INSTR - AVS FIRST PAGE
Follow-up with PCP in 1 week  Follow-up with GI as outpatient  Repeat PT/INR in 2 to 3 days at the facility

## 2023-08-07 NOTE — PROGRESS NOTES
[address]  Facility: [facility]    NAME: Marlon Gerber  AGE: 79 y.o. SEX: female    DATE OF ENCOUNTER: 8/7/2023    Code status:  Full Code    Assessment and Plan     1. Chronic respiratory failure with hypoxia and hypercapnia (HCC)    2. COPD exacerbation ([address] )    3. Other specified hypothyroidism    4. Paroxysmal atrial fibrillation (HCC)    5. Essential hypertension    6. Antiphospholipid antibody syndrome (HCC)    7. Anxiety    8. Failure to thrive in adult    9. Severe protein-calorie malnutrition ([address])        All medications and routine orders were reviewed and updated as needed. Plan discussed with: Patient    Chief Complaint     Seen for admission at [facility]    History of Present Illness     79-year-old female returns after hospitalization for exacerbation of COPD. The patient is well-known to me from several prior admissions most recently within the last 3 months. She reports that her appetite is poor. Her performance status at home has been suboptimal.  She requires assistance with most activities of daily living. She has some discomfort over the sacral area but otherwise pain control has been good. Her bowel habits are stable. She denies any dysuria. She has had no swallowing problems. As she becomes more limited in her ability to provide self-care her mood has become somewhat sullen.     HISTORY:  Past Medical History:   Diagnosis Date   • Anti-phospholipid syndrome (HCC)    • Asthma    • Blood type O+    • Cardiac disease    • Chronic pain    • COPD (chronic obstructive pulmonary disease) (HCC)    • Fracture of ankle     left   • Hyperlipidemia    • Hypertension    • Hypokalemia 2/27/2023   • Osteoporosis      Past Surgical History:   Procedure Laterality Date   • APPENDECTOMY     • BILATERAL OOPHORECTOMY Bilateral    • ORIF TIBIAL SHAFT FRACTURE W/ PLATES AND SCREWS Right    • WI HEMIARTHROPLASTY HIP PARTIAL Left 6/23/2023 Procedure: HEMIARTHROPLASTY HIP (BIPOLAR); Surgeon: Briana Castañeda MD;  Location:  MAIN OR;  Service: Orthopedics     Family History   Problem Relation Age of Onset   • Hypertension Mother    • Skin cancer Mother    • Hypertension Father      Social History     Socioeconomic History   • Marital status:      Spouse name: None   • Number of children: None   • Years of education: None   • Highest education level: None   Occupational History   • None   Tobacco Use   • Smoking status: Former     Types: Cigarettes   • Smokeless tobacco: Never   • Tobacco comments:     Never smoker per [employer]   Vaping Use   • Vaping Use: Never used   Substance and Sexual Activity   • Alcohol use: Not Currently   • Drug use: No   • Sexual activity: Not Currently   Other Topics Concern   • None   Social History Narrative   • None     Social Determinants of Health     Financial Resource Strain: Low Risk  (4/11/2023)    Overall Financial Resource Strain (CARDIA)    • Difficulty of Paying Living Expenses: Not very hard   Food Insecurity: No Food Insecurity (8/4/2023)    Hunger Vital Sign    • Worried About Running Out of Food in the Last Year: Never true    • Ran Out of Food in the Last Year: Never true   Transportation Needs: No Transportation Needs (8/4/2023)    PRAPARE - Transportation    • Lack of Transportation (Medical): No    • Lack of Transportation (Non-Medical): No   Physical Activity: Not on file   Stress: Not on file   Social Connections: Not on file   Intimate Partner Violence: Not on file   Housing Stability: Low Risk  (8/4/2023)    Housing Stability Vital Sign    • Unable to Pay for Housing in the Last Year: No    • Number of Places Lived in the Last Year: 1    • Unstable Housing in the Last Year: No       Allergies:   Allergies   Allergen Reactions   • Aspirin Other (See Comments)   • Penicillins Rash and Hives   • Sulfa Antibiotics Rash       Review of Systems     Review of Systems   Constitutional: Positive for appetite change. Negative for activity change, chills, diaphoresis, fatigue and unexpected weight change. HENT: Negative for congestion, ear discharge, ear pain, hearing loss, nosebleeds and rhinorrhea. Eyes: Negative for pain, redness, itching and visual disturbance. Respiratory: Positive for cough and shortness of breath. Negative for choking and chest tightness. Cardiovascular: Negative for chest pain and leg swelling. Gastrointestinal: Negative for abdominal pain, blood in stool, constipation, diarrhea and nausea. Endocrine: Negative for cold intolerance, polydipsia and polyphagia. Genitourinary: Negative for dysuria, frequency, hematuria and urgency. Musculoskeletal: Negative for arthralgias, back pain, gait problem, joint swelling, neck pain and neck stiffness. Skin: Negative for color change and rash. Allergic/Immunologic: Negative for environmental allergies and food allergies. Neurological: Positive for weakness. Negative for dizziness, tremors, seizures, speech difficulty, numbness and headaches. Hematological: Negative for adenopathy. Does not bruise/bleed easily. Psychiatric/Behavioral: Positive for dysphoric mood. Negative for behavioral problems, hallucinations and self-injury. Medications and orders     All medications reviewed and updated in [facility] EMR. Objective     Vitals: per nursing home record    Physical Exam  Constitutional:       Appearance: She is well-developed. HENT:      Head: Normocephalic and atraumatic. Right Ear: External ear normal.      Left Ear: External ear normal.      Mouth/Throat:      Pharynx: No oropharyngeal exudate. Eyes:      General: No scleral icterus. Right eye: No discharge. Left eye: No discharge. Conjunctiva/sclera: Conjunctivae normal.      Pupils: Pupils are equal, round, and reactive to light. Neck:      Thyroid: No thyromegaly. Cardiovascular:      Rate and Rhythm: Normal rate.  Rhythm irregular. Heart sounds: Normal heart sounds. No murmur heard. No gallop. Pulmonary:      Effort: Pulmonary effort is normal. No respiratory distress. Breath sounds: Wheezing present. No rales. Abdominal:      General: Bowel sounds are normal.      Palpations: Abdomen is soft. There is no mass. Tenderness: There is no guarding or rebound. Musculoskeletal:         General: No tenderness or deformity. Normal range of motion. Cervical back: Normal range of motion and neck supple. Lymphadenopathy:      Cervical: No cervical adenopathy. Skin:     General: Skin is warm and dry. Findings: No rash. Neurological:      Mental Status: She is alert and oriented to person, place, and time. Cranial Nerves: No cranial nerve deficit. Coordination: Coordination normal.      Deep Tendon Reflexes: Reflexes are normal and symmetric. Reflexes normal.   Psychiatric:         Behavior: Behavior normal.         Thought Content: Thought content normal.         Judgment: Judgment normal.         Pertinent Laboratory/Diagnostic Studies: The following labs/studies were reviewed please see chart or hospital paperwork for details. Diagnostic studies from the hospital were reviewed    - Admit for PT OT and medical therapy. She will continue with inhalation therapy for her exacerbation of COPD. I am a little surprised there is not a prednisone taper in place. We will monitor her labs and encourage her to improve her nutritional status.     Geraldo Cooper,   8/7/2023 12:49 PM

## 2023-08-07 NOTE — CASE MANAGEMENT
Case Management Discharge Planning Note    Patient name Izabella Angulo  Location /-50 MRN [id number]  : 1940 Date 2023       Current Admission Date: 8/3/2023  Current Admission Diagnosis:Nausea vomiting and diarrhea   Patient Active Problem List    Diagnosis Date Noted   • Dehydration 2023   • Abdominal pain 2023   • Nausea vomiting and diarrhea 2023   • Compression fracture of lumbar vertebra ([address]) 2023   • Aftercare following surgery of the musculoskeletal system 2023   • Constipation 2023   • Acute postoperative pulmonary insufficiency ([address]) 2023   • Femur fracture, left ([address]) 2023   • Elevated troponin 2023   • Bilateral pleural effusion 2023   • Chronic respiratory failure with hypoxia and hypercapnia ([address]) 2023   • UGI bleed 2023   • Cerebral aneurysm, nonruptured 2023   • Abdominal distention/back pain 2023   • Urinary retention 2023   • MDD with persistant bereavement complex 2023   • Abnormal CT of the chest 2023   • 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   • Thyroid nodule 2023   • Severe protein-calorie malnutrition ([address]) 2023   • Unspecified mood (affective) disorder ([address]) 2023   • Failure to thrive in adult 2023   • Pulmonary nodule 02/15/2023   • Onychomycosis 02/15/2023   • Essential hypertension 02/10/2023   • Anxiety 02/10/2023   • Other specified hypothyroidism 2022   • Peripheral edema 2021   • Low back pain without sciatica 2021   • COPD exacerbation ([address]) 2017   • Hypertension 2017   • Hypercoagulable state ([address]) 2017   • 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  [address] [place], [place] [address] [address]  Phone: [phone] Fax: [phone]    Primary Care Provider: Edith Mustafa MD    Primary Insurance: MEDICARE  Secondary Insurance: BLUE CROSS    DISCHARGE DETAILS:     Per MD, pt is medically stable for discharge. CM provided pt with choice list for STR and she selected [facility]. [address] in [place]. CM requested transport via [address] [place] [address]. Awaiting confirmed pickup time.

## 2023-08-08 ENCOUNTER — PATIENT OUTREACH (OUTPATIENT)
Dept: CASE MANAGEMENT | Facility: OTHER | Age: 83
End: 2023-08-08

## 2023-08-08 NOTE — PROGRESS NOTES
Outpatient Care Management [facility]. Discharged 8/7/23 to [facility]. Email sent to facility to inform them the patient is on the [facility]ENNIE Pathway and I will be following them during their skilled stay. This Admin Coordinator will continue to monitor via chart review.

## 2023-08-10 ENCOUNTER — NURSING HOME VISIT (OUTPATIENT)
Dept: FAMILY MEDICINE CLINIC | Facility: CLINIC | Age: 83
End: 2023-08-10
Payer: MEDICARE

## 2023-08-10 DIAGNOSIS — I48.0 PAROXYSMAL ATRIAL FIBRILLATION (HCC): ICD-10-CM

## 2023-08-10 DIAGNOSIS — E43 SEVERE PROTEIN-CALORIE MALNUTRITION (HCC): ICD-10-CM

## 2023-08-10 DIAGNOSIS — J44.1 COPD EXACERBATION (HCC): ICD-10-CM

## 2023-08-10 DIAGNOSIS — J96.11 CHRONIC RESPIRATORY FAILURE WITH HYPOXIA AND HYPERCAPNIA (HCC): Primary | ICD-10-CM

## 2023-08-10 DIAGNOSIS — R41.82 ALTERED MENTAL STATUS, UNSPECIFIED ALTERED MENTAL STATUS TYPE: ICD-10-CM

## 2023-08-10 DIAGNOSIS — J96.12 CHRONIC RESPIRATORY FAILURE WITH HYPOXIA AND HYPERCAPNIA (HCC): Primary | ICD-10-CM

## 2023-08-10 DIAGNOSIS — D68.61 ANTIPHOSPHOLIPID ANTIBODY SYNDROME (HCC): ICD-10-CM

## 2023-08-10 PROCEDURE — 99308 SBSQ NF CARE LOW MDM 20: CPT | Performed by: FAMILY MEDICINE

## 2023-08-11 ENCOUNTER — PATIENT OUTREACH (OUTPATIENT)
Dept: CASE MANAGEMENT | Facility: OTHER | Age: 83
End: 2023-08-11

## 2023-08-11 NOTE — PROGRESS NOTES
Chart review completed. Email sent to [facility] ([facility]) SNF Coordinator to obtain an update on patient. This Admin Coordinator will continue to monitor via chart review throughout episode.

## 2023-08-14 ENCOUNTER — NURSING HOME VISIT (OUTPATIENT)
Dept: FAMILY MEDICINE CLINIC | Facility: CLINIC | Age: 83
End: 2023-08-14
Payer: MEDICARE

## 2023-08-14 DIAGNOSIS — J44.1 COPD EXACERBATION (HCC): ICD-10-CM

## 2023-08-14 DIAGNOSIS — J96.12 CHRONIC RESPIRATORY FAILURE WITH HYPOXIA AND HYPERCAPNIA (HCC): Primary | ICD-10-CM

## 2023-08-14 DIAGNOSIS — R41.82 ALTERED MENTAL STATUS, UNSPECIFIED ALTERED MENTAL STATUS TYPE: ICD-10-CM

## 2023-08-14 DIAGNOSIS — D68.61 ANTIPHOSPHOLIPID ANTIBODY SYNDROME (HCC): ICD-10-CM

## 2023-08-14 DIAGNOSIS — I48.0 PAROXYSMAL ATRIAL FIBRILLATION (HCC): ICD-10-CM

## 2023-08-14 DIAGNOSIS — R62.7 FAILURE TO THRIVE IN ADULT: ICD-10-CM

## 2023-08-14 DIAGNOSIS — J96.11 CHRONIC RESPIRATORY FAILURE WITH HYPOXIA AND HYPERCAPNIA (HCC): Primary | ICD-10-CM

## 2023-08-14 PROCEDURE — 99308 SBSQ NF CARE LOW MDM 20: CPT | Performed by: FAMILY MEDICINE

## 2023-08-14 NOTE — PROGRESS NOTES
[address]  [address] [facility], [address] [place] [address]  Facility: [facility]    NAME: Alfredo Marmolejo  AGE: 79 y.o. SEX: female    DATE OF ENCOUNTER: 8/14/2023    Code status:  Full Code    Assessment and Plan     1. Chronic respiratory failure with hypoxia and hypercapnia (HCC)    2. COPD exacerbation (HCC)    3. Paroxysmal atrial fibrillation ([address])    4. Antiphospholipid antibody syndrome (HCC)    5. Failure to thrive in adult    6. Altered mental status, unspecified altered mental status type        All medications and routine orders were reviewed and updated as needed. Plan discussed with: Family member    Chief Complaint     Interim evaluation    History of Present Illness     The patient is making progress with her therapy. Her appetite is improved and she has been more independent with activities of daily living. She is ambulating with her walker and continues to wear oxygen but her conditioning is definitely better since admission. She actually looks better than when she was discharged the last time from this facility. Bowels moved. No dysuria. Denies swallowing issues. The following portions of the patient's history were reviewed and updated as appropriate: current medications, past family history, past medical history, past social history, past surgical history and problem list.    Allergies: Allergies   Allergen Reactions   • Aspirin Other (See Comments)   • Penicillins Rash and Hives   • Sulfa Antibiotics Rash       Review of Systems     Review of Systems   Constitutional: Negative for activity change, appetite change, chills, diaphoresis, fatigue and unexpected weight change. HENT: Negative for congestion, ear discharge, ear pain, hearing loss, nosebleeds and rhinorrhea. Eyes: Negative for pain, redness, itching and visual disturbance. Respiratory: Positive for shortness of breath. Negative for cough, choking and chest tightness.     Cardiovascular: Negative for chest pain and leg swelling. Gastrointestinal: Negative for abdominal pain, blood in stool, constipation, diarrhea and nausea. Endocrine: Negative for cold intolerance, polydipsia and polyphagia. Genitourinary: Negative for dysuria, frequency, hematuria and urgency. Musculoskeletal: Negative for arthralgias, back pain, gait problem, joint swelling, neck pain and neck stiffness. Skin: Negative for color change and rash. Allergic/Immunologic: Negative for environmental allergies and food allergies. Neurological: Positive for weakness. Negative for dizziness, tremors, seizures, speech difficulty, numbness and headaches. Hematological: Negative for adenopathy. Does not bruise/bleed easily. Psychiatric/Behavioral: Negative for behavioral problems, dysphoric mood, hallucinations and self-injury. Medications and orders     All medications reviewed and updated in alf EMR. Objective     Vitals: per nursing home records    Physical Exam  Constitutional:       Appearance: She is well-developed. HENT:      Head: Normocephalic and atraumatic. Right Ear: External ear normal.      Left Ear: External ear normal.      Mouth/Throat:      Pharynx: No oropharyngeal exudate. Eyes:      General: No scleral icterus. Right eye: No discharge. Left eye: No discharge. Conjunctiva/sclera: Conjunctivae normal.      Pupils: Pupils are equal, round, and reactive to light. Neck:      Thyroid: No thyromegaly. Cardiovascular:      Rate and Rhythm: Normal rate. Rhythm irregular. Heart sounds: Normal heart sounds. No murmur heard. No gallop. Pulmonary:      Effort: Pulmonary effort is normal. No respiratory distress. Breath sounds: Wheezing present. No rales. Abdominal:      General: Bowel sounds are normal.      Palpations: Abdomen is soft. There is no mass. Tenderness: There is no guarding or rebound.    Musculoskeletal:         General: No tenderness or deformity. Normal range of motion. Cervical back: Normal range of motion and neck supple. Lymphadenopathy:      Cervical: No cervical adenopathy. Skin:     General: Skin is warm and dry. Findings: No rash. Neurological:      Mental Status: She is alert and oriented to person, place, and time. Cranial Nerves: No cranial nerve deficit. Coordination: Coordination normal.      Deep Tendon Reflexes: Reflexes are normal and symmetric. Reflexes normal.   Psychiatric:         Behavior: Behavior normal.         Thought Content: Thought content normal.         Judgment: Judgment normal.         Pertinent Laboratory/Diagnostic Studies: The following studies were reviewed please see chart or hospital paperwork for details.     Space for lab dictation no new diagnostic studies    - Maintain the current therapy plan and medication regimen    Juancho Bahena DO  8/14/2023 10:06 AM

## 2023-08-16 ENCOUNTER — PATIENT OUTREACH (OUTPATIENT)
Dept: CASE MANAGEMENT | Facility: OTHER | Age: 83
End: 2023-08-16

## 2023-08-16 NOTE — PROGRESS NOTES
Update received from [facility] ([facility] SNF Coordinator the patient has a LCD of 8/18/23 and discharge 8/19/23 to Home. This Admin Coordinator will continue to monitor via chart review.

## 2023-08-17 ENCOUNTER — NURSING HOME VISIT (OUTPATIENT)
Dept: FAMILY MEDICINE CLINIC | Facility: CLINIC | Age: 83
End: 2023-08-17
Payer: MEDICARE

## 2023-08-17 DIAGNOSIS — M54.50 LOW BACK PAIN WITHOUT SCIATICA, UNSPECIFIED BACK PAIN LATERALITY, UNSPECIFIED CHRONICITY: ICD-10-CM

## 2023-08-17 DIAGNOSIS — J44.1 COPD EXACERBATION (HCC): Primary | ICD-10-CM

## 2023-08-17 DIAGNOSIS — J96.12 CHRONIC RESPIRATORY FAILURE WITH HYPOXIA AND HYPERCAPNIA (HCC): ICD-10-CM

## 2023-08-17 DIAGNOSIS — E03.8 OTHER SPECIFIED HYPOTHYROIDISM: ICD-10-CM

## 2023-08-17 DIAGNOSIS — I48.0 PAROXYSMAL ATRIAL FIBRILLATION (HCC): ICD-10-CM

## 2023-08-17 DIAGNOSIS — J96.11 CHRONIC RESPIRATORY FAILURE WITH HYPOXIA AND HYPERCAPNIA (HCC): ICD-10-CM

## 2023-08-17 DIAGNOSIS — R62.7 FAILURE TO THRIVE IN ADULT: ICD-10-CM

## 2023-08-17 PROCEDURE — 99315 NF DSCHRG MGMT 30 MIN/LESS: CPT | Performed by: FAMILY MEDICINE

## 2023-08-17 NOTE — PROGRESS NOTES
[address]  [address] [facility], [address] [place] [address]  Facility: [facility]    NAME: Alfredo Marmolejo  AGE: 79 y.o. SEX: female    DATE OF ENCOUNTER: 8/17/2023    Code status:  DNR w/ Hospitalization    Assessment and Plan     1. COPD exacerbation ([address])    2. Chronic respiratory failure with hypoxia and hypercapnia (HCC)    3. Other specified hypothyroidism    4. Paroxysmal atrial fibrillation (HCC)    5. Failure to thrive in adult    6. Low back pain without sciatica, unspecified back pain laterality, unspecified chronicity        All medications and routine orders were reviewed and updated as needed. Plan discussed with: Family member    Chief Complaint     Interim evaluation    History of Present Illness     The patient will be discharged in the next several days. She has made nice progress with her therapy. She has air conditioning at home and will have assistance on a regular basis. Her bowel habits are stable. She uses her nebulizer regularly and this helps to manage her COPD. The following portions of the patient's history were reviewed and updated as appropriate: current medications, past family history, past medical history, past social history, past surgical history and problem list.    Allergies: Allergies   Allergen Reactions   • Aspirin Other (See Comments)   • Penicillins Rash and Hives   • Sulfa Antibiotics Rash       Review of Systems     Review of Systems   Constitutional: Negative for activity change, appetite change, chills, diaphoresis, fatigue and unexpected weight change. HENT: Negative for congestion, ear discharge, ear pain, hearing loss, nosebleeds and rhinorrhea. Eyes: Negative for pain, redness, itching and visual disturbance. Respiratory: Positive for shortness of breath. Negative for cough, choking and chest tightness. Cardiovascular: Negative for chest pain and leg swelling.    Gastrointestinal: Negative for abdominal pain, blood in stool, constipation, diarrhea and nausea. Endocrine: Negative for cold intolerance, polydipsia and polyphagia. Genitourinary: Negative for dysuria, frequency, hematuria and urgency. Musculoskeletal: Negative for arthralgias, back pain, gait problem, joint swelling, neck pain and neck stiffness. Skin: Negative for color change and rash. Allergic/Immunologic: Negative for environmental allergies and food allergies. Neurological: Positive for weakness. Negative for dizziness, tremors, seizures, speech difficulty, numbness and headaches. Hematological: Negative for adenopathy. Does not bruise/bleed easily. Psychiatric/Behavioral: Negative for behavioral problems, dysphoric mood, hallucinations and self-injury. Medications and orders     All medications reviewed and updated in care home EMR. Objective     Vitals: per nursing home records    Physical Exam  Constitutional:       Appearance: She is well-developed. HENT:      Head: Normocephalic and atraumatic. Right Ear: External ear normal.      Left Ear: External ear normal.      Mouth/Throat:      Pharynx: No oropharyngeal exudate. Eyes:      General: No scleral icterus. Right eye: No discharge. Left eye: No discharge. Conjunctiva/sclera: Conjunctivae normal.      Pupils: Pupils are equal, round, and reactive to light. Neck:      Thyroid: No thyromegaly. Cardiovascular:      Rate and Rhythm: Normal rate. Rhythm irregular. Heart sounds: Normal heart sounds. No murmur heard. No gallop. Pulmonary:      Effort: Pulmonary effort is normal. No respiratory distress. Breath sounds: No wheezing or rales. Comments: Decreased breath sounds bilaterally  Abdominal:      General: Bowel sounds are normal.      Palpations: Abdomen is soft. There is no mass. Tenderness: There is no guarding or rebound. Musculoskeletal:         General: No tenderness or deformity. Normal range of motion.       Cervical back: Normal range of motion and neck supple. Lymphadenopathy:      Cervical: No cervical adenopathy. Skin:     General: Skin is warm and dry. Findings: No rash. Neurological:      Mental Status: She is alert and oriented to person, place, and time. Cranial Nerves: No cranial nerve deficit. Coordination: Coordination normal.      Deep Tendon Reflexes: Reflexes are normal and symmetric. Reflexes normal.   Psychiatric:         Behavior: Behavior normal.         Thought Content: Thought content normal.         Judgment: Judgment normal.         Pertinent Laboratory/Diagnostic Studies: The following studies were reviewed please see chart or hospital paperwork for details.     Space for lab dictation no new diagnostic studies    - Discharge planning    Geraldo Cooper DO  8/17/2023 11:02 AM

## 2023-08-18 ENCOUNTER — PATIENT OUTREACH (OUTPATIENT)
Dept: CASE MANAGEMENT | Facility: OTHER | Age: 83
End: 2023-08-18

## 2023-08-18 NOTE — PROGRESS NOTES
Chart review completed. Email sent to [facility] ([facility]) SNF Coordinator to obtain an update on patient. This Admin Coordinator will continue to monitor via chart review throughout episode.

## 2023-08-22 ENCOUNTER — PATIENT OUTREACH (OUTPATIENT)
Dept: CASE MANAGEMENT | Facility: OTHER | Age: 83
End: 2023-08-22

## 2023-08-22 DIAGNOSIS — N17.9 AKI (ACUTE KIDNEY INJURY) (HCC): Primary | ICD-10-CM

## 2023-08-22 NOTE — PROGRESS NOTES
In basket message received with patient discharge on SNF/JENNIE pathway. Chart reviewed. Patient was admitted to [facility] on 8/3 with nausea vomiting and diarrhea. Stool work up was negative. Patient discharged to [facility] on 8/7. She discharged home on 8/19. Outpatient Care Manager Note: Patient identified for SNF/JENNIE Pathway. JENNIE pathway interventions reviewed  including:  • PCP visit within 1 week  • Avoiding NSAIDs  • Adequate nutrition and hydration  • Keeping BP >130 if normal BP not lower  • Checking temperature  • Assessing for weight gain or loss and edema changes since home. • Medication Review  • BMP   Unable to review interventions. I left a message on patient's voicemail with my contact information.

## 2023-08-22 NOTE — PROGRESS NOTES
Chart review complete the patient discharged 8/19/23 to home. A email was sent to the facility requesting discharge instructions. When CM assistant has received the Discharge paperwork CM assistant will attach to this encounter. I have removed myself off of the care team, added the CM to the care team who will follow the patient through the episode, sent the care manager an inbasket notifying them of the JENNIE/SNF Pathway. Ambulatory referral placed for complex care management.

## 2023-08-23 ENCOUNTER — PATIENT OUTREACH (OUTPATIENT)
Dept: CASE MANAGEMENT | Facility: OTHER | Age: 83
End: 2023-08-23

## 2023-08-23 ENCOUNTER — TELEPHONE (OUTPATIENT)
Age: 83
End: 2023-08-23

## 2023-08-23 NOTE — TELEPHONE ENCOUNTER
Caller: PT     Doctor: Kenneth Ortiz     Reason for call: Asked what type of hip replacement she had and if there were any precautions.  I read off last note

## 2023-08-23 NOTE — PROGRESS NOTES
I called patient's daughter Brittany Tobin and she reports patient is not doing as well as when she was discharged. Her legs are swollen and her oxygen sats are dropping. There is some confusion about home health care. She was contacted by [facility] today and they will start care tomorrow. Brittany Tobin thought her mother would continue with [facility] since she had them before her mother was readmitted. I advised Brittany Tobin to call patient's PCP as soon as we are finished talking. It is urgent that she inform the office of the change in patient's condition. She agrees and states she will call. I advised her I will find out why the home care referral went to another company. She has my contact information and I will call her tomorrow.

## 2023-08-24 ENCOUNTER — TELEPHONE (OUTPATIENT)
Dept: FAMILY MEDICINE CLINIC | Facility: CLINIC | Age: 83
End: 2023-08-24

## 2023-08-24 ENCOUNTER — PATIENT OUTREACH (OUTPATIENT)
Dept: CASE MANAGEMENT | Facility: OTHER | Age: 83
End: 2023-08-24

## 2023-08-24 DIAGNOSIS — N17.9 AKI (ACUTE KIDNEY INJURY) (HCC): Primary | ICD-10-CM

## 2023-08-24 NOTE — PROGRESS NOTES
Outpatient Care Manager Note: Patient identified for SNF/JENNIE Pathway. BMP order placed and PCP notified. Chart review completed. Patient discharged home on 8/19. pathway interventions reviewed  including:  • PCP visit within 1 week  • Avoiding NSAIDs  • Adequate nutrition and hydration  • Keeping BP >130 if normal BP not lower  • Checking temperature  • Assessing for weight gain or loss and edema changes since home. • Medication Review  • BMP   I spoke with patient's daughter Lonnie Stephen who reports her mother was better today. She thinks the [facility] will start today. I went over the information above with her. She thanked me for calling.

## 2023-08-24 NOTE — PROGRESS NOTES
I called [facility] and they did get the referral for [facility]. I advised them a BMP is ordered and they report they will draw the blood test at the next visit.

## 2023-08-24 NOTE — TELEPHONE ENCOUNTER
Preethi Rick, registered nurse care manager for pt calling. After being d/c'd from skilled nursing facility was referred to [facility] but they would like a referral for  [facility] instead.  Asking if Dr Bonita Fox can place order, please advise

## 2023-08-25 ENCOUNTER — TELEPHONE (OUTPATIENT)
Dept: FAMILY MEDICINE CLINIC | Facility: CLINIC | Age: 83
End: 2023-08-25

## 2023-08-25 NOTE — TELEPHONE ENCOUNTER
Received VM from [facility]     "Hi, my name is Michael elizabeth. I'm one of the nurses from [facility] calling in regards to [address] date of birth 1940, seeing the client for home health services. I did a nursing visit today, did some blood work and dropped that off at the lab for results sent to you patsy. But they had three questions. One, they would like a new script for their nebulizer. They said it's very old and uncomfortable and they haven't had one in a while and so they'd like you get a script for a nebulizer and the nose equipment and then two, they are inquiring about a script for a hospital bed as well. So she doesn't have to go back into the hospital. She can elevate her legs and for those things. And we also have therapy coming out to assess those needs. And then thirdly, with the albuterol and the budesonide nebulizer treatments, they were wondering if they are mixable and if they can take them at the same time into the nebulizer treatment. So if you can give me a call back and clarify these or if the doctor would be willing to write the scripts, my phone number is [phone]. Thank you jorge Nunes."    Please advise if you are able to place orders for nebulizer and hospital bed.

## 2023-08-30 ENCOUNTER — OFFICE VISIT (OUTPATIENT)
Dept: FAMILY MEDICINE CLINIC | Facility: CLINIC | Age: 83
End: 2023-08-30
Payer: MEDICARE

## 2023-08-30 VITALS
SYSTOLIC BLOOD PRESSURE: 102 MMHG | WEIGHT: 112 LBS | OXYGEN SATURATION: 95 % | TEMPERATURE: 98 F | HEIGHT: 66 IN | RESPIRATION RATE: 16 BRPM | BODY MASS INDEX: 18 KG/M2 | HEART RATE: 86 BPM | DIASTOLIC BLOOD PRESSURE: 52 MMHG

## 2023-08-30 DIAGNOSIS — I10 PRIMARY HYPERTENSION: ICD-10-CM

## 2023-08-30 DIAGNOSIS — E03.8 OTHER SPECIFIED HYPOTHYROIDISM: ICD-10-CM

## 2023-08-30 DIAGNOSIS — J44.1 COPD EXACERBATION (HCC): Primary | ICD-10-CM

## 2023-08-30 DIAGNOSIS — M54.50 LOW BACK PAIN WITHOUT SCIATICA, UNSPECIFIED BACK PAIN LATERALITY, UNSPECIFIED CHRONICITY: ICD-10-CM

## 2023-08-30 DIAGNOSIS — I48.0 PAROXYSMAL ATRIAL FIBRILLATION (HCC): ICD-10-CM

## 2023-08-30 PROCEDURE — 99214 OFFICE O/P EST MOD 30 MIN: CPT | Performed by: FAMILY MEDICINE

## 2023-08-30 RX ORDER — LIDOCAINE PAIN RELIEF 40 MG/1000MG
PATCH TOPICAL
COMMUNITY
Start: 2023-06-29

## 2023-08-30 RX ORDER — IPRATROPIUM BROMIDE AND ALBUTEROL SULFATE 2.5; .5 MG/3ML; MG/3ML
SOLUTION RESPIRATORY (INHALATION)
COMMUNITY
Start: 2023-06-30

## 2023-08-30 RX ORDER — UMECLIDINIUM 62.5 UG/1
1 AEROSOL, POWDER ORAL DAILY
Qty: 90 BLISTER | Refills: 3 | Status: SHIPPED | OUTPATIENT
Start: 2023-08-30

## 2023-08-31 ENCOUNTER — PATIENT OUTREACH (OUTPATIENT)
Dept: CASE MANAGEMENT | Facility: OTHER | Age: 83
End: 2023-08-31

## 2023-08-31 PROBLEM — E86.0 DEHYDRATION: Status: RESOLVED | Noted: 2023-08-03 | Resolved: 2023-08-31

## 2023-08-31 PROBLEM — R79.89 ELEVATED TROPONIN: Status: RESOLVED | Noted: 2023-06-22 | Resolved: 2023-08-31

## 2023-08-31 PROBLEM — R10.9 ABDOMINAL PAIN: Status: RESOLVED | Noted: 2023-08-03 | Resolved: 2023-08-31

## 2023-08-31 PROBLEM — R11.2 NAUSEA VOMITING AND DIARRHEA: Status: RESOLVED | Noted: 2023-08-03 | Resolved: 2023-08-31

## 2023-08-31 PROBLEM — Z47.89 AFTERCARE FOLLOWING SURGERY OF THE MUSCULOSKELETAL SYSTEM: Status: RESOLVED | Noted: 2023-07-05 | Resolved: 2023-08-31

## 2023-08-31 PROBLEM — R93.89 ABNORMAL CT OF THE CHEST: Status: RESOLVED | Noted: 2023-02-27 | Resolved: 2023-08-31

## 2023-08-31 PROBLEM — R77.8 ELEVATED TROPONIN: Status: RESOLVED | Noted: 2023-06-22 | Resolved: 2023-08-31

## 2023-08-31 PROBLEM — R33.9 URINARY RETENTION: Status: RESOLVED | Noted: 2023-03-07 | Resolved: 2023-08-31

## 2023-08-31 PROBLEM — R19.7 NAUSEA VOMITING AND DIARRHEA: Status: RESOLVED | Noted: 2023-08-03 | Resolved: 2023-08-31

## 2023-08-31 NOTE — PROGRESS NOTES
Heidi Moss reports everything is going well for now. She thanked me for calling her back. I advised brian to call me if I can assist her.

## 2023-08-31 NOTE — ASSESSMENT & PLAN NOTE
Atrial fibrillation. Patient with chronic A-fib and on anticoagulation medication.   She will continue to obtain INR testing as ordered to manage Coumadin dosing

## 2023-08-31 NOTE — ASSESSMENT & PLAN NOTE
COPD.  Patient will continue with oxygen at 2 L via nasal cannula. She has consult with pulmonary coming up in the near future for monitoring of chronic condition.

## 2023-08-31 NOTE — ASSESSMENT & PLAN NOTE
Back pain.   Patient with history of compression fractures and uses lidocaine patches for symptomatic relief

## 2023-08-31 NOTE — PROGRESS NOTES
I spoke with daughter Patricia De Leon who reports her mother had a good day yesterday. She was seen in the office by her PCP. Per his notes patient is doing well. Patricia De Leon reports patient lived alone and the family has paid for aides to stay with patient during the day. Patricia De Leon and her brother are very supportive. They transport patient to her appointments. Her most recent BUN and creatinine are normal.  Patient continues to have physical therapy at home. Patricia De Leon had to take another call and asked me to call her back.

## 2023-08-31 NOTE — TELEPHONE ENCOUNTER
Miguel, Physical Therapist from [facility] calling today requesting if Dr. Emily Chaudhary can place order to be sent to [facility] bed as patient sometimes has trouble breathing when laying down. She believes pt has used [facility] for oxygen supplies so can be sent to them. Amy's # is [phone] with any questions.

## 2023-08-31 NOTE — PROGRESS NOTES
FAMILY PRACTICE OFFICE VISIT       NAME: Lobo Fuentes  AGE: 79 y.o. SEX: female       : 1940        MRN: [id number]    DATE: 2023  TIME: 6:39 AM    Assessment and Plan     Problem List Items Addressed This Visit        Endocrine    Other specified hypothyroidism     Hypothyroidism. TSH is stable on current dose of levothyroxine            Respiratory    COPD exacerbation (HCC) - Primary     COPD. Patient will continue with oxygen at 2 L via nasal cannula. She has consult with pulmonary coming up in the near future for monitoring of chronic condition. Relevant Medications    ipratropium-albuterol (DUO-NEB) 0.5-2.5 mg/3 mL nebulizer solution    umeclidinium (Incruse Ellipta) 62.5 mcg/actuation AEPB inhaler       Cardiovascular and Mediastinum    Hypertension     Hypertension. The patient's blood pressure is stable at this time and he will continue current regimen of medications         Atrial fibrillation (HCC)     Atrial fibrillation. Patient with chronic A-fib and on anticoagulation medication. She will continue to obtain INR testing as ordered to manage Coumadin dosing            Other    Low back pain without sciatica     Back pain. Patient with history of compression fractures and uses lidocaine patches for symptomatic relief                Chief Complaint     Chief Complaint   Patient presents with   • Follow-up     Pt here today with son Asim Lundberg       History of Present Illness     I reviewed the patient's discharge summary from her latest hospitalization. Patient had spent a few weeks at [facility] for rehabilitation. She currently has home health aide that visits her for a few hours on a regular basis. Patient has physical therapy and Occupational Therapy coming to her home. She is currently using oxygen 24 hours a day. She does monitor pulse oximetry readings. Review of Systems   Review of Systems   Constitutional: Positive for fatigue.    Respiratory: Positive for shortness of breath. Cardiovascular: Negative. Gastrointestinal: Negative. Genitourinary: Negative. Musculoskeletal: Positive for arthralgias, back pain and gait problem. Psychiatric/Behavioral: Positive for sleep disturbance. The patient is nervous/anxious.         Active Problem List     Patient Active Problem List   Diagnosis   • COPD exacerbation (HCC)   • Hypertension   • Hypercoagulable state ([address])   • 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HCC)   • Antiphospholipid antibody syndrome (HCC)   • Atrial fibrillation (HCC)   • Hereditary hemolytic anemia (HCC)   • Asthma   • Vitamin D deficiency   • Osteoporosis   • Low back pain without sciatica   • Peripheral edema   • Other specified hypothyroidism   • Essential hypertension   • Anxiety   • Pulmonary nodule   • Onychomycosis   • Failure to thrive in adult   • Severe protein-calorie malnutrition ([address])   • Unspecified mood (affective) disorder ([facility])   • MDD with persistant bereavement complex   • 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[facility])   • Thyroid nodule   • Abdominal distention/back pain   • Cerebral aneurysm, nonruptured   • UGI bleed   • Chronic respiratory failure with hypoxia and hypercapnia ([facility])   • Bilateral pleural effusion   • Femur fracture, left ([facility])   • Acute postoperative pulmonary insufficiency ([facility])   • Constipation   • Compression fracture of lumbar vertebra ([facility])       Past Medical History:  Past Medical History:   Diagnosis Date   • Anti-phospholipid syndrome (HCC)    • Asthma    • Blood type O+    • Cardiac disease    • Chronic pain    • COPD (chronic obstructive pulmonary disease) ([facility])    • Fracture of ankle     left   • Hyperlipidemia    • Hypertension    • Hypokalemia 2/27/2023   • Osteoporosis        Past Surgical History:  Past Surgical History:   Procedure Laterality Date   • APPENDECTOMY     • BILATERAL OOPHORECTOMY Bilateral    • ORIF TIBIAL SHAFT FRACTURE W/ PLATES AND SCREWS Right    • [place] HEMIARTHROPLASTY HIP PARTIAL Left 6/23/2023 Procedure: HEMIARTHROPLASTY HIP (BIPOLAR); Surgeon: Jalyn Burns MD;  Location:  MAIN OR;  Service: Orthopedics       Family History:  Family History   Problem Relation Age of Onset   • Hypertension Mother    • Skin cancer Mother    • Hypertension Father        Social History:  Social History     Socioeconomic History   • Marital status:      Spouse name: Not on file   • Number of children: Not on file   • Years of education: Not on file   • Highest education level: Not on file   Occupational History   • Not on file   Tobacco Use   • Smoking status: Former     Types: Cigarettes   • Smokeless tobacco: Never   • Tobacco comments:     Never smoker per [employer]   Vaping Use   • Vaping Use: Never used   Substance and Sexual Activity   • Alcohol use: Not Currently   • Drug use: No   • Sexual activity: Not Currently   Other Topics Concern   • Not on file   Social History Narrative   • Not on file     Social Determinants of Health     Financial Resource Strain: Low Risk  (4/11/2023)    Overall Financial Resource Strain (CARDIA)    • Difficulty of Paying Living Expenses: Not very hard   Food Insecurity: No Food Insecurity (8/4/2023)    Hunger Vital Sign    • Worried About Running Out of Food in the Last Year: Never true    • Ran Out of Food in the Last Year: Never true   Transportation Needs: No Transportation Needs (8/4/2023)    PRAPARE - Transportation    • Lack of Transportation (Medical): No    • Lack of Transportation (Non-Medical):  No   Physical Activity: Not on file   Stress: Not on file   Social Connections: Not on file   Intimate Partner Violence: Not on file   Housing Stability: Low Risk  (8/4/2023)    Housing Stability Vital Sign    • Unable to Pay for Housing in the Last Year: No    • Number of Places Lived in the Last Year: 1    • Unstable Housing in the Last Year: No       Objective     Vitals:    08/30/23 1049   BP: 102/52   Pulse: 86   Resp: 16   Temp: 98 °F (36.7 °C)   SpO2: 95%     Wt Readings from Last 3 Encounters:   08/30/23 50.8 kg (112 lb)   08/07/23 54.4 kg (119 lb 14.9 oz)   07/27/23 50.8 kg (112 lb)       Physical Exam  Constitutional:       Comments: Patient in no acute distress but appears frail. Cardiovascular:      Rate and Rhythm: Normal rate. Rhythm irregular. Heart sounds: No murmur heard. Pulmonary:      Comments: Lungs are clear to auscultation without wheezes,rales, or rhonchi  Musculoskeletal:      Right lower leg: Edema present. Left lower leg: Edema present. Comments: Trace bilateral peripheral edema lower extremity   Neurological:      Mental Status: She is alert. Mental status is at baseline. Psychiatric:         Mood and Affect: Mood normal.         Behavior: Behavior normal.         Thought Content:  Thought content normal.         Judgment: Judgment normal.         Pertinent Laboratory/Diagnostic Studies:  Lab Results   Component Value Date    GLUCOSE 210 (H) 05/20/2023    BUN 8 08/07/2023    CREATININE 0.41 (L) 08/07/2023    CALCIUM 8.4 08/07/2023     03/17/2015    K 3.5 08/07/2023    CO2 37 (H) 08/07/2023     08/07/2023     Lab Results   Component Value Date    ALT 7 08/04/2023    AST 15 08/04/2023    ALKPHOS 97 08/04/2023    BILITOT 0.40 03/17/2015       Lab Results   Component Value Date    WBC 6.86 08/07/2023    HGB 9.0 (L) 08/07/2023    HCT 29.0 (L) 08/07/2023     (H) 08/07/2023     08/07/2023       No results found for: "TSH"    Lab Results   Component Value Date    CHOL 145 03/17/2015     Lab Results   Component Value Date    TRIG 76 03/03/2023     Lab Results   Component Value Date    HDL 37 (L) 03/03/2023     Lab Results   Component Value Date    LDLCALC 87 03/03/2023     Lab Results   Component Value Date    HGBA1C 4.5 03/03/2023       Results for orders placed or performed during the hospital encounter of 08/03/23   Stool Enteric Bacterial Panel by PCR    Specimen: Rectum; Stool   Result Value Ref Range    Salmonella sp PCR None Detected None Detected    Shigella sp/Enteroinvasive E. coli (EIEC) PCR None Detected None Detected    Campylobacter sp (jejuni and coli) PCR None Detected None Detected    Shiga toxin 1/Shiga toxin 2 genes PCR None Detected None Detected   Clostridium difficile toxin by PCR with EIA    Specimen: Per Rectum; Stool   Result Value Ref Range    C.difficile toxin by PCR Negative Negative   COVID19, Influenza A/B, RSV PCR, [facility]    Specimen: Nasopharyngeal Swab; Nares   Result Value Ref Range    SARS-CoV-2 Negative Negative    INFLUENZA A PCR Negative Negative    INFLUENZA B PCR Negative Negative    RSV PCR Negative Negative   CBC and differential   Result Value Ref Range    WBC 9.82 4.31 - 10.16 Thousand/uL    RBC 3.30 (L) 3.81 - 5.12 Million/uL    Hemoglobin 10.3 (L) 11.5 - 15.4 g/dL    Hematocrit 32.5 (L) 34.8 - 46.1 %    MCV 99 (H) 82 - 98 fL    MCH 31.2 26.8 - 34.3 pg    MCHC 31.7 31.4 - 37.4 g/dL    RDW 14.4 11.6 - 15.1 %    MPV 8.6 (L) 8.9 - 12.7 fL    Platelets 789 829 - 817 Thousands/uL    nRBC 0 /100 WBCs    Neutrophils Relative 83 (H) 43 - 75 %    Immat GRANS % 0 0 - 2 %    Lymphocytes Relative 10 (L) 14 - 44 %    Monocytes Relative 7 4 - 12 %    Eosinophils Relative 0 0 - 6 %    Basophils Relative 0 0 - 1 %    Neutrophils Absolute 8.05 (H) 1.85 - 7.62 Thousands/µL    Immature Grans Absolute 0.03 0.00 - 0.20 Thousand/uL    Lymphocytes Absolute 1.02 0.60 - 4.47 Thousands/µL    Monocytes Absolute 0.68 0.17 - 1.22 Thousand/µL    Eosinophils Absolute 0.02 0.00 - 0.61 Thousand/µL    Basophils Absolute 0.02 0.00 - 0.10 Thousands/µL   Comprehensive metabolic panel   Result Value Ref Range    Sodium 135 135 - 147 mmol/L    Potassium 3.7 3.5 - 5.3 mmol/L    Chloride 96 96 - 108 mmol/L    CO2 29 21 - 32 mmol/L    ANION GAP 10 mmol/L    BUN 31 (H) 5 - 25 mg/dL    Creatinine 0.72 0.60 - 1.30 mg/dL    Glucose 97 65 - 140 mg/dL    Calcium 9.0 8.4 - 10.2 mg/dL    AST 20 13 - 39 U/L    ALT 6 (L) 7 - 52 U/L    Alkaline Phosphatase 122 (H) 34 - 104 U/L    Total Protein 6.8 6.4 - 8.4 g/dL    Albumin 3.6 3.5 - 5.0 g/dL    Total Bilirubin 0.63 0.20 - 1.00 mg/dL    eGFR 77 ml/min/1.73sq m   Lipase   Result Value Ref Range    Lipase 17 11 - 82 u/L   UA w Reflex to Microscopic w Reflex to Culture    Specimen: Urine, Clean Catch   Result Value Ref Range    Color, UA Yellow     Clarity, UA Clear     Specific Gravity, UA >=1.030 1.005 - 1.030    pH, UA 5.5 4.5, 5.0, 5.5, 6.0, 6.5, 7.0, 7.5, 8.0    Leukocytes, UA Small (A) Negative    Nitrite, UA Negative Negative    Protein, UA 30 (1+) (A) Negative mg/dl    Glucose, UA Negative Negative mg/dl    Ketones, UA 10 (1+) (A) Negative mg/dl    Urobilinogen, UA <2.0 <2.0 mg/dl mg/dl    Bilirubin, UA Negative Negative    Occult Blood, UA Negative Negative   Urine Microscopic   Result Value Ref Range    RBC, UA 2-4 None Seen, 0-1, 1-2, 2-4, 0-5 /hpf    WBC, UA 4-10 (A) None Seen, 0-1, 1-2, 0-5, 2-4 /hpf    Epithelial Cells Moderate (A) None Seen, Occasional /hpf    Bacteria, UA Occasional None Seen, Occasional /hpf   Protime-INR   Result Value Ref Range    Protime 35.6 (H) 11.6 - 14.5 seconds    INR 3.36 (H) 0.84 - 1.19   Comprehensive metabolic panel   Result Value Ref Range    Sodium 140 135 - 147 mmol/L    Potassium 3.5 3.5 - 5.3 mmol/L    Chloride 103 96 - 108 mmol/L    CO2 34 (H) 21 - 32 mmol/L    ANION GAP 3 mmol/L    BUN 19 5 - 25 mg/dL    Creatinine 0.48 (L) 0.60 - 1.30 mg/dL    Glucose 86 65 - 140 mg/dL    Glucose, Fasting 86 65 - 99 mg/dL    Calcium 8.4 8.4 - 10.2 mg/dL    Corrected Calcium 9.1 8.3 - 10.1 mg/dL    AST 15 13 - 39 U/L    ALT 7 7 - 52 U/L    Alkaline Phosphatase 97 34 - 104 U/L    Total Protein 5.6 (L) 6.4 - 8.4 g/dL    Albumin 3.1 (L) 3.5 - 5.0 g/dL    Total Bilirubin 0.44 0.20 - 1.00 mg/dL    eGFR 90 ml/min/1.73sq m   Magnesium   Result Value Ref Range    Magnesium 1.9 1.9 - 2.7 mg/dL   Phosphorus   Result Value Ref Range    Phosphorus 2.4 2.3 - 4.1 mg/dL   CBC and differential   Result Value Ref Range    WBC 7.46 4.31 - 10.16 Thousand/uL    RBC 2.96 (L) 3.81 - 5.12 Million/uL    Hemoglobin 9.1 (L) 11.5 - 15.4 g/dL    Hematocrit 29.4 (L) 34.8 - 46.1 %    MCV 99 (H) 82 - 98 fL    MCH 30.7 26.8 - 34.3 pg    MCHC 31.0 (L) 31.4 - 37.4 g/dL    RDW 14.3 11.6 - 15.1 %    MPV 8.5 (L) 8.9 - 12.7 fL    Platelets 492 516 - 122 Thousands/uL    nRBC 0 /100 WBCs    Neutrophils Relative 78 (H) 43 - 75 %    Immat GRANS % 0 0 - 2 %    Lymphocytes Relative 12 (L) 14 - 44 %    Monocytes Relative 9 4 - 12 %    Eosinophils Relative 1 0 - 6 %    Basophils Relative 0 0 - 1 %    Neutrophils Absolute 5.78 1.85 - 7.62 Thousands/µL    Immature Grans Absolute 0.02 0.00 - 0.20 Thousand/uL    Lymphocytes Absolute 0.87 0.60 - 4.47 Thousands/µL    Monocytes Absolute 0.68 0.17 - 1.22 Thousand/µL    Eosinophils Absolute 0.08 0.00 - 0.61 Thousand/µL    Basophils Absolute 0.03 0.00 - 0.10 Thousands/µL   Protime-INR   Result Value Ref Range    Protime 35.2 (H) 11.6 - 14.5 seconds    INR 3.32 (H) 0.84 - 1.19   Protime-INR   Result Value Ref Range    Protime 34.9 (H) 11.6 - 14.5 seconds    INR 3.28 (H) 0.84 - 1.19   Lactic acid, plasma (w/reflex if result > 2.0)   Result Value Ref Range    LACTIC ACID 0.6 0.5 - 2.0 mmol/L   CBC and differential   Result Value Ref Range    WBC 7.84 4.31 - 10.16 Thousand/uL    RBC 3.05 (L) 3.81 - 5.12 Million/uL    Hemoglobin 9.3 (L) 11.5 - 15.4 g/dL    Hematocrit 30.0 (L) 34.8 - 46.1 %    MCV 98 82 - 98 fL    MCH 30.5 26.8 - 34.3 pg    MCHC 31.0 (L) 31.4 - 37.4 g/dL    RDW 14.0 11.6 - 15.1 %    MPV 8.4 (L) 8.9 - 12.7 fL    Platelets 947 182 - 454 Thousands/uL    nRBC 0 /100 WBCs    Neutrophils Relative 77 (H) 43 - 75 %    Immat GRANS % 1 0 - 2 %    Lymphocytes Relative 13 (L) 14 - 44 %    Monocytes Relative 7 4 - 12 %    Eosinophils Relative 2 0 - 6 %    Basophils Relative 0 0 - 1 %    Neutrophils Absolute 6.05 1.85 - 7.62 Thousands/µL    Immature Grans Absolute 0.05 0.00 - 0.20 Thousand/uL    Lymphocytes Absolute 1.03 0.60 - 4.47 Thousands/µL    Monocytes Absolute 0.52 0.17 - 1.22 Thousand/µL    Eosinophils Absolute 0.16 0.00 - 0.61 Thousand/µL    Basophils Absolute 0.03 0.00 - 0.10 Thousands/µL   Basic metabolic panel   Result Value Ref Range    Sodium 138 135 - 147 mmol/L    Potassium 3.7 3.5 - 5.3 mmol/L    Chloride 101 96 - 108 mmol/L    CO2 31 21 - 32 mmol/L    ANION GAP 6 mmol/L    BUN 12 5 - 25 mg/dL    Creatinine 0.43 (L) 0.60 - 1.30 mg/dL    Glucose 79 65 - 140 mg/dL    Calcium 8.6 8.4 - 10.2 mg/dL    eGFR 93 ml/min/1.73sq m   Protime-INR   Result Value Ref Range    Protime 33.8 (H) 11.6 - 14.5 seconds    INR 3.15 (H) 0.84 - 1.19   CBC and differential   Result Value Ref Range    WBC 7.37 4.31 - 10.16 Thousand/uL    RBC 3.17 (L) 3.81 - 5.12 Million/uL    Hemoglobin 9.8 (L) 11.5 - 15.4 g/dL    Hematocrit 31.2 (L) 34.8 - 46.1 %    MCV 98 82 - 98 fL    MCH 30.9 26.8 - 34.3 pg    MCHC 31.4 31.4 - 37.4 g/dL    RDW 13.9 11.6 - 15.1 %    MPV 8.6 (L) 8.9 - 12.7 fL    Platelets 938 617 - 127 Thousands/uL    nRBC 0 /100 WBCs    Neutrophils Relative 75 43 - 75 %    Immat GRANS % 1 0 - 2 %    Lymphocytes Relative 12 (L) 14 - 44 %    Monocytes Relative 9 4 - 12 %    Eosinophils Relative 3 0 - 6 %    Basophils Relative 0 0 - 1 %    Neutrophils Absolute 5.57 1.85 - 7.62 Thousands/µL    Immature Grans Absolute 0.04 0.00 - 0.20 Thousand/uL    Lymphocytes Absolute 0.89 0.60 - 4.47 Thousands/µL    Monocytes Absolute 0.63 0.17 - 1.22 Thousand/µL    Eosinophils Absolute 0.22 0.00 - 0.61 Thousand/µL    Basophils Absolute 0.02 0.00 - 0.10 Thousands/µL   Basic metabolic panel   Result Value Ref Range    Sodium 138 135 - 147 mmol/L    Potassium 3.6 3.5 - 5.3 mmol/L    Chloride 102 96 - 108 mmol/L    CO2 35 (H) 21 - 32 mmol/L    ANION GAP 1 mmol/L    BUN 10 5 - 25 mg/dL    Creatinine 0.42 (L) 0.60 - 1.30 mg/dL    Glucose 94 65 - 140 mg/dL    Calcium 8.6 8.4 - 10.2 mg/dL    eGFR 94 ml/min/1.73sq m   Basic metabolic panel   Result Value Ref Range    Sodium 139 135 - 147 mmol/L    Potassium 3.5 3.5 - 5.3 mmol/L    Chloride 100 96 - 108 mmol/L    CO2 37 (H) 21 - 32 mmol/L    ANION GAP 2 mmol/L    BUN 8 5 - 25 mg/dL    Creatinine 0.41 (L) 0.60 - 1.30 mg/dL    Glucose 88 65 - 140 mg/dL    Calcium 8.4 8.4 - 10.2 mg/dL    eGFR 95 ml/min/1.73sq m   CBC and differential   Result Value Ref Range    WBC 6.86 4.31 - 10.16 Thousand/uL    RBC 2.91 (L) 3.81 - 5.12 Million/uL    Hemoglobin 9.0 (L) 11.5 - 15.4 g/dL    Hematocrit 29.0 (L) 34.8 - 46.1 %     (H) 82 - 98 fL    MCH 30.9 26.8 - 34.3 pg    MCHC 31.0 (L) 31.4 - 37.4 g/dL    RDW 13.9 11.6 - 15.1 %    MPV 9.0 8.9 - 12.7 fL    Platelets 215 220 - 978 Thousands/uL    nRBC 0 /100 WBCs    Neutrophils Relative 76 (H) 43 - 75 %    Immat GRANS % 0 0 - 2 %    Lymphocytes Relative 12 (L) 14 - 44 %    Monocytes Relative 9 4 - 12 %    Eosinophils Relative 3 0 - 6 %    Basophils Relative 0 0 - 1 %    Neutrophils Absolute 5.20 1.85 - 7.62 Thousands/µL    Immature Grans Absolute 0.03 0.00 - 0.20 Thousand/uL    Lymphocytes Absolute 0.81 0.60 - 4.47 Thousands/µL    Monocytes Absolute 0.59 0.17 - 1.22 Thousand/µL    Eosinophils Absolute 0.21 0.00 - 0.61 Thousand/µL    Basophils Absolute 0.02 0.00 - 0.10 Thousands/µL   Protime-INR   Result Value Ref Range    Protime 29.5 (H) 11.6 - 14.5 seconds    INR 2.64 (H) 0.84 - 1.19       No orders of the defined types were placed in this encounter.       ALLERGIES:  Allergies   Allergen Reactions   • Aspirin Other (See Comments)   • Penicillins Rash and Hives   • Sulfa Antibiotics Rash       Current Medications     Current Outpatient Medications   Medication Sig Dispense Refill   • acetaminophen (TYLENOL) 325 mg tablet Take 3 tablets (975 mg total) by mouth every 8 (eight) hours  0   • albuterol (2.5 mg/3 mL) 0.083 % nebulizer solution USE 1 VIAL IN NEBULIZER EVERY 4 TO 6 HOURS AS NEEDED FOR COUGH AND WHEEZE 75 vial 3   • Ascorbic Acid (VITAMIN C) 1000 MG tablet Take 1,000 mg by mouth daily     • Cholecalciferol (VITAMIN D3) 20 MCG (800 UNIT) TABS Take by mouth in the morning     • digoxin (LANOXIN) 0.125 mg tablet Take 1 tablet (125 mcg total) by mouth every other day Do not start before May 10, 2023.  0   • diltiazem (CARDIZEM CD) 240 mg 24 hr capsule take 1 capsule by mouth once daily for blood pressure 30 capsule 0   • Ferrous Sulfate 220 (44 Fe) MG/5ML LIQD take 1 teaspoonful by mouth once daily 150 mL 5   • furosemide (LASIX) 20 mg tablet Take 1 tablet (20 mg total) by mouth daily Do not start before May 26, 2023.  0   • guaiFENesin (MUCINEX) 600 mg 12 hr tablet Take 1 tablet (600 mg total) by mouth 2 (two) times a day  0   • levothyroxine 25 mcg tablet TAKE 1 TABLET DAILY 90 tablet 3   • loratadine (CLARITIN) 10 mg tablet Take 10 mg by mouth daily     • mirtazapine (REMERON) 7.5 MG tablet Take 1 tablet (7.5 mg total) by mouth daily at bedtime 90 tablet 3   • multivitamin (THERAGRAN) TABS Take 1 tablet by mouth daily     • oxygen gas Inhale 1 L/min continuous. Indications: Difficulty Breathing     • sertraline (Zoloft) 50 mg tablet Take 1 tablet (50 mg total) by mouth daily 90 tablet 3   • umeclidinium (Incruse Ellipta) 62.5 mcg/actuation AEPB inhaler Inhale 1 puff daily 90 blister 3   • warfarin (COUMADIN) 2 mg tablet Take 2 mg daily, adjust to keep INR therapeutic, between 2-3  0   • budesonide (PULMICORT) 0.5 mg/2 mL nebulizer solution Take 2 mL (0.5 mg total) by nebulization every 12 (twelve) hours Rinse mouth after use.  (Patient not taking: Reported on 8/3/2023) 120 mL 0   • Fluticasone-Salmeterol (Advair) 250-50 mcg/dose inhaler USE 1 INHALATION TWICE A DAY (Patient not taking: Reported on 8/30/2023) 180 blister 3   • ipratropium-albuterol (DUO-NEB) 0.5-2.5 mg/3 mL nebulizer solution      • lidocaine (LIDODERM) 5 % Apply 1 patch topically over 12 hours daily Remove & Discard patch within 12 hours or as directed by MD Mackay patch 3   • Lidocaine Pain Relief 4 % PTCH        No current facility-administered medications for this visit.          Health Maintenance     Health Maintenance   Topic Date Due   • SLP PLAN OF CARE  Never done   • BMI: Followup Plan  Never done   • COVID-19 Vaccine (3 - Pfizer series) 06/04/2021   • Influenza Vaccine (1) 09/01/2023   • Fall Risk  04/11/2024   • Depression Screening  04/11/2024   • Urinary Incontinence Screening  04/11/2024   • Medicare Annual Wellness Visit (AWV)  04/11/2024   • BMI: Adult  08/30/2024   • Osteoporosis Screening  Completed   • Pneumococcal Vaccine: 65+ Years  Completed   • HIB Vaccine  Aged Out   • IPV Vaccine  Aged Out   • Hepatitis A Vaccine  Aged Out   • Meningococcal ACWY Vaccine  Aged Out   • HPV Vaccine  Aged Out     Immunization History   Administered Date(s) Administered   • COVID-19 PFIZER VACCINE 0.3 ML IM 03/18/2021, 04/09/2021   • INFLUENZA 12/14/2004, 10/17/2018, 10/15/2020, 09/24/2021   • Influenza Split High Dose Preservative Free IM 09/17/2015, 12/19/2016, 10/23/2017, 09/01/2022   • Influenza, high dose seasonal 0.7 mL 10/16/2019   • Influenza, seasonal, injectable 11/01/2011, 11/16/2012, 10/25/2013   • Pneumococcal Conjugate 13-Valent 03/17/2016   • Pneumococcal Polysaccharide PPV23 12/23/2005   • Tuberculin Skin Test 08/07/2023, 66/54/2946       Abel Cadena MD

## 2023-09-01 LAB
DME PARACHUTE DELIVERY DATE REQUESTED: NORMAL
DME PARACHUTE DELIVERY DATE REQUESTED: NORMAL
DME PARACHUTE ITEM DESCRIPTION: NORMAL
DME PARACHUTE ORDER STATUS: NORMAL
DME PARACHUTE ORDER STATUS: NORMAL
DME PARACHUTE SUPPLIER NAME: NORMAL
DME PARACHUTE SUPPLIER NAME: NORMAL
DME PARACHUTE SUPPLIER PHONE: NORMAL
DME PARACHUTE SUPPLIER PHONE: NORMAL

## 2023-09-08 LAB
DME PARACHUTE DELIVERY DATE ACTUAL: NORMAL
DME PARACHUTE DELIVERY DATE ACTUAL: NORMAL
DME PARACHUTE DELIVERY DATE REQUESTED: NORMAL
DME PARACHUTE DELIVERY DATE REQUESTED: NORMAL
DME PARACHUTE ITEM DESCRIPTION: NORMAL
DME PARACHUTE ORDER STATUS: NORMAL
DME PARACHUTE ORDER STATUS: NORMAL
DME PARACHUTE SUPPLIER NAME: NORMAL
DME PARACHUTE SUPPLIER NAME: NORMAL
DME PARACHUTE SUPPLIER PHONE: NORMAL
DME PARACHUTE SUPPLIER PHONE: NORMAL

## 2023-09-13 ENCOUNTER — OFFICE VISIT (OUTPATIENT)
Age: 83
End: 2023-09-13
Payer: MEDICARE

## 2023-09-13 VITALS
DIASTOLIC BLOOD PRESSURE: 58 MMHG | HEART RATE: 78 BPM | BODY MASS INDEX: 18.48 KG/M2 | WEIGHT: 115 LBS | OXYGEN SATURATION: 94 % | HEIGHT: 66 IN | SYSTOLIC BLOOD PRESSURE: 144 MMHG

## 2023-09-13 DIAGNOSIS — J45.909 MODERATE ASTHMA, UNSPECIFIED WHETHER COMPLICATED, UNSPECIFIED WHETHER PERSISTENT: ICD-10-CM

## 2023-09-13 DIAGNOSIS — J44.1 COPD EXACERBATION (HCC): ICD-10-CM

## 2023-09-13 DIAGNOSIS — J44.1 COPD WITH ACUTE EXACERBATION (HCC): ICD-10-CM

## 2023-09-13 DIAGNOSIS — J44.9 CHRONIC OBSTRUCTIVE PULMONARY DISEASE, UNSPECIFIED COPD TYPE (HCC): ICD-10-CM

## 2023-09-13 DIAGNOSIS — J96.11 CHRONIC RESPIRATORY FAILURE WITH HYPOXIA AND HYPERCAPNIA (HCC): Primary | ICD-10-CM

## 2023-09-13 DIAGNOSIS — J96.12 CHRONIC RESPIRATORY FAILURE WITH HYPOXIA AND HYPERCAPNIA (HCC): Primary | ICD-10-CM

## 2023-09-13 PROCEDURE — 99214 OFFICE O/P EST MOD 30 MIN: CPT | Performed by: INTERNAL MEDICINE

## 2023-09-13 PROCEDURE — 94618 PULMONARY STRESS TESTING: CPT | Performed by: INTERNAL MEDICINE

## 2023-09-13 RX ORDER — ALBUTEROL SULFATE 2.5 MG/3ML
2.5 SOLUTION RESPIRATORY (INHALATION) EVERY 6 HOURS PRN
Qty: 110 ML | Refills: 3 | Status: SHIPPED | OUTPATIENT
Start: 2023-09-13

## 2023-09-13 RX ORDER — BUDESONIDE 0.5 MG/2ML
0.5 INHALANT ORAL
Qty: 120 ML | Refills: 0 | Status: SHIPPED | OUTPATIENT
Start: 2023-09-13 | End: 2023-10-13

## 2023-09-13 RX ORDER — FLUTICASONE PROPIONATE AND SALMETEROL 250; 50 UG/1; UG/1
1 POWDER RESPIRATORY (INHALATION) 2 TIMES DAILY
Qty: 180 BLISTER | Refills: 3 | Status: SHIPPED | OUTPATIENT
Start: 2023-09-13

## 2023-09-13 RX ORDER — TIOTROPIUM BROMIDE 18 UG/1
18 CAPSULE ORAL; RESPIRATORY (INHALATION) DAILY
Qty: 30 CAPSULE | Refills: 3 | Status: SHIPPED | OUTPATIENT
Start: 2023-09-13

## 2023-09-13 NOTE — PATIENT INSTRUCTIONS
Using Oxygen at [address]:   Why might I need to use oxygen at home? You may need extra oxygen if you are not able to breathe enough oxygen on your own. You need a doctor's order to get oxygen therapy. The order will include how much you need, and how often you need it. Use oxygen as directed. What are the types of oxygen supply systems? Your healthcare provider will pick your oxygen supply based on how much oxygen you need, and how active you are. Oxygen can be supplied the following 3 ways:  Compressed oxygen  holds oxygen in a metal cylinder (tank) under pressure. The tank can be set to release only the amount of oxygen you need as you breathe. Compressed oxygen tanks are heavy, and are meant to be used when you stay mostly in one place. You may need help to move or secure it. Smaller tanks and wheeled carts are available to help you move with ease, or when you travel. Liquid oxygen  is kept chilled inside a small, insulated case. The liquid warms and becomes a breathable gas when you need to breathe in. Liquid oxygen cases are smaller and easy to carry around. You can refill your small liquid oxygen case from a big tank kept in your home. Your oxygen delivery service will fill your large tank every 1 to 2 weeks. An oxygen concentrator  is an electric machine that stores oxygen from the air. This machine is heavy and may come with a wheeled cart to help you move it from room to room. What are the types of oxygen breathing devices? Each device is connected to the oxygen supply with tubing. The tubing should be long enough to let you move around your house. You may need a humidifier to moisten the oxygen. This may prevent dryness in your nose, mouth, and throat. Ask your healthcare provider if you need a humidifier, and how to attach it to your oxygen supply. A nasal cannula  is a 2-pronged plastic tube that fits inside your nostrils. Place one prong in each nostril.  Loop the tubing around your ears, or attach it to your eyeglasses to keep it in place. Make sure your cannula fits you well and is comfortable. An oxygen mask  is attached to a plastic tube and covers your nose and mouth. It is usually held in place by an elastic strap that wraps around the back of your head. You can use an oxygen mask if you need a lot of oxygen. Your healthcare provider may tell you to use a nasal cannula during the day, and a mask at night. A mask may help if your nose is dry or stuffy. Transtracheal oxygen  is given through a small, flexible catheter inserted into an opening in your trachea (windpipe). A necklace holds the catheter in place. How do I use oxygen safely? Do not use oxygen around heat or flame. Compressed oxygen can catch on fire. Keep the oxygen container 5 feet away from open flames or heaters, such as candles or hot water heaters. Do not use anything flammable, such as cleaning fluids, gasoline, or aerosol sprays near your oxygen. Keep a fire extinguisher and a phone close by in case of a fire. Tell your fire department that you have oxygen in your home if you need to call them for help. Do not smoke while you are using oxygen. Do not let anyone smoke around you. Do not change the flow of your oxygen  unless your healthcare provider tells you to. Turn your oxygen container or concentrator off when you are not using your oxygen. Do not drink alcohol or take sedatives  while you use oxygen. These may slow your breathing. Put signs on all the doors of your house  to let visitors and emergency workers know that oxygen is in use. Tell your electric company that you have electrical medical equipment. They will put you on a priority list to fix your power quickly if it goes out. Follow instructions for use and maintenance of your oxygen equipment. Keep oxygen containers secured in an upright position. Oxygen containers may become damaged if they fall over.  An oxygen container may cause serious injury if it breaks. How do I clean my oxygen supplies? Wash or replace equipment parts  as directed. Wash your nasal prongs with soap and water twice a week. Replace your nasal prongs every 2 weeks. Replace your tubing every 2 months, or when it becomes stiff. Change the tubing if moisture appears on the inside of the tube. Moisture can make bacteria grow, and cause infections. Change the cannula and tubing after you have a cold or the flu. Ask your healthcare provider how to clean your oxygen mask or transtracheal catheter. Replace the oxygen mask every 2 weeks. Disinfect the buttons and outside of your oxygen concentrator. Clean your air filter at least once a week with soap and water. Let it air dry. Replace the filter at least once a week. Ask your oxygen supply company to service your concentrator at least once a year. Ask your healthcare provider if you have any questions about how to clean the air filter. Wash your humidifier bottle with soap and warm water between each refill. Rinse and air dry the bottle before you refill it with distilled water. Do not use tap water. Disinfect the outside of the bottle and cap once the inside of the bottle has been washed. What are some general tips for oxygen use? Keep a backup oxygen supply in case of an emergency. Always keep a backup oxygen tank that does not run on electricity in case there is a power failure. Oxygen may leak out of your container. Ask your healthcare provider if your supply has a tool to reduce wasted oxygen. Use gauze or water-based lubricants to help soothe your skin. Oxygen may dry out your skin, mouth, or throat. Place gauze on top of your ears or under the tubing on your cheeks if they become sore. Use water-based lubricants on your lips and nostrils if they become dry or sore. Do not use oil-based lubricants. They may be flammable.     Order new oxygen well before your current supply runs out.  Your oxygen company may not deliver on holidays. Ask your healthcare provider for help planning your oxygen needs when you travel. Keep the phone number of your oxygen supply company handy. Place it in an area that you see every day, such as on your fridge. Contact them if you have any problems with your supplies. Call, or have someone call, your local emergency number (911 in the [address]) if:   Your breathing becomes fast, or it hurts to inhale. You have sudden chest pain. You are tired, confused, cannot think clearly, or faint. When should I seek immediate care? You have a headache, your heart is beating fast, and you are shaking. Your breathing is shallow, slow, or more difficult than usual for you. You feel anxious or cannot sit still. Your fingernails or lips turn blue. When should I call my doctor? The oxygen tubes create sores on your skin, or make you bleed. You have trouble sleeping because you cannot breathe well. You have questions or concerns about your condition or care. CARE AGREEMENT:   You have the right to help plan your care. Learn about your health condition and how it may be treated. Discuss treatment options with your healthcare providers to decide what care you want to receive. You always have the right to refuse treatment. The above information is an  only. It is not intended as medical advice for individual conditions or treatments. Talk to your doctor, nurse or pharmacist before following any medical regimen to see if it is safe and effective for you. © Copyright Emely Collado 2022 Information is for End User's use only and may not be sold, redistributed or otherwise used for commercial purposes.

## 2023-09-13 NOTE — ASSESSMENT & PLAN NOTE
COPD not in exacerbation currently she is on maximal therapy, switched her back from Incruse to Spiriva as she felt better on it, continue Advair, she has albuterol nebulizer and Pulmicort

## 2023-09-13 NOTE — PROGRESS NOTES
Pulmonary/Sleep Follow Up Note   Carrington Kendrick 79 y.o. female MRN: [id number]  9/13/2023      Assessment and Plan:    1. Chronic respiratory failure with hypoxia and hypercapnia (HCC)  Assessment & Plan:  Chronic oxygen therapy 2 L-24/7  We will do an oxygen titration walk test today    Orders:  -     POCT Oxygen Titration    2. Chronic obstructive pulmonary disease, unspecified COPD type ([address])  Assessment & Plan:  COPD not in exacerbation currently she is on maximal therapy, switched her back from Incruse to Spiriva as she felt better on it, continue Advair, she has albuterol nebulizer and Pulmicort      Orders:  -     Fluticasone-Salmeterol (Advair) 250-50 mcg/dose inhaler; Inhale 1 puff 2 (two) times a day Rinse mouth after use. 3. COPD exacerbation (HCC)  Assessment & Plan:  COPD not in exacerbation currently she is on maximal therapy, switched her back from Incruse to Spiriva as she felt better on it, continue Advair, she has albuterol nebulizer and Pulmicort      Orders:  -     tiotropium (Spiriva HandiHaler) 18 mcg inhalation capsule; Place 1 capsule (18 mcg total) into inhaler and inhale daily    4. COPD with acute exacerbation (HCC)  -     budesonide (PULMICORT) 0.5 mg/2 mL nebulizer solution; Take 2 mL (0.5 mg total) by nebulization every 12 (twelve) hours Rinse mouth after use. 5. Moderate asthma, unspecified whether complicated, unspecified whether persistent  Assessment & Plan: With COPD overlap    Orders:  -     albuterol (2.5 mg/3 mL) 0.083 % nebulizer solution; Take 3 mL (2.5 mg total) by nebulization every 6 (six) hours as needed for wheezing or shortness of breath      Return in about 6 months (around 3/13/2024). History of Present Illness   HPI:  Carrington Kendrick is a 79 y.o. female who is here accompanied by her son for a follow-up she was recently admitted in [facility] on 8/2023 due to abdominal pain. she is also on oxygen currently 2 L.   She still has significant shortness of breath however she is not ambulating as much she is using the wheelchair/walker for the most part and she is undergoing a rehab. She has also chronic cough which comes and episodes productive think white phlegm sometimes. She experiences difficulty sometimes finishing her meals because of shortness of breath but also her appetite is very low. She struggles if she does not use her inhalers    Review of Systems   Constitutional: Negative for appetite change and fever. HENT: Positive for postnasal drip. Negative for ear pain, rhinorrhea, sneezing, sore throat and trouble swallowing. Respiratory: Positive for cough, shortness of breath and wheezing. Cardiovascular: Negative for chest pain. Musculoskeletal: Positive for myalgias. Neurological: Negative for headaches. All other systems reviewed and are negative. Historical Information   Past Medical History:   Diagnosis Date   • Anti-phospholipid syndrome (HCC)    • Asthma    • Blood type O+    • Cardiac disease    • Chronic pain    • COPD (chronic obstructive pulmonary disease) (HCC)    • Fracture of ankle     left   • Hyperlipidemia    • Hypertension    • Hypokalemia 2/27/2023   • Osteoporosis      Past Surgical History:   Procedure Laterality Date   • APPENDECTOMY     • BILATERAL OOPHORECTOMY Bilateral    • ORIF TIBIAL SHAFT FRACTURE W/ PLATES AND SCREWS Right    • CA HEMIARTHROPLASTY HIP PARTIAL Left 6/23/2023    Procedure: HEMIARTHROPLASTY HIP (BIPOLAR);   Surgeon: Julianne Dockery MD;  Location:  MAIN OR;  Service: Orthopedics     Family History   Problem Relation Age of Onset   • Hypertension Mother    • Skin cancer Mother    • Hypertension Father          Meds/Allergies     Current Outpatient Medications:   •  acetaminophen (TYLENOL) 325 mg tablet, Take 3 tablets (975 mg total) by mouth every 8 (eight) hours, Disp: , Rfl: 0  •  albuterol (2.5 mg/3 mL) 0.083 % nebulizer solution, Take 3 mL (2.5 mg total) by nebulization every 6 (six) hours as needed for wheezing or shortness of breath, Disp: 110 mL, Rfl: 3  •  Ascorbic Acid (VITAMIN C) 1000 MG tablet, Take 1,000 mg by mouth daily, Disp: , Rfl:   •  budesonide (PULMICORT) 0.5 mg/2 mL nebulizer solution, Take 2 mL (0.5 mg total) by nebulization every 12 (twelve) hours Rinse mouth after use., Disp: 120 mL, Rfl: 0  •  Cholecalciferol (VITAMIN D3) 20 MCG (800 UNIT) TABS, Take by mouth in the morning, Disp: , Rfl:   •  digoxin (LANOXIN) 0.125 mg tablet, Take 1 tablet (125 mcg total) by mouth every other day Do not start before May 10, 2023., Disp: , Rfl: 0  •  diltiazem (CARDIZEM CD) 240 mg 24 hr capsule, take 1 capsule by mouth once daily for blood pressure, Disp: 30 capsule, Rfl: 0  •  Ferrous Sulfate 220 (44 Fe) MG/5ML LIQD, take 1 teaspoonful by mouth once daily, Disp: 150 mL, Rfl: 5  •  Fluticasone-Salmeterol (Advair) 250-50 mcg/dose inhaler, Inhale 1 puff 2 (two) times a day Rinse mouth after use., Disp: 180 blister, Rfl: 3  •  furosemide (LASIX) 20 mg tablet, Take 1 tablet (20 mg total) by mouth daily Do not start before May 26, 2023., Disp: , Rfl: 0  •  guaiFENesin (MUCINEX) 600 mg 12 hr tablet, Take 1 tablet (600 mg total) by mouth 2 (two) times a day, Disp: , Rfl: 0  •  ipratropium-albuterol (DUO-NEB) 0.5-2.5 mg/3 mL nebulizer solution, , Disp: , Rfl:   •  levothyroxine 25 mcg tablet, TAKE 1 TABLET DAILY, Disp: 90 tablet, Rfl: 3  •  lidocaine (LIDODERM) 5 %, Apply 1 patch topically over 12 hours daily Remove & Discard patch within 12 hours or as directed by MD, Disp: 90 patch, Rfl: 3  •  Lidocaine Pain Relief 4 % PTCH, , Disp: , Rfl:   •  loratadine (CLARITIN) 10 mg tablet, Take 10 mg by mouth daily, Disp: , Rfl:   •  mirtazapine (REMERON) 7.5 MG tablet, Take 1 tablet (7.5 mg total) by mouth daily at bedtime, Disp: 90 tablet, Rfl: 3  •  multivitamin (THERAGRAN) TABS, Take 1 tablet by mouth daily, Disp: , Rfl:   •  sertraline (Zoloft) 50 mg tablet, Take 1 tablet (50 mg total) by mouth daily, Disp: 90 tablet, Rfl: 3  •  tiotropium (Spiriva HandiHaler) 18 mcg inhalation capsule, Place 1 capsule (18 mcg total) into inhaler and inhale daily, Disp: 30 capsule, Rfl: 3  •  warfarin (COUMADIN) 2 mg tablet, Take 2 mg daily, adjust to keep INR therapeutic, between 2-3, Disp: , Rfl: 0  •  oxygen gas, Inhale 1 L/min continuous. Indications: Difficulty Breathing, Disp: , Rfl:   Allergies   Allergen Reactions   • Aspirin Other (See Comments)   • Penicillins Rash and Hives   • Sulfa Antibiotics Rash       Vitals: Blood pressure 144/58, pulse 78, height 5' 6" (1.676 m), weight 52.2 kg (115 lb), SpO2 94 %, not currently breastfeeding. Body mass index is 18.56 kg/m². Oxygen Therapy  SpO2: 94 %  Oxygen Therapy: Supplemental oxygen  O2 Delivery Method: Nasal cannula  O2 Flow Rate (L/min): 2 L/min      Physical Exam  General:  Awake alert and oriented x 3, conversant without conversational dyspnea, NAD, normal affect  HEENT:   Sclera noninjected, nonicteric OU, Nares patent,  no craniofacial abnormalities  NECK:  Trachea midline, no accessory muscle use, no stridor,  JVP not elevated  CARDIAC: Reg, single s1/S2, no m/r/g  PULM: Bilateral significantly diminished air entry, bilateral lower lung zones Rales/crackles  EXT: No cyanosis, no clubbing, no edema  NEURO: no focal neurologic deficits, AAOx3      Labs: I have personally reviewed pertinent lab results. , ABG: No results found for: "PHART", "MLI3PHA", "PO2ART", "WRQ3URY", "A6DKYOFB", "BEART", "SOURCE", BNP: No results found for: "BNP", CBC: No results found for: "WBC", "HGB", "HCT", "MCV", "PLT", "ADJUSTEDWBC", "RBC", "MCH", "MCHC", "RDW", "MPV", "NRBC", CMP: No results found for: "SODIUM", "K", "CL", "CO2", "ANIONGAP", "BUN", "CREATININE", "GLUCOSE", "CALCIUM", "AST", "ALT", "ALKPHOS", "PROT", "BILITOT", "EGFR", PT/INR: No results found for: "PT", "INR", Troponin: No results found for: "TROPONINI"  Lab Results   Component Value Date    WBC 6.86 08/07/2023    HGB 9.0 (L) 08/07/2023    HCT 29.0 (L) 08/07/2023     (H) 08/07/2023     08/07/2023     Lab Results   Component Value Date    GLUCOSE 210 (H) 05/20/2023    CALCIUM 8.4 08/07/2023     03/17/2015    K 3.5 08/07/2023    CO2 37 (H) 08/07/2023     08/07/2023    BUN 8 08/07/2023    CREATININE 0.41 (L) 08/07/2023     No results found for: "IGE"  Lab Results   Component Value Date    ALT 7 08/04/2023    AST 15 08/04/2023    ALKPHOS 97 08/04/2023    BILITOT 0.40 03/17/2015         Imaging and other studies:   No orders to display     CT chest 5/2023     IMPRESSION:     Multilobar pneumonia most prominent in the right lower lobe.     New moderate T10 vertebral body compression deformity. CT C/A/P 6/2023  IMPRESSION:     Displaced left subcapital fracture.     Bilateral sacral insufficiency fractures.     Chronic right pubic symphysis fracture.     Subacute left 11th rib fracture          Maia Duane, MD  [facility]       Portions of the record may have been created with voice recognition software. Occasional wrong word or "sound a like" substitutions may have occurred due to the inherent limitations of voice recognition software. Read the chart carefully and recognize, using context, where substitutions have occurred.

## 2023-10-04 ENCOUNTER — TELEPHONE (OUTPATIENT)
Dept: FAMILY MEDICINE CLINIC | Facility: CLINIC | Age: 83
End: 2023-10-04

## 2023-10-04 NOTE — TELEPHONE ENCOUNTER
Patient's daughter called to state that [facility] has been dealing with some leg swelling which resulted in her increasing her Lasix on Saturday, Sunday, and Monday to two of the 20mg tablets instead of one. She is still dealing with swollen legs. Bert Valverde last visited her house for a nurse visit on 9/26 and she is unsure why they have not been back since. I have a call into [facility] to inquire about frequency of visits. She also states Simin is dealing with some back pain but is going to hip doctor tomorrow. Her pulse ox has been ranging 88-92. Lastly, she wanted you to be aware that Simin may be sneaking cigarettes as at times she smells of smoke. Please advise.

## 2023-10-05 ENCOUNTER — OFFICE VISIT (OUTPATIENT)
Dept: OBGYN CLINIC | Facility: CLINIC | Age: 83
End: 2023-10-05
Payer: MEDICARE

## 2023-10-05 ENCOUNTER — APPOINTMENT (OUTPATIENT)
Dept: RADIOLOGY | Facility: CLINIC | Age: 83
End: 2023-10-05
Payer: MEDICARE

## 2023-10-05 ENCOUNTER — APPOINTMENT (OUTPATIENT)
Dept: RADIOLOGY | Facility: HOSPITAL | Age: 83
End: 2023-10-05
Payer: MEDICARE

## 2023-10-05 ENCOUNTER — TELEPHONE (OUTPATIENT)
Dept: FAMILY MEDICINE CLINIC | Facility: CLINIC | Age: 83
End: 2023-10-05

## 2023-10-05 ENCOUNTER — HOSPITAL ENCOUNTER (EMERGENCY)
Facility: HOSPITAL | Age: 83
Discharge: HOME | End: 2023-10-05
Attending: EMERGENCY MEDICINE
Payer: MEDICARE

## 2023-10-05 VITALS
BODY MASS INDEX: 18.8 KG/M2 | DIASTOLIC BLOOD PRESSURE: 72 MMHG | HEIGHT: 66 IN | SYSTOLIC BLOOD PRESSURE: 132 MMHG | WEIGHT: 117 LBS

## 2023-10-05 VITALS
HEIGHT: 66 IN | SYSTOLIC BLOOD PRESSURE: 175 MMHG | WEIGHT: 117 LBS | HEART RATE: 70 BPM | DIASTOLIC BLOOD PRESSURE: 74 MMHG | OXYGEN SATURATION: 91 % | BODY MASS INDEX: 18.8 KG/M2 | RESPIRATION RATE: 20 BRPM | TEMPERATURE: 98.5 F

## 2023-10-05 DIAGNOSIS — Z96.649 STATUS POST HIP HEMIARTHROPLASTY: Primary | ICD-10-CM

## 2023-10-05 DIAGNOSIS — J44.1 COPD WITH ACUTE EXACERBATION (HCC): Primary | ICD-10-CM

## 2023-10-05 DIAGNOSIS — Z47.89 AFTERCARE FOLLOWING SURGERY OF THE MUSCULOSKELETAL SYSTEM: ICD-10-CM

## 2023-10-05 DIAGNOSIS — R07.89 RIGHT-SIDED CHEST WALL PAIN: ICD-10-CM

## 2023-10-05 PROBLEM — S72.92XA FEMUR FRACTURE, LEFT (HCC): Status: RESOLVED | Noted: 2023-06-22 | Resolved: 2023-10-05

## 2023-10-05 LAB
2HR DELTA HS TROPONIN: -1 NG/L
ALBUMIN SERPL BCP-MCNC: 3.7 G/DL (ref 3.5–5)
ALP SERPL-CCNC: 133 U/L (ref 34–104)
ALT SERPL W P-5'-P-CCNC: 12 U/L (ref 7–52)
ANION GAP SERPL CALCULATED.3IONS-SCNC: 6 MMOL/L
APTT PPP: 50 SECONDS (ref 23–37)
AST SERPL W P-5'-P-CCNC: 20 U/L (ref 13–39)
BASE EXCESS BLDA CALC-SCNC: 20 MMOL/L (ref -2–3)
BASOPHILS # BLD AUTO: 0.03 THOUSANDS/ÂΜL (ref 0–0.1)
BASOPHILS NFR BLD AUTO: 0 % (ref 0–1)
BILIRUB SERPL-MCNC: 0.43 MG/DL (ref 0.2–1)
BNP SERPL-MCNC: 207 PG/ML (ref 0–100)
BUN SERPL-MCNC: 20 MG/DL (ref 5–25)
CA-I BLD-SCNC: 1.13 MMOL/L (ref 1.12–1.32)
CALCIUM SERPL-MCNC: 9.7 MG/DL (ref 8.4–10.2)
CARDIAC TROPONIN I PNL SERPL HS: 11 NG/L
CARDIAC TROPONIN I PNL SERPL HS: 12 NG/L
CHLORIDE SERPL-SCNC: 95 MMOL/L (ref 96–108)
CO2 SERPL-SCNC: 41 MMOL/L (ref 21–32)
CREAT SERPL-MCNC: 0.78 MG/DL (ref 0.6–1.3)
DIGOXIN SERPL-MCNC: 0.6 NG/ML (ref 0.8–2)
EOSINOPHIL # BLD AUTO: 0.33 THOUSAND/ÂΜL (ref 0–0.61)
EOSINOPHIL NFR BLD AUTO: 5 % (ref 0–6)
ERYTHROCYTE [DISTWIDTH] IN BLOOD BY AUTOMATED COUNT: 18 % (ref 11.6–15.1)
GFR SERPL CREATININE-BSD FRML MDRD: 70 ML/MIN/1.73SQ M
GLUCOSE SERPL-MCNC: 124 MG/DL (ref 65–140)
GLUCOSE SERPL-MCNC: 90 MG/DL (ref 65–140)
HCO3 BLDA-SCNC: 45.7 MMOL/L (ref 24–30)
HCT VFR BLD AUTO: 32.1 % (ref 34.8–46.1)
HCT VFR BLD CALC: 29 % (ref 34.8–46.1)
HGB BLD-MCNC: 9.6 G/DL (ref 11.5–15.4)
HGB BLDA-MCNC: 9.9 G/DL (ref 11.5–15.4)
IMM GRANULOCYTES # BLD AUTO: 0.08 THOUSAND/UL (ref 0–0.2)
IMM GRANULOCYTES NFR BLD AUTO: 1 % (ref 0–2)
INR PPP: 2.62 (ref 0.84–1.19)
LYMPHOCYTES # BLD AUTO: 0.83 THOUSANDS/ÂΜL (ref 0.6–4.47)
LYMPHOCYTES NFR BLD AUTO: 12 % (ref 14–44)
MCH RBC QN AUTO: 29.8 PG (ref 26.8–34.3)
MCHC RBC AUTO-ENTMCNC: 29.9 G/DL (ref 31.4–37.4)
MCV RBC AUTO: 100 FL (ref 82–98)
MONOCYTES # BLD AUTO: 0.6 THOUSAND/ÂΜL (ref 0.17–1.22)
MONOCYTES NFR BLD AUTO: 9 % (ref 4–12)
NEUTROPHILS # BLD AUTO: 5.22 THOUSANDS/ÂΜL (ref 1.85–7.62)
NEUTS SEG NFR BLD AUTO: 73 % (ref 43–75)
NRBC BLD AUTO-RTO: 0 /100 WBCS
PCO2 BLD: 60.9 MM HG (ref 42–50)
PCO2 BLD: >45 MMOL/L (ref 21–32)
PH BLD: 7.48 [PH] (ref 7.3–7.4)
PLATELET # BLD AUTO: 389 THOUSANDS/UL (ref 149–390)
PMV BLD AUTO: 8.7 FL (ref 8.9–12.7)
PO2 BLD: 24 MM HG (ref 35–45)
POTASSIUM BLD-SCNC: 4.2 MMOL/L (ref 3.5–5.3)
POTASSIUM SERPL-SCNC: 4.1 MMOL/L (ref 3.5–5.3)
PROT SERPL-MCNC: 7.5 G/DL (ref 6.4–8.4)
PROTHROMBIN TIME: 28.6 SECONDS (ref 11.6–14.5)
RBC # BLD AUTO: 3.22 MILLION/UL (ref 3.81–5.12)
SAO2 % BLD FROM PO2: 44 % (ref 60–85)
SODIUM BLD-SCNC: 138 MMOL/L (ref 136–145)
SODIUM SERPL-SCNC: 142 MMOL/L (ref 135–147)
SPECIMEN SOURCE: ABNORMAL
TSH SERPL DL<=0.05 MIU/L-ACNC: 4.11 UIU/ML (ref 0.45–4.5)
WBC # BLD AUTO: 7.09 THOUSAND/UL (ref 4.31–10.16)

## 2023-10-05 PROCEDURE — 82947 ASSAY GLUCOSE BLOOD QUANT: CPT

## 2023-10-05 PROCEDURE — 71045 X-RAY EXAM CHEST 1 VIEW: CPT

## 2023-10-05 PROCEDURE — 82330 ASSAY OF CALCIUM: CPT

## 2023-10-05 PROCEDURE — 85014 HEMATOCRIT: CPT

## 2023-10-05 PROCEDURE — 83880 ASSAY OF NATRIURETIC PEPTIDE: CPT | Performed by: EMERGENCY MEDICINE

## 2023-10-05 PROCEDURE — 99214 OFFICE O/P EST MOD 30 MIN: CPT | Performed by: ORTHOPAEDIC SURGERY

## 2023-10-05 PROCEDURE — 84484 ASSAY OF TROPONIN QUANT: CPT | Performed by: EMERGENCY MEDICINE

## 2023-10-05 PROCEDURE — 85025 COMPLETE CBC W/AUTO DIFF WBC: CPT | Performed by: EMERGENCY MEDICINE

## 2023-10-05 PROCEDURE — 84132 ASSAY OF SERUM POTASSIUM: CPT

## 2023-10-05 PROCEDURE — 82803 BLOOD GASES ANY COMBINATION: CPT

## 2023-10-05 PROCEDURE — 93005 ELECTROCARDIOGRAM TRACING: CPT

## 2023-10-05 PROCEDURE — 99285 EMERGENCY DEPT VISIT HI MDM: CPT | Performed by: EMERGENCY MEDICINE

## 2023-10-05 PROCEDURE — 85730 THROMBOPLASTIN TIME PARTIAL: CPT | Performed by: EMERGENCY MEDICINE

## 2023-10-05 PROCEDURE — 84443 ASSAY THYROID STIM HORMONE: CPT | Performed by: EMERGENCY MEDICINE

## 2023-10-05 PROCEDURE — 99285 EMERGENCY DEPT VISIT HI MDM: CPT

## 2023-10-05 PROCEDURE — 80053 COMPREHEN METABOLIC PANEL: CPT | Performed by: EMERGENCY MEDICINE

## 2023-10-05 PROCEDURE — 80162 ASSAY OF DIGOXIN TOTAL: CPT | Performed by: EMERGENCY MEDICINE

## 2023-10-05 PROCEDURE — 36415 COLL VENOUS BLD VENIPUNCTURE: CPT

## 2023-10-05 PROCEDURE — 73502 X-RAY EXAM HIP UNI 2-3 VIEWS: CPT

## 2023-10-05 PROCEDURE — 85610 PROTHROMBIN TIME: CPT | Performed by: EMERGENCY MEDICINE

## 2023-10-05 PROCEDURE — 87040 BLOOD CULTURE FOR BACTERIA: CPT | Performed by: EMERGENCY MEDICINE

## 2023-10-05 PROCEDURE — 94640 AIRWAY INHALATION TREATMENT: CPT

## 2023-10-05 PROCEDURE — 84295 ASSAY OF SERUM SODIUM: CPT

## 2023-10-05 RX ORDER — LIDOCAINE 50 MG/G
1 PATCH TOPICAL ONCE
Status: DISCONTINUED | OUTPATIENT
Start: 2023-10-05 | End: 2023-10-06 | Stop reason: HOSPADM

## 2023-10-05 RX ORDER — IPRATROPIUM BROMIDE AND ALBUTEROL SULFATE 2.5; .5 MG/3ML; MG/3ML
3 SOLUTION RESPIRATORY (INHALATION) ONCE
Status: COMPLETED | OUTPATIENT
Start: 2023-10-05 | End: 2023-10-05

## 2023-10-05 RX ORDER — ACETAMINOPHEN 325 MG/1
650 TABLET ORAL ONCE
Status: COMPLETED | OUTPATIENT
Start: 2023-10-05 | End: 2023-10-05

## 2023-10-05 RX ADMIN — ACETAMINOPHEN 650 MG: 325 TABLET, FILM COATED ORAL at 21:07

## 2023-10-05 RX ADMIN — IPRATROPIUM BROMIDE AND ALBUTEROL SULFATE 3 ML: .5; 3 SOLUTION RESPIRATORY (INHALATION) at 19:02

## 2023-10-05 RX ADMIN — LIDOCAINE 1 PATCH: 700 PATCH TOPICAL at 21:07

## 2023-10-05 NOTE — PROGRESS NOTES
Assessment:     1. Status post hip hemiarthroplasty        Plan:     Problem List Items Addressed This Visit        Other    Status post hip hemiarthroplasty - Primary     Status post left hip cemented hemiarthroplasty on June 23, 2023. X-rays were reviewed with her that revealed a well aligned prosthesis with no evidence of loosening. Continue with in home physical therapy for continued strengthening. Lise Mould as needed to ambulate. See back in June with repeat imaging left hip. Tylenol if needed for any pain. All patient's questions were answered to her satisfaction. This note is created using dictation transcription. It may contain typographical errors, grammatical errors, improperly dictated words, background noise and other errors. Relevant Orders    XR hip/pelv 2-3 vws left if performed       Subjective:     Patient ID: Jeoffrey Crigler is a 79 y.o. female. Chief Complaint:  79-year-old white female who is status post left hip cemented hemiarthroplasty on June 23, 2023. She has returned home. She is using walker to ambulate. Pain is well controlled. Physical therapy is coming to her home once a week. She denies any fevers or chills.     Allergy:  Allergies   Allergen Reactions   • Aspirin Other (See Comments)   • Penicillins Rash and Hives   • Sulfa Antibiotics Rash     Medications:  all current active meds have been reviewed  Past Medical History:  Past Medical History:   Diagnosis Date   • Anti-phospholipid syndrome (HCC)    • Asthma    • Blood type O+    • Cardiac disease    • Chronic pain    • COPD (chronic obstructive pulmonary disease) ([facility])    • Femur fracture, left ([address]) 6/22/2023   • Fracture of ankle     left   • Hyperlipidemia    • Hypertension    • Hypokalemia 2/27/2023   • Osteoporosis      Past Surgical History:  Past Surgical History:   Procedure Laterality Date   • APPENDECTOMY     • BILATERAL OOPHORECTOMY Bilateral    • ORIF TIBIAL SHAFT FRACTURE W/ PLATES AND SCREWS Right    • UT HEMIARTHROPLASTY HIP PARTIAL Left 6/23/2023    Procedure: HEMIARTHROPLASTY HIP (BIPOLAR); Surgeon: Marli Saavedra MD;  Location:  MAIN OR;  Service: Orthopedics     Family History:  Family History   Problem Relation Age of Onset   • Hypertension Mother    • Skin cancer Mother    • Hypertension Father      Social History:  Social History     Substance and Sexual Activity   Alcohol Use Not Currently     Social History     Substance and Sexual Activity   Drug Use No     Social History     Tobacco Use   Smoking Status Former   • Types: Cigarettes   Smokeless Tobacco Never   Tobacco Comments    Never smoker per [employer]     Review of Systems   Constitutional: Negative for chills and fever. HENT: Negative for ear pain and sore throat. Eyes: Negative for pain and visual disturbance. Respiratory: Negative for cough and shortness of breath. Cardiovascular: Negative for chest pain and palpitations. Gastrointestinal: Negative for abdominal pain and vomiting. Genitourinary: Negative for dysuria and hematuria. Musculoskeletal: Negative for arthralgias (left hip) and back pain. Skin: Negative for color change and rash. Neurological: Negative for seizures and syncope. All other systems reviewed and are negative. Objective:  BP Readings from Last 1 Encounters:   10/05/23 132/72      Wt Readings from Last 1 Encounters:   10/05/23 53.1 kg (117 lb)      BMI:   Estimated body mass index is 18.88 kg/m² as calculated from the following:    Height as of this encounter: 5' 6" (1.676 m). Weight as of this encounter: 53.1 kg (117 lb). BSA:   Estimated body surface area is 1.59 meters squared as calculated from the following:    Height as of this encounter: 5' 6" (1.676 m). Weight as of this encounter: 53.1 kg (117 lb). Physical Exam  Constitutional:       Appearance: She is well-developed. Eyes:      Pupils: Pupils are equal, round, and reactive to light.    Pulmonary:      Effort: Pulmonary effort is normal.      Breath sounds: Normal breath sounds. Musculoskeletal:         General: Tenderness (left hip arthralgia ) present. Skin:     General: Skin is warm and dry. Neurological:      Mental Status: She is alert and oriented to person, place, and time. Deep Tendon Reflexes: Reflexes are normal and symmetric. Psychiatric:         Behavior: Behavior normal.         Thought Content: Thought content normal.         Judgment: Judgment normal.       Left Hip Exam     Tenderness   The patient is experiencing no tenderness. Other   Erythema: absent  Scars: present (well healed incision )  Sensation: normal  Pulse: present    Comments:  No pain with passive range of motion of left hip             I have personally reviewed pertinent films in PACS and my interpretation is xr left hip demonstrates well alignment prosthesis with no evidence of loosening .      Scribe Attestation    I,:  Dee Yuan am acting as a scribe while in the presence of the attending physician.:       I,:  Blanka Zamudio MD personally performed the services described in this documentation    as scribed in my presence.:

## 2023-10-05 NOTE — TELEPHONE ENCOUNTER
[facility] home nurse called in after her in home evaluation of Simin. She stated that the patient is retaining fluid, having trouble breathing, and having pain in the rib area. She also stated that her pulse oximeter reading went from 94 to 80 after a short walk to the rest room. Nurse also stated that she detected rhonchi sounds in patients lungs. Deon Valencia called the patient and advised her to call 911. The patient asked us to contact Rah. Family was contacted and made aware that patient should go to the ER as soon as possible. Nurse stated that she was calling patient's daughter Chantal to make her aware of our office's recommendation to seek immediate medical attention.

## 2023-10-05 NOTE — TELEPHONE ENCOUNTER
Spoke with daughter and relayed message and instructions. She will contact cardiology. and have them refill medication and schedule an appointment.

## 2023-10-05 NOTE — ASSESSMENT & PLAN NOTE
Status post left hip cemented hemiarthroplasty on June 23, 2023. X-rays were reviewed with her that revealed a well aligned prosthesis with no evidence of loosening. Continue with in home physical therapy for continued strengthening. Kaylynn Golva as needed to ambulate. See back in June with repeat imaging left hip. Tylenol if needed for any pain. All patient's questions were answered to her satisfaction. This note is created using dictation transcription. It may contain typographical errors, grammatical errors, improperly dictated words, background noise and other errors.

## 2023-10-05 NOTE — ED PROVIDER NOTES
History  Chief Complaint   Patient presents with   • Shortness of Breath     Patient presents to ED with son after being sent by visiting nurse for worsening SOB chest pain     70-year-old female from home by private vehicle with history of antiphospholipid syndrome, asthma, COPD, hypertension, chronic pain complains of progressively worsening dyspnea and cough over the past 4 days or so. She now also has significant right chest wall tenderness that is worse with deep breaths, palpation and movement. She states that although she is coughing more frequently she has not produce more sputum than usual.  She is compliant with her nebulizer treatment which is gives her short-term relief. Recently she had been improving as assessed to be able to stop using home oxygen but over the last few days her pulse ox is lower and it takes longer for her to recover with ambulation. Patient also had her Lasix dose increased, doubled, over the past few days due to increasing pedal edema. Edema has been improving. Denies anginal pain, fever, sore throat, GI and  symptoms. Complains of progressively worsening shortness of breath and cough past 4 to 5 days. Prior to Admission Medications   Prescriptions Last Dose Informant Patient Reported? Taking? Ascorbic Acid (VITAMIN C) 1000 MG tablet  Outside Facility (Specify) Yes No   Sig: Take 1,000 mg by mouth daily   Cholecalciferol (VITAMIN D3) 20 MCG (800 UNIT) TABS  Outside Facility (Specify) Yes No   Sig: Take by mouth in the morning   Ferrous Sulfate 220 (44 Fe) MG/5ML LIQD  Outside Facility (Specify) No No   Sig: take 1 teaspoonful by mouth once daily   Fluticasone-Salmeterol (Advair) 250-50 mcg/dose inhaler   No No   Sig: Inhale 1 puff 2 (two) times a day Rinse mouth after use.    Lidocaine Pain Relief 4 % [facility]   Yes No   acetaminophen (TYLENOL) 325 mg tablet  Outside Facility (Specify) No No   Sig: Take 3 tablets (975 mg total) by mouth every 8 (eight) hours   albuterol (2.5 mg/3 mL) 0.083 % nebulizer solution   No No   Sig: Take 3 mL (2.5 mg total) by nebulization every 6 (six) hours as needed for wheezing or shortness of breath   budesonide (PULMICORT) 0.5 mg/2 mL nebulizer solution   No No   Sig: Take 2 mL (0.5 mg total) by nebulization every 12 (twelve) hours Rinse mouth after use. digoxin (LANOXIN) 0.125 mg tablet  Outside Facility (Specify) No No   Sig: Take 1 tablet (125 mcg total) by mouth every other day Do not start before May 10, 2023. diltiazem (CARDIZEM CD) 240 mg 24 hr capsule  Outside Facility (Specify) No No   Sig: take 1 capsule by mouth once daily for blood pressure   furosemide (LASIX) 20 mg tablet  Outside Facility (Specify) No No   Sig: Take 1 tablet (20 mg total) by mouth daily Do not start before May 26, 2023. guaiFENesin (MUCINEX) 600 mg 12 hr tablet  Outside Facility (Specify) No No   Sig: Take 1 tablet (600 mg total) by mouth 2 (two) times a day   ipratropium-albuterol (DUO-NEB) 0.5-2.5 mg/3 mL nebulizer solution   Yes No   levothyroxine 25 mcg tablet  Outside Facility (Specify) No No   Sig: TAKE 1 TABLET DAILY   lidocaine (LIDODERM) 5 %  Outside Facility (Specify) No No   Sig: Apply 1 patch topically over 12 hours daily Remove & Discard patch within 12 hours or as directed by MD   loratadine (CLARITIN) 10 mg tablet  Self, Outside Facility (Specify) Yes No   Sig: Take 10 mg by mouth daily   mirtazapine (REMERON) 7.5 MG tablet  Outside Facility (Specify) No No   Sig: Take 1 tablet (7.5 mg total) by mouth daily at bedtime   multivitamin (THERAGRAN) TABS  Self, Outside Facility (Specify) Yes No   Sig: Take 1 tablet by mouth daily   oxygen gas   Yes No   Sig: Inhale 1 L/min continuous.  Indications: Difficulty Breathing   sertraline (Zoloft) 50 mg tablet  Outside Facility (Specify) No No   Sig: Take 1 tablet (50 mg total) by mouth daily   tiotropium (Spiriva HandiHaler) 18 mcg inhalation capsule   No No   Sig: Place 1 capsule (18 mcg total) into inhaler and inhale daily   warfarin (COUMADIN) 2 mg tablet  Outside Facility (Specify) No No   Sig: Take 2 mg daily, adjust to keep INR therapeutic, between 2-3      Facility-Administered Medications: None       Past Medical History:   Diagnosis Date   • Anti-phospholipid syndrome (HCC)    • Asthma    • Blood type O+    • Cardiac disease    • Chronic pain    • COPD (chronic obstructive pulmonary disease) (HCC)    • Femur fracture, left (HCC) 6/22/2023   • Fracture of ankle     left   • Hyperlipidemia    • Hypertension    • Hypokalemia 2/27/2023   • Osteoporosis        Past Surgical History:   Procedure Laterality Date   • APPENDECTOMY     • BILATERAL OOPHORECTOMY Bilateral    • ORIF TIBIAL SHAFT FRACTURE W/ PLATES AND SCREWS Right    • IA HEMIARTHROPLASTY HIP PARTIAL Left 6/23/2023    Procedure: HEMIARTHROPLASTY HIP (BIPOLAR); Surgeon: Jalyn Burns MD;  Location:  MAIN OR;  Service: Orthopedics       Family History   Problem Relation Age of Onset   • Hypertension Mother    • Skin cancer Mother    • Hypertension Father      I have reviewed and agree with the history as documented. E-Cigarette/Vaping   • E-Cigarette Use Never User      E-Cigarette/Vaping Substances   • Nicotine No    • THC No      Social History     Tobacco Use   • Smoking status: Former     Types: Cigarettes   • Smokeless tobacco: Never   • Tobacco comments:     Never smoker per [employer]   Vaping Use   • Vaping Use: Never used   Substance Use Topics   • Alcohol use: Not Currently   • Drug use: No       Review of Systems   Constitutional: Positive for activity change. Negative for chills, diaphoresis and fever. HENT: Negative for congestion and rhinorrhea. Respiratory: Positive for cough and shortness of breath. Cardiovascular: Positive for chest pain ( Right posterolateral chest wall pain and tenderness for several days) and leg swelling. Gastrointestinal: Negative for abdominal pain and blood in stool.    Genitourinary: Negative for dysuria. Musculoskeletal: Positive for myalgias. Skin: Positive for wound ( Chronic sacral decubitus). Neurological: Negative for syncope and headaches. Psychiatric/Behavioral: The patient is nervous/anxious. Physical Exam  Physical Exam  Vitals and nursing note reviewed. Constitutional:       General: She is in acute distress. Appearance: She is ill-appearing. She is not diaphoretic. HENT:      Head: Normocephalic and atraumatic. Mouth/Throat:      Pharynx: No pharyngeal swelling or oropharyngeal exudate. Neck:      Vascular: No JVD. Cardiovascular:      Rate and Rhythm: Normal rate. Pulses: Normal pulses. Heart sounds: Normal heart sounds. Pulmonary:      Effort: Tachypnea present. No accessory muscle usage. Breath sounds: No stridor. Examination of the right-lower field reveals rhonchi. Examination of the left-lower field reveals rhonchi. Decreased breath sounds and rhonchi present. Chest:      Chest wall: Tenderness present. No deformity or crepitus. Abdominal:      General: Bowel sounds are normal.      Palpations: Abdomen is soft. There is no mass. Tenderness: There is no abdominal tenderness. Musculoskeletal:      Cervical back: Neck supple. Right lower leg: No tenderness. Edema present. Left lower leg: No tenderness. Edema present. Lymphadenopathy:      Cervical: No cervical adenopathy. Skin:     General: Skin is warm and dry. Capillary Refill: Capillary refill takes less than 2 seconds. Neurological:      General: No focal deficit present. Mental Status: She is alert. Psychiatric:         Mood and Affect: Mood is anxious.          Behavior: Behavior normal.         Vital Signs  ED Triage Vitals   Temperature Pulse Respirations Blood Pressure SpO2   10/05/23 1637 10/05/23 1633 10/05/23 1633 10/05/23 1633 10/05/23 1636   98.5 °F (36.9 °C) 76 21 (!) 171/75 (!) 78 %      Temp Source Heart Rate Source Patient Position - Orthostatic VS BP Location FiO2 (%)   10/05/23 1637 10/05/23 1633 10/05/23 1633 10/05/23 1633 --   Temporal Monitor Sitting Left arm       Pain Score       10/05/23 2107       10 - Worst Possible Pain           Vitals:    10/05/23 1730 10/05/23 1930 10/05/23 2100 10/05/23 2130   BP: 116/55 170/73 (!) 174/77 (!) 175/74   Pulse: 71 68 71 70   Patient Position - Orthostatic VS:             Visual Acuity      ED Medications  Medications   ipratropium-albuterol (DUO-NEB) 0.5-2.5 mg/3 mL inhalation solution 3 mL (3 mL Nebulization Given 10/5/23 1902)   acetaminophen (TYLENOL) tablet 650 mg (650 mg Oral Given 10/5/23 2107)       Diagnostic Studies  Results Reviewed     Procedure Component Value Units Date/Time    Blood culture [[id number]] Collected: 10/05/23 1657    Lab Status: Preliminary result Specimen: Blood from Arm, Right Updated: 10/05/23 2101     Blood Culture Received in Microbiology Lab. Culture in Progress.     HS Troponin I 2hr [[id number]]  (Normal) Collected: 10/05/23 1908    Lab Status: Final result Specimen: Blood from Arm, Right Updated: 10/05/23 1942     hs TnI 2hr 11 ng/L      Delta 2hr hsTnI -1 ng/L     POCT Blood Gas (CG8+) [[id number]]  (Abnormal) Collected: 10/05/23 1910    Lab Status: Final result Specimen: Venous Updated: 10/05/23 1921     ph, Tristen ISTAT 7.483     pCO2, Tristen i-STAT 60.9 mm HG      pO2, Tristen i-STAT 24.0 mm HG      BE, i-STAT 20 mmol/L      HCO3, Tristen i-STAT 45.7 mmol/L      CO2, i-STAT >45 mmol/L      O2 Sat, i-STAT 44 %      SODIUM, I-STAT 138 mmol/l      Potassium, i-STAT 4.2 mmol/L      Calcium, Ionized i-STAT 1.13 mmol/L      Hct, i-STAT 29 %      Hgb, i-STAT 9.9 g/dl      Glucose, i-STAT 124 mg/dl      Specimen Type VENOUS    B-Type Natriuretic Peptide(BNP) [[id number]]  (Abnormal) Collected: 10/05/23 1652    Lab Status: Final result Specimen: Blood from Arm, Left Updated: 10/05/23 1835      pg/mL     TSH, 3rd generation with Free T4 reflex [[id number]]  (Normal) Collected: 10/05/23 1652    Lab Status: Final result Specimen: Blood from Arm, Left Updated: 10/05/23 1805     TSH 3RD GENERATON 4.114 uIU/mL     Protime-INR [[id number]]  (Abnormal) Collected: 10/05/23 1652    Lab Status: Final result Specimen: Blood from Arm, Left Updated: 10/05/23 1802     Protime 28.6 seconds      INR 2.62    APTT [[id number]]  (Abnormal) Collected: 10/05/23 1652    Lab Status: Final result Specimen: Blood from Arm, Left Updated: 10/05/23 1802     PTT 50 seconds     Digoxin level [[id number]]  (Abnormal) Collected: 10/05/23 1652    Lab Status: Final result Specimen: Blood from Arm, Left Updated: 10/05/23 1744     Digoxin Lvl 0.6 ng/mL     HS Troponin 0hr (reflex protocol) [[id number]]  (Normal) Collected: 10/05/23 1652    Lab Status: Final result Specimen: Blood from Arm, Left Updated: 10/05/23 1732     hs TnI 0hr 12 ng/L     Comprehensive metabolic panel [[id number]]  (Abnormal) Collected: 10/05/23 1652    Lab Status: Final result Specimen: Blood from Arm, Left Updated: 10/05/23 1729     Sodium 142 mmol/L      Potassium 4.1 mmol/L      Chloride 95 mmol/L      CO2 41 mmol/L      ANION GAP 6 mmol/L      BUN 20 mg/dL      Creatinine 0.78 mg/dL      Glucose 90 mg/dL      Calcium 9.7 mg/dL      AST 20 U/L      ALT 12 U/L      Alkaline Phosphatase 133 U/L      Total Protein 7.5 g/dL      Albumin 3.7 g/dL      Total Bilirubin 0.43 mg/dL      eGFR 70 ml/min/1.73sq m     Narrative:      [place] guidelines for Chronic Kidney Disease (CKD):   •  Stage 1 with normal or high GFR (GFR > 90 mL/min/1.73 square meters)  •  Stage 2 Mild CKD (GFR = 60-89 mL/min/1.73 square meters)  •  Stage 3A Moderate CKD (GFR = 45-59 mL/min/1.73 square meters)  •  Stage 3B Moderate CKD (GFR = 30-44 mL/min/1.73 square meters)  •  Stage 4 Severe CKD (GFR = 15-29 mL/min/1.73 square meters)  •  Stage 5 End Stage CKD (GFR <15 mL/min/1.73 square meters)  Note: GFR calculation is accurate only with a steady state creatinine CBC and differential [[id number]]  (Abnormal) Collected: 10/05/23 1652    Lab Status: Final result Specimen: Blood from Arm, Left Updated: 10/05/23 1709     WBC 7.09 Thousand/uL      RBC 3.22 Million/uL      Hemoglobin 9.6 g/dL      Hematocrit 32.1 %       fL      MCH 29.8 pg      MCHC 29.9 g/dL      RDW 18.0 %      MPV 8.7 fL      Platelets 370 Thousands/uL      nRBC 0 /100 WBCs      Neutrophils Relative 73 %      Immat GRANS % 1 %      Lymphocytes Relative 12 %      Monocytes Relative 9 %      Eosinophils Relative 5 %      Basophils Relative 0 %      Neutrophils Absolute 5.22 Thousands/µL      Immature Grans Absolute 0.08 Thousand/uL      Lymphocytes Absolute 0.83 Thousands/µL      Monocytes Absolute 0.60 Thousand/µL      Eosinophils Absolute 0.33 Thousand/µL      Basophils Absolute 0.03 Thousands/µL                  XR chest portable   ED Interpretation by Pretty Conte DO (10/05 1801)   Chronic changes including scarring or atelectasis of the right base. Possible new left pleural effusion. Final Result by Max Grewal MD (24/34 9143)      New small left pleural effusion. Chronic interstitial prominence, question related to chronic interstitial edema versus interstitial lung disease. Workstation performed: [id number]                    Procedures  ECG 12 Lead Documentation Only    Date/Time: 10/5/2023 5:25 PM    Performed by: Pretty Conte DO  Authorized by: Pretty Conte DO    ECG reviewed by me, the ED Provider: yes    Patient location:  ED  Previous ECG:     Previous ECG:  Compared to current    Similarity:  No change    Comparison to cardiac monitor: Yes    Interpretation:     Interpretation: normal             ED Course  ED Course as of 10/06/23 1232   Thu Oct 05, 2023   2145 After Lidoderm patch and Tylenol patient was able to ambulate with walker.   She does not feel in any more distress than normal.  Pulse ox dropped to 82 and came back to 93% with her oxygen at rest.  Patient and family believe this is her normal situation. They do have visiting nurses coming every day. She has oxygen at home. Patient is stable for discharge. 2146 Family states that the patient has a hospital bed at home. She does sleep on her right side. Daughter at bedside believes that is likely the cause of her right-sided chest pain. SBIRT 22yo+    Flowsheet Row Most Recent Value   Initial Alcohol Screen: US AUDIT-C     1. How often do you have a drink containing alcohol? 0 Filed at: 10/05/2023 1652   2. How many drinks containing alcohol do you have on a typical day you are drinking? 0 Filed at: 10/05/2023 1652   3a. Male UNDER 65: How often do you have five or more drinks on one occasion? 0 Filed at: 10/05/2023 1652   3b. FEMALE Any Age, or MALE 65+: How often do you have 4 or more drinks on one occassion? 0 Filed at: 10/05/2023 1652   Audit-C Score 0 Filed at: 10/05/2023 1652   [place]: How many times in the past year have you. .. Used an illegal drug or used a prescription medication for non-medical reasons? Never Filed at: 10/05/2023 1652                    Medical Decision Making  59-year-old female with history of asthma, COPD complaining of worsening dyspnea cough and male with reproducible right-sided chest pain. Concern for exacerbation of COPD, pneumothorax, pleural effusion, pleurisy hypoxemia, dehydration, electrolyte abnormality. Patient has had increased pedal edema and Lasix dose was recently increased but does not appear to have CHF on lung auscultation. Patient does not clinically appear to have pulmonary embolism and is on warfarin. Will check labs and imaging. INR is in therapeutic range. Troponin slightly elevated. Patient appears improved when sitting still with increase in her supplemental oxygen. X-ray shows no acute changes. Impression this time is COPD exacerbation and musculoskeletal chest wall pain.   Awaiting further lab results. Will give DuoNeb. Patient clinically improved and states she feels better after DuoNeb. We did ambulate her with a walker around the bed without supplemental oxygen. Pulse ox dropped to low 80s. Upon return to bed and reapplying oxygen at 2 L/min nasal cannula as patient maintains at home pulse ox came back up to 93%. Patient and her daughter at bedside believes she is basically baseline. Chest x-ray shows no acute changes. BNP slightly only elevated at 207 but there are no rales nor JVD and her pedal edema has improved with increase in Lasix dose over the last few days according to patient and family. It appears patient is back to her baseline. Electrolytes, CBC, troponin, etc. reviewed and are near patient's baseline. Patient and family would prefer to go home. Lidoderm patch and Tylenol are helping her pain. Component of anxiety likely contributing to her symptoms. She is improving with recent diuresis at home. Return instructions reviewed. Amount and/or Complexity of Data Reviewed  Labs: ordered. Radiology: ordered and independent interpretation performed. Risk  OTC drugs. Prescription drug management. Disposition  Final diagnoses:   COPD with acute exacerbation ([address])   Right-sided chest wall pain     Time reflects when diagnosis was documented in both MDM as applicable and the Disposition within this note     Time User Action Codes Description Comment    10/5/2023  9:46 PM Pavon Space Add [J44.1] COPD with acute exacerbation ([address])     10/5/2023  9:46 PM Pavon Space Add [R07.89] Right-sided chest wall pain       ED Disposition     ED Disposition   Discharge    Condition   Stable    Date/Time   Thu Oct 5, 2023  9:46 PM    Comment   [facility] discharge to home/self care.                Follow-up Information     Follow up With Specialties Details Why Bess Barclay MD Family Medicine Call in 1 day  [address] [id number]  [phone]            Discharge Medication List as of 10/5/2023  9:49 PM      CONTINUE these medications which have NOT CHANGED    Details   acetaminophen (TYLENOL) 325 mg tablet Take 3 tablets (975 mg total) by mouth every 8 (eight) hours, Starting Mon 3/13/2023, No Print      albuterol (2.5 mg/3 mL) 0.083 % nebulizer solution Take 3 mL (2.5 mg total) by nebulization every 6 (six) hours as needed for wheezing or shortness of breath, Starting Wed 9/13/2023, Normal      Ascorbic Acid (VITAMIN C) 1000 MG tablet Take 1,000 mg by mouth daily, Historical Med      budesonide (PULMICORT) 0.5 mg/2 mL nebulizer solution Take 2 mL (0.5 mg total) by nebulization every 12 (twelve) hours Rinse mouth after use., Starting Wed 9/13/2023, Until Fri 10/13/2023, Normal      Cholecalciferol (VITAMIN D3) 20 MCG (800 UNIT) TABS Take by mouth in the morning, Historical Med      digoxin (LANOXIN) 0.125 mg tablet Take 1 tablet (125 mcg total) by mouth every other day Do not start before May 10, 2023., Starting Wed 5/10/2023, No Print      diltiazem (CARDIZEM CD) 240 mg 24 hr capsule take 1 capsule by mouth once daily for blood pressure, Normal      Ferrous Sulfate 220 (44 Fe) MG/5ML LIQD take 1 teaspoonful by mouth once daily, Normal      Fluticasone-Salmeterol (Advair) 250-50 mcg/dose inhaler Inhale 1 puff 2 (two) times a day Rinse mouth after use., Starting Wed 9/13/2023, Normal      furosemide (LASIX) 20 mg tablet Take 1 tablet (20 mg total) by mouth daily Do not start before May 26, 2023., Starting Fri 5/26/2023, No Print      guaiFENesin (MUCINEX) 600 mg 12 hr tablet Take 1 tablet (600 mg total) by mouth 2 (two) times a day, Starting Wed 6/28/2023, No Print      ipratropium-albuterol (DUO-NEB) 0.5-2.5 mg/3 mL nebulizer solution Starting Fri 6/30/2023, Historical Med      levothyroxine 25 mcg tablet TAKE 1 TABLET DAILY, Normal      lidocaine (LIDODERM) 5 % Apply 1 patch topically over 12 hours daily Remove & Discard patch within 12 hours or as directed by MD, Starting Tue 4/11/2023, Normal      Lidocaine Pain Relief 4 % PT[facility] Starting Thu 6/29/2023, Historical Med      loratadine (CLARITIN) 10 mg tablet Take 10 mg by mouth daily, Historical Med      mirtazapine (REMERON) 7.5 MG tablet Take 1 tablet (7.5 mg total) by mouth daily at bedtime, Starting Tue 4/11/2023, Normal      multivitamin (THERAGRAN) TABS Take 1 tablet by mouth daily, Historical Med      oxygen gas Inhale 1 L/min continuous. Indications: Difficulty Breathing, Historical Med      sertraline (Zoloft) 50 mg tablet Take 1 tablet (50 mg total) by mouth daily, Starting Tue 4/11/2023, Normal      tiotropium (Spiriva HandiHaler) 18 mcg inhalation capsule Place 1 capsule (18 mcg total) into inhaler and inhale daily, Starting Wed 9/13/2023, Normal      warfarin (COUMADIN) 2 mg tablet Take 2 mg daily, adjust to keep INR therapeutic, between 2-3, No Print             No discharge procedures on file.     PDMP Review       Value Time User    PDMP Reviewed  Yes 8/7/2023  9:33 AM Elsi Escudero MD          ED Provider  Electronically Signed by             Erik Ybarra DO  10/06/23 3466

## 2023-10-05 NOTE — TELEPHONE ENCOUNTER
Patient requires a follow-up office visit with her cardiologist who manages her Lasix medication. Patient has already been notified by us and pulmonologist in regards to dangers of smoking. If she is continuing to smoke there is not much that we would be able to do about that.     I would not be able to provide any more information unless patient had appointment for an evaluation

## 2023-10-06 ENCOUNTER — VBI (OUTPATIENT)
Dept: FAMILY MEDICINE CLINIC | Facility: CLINIC | Age: 83
End: 2023-10-06

## 2023-10-06 LAB
ATRIAL RATE: 74 BPM
P AXIS: 44 DEGREES
PR INTERVAL: 150 MS
QRS AXIS: 22 DEGREES
QRSD INTERVAL: 86 MS
QT INTERVAL: 352 MS
QTC INTERVAL: 390 MS
T WAVE AXIS: 71 DEGREES
VENTRICULAR RATE: 74 BPM

## 2023-10-06 PROCEDURE — 93010 ELECTROCARDIOGRAM REPORT: CPT | Performed by: INTERNAL MEDICINE

## 2023-10-06 NOTE — DISCHARGE INSTRUCTIONS
You may leave the lidocaine patch on your chest for up to 12 hours. If it helps you by more at the drugstore. You also can try other over-the-counter topical analgesic creams or ointments. Take Tylenol as directed for pain. Try heating pad or cold packs on the chest wall if needed for pain. Continue present medications. Follow-up with your PCP tomorrow if not improving.

## 2023-10-06 NOTE — TELEPHONE ENCOUNTER
10/06/23 2:06 PM    Patient contacted post ED visit, first outreach attempt made. Message was left for patient to return a call to the [facility] Department at [facility]: Phone [phone]. Thank you.   Elvia Harris MA  [facility]

## 2023-10-08 LAB — BACTERIA BLD CULT: NORMAL

## 2023-10-09 ENCOUNTER — TELEPHONE (OUTPATIENT)
Dept: FAMILY MEDICINE CLINIC | Facility: CLINIC | Age: 83
End: 2023-10-09

## 2023-10-09 DIAGNOSIS — I48.91 ATRIAL FIBRILLATION, UNSPECIFIED TYPE (HCC): ICD-10-CM

## 2023-10-09 RX ORDER — FUROSEMIDE 20 MG/1
20 TABLET ORAL DAILY
Qty: 90 TABLET | Refills: 1 | Status: SHIPPED | OUTPATIENT
Start: 2023-10-09 | End: 2023-10-20 | Stop reason: SDUPTHER

## 2023-10-09 NOTE — TELEPHONE ENCOUNTER
Nura Kraft home health nurse from [facility] calling, the patient did see cardiology Dr. Barbara Blanco documented in note "Patient has been adequately managed for her chronic cardiac conditions by her primary care physician. For simplicity and per patient preference, she will continue to have the above managed by her PCP."    They were told this and were under impression Lasix would now be managed by PCP. She will be all out on Saturday.  Please advise

## 2023-10-10 LAB — BACTERIA BLD CULT: NORMAL

## 2023-10-20 ENCOUNTER — OFFICE VISIT (OUTPATIENT)
Dept: FAMILY MEDICINE CLINIC | Facility: CLINIC | Age: 83
End: 2023-10-20

## 2023-10-20 VITALS
HEART RATE: 77 BPM | WEIGHT: 115.13 LBS | BODY MASS INDEX: 18.5 KG/M2 | HEIGHT: 66 IN | RESPIRATION RATE: 16 BRPM | TEMPERATURE: 98.2 F | OXYGEN SATURATION: 100 % | DIASTOLIC BLOOD PRESSURE: 84 MMHG | SYSTOLIC BLOOD PRESSURE: 142 MMHG

## 2023-10-20 DIAGNOSIS — I10 PRIMARY HYPERTENSION: ICD-10-CM

## 2023-10-20 DIAGNOSIS — R60.9 PERIPHERAL EDEMA: ICD-10-CM

## 2023-10-20 DIAGNOSIS — Z23 NEED FOR INFLUENZA VACCINATION: ICD-10-CM

## 2023-10-20 DIAGNOSIS — J42 CHRONIC BRONCHITIS, UNSPECIFIED CHRONIC BRONCHITIS TYPE (HCC): Primary | ICD-10-CM

## 2023-10-20 DIAGNOSIS — I48.91 ATRIAL FIBRILLATION, UNSPECIFIED TYPE (HCC): ICD-10-CM

## 2023-10-20 PROBLEM — K59.00 CONSTIPATION: Status: RESOLVED | Noted: 2023-06-27 | Resolved: 2023-10-20

## 2023-10-20 RX ORDER — FUROSEMIDE 20 MG/1
TABLET ORAL
Qty: 135 TABLET | Refills: 1 | Status: SHIPPED | OUTPATIENT
Start: 2023-10-20

## 2023-10-20 NOTE — PROGRESS NOTES
FAMILY PRACTICE OFFICE VISIT       NAME: Yevgeniy Leon  AGE: 79 y.o. SEX: female       : 1940        MRN: [id number]    DATE: 10/20/2023  TIME: 12:44 PM    Assessment and Plan     Problem List Items Addressed This Visit       Chronic obstructive pulmonary disease ([address]) - Primary     COPD. Patient continues on 24-hour oxygen therapy at 2 L via nasal cannula. She currently uses her nebulizer twice daily but feels she may need to use more frequently. Patient's son believes she is on a combination of albuterol as well as budesonide. Patient was again counseled on absolutely staying away from cigarette smoking which may exacerbate her underlying condition and the dangers of having oxygen anywhere near her cigarettes         Hypertension     Hypertension. The patient's blood pressure is stable at this time and he will continue current regimen of medications         Relevant Medications    furosemide (LASIX) 20 mg tablet    Atrial fibrillation (HCC)     Atrial fibrillation. Patient with rate controlled atrial fibrillation. She will continue on current medications as per cardiologist         Relevant Medications    furosemide (LASIX) 20 mg tablet    Peripheral edema     Peripheral edema. Patient was recommended to increase her furosemide 20 mg to use 1.5 tablets once daily. New prescription sent to mail order pharmacy. Recent renal function tests were stable for patient                Chief Complaint     Chief Complaint   Patient presents with    Follow-up     EMERGENCY ROOM        History of Present Illness     I reviewed the patient's latest emergency room report. She is accompanied by her son today. She has been using her oxygen therapy at 2 L/min via nasal cannula around-the-clock to try and avoid hypoxia. She still has chronic intermittent coughing that is productive of mucus. She does monitor her home pulse oximetry readings and stays above 90% with her oxygen.   She received her annual influenza vaccine today. She was recommended to obtain COVID-19 vaccination at her local pharmacy. She does have a hospital bed to assist with her shortness of breath at night. She continues to have some peripheral edema despite using compression stockings. Review of Systems   Review of Systems   Constitutional:  Positive for fatigue. Negative for fever. HENT:  Positive for congestion. Respiratory:  Positive for cough, chest tightness and wheezing. Cardiovascular: Negative. Gastrointestinal: Negative. Genitourinary: Negative. Musculoskeletal:  Positive for arthralgias, back pain and gait problem. Psychiatric/Behavioral:  The patient is nervous/anxious.         Active Problem List     Patient Active Problem List   Diagnosis    Chronic obstructive pulmonary disease (HCC)    Hypertension    Hypercoagulable state ([address])    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HCC)    Antiphospholipid antibody syndrome (HCC)    Atrial fibrillation (HCC)    Hereditary hemolytic anemia (HCC)    Asthma    Vitamin D deficiency    Osteoporosis    Low back pain without sciatica    Peripheral edema    Other specified hypothyroidism    Anxiety    Pulmonary nodule    Onychomycosis    Failure to thrive in adult    Severe protein-calorie malnutrition (HCC)    Unspecified mood (affective) disorder (HCC)    MDD with persistant bereavement complex    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HCC)    Thyroid nodule    Abdominal distention/back pain    Cerebral aneurysm, nonruptured    UGI bleed    Chronic respiratory failure with hypoxia and hypercapnia (HCC)    Bilateral pleural effusion    Acute postoperative pulmonary insufficiency (HCC)    Status post hip hemiarthroplasty    Compression fracture of lumbar vertebra (HCC)       Past Medical History:  Past Medical History:   Diagnosis Date    Anti-phospholipid syndrome (HCC)     Asthma     Blood type O+     Cardiac disease     Chronic pain     COPD (chronic obstructive pulmonary disease) ([address])     Femur fracture, left ([address]) 6/22/2023    Fracture of ankle     left    Hyperlipidemia     Hypertension     Hypokalemia 2/27/2023    Osteoporosis        Past Surgical History:  Past Surgical History:   Procedure Laterality Date    APPENDECTOMY      BILATERAL OOPHORECTOMY Bilateral     ORIF TIBIAL SHAFT FRACTURE W/ PLATES AND SCREWS Right     ND HEMIARTHROPLASTY HIP PARTIAL Left 6/23/2023    Procedure: HEMIARTHROPLASTY HIP (BIPOLAR); Surgeon: Lori Gilbert MD;  Location:  MAIN OR;  Service: Orthopedics       Family History:  Family History   Problem Relation Age of Onset    Hypertension Mother     Skin cancer Mother     Hypertension Father        Social History:  Social History     Socioeconomic History    Marital status:      Spouse name: Not on file    Number of children: Not on file    Years of education: Not on file    Highest education level: Not on file   Occupational History    Not on file   Tobacco Use    Smoking status: Former     Types: Cigarettes    Smokeless tobacco: Never    Tobacco comments:     Never smoker per [employer]   Vaping Use    Vaping Use: Never used   Substance and Sexual Activity    Alcohol use: Not Currently    Drug use: No    Sexual activity: Not Currently   Other Topics Concern    Not on file   Social History Narrative    Not on file     Social Determinants of Health     Financial Resource Strain: Low Risk  (4/11/2023)    Overall Financial Resource Strain (CARDIA)     Difficulty of Paying Living Expenses: Not very hard   Food Insecurity: No Food Insecurity (8/4/2023)    Hunger Vital Sign     Worried About Running Out of Food in the Last Year: Never true     Ran Out of Food in the Last Year: Never true   Transportation Needs: No Transportation Needs (8/4/2023)    PRAPARE - Transportation     Lack of Transportation (Medical): No     Lack of Transportation (Non-Medical):  No   Physical Activity: Not on file   Stress: Not on file   Social Connections: Not on file   Intimate Partner Violence: Not on file   Housing Stability: Low Risk  (8/4/2023)    Housing Stability Vital Sign     Unable to Pay for Housing in the Last Year: No     Number of Places Lived in the Last Year: 1     Unstable Housing in the Last Year: No       Objective     Vitals:    10/20/23 1144   BP: 142/84   Pulse: 77   Resp: 16   Temp: 98.2 °F (36.8 °C)   SpO2: 100%     Wt Readings from Last 3 Encounters:   10/20/23 52.2 kg (115 lb 2 oz)   10/05/23 53.1 kg (117 lb)   10/05/23 53.1 kg (117 lb)       Physical Exam  Constitutional:       General: She is not in acute distress. Appearance: Normal appearance. She is not ill-appearing. Comments: Patient in no acute distress but did appear frail at the office visit. Patient had her oxygen via nasal cannula and portable concentrator   Eyes:      General:         Right eye: No discharge. Left eye: No discharge. Extraocular Movements: Extraocular movements intact. Conjunctiva/sclera: Conjunctivae normal.      Pupils: Pupils are equal, round, and reactive to light. Cardiovascular:      Rate and Rhythm: Normal rate. Rhythm irregular. Pulmonary:      Effort: Pulmonary effort is normal. No respiratory distress. Breath sounds: Wheezing present. No rhonchi or rales. Comments: Patient with some scattered inspiratory wheezing. Musculoskeletal:      Right lower leg: Edema present. Left lower leg: Edema present. Comments: Patient with trace to +1 peripheral edema bilateral lower extremities despite using knee-high compression stockings   Neurological:      Mental Status: She is alert. Mental status is at baseline. Psychiatric:         Mood and Affect: Mood normal.         Behavior: Behavior normal.         Thought Content:  Thought content normal.         Judgment: Judgment normal.         Pertinent Laboratory/Diagnostic Studies:  Lab Results   Component Value Date    GLUCOSE 124 10/05/2023    BUN 20 10/05/2023    CREATININE 0.78 10/05/2023 CALCIUM 9.7 10/05/2023     03/17/2015    K 4.1 10/05/2023    CO2 >45 (H) 10/05/2023    CL 95 (L) 10/05/2023     Lab Results   Component Value Date    ALT 12 10/05/2023    AST 20 10/05/2023    ALKPHOS 133 (H) 10/05/2023    BILITOT 0.40 03/17/2015       Lab Results   Component Value Date    WBC 7.09 10/05/2023    HGB 9.9 (L) 10/05/2023    HCT 29 (L) 10/05/2023     (H) 10/05/2023     10/05/2023       No results found for: "TSH"    Lab Results   Component Value Date    CHOL 145 03/17/2015     Lab Results   Component Value Date    TRIG 76 03/03/2023     Lab Results   Component Value Date    HDL 37 (L) 03/03/2023     Lab Results   Component Value Date    LDLCALC 87 03/03/2023     Lab Results   Component Value Date    HGBA1C 4.5 03/03/2023       Results for orders placed or performed during the hospital encounter of 10/05/23   Blood culture    Specimen: Arm, Right; Blood   Result Value Ref Range    Blood Culture No Growth After 5 Days.     CBC and differential   Result Value Ref Range    WBC 7.09 4.31 - 10.16 Thousand/uL    RBC 3.22 (L) 3.81 - 5.12 Million/uL    Hemoglobin 9.6 (L) 11.5 - 15.4 g/dL    Hematocrit 32.1 (L) 34.8 - 46.1 %     (H) 82 - 98 fL    MCH 29.8 26.8 - 34.3 pg    MCHC 29.9 (L) 31.4 - 37.4 g/dL    RDW 18.0 (H) 11.6 - 15.1 %    MPV 8.7 (L) 8.9 - 12.7 fL    Platelets 456 291 - 596 Thousands/uL    nRBC 0 /100 WBCs    Neutrophils Relative 73 43 - 75 %    Immat GRANS % 1 0 - 2 %    Lymphocytes Relative 12 (L) 14 - 44 %    Monocytes Relative 9 4 - 12 %    Eosinophils Relative 5 0 - 6 %    Basophils Relative 0 0 - 1 %    Neutrophils Absolute 5.22 1.85 - 7.62 Thousands/µL    Immature Grans Absolute 0.08 0.00 - 0.20 Thousand/uL    Lymphocytes Absolute 0.83 0.60 - 4.47 Thousands/µL    Monocytes Absolute 0.60 0.17 - 1.22 Thousand/µL    Eosinophils Absolute 0.33 0.00 - 0.61 Thousand/µL    Basophils Absolute 0.03 0.00 - 0.10 Thousands/µL   Comprehensive metabolic panel   Result Value Ref Range    Sodium 142 135 - 147 mmol/L    Potassium 4.1 3.5 - 5.3 mmol/L    Chloride 95 (L) 96 - 108 mmol/L    CO2 41 (H) 21 - 32 mmol/L    ANION GAP 6 mmol/L    BUN 20 5 - 25 mg/dL    Creatinine 0.78 0.60 - 1.30 mg/dL    Glucose 90 65 - 140 mg/dL    Calcium 9.7 8.4 - 10.2 mg/dL    AST 20 13 - 39 U/L    ALT 12 7 - 52 U/L    Alkaline Phosphatase 133 (H) 34 - 104 U/L    Total Protein 7.5 6.4 - 8.4 g/dL    Albumin 3.7 3.5 - 5.0 g/dL    Total Bilirubin 0.43 0.20 - 1.00 mg/dL    eGFR 70 ml/min/1.73sq m   HS Troponin 0hr (reflex protocol)   Result Value Ref Range    hs TnI 0hr 12 "Refer to ACS Flowchart"- see link ng/L   Digoxin level   Result Value Ref Range    Digoxin Lvl 0.6 (L) 0.8 - 2.0 ng/mL   TSH, 3rd generation with Free T4 reflex   Result Value Ref Range    TSH 3RD GENERATON 4.114 0.450 - 4.500 uIU/mL   Protime-INR   Result Value Ref Range    Protime 28.6 (H) 11.6 - 14.5 seconds    INR 2.62 (H) 0.84 - 1.19   APTT   Result Value Ref Range    PTT 50 (H) 23 - 37 seconds   HS Troponin I 2hr   Result Value Ref Range    hs TnI 2hr 11 "Refer to ACS Flowchart"- see link ng/L    Delta 2hr hsTnI -1 <20 ng/L   B-Type Natriuretic Peptide(BNP)   Result Value Ref Range     (H) 0 - 100 pg/mL   ECG 12 lead   Result Value Ref Range    Ventricular Rate 74 BPM    Atrial Rate 74 BPM    NV Interval 150 ms    QRSD Interval 86 ms    QT Interval 352 ms    QTC Interval 390 ms    P [place] 44 degrees    QRS Axis 22 degrees    T Wave Axis 71 degrees   POCT Blood Gas (CG8+)   Result Value Ref Range    ph, Tristen ISTAT 7.483 (HH) 7.300 - 7.400    pCO2, Tristen i-STAT 60.9 (H) 42.0 - 50.0 mm HG    pO2, Tristen i-STAT 24.0 (L) 35.0 - 45.0 mm HG    BE, i-STAT 20 (H) -2 - 3 mmol/L    HCO3, Tristen i-STAT 45.7 (HH) 24.0 - 30.0 mmol/L    CO2, i-STAT >45 (H) 21 - 32 mmol/L    O2 Sat, i-STAT 44 (L) 60 - 85 %    SODIUM, I-STAT 138 136 - 145 mmol/l    Potassium, i-STAT 4.2 3.5 - 5.3 mmol/L    Calcium, Ionized i-STAT 1.13 1.12 - 1.32 mmol/L    Hct, i-STAT 29 (L) 34.8 - 46.1 %    Hgb, i-STAT 9.9 (L) 11.5 - 15.4 g/dl    Glucose, i-STAT 124 65 - 140 mg/dl    Specimen Type VENOUS        No orders of the defined types were placed in this encounter. ALLERGIES:  Allergies   Allergen Reactions    Aspirin Other (See Comments)    Penicillins Rash and Hives    Sulfa Antibiotics Rash       Current Medications     Current Outpatient Medications   Medication Sig Dispense Refill    acetaminophen (TYLENOL) 325 mg tablet Take 3 tablets (975 mg total) by mouth every 8 (eight) hours  0    albuterol (2.5 mg/3 mL) 0.083 % nebulizer solution Take 3 mL (2.5 mg total) by nebulization every 6 (six) hours as needed for wheezing or shortness of breath 110 mL 3    Ascorbic Acid (VITAMIN C) 1000 MG tablet Take 1,000 mg by mouth daily      Cholecalciferol (VITAMIN D3) 20 MCG (800 UNIT) TABS Take by mouth in the morning      digoxin (LANOXIN) 0.125 mg tablet Take 1 tablet (125 mcg total) by mouth every other day Do not start before May 10, 2023.  0    diltiazem (CARDIZEM CD) 240 mg 24 hr capsule take 1 capsule by mouth once daily for blood pressure 30 capsule 0    Ferrous Sulfate 220 (44 Fe) MG/5ML LIQD take 1 teaspoonful by mouth once daily 150 mL 5    Fluticasone-Salmeterol (Advair) 250-50 mcg/dose inhaler Inhale 1 puff 2 (two) times a day Rinse mouth after use.  180 blister 3    furosemide (LASIX) 20 mg tablet 1.5 TABS PO Q  tablet 1    guaiFENesin (MUCINEX) 600 mg 12 hr tablet Take 1 tablet (600 mg total) by mouth 2 (two) times a day  0    ipratropium-albuterol (DUO-NEB) 0.5-2.5 mg/3 mL nebulizer solution       levothyroxine 25 mcg tablet TAKE 1 TABLET DAILY 90 tablet 3    lidocaine (LIDODERM) 5 % Apply 1 patch topically over 12 hours daily Remove & Discard patch within 12 hours or as directed by MD Mackay patch 3    Lidocaine Pain Relief 4 % PTCH       loratadine (CLARITIN) 10 mg tablet Take 10 mg by mouth daily      mirtazapine (REMERON) 7.5 MG tablet Take 1 tablet (7.5 mg total) by mouth daily at bedtime 90 tablet 3    multivitamin (THERAGRAN) TABS Take 1 tablet by mouth daily      oxygen gas Inhale 1 L/min continuous. Indications: Difficulty Breathing      sertraline (Zoloft) 50 mg tablet Take 1 tablet (50 mg total) by mouth daily 90 tablet 3    tiotropium (Spiriva HandiHaler) 18 mcg inhalation capsule Place 1 capsule (18 mcg total) into inhaler and inhale daily 30 capsule 3    warfarin (COUMADIN) 2 mg tablet Take 2 mg daily, adjust to keep INR therapeutic, between 2-3  0    budesonide (PULMICORT) 0.5 mg/2 mL nebulizer solution Take 2 mL (0.5 mg total) by nebulization every 12 (twelve) hours Rinse mouth after use. 120 mL 0     No current facility-administered medications for this visit.          Health Maintenance     Health Maintenance   Topic Date Due    SLP PLAN OF CARE  Never done    COVID-19 Vaccine (3 - Pfizer series) 06/04/2021    Influenza Vaccine (1) 09/01/2023    Fall Risk  04/11/2024    Urinary Incontinence Screening  04/11/2024    Medicare Annual Wellness Visit (AWV)  04/11/2024    Depression Screening  10/20/2024    BMI: Adult  10/20/2024    Osteoporosis Screening  Completed    Pneumococcal Vaccine: 65+ Years  Completed    HIB Vaccine  Aged Out    IPV Vaccine  Aged Out    Hepatitis A Vaccine  Aged Out    Meningococcal ACWY Vaccine  Aged Out    HPV Vaccine  Aged Out     Immunization History   Administered Date(s) Administered    COVID-19 PFIZER VACCINE 0.3 ML IM 03/18/2021, 04/09/2021    INFLUENZA 12/14/2004, 10/17/2018, 10/15/2020, 09/24/2021    Influenza Split High Dose Preservative Free IM 09/17/2015, 12/19/2016, 10/23/2017, 09/01/2022    Influenza, high dose seasonal 0.7 mL 10/16/2019    Influenza, seasonal, injectable 11/01/2011, 11/16/2012, 10/25/2013    Pneumococcal Conjugate 13-Valent 03/17/2016    Pneumococcal Polysaccharide PPV23 12/23/2005    Tuberculin Skin Test 08/07/2023, 97/99/0617       MD OANH Naylor spent 30 minutes with this patient of which greater than 50% was spent counseling or reviewing chart

## 2023-10-20 NOTE — ASSESSMENT & PLAN NOTE
Peripheral edema. Patient was recommended to increase her furosemide 20 mg to use 1.5 tablets once daily. New prescription sent to mail order pharmacy.   Recent renal function tests were stable for patient

## 2023-10-20 NOTE — ASSESSMENT & PLAN NOTE
COPD.  Patient continues on 24-hour oxygen therapy at 2 L via nasal cannula. She currently uses her nebulizer twice daily but feels she may need to use more frequently. Patient's son believes she is on a combination of albuterol as well as budesonide.   Patient was again counseled on absolutely staying away from cigarette smoking which may exacerbate her underlying condition and the dangers of having oxygen anywhere near her cigarettes

## 2023-10-20 NOTE — ASSESSMENT & PLAN NOTE
Atrial fibrillation. Patient with rate controlled atrial fibrillation.   She will continue on current medications as per cardiologist

## 2023-10-25 ENCOUNTER — TELEPHONE (OUTPATIENT)
Dept: FAMILY MEDICINE CLINIC | Facility: CLINIC | Age: 83
End: 2023-10-25

## 2023-10-25 DIAGNOSIS — J44.1 COPD WITH ACUTE EXACERBATION (HCC): ICD-10-CM

## 2023-10-25 DIAGNOSIS — J45.909 UNCOMPLICATED ASTHMA, UNSPECIFIED ASTHMA SEVERITY, UNSPECIFIED WHETHER PERSISTENT: Primary | ICD-10-CM

## 2023-10-25 NOTE — TELEPHONE ENCOUNTER
Luly Guadalupe from home health called to see if you could temporarily increase Simin's Lasix as her legs are starting to get a texture that has concern for turning into blisters/bubbling. She states that since her last COPD exasperation her legs have not come back down to normal. Please advise.

## 2023-10-26 DIAGNOSIS — I48.91 ATRIAL FIBRILLATION, UNSPECIFIED TYPE (HCC): ICD-10-CM

## 2023-10-26 DIAGNOSIS — I10 PRIMARY HYPERTENSION: ICD-10-CM

## 2023-10-26 RX ORDER — DILTIAZEM HYDROCHLORIDE 240 MG/1
240 CAPSULE, COATED, EXTENDED RELEASE ORAL DAILY
Qty: 30 CAPSULE | Refills: 5 | Status: SHIPPED | OUTPATIENT
Start: 2023-10-26

## 2023-10-26 RX ORDER — LORATADINE 10 MG/1
10 TABLET ORAL DAILY
Qty: 30 TABLET | Refills: 0 | Status: SHIPPED | OUTPATIENT
Start: 2023-10-26

## 2023-10-26 NOTE — TELEPHONE ENCOUNTER
Patient may increase her furosemide from 1.5 tablets daily to full 2 tablets daily for 1 week to see if this would help with her swelling.   She may then return back to her usual dose of 1.5 tablets daily

## 2023-10-26 NOTE — TELEPHONE ENCOUNTER
Relayed message to both Simin and Bethany Bedolla (daughter), left detailed voicemail for Harsha Osuna from home health

## 2023-10-27 DIAGNOSIS — E03.9 HYPOTHYROIDISM, UNSPECIFIED TYPE: ICD-10-CM

## 2023-10-27 RX ORDER — BUDESONIDE 0.5 MG/2ML
0.5 INHALANT ORAL
Qty: 120 ML | Refills: 3 | Status: SHIPPED | OUTPATIENT
Start: 2023-10-27 | End: 2024-02-24

## 2023-10-27 RX ORDER — LEVOTHYROXINE SODIUM 0.03 MG/1
TABLET ORAL
Qty: 90 TABLET | Refills: 3 | Status: SHIPPED | OUTPATIENT
Start: 2023-10-27

## 2023-11-03 ENCOUNTER — TELEPHONE (OUTPATIENT)
Dept: FAMILY MEDICINE CLINIC | Facility: CLINIC | Age: 83
End: 2023-11-03

## 2023-11-03 NOTE — TELEPHONE ENCOUNTER
Hafsa Saha from [facility] called to state that when she arrived at [facility] her pulse ox was 86 but with deep breathing it got into the 90s. She has 3+ pitting edema and a rattling cough. She is concerned about the edema being hard on her heart. Simin does not want to proceed to the ED but I advised that she proceed to the ED for evaluation.

## 2023-11-14 DIAGNOSIS — I48.91 ATRIAL FIBRILLATION, UNSPECIFIED TYPE (HCC): ICD-10-CM

## 2023-11-14 RX ORDER — DIGOXIN 125 MCG
125 TABLET ORAL EVERY OTHER DAY
Qty: 90 TABLET | Refills: 1 | Status: ON HOLD | OUTPATIENT
Start: 2023-11-14

## 2023-11-22 ENCOUNTER — TELEPHONE (OUTPATIENT)
Dept: FAMILY MEDICINE CLINIC | Facility: CLINIC | Age: 83
End: 2023-11-22

## 2023-11-22 NOTE — TELEPHONE ENCOUNTER
Received VM from caregiver -     "Hi, good morning. It is 11 AM I'm calling for Law Perez, [address] patient of Doctor Stefanie Rizvi. She's having a blood in her stool and she's on Coumadin. So I have a question as to how we should proceed with the Coumadin. If someone could kindly call back [phone]. I am here with Simin and again my number is [phone].  Thank you."    Please advise

## 2023-11-22 NOTE — TELEPHONE ENCOUNTER
Patient is already anemic with her last blood work in October showing an hemoglobin of 9.6. She would need ER evaluation to see where source of bleeding may be stemming from and she may require transfusion if her hemoglobin has decreased due to bleeding.

## 2023-11-22 NOTE — TELEPHONE ENCOUNTER
[facility] pt refusing to go to ER. States vitals are all stable, also gave immodium and she advised patient to eat things like applesauce and toast and will continue coumadin.  ISRAEL

## 2023-11-24 ENCOUNTER — APPOINTMENT (EMERGENCY)
Dept: RADIOLOGY | Facility: HOSPITAL | Age: 83
DRG: 291 | End: 2023-11-24
Payer: MEDICARE

## 2023-11-24 ENCOUNTER — HOSPITAL ENCOUNTER (INPATIENT)
Facility: HOSPITAL | Age: 83
LOS: 8 days | Discharge: NURSING, REHAB OR LONG-TERM CARE | DRG: 291 | End: 2023-12-02
Attending: EMERGENCY MEDICINE | Admitting: INTERNAL MEDICINE
Payer: MEDICARE

## 2023-11-24 DIAGNOSIS — I50.33 ACUTE ON CHRONIC DIASTOLIC CHF (CONGESTIVE HEART FAILURE) (HCC): ICD-10-CM

## 2023-11-24 DIAGNOSIS — J96.22 ACUTE ON CHRONIC RESPIRATORY FAILURE WITH HYPOXIA AND HYPERCAPNIA (HCC): ICD-10-CM

## 2023-11-24 DIAGNOSIS — D64.9 ACUTE ON CHRONIC ANEMIA: ICD-10-CM

## 2023-11-24 DIAGNOSIS — J96.21 ACUTE ON CHRONIC RESPIRATORY FAILURE WITH HYPOXIA AND HYPERCAPNIA (HCC): ICD-10-CM

## 2023-11-24 DIAGNOSIS — E43 SEVERE PROTEIN-CALORIE MALNUTRITION (HCC): ICD-10-CM

## 2023-11-24 DIAGNOSIS — R06.03 RESPIRATORY DISTRESS: Primary | ICD-10-CM

## 2023-11-24 DIAGNOSIS — R29.90 STROKE-LIKE SYMPTOM: ICD-10-CM

## 2023-11-24 DIAGNOSIS — Z79.01 ANTICOAGULATED ON COUMADIN: ICD-10-CM

## 2023-11-24 DIAGNOSIS — J44.9 COPD (CHRONIC OBSTRUCTIVE PULMONARY DISEASE) (HCC): ICD-10-CM

## 2023-11-24 DIAGNOSIS — I48.0 PAROXYSMAL ATRIAL FIBRILLATION (HCC): ICD-10-CM

## 2023-11-24 DIAGNOSIS — J18.9 PNEUMONIA OF BOTH LOWER LOBES DUE TO INFECTIOUS ORGANISM: ICD-10-CM

## 2023-11-24 DIAGNOSIS — K92.2 GI BLEED: ICD-10-CM

## 2023-11-24 DIAGNOSIS — K62.5 BRBPR (BRIGHT RED BLOOD PER RECTUM): ICD-10-CM

## 2023-11-24 DIAGNOSIS — K92.2 GIB (GASTROINTESTINAL BLEEDING): ICD-10-CM

## 2023-11-24 PROBLEM — D62 ACUTE BLOOD LOSS ANEMIA: Status: ACTIVE | Noted: 2023-11-24

## 2023-11-24 PROBLEM — R55 SYNCOPE: Status: ACTIVE | Noted: 2023-11-24

## 2023-11-24 LAB
2HR DELTA HS TROPONIN: 0 NG/L
4HR DELTA HS TROPONIN: 10 NG/L
ABO GROUP BLD: NORMAL
ALBUMIN SERPL BCP-MCNC: 3.2 G/DL (ref 3.5–5)
ALP SERPL-CCNC: 187 U/L (ref 34–104)
ALT SERPL W P-5'-P-CCNC: 12 U/L (ref 7–52)
ANION GAP SERPL CALCULATED.3IONS-SCNC: 2 MMOL/L
AST SERPL W P-5'-P-CCNC: 19 U/L (ref 13–39)
ATRIAL RATE: 73 BPM
ATRIAL RATE: 78 BPM
BASE EXCESS BLDA CALC-SCNC: 18 MMOL/L (ref -2–3)
BASOPHILS # BLD AUTO: 0.02 THOUSANDS/ÂΜL (ref 0–0.1)
BASOPHILS NFR BLD AUTO: 0 % (ref 0–1)
BILIRUB SERPL-MCNC: 0.39 MG/DL (ref 0.2–1)
BLD GP AB SCN SERPL QL: POSITIVE
BNP SERPL-MCNC: 365 PG/ML (ref 0–100)
BUN SERPL-MCNC: 32 MG/DL (ref 5–25)
CA-I BLD-SCNC: 1.16 MMOL/L (ref 1.12–1.32)
CALCIUM ALBUM COR SERPL-MCNC: 9.2 MG/DL (ref 8.3–10.1)
CALCIUM SERPL-MCNC: 8.6 MG/DL (ref 8.4–10.2)
CARDIAC TROPONIN I PNL SERPL HS: 40 NG/L
CARDIAC TROPONIN I PNL SERPL HS: 40 NG/L
CARDIAC TROPONIN I PNL SERPL HS: 50 NG/L
CHLORIDE SERPL-SCNC: 99 MMOL/L (ref 96–108)
CO2 SERPL-SCNC: 41 MMOL/L (ref 21–32)
CREAT SERPL-MCNC: 0.8 MG/DL (ref 0.6–1.3)
EOSINOPHIL # BLD AUTO: 0.01 THOUSAND/ÂΜL (ref 0–0.61)
EOSINOPHIL NFR BLD AUTO: 0 % (ref 0–6)
ERYTHROCYTE [DISTWIDTH] IN BLOOD BY AUTOMATED COUNT: 21 % (ref 11.6–15.1)
FLUAV RNA RESP QL NAA+PROBE: NEGATIVE
FLUBV RNA RESP QL NAA+PROBE: NEGATIVE
GFR SERPL CREATININE-BSD FRML MDRD: 67 ML/MIN/1.73SQ M
GLUCOSE SERPL-MCNC: 150 MG/DL (ref 65–140)
GLUCOSE SERPL-MCNC: 152 MG/DL (ref 65–140)
HCO3 BLDA-SCNC: 45.8 MMOL/L (ref 24–30)
HCT VFR BLD AUTO: 20.6 % (ref 34.8–46.1)
HCT VFR BLD CALC: 19 % (ref 34.8–46.1)
HGB BLD-MCNC: 6 G/DL (ref 11.5–15.4)
HGB BLDA-MCNC: 6.5 G/DL (ref 11.5–15.4)
IMM GRANULOCYTES # BLD AUTO: 0.12 THOUSAND/UL (ref 0–0.2)
IMM GRANULOCYTES NFR BLD AUTO: 1 % (ref 0–2)
INR PPP: 5.41 (ref 0.84–1.19)
LYMPHOCYTES # BLD AUTO: 0.65 THOUSANDS/ÂΜL (ref 0.6–4.47)
LYMPHOCYTES NFR BLD AUTO: 5 % (ref 14–44)
MCH RBC QN AUTO: 31.4 PG (ref 26.8–34.3)
MCHC RBC AUTO-ENTMCNC: 29.1 G/DL (ref 31.4–37.4)
MCV RBC AUTO: 108 FL (ref 82–98)
MONOCYTES # BLD AUTO: 0.93 THOUSAND/ÂΜL (ref 0.17–1.22)
MONOCYTES NFR BLD AUTO: 7 % (ref 4–12)
NEUTROPHILS # BLD AUTO: 12.01 THOUSANDS/ÂΜL (ref 1.85–7.62)
NEUTS SEG NFR BLD AUTO: 87 % (ref 43–75)
NRBC BLD AUTO-RTO: 0 /100 WBCS
P AXIS: 27 DEGREES
P AXIS: 36 DEGREES
PCO2 BLD: 88.8 MM HG (ref 42–50)
PCO2 BLD: >45 MMOL/L (ref 21–32)
PH BLD: 7.32 [PH] (ref 7.3–7.4)
PLATELET # BLD AUTO: 369 THOUSANDS/UL (ref 149–390)
PMV BLD AUTO: 9.3 FL (ref 8.9–12.7)
PO2 BLD: 19 MM HG (ref 35–45)
POTASSIUM BLD-SCNC: 5 MMOL/L (ref 3.5–5.3)
POTASSIUM SERPL-SCNC: 4.9 MMOL/L (ref 3.5–5.3)
PR INTERVAL: 152 MS
PR INTERVAL: 156 MS
PROCALCITONIN SERPL-MCNC: 0.41 NG/ML
PROT SERPL-MCNC: 6 G/DL (ref 6.4–8.4)
PROTHROMBIN TIME: 49.9 SECONDS (ref 11.6–14.5)
QRS AXIS: -1 DEGREES
QRS AXIS: -5 DEGREES
QRSD INTERVAL: 84 MS
QRSD INTERVAL: 88 MS
QT INTERVAL: 334 MS
QT INTERVAL: 342 MS
QTC INTERVAL: 376 MS
QTC INTERVAL: 380 MS
RBC # BLD AUTO: 1.91 MILLION/UL (ref 3.81–5.12)
RH BLD: POSITIVE
RSV RNA RESP QL NAA+PROBE: NEGATIVE
SAO2 % BLD FROM PO2: 23 % (ref 60–85)
SARS-COV-2 RNA RESP QL NAA+PROBE: NEGATIVE
SODIUM BLD-SCNC: 141 MMOL/L (ref 136–145)
SODIUM SERPL-SCNC: 142 MMOL/L (ref 135–147)
SPECIMEN EXPIRATION DATE: NORMAL
SPECIMEN SOURCE: ABNORMAL
T WAVE AXIS: 103 DEGREES
T WAVE AXIS: 87 DEGREES
TSH SERPL DL<=0.05 MIU/L-ACNC: 2.76 UIU/ML (ref 0.45–4.5)
VENTRICULAR RATE: 73 BPM
VENTRICULAR RATE: 78 BPM
WBC # BLD AUTO: 13.74 THOUSAND/UL (ref 4.31–10.16)

## 2023-11-24 PROCEDURE — 84484 ASSAY OF TROPONIN QUANT: CPT

## 2023-11-24 PROCEDURE — 84145 PROCALCITONIN (PCT): CPT | Performed by: PHYSICIAN ASSISTANT

## 2023-11-24 PROCEDURE — 86922 COMPATIBILITY TEST ANTIGLOB: CPT

## 2023-11-24 PROCEDURE — 83880 ASSAY OF NATRIURETIC PEPTIDE: CPT | Performed by: PHYSICIAN ASSISTANT

## 2023-11-24 PROCEDURE — 86901 BLOOD TYPING SEROLOGIC RH(D): CPT | Performed by: PHYSICIAN ASSISTANT

## 2023-11-24 PROCEDURE — 82330 ASSAY OF CALCIUM: CPT

## 2023-11-24 PROCEDURE — 30233N1 TRANSFUSION OF NONAUTOLOGOUS RED BLOOD CELLS INTO PERIPHERAL VEIN, PERCUTANEOUS APPROACH: ICD-10-PCS | Performed by: EMERGENCY MEDICINE

## 2023-11-24 PROCEDURE — 84443 ASSAY THYROID STIM HORMONE: CPT

## 2023-11-24 PROCEDURE — C9113 INJ PANTOPRAZOLE SODIUM, VIA: HCPCS

## 2023-11-24 PROCEDURE — 86921 COMPATIBILITY TEST INCUBATE: CPT

## 2023-11-24 PROCEDURE — 93005 ELECTROCARDIOGRAM TRACING: CPT

## 2023-11-24 PROCEDURE — 99223 1ST HOSP IP/OBS HIGH 75: CPT | Performed by: INTERNAL MEDICINE

## 2023-11-24 PROCEDURE — 86850 RBC ANTIBODY SCREEN: CPT | Performed by: PHYSICIAN ASSISTANT

## 2023-11-24 PROCEDURE — 94664 DEMO&/EVAL PT USE INHALER: CPT

## 2023-11-24 PROCEDURE — 84132 ASSAY OF SERUM POTASSIUM: CPT

## 2023-11-24 PROCEDURE — 71045 X-RAY EXAM CHEST 1 VIEW: CPT

## 2023-11-24 PROCEDURE — 86900 BLOOD TYPING SEROLOGIC ABO: CPT | Performed by: PHYSICIAN ASSISTANT

## 2023-11-24 PROCEDURE — 85014 HEMATOCRIT: CPT

## 2023-11-24 PROCEDURE — P9017 PLASMA 1 DONOR FRZ W/IN 8 HR: HCPCS

## 2023-11-24 PROCEDURE — 82947 ASSAY GLUCOSE BLOOD QUANT: CPT

## 2023-11-24 PROCEDURE — 85025 COMPLETE CBC W/AUTO DIFF WBC: CPT | Performed by: PHYSICIAN ASSISTANT

## 2023-11-24 PROCEDURE — 94760 N-INVAS EAR/PLS OXIMETRY 1: CPT

## 2023-11-24 PROCEDURE — 80053 COMPREHEN METABOLIC PANEL: CPT | Performed by: PHYSICIAN ASSISTANT

## 2023-11-24 PROCEDURE — 0241U HB NFCT DS VIR RESP RNA 4 TRGT: CPT | Performed by: PHYSICIAN ASSISTANT

## 2023-11-24 PROCEDURE — 84484 ASSAY OF TROPONIN QUANT: CPT | Performed by: PHYSICIAN ASSISTANT

## 2023-11-24 PROCEDURE — 82803 BLOOD GASES ANY COMBINATION: CPT

## 2023-11-24 PROCEDURE — 99285 EMERGENCY DEPT VISIT HI MDM: CPT

## 2023-11-24 PROCEDURE — 86870 RBC ANTIBODY IDENTIFICATION: CPT | Performed by: PHYSICIAN ASSISTANT

## 2023-11-24 PROCEDURE — 84295 ASSAY OF SERUM SODIUM: CPT

## 2023-11-24 PROCEDURE — 30233K1 TRANSFUSION OF NONAUTOLOGOUS FROZEN PLASMA INTO PERIPHERAL VEIN, PERCUTANEOUS APPROACH: ICD-10-PCS | Performed by: EMERGENCY MEDICINE

## 2023-11-24 PROCEDURE — 85610 PROTHROMBIN TIME: CPT | Performed by: PHYSICIAN ASSISTANT

## 2023-11-24 PROCEDURE — 99285 EMERGENCY DEPT VISIT HI MDM: CPT | Performed by: PHYSICIAN ASSISTANT

## 2023-11-24 PROCEDURE — 94640 AIRWAY INHALATION TREATMENT: CPT

## 2023-11-24 PROCEDURE — 96374 THER/PROPH/DIAG INJ IV PUSH: CPT

## 2023-11-24 PROCEDURE — 36415 COLL VENOUS BLD VENIPUNCTURE: CPT

## 2023-11-24 RX ORDER — BUDESONIDE 0.5 MG/2ML
0.5 INHALANT ORAL
Status: DISCONTINUED | OUTPATIENT
Start: 2023-11-24 | End: 2023-12-02 | Stop reason: HOSPADM

## 2023-11-24 RX ORDER — ALBUTEROL SULFATE 2.5 MG/3ML
2.5 SOLUTION RESPIRATORY (INHALATION) EVERY 6 HOURS PRN
Status: DISCONTINUED | OUTPATIENT
Start: 2023-11-24 | End: 2023-12-02 | Stop reason: HOSPADM

## 2023-11-24 RX ORDER — DIGOXIN 125 MCG
125 TABLET ORAL EVERY OTHER DAY
Status: DISCONTINUED | OUTPATIENT
Start: 2023-11-26 | End: 2023-11-26

## 2023-11-24 RX ORDER — LEVOTHYROXINE SODIUM 0.03 MG/1
25 TABLET ORAL
Status: DISCONTINUED | OUTPATIENT
Start: 2023-11-25 | End: 2023-12-02 | Stop reason: HOSPADM

## 2023-11-24 RX ORDER — PANTOPRAZOLE SODIUM 40 MG/10ML
40 INJECTION, POWDER, LYOPHILIZED, FOR SOLUTION INTRAVENOUS EVERY 12 HOURS SCHEDULED
Status: DISCONTINUED | OUTPATIENT
Start: 2023-11-24 | End: 2023-11-30

## 2023-11-24 RX ORDER — FUROSEMIDE 10 MG/ML
60 INJECTION INTRAMUSCULAR; INTRAVENOUS ONCE
Status: COMPLETED | OUTPATIENT
Start: 2023-11-24 | End: 2023-11-24

## 2023-11-24 RX ORDER — LEVALBUTEROL INHALATION SOLUTION 1.25 MG/3ML
1.25 SOLUTION RESPIRATORY (INHALATION)
Status: DISCONTINUED | OUTPATIENT
Start: 2023-11-24 | End: 2023-11-24

## 2023-11-24 RX ORDER — ACETAMINOPHEN 325 MG/1
650 TABLET ORAL EVERY 6 HOURS PRN
Status: DISCONTINUED | OUTPATIENT
Start: 2023-11-24 | End: 2023-12-02 | Stop reason: HOSPADM

## 2023-11-24 RX ORDER — LEVALBUTEROL INHALATION SOLUTION 0.63 MG/3ML
0.63 SOLUTION RESPIRATORY (INHALATION)
Status: DISCONTINUED | OUTPATIENT
Start: 2023-11-24 | End: 2023-11-24

## 2023-11-24 RX ORDER — MIRTAZAPINE 15 MG/1
7.5 TABLET, FILM COATED ORAL
Status: DISCONTINUED | OUTPATIENT
Start: 2023-11-25 | End: 2023-12-02 | Stop reason: HOSPADM

## 2023-11-24 RX ORDER — FORMOTEROL FUMARATE 20 UG/2ML
20 SOLUTION RESPIRATORY (INHALATION)
Status: DISCONTINUED | OUTPATIENT
Start: 2023-11-24 | End: 2023-12-02 | Stop reason: HOSPADM

## 2023-11-24 RX ORDER — FUROSEMIDE 10 MG/ML
40 INJECTION INTRAMUSCULAR; INTRAVENOUS ONCE
Status: COMPLETED | OUTPATIENT
Start: 2023-11-24 | End: 2023-11-24

## 2023-11-24 RX ORDER — MIRTAZAPINE 15 MG/1
7.5 TABLET, FILM COATED ORAL
Status: DISCONTINUED | OUTPATIENT
Start: 2023-11-24 | End: 2023-11-24

## 2023-11-24 RX ORDER — LEVALBUTEROL INHALATION SOLUTION 1.25 MG/3ML
1.25 SOLUTION RESPIRATORY (INHALATION)
Status: DISCONTINUED | OUTPATIENT
Start: 2023-11-25 | End: 2023-12-02 | Stop reason: HOSPADM

## 2023-11-24 RX ORDER — DILTIAZEM HYDROCHLORIDE 240 MG/1
240 CAPSULE, COATED, EXTENDED RELEASE ORAL DAILY
Status: DISCONTINUED | OUTPATIENT
Start: 2023-11-25 | End: 2023-12-02 | Stop reason: HOSPADM

## 2023-11-24 RX ORDER — ALBUTEROL SULFATE 2.5 MG/3ML
2 SOLUTION RESPIRATORY (INHALATION) ONCE
Status: COMPLETED | OUTPATIENT
Start: 2023-11-24 | End: 2023-11-24

## 2023-11-24 RX ADMIN — FUROSEMIDE 40 MG: 10 INJECTION, SOLUTION INTRAMUSCULAR; INTRAVENOUS at 19:12

## 2023-11-24 RX ADMIN — LEVALBUTEROL HYDROCHLORIDE 0.63 MG: 0.63 SOLUTION RESPIRATORY (INHALATION) at 17:38

## 2023-11-24 RX ADMIN — PHYTONADIONE 10 MG: 10 INJECTION, EMULSION INTRAMUSCULAR; INTRAVENOUS; SUBCUTANEOUS at 17:00

## 2023-11-24 RX ADMIN — FUROSEMIDE 60 MG: 10 INJECTION, SOLUTION INTRAMUSCULAR; INTRAVENOUS at 14:37

## 2023-11-24 RX ADMIN — PANTOPRAZOLE SODIUM 40 MG: 40 INJECTION, POWDER, FOR SOLUTION INTRAVENOUS at 17:38

## 2023-11-24 RX ADMIN — FORMOTEROL FUMARATE DIHYDRATE 20 MCG: 20 SOLUTION RESPIRATORY (INHALATION) at 20:15

## 2023-11-24 RX ADMIN — BUDESONIDE 0.5 MG: 0.5 INHALANT RESPIRATORY (INHALATION) at 20:15

## 2023-11-24 RX ADMIN — IPRATROPIUM BROMIDE 0.5 MG: 0.5 SOLUTION RESPIRATORY (INHALATION) at 17:38

## 2023-11-24 NOTE — ASSESSMENT & PLAN NOTE
Chronically on 2L, currently requiring 4-5  Suspect CHF > COPD exacerbation  Home regimen: Spiriva inhaler + pumicort nebs BID + duonebs TID daily   Continue ATC nebs currently

## 2023-11-24 NOTE — ASSESSMENT & PLAN NOTE
BRBPR began 11/22, PCP recommended stopping warfarin temporarily, however persistent symptoms with large BRBPR episode witnessed by son this morning followed by syncopal episode  Hgb on admission 6 (baseline 8.5-10, most recently 9.6)  INR 5.4  S/P 1 unit FFP + vit K 10 mg + 1 unit PRBC in ED  GI consult  NPO + hypoglycemia protocol  IV protonix  Hold on IVF given acute volume overload  Hold warfarin, trend INR   SBP stable, monitor   Trend hgb (next at 2200)

## 2023-11-24 NOTE — H&P
[address] [facility]  H&P  Name: Asuncion Nicholas 79 y.o. female I MRN: [id number]  Unit/Bed#: ED 08 I Date of Admission: 11/24/2023   Date of Service: 11/24/2023 I Hospital Day: 0      Assessment/Plan   * GIB (gastrointestinal bleeding)  Assessment & Plan  BRBPR began 11/22, PCP recommended stopping warfarin temporarily, however persistent symptoms with large BRBPR episode witnessed by son this morning followed by syncopal episode  Hgb on admission 6 (baseline 8.5-10, most recently 9.6)  INR 5.4  S/P 1 unit FFP + vit K 10 mg + 1 unit PRBC in ED  GI consult  NPO + hypoglycemia protocol  IV protonix  Hold on IVF given acute volume overload  Hold warfarin, trend INR   SBP stable, monitor   Trend hgb (next at 2200)     Acute blood loss anemia  Assessment & Plan  In setting of acute GIB on warfarin with supratherapeutic INR   Iron panel, Y37, Folic acid pending   Hold warfarin  Maintain hgb > 7  See above     Acute on chronic respiratory failure with hypoxia and hypercapnia (HCC)  Assessment & Plan  Chronically maintained on 2L 2/2 COPD   Requiring 4-5 L on admission, SpO2 90-94%  COVID/FLU/RSV neg  CXR pending final read however appears overloaded      Suspect likely 2/2 acute on chronic CHF, see below  Repeat procal in am     Acute on chronic diastolic CHF (congestive heart failure) (HCC)  Assessment & Plan  Wt Readings from Last 3 Encounters:   10/20/23 52.2 kg (115 lb 2 oz)   10/05/23 53.1 kg (117 lb)   10/05/23 53.1 kg (117 lb)   Appears volume overloaded, increasing edema per son x last several days.  O2 requirement 4-5 L on admission, baseline is 2L   Outpatient lasix was changed from 20 mg daily to 40 mg daily about 4 weeks ago  CXR pending final read however appears congested  Echo 2/2023: EF 65%, G1DD  S/P IV lasix 60 mg x 1 in ED   Cardiology consult   Hold on scheduled IV diuretics given current GIB, reorder tomorrow   Repeat TTE   Monitor I/O + daily weights   Most recent wt 115 lbs 2 months ago per chart reviewed, update weight today     Syncope  Assessment & Plan  Witnessed by son, was attempting to walk patient however she became weak/collapsed, no headstrike, LOC about 30 seconds, prompting son to call EMS  Home care aids noted low BP and Low O2 sats prior to this  Currently A&O x3   Suspect 2/2 acute blood loss anemia/GIB along with increasing O2 requirements resulting in orthostasis/hypoxia  Check orthostatics  Trop WNL   Echo pending  Tele  PT/OT  Low threshold to obtain CTH for acute mental status changes    Other specified hypothyroidism  Assessment & Plan  Continue synthroid 25 mcg daily     Atrial fibrillation (HCC)  Assessment & Plan  Rate control: digoxin 125 mcg every other day (last taken 11/24)  Check am digoxin level  Rhythm: Cardizem 240 mg daily   OAC: warfarin - HOLD due to supratherapeutic INR (5.4)  Monitor daily PT/INR    Chronic obstructive pulmonary disease ([address])  Assessment & Plan  Chronically on 2L, currently requiring 4-5  Suspect CHF > COPD exacerbation  Home regimen: Spiriva inhaler + pumicort nebs BID + duonebs TID daily   Continue ATC nebs currently            VTE Pharmacologic Prophylaxis: VTE Score: 3 Moderate Risk (Score 3-4) - Pharmacological DVT Prophylaxis Contraindicated. Sequential Compression Devices Ordered. Code Status: Level 2 - DNAR: but accepts endotracheal intubation Discussed code status at length with patient and son at bedside. No CPR/resuscitation. Patient accepts endotracheal intubation for acute respiratory distress/temporary use if needed, son is in agreement. Had previously told ED DNR level 3, updated to DNR level 2 (no CPR, accepts ET intubation). Informed patient/family they may change this at any time if they would like to. Discussion with family: Updated  (son) at bedside.     Anticipated Length of Stay: Patient will be admitted on an inpatient basis with an anticipated length of stay of greater than 2 midnights secondary to IV diuresis, GIB . Total Time Spent on Date of Encounter in care of patient: 65 mins. This time was spent on one or more of the following: performing physical exam; counseling and coordination of care; obtaining or reviewing history; documenting in the medical record; reviewing/ordering tests, medications or procedures; communicating with other healthcare professionals and discussing with patient's family/caregivers. Chief Complaint: syncopal episode, SOB, rectal bleeding     History of Present Illness:  Jen Davila Ableelisa is a 79 y.o. female with a PMH of chronic diastolic CHF, COPD with chronic resp failure on 2L, HTN, afib on coumadin, hypothyroidism who presents with multiple complaints. Son at bedside notes patient began experiencing BRBPR 11/22, PCP was informed who recommended holding warfarin however despite holding warfarin patient has continued with profuse bright red bowel movements, last this morning 11/24. Son states he was called to patient's house by home care aid who noted low SPO2 as well as low blood pressure + patient appear weak/fatigued. Son and aid attempted to help patient walk however she then collapsed, without head strike reported however LOC x 30-40 second + associated dizziness preceding event. Son called EMS. Son also notes patient appears more SOB and has increasing LE edema. Patient is chronically on 2L O2, requiring 4-5 L in ED. Patient denies fevers chills, CP, N/V, dysuria. She notes she "can feel her stomach" but is not in pain. Review of Systems:  Review of Systems   Constitutional:  Positive for activity change and fatigue. Negative for chills and fever. HENT:  Negative for congestion, rhinorrhea and sore throat. Eyes:  Negative for visual disturbance. Respiratory:  Positive for shortness of breath. Negative for cough, chest tightness and wheezing. Cardiovascular:  Positive for leg swelling. Negative for chest pain and palpitations. Gastrointestinal:  Positive for blood in stool. Negative for abdominal distention, abdominal pain, constipation, diarrhea, nausea and vomiting. Genitourinary:  Positive for decreased urine volume. Negative for difficulty urinating, dysuria, frequency and urgency. Musculoskeletal:  Negative for arthralgias, back pain and myalgias. Skin:  Negative for rash and wound. Neurological:  Positive for dizziness and syncope. Negative for light-headedness and headaches. All other systems reviewed and are negative. Past Medical and Surgical History:   Past Medical History:   Diagnosis Date    Anti-phospholipid syndrome ([facility])     Asthma     Blood type O+     Cardiac disease     Chronic pain     COPD (chronic obstructive pulmonary disease) ([facility])     Femur fracture, left ([facility]) 6/22/2023    Fracture of ankle     left    Hyperlipidemia     Hypertension     Hypokalemia 2/27/2023    Osteoporosis        Past Surgical History:   Procedure Laterality Date    APPENDECTOMY      BILATERAL OOPHORECTOMY Bilateral     ORIF TIBIAL SHAFT FRACTURE W/ PLATES AND SCREWS Right     CT HEMIARTHROPLASTY HIP PARTIAL Left 6/23/2023    Procedure: HEMIARTHROPLASTY HIP (BIPOLAR); Surgeon: Rachel Brandon MD;  Location: [facility];  Service: Orthopedics       Meds/Allergies:  Prior to Admission medications    Medication Sig Start Date End Date Taking? Authorizing Provider   acetaminophen (TYLENOL) 325 mg tablet Take 3 tablets (975 mg total) by mouth every 8 (eight) hours 3/13/23   Indiana Smith MD   albuterol (2.5 mg/3 mL) 0.083 % nebulizer solution Take 3 mL (2.5 mg total) by nebulization every 6 (six) hours as needed for wheezing or shortness of breath 9/13/23   Armen Valerio MD   Ascorbic Acid (VITAMIN C) 1000 MG tablet Take 1,000 mg by mouth daily    Historical Provider, MD   budesonide (PULMICORT) 0.5 mg/2 mL nebulizer solution Take 2 mL (0.5 mg total) by nebulization every 12 (twelve) hours Rinse mouth after use.  10/27/23 2/24/24  YUE Hernandez   Cholecalciferol (VITAMIN D3) 20 MCG (800 UNIT) TABS Take by mouth in the morning    Historical Provider, MD   digoxin (LANOXIN) 0.125 mg tablet Take 1 tablet (125 mcg total) by mouth every other day 90/49/90   Jane Nair MD   diltiazem (CARDIZEM CD) 240 mg 24 hr capsule Take 1 capsule (240 mg total) by mouth daily 99/28/54   Jane Nair MD   Ferrous Sulfate 220 (44 Fe) MG/5ML LIQD take 1 teaspoonful by mouth once daily 4/92/52   Jane Nair MD   Fluticasone-Salmeterol (Advair) 250-50 mcg/dose inhaler Inhale 1 puff 2 (two) times a day Rinse mouth after use. 9/13/23   Ricardo Mccain MD   furosemide (LASIX) 20 mg tablet 1.5 TABS PO Q DAY 91/21/63   Jane Nair MD   guaiFENesin (MUCINEX) 600 mg 12 hr tablet Take 1 tablet (600 mg total) by mouth 2 (two) times a day 6/28/23   Ghislaine Vance MD   ipratropium-albuterol (DUO-NEB) 0.5-2.5 mg/3 mL nebulizer solution  6/30/23   Historical Provider, MD   levothyroxine 25 mcg tablet TAKE 1 TABLET DAILY 01/11/75   Jane Nair MD   lidocaine (LIDODERM) 5 % Apply 1 patch topically over 12 hours daily Remove & Discard patch within 12 hours or as directed by MD 6/70/13   Jane Nair MD   Lidocaine Pain Relief 4 % [facility]  6/29/23   Historical Provider, MD   loratadine (CLARITIN) 10 mg tablet Take 1 tablet (10 mg total) by mouth daily 31/36/68   Jane Nair MD   mirtazapine (REMERON) 7.5 MG tablet Take 1 tablet (7.5 mg total) by mouth daily at bedtime 3/49/72   Jane Nair MD   multivitamin (THERAGRAN) TABS Take 1 tablet by mouth daily    Historical Provider, MD   oxygen gas Inhale 1 L/min continuous.  Indications: Difficulty Breathing    Historical Provider, MD   sertraline (Zoloft) 50 mg tablet Take 1 tablet (50 mg total) by mouth daily 2/68/59   Jane Nair MD   tiotropium (Spiriva HandiHaler) 18 mcg inhalation capsule Place 1 capsule (18 mcg total) into inhaler and inhale daily 9/13/23   Ricardo Mccain MD   warfarin (COUMADIN) 2 mg tablet Take 2 mg daily, adjust to keep INR therapeutic, between 2-3 3/13/23   Florecita Biswas MD     I have reviewed home medications with patient family member. Allergies: Allergies   Allergen Reactions    Aspirin Other (See Comments)    Penicillins Rash and Hives    Sulfa Antibiotics Rash       Social History:  Marital Status:    Occupation: none   Patient Pre-hospital Living Situation: Home, Alone  Patient Pre-hospital Level of Mobility: walks with walker  Patient Pre-hospital Diet Restrictions: none   Substance Use History:   Social History     Substance and Sexual Activity   Alcohol Use Not Currently     Social History     Tobacco Use   Smoking Status Former    Types: Cigarettes   Smokeless Tobacco Never   Tobacco Comments    Never smoker per [employer]     Social History     Substance and Sexual Activity   Drug Use No       Family History:  Family History   Problem Relation Age of Onset    Hypertension Mother     Skin cancer Mother     Hypertension Father        Physical Exam:     Vitals:   Blood Pressure: 125/59 (11/24/23 1730)  Pulse: 73 (11/24/23 1730)  Respirations: (!) 26 (11/24/23 1730)  SpO2: 96 % (11/24/23 1730)    Physical Exam  Vitals and nursing note reviewed. Constitutional:       General: She is not in acute distress. Appearance: She is well-developed. She is ill-appearing. HENT:      Head: Normocephalic and atraumatic. Eyes:      General:         Right eye: No discharge. Left eye: No discharge. Extraocular Movements: Extraocular movements intact. Conjunctiva/sclera: Conjunctivae normal.   Cardiovascular:      Rate and Rhythm: Normal rate and regular rhythm. Heart sounds: No murmur heard. Pulmonary:      Effort: Pulmonary effort is normal. No respiratory distress. Breath sounds: Rhonchi and rales present. No wheezing.       Comments: Diminished BS bilaterally SPO2 91% on 4L  Abdominal:      General: Bowel sounds are normal. There is no distension. Palpations: Abdomen is soft. Tenderness: There is no abdominal tenderness. Musculoskeletal:      Cervical back: Neck supple. Right lower leg: Edema present. Left lower leg: Edema present. Comments: 3+ pitting edema b/l   Skin:     General: Skin is warm and dry. Capillary Refill: Capillary refill takes less than 2 seconds. Neurological:      General: No focal deficit present. Mental Status: She is alert and oriented to person, place, and time. Mental status is at baseline. Cranial Nerves: No cranial nerve deficit. Psychiatric:         Mood and Affect: Mood normal.         Behavior: Behavior normal.          Additional Data:     Lab Results:  Results from last 7 days   Lab Units 11/24/23  1426   WBC Thousand/uL 13.74*   HEMOGLOBIN g/dL 6.0*   HEMATOCRIT % 20.6*   PLATELETS Thousands/uL 369   NEUTROS PCT % 87*   LYMPHS PCT % 5*   MONOS PCT % 7   EOS PCT % 0     Results from last 7 days   Lab Units 11/24/23  1426   SODIUM mmol/L 142   POTASSIUM mmol/L 4.9   CHLORIDE mmol/L 99   CO2 mmol/L 41*   BUN mg/dL 32*   CREATININE mg/dL 0.80   ANION GAP mmol/L 2   CALCIUM mg/dL 8.6   ALBUMIN g/dL 3.2*   TOTAL BILIRUBIN mg/dL 0.39   ALK PHOS U/L 187*   ALT U/L 12   AST U/L 19   GLUCOSE RANDOM mg/dL 152*     Results from last 7 days   Lab Units 11/24/23  1426   INR  5.41*             Results from last 7 days   Lab Units 11/24/23  1426   PROCALCITONIN ng/ml 0.41*       Lines/Drains:  Invasive Devices       Peripheral Intravenous Line  Duration             Peripheral IV 11/24/23 Left Antecubital <1 day                        Imaging: Personally reviewed the following imaging: chest xray  X-ray chest 1 view portable   ED Interpretation by Nicole Rayo PA-C (11/24 1537)   CHF w/ B/L Pleural Effusions Larger on the Right          EKG and Other Studies Reviewed on Admission:   EKG: NSR.  HR 73 + PVC.    ** Please Note: This note has been constructed using a voice recognition system.  **

## 2023-11-24 NOTE — RESPIRATORY THERAPY NOTE
RT Protocol Note  Asuncion Nicholas 79 y.o. female MRN: [id number]  Unit/Bed#: ED 08 Encounter: [id number]    Assessment    Principal Problem:    GIB (gastrointestinal bleeding)  Active Problems:    Chronic obstructive pulmonary disease (HCC)    Atrial fibrillation (HCC)    Other specified hypothyroidism    Acute on chronic respiratory failure with hypoxia and hypercapnia (HCC)    Acute blood loss anemia    Syncope    Acute on chronic diastolic CHF (congestive heart failure) (HCC)      Home Pulmonary Medications:  udn       Past Medical History:   Diagnosis Date    Anti-phospholipid syndrome (HCC)     Asthma     Blood type O+     Cardiac disease     Chronic pain     COPD (chronic obstructive pulmonary disease) (HCC)     Femur fracture, left ([address]) 6/22/2023    Fracture of ankle     left    Hyperlipidemia     Hypertension     Hypokalemia 2/27/2023    Osteoporosis      Social History     Socioeconomic History    Marital status:       Spouse name: Not on file    Number of children: Not on file    Years of education: Not on file    Highest education level: Not on file   Occupational History    Not on file   Tobacco Use    Smoking status: Former     Types: Cigarettes    Smokeless tobacco: Never    Tobacco comments:     Never smoker per [employer]   Vaping Use    Vaping Use: Never used   Substance and Sexual Activity    Alcohol use: Not Currently    Drug use: No    Sexual activity: Not Currently   Other Topics Concern    Not on file   Social History Narrative    Not on file     Social Determinants of Health     Financial Resource Strain: Low Risk  (4/11/2023)    Overall Financial Resource Strain (CARDIA)     Difficulty of Paying Living Expenses: Not very hard   Food Insecurity: No Food Insecurity (8/4/2023)    Hunger Vital Sign     Worried About Running Out of Food in the Last Year: Never true     Ran Out of Food in the Last Year: Never true   Transportation Needs: No Transportation Needs (8/4/2023)    [place] - Transportation     Lack of Transportation (Medical): No     Lack of Transportation (Non-Medical): No   Physical Activity: Not on file   Stress: Not on file   Social Connections: Not on file   Intimate Partner Violence: Not on file   Housing Stability: Low Risk  (8/4/2023)    Housing Stability Vital Sign     Unable to Pay for Housing in the Last Year: No     Number of Places Lived in the Last Year: 1     Unstable Housing in the Last Year: No       Subjective         Objective    Physical Exam:   Assessment Type: Assess only  General Appearance: Alert, Awake  Respiratory Pattern: Normal  Chest Assessment: Chest expansion symmetrical  Bilateral Breath Sounds: Clear, Diminished  Cough: Strong, Productive  O2 Device: (P) nc    Vitals:  Blood pressure 125/59, pulse 73, resp. rate (!) 26, SpO2 96 %, not currently breastfeeding.           Imaging and other studies:     O2 Device: (P) nc     Plan    Respiratory Plan: Home Bronchodilator Patient pathway  Airway Clearance Plan: Discontinue Protocol     Resp Comments: (P) per pt/family, smokes 1-2ppd x 70 yrs, pt takes udn tx's at home, no available cxr, pt wears 2 liter of 02 at home, will order udn tid with xop/atrovent tid and albuterol udn tx's prn, D/c'd ACP no indication, pt has a strong loose productive cough

## 2023-11-24 NOTE — ASSESSMENT & PLAN NOTE
Chronically maintained on 2L 2/2 COPD   Requiring 4-5 L on admission, SpO2 90-94%  COVID/FLU/RSV neg  CXR pending final read however appears overloaded      Suspect likely 2/2 acute on chronic CHF, see below  Repeat procal in am

## 2023-11-24 NOTE — ASSESSMENT & PLAN NOTE
Rate control: digoxin 125 mcg every other day (last taken 11/24)  Check am digoxin level  Rhythm: Cardizem 240 mg daily   OAC: warfarin - HOLD due to supratherapeutic INR (5.4)  Monitor daily PT/INR Retention Suture Bite Size: 3 mm

## 2023-11-24 NOTE — ASSESSMENT & PLAN NOTE
Wt Readings from Last 3 Encounters:   10/20/23 52.2 kg (115 lb 2 oz)   10/05/23 53.1 kg (117 lb)   10/05/23 53.1 kg (117 lb)   Appears volume overloaded, increasing edema per son x last several days.  O2 requirement 4-5 L on admission, baseline is 2L   Outpatient lasix was changed from 20 mg daily to 40 mg daily about 4 weeks ago  CXR pending final read however appears congested  Echo 2/2023: EF 65%, G1DD  S/P IV lasix 60 mg x 1 in ED   Cardiology consult   Hold on scheduled IV diuretics given current GIB, reorder tomorrow   Repeat TTE   Monitor I/O + daily weights   Most recent wt 115 lbs 2 months ago per chart reviewed, update weight today

## 2023-11-24 NOTE — ED PROVIDER NOTES
History  Chief Complaint   Patient presents with    Shortness of Breath     Patient presents to ED via EMS shortness of breath, patient comes from home son called increased sob and low pulse ox patient reports bright red blood in stool. HPI    Simin Tracey Covert 79 y.o. female with COPD w/ 2 L 02 dependence / chronic diastolic CHF w/ Y3JB and Hx of BLE edema on Lasix and remote PAF on Coumadin recently stopped a few days agon and prior on 2 mg / day. Chronically on albuterol and budesonide / cardizem/ furosemide recently increased to 40 mg /day  PMHX presents to the ED for ROMANO, LOWER EXTREMITY EDEMA, and BRPBR. History provided by Red Mora who is POA. He states she has had some blood in the stool over the past couple of days and this morning had a significant amount of blood. Son states she had a syncopal episode in his arms that lasted approx 10 seconds at approx 10 AM.  Additionally he states her 02 level dropped to 42% and thereefore he increased her 02 to 4 L and called EMS. DDX: CHF / GIB / AECOPD    Past Medical History:   Diagnosis Date    Anti-phospholipid syndrome (HCC)     Asthma     Blood type O+     Cardiac disease     Chronic pain     COPD (chronic obstructive pulmonary disease) (HCC)     Femur fracture, left ([address]) 6/22/2023    Fracture of ankle     left    Hyperlipidemia     Hypertension     Hypokalemia 2/27/2023    Osteoporosis         Past Surgical History:   Procedure Laterality Date    APPENDECTOMY      BILATERAL OOPHORECTOMY Bilateral     ORIF TIBIAL SHAFT FRACTURE W/ PLATES AND SCREWS Right     ND HEMIARTHROPLASTY HIP PARTIAL Left 6/23/2023    Procedure: HEMIARTHROPLASTY HIP (BIPOLAR); Surgeon: Caridad Farias MD;  Location:  MAIN OR;  Service: Orthopedics           Prior to Admission Medications   Prescriptions Last Dose Informant Patient Reported? Taking?    Ascorbic Acid (VITAMIN C) 1000 MG tablet  Outside Facility (Specify) Yes No   Sig: Take 1,000 mg by mouth daily Cholecalciferol (VITAMIN D3) 20 MCG (800 UNIT) TABS  Outside Facility (Specify) Yes No   Sig: Take by mouth in the morning   Ferrous Sulfate 220 (44 Fe) MG/5ML LIQD  Outside Facility (Specify) No No   Sig: take 1 teaspoonful by mouth once daily   Fluticasone-Salmeterol (Advair) 250-50 mcg/dose inhaler   No No   Sig: Inhale 1 puff 2 (two) times a day Rinse mouth after use. Lidocaine Pain Relief 4 % PTCH   Yes No   acetaminophen (TYLENOL) 325 mg tablet  Outside Facility (Specify) No No   Sig: Take 3 tablets (975 mg total) by mouth every 8 (eight) hours   albuterol (2.5 mg/3 mL) 0.083 % nebulizer solution   No No   Sig: Take 3 mL (2.5 mg total) by nebulization every 6 (six) hours as needed for wheezing or shortness of breath   budesonide (PULMICORT) 0.5 mg/2 mL nebulizer solution   No No   Sig: Take 2 mL (0.5 mg total) by nebulization every 12 (twelve) hours Rinse mouth after use.    digoxin (LANOXIN) 0.125 mg tablet   No No   Sig: Take 1 tablet (125 mcg total) by mouth every other day   diltiazem (CARDIZEM CD) 240 mg 24 hr capsule   No No   Sig: Take 1 capsule (240 mg total) by mouth daily   furosemide (LASIX) 20 mg tablet   No No   Si.5 TABS PO Q DAY   guaiFENesin (MUCINEX) 600 mg 12 hr tablet  Outside Facility (Specify) No No   Sig: Take 1 tablet (600 mg total) by mouth 2 (two) times a day   ipratropium-albuterol (DUO-NEB) 0.5-2.5 mg/3 mL nebulizer solution   Yes No   levothyroxine 25 mcg tablet   No No   Sig: TAKE 1 TABLET DAILY   lidocaine (LIDODERM) 5 %  Outside Facility (Specify) No No   Sig: Apply 1 patch topically over 12 hours daily Remove & Discard patch within 12 hours or as directed by MD   loratadine (CLARITIN) 10 mg tablet   No No   Sig: Take 1 tablet (10 mg total) by mouth daily   mirtazapine (REMERON) 7.5 MG tablet  Outside Facility (Specify) No No   Sig: Take 1 tablet (7.5 mg total) by mouth daily at bedtime   multivitamin (THERAGRAN) TABS  Self, Outside Facility (Specify) Yes No   Sig: Take 1 tablet by mouth daily   oxygen gas   Yes No   Sig: Inhale 1 L/min continuous. Indications: Difficulty Breathing   sertraline (Zoloft) 50 mg tablet  Outside Facility (Specify) No No   Sig: Take 1 tablet (50 mg total) by mouth daily   tiotropium (Spiriva HandiHaler) 18 mcg inhalation capsule   No No   Sig: Place 1 capsule (18 mcg total) into inhaler and inhale daily   warfarin (COUMADIN) 2 mg tablet  Outside Facility (Specify) No No   Sig: Take 2 mg daily, adjust to keep INR therapeutic, between 2-3      Facility-Administered Medications: None       Past Medical History:   Diagnosis Date    Anti-phospholipid syndrome (HCC)     Asthma     Blood type O+     Cardiac disease     Chronic pain     COPD (chronic obstructive pulmonary disease) (HCC)     Femur fracture, left ([address]) 6/22/2023    Fracture of ankle     left    Hyperlipidemia     Hypertension     Hypokalemia 2/27/2023    Osteoporosis        Past Surgical History:   Procedure Laterality Date    APPENDECTOMY      BILATERAL OOPHORECTOMY Bilateral     ORIF TIBIAL SHAFT FRACTURE W/ PLATES AND SCREWS Right     SD HEMIARTHROPLASTY HIP PARTIAL Left 6/23/2023    Procedure: HEMIARTHROPLASTY HIP (BIPOLAR); Surgeon: Ludivina Rios MD;  Location: [facility] OR;  Service: Orthopedics       Family History   Problem Relation Age of Onset    Hypertension Mother     Skin cancer Mother     Hypertension Father      I have reviewed and agree with the history as documented. E-Cigarette/Vaping    E-Cigarette Use Never User      E-Cigarette/Vaping Substances    Nicotine No     THC No      Social History     Tobacco Use    Smoking status: Former     Types: Cigarettes    Smokeless tobacco: Never    Tobacco comments:     Never smoker per [employer]   Vaping Use    Vaping Use: Never used   Substance Use Topics    Alcohol use: Not Currently    Drug use: No       Review of Systems   Constitutional:  Negative for activity change, appetite change, chills, fatigue and fever.    HENT:  Negative for postnasal drip, rhinorrhea, sinus pressure and sinus pain. Eyes:  Negative for visual disturbance. Respiratory:  Positive for shortness of breath and wheezing. Negative for apnea, cough, chest tightness and stridor. Cardiovascular:  Negative for chest pain and leg swelling. Gastrointestinal:  Positive for blood in stool. Negative for diarrhea, nausea and vomiting. Endocrine: Negative for cold intolerance and heat intolerance. Musculoskeletal:  Negative for arthralgias, back pain, joint swelling and myalgias. Skin:  Negative for color change. Neurological:  Negative for syncope and light-headedness. Hematological:  Negative for adenopathy. Psychiatric/Behavioral:  Negative for confusion and sleep disturbance. Physical Exam  Physical Exam  Vitals and nursing note reviewed. Constitutional:       General: She is not in acute distress. Appearance: She is well-developed. HENT:      Head: Normocephalic and atraumatic. Eyes:      Conjunctiva/sclera: Conjunctivae normal.   Cardiovascular:      Rate and Rhythm: Normal rate and regular rhythm. Heart sounds: No murmur heard. Pulmonary:      Effort: Pulmonary effort is normal. No respiratory distress. Breath sounds: Examination of the right-middle field reveals rhonchi. Examination of the left-middle field reveals rhonchi. Examination of the right-lower field reveals rhonchi and rales. Examination of the left-lower field reveals rhonchi and rales. Rhonchi and rales present. No decreased breath sounds or wheezing. Comments: 94% on 6 L   Abdominal:      Palpations: Abdomen is soft. Tenderness: There is no abdominal tenderness. Musculoskeletal:         General: No swelling. Cervical back: Neck supple. Right lower le+ Edema present. Left lower le+ Edema present. Skin:     General: Skin is warm and dry. Capillary Refill: Capillary refill takes less than 2 seconds.    Neurological:      Mental Status: She is alert.    Psychiatric:         Mood and Affect: Mood normal.         Vital Signs  ED Triage Vitals   Temperature Pulse Respirations Blood Pressure SpO2   11/24/23 1430 11/24/23 1411 11/24/23 1411 11/24/23 1430 11/24/23 1411   97.8 °F (36.6 °C) 78 21 137/63 90 %      Temp Source Heart Rate Source Patient Position - Orthostatic VS BP Location FiO2 (%)   11/24/23 1430 11/24/23 1411 11/24/23 1430 11/24/23 1430 --   Temporal Monitor Lying Right arm       Pain Score       11/24/23 2000       No Pain           Vitals:    11/24/23 2015 11/24/23 2030 11/24/23 2045 11/24/23 2100   BP: 156/65 136/64 148/66 140/67   Pulse: 67 75 77 74   Patient Position - Orthostatic VS:             Visual Acuity  Visual Acuity      Flowsheet Row Most Recent Value   R Pupil Size (mm) 3            ED Medications  Medications   budesonide (PULMICORT) inhalation solution 0.5 mg (0.5 mg Nebulization Given 11/24/23 2015)   diltiazem (CARDIZEM CD) 24 hr capsule 240 mg (has no administration in time range)   digoxin (LANOXIN) tablet 125 mcg (has no administration in time range)   levothyroxine tablet 25 mcg (has no administration in time range)   sertraline (ZOLOFT) tablet 50 mg (has no administration in time range)   acetaminophen (TYLENOL) tablet 650 mg (has no administration in time range)   formoterol (PERFOROMIST) nebulizer solution 20 mcg (20 mcg Nebulization Given 11/24/23 2015)   mirtazapine (REMERON) tablet 7.5 mg (has no administration in time range)   pantoprazole (PROTONIX) injection 40 mg (40 mg Intravenous Given 11/24/23 9748)   ipratropium (ATROVENT) 0.02 % inhalation solution 0.5 mg (has no administration in time range)   albuterol inhalation solution 2.5 mg (has no administration in time range)   levalbuterol (XOPENEX) inhalation solution 1.25 mg (has no administration in time range)   albuterol (FOR EMS ONLY) (2.5 mg/3 mL) 0.083 % inhalation solution 5 mg (0 mg Does not apply Given to EMS 11/24/23 8996)   furosemide (LASIX) injection 60 mg (60 mg Intravenous Given 11/24/23 1437)   phytonadione (AQUA-MEPHYTON) 10 mg/mL 10 mg in sodium chloride 0.9 % 50 mL IVPB (0 mg Intravenous Stopped 11/24/23 1730)   furosemide (LASIX) injection 40 mg (40 mg Intravenous Given 11/24/23 1912)       Diagnostic Studies  Results Reviewed       Procedure Component Value Units Date/Time    Ferritin [[id number]] Collected: 11/24/23 1426    Lab Status: In process Specimen: Blood from Arm, Left Updated: 11/24/23 2106    TIBC Panel (incl. Iron, TIBC, % Iron Saturation) [[id number]] Collected: 11/24/23 1426    Lab Status: In process Specimen: Blood from Arm, Left Updated: 11/24/23 2106    Vitamin B12 [[id number]] Collected: 11/24/23 1426    Lab Status: In process Specimen: Blood from Arm, Left Updated: 11/24/23 2042    Folate [[id number]] Collected: 11/24/23 1426    Lab Status:  In process Specimen: Blood from Arm, Left Updated: 11/24/23 2042    HS Troponin I 4hr [[id number]]  (Abnormal) Collected: 11/24/23 1958    Lab Status: Final result Specimen: Blood from Arm, Right Updated: 11/24/23 2039     hs TnI 4hr 50 ng/L      Delta 4hr hsTnI 10 ng/L     HS Troponin I 2hr [[id number]]  (Normal) Collected: 11/24/23 1743    Lab Status: Final result Specimen: Blood from Arm, Left Updated: 11/24/23 1820     hs TnI 2hr 40 ng/L      Delta 2hr hsTnI 0 ng/L     TSH, 3rd generation with Free T4 reflex [[id number]]  (Normal) Collected: 11/24/23 1426    Lab Status: Final result Specimen: Blood from Arm, Left Updated: 11/24/23 1653     TSH 3RD GENERATON 2.758 uIU/mL     B-Type Natriuretic Peptide(BNP) [[id number]]  (Abnormal) Collected: 11/24/23 1426    Lab Status: Final result Specimen: Blood from Arm, Left Updated: 11/24/23 1622      pg/mL     Protime-INR [[id number]]  (Abnormal) Collected: 11/24/23 1426    Lab Status: Final result Specimen: Blood from Arm, Left Updated: 11/24/23 1607     Protime 49.9 seconds      INR 5.41    CBC and differential [[id number]]  (Abnormal) Collected: 11/24/23 1426    Lab Status: Final result Specimen: Blood from Arm, Left Updated: 11/24/23 1536     WBC 13.74 Thousand/uL      RBC 1.91 Million/uL      Hemoglobin 6.0 g/dL      Hematocrit 20.6 %       fL      MCH 31.4 pg      MCHC 29.1 g/dL      RDW 21.0 %      MPV 9.3 fL      Platelets 065 Thousands/uL      nRBC 0 /100 WBCs      Neutrophils Relative 87 %      Immat GRANS % 1 %      Lymphocytes Relative 5 %      Monocytes Relative 7 %      Eosinophils Relative 0 %      Basophils Relative 0 %      Neutrophils Absolute 12.01 Thousands/µL      Immature Grans Absolute 0.12 Thousand/uL      Lymphocytes Absolute 0.65 Thousands/µL      Monocytes Absolute 0.93 Thousand/µL      Eosinophils Absolute 0.01 Thousand/µL      Basophils Absolute 0.02 Thousands/µL     Narrative: This is an appended report. These results have been appended to a previously verified report. FLU/RSV/COVID - if FLU/RSV clinically relevant [[id number]]  (Normal) Collected: 11/24/23 1426    Lab Status: Final result Specimen: Nares from Nasopharyngeal Swab Updated: 11/24/23 1516     SARS-CoV-2 Negative     INFLUENZA A PCR Negative     INFLUENZA B PCR Negative     RSV PCR Negative    Narrative:      FOR PEDIATRIC PATIENTS - copy/paste COVID Guidelines URL to browser: [email or web address] ashx    SARS-CoV-2 assay is a Nucleic Acid Amplification assay intended for the  qualitative detection of nucleic acid from SARS-CoV-2 in nasopharyngeal  swabs. Results are for the presumptive identification of SARS-CoV-2 RNA. Positive results are indicative of infection with SARS-CoV-2, the virus  causing COVID-19, but do not rule out bacterial infection or co-infection  with other viruses. Laboratories within the [facility] and its  territories are required to report all positive results to the appropriate  public health authorities.  Negative results do not preclude SARS-CoV-2  infection and should not be used as the sole basis for treatment or other  patient management decisions. Negative results must be combined with  clinical observations, patient history, and epidemiological information. This test has not been FDA cleared or approved. This test has been authorized by FDA under an Emergency Use Authorization  (EUA). This test is only authorized for the duration of time the  declaration that circumstances exist justifying the authorization of the  emergency use of an in vitro diagnostic tests for detection of SARS-CoV-2  virus and/or diagnosis of COVID-19 infection under section 564(b)(1) of  the Act, 21 U. S.C. 238HEY-3(I)(1), unless the authorization is terminated  or revoked sooner. The test has been validated but independent review by FDA  and CLIA is pending. Test performed using [employer] GeneXpert: This RT-PCR assay targets N2,  a region unique to SARS-CoV-2. A conserved region in the E-gene was chosen  for pan-Sarbecovirus detection which includes SARS-CoV-2. According to CMS-2020-01-R, this platform meets the definition of high-throughput technology.     Procalcitonin [[id number]]  (Abnormal) Collected: 11/24/23 1426    Lab Status: Final result Specimen: Blood from Arm, Left Updated: 11/24/23 1501     Procalcitonin 0.41 ng/ml     HS Troponin 0hr (reflex protocol) [[id number]]  (Normal) Collected: 11/24/23 1426    Lab Status: Final result Specimen: Blood from Arm, Left Updated: 11/24/23 1458     hs TnI 0hr 40 ng/L     Comprehensive metabolic panel [[id number]]  (Abnormal) Collected: 11/24/23 1426    Lab Status: Final result Specimen: Blood from Arm, Left Updated: 11/24/23 1451     Sodium 142 mmol/L      Potassium 4.9 mmol/L      Chloride 99 mmol/L      CO2 41 mmol/L      ANION GAP 2 mmol/L      BUN 32 mg/dL      Creatinine 0.80 mg/dL      Glucose 152 mg/dL      Calcium 8.6 mg/dL      Corrected Calcium 9.2 mg/dL      AST 19 U/L      ALT 12 U/L      Alkaline Phosphatase 187 U/L      Total Protein 6.0 g/dL      Albumin 3.2 g/dL      Total Bilirubin 0.39 mg/dL      eGFR 67 ml/min/1.73sq m     Narrative:      [place] guidelines for Chronic Kidney Disease (CKD):     Stage 1 with normal or high GFR (GFR > 90 mL/min/1.73 square meters)    Stage 2 Mild CKD (GFR = 60-89 mL/min/1.73 square meters)    Stage 3A Moderate CKD (GFR = 45-59 mL/min/1.73 square meters)    Stage 3B Moderate CKD (GFR = 30-44 mL/min/1.73 square meters)    Stage 4 Severe CKD (GFR = 15-29 mL/min/1.73 square meters)    Stage 5 End Stage CKD (GFR <15 mL/min/1.73 square meters)  Note: GFR calculation is accurate only with a steady state creatinine    POCT Blood Gas (CG8+) [[id number]]  (Abnormal) Collected: 11/24/23 1423    Lab Status: Final result Specimen: Venous Updated: 11/24/23 1427     ph, Tristen ISTAT 7.320     pCO2, Tristen i-STAT 88.8 mm HG      pO2, Tristen i-STAT 19.0 mm HG      BE, i-STAT 18 mmol/L      HCO3, Tristen i-STAT 45.8 mmol/L      CO2, i-STAT >45 mmol/L      O2 Sat, i-STAT 23 %      SODIUM, I-STAT 141 mmol/l      Potassium, i-STAT 5.0 mmol/L      Calcium, Ionized i-STAT 1.16 mmol/L      Hct, i-STAT 19 %      Hgb, i-STAT 6.5 g/dl      Glucose, i-STAT 150 mg/dl      Specimen Type VENOUS    UA w Reflex to Microscopic w Reflex to Culture [[id number]]     Lab Status: No result Specimen: Urine                    X-ray chest 1 view portable   ED Interpretation by Varsha Peters PA-C (11/24 [id number])   CHF w/ B/L Pleural Effusions Larger on the Right                 Procedures  ECG 12 Lead Documentation Only    Date/Time: 11/24/2023 2:16 PM    Performed by: Varsha Peters PA-C  Authorized by: Varsha Peters PA-C    ECG reviewed by me, the ED Provider: yes    Patient location:  ED  Previous ECG:     Previous ECG:  Compared to current    Similarity:  No change    Comparison to cardiac monitor: Yes    Interpretation:     Interpretation: normal    Rate:     ECG rate:  78    ECG rate assessment: normal Rhythm:     Rhythm: sinus rhythm    Ectopy:     Ectopy: PAC    QRS:     QRS axis:  Normal  Conduction:     Conduction: normal    ST segments:     ST segments:  Normal  T waves:     T waves: normal    Comments:      Self interpreted by me. NO STEMI           ED Course  ED Course as of 11/24/23 2208 Fri Nov 24, 2023   1428 Advanced Care Planning Discussion:    At 2488 on 11`/24/23 I did have a discussion with patients son Damián Mast ([place]), in hospital ED bed 8, and patient who was/were present via personal conversation regarding advanced directives. In particular we discussed endotracheal intubation, mechanical ventilation, and CPR directives. This conversation was prompted and performed in consideration of the patient's clinical condition(s) of : Acute Respiratory Distress / Hypoxemia  / Acute Anemia  The patient herself, and family member(s) Damián Mast, agree that the patient would NOT want to be intubated and ventilated for respiratory failure, and in the event of cardiac arrest would opt NOT to have ACLS protocol measures. The patient's case and advanced directives were reviewed, discussed, and agreed upon with Dr. Ulysses Salgado of the ED team.    Outcomes:  -No Intubation  -No CPR  -No enteral feeding  -accepts blood and blood products  -accepts routine medical therapies / treatements     1430 Hemoglobin, Istat(!!): 6.5  PRBC ordered for patient as well as lasix. Pending CBC for confirmatory Hb.   1538 Hemoglobin(!!): 6.0  Confirmed Hb 6. Pending PRBC from blood bank. Pending INR. Pt's son states she has been off of coumadin for at least 2 days. 1629 Discussed w/ IM team.  Accepted for admission. Ordered 10 IV vit K and 1 u FFP for active bleeding w/ INR now 5.4.  GI consulted. Admission orders placed. SBIRT 20yo+      Flowsheet Row Most Recent Value   Initial Alcohol Screen: US AUDIT-C     1. How often do you have a drink containing alcohol? 0 Filed at: 11/24/2023 1412   2.  How many drinks containing alcohol do you have on a typical day you are drinking? 0 Filed at: 11/24/2023 1412   3b. FEMALE Any Age, or MALE 65+: How often do you have 4 or more drinks on one occassion? 0 Filed at: 11/24/2023 1412   Audit-C Score 0 Filed at: 11/24/2023 1412   [place]: How many times in the past year have you. .. Used an illegal drug or used a prescription medication for non-medical reasons? Never Filed at: 11/24/2023 1412                      Medical Decision Making  Admitted patient d/t GIB and respiratory distress. Anemic w/ HB 6. PRBC ordered as well as 10 u vit K and 1 FFP d/t GIB. Admitted to IM service. Patient has been stable hemodynamically throughout ED course. Admitted to SD 2 . Amount and/or Complexity of Data Reviewed  Labs: ordered. Decision-making details documented in ED Course. Radiology: ordered and independent interpretation performed. Risk  Prescription drug management. Decision regarding hospitalization. Orders and follow up test results included in note refreshed hereafter time of admission order reflect orders, tests, and labs reviewed independently by inpatient teams and not necessarily reviewed by me.     Disposition  Final diagnoses:   Respiratory distress   COPD (chronic obstructive pulmonary disease) (HCC)   Acute on chronic respiratory failure with hypoxia and hypercapnia (HCC)   Acute on chronic anemia   GI bleed     Time reflects when diagnosis was documented in both MDM as applicable and the Disposition within this note       Time User Action Codes Description Comment    11/24/2023  2:36 PM Kamilla Borjas Add [R06.03] Respiratory distress     11/24/2023  2:36 PM Madalyn Mar [J44.9] COPD (chronic obstructive pulmonary disease) ([address])     11/24/2023  2:37 PM Kamilla Borjas Add [Z93.34,  J96.22] Acute on chronic respiratory failure with hypoxia and hypercapnia ([address])     11/24/2023  3:58 PM Darryle Pont Add [K92.2] GIB (gastrointestinal bleeding)     11/24/2023  4:23 PM Alisa Vivas Add [D64.9] Acute on chronic anemia     11/24/2023  4:23 PM Alisa Vivas Add [K92.2] GI bleed     11/24/2023  4:39 PM Evelyn Wood Add [E43] Severe protein-calorie malnutrition [facility])           ED Disposition       ED Disposition   Admit    Condition   Stable    Date/Time   Fri Nov 24, 2023 1623    Comment   Case was discussed with Dr. Tex Garza and the patient's admission status was agreed to be Admission Status: Inpatient status to the service of Dr. Tex Garza . Follow-up Information    None         Current Discharge Medication List        CONTINUE these medications which have NOT CHANGED    Details   acetaminophen (TYLENOL) 325 mg tablet Take 3 tablets (975 mg total) by mouth every 8 (eight) hours  Refills: 0    Associated Diagnoses: Low back pain without sciatica, unspecified back pain laterality, unspecified chronicity      albuterol (2.5 mg/3 mL) 0.083 % nebulizer solution Take 3 mL (2.5 mg total) by nebulization every 6 (six) hours as needed for wheezing or shortness of breath  Qty: 110 mL, Refills: 3    Associated Diagnoses: Moderate asthma, unspecified whether complicated, unspecified whether persistent      Ascorbic Acid (VITAMIN C) 1000 MG tablet Take 1,000 mg by mouth daily      budesonide (PULMICORT) 0.5 mg/2 mL nebulizer solution Take 2 mL (0.5 mg total) by nebulization every 12 (twelve) hours Rinse mouth after use.   Qty: 120 mL, Refills: 3    Associated Diagnoses: COPD with acute exacerbation (HCC)      Cholecalciferol (VITAMIN D3) 20 MCG (800 UNIT) TABS Take by mouth in the morning      digoxin (LANOXIN) 0.125 mg tablet Take 1 tablet (125 mcg total) by mouth every other day  Qty: 90 tablet, Refills: 1    Associated Diagnoses: Atrial fibrillation, unspecified type (HCC)      diltiazem (CARDIZEM CD) 240 mg 24 hr capsule Take 1 capsule (240 mg total) by mouth daily  Qty: 30 capsule, Refills: 5 Associated Diagnoses: Primary hypertension; Atrial fibrillation, unspecified type (formerly [facility])      Ferrous Sulfate 220 (44 Fe) MG/5ML LIQD take 1 teaspoonful by mouth once daily  Qty: 150 mL, Refills: 5    Associated Diagnoses: Anemia, unspecified type      Fluticasone-Salmeterol (Advair) 250-50 mcg/dose inhaler Inhale 1 puff 2 (two) times a day Rinse mouth after use. Qty: 180 blister, Refills: 3    Comments: Substitution to a formulary equivalent within the same pharmaceutical class is authorized. Associated Diagnoses: Chronic obstructive pulmonary disease, unspecified COPD type (formerly [facility])      furosemide (LASIX) 20 mg tablet 1.5 TABS PO Q DAY  Qty: 135 tablet, Refills: 1    Associated Diagnoses: Atrial fibrillation, unspecified type (formerly [facility])      guaiFENesin (MUCINEX) 600 mg 12 hr tablet Take 1 tablet (600 mg total) by mouth 2 (two) times a day  Refills: 0    Associated Diagnoses: Femur fracture, left (formerly [facility])      ipratropium-albuterol (DUO-NEB) 0.5-2.5 mg/3 mL nebulizer solution       levothyroxine 25 mcg tablet TAKE 1 TABLET DAILY  Qty: 90 tablet, Refills: 3    Associated Diagnoses: Hypothyroidism, unspecified type      lidocaine (LIDODERM) 5 % Apply 1 patch topically over 12 hours daily Remove & Discard patch within 12 hours or as directed by MD  Qty: 90 patch, Refills: 3    Associated Diagnoses: Osteoporosis, unspecified osteoporosis type, unspecified pathological fracture presence;  Low back pain without sciatica, unspecified back pain laterality, unspecified chronicity      Lidocaine Pain Relief 4 % PTCH       loratadine (CLARITIN) 10 mg tablet Take 1 tablet (10 mg total) by mouth daily  Qty: 30 tablet, Refills: 0    Associated Diagnoses: Uncomplicated asthma, unspecified asthma severity, unspecified whether persistent      mirtazapine (REMERON) 7.5 MG tablet Take 1 tablet (7.5 mg total) by mouth daily at bedtime  Qty: 90 tablet, Refills: 3    Associated Diagnoses: Adult failure to thrive      multivitamin (THERAGRAN) TABS Take 1 tablet by mouth daily      oxygen gas Inhale 1 L/min continuous. Indications: Difficulty Breathing      sertraline (Zoloft) 50 mg tablet Take 1 tablet (50 mg total) by mouth daily  Qty: 90 tablet, Refills: 3    Associated Diagnoses: Anxiety      tiotropium (Spiriva HandiHaler) 18 mcg inhalation capsule Place 1 capsule (18 mcg total) into inhaler and inhale daily  Qty: 30 capsule, Refills: 3    Associated Diagnoses: COPD exacerbation (HCC)      warfarin (COUMADIN) 2 mg tablet Take 2 mg daily, adjust to keep INR therapeutic, between 2-3  Refills: 0    Associated Diagnoses: Chronic atrial fibrillation (HCC)             No discharge procedures on file.     PDMP Review         Value Time User    PDMP Reviewed  Yes 8/7/2023  9:33 AM Neville Harrison MD            ED Provider  Electronically Signed by             Irina Davis PA-C  11/24/23 4640

## 2023-11-24 NOTE — ASSESSMENT & PLAN NOTE
In setting of acute GIB on warfarin with supratherapeutic INR   Iron panel, U12, Folic acid pending   Hold warfarin  Maintain hgb > 7  See above

## 2023-11-24 NOTE — ASSESSMENT & PLAN NOTE
Witnessed by son, was attempting to walk patient however she became weak/collapsed, no headstrike, LOC about 30 seconds, prompting son to call EMS  Home care aids noted low BP and Low O2 sats prior to this  Currently A&O x3   Suspect 2/2 acute blood loss anemia/GIB along with increasing O2 requirements resulting in orthostasis/hypoxia  Check orthostatics  Trop WNL   Echo pending  Tele  PT/OT  Low threshold to obtain [facility] for acute mental status changes

## 2023-11-25 PROBLEM — R63.6 UNDERWEIGHT: Status: ACTIVE | Noted: 2023-11-25

## 2023-11-25 LAB
ABO GROUP BLD BPU: NORMAL
ABO GROUP BLD BPU: NORMAL
ALBUMIN SERPL BCP-MCNC: 3 G/DL (ref 3.5–5)
ALP SERPL-CCNC: 165 U/L (ref 34–104)
ALT SERPL W P-5'-P-CCNC: 10 U/L (ref 7–52)
ANION GAP SERPL CALCULATED.3IONS-SCNC: 5 MMOL/L
AST SERPL W P-5'-P-CCNC: 18 U/L (ref 13–39)
BASOPHILS # BLD AUTO: 0.02 THOUSANDS/ÂΜL (ref 0–0.1)
BASOPHILS NFR BLD AUTO: 0 % (ref 0–1)
BILIRUB SERPL-MCNC: 0.38 MG/DL (ref 0.2–1)
BLOOD GROUP ANTIBODIES SERPL: NORMAL
BLOOD GROUP ANTIBODIES SERPL: NORMAL
BPU ID: NORMAL
BPU ID: NORMAL
BUN SERPL-MCNC: 29 MG/DL (ref 5–25)
CALCIUM ALBUM COR SERPL-MCNC: 8.9 MG/DL (ref 8.3–10.1)
CALCIUM SERPL-MCNC: 8.1 MG/DL (ref 8.4–10.2)
CARDIAC TROPONIN I PNL SERPL HS: 39 NG/L (ref 8–18)
CHLORIDE SERPL-SCNC: 97 MMOL/L (ref 96–108)
CO2 SERPL-SCNC: 41 MMOL/L (ref 21–32)
CREAT SERPL-MCNC: 0.75 MG/DL (ref 0.6–1.3)
CROSSMATCH: NORMAL
DIGOXIN SERPL-MCNC: 0.7 NG/ML (ref 0.8–2)
EOSINOPHIL # BLD AUTO: 0.01 THOUSAND/ÂΜL (ref 0–0.61)
EOSINOPHIL NFR BLD AUTO: 0 % (ref 0–6)
ERYTHROCYTE [DISTWIDTH] IN BLOOD BY AUTOMATED COUNT: 23.8 % (ref 11.6–15.1)
FERRITIN SERPL-MCNC: 111 NG/ML (ref 11–307)
FOLATE SERPL-MCNC: >22.3 NG/ML
GFR SERPL CREATININE-BSD FRML MDRD: 73 ML/MIN/1.73SQ M
GLUCOSE SERPL-MCNC: 102 MG/DL (ref 65–140)
GLUCOSE SERPL-MCNC: 110 MG/DL (ref 65–140)
GLUCOSE SERPL-MCNC: 134 MG/DL (ref 65–140)
GLUCOSE SERPL-MCNC: 97 MG/DL (ref 65–140)
HCT VFR BLD AUTO: 16.6 % (ref 34.8–46.1)
HCT VFR BLD AUTO: 26.3 % (ref 34.8–46.1)
HCT VFR BLD AUTO: 28.2 % (ref 34.8–46.1)
HGB BLD-MCNC: 5 G/DL (ref 11.5–15.4)
HGB BLD-MCNC: 8.1 G/DL (ref 11.5–15.4)
HGB BLD-MCNC: 8.9 G/DL (ref 11.5–15.4)
IMM GRANULOCYTES # BLD AUTO: 0.13 THOUSAND/UL (ref 0–0.2)
IMM GRANULOCYTES NFR BLD AUTO: 1 % (ref 0–2)
INR PPP: 1.02 (ref 0.84–1.19)
IRON SATN MFR SERPL: 23 % (ref 15–50)
IRON SERPL-MCNC: 36 UG/DL (ref 50–212)
LYMPHOCYTES # BLD AUTO: 0.91 THOUSANDS/ÂΜL (ref 0.6–4.47)
LYMPHOCYTES NFR BLD AUTO: 7 % (ref 14–44)
MCH RBC QN AUTO: 33.3 PG (ref 26.8–34.3)
MCHC RBC AUTO-ENTMCNC: 30.1 G/DL (ref 31.4–37.4)
MCV RBC AUTO: 111 FL (ref 82–98)
MONOCYTES # BLD AUTO: 0.67 THOUSAND/ÂΜL (ref 0.17–1.22)
MONOCYTES NFR BLD AUTO: 5 % (ref 4–12)
NEUTROPHILS # BLD AUTO: 10.77 THOUSANDS/ÂΜL (ref 1.85–7.62)
NEUTS SEG NFR BLD AUTO: 87 % (ref 43–75)
NRBC BLD AUTO-RTO: 0 /100 WBCS
PLATELET # BLD AUTO: 310 THOUSANDS/UL (ref 149–390)
PMV BLD AUTO: 9.6 FL (ref 8.9–12.7)
POTASSIUM SERPL-SCNC: 3.9 MMOL/L (ref 3.5–5.3)
PROCALCITONIN SERPL-MCNC: 0.43 NG/ML
PROT SERPL-MCNC: 5.6 G/DL (ref 6.4–8.4)
PROTHROMBIN TIME: 13.9 SECONDS (ref 11.6–14.5)
RBC # BLD AUTO: 1.5 MILLION/UL (ref 3.81–5.12)
SODIUM SERPL-SCNC: 143 MMOL/L (ref 135–147)
TIBC SERPL-MCNC: 156 UG/DL (ref 250–450)
UIBC SERPL-MCNC: 120 UG/DL (ref 155–355)
UNIT DISPENSE STATUS: NORMAL
UNIT DISPENSE STATUS: NORMAL
UNIT PRODUCT CODE: NORMAL
UNIT PRODUCT CODE: NORMAL
UNIT PRODUCT VOLUME: 203 ML
UNIT PRODUCT VOLUME: 350 ML
UNIT RH: NORMAL
UNIT RH: NORMAL
VIT B12 SERPL-MCNC: 416 PG/ML (ref 180–914)
WBC # BLD AUTO: 12.51 THOUSAND/UL (ref 4.31–10.16)

## 2023-11-25 PROCEDURE — 85025 COMPLETE CBC W/AUTO DIFF WBC: CPT | Performed by: NURSE PRACTITIONER

## 2023-11-25 PROCEDURE — 83540 ASSAY OF IRON: CPT

## 2023-11-25 PROCEDURE — 86870 RBC ANTIBODY IDENTIFICATION: CPT | Performed by: PHYSICIAN ASSISTANT

## 2023-11-25 PROCEDURE — 99233 SBSQ HOSP IP/OBS HIGH 50: CPT

## 2023-11-25 PROCEDURE — 85018 HEMOGLOBIN: CPT

## 2023-11-25 PROCEDURE — 94760 N-INVAS EAR/PLS OXIMETRY 1: CPT

## 2023-11-25 PROCEDURE — 82607 VITAMIN B-12: CPT

## 2023-11-25 PROCEDURE — 83550 IRON BINDING TEST: CPT

## 2023-11-25 PROCEDURE — 85014 HEMATOCRIT: CPT

## 2023-11-25 PROCEDURE — C9113 INJ PANTOPRAZOLE SODIUM, VIA: HCPCS

## 2023-11-25 PROCEDURE — 99223 1ST HOSP IP/OBS HIGH 75: CPT | Performed by: INTERNAL MEDICINE

## 2023-11-25 PROCEDURE — 84484 ASSAY OF TROPONIN QUANT: CPT | Performed by: NURSE PRACTITIONER

## 2023-11-25 PROCEDURE — 80053 COMPREHEN METABOLIC PANEL: CPT | Performed by: NURSE PRACTITIONER

## 2023-11-25 PROCEDURE — 92610 EVALUATE SWALLOWING FUNCTION: CPT

## 2023-11-25 PROCEDURE — 82746 ASSAY OF FOLIC ACID SERUM: CPT

## 2023-11-25 PROCEDURE — 82948 REAGENT STRIP/BLOOD GLUCOSE: CPT

## 2023-11-25 PROCEDURE — 80162 ASSAY OF DIGOXIN TOTAL: CPT | Performed by: NURSE PRACTITIONER

## 2023-11-25 PROCEDURE — 94640 AIRWAY INHALATION TREATMENT: CPT

## 2023-11-25 PROCEDURE — 82728 ASSAY OF FERRITIN: CPT

## 2023-11-25 PROCEDURE — P9016 RBC LEUKOCYTES REDUCED: HCPCS

## 2023-11-25 PROCEDURE — 84145 PROCALCITONIN (PCT): CPT | Performed by: NURSE PRACTITIONER

## 2023-11-25 PROCEDURE — 85610 PROTHROMBIN TIME: CPT | Performed by: NURSE PRACTITIONER

## 2023-11-25 RX ORDER — FUROSEMIDE 10 MG/ML
40 INJECTION INTRAMUSCULAR; INTRAVENOUS
Status: DISCONTINUED | OUTPATIENT
Start: 2023-11-25 | End: 2023-11-26

## 2023-11-25 RX ORDER — FUROSEMIDE 10 MG/ML
40 INJECTION INTRAMUSCULAR; INTRAVENOUS ONCE
Status: COMPLETED | OUTPATIENT
Start: 2023-11-25 | End: 2023-11-25

## 2023-11-25 RX ORDER — FERROUS SULFATE 325(65) MG
325 TABLET ORAL
Status: DISCONTINUED | OUTPATIENT
Start: 2023-11-26 | End: 2023-12-02 | Stop reason: HOSPADM

## 2023-11-25 RX ORDER — POLYETHYLENE GLYCOL 3350 17 G/17G
34 POWDER, FOR SOLUTION ORAL ONCE
Status: COMPLETED | OUTPATIENT
Start: 2023-11-25 | End: 2023-11-25

## 2023-11-25 RX ADMIN — PANTOPRAZOLE SODIUM 40 MG: 40 INJECTION, POWDER, FOR SOLUTION INTRAVENOUS at 20:47

## 2023-11-25 RX ADMIN — BUDESONIDE 0.5 MG: 0.5 INHALANT RESPIRATORY (INHALATION) at 19:47

## 2023-11-25 RX ADMIN — IPRATROPIUM BROMIDE 0.5 MG: 0.5 SOLUTION RESPIRATORY (INHALATION) at 13:22

## 2023-11-25 RX ADMIN — SERTRALINE HYDROCHLORIDE 50 MG: 50 TABLET ORAL at 11:20

## 2023-11-25 RX ADMIN — FORMOTEROL FUMARATE DIHYDRATE 20 MCG: 20 SOLUTION RESPIRATORY (INHALATION) at 19:47

## 2023-11-25 RX ADMIN — MIRTAZAPINE 7.5 MG: 15 TABLET, FILM COATED ORAL at 20:47

## 2023-11-25 RX ADMIN — FORMOTEROL FUMARATE DIHYDRATE 20 MCG: 20 SOLUTION RESPIRATORY (INHALATION) at 08:05

## 2023-11-25 RX ADMIN — LEVALBUTEROL HYDROCHLORIDE 1.25 MG: 1.25 SOLUTION RESPIRATORY (INHALATION) at 19:47

## 2023-11-25 RX ADMIN — IPRATROPIUM BROMIDE 0.5 MG: 0.5 SOLUTION RESPIRATORY (INHALATION) at 19:47

## 2023-11-25 RX ADMIN — POLYETHYLENE GLYCOL 3350 34 G: 17 POWDER, FOR SOLUTION ORAL at 17:31

## 2023-11-25 RX ADMIN — LEVALBUTEROL HYDROCHLORIDE 1.25 MG: 1.25 SOLUTION RESPIRATORY (INHALATION) at 08:05

## 2023-11-25 RX ADMIN — IPRATROPIUM BROMIDE 0.5 MG: 0.5 SOLUTION RESPIRATORY (INHALATION) at 08:05

## 2023-11-25 RX ADMIN — ALBUTEROL SULFATE 2.5 MG: 2.5 SOLUTION RESPIRATORY (INHALATION) at 10:17

## 2023-11-25 RX ADMIN — ACETAMINOPHEN 650 MG: 325 TABLET, FILM COATED ORAL at 17:32

## 2023-11-25 RX ADMIN — BUDESONIDE 0.5 MG: 0.5 INHALANT RESPIRATORY (INHALATION) at 08:05

## 2023-11-25 RX ADMIN — LEVALBUTEROL HYDROCHLORIDE 1.25 MG: 1.25 SOLUTION RESPIRATORY (INHALATION) at 13:22

## 2023-11-25 RX ADMIN — FUROSEMIDE 40 MG: 10 INJECTION, SOLUTION INTRAMUSCULAR; INTRAVENOUS at 17:32

## 2023-11-25 RX ADMIN — PANTOPRAZOLE SODIUM 40 MG: 40 INJECTION, POWDER, FOR SOLUTION INTRAVENOUS at 11:19

## 2023-11-25 RX ADMIN — DILTIAZEM HYDROCHLORIDE 240 MG: 120 CAPSULE, COATED, EXTENDED RELEASE ORAL at 11:19

## 2023-11-25 RX ADMIN — FUROSEMIDE 40 MG: 10 INJECTION, SOLUTION INTRAMUSCULAR; INTRAVENOUS at 11:19

## 2023-11-25 NOTE — ASSESSMENT & PLAN NOTE
BRBPR began 11/22, PCP recommended stopping warfarin temporarily, however persistent symptoms with large BRBPR episode witnessed by son this morning followed by syncopal episode  Hgb on admission 6 (baseline 8.5-10, most recently 9.6)  INR 5.4  S/P 1 unit FFP + IV vit K 10 mg   Repeat INR pending   GI consult  Clears --> anticipate colonoscopy Monday 11/27  IV protonix  Hold on IVF given acute volume overload  Hold warfarin, trend INR   SBP stable, monitor   Trend hgb    11/25 - no bleeding since yesterday so far

## 2023-11-25 NOTE — ASSESSMENT & PLAN NOTE
Rate control: digoxin 125 mcg every other day (last taken 11/24)  Dig level 0.7  Rhythm: Cardizem 240 mg daily   OAC: warfarin - HOLD due to supratherapeutic INR (5.4)  Monitor daily PT/INR

## 2023-11-25 NOTE — CONSULTS
Consultation - Cardiology   Yevgeniy Leon 79 y.o. female MRN: [id number]  Unit/Bed#: -01 SDU Encounter: [id number]    Inpatient consult to Cardiology  Consult performed by: Surinder Valero MD  Consult ordered by: Pallavi Cantrell PA-C          History of Present Illness   Physician Requesting Consult: Ruthy Lindo MD  Reason for Consult / Principal Problem: CHF    Impression:  PAF - no recent documented atrial fibrillation. Has been on warfarin for 30 years. Risks of anticoagulation may be greater than benefits. Will continue diltiazem for now. GI bleed - for colonoscopy on 11/27. Patient at acceptable cardiovascular risk. Syncope - 2nd to anemia  Acute on chronic HFpEF - precipitated by metabolic stress of anemia. Plan:  Continue IV diuretics for now. Continue remainder of medications. Await colonoscopy. HPI: Yevgeniy Leon is a 79y.o. year old female with paroxysmal atrial fibrillation, hypertension, COPD, who presented to [address] [place] [address] on 11/24 with acute blood loss anemia from GI bleed. Had BRBPR since 11/22, held warfarin, but had syncopal event. On admission, Hgb 6 and INR 5.4. Patient also appeared volume overloaded for past several days, and had increasing O2 requirement. Given lasix 60mg IV in ED. Echo 2/23 - normal LV systolic function with mild AS. Received 2u PRBC - Hgb increased to 8.9. Slated to undergo colonoscopy on 11/27. Started on standing diuretics. Feeling better, but tired. Review of Systems   Constitutional: Positive for malaise/fatigue. Negative for chills, diaphoresis and fever. HENT: Negative. Eyes: Negative. Cardiovascular:  Negative for chest pain, dyspnea on exertion, leg swelling and palpitations. Respiratory:  Positive for shortness of breath. Negative for cough, snoring and wheezing. Endocrine: Negative. Hematologic/Lymphatic: Negative. Skin:  Negative for rash. Musculoskeletal: Negative.     Gastrointestinal: Positive for hematochezia. Negative for abdominal pain, constipation and diarrhea. Genitourinary:  Negative for bladder incontinence, dysuria and frequency. Neurological:  Negative for dizziness and light-headedness. Psychiatric/Behavioral: Negative. All other systems reviewed and are negative. Historical Information   Past Medical History:   Diagnosis Date    Anti-phospholipid syndrome (HCC)     Asthma     Blood type O+     Cardiac disease     Chronic pain     COPD (chronic obstructive pulmonary disease) (HCC)     Femur fracture, left (HCC) 6/22/2023    Fracture of ankle     left    Hyperlipidemia     Hypertension     Hypokalemia 2/27/2023    Osteoporosis      Past Surgical History:   Procedure Laterality Date    APPENDECTOMY      BILATERAL OOPHORECTOMY Bilateral     ORIF TIBIAL SHAFT FRACTURE W/ PLATES AND SCREWS Right     RI HEMIARTHROPLASTY HIP PARTIAL Left 6/23/2023    Procedure: HEMIARTHROPLASTY HIP (BIPOLAR);   Surgeon: Ludivina Rios MD;  Location: [facility] OR;  Service: Orthopedics     Social History     Substance and Sexual Activity   Alcohol Use Not Currently     Social History     Substance and Sexual Activity   Drug Use No     Social History     Tobacco Use   Smoking Status Former    Types: Cigarettes   Smokeless Tobacco Never   Tobacco Comments    Never smoker per [employer]     Family History: non-contributory    Meds/Allergies   Current Facility-Administered Medications   Medication Dose Route Frequency Provider Last Rate    acetaminophen  650 mg Oral Q6H PRN Delories Trenton Escobar PA-C      albuterol  2.5 mg Nebulization Q6H PRN Radha Mojica MD      budesonide  0.5 mg Nebulization Q12H Delories Trenton Murray PA-C      [START ON 11/26/2023] digoxin  125 mcg Oral Every Other Day Delories Trenton Escobar PA-C      diltiazem  240 mg Oral Daily Delories Trenton Escobar PA-C      formoterol  20 mcg Nebulization Q12H Delories Trenton Escobar PA-C      furosemide  40 mg Intravenous BID (diuretic) Lizzy Escobar PA-C      ipratropium  0.5 mg Nebulization TID Tin Tabares MD      levalbuterol  1.25 mg Nebulization TID Tin Tabares MD      levothyroxine  25 mcg Oral Early Morning Lizzy Murray PA-C      mirtazapine  7.5 mg Oral HS Lizzy Murray PA-C      pantoprazole  40 mg Intravenous Q12H [address] [facility] [place], [place]      [START ON 11/26/2023] polyethylene glycol  4,000 mL Oral See Admin Instructions Sirisha Kohler PA-C      polyethylene glycol  34 g Oral Once Sirisha Kohler PA-C      sertraline  50 mg Oral Daily Lizzy Escobar PA-C          Medications Prior to Admission   Medication    acetaminophen (TYLENOL) 325 mg tablet    albuterol (2.5 mg/3 mL) 0.083 % nebulizer solution    Ascorbic Acid (VITAMIN C) 1000 MG tablet    budesonide (PULMICORT) 0.5 mg/2 mL nebulizer solution    Cholecalciferol (VITAMIN D3) 20 MCG (800 UNIT) TABS    digoxin (LANOXIN) 0.125 mg tablet    diltiazem (CARDIZEM CD) 240 mg 24 hr capsule    Ferrous Sulfate 220 (44 Fe) MG/5ML LIQD    Fluticasone-Salmeterol (Advair) 250-50 mcg/dose inhaler    furosemide (LASIX) 20 mg tablet    guaiFENesin (MUCINEX) 600 mg 12 hr tablet    ipratropium-albuterol (DUO-NEB) 0.5-2.5 mg/3 mL nebulizer solution    levothyroxine 25 mcg tablet    lidocaine (LIDODERM) 5 %    Lidocaine Pain Relief 4 % PTCH    loratadine (CLARITIN) 10 mg tablet    mirtazapine (REMERON) 7.5 MG tablet    multivitamin (THERAGRAN) TABS    oxygen gas    sertraline (Zoloft) 50 mg tablet    tiotropium (Spiriva HandiHaler) 18 mcg inhalation capsule    warfarin (COUMADIN) 2 mg tablet       Allergies   Allergen Reactions    Aspirin Other (See Comments)    Penicillins Rash and Hives    Sulfa Antibiotics Rash       Objective   Vitals: Blood pressure 164/82, pulse 82, temperature 98.3 °F (36.8 °C), temperature source Oral, resp.  rate (!) 36, height 5' 6" (1.676 m), weight 51.5 kg (113 lb 8.6 oz), SpO2 93 %, not currently breastfeeding., Body mass index is 18.33 kg/m².,   Orthostatic Blood Pressures      Flowsheet Row Most Recent Value   Blood Pressure 164/82 filed at 11/25/2023 1200   Patient Position - Orthostatic VS Lying filed at 11/25/2023 2442          Systolic (14EFT), TCO:163 , Min:119 , FXI:986     Diastolic (93TEQ), DENIS:65, Min:54, Max:82      Intake/Output Summary (Last 24 hours) at 11/25/2023 1337  Last data filed at 11/25/2023 1305  Gross per 24 hour   Intake 1070 ml   Output 1501 ml   Net -431 ml       Weight (last 2 days)       Date/Time Weight    11/25/23 0600 51.5 (113.54)            Invasive Devices       Peripheral Intravenous Line  Duration             Peripheral IV 11/24/23 Left Antecubital <1 day    Peripheral IV 11/24/23 Right;Ventral (anterior) Forearm <1 day              Drain  Duration             External Urinary Catheter <1 day                      Physical Exam  Constitutional:       Appearance: She is well-developed. HENT:      Head: Normocephalic and atraumatic. Neck:      Vascular: No JVD. Cardiovascular:      Rate and Rhythm: Normal rate and regular rhythm. Heart sounds: Normal heart sounds. No murmur heard. No friction rub. No gallop. Pulmonary:      Effort: Pulmonary effort is normal.      Breath sounds: Decreased breath sounds present. No wheezing or rales. Abdominal:      General: Bowel sounds are normal. There is no distension. Palpations: Abdomen is soft. Tenderness: There is no abdominal tenderness. Skin:     General: Skin is warm and dry. Findings: No erythema or rash. Neurological:      Mental Status: She is alert and oriented to person, place, and time. Deep Tendon Reflexes: Reflexes are normal and symmetric.              Laboratory Results:        CBC with diff:   Results from last 7 days   Lab Units 11/25/23  1150 11/25/23  0024 11/24/23  1426 11/24/23  1423   WBC Thousand/uL  --  12.51* 13.74*  -- HEMOGLOBIN g/dL 8.9* 5.0* 6.0*  --    I STAT HEMOGLOBIN g/dl  --   --   --  6.5*   HEMATOCRIT % 28.2* 16.6* 20.6*  --    HEMATOCRIT, ISTAT %  --   --   --  19*   MCV fL  --  111* 108*  --    PLATELETS Thousands/uL  --  310 369  --    RBC Million/uL  --  1.50* 1.91*  --    MCH pg  --  33.3 31.4  --    MCHC g/dL  --  30.1* 29.1*  --    RDW %  --  23.8* 21.0*  --    MPV fL  --  9.6 9.3  --    NRBC AUTO /100 WBCs  --  0 0  --          CMP:  Results from last 7 days   Lab Units 11/25/23  0024 11/24/23  1426 11/24/23  1423   POTASSIUM mmol/L 3.9 4.9  --    CHLORIDE mmol/L 97 99  --    CO2 mmol/L 41* 41*  --    CO2, I-STAT mmol/L  --   --  >45*   BUN mg/dL 29* 32*  --    CREATININE mg/dL 0.75 0.80  --    GLUCOSE, ISTAT mg/dl  --   --  150*   CALCIUM mg/dL 8.1* 8.6  --    AST U/L 18 19  --    ALT U/L 10 12  --    ALK PHOS U/L 165* 187*  --    EGFR ml/min/1.73sq m 73 67  --          BMP:  Results from last 7 days   Lab Units 11/25/23  0024 11/24/23  1426 11/24/23  1423   POTASSIUM mmol/L 3.9 4.9  --    CHLORIDE mmol/L 97 99  --    CO2 mmol/L 41* 41*  --    CO2, I-STAT mmol/L  --   --  >45*   BUN mg/dL 29* 32*  --    CREATININE mg/dL 0.75 0.80  --    GLUCOSE, ISTAT mg/dl  --   --  150*   CALCIUM mg/dL 8.1* 8.6  --        BNP:     Magnesium:       Coags:   Results from last 7 days   Lab Units 11/25/23  1148 11/24/23  1426   INR  1.02 5.41*       TSH:       Lipid Profile:         Cardiac testing:   No results found for this or any previous visit. No results found for this or any previous visit. No results found for this or any previous visit. No results found for this or any previous visit. Imaging: I have personally reviewed pertinent reports. X-ray chest 1 view portable    Result Date: 11/25/2023  Narrative: CHEST INDICATION:   chest pain. COMPARISON: 10/05/2023 EXAM PERFORMED/VIEWS:  XR CHEST PORTABLE 1 image FINDINGS: Cardiomediastinal silhouette appears enlarged.  The patient appears to be in congestive heart failure with bilateral pleural effusions. There is no pneumothorax. Osseous structures appear within normal limits for patient age. Impression: Cardiomegaly. CHF. Bilateral pleural effusions.  Workstation performed: [id number]       EKG reviewed personally: NSR 80  Telemetry reviewed personally: NSR    Assessment:  Principal Problem:    GIB (gastrointestinal bleeding)  Active Problems:    Chronic obstructive pulmonary disease (HCC)    Atrial fibrillation (HCC)    Other specified hypothyroidism    Acute on chronic respiratory failure with hypoxia and hypercapnia (HCC)    Acute blood loss anemia    Syncope    Acute on chronic diastolic CHF (congestive heart failure) (HCC)    Underweight

## 2023-11-25 NOTE — SPEECH THERAPY NOTE
Speech Language/Pathology    Order received, chart reviewed. Pt NPO for work-up for GI bleed. ST to f/u for evaluation as able when medically/clinically appropriate.

## 2023-11-25 NOTE — PROGRESS NOTES
[facility]  Progress Note  Name: Chrystal Qureshi  MRN: [id number]  Unit/Bed#: -01 SDU I Date of Admission: 11/24/2023   Date of Service: 11/25/2023 I Hospital Day: 1    Assessment/Plan   * GIB (gastrointestinal bleeding)  Assessment & Plan  BRBPR began 11/22, PCP recommended stopping warfarin temporarily, however persistent symptoms with large BRBPR episode witnessed by son this morning followed by syncopal episode  Hgb on admission 6 (baseline 8.5-10, most recently 9.6)  INR 5.4  S/P 1 unit FFP + IV vit K 10 mg   Repeat INR pending   GI consult  Clears --> anticipate colonoscopy Monday 11/27  IV protonix  Hold on IVF given acute volume overload  Hold warfarin, trend INR   SBP stable, monitor   Trend hgb    11/25 - no bleeding since yesterday so far    Acute blood loss anemia  Assessment & Plan  In setting of acute GIB on warfarin with supratherapeutic INR   Iron panel: Iron 36, Iron Sat 23, TIBC 156 Ferritin 111  Hold warfarin  11/24: 1 unit PRBC  11/25: 1 unit PRBC  Hgb 6 > 5 > 8.9  Maintain hgb > 7  See above     Acute on chronic respiratory failure with hypoxia and hypercapnia (HCC)  Assessment & Plan  Chronically maintained on 2L 2/2 COPD   Requiring 4-5 L on admission, SpO2 90-94%  COVID/FLU/RSV neg  CXR with venous congestion, bilateral effusions     Suspect likely 2/2 acute on chronic CHF, see below    Acute on chronic diastolic CHF (congestive heart failure) (HCC)  Assessment & Plan  Wt Readings from Last 3 Encounters:   11/25/23 51.5 kg (113 lb 8.6 oz)   10/20/23 52.2 kg (115 lb 2 oz)   10/05/23 53.1 kg (117 lb)   Appears volume overloaded, increasing edema per son x last several days.  O2 requirement 4-5 L on admission, baseline is 2L   Outpatient lasix was changed from 20 mg daily to 40 mg daily about 4 weeks ago  CXR pending final read however appears congested  Echo 2/2023: EF 65%, G1DD  S/P IV lasix 60 mg x 1 + 40 mg IV lasix x 2  Cardiology consult   IV lasix 40 mg BID  Repeat TTE   Monitor I/O + daily weights     Syncope  Assessment & Plan  Witnessed by son, was attempting to walk patient however she became weak/collapsed, no headstrike, LOC about 30 seconds, prompting son to call EMS  Home care aids noted low BP and Low O2 sats prior to this  Currently A&O x3   Suspect 2/2 acute blood loss anemia/GIB along with increasing O2 requirements resulting in orthostasis/hypoxia  Check orthostatics  Trop WNL   Echo pending  Tele NSR  PT/OT  Low threshold to obtain CTH for acute mental status changes    Other specified hypothyroidism  Assessment & Plan  Continue synthroid 25 mcg daily     Atrial fibrillation (HCC)  Assessment & Plan  Rate control: digoxin 125 mcg every other day (last taken 11/24)  Dig level 0.7  Rhythm: Cardizem 240 mg daily   OAC: warfarin - HOLD due to supratherapeutic INR (5.4)  Monitor daily PT/INR    Chronic obstructive pulmonary disease ([address])  Assessment & Plan  Chronically on 2L, currently requiring 4-5  Suspect CHF > COPD exacerbation  Home regimen: Spiriva inhaler + pumicort nebs BID + duonebs TID daily   Continue ATC nebs currently                VTE Pharmacologic Prophylaxis: VTE Score: 3 Moderate Risk (Score 3-4) - Pharmacological DVT Prophylaxis Contraindicated. Sequential Compression Devices Ordered. Mobility:   Basic Mobility Inpatient Raw Score: 18  JH-HLM Goal: 6: Walk 10 steps or more  JH-HLM Achieved: 1: Laying in bed  [facility] Goal NOT achieved. Continue with multidisciplinary rounding and encourage appropriate mobility to improve upon [facility] goals. Patient Centered Rounds: I performed bedside rounds with nursing staff today. Discussions with Specialists or Other Care Team Provider: None     Education and Discussions with Family / Patient: Updated  (son) at bedside. Total Time Spent on Date of Encounter in care of patient: 30 mins.  This time was spent on one or more of the following: performing physical exam; counseling and coordination of care; obtaining or reviewing history; documenting in the medical record; reviewing/ordering tests, medications or procedures; communicating with other healthcare professionals and discussing with patient's family/caregivers. Current Length of Stay: 1 day(s)  Current Patient Status: Inpatient   Certification Statement: The patient will continue to require additional inpatient hospital stay due to IV diuresis, GIB, O2 demand  Discharge Plan: Anticipate discharge in 48-72 hrs to discharge location to be determined pending rehab evaluations. Code Status: Level 2 - DNAR: but accepts endotracheal intubation    Subjective:   Patient reports fatigue however denies specific SOB/CP or abdominal pain. Nursing has not witnessed any rectal bleeding thus far. Patient remains on 6L midflow. Objective:     Vitals:   Temp (24hrs), Av °F (36.7 °C), Min:97.5 °F (36.4 °C), Max:98.4 °F (36.9 °C)    Temp:  [97.5 °F (36.4 °C)-98.4 °F (36.9 °C)] 98.3 °F (36.8 °C)  HR:  [] 80  Resp:  [18-40] 22  BP: (119-163)/(54-70) 152/67  SpO2:  [88 %-100 %] 92 %  Body mass index is 18.33 kg/m². Input and Output Summary (last 24 hours): Intake/Output Summary (Last 24 hours) at 2023 1304  Last data filed at 2023 1005  Gross per 24 hour   Intake 1070 ml   Output 1051 ml   Net 19 ml       Physical Exam:   Physical Exam  Vitals and nursing note reviewed. Constitutional:       General: She is not in acute distress. Appearance: She is well-developed. She is ill-appearing. HENT:      Head: Normocephalic and atraumatic. Eyes:      General:         Right eye: No discharge. Left eye: No discharge. Extraocular Movements: Extraocular movements intact. Conjunctiva/sclera: Conjunctivae normal.   Cardiovascular:      Rate and Rhythm: Normal rate and regular rhythm. Heart sounds: Murmur heard.    Pulmonary:      Effort: Pulmonary effort is normal. No respiratory distress. Breath sounds: Rales present. No wheezing or rhonchi. Comments: SpO2 97% on 6L midflow however with mild accessory muscle use. Diffuse crackles and diminished BS. Abdominal:      General: Bowel sounds are normal. There is no distension. Palpations: Abdomen is soft. Tenderness: There is no abdominal tenderness. Musculoskeletal:      Cervical back: Neck supple. Right lower leg: Edema present. Left lower leg: Edema present. Skin:     General: Skin is warm and dry. Capillary Refill: Capillary refill takes less than 2 seconds. Neurological:      General: No focal deficit present. Mental Status: She is alert and oriented to person, place, and time. Mental status is at baseline. Cranial Nerves: No cranial nerve deficit.    Psychiatric:         Mood and Affect: Mood normal.         Behavior: Behavior normal.          Additional Data:     Labs:  Results from last 7 days   Lab Units 11/25/23  1150 11/25/23  0024   WBC Thousand/uL  --  12.51*   HEMOGLOBIN g/dL 8.9* 5.0*   HEMATOCRIT % 28.2* 16.6*   PLATELETS Thousands/uL  --  310   NEUTROS PCT %  --  87*   LYMPHS PCT %  --  7*   MONOS PCT %  --  5   EOS PCT %  --  0     Results from last 7 days   Lab Units 11/25/23  0024   SODIUM mmol/L 143   POTASSIUM mmol/L 3.9   CHLORIDE mmol/L 97   CO2 mmol/L 41*   BUN mg/dL 29*   CREATININE mg/dL 0.75   ANION GAP mmol/L 5   CALCIUM mg/dL 8.1*   ALBUMIN g/dL 3.0*   TOTAL BILIRUBIN mg/dL 0.38   ALK PHOS U/L 165*   ALT U/L 10   AST U/L 18   GLUCOSE RANDOM mg/dL 110     Results from last 7 days   Lab Units 11/24/23  1426   INR  5.41*     Results from last 7 days   Lab Units 11/25/23  1240 11/25/23  0629 11/25/23  0022   POC GLUCOSE mg/dl 97 102 134         Results from last 7 days   Lab Units 11/25/23  0024 11/24/23  1426   PROCALCITONIN ng/ml 0.43* 0.41*       Lines/Drains:  Invasive Devices       Peripheral Intravenous Line  Duration             Peripheral IV 11/24/23 Left Antecubital <1 day    Peripheral IV 11/24/23 Right;Ventral (anterior) Forearm <1 day              Drain  Duration             External Urinary Catheter <1 day                      Telemetry:  Telemetry Orders (From admission, onward)               24 Hour Telemetry Monitoring  Continuous x 24 Hours (Telem)        Expiring   Question:  Reason for 24 Hour Telemetry  Answer:  Decompensated CHF- and any one of the following: continuous diuretic infusion or total diuretic dose >200 mg daily, associated electrolyte derangement (I.e. K < 3.0), ionotropic drip (continuous infusion), hx of ventricular arrhythmia, or new EF < 35%                     Telemetry Reviewed: Normal Sinus Rhythm  Indication for Continued Telemetry Use: Acute CHF on >200 mg lasix/day or equivalent dose or with new reduced EF.               Imaging: Reviewed radiology reports from this admission including: chest xray    Recent Cultures (last 7 days):         Last 24 Hours Medication List:   Current Facility-Administered Medications   Medication Dose Route Frequency Provider Last Rate    acetaminophen  650 mg Oral Q6H PRN Francheska Escobar PA-C      albuterol  2.5 mg Nebulization Q6H PRN Isauro Hernandez MD      budesonide  0.5 mg Nebulization Q12H [place], [place]      [START ON 11/26/2023] digoxin  125 mcg Oral Every Other Day Francheska Escobar PA-C      diltiazem  240 mg Oral Daily Francheska Escobar PA-C      formoterol  20 mcg Nebulization Q12H Francheska Escobar PA-C      furosemide  40 mg Intravenous BID (diuretic) Francheska Escobar PA-C      ipratropium  0.5 mg Nebulization TID Isauro Hernandez MD      levalbuterol  1.25 mg Nebulization TID Isauro Hernandez MD      levothyroxine  25 mcg Oral Early Morning Francheska Murray PA-C      mirtazapine  7.5 mg Oral HS Francheska Escobar PA-C      pantoprazole  40 mg Intravenous Q12H [facility] & [place], [place] [START ON 11/26/2023] polyethylene glycol  4,000 mL Oral See Admin Instructions Juanpablo Tracy PA-C      polyethylene glycol  34 g Oral Once Juanpablo Tracy PA-C      sertraline  50 mg Oral Daily JensRita Deluca          Today, Patient Was Seen By: Jens Serna PA-C    **Please Note: This note may have been constructed using a voice recognition system. **

## 2023-11-25 NOTE — PLAN OF CARE
Problem: Potential for Falls  Goal: Patient will remain free of falls  Description: INTERVENTIONS:  - Educate patient/family on patient safety including physical limitations  - Instruct patient to call for assistance with activity   - Consult OT/PT to assist with strengthening/mobility   - Keep Call bell within reach  - Keep bed low and locked with side rails adjusted as appropriate  - Keep care items and personal belongings within reach  - Initiate and maintain comfort rounds  - Make Fall Risk Sign visible to staff  - Offer Toileting every  Hours, in advance of need  - Initiate/Maintain alarm  - Obtain necessary fall risk management equipment:   - Apply yellow socks and bracelet for high fall risk patients  - Consider moving patient to room near nurses station  Outcome: Progressing     Problem: PAIN - ADULT  Goal: Verbalizes/displays adequate comfort level or baseline comfort level  Description: Interventions:  - Encourage patient to monitor pain and request assistance  - Assess pain using appropriate pain scale  - Administer analgesics based on type and severity of pain and evaluate response  - Implement non-pharmacological measures as appropriate and evaluate response  - Consider cultural and social influences on pain and pain management  - Notify physician/advanced practitioner if interventions unsuccessful or patient reports new pain  Outcome: Progressing     Problem: INFECTION - ADULT  Goal: Absence or prevention of progression during hospitalization  Description: INTERVENTIONS:  - Assess and monitor for signs and symptoms of infection  - Monitor lab/diagnostic results  - Monitor all insertion sites, i.e. indwelling lines, tubes, and drains  - Monitor endotracheal if appropriate and nasal secretions for changes in amount and color  - [place] appropriate cooling/warming therapies per order  - Administer medications as ordered  - Instruct and encourage patient and family to use good hand hygiene technique  - Identify and instruct in appropriate isolation precautions for identified infection/condition  Outcome: Progressing  Goal: Absence of fever/infection during neutropenic period  Description: INTERVENTIONS:  - Monitor WBC    Outcome: Progressing     Problem: SAFETY ADULT  Goal: Patient will remain free of falls  Description: INTERVENTIONS:  - Educate patient/family on patient safety including physical limitations  - Instruct patient to call for assistance with activity   - Consult OT/PT to assist with strengthening/mobility   - Keep Call bell within reach  - Keep bed low and locked with side rails adjusted as appropriate  - Keep care items and personal belongings within reach  - Initiate and maintain comfort rounds  - Make Fall Risk Sign visible to staff  - Offer Toileting every  Hours, in advance of need  - Initiate/Maintain alarm  - Obtain necessary fall risk management equipment:   - Apply yellow socks and bracelet for high fall risk patients  - Consider moving patient to room near nurses station  Outcome: Progressing  Goal: Maintain or return to baseline ADL function  Description: INTERVENTIONS:  -  Assess patient's ability to carry out ADLs; assess patient's baseline for ADL function and identify physical deficits which impact ability to perform ADLs (bathing, care of mouth/teeth, toileting, grooming, dressing, etc.)  - Assess/evaluate cause of self-care deficits   - Assess range of motion  - Assess patient's mobility; develop plan if impaired  - Assess patient's need for assistive devices and provide as appropriate  - Encourage maximum independence but intervene and supervise when necessary  - Involve family in performance of ADLs  - Assess for home care needs following discharge   - Consider OT consult to assist with ADL evaluation and planning for discharge  - Provide patient education as appropriate  Outcome: Progressing  Goal: Maintains/Returns to pre admission functional level  Description: INTERVENTIONS:  - Perform AM-PAC 6 Click Basic Mobility/ Daily Activity assessment daily.  - Set and communicate daily mobility goal to care team and patient/family/caregiver. - Collaborate with rehabilitation services on mobility goals if consulted  - Perform Range of Motion  times a day. - Reposition patient every  hours. - Dangle patient  times a day  - Stand patient  times a day  - Ambulate patient  times a day  - Out of bed to chair  times a day   - Out of bed for meals  times a day  - Out of bed for toileting  - Record patient progress and toleration of activity level   Outcome: Progressing     Problem: DISCHARGE PLANNING  Goal: Discharge to home or other facility with appropriate resources  Description: INTERVENTIONS:  - Identify barriers to discharge w/patient and caregiver  - Arrange for needed discharge resources and transportation as appropriate  - Identify discharge learning needs (meds, wound care, etc.)  - Arrange for interpretive services to assist at discharge as needed  - Refer to Case Management Department for coordinating discharge planning if the patient needs post-hospital services based on physician/advanced practitioner order or complex needs related to functional status, cognitive ability, or social support system  Outcome: Progressing     Problem: Knowledge Deficit  Goal: Patient/family/caregiver demonstrates understanding of disease process, treatment plan, medications, and discharge instructions  Description: Complete learning assessment and assess knowledge base.   Interventions:  - Provide teaching at level of understanding  - Provide teaching via preferred learning methods  Outcome: Progressing     Problem: CARDIOVASCULAR - ADULT  Goal: Maintains optimal cardiac output and hemodynamic stability  Description: INTERVENTIONS:  - Monitor I/O, vital signs and rhythm  - Monitor for S/S and trends of decreased cardiac output  - Administer and titrate ordered vasoactive medications to optimize hemodynamic stability  - Assess quality of pulses, skin color and temperature  - Assess for signs of decreased coronary artery perfusion  - Instruct patient to report change in severity of symptoms  Outcome: Progressing  Goal: Absence of cardiac dysrhythmias or at baseline rhythm  Description: INTERVENTIONS:  - Continuous cardiac monitoring, vital signs, obtain 12 lead EKG if ordered  - Administer antiarrhythmic and heart rate control medications as ordered  - Monitor electrolytes and administer replacement therapy as ordered  Outcome: Progressing     Problem: RESPIRATORY - ADULT  Goal: Achieves optimal ventilation and oxygenation  Description: INTERVENTIONS:  - Assess for changes in respiratory status  - Assess for changes in mentation and behavior  - Position to facilitate oxygenation and minimize respiratory effort  - Oxygen administered by appropriate delivery if ordered  - Initiate smoking cessation education as indicated  - Encourage broncho-pulmonary hygiene including cough, deep breathe, Incentive Spirometry  - Assess the need for suctioning and aspirate as needed  - Assess and instruct to report SOB or any respiratory difficulty  - Respiratory Therapy support as indicated  Outcome: Progressing     Problem: GENITOURINARY - ADULT  Goal: Maintains or returns to baseline urinary function  Description: INTERVENTIONS:  - Assess urinary function  - Encourage oral fluids to ensure adequate hydration if ordered  - Administer IV fluids as ordered to ensure adequate hydration  - Administer ordered medications as needed  - Offer frequent toileting  - Follow urinary retention protocol if ordered  Outcome: Progressing  Goal: Absence of urinary retention  Description: INTERVENTIONS:  - Assess patient’s ability to void and empty bladder  - Monitor I/O  - Bladder scan as needed  - Discuss with physician/AP medications to alleviate retention as needed  - Discuss catheterization for long term situations as appropriate  Outcome: Progressing  Goal: Urinary catheter remains patent  Description: INTERVENTIONS:  - Assess patency of urinary catheter  - If patient has a chronic hand, consider changing catheter if non-functioning  - Follow guidelines for intermittent irrigation of non-functioning urinary catheter  Outcome: Progressing     Problem: METABOLIC, FLUID AND ELECTROLYTES - ADULT  Goal: Electrolytes maintained within normal limits  Description: INTERVENTIONS:  - Monitor labs and assess patient for signs and symptoms of electrolyte imbalances  - Administer electrolyte replacement as ordered  - Monitor response to electrolyte replacements, including repeat lab results as appropriate  - Instruct patient on fluid and nutrition as appropriate  Outcome: Progressing  Goal: Fluid balance maintained  Description: INTERVENTIONS:  - Monitor labs   - Monitor I/O and WT  - Instruct patient on fluid and nutrition as appropriate  - Assess for signs & symptoms of volume excess or deficit  Outcome: Progressing  Goal: Glucose maintained within target range  Description: INTERVENTIONS:  - Monitor Blood Glucose as ordered  - Assess for signs and symptoms of hyperglycemia and hypoglycemia  - Administer ordered medications to maintain glucose within target range  - Assess nutritional intake and initiate nutrition service referral as needed  Outcome: Progressing     Problem: SKIN/TISSUE INTEGRITY - ADULT  Goal: Skin Integrity remains intact(Skin Breakdown Prevention)  Description: Assess:  -Perform [place] assessment every   -Clean and moisturize skin every   -Inspect skin when repositioning, toileting, and assisting with ADLS  -Assess under medical devices such as  every   -Assess extremities for adequate circulation and sensation     Bed Management:  -Have minimal linens on bed & keep smooth, unwrinkled  -Change linens as needed when moist or perspiring  -Avoid sitting or lying in one position for more than  hours while in bed  -Keep [facility] at degrees     Toileting:  -Offer bedside commode  -Assess for incontinence every   -Use incontinent care products after each incontinent episode such as     Activity:  -Mobilize patient  times a day  -Encourage activity and walks on unit  -Encourage or provide ROM exercises   -Turn and reposition patient every hours  -Use appropriate equipment to lift or move patient in bed  -Instruct/ Assist with weight shifting every  when out of bed in chair  -Consider limitation of chair time  hour intervals    Skin Care:  -Avoid use of baby powder, tape, friction and shearing, hot water or constrictive clothing  -Relieve pressure over bony prominences using   -Do not massage red bony areas    Next Steps:  -Teach patient strategies to minimize risks such as    -Consider consults to  interdisciplinary teams such as   Outcome: Progressing  Goal: Incision(s), wounds(s) or drain site(s) healing without S/S of infection  Description: INTERVENTIONS  - Assess and document dressing, incision, wound bed, drain sites and surrounding tissue  - Provide patient and family education  - Perform skin care/dressing changes every   Outcome: Progressing  Goal: Pressure injury heals and does not worsen  Description: Interventions:  - Implement low air loss mattress or specialty surface (Criteria met)  - Apply silicone foam dressing  - Instruct/assist with weight shifting every  minutes when in chair   - Limit chair time to  hour intervals  - Use special pressure reducing interventions such as  when in chair   - Apply fecal or urinary incontinence containment device   - Perform passive or active ROM every   - Turn and reposition patient & offload bony prominences every  hours   - Utilize friction reducing device or surface for transfers   - Consider consults to  interdisciplinary teams such as   - Use incontinent care products after each incontinent episode such as   - Consider nutrition services referral as needed  Outcome: Progressing

## 2023-11-25 NOTE — SPEECH THERAPY NOTE
Speech Language/Pathology    Speech-Language Pathology Bedside Swallow Evaluation      Patient Name: Yevgeniy Leon    MNAFG'V Date: 11/25/2023     Problem List  Principal Problem:    GIB (gastrointestinal bleeding)  Active Problems:    Chronic obstructive pulmonary disease (HCC)    Atrial fibrillation ([address])    Other specified hypothyroidism    Acute on chronic respiratory failure with hypoxia and hypercapnia (HCC)    Acute blood loss anemia    Syncope    Acute on chronic diastolic CHF (congestive heart failure) ([address])    Underweight      Past Medical History  Past Medical History:   Diagnosis Date    Anti-phospholipid syndrome (HCC)     Asthma     Blood type O+     Cardiac disease     Chronic pain     COPD (chronic obstructive pulmonary disease) (HCC)     Femur fracture, left ([address]) 6/22/2023    Fracture of ankle     left    Hyperlipidemia     Hypertension     Hypokalemia 2/27/2023    Osteoporosis        Past Surgical History  Past Surgical History:   Procedure Laterality Date    APPENDECTOMY      BILATERAL OOPHORECTOMY Bilateral     ORIF TIBIAL SHAFT FRACTURE W/ PLATES AND SCREWS Right     MI HEMIARTHROPLASTY HIP PARTIAL Left 6/23/2023    Procedure: HEMIARTHROPLASTY HIP (BIPOLAR); Surgeon: Rissa Rashid MD;  Location:  MAIN OR;  Service: Orthopedics       Summary   Pt presented with functional appearing oral s/s suggestive of suspected minimal pharyngeal dysphagia. Bite strength is adequate. Timely and effective mastication and oral organization. Prompt transfers without oral residue. Swallows are audible, ?incoordination and increased effort. Pt without overt s/s aspiration immediately s/p swallows today, however does have productive cough at baseline/x1 during evaluation - per RN has been intermittently coughing all day in the absence of PO. Pt denies sensation of aspiration/dysphagia. Education initiated on strategies to optimize swallow safety and s/s aspiration to notify medical team of should they arise.  Pt demonstrated understanding and denied questions/concerns at this time. Risk/s for Aspiration: Mild 2/2 respiratory status, current medical      Recommended Diet:  defer to GI service, reg/thin from ST perspective     Recommended Form of Meds:  As tolerated    Aspiration precautions and swallowing strategies: upright posture, only feed when fully alert, slow rate of feeding, and small bites/sips  Other Recommendations: Continue frequent oral care         Current Medical Status  Pt is a 79 y.o. female who presented to  [address] [place] [address]  with a PMH of chronic diastolic CHF, COPD with chronic resp failure on 2L, HTN, afib on coumadin, hypothyroidism who presents with multiple complaints. Son at bedside notes patient began experiencing BRBPR 11/22, PCP was informed who recommended holding warfarin however despite holding warfarin patient has continued with profuse bright red bowel movements, last this morning 11/24. Son states he was called to patient's house by home care aid who noted low SPO2 as well as low blood pressure + patient appear weak/fatigued. Son and aid attempted to help patient walk however she then collapsed, without head strike reported however LOC x 30-40 second + associated dizziness preceding event. Son called EMS. Son also notes patient appears more SOB and has increasing LE edema. Patient is chronically on 2L O2, requiring 4-5 L in ED. Patient denies fevers chills, CP, N/V, dysuria. She notes she "can feel her stomach" but is not in pain. Current Precautions:  Fall  Aspiration     Allergies:  No known food allergies    Past medical history:  Please see H&P for details    Special Studies:  CXR 11/24: Cardiomegaly. CHF. Bilateral pleural effusions.      Social/Education/Vocational Hx:  Pt lives alone    Swallow Information   Current Risks for Dysphagia & Aspiration:  respiratory status  Current Symptoms/Concerns:  none reported  Current Diet:  clear liquids, thin - s/w GI who state pt is cleared for trials of solids for purpose of ST evaluation     Baseline Diet: regular diet and thin liquids      Baseline Assessment   Behavior/Cognition: alert  Speech/Language Status: able to participate in conversation and able to follow commands  Patient Positioning: upright in bed  Pain Status/Interventions/Response to Interventions:  No report of or nonverbal indications of pain. Swallow Mechanism Exam  Facial: symmetrical  Labial: WFL  Lingual: WFL  Velum: symmetrical  Mandible: adequate ROM  Dentition: full dentures  Vocal quality:clear/adequate   Volitional Cough: strong/productive   Respiratory Status: on 4L O2      Consistencies Assessed and Performance   Consistencies Administered: thin liquids, puree, soft solids, and hard solids    Oral Stage: WFL  Mastication was adequate with the materials administered today. Bolus formation and transfer were functional with no significant oral residue noted. No overt s/s reduced oral control. Pharyngeal Stage: minimal  Swallow Mechanics:  Swallowing initiation appeared fairly prompt. Laryngeal rise was palpated and judged to be within functional limits. No coughing, throat clearing, change in vocal quality or respiratory status noted today. Esophageal Concerns: none reported    Strategies and Efficacy: -     Summary and Recommendations (see above)    Results Reviewed with: patient, RN, and MD     Treatment Recommended: Brief f/u across larger sample as able/appropriate      Patient Stated Goal: "anything will taste like heaven"     Dysphagia LTG  -Patient will demonstrate safe and effective oral intake (without overt s/s significant oral/pharyngeal dysphagia including s/s penetration or aspiration) for the highest appropriate diet level.        Speech Therapy Prognosis   Prognosis: good    Prognosis Considerations: age, medical status, prior medical history, and cognitive status

## 2023-11-25 NOTE — ASSESSMENT & PLAN NOTE
In setting of acute GIB on warfarin with supratherapeutic INR   Iron panel: Iron 36, Iron Sat 23, TIBC 156 Ferritin 111  Hold warfarin  11/24: 1 unit PRBC  11/25: 1 unit PRBC  Hgb 6 > 5 > 8.9  Maintain hgb > 7  See above

## 2023-11-25 NOTE — ASSESSMENT & PLAN NOTE
Wt Readings from Last 3 Encounters:   11/25/23 51.5 kg (113 lb 8.6 oz)   10/20/23 52.2 kg (115 lb 2 oz)   10/05/23 53.1 kg (117 lb)   Appears volume overloaded, increasing edema per son x last several days.  O2 requirement 4-5 L on admission, baseline is 2L   Outpatient lasix was changed from 20 mg daily to 40 mg daily about 4 weeks ago  CXR pending final read however appears congested  Echo 2/2023: EF 65%, G1DD  S/P IV lasix 60 mg x 1 + 40 mg IV lasix x 2  Cardiology consult   IV lasix 40 mg BID  Repeat TTE   Monitor I/O + daily weights

## 2023-11-25 NOTE — ASSESSMENT & PLAN NOTE
Chronically maintained on 2L 2/2 COPD   Requiring 4-5 L on admission, SpO2 90-94%  COVID/FLU/RSV neg  CXR with venous congestion, bilateral effusions     Suspect likely 2/2 acute on chronic CHF, see below

## 2023-11-25 NOTE — CONSULTS
Consultation - [address] [place] [address] Gastroenterology     Simin Tapia Null 79 y.o. female MRN: [id number]  Unit/Bed#: -01 SDU Encounter: [id number]    Inpatient consult to gastroenterology  Consult performed by: Alverda Duane, PA-C  Consult ordered by: Jonn Lennox, PA-C          ASSESSMENT and PLAN  1. Acute blood loss anemia  83F with hx/o COPD, chronic diastolic CHF, PAF on Coumadin (last dose Thu 11/23) who presented to the ED from home on 11/24/2023 for BRBPR, ROMANO, bilateral lower extremity swelling. Admission labs notable for Hgb 6.0 (baseline 8.5-10), BUN 32, INR 5.41, low iron studies. GI consulted for suspected lower GIB. No prior EGD/colonoscopy, no known prior GIB. - Possible etiologies of lower GIB include polyp/neoplasm, AVMs, diverticuli, hemorrhoids  - Will discuss timing of colonoscopy with Dr. Linsey Rodarte - likely not before Mon 11/27 to allow for 4-day warfarin washout and adequate hemodynamic resuscitation, given Hgb 5.0 this AM  - NPO -> clear liquid diet  - Continue pantoprazole 40 mg Q12  - Monitor H/H, transfuse for Hgb <7. 1 unit FFP, 1 unit PRBCs to date this admission    2. Atrial fibrillation  - Chronic, on Coumadin (last dose 11/23)    3. Acute on chronic respiratory failure with hypoxia and hypercapnia  - Currently requiring 4-5 L O2 NC, baseline is 2L  - Underlying COPD, likely exacerbated by acute on chronic CHF  - Treatment per primary team    Chief Complaint   Patient presents with    Shortness of Breath     Patient presents to ED via EMS shortness of breath, patient comes from home son called increased sob and low pulse ox patient reports bright red blood in stool. Physician Requesting Consult: Abhijeet Styles MD    HPI  Accompanied by self and history is obtained from self.     Dorys Hernández is a 79y.o. year old female with a past medical history of COPD, chronic diastolic CHF, PAF on Coumadin (last dose Thu 11/23) who presented to the ED from home on 11/24/2023 for BRBPR, ROAMNO, bilateral lower extremity swelling. GI consulted due to concerns for lower GI bleed with concomitant acute anemia. In the ED, she received 1 unit FFP, 1 unit PRBCs, and vitamin K x1 dose. She has never had BRBPR or GI bleeding before. No prior EGD/colonoscopy. No new medications. Unsure if she takes NSAIDs at home. Former smoker. Rare EtOH, no hx/o heavy use. Admission labs notable for Hgb 6.0 (baseline 8.5-10), BUN 32, INR 5.41, and low iron studies. GI History:  Previous issues: None  Blood thinners: ASA: no antiplatelet: no anticoagulation: yes - Coumadin, last dose 11/23  NSAID use: ? Caffeine use: Tobacco use: former smoker  EtOH use: rare, 6 or fewer times per year, no hx/o heavy use  Cannabis use: none  Insulin use: no    Abdominal Surgical Hx: None  Family Hx: Denies first degree relatives with GI malignancies. GI procedure Hx:  EGD: none  Colonoscopy: none  GI imaging Hx: None    Review of Systems   Constitutional:  Negative for appetite change, chills, fever and unexpected weight change. HENT:  Negative for dental problem, mouth sores, sore throat, trouble swallowing and voice change. Respiratory:  Positive for shortness of breath. Negative for cough and choking. Cardiovascular:  Negative for chest pain and leg swelling. Gastrointestinal:  Positive for anal bleeding. Negative for abdominal distention, abdominal pain, blood in stool, constipation, diarrhea, nausea, rectal pain and vomiting. Skin:  Negative for pallor and rash. Neurological:  Positive for weakness. Negative for light-headedness and headaches. Psychiatric/Behavioral:  Negative for confusion and sleep disturbance. The patient is not nervous/anxious.         Historical Information   Past Medical History:   Diagnosis Date    Anti-phospholipid syndrome (HCC)     Asthma     Blood type O+     Cardiac disease     Chronic pain     COPD (chronic obstructive pulmonary disease) ([facility])     Femur fracture, left ([address]) 6/22/2023    Fracture of ankle     left    Hyperlipidemia     Hypertension     Hypokalemia 2/27/2023    Osteoporosis      Past Surgical History:   Procedure Laterality Date    APPENDECTOMY      BILATERAL OOPHORECTOMY Bilateral     ORIF TIBIAL SHAFT FRACTURE W/ PLATES AND SCREWS Right     CA HEMIARTHROPLASTY HIP PARTIAL Left 6/23/2023    Procedure: HEMIARTHROPLASTY HIP (BIPOLAR);   Surgeon: Samir Nguyen MD;  Location:  MAIN OR;  Service: Orthopedics     Social History   Social History     Substance and Sexual Activity   Alcohol Use Not Currently     Social History     Substance and Sexual Activity   Drug Use No     Social History     Tobacco Use   Smoking Status Former    Types: Cigarettes   Smokeless Tobacco Never   Tobacco Comments    Never smoker per [employer]     Family History   Problem Relation Age of Onset    Hypertension Mother     Skin cancer Mother     Hypertension Father        Meds/Allergies     Current Facility-Administered Medications   Medication Dose Route Frequency    acetaminophen (TYLENOL) tablet 650 mg  650 mg Oral Q6H PRN    albuterol inhalation solution 2.5 mg  2.5 mg Nebulization Q6H PRN    budesonide (PULMICORT) inhalation solution 0.5 mg  0.5 mg Nebulization Q12H    [START ON 11/26/2023] digoxin (LANOXIN) tablet 125 mcg  125 mcg Oral Every Other Day    diltiazem (CARDIZEM CD) 24 hr capsule 240 mg  240 mg Oral Daily    formoterol (PERFOROMIST) nebulizer solution 20 mcg  20 mcg Nebulization Q12H    ipratropium (ATROVENT) 0.02 % inhalation solution 0.5 mg  0.5 mg Nebulization TID    levalbuterol (XOPENEX) inhalation solution 1.25 mg  1.25 mg Nebulization TID    levothyroxine tablet 25 mcg  25 mcg Oral Early Morning    mirtazapine (REMERON) tablet 7.5 mg  7.5 mg Oral HS    pantoprazole (PROTONIX) injection 40 mg  40 mg Intravenous Q12H CARMEN    sertraline (ZOLOFT) tablet 50 mg  50 mg Oral Daily     Medications Prior to Admission   Medication    acetaminophen (TYLENOL) 325 mg tablet    albuterol (2.5 mg/3 mL) 0.083 % nebulizer solution    Ascorbic Acid (VITAMIN C) 1000 MG tablet    budesonide (PULMICORT) 0.5 mg/2 mL nebulizer solution    Cholecalciferol (VITAMIN D3) 20 MCG (800 UNIT) TABS    digoxin (LANOXIN) 0.125 mg tablet    diltiazem (CARDIZEM CD) 240 mg 24 hr capsule    Ferrous Sulfate 220 (44 Fe) MG/5ML LIQD    Fluticasone-Salmeterol (Advair) 250-50 mcg/dose inhaler    furosemide (LASIX) 20 mg tablet    guaiFENesin (MUCINEX) 600 mg 12 hr tablet    ipratropium-albuterol (DUO-NEB) 0.5-2.5 mg/3 mL nebulizer solution    levothyroxine 25 mcg tablet    lidocaine (LIDODERM) 5 %    Lidocaine Pain Relief 4 % PTCH    loratadine (CLARITIN) 10 mg tablet    mirtazapine (REMERON) 7.5 MG tablet    multivitamin (THERAGRAN) TABS    oxygen gas    sertraline (Zoloft) 50 mg tablet    tiotropium (Spiriva HandiHaler) 18 mcg inhalation capsule    warfarin (COUMADIN) 2 mg tablet       Allergies   Allergen Reactions    Aspirin Other (See Comments)    Penicillins Rash and Hives    Sulfa Antibiotics Rash       PHYSICAL EXAM    Physical Exam  Vitals and nursing note reviewed. Constitutional:       General: She is not in acute distress. Appearance: She is ill-appearing (chronically). She is not toxic-appearing. Interventions: Nasal cannula in place. HENT:      Head: Normocephalic. Mouth/Throat:      Mouth: Mucous membranes are moist.      Pharynx: Oropharynx is clear. Eyes:      General: No scleral icterus. Extraocular Movements: Extraocular movements intact. Neck:      Trachea: Phonation normal.   Cardiovascular:      Rate and Rhythm: Rhythm irregularly irregular. Heart sounds: No murmur heard. No friction rub. No gallop. Pulmonary:      Effort: Pulmonary effort is normal. No respiratory distress. Breath sounds: No wheezing, rhonchi or rales. Abdominal:      General: Abdomen is flat. Bowel sounds are normal. There is no distension or abdominal bruit. Palpations: Abdomen is soft. There is no hepatomegaly or splenomegaly. Tenderness: There is no abdominal tenderness. There is no guarding or rebound. Musculoskeletal:      Cervical back: Neck supple. Right lower leg: Edema present. Left lower leg: Edema present. Comments: Moving all 4 extremities spontaneously   Skin:     General: Skin is warm and dry. Capillary Refill: Capillary refill takes less than 2 seconds. Coloration: Skin is pale. Skin is not jaundiced. Neurological:      General: No focal deficit present. Mental Status: She is alert and oriented to person, place, and time. Psychiatric:         Behavior: Behavior normal.         Thought Content: Thought content normal.      Comments: Poor historian - frustrated with feeling cold & hungry,  unwilling to answer this PA's questions.            Lab Results   Component Value Date    GLUCOSE 150 (H) 11/24/2023    CALCIUM 8.1 (L) 11/25/2023     03/17/2015    K 3.9 11/25/2023    CO2 41 (H) 11/25/2023    CL 97 11/25/2023    BUN 29 (H) 11/25/2023    CREATININE 0.75 11/25/2023    CREATININE 0.80 11/24/2023    CREATININE 0.78 10/05/2023     Lab Results   Component Value Date    WBC 12.51 (H) 11/25/2023    WBC 13.74 (H) 11/24/2023    WBC 7.09 10/05/2023    HGB 5.0 (LL) 11/25/2023    HGB 6.0 (LL) 11/24/2023    HGB 6.5 (LL) 11/24/2023     (H) 11/25/2023     11/25/2023     11/24/2023     10/05/2023     Lab Results   Component Value Date    ALT 10 11/25/2023    ALT 12 11/24/2023    ALT 12 10/05/2023    AST 18 11/25/2023    AST 19 11/24/2023    AST 20 10/05/2023    ALKPHOS 165 (H) 11/25/2023    ALKPHOS 187 (H) 11/24/2023    ALKPHOS 133 (H) 10/05/2023    TBILI 0.38 11/25/2023    TBILI 0.39 11/24/2023    TBILI 0.43 10/05/2023     Lab Results   Component Value Date    LIPASE 17 08/03/2023     Lab Results   Component Value Date    IRON 36 (L) 11/25/2023    TIBC 156 (L) 11/25/2023    FERRITIN 111 11/25/2023 Lab Results   Component Value Date    INR 5.41 (HH) 11/24/2023    INR 2.62 (H) 10/05/2023    INR 2.64 (H) 08/07/2023       X-ray chest 1 view portable    Result Date: 11/25/2023  Narrative: CHEST INDICATION:   chest pain. COMPARISON: 10/05/2023 EXAM PERFORMED/VIEWS:  XR CHEST PORTABLE 1 image FINDINGS: Cardiomediastinal silhouette appears enlarged. The patient appears to be in congestive heart failure with bilateral pleural effusions. There is no pneumothorax. Osseous structures appear within normal limits for patient age. Impression: Cardiomegaly. CHF. Bilateral pleural effusions. Workstation performed: [id number]       Imaging Studies: I have personally reviewed pertinent reports. Pathology, and Other Studies: I have personally reviewed pertinent reports. Patient expressed understanding and had all questions and concerns addressed. Cat Serrano PA-C  11/25/23   9:30 AM     This chart was completed in part utilizing [employer] speech voice recognition software. Random word insertions, pronoun errors, and incomplete sentences are an occasional consequence of this system due to software limitations, and ambient noise. Any questions or concerns about the content, text, or information contained within the body of this dictation should be directly addressed to the provider for clarification.

## 2023-11-25 NOTE — ASSESSMENT & PLAN NOTE
Witnessed by son, was attempting to walk patient however she became weak/collapsed, no headstrike, LOC about 30 seconds, prompting son to call EMS  Home care aids noted low BP and Low O2 sats prior to this  Currently A&O x3   Suspect 2/2 acute blood loss anemia/GIB along with increasing O2 requirements resulting in orthostasis/hypoxia  Check orthostatics  Trop WNL   Echo pending  Tele NSR  PT/OT  Low threshold to obtain [facility] for acute mental status changes

## 2023-11-26 ENCOUNTER — APPOINTMENT (INPATIENT)
Dept: RADIOLOGY | Facility: HOSPITAL | Age: 83
DRG: 291 | End: 2023-11-26
Payer: MEDICARE

## 2023-11-26 LAB
ABO GROUP BLD BPU: NORMAL
ABO GROUP BLD BPU: NORMAL
ATRIAL RATE: 77 BPM
ATRIAL RATE: 79 BPM
ATRIAL RATE: 80 BPM
BACTERIA UR QL AUTO: NORMAL /HPF
BASE EX.OXY STD BLDV CALC-SCNC: 91.5 % (ref 60–80)
BASE EXCESS BLDV CALC-SCNC: 11.3 MMOL/L
BASOPHILS # BLD AUTO: 0.02 THOUSANDS/ÂΜL (ref 0–0.1)
BASOPHILS NFR BLD AUTO: 0 % (ref 0–1)
BILIRUB UR QL STRIP: NEGATIVE
BPU ID: NORMAL
BPU ID: NORMAL
BUN SERPL-MCNC: 22 MG/DL (ref 5–25)
CALCIUM SERPL-MCNC: 8.2 MG/DL (ref 8.4–10.2)
CHLORIDE SERPL-SCNC: 93 MMOL/L (ref 96–108)
CLARITY UR: CLEAR
CO2 SERPL-SCNC: >45 MMOL/L (ref 21–32)
COLOR UR: YELLOW
CREAT SERPL-MCNC: 0.59 MG/DL (ref 0.6–1.3)
CROSSMATCH: NORMAL
CROSSMATCH: NORMAL
EOSINOPHIL # BLD AUTO: 0.09 THOUSAND/ÂΜL (ref 0–0.61)
EOSINOPHIL NFR BLD AUTO: 1 % (ref 0–6)
ERYTHROCYTE [DISTWIDTH] IN BLOOD BY AUTOMATED COUNT: 20 % (ref 11.6–15.1)
GFR SERPL CREATININE-BSD FRML MDRD: 84 ML/MIN/1.73SQ M
GLUCOSE SERPL-MCNC: 93 MG/DL (ref 65–140)
GLUCOSE UR STRIP-MCNC: NEGATIVE MG/DL
HCO3 BLDV-SCNC: 35.7 MMOL/L (ref 24–30)
HCT VFR BLD AUTO: 25.8 % (ref 34.8–46.1)
HCT VFR BLD AUTO: 27.8 % (ref 34.8–46.1)
HGB BLD-MCNC: 8 G/DL (ref 11.5–15.4)
HGB BLD-MCNC: 8.3 G/DL (ref 11.5–15.4)
HGB UR QL STRIP.AUTO: NEGATIVE
IMM GRANULOCYTES # BLD AUTO: 0.14 THOUSAND/UL (ref 0–0.2)
IMM GRANULOCYTES NFR BLD AUTO: 2 % (ref 0–2)
INR PPP: 1.04 (ref 0.84–1.19)
KETONES UR STRIP-MCNC: NEGATIVE MG/DL
LEUKOCYTE ESTERASE UR QL STRIP: ABNORMAL
LYMPHOCYTES # BLD AUTO: 0.98 THOUSANDS/ÂΜL (ref 0.6–4.47)
LYMPHOCYTES NFR BLD AUTO: 11 % (ref 14–44)
MAGNESIUM SERPL-MCNC: 2.1 MG/DL (ref 1.9–2.7)
MCH RBC QN AUTO: 32.9 PG (ref 26.8–34.3)
MCHC RBC AUTO-ENTMCNC: 31 G/DL (ref 31.4–37.4)
MCV RBC AUTO: 106 FL (ref 82–98)
MONOCYTES # BLD AUTO: 0.76 THOUSAND/ÂΜL (ref 0.17–1.22)
MONOCYTES NFR BLD AUTO: 8 % (ref 4–12)
NEUTROPHILS # BLD AUTO: 7.37 THOUSANDS/ÂΜL (ref 1.85–7.62)
NEUTS SEG NFR BLD AUTO: 78 % (ref 43–75)
NITRITE UR QL STRIP: NEGATIVE
NON-SQ EPI CELLS URNS QL MICRO: NORMAL /HPF
NRBC BLD AUTO-RTO: 0 /100 WBCS
O2 CT BLDV-SCNC: 12.3 ML/DL
P AXIS: 49 DEGREES
P AXIS: 50 DEGREES
P AXIS: 50 DEGREES
PCO2 BLDV: 46.9 MM HG (ref 42–50)
PH BLDV: 7.5 [PH] (ref 7.3–7.4)
PH UR STRIP.AUTO: 6.5 [PH]
PLATELET # BLD AUTO: 281 THOUSANDS/UL (ref 149–390)
PMV BLD AUTO: 9.6 FL (ref 8.9–12.7)
PO2 BLDV: 66.9 MM HG (ref 35–45)
POTASSIUM SERPL-SCNC: 3.3 MMOL/L (ref 3.5–5.3)
PR INTERVAL: 150 MS
PR INTERVAL: 154 MS
PR INTERVAL: 160 MS
PROT UR STRIP-MCNC: NEGATIVE MG/DL
PROTHROMBIN TIME: 14 SECONDS (ref 11.6–14.5)
QRS AXIS: 15 DEGREES
QRS AXIS: 31 DEGREES
QRS AXIS: 9 DEGREES
QRSD INTERVAL: 84 MS
QRSD INTERVAL: 84 MS
QRSD INTERVAL: 88 MS
QT INTERVAL: 332 MS
QT INTERVAL: 342 MS
QT INTERVAL: 344 MS
QTC INTERVAL: 375 MS
QTC INTERVAL: 394 MS
QTC INTERVAL: 394 MS
RBC # BLD AUTO: 2.43 MILLION/UL (ref 3.81–5.12)
RBC #/AREA URNS AUTO: NORMAL /HPF
SODIUM SERPL-SCNC: 142 MMOL/L (ref 135–147)
SP GR UR STRIP.AUTO: 1.02 (ref 1–1.03)
T WAVE AXIS: 30 DEGREES
T WAVE AXIS: 64 DEGREES
T WAVE AXIS: 91 DEGREES
UNIT DISPENSE STATUS: NORMAL
UNIT DISPENSE STATUS: NORMAL
UNIT PRODUCT CODE: NORMAL
UNIT PRODUCT CODE: NORMAL
UNIT PRODUCT VOLUME: 350 ML
UNIT PRODUCT VOLUME: 350 ML
UNIT RH: NORMAL
UNIT RH: NORMAL
UROBILINOGEN UR STRIP-ACNC: <2 MG/DL
VENTRICULAR RATE: 77 BPM
VENTRICULAR RATE: 79 BPM
VENTRICULAR RATE: 80 BPM
WBC # BLD AUTO: 9.36 THOUSAND/UL (ref 4.31–10.16)
WBC #/AREA URNS AUTO: NORMAL /HPF

## 2023-11-26 PROCEDURE — 99232 SBSQ HOSP IP/OBS MODERATE 35: CPT | Performed by: INTERNAL MEDICINE

## 2023-11-26 PROCEDURE — C9113 INJ PANTOPRAZOLE SODIUM, VIA: HCPCS

## 2023-11-26 PROCEDURE — 99233 SBSQ HOSP IP/OBS HIGH 50: CPT

## 2023-11-26 PROCEDURE — 71045 X-RAY EXAM CHEST 1 VIEW: CPT

## 2023-11-26 PROCEDURE — 94760 N-INVAS EAR/PLS OXIMETRY 1: CPT

## 2023-11-26 PROCEDURE — 85014 HEMATOCRIT: CPT

## 2023-11-26 PROCEDURE — 85025 COMPLETE CBC W/AUTO DIFF WBC: CPT | Performed by: PHYSICIAN ASSISTANT

## 2023-11-26 PROCEDURE — 82805 BLOOD GASES W/O2 SATURATION: CPT

## 2023-11-26 PROCEDURE — 83735 ASSAY OF MAGNESIUM: CPT | Performed by: INTERNAL MEDICINE

## 2023-11-26 PROCEDURE — 81001 URINALYSIS AUTO W/SCOPE: CPT

## 2023-11-26 PROCEDURE — 80048 BASIC METABOLIC PNL TOTAL CA: CPT | Performed by: PHYSICIAN ASSISTANT

## 2023-11-26 PROCEDURE — 94640 AIRWAY INHALATION TREATMENT: CPT

## 2023-11-26 PROCEDURE — 85610 PROTHROMBIN TIME: CPT | Performed by: PHYSICIAN ASSISTANT

## 2023-11-26 PROCEDURE — 85018 HEMOGLOBIN: CPT

## 2023-11-26 RX ORDER — BISACODYL 5 MG/1
10 TABLET, DELAYED RELEASE ORAL ONCE
Status: DISCONTINUED | OUTPATIENT
Start: 2023-11-26 | End: 2023-11-30

## 2023-11-26 RX ORDER — POTASSIUM CHLORIDE 20 MEQ/1
40 TABLET, EXTENDED RELEASE ORAL ONCE
Status: COMPLETED | OUTPATIENT
Start: 2023-11-26 | End: 2023-11-26

## 2023-11-26 RX ADMIN — FERROUS SULFATE TAB 325 MG (65 MG ELEMENTAL FE) 325 MG: 325 (65 FE) TAB at 09:57

## 2023-11-26 RX ADMIN — PANTOPRAZOLE SODIUM 40 MG: 40 INJECTION, POWDER, FOR SOLUTION INTRAVENOUS at 09:57

## 2023-11-26 RX ADMIN — LEVOTHYROXINE SODIUM 25 MCG: 25 TABLET ORAL at 06:06

## 2023-11-26 RX ADMIN — FUROSEMIDE 40 MG: 10 INJECTION, SOLUTION INTRAMUSCULAR; INTRAVENOUS at 09:57

## 2023-11-26 RX ADMIN — MIRTAZAPINE 7.5 MG: 15 TABLET, FILM COATED ORAL at 21:10

## 2023-11-26 RX ADMIN — PANTOPRAZOLE SODIUM 40 MG: 40 INJECTION, POWDER, FOR SOLUTION INTRAVENOUS at 21:13

## 2023-11-26 RX ADMIN — POTASSIUM CHLORIDE 40 MEQ: 1500 TABLET, EXTENDED RELEASE ORAL at 09:57

## 2023-11-26 RX ADMIN — BUDESONIDE 0.5 MG: 0.5 INHALANT RESPIRATORY (INHALATION) at 08:00

## 2023-11-26 RX ADMIN — LEVALBUTEROL HYDROCHLORIDE 1.25 MG: 1.25 SOLUTION RESPIRATORY (INHALATION) at 13:31

## 2023-11-26 RX ADMIN — BUDESONIDE 0.5 MG: 0.5 INHALANT RESPIRATORY (INHALATION) at 19:40

## 2023-11-26 RX ADMIN — IPRATROPIUM BROMIDE 0.5 MG: 0.5 SOLUTION RESPIRATORY (INHALATION) at 19:40

## 2023-11-26 RX ADMIN — FORMOTEROL FUMARATE DIHYDRATE 20 MCG: 20 SOLUTION RESPIRATORY (INHALATION) at 08:00

## 2023-11-26 RX ADMIN — LEVALBUTEROL HYDROCHLORIDE 1.25 MG: 1.25 SOLUTION RESPIRATORY (INHALATION) at 08:00

## 2023-11-26 RX ADMIN — IPRATROPIUM BROMIDE 0.5 MG: 0.5 SOLUTION RESPIRATORY (INHALATION) at 08:00

## 2023-11-26 RX ADMIN — DIGOXIN 125 MCG: 125 TABLET ORAL at 09:57

## 2023-11-26 RX ADMIN — ACETAMINOPHEN 650 MG: 325 TABLET, FILM COATED ORAL at 15:07

## 2023-11-26 RX ADMIN — FORMOTEROL FUMARATE DIHYDRATE 20 MCG: 20 SOLUTION RESPIRATORY (INHALATION) at 19:40

## 2023-11-26 RX ADMIN — LEVALBUTEROL HYDROCHLORIDE 1.25 MG: 1.25 SOLUTION RESPIRATORY (INHALATION) at 19:40

## 2023-11-26 RX ADMIN — SERTRALINE HYDROCHLORIDE 50 MG: 50 TABLET ORAL at 09:57

## 2023-11-26 RX ADMIN — POLYETHYLENE GLYCOL 3350, SODIUM SULFATE ANHYDROUS, SODIUM BICARBONATE, SODIUM CHLORIDE, POTASSIUM CHLORIDE 4000 ML: 236; 22.74; 6.74; 5.86; 2.97 POWDER, FOR SOLUTION ORAL at 15:07

## 2023-11-26 RX ADMIN — DILTIAZEM HYDROCHLORIDE 240 MG: 120 CAPSULE, COATED, EXTENDED RELEASE ORAL at 09:57

## 2023-11-26 RX ADMIN — IPRATROPIUM BROMIDE 0.5 MG: 0.5 SOLUTION RESPIRATORY (INHALATION) at 13:31

## 2023-11-26 NOTE — ASSESSMENT & PLAN NOTE
Rate control: digoxin 125 mcg every other day (last taken 11/24)  Dig level 0.7  Rhythm: Cardizem 240 mg daily   OAC: warfarin - HOLD due to supratherapeutic INR (5.4)  Monitor daily PT/INR  Consider discontinuing warfarin, risk vs benefit discussions with GI and cards

## 2023-11-26 NOTE — ASSESSMENT & PLAN NOTE
BRBPR began 11/22, PCP recommended stopping warfarin temporarily, however persistent symptoms with large BRBPR episode witnessed by son this morning followed by syncopal episode  Hgb on admission 6 (baseline 8.5-10, most recently 9.6)  INR 5.4  S/P 1 unit FFP + IV vit K 10 mg   Repeat INR 1  GI consult  NPO at midnight- anticipate colonoscopy Monday 11/27  IV protonix  Hold warfarin, trend INR   SBP stable, monitor   Trend hgb    11/26 - no bleeding so far

## 2023-11-26 NOTE — ASSESSMENT & PLAN NOTE
Witnessed by son, was attempting to walk patient however she became weak/collapsed, no headstrike, LOC about 30 seconds, prompting son to call EMS  Home care aids noted low BP and Low O2 sats prior to this  Currently A&O x3   Suspect 2/2 acute blood loss anemia/GIB along with increasing O2 requirements resulting in orthostasis/hypoxia  Trop WNL   Echo pending  Tele NSR  PT/OT

## 2023-11-26 NOTE — CONSULTS
Pt is pending colonoscopy on 11/27. NPO on 11/27 at 0001. Will make recommendations following outcomes. Pt is willing to trial Ensure clear BID to aid in meeting estimated needs. Pt is not appropriate at this time for CHF education. Will attempt education at F/U.

## 2023-11-26 NOTE — PROGRESS NOTES
Cardiology Progress Note - Asuncion Nicholas 79 y.o. female MRN: [id number]    Unit/Bed#: -01 SDU Encounter: [id number]        Impression:  PAF - no recent documented atrial fibrillation. Has been on warfarin for 30 years. Risks of anticoagulation may be greater than benefits. Will continue diltiazem for now. GI bleed - for colonoscopy on 11/27. Patient at acceptable cardiovascular risk. Syncope - 2nd to anemia  Acute on chronic HFpEF - precipitated by metabolic stress of anemia. Plan:  D/C diuretics. Continue remainder of medications. Await colonoscopy. Subjective:    No significant events overnight. Review of Systems   Cardiovascular:  Negative for chest pain, leg swelling and palpitations. Respiratory:  Negative for shortness of breath. Objective:   Vitals: Blood pressure 140/64, pulse 72, temperature 98.3 °F (36.8 °C), temperature source Oral, resp. rate 18, height 5' 6" (1.676 m), weight 50.7 kg (111 lb 12.4 oz), SpO2 97 %, not currently breastfeeding., Body mass index is 18.04 kg/m².,   Orthostatic Blood Pressures      Flowsheet Row Most Recent Value   Blood Pressure 140/64 filed at 11/26/2023 0932   Patient Position - Orthostatic VS Lying filed at 11/26/2023 4508           Systolic (65MLH), NTJ:260 , Min:131 , LGL:821     Diastolic (71ODW), OYD:25, Min:61, Max:64      Intake/Output Summary (Last 24 hours) at 11/26/2023 1252  Last data filed at 11/26/2023 1001  Gross per 24 hour   Intake 360 ml   Output 1204 ml   Net -844 ml     Weight (last 2 days)       Date/Time Weight    11/26/23 0600 50.7 (111.77)    11/25/23 0600 51.5 (113.54)    11/24/23 1640 51.5 (113.54)                Telemetry Review: NSR    Physical Exam  Cardiovascular:      Rate and Rhythm: Normal rate and regular rhythm. Heart sounds: Normal heart sounds. No murmur heard. No friction rub. No gallop. Pulmonary:      Breath sounds: Normal breath sounds. No wheezing or rales.            Laboratory Results:        CBC with diff:   Results from last 7 days   Lab Units 11/26/23 0456 11/25/23 2015 11/25/23  1150 11/25/23 0024 11/24/23  1426 11/24/23  1423   WBC Thousand/uL 9.36  --   --  12.51* 13.74*  --    HEMOGLOBIN g/dL 8.0* 8.1* 8.9* 5.0* 6.0*  --    I STAT HEMOGLOBIN g/dl  --   --   --   --   --  6.5*   HEMATOCRIT % 25.8* 26.3* 28.2* 16.6* 20.6*  --    HEMATOCRIT, ISTAT %  --   --   --   --   --  19*   MCV fL 106*  --   --  111* 108*  --    PLATELETS Thousands/uL 281  --   --  310 369  --    RBC Million/uL 2.43*  --   --  1.50* 1.91*  --    MCH pg 32.9  --   --  33.3 31.4  --    MCHC g/dL 31.0*  --   --  30.1* 29.1*  --    RDW % 20.0*  --   --  23.8* 21.0*  --    MPV fL 9.6  --   --  9.6 9.3  --    NRBC AUTO /100 WBCs 0  --   --  0 0  --          CMP:  Results from last 7 days   Lab Units 11/26/23 0456 11/25/23 0024 11/24/23 1426 11/24/23  1423   POTASSIUM mmol/L 3.3* 3.9 4.9  --    CHLORIDE mmol/L 93* 97 99  --    CO2 mmol/L >45* 41* 41*  --    CO2, I-STAT mmol/L  --   --   --  >45*   BUN mg/dL 22 29* 32*  --    CREATININE mg/dL 0.59* 0.75 0.80  --    GLUCOSE, ISTAT mg/dl  --   --   --  150*   CALCIUM mg/dL 8.2* 8.1* 8.6  --    AST U/L  --  18 19  --    ALT U/L  --  10 12  --    ALK PHOS U/L  --  165* 187*  --    EGFR ml/min/1.73sq m 84 73 67  --          BMP:  Results from last 7 days   Lab Units 11/26/23  0456 11/25/23  0024 11/24/23  1426 11/24/23  1423   POTASSIUM mmol/L 3.3* 3.9 4.9  --    CHLORIDE mmol/L 93* 97 99  --    CO2 mmol/L >45* 41* 41*  --    CO2, I-STAT mmol/L  --   --   --  >45*   BUN mg/dL 22 29* 32*  --    CREATININE mg/dL 0.59* 0.75 0.80  --    GLUCOSE, ISTAT mg/dl  --   --   --  150*   CALCIUM mg/dL 8.2* 8.1* 8.6  --        BNP:         Magnesium:   Results from last 7 days   Lab Units 11/26/23 0456   MAGNESIUM mg/dL 2.1       Coags:   Results from last 7 days   Lab Units 11/26/23  0456 11/25/23  1148 11/24/23  1426   INR  1.04 1.02 5.41*           Meds/Allergies   Current Facility-Administered Medications   Medication Dose Route Frequency Provider Last Rate    acetaminophen  650 mg Oral Q6H PRN [place] [facility] SIGRID Escobar      albuterol  2.5 mg Nebulization Q6H PRN Mirian Francois MD      bisacodyl  10 [facility] Oral Once [facility]      budesonide  0.5 mg Nebulization Q12H [employer] [place] [facility] SIGRID Murray      digoxin  125 mcg Oral Every Other Day [place] [facility] SIGRID Escobar      diltiazem  240 mg Oral Daily [facility] [place] [facility] SIGRID Murray      ferrous sulfate  325 mg Oral Daily With Breakfast [facility] [place] [facility] SIGRID Escobar      formoterol  20 mcg Nebulization Q12H [place] [facility] SIGRID Escobar      furosemide  40 mg Intravenous BID (diuretic) [place] [facility] SIGRID Escobar      ipratropium  0.5 mg Nebulization TID Mirian Francois MD      levalbuterol  1.25 mg Nebulization TID Mirian Francois MD      levothyroxine  25 mcg Oral Early Morning [employer] [place] [facility] SIGRID Murray      mirtazapine  7.5 mg Oral HS [employer] [place] [facility] SIGRID Escobar      pantoprazole  40 mg Intravenous Q12H [address] Lianet Leidy SIGRID Escobar      polyethylene glycol  4,000 mL Oral See Admin Instructions Alma Rosa Enrique PA-C      sertraline  50 mg Oral Daily [place] [facility] SIGRID Escobar          Medications Prior to Admission   Medication    acetaminophen (TYLENOL) 325 mg tablet    albuterol (2.5 mg/3 mL) 0.083 % nebulizer solution    Ascorbic Acid (VITAMIN C) 1000 MG tablet    budesonide (PULMICORT) 0.5 mg/2 mL nebulizer solution    Cholecalciferol (VITAMIN D3) 20 MCG (800 UNIT) TABS    digoxin (LANOXIN) 0.125 mg tablet    diltiazem (CARDIZEM CD) 240 mg 24 hr capsule    Ferrous Sulfate 220 (44 Fe) MG/5ML LIQD    Fluticasone-Salmeterol (Advair) 250-50 mcg/dose inhaler    furosemide (LASIX) 20 mg tablet    guaiFENesin (MUCINEX) 600 mg 12 hr tablet    ipratropium-albuterol (DUO-NEB) 0.5-2.5 mg/3 mL nebulizer solution    levothyroxine 25 mcg tablet    lidocaine (LIDODERM) 5 %    Lidocaine Pain Relief 4 % [facility]    loratadine (CLARITIN) 10 mg tablet    mirtazapine (REMERON) 7.5 MG tablet    multivitamin (THERAGRAN) TABS    oxygen gas    sertraline (Zoloft) 50 mg tablet    tiotropium (Spiriva HandiHaler) 18 mcg inhalation capsule    warfarin (COUMADIN) 2 mg tablet       Assessment:  Principal Problem:    GIB (gastrointestinal bleeding)  Active Problems:    Chronic obstructive pulmonary disease (HCC)    Atrial fibrillation (HCC)    Other specified hypothyroidism    Acute on chronic respiratory failure with hypoxia and hypercapnia (HCC)    Acute blood loss anemia    Syncope    Acute on chronic diastolic CHF (congestive heart failure) (HCC)    Underweight

## 2023-11-26 NOTE — ASSESSMENT & PLAN NOTE
In setting of acute GIB on warfarin with supratherapeutic INR   Iron panel: Iron 36, Iron Sat 23, TIBC 156 Ferritin 111  Hold warfarin  11/24: 1 unit PRBC  11/25: 1 unit PRBC  Hgb 6 > 5 > 8.9 > 8  Maintain hgb > 7  See above

## 2023-11-26 NOTE — ASSESSMENT & PLAN NOTE
Wt Readings from Last 3 Encounters:   11/26/23 50.7 kg (111 lb 12.4 oz)   10/20/23 52.2 kg (115 lb 2 oz)   10/05/23 53.1 kg (117 lb)   Appears volume overloaded, increasing edema per son x last several days.  O2 requirement 4-5 L on admission, baseline is 2L   Outpatient lasix was changed from 20 mg daily to 40 mg daily about 4 weeks ago  CXR pending final read however appears congested  Echo 2/2023: EF 65%, G1DD  S/P IV lasix 60 mg x 1 + 40 mg IV lasix x 2  Cardiology consult   IV lasix 40 mg BID dc'd per cards  Repeat TTE   Monitor I/O + daily weights

## 2023-11-26 NOTE — ASSESSMENT & PLAN NOTE
BRBPR began 11/22, PCP recommended stopping warfarin temporarily, however persistent symptoms with large BRBPR episode witnessed by son this morning followed by syncopal episode  Hgb on admission 6 (baseline 8.5-10, most recently 9.6)  INR 5.4  S/P 1 unit FFP + IV vit K 10 mg   Repeat INR 1  GI consult  For colonoscopy today 11/27  IV protonix  Hold warfarin, trend INR   SBP stable, monitor   Trend hgb

## 2023-11-26 NOTE — ASSESSMENT & PLAN NOTE
Chronically maintained on 2L 2/2 COPD   Requiring 4-5 L on admission, SpO2 90-94%  COVID/FLU/RSV neg  CXR with venous congestion, bilateral effusions     Suspect likely 2/2 acute on chronic CHF, see below  Currently on 2-3L

## 2023-11-26 NOTE — ASSESSMENT & PLAN NOTE
Witnessed by son, was attempting to walk patient however she became weak/collapsed, no headstrike, LOC about 30 seconds, prompting son to call EMS  Home care aids noted low BP and Low O2 sats prior to this  Currently A&O x3   Suspect 2/2 acute blood loss anemia/GIB along with increasing O2 requirements resulting in orthostasis/hypoxia  Check orthostatics  Trop WNL   Echo pending  Tele NSR  PT/OT

## 2023-11-26 NOTE — PROGRESS NOTES
Progress note - [facility]   Servando Vines 79 y.o. female MRN: [id number]  Unit/Bed#: -01 SDU Encounter: [id number]    ASSESSMENT and PLAN  1. Acute blood loss anemia  83F with hx/o COPD, chronic diastolic CHF, PAF on Coumadin (last dose Thu 11/23) who presented to the ED from home on 11/24/2023 for BRBPR, ROMANO, bilateral lower extremity swelling. Admission labs notable for Hgb 6.0 (baseline 8.5-10) w/ anemia of chronic disease picture, BUN 32, INR 5.41. GI consulted for suspected lower GIB. No prior EGD/colonoscopy, no known prior GIB. - Possible etiologies of lower GIB include polyp/neoplasm, AVMs, diverticuli, hemorrhoids  - Hgb stable at 8.0 today 11/26, INR improved to 1.04  - Plan for colonoscopy tomorrow 11/27  - Clear liquid diet  - Continue pantoprazole 40 mg Q12  - Monitor H/H, transfuse for Hgb <7. 1 unit FFP, 2 units PRBCs to date this admission     2. Atrial fibrillation  - Chronic, on Coumadin (last dose 11/23)  - Per cardiology consult 11/25, "Risks of anticoagulation may be greater than benefits. Will continue diltiazem for now."     3. Acute on chronic respiratory failure with hypoxia and hypercapnia  - Currently requiring 4L O2 NC, baseline is 2L  - Underlying COPD, likely exacerbated by acute on chronic CHF  - Treatment per primary team      SUBJECTIVE  Accompanied by self and history is obtained from self. No acute GI complaints today. Tolerating clear liquid diet. Per nursing, Miralax 34 g given last night. GoLytely bowel prep scheduled to start at 16:00 today in prep for colonoscopy tomorrow 11/27. Review of Systems   Constitutional:  Negative for appetite change, chills and fever. HENT:  Negative for dental problem, mouth sores, sore throat, trouble swallowing and voice change. Respiratory:  Positive for cough (chronic). Negative for choking. Cardiovascular:  Negative for chest pain and leg swelling.    Gastrointestinal:  Negative for abdominal distention, abdominal pain, anal bleeding, blood in stool, constipation, diarrhea, nausea, rectal pain and vomiting. Skin:  Negative for pallor and rash. Psychiatric/Behavioral:  Negative for confusion and sleep disturbance. The patient is not nervous/anxious. Temp:  [97.4 °F (36.3 °C)-98.3 °F (36.8 °C)] 98.3 °F (36.8 °C)  HR:  [65-82] 72  Resp:  [16-36] 18  BP: (131-164)/(61-82) 140/64     PHYSICAL EXAM  Physical Exam  Vitals and nursing note reviewed. Constitutional:       General: She is not in acute distress. Appearance: She is ill-appearing (chronically). She is not toxic-appearing. Interventions: Nasal cannula in place. HENT:      Head: Normocephalic. Mouth/Throat:      Mouth: Mucous membranes are moist.      Pharynx: Oropharynx is clear. Eyes:      General: No scleral icterus. Extraocular Movements: Extraocular movements intact. Neck:      Trachea: Phonation normal.   Cardiovascular:      Rate and Rhythm: Rhythm irregularly irregular. Heart sounds: No murmur heard. No friction rub. No gallop. Pulmonary:      Effort: Pulmonary effort is normal. No respiratory distress. Breath sounds: Rales present. No wheezing or rhonchi. Abdominal:      General: Abdomen is flat. Bowel sounds are normal. There is no distension or abdominal bruit. Palpations: Abdomen is soft. There is no hepatomegaly or splenomegaly. Tenderness: There is no abdominal tenderness. There is no guarding or rebound. Musculoskeletal:      Cervical back: Neck supple. Right lower leg: Edema present. Left lower leg: Edema present. Comments: Moving all 4 extremities spontaneously   Skin:     General: Skin is warm and dry. Capillary Refill: Capillary refill takes less than 2 seconds. Coloration: Skin is pale. Skin is not jaundiced. Neurological:      General: No focal deficit present.       Mental Status: She is alert and oriented to person, place, and time.   Psychiatric:         Behavior: Behavior normal.         Thought Content: Thought content normal.           LABS & STUDIES  Lab Results   Component Value Date    GLUCOSE 150 (H) 11/24/2023    CALCIUM 8.2 (L) 11/26/2023     03/17/2015    K 3.3 (L) 11/26/2023    CO2 >45 (HH) 11/26/2023    CL 93 (L) 11/26/2023    BUN 22 11/26/2023    CREATININE 0.59 (L) 11/26/2023    CREATININE 0.75 11/25/2023    CREATININE 0.80 11/24/2023     Lab Results   Component Value Date    WBC 9.36 11/26/2023    WBC 12.51 (H) 11/25/2023    WBC 13.74 (H) 11/24/2023    HGB 8.0 (L) 11/26/2023    HGB 8.1 (L) 11/25/2023    HGB 8.9 (L) 11/25/2023     (H) 11/26/2023     11/26/2023     11/25/2023     11/24/2023     Lab Results   Component Value Date    ALT 10 11/25/2023    ALT 12 11/24/2023    ALT 12 10/05/2023    AST 18 11/25/2023    AST 19 11/24/2023    AST 20 10/05/2023    ALKPHOS 165 (H) 11/25/2023    ALKPHOS 187 (H) 11/24/2023    ALKPHOS 133 (H) 10/05/2023    TBILI 0.38 11/25/2023    TBILI 0.39 11/24/2023    TBILI 0.43 10/05/2023     Lab Results   Component Value Date    LIPASE 17 08/03/2023     Lab Results   Component Value Date    IRON 36 (L) 11/25/2023    TIBC 156 (L) 11/25/2023    FERRITIN 111 11/25/2023     Lab Results   Component Value Date    INR 1.04 11/26/2023    INR 1.02 11/25/2023    INR 5.41 (HH) 11/24/2023       X-ray chest 1 view portable    Result Date: 11/25/2023  Narrative: CHEST INDICATION:   chest pain. COMPARISON: 10/05/2023 EXAM PERFORMED/VIEWS:  XR CHEST PORTABLE 1 image FINDINGS: Cardiomediastinal silhouette appears enlarged. The patient appears to be in congestive heart failure with bilateral pleural effusions. There is no pneumothorax. Osseous structures appear within normal limits for patient age. Impression: Cardiomegaly. CHF. Bilateral pleural effusions.  Workstation performed: [id number]       Patient expressed understanding and had all questions and concerns addressed. Joshua Buchanan PA-C   11/26/23  11:16 AM    This chart was completed in part utilizing [employer] speech voice recognition software. Random word insertions, pronoun errors, and incomplete sentences are an occasional consequence of this system due to software limitations, and ambient noise. Any questions or concerns about the content, text, or information contained within the body of this dictation should be directly addressed to the provider for clarification.

## 2023-11-26 NOTE — PROGRESS NOTES
[facility]  Progress Note  Name: Jd Camacho  MRN: [id number]  Unit/Bed#: -01 SDU I Date of Admission: 11/24/2023   Date of Service: 11/26/2023 I Hospital Day: 2    Assessment/Plan   * GIB (gastrointestinal bleeding)  Assessment & Plan  BRBPR began 11/22, PCP recommended stopping warfarin temporarily, however persistent symptoms with large BRBPR episode witnessed by son this morning followed by syncopal episode  Hgb on admission 6 (baseline 8.5-10, most recently 9.6)  INR 5.4  S/P 1 unit FFP + IV vit K 10 mg   Repeat INR 1  GI consult  NPO at midnight- anticipate colonoscopy Monday 11/27  IV protonix  Hold warfarin, trend INR   SBP stable, monitor   Trend hgb    11/26 - no bleeding so far    Acute blood loss anemia  Assessment & Plan  In setting of acute GIB on warfarin with supratherapeutic INR   Iron panel: Iron 36, Iron Sat 23, TIBC 156 Ferritin 111  Hold warfarin  11/24: 1 unit PRBC  11/25: 1 unit PRBC  Hgb 6 > 5 > 8.9 > 8  Maintain hgb > 7  See above     Acute on chronic respiratory failure with hypoxia and hypercapnia (HCC)  Assessment & Plan  Chronically maintained on 2L 2/2 COPD   Requiring 4-5 L on admission, SpO2 90-94%  COVID/FLU/RSV neg  CXR with venous congestion, bilateral effusions     Suspect likely 2/2 acute on chronic CHF, see below  Currently on 2-3L     Acute on chronic diastolic CHF (congestive heart failure) (HCC)  Assessment & Plan  Wt Readings from Last 3 Encounters:   11/26/23 50.7 kg (111 lb 12.4 oz)   10/20/23 52.2 kg (115 lb 2 oz)   10/05/23 53.1 kg (117 lb)   Appears volume overloaded, increasing edema per son x last several days.  O2 requirement 4-5 L on admission, baseline is 2L   Outpatient lasix was changed from 20 mg daily to 40 mg daily about 4 weeks ago  CXR pending final read however appears congested  Echo 2/2023: EF 65%, G1DD  S/P IV lasix 60 mg x 1 + 40 mg IV lasix x 2  Cardiology consult   IV lasix 40 mg BID dc'd 11/26  CO2 45 concern for contraction alkalosis  Consider diamox tomorrow if not improving   Repeat TTE   Monitor I/O + daily weights     Syncope  Assessment & Plan  Witnessed by son, was attempting to walk patient however she became weak/collapsed, no headstrike, LOC about 30 seconds, prompting son to call EMS  Home care aids noted low BP and Low O2 sats prior to this  Currently A&O x3   Suspect 2/2 acute blood loss anemia/GIB along with increasing O2 requirements resulting in orthostasis/hypoxia  Check orthostatics  Trop WNL   Echo pending  Tele NSR  PT/OT    Underweight  Assessment & Plan  POA BMI 12.33  In setting of chronic illness  Nutrition consult    Other specified hypothyroidism  Assessment & Plan  Continue synthroid 25 mcg daily     Atrial fibrillation (HCC)  Assessment & Plan  Rate control: digoxin 125 mcg every other day (last taken 11/24)  Dig level 0.7  Rhythm: Cardizem 240 mg daily   OAC: warfarin - HOLD due to supratherapeutic INR (5.4)  Monitor daily PT/INR  Consider discontinuing warfarin, risk vs benefit discussions with GI and cards    Chronic obstructive pulmonary disease ([address])  Assessment & Plan  Chronically on 2L, currently requiring 4-5  Suspect CHF > COPD exacerbation  Home regimen: Spiriva inhaler + pumicort nebs BID + duonebs TID daily   Continue ATC nebs currently                VTE Pharmacologic Prophylaxis: VTE Score: 3 Moderate Risk (Score 3-4) - Pharmacological DVT Prophylaxis Contraindicated. Sequential Compression Devices Ordered. Mobility:   Basic Mobility Inpatient Raw Score: 16  JH-HLM Goal: 5: Stand one or more mins  JH-HLM Achieved: 5: Stand (1 or more minutes)  HLM Goal achieved. Continue to encourage appropriate mobility. Patient Centered Rounds: I performed bedside rounds with nursing staff today. Discussions with Specialists or Other Care Team Provider: Cards and GI     Education and Discussions with Family / Patient: Updated  (son) via phone.     Total Time Spent on Date of Encounter in care of patient: 30 mins. This time was spent on one or more of the following: performing physical exam; counseling and coordination of care; obtaining or reviewing history; documenting in the medical record; reviewing/ordering tests, medications or procedures; communicating with other healthcare professionals and discussing with patient's family/caregivers. Current Length of Stay: 2 day(s)  Current Patient Status: Inpatient   Certification Statement: The patient will continue to require additional inpatient hospital stay due to C scope   Discharge Plan: Anticipate discharge in 24-48 hrs to discharge location to be determined pending rehab evaluations. Code Status: Level 2 - DNAR: but accepts endotracheal intubation    Subjective:   Patient reports feeling fatigued however denies any specific CP or SOB. No recurrent bloody Bms. Objective:     Vitals:   Temp (24hrs), Av °F (36.7 °C), Min:97.4 °F (36.3 °C), Max:98.3 °F (36.8 °C)    Temp:  [97.4 °F (36.3 °C)-98.3 °F (36.8 °C)] 98.3 °F (36.8 °C)  HR:  [65-73] 73  Resp:  [16-25] 25  BP: (138-154)/(63-67) 154/67  SpO2:  [93 %-100 %] 93 %  Body mass index is 18.04 kg/m². Input and Output Summary (last 24 hours): Intake/Output Summary (Last 24 hours) at 2023 1629  Last data filed at 2023 1513  Gross per 24 hour   Intake 480 ml   Output 1354 ml   Net -874 ml       Physical Exam:   Physical Exam  Vitals and nursing note reviewed. Constitutional:       General: She is not in acute distress. Appearance: She is well-developed. She is ill-appearing. HENT:      Head: Normocephalic and atraumatic. Eyes:      General:         Right eye: No discharge. Left eye: No discharge. Extraocular Movements: Extraocular movements intact. Conjunctiva/sclera: Conjunctivae normal.   Cardiovascular:      Rate and Rhythm: Normal rate and regular rhythm. Heart sounds: No murmur heard.   Pulmonary:      Effort: Pulmonary effort is normal. No respiratory distress. Breath sounds: Rhonchi present. No wheezing or rales. Abdominal:      General: Bowel sounds are normal. There is no distension. Palpations: Abdomen is soft. Tenderness: There is no abdominal tenderness. Musculoskeletal:      Cervical back: Neck supple. Right lower leg: Edema present. Left lower leg: Edema present. Comments: R>L LE edema    Skin:     General: Skin is warm and dry. Capillary Refill: Capillary refill takes less than 2 seconds. Neurological:      General: No focal deficit present. Mental Status: She is alert and oriented to person, place, and time. Mental status is at baseline. Cranial Nerves: No cranial nerve deficit.    Psychiatric:         Mood and Affect: Mood normal.         Behavior: Behavior normal.          Additional Data:     Labs:  Results from last 7 days   Lab Units 11/26/23  0456   WBC Thousand/uL 9.36   HEMOGLOBIN g/dL 8.0*   HEMATOCRIT % 25.8*   PLATELETS Thousands/uL 281   NEUTROS PCT % 78*   LYMPHS PCT % 11*   MONOS PCT % 8   EOS PCT % 1     Results from last 7 days   Lab Units 11/26/23  0456 11/25/23  0024   SODIUM mmol/L 142 143   POTASSIUM mmol/L 3.3* 3.9   CHLORIDE mmol/L 93* 97   CO2 mmol/L >45* 41*   BUN mg/dL 22 29*   CREATININE mg/dL 0.59* 0.75   ANION GAP mmol/L  --  5   CALCIUM mg/dL 8.2* 8.1*   ALBUMIN g/dL  --  3.0*   TOTAL BILIRUBIN mg/dL  --  0.38   ALK PHOS U/L  --  165*   ALT U/L  --  10   AST U/L  --  18   GLUCOSE RANDOM mg/dL 93 110     Results from last 7 days   Lab Units 11/26/23  0456   INR  1.04     Results from last 7 days   Lab Units 11/25/23  1240 11/25/23  0629 11/25/23  0022   POC GLUCOSE mg/dl 97 102 134         Results from last 7 days   Lab Units 11/25/23  0024 11/24/23  1426   PROCALCITONIN ng/ml 0.43* 0.41*       Lines/Drains:  Invasive Devices       Peripheral Intravenous Line  Duration             Peripheral IV 11/24/23 Left Antecubital 2 days Peripheral IV 11/25/23 Proximal;Right;Ventral (anterior) Forearm <1 day              Drain  Duration             External Urinary Catheter 1 day                      Telemetry:  Telemetry Orders (From admission, onward)               24 Hour Telemetry Monitoring  Continuous x 24 Hours (Telem)        Expiring   Question:  Reason for 24 Hour Telemetry  Answer:  Decompensated CHF- and any one of the following: continuous diuretic infusion or total diuretic dose >200 mg daily, associated electrolyte derangement (I.e. K < 3.0), ionotropic drip (continuous infusion), hx of ventricular arrhythmia, or new EF < 35%                     Telemetry Reviewed: Normal Sinus Rhythm  Indication for Continued Telemetry Use: Acute CHF on >200 mg lasix/day or equivalent dose or with new reduced EF. Imaging: No pertinent imaging reviewed.     Recent Cultures (last 7 days):         Last 24 Hours Medication List:   Current Facility-Administered Medications   Medication Dose Route Frequency Provider Last Rate    acetaminophen  650 mg Oral Q6H PRN Lizzy Escobar PA-C      albuterol  2.5 mg Nebulization Q6H PRN Tin Tabares MD      bisacodyl  [facility]      budesonide  0.5 mg Nebulization Q12H Lizzy Escobar PA-C      diltiazem  240 mg Oral Daily Lizzy Murray PA-C      ferrous sulfate  325 mg Oral Daily With Breakfast Lizzy Escobar PA-C      formoterol  20 mcg Nebulization Q12H Lizzy Escobar PA-C      ipratropium  0.5 mg Nebulization TID Tin Tabares MD      levalbuterol  1.25 mg Nebulization TID Tin Tabares MD      levothyroxine  25 mcg Oral Early Morning Lizzy Murray PA-C      mirtazapine  7.5 mg Oral HS Lizzy Escobar PA-C      pantoprazole  40 mg Intravenous Q12H [facility] Lianet Murray PA-C      sertraline  50 mg Oral Daily Lizzy Murray PA-C          Today, Patient Was Seen By: Edwina Tolbert PA-C    **Please Note: This note may have been constructed using a voice recognition system. **

## 2023-11-26 NOTE — ASSESSMENT & PLAN NOTE
Wt Readings from Last 3 Encounters:   11/26/23 50.7 kg (111 lb 12.4 oz)   10/20/23 52.2 kg (115 lb 2 oz)   10/05/23 53.1 kg (117 lb)   Appears volume overloaded, increasing edema per son x last several days.  O2 requirement 4-5 L on admission, baseline is 2L   Outpatient lasix was changed from 20 mg daily to 40 mg daily about 4 weeks ago  CXR pending final read however appears congested  Echo 2/2023: EF 65%, G1DD  Cardiology consult   Repeat TTE pending  S/p 1 dose of IV Lasix 40 mg today  Monitor I/O + daily weights

## 2023-11-26 NOTE — ASSESSMENT & PLAN NOTE
In setting of acute GIB on warfarin with supratherapeutic INR   Iron panel: Iron 36, Iron Sat 23, TIBC 156 Ferritin 111  Hold warfarin  11/24: 1 unit PRBC  11/25: 1 unit PRBC  Hgb 6 > 5 > 8.9 > 8.7  Maintain hgb > 7  See above

## 2023-11-27 ENCOUNTER — APPOINTMENT (INPATIENT)
Dept: RADIOLOGY | Facility: HOSPITAL | Age: 83
DRG: 291 | End: 2023-11-27
Payer: MEDICARE

## 2023-11-27 ENCOUNTER — APPOINTMENT (INPATIENT)
Dept: GASTROENTEROLOGY | Facility: HOSPITAL | Age: 83
DRG: 291 | End: 2023-11-27
Payer: MEDICARE

## 2023-11-27 ENCOUNTER — ANESTHESIA EVENT (INPATIENT)
Dept: GASTROENTEROLOGY | Facility: HOSPITAL | Age: 83
End: 2023-11-27
Payer: MEDICARE

## 2023-11-27 ENCOUNTER — APPOINTMENT (INPATIENT)
Dept: NON INVASIVE DIAGNOSTICS | Facility: HOSPITAL | Age: 83
DRG: 291 | End: 2023-11-27
Payer: MEDICARE

## 2023-11-27 ENCOUNTER — ANESTHESIA (INPATIENT)
Dept: GASTROENTEROLOGY | Facility: HOSPITAL | Age: 83
End: 2023-11-27
Payer: MEDICARE

## 2023-11-27 LAB
ANION GAP SERPL CALCULATED.3IONS-SCNC: 3 MMOL/L
AORTIC ROOT: 3.2 CM
AORTIC VALVE MEAN VELOCITY: 13 M/S
APICAL FOUR CHAMBER EJECTION FRACTION: 60 %
ASCENDING AORTA: 3.3 CM
AV AREA BY CONTINUOUS VTI: 1.8 CM2
AV AREA PEAK VELOCITY: 1.7 CM2
AV LVOT MEAN GRADIENT: 2 MMHG
AV LVOT PEAK GRADIENT: 5 MMHG
AV MEAN GRADIENT: 8 MMHG
AV PEAK GRADIENT: 17 MMHG
AV VALVE AREA: 1.77 CM2
AV VELOCITY RATIO: 0.54
BASOPHILS # BLD AUTO: 0.01 THOUSANDS/ÂΜL (ref 0–0.1)
BASOPHILS NFR BLD AUTO: 0 % (ref 0–1)
BUN SERPL-MCNC: 15 MG/DL (ref 5–25)
CALCIUM SERPL-MCNC: 8.3 MG/DL (ref 8.4–10.2)
CHLORIDE SERPL-SCNC: 95 MMOL/L (ref 96–108)
CO2 SERPL-SCNC: 44 MMOL/L (ref 21–32)
CREAT SERPL-MCNC: 0.5 MG/DL (ref 0.6–1.3)
DOP CALC AO PEAK VEL: 2.04 M/S
DOP CALC AO VTI: 42.22 CM
DOP CALC LVOT AREA: 3.14 CM2
DOP CALC LVOT CARDIAC INDEX: 3.66 L/MIN/M2
DOP CALC LVOT CARDIAC OUTPUT: 5.78 L/MIN
DOP CALC LVOT DIAMETER: 2 CM
DOP CALC LVOT PEAK VEL VTI: 23.74 CM
DOP CALC LVOT PEAK VEL: 1.11 M/S
DOP CALC LVOT STROKE INDEX: 47.5 ML/M2
DOP CALC LVOT STROKE VOLUME: 74.54
DOP CALC MV VTI: 46.94 CM
E WAVE DECELERATION TIME: 271 MS
E/A RATIO: 0.77
EOSINOPHIL # BLD AUTO: 0.13 THOUSAND/ÂΜL (ref 0–0.61)
EOSINOPHIL NFR BLD AUTO: 2 % (ref 0–6)
ERYTHROCYTE [DISTWIDTH] IN BLOOD BY AUTOMATED COUNT: 19.1 % (ref 11.6–15.1)
FRACTIONAL SHORTENING: 43 (ref 28–44)
GFR SERPL CREATININE-BSD FRML MDRD: 89 ML/MIN/1.73SQ M
GLUCOSE SERPL-MCNC: 82 MG/DL (ref 65–140)
HCT VFR BLD AUTO: 29.4 % (ref 34.8–46.1)
HGB BLD-MCNC: 8.7 G/DL (ref 11.5–15.4)
IMM GRANULOCYTES # BLD AUTO: 0.06 THOUSAND/UL (ref 0–0.2)
IMM GRANULOCYTES NFR BLD AUTO: 1 % (ref 0–2)
INTERVENTRICULAR SEPTUM IN DIASTOLE (PARASTERNAL SHORT AXIS VIEW): 1.1 CM
INTERVENTRICULAR SEPTUM: 1.1 CM (ref 0.6–1.1)
LAAS-AP2: 25.4 CM2
LAAS-AP4: 19.9 CM2
LEFT ATRIUM AREA SYSTOLE SINGLE PLANE A4C: 18.7 CM2
LEFT ATRIUM SIZE: 3.7 CM
LEFT ATRIUM VOLUME (MOD BIPLANE): 81 ML
LEFT ATRIUM VOLUME INDEX (MOD BIPLANE): 51.3 ML/M2
LEFT INTERNAL DIMENSION IN SYSTOLE: 2.4 CM (ref 2.1–4)
LEFT VENTRICULAR INTERNAL DIMENSION IN DIASTOLE: 4.2 CM (ref 3.5–6)
LEFT VENTRICULAR POSTERIOR WALL IN END DIASTOLE: 1 CM
LEFT VENTRICULAR STROKE VOLUME: 58 ML
LVSV (TEICH): 58 ML
LYMPHOCYTES # BLD AUTO: 0.72 THOUSANDS/ÂΜL (ref 0.6–4.47)
LYMPHOCYTES NFR BLD AUTO: 10 % (ref 14–44)
MCH RBC QN AUTO: 31.2 PG (ref 26.8–34.3)
MCHC RBC AUTO-ENTMCNC: 29.6 G/DL (ref 31.4–37.4)
MCV RBC AUTO: 105 FL (ref 82–98)
MONOCYTES # BLD AUTO: 0.6 THOUSAND/ÂΜL (ref 0.17–1.22)
MONOCYTES NFR BLD AUTO: 9 % (ref 4–12)
MV E'TISSUE VEL-SEP: 7 CM/S
MV MEAN GRADIENT: 6 MMHG
MV PEAK A VEL: 1.74 M/S
MV PEAK E VEL: 134 CM/S
MV PEAK GRADIENT: 16 MMHG
MV STENOSIS PRESSURE HALF TIME: 79 MS
MV VALVE AREA BY CONTINUITY EQUATION: 1.59 CM2
MV VALVE AREA P 1/2 METHOD: 2.78
NEUTROPHILS # BLD AUTO: 5.56 THOUSANDS/ÂΜL (ref 1.85–7.62)
NEUTS SEG NFR BLD AUTO: 78 % (ref 43–75)
NRBC BLD AUTO-RTO: 0 /100 WBCS
PLATELET # BLD AUTO: 245 THOUSANDS/UL (ref 149–390)
PMV BLD AUTO: 9 FL (ref 8.9–12.7)
POTASSIUM SERPL-SCNC: 4 MMOL/L (ref 3.5–5.3)
RA PRESSURE ESTIMATED: 3 MMHG
RBC # BLD AUTO: 2.79 MILLION/UL (ref 3.81–5.12)
RIGHT ATRIUM AREA SYSTOLE A4C: 15 CM2
RV PSP: 41 MMHG
SL CV LEFT ATRIUM LENGTH A2C: 5.4 CM
SL CV LV EF: 60
SL CV PED ECHO LEFT VENTRICLE DIASTOLIC VOLUME (MOD BIPLANE) 2D: 77 ML
SL CV PED ECHO LEFT VENTRICLE SYSTOLIC VOLUME (MOD BIPLANE) 2D: 19 ML
SL CV TR MAX PG ANTEGRADE: 36 MMHG
SODIUM SERPL-SCNC: 142 MMOL/L (ref 135–147)
TR MAX PG: 38 MMHG
TR PEAK VELOCITY: 3.1 M/S
TRICUSPID ANNULAR PLANE SYSTOLIC EXCURSION: 2.6 CM
TRICUSPID VALVE PEAK REGURGITATION VELOCITY: 3.08 M/S
TV MEAN GRADIENT: 26 MMHG
TV MV D: 2.42 M/S
TV VALVE AREA BY CONTINUITY EQUATION: 0.85 CM2
TV VTI: 87.25 CM
WBC # BLD AUTO: 7.08 THOUSAND/UL (ref 4.31–10.16)

## 2023-11-27 PROCEDURE — 97530 THERAPEUTIC ACTIVITIES: CPT

## 2023-11-27 PROCEDURE — 80048 BASIC METABOLIC PNL TOTAL CA: CPT

## 2023-11-27 PROCEDURE — 93306 TTE W/DOPPLER COMPLETE: CPT | Performed by: INTERNAL MEDICINE

## 2023-11-27 PROCEDURE — 99232 SBSQ HOSP IP/OBS MODERATE 35: CPT | Performed by: PHYSICIAN ASSISTANT

## 2023-11-27 PROCEDURE — 97167 OT EVAL HIGH COMPLEX 60 MIN: CPT

## 2023-11-27 PROCEDURE — 45378 DIAGNOSTIC COLONOSCOPY: CPT | Performed by: INTERNAL MEDICINE

## 2023-11-27 PROCEDURE — 85025 COMPLETE CBC W/AUTO DIFF WBC: CPT

## 2023-11-27 PROCEDURE — 93306 TTE W/DOPPLER COMPLETE: CPT

## 2023-11-27 PROCEDURE — 0DJD8ZZ INSPECTION OF LOWER INTESTINAL TRACT, VIA NATURAL OR ARTIFICIAL OPENING ENDOSCOPIC: ICD-10-PCS | Performed by: INTERNAL MEDICINE

## 2023-11-27 PROCEDURE — C9113 INJ PANTOPRAZOLE SODIUM, VIA: HCPCS

## 2023-11-27 PROCEDURE — 71045 X-RAY EXAM CHEST 1 VIEW: CPT

## 2023-11-27 PROCEDURE — 94760 N-INVAS EAR/PLS OXIMETRY 1: CPT

## 2023-11-27 PROCEDURE — 99232 SBSQ HOSP IP/OBS MODERATE 35: CPT | Performed by: INTERNAL MEDICINE

## 2023-11-27 PROCEDURE — 94640 AIRWAY INHALATION TREATMENT: CPT

## 2023-11-27 RX ORDER — LIDOCAINE HYDROCHLORIDE 10 MG/ML
INJECTION, SOLUTION EPIDURAL; INFILTRATION; INTRACAUDAL; PERINEURAL AS NEEDED
Status: DISCONTINUED | OUTPATIENT
Start: 2023-11-27 | End: 2023-11-27

## 2023-11-27 RX ORDER — FUROSEMIDE 10 MG/ML
40 INJECTION INTRAMUSCULAR; INTRAVENOUS ONCE
Status: COMPLETED | OUTPATIENT
Start: 2023-11-27 | End: 2023-11-27

## 2023-11-27 RX ORDER — POTASSIUM CHLORIDE 20 MEQ/1
20 TABLET, EXTENDED RELEASE ORAL ONCE
Status: COMPLETED | OUTPATIENT
Start: 2023-11-27 | End: 2023-11-27

## 2023-11-27 RX ORDER — PROPOFOL 10 MG/ML
INJECTION, EMULSION INTRAVENOUS AS NEEDED
Status: DISCONTINUED | OUTPATIENT
Start: 2023-11-27 | End: 2023-11-27

## 2023-11-27 RX ORDER — FUROSEMIDE 10 MG/ML
40 INJECTION INTRAMUSCULAR; INTRAVENOUS
Status: DISCONTINUED | OUTPATIENT
Start: 2023-11-27 | End: 2023-11-29

## 2023-11-27 RX ORDER — SODIUM CHLORIDE 9 MG/ML
INJECTION, SOLUTION INTRAVENOUS CONTINUOUS PRN
Status: DISCONTINUED | OUTPATIENT
Start: 2023-11-27 | End: 2023-11-27

## 2023-11-27 RX ADMIN — SODIUM CHLORIDE: 0.9 INJECTION, SOLUTION INTRAVENOUS at 14:04

## 2023-11-27 RX ADMIN — MIRTAZAPINE 7.5 MG: 15 TABLET, FILM COATED ORAL at 21:17

## 2023-11-27 RX ADMIN — FORMOTEROL FUMARATE DIHYDRATE 20 MCG: 20 SOLUTION RESPIRATORY (INHALATION) at 08:13

## 2023-11-27 RX ADMIN — IPRATROPIUM BROMIDE 0.5 MG: 0.5 SOLUTION RESPIRATORY (INHALATION) at 20:18

## 2023-11-27 RX ADMIN — FUROSEMIDE 40 MG: 10 INJECTION, SOLUTION INTRAMUSCULAR; INTRAVENOUS at 16:10

## 2023-11-27 RX ADMIN — LEVALBUTEROL HYDROCHLORIDE 1.25 MG: 1.25 SOLUTION RESPIRATORY (INHALATION) at 08:13

## 2023-11-27 RX ADMIN — LEVOTHYROXINE SODIUM 25 MCG: 25 TABLET ORAL at 05:04

## 2023-11-27 RX ADMIN — LEVALBUTEROL HYDROCHLORIDE 1.25 MG: 1.25 SOLUTION RESPIRATORY (INHALATION) at 20:18

## 2023-11-27 RX ADMIN — PROPOFOL 80 MCG/KG/MIN: 10 INJECTION, EMULSION INTRAVENOUS at 14:05

## 2023-11-27 RX ADMIN — PANTOPRAZOLE SODIUM 40 MG: 40 INJECTION, POWDER, FOR SOLUTION INTRAVENOUS at 21:19

## 2023-11-27 RX ADMIN — POTASSIUM CHLORIDE 20 MEQ: 1500 TABLET, EXTENDED RELEASE ORAL at 16:10

## 2023-11-27 RX ADMIN — IPRATROPIUM BROMIDE 0.5 MG: 0.5 SOLUTION RESPIRATORY (INHALATION) at 08:13

## 2023-11-27 RX ADMIN — FUROSEMIDE 40 MG: 10 INJECTION, SOLUTION INTRAMUSCULAR; INTRAVENOUS at 11:07

## 2023-11-27 RX ADMIN — BUDESONIDE 0.5 MG: 0.5 INHALANT RESPIRATORY (INHALATION) at 20:18

## 2023-11-27 RX ADMIN — PROPOFOL 60 MG: 10 INJECTION, EMULSION INTRAVENOUS at 14:04

## 2023-11-27 RX ADMIN — FORMOTEROL FUMARATE DIHYDRATE 20 MCG: 20 SOLUTION RESPIRATORY (INHALATION) at 20:18

## 2023-11-27 RX ADMIN — PANTOPRAZOLE SODIUM 40 MG: 40 INJECTION, POWDER, FOR SOLUTION INTRAVENOUS at 09:33

## 2023-11-27 RX ADMIN — BUDESONIDE 0.5 MG: 0.5 INHALANT RESPIRATORY (INHALATION) at 08:13

## 2023-11-27 RX ADMIN — LIDOCAINE HYDROCHLORIDE 25 MG: 10 INJECTION, SOLUTION EPIDURAL; INFILTRATION; INTRACAUDAL; PERINEURAL at 14:04

## 2023-11-27 NOTE — CASE MANAGEMENT
Case Management Assessment & Discharge Planning Note    Patient name Francois Dong  Location  SDU/-01 S* MRN [id number]  : 1940 Date 2023       Current Admission Date: 2023  Current Admission Diagnosis:GIB (gastrointestinal bleeding)   Patient Active Problem List    Diagnosis Date Noted    Underweight 2023    Acute blood loss anemia 2023    Syncope 2023    Acute on chronic diastolic CHF (congestive heart failure) ([address]) 2023    Compression fracture of lumbar vertebra ([address]) 2023    Status post hip hemiarthroplasty 2023    Acute postoperative pulmonary insufficiency ([address]) 2023    Bilateral pleural effusion 2023    Acute on chronic respiratory failure with hypoxia and hypercapnia ([address]) 2023    GIB (gastrointestinal bleeding) 2023    Cerebral aneurysm, nonruptured 2023    Abdominal distention/back pain 2023    MDD with persistant bereavement complex 2023    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    Thyroid nodule 2023    Severe protein-calorie malnutrition ([address]) 2023    Unspecified mood (affective) disorder ([address]) 2023    Failure to thrive in adult 2023    Pulmonary nodule 02/15/2023    Onychomycosis 02/15/2023    Anxiety 02/10/2023    Other specified hypothyroidism 2022    Peripheral edema 2021    Low back pain without sciatica 2021    Chronic obstructive pulmonary disease ([address]) 2017    Hypertension 2017    Hypercoagulable state ([address]) 2017    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    Vitamin D deficiency 2016    Osteoporosis 2015    Antiphospholipid antibody syndrome ([address]) 2012    Atrial fibrillation ([address]) 2012    Hereditary hemolytic anemia ([address]) 2012    Asthma 2012      LOS (days): 3  Geometric Mean LOS (GMLOS) (days): 3.90  Days to GMLOS:0.9     OBJECTIVE:    Risk of Unplanned Readmission Score: 33.3 Current admission status: Inpatient       Preferred Pharmacy:   [address] [facility] [address] [facility], [address]  [address] [place] [address]  Phone: [phone] Fax: [address], [address]  [facility] [address]  Phone: [phone] Fax: [phone]    Primary Care Provider: Jelena Ashby MD    Primary Insurance: MEDICARE  Secondary Insurance: BLUE CROSS    ASSESSMENT:  [address] [facility], [address] - Child   Primary Phone: [phone] [facility])  Work Phone: [phone]                 Advance Directives  Does patient have a [address]?: Yes  Does patient have Advance Directives?: Yes  Advance Directives: Living will, Power of  for health care  Primary Contact: Lisa Velasquez: son: [phone]    Readmission Root Cause  30 Day Readmission: No    Patient Information  Admitted from[de-identified] Home  Mental Status: Alert  During Assessment patient was accompanied by: Son  Assessment information provided by[de-identified] Patient, Son  Primary Caregiver: Self  Support Systems: Self, Children (caregivers)  [facility]: [address] [place] [address] do you live in?: [facility] entry access options.  Select all that apply.: Stairs  Number of steps to enter home.: 2 (6 steps to upper level, 6 steps to lower level)  Do the steps have railings?: Yes  Type of Current Residence: Bi-level  Upon entering residence, is there a bedroom on the main floor (no further steps)?: Yes  Upon entering residence, is there a bathroom on the main floor (no further steps)?: Yes  Living Arrangements: Lives Alone  Is patient a ?: No    Activities of Daily Living Prior to Admission  Functional Status: Independent  Completes ADLs independently?: Yes  Ambulates independently?: Yes (ambulates with walker)  Does patient use assisted devices?: Yes  Assisted Devices (DME) used: Heena Treadwell  Does patient currently own DME?: Yes  What DME does the patient currently own?: Walker, Portable Oxygen tanks, Home Oxygen concentrator, Nebulizer  Does patient have a history of Outpatient Therapy (PT/OT)?: No  Does the patient have a history of Short-Term Rehab?: Yes  Does patient have a history of HHC?: Yes  Does patient currently have [address]?: Yes ([address] [facility])    Current [address] [facility]  Type of Current Home Care Services: Home PT, Nurse visit  [address][de-identified] [facility] Provider[de-identified] PCP    Patient Information Continued  Income Source: Pension/jail  Does patient have prescription coverage?: Yes  Does patient receive dialysis treatments?: No  Does patient have a history of substance abuse?: No  Does patient have a history of Mental Health Diagnosis?: No         Means of Transportation  Means of Transport to Appts[de-identified] Family transport      Housing Stability: Low Risk  (8/4/2023)    Housing Stability Vital Sign     Unable to Pay for Housing in the Last Year: No     Number of [address] in the Last Year: 1     Unstable Housing in the Last Year: No   Food Insecurity: No Food Insecurity (8/4/2023)    Hunger Vital Sign     Worried About Running Out of Food in the Last Year: Never true     [address] in the Last Year: Never true   Transportation Needs: No Transportation Needs (8/4/2023)    PRAPARE - Transportation     Lack of Transportation (Medical): No     Lack of Transportation (Non-Medical): No   Utilities: Not on file       DISCHARGE DETAILS:    Discharge planning discussed with[de-identified] patient and patient's son: Mariella Moon of Choice: Yes  Comments - Freedom of Choice: May need STR vs VNA/home PT/OT  CM contacted family/caregiver?: No- see comments (Pt's son at bedside)    Contacts  Patient Contacts: Kathy Michael: son  Relationship to Patient[de-identified] Family  Contact Method: Phone  Phone Number: [phone]  Reason/Outcome: Emergency Contact, Discharge Planning      Additional Comments: Met with Pt.  Pt presents AA&Ox3. Pt's son(Solomon) at bedside. Discussed role of case management. Pt lives alone in bilevel, 2 stteps to enter with railings, 6 steps to upper level, 6 steps to lower level. Ambulates with walker. Has home oxygen, nebulizer through [address]. Hx of SNF at [facility], [address], [employer]. Hx of [facility] and currently has [facility]. Family provides transportation and grocery shopping. Referral sent to [facility] via [address] Await PT/OT recs for discharge planning. CM to follow.

## 2023-11-27 NOTE — ANESTHESIA PREPROCEDURE EVALUATION
Procedure:  COLONOSCOPY    Relevant Problems   CARDIO   (+) Atrial fibrillation (HCC)   (+) Hypertension      ENDO   (+) Other specified hypothyroidism      GI/HEPATIC   (+) GIB (gastrointestinal bleeding)      HEMATOLOGY   (+) Acute blood loss anemia   (+) Hereditary hemolytic anemia (HCC)   (+) Hypercoagulable state (HCC)   (+) 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HCC)      MUSCULOSKELETAL   (+) Low back pain without sciatica      NEURO/PSYCH   (+) Anxiety      PULMONARY   (+) Acute on chronic respiratory failure with hypoxia and hypercapnia (HCC)   (+) Asthma   (+) Bilateral pleural effusion   (+) Chronic obstructive pulmonary disease (HCC)      Cardiovascular and Mediastinum   (+) Acute on chronic diastolic CHF (congestive heart failure) (HCC)      Musculoskeletal and Integument   (+) Compression fracture of lumbar vertebra (HCC)      Hematopoietic and Hemostatic   (+) Antiphospholipid antibody syndrome (HCC)      Other   (+) Underweight     Labs:   Results from last 7 days   Lab Units 11/27/23 0448 11/26/23  1833 11/26/23  0456 11/25/23  2015 11/25/23  1150 11/25/23  0024 11/24/23  1426   WBC Thousand/uL 7.08  --  9.36  --   --  12.51* 13.74*   HEMOGLOBIN g/dL 8.7* 8.3* 8.0* 8.1* 8.9* 5.0* 6.0*   HEMATOCRIT % 29.4* 27.8* 25.8* 26.3* 28.2* 16.6* 20.6*   PLATELETS Thousands/uL 245  --  281  --   --  310 369   NEUTROS PCT % 78*  --  78*  --   --  87* 87*   MONOS PCT % 9  --  8  --   --  5 7   EOS PCT % 2  --  1  --   --  0 0     Results from last 7 days   Lab Units 11/27/23  0448 11/26/23  0456 11/25/23  0024 11/24/23  1426 11/24/23  1423   SODIUM mmol/L 142 142 143 142  --    POTASSIUM mmol/L 4.0 3.3* 3.9 4.9  --    CHLORIDE mmol/L 95* 93* 97 99  --    CO2 mmol/L 44* >45* 41* 41*  --    CO2, I-STAT mmol/L  --   --   --   --  >45*   ANION GAP mmol/L 3  --  5 2  --    BUN mg/dL 15 22 29* 32*  --    CREATININE mg/dL 0.50* 0.59* 0.75 0.80  --    EGFR ml/min/1.73sq m 89 84 73 67  --    CALCIUM mg/dL 8.3* 8.2* 8.1* 8.6  --    GLUCOSE RANDOM mg/dL 82 93 110 152*  --    ALT U/L  --   --  10 12  --    AST U/L  --   --  18 19  --    ALK PHOS U/L  --   --  165* 187*  --    ALBUMIN g/dL  --   --  3.0* 3.2*  --    TOTAL BILIRUBIN mg/dL  --   --  0.38 0.39  --      Results from last 7 days   Lab Units 11/26/23  0456   MAGNESIUM mg/dL 2.1      Results from last 7 days   Lab Units 11/26/23  0456 11/25/23  1148 11/24/23  1426   INR  1.04 1.02 5.41*      Results from last 7 days   Lab Units 11/26/23  1007   PH OSCAR  7.499*   PCO2 OSCAR mm Hg 46.9   PO2 OSCAR mm Hg 66.9*   HCO3 OSCAR mmol/L 35.7*   BASE EXC OSCAR mmol/L 11.3     Results from last 7 days   Lab Units 11/25/23  0024 11/24/23  1426   PROCALCITONIN ng/ml 0.43* 0.41*     Type and Screen:  Results from last 7 days   Lab Units 11/24/23  1449   ABO GROUPING  O   RH FACTOR  Positive   ANTIBODY SCREEN  Positive   SPECIMEN EXPIRATION DATE  [id number]       02/28/23 0817   Echo complete w/ contrast if indicated (Final result)       Impression  No impression found. Narrative    Left Ventricle: Left ventricular cavity size is normal. Wall thickness  is mildly increased. There is moderate concentric hypertrophy. The left  ventricular ejection fraction is 65%. Systolic function is normal. Wall  motion is normal. Diastolic function is mildly abnormal, consistent with  grade I (abnormal) relaxation. Left Atrium: The atrium is mildly dilated. Aortic Valve: There is mild stenosis. Mitral Valve: There is moderate annular calcification. Tricuspid Valve: There is mild regurgitation. Physical Exam    Airway    Mallampati score: II  TM Distance: >3 FB  Neck ROM: full     Dental   Comment: edentulous     Cardiovascular  Cardiovascular exam normal    Pulmonary  Pulmonary exam normal     Other Findings  post-pubertal.      Anesthesia Plan  ASA Score- 4     Anesthesia Type- IV sedation with anesthesia with ASA Monitors.          Additional Monitors:     Airway Plan:     Comment: I discussed risks (reviewed with patient on the anesthesia consent form), benefits and alternatives of monitored sedation including the possibility under sedation to have recall or mild discomfort. .       Plan Factors-    Chart reviewed. EKG reviewed. Existing labs reviewed. Patient summary reviewed. Induction- intravenous. Postoperative Plan-     Informed Consent- Anesthetic plan and risks discussed with patient. I personally reviewed this patient with the CRNA. Discussed and agreed on the Anesthesia Plan with the CRNA. Arleth Fernando

## 2023-11-27 NOTE — PLAN OF CARE
Problem: Potential for Falls  Goal: Patient will remain free of falls  Description: INTERVENTIONS:  - Educate patient/family on patient safety including physical limitations  - Instruct patient to call for assistance with activity   - Consult OT/PT to assist with strengthening/mobility   - Keep Call bell within reach  - Keep bed low and locked with side rails adjusted as appropriate  - Keep care items and personal belongings within reach  - Initiate and maintain comfort rounds  - Make Fall Risk Sign visible to staff  - Offer Toileting every 2 Hours, in advance of need  - Initiate/Maintain bed alarm  - Obtain necessary fall risk management equipment  - Apply yellow socks and bracelet for high fall risk patients  - Consider moving patient to room near nurses station  Outcome: Progressing     Problem: PAIN - ADULT  Goal: Verbalizes/displays adequate comfort level or baseline comfort level  Description: Interventions:  - Encourage patient to monitor pain and request assistance  - Assess pain using appropriate pain scale  - Administer analgesics based on type and severity of pain and evaluate response  - Implement non-pharmacological measures as appropriate and evaluate response  - Consider cultural and social influences on pain and pain management  - Notify physician/advanced practitioner if interventions unsuccessful or patient reports new pain  Outcome: Progressing     Problem: INFECTION - ADULT  Goal: Absence or prevention of progression during hospitalization  Description: INTERVENTIONS:  - Assess and monitor for signs and symptoms of infection  - Monitor lab/diagnostic results  - Monitor all insertion sites, i.e. indwelling lines, tubes, and drains  - Monitor endotracheal if appropriate and nasal secretions for changes in amount and color  - [place] appropriate cooling/warming therapies per order  - Administer medications as ordered  - Instruct and encourage patient and family to use good hand hygiene technique  - Identify and instruct in appropriate isolation precautions for identified infection/condition  Outcome: Progressing  Goal: Absence of fever/infection during neutropenic period  Description: INTERVENTIONS:  - Monitor WBC    Outcome: Progressing     Problem: SAFETY ADULT  Goal: Maintain or return to baseline ADL function  Description: INTERVENTIONS:  -  Assess patient's ability to carry out ADLs; assess patient's baseline for ADL function and identify physical deficits which impact ability to perform ADLs (bathing, care of mouth/teeth, toileting, grooming, dressing, etc.)  - Assess/evaluate cause of self-care deficits   - Assess range of motion  - Assess patient's mobility; develop plan if impaired  - Assess patient's need for assistive devices and provide as appropriate  - Encourage maximum independence but intervene and supervise when necessary  - Involve family in performance of ADLs  - Assess for home care needs following discharge   - Consider OT consult to assist with ADL evaluation and planning for discharge  - Provide patient education as appropriate  Outcome: Progressing     Problem: SAFETY ADULT  Goal: Maintains/Returns to pre admission functional level  Description: INTERVENTIONS:  - Perform AM-PAC 6 Click Basic Mobility/ Daily Activity assessment daily.  - Set and communicate daily mobility goal to care team and patient/family/caregiver. - Collaborate with rehabilitation services on mobility goals if consulted  - Perform Range of Motion 3 times a day. - Reposition patient every 2 hours.   - Dangle patient 3 times a day  - Stand patient 3 times a day  - Ambulate patient 3 times a day  - Out of bed to chair 3 times a day   - Out of bed for meals 3 times a day  - Out of bed for toileting  - Record patient progress and toleration of activity level   Outcome: Progressing     Problem: DISCHARGE PLANNING  Goal: Discharge to home or other facility with appropriate resources  Description: INTERVENTIONS:  - Identify barriers to discharge w/patient and caregiver  - Arrange for needed discharge resources and transportation as appropriate  - Identify discharge learning needs (meds, wound care, etc.)  - Arrange for interpretive services to assist at discharge as needed  - Refer to Case Management Department for coordinating discharge planning if the patient needs post-hospital services based on physician/advanced practitioner order or complex needs related to functional status, cognitive ability, or social support system  Outcome: Progressing     Problem: Knowledge Deficit  Goal: Patient/family/caregiver demonstrates understanding of disease process, treatment plan, medications, and discharge instructions  Description: Complete learning assessment and assess knowledge base.   Interventions:  - Provide teaching at level of understanding  - Provide teaching via preferred learning methods  Outcome: Progressing     Problem: CARDIOVASCULAR - ADULT  Goal: Maintains optimal cardiac output and hemodynamic stability  Description: INTERVENTIONS:  - Monitor I/O, vital signs and rhythm  - Monitor for S/S and trends of decreased cardiac output  - Administer and titrate ordered vasoactive medications to optimize hemodynamic stability  - Assess quality of pulses, skin color and temperature  - Assess for signs of decreased coronary artery perfusion  - Instruct patient to report change in severity of symptoms  Outcome: Progressing  Goal: Absence of cardiac dysrhythmias or at baseline rhythm  Description: INTERVENTIONS:  - Continuous cardiac monitoring, vital signs, obtain 12 lead EKG if ordered  - Administer antiarrhythmic and heart rate control medications as ordered  - Monitor electrolytes and administer replacement therapy as ordered  Outcome: Progressing     Problem: RESPIRATORY - ADULT  Goal: Achieves optimal ventilation and oxygenation  Description: INTERVENTIONS:  - Assess for changes in respiratory status  - Assess for changes in mentation and behavior  - Position to facilitate oxygenation and minimize respiratory effort  - Oxygen administered by appropriate delivery if ordered  - Initiate smoking cessation education as indicated  - Encourage broncho-pulmonary hygiene including cough, deep breathe, Incentive Spirometry  - Assess the need for suctioning and aspirate as needed  - Assess and instruct to report SOB or any respiratory difficulty  - Respiratory Therapy support as indicated  Outcome: Progressing     Problem: GENITOURINARY - ADULT  Goal: Maintains or returns to baseline urinary function  Description: INTERVENTIONS:  - Assess urinary function  - Encourage oral fluids to ensure adequate hydration if ordered  - Administer IV fluids as ordered to ensure adequate hydration  - Administer ordered medications as needed  - Offer frequent toileting  - Follow urinary retention protocol if ordered  Outcome: Progressing  Goal: Absence of urinary retention  Description: INTERVENTIONS:  - Assess patient’s ability to void and empty bladder  - Monitor I/O  - Bladder scan as needed  - Discuss with physician/AP medications to alleviate retention as needed  - Discuss catheterization for long term situations as appropriate  Outcome: Progressing  Goal: Urinary catheter remains patent  Description: INTERVENTIONS:  - Assess patency of urinary catheter  - If patient has a chronic hand, consider changing catheter if non-functioning  - Follow guidelines for intermittent irrigation of non-functioning urinary catheter  Outcome: Progressing     Problem: METABOLIC, FLUID AND ELECTROLYTES - ADULT  Goal: Electrolytes maintained within normal limits  Description: INTERVENTIONS:  - Monitor labs and assess patient for signs and symptoms of electrolyte imbalances  - Administer electrolyte replacement as ordered  - Monitor response to electrolyte replacements, including repeat lab results as appropriate  - Instruct patient on fluid and nutrition as appropriate  Outcome: Progressing  Goal: Fluid balance maintained  Description: INTERVENTIONS:  - Monitor labs   - Monitor I/O and WT  - Instruct patient on fluid and nutrition as appropriate  - Assess for signs & symptoms of volume excess or deficit  Outcome: Progressing  Goal: Glucose maintained within target range  Description: INTERVENTIONS:  - Monitor Blood Glucose as ordered  - Assess for signs and symptoms of hyperglycemia and hypoglycemia  - Administer ordered medications to maintain glucose within target range  - Assess nutritional intake and initiate nutrition service referral as needed  Outcome: Progressing     Problem: Nutrition/Hydration-ADULT  Goal: Nutrient/Hydration intake appropriate for improving, restoring or maintaining nutritional needs  Description: Monitor and assess patient's nutrition/hydration status for malnutrition. Collaborate with interdisciplinary team and initiate plan and interventions as ordered. Monitor patient's weight and dietary intake as ordered or per policy. Utilize nutrition screening tool and intervene as necessary. Determine patient's food preferences and provide high-protein, high-caloric foods as appropriate.      INTERVENTIONS:  - Monitor oral intake, urinary output, labs, and treatment plans  - Assess nutrition and hydration status and recommend course of action  - Evaluate amount of meals eaten  - Assist patient with eating if necessary   - Allow adequate time for meals  - Recommend/ encourage appropriate diets, oral nutritional supplements, and vitamin/mineral supplements  - Order, calculate, and assess calorie counts as needed  - Recommend, monitor, and adjust tube feedings and TPN/PPN based on assessed needs  - Assess need for intravenous fluids  - Provide specific nutrition/hydration education as appropriate  - Include patient/family/caregiver in decisions related to nutrition  Outcome: Progressing     Problem: Prexisting or High Potential for Compromised Skin Integrity  Goal: Skin integrity is maintained or improved  Description: INTERVENTIONS:  - Identify patients at risk for skin breakdown  - Assess and monitor skin integrity  - Assess and monitor nutrition and hydration status  - Monitor labs   - Assess for incontinence   - Turn and reposition patient  - Assist with mobility/ambulation  - Relieve pressure over bony prominences  - Avoid friction and shearing  - Provide appropriate hygiene as needed including keeping skin clean and dry  - Evaluate need for skin moisturizer/barrier cream  - Collaborate with interdisciplinary team   - Patient/family teaching  - Consider wound care consult   Outcome: Progressing

## 2023-11-27 NOTE — PLAN OF CARE
Problem: OCCUPATIONAL THERAPY ADULT  Goal: Performs self-care activities at highest level of function for planned discharge setting. See evaluation for individualized goals. Description: Treatment Interventions: ADL retraining, Functional transfer training, Patient/family training, Compensatory technique education, Endurance training          See flowsheet documentation for full assessment, interventions and recommendations. Note: Limitation: Decreased ADL status, Decreased self-care trans, Decreased high-level ADLs, Decreased endurance  Prognosis: Fair  Assessment: Pt is a 79 y.o. female seen for OT evaluation at [facility], admitted 11/24/2023 w/ GIB (gastrointestinal bleeding). Comorbidities affecting pt's functional performance at time of assessment include: f chronic diastolic CHF, COPD with chronic resp failure on 2L, HTN, afib on coumadin, hypothyroidism. Personal factors affecting pt at time of IE include:steps to enter environment, difficulty performing ADLS, difficulty performing IADLS , and decreased functional mobility . Prior to admission, pt was living with son in house with steps to manage. Pt was I w/  ADLS and required assist with IADLS. Upon evaluation: Pt requires min-mod Ax 1 for bed mobility, min A x 1 for functional mobility/transfers, sup for UB ADLs and mod A for LB ADLS 2* the following deficits impacting occupational performance: weakness, decreased strength, decreased balance, and decreased tolerance. Full objective findings from OT assessment regarding body systems outlined above. These impairments, as well as risk for falls  limit pt's ability to safely engage in all baseline areas of occupation and mobility. Pt to benefit from continued skilled OT tx while in the hospital to address deficits as defined above and maximize level of functional independence w ADL's and functional mobility.  Occupational Performance areas to address include: bathing/shower, toilet hygiene, dressing, and functional mobility. This evaluation required an extensive review of medical and/or therapy records and additional review of physical, cognitive and psychosocial history related to functional performance. Based upon functional performance deficits and assessments, this evaluation has been identified as a high complexity evaluation. The patient's raw score on the AM-PAC Daily Activity inpatient short form is 17, standardized score is 37.26, less than 39.4. Patients at this level are likely to benefit from DC to post-acute rehabilitation services. However please refer to therapist recommendation for discharge planning given other factors that may influence destination. At this time, OT recommendations at time of discharge are level 2 mod intensity resources.      Rehab Resource Intensity Level, OT: II (Moderate Resource Intensity)

## 2023-11-27 NOTE — ANESTHESIA POSTPROCEDURE EVALUATION
98Post-Op Assessment Note    CV Status:  Stable  Pain Score: 0    Pain management: adequate       Mental Status:  Alert and sleepy   Hydration Status:  Euvolemic   PONV Controlled:  Controlled   Airway Patency:  Patent     Post Op Vitals Reviewed: Yes    No anethesia notable event occurred.     Staff: CRNA               /71 (11/27/23 1442)    Temp   98   Pulse 87 (11/27/23 1442)   Resp   18   SpO2 97 % (11/27/23 1442) Were seen in the emergency department and identified as having a clavicular fracture.  Keep your arm in a sling, return to the emergency department immediately with any increasing or worsening symptoms, weakness, numbness, color change to your extremity.  Follow-up with orthopedics within the next 2 to 5 days.

## 2023-11-27 NOTE — PROGRESS NOTES
[facility]  Progress Note  Name: Chana Parmar  MRN: [id number]  Unit/Bed#: -01 SDU I Date of Admission: 11/24/2023   Date of Service: 11/27/2023 I Hospital Day: 3    Assessment/Plan   * GIB (gastrointestinal bleeding)  Assessment & Plan  BRBPR began 11/22, PCP recommended stopping warfarin temporarily, however persistent symptoms with large BRBPR episode witnessed by son this morning followed by syncopal episode  Hgb on admission 6 (baseline 8.5-10, most recently 9.6)  INR 5.4  S/P 1 unit FFP + IV vit K 10 mg   Repeat INR 1  GI consult  For colonoscopy today 11/27  IV protonix  Hold warfarin, trend INR   SBP stable, monitor   Trend hgb      Acute on chronic diastolic CHF (congestive heart failure) (HCC)  Assessment & Plan  Wt Readings from Last 3 Encounters:   11/26/23 50.7 kg (111 lb 12.4 oz)   10/20/23 52.2 kg (115 lb 2 oz)   10/05/23 53.1 kg (117 lb)   Appears volume overloaded, increasing edema per son x last several days.  O2 requirement 4-5 L on admission, baseline is 2L   Outpatient lasix was changed from 20 mg daily to 40 mg daily about 4 weeks ago  CXR pending final read however appears congested  Echo 2/2023: EF 65%, G1DD  Cardiology consult   Repeat TTE pending  S/p 1 dose of IV Lasix 40 mg today  Monitor I/O + daily weights     Acute on chronic respiratory failure with hypoxia and hypercapnia (HCC)  Assessment & Plan  Chronically maintained on 2L 2/2 COPD   Requiring 4-5 L on admission, SpO2 90-94%  COVID/FLU/RSV neg  CXR with venous congestion, bilateral effusions     Suspect likely 2/2 acute on chronic CHF, see below  Currently on 2-3L     Underweight  Assessment & Plan  POA BMI 12.33  In setting of chronic illness  Nutrition consult    Syncope  Assessment & Plan  Witnessed by son, was attempting to walk patient however she became weak/collapsed, no headstrike, LOC about 30 seconds, prompting son to call EMS  Home care aids noted low BP and Low O2 sats prior to this  Currently A&O x3   Suspect 2/2 acute blood loss anemia/GIB along with increasing O2 requirements resulting in orthostasis/hypoxia  Trop WNL   Echo pending  Tele NSR  PT/OT    Acute blood loss anemia  Assessment & Plan  In setting of acute GIB on warfarin with supratherapeutic INR   Iron panel: Iron 36, Iron Sat 23, TIBC 156 Ferritin 111  Hold warfarin  11/24: 1 unit PRBC  11/25: 1 unit PRBC  Hgb 6 > 5 > 8.9 > 8.7  Maintain hgb > 7  See above     Other specified hypothyroidism  Assessment & Plan  Continue synthroid 25 mcg daily     Atrial fibrillation (HCC)  Assessment & Plan  Rate control: digoxin 125 mcg every other day (last taken 11/24)  Dig level 0.7  Rhythm: Cardizem 240 mg daily   OAC: warfarin - HOLD due to supratherapeutic INR (5.4)  Monitor daily PT/INR  Consider discontinuing warfarin, risk vs benefit discussions with GI and cards    Chronic obstructive pulmonary disease ([address])  Assessment & Plan  Chronically on 2L, currently requiring 4-5  Suspect CHF > COPD exacerbation  Home regimen: Spiriva inhaler + pumicort nebs BID + duonebs TID daily   Continue ATC nebs currently            VTE Pharmacologic Prophylaxis: VTE Score: 3 Moderate Risk (Score 3-4) - Pharmacological DVT Prophylaxis Contraindicated. Sequential Compression Devices Ordered. Patient Centered Rounds: I performed bedside rounds with nursing staff today. Discussions with Specialists or Other Care Team Provider: sanjay, rn    Education and Discussions with Family / Patient: Updated  (son) at bedside. Time Spent for Care: 45 minutes. More than 50% of total time spent on counseling and coordination of care as described above.     Current Length of Stay: 3 day(s)  Current Patient Status: Inpatient   Certification Statement: The patient will continue to require additional inpatient hospital stay due to GI bleeding and anemia   Discharge Plan: Anticipate discharge in 24-48 hrs to discharge location to be determined pending rehab evaluations. Code Status: Level 2 - DNAR: but accepts endotracheal intubation    Subjective:   No further bleeding, for colonoscopy today    Objective:     Vitals:   Temp (24hrs), Av.2 °F (36.8 °C), Min:97.8 °F (36.6 °C), Max:98.6 °F (37 °C)    Temp:  [97.8 °F (36.6 °C)-98.6 °F (37 °C)] 98.4 °F (36.9 °C)  HR:  [70-76] 72  Resp:  [15-30] 30  BP: (146-178)/(64-75) 152/67  SpO2:  [93 %-100 %] 100 %  Body mass index is 18.57 kg/m². Input and Output Summary (last 24 hours): Intake/Output Summary (Last 24 hours) at 2023 1140  Last data filed at 2023 1134  Gross per 24 hour   Intake 3620 ml   Output 875 ml   Net 2745 ml       Physical Exam:   Physical Exam  Vitals and nursing note reviewed. Constitutional:       General: She is not in acute distress. Appearance: Normal appearance. She is well-developed. She is ill-appearing. HENT:      Head: Normocephalic and atraumatic. Mouth/Throat:      Mouth: Mucous membranes are moist.   Eyes:      General: No scleral icterus. Right eye: No discharge. Left eye: No discharge. Extraocular Movements: Extraocular movements intact. Conjunctiva/sclera: Conjunctivae normal.      Pupils: Pupils are equal, round, and reactive to light. Cardiovascular:      Rate and Rhythm: Normal rate and regular rhythm. Heart sounds: No murmur heard. Pulmonary:      Effort: Pulmonary effort is normal. No respiratory distress. Breath sounds: Rhonchi present. No wheezing or rales. Abdominal:      General: Bowel sounds are normal. There is no distension. Palpations: Abdomen is soft. Tenderness: There is no abdominal tenderness. Musculoskeletal:         General: No swelling. Cervical back: Neck supple. Right lower leg: Edema present. Left lower leg: Edema present. Comments: R>L LE edema    Skin:     General: Skin is warm and dry.       Capillary Refill: Capillary refill takes less than 2 seconds. Neurological:      General: No focal deficit present. Mental Status: She is alert and oriented to person, place, and time. Mental status is at baseline. Cranial Nerves: No cranial nerve deficit. Psychiatric:         Mood and Affect: Mood normal.         Behavior: Behavior normal.          Additional Data:     Labs:  Results from last 7 days   Lab Units 11/27/23  0448   WBC Thousand/uL 7.08   HEMOGLOBIN g/dL 8.7*   HEMATOCRIT % 29.4*   PLATELETS Thousands/uL 245   NEUTROS PCT % 78*   LYMPHS PCT % 10*   MONOS PCT % 9   EOS PCT % 2     Results from last 7 days   Lab Units 11/27/23  0448 11/26/23  0456 11/25/23  0024   SODIUM mmol/L 142   < > 143   POTASSIUM mmol/L 4.0   < > 3.9   CHLORIDE mmol/L 95*   < > 97   CO2 mmol/L 44*   < > 41*   BUN mg/dL 15   < > 29*   CREATININE mg/dL 0.50*   < > 0.75   ANION GAP mmol/L 3  --  5   CALCIUM mg/dL 8.3*   < > 8.1*   ALBUMIN g/dL  --   --  3.0*   TOTAL BILIRUBIN mg/dL  --   --  0.38   ALK PHOS U/L  --   --  165*   ALT U/L  --   --  10   AST U/L  --   --  18   GLUCOSE RANDOM mg/dL 82   < > 110    < > = values in this interval not displayed.      Results from last 7 days   Lab Units 11/26/23  0456   INR  1.04     Results from last 7 days   Lab Units 11/25/23  1240 11/25/23  0629 11/25/23  0022   POC GLUCOSE mg/dl 97 102 134         Results from last 7 days   Lab Units 11/25/23  0024 11/24/23  1426   PROCALCITONIN ng/ml 0.43* 0.41*       Lines/Drains:  Invasive Devices       Peripheral Intravenous Line  Duration             Peripheral IV 11/24/23 Left Antecubital 2 days    Peripheral IV 11/25/23 Proximal;Right;Ventral (anterior) Forearm 1 day              Drain  Duration             NG/OG/Enteral Tube Nasogastric 12 Fr Left nare <1 day                      Telemetry:  Telemetry Orders (From admission, onward)               24 Hour Telemetry Monitoring  Continuous x 24 Hours (Telem)        Question:  Reason for 24 Hour Telemetry  Answer:  Decompensated CHF- and any one of the following: continuous diuretic infusion or total diuretic dose >200 mg daily, associated electrolyte derangement (I.e. K < 3.0), ionotropic drip (continuous infusion), hx of ventricular arrhythmia, or new EF < 35%                     Telemetry Reviewed: Normal Sinus Rhythm  Indication for Continued Telemetry Use: Acute CHF on >200 mg lasix/day or equivalent dose or with new reduced EF. Imaging: No pertinent imaging reviewed. Recent Cultures (last 7 days):         Last 24 Hours Medication List:   Current Facility-Administered Medications   Medication Dose Route Frequency Provider Last Rate    acetaminophen  650 mg Oral Q6H PRN Sussy Escobar PA-C      albuterol  2.5 mg Nebulization Q6H PRN Dandy Valdivia MD      bisacodyl  10 [facility]      budesonide  0.5 mg Nebulization Q12H Sussy Escobar PA-C      diltiazem  240 mg Oral Daily Sussy Murray PA-C      ferrous sulfate  325 mg Oral Daily With Breakfast Sussy Escobar PA-C      formoterol  20 mcg Nebulization Q12H Sussy Escobar PA-C      ipratropium  0.5 mg Nebulization TID Dandy Valdivia MD      levalbuterol  1.25 mg Nebulization TID Dandy Valdivia MD      levothyroxine  25 mcg Oral Early Morning Sussy Murray PA-C      mirtazapine  7.5 mg Oral HS Sussy Escobar PA-C      pantoprazole  40 mg Intravenous Q12H [address] [facility] Leidy Murray PA-C      sertraline  50 mg Oral Daily [place], [place]          Today, Patient Was Seen By: Caterina Toro PA-C    **Please Note: This note may have been constructed using a voice recognition system. **

## 2023-11-27 NOTE — PROGRESS NOTES
Cardiology Progress Note   Anette Kemp 79 y.o. female MRN: [id number]    Unit/Bed#: -01 SDU Encounter: [id number]      Assessment:  Acute on chronic hypoxic respiratory failure  On 4L O2 currently  CXR: B/L pleural effusions, interstitial edema      Acute HFpEF  BRBPR/ acute blood loss anemia   HgB as low as 5 >> 8.7 today s/p 2U pRBCs  INR 5.41 on arrival >> 1.04 today s/p 1U FFP and Vit K 10mg  Paroxysmal atrial fibrillation  Longstanding hx  Has been on warfarin for 30+ years   Maintaining NSR currently   On Cardizem and warfarin as outpatient   Hypertension   Chronic peripheral edema    Prior TTE EF 65%, G1DD, mild LA dilation, mild AS, moderate MAC, mild TR    Plan:  Appears hypervolemic today with bibasilar crackles and +2 pitting LE edema. IV diuretics held over the past 48 hours. She is +2.8L overnight. Will give IV lasix 40mg x1 today and reassess. TTE pending today. Colonoscopy pending today to evaluate for acute GIB. Acceptable cardiac risk to proceed as needed. Warfarin held. Will await results. HR controlled and maintaining SR on telemetry -- continue Cardizem 240mg QD. Subjective:   Patient seen and examined. Overnight events reviewed. Objective:     Vitals: Blood pressure 152/67, pulse 72, temperature 98.4 °F (36.9 °C), temperature source Oral, resp.  rate (!) 30, height 5' 6" (1.676 m), weight 52.2 kg (115 lb 1.3 oz), SpO2 100 %, not currently breastfeeding., Body mass index is 18.57 kg/m².,   Orthostatic Blood Pressures      Flowsheet Row Most Recent Value   Blood Pressure 152/67 filed at 11/27/2023 0741   Patient Position - Orthostatic VS Lying filed at 11/27/2023 0741              Intake/Output Summary (Last 24 hours) at 11/27/2023 1139  Last data filed at 11/27/2023 1134  Gross per 24 hour   Intake 3620 ml   Output 875 ml   Net 2745 ml         Physical Exam:    GEN: Anette Kemp appears well, alert and oriented x 3, pleasant and cooperative   HEENT: Sclera anicteric, conjunctivae pink, mucous membranes moist. Oropharynx clear. NECK: supple, no significant JVD, Trachea midline, no thyromegaly. HEART: 3/6 systolic murmur, RRR, no clicks, gallops or rubs   LUNGS: B/L rales at bases. ABDOMEN: Soft, nontender, nondistended  EXTREMITIES: +2 LE edema, pitting B/L. Skin warm and well perfused, no clubbing, cyanosis  NEURO: No focal findings. Normal speech. Mood and affect normal.   SKIN: Normal without suspicious lesions on exposed skin.       Medications:      Current Facility-Administered Medications:     acetaminophen (TYLENOL) tablet 650 mg, 650 mg, Oral, Q6H PRN, Maragret Dill Fountaine, PA-C, 650 mg at 11/26/23 1507    albuterol inhalation solution 2.5 mg, 2.5 mg, Nebulization, Q6H PRN, Mila Pepper MD, 2.5 mg at 11/25/23 1017    bisacodyl (DULCOLAX) EC tablet 10 mg, 10 mg, Oral, Once, Eldon Yanes PA-C    budesonide (PULMICORT) inhalation solution 0.5 mg, 0.5 mg, Nebulization, Q12H, Margaret Dill Fountaine, PA-C, 0.5 mg at 11/27/23 0813    diltiazem (CARDIZEM CD) 24 hr capsule 240 mg, 240 mg, Oral, Daily, Margaret Dill Fountaine, PA-C, 240 mg at 11/26/23 0957    ferrous sulfate tablet 325 mg, 325 mg, Oral, Daily With Breakfast, Margaret Dill Fountaine, PA-C, 325 mg at 11/26/23 0957    formoterol (PERFOROMIST) nebulizer solution 20 mcg, 20 mcg, Nebulization, Q12H, Margaret Dill Jpaine, PA-C, 20 mcg at 11/27/23 0813    ipratropium (ATROVENT) 0.02 % inhalation solution 0.5 mg, 0.5 mg, Nebulization, TID, Mila Peppre MD, 0.5 mg at 11/27/23 0813    levalbuterol (XOPENEX) inhalation solution 1.25 mg, 1.25 mg, Nebulization, TID, Mila Pepper MD, 1.25 mg at 11/27/23 0813    levothyroxine tablet 25 mcg, 25 mcg, Oral, Early Morning, Margaret Escobar PA-C, 25 mcg at 11/27/23 0504    mirtazapine (REMERON) tablet 7.5 mg, 7.5 mg, Oral, HS, Margaret Escobar PA-C, 7.5 mg at 11/26/23 2110    pantoprazole (PROTONIX) injection 40 mg, 40 mg, Intravenous, Q12H [facility], [facility] Clari Escobar PA-C, 40 mg at 11/27/23 0933    sertraline (ZOLOFT) tablet 50 mg, 50 mg, Oral, Daily, [facility] Clari Escobar PA-C, 50 mg at 11/26/23 0957     Labs & Results:        Results from last 7 days   Lab Units 11/27/23  0448 11/26/23  1833 11/26/23  0456 11/25/23  1150 11/25/23  0024   WBC Thousand/uL 7.08  --  9.36  --  12.51*   HEMOGLOBIN g/dL 8.7* 8.3* 8.0*   < > 5.0*   HEMATOCRIT % 29.4* 27.8* 25.8*   < > 16.6*   PLATELETS Thousands/uL 245  --  281  --  310    < > = values in this interval not displayed. Results from last 7 days   Lab Units 11/27/23  0448 11/26/23  0456 11/25/23  0024 11/24/23  1426 11/24/23  1426 11/24/23  1423   POTASSIUM mmol/L 4.0 3.3* 3.9   < > 4.9  --    CHLORIDE mmol/L 95* 93* 97   < > 99  --    CO2 mmol/L 44* >45* 41*   < > 41*  --    CO2, I-STAT mmol/L  --   --   --   --   --  >45*   BUN mg/dL 15 22 29*   < > 32*  --    CREATININE mg/dL 0.50* 0.59* 0.75   < > 0.80  --    CALCIUM mg/dL 8.3* 8.2* 8.1*   < > 8.6  --    ALK PHOS U/L  --   --  165*  --  187*  --    ALT U/L  --   --  10  --  12  --    AST U/L  --   --  18  --  19  --    GLUCOSE, ISTAT mg/dl  --   --   --   --   --  150*    < > = values in this interval not displayed.      Results from last 7 days   Lab Units 11/26/23  0456 11/25/23  1148 11/24/23  1426   INR  1.04 1.02 5.41*     Results from last 7 days   Lab Units 11/26/23  0456   MAGNESIUM mg/dL 2.1

## 2023-11-27 NOTE — PLAN OF CARE
Problem: OCCUPATIONAL THERAPY ADULT  Goal: Performs self-care activities at highest level of function for planned discharge setting. See evaluation for individualized goals. Description: Treatment Interventions: ADL retraining, Functional transfer training, Patient/family training, Compensatory technique education, Endurance training          See flowsheet documentation for full assessment, interventions and recommendations. 11/27/2023 1654 by Larry Montero OT  Note: Limitation: Decreased ADL status, Decreased self-care trans, Decreased high-level ADLs, Decreased endurance  Prognosis: Fair  Assessment: Pt is a 79 y.o. female seen for OT evaluation at [facility], admitted 11/24/2023 w/ GIB (gastrointestinal bleeding). Comorbidities affecting pt's functional performance at time of assessment include: f chronic diastolic CHF, COPD with chronic resp failure on 2L, HTN, afib on coumadin, hypothyroidism. Personal factors affecting pt at time of IE include:steps to enter environment, difficulty performing ADLS, difficulty performing IADLS , and decreased functional mobility . Prior to admission, pt was living with son in house with steps to manage. Pt was I w/  ADLS and required assist with IADLS. Upon evaluation: Pt requires min-mod Ax 1 for bed mobility, min A x 1 for functional mobility/transfers, sup for UB ADLs and mod A for LB ADLS 2* the following deficits impacting occupational performance: weakness, decreased strength, decreased balance, and decreased tolerance. Full objective findings from OT assessment regarding body systems outlined above. These impairments, as well as risk for falls  limit pt's ability to safely engage in all baseline areas of occupation and mobility. Pt to benefit from continued skilled OT tx while in the hospital to address deficits as defined above and maximize level of functional independence w ADL's and functional mobility.  Occupational Performance areas to address include: bathing/shower, toilet hygiene, dressing, and functional mobility. This evaluation required an extensive review of medical and/or therapy records and additional review of physical, cognitive and psychosocial history related to functional performance. Based upon functional performance deficits and assessments, this evaluation has been identified as a high complexity evaluation. The patient's raw score on the AM-PAC Daily Activity inpatient short form is 17, standardized score is 37.26, less than 39.4. Patients at this level are likely to benefit from DC to post-acute rehabilitation services. However please refer to therapist recommendation for discharge planning given other factors that may influence destination. At this time, OT recommendations at time of discharge are level 2 mod intensity resources. Rehab Resource Intensity Level, OT: II (Moderate Resource Intensity)       11/27/2023 1653 by Jessica Diez OT  Note: Limitation: Decreased ADL status, Decreased self-care trans, Decreased high-level ADLs, Decreased endurance  Prognosis: Fair  Assessment: Pt is a 79 y.o. female seen for OT evaluation at [address] [place] [address], admitted 11/24/2023 w/ GIB (gastrointestinal bleeding). Comorbidities affecting pt's functional performance at time of assessment include: f chronic diastolic CHF, COPD with chronic resp failure on 2L, HTN, afib on coumadin, hypothyroidism. Personal factors affecting pt at time of IE include:steps to enter environment, difficulty performing ADLS, difficulty performing IADLS , and decreased functional mobility . Prior to admission, pt was living with son in house with steps to manage. Pt was I w/  ADLS and required assist with IADLS.  Upon evaluation: Pt requires min-mod Ax 1 for bed mobility, min A x 1 for functional mobility/transfers, sup for UB ADLs and mod A for LB ADLS 2* the following deficits impacting occupational performance: weakness, decreased strength, decreased balance, and decreased tolerance. Full objective findings from OT assessment regarding body systems outlined above. These impairments, as well as risk for falls  limit pt's ability to safely engage in all baseline areas of occupation and mobility. Pt to benefit from continued skilled OT tx while in the hospital to address deficits as defined above and maximize level of functional independence w ADL's and functional mobility. Occupational Performance areas to address include: bathing/shower, toilet hygiene, dressing, and functional mobility. This evaluation required an extensive review of medical and/or therapy records and additional review of physical, cognitive and psychosocial history related to functional performance. Based upon functional performance deficits and assessments, this evaluation has been identified as a high complexity evaluation. The patient's raw score on the AM-PAC Daily Activity inpatient short form is 17, standardized score is 37.26, less than 39.4. Patients at this level are likely to benefit from DC to post-acute rehabilitation services. However please refer to therapist recommendation for discharge planning given other factors that may influence destination. At this time, OT recommendations at time of discharge are level 2 mod intensity resources.      Rehab Resource Intensity Level, OT: II (Moderate Resource Intensity)

## 2023-11-27 NOTE — OCCUPATIONAL THERAPY NOTE
Occupational Therapy Evaluation & Treat     Patient Name: Asuncion Nicholas  KTOQA'Y Date: 11/27/2023  Problem List  Principal Problem:    GIB (gastrointestinal bleeding)  Active Problems:    Chronic obstructive pulmonary disease (HCC)    Atrial fibrillation ([address])    Other specified hypothyroidism    Acute on chronic respiratory failure with hypoxia and hypercapnia (HCC)    Acute blood loss anemia    Syncope    Acute on chronic diastolic CHF (congestive heart failure) (HCC)    Past Medical History  Past Medical History:   Diagnosis Date    Anti-phospholipid syndrome (HCC)     Asthma     Blood type O+     Cardiac disease     Chronic pain     COPD (chronic obstructive pulmonary disease) (HCC)     Femur fracture, left ([address]) 6/22/2023    Fracture of ankle     left    Hyperlipidemia     Hypertension     Hypokalemia 2/27/2023    Osteoporosis      Past Surgical History  Past Surgical History:   Procedure Laterality Date    APPENDECTOMY      BILATERAL OOPHORECTOMY Bilateral     ORIF TIBIAL SHAFT FRACTURE W/ PLATES AND SCREWS Right     VT HEMIARTHROPLASTY HIP PARTIAL Left 6/23/2023    Procedure: HEMIARTHROPLASTY HIP (BIPOLAR); Surgeon: Sonia Peace MD;  Location:  MAIN OR;  Service: Orthopedics           11/27/23 1122   OT Last Visit   OT Visit Date 11/27/23   Note Type   Note type Evaluation  (& Treat)   Pain Assessment   Pain Assessment Tool 0-10   Pain Score No Pain   Restrictions/Precautions   Other Precautions Fall Risk; Chair Alarm; Bed Alarm;Telemetry;O2   Home Living   Type of [address] [place] [address] Two level;Stairs to enter with rails   Bathroom Shower/Tub Tub/shower unit   [employer] Grab bars in shower; Shower chair;Hand-held shower   Bathroom Accessibility Accessible   Prior Function   Level of [place] Independent with ADLs; Independent with functional mobility; Needs assistance with IADLS   Saad With Lesa Nicolas Help From Family   IADLs Family/Friend/Other provides transportation; Family/Friend/Other provides meals; Family/Friend/Other provides medication management   Falls in the last 6 months 1 to 4   Subjective   Subjective Pt received in supine position. Pt states "I made a mess in the bed and I think I have to go again". ADL   Eating Assistance Unable to assess   Eating Deficit NPO   Grooming Assistance 7  Independent   Grooming Deficit Setup   UB Bathing Assistance 5  Supervision/Setup   LB Bathing Assistance 3  Moderate Assistance   UB Dressing Assistance 5  Supervision/Setup   LB Dressing Assistance 3  Moderate Assistance   Toileting Assistance  3  Moderate Assistance   Bed Mobility   Supine to Sit 4  Minimal assistance   Additional items Assist x 1;Verbal cues; Increased time required   Transfers   Sit to Stand 4  Minimal assistance   Additional items Assist x 1;Verbal cues   Stand to Sit 4  Minimal assistance   Additional items Assist x 1;Verbal cues   Additional items   (+ hand held assistance)   Toilet transfer 4  Minimal assistance   Additional items Assist x 1;Commode; Increased time required  (+ hand held assistance)   Activity Tolerance   Activity Tolerance Patient limited by fatigue  (Limited by orthostatic hypotenstion. Supine /78. Sitting /63. S/p Commode transfer 117/57. Pt states "It feels like my head is gonna fall off". Pt alert and oriented t/o. Kris Sutherland aware.)   Medical Staff Made Aware CIPRIANO Zee Assessment   RUE Assessment WFL   LUE Assessment   LUE Assessment WFL   Cognition   Overall Cognitive Status WFL   Arousal/Participation Alert   Attention Within functional limits   Orientation Level Oriented X4   Memory Within functional limits   Following Commands Follows all commands and directions without difficulty   Assessment   Limitation Decreased ADL status; Decreased self-care trans;Decreased high-level ADLs; Decreased endurance   Prognosis Fair   Assessment Pt is a 79 y.o. female seen for OT evaluation at [facility], admitted 11/24/2023 w/ GIB (gastrointestinal bleeding). Comorbidities affecting pt's functional performance at time of assessment include: f chronic diastolic CHF, COPD with chronic resp failure on 2L, HTN, afib on coumadin, hypothyroidism. Personal factors affecting pt at time of IE include:steps to enter environment, difficulty performing ADLS, difficulty performing IADLS , and decreased functional mobility . Prior to admission, pt was living with son in house with steps to manage. Pt was I w/  ADLS and required assist with IADLS. Upon evaluation: Pt requires min-mod Ax 1 for bed mobility, min A x 1 for functional mobility/transfers, sup for UB ADLs and mod A for LB ADLS 2* the following deficits impacting occupational performance: weakness, decreased strength, decreased balance, and decreased tolerance. Full objective findings from OT assessment regarding body systems outlined above. These impairments, as well as risk for falls  limit pt's ability to safely engage in all baseline areas of occupation and mobility. Pt to benefit from continued skilled OT tx while in the hospital to address deficits as defined above and maximize level of functional independence w ADL's and functional mobility. Occupational Performance areas to address include: bathing/shower, toilet hygiene, dressing, and functional mobility. This evaluation required an extensive review of medical and/or therapy records and additional review of physical, cognitive and psychosocial history related to functional performance. Based upon functional performance deficits and assessments, this evaluation has been identified as a high complexity evaluation. The patient's raw score on the AM-PAC Daily Activity inpatient short form is 17, standardized score is 37.26, less than 39.4. Patients at this level are likely to benefit from DC to post-acute rehabilitation services.  However please refer to therapist recommendation for discharge planning given other factors that may influence destination. At this time, OT recommendations at time of discharge are level 2 mod intensity resources. Goals   Patient Goals Pt wishes to get better   Plan   Treatment Interventions ADL retraining;Functional transfer training;Patient/family training; Compensatory technique education; Endurance training   Goal Expiration Date 12/07/23   OT Treatment Day 0   OT Frequency 3-5x/wk   Discharge Recommendation   Rehab Resource Intensity Level, OT II (Moderate Resource Intensity)   AM-PAC Daily Activity Inpatient   Lower Body Dressing 2   Bathing 2   Toileting 2   Upper Body Dressing 3   Grooming 4   Eating 4   Daily Activity Raw Score 17   Daily Activity Standardized Score (Calc for Raw Score >=11) 37.26   AM-PAC Applied Cognition Inpatient   Following a Speech/Presentation 3   Understanding Ordinary Conversation 4   Taking Medications 4   Remembering Where Things Are Placed or Put Away 4   Remembering List of 4-5 Errands 4   Taking Care of Complicated Tasks 3   Applied Cognition Raw Score 22   Applied Cognition Standardized Score 47.83   Additional Treatment Session   Start Time 1112   End Time 1122   Treatment Assessment Pt seated on commode. Requried min A x 1 to perform sit>stand. Requried mod A for pericare. Performed sit > supine with mod Ax 1. /72 once back in supine. RN present. End of Consult   Education Provided Yes   Patient Position at End of Consult Bedside chair;Bed/Chair alarm activated; All needs within reach   Nurse Communication Nurse aware of consult           Pt will achieve the following goals within 10 days.     *Pt will complete UB bathing and dressing with mod I.    *Pt will complete LB bathing and dressing with mod I.    * Pt will complete toileting w/ mod I w/ G hygiene/thoroughness using DME PRN    *Pt will complete bed mobility with mod I, with bed flat and no side rail to prep for purposeful tasks    *Pt will perform functional transfers with on/off all surfaces with mod I using DME as needed w/ G balance/safety. *Patient will verbalize 3 safety awareness/ principles to prevent falls in the home setting. *Pt will improve functional mobility during ADL/IADL/leisure tasks to mod I using DME as needed w/ G balance/safety.          Ghassan Esteves, OTR/L

## 2023-11-27 NOTE — ANESTHESIA POSTPROCEDURE EVALUATION
Post-Op Assessment Note    CV Status:  Stable  Pain Score: 0    Pain management: adequate       Mental Status:  Alert and awake   Hydration Status:  Euvolemic   PONV Controlled:  Controlled   Airway Patency:  Patent     Post Op Vitals Reviewed: Yes    No anethesia notable event occurred.     Staff: CRNA               BP      Temp      Pulse     Resp      SpO2

## 2023-11-27 NOTE — PLAN OF CARE
Problem: PAIN - ADULT  Goal: Verbalizes/displays adequate comfort level or baseline comfort level  Description: Interventions:  - Encourage patient to monitor pain and request assistance  - Assess pain using appropriate pain scale  - Administer analgesics based on type and severity of pain and evaluate response  - Implement non-pharmacological measures as appropriate and evaluate response  - Consider cultural and social influences on pain and pain management  - Notify physician/advanced practitioner if interventions unsuccessful or patient reports new pain  Outcome: Progressing     Problem: INFECTION - ADULT  Goal: Absence or prevention of progression during hospitalization  Description: INTERVENTIONS:  - Assess and monitor for signs and symptoms of infection  - Monitor lab/diagnostic results  - Monitor all insertion sites, i.e. indwelling lines, tubes, and drains  - Monitor endotracheal if appropriate and nasal secretions for changes in amount and color  - [place] appropriate cooling/warming therapies per order  - Administer medications as ordered  - Instruct and encourage patient and family to use good hand hygiene technique  - Identify and instruct in appropriate isolation precautions for identified infection/condition  Outcome: Progressing  Goal: Absence of fever/infection during neutropenic period  Description: INTERVENTIONS:  - Monitor WBC    Outcome: Progressing     Problem: DISCHARGE PLANNING  Goal: Discharge to home or other facility with appropriate resources  Description: INTERVENTIONS:  - Identify barriers to discharge w/patient and caregiver  - Arrange for needed discharge resources and transportation as appropriate  - Identify discharge learning needs (meds, wound care, etc.)  - Arrange for interpretive services to assist at discharge as needed  - Refer to Case Management Department for coordinating discharge planning if the patient needs post-hospital services based on physician/advanced practitioner order or complex needs related to functional status, cognitive ability, or social support system  Outcome: Progressing     Problem: Knowledge Deficit  Goal: Patient/family/caregiver demonstrates understanding of disease process, treatment plan, medications, and discharge instructions  Description: Complete learning assessment and assess knowledge base. Interventions:  - Provide teaching at level of understanding  - Provide teaching via preferred learning methods  Outcome: Progressing       Problem: Nutrition/Hydration-ADULT  Goal: Nutrient/Hydration intake appropriate for improving, restoring or maintaining nutritional needs  Description: Monitor and assess patient's nutrition/hydration status for malnutrition. Collaborate with interdisciplinary team and initiate plan and interventions as ordered. Monitor patient's weight and dietary intake as ordered or per policy. Utilize nutrition screening tool and intervene as necessary. Determine patient's food preferences and provide high-protein, high-caloric foods as appropriate.      INTERVENTIONS:  - Monitor oral intake, urinary output, labs, and treatment plans  - Assess nutrition and hydration status and recommend course of action  - Evaluate amount of meals eaten  - Assist patient with eating if necessary   - Allow adequate time for meals  - Recommend/ encourage appropriate diets, oral nutritional supplements, and vitamin/mineral supplements  - Order, calculate, and assess calorie counts as needed  - Recommend, monitor, and adjust tube feedings and TPN/PPN based on assessed needs  - Assess need for intravenous fluids  - Provide specific nutrition/hydration education as appropriate  - Include patient/family/caregiver in decisions related to nutrition  Outcome: Progressing

## 2023-11-27 NOTE — QUICK NOTE
Brief colonoscopy report    IMPRESSION:  Severe left-sided diverticulosis  Medium size internal hemorrhoids  No fresh or old blood         RECOMMENDATION:  Resume regular diet and medications  Restart Coumadin per cardiology   Repeat screening colonoscopy in 10 years          Kayla Le.  Sheri Stephen MD

## 2023-11-28 LAB
ABO GROUP BLD BPU: NORMAL
ANION GAP SERPL CALCULATED.3IONS-SCNC: 8 MMOL/L
BASOPHILS # BLD AUTO: 0.01 THOUSANDS/ÂΜL (ref 0–0.1)
BASOPHILS NFR BLD AUTO: 0 % (ref 0–1)
BPU ID: NORMAL
BUN SERPL-MCNC: 14 MG/DL (ref 5–25)
CALCIUM SERPL-MCNC: 8.4 MG/DL (ref 8.4–10.2)
CHLORIDE SERPL-SCNC: 92 MMOL/L (ref 96–108)
CO2 SERPL-SCNC: 43 MMOL/L (ref 21–32)
CREAT SERPL-MCNC: 0.55 MG/DL (ref 0.6–1.3)
CROSSMATCH: NORMAL
EOSINOPHIL # BLD AUTO: 0.15 THOUSAND/ÂΜL (ref 0–0.61)
EOSINOPHIL NFR BLD AUTO: 2 % (ref 0–6)
ERYTHROCYTE [DISTWIDTH] IN BLOOD BY AUTOMATED COUNT: 19.4 % (ref 11.6–15.1)
GFR SERPL CREATININE-BSD FRML MDRD: 86 ML/MIN/1.73SQ M
GLUCOSE SERPL-MCNC: 99 MG/DL (ref 65–140)
HCT VFR BLD AUTO: 25.9 % (ref 34.8–46.1)
HGB BLD-MCNC: 7.9 G/DL (ref 11.5–15.4)
IMM GRANULOCYTES # BLD AUTO: 0.05 THOUSAND/UL (ref 0–0.2)
IMM GRANULOCYTES NFR BLD AUTO: 1 % (ref 0–2)
LYMPHOCYTES # BLD AUTO: 0.66 THOUSANDS/ÂΜL (ref 0.6–4.47)
LYMPHOCYTES NFR BLD AUTO: 8 % (ref 14–44)
MCH RBC QN AUTO: 32.5 PG (ref 26.8–34.3)
MCHC RBC AUTO-ENTMCNC: 30.5 G/DL (ref 31.4–37.4)
MCV RBC AUTO: 107 FL (ref 82–98)
MONOCYTES # BLD AUTO: 0.57 THOUSAND/ÂΜL (ref 0.17–1.22)
MONOCYTES NFR BLD AUTO: 7 % (ref 4–12)
NEUTROPHILS # BLD AUTO: 7.12 THOUSANDS/ÂΜL (ref 1.85–7.62)
NEUTS SEG NFR BLD AUTO: 82 % (ref 43–75)
NRBC BLD AUTO-RTO: 0 /100 WBCS
PLATELET # BLD AUTO: 248 THOUSANDS/UL (ref 149–390)
PMV BLD AUTO: 8.7 FL (ref 8.9–12.7)
POTASSIUM SERPL-SCNC: 3.6 MMOL/L (ref 3.5–5.3)
RBC # BLD AUTO: 2.43 MILLION/UL (ref 3.81–5.12)
SODIUM SERPL-SCNC: 143 MMOL/L (ref 135–147)
UNIT DISPENSE STATUS: NORMAL
UNIT PRODUCT CODE: NORMAL
UNIT PRODUCT VOLUME: 350 ML
UNIT RH: NORMAL
WBC # BLD AUTO: 8.56 THOUSAND/UL (ref 4.31–10.16)

## 2023-11-28 PROCEDURE — 99232 SBSQ HOSP IP/OBS MODERATE 35: CPT | Performed by: INTERNAL MEDICINE

## 2023-11-28 PROCEDURE — 94760 N-INVAS EAR/PLS OXIMETRY 1: CPT

## 2023-11-28 PROCEDURE — 92526 ORAL FUNCTION THERAPY: CPT

## 2023-11-28 PROCEDURE — 80048 BASIC METABOLIC PNL TOTAL CA: CPT | Performed by: INTERNAL MEDICINE

## 2023-11-28 PROCEDURE — C9113 INJ PANTOPRAZOLE SODIUM, VIA: HCPCS | Performed by: INTERNAL MEDICINE

## 2023-11-28 PROCEDURE — 99232 SBSQ HOSP IP/OBS MODERATE 35: CPT | Performed by: PHYSICIAN ASSISTANT

## 2023-11-28 PROCEDURE — 94640 AIRWAY INHALATION TREATMENT: CPT

## 2023-11-28 PROCEDURE — 85025 COMPLETE CBC W/AUTO DIFF WBC: CPT | Performed by: INTERNAL MEDICINE

## 2023-11-28 RX ADMIN — LEVALBUTEROL HYDROCHLORIDE 1.25 MG: 1.25 SOLUTION RESPIRATORY (INHALATION) at 13:43

## 2023-11-28 RX ADMIN — DILTIAZEM HYDROCHLORIDE 240 MG: 120 CAPSULE, COATED, EXTENDED RELEASE ORAL at 08:16

## 2023-11-28 RX ADMIN — MIRTAZAPINE 7.5 MG: 15 TABLET, FILM COATED ORAL at 21:57

## 2023-11-28 RX ADMIN — FERROUS SULFATE TAB 325 MG (65 MG ELEMENTAL FE) 325 MG: 325 (65 FE) TAB at 08:16

## 2023-11-28 RX ADMIN — BUDESONIDE 0.5 MG: 0.5 INHALANT RESPIRATORY (INHALATION) at 07:36

## 2023-11-28 RX ADMIN — APIXABAN 2.5 MG: 2.5 TABLET, FILM COATED ORAL at 18:16

## 2023-11-28 RX ADMIN — PANTOPRAZOLE SODIUM 40 MG: 40 INJECTION, POWDER, FOR SOLUTION INTRAVENOUS at 08:16

## 2023-11-28 RX ADMIN — FORMOTEROL FUMARATE DIHYDRATE 20 MCG: 20 SOLUTION RESPIRATORY (INHALATION) at 07:36

## 2023-11-28 RX ADMIN — LEVOTHYROXINE SODIUM 25 MCG: 25 TABLET ORAL at 05:17

## 2023-11-28 RX ADMIN — IPRATROPIUM BROMIDE 0.5 MG: 0.5 SOLUTION RESPIRATORY (INHALATION) at 13:43

## 2023-11-28 RX ADMIN — SERTRALINE HYDROCHLORIDE 50 MG: 50 TABLET ORAL at 08:17

## 2023-11-28 RX ADMIN — IPRATROPIUM BROMIDE 0.5 MG: 0.5 SOLUTION RESPIRATORY (INHALATION) at 19:35

## 2023-11-28 RX ADMIN — LEVALBUTEROL HYDROCHLORIDE 1.25 MG: 1.25 SOLUTION RESPIRATORY (INHALATION) at 07:36

## 2023-11-28 RX ADMIN — BUDESONIDE 0.5 MG: 0.5 INHALANT RESPIRATORY (INHALATION) at 19:35

## 2023-11-28 RX ADMIN — IPRATROPIUM BROMIDE 0.5 MG: 0.5 SOLUTION RESPIRATORY (INHALATION) at 07:36

## 2023-11-28 RX ADMIN — FUROSEMIDE 40 MG: 10 INJECTION, SOLUTION INTRAMUSCULAR; INTRAVENOUS at 08:16

## 2023-11-28 RX ADMIN — PANTOPRAZOLE SODIUM 40 MG: 40 INJECTION, POWDER, FOR SOLUTION INTRAVENOUS at 21:57

## 2023-11-28 RX ADMIN — FUROSEMIDE 40 MG: 10 INJECTION, SOLUTION INTRAMUSCULAR; INTRAVENOUS at 16:06

## 2023-11-28 RX ADMIN — FORMOTEROL FUMARATE DIHYDRATE 20 MCG: 20 SOLUTION RESPIRATORY (INHALATION) at 19:35

## 2023-11-28 RX ADMIN — LEVALBUTEROL HYDROCHLORIDE 1.25 MG: 1.25 SOLUTION RESPIRATORY (INHALATION) at 19:35

## 2023-11-28 NOTE — PROGRESS NOTES
Cardiology Progress Note   Jules Ashby 79 y.o. female MRN: [id number]    Unit/Bed#: -01 SDU Encounter: [id number]      Assessment:  Acute on chronic hypoxic respiratory failure  On 4L O2 currently  CXR: B/L pleural effusions, interstitial edema    Acute HFpEF  TTE 11/27/2023: EF 60%, G1DD, severe LA dilation, functionally bicuspid AV with mild AS, mild MR/MS, moderate TR, PASP 41mmHg. TTE 2/28/2023: EF 65%, G1DD, mild LA dilation, mild AS, moderate MAC, mild TR   BRBPR/ acute blood loss anemia   HgB as low as 5 >> 8.7 today s/p 2U pRBCs  INR 5.41 on arrival >> 1.04 today s/p 1U FFP and Vit K 10mg  Paroxysmal atrial fibrillation  Longstanding hx  Has been on warfarin for 30+ years   Maintaining NSR currently   On Cardizem and warfarin as outpatient   Hypertension   Chronic peripheral edema, multifactorial (venous insufficiency, diastolic dysfunction, hypoalbuminemia). Plan/Discussion:  She was resumed on IV lasix 40mg BID yesterday which we will continue today. She is now trending net negative. BMP stable overall today, trend QD. Keep K>4 and Mg>2. We will continue to monitor for contraction alkalosis. If she does, we can consider IV Diamox. Colonoscopy yesterday did not show evidence of acute GIB; recommend resuming [address] when able to from a GI perspective given her high CVA risk. Given her labile INRs and supratherapeutic INR on arrival, she may benefit from switching to DOAC if affordable (such as Eliquis 2.5mg BID). Salt/ fluid restriction, daily weights, strict I&O monitoring (moving to and from [facility] per RN). Telemetry monitoring while diuresing. Subjective:   Patient seen and examined. Overnight events reviewed. ICU AP called to bedside overnight given hypoxia with SPO2 in the mid 80s. O2 increased from 4->6L and switched to NR mask. Resulting SPO2 100% and appeared not in acute distress per overnight notes. Repeat CXR shows persistent pulmonary edema.  Agreed to continue IV diuresis. Overnight she has been weaned back to 4L O2 via NC. Denies any dyspnea, chest discomfort. Feels "tired". Objective:     Vitals: Blood pressure 145/67, pulse 93, temperature 98.4 °F (36.9 °C), temperature source Oral, resp. rate (!) 27, height 5' 6" (1.676 m), weight 51 kg (112 lb 7 oz), SpO2 92 %, not currently breastfeeding., Body mass index is 18.15 kg/m².,   Orthostatic Blood Pressures      Flowsheet Row Most Recent Value   Blood Pressure 145/67 filed at 11/28/2023 0180   Patient Position - Orthostatic VS Lying filed at 11/28/2023 0800              Intake/Output Summary (Last 24 hours) at 11/28/2023 1055  Last data filed at 11/28/2023 0800  Gross per 24 hour   Intake 980 ml   Output 1150 ml   Net -170 ml         Physical Exam:    GEN: Aiden Callas +frail/ weak appearing, +O2 via NC.   HEENT: Sclera anicteric, conjunctivae pink, mucous membranes moist. Oropharynx clear. NECK: supple, no significant JVD, Trachea midline, no thyromegaly. HEART: regular rhythm, normal S1 and S2, no murmurs, clicks, gallops or rubs   LUNGS: decreased air entry at bases  ABDOMEN: Soft, nontender, nondistended  EXTREMITIES: Skin warm and well perfused, no clubbing, cyanosis, or edema. NEURO: No focal findings. Normal speech. Mood and affect normal.   SKIN: Normal without suspicious lesions on exposed skin.       Medications:      Current Facility-Administered Medications:     acetaminophen (TYLENOL) tablet 650 mg, 650 mg, Oral, Q6H PRN, Cayden Bonner MD, 650 mg at 11/26/23 1507    albuterol inhalation solution 2.5 mg, 2.5 mg, Nebulization, Q6H PRN, Cayden Bonner MD, 2.5 mg at 11/25/23 1017    bisacodyl (DULCOLAX) EC tablet 10 mg, 10 mg, Oral, Once, Cayden Bonner MD    budesonide (PULMICORT) inhalation solution 0.5 mg, 0.5 mg, Nebulization, Q12H, Cayden Bonner MD, 0.5 mg at 11/28/23 0736    diltiazem (CARDIZEM CD) 24 hr capsule 240 mg, 240 mg, Oral, Daily, Cayden Bonner MD, 240 mg at 11/28/23 4468    ferrous sulfate tablet 325 mg, 325 mg, Oral, Daily With Breakfast, Alice Sawyer MD, 325 mg at 11/28/23 0816    formoterol (PERFOROMIST) nebulizer solution 20 mcg, 20 mcg, Nebulization, Q12H, Alice Sawyer MD, 20 mcg at 11/28/23 0736    furosemide (LASIX) injection 40 mg, 40 mg, Intravenous, BID (diuretic), Frank Aguilar PA-C, 40 mg at 11/28/23 0816    ipratropium (ATROVENT) 0.02 % inhalation solution 0.5 mg, 0.5 mg, Nebulization, TID, Alice Sawyer MD, 0.5 mg at 11/28/23 0736    levalbuterol [facility]) inhalation solution 1.25 mg, 1.25 mg, Nebulization, TID, Alice Sawyer MD, 1.25 mg at 11/28/23 6301    levothyroxine tablet 25 mcg, 25 mcg, Oral, Early Morning, Alice Sawyer MD, 25 mcg at 11/28/23 0517    mirtazapine (REMERON) tablet 7.5 mg, 7.5 mg, Oral, HS, Alice Sawyer MD, 7.5 mg at 11/27/23 2117    pantoprazole (PROTONIX) injection 40 mg, 40 mg, Intravenous, Q12H [facility], Alice Sawyer MD, 40 mg at 11/28/23 0816    sertraline (ZOLOFT) tablet 50 mg, 50 mg, Oral, Daily, Alice Sawyer MD, 50 mg at 11/28/23 0817     Labs & Results:        Results from last 7 days   Lab Units 11/28/23 0443 11/27/23 0448 11/26/23  1833 11/26/23  0456   WBC Thousand/uL 8.56 7.08  --  9.36   HEMOGLOBIN g/dL 7.9* 8.7* 8.3* 8.0*   HEMATOCRIT % 25.9* 29.4* 27.8* 25.8*   PLATELETS Thousands/uL 248 245  --  281         Results from last 7 days   Lab Units 11/28/23 0443 11/27/23 0448 11/26/23  0456 11/25/23  0024 11/24/23  1426 11/24/23  1426 11/24/23  1423   POTASSIUM mmol/L 3.6 4.0 3.3* 3.9   < > 4.9  --    CHLORIDE mmol/L 92* 95* 93* 97   < > 99  --    CO2 mmol/L 43* 44* >45* 41*   < > 41*  --    CO2, I-STAT mmol/L  --   --   --   --   --   --  >45*   BUN mg/dL 14 15 22 29*   < > 32*  --    CREATININE mg/dL 0.55* 0.50* 0.59* 0.75   < > 0.80  --    CALCIUM mg/dL 8.4 8.3* 8.2* 8.1*   < > 8.6  --    ALK PHOS U/L  --   --   --  165*  --  187*  --    ALT U/L  --   --   --  10  --  12  --    AST U/L  --   --   --  18  --  19  --    GLUCOSE, ISTAT mg/dl  --   --   --   --   --   --  150*    < > = values in this interval not displayed.      Results from last 7 days   Lab Units 11/26/23  0456 11/25/23  1148 11/24/23  1426   INR  1.04 1.02 5.41*     Results from last 7 days   Lab Units 11/26/23  0456   MAGNESIUM mg/dL 2.1

## 2023-11-28 NOTE — PROGRESS NOTES
[facility]  Progress Note  Name: Sabrina Ware  MRN: [id number]  Unit/Bed#: -01 SDU I Date of Admission: 11/24/2023   Date of Service: 11/28/2023 I Hospital Day: 4    Assessment/Plan   * GIB (gastrointestinal bleeding)  Assessment & Plan  BRBPR began 11/22, PCP recommended stopping warfarin temporarily, however persistent symptoms with large BRBPR episode witnessed by son this morning followed by syncopal episode  Hgb on admission 6 (baseline 8.5-10, most recently 9.6)  INR 5.4  S/P 1 unit FFP + IV vit K 10 mg   Repeat INR 1  GI consult  No active bleeding noted on colonoscopy, however she did have diverticulosis and internal hemorrhoids. Okay to resume anticoagulation per GI    Acute on chronic diastolic CHF (congestive heart failure) (HCC)  Assessment & Plan  Wt Readings from Last 3 Encounters:   11/28/23 51 kg (112 lb 7 oz)   10/20/23 52.2 kg (115 lb 2 oz)   10/05/23 53.1 kg (117 lb)   Appears volume overloaded, increasing edema per son x last several days.  O2 requirement 4-5 L on admission, baseline is 2L   Outpatient lasix was changed from 20 mg daily to 40 mg daily about 4 weeks ago  CXR pending final read however appears congested  Echo 2/2023: EF 65%, G1DD  Cardiology consult   Continue IV Lasix 40 mg BID today  Monitor I/O + daily weights     Acute on chronic respiratory failure with hypoxia and hypercapnia ([facility])  Assessment & Plan  Chronically maintained on 2L 2/2 COPD   Requiring 4-5 L on admission, SpO2 90-94%  COVID/FLU/RSV neg  CXR with venous congestion, bilateral effusions     Suspect likely 2/2 acute on chronic CHF, see below  Currently on 4L     Syncope  Assessment & Plan  Witnessed by son, was attempting to walk patient however she became weak/collapsed, no headstrike, LOC about 30 seconds, prompting son to call EMS  Home care aids noted low BP and Low O2 sats prior to this  Currently A&O x3   Suspect 2/2 acute blood loss anemia/GIB along with increasing O2 requirements resulting in orthostasis/hypoxia  Trop WNL   Tele NSR  PT/OT    Acute blood loss anemia  Assessment & Plan  In setting of acute lower GIB on warfarin with supratherapeutic INR   Iron panel: Iron 36, Iron Sat 23, TIBC 156 Ferritin 111  11/24: 1 unit PRBC  11/25: 1 unit PRBC  Hgb 6 > 5 > 8.9 > 8.7  Maintain hgb > 7  See above     Other specified hypothyroidism  Assessment & Plan  Continue synthroid 25 mcg daily     Atrial fibrillation (HCC)  Assessment & Plan  Rate control: digoxin 125 mcg every other day (last taken 11/24)  Dig level 0.7  Rhythm: Cardizem 240 mg daily   OAC: warfarin - HOLD due to supratherapeutic INR (5.4)  Need to resume AC with NOAC, Eliquis sent to pharmacy and $22 monthly. Will start Eliquis tomorrow after discussing with patient's family. Chronic obstructive pulmonary disease (HCC)  Assessment & Plan  Chronically on 2L, currently requiring 4-5  Suspect CHF > COPD exacerbation  Home regimen: Spiriva inhaler + pumicort nebs BID + duonebs TID daily   Continue ATC nebs currently            VTE Pharmacologic Prophylaxis: VTE Score: 3 Moderate Risk (Score 3-4) - Pharmacological DVT Prophylaxis Ordered: apixaban (Eliquis). Patient Centered Rounds: I performed bedside rounds with nursing staff today. Discussions with Specialists or Other Care Team Provider: sanjay, rn    Education and Discussions with Family / Patient: Attempted to update  (son) via phone. Left voicemail. Time Spent for Care: 45 minutes. More than 50% of total time spent on counseling and coordination of care as described above.     Current Length of Stay: 4 day(s)  Current Patient Status: Inpatient   Certification Statement: The patient will continue to require additional inpatient hospital stay due to blood loss anemia requiring resumption of anticoagulation with serial monitoring as well as PT and OT evaluations for rehab  Discharge Plan: Anticipate discharge in 48 hrs to rehab facility. Code Status: Level 2 - DNAR: but accepts endotracheal intubation    Subjective:   No evidence of bleeding. She is eating breakfast.  Feeling okay, mildly shortness of breath overnight    Objective:     Vitals:   Temp (24hrs), Av.4 °F (36.9 °C), Min:98.1 °F (36.7 °C), Max:98.6 °F (37 °C)    Temp:  [98.1 °F (36.7 °C)-98.6 °F (37 °C)] 98.6 °F (37 °C)  HR:  [82-93] 87  Resp:  [19-29] 24  BP: ()/(52-82) 94/52  SpO2:  [85 %-100 %] 99 %  Body mass index is 18.15 kg/m². Input and Output Summary (last 24 hours): Intake/Output Summary (Last 24 hours) at 2023 1246  Last data filed at 2023 0800  Gross per 24 hour   Intake 980 ml   Output 1050 ml   Net -70 ml       Physical Exam:   Physical Exam  Vitals and nursing note reviewed. Constitutional:       General: She is not in acute distress. Appearance: Normal appearance. She is well-developed. She is ill-appearing. HENT:      Head: Normocephalic and atraumatic. Mouth/Throat:      Mouth: Mucous membranes are moist.   Eyes:      General: No scleral icterus. Right eye: No discharge. Left eye: No discharge. Extraocular Movements: Extraocular movements intact. Conjunctiva/sclera: Conjunctivae normal.      Pupils: Pupils are equal, round, and reactive to light. Cardiovascular:      Rate and Rhythm: Normal rate and regular rhythm. Heart sounds: No murmur heard. Pulmonary:      Effort: Pulmonary effort is normal. No respiratory distress. Breath sounds: Rhonchi present. No wheezing or rales. Abdominal:      General: Bowel sounds are normal. There is no distension. Palpations: Abdomen is soft. Tenderness: There is no abdominal tenderness. Musculoskeletal:         General: No swelling. Cervical back: Neck supple. Right lower leg: Edema present. Left lower leg: Edema present. Comments: R>L LE edema    Skin:     General: Skin is warm and dry.       Capillary Refill: Capillary refill takes less than 2 seconds. Neurological:      General: No focal deficit present. Mental Status: She is alert and oriented to person, place, and time. Mental status is at baseline. Cranial Nerves: No cranial nerve deficit. Psychiatric:         Mood and Affect: Mood normal.         Behavior: Behavior normal.          Additional Data:     Labs:  Results from last 7 days   Lab Units 11/28/23  0443   WBC Thousand/uL 8.56   HEMOGLOBIN g/dL 7.9*   HEMATOCRIT % 25.9*   PLATELETS Thousands/uL 248   NEUTROS PCT % 82*   LYMPHS PCT % 8*   MONOS PCT % 7   EOS PCT % 2     Results from last 7 days   Lab Units 11/28/23  0443 11/26/23  0456 11/25/23  0024   SODIUM mmol/L 143   < > 143   POTASSIUM mmol/L 3.6   < > 3.9   CHLORIDE mmol/L 92*   < > 97   CO2 mmol/L 43*   < > 41*   BUN mg/dL 14   < > 29*   CREATININE mg/dL 0.55*   < > 0.75   ANION GAP mmol/L 8   < > 5   CALCIUM mg/dL 8.4   < > 8.1*   ALBUMIN g/dL  --   --  3.0*   TOTAL BILIRUBIN mg/dL  --   --  0.38   ALK PHOS U/L  --   --  165*   ALT U/L  --   --  10   AST U/L  --   --  18   GLUCOSE RANDOM mg/dL 99   < > 110    < > = values in this interval not displayed.      Results from last 7 days   Lab Units 11/26/23  0456   INR  1.04     Results from last 7 days   Lab Units 11/25/23  1240 11/25/23  0629 11/25/23  0022   POC GLUCOSE mg/dl 97 102 134         Results from last 7 days   Lab Units 11/25/23  0024 11/24/23  1426   PROCALCITONIN ng/ml 0.43* 0.41*       Lines/Drains:  Invasive Devices       Peripheral Intravenous Line  Duration             Peripheral IV 11/24/23 Left Antecubital 3 days    Peripheral IV 11/25/23 Proximal;Right;Ventral (anterior) Forearm 2 days              Drain  Duration             External Urinary Catheter <1 day                      Telemetry:  Telemetry Orders (From admission, onward)               24 Hour Telemetry Monitoring  Continuous x 24 Hours (Telem)        Expiring   Question:  Reason for 24 Hour Telemetry  Answer: Decompensated CHF- and any one of the following: continuous diuretic infusion or total diuretic dose >200 mg daily, associated electrolyte derangement (I.e. K < 3.0), ionotropic drip (continuous infusion), hx of ventricular arrhythmia, or new EF < 35%                     Telemetry Reviewed: Normal Sinus Rhythm  Indication for Continued Telemetry Use: No indication for continued use. Will discontinue. Imaging: No pertinent imaging reviewed. Recent Cultures (last 7 days):         Last 24 Hours Medication List:   Current Facility-Administered Medications   Medication Dose Route Frequency Provider Last Rate    acetaminophen  650 mg Oral Q6H PRN Sonya Caldera MD      albuterol  2.5 mg Nebulization Q6H PRN Sonya Caldera MD      [START ON 11/29/2023] apixaban  2.5 mg Oral BID Sugar Yi PA-C      bisacodyl  10 mg Oral Once Sonya Caldera MD      budesonide  0.5 mg Nebulization Q12H Sonya Caldera MD      diltiazem  240 mg Oral Daily Sonya Caldera MD      ferrous sulfate  325 mg Oral Daily With Breakfast Sonya Caldera MD      formoterol  20 mcg Nebulization Q12H Sonya Caldera MD      furosemide  40 mg Intravenous BID (diuretic) Aleena Lynn PA-C      ipratropium  0.5 mg Nebulization TID Sonya Caldera MD      levalbuterol  1.25 mg Nebulization TID Sonya Caldera MD      levothyroxine  25 mcg Oral Early Morning Sonya Caldera MD      mirtazapine  7.5 mg Oral HS Sonya Caldera MD      pantoprazole  40 mg Intravenous Q12H [address] Sonya Caldera MD      sertraline  50 mg Oral Daily Sonya Caldera MD          Today, Patient Was Seen By: Sly Reynolds PA-C    **Please Note: This note may have been constructed using a voice recognition system. **

## 2023-11-28 NOTE — ASSESSMENT & PLAN NOTE
Chronically maintained on 2L 2/2 COPD   Requiring 4-5 L on admission, SpO2 90-94%  COVID/FLU/RSV neg  CXR with venous congestion, bilateral effusions     Suspect likely 2/2 acute on chronic CHF, see below  Currently on 4L

## 2023-11-28 NOTE — PROGRESS NOTES
Progress Note - Janneth Khan 79 y.o. female MRN: [id number]    Unit/Bed#: -01 SDU Encounter: [id number]    Assessment and Plan:    79-year-old female with history of COPD, chronic diastolic CHF, PAF on Coumadin presenting with BRBPR, acute blood loss anemia, ROMANO, bilateral lower extremity swelling. 1) Bright red blood per rectum  2) Acute blood loss anemia    Admission labs notable for Hgb 6.0 (baseline 8.5-10) with anemia of chronic disease picture. GI consulted for suspected lower GIB. Colonoscopy 11/27 showed severe diverticulosis in the descending and sigmoid colon with medium hemorrhoids. No active bleeding. She was transfused 2 units PRBCs. Hemoglobin has remained stable since 7.9. No evidence of ongoing, overt bleeding.    - Continue cardiac diet  - OK to resume Coumadin from GI standpoint  - Monitor hemoglobin     3) Atrial fibrillation    - OK to resume Coumadin from GI standpoint  - Cardiology may consider switching to [address] if affordable for the patient     4) Acute on chronic respiratory failure with hypoxia and hypercapnia    - Currently requiring 4L O2 NC, baseline is 2L  - Underlying COPD, likely exacerbated by acute on chronic CHF  - Treatment per primary team      Subjective:     Patient seen and examined at bedside. She is feeling tired. She had non-bloody BM this morning. She denies abdominal pain. She is eating. Objective:     Vitals: Blood pressure 145/67, pulse 93, temperature 98.4 °F (36.9 °C), temperature source Oral, resp. rate (!) 27, height 5' 6" (1.676 m), weight 51 kg (112 lb 7 oz), SpO2 92 %, not currently breastfeeding. ,Body mass index is 18.15 kg/m².       Intake/Output Summary (Last 24 hours) at 11/28/2023 1142  Last data filed at 11/28/2023 0800  Gross per 24 hour   Intake 980 ml   Output 1050 ml   Net -70 ml       Physical Exam:     General Appearance: Alert, frail, weak appearing elderly female, appears stated age and cooperative  HEENT: Nasal cannula  Lungs: Decreased breath sounds bilaterally  Heart: Regular rate and rhythm  Abdomen: Soft, non-tender, non-distended; bowel sounds normal  Extremities: No cyanosis, edema    Invasive Devices       Peripheral Intravenous Line  Duration             Peripheral IV 11/24/23 Left Antecubital 3 days    Peripheral IV 11/25/23 Proximal;Right;Ventral (anterior) Forearm 2 days              Drain  Duration             External Urinary Catheter <1 day                    Lab Results:  Results from last 7 days   Lab Units 11/28/23  0443   WBC Thousand/uL 8.56   HEMOGLOBIN g/dL 7.9*   HEMATOCRIT % 25.9*   PLATELETS Thousands/uL 248   NEUTROS PCT % 82*   LYMPHS PCT % 8*   MONOS PCT % 7   EOS PCT % 2     Results from last 7 days   Lab Units 11/28/23  0443 11/26/23  0456 11/25/23  0024 11/24/23  1426 11/24/23  1423   POTASSIUM mmol/L 3.6   < > 3.9   < >  --    CHLORIDE mmol/L 92*   < > 97   < >  --    CO2 mmol/L 43*   < > 41*   < >  --    CO2, I-STAT mmol/L  --   --   --   --  >45*   BUN mg/dL 14   < > 29*   < >  --    CREATININE mg/dL 0.55*   < > 0.75   < >  --    CALCIUM mg/dL 8.4   < > 8.1*   < >  --    ALK PHOS U/L  --   --  165*   < >  --    ALT U/L  --   --  10   < >  --    AST U/L  --   --  18   < >  --    GLUCOSE, ISTAT mg/dl  --   --   --   --  150*    < > = values in this interval not displayed. Results from last 7 days   Lab Units 11/26/23  0456   INR  1.04           Imaging Studies: I have personally reviewed pertinent imaging studies. XR chest portable ICU    Result Date: 11/28/2023  Impression: Bilateral pleural effusions with bibasilar atelectasis is not significantly changed. Resident: Glenroy Froeman, the attending radiologist, have reviewed the images and agree with the final report above.  Workstation performed: [id number]     Colonoscopy    Result Date: 11/27/2023  Impression: Diverticulosis of severe severity in the descending colon and sigmoid colon Medium hemorrhoids RECOMMENDATION: Resume regular diet and medications  Repeat screening colonoscopy in 10 years  [facility]. Ángel Stevens MD    XR chest portable    Result Date: 11/27/2023  Impression: Nasogastric tube has been placed, tip overlying the distal stomach. Moderate size right pleural effusion and small left pleural effusion are stable. Underlying interstitial edema suggested without significant change. Workstation performed: [id number]     XR chest portable ICU    Result Date: 11/27/2023  Impression: NG tube in stomach. Persistent bilateral groundglass opacity, likely edema, with small effusions and bibasilar atelectasis. Workstation performed: [id number]     X-ray chest 1 view portable    Result Date: 11/25/2023  Impression: Cardiomegaly. CHF. Bilateral pleural effusions.  Workstation performed: [id number]

## 2023-11-28 NOTE — ASSESSMENT & PLAN NOTE
Witnessed by son, was attempting to walk patient however she became weak/collapsed, no headstrike, LOC about 30 seconds, prompting son to call EMS  Home care aids noted low BP and Low O2 sats prior to this  Currently A&O x3   Suspect 2/2 acute blood loss anemia/GIB along with increasing O2 requirements resulting in orthostasis/hypoxia  Trop WNL   Tele NSR  PT/OT

## 2023-11-28 NOTE — ASSESSMENT & PLAN NOTE
BRBPR began 11/22, PCP recommended stopping warfarin temporarily, however persistent symptoms with large BRBPR episode witnessed by son this morning followed by syncopal episode  Hgb on admission 6 (baseline 8.5-10, most recently 9.6)  INR 5.4  S/P 1 unit FFP + IV vit K 10 mg   Repeat INR 1  GI consult  No active bleeding noted on colonoscopy, however she did have diverticulosis and internal hemorrhoids.   Okay to resume anticoagulation per GI

## 2023-11-28 NOTE — PLAN OF CARE
Problem: Potential for Falls  Goal: Patient will remain free of falls  Description: INTERVENTIONS:  - Educate patient/family on patient safety including physical limitations  - Instruct patient to call for assistance with activity   - Consult OT/PT to assist with strengthening/mobility   - Keep Call bell within reach  - Keep bed low and locked with side rails adjusted as appropriate  - Keep care items and personal belongings within reach  - Initiate and maintain comfort rounds  - Make Fall Risk Sign visible to staff  - Offer Toileting every 2 Hours, in advance of need  - Initiate/Maintain bed alarm  - Obtain necessary fall risk management equipment  - Apply yellow socks and bracelet for high fall risk patients  - Consider moving patient to room near nurses station  Outcome: Progressing     Problem: PAIN - ADULT  Goal: Verbalizes/displays adequate comfort level or baseline comfort level  Description: Interventions:  - Encourage patient to monitor pain and request assistance  - Assess pain using appropriate pain scale  - Administer analgesics based on type and severity of pain and evaluate response  - Implement non-pharmacological measures as appropriate and evaluate response  - Consider cultural and social influences on pain and pain management  - Notify physician/advanced practitioner if interventions unsuccessful or patient reports new pain  Outcome: Progressing     Problem: INFECTION - ADULT  Goal: Absence or prevention of progression during hospitalization  Description: INTERVENTIONS:  - Assess and monitor for signs and symptoms of infection  - Monitor lab/diagnostic results  - Monitor all insertion sites, i.e. indwelling lines, tubes, and drains  - Monitor endotracheal if appropriate and nasal secretions for changes in amount and color  - [place] appropriate cooling/warming therapies per order  - Administer medications as ordered  - Instruct and encourage patient and family to use good hand hygiene technique  - Identify and instruct in appropriate isolation precautions for identified infection/condition  Outcome: Progressing  Goal: Absence of fever/infection during neutropenic period  Description: INTERVENTIONS:  - Monitor WBC    Outcome: Progressing     Problem: SAFETY ADULT  Goal: Maintain or return to baseline ADL function  Description: INTERVENTIONS:  -  Assess patient's ability to carry out ADLs; assess patient's baseline for ADL function and identify physical deficits which impact ability to perform ADLs (bathing, care of mouth/teeth, toileting, grooming, dressing, etc.)  - Assess/evaluate cause of self-care deficits   - Assess range of motion  - Assess patient's mobility; develop plan if impaired  - Assess patient's need for assistive devices and provide as appropriate  - Encourage maximum independence but intervene and supervise when necessary  - Involve family in performance of ADLs  - Assess for home care needs following discharge   - Consider OT consult to assist with ADL evaluation and planning for discharge  - Provide patient education as appropriate  Outcome: Progressing     Problem: SAFETY ADULT  Goal: Maintains/Returns to pre admission functional level  Description: INTERVENTIONS:  - Perform AM-PAC 6 Click Basic Mobility/ Daily Activity assessment daily.  - Set and communicate daily mobility goal to care team and patient/family/caregiver. - Collaborate with rehabilitation services on mobility goals if consulted  - Perform Range of Motion 3 times a day. - Reposition patient every 2 hours.   - Dangle patient 3 times a day  - Stand patient 3 times a day  - Ambulate patient 3 times a day  - Out of bed to chair 3 times a day   - Out of bed for meals 3 times a day  - Out of bed for toileting  - Record patient progress and toleration of activity level   Outcome: Progressing     Problem: DISCHARGE PLANNING  Goal: Discharge to home or other facility with appropriate resources  Description: INTERVENTIONS:  - Identify barriers to discharge w/patient and caregiver  - Arrange for needed discharge resources and transportation as appropriate  - Identify discharge learning needs (meds, wound care, etc.)  - Arrange for interpretive services to assist at discharge as needed  - Refer to Case Management Department for coordinating discharge planning if the patient needs post-hospital services based on physician/advanced practitioner order or complex needs related to functional status, cognitive ability, or social support system  Outcome: Progressing     Problem: CARDIOVASCULAR - ADULT  Goal: Maintains optimal cardiac output and hemodynamic stability  Description: INTERVENTIONS:  - Monitor I/O, vital signs and rhythm  - Monitor for S/S and trends of decreased cardiac output  - Administer and titrate ordered vasoactive medications to optimize hemodynamic stability  - Assess quality of pulses, skin color and temperature  - Assess for signs of decreased coronary artery perfusion  - Instruct patient to report change in severity of symptoms  Outcome: Progressing  Goal: Absence of cardiac dysrhythmias or at baseline rhythm  Description: INTERVENTIONS:  - Continuous cardiac monitoring, vital signs, obtain 12 lead EKG if ordered  - Administer antiarrhythmic and heart rate control medications as ordered  - Monitor electrolytes and administer replacement therapy as ordered  Outcome: Progressing     Problem: RESPIRATORY - ADULT  Goal: Achieves optimal ventilation and oxygenation  Description: INTERVENTIONS:  - Assess for changes in respiratory status  - Assess for changes in mentation and behavior  - Position to facilitate oxygenation and minimize respiratory effort  - Oxygen administered by appropriate delivery if ordered  - Initiate smoking cessation education as indicated  - Encourage broncho-pulmonary hygiene including cough, deep breathe, Incentive Spirometry  - Assess the need for suctioning and aspirate as needed  - Assess and instruct to report SOB or any respiratory difficulty  - Respiratory Therapy support as indicated  Outcome: Progressing     Problem: GENITOURINARY - ADULT  Goal: Maintains or returns to baseline urinary function  Description: INTERVENTIONS:  - Assess urinary function  - Encourage oral fluids to ensure adequate hydration if ordered  - Administer IV fluids as ordered to ensure adequate hydration  - Administer ordered medications as needed  - Offer frequent toileting  - Follow urinary retention protocol if ordered  Outcome: Progressing  Goal: Absence of urinary retention  Description: INTERVENTIONS:  - Assess patient’s ability to void and empty bladder  - Monitor I/O  - Bladder scan as needed  - Discuss with physician/AP medications to alleviate retention as needed  - Discuss catheterization for long term situations as appropriate  Outcome: Progressing  Goal: Urinary catheter remains patent  Description: INTERVENTIONS:  - Assess patency of urinary catheter  - If patient has a chronic hand, consider changing catheter if non-functioning  - Follow guidelines for intermittent irrigation of non-functioning urinary catheter  Outcome: Progressing     Problem: METABOLIC, FLUID AND ELECTROLYTES - ADULT  Goal: Electrolytes maintained within normal limits  Description: INTERVENTIONS:  - Monitor labs and assess patient for signs and symptoms of electrolyte imbalances  - Administer electrolyte replacement as ordered  - Monitor response to electrolyte replacements, including repeat lab results as appropriate  - Instruct patient on fluid and nutrition as appropriate  Outcome: Progressing  Goal: Fluid balance maintained  Description: INTERVENTIONS:  - Monitor labs   - Monitor I/O and WT  - Instruct patient on fluid and nutrition as appropriate  - Assess for signs & symptoms of volume excess or deficit  Outcome: Progressing     Problem: Prexisting or High Potential for Compromised Skin Integrity  Goal: Skin integrity is maintained or improved  Description: INTERVENTIONS:  - Identify patients at risk for skin breakdown  - Assess and monitor skin integrity  - Assess and monitor nutrition and hydration status  - Monitor labs   - Assess for incontinence   - Turn and reposition patient  - Assist with mobility/ambulation  - Relieve pressure over bony prominences  - Avoid friction and shearing  - Provide appropriate hygiene as needed including keeping skin clean and dry  - Evaluate need for skin moisturizer/barrier cream  - Collaborate with interdisciplinary team   - Patient/family teaching  - Consider wound care consult   Outcome: Progressing     Problem: Nutrition/Hydration-ADULT  Goal: Nutrient/Hydration intake appropriate for improving, restoring or maintaining nutritional needs  Description: Monitor and assess patient's nutrition/hydration status for malnutrition. Collaborate with interdisciplinary team and initiate plan and interventions as ordered. Monitor patient's weight and dietary intake as ordered or per policy. Utilize nutrition screening tool and intervene as necessary. Determine patient's food preferences and provide high-protein, high-caloric foods as appropriate.      INTERVENTIONS:  - Monitor oral intake, urinary output, labs, and treatment plans  - Assess nutrition and hydration status and recommend course of action  - Evaluate amount of meals eaten  - Assist patient with eating if necessary   - Allow adequate time for meals  - Recommend/ encourage appropriate diets, oral nutritional supplements, and vitamin/mineral supplements  - Order, calculate, and assess calorie counts as needed  - Recommend, monitor, and adjust tube feedings and TPN/PPN based on assessed needs  - Assess need for intravenous fluids  - Provide specific nutrition/hydration education as appropriate  - Include patient/family/caregiver in decisions related to nutrition  Outcome: Progressing

## 2023-11-28 NOTE — SPEECH THERAPY NOTE
Speech Language/Pathology    Speech/Language Pathology Progress Note    Patient Name: Adelso Mendoza  [id number] Date: 11/28/2023     Problem List  Principal Problem:    GIB (gastrointestinal bleeding)  Active Problems:    Chronic obstructive pulmonary disease (HCC)    Atrial fibrillation ([address])    Other specified hypothyroidism    Acute on chronic respiratory failure with hypoxia and hypercapnia (HCC)    Acute blood loss anemia    Syncope    Acute on chronic diastolic CHF (congestive heart failure) (HCC)       Past Medical History  Past Medical History:   Diagnosis Date    Anti-phospholipid syndrome (HCC)     Asthma     Blood type O+     Cardiac disease     Chronic pain     COPD (chronic obstructive pulmonary disease) ([facility])     Femur fracture, left ([address]) 6/22/2023    Fracture of ankle     left    Hyperlipidemia     Hypertension     Hypokalemia 2/27/2023    Osteoporosis         Past Surgical History  Past Surgical History:   Procedure Laterality Date    APPENDECTOMY      BILATERAL OOPHORECTOMY Bilateral     ORIF TIBIAL SHAFT FRACTURE W/ PLATES AND SCREWS Right     OK HEMIARTHROPLASTY HIP PARTIAL Left 6/23/2023    Procedure: HEMIARTHROPLASTY HIP (BIPOLAR); Surgeon: Maliha Hawthorne MD;  Location:  MAIN OR;  Service: Orthopedics         Subjective:  Pt OOB in chair, diet resumed s/p colonoscopy yesterday. "I was just happy to eat something finally"     Current Diet:  Regular/thin     Objective:  Pt seen for dx dysphagia tx w/ regular texture lunch tray and thin liquids. Min-mild prolonged mastication and bolus breakdown. Pt eventually able to manipulate all material for complete transfer. No significant oral residue. Swallows suspected fairly prompt/?mld delay. Intermittent throat clearing noted throughout meal. Cough x1 s/p swallow of thin and again x1 s/p swallow of nectar thick trials. Of note, pt w/ productive cough at baseline/in the absence of PO.  S/w pt regarding potential VBS to further assess/rule-out aspiration, pt wishes to think further on participation in test and states she is fatigued at this time. Education reviewed on strategies to optimize swallow safety, pt w/ good recall and is agreeable to use of strategies. Assessment:  Pt continues w/ inconsistent possible s/s aspiration, may benefit from instrumentation to discern s/s as related to aspiration vs other. Pt unsure at this time if she is agreeable to participation in study,. Plan/Recommendations:  Continue current diet as tolerated  Close supervision to monitor for s/s aspiration  ? VBS if pt agreeable  ST to f/u as able/appropriate

## 2023-11-28 NOTE — QUICK NOTE
Eval at bedside after RN noted patient to be hypoxic to the mid 80s on 3L NC. Increased to 6L NC without improvement, and NRB placed. On my eval, she is awake and says "hello" clearly. Spo2 100% with 6L NC and NRB overlying. Inspiratory effort poor. Decreased posterior BL bases with wheeing R side posteriorly. Review of chart indicates she underwent colonoscopy today, which showed L sided diverticulitis, med size internal hemorrhoids, and no olf or fresh blood noted. Cardiology consult noted. It appears as though her diuretics were held 11/26 2/2 contraction alkalosis, but was restarted 11/27. Last blood transfusion 11/25 for 2U PRBC, 1U FFP. NRB removed with patient on 6L NC and spo2 remains %. Will check CXR. Agree with continuation of IV diuretics. Natalya, primary RN at bedside. Staff aware they may call me with any questions, concerns or updates.

## 2023-11-28 NOTE — ASSESSMENT & PLAN NOTE
Wt Readings from Last 3 Encounters:   11/28/23 51 kg (112 lb 7 oz)   10/20/23 52.2 kg (115 lb 2 oz)   10/05/23 53.1 kg (117 lb)   Appears volume overloaded, increasing edema per son x last several days.  O2 requirement 4-5 L on admission, baseline is 2L   Outpatient lasix was changed from 20 mg daily to 40 mg daily about 4 weeks ago  CXR pending final read however appears congested  Echo 2/2023: EF 65%, G1DD  Cardiology consult   Continue IV Lasix 40 mg BID today  Monitor I/O + daily weights

## 2023-11-28 NOTE — ASSESSMENT & PLAN NOTE
In setting of acute lower GIB on warfarin with supratherapeutic INR   Iron panel: Iron 36, Iron Sat 23, TIBC 156 Ferritin 111  11/24: 1 unit PRBC  11/25: 1 unit PRBC  Hgb 6 > 5 > 8.9 > 8.7  Maintain hgb > 7  See above

## 2023-11-28 NOTE — PLAN OF CARE
Problem: Potential for Falls  Goal: Patient will remain free of falls  Description: INTERVENTIONS:  - Educate patient/family on patient safety including physical limitations  - Instruct patient to call for assistance with activity   - Consult OT/PT to assist with strengthening/mobility   - Keep Call bell within reach  - Keep bed low and locked with side rails adjusted as appropriate  - Keep care items and personal belongings within reach  - Initiate and maintain comfort rounds  - Make Fall Risk Sign visible to staff  - Offer Toileting every 2 Hours, in advance of need  - Initiate/Maintain chair alarm  - Obtain necessary fall risk management equipment  - Apply yellow socks and bracelet for high fall risk patients  - Consider moving patient to room near nurses station  Outcome: Progressing     Problem: PAIN - ADULT  Goal: Verbalizes/displays adequate comfort level or baseline comfort level  Description: Interventions:  - Encourage patient to monitor pain and request assistance  - Assess pain using appropriate pain scale  - Administer analgesics based on type and severity of pain and evaluate response  - Implement non-pharmacological measures as appropriate and evaluate response  - Consider cultural and social influences on pain and pain management  - Notify physician/advanced practitioner if interventions unsuccessful or patient reports new pain  Outcome: Progressing     Problem: INFECTION - ADULT  Goal: Absence or prevention of progression during hospitalization  Description: INTERVENTIONS:  - Assess and monitor for signs and symptoms of infection  - Monitor lab/diagnostic results  - Monitor all insertion sites, i.e. indwelling lines, tubes, and drains  - Monitor endotracheal if appropriate and nasal secretions for changes in amount and color  - [place] appropriate cooling/warming therapies per order  - Administer medications as ordered  - Instruct and encourage patient and family to use good hand hygiene technique  - Identify and instruct in appropriate isolation precautions for identified infection/condition  Outcome: Progressing  Goal: Absence of fever/infection during neutropenic period  Description: INTERVENTIONS:  - Monitor WBC    Outcome: Progressing     Problem: SAFETY ADULT  Goal: Patient will remain free of falls  Description: INTERVENTIONS:  - Educate patient/family on patient safety including physical limitations  - Instruct patient to call for assistance with activity   - Consult OT/PT to assist with strengthening/mobility   - Keep Call bell within reach  - Keep bed low and locked with side rails adjusted as appropriate  - Keep care items and personal belongings within reach  - Initiate and maintain comfort rounds  - Make Fall Risk Sign visible to staff  - Offer Toileting every 2 Hours, in advance of need  - Initiate/Maintain chair alarm  - Obtain necessary fall risk management equipment  - Apply yellow socks and bracelet for high fall risk patients  - Consider moving patient to room near nurses station  Outcome: Progressing  Goal: Maintain or return to baseline ADL function  Description: INTERVENTIONS:  -  Assess patient's ability to carry out ADLs; assess patient's baseline for ADL function and identify physical deficits which impact ability to perform ADLs (bathing, care of mouth/teeth, toileting, grooming, dressing, etc.)  - Assess/evaluate cause of self-care deficits   - Assess range of motion  - Assess patient's mobility; develop plan if impaired  - Assess patient's need for assistive devices and provide as appropriate  - Encourage maximum independence but intervene and supervise when necessary  - Involve family in performance of ADLs  - Assess for home care needs following discharge   - Consider OT consult to assist with ADL evaluation and planning for discharge  - Provide patient education as appropriate  Outcome: Progressing  Goal: Maintains/Returns to pre admission functional level  Description: INTERVENTIONS:  - Perform AM-PAC 6 Click Basic Mobility/ Daily Activity assessment daily.  - Set and communicate daily mobility goal to care team and patient/family/caregiver. - Collaborate with rehabilitation services on mobility goals if consulted  - Perform Range of Motion 3 times a day. - Reposition patient every 2 hours. - Dangle patient 3 times a day  - Stand patient 3 times a day  - Ambulate patient 3 times a day  - Out of bed to chair 3 times a day   - Out of bed for meals 3 times a day  - Out of bed for toileting  - Record patient progress and toleration of activity level   Outcome: Progressing     Problem: DISCHARGE PLANNING  Goal: Discharge to home or other facility with appropriate resources  Description: INTERVENTIONS:  - Identify barriers to discharge w/patient and caregiver  - Arrange for needed discharge resources and transportation as appropriate  - Identify discharge learning needs (meds, wound care, etc.)  - Arrange for interpretive services to assist at discharge as needed  - Refer to Case Management Department for coordinating discharge planning if the patient needs post-hospital services based on physician/advanced practitioner order or complex needs related to functional status, cognitive ability, or social support system  Outcome: Progressing     Problem: Knowledge Deficit  Goal: Patient/family/caregiver demonstrates understanding of disease process, treatment plan, medications, and discharge instructions  Description: Complete learning assessment and assess knowledge base.   Interventions:  - Provide teaching at level of understanding  - Provide teaching via preferred learning methods  Outcome: Progressing     Problem: CARDIOVASCULAR - ADULT  Goal: Maintains optimal cardiac output and hemodynamic stability  Description: INTERVENTIONS:  - Monitor I/O, vital signs and rhythm  - Monitor for S/S and trends of decreased cardiac output  - Administer and titrate ordered vasoactive medications to optimize hemodynamic stability  - Assess quality of pulses, skin color and temperature  - Assess for signs of decreased coronary artery perfusion  - Instruct patient to report change in severity of symptoms  Outcome: Progressing  Goal: Absence of cardiac dysrhythmias or at baseline rhythm  Description: INTERVENTIONS:  - Continuous cardiac monitoring, vital signs, obtain 12 lead EKG if ordered  - Administer antiarrhythmic and heart rate control medications as ordered  - Monitor electrolytes and administer replacement therapy as ordered  Outcome: Progressing     Problem: RESPIRATORY - ADULT  Goal: Achieves optimal ventilation and oxygenation  Description: INTERVENTIONS:  - Assess for changes in respiratory status  - Assess for changes in mentation and behavior  - Position to facilitate oxygenation and minimize respiratory effort  - Oxygen administered by appropriate delivery if ordered  - Initiate smoking cessation education as indicated  - Encourage broncho-pulmonary hygiene including cough, deep breathe, Incentive Spirometry  - Assess the need for suctioning and aspirate as needed  - Assess and instruct to report SOB or any respiratory difficulty  - Respiratory Therapy support as indicated  Outcome: Progressing     Problem: GENITOURINARY - ADULT  Goal: Maintains or returns to baseline urinary function  Description: INTERVENTIONS:  - Assess urinary function  - Encourage oral fluids to ensure adequate hydration if ordered  - Administer IV fluids as ordered to ensure adequate hydration  - Administer ordered medications as needed  - Offer frequent toileting  - Follow urinary retention protocol if ordered  Outcome: Progressing  Goal: Absence of urinary retention  Description: INTERVENTIONS:  - Assess patient’s ability to void and empty bladder  - Monitor I/O  - Bladder scan as needed  - Discuss with physician/AP medications to alleviate retention as needed  - Discuss catheterization for long term situations as appropriate  Outcome: Progressing  Goal: Urinary catheter remains patent  Description: INTERVENTIONS:  - Assess patency of urinary catheter  - If patient has a chronic hand, consider changing catheter if non-functioning  - Follow guidelines for intermittent irrigation of non-functioning urinary catheter  Outcome: Progressing     Problem: METABOLIC, FLUID AND ELECTROLYTES - ADULT  Goal: Electrolytes maintained within normal limits  Description: INTERVENTIONS:  - Monitor labs and assess patient for signs and symptoms of electrolyte imbalances  - Administer electrolyte replacement as ordered  - Monitor response to electrolyte replacements, including repeat lab results as appropriate  - Instruct patient on fluid and nutrition as appropriate  Outcome: Progressing  Goal: Fluid balance maintained  Description: INTERVENTIONS:  - Monitor labs   - Monitor I/O and WT  - Instruct patient on fluid and nutrition as appropriate  - Assess for signs & symptoms of volume excess or deficit  Outcome: Progressing  Goal: Glucose maintained within target range  Description: INTERVENTIONS:  - Monitor Blood Glucose as ordered  - Assess for signs and symptoms of hyperglycemia and hypoglycemia  - Administer ordered medications to maintain glucose within target range  - Assess nutritional intake and initiate nutrition service referral as needed  Outcome: Progressing     Problem: SKIN/TISSUE INTEGRITY - ADULT  Goal: Skin Integrity remains intact(Skin Breakdown Prevention)  Description: Assess:  -Perform [place] assessment every shift  -Clean and moisturize skin every shift  -Inspect skin when repositioning, toileting, and assisting with ADLS  -Assess under medical devices every 2 hours  -Assess extremities for adequate circulation and sensation     Bed Management:  -Have minimal linens on bed & keep smooth, unwrinkled  -Change linens as needed when moist or perspiring  -Avoid sitting or lying in one position for more than 2 hours while in bed  -Keep HOB at 30 degrees     Toileting:  -Offer bedside commode  -Assess for incontinence every 2 hours  -Use incontinent care products after each incontinent episode. Activity:  -Mobilize patient 3 times a day  -Encourage activity and walks on unit  -Encourage or provide ROM exercises   -Turn and reposition patient every 2 Hours  -Use appropriate equipment to lift or move patient in bed  -Instruct/ Assist with weight shifting every 2 hours when out of bed in chair  -Consider limitation of chair time 2 hour intervals    Skin Care:  -Avoid use of baby powder, tape, friction and shearing, hot water or constrictive clothing  -Relieve pressure over bony prominences using [place]elyn foam dressing  -Do not massage red bony areas    Next Steps:  -Teach patient strategies to minimize risks. -Consider consults to  interdisciplinary teams.   Outcome: Progressing  Goal: Incision(s), wounds(s) or drain site(s) healing without S/S of infection  Description: INTERVENTIONS  - Assess and document dressing, incision, wound bed, drain sites and surrounding tissue  - Provide patient and family education  - Perform skin care/dressing changes every shift  Outcome: Progressing  Goal: Pressure injury heals and does not worsen  Description: Interventions:  - Implement low air loss mattress or specialty surface (Criteria met)  - Apply silicone foam dressing  - Instruct/assist with weight shifting every 60 minutes when in chair   - Limit chair time to 2 hour intervals  - Use special pressure reducing interventions when in chair   - Apply fecal or urinary incontinence containment device   - Perform passive or active ROM every 2 hours  - Turn and reposition patient & offload bony prominences every 2 hours   - Utilize friction reducing device or surface for transfers   - Consider consults to  interdisciplinary teams.  - Use incontinent care products after each incontinent episode  - Consider nutrition services referral as needed  Outcome: Progressing     Problem: Prexisting or High Potential for Compromised Skin Integrity  Goal: Skin integrity is maintained or improved  Description: INTERVENTIONS:  - Identify patients at risk for skin breakdown  - Assess and monitor skin integrity  - Assess and monitor nutrition and hydration status  - Monitor labs   - Assess for incontinence   - Turn and reposition patient  - Assist with mobility/ambulation  - Relieve pressure over bony prominences  - Avoid friction and shearing  - Provide appropriate hygiene as needed including keeping skin clean and dry  - Evaluate need for skin moisturizer/barrier cream  - Collaborate with interdisciplinary team   - Patient/family teaching  - Consider wound care consult   Outcome: Progressing     Problem: Nutrition/Hydration-ADULT  Goal: Nutrient/Hydration intake appropriate for improving, restoring or maintaining nutritional needs  Description: Monitor and assess patient's nutrition/hydration status for malnutrition. Collaborate with interdisciplinary team and initiate plan and interventions as ordered. Monitor patient's weight and dietary intake as ordered or per policy. Utilize nutrition screening tool and intervene as necessary. Determine patient's food preferences and provide high-protein, high-caloric foods as appropriate.      INTERVENTIONS:  - Monitor oral intake, urinary output, labs, and treatment plans  - Assess nutrition and hydration status and recommend course of action  - Evaluate amount of meals eaten  - Assist patient with eating if necessary   - Allow adequate time for meals  - Recommend/ encourage appropriate diets, oral nutritional supplements, and vitamin/mineral supplements  - Order, calculate, and assess calorie counts as needed  - Recommend, monitor, and adjust tube feedings and TPN/PPN based on assessed needs  - Assess need for intravenous fluids  - Provide specific nutrition/hydration education as appropriate  - Include patient/family/caregiver in decisions related to nutrition  Outcome: Progressing

## 2023-11-28 NOTE — ASSESSMENT & PLAN NOTE
Rate control: digoxin 125 mcg every other day (last taken 11/24)  Dig level 0.7  Rhythm: Cardizem 240 mg daily   OAC: warfarin - HOLD due to supratherapeutic INR (5.4)  Need to resume AC with NOAC, Eliquis sent to pharmacy and $22 monthly. Will start Eliquis tomorrow after discussing with patient's family.

## 2023-11-29 ENCOUNTER — APPOINTMENT (INPATIENT)
Dept: CT IMAGING | Facility: HOSPITAL | Age: 83
DRG: 291 | End: 2023-11-29
Payer: MEDICARE

## 2023-11-29 LAB
BASOPHILS # BLD AUTO: 0.02 THOUSANDS/ÂΜL (ref 0–0.1)
BASOPHILS NFR BLD AUTO: 0 % (ref 0–1)
BUN SERPL-MCNC: 13 MG/DL (ref 5–25)
CALCIUM SERPL-MCNC: 8.8 MG/DL (ref 8.4–10.2)
CHLORIDE SERPL-SCNC: 89 MMOL/L (ref 96–108)
CO2 SERPL-SCNC: >45 MMOL/L (ref 21–32)
CREAT SERPL-MCNC: 0.57 MG/DL (ref 0.6–1.3)
EOSINOPHIL # BLD AUTO: 0.24 THOUSAND/ÂΜL (ref 0–0.61)
EOSINOPHIL NFR BLD AUTO: 3 % (ref 0–6)
ERYTHROCYTE [DISTWIDTH] IN BLOOD BY AUTOMATED COUNT: 18.9 % (ref 11.6–15.1)
GFR SERPL CREATININE-BSD FRML MDRD: 85 ML/MIN/1.73SQ M
GLUCOSE SERPL-MCNC: 100 MG/DL (ref 65–140)
HCT VFR BLD AUTO: 28.2 % (ref 34.8–46.1)
HGB BLD-MCNC: 8.4 G/DL (ref 11.5–15.4)
IMM GRANULOCYTES # BLD AUTO: 0.03 THOUSAND/UL (ref 0–0.2)
IMM GRANULOCYTES NFR BLD AUTO: 0 % (ref 0–2)
LYMPHOCYTES # BLD AUTO: 0.81 THOUSANDS/ÂΜL (ref 0.6–4.47)
LYMPHOCYTES NFR BLD AUTO: 11 % (ref 14–44)
MAGNESIUM SERPL-MCNC: 1.9 MG/DL (ref 1.9–2.7)
MCH RBC QN AUTO: 31.3 PG (ref 26.8–34.3)
MCHC RBC AUTO-ENTMCNC: 29.8 G/DL (ref 31.4–37.4)
MCV RBC AUTO: 105 FL (ref 82–98)
MONOCYTES # BLD AUTO: 0.57 THOUSAND/ÂΜL (ref 0.17–1.22)
MONOCYTES NFR BLD AUTO: 8 % (ref 4–12)
NEUTROPHILS # BLD AUTO: 5.53 THOUSANDS/ÂΜL (ref 1.85–7.62)
NEUTS SEG NFR BLD AUTO: 78 % (ref 43–75)
NRBC BLD AUTO-RTO: 0 /100 WBCS
PHOSPHATE SERPL-MCNC: 2.8 MG/DL (ref 2.3–4.1)
PLATELET # BLD AUTO: 246 THOUSANDS/UL (ref 149–390)
PMV BLD AUTO: 8.9 FL (ref 8.9–12.7)
POTASSIUM SERPL-SCNC: 3.5 MMOL/L (ref 3.5–5.3)
RBC # BLD AUTO: 2.68 MILLION/UL (ref 3.81–5.12)
SODIUM SERPL-SCNC: 139 MMOL/L (ref 135–147)
WBC # BLD AUTO: 7.2 THOUSAND/UL (ref 4.31–10.16)

## 2023-11-29 PROCEDURE — G1004 CDSM NDSC: HCPCS

## 2023-11-29 PROCEDURE — 97116 GAIT TRAINING THERAPY: CPT

## 2023-11-29 PROCEDURE — 94760 N-INVAS EAR/PLS OXIMETRY 1: CPT

## 2023-11-29 PROCEDURE — 83735 ASSAY OF MAGNESIUM: CPT

## 2023-11-29 PROCEDURE — 84100 ASSAY OF PHOSPHORUS: CPT

## 2023-11-29 PROCEDURE — 99232 SBSQ HOSP IP/OBS MODERATE 35: CPT | Performed by: INTERNAL MEDICINE

## 2023-11-29 PROCEDURE — 94640 AIRWAY INHALATION TREATMENT: CPT

## 2023-11-29 PROCEDURE — 80048 BASIC METABOLIC PNL TOTAL CA: CPT

## 2023-11-29 PROCEDURE — C9113 INJ PANTOPRAZOLE SODIUM, VIA: HCPCS | Performed by: INTERNAL MEDICINE

## 2023-11-29 PROCEDURE — 85025 COMPLETE CBC W/AUTO DIFF WBC: CPT

## 2023-11-29 PROCEDURE — 97535 SELF CARE MNGMENT TRAINING: CPT

## 2023-11-29 PROCEDURE — 99232 SBSQ HOSP IP/OBS MODERATE 35: CPT | Performed by: PHYSICIAN ASSISTANT

## 2023-11-29 PROCEDURE — 97163 PT EVAL HIGH COMPLEX 45 MIN: CPT

## 2023-11-29 PROCEDURE — 71250 CT THORAX DX C-: CPT

## 2023-11-29 PROCEDURE — 97530 THERAPEUTIC ACTIVITIES: CPT

## 2023-11-29 PROCEDURE — 94660 CPAP INITIATION&MGMT: CPT

## 2023-11-29 RX ORDER — FUROSEMIDE 40 MG/1
40 TABLET ORAL DAILY
Status: DISCONTINUED | OUTPATIENT
Start: 2023-11-29 | End: 2023-11-30

## 2023-11-29 RX ORDER — METHYLPREDNISOLONE SODIUM SUCCINATE 40 MG/ML
40 INJECTION, POWDER, LYOPHILIZED, FOR SOLUTION INTRAMUSCULAR; INTRAVENOUS EVERY 12 HOURS SCHEDULED
Status: COMPLETED | OUTPATIENT
Start: 2023-11-29 | End: 2023-12-01

## 2023-11-29 RX ADMIN — METHYLPREDNISOLONE SODIUM SUCCINATE 40 MG: 40 INJECTION, POWDER, FOR SOLUTION INTRAMUSCULAR; INTRAVENOUS at 20:35

## 2023-11-29 RX ADMIN — APIXABAN 2.5 MG: 2.5 TABLET, FILM COATED ORAL at 09:24

## 2023-11-29 RX ADMIN — MIRTAZAPINE 7.5 MG: 15 TABLET, FILM COATED ORAL at 20:35

## 2023-11-29 RX ADMIN — IPRATROPIUM BROMIDE 0.5 MG: 0.5 SOLUTION RESPIRATORY (INHALATION) at 20:47

## 2023-11-29 RX ADMIN — BUDESONIDE 0.5 MG: 0.5 INHALANT RESPIRATORY (INHALATION) at 09:09

## 2023-11-29 RX ADMIN — DILTIAZEM HYDROCHLORIDE 240 MG: 120 CAPSULE, COATED, EXTENDED RELEASE ORAL at 09:24

## 2023-11-29 RX ADMIN — IPRATROPIUM BROMIDE 0.5 MG: 0.5 SOLUTION RESPIRATORY (INHALATION) at 09:09

## 2023-11-29 RX ADMIN — FUROSEMIDE 40 MG: 40 TABLET ORAL at 09:24

## 2023-11-29 RX ADMIN — SERTRALINE HYDROCHLORIDE 50 MG: 50 TABLET ORAL at 09:24

## 2023-11-29 RX ADMIN — PANTOPRAZOLE SODIUM 40 MG: 40 INJECTION, POWDER, FOR SOLUTION INTRAVENOUS at 09:24

## 2023-11-29 RX ADMIN — PANTOPRAZOLE SODIUM 40 MG: 40 INJECTION, POWDER, FOR SOLUTION INTRAVENOUS at 20:35

## 2023-11-29 RX ADMIN — FERROUS SULFATE TAB 325 MG (65 MG ELEMENTAL FE) 325 MG: 325 (65 FE) TAB at 09:24

## 2023-11-29 RX ADMIN — IPRATROPIUM BROMIDE 0.5 MG: 0.5 SOLUTION RESPIRATORY (INHALATION) at 13:21

## 2023-11-29 RX ADMIN — FORMOTEROL FUMARATE DIHYDRATE 20 MCG: 20 SOLUTION RESPIRATORY (INHALATION) at 09:34

## 2023-11-29 RX ADMIN — LEVALBUTEROL HYDROCHLORIDE 1.25 MG: 1.25 SOLUTION RESPIRATORY (INHALATION) at 09:09

## 2023-11-29 RX ADMIN — BUDESONIDE 0.5 MG: 0.5 INHALANT RESPIRATORY (INHALATION) at 20:46

## 2023-11-29 RX ADMIN — LEVALBUTEROL HYDROCHLORIDE 1.25 MG: 1.25 SOLUTION RESPIRATORY (INHALATION) at 20:47

## 2023-11-29 RX ADMIN — LEVALBUTEROL HYDROCHLORIDE 1.25 MG: 1.25 SOLUTION RESPIRATORY (INHALATION) at 13:21

## 2023-11-29 RX ADMIN — FORMOTEROL FUMARATE DIHYDRATE 20 MCG: 20 SOLUTION RESPIRATORY (INHALATION) at 20:47

## 2023-11-29 RX ADMIN — LEVOTHYROXINE SODIUM 25 MCG: 25 TABLET ORAL at 05:07

## 2023-11-29 NOTE — PLAN OF CARE
Problem: Potential for Falls  Goal: Patient will remain free of falls  Description: INTERVENTIONS:  - Educate patient/family on patient safety including physical limitations  - Instruct patient to call for assistance with activity   - Consult OT/PT to assist with strengthening/mobility   - Keep Call bell within reach  - Keep bed low and locked with side rails adjusted as appropriate  - Keep care items and personal belongings within reach  - Initiate and maintain comfort rounds  - Make Fall Risk Sign visible to staff  - Offer Toileting every 2 Hours, in advance of need  - Initiate/Maintain bed alarm  - Apply yellow socks and bracelet for high fall risk patients  - Consider moving patient to room near nurses station  Outcome: Progressing     Problem: PAIN - ADULT  Goal: Verbalizes/displays adequate comfort level or baseline comfort level  Description: Interventions:  - Encourage patient to monitor pain and request assistance  - Assess pain using appropriate pain scale  - Administer analgesics based on type and severity of pain and evaluate response  - Implement non-pharmacological measures as appropriate and evaluate response  - Consider cultural and social influences on pain and pain management  - Notify physician/advanced practitioner if interventions unsuccessful or patient reports new pain  Outcome: Progressing     Problem: INFECTION - ADULT  Goal: Absence or prevention of progression during hospitalization  Description: INTERVENTIONS:  - Assess and monitor for signs and symptoms of infection  - Monitor lab/diagnostic results  - Monitor all insertion sites, i.e. indwelling lines, tubes, and drains  - Monitor endotracheal if appropriate and nasal secretions for changes in amount and color  - [place] appropriate cooling/warming therapies per order  - Administer medications as ordered  - Instruct and encourage patient and family to use good hand hygiene technique  - Identify and instruct in appropriate isolation precautions for identified infection/condition  Outcome: Progressing  Goal: Absence of fever/infection during neutropenic period  Description: INTERVENTIONS:  - Monitor WBC    Outcome: Progressing     Problem: SAFETY ADULT  Goal: Patient will remain free of falls  Description: INTERVENTIONS:  - Educate patient/family on patient safety including physical limitations  - Instruct patient to call for assistance with activity   - Consult OT/PT to assist with strengthening/mobility   - Keep Call bell within reach  - Keep bed low and locked with side rails adjusted as appropriate  - Keep care items and personal belongings within reach  - Initiate and maintain comfort rounds  - Make Fall Risk Sign visible to staff  - Offer Toileting every 2 Hours, in advance of need  - Initiate/Maintain bed alarm  - Apply yellow socks and bracelet for high fall risk patients  - Consider moving patient to room near nurses station  Outcome: Progressing  Goal: Maintain or return to baseline ADL function  Description: INTERVENTIONS:  -  Assess patient's ability to carry out ADLs; assess patient's baseline for ADL function and identify physical deficits which impact ability to perform ADLs (bathing, care of mouth/teeth, toileting, grooming, dressing, etc.)  - Assess/evaluate cause of self-care deficits   - Assess range of motion  - Assess patient's mobility; develop plan if impaired  - Assess patient's need for assistive devices and provide as appropriate  - Encourage maximum independence but intervene and supervise when necessary  - Involve family in performance of ADLs  - Assess for home care needs following discharge   - Consider OT consult to assist with ADL evaluation and planning for discharge  - Provide patient education as appropriate  Outcome: Progressing  Goal: Maintains/Returns to pre admission functional level  Description: INTERVENTIONS:  - Perform AM-PAC 6 Click Basic Mobility/ Daily Activity assessment daily.  - Set and communicate daily mobility goal to care team and patient/family/caregiver. - Collaborate with rehabilitation services on mobility goals if consulted  - Perform Range of Motion 3 times a day. - Reposition patient every 2 hours. - Dangle patient 2 times a day  - Stand patient 2 times a day  - Ambulate patient 2 times a day  - Out of bed to chair 2 times a day   - Out of bed for meals 2 times a day  - Out of bed for toileting  - Record patient progress and toleration of activity level   Outcome: Progressing     Problem: DISCHARGE PLANNING  Goal: Discharge to home or other facility with appropriate resources  Description: INTERVENTIONS:  - Identify barriers to discharge w/patient and caregiver  - Arrange for needed discharge resources and transportation as appropriate  - Identify discharge learning needs (meds, wound care, etc.)  - Arrange for interpretive services to assist at discharge as needed  - Refer to Case Management Department for coordinating discharge planning if the patient needs post-hospital services based on physician/advanced practitioner order or complex needs related to functional status, cognitive ability, or social support system  Outcome: Progressing     Problem: Knowledge Deficit  Goal: Patient/family/caregiver demonstrates understanding of disease process, treatment plan, medications, and discharge instructions  Description: Complete learning assessment and assess knowledge base.   Interventions:  - Provide teaching at level of understanding  - Provide teaching via preferred learning methods  Outcome: Progressing     Problem: CARDIOVASCULAR - ADULT  Goal: Maintains optimal cardiac output and hemodynamic stability  Description: INTERVENTIONS:  - Monitor I/O, vital signs and rhythm  - Monitor for S/S and trends of decreased cardiac output  - Administer and titrate ordered vasoactive medications to optimize hemodynamic stability  - Assess quality of pulses, skin color and temperature  - Assess for signs of decreased coronary artery perfusion  - Instruct patient to report change in severity of symptoms  Outcome: Progressing  Goal: Absence of cardiac dysrhythmias or at baseline rhythm  Description: INTERVENTIONS:  - Continuous cardiac monitoring, vital signs, obtain 12 lead EKG if ordered  - Administer antiarrhythmic and heart rate control medications as ordered  - Monitor electrolytes and administer replacement therapy as ordered  Outcome: Progressing     Problem: RESPIRATORY - ADULT  Goal: Achieves optimal ventilation and oxygenation  Description: INTERVENTIONS:  - Assess for changes in respiratory status  - Assess for changes in mentation and behavior  - Position to facilitate oxygenation and minimize respiratory effort  - Oxygen administered by appropriate delivery if ordered  - Initiate smoking cessation education as indicated  - Encourage broncho-pulmonary hygiene including cough, deep breathe, Incentive Spirometry  - Assess the need for suctioning and aspirate as needed  - Assess and instruct to report SOB or any respiratory difficulty  - Respiratory Therapy support as indicated  Outcome: Progressing     Problem: GENITOURINARY - ADULT  Goal: Maintains or returns to baseline urinary function  Description: INTERVENTIONS:  - Assess urinary function  - Encourage oral fluids to ensure adequate hydration if ordered  - Administer IV fluids as ordered to ensure adequate hydration  - Administer ordered medications as needed  - Offer frequent toileting  - Follow urinary retention protocol if ordered  Outcome: Progressing  Goal: Absence of urinary retention  Description: INTERVENTIONS:  - Assess patient’s ability to void and empty bladder  - Monitor I/O  - Bladder scan as needed  - Discuss with physician/AP medications to alleviate retention as needed  - Discuss catheterization for long term situations as appropriate  Outcome: Progressing  Goal: Urinary catheter remains patent  Description: INTERVENTIONS:  - Assess patency of urinary catheter  - If patient has a chronic hand, consider changing catheter if non-functioning  - Follow guidelines for intermittent irrigation of non-functioning urinary catheter  Outcome: Progressing     Problem: METABOLIC, FLUID AND ELECTROLYTES - ADULT  Goal: Electrolytes maintained within normal limits  Description: INTERVENTIONS:  - Monitor labs and assess patient for signs and symptoms of electrolyte imbalances  - Administer electrolyte replacement as ordered  - Monitor response to electrolyte replacements, including repeat lab results as appropriate  - Instruct patient on fluid and nutrition as appropriate  Outcome: Progressing  Goal: Fluid balance maintained  Description: INTERVENTIONS:  - Monitor labs   - Monitor I/O and WT  - Instruct patient on fluid and nutrition as appropriate  - Assess for signs & symptoms of volume excess or deficit  Outcome: Progressing  Goal: Glucose maintained within target range  Description: INTERVENTIONS:  - Monitor Blood Glucose as ordered  - Assess for signs and symptoms of hyperglycemia and hypoglycemia  - Administer ordered medications to maintain glucose within target range  - Assess nutritional intake and initiate nutrition service referral as needed  Outcome: Progressing     Problem: SKIN/TISSUE INTEGRITY - ADULT  Goal: Skin Integrity remains intact(Skin Breakdown Prevention)  Description: Assess:  -Perform Everardo assessment every shift  -Clean and moisturize skin every 2 hours  -Inspect skin when repositioning, toileting, and assisting with ADLS  -Assess extremities for adequate circulation and sensation     Bed Management:  -Have minimal linens on bed & keep smooth, unwrinkled  -Change linens as needed when moist or perspiring  -Avoid sitting or lying in one position for more than 2 hours while in bed  -Keep HOB at 30degrees     Toileting:  -Offer bedside commode  -Assess for incontinence every 2 hours    Activity:  -Mobilize patient 3 times a day  -Encourage activity and walks on unit  -Encourage or provide ROM exercises   -Turn and reposition patient every 2 Hours  -Use appropriate equipment to lift or move patient in bed  -Instruct/ Assist with weight shifting every 2 hours when out of bed in chair  -Consider limitation of chair time 2 hour intervals    Skin Care:  -Avoid use of baby powder, tape, friction and shearing, hot water or constrictive clothing  -Relieve pressure over bony prominences using allevyn  -Do not massage red bony areas    Outcome: Progressing  Goal: Incision(s), wounds(s) or drain site(s) healing without S/S of infection  Description: INTERVENTIONS  - Assess and document dressing, incision, wound bed, drain sites and surrounding tissue  - Provide patient and family education  - Perform skin care/dressing changes every shift  Outcome: Progressing  Goal: Pressure injury heals and does not worsen  Description: Interventions:  - Implement low air loss mattress or specialty surface (Criteria met)  - Apply silicone foam dressing  - Instruct/assist with weight shifting every 60 minutes when in chair   - Limit chair time to 2 hour intervals  - Use special pressure reducing interventions such as cushion when in chair   - Apply fecal or urinary incontinence containment device   - Perform passive or active ROM every 60 minutes  - Turn and reposition patient & offload bony prominences every 2 hours   - Utilize friction reducing device or surface for transfers   - Consider nutrition services referral as needed  Outcome: Progressing     Problem: Prexisting or High Potential for Compromised Skin Integrity  Goal: Skin integrity is maintained or improved  Description: INTERVENTIONS:  - Identify patients at risk for skin breakdown  - Assess and monitor skin integrity  - Assess and monitor nutrition and hydration status  - Monitor labs   - Assess for incontinence   - Turn and reposition patient  - Assist with mobility/ambulation  - Relieve pressure over bony prominences  - Avoid friction and shearing  - Provide appropriate hygiene as needed including keeping skin clean and dry  - Evaluate need for skin moisturizer/barrier cream  - Collaborate with interdisciplinary team   - Patient/family teaching  - Consider wound care consult   Outcome: Progressing     Problem: Nutrition/Hydration-ADULT  Goal: Nutrient/Hydration intake appropriate for improving, restoring or maintaining nutritional needs  Description: Monitor and assess patient's nutrition/hydration status for malnutrition. Collaborate with interdisciplinary team and initiate plan and interventions as ordered. Monitor patient's weight and dietary intake as ordered or per policy. Utilize nutrition screening tool and intervene as necessary. Determine patient's food preferences and provide high-protein, high-caloric foods as appropriate.      INTERVENTIONS:  - Monitor oral intake, urinary output, labs, and treatment plans  - Assess nutrition and hydration status and recommend course of action  - Evaluate amount of meals eaten  - Assist patient with eating if necessary   - Allow adequate time for meals  - Recommend/ encourage appropriate diets, oral nutritional supplements, and vitamin/mineral supplements  - Order, calculate, and assess calorie counts as needed  - Recommend, monitor, and adjust tube feedings and TPN/PPN based on assessed needs  - Assess need for intravenous fluids  - Provide specific nutrition/hydration education as appropriate  - Include patient/family/caregiver in decisions related to nutrition  Outcome: Progressing

## 2023-11-29 NOTE — ASSESSMENT & PLAN NOTE
Rate control: digoxin 125 mcg every other day (last taken 11/24)  Dig level 0.7  Rhythm: Cardizem 240 mg daily   OAC: warfarin - HOLD due to supratherapeutic INR (5.4)  Need to resume AC with NOAC, Eliquis sent to pharmacy and $22 monthly.    Eliquis restarted today, will monitor CBC

## 2023-11-29 NOTE — ASSESSMENT & PLAN NOTE
Chronically maintained on 2L 2/2 COPD   Requiring 4-5 L on admission, SpO2 90-94%  COVID/FLU/RSV neg  CXR with venous congestion, bilateral effusions     Suspect multifactorial in the setting of acute on chronic CHF as well as COPD with acute exacerbation  Currently on 4L  With CO2 greater than 45  We will try her BiPAP at bedtime  Diuretics on hold today given component of contraction alkalosis  Ct scan to be performed today  Possible consult to IR depending on size of effusion

## 2023-11-29 NOTE — OCCUPATIONAL THERAPY NOTE
Occupational Therapy Tx Note     Patient Name: Josep Rm  MLWLZ'Y Date: 11/29/2023  Problem List  Principal Problem:    GIB (gastrointestinal bleeding)  Active Problems:    Chronic obstructive pulmonary disease (HCC)    Atrial fibrillation ([address])    Other specified hypothyroidism    Acute on chronic respiratory failure with hypoxia and hypercapnia (HCC)    Acute blood loss anemia    Syncope    Acute on chronic diastolic CHF (congestive heart failure) ([address])            11/29/23 1104   OT Last Visit   OT Visit Date 11/29/23   Note Type   Note Type Treatment   Pain Assessment   Pain Assessment Tool 0-10   Pain Score No Pain   Restrictions/Precautions   Weight Bearing Precautions Per Order No   Other Precautions Cognitive; Chair Alarm; Bed Alarm; Fall Risk;O2;Multiple lines   ADL   Toileting Assistance  3  Moderate Assistance   Toileting Deficit Perineal hygiene; Bedside commode; Increased time to complete;Verbal cueing;Steadying   Bed Mobility   Additional Comments Pt received sitting in recliner. Transfers   Sit to Stand 4  Minimal assistance   Additional items Assist x 2;Verbal cues   Stand to Sit 4  Minimal assistance   Additional items Assist x 2;Verbal cues   Cognition   Overall Cognitive Status WFL   Arousal/Participation Alert   Attention Within functional limits   Orientation Level Oriented X4   Memory Within functional limits   Following Commands Follows all commands and directions without difficulty   Activity Tolerance   Activity Tolerance Patient limited by fatigue   Medical Staff Made Aware PT Natalya   Assessment   Assessment Pt seen for OT tx session with focus on functional balance, functional mobility, ADL status, and transfer safety. Patient agreeable to OT treatment session. Pt received seated OOB to Recliner. Performed transfers with min Ax 2. Performed toileting with mod A. VSS today.  Patient continues to be functioning below baseline level, occupational performance remains limited secondary to factors listed above, and pt at increased risk for falls and injury. The patient's raw score on the AM-PAC Daily Activity inpatient short form is 17, standardized score is 37.26, less than 39.4. Patients at this level are likely to benefit from DC to post-acute rehabilitation services. Please refer to the recommendation of the Occupational Therapist for safe DC planning. From OT standpoint, recommendation at time of d/c would be level 2 resources. Patient to benefit from continued Occupational Therapy treatment while in the hospital to address deficits as defined above and maximize level of functional independence with ADLs and functional mobility. Pt left with call bell in reach, tray table in reach, needs met, chair alarm activated. RN aware. Plan   Treatment Interventions ADL retraining;Functional transfer training;Patient/family training; Compensatory technique education; Endurance training; Activityengagement   Goal Expiration Date 12/07/23   OT Treatment Day 1   OT Frequency 3-5x/wk   Discharge Recommendation   Rehab Resource Intensity Level, OT II (Moderate Resource Intensity)   AM-PAC Daily Activity Inpatient   Lower Body Dressing 2   Bathing 2   Toileting 2   Upper Body Dressing 3   Grooming 4   Eating 4   Daily Activity Raw Score 17   Daily Activity Standardized Score (Calc for Raw Score >=11) 37.26   AM-PAC Applied Cognition Inpatient   Following a Speech/Presentation 3   Understanding Ordinary Conversation 4   Taking Medications 4   Remembering Where Things Are Placed or Put Away 4   Remembering List of 4-5 Errands 4   Taking Care of Complicated Tasks 3   Applied Cognition Raw Score 22   Applied Cognition Standardized Score 47.83   End of Consult   Education Provided Yes   Patient Position at End of Consult Bedside chair; All needs within reach;Bed/Chair alarm activated   Nurse Communication Nurse aware of consult           MARLYS Abarca/KRYSTAL

## 2023-11-29 NOTE — PLAN OF CARE
Problem: Potential for Falls  Goal: Patient will remain free of falls  Description: INTERVENTIONS:  - Educate patient/family on patient safety including physical limitations  - Instruct patient to call for assistance with activity   - Consult OT/PT to assist with strengthening/mobility   - Keep Call bell within reach  - Keep bed low and locked with side rails adjusted as appropriate  - Keep care items and personal belongings within reach  - Initiate and maintain comfort rounds  - Make Fall Risk Sign visible to staff  - Apply yellow socks and bracelet for high fall risk patients  - Consider moving patient to room near nurses station  Outcome: Progressing     Problem: PAIN - ADULT  Goal: Verbalizes/displays adequate comfort level or baseline comfort level  Description: Interventions:  - Encourage patient to monitor pain and request assistance  - Assess pain using appropriate pain scale  - Administer analgesics based on type and severity of pain and evaluate response  - Implement non-pharmacological measures as appropriate and evaluate response  - Consider cultural and social influences on pain and pain management  - Notify physician/advanced practitioner if interventions unsuccessful or patient reports new pain  Outcome: Progressing     Problem: INFECTION - ADULT  Goal: Absence or prevention of progression during hospitalization  Description: INTERVENTIONS:  - Assess and monitor for signs and symptoms of infection  - Monitor lab/diagnostic results  - Monitor all insertion sites, i.e. indwelling lines, tubes, and drains  - Monitor endotracheal if appropriate and nasal secretions for changes in amount and color  - [place] appropriate cooling/warming therapies per order  - Administer medications as ordered  - Instruct and encourage patient and family to use good hand hygiene technique  - Identify and instruct in appropriate isolation precautions for identified infection/condition  Outcome: Progressing  Goal: Absence of fever/infection during neutropenic period  Description: INTERVENTIONS:  - Monitor WBC    Outcome: Progressing     Problem: SAFETY ADULT  Goal: Patient will remain free of falls  Description: INTERVENTIONS:  - Educate patient/family on patient safety including physical limitations  - Instruct patient to call for assistance with activity   - Consult OT/PT to assist with strengthening/mobility   - Keep Call bell within reach  - Keep bed low and locked with side rails adjusted as appropriate  - Keep care items and personal belongings within reach  - Initiate and maintain comfort rounds  - Make Fall Risk Sign visible to staff  - Apply yellow socks and bracelet for high fall risk patients  - Consider moving patient to room near nurses station  Outcome: Progressing  Goal: Maintain or return to baseline ADL function  Description: INTERVENTIONS:  -  Assess patient's ability to carry out ADLs; assess patient's baseline for ADL function and identify physical deficits which impact ability to perform ADLs (bathing, care of mouth/teeth, toileting, grooming, dressing, etc.)  - Assess/evaluate cause of self-care deficits   - Assess range of motion  - Assess patient's mobility; develop plan if impaired  - Assess patient's need for assistive devices and provide as appropriate  - Encourage maximum independence but intervene and supervise when necessary  - Involve family in performance of ADLs  - Assess for home care needs following discharge   - Consider OT consult to assist with ADL evaluation and planning for discharge  - Provide patient education as appropriate  Outcome: Progressing  Goal: Maintains/Returns to pre admission functional level  Description: INTERVENTIONS:  - Perform AM-PAC 6 Click Basic Mobility/ Daily Activity assessment daily.  - Set and communicate daily mobility goal to care team and patient/family/caregiver.    - Collaborate with rehabilitation services on mobility goals if consulted  - Out of bed for toileting  - Record patient progress and toleration of activity level   Outcome: Progressing     Problem: CARDIOVASCULAR - ADULT  Goal: Maintains optimal cardiac output and hemodynamic stability  Description: INTERVENTIONS:  - Monitor I/O, vital signs and rhythm  - Monitor for S/S and trends of decreased cardiac output  - Administer and titrate ordered vasoactive medications to optimize hemodynamic stability  - Assess quality of pulses, skin color and temperature  - Assess for signs of decreased coronary artery perfusion  - Instruct patient to report change in severity of symptoms  Outcome: Progressing  Goal: Absence of cardiac dysrhythmias or at baseline rhythm  Description: INTERVENTIONS:  - Continuous cardiac monitoring, vital signs, obtain 12 lead EKG if ordered  - Administer antiarrhythmic and heart rate control medications as ordered  - Monitor electrolytes and administer replacement therapy as ordered  Outcome: Progressing     Problem: RESPIRATORY - ADULT  Goal: Achieves optimal ventilation and oxygenation  Description: INTERVENTIONS:  - Assess for changes in respiratory status  - Assess for changes in mentation and behavior  - Position to facilitate oxygenation and minimize respiratory effort  - Oxygen administered by appropriate delivery if ordered  - Initiate smoking cessation education as indicated  - Encourage broncho-pulmonary hygiene including cough, deep breathe, Incentive Spirometry  - Assess the need for suctioning and aspirate as needed  - Assess and instruct to report SOB or any respiratory difficulty  - Respiratory Therapy support as indicated  Outcome: Progressing     Problem: GENITOURINARY - ADULT  Goal: Maintains or returns to baseline urinary function  Description: INTERVENTIONS:  - Assess urinary function  - Encourage oral fluids to ensure adequate hydration if ordered  - Administer IV fluids as ordered to ensure adequate hydration  - Administer ordered medications as needed  - Offer frequent toileting  - Follow urinary retention protocol if ordered  Outcome: Progressing  Goal: Absence of urinary retention  Description: INTERVENTIONS:  - Assess patient’s ability to void and empty bladder  - Monitor I/O  - Bladder scan as needed  - Discuss with physician/AP medications to alleviate retention as needed  - Discuss catheterization for long term situations as appropriate  Outcome: Progressing  Goal: Urinary catheter remains patent  Description: INTERVENTIONS:  - Assess patency of urinary catheter  - If patient has a chronic hand, consider changing catheter if non-functioning  - Follow guidelines for intermittent irrigation of non-functioning urinary catheter  Outcome: Progressing     Problem: METABOLIC, FLUID AND ELECTROLYTES - ADULT  Goal: Electrolytes maintained within normal limits  Description: INTERVENTIONS:  - Monitor labs and assess patient for signs and symptoms of electrolyte imbalances  - Administer electrolyte replacement as ordered  - Monitor response to electrolyte replacements, including repeat lab results as appropriate  - Instruct patient on fluid and nutrition as appropriate  Outcome: Progressing  Goal: Fluid balance maintained  Description: INTERVENTIONS:  - Monitor labs   - Monitor I/O and WT  - Instruct patient on fluid and nutrition as appropriate  - Assess for signs & symptoms of volume excess or deficit  Outcome: Progressing  Goal: Glucose maintained within target range  Description: INTERVENTIONS:  - Monitor Blood Glucose as ordered  - Assess for signs and symptoms of hyperglycemia and hypoglycemia  - Administer ordered medications to maintain glucose within target range  - Assess nutritional intake and initiate nutrition service referral as needed  Outcome: Progressing     Problem: SKIN/TISSUE INTEGRITY - ADULT  Goal: Skin Integrity remains intact(Skin Breakdown Prevention)  Description: Assess:  -Inspect skin when repositioning, toileting, and assisting with ADLS  -Assess extremities for adequate circulation and sensation     Bed Management:  -Have minimal linens on bed & keep smooth, unwrinkled  -Change linens as needed when moist or perspiring      Toileting:  -Offer bedside commode      Activity:  -Encourage activity and walks on unit  -Encourage or provide ROM exercises   -Use appropriate equipment to lift or move patient in bed      Skin Care:  -Avoid use of baby powder, tape, friction and shearing, hot water or constrictive clothing  -Do not massage red bony areas      Outcome: Progressing  Goal: Incision(s), wounds(s) or drain site(s) healing without S/S of infection  Description: INTERVENTIONS  - Assess and document dressing, incision, wound bed, drain sites and surrounding tissue  - Provide patient and family education  Outcome: Progressing  Goal: Pressure injury heals and does not worsen  Description: Interventions:  - Implement low air loss mattress or specialty surface (Criteria met)  - Apply silicone foam dressing  - Apply fecal or urinary incontinence containment device   - Utilize friction reducing device or surface for transfers   - Consider nutrition services referral as needed  Outcome: Progressing     Problem: Prexisting or High Potential for Compromised Skin Integrity  Goal: Skin integrity is maintained or improved  Description: INTERVENTIONS:  - Identify patients at risk for skin breakdown  - Assess and monitor skin integrity  - Assess and monitor nutrition and hydration status  - Monitor labs   - Assess for incontinence   - Turn and reposition patient  - Assist with mobility/ambulation  - Relieve pressure over bony prominences  - Avoid friction and shearing  - Provide appropriate hygiene as needed including keeping skin clean and dry  - Evaluate need for skin moisturizer/barrier cream  - Collaborate with interdisciplinary team   - Patient/family teaching  - Consider wound care consult   Outcome: Progressing     Problem: Nutrition/Hydration-ADULT  Goal: Nutrient/Hydration intake appropriate for improving, restoring or maintaining nutritional needs  Description: Monitor and assess patient's nutrition/hydration status for malnutrition. Collaborate with interdisciplinary team and initiate plan and interventions as ordered. Monitor patient's weight and dietary intake as ordered or per policy. Utilize nutrition screening tool and intervene as necessary. Determine patient's food preferences and provide high-protein, high-caloric foods as appropriate.      INTERVENTIONS:  - Monitor oral intake, urinary output, labs, and treatment plans  - Assess nutrition and hydration status and recommend course of action  - Evaluate amount of meals eaten  - Assist patient with eating if necessary   - Allow adequate time for meals  - Recommend/ encourage appropriate diets, oral nutritional supplements, and vitamin/mineral supplements  - Order, calculate, and assess calorie counts as needed  - Recommend, monitor, and adjust tube feedings and TPN/PPN based on assessed needs  - Assess need for intravenous fluids  - Provide specific nutrition/hydration education as appropriate  - Include patient/family/caregiver in decisions related to nutrition  Outcome: Progressing

## 2023-11-29 NOTE — PLAN OF CARE
Problem: OCCUPATIONAL THERAPY ADULT  Goal: Performs self-care activities at highest level of function for planned discharge setting. See evaluation for individualized goals. Description: Treatment Interventions: ADL retraining, Functional transfer training, Patient/family training, Compensatory technique education, Endurance training, Activityengagement          See flowsheet documentation for full assessment, interventions and recommendations. Outcome: Progressing  Note: Limitation: Decreased ADL status, Decreased self-care trans, Decreased high-level ADLs, Decreased endurance  Prognosis: Fair  Assessment: Pt seen for OT tx session with focus on functional balance, functional mobility, ADL status, and transfer safety. Patient agreeable to OT treatment session. Pt received seated OOB to Recliner. Performed transfers with min Ax 2. Performed toileting with mod A. VSS today. Patient continues to be functioning below baseline level, occupational performance remains limited secondary to factors listed above, and pt at increased risk for falls and injury. The patient's raw score on the AM-PAC Daily Activity inpatient short form is 17, standardized score is 37.26, less than 39.4. Patients at this level are likely to benefit from DC to post-acute rehabilitation services. Please refer to the recommendation of the Occupational Therapist for safe DC planning. From OT standpoint, recommendation at time of d/c would be level 2 resources. Patient to benefit from continued Occupational Therapy treatment while in the hospital to address deficits as defined above and maximize level of functional independence with ADLs and functional mobility. Pt left with call bell in reach, tray table in reach, needs met, chair alarm activated. RN aware.      Rehab Resource Intensity Level, OT: II (Moderate Resource Intensity)

## 2023-11-29 NOTE — PLAN OF CARE
Problem: PHYSICAL THERAPY ADULT  Goal: Performs mobility at highest level of function for planned discharge setting. See evaluation for individualized goals. Description: Treatment/Interventions: Functional transfer training, LE strengthening/ROM, Elevations, Patient/family training, Cognitive reorientation, Therapeutic exercise, Endurance training, Equipment eval/education, Bed mobility, Gait training          See flowsheet documentation for full assessment, interventions and recommendations. 11/29/2023 1320 by Lang Ornelas, PT  Note:    Problem List: Decreased strength, Decreased endurance, Impaired balance, Decreased mobility, Impaired vision  Assessment: Jeoffrey Crigler is a 79 y.o. Female who presents to [address] [place] [address] on 11/24/2023 from home w/ c/o SOB, BRBPR, syncopal episode and diagnosis of GIB. Orders for PT eval and treat received. Pt presents w/ comorbidities of COPD, Afib, CHF, HTN, chronic respiratory failure on 2L NC O2 chronically, osteoporosis. At baseline, pt mobilizes w/ RW, and reports + falls in the last 6 months. Upon evaluation, pt presents w/ the following deficits: weakness, impaired balance, decreased endurance, and endurance impacting functional mobility . Upon eval, pt requires min A x 1 for transfers, and min A x 2 for gait. Based on this PT evaluation today, patient's discharge recommendation is for Level II. During this admission, pt would benefit from continued skilled inpatient PT in the acute care setting in order to address the abovementioned deficits to maximize function and mobility before DC from acute care. Rehab Resource Intensity Level, PT: II (Moderate Resource Intensity)    See flowsheet documentation for full assessment.

## 2023-11-29 NOTE — PROGRESS NOTES
[address] [facility]  Progress Note  Name: Verner Fitz  MRN: [id number]  Unit/Bed#: -01 I Date of Admission: 11/24/2023   Date of Service: 11/29/2023 I Hospital Day: 5    Assessment/Plan   * Acute on chronic respiratory failure with hypoxia and hypercapnia (HCC)  Assessment & Plan  Chronically maintained on 2L 2/2 COPD   Requiring 4-5 L on admission, SpO2 90-94%  COVID/FLU/RSV neg  CXR with venous congestion, bilateral effusions     Suspect multifactorial in the setting of acute on chronic CHF as well as COPD with acute exacerbation  Currently on 4L  With CO2 greater than 45  We will try her BiPAP at bedtime  Diuretics on hold today given component of contraction alkalosis  Ct scan to be performed today  Possible consult to IR depending on size of effusion    GIB (gastrointestinal bleeding)  Assessment & Plan  BRBPR began 11/22, PCP recommended stopping warfarin temporarily, however persistent symptoms with large BRBPR episode witnessed by son this morning followed by syncopal episode  Hgb on admission 6 (baseline 8.5-10, most recently 9.6)  INR 5.4  S/P 1 unit FFP + IV vit K 10 mg   Repeat INR 1  GI consult  No active bleeding noted on colonoscopy, however she did have diverticulosis and internal hemorrhoids. Okay to resume anticoagulation per GI  Eliquis recommended to be started today however on hold given possible need for thoracentesis    Acute on chronic diastolic CHF (congestive heart failure) (HCC)  Assessment & Plan  Wt Readings from Last 3 Encounters:   11/29/23 52.7 kg (116 lb 2.9 oz)   10/20/23 52.2 kg (115 lb 2 oz)   10/05/23 53.1 kg (117 lb)   Appears volume overloaded, increasing edema per son x last several days.  O2 requirement 4-5 L on admission, baseline is 2L   Outpatient lasix was changed from 20 mg daily to 40 mg daily about 4 weeks ago  CXR pending final read however appears congested  Echo 2/2023: EF 65%, G1DD  Cardiology consult   IV Lasix discontinued given contraction alkalosis, currently on a diuretic holiday  Obtain CT of the chest to evaluate whether or not patient is a candidate for possible thoracentesis with our interventional radiology team    Syncope  Assessment & Plan  Witnessed by son, was attempting to walk patient however she became weak/collapsed, no headstrike, LOC about 30 seconds, prompting son to call EMS  Home care aids noted low BP and Low O2 sats prior to this  Currently A&O x3   Suspect 2/2 acute blood loss anemia/GIB along with increasing O2 requirements resulting in orthostasis/hypoxia  Trop WNL   Tele NSR  PT/OT    Acute blood loss anemia  Assessment & Plan  In setting of acute lower GIB on warfarin with supratherapeutic INR   Iron panel: Iron 36, Iron Sat 23, TIBC 156 Ferritin 111  11/24: 1 unit PRBC  11/25: 1 unit PRBC  Hgb 6 > 5 > 8.9 > 8.7  Maintain hgb > 7  See above     Other specified hypothyroidism  Assessment & Plan  Continue synthroid 25 mcg daily     Atrial fibrillation (HCC)  Assessment & Plan  Rate control: digoxin 125 mcg every other day (last taken 11/24)  Dig level 0.7  Rhythm: Cardizem 240 mg daily   OAC: warfarin - HOLD due to supratherapeutic INR (5.4)  Need to resume AC with NOAC, Eliquis sent to pharmacy and $22 monthly. Eliquis restarted today, will monitor CBC     Chronic obstructive pulmonary disease with acute exacerbation (HCC)  Assessment & Plan  Chronically on 2L, currently requiring 5L NC  Home regimen: Spiriva inhaler + pumicort nebs BID + duonebs TID daily   Continue ATC nebs currently   Initiate IV steroids given significant wheezing on exam  Continue chest PT           VTE Pharmacologic Prophylaxis: VTE Score: 3 Moderate Risk (Score 3-4) - Pharmacological DVT Prophylaxis Ordered: apixaban (Eliquis). Mobility:   Basic Mobility Inpatient Raw Score: 17  JH-HLM Goal: 5: Stand one or more mins  JH-HLM Achieved: 5: Stand (1 or more minutes)  HLM Goal achieved.  Continue to encourage appropriate mobility. Patient Centered Rounds: I performed bedside rounds with nursing staff today. Discussions with Specialists or Other Care Team Provider: Cardiology, RT    Education and Discussions with Family / Patient: Updated  (son) via phone. Total Time Spent on Date of Encounter in care of patient: 35 mins. This time was spent on one or more of the following: performing physical exam; counseling and coordination of care; obtaining or reviewing history; documenting in the medical record; reviewing/ordering tests, medications or procedures; communicating with other healthcare professionals and discussing with patient's family/caregivers. Current Length of Stay: 5 day(s)  Current Patient Status: Inpatient   Certification Statement: The patient will continue to require additional inpatient hospital stay due to hypoxia  Discharge Plan: Anticipate discharge in 48 hrs to discharge location to be determined pending rehab evaluations. Code Status: Level 2 - DNAR: but accepts endotracheal intubation    Subjective: The patient is seen resting in chair at bedside. She is evaluated alongside the respiratory therapy team as well as nursing staff. She reports continued shortness of breath and associated cough. She denies any signs of bleeding. Denies any lightheadedness or dizziness, chest pain, or palpitations. Objective:     Vitals:   Temp (24hrs), Av.9 °F (36.6 °C), Min:97.5 °F (36.4 °C), Max:98.3 °F (36.8 °C)    Temp:  [97.5 °F (36.4 °C)-98.3 °F (36.8 °C)] 97.9 °F (36.6 °C)  HR:  [68-90] 80  Resp:  [17-24] 18  BP: (132-178)/(60-77) 143/60  SpO2:  [91 %-99 %] 93 %  Body mass index is 18.75 kg/m². Input and Output Summary (last 24 hours): Intake/Output Summary (Last 24 hours) at 2023 1602  Last data filed at 2023 1443  Gross per 24 hour   Intake 600 ml   Output 1302 ml   Net -702 ml       Physical Exam:   Physical Exam  Vitals and nursing note reviewed.    Constitutional: General: She is not in acute distress. Appearance: She is ill-appearing. She is not toxic-appearing or diaphoretic. Comments: Chronically ill appearing   HENT:      Head: Normocephalic and atraumatic. Cardiovascular:      Rate and Rhythm: Normal rate and regular rhythm. Heart sounds: No murmur heard. No friction rub. No gallop. Pulmonary:      Effort: Pulmonary effort is normal. No respiratory distress. Breath sounds: Wheezing present. No rhonchi or rales. Comments: Inspiratory and expiratory wheezing noted throughout bilateral lung fields, decreased breath sounds at right lung base  Abdominal:      General: Abdomen is flat. Bowel sounds are normal. There is no distension. Palpations: Abdomen is soft. Tenderness: There is no abdominal tenderness. Musculoskeletal:      Right lower leg: No edema. Left lower leg: No edema. Skin:     General: Skin is warm and dry. Coloration: Skin is not jaundiced or pale. Neurological:      General: No focal deficit present. Mental Status: She is alert. Mental status is at baseline.           Additional Data:     Labs:  Results from last 7 days   Lab Units 11/29/23  0508   WBC Thousand/uL 7.20   HEMOGLOBIN g/dL 8.4*   HEMATOCRIT % 28.2*   PLATELETS Thousands/uL 246   NEUTROS PCT % 78*   LYMPHS PCT % 11*   MONOS PCT % 8   EOS PCT % 3     Results from last 7 days   Lab Units 11/29/23  0508 11/28/23  0443 11/26/23  0456 11/25/23  0024   SODIUM mmol/L 139 143   < > 143   POTASSIUM mmol/L 3.5 3.6   < > 3.9   CHLORIDE mmol/L 89* 92*   < > 97   CO2 mmol/L >45* 43*   < > 41*   BUN mg/dL 13 14   < > 29*   CREATININE mg/dL 0.57* 0.55*   < > 0.75   ANION GAP mmol/L  --  8   < > 5   CALCIUM mg/dL 8.8 8.4   < > 8.1*   ALBUMIN g/dL  --   --   --  3.0*   TOTAL BILIRUBIN mg/dL  --   --   --  0.38   ALK PHOS U/L  --   --   --  165*   ALT U/L  --   --   --  10   AST U/L  --   --   --  18   GLUCOSE RANDOM mg/dL 100 99   < > 110    < > = values in this interval not displayed. Results from last 7 days   Lab Units 11/26/23  0456   INR  1.04     Results from last 7 days   Lab Units 11/25/23  1240 11/25/23  0629 11/25/23  0022   POC GLUCOSE mg/dl 97 102 134         Results from last 7 days   Lab Units 11/25/23  0024 11/24/23  1426   PROCALCITONIN ng/ml 0.43* 0.41*       Lines/Drains:  Invasive Devices       Peripheral Intravenous Line  Duration             Peripheral IV 11/28/23 Distal;Right;Upper;Ventral (anterior) Arm 1 day                          Imaging: Reviewed radiology reports from this admission including: chest xray    Recent Cultures (last 7 days):         Last 24 Hours Medication List:   Current Facility-Administered Medications   Medication Dose Route Frequency Provider Last Rate    acetaminophen  650 mg Oral Q6H PRN Ashley Tijerina MD      albuterol  2.5 mg Nebulization Q6H PRN Ashley Tijerina MD      [facility] Hold] apixaban  2.5 mg Oral BID Pastor Hernandez PA-C      bisacodyl  10 mg Oral Once Ashley Tijerina MD      budesonide  0.5 mg Nebulization Q12H Ashley Tijerina MD      diltiazem  240 mg Oral Daily Ashley Tijerina MD      ferrous sulfate  325 mg Oral Daily With Breakfast Ashley Tijerina MD      formoterol  20 mcg Nebulization Q12H Ashley Tijerina MD      [facility] Hold] furosemide  40 mg Oral Daily Charisse Contreras PA-C      ipratropium  0.5 mg Nebulization TID Ashley Tijerina MD      levalbuterol  1.25 mg Nebulization TID Ashley Tijerina MD      levothyroxine  25 mcg Oral Early Morning Ashley Tijerina MD      mirtazapine  7.5 mg Oral HS Ashley Tijerina MD      pantoprazole  40 mg Intravenous Q12H [facility] HOME Ashley Tijerina MD      sertraline  50 mg Oral Daily Ashley Tijerina MD          Today, Patient Was Seen By: Tiffany Abdi PA-C    **Please Note: This note may have been constructed using a voice recognition system. **

## 2023-11-29 NOTE — PHYSICAL THERAPY NOTE
PHYSICAL THERAPY EVALUATION NOTE    Patient Name: Herminio BIRCH Date: 2023    AGE:   79 y.o. Mrn:   [id number]  ADMIT DX:  COPD (chronic obstructive pulmonary disease) ([address]) [J44.9]  Severe protein-calorie malnutrition ([address]) [E43]  Respiratory distress [R06.03]  GI bleed [K92.2]  GIB (gastrointestinal bleeding) [K92.2]  Acute on chronic respiratory failure with hypoxia and hypercapnia (HCC) [J96.21, J96.22]  Acute on chronic anemia [D64.9]    Past Medical History:   Diagnosis Date    Anti-phospholipid syndrome (HCC)     Asthma     Blood type O+     Cardiac disease     Chronic pain     COPD (chronic obstructive pulmonary disease) (HCC)     Femur fracture, left ([address]) 2023    Fracture of ankle     left    Hyperlipidemia     Hypertension     Hypokalemia 2023    Osteoporosis      Length Of Stay: 5  PHYSICAL THERAPY EVALUATION :   Patient's identity confirmed via 2 patient identifiers (full name and ) at start of session       23 1046   PT Last Visit   PT Visit Date 23   Note Type   Note type Evaluation  (+ Treatment)   Pain Assessment   Pain Assessment Tool 0-10   Pain Score No Pain   Restrictions/Precautions   Weight Bearing Precautions Per Order No   Other Precautions Cognitive; Chair Alarm; Bed Alarm;O2;Fall Risk;Multiple lines  (4L NC O2)   Home Living   Type of Home House   Home Layout Two level  (2 SWETA, 6 up or down)   Bathroom Shower/Tub Tub/shower unit   Home Equipment Walker  (2L NC O2)   Additional Comments Pt was using RW for mobility PTA   Prior Function   Level of [place] Independent with ADLs; Independent with functional mobility; Needs assistance with IADLS   Lives With (S)  Alone   Receives Help From Family;Personal care attendant;Home health  (HHPT, HHA)   IADLs Family/Friend/Other provides transportation; Family/Friend/Other provides meals; Family/Friend/Other provides medication management   Falls in the last 6 months 1 to 4   Vocational Retired   Comments Pt resides alone in a bi level home, she has 2 SWETA and then 6 steps to upper level vs 6 steps to lower level. Sons are local and supportive, but does not currently have 24/7 assistance   General   Additional Pertinent History BP sittin/63, SpO2 91%, BP standing 136/63   Family/Caregiver Present Yes   Cognition   Arousal/Participation Alert   Attention Within functional limits   Orientation Level Oriented X4   Memory Within functional limits   Following Commands Follows all commands and directions without difficulty   Comments Pt benefits from some motivation to challenge self and participate. Subjective   Subjective "They told me I'm not allowed to use the purewick"   RLE Assessment   RLE Assessment WFL   Strength RLE   RLE Overall Strength 3+/5   LLE Assessment   LLE Assessment WFL   Strength LLE   LLE Overall Strength 3+/5   Vision-Basic Assessment   Current Vision Wears glasses all the time   Bed Mobility   Supine to Sit Unable to assess   Additional Comments Pt seated OOB in recliner chair at start and end of session   Transfers   Sit to Stand 4  Minimal assistance   Additional items Assist x 1; Increased time required;Verbal cues  (Ax 2 progressing to A x 1)   Stand to Sit 4  Minimal assistance   Additional items Assist x 1; Increased time required;Verbal cues   Toilet transfer 4  Minimal assistance   Additional items Assist x 2; Increased time required;Verbal cues   Additional Comments Pt performed STS x 2 during evaluation for SPT from recliner <> commode, improved from min A x 2 to stand from recliner to min A x 1 to stand from commode   Ambulation/Elevation   Ambulation/Elevation Additional Comments see tx note below for further mobility   Balance   Static Sitting Fair   Static Standing Poor +   Ambulatory Poor +   Activity Tolerance   Activity Tolerance Patient limited by fatigue   Medical Staff Made Aware OT Flakita Siddiqui Nurse Made Aware Spoke to RN Katie   Assessment   Problem List Decreased strength;Decreased endurance; Impaired balance;Decreased mobility; Impaired vision   Assessment Melvi Aguilar is a 79 y.o. Female who presents to [address] [place] [address] on 11/24/2023 from home w/ c/o SOB, BRBPR, syncopal episode and diagnosis of GIB. Orders for PT eval and treat received. Pt presents w/ comorbidities of COPD, Afib, CHF, HTN, chronic respiratory failure on 2L NC O2 chronically, osteoporosis. At baseline, pt mobilizes w/ RW, and reports + falls in the last 6 months. Upon evaluation, pt presents w/ the following deficits: weakness, impaired balance, decreased endurance, and endurance impacting functional mobility . Upon eval, pt requires min A x 1 for transfers, and min A x 2 for gait. Based on this PT evaluation today, patient's discharge recommendation is for Level II. During this admission, pt would benefit from continued skilled inpatient PT in the acute care setting in order to address the abovementioned deficits to maximize function and mobility before DC from acute care.    Goals   Patient Goals to go home to her catGlenna   STG Expiration Date 12/09/23   Short Term Goal #1 Patient will: Perform all bed mobility tasks w/ supervision to improve pt's independence w/ repositioning for decrease risk of skin breakdown, Perform all transfers w/ supervision consistently from various height surfaces in order to improve I w/ engagement w/ real-world environments/situations, Ambulate at least 50 ft. with roller walker w/ supervision w/o LOB to facilitate return and engagement w/ previous living environment, Navigate 6 stairs w/ supervision with unilateral handrail to either improve independence w/ entering home and/or so patient can fully access living areas in home, and Increase all balance 1/2 grade to decrease risk for falls   PT Treatment Day 0   Plan   Treatment/Interventions Functional transfer training;LE strengthening/ROM; Elevations; Patient/family training;Cognitive reorientation; Therapeutic exercise; Endurance training;Equipment eval/education; Bed mobility;Gait training   PT Frequency 3-5x/wk   Discharge Recommendation   Rehab Resource Intensity Level, PT II (Moderate Resource Intensity)   AM-PAC Basic Mobility Inpatient   Turning in Flat Bed Without Bedrails 3   Lying on Back to Sitting on Edge of Flat Bed Without Bedrails 3   Moving Bed to Chair 3   Standing Up From Chair Using Arms 3   Walk in Room 2   Climb 3-5 Stairs With Railing 1   Basic Mobility Inpatient Raw Score 15   Basic Mobility Standardized Score 36.97   Highest Level Of Mobility   JH-HLM Goal 4: Move to chair/commode   JH-HLM Achieved 6: Walk 10 steps or more   Additional Treatment Session   Start Time 1050   End Time 1058   Treatment Assessment Pt seated OOB in recliner chair, agreeable to participate in PT intervention w/ motivation. She requires min A x 1 for STS from recliner chair w/ VC for hand placement. She is then able to ambulate 6 ft w/ RW w/ min A x 1 and SBA/chair follow of second person for safety. Pt unanable to ambulate further due to fatigue. SpO2 dropped to 86% on 4L NC O2, increased slowly >88% within 1.5 min. Pt seated OOB in recliner chair at end of session, pt educated on ringining call bell for RN to assist her to the commode for toileting and that frequent mobility is important for her strength, endurance, and respiratory function   Equipment Use RW   Additional Treatment Day 1   End of Consult   Patient Position at End of Consult Bedside chair; All needs within reach;Bed/Chair alarm activated         The patient's AM-PAC Basic Mobility Inpatient Short Form Raw Score is 15, Standardized Score is 36.97. A standardized score less than 38.32 (raw score of 16) suggests the patient may benefit from discharge to post-acute rehabilitation services which may not coincide with above PT recommendations.  However please refer to therapist recommendation for discharge planning given other factors that may influence destination.     Pt would benefit from skilled inpatient PT during this admission in order to facilitate progress towards goals to maximize functional independence    Serenity Day, PT, DPT

## 2023-11-29 NOTE — ASSESSMENT & PLAN NOTE
Chronically on 2L, currently requiring 5L NC  Home regimen: Spiriva inhaler + pumicort nebs BID + duonebs TID daily   Continue ATC nebs currently   Initiate IV steroids given significant wheezing on exam  Continue chest PT

## 2023-11-29 NOTE — PROGRESS NOTES
Cardiology Progress Note   Carrington Kendrick 79 y.o. female MRN: [id number]    Unit/Bed#: -01 SDU Encounter: [id number]      Assessment:  Acute on chronic hypoxic respiratory failure  On 4L O2 currently  CXR: B/L pleural effusions, interstitial edema    Acute HFpEF  TTE 11/27/2023: EF 60%, G1DD, severe LA dilation, functionally bicuspid AV with mild AS, mild MR/MS, moderate TR, PASP 41mmHg. TTE 2/28/2023: EF 65%, G1DD, mild LA dilation, mild AS, moderate MAC, mild TR   BRBPR/ acute blood loss anemia   HgB as low as 5 >> 8.7 s/p 2U pRBCs  INR 5.41 on arrival >> 1.04 s/p 1U FFP and Vit K 10mg  Paroxysmal atrial fibrillation  Longstanding hx  Has been on warfarin for 30+ years   Maintaining NSR currently   On Cardizem and warfarin as outpatient   Hypertension   Chronic peripheral edema  Likely multifactorial from combination of venous insufficiency, diastolic dysfunction and hypoalbuminemia. Plan/Discussion:  Her volume status appears to be much improved with minimal extremity edema which is most likely chronic. Continues to be hypoxic with increased O2 requirement and minimal urine output overnight. IV diuretics now discontinued. Case discussed with SLIM team, recommend IR evaluation for possible thoracentesis. No acute GIB per GI team; ok to resume [address]; started on Eliquis 2.5mg BID  Salt/ fluid restriction, daily weights, strict I&O monitoring. Telemetry monitoring while diuresing. Subjective:   Patient seen and examined. Overnight events reviewed. Objective:     Vitals: Blood pressure (!) 172/74, pulse 79, temperature 97.5 °F (36.4 °C), temperature source Oral, resp.  rate (!) 24, height 5' 6" (1.676 m), weight 52.7 kg (116 lb 2.9 oz), SpO2 91 %, not currently breastfeeding., Body mass index is 18.75 kg/m².,   Orthostatic Blood Pressures      Flowsheet Row Most Recent Value   Blood Pressure 172/74 filed at 11/29/2023 3968   Patient Position - Orthostatic VS Sitting filed at 11/29/2023 2362 Intake/Output Summary (Last 24 hours) at 11/29/2023 1040  Last data filed at 11/29/2023 0506  Gross per 24 hour   Intake 610 ml   Output 1550 ml   Net -940 ml     Physical Exam:  GEN: Christelle Modi appears well, alert and oriented x 3, pleasant and cooperative   HEENT: Sclera anicteric, conjunctivae pink, mucous membranes moist. Oropharynx clear. NECK: supple, no significant JVD, Trachea midline, no thyromegaly. HEART: regular rhythm, normal S1 and S2, no murmurs, clicks, gallops or rubs   LUNGS: clear to auscultation bilaterally; no wheezes, rales, or rhonchi. No increased work of breathing or signs of respiratory distress. ABDOMEN: Soft, nontender, nondistended  EXTREMITIES: Skin warm and well perfused, no clubbing, cyanosis, or edema. NEURO: No focal findings. Normal speech. Mood and affect normal.   SKIN: Normal without suspicious lesions on exposed skin.     Medications:    Current Facility-Administered Medications:     acetaminophen (TYLENOL) tablet 650 mg, 650 mg, Oral, Q6H PRN, Lane Singh MD, 650 mg at 11/26/23 1507    albuterol inhalation solution 2.5 mg, 2.5 mg, Nebulization, Q6H PRN, Lane Singh MD, 2.5 mg at 11/25/23 1017    apixaban (ELIQUIS) tablet 2.5 mg, 2.5 mg, Oral, BID, Tomás Hicks PA-C, 2.5 mg at 11/29/23 3110    bisacodyl (DULCOLAX) EC tablet 10 mg, 10 mg, Oral, Once, Lane Singh MD    budesonide (PULMICORT) inhalation solution 0.5 mg, 0.5 mg, Nebulization, Q12H, Lane Singh MD, 0.5 mg at 11/29/23 3002    diltiazem (CARDIZEM CD) 24 hr capsule 240 mg, 240 mg, Oral, Daily, Lane Singh MD, 240 mg at 11/29/23 1379    ferrous sulfate tablet 325 mg, 325 mg, Oral, Daily With Breakfast, Lane Singh MD, 325 mg at 11/29/23 0924    formoterol (PERFOROMIST) nebulizer solution 20 mcg, 20 mcg, Nebulization, Q12H, Lane Singh MD, 20 mcg at 11/29/23 0934    furosemide (LASIX) tablet 40 mg, 40 mg, Oral, Daily, Frank Aguilar PA-C, 40 mg at 11/29/23 0924    ipratropium (ATROVENT) 0.02 % inhalation solution 0.5 mg, 0.5 mg, Nebulization, TID, Alice Sawyer MD, 0.5 mg at 11/29/23 4645    levalbuterol [facility]) inhalation solution 1.25 mg, 1.25 mg, Nebulization, TID, Alice Sawyer MD, 1.25 mg at 11/29/23 5241    levothyroxine tablet 25 mcg, 25 mcg, Oral, Early Morning, Alice Sawyer MD, 25 mcg at 11/29/23 0507    mirtazapine (REMERON) tablet 7.5 mg, 7.5 mg, Oral, HS, Alice Sawyer MD, 7.5 mg at 11/28/23 2157    pantoprazole (PROTONIX) injection 40 mg, 40 mg, Intravenous, Q12H [address], Alice Sawyer MD, 40 mg at 11/29/23 0924    sertraline (ZOLOFT) tablet 50 mg, 50 mg, Oral, Daily, Alice Sawyer MD, 50 mg at 11/29/23 0924     Labs & Results:        Results from last 7 days   Lab Units 11/29/23  0508 11/28/23 0443 11/27/23 0448   WBC Thousand/uL 7.20 8.56 7.08   HEMOGLOBIN g/dL 8.4* 7.9* 8.7*   HEMATOCRIT % 28.2* 25.9* 29.4*   PLATELETS Thousands/uL 246 248 245         Results from last 7 days   Lab Units 11/29/23  0508 11/28/23  0443 11/27/23  0448 11/26/23  0456 11/25/23  0024 11/24/23  1426 11/24/23  1426 11/24/23  1423   POTASSIUM mmol/L 3.5 3.6 4.0   < > 3.9   < > 4.9  --    CHLORIDE mmol/L 89* 92* 95*   < > 97   < > 99  --    CO2 mmol/L >45* 43* 44*   < > 41*   < > 41*  --    CO2, I-STAT mmol/L  --   --   --   --   --   --   --  >45*   BUN mg/dL 13 14 15   < > 29*   < > 32*  --    CREATININE mg/dL 0.57* 0.55* 0.50*   < > 0.75   < > 0.80  --    CALCIUM mg/dL 8.8 8.4 8.3*   < > 8.1*   < > 8.6  --    ALK PHOS U/L  --   --   --   --  165*  --  187*  --    ALT U/L  --   --   --   --  10  --  12  --    AST U/L  --   --   --   --  18  --  19  --    GLUCOSE, ISTAT mg/dl  --   --   --   --   --   --   --  150*    < > = values in this interval not displayed.      Results from last 7 days   Lab Units 11/26/23  0456 11/25/23  1148 11/24/23  1426   INR  1.04 1.02 5.41*     Results from last 7 days   Lab Units 11/29/23  0508 11/26/23  0456   MAGNESIUM mg/dL 1.9 2.1

## 2023-11-29 NOTE — ASSESSMENT & PLAN NOTE
Wt Readings from Last 3 Encounters:   11/29/23 52.7 kg (116 lb 2.9 oz)   10/20/23 52.2 kg (115 lb 2 oz)   10/05/23 53.1 kg (117 lb)   Appears volume overloaded, increasing edema per son x last several days.  O2 requirement 4-5 L on admission, baseline is 2L   Outpatient lasix was changed from 20 mg daily to 40 mg daily about 4 weeks ago  CXR pending final read however appears congested  Echo 2/2023: EF 65%, G1DD  Cardiology consult   IV Lasix discontinued given contraction alkalosis, currently on a diuretic holiday  Obtain CT of the chest to evaluate whether or not patient is a candidate for possible thoracentesis with our interventional radiology team

## 2023-11-29 NOTE — ASSESSMENT & PLAN NOTE
BRBPR began 11/22, PCP recommended stopping warfarin temporarily, however persistent symptoms with large BRBPR episode witnessed by son this morning followed by syncopal episode  Hgb on admission 6 (baseline 8.5-10, most recently 9.6)  INR 5.4  S/P 1 unit FFP + IV vit K 10 mg   Repeat INR 1  GI consult  No active bleeding noted on colonoscopy, however she did have diverticulosis and internal hemorrhoids.   Okay to resume anticoagulation per GI  Eliquis recommended to be started today however on hold given possible need for thoracentesis

## 2023-11-30 ENCOUNTER — APPOINTMENT (INPATIENT)
Dept: RADIOLOGY | Facility: HOSPITAL | Age: 83
DRG: 291 | End: 2023-11-30
Payer: MEDICARE

## 2023-11-30 PROBLEM — R55 SYNCOPE: Status: RESOLVED | Noted: 2023-11-24 | Resolved: 2023-11-30

## 2023-11-30 LAB
BUN SERPL-MCNC: 14 MG/DL (ref 5–25)
CALCIUM SERPL-MCNC: 8.7 MG/DL (ref 8.4–10.2)
CHLORIDE SERPL-SCNC: 89 MMOL/L (ref 96–108)
CO2 SERPL-SCNC: >45 MMOL/L (ref 21–32)
CREAT SERPL-MCNC: 0.64 MG/DL (ref 0.6–1.3)
ERYTHROCYTE [DISTWIDTH] IN BLOOD BY AUTOMATED COUNT: 19.2 % (ref 11.6–15.1)
GFR SERPL CREATININE-BSD FRML MDRD: 82 ML/MIN/1.73SQ M
GLUCOSE SERPL-MCNC: 160 MG/DL (ref 65–140)
HCT VFR BLD AUTO: 27.7 % (ref 34.8–46.1)
HGB BLD-MCNC: 8.4 G/DL (ref 11.5–15.4)
L PNEUMO1 AG UR QL IA.RAPID: NEGATIVE
MCH RBC QN AUTO: 32.1 PG (ref 26.8–34.3)
MCHC RBC AUTO-ENTMCNC: 30.3 G/DL (ref 31.4–37.4)
MCV RBC AUTO: 106 FL (ref 82–98)
PLATELET # BLD AUTO: 220 THOUSANDS/UL (ref 149–390)
PMV BLD AUTO: 8.8 FL (ref 8.9–12.7)
POTASSIUM SERPL-SCNC: 3.5 MMOL/L (ref 3.5–5.3)
PROCALCITONIN SERPL-MCNC: 0.31 NG/ML
RBC # BLD AUTO: 2.62 MILLION/UL (ref 3.81–5.12)
S PNEUM AG UR QL: NEGATIVE
SODIUM SERPL-SCNC: 139 MMOL/L (ref 135–147)
WBC # BLD AUTO: 5.23 THOUSAND/UL (ref 4.31–10.16)

## 2023-11-30 PROCEDURE — 85027 COMPLETE CBC AUTOMATED: CPT | Performed by: PHYSICIAN ASSISTANT

## 2023-11-30 PROCEDURE — 99232 SBSQ HOSP IP/OBS MODERATE 35: CPT | Performed by: INTERNAL MEDICINE

## 2023-11-30 PROCEDURE — 92526 ORAL FUNCTION THERAPY: CPT

## 2023-11-30 PROCEDURE — 94660 CPAP INITIATION&MGMT: CPT

## 2023-11-30 PROCEDURE — 87186 SC STD MICRODIL/AGAR DIL: CPT | Performed by: INTERNAL MEDICINE

## 2023-11-30 PROCEDURE — 87449 NOS EACH ORGANISM AG IA: CPT | Performed by: INTERNAL MEDICINE

## 2023-11-30 PROCEDURE — 94002 VENT MGMT INPAT INIT DAY: CPT

## 2023-11-30 PROCEDURE — C9113 INJ PANTOPRAZOLE SODIUM, VIA: HCPCS | Performed by: INTERNAL MEDICINE

## 2023-11-30 PROCEDURE — 84145 PROCALCITONIN (PCT): CPT | Performed by: INTERNAL MEDICINE

## 2023-11-30 PROCEDURE — 99223 1ST HOSP IP/OBS HIGH 75: CPT | Performed by: INTERNAL MEDICINE

## 2023-11-30 PROCEDURE — 94640 AIRWAY INHALATION TREATMENT: CPT

## 2023-11-30 PROCEDURE — 74230 X-RAY XM SWLNG FUNCJ C+: CPT

## 2023-11-30 PROCEDURE — 80048 BASIC METABOLIC PNL TOTAL CA: CPT | Performed by: PHYSICIAN ASSISTANT

## 2023-11-30 PROCEDURE — 92611 MOTION FLUOROSCOPY/SWALLOW: CPT

## 2023-11-30 PROCEDURE — 87070 CULTURE OTHR SPECIMN AEROBIC: CPT | Performed by: INTERNAL MEDICINE

## 2023-11-30 PROCEDURE — 87205 SMEAR GRAM STAIN: CPT | Performed by: INTERNAL MEDICINE

## 2023-11-30 PROCEDURE — 94760 N-INVAS EAR/PLS OXIMETRY 1: CPT

## 2023-11-30 PROCEDURE — 87077 CULTURE AEROBIC IDENTIFY: CPT | Performed by: INTERNAL MEDICINE

## 2023-11-30 RX ORDER — PANTOPRAZOLE SODIUM 40 MG/1
40 TABLET, DELAYED RELEASE ORAL
Status: DISCONTINUED | OUTPATIENT
Start: 2023-11-30 | End: 2023-12-02 | Stop reason: HOSPADM

## 2023-11-30 RX ORDER — AZITHROMYCIN 500 MG/1
500 TABLET, FILM COATED ORAL EVERY 24 HOURS
Status: DISCONTINUED | OUTPATIENT
Start: 2023-11-30 | End: 2023-12-01

## 2023-11-30 RX ORDER — CEFTRIAXONE 1 G/50ML
1000 INJECTION, SOLUTION INTRAVENOUS EVERY 24 HOURS
Status: DISCONTINUED | OUTPATIENT
Start: 2023-11-30 | End: 2023-12-02

## 2023-11-30 RX ADMIN — FERROUS SULFATE TAB 325 MG (65 MG ELEMENTAL FE) 325 MG: 325 (65 FE) TAB at 08:13

## 2023-11-30 RX ADMIN — SERTRALINE HYDROCHLORIDE 50 MG: 50 TABLET ORAL at 08:13

## 2023-11-30 RX ADMIN — APIXABAN 2.5 MG: 2.5 TABLET, FILM COATED ORAL at 11:57

## 2023-11-30 RX ADMIN — BUDESONIDE 0.5 MG: 0.5 INHALANT RESPIRATORY (INHALATION) at 19:19

## 2023-11-30 RX ADMIN — METHYLPREDNISOLONE SODIUM SUCCINATE 40 MG: 40 INJECTION, POWDER, FOR SOLUTION INTRAMUSCULAR; INTRAVENOUS at 08:13

## 2023-11-30 RX ADMIN — DILTIAZEM HYDROCHLORIDE 240 MG: 120 CAPSULE, COATED, EXTENDED RELEASE ORAL at 08:26

## 2023-11-30 RX ADMIN — PANTOPRAZOLE SODIUM 40 MG: 40 INJECTION, POWDER, FOR SOLUTION INTRAVENOUS at 08:13

## 2023-11-30 RX ADMIN — LEVALBUTEROL HYDROCHLORIDE 1.25 MG: 1.25 SOLUTION RESPIRATORY (INHALATION) at 19:19

## 2023-11-30 RX ADMIN — FORMOTEROL FUMARATE DIHYDRATE 20 MCG: 20 SOLUTION RESPIRATORY (INHALATION) at 19:19

## 2023-11-30 RX ADMIN — IPRATROPIUM BROMIDE 0.5 MG: 0.5 SOLUTION RESPIRATORY (INHALATION) at 08:21

## 2023-11-30 RX ADMIN — IPRATROPIUM BROMIDE 0.5 MG: 0.5 SOLUTION RESPIRATORY (INHALATION) at 19:19

## 2023-11-30 RX ADMIN — PANTOPRAZOLE SODIUM 40 MG: 40 TABLET, DELAYED RELEASE ORAL at 16:15

## 2023-11-30 RX ADMIN — AZITHROMYCIN DIHYDRATE 500 MG: 500 TABLET ORAL at 08:26

## 2023-11-30 RX ADMIN — CEFTRIAXONE 1000 MG: 1 INJECTION, SOLUTION INTRAVENOUS at 08:26

## 2023-11-30 RX ADMIN — METHYLPREDNISOLONE SODIUM SUCCINATE 40 MG: 40 INJECTION, POWDER, FOR SOLUTION INTRAMUSCULAR; INTRAVENOUS at 21:43

## 2023-11-30 RX ADMIN — LEVOTHYROXINE SODIUM 25 MCG: 25 TABLET ORAL at 04:53

## 2023-11-30 RX ADMIN — FORMOTEROL FUMARATE DIHYDRATE 20 MCG: 20 SOLUTION RESPIRATORY (INHALATION) at 08:21

## 2023-11-30 RX ADMIN — LEVALBUTEROL HYDROCHLORIDE 1.25 MG: 1.25 SOLUTION RESPIRATORY (INHALATION) at 08:21

## 2023-11-30 RX ADMIN — APIXABAN 2.5 MG: 2.5 TABLET, FILM COATED ORAL at 17:04

## 2023-11-30 RX ADMIN — BUDESONIDE 0.5 MG: 0.5 INHALANT RESPIRATORY (INHALATION) at 08:21

## 2023-11-30 RX ADMIN — MIRTAZAPINE 7.5 MG: 15 TABLET, FILM COATED ORAL at 21:43

## 2023-11-30 NOTE — PLAN OF CARE
Problem: INFECTION - ADULT  Goal: Absence or prevention of progression during hospitalization  Description: INTERVENTIONS:  - Assess and monitor for signs and symptoms of infection  - Monitor lab/diagnostic results  - Monitor all insertion sites, i.e. indwelling lines, tubes, and drains  - Monitor endotracheal if appropriate and nasal secretions for changes in amount and color  - [place] appropriate cooling/warming therapies per order  - Administer medications as ordered  - Instruct and encourage patient and family to use good hand hygiene technique  - Identify and instruct in appropriate isolation precautions for identified infection/condition  11/30/2023 1131 by Eveline Costa RN  Outcome: Progressing  11/30/2023 1131 by Eveline Costa RN  Outcome: Progressing  Goal: Absence of fever/infection during neutropenic period  Description: INTERVENTIONS:  - Monitor WBC    11/30/2023 1131 by Eveline Costa RN  Outcome: Progressing  11/30/2023 1131 by Eveline Costa RN  Outcome: Progressing     Problem: Knowledge Deficit  Goal: Patient/family/caregiver demonstrates understanding of disease process, treatment plan, medications, and discharge instructions  Description: Complete learning assessment and assess knowledge base.   Interventions:  - Provide teaching at level of understanding  - Provide teaching via preferred learning methods  11/30/2023 1131 by Eveline Costa RN  Outcome: Progressing  11/30/2023 1131 by Eveline Costa RN  Outcome: Progressing     Problem: CARDIOVASCULAR - ADULT  Goal: Maintains optimal cardiac output and hemodynamic stability  Description: INTERVENTIONS:  - Monitor I/O, vital signs and rhythm  - Monitor for S/S and trends of decreased cardiac output  - Administer and titrate ordered vasoactive medications to optimize hemodynamic stability  - Assess quality of pulses, skin color and temperature  - Assess for signs of decreased coronary artery perfusion  - Instruct patient to report change in severity of symptoms  11/30/2023 1131 by Anita Lopez RN  Outcome: Progressing  11/30/2023 1131 by Anita Lopez RN  Outcome: Progressing  Goal: Absence of cardiac dysrhythmias or at baseline rhythm  Description: INTERVENTIONS:  - Continuous cardiac monitoring, vital signs, obtain 12 lead EKG if ordered  - Administer antiarrhythmic and heart rate control medications as ordered  - Monitor electrolytes and administer replacement therapy as ordered  11/30/2023 1131 by Anita Lopez RN  Outcome: Progressing  11/30/2023 1131 by Anita Lopez RN  Outcome: Progressing     Problem: GENITOURINARY - ADULT  Goal: Maintains or returns to baseline urinary function  Description: INTERVENTIONS:  - Assess urinary function  - Encourage oral fluids to ensure adequate hydration if ordered  - Administer IV fluids as ordered to ensure adequate hydration  - Administer ordered medications as needed  - Offer frequent toileting  - Follow urinary retention protocol if ordered  11/30/2023 1131 by Anita Lopez RN  Outcome: Progressing  11/30/2023 1131 by Anita Lopez RN  Outcome: Progressing  Goal: Absence of urinary retention  Description: INTERVENTIONS:  - Assess patient’s ability to void and empty bladder  - Monitor I/O  - Bladder scan as needed  - Discuss with physician/AP medications to alleviate retention as needed  - Discuss catheterization for long term situations as appropriate  11/30/2023 1131 by Anita Lopez RN  Outcome: Progressing  11/30/2023 1131 by Anita Lopez RN  Outcome: Progressing  Goal: Urinary catheter remains patent  Description: INTERVENTIONS:  - Assess patency of urinary catheter  - If patient has a chronic hand, consider changing catheter if non-functioning  - Follow guidelines for intermittent irrigation of non-functioning urinary catheter  11/30/2023 1131 by Anita Lopez RN  Outcome: Progressing  11/30/2023 [phone] by Bao Sutherland RN  Outcome: Progressing     Problem: RESPIRATORY - ADULT  Goal: Achieves optimal ventilation and oxygenation  Description: INTERVENTIONS:  - Assess for changes in respiratory status  - Assess for changes in mentation and behavior  - Position to facilitate oxygenation and minimize respiratory effort  - Oxygen administered by appropriate delivery if ordered  - Initiate smoking cessation education as indicated  - Encourage broncho-pulmonary hygiene including cough, deep breathe, Incentive Spirometry  - Assess the need for suctioning and aspirate as needed  - Assess and instruct to report SOB or any respiratory difficulty  - Respiratory Therapy support as indicated  11/30/2023 1131 by Bao Sutherland RN  Outcome: Progressing  11/30/2023 1131 by Bao Sutherland RN  Outcome: Progressing     Problem: METABOLIC, FLUID AND ELECTROLYTES - ADULT  Goal: Electrolytes maintained within normal limits  Description: INTERVENTIONS:  - Monitor labs and assess patient for signs and symptoms of electrolyte imbalances  - Administer electrolyte replacement as ordered  - Monitor response to electrolyte replacements, including repeat lab results as appropriate  - Instruct patient on fluid and nutrition as appropriate  11/30/2023 1131 by Bao Sutherland RN  Outcome: Progressing  11/30/2023 1131 by Bao Sutherland RN  Outcome: Progressing  Goal: Fluid balance maintained  Description: INTERVENTIONS:  - Monitor labs   - Monitor I/O and WT  - Instruct patient on fluid and nutrition as appropriate  - Assess for signs & symptoms of volume excess or deficit  11/30/2023 1131 by Bao Sutherland RN  Outcome: Progressing  11/30/2023 1131 by Bao Sutherland RN  Outcome: Progressing  Goal: Glucose maintained within target range  Description: INTERVENTIONS:  - Monitor Blood Glucose as ordered  - Assess for signs and symptoms of hyperglycemia and hypoglycemia  - Administer ordered medications to maintain glucose within target range  - Assess nutritional intake and initiate nutrition service referral as needed  11/30/2023 1131 by Lucia Gerard RN  Outcome: Progressing  11/30/2023 1131 by Lucia Gerard, RN  Outcome: Progressing

## 2023-11-30 NOTE — PROCEDURES
Modified (Video) Barium Swallow Study    Summary:  Images are on PACS for review. Pt presents w/ mild-mod oropharyngeal dysphagia. Reduced oral control w/ premature spill into the pharynx. Swallow initiation is delayed w/ material reaching the pyriforms. Reduced hyo-laryngeal elevation w/ incomplete laryngeal vestibule closure at the height of the swallow. Small amounts of aspiration w/ thin liquids both anteriorly and posteriorly during the swallow - no sensory response to aspiration events. Recommendations:  Diet: Dysphagia 3   Liquids: Nectar thick   Meds: Whole in puree  Strategies: Slow rate, single sips   Frequent oral care  Upright position  F/u ST tx: Yes   Therapy Prognosis: Fair   Prognosis considerations: Age, past medical, current medica  Aspiration Precautions  Reflux Precautions  Consider consult with: -   Results reviewed with: pt, nursing, physician   Aspiration precautions posted. Repeat MBS as necessary   If a dedicated assessment of the esophagus is desired, consider esophagram/barium swallow or EGD. Goals:  Pt will tolerate least restrictive diet w/out s/s aspiration or oral/pharyngeal difficulties. Patient's goal: "I hope it gets warmer and that you find nothing"       Previous VBS:  None on record, pt reports she may have had one completed previously         Does the pt have pain? No   If yes, was nursing made aware/was it addressed?  N/A     Swallow Mechanism Exam  Facial: symmetrical  Labial: WFL  Lingual: WFL  Velum: symmetrical  Mandible: adequate ROM  Dentition: full dentures  Vocal quality:strained   Volitional Cough: strong/productive   Respiratory Status: on 3L O2     Swallow Information   Current Risks for Dysphagia & Aspiration:  respiratory status  Current Symptoms/Concerns: change in respiratory status  Current Diet: regular diet and thin liquids   Baseline Diet: regular diet and thin liquids      Consistencies Administered:  Pt was viewed sitting upright in the lateral position. Due to concerns for patient safety / patient refusal, trials provided deviated from the [facility] Protocol. Trials administered: 5-mL thin liquid x2, 20-mL cup sip thin, cup sip thin w/ chin tuck, 5-mL nectar thick, 20-mL cup sip nectar thick, 40-mL sequential swallow nectar thick, 5-mL Honey thick, 5-mL pudding, ½ cookie coated with 3-mL pudding. Pt was also given thin and nectar thick liquids by straw, as well as a barium tablet with pudding.        Oral Impairment:  Lip Closure: complete   Tongue Control During Bolus Hold: reduced w/ premature spill   Bolus Preparation/Mastication: mild prolonged   Bolus Transport/Lingual Motion: lingual pumping   Oral Residue: mild, cleared w/ recollection and secondary transfer   Initiation of the Pharyngeal Swallow:  bolus head to the pyriforms     Pharyngeal Impairment:  Soft Palate Elevation: complete   Laryngeal Elevation: incomplete   Anterior Hyoid Excursion: reduced/partial   Epiglottic Movement: sluggish   Laryngeal Vestibular Closure: incomplete   Pharyngeal Stripping Wave:  present, diminished   Pharyngeal Contraction: AP view not obtained   PES Opening: mild reduced    Tongue Base Retraction: fairly complete   Pharyngeal Residue: mild     Screening of Esophageal Impairment   Esophageal Clearance: noted retention       Penetration/Aspiration:  Thin: aspiration during the swallow w/ tsp (anterior), aspiration during the swallow w/ cup and straw sip (posterior) - PAS 8   Nectar: no penetration/aspiration (PAS 1)   Honey:  no penetration/aspiration (PAS 1)   Puree:  no penetration/aspiration (PAS 1)   Solid:  no penetration/aspiration (PAS 1)   Response to Aspiration: SILENT   Strategies/Efficacy: chin tuck and effortful swallow effective     8-Point Penetration-Aspiration Scale   1 Material does not enter the airway   2 Material enters the airway, remains above the vocal folds, and is ejected  from the  airway    3 Material enters the airway, remains above the vocal folds, and is not ejected from the airway   4 Material enters the airway, contacts the vocal folds, and is ejected from the airway   5 Material enters the airway, contacts the vocal folds, and is not ejected from the airway    6 Material enters the airway, passes below the vocal folds and is ejected into the larynx or out of the airway    7 Material enters the airway, passes below the vocal folds, and is not ejected from the trachea despite effort    8 Material enters the airway, passes below the vocal folds, and no effort is made to eject

## 2023-11-30 NOTE — PROGRESS NOTES
[facility]  Progress Note  Name: Obdulia Jack  MRN: [id number]  Unit/Bed#: -01 I Date of Admission: 11/24/2023   Date of Service: 11/30/2023 I Hospital Day: 6    Assessment/Plan   Acute on chronic diastolic CHF (congestive heart failure) [facility])  Assessment & Plan  Wt Readings from Last 3 Encounters:   11/30/23 53 kg (116 lb 13.5 oz)   10/20/23 52.2 kg (115 lb 2 oz)   10/05/23 53.1 kg (117 lb)   Appears volume overloaded, increasing edema per son x last several days. O2 requirement 4-5 L on admission, baseline is 2L   Outpatient lasix was changed from 20 mg daily to 40 mg daily about 4 weeks ago  CXR-Bilateral pleural effusions with bibasilar atelectasis is not significantly changed. Echo 2/2023: EF 65%, G1DD  Cardiology following, appreciate recommendations  IV Lasix discontinued given contraction alkalosis, currently on a diuretic holiday      Acute blood loss anemia  Assessment & Plan  In setting of acute lower GIB on warfarin with supratherapeutic INR   Iron panel: Iron 36, Iron Sat 23, TIBC 156 Ferritin 111  11/24: 1 unit PRBC  11/25: 1 unit PRBC  Recent Labs     11/28/23  0443 11/29/23  0508 11/30/23  0450   HGB 7.9* 8.4* 8.4*      Maintain hgb > 7  See above     GIB (gastrointestinal bleeding)  Assessment & Plan  BRBPR began 11/22, PCP recommended stopping warfarin temporarily, however persistent symptoms with large BRBPR episode witnessed by son this morning followed by syncopal episode  Hgb on admission 6 (baseline 8.5-10, most recently 9.6)  INR 5.4  S/P 1 unit FFP + IV vit K 10 mg   Repeat INR 1  GI consult  No active bleeding noted on colonoscopy, however she did have diverticulosis and internal hemorrhoids.   Okay to resume anticoagulation per GI  Eliquis recommended to be started today     Other specified hypothyroidism  Assessment & Plan  Continue synthroid 25 mcg daily     Atrial fibrillation (HCC)  Assessment & Plan  Rate control: digoxin 125 mcg every other day (last taken 11/24)  Dig level 0.7  Rhythm: Cardizem 240 mg daily   OAC: warfarin - HOLD due to supratherapeutic INR (5.4)  Need to resume AC with NOAC, Eliquis sent to pharmacy and $22 monthly. Eliquis restarted today, will monitor CBC     Chronic obstructive pulmonary disease with acute exacerbation (HCC)  Assessment & Plan  Chronically on 2L, currently requiring 5L NC  Home regimen: Spiriva inhaler + pumicort nebs BID + duonebs TID daily   Continue ATC nebs currently   Started on IV steroids on 11/30 given significant wheezing on exam  Continue chest PT    Bilateral pneumonia  Assessment & Plan  Patient with underlying COPD, and a chronic hypoxic respiratory failure with hypercapnia  CT imaging-Right middle lobe and bilateral lower lobe consolidation suggestive of compressive atelectasis however pneumonia cannot be entirely excluded.  Small bilateral pleural effusions   Afebrile, no leukocytosis  Recent Labs     11/30/23  0450   PROCALCITONI 0.31*   Continue with IV ceftriaxone and azithromycin  Continue to trend procalcitonin and de-escalate antibiotics as needed  Check urine strep and Legionella antigen  Sputum culture and Gram stain  Incentive spirometer  Respiratory protocol  Inpatient consult to pulmonology with significant hypercapnia and COPD exacerbation, appreciate recommendations    * Acute on chronic respiratory failure with hypoxia and hypercapnia (HCC)  Assessment & Plan  Chronically maintained on 2L 2/2 COPD   Requiring 4-5 L on admission, SpO2 90-94%  COVID/FLU/RSV neg  CXR with venous congestion, bilateral effusions     Suspect multifactorial in the setting of acute on chronic CHF as well as COPD with acute exacerbation  Currently on 4L  With CO2 greater than 45  We will try her BiPAP at bedtime  Diuretics on hold today given component of contraction alkalosis  Ct scan 11/29-Right middle lobe and bilateral lower lobe consolidation suggestive of compressive atelectasis however pneumonia cannot be entirely excluded. Small bilateral pleural effusions. Started on antibiotics, pulmonology consulted, appreciate input  Continue to trend procalcitonin    Syncope-resolved as of 11/30/2023  Assessment & Plan  Witnessed by son, was attempting to walk patient however she became weak/collapsed, no headstrike, LOC about 30 seconds, prompting son to call EMS  Home care aids noted low BP and Low O2 sats prior to this  Currently A&O x3   Suspect 2/2 acute blood loss anemia/GIB along with increasing O2 requirements resulting in orthostasis/hypoxia  Trop WNL   Tele NSR  PT/OT               VTE Pharmacologic Prophylaxis: VTE Score: 3 Moderate Risk (Score 3-4) - Pharmacological DVT Prophylaxis Ordered: apixaban (Eliquis). Mobility:   Basic Mobility Inpatient Raw Score: 17  JH-HLM Goal: 5: Stand one or more mins  JH-HLM Achieved: 4: Move to chair/commode  HLM Goal achieved. Continue to encourage appropriate mobility. Patient Centered Rounds: I performed bedside rounds with nursing staff today. Discussions with Specialists or Other Care Team Provider: yes    Education and Discussions with Family / Patient:  yes. Total Time Spent on Date of Encounter in care of patient: 35 mins. This time was spent on one or more of the following: performing physical exam; counseling and coordination of care; obtaining or reviewing history; documenting in the medical record; reviewing/ordering tests, medications or procedures; communicating with other healthcare professionals and discussing with patient's family/caregivers. Current Length of Stay: 6 day(s)  Current Patient Status: Inpatient   Certification Statement: The patient will continue to require additional inpatient hospital stay due to pending stabilization  Discharge Plan: To be determined    Code Status: Level 2 - DNAR: but accepts endotracheal intubation    Subjective:   Seen and examined at bedside. Denies shortness of breath or chest tightness. Alert.  Frail with muscle wasting  Rest of review of systems were negative      Objective:     Vitals:   Temp (24hrs), Av °F (36.7 °C), Min:97.3 °F (36.3 °C), Max:98.9 °F (37.2 °C)    Temp:  [97.3 °F (36.3 °C)-98.9 °F (37.2 °C)] 97.3 °F (36.3 °C)  HR:  [72-83] 72  Resp:  [18-20] 18  BP: (138-180)/(55-70) 138/55  SpO2:  [89 %-95 %] 95 %  Body mass index is 18.86 kg/m². Input and Output Summary (last 24 hours): Intake/Output Summary (Last 24 hours) at 2023 1507  Last data filed at 2023 1436  Gross per 24 hour   Intake 180 ml   Output 810 ml   Net -630 ml       Physical Exam:   Physical Exam  Constitutional:       Comments: Frail  Malnourished   HENT:      Head: Atraumatic. Mouth/Throat:      Mouth: Mucous membranes are dry. Eyes:      General: No scleral icterus. Cardiovascular:      Rate and Rhythm: Normal rate. Heart sounds: Normal heart sounds. Pulmonary:      Breath sounds: Wheezing present. Comments: On 4 L of oxygen  Abdominal:      General: Abdomen is flat. Bowel sounds are normal.   Musculoskeletal:      Cervical back: Neck supple. Right lower leg: Edema present. Left lower leg: Edema present. Comments: Trace bilateral pedal edema, muscle wasting   Skin:     General: Skin is warm and dry. Capillary Refill: Capillary refill takes less than 2 seconds. Neurological:      Mental Status: She is alert. She is disoriented. Motor: Weakness present.    Psychiatric:         Mood and Affect: Mood normal.          Additional Data:     Labs:  Results from last 7 days   Lab Units 23  0450 23  0508   WBC Thousand/uL 5.23 7.20   HEMOGLOBIN g/dL 8.4* 8.4*   HEMATOCRIT % 27.7* 28.2*   PLATELETS Thousands/uL 220 246   NEUTROS PCT %  --  78*   LYMPHS PCT %  --  11*   MONOS PCT %  --  8   EOS PCT %  --  3     Results from last 7 days   Lab Units 23  0450 23  0508 23  0443 23  0456 23  0024   SODIUM mmol/L 139   < > 143 < > 143   POTASSIUM mmol/L 3.5   < > 3.6   < > 3.9   CHLORIDE mmol/L 89*   < > 92*   < > 97   CO2 mmol/L >45*   < > 43*   < > 41*   BUN mg/dL 14   < > 14   < > 29*   CREATININE mg/dL 0.64   < > 0.55*   < > 0.75   ANION GAP mmol/L  --   --  8   < > 5   CALCIUM mg/dL 8.7   < > 8.4   < > 8.1*   ALBUMIN g/dL  --   --   --   --  3.0*   TOTAL BILIRUBIN mg/dL  --   --   --   --  0.38   ALK PHOS U/L  --   --   --   --  165*   ALT U/L  --   --   --   --  10   AST U/L  --   --   --   --  18   GLUCOSE RANDOM mg/dL 160*   < > 99   < > 110    < > = values in this interval not displayed. Results from last 7 days   Lab Units 11/26/23  0456   INR  1.04     Results from last 7 days   Lab Units 11/25/23  1240 11/25/23  0629 11/25/23  0022   POC GLUCOSE mg/dl 97 102 134         Results from last 7 days   Lab Units 11/30/23  0450 11/25/23  0024 11/24/23  1426   PROCALCITONIN ng/ml 0.31* 0.43* 0.41*       Lines/Drains:  Invasive Devices       Peripheral Intravenous Line  Duration             Peripheral IV 11/28/23 Dorsal (posterior); Right Forearm 2 days                          Imaging: Reviewed radiology reports from this admission including: chest CT scan    Recent Cultures (last 7 days):         Last 24 Hours Medication List:   Current Facility-Administered Medications   Medication Dose Route Frequency Provider Last Rate    acetaminophen  650 mg Oral Q6H PRN Stacy Chiang MD      albuterol  2.5 mg Nebulization Q6H PRN Stacy Chiang MD      apixaban  2.5 mg Oral BID Alisa Vargas MD      azithromycin  500 mg Oral Q24H Alisa Vargas MD      bisacodyl  10 mg Oral Once Stacy Chiang MD      budesonide  0.5 mg Nebulization Q12H Stacy Chiang MD      cefTRIAXone  1,000 mg Intravenous Q24H Alisa Vargas MD 1,000 mg (11/30/23 0826)    diltiazem  240 mg Oral Daily Stacy Chiang MD      ferrous sulfate  325 mg Oral Daily With Breakfast Stacy Chiang MD      formoterol  20 mcg Nebulization Q12H Stacy Chiang MD ipratropium  0.5 mg Nebulization TID Fozia Gaston MD      levalbuterol  1.25 mg Nebulization TID Fozia Gaston MD      levothyroxine  25 mcg Oral Early Morning Fozia Gaston MD      methylPREDNISolone sodium succinate  40 mg Intravenous Q12H [address] [facility]      mirtazapine  7.5 mg Oral HS Fozia Gaston MD      pantoprazole  40 mg Oral BID AC Mady Gosselin, MD      sertraline  50 mg Oral Daily Fozia Gaston MD          Today, Patient Was Seen By: Mady Gosselin, MD    **Please Note: This note may have been constructed using a voice recognition system. **

## 2023-11-30 NOTE — CASE MANAGEMENT
Case Management Discharge Planning Note    Patient name Dorys Hernández  Location [address] [facility] [place] 316/-16 MRN [id number]  : 1940 Date 2023       Current Admission Date: 2023  Current Admission Diagnosis:Acute on chronic respiratory failure with hypoxia and hypercapnia [facility])   Patient Active Problem List    Diagnosis Date Noted    Underweight 2023    Acute blood loss anemia 2023    Acute on chronic diastolic CHF (congestive heart failure) ([address]) 2023    Compression fracture of lumbar vertebra ([address]) 2023    Status post hip hemiarthroplasty 2023    Acute postoperative pulmonary insufficiency ([address]) 2023    Bilateral pleural effusion 2023    Acute on chronic respiratory failure with hypoxia and hypercapnia ([address]) 2023    GIB (gastrointestinal bleeding) 2023    Cerebral aneurysm, nonruptured 2023    Abdominal distention/back pain 2023    MDD with persistant bereavement complex 2023    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    Thyroid nodule 2023    Severe protein-calorie malnutrition ([address]) 2023    Unspecified mood (affective) disorder ([address]) 2023    Failure to thrive in adult 2023    Pulmonary nodule 02/15/2023    Onychomycosis 02/15/2023    Anxiety 02/10/2023    Other specified hypothyroidism 2022    Peripheral edema 2021    Low back pain without sciatica 2021    Bilateral pneumonia 2017    Chronic obstructive pulmonary disease with acute exacerbation ([address]) 2017    Hypertension 2017    Hypercoagulable state ([address]) 2017    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 Fax: [address], [address] [facility] [address] [place] [address],[address]  [facility] [address]  Phone: [phone] Fax: [phone]    Primary Care Provider: Aleyda Shay MD    Primary Insurance: MEDICARE  Secondary Insurance: BLUE CROSS    DISCHARGE DETAILS:    Discharge planning discussed with[de-identified] patient and patient's son: Melecio Lewis of Choice: Yes    IMM Given (Date):: 11/30/23  IMM Given to[de-identified] Patient     Additional Comments: [facility] can accept Pt for STR once medically stable. Pt and Pt's son aware and in agreement.     Accepting Facility Name, [address] [place] [address] : [facility]  Receiving Facility/Agency Phone Number: [phone]

## 2023-11-30 NOTE — ASSESSMENT & PLAN NOTE
Chronically on 2L, currently requiring 5L NC  Home regimen: Spiriva inhaler + pumicort nebs BID + duonebs TID daily   Continue ATC nebs currently   Started on IV steroids on 11/30 given significant wheezing on exam  Continue chest PT

## 2023-11-30 NOTE — PLAN OF CARE
Problem: PAIN - ADULT  Goal: Verbalizes/displays adequate comfort level or baseline comfort level  Description: Interventions:  - Encourage patient to monitor pain and request assistance  - Assess pain using appropriate pain scale  - Administer analgesics based on type and severity of pain and evaluate response  - Implement non-pharmacological measures as appropriate and evaluate response  - Consider cultural and social influences on pain and pain management  - Notify physician/advanced practitioner if interventions unsuccessful or patient reports new pain  Outcome: Progressing     Problem: Potential for Falls  Goal: Patient will remain free of falls  Description: INTERVENTIONS:  - Educate patient/family on patient safety including physical limitations  - Instruct patient to call for assistance with activity   - Consult OT/PT to assist with strengthening/mobility   - Keep Call bell within reach  - Keep bed low and locked with side rails adjusted as appropriate  - Keep care items and personal belongings within reach  - Initiate and maintain comfort rounds  - Make Fall Risk Sign visible to staff  - Offer Toileting every 2 Hours, in advance of need  - Initiate/Maintain alarm  - Obtain necessary fall risk management equipment:  - Apply yellow socks and bracelet for high fall risk patients  - Consider moving patient to room near nurses station  Outcome: Progressing

## 2023-11-30 NOTE — PLAN OF CARE
Problem: Potential for Falls  Goal: Patient will remain free of falls  Description: INTERVENTIONS:  - Educate patient/family on patient safety including physical limitations  - Instruct patient to call for assistance with activity   - Consult OT/PT to assist with strengthening/mobility   - Keep Call bell within reach  - Keep bed low and locked with side rails adjusted as appropriate  - Keep care items and personal belongings within reach  - Initiate and maintain comfort rounds  - Make Fall Risk Sign visible to staff  - Offer Toileting every 2 Hours, in advance of need  - Initiate/Maintain 22alarm  - Obtain necessary fall risk management equipment: 2  - Apply yellow socks and bracelet for high fall risk patients  - Consider moving patient to room near nurses station  Outcome: Progressing

## 2023-11-30 NOTE — CASE MANAGEMENT
Case Management Discharge Planning Note    Patient name Anette Kemp  Location [address] [facility] 316/-00 MRN [id number]  : 1940 Date 2023       Current Admission Date: 2023  Current Admission Diagnosis:Acute on chronic respiratory failure with hypoxia and hypercapnia [facility])   Patient Active Problem List    Diagnosis Date Noted    Underweight 2023    Acute blood loss anemia 2023    Syncope 2023    Acute on chronic diastolic CHF (congestive heart failure) ([address]) 2023    Compression fracture of lumbar vertebra ([address]) 2023    Status post hip hemiarthroplasty 2023    Acute postoperative pulmonary insufficiency ([address]) 2023    Bilateral pleural effusion 2023    Acute on chronic respiratory failure with hypoxia and hypercapnia ([address]) 2023    GIB (gastrointestinal bleeding) 2023    Cerebral aneurysm, nonruptured 2023    Abdominal distention/back pain 2023    MDD with persistant bereavement complex 2023    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    Thyroid nodule 2023    Severe protein-calorie malnutrition ([address]) 2023    Unspecified mood (affective) disorder ([address]) 2023    Failure to thrive in adult 2023    Pulmonary nodule 02/15/2023    Onychomycosis 02/15/2023    Anxiety 02/10/2023    Other specified hypothyroidism 2022    Peripheral edema 2021    Low back pain without sciatica 2021    Chronic obstructive pulmonary disease with acute exacerbation ([address]) 2017    Hypertension 2017    Hypercoagulable state ([address]) 2017    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    Vitamin D deficiency 2016    Osteoporosis 2015    Antiphospholipid antibody syndrome ([address]) 2012    Atrial fibrillation ([address]) 2012    Hereditary hemolytic anemia ([address]) 2012    Asthma 2012      LOS (days): 6  Geometric Mean LOS (GMLOS) (days): 3.90  Days to GMLOS:-1.8     OBJECTIVE:  Risk of Unplanned Readmission Score: 35.11         Current admission status: Inpatient   Preferred Pharmacy:   [id number] - [place], [address]  [address] [address]  Phone: [phone] Fax: [address], [address] [facility] [address]  [facility] [address]  Phone: [phone] Fax: [phone]    Primary Care Provider: Abel Cadena MD    Primary Insurance: MEDICARE  Secondary Insurance: BLUE CROSS    DISCHARGE DETAILS:    Additional Comments: Met with Pt and Pt's son at bedside. CM informed Pt and Pt's son of SNF recommendation. Pr and son in agreement and requesting [facility]. Referral sent to [facility] via [facility]. CM to follow.

## 2023-11-30 NOTE — SPEECH THERAPY NOTE
Speech Language/Pathology    Speech/Language Pathology Progress Note    Patient Name: Bill Woody  ULCWL'I Date: 11/30/2023     Problem List  Principal Problem:    Acute on chronic respiratory failure with hypoxia and hypercapnia (HCC)  Active Problems:    Chronic obstructive pulmonary disease with acute exacerbation (HCC)    Atrial fibrillation (HCC)    Other specified hypothyroidism    GIB (gastrointestinal bleeding)    Acute blood loss anemia    Syncope    Acute on chronic diastolic CHF (congestive heart failure) (HCC)       Past Medical History  Past Medical History:   Diagnosis Date    Anti-phospholipid syndrome (HCC)     Asthma     Blood type O+     Cardiac disease     Chronic pain     COPD (chronic obstructive pulmonary disease) (HCC)     Femur fracture, left ([address]) 6/22/2023    Fracture of ankle     left    Hyperlipidemia     Hypertension     Hypokalemia 2/27/2023    Osteoporosis         Past Surgical History  Past Surgical History:   Procedure Laterality Date    APPENDECTOMY      BILATERAL OOPHORECTOMY Bilateral     ORIF TIBIAL SHAFT FRACTURE W/ PLATES AND SCREWS Right     [place] HEMIARTHROPLASTY HIP PARTIAL Left 6/23/2023    Procedure: HEMIARTHROPLASTY HIP (BIPOLAR); Surgeon: Aramis Montes MD;  Location:  MAIN OR;  Service: Orthopedics         Subjective:  Pt finishing breakfast tray, c/o some fruit/vegetables are too hard to chew. "As long as I don't have to lay down."    Current Diet:  Regular/thin     Objective:  Pt seen for dx dysphagia tx, pt agreeable to thin liquids only as she had recently completed material from breakfast tray. Immediate wet cough response to sip of of thin. Coughing followed by SOB, pt requesting several seconds to recover. Inconsistent cough response s/p approx 450% of trials - ?related to aspiration vs baseline cough. S/w pt regarding potential benefit of VBS to rule-out aspiration given CT chest fndings and inconsistent s/s.  After explanation of study and benefit, pt agreeable. S/w SLIM physician, agreeable to plan to further assess/rule-out aspiration. Assessment:  Pt continues w/ inconsistent s/s aspiration w/ liquids. Given current medical presentation and CT findings, likely to benefit from instrumentation to further assess.      Plan/Recommendations:  VBS scheduled for 1045 am  Further plan/recommendations to follow

## 2023-11-30 NOTE — CONSULTS
Pulmonary Consultation   Flaco Mays 79 y.o. female MRN: [id number]  Unit/Bed#: -01 Encounter: [id number]      Reason for consultation: COPD exacerbation, aspiration pneumonia    Requesting physician: Dr. Renee Colbert    Impressions/Recommendations:     COPD by history with acute exacerbation-there are no PFTs on file. She does have acute bronchospasm. Will continue IV steroids at 40 mg every 12 hours. Continue Xopenex and Atrovent 3 times daily and budesonide with Perforomist twice daily. Acute on chronic hypoxic respiratory failure-baseline oxygen needs are 2 L/min, currently requiring 4 L/min. Titrate to maintain saturations above 88%    Small bilateral pleural effusions secondary to acute on chronic heart failure with preserved effective fraction-improving with diuretics    Abnormal chest CT with bibasilar infiltrates, likely aspiration pneumonia-speech evaluation was performed today which showed mild to moderate oropharyngeal dysphagia with evidence of aspiration. Continue antibiotics with ceftriaxone and azithromycin. Follow-up urinary Legionella and strep pneumonia antigens. History of Present Illness   HPI:  Flaco Mays is a 79 y.o. female who was admitted on 11/24/2023 with bright red blood per rectum. She has history of COPD and chronic hypoxic respiratory failure requiring 2 L/min continuously. Patient is a poor historian and therefore most of the information obtained from chart review. Patient does not give me any specific complaints. Pulmonary has been consulted due to concern for COPD exacerbation and increasing oxygen requirements, up to 4-5 L/min. Cardiology consult was also obtained due to concern there could be an element of CHF. Patient denies significant cough or sputum production. Review of systems:   Review of Systems   Constitutional:  Negative for chills, fever and unexpected weight change. HENT:  Negative for postnasal drip and sore throat.     Eyes:  Negative for visual disturbance. Respiratory:          As noted in HPI   Cardiovascular:  Negative for chest pain. Gastrointestinal:  Negative for abdominal pain, diarrhea and vomiting. Musculoskeletal:  Negative for arthralgias. Skin:  Negative for rash. Neurological:  Negative for headaches. Hematological:  Negative for adenopathy. Psychiatric/Behavioral: Negative. All other systems reviewed and are negative. All other 12-point review of systems are negative. Historical Information   Past Medical History:   Diagnosis Date    Anti-phospholipid syndrome (HCC)     Asthma     Blood type O+     Cardiac disease     Chronic pain     COPD (chronic obstructive pulmonary disease) (HCC)     Femur fracture, left (HCC) 6/22/2023    Fracture of ankle     left    Hyperlipidemia     Hypertension     Hypokalemia 2/27/2023    Osteoporosis      Past Surgical History:   Procedure Laterality Date    APPENDECTOMY      BILATERAL OOPHORECTOMY Bilateral     ORIF TIBIAL SHAFT FRACTURE W/ PLATES AND SCREWS Right     [place] HEMIARTHROPLASTY HIP PARTIAL Left 6/23/2023    Procedure: HEMIARTHROPLASTY HIP (BIPOLAR);   Surgeon: Maliha Hawthorne MD;  Location:  MAIN OR;  Service: Orthopedics     Family History   Problem Relation Age of Onset    Hypertension Mother     Skin cancer Mother     Hypertension Father      Tobacco history: Unknown, patient is poor historian    Family history: Negative from pulmonary perspective per history    Meds/Allergies   Current Facility-Administered Medications   Medication Dose Route Frequency    acetaminophen (TYLENOL) tablet 650 mg  650 mg Oral Q6H PRN    albuterol inhalation solution 2.5 mg  2.5 mg Nebulization Q6H PRN    apixaban (ELIQUIS) tablet 2.5 mg  2.5 mg Oral BID    azithromycin (ZITHROMAX) tablet 500 mg  500 mg Oral Q24H    budesonide (PULMICORT) inhalation solution 0.5 mg  0.5 mg Nebulization Q12H    cefTRIAXone (ROCEPHIN) IVPB (premix in dextrose) 1,000 mg 50 mL  1,000 mg Intravenous Q24H diltiazem (CARDIZEM CD) 24 hr capsule 240 mg  240 mg Oral Daily    ferrous sulfate tablet 325 mg  325 mg Oral Daily With Breakfast    formoterol (PERFOROMIST) nebulizer solution 20 mcg  20 mcg Nebulization Q12H    ipratropium (ATROVENT) 0.02 % inhalation solution 0.5 mg  0.5 mg Nebulization TID    levalbuterol (XOPENEX) inhalation solution 1.25 mg  1.25 mg Nebulization TID    levothyroxine tablet 25 mcg  25 mcg Oral Early Morning    methylPREDNISolone sodium succinate (Solu-MEDROL) injection 40 mg  40 mg Intravenous Q12H CARMEN    mirtazapine (REMERON) tablet 7.5 mg  7.5 mg Oral HS    pantoprazole (PROTONIX) EC tablet 40 mg  40 mg Oral BID AC    sertraline (ZOLOFT) tablet 50 mg  50 mg Oral Daily     Allergies   Allergen Reactions    Aspirin Other (See Comments)    Penicillins Rash and Hives    Sulfa Antibiotics Rash       Vitals: Blood pressure 138/55, pulse 72, temperature (!) 97.3 °F (36.3 °C), resp. rate 18, height 5' 6" (1.676 m), weight 53 kg (116 lb 13.5 oz), SpO2 95 %, not currently breastfeeding. , , Body mass index is 18.86 kg/m². Intake/Output Summary (Last 24 hours) at 11/30/2023 1514  Last data filed at 11/30/2023 1436  Gross per 24 hour   Intake 180 ml   Output 810 ml   Net -630 ml       Physical exam:     Physical Exam  Constitutional:       General: She is not in acute distress. Appearance: Normal appearance. She is ill-appearing. Comments: Frail, cachectic   HENT:      Head: Normocephalic. Mouth/Throat:      Pharynx: No oropharyngeal exudate. Eyes:      General: No scleral icterus. Neck:      Vascular: No JVD. Cardiovascular:      Rate and Rhythm: Normal rate and regular rhythm. Pulmonary:      Breath sounds: Decreased breath sounds and wheezing present. Abdominal:      Palpations: Abdomen is soft. Tenderness: There is no abdominal tenderness. Musculoskeletal:         General: No deformity. Cervical back: Neck supple. Right lower leg: Edema present. Lymphadenopathy:      Cervical: No cervical adenopathy. Skin:     General: Skin is warm and dry. Neurological:      Mental Status: She is alert and oriented to person, place, and time. Psychiatric:      Comments: Flat affect         Labs: I have personally reviewed pertinent lab results. Results from last 7 days   Lab Units 11/30/23  0450 11/29/23 0508 11/28/23 0443   WBC Thousand/uL 5.23 7.20 8.56   HEMOGLOBIN g/dL 8.4* 8.4* 7.9*   HEMATOCRIT % 27.7* 28.2* 25.9*   PLATELETS Thousands/uL 220 246 248         Results from last 7 days   Lab Units 11/30/23  0450 11/29/23  0508 11/28/23  0443 11/26/23  0456 11/25/23  0024 11/24/23  1426 11/24/23  1426 11/24/23  1423   POTASSIUM mmol/L 3.5 3.5 3.6   < > 3.9   < > 4.9  --    CHLORIDE mmol/L 89* 89* 92*   < > 97   < > 99  --    CO2 mmol/L >45* >45* 43*   < > 41*   < > 41*  --    CO2, I-STAT mmol/L  --   --   --   --   --   --   --  >45*   BUN mg/dL 14 13 14   < > 29*   < > 32*  --    CREATININE mg/dL 0.64 0.57* 0.55*   < > 0.75   < > 0.80  --    CALCIUM mg/dL 8.7 8.8 8.4   < > 8.1*   < > 8.6  --    ALK PHOS U/L  --   --   --   --  165*  --  187*  --    ALT U/L  --   --   --   --  10  --  12  --    AST U/L  --   --   --   --  18  --  19  --    GLUCOSE, ISTAT mg/dl  --   --   --   --   --   --   --  150*    < > = values in this interval not displayed. Results from last 7 days   Lab Units 11/26/23  0456 11/25/23  1148 11/24/23  1426   INR  1.04 1.02 5.41*     Results from last 7 days   Lab Units 11/29/23  0508 11/26/23  0456   MAGNESIUM mg/dL 1.9 2.1     Venous blood gas showed pH 08570, pCO2 46, pO2 66    Procalcitonin 0.31  Imaging and other studies: I have personally reviewed pertinent reports.    and I have personally reviewed pertinent films in PACS CT of the chest from 11/29/2023 shows right middle lobe and bilateral lobe consolidation and small bilateral pleural effusions    Pulmonary function testing: None on file    Other Studies: I have personally reviewed pertinent reports. Echocardiogram from 11/27/2023 shows an EF of 92%, grade 1 diastolic dysfunction, normal RV size and systolic function. There is mild aortic stenosis. RV pressure 41 mmHg    Code Status: Level 2 - DNAR: but accepts endotracheal intubation    Thank you for allowing us to participate in the care of your patient.     Yahaira Guevara, DO

## 2023-11-30 NOTE — ASSESSMENT & PLAN NOTE
In setting of acute lower GIB on warfarin with supratherapeutic INR   Iron panel: Iron 36, Iron Sat 23, TIBC 156 Ferritin 111  11/24: 1 unit PRBC  11/25: 1 unit PRBC  Recent Labs     11/28/23  0443 11/29/23  0508 11/30/23  0450   HGB 7.9* 8.4* 8.4*      Maintain hgb > 7  See above

## 2023-11-30 NOTE — ASSESSMENT & PLAN NOTE
Wt Readings from Last 3 Encounters:   11/30/23 53 kg (116 lb 13.5 oz)   10/20/23 52.2 kg (115 lb 2 oz)   10/05/23 53.1 kg (117 lb)   Appears volume overloaded, increasing edema per son x last several days. O2 requirement 4-5 L on admission, baseline is 2L   Outpatient lasix was changed from 20 mg daily to 40 mg daily about 4 weeks ago  CXR-Bilateral pleural effusions with bibasilar atelectasis is not significantly changed.    Echo 2/2023: EF 65%, G1DD  Cardiology following, appreciate recommendations  IV Lasix discontinued given contraction alkalosis, currently on a diuretic holiday

## 2023-11-30 NOTE — ASSESSMENT & PLAN NOTE
Chronically maintained on 2L 2/2 COPD   Requiring 4-5 L on admission, SpO2 90-94%  COVID/FLU/RSV neg  CXR with venous congestion, bilateral effusions     Suspect multifactorial in the setting of acute on chronic CHF as well as COPD with acute exacerbation  Currently on 4L  With CO2 greater than 45  We will try her BiPAP at bedtime  Diuretics on hold today given component of contraction alkalosis  Ct scan 11/29-Right middle lobe and bilateral lower lobe consolidation suggestive of compressive atelectasis however pneumonia cannot be entirely excluded. Small bilateral pleural effusions.    Started on antibiotics, pulmonology consulted, appreciate input  Continue to trend procalcitonin

## 2023-11-30 NOTE — PROGRESS NOTES
Cardiology Progress Note   Anders Setting 79 y.o. female MRN: [id number]    Unit/Bed#: -01 Encounter: [id number]      Assessment:  Acute on chronic hypoxic respiratory failure  On 4L O2 currently > Baseline 2L O2 chronically   CXR: B/L pleural effusions, interstitial edema    Acute HFpEF  TTE this admission 11/27/2023: EF 60%, G1DD, severe LA dilation, functionally bicuspid AV with mild AS, mild MR/MS, moderate TR, PASP 41mmHg. TTE 2/28/2023: EF 65%, G1DD, mild LA dilation, mild AS, moderate MAC, mild TR   BRBPR/ acute blood loss anemia   HgB as low as 5 >> 8.7 s/p 2U pRBCs   INR 5.41 on arrival >> 1.04 s/p 1U FFP and Vit K 10mg; warfarin discontinued   GI consulted; no acute GIB -- started on Eliquis 2.5mg BID (11/29/2023)   HgB stable 8.4 today   Paroxysmal atrial fibrillation  Longstanding hx  Has been on warfarin for 30+ years   Maintaining NSR currently   On Cardizem and warfarin as outpatient   Hypertension   Chronic peripheral edema  Likely multifactorial from combination of venous insufficiency, diastolic dysfunction and hypoalbuminemia. Plan/Discussion:   She appears close to euvolemic on exam; she has peripheral edema chronically and at baseline today. BMP continues to show contraction alkalosis. Recommend holding diuretics at this point. Her remaining breathing issues could be secondary to COPD +/- underlying infectious process. Repeat CT chest yesterday shows B/L lower lobe consolidation with small bilateral effusions. Effusion size likely too small for IR guided thoracentesis. Speech therapy consulted for swallow study (d/t concern for aspiration pneumonia). Could also consider pulmonary consult for better optimization of her COPD. Her anemia is now stable; GI R/O acute GIB; her home warfarin was switched to Eliquis 2.5mg BID (age >80, Kg <60) for CVA prophylaxis ([id number]).   Continue Cardizem CD 240mg  Cardiology to likely follow from the periphery; please call with any questions or concerns\    Subjective:   Patient seen and examined. Overnight events reviewed. "I feel tired, my breathing has been about the same since I've been here". Undergoing neb treatment via non-rebreather mask. Objective:     Vitals: Blood pressure (!) 180/70, pulse 83, temperature 97.9 °F (36.6 °C), resp. rate 18, height 5' 6" (1.676 m), weight 53 kg (116 lb 13.5 oz), SpO2 90 %, not currently breastfeeding., Body mass index is 18.86 kg/m².,   Orthostatic Blood Pressures      Flowsheet Row Most Recent Value   Blood Pressure 180/70 filed at 11/30/2023 0755   Patient Position - Orthostatic VS Sitting filed at 11/29/2023 1115              Intake/Output Summary (Last 24 hours) at 11/30/2023 0804  Last data filed at 11/30/2023 8298  Gross per 24 hour   Intake --   Output 802 ml   Net -802 ml         Physical Exam:    GEN: Simin Worrell +4 L O2, nebulizer via non-rebreather mask. HEENT: Sclera anicteric, conjunctivae pink, mucous membranes moist. Oropharynx clear. NECK: supple, no significant JVD, Trachea midline, no thyromegaly. HEART: regular rhythm, normal S1 and S2, no murmurs, clicks, gallops or rubs   LUNGS: severely diminished breath sounds overall, faint R>L rales  ABDOMEN: Soft, nontender, nondistended  EXTREMITIES: +trace to +1 ankle edema, bilaterally. Skin warm and well perfused, no clubbing, cyanosis  NEURO: No focal findings. Normal speech. Mood and affect normal.   SKIN: Normal without suspicious lesions on exposed skin.       Medications:      Current Facility-Administered Medications:     acetaminophen (TYLENOL) tablet 650 mg, 650 mg, Oral, Q6H PRN, Kori Hyatt MD, 650 mg at 11/26/23 1507    albuterol inhalation solution 2.5 mg, 2.5 mg, Nebulization, Q6H PRN, Kori Hyatt MD, 2.5 mg at 11/25/23 1017    [MAR Hold] apixaban (ELIQUIS) tablet 2.5 mg, 2.5 mg, Oral, BID, Parminder Cueva PA-C, 2.5 mg at 11/29/23 0924    bisacodyl (DULCOLAX) EC tablet 10 mg, 10 mg, Oral, Once, Verduzco Aston Vita Riedel, MD    budesonide (PULMICORT) inhalation solution 0.5 mg, 0.5 mg, Nebulization, Q12H, Beverly Franklin MD, 0.5 mg at 11/29/23 2046    diltiazem (CARDIZEM CD) 24 hr capsule 240 mg, 240 mg, Oral, Daily, Beverly Franklin MD, 240 mg at 11/29/23 8213    ferrous sulfate tablet 325 mg, 325 mg, Oral, Daily With Breakfast, Beverly Franklin MD, 325 mg at 11/29/23 0924    formoterol (PERFOROMIST) nebulizer solution 20 mcg, 20 mcg, Nebulization, Q12H, Beverly Franklin MD, 20 mcg at 11/29/23 2047    [facility] Hold] furosemide (LASIX) tablet 40 mg, 40 mg, Oral, Daily, Frank Aguilar PA-C, 40 mg at 11/29/23 0924    ipratropium (ATROVENT) 0.02 % inhalation solution 0.5 mg, 0.5 mg, Nebulization, TID, Beverly Franklin MD, 0.5 mg at 11/29/23 2047    levalbuterol [facility]) inhalation solution 1.25 mg, 1.25 mg, Nebulization, TID, Beverly Franklin MD, 1.25 mg at 11/29/23 2047    levothyroxine tablet 25 mcg, 25 mcg, Oral, Early Morning, Beverly Franklin MD, 25 mcg at 11/30/23 0453    methylPREDNISolone sodium succinate (Solu-MEDROL) injection 40 mg, 40 mg, Intravenous, Q12H [address], Binta Lowry PA-C, 40 mg at 11/29/23 2035    mirtazapine (REMERON) tablet 7.5 mg, 7.5 mg, Oral, HS, Beverly Franklin MD, 7.5 mg at 11/29/23 2035    pantoprazole (PROTONIX) injection 40 mg, 40 mg, Intravenous, Q12H [address], Beverly Franklin MD, 40 mg at 11/29/23 2035    sertraline (ZOLOFT) tablet 50 mg, 50 mg, Oral, Daily, Beverly Franklin MD, 50 mg at 11/29/23 0924     Labs & Results:        Results from last 7 days   Lab Units 11/30/23  0450 11/29/23  0508 11/28/23  0443   WBC Thousand/uL 5.23 7.20 8.56   HEMOGLOBIN g/dL 8.4* 8.4* 7.9*   HEMATOCRIT % 27.7* 28.2* 25.9*   PLATELETS Thousands/uL 220 246 248         Results from last 7 days   Lab Units 11/30/23  0450 11/29/23  0508 11/28/23  0443 11/26/23  0456 11/25/23  0024 11/24/23  1426 11/24/23  1426 11/24/23  1423   POTASSIUM mmol/L 3.5 3.5 3.6   < > 3.9   < > 4.9  --    CHLORIDE mmol/L 89* 89* 92*   < > 97   < > 99  --    CO2 mmol/L >45* >45* 43*   < > 41*   < > 41*  --    CO2, I-STAT mmol/L  --   --   --   --   --   --   --  >45*   BUN mg/dL 14 13 14   < > 29*   < > 32*  --    CREATININE mg/dL 0.64 0.57* 0.55*   < > 0.75   < > 0.80  --    CALCIUM mg/dL 8.7 8.8 8.4   < > 8.1*   < > 8.6  --    ALK PHOS U/L  --   --   --   --  165*  --  187*  --    ALT U/L  --   --   --   --  10  --  12  --    AST U/L  --   --   --   --  18  --  19  --    GLUCOSE, ISTAT mg/dl  --   --   --   --   --   --   --  150*    < > = values in this interval not displayed.      Results from last 7 days   Lab Units 11/26/23  0456 11/25/23  1148 11/24/23  1426   INR  1.04 1.02 5.41*     Results from last 7 days   Lab Units 11/29/23  0508 11/26/23  0456   MAGNESIUM mg/dL 1.9 2.1

## 2023-11-30 NOTE — ASSESSMENT & PLAN NOTE
Patient with underlying COPD, and a chronic hypoxic respiratory failure with hypercapnia  CT imaging-Right middle lobe and bilateral lower lobe consolidation suggestive of compressive atelectasis however pneumonia cannot be entirely excluded.  Small bilateral pleural effusions   Afebrile, no leukocytosis  Recent Labs     11/30/23  0450   PROCALCITONI 0.31*   Continue with IV ceftriaxone and azithromycin  Continue to trend procalcitonin and de-escalate antibiotics as needed  Check urine strep and Legionella antigen  Sputum culture and Gram stain  Incentive spirometer  Respiratory protocol  Inpatient consult to pulmonology with significant hypercapnia and COPD exacerbation, appreciate recommendations

## 2023-11-30 NOTE — SPEECH THERAPY NOTE
Speech Language/Pathology    Speech/Language Pathology Progress Note    Patient Name: Francois Dong  TMQBB'I Date: 2023     Problem List  Principal Problem:    Acute on chronic respiratory failure with hypoxia and hypercapnia (HCC)  Active Problems:    Bilateral pneumonia    Chronic obstructive pulmonary disease with acute exacerbation (HCC)    Atrial fibrillation (HCC)    Other specified hypothyroidism    GIB (gastrointestinal bleeding)    Acute blood loss anemia    Acute on chronic diastolic CHF (congestive heart failure) ([address])       Past Medical History  Past Medical History:   Diagnosis Date    Anti-phospholipid syndrome (HCC)     Asthma     Blood type O+     Cardiac disease     Chronic pain     COPD (chronic obstructive pulmonary disease) (HCC)     Femur fracture, left ([address]) 2023    Fracture of ankle     left    Hyperlipidemia     Hypertension     Hypokalemia 2023    Osteoporosis         Past Surgical History  Past Surgical History:   Procedure Laterality Date    APPENDECTOMY      BILATERAL OOPHORECTOMY Bilateral     ORIF TIBIAL SHAFT FRACTURE W/ PLATES AND SCREWS Right     TX HEMIARTHROPLASTY HIP PARTIAL Left 2023    Procedure: HEMIARTHROPLASTY HIP (BIPOLAR); Surgeon: Christopher Ayala MD;  Location:  MAIN OR;  Service: Orthopedics         Subjective:  Pt awake and alert, interested in MBS findings. Objective:  VBS findings thoroughly reviewed w/ pt w/ use of images to aid in understanding. Education provided on anatomy/physiology of the swallow and pt specific identified impairments including reduced oral control, delayed swallow initiation, and reduced airway protection 2/2 incomplete laryngeal elevation and vestibule closure. Further education provided on risks of aspiration w/ each administered consistency and options for diet modifications to potentially reduce aspiration.  Further education provided on risks/benefits of liquid/solid texture modifications (reduced aspiration risk and subsequent medical implications, increased retention, potential dehydration, etc.) vs risks/benefits of continuing baseline diet w/ use of strategies to optimize swallow safety and understanding/acceptance of any potential risks. Pt verbalized understanding of options and associated risks/benefits and at this time states preference/choice for modifications w/ plan for ST f/u. Physician notified of decision. Education provided on the importance of frequent/thorough oral care and s/s worsening dysphagia/aspiration to notify medical team of should they arise. Pt demonstrated understanding and denied questions/concerns at this time. Pt assessed w/ trials of nectar thick liquids, encouraged single sips. 4 oz NTL completed without overt s/s aspiration. Assessment:  Pt w/ good understandings of MBS findings, current aspiration risk, and options for plan of care including risks/benefits of modifications vs without. Pt agreeable to implementation of dysphagia diet modifications w/ plan for ST f/u.      Plan/Recommendations:  Dysphagia 3 w/ nectar  Frequent/thorough oral care  ST to f/u as able/appropriate

## 2023-11-30 NOTE — PROGRESS NOTES
The pantoprazole has / have been converted to Oral per [facility] IV-to-PO Auto-Conversion Protocol for Adults as approved by the Pharmacy and Therapeutics Committee. The patient met all eligible criteria:  3 Age = 25years old   2) Received at least one dose of the IV form   3) Receiving at least one other scheduled oral/enteral medication   4) Tolerating an oral/enteral diet   and did not have any exclusions:   1) Critical care patient   2) Active GI bleed (IF assessing H2RAs or PPIs)   3) Continuous tube feeding (IF assessing cipro, doxycycline, levofloxacin, minocycline, rifampin, or voriconazole)   4) Receiving PO vancomycin (IF assessing metronidazole)   5) Persistent nausea and/or vomiting   6) Ileus or gastrointestinal obstruction   7) Iliana/nasogastric tube set for continuous suction   8) Specific order not to automatically convert to PO (in the order's comments or if discussed in the most recent Infectious Disease or primary team's progress notes).

## 2023-11-30 NOTE — ASSESSMENT & PLAN NOTE
BRBPR began 11/22, PCP recommended stopping warfarin temporarily, however persistent symptoms with large BRBPR episode witnessed by son this morning followed by syncopal episode  Hgb on admission 6 (baseline 8.5-10, most recently 9.6)  INR 5.4  S/P 1 unit FFP + IV vit K 10 mg   Repeat INR 1  GI consult  No active bleeding noted on colonoscopy, however she did have diverticulosis and internal hemorrhoids.   Okay to resume anticoagulation per GI  Eliquis recommended to be started today

## 2023-12-01 ENCOUNTER — APPOINTMENT (INPATIENT)
Dept: CT IMAGING | Facility: HOSPITAL | Age: 83
DRG: 291 | End: 2023-12-01
Payer: MEDICARE

## 2023-12-01 ENCOUNTER — APPOINTMENT (INPATIENT)
Dept: MRI IMAGING | Facility: HOSPITAL | Age: 83
DRG: 291 | End: 2023-12-01
Payer: MEDICARE

## 2023-12-01 PROBLEM — R29.90 STROKE-LIKE SYMPTOMS: Status: ACTIVE | Noted: 2023-12-01

## 2023-12-01 LAB
2HR DELTA HS TROPONIN: 7 NG/L
4HR DELTA HS TROPONIN: 1 NG/L
ALBUMIN SERPL BCP-MCNC: 3.1 G/DL (ref 3.5–5)
ALP SERPL-CCNC: 168 U/L (ref 34–104)
ALT SERPL W P-5'-P-CCNC: 11 U/L (ref 7–52)
ANION GAP SERPL CALCULATED.3IONS-SCNC: 3 MMOL/L
ANISOCYTOSIS BLD QL SMEAR: PRESENT
APTT PPP: 36 SECONDS (ref 23–37)
AST SERPL W P-5'-P-CCNC: 17 U/L (ref 13–39)
BASE EX.OXY STD BLDV CALC-SCNC: 70.1 % (ref 60–80)
BASE EXCESS BLDV CALC-SCNC: 17.3 MMOL/L
BASO STIPL BLD QL SMEAR: PRESENT
BASOPHILS # BLD MANUAL: 0 THOUSAND/UL (ref 0–0.1)
BASOPHILS NFR MAR MANUAL: 0 % (ref 0–1)
BILIRUB SERPL-MCNC: 0.31 MG/DL (ref 0.2–1)
BUN SERPL-MCNC: 17 MG/DL (ref 5–25)
BUN SERPL-MCNC: 18 MG/DL (ref 5–25)
CALCIUM ALBUM COR SERPL-MCNC: 9.6 MG/DL (ref 8.3–10.1)
CALCIUM SERPL-MCNC: 8.5 MG/DL (ref 8.4–10.2)
CALCIUM SERPL-MCNC: 8.9 MG/DL (ref 8.4–10.2)
CARDIAC TROPONIN I PNL SERPL HS: 51 NG/L
CARDIAC TROPONIN I PNL SERPL HS: 52 NG/L
CARDIAC TROPONIN I PNL SERPL HS: 58 NG/L
CHLORIDE SERPL-SCNC: 91 MMOL/L (ref 96–108)
CHLORIDE SERPL-SCNC: 92 MMOL/L (ref 96–108)
CO2 SERPL-SCNC: 45 MMOL/L (ref 21–32)
CO2 SERPL-SCNC: >45 MMOL/L (ref 21–32)
CREAT SERPL-MCNC: 0.6 MG/DL (ref 0.6–1.3)
CREAT SERPL-MCNC: 0.63 MG/DL (ref 0.6–1.3)
EOSINOPHIL # BLD MANUAL: 0 THOUSAND/UL (ref 0–0.4)
EOSINOPHIL NFR BLD MANUAL: 0 % (ref 0–6)
ERYTHROCYTE [DISTWIDTH] IN BLOOD BY AUTOMATED COUNT: 18 % (ref 11.6–15.1)
GFR SERPL CREATININE-BSD FRML MDRD: 82 ML/MIN/1.73SQ M
GFR SERPL CREATININE-BSD FRML MDRD: 83 ML/MIN/1.73SQ M
GLUCOSE SERPL-MCNC: 145 MG/DL (ref 65–140)
GLUCOSE SERPL-MCNC: 149 MG/DL (ref 65–140)
GLUCOSE SERPL-MCNC: 165 MG/DL (ref 65–140)
HCO3 BLDV-SCNC: 45.4 MMOL/L (ref 24–30)
HCT VFR BLD AUTO: 26.6 % (ref 34.8–46.1)
HGB BLD-MCNC: 8 G/DL (ref 11.5–15.4)
LYMPHOCYTES # BLD AUTO: 0 % (ref 14–44)
LYMPHOCYTES # BLD AUTO: 0 THOUSAND/UL (ref 0.6–4.47)
MCH RBC QN AUTO: 32.1 PG (ref 26.8–34.3)
MCHC RBC AUTO-ENTMCNC: 30.1 G/DL (ref 31.4–37.4)
MCV RBC AUTO: 107 FL (ref 82–98)
MONOCYTES # BLD AUTO: 0.06 THOUSAND/UL (ref 0–1.22)
MONOCYTES NFR BLD: 1 % (ref 4–12)
NEUTROPHILS # BLD MANUAL: 5.97 THOUSAND/UL (ref 1.85–7.62)
NEUTS SEG NFR BLD AUTO: 99 % (ref 43–75)
O2 CT BLDV-SCNC: 10 ML/DL
PCO2 BLDV: 79.3 MM HG (ref 42–50)
PH BLDV: 7.38 [PH] (ref 7.3–7.4)
PLATELET # BLD AUTO: 239 THOUSANDS/UL (ref 149–390)
PLATELET BLD QL SMEAR: ADEQUATE
PMV BLD AUTO: 9 FL (ref 8.9–12.7)
PO2 BLDV: 37.9 MM HG (ref 35–45)
POLYCHROMASIA BLD QL SMEAR: PRESENT
POTASSIUM SERPL-SCNC: 3.5 MMOL/L (ref 3.5–5.3)
POTASSIUM SERPL-SCNC: 3.5 MMOL/L (ref 3.5–5.3)
PROCALCITONIN SERPL-MCNC: 0.23 NG/ML
PROT SERPL-MCNC: 5.8 G/DL (ref 6.4–8.4)
RBC # BLD AUTO: 2.49 MILLION/UL (ref 3.81–5.12)
SODIUM SERPL-SCNC: 139 MMOL/L (ref 135–147)
SODIUM SERPL-SCNC: 140 MMOL/L (ref 135–147)
WBC # BLD AUTO: 6.03 THOUSAND/UL (ref 4.31–10.16)

## 2023-12-01 PROCEDURE — 94760 N-INVAS EAR/PLS OXIMETRY 1: CPT

## 2023-12-01 PROCEDURE — 99232 SBSQ HOSP IP/OBS MODERATE 35: CPT | Performed by: INTERNAL MEDICINE

## 2023-12-01 PROCEDURE — 85027 COMPLETE CBC AUTOMATED: CPT | Performed by: INTERNAL MEDICINE

## 2023-12-01 PROCEDURE — 70551 MRI BRAIN STEM W/O DYE: CPT

## 2023-12-01 PROCEDURE — 85730 THROMBOPLASTIN TIME PARTIAL: CPT

## 2023-12-01 PROCEDURE — 99233 SBSQ HOSP IP/OBS HIGH 50: CPT | Performed by: INTERNAL MEDICINE

## 2023-12-01 PROCEDURE — 85007 BL SMEAR W/DIFF WBC COUNT: CPT | Performed by: INTERNAL MEDICINE

## 2023-12-01 PROCEDURE — 94640 AIRWAY INHALATION TREATMENT: CPT

## 2023-12-01 PROCEDURE — G1004 CDSM NDSC: HCPCS

## 2023-12-01 PROCEDURE — 92526 ORAL FUNCTION THERAPY: CPT

## 2023-12-01 PROCEDURE — 84484 ASSAY OF TROPONIN QUANT: CPT

## 2023-12-01 PROCEDURE — 82805 BLOOD GASES W/O2 SATURATION: CPT

## 2023-12-01 PROCEDURE — 82948 REAGENT STRIP/BLOOD GLUCOSE: CPT

## 2023-12-01 PROCEDURE — 80053 COMPREHEN METABOLIC PANEL: CPT | Performed by: INTERNAL MEDICINE

## 2023-12-01 PROCEDURE — 80048 BASIC METABOLIC PNL TOTAL CA: CPT

## 2023-12-01 PROCEDURE — 84145 PROCALCITONIN (PCT): CPT | Performed by: INTERNAL MEDICINE

## 2023-12-01 PROCEDURE — 70496 CT ANGIOGRAPHY HEAD: CPT

## 2023-12-01 PROCEDURE — 93005 ELECTROCARDIOGRAM TRACING: CPT

## 2023-12-01 PROCEDURE — NC001 PR NO CHARGE

## 2023-12-01 PROCEDURE — 70498 CT ANGIOGRAPHY NECK: CPT

## 2023-12-01 PROCEDURE — 99223 1ST HOSP IP/OBS HIGH 75: CPT | Performed by: PSYCHIATRY & NEUROLOGY

## 2023-12-01 RX ORDER — PREDNISONE 20 MG/1
60 TABLET ORAL DAILY
Status: DISCONTINUED | OUTPATIENT
Start: 2023-12-02 | End: 2023-12-02 | Stop reason: HOSPADM

## 2023-12-01 RX ORDER — HYDRALAZINE HYDROCHLORIDE 20 MG/ML
5 INJECTION INTRAMUSCULAR; INTRAVENOUS EVERY 6 HOURS PRN
Status: DISCONTINUED | OUTPATIENT
Start: 2023-12-01 | End: 2023-12-02 | Stop reason: HOSPADM

## 2023-12-01 RX ADMIN — DILTIAZEM HYDROCHLORIDE 240 MG: 120 CAPSULE, COATED, EXTENDED RELEASE ORAL at 11:45

## 2023-12-01 RX ADMIN — APIXABAN 2.5 MG: 2.5 TABLET, FILM COATED ORAL at 11:44

## 2023-12-01 RX ADMIN — IPRATROPIUM BROMIDE 0.5 MG: 0.5 SOLUTION RESPIRATORY (INHALATION) at 19:08

## 2023-12-01 RX ADMIN — IOHEXOL 85 ML: 350 INJECTION, SOLUTION INTRAVENOUS at 06:09

## 2023-12-01 RX ADMIN — IPRATROPIUM BROMIDE 0.5 MG: 0.5 SOLUTION RESPIRATORY (INHALATION) at 08:04

## 2023-12-01 RX ADMIN — LEVOTHYROXINE SODIUM 25 MCG: 25 TABLET ORAL at 05:26

## 2023-12-01 RX ADMIN — CEFTRIAXONE 1000 MG: 1 INJECTION, SOLUTION INTRAVENOUS at 08:09

## 2023-12-01 RX ADMIN — FORMOTEROL FUMARATE DIHYDRATE 20 MCG: 20 SOLUTION RESPIRATORY (INHALATION) at 19:08

## 2023-12-01 RX ADMIN — APIXABAN 2.5 MG: 2.5 TABLET, FILM COATED ORAL at 18:01

## 2023-12-01 RX ADMIN — PANTOPRAZOLE SODIUM 40 MG: 40 TABLET, DELAYED RELEASE ORAL at 05:25

## 2023-12-01 RX ADMIN — HYDRALAZINE HYDROCHLORIDE 5 MG: 20 INJECTION, SOLUTION INTRAMUSCULAR; INTRAVENOUS at 16:53

## 2023-12-01 RX ADMIN — LEVALBUTEROL HYDROCHLORIDE 1.25 MG: 1.25 SOLUTION RESPIRATORY (INHALATION) at 08:04

## 2023-12-01 RX ADMIN — AZITHROMYCIN DIHYDRATE 500 MG: 500 TABLET ORAL at 11:46

## 2023-12-01 RX ADMIN — LEVALBUTEROL HYDROCHLORIDE 1.25 MG: 1.25 SOLUTION RESPIRATORY (INHALATION) at 19:08

## 2023-12-01 RX ADMIN — ACETAMINOPHEN 650 MG: 325 TABLET, FILM COATED ORAL at 21:06

## 2023-12-01 RX ADMIN — PANTOPRAZOLE SODIUM 40 MG: 40 TABLET, DELAYED RELEASE ORAL at 16:53

## 2023-12-01 RX ADMIN — FORMOTEROL FUMARATE DIHYDRATE 20 MCG: 20 SOLUTION RESPIRATORY (INHALATION) at 08:04

## 2023-12-01 RX ADMIN — IPRATROPIUM BROMIDE 0.5 MG: 0.5 SOLUTION RESPIRATORY (INHALATION) at 13:26

## 2023-12-01 RX ADMIN — METHYLPREDNISOLONE SODIUM SUCCINATE 40 MG: 40 INJECTION, POWDER, FOR SOLUTION INTRAMUSCULAR; INTRAVENOUS at 21:34

## 2023-12-01 RX ADMIN — BUDESONIDE 0.5 MG: 0.5 INHALANT RESPIRATORY (INHALATION) at 08:04

## 2023-12-01 RX ADMIN — BUDESONIDE 0.5 MG: 0.5 INHALANT RESPIRATORY (INHALATION) at 19:08

## 2023-12-01 RX ADMIN — METHYLPREDNISOLONE SODIUM SUCCINATE 40 MG: 40 INJECTION, POWDER, FOR SOLUTION INTRAMUSCULAR; INTRAVENOUS at 08:12

## 2023-12-01 RX ADMIN — LEVALBUTEROL HYDROCHLORIDE 1.25 MG: 1.25 SOLUTION RESPIRATORY (INHALATION) at 13:26

## 2023-12-01 RX ADMIN — SERTRALINE HYDROCHLORIDE 50 MG: 50 TABLET ORAL at 11:44

## 2023-12-01 RX ADMIN — FERROUS SULFATE TAB 325 MG (65 MG ELEMENTAL FE) 325 MG: 325 (65 FE) TAB at 11:44

## 2023-12-01 RX ADMIN — MIRTAZAPINE 7.5 MG: 15 TABLET, FILM COATED ORAL at 21:06

## 2023-12-01 NOTE — ASSESSMENT & PLAN NOTE
In setting of acute lower GIB on warfarin with supratherapeutic INR   Iron panel: Iron 36, Iron Sat 23, TIBC 156 Ferritin 111  11/24: 1 unit PRBC  11/25: 1 unit PRBC  Recent Labs     11/29/23  0508 11/30/23  0450 12/01/23  0321   HGB 8.4* 8.4* 8.0*      Maintain hgb > 7  See above

## 2023-12-01 NOTE — PROGRESS NOTES
[facility]  Progress Note  Name: Amauri Moreland  MRN: [id number]  Unit/Bed#: -01 I Date of Admission: 11/24/2023   Date of Service: 12/1/2023 I Hospital Day: 7    Assessment/Plan   Stroke-like symptoms  Assessment & Plan  Rapid response was called on 12/1, due to concern for acute CVA  Patient was found altered with strokelike symptoms  CT head/neck and MRI brain were negative for any acute infarct  Neurology evaluated the patient, no concern for stroke pathway to be continued  Continue with Eliquis 2.5 mg twice daily  PT/OT next    Acute on chronic diastolic CHF (congestive heart failure) (HCC)  Assessment & Plan  Wt Readings from Last 3 Encounters:   12/01/23 55.1 kg (121 lb 7.6 oz)   10/20/23 52.2 kg (115 lb 2 oz)   10/05/23 53.1 kg (117 lb)   Appears volume overloaded, increasing edema per son x last several days. O2 requirement 4-5 L on admission, baseline is 2L   Outpatient lasix was changed from 20 mg daily to 40 mg daily about 4 weeks ago  CXR-Bilateral pleural effusions with bibasilar atelectasis is not significantly changed.    Echo 2/2023: EF 65%, G1DD  Cardiology following, appreciate recommendations  IV Lasix discontinued given contraction alkalosis, currently on a diuretic holiday      Acute blood loss anemia  Assessment & Plan  In setting of acute lower GIB on warfarin with supratherapeutic INR   Iron panel: Iron 36, Iron Sat 23, TIBC 156 Ferritin 111  11/24: 1 unit PRBC  11/25: 1 unit PRBC  Recent Labs     11/29/23  0508 11/30/23  0450 12/01/23  0321   HGB 8.4* 8.4* 8.0*      Maintain hgb > 7  See above     GIB (gastrointestinal bleeding)  Assessment & Plan  BRBPR began 11/22, PCP recommended stopping warfarin temporarily, however persistent symptoms with large BRBPR episode witnessed by son this morning followed by syncopal episode  Hgb on admission 6   INR supratherapeutic on arrival  Recent Labs     11/29/23  0508 11/30/23  0450 12/01/23  0321   HGB 8.4* 8.4* 8.0*        No results for input(s): "INR" in the last 72 hours. GI evaluated the patient, status post colonoscopy, no concern for active bleed, diverticulosis and internal hemorrhoids visualized  Okay to continue with anticoagulation as per GI      Other specified hypothyroidism  Assessment & Plan  Continue synthroid 25 mcg daily     Atrial fibrillation (HCC)  Assessment & Plan  Rate control: digoxin 125 mcg every other day (last taken 11/24)  Dig level 0.7  Rhythm: Cardizem 240 mg daily   OAC: warfarin - HOLD due to supratherapeutic INR (5.4)  Need to resume AC with NOAC, Eliquis sent to pharmacy and $22 monthly. Eliquis restarted today, will monitor CBC     Chronic obstructive pulmonary disease with acute exacerbation (HCC)  Assessment & Plan  Chronically on 2L, currently requiring 5L NC  Home regimen: Spiriva inhaler + pumicort nebs BID + duonebs TID daily   Continue ATC nebs currently   Started on IV steroids on 11/30 given significant wheezing on exam  Continue chest PT    Bilateral pneumonia  Assessment & Plan  Patient with underlying COPD, and a chronic hypoxic respiratory failure with hypercapnia  CT imaging-Right middle lobe and bilateral lower lobe consolidation suggestive of compressive atelectasis however pneumonia cannot be entirely excluded.  Small bilateral pleural effusions   Afebrile, no leukocytosis  Recent Labs     11/30/23  0450 12/01/23  0620   PROCALCITONI 0.31* 0.23     Continue with IV ceftriaxone day #2   Continue with azithromycin for 3 days  Continue to trend procalcitonin and de-escalate antibiotics as needed   urine strep and Legionella antigen-negative  Sputum culture and Gram stain  Incentive spirometer  Respiratory protocol  Possible aspiration risk, as per speech evaluation  Continue with dysphagia diet, continue speech eval as needed  Inpatient consult to pulmonology with significant hypercapnia and COPD exacerbation, appreciate recommendations    * Acute on chronic respiratory failure with hypoxia and hypercapnia (HCC)  Assessment & Plan  Chronically maintained on 2L 2/2 COPD   Requiring 4-5 L on admission, SpO2 90-94%  COVID/FLU/RSV neg  CXR with venous congestion, bilateral effusions     Suspect multifactorial in the setting of acute on chronic CHF as well as COPD with acute exacerbation  Currently on 4L  With CO2 greater than 45  We will try her BiPAP at bedtime  Diuretics on hold today given component of contraction alkalosis  Ct scan 11/29-Right middle lobe and bilateral lower lobe consolidation suggestive of compressive atelectasis however pneumonia cannot be entirely excluded. Small bilateral pleural effusions. Started on antibiotics, pulmonology consulted, appreciate input  Continue to trend procalcitonin               VTE Pharmacologic Prophylaxis: VTE Score: 3 Moderate Risk (Score 3-4) - Pharmacological DVT Prophylaxis Ordered: apixaban (Eliquis). Mobility:   Basic Mobility Inpatient Raw Score: 17  JH-HLM Goal: 5: Stand one or more mins  JH-HLM Achieved: 4: Move to chair/commode  HLM Goal achieved. Continue to encourage appropriate mobility. Patient Centered Rounds: I performed bedside rounds with nursing staff today. Discussions with Specialists or Other Care Team Provider: yes    Education and Discussions with Family / Patient:  yes. Total Time Spent on Date of Encounter in care of patient: 35 mins. This time was spent on one or more of the following: performing physical exam; counseling and coordination of care; obtaining or reviewing history; documenting in the medical record; reviewing/ordering tests, medications or procedures; communicating with other healthcare professionals and discussing with patient's family/caregivers.     Current Length of Stay: 7 day(s)  Current Patient Status: Inpatient   Certification Statement: The patient will continue to require additional inpatient hospital stay due to pending stabilization  Discharge Plan: Anticipate discharge in 24-48 hrs to rehab facility. Code Status: Level 2 - DNAR: but accepts endotracheal intubation    Subjective:   Seen and examined at bedside  Lethargic  On oxygen     Objective:     Vitals:   Temp (24hrs), Av.9 °F (36.6 °C), Min:97.3 °F (36.3 °C), Max:98.5 °F (36.9 °C)    Temp:  [97.3 °F (36.3 °C)-98.5 °F (36.9 °C)] 97.9 °F (36.6 °C)  HR:  [72-93] 79  Resp:  [13-30] 30  BP: (138-189)/(55-84) 167/77  SpO2:  [90 %-100 %] 91 %  Body mass index is 19.61 kg/m². Input and Output Summary (last 24 hours): Intake/Output Summary (Last 24 hours) at 2023 1154  Last data filed at 2023 0800  Gross per 24 hour   Intake 90 ml   Output 10 ml   Net 80 ml       Physical Exam:   Physical Exam  Vitals reviewed. Constitutional:       Comments: Frail  Chronically ill appearing    HENT:      Head: Atraumatic. Eyes:      General: No scleral icterus. Cardiovascular:      Rate and Rhythm: Normal rate. Heart sounds: Murmur heard. Pulmonary:      Effort: No respiratory distress. Comments: 4 litres   Abdominal:      General: Bowel sounds are normal.   Musculoskeletal:      Cervical back: Neck supple. Right lower leg: No edema. Left lower leg: No edema. Skin:     General: Skin is warm and dry. Capillary Refill: Capillary refill takes less than 2 seconds. Neurological:      Mental Status: She is alert. She is disoriented. Motor: Weakness present. Additional Data:     Labs:  Results from last 7 days   Lab Units 23  0321 23  0450 23  0508   WBC Thousand/uL 6.03   < > 7.20   HEMOGLOBIN g/dL 8.0*   < > 8.4*   HEMATOCRIT % 26.6*   < > 28.2*   PLATELETS Thousands/uL 239   < > 246   NEUTROS PCT %  --   --  78*   LYMPHS PCT %  --   --  11*   LYMPHO PCT % 0*  --   --    MONOS PCT %  --   --  8   MONO PCT % 1*  --   --    EOS PCT % 0  --  3    < > = values in this interval not displayed.      Results from last 7 days   Lab Units 23  1117 12/01/23  0321   SODIUM mmol/L 139 140   POTASSIUM mmol/L 3.5 3.5   CHLORIDE mmol/L 91* 92*   CO2 mmol/L 45* >45*   BUN mg/dL 17 18   CREATININE mg/dL 0.60 0.63   ANION GAP mmol/L 3  --    CALCIUM mg/dL 8.5 8.9   ALBUMIN g/dL  --  3.1*   TOTAL BILIRUBIN mg/dL  --  0.31   ALK PHOS U/L  --  168*   ALT U/L  --  11   AST U/L  --  17   GLUCOSE RANDOM mg/dL 145* 165*     Results from last 7 days   Lab Units 11/26/23  0456   INR  1.04     Results from last 7 days   Lab Units 12/01/23  0533 11/25/23  1240 11/25/23  0629 11/25/23  0022   POC GLUCOSE mg/dl 149* 97 102 134         Results from last 7 days   Lab Units 12/01/23  0620 11/30/23  0450 11/25/23  0024 11/24/23  1426   PROCALCITONIN ng/ml 0.23 0.31* 0.43* 0.41*       Lines/Drains:  Invasive Devices       Peripheral Intravenous Line  Duration             Peripheral IV 12/01/23 Proximal;Right;Ventral (anterior) Forearm <1 day                      Telemetry:  Telemetry Orders (From admission, onward)               24 Hour Telemetry Monitoring  Continuous x 24 Hours (Telem)        Question:  Reason for 24 Hour Telemetry  Answer:  TIA/Suspected CVA/ Confirmed CVA                     Telemetry Reviewed: Normal Sinus Rhythm  Indication for Continued Telemetry Use: Acute respiratory failure on Bipap             Imaging: Reviewed radiology reports from this admission including: CT head and MRI brain    Recent Cultures (last 7 days):   Results from last 7 days   Lab Units 11/30/23  1545   LEGIONELLA URINARY ANTIGEN  Negative       Last 24 Hours Medication List:   Current Facility-Administered Medications   Medication Dose Route Frequency Provider Last Rate    acetaminophen  650 mg Oral Q6H PRN Alice Sawyer MD      albuterol  2.5 mg Nebulization Q6H PRN Alice Sawyer MD      apixaban  2.5 mg Oral BID Pavan Cardoso MD      azithromycin  500 mg Oral Q24H Pavan Cardoso MD      budesonide  0.5 mg Nebulization Q12H Alice Sawyer MD      cefTRIAXone  1,000 mg Intravenous Q24H Perez Messer MD 1,000 mg (12/01/23 0809)    diltiazem  240 mg Oral Daily Anne Marie Blank MD      ferrous sulfate  325 mg Oral Daily With Breakfast Anne Marie Blank MD      formoterol  20 mcg Nebulization Q12H Anne Marie Blank MD      ipratropium  0.5 mg Nebulization TID Anne Marie Blank MD      levalbuterol  1.25 mg Nebulization TID Anne Marie Blank MD      levothyroxine  25 mcg Oral Early Morning Anne Marie Blank MD      methylPREDNISolone sodium succinate  40 mg Intravenous Q12H [address] [place] [address]      mirtazapine  7.5 mg Oral HS Anne Marie Blank MD      pantoprazole  40 mg Oral BID AC Perez Messer MD      sertraline  50 mg Oral Daily Anne Marie Blank MD          Today, Patient Was Seen By: Perez Messer MD    **Please Note: This note may have been constructed using a voice recognition system. **

## 2023-12-01 NOTE — PLAN OF CARE
Problem: Potential for Falls  Goal: Patient will remain free of falls  Description: INTERVENTIONS:  - Educate patient/family on patient safety including physical limitations  - Instruct patient to call for assistance with activity   - Consult OT/PT to assist with strengthening/mobility   - Keep Call bell within reach  - Keep bed low and locked with side rails adjusted as appropriate  - Keep care items and personal belongings within reach  - Initiate and maintain comfort rounds  - Make Fall Risk Sign visible to staff  - Offer Toileting every 4 Hours, in advance of need  - Initiate/Maintain bed alarm  - Obtain necessary fall risk management equipment:   - Apply yellow socks and bracelet for high fall risk patients  - Consider moving patient to room near nurses station  Outcome: Progressing     Problem: PAIN - ADULT  Goal: Verbalizes/displays adequate comfort level or baseline comfort level  Description: Interventions:  - Encourage patient to monitor pain and request assistance  - Assess pain using appropriate pain scale  - Administer analgesics based on type and severity of pain and evaluate response  - Implement non-pharmacological measures as appropriate and evaluate response  - Consider cultural and social influences on pain and pain management  - Notify physician/advanced practitioner if interventions unsuccessful or patient reports new pain  Outcome: Progressing     Problem: INFECTION - ADULT  Goal: Absence or prevention of progression during hospitalization  Description: INTERVENTIONS:  - Assess and monitor for signs and symptoms of infection  - Monitor lab/diagnostic results  - Monitor all insertion sites, i.e. indwelling lines, tubes, and drains  - Monitor endotracheal if appropriate and nasal secretions for changes in amount and color  - Antler appropriate cooling/warming therapies per order  - Administer medications as ordered  - Instruct and encourage patient and family to use good hand hygiene technique  - Identify and instruct in appropriate isolation precautions for identified infection/condition  Outcome: Progressing  Goal: Absence of fever/infection during neutropenic period  Description: INTERVENTIONS:  - Monitor WBC    Outcome: Progressing     Problem: SAFETY ADULT  Goal: Patient will remain free of falls  Description: INTERVENTIONS:  - Educate patient/family on patient safety including physical limitations  - Instruct patient to call for assistance with activity   - Consult OT/PT to assist with strengthening/mobility   - Keep Call bell within reach  - Keep bed low and locked with side rails adjusted as appropriate  - Keep care items and personal belongings within reach  - Initiate and maintain comfort rounds  - Make Fall Risk Sign visible to staff  - Offer Toileting every 4 Hours, in advance of need  - Initiate/Maintain bed alarm  - Obtain necessary fall risk management equipment:   - Apply yellow socks and bracelet for high fall risk patients  - Consider moving patient to room near nurses station  Outcome: Progressing  Goal: Maintain or return to baseline ADL function  Description: INTERVENTIONS:  -  Assess patient's ability to carry out ADLs; assess patient's baseline for ADL function and identify physical deficits which impact ability to perform ADLs (bathing, care of mouth/teeth, toileting, grooming, dressing, etc.)  - Assess/evaluate cause of self-care deficits   - Assess range of motion  - Assess patient's mobility; develop plan if impaired  - Assess patient's need for assistive devices and provide as appropriate  - Encourage maximum independence but intervene and supervise when necessary  - Involve family in performance of ADLs  - Assess for home care needs following discharge   - Consider OT consult to assist with ADL evaluation and planning for discharge  - Provide patient education as appropriate  Outcome: Progressing  Goal: Maintains/Returns to pre admission functional level  Description: INTERVENTIONS:  - Perform AM-PAC 6 Click Basic Mobility/ Daily Activity assessment daily.  - Set and communicate daily mobility goal to care team and patient/family/caregiver. - Collaborate with rehabilitation services on mobility goals if consulted  - Perform Range of Motion 3 times a day. - Reposition patient every 2 hours. - Dangle patient 3 times a day  - Stand patient 5 times a day  - Ambulate patient 5 times a day  - Out of bed to chair 3 times a day   - Out of bed for meals 3 times a day  - Out of bed for toileting  - Record patient progress and toleration of activity level   Outcome: Progressing     Problem: DISCHARGE PLANNING  Goal: Discharge to home or other facility with appropriate resources  Description: INTERVENTIONS:  - Identify barriers to discharge w/patient and caregiver  - Arrange for needed discharge resources and transportation as appropriate  - Identify discharge learning needs (meds, wound care, etc.)  - Arrange for interpretive services to assist at discharge as needed  - Refer to Case Management Department for coordinating discharge planning if the patient needs post-hospital services based on physician/advanced practitioner order or complex needs related to functional status, cognitive ability, or social support system  Outcome: Progressing     Problem: Knowledge Deficit  Goal: Patient/family/caregiver demonstrates understanding of disease process, treatment plan, medications, and discharge instructions  Description: Complete learning assessment and assess knowledge base.   Interventions:  - Provide teaching at level of understanding  - Provide teaching via preferred learning methods  Outcome: Progressing     Problem: CARDIOVASCULAR - ADULT  Goal: Maintains optimal cardiac output and hemodynamic stability  Description: INTERVENTIONS:  - Monitor I/O, vital signs and rhythm  - Monitor for S/S and trends of decreased cardiac output  - Administer and titrate ordered vasoactive medications to optimize hemodynamic stability  - Assess quality of pulses, skin color and temperature  - Assess for signs of decreased coronary artery perfusion  - Instruct patient to report change in severity of symptoms  Outcome: Progressing  Goal: Absence of cardiac dysrhythmias or at baseline rhythm  Description: INTERVENTIONS:  - Continuous cardiac monitoring, vital signs, obtain 12 lead EKG if ordered  - Administer antiarrhythmic and heart rate control medications as ordered  - Monitor electrolytes and administer replacement therapy as ordered  Outcome: Progressing     Problem: RESPIRATORY - ADULT  Goal: Achieves optimal ventilation and oxygenation  Description: INTERVENTIONS:  - Assess for changes in respiratory status  - Assess for changes in mentation and behavior  - Position to facilitate oxygenation and minimize respiratory effort  - Oxygen administered by appropriate delivery if ordered  - Initiate smoking cessation education as indicated  - Encourage broncho-pulmonary hygiene including cough, deep breathe, Incentive Spirometry  - Assess the need for suctioning and aspirate as needed  - Assess and instruct to report SOB or any respiratory difficulty  - Respiratory Therapy support as indicated  Outcome: Progressing     Problem: GENITOURINARY - ADULT  Goal: Maintains or returns to baseline urinary function  Description: INTERVENTIONS:  - Assess urinary function  - Encourage oral fluids to ensure adequate hydration if ordered  - Administer IV fluids as ordered to ensure adequate hydration  - Administer ordered medications as needed  - Offer frequent toileting  - Follow urinary retention protocol if ordered  Outcome: Progressing  Goal: Absence of urinary retention  Description: INTERVENTIONS:  - Assess patient’s ability to void and empty bladder  - Monitor I/O  - Bladder scan as needed  - Discuss with physician/AP medications to alleviate retention as needed  - Discuss catheterization for long term situations as appropriate  Outcome: Progressing  Goal: Urinary catheter remains patent  Description: INTERVENTIONS:  - Assess patency of urinary catheter  - If patient has a chronic hand, consider changing catheter if non-functioning  - Follow guidelines for intermittent irrigation of non-functioning urinary catheter  Outcome: Progressing     Problem: METABOLIC, FLUID AND ELECTROLYTES - ADULT  Goal: Electrolytes maintained within normal limits  Description: INTERVENTIONS:  - Monitor labs and assess patient for signs and symptoms of electrolyte imbalances  - Administer electrolyte replacement as ordered  - Monitor response to electrolyte replacements, including repeat lab results as appropriate  - Instruct patient on fluid and nutrition as appropriate  Outcome: Progressing  Goal: Fluid balance maintained  Description: INTERVENTIONS:  - Monitor labs   - Monitor I/O and WT  - Instruct patient on fluid and nutrition as appropriate  - Assess for signs & symptoms of volume excess or deficit  Outcome: Progressing  Goal: Glucose maintained within target range  Description: INTERVENTIONS:  - Monitor Blood Glucose as ordered  - Assess for signs and symptoms of hyperglycemia and hypoglycemia  - Administer ordered medications to maintain glucose within target range  - Assess nutritional intake and initiate nutrition service referral as needed  Outcome: Progressing     Problem: SKIN/TISSUE INTEGRITY - ADULT  Goal: Skin Integrity remains intact(Skin Breakdown Prevention)  Description: Assess:  -Perform Everardo assessment every shift   -Clean and moisturize skin every shift  -Inspect skin when repositioning, toileting, and assisting with ADLS  -Assess under medical devices such as oxygen tubing every 4 hours  -Assess extremities for adequate circulation and sensation     Bed Management:  -Have minimal linens on bed & keep smooth, unwrinkled  -Change linens as needed when moist or perspiring  -Avoid sitting or lying in one position for more than 2 hours while in bed  -Keep HOB at 30 degrees     Toileting:  -Offer bedside commode  -Assess for incontinence every 4 hours  -Use incontinent care products after each incontinent episode such as calazime cream    Activity:  -Mobilize patient 3 times a day  -Encourage activity and walks on unit  -Encourage or provide ROM exercises   -Turn and reposition patient every 2 Hours  -Use appropriate equipment to lift or move patient in bed  -Instruct/ Assist with weight shifting every 15 min when out of bed in chair  -Consider limitation of chair time 2 hour intervals    Skin Care:  -Avoid use of baby powder, tape, friction and shearing, hot water or constrictive clothing  -Relieve pressure over bony prominences using wedges  -Do not massage red bony areas    Next Steps:  -Teach patient strategies to minimize risks such as frequent ambulation and repositioning   -Consider consults to  interdisciplinary teams such as PT/OT and nutrition  Outcome: Progressing  Goal: Incision(s), wounds(s) or drain site(s) healing without S/S of infection  Description: INTERVENTIONS  - Assess and document dressing, incision, wound bed, drain sites and surrounding tissue  - Provide patient and family education  - Perform skin care/dressing changes as ordered  Outcome: Progressing  Goal: Pressure injury heals and does not worsen  Description: Interventions:  - Implement low air loss mattress or specialty surface (Criteria met)  - Apply silicone foam dressing  - Instruct/assist with weight shifting every 15 minutes when in chair   - Limit chair time to 4 hour intervals  - Use special pressure reducing interventions such as EHOB cushion when in chair   - Apply fecal or urinary incontinence containment device   - Perform passive or active ROM every 4 hours  - Turn and reposition patient & offload bony prominences every 2 hours   - Utilize friction reducing device or surface for transfers   - Consider consults to interdisciplinary teams such as PT/OT  - Use incontinent care products after each incontinent episode such as calazime cream  - Consider nutrition services referral as needed  Outcome: Progressing     Problem: Prexisting or High Potential for Compromised Skin Integrity  Goal: Skin integrity is maintained or improved  Description: INTERVENTIONS:  - Identify patients at risk for skin breakdown  - Assess and monitor skin integrity  - Assess and monitor nutrition and hydration status  - Monitor labs   - Assess for incontinence   - Turn and reposition patient  - Assist with mobility/ambulation  - Relieve pressure over bony prominences  - Avoid friction and shearing  - Provide appropriate hygiene as needed including keeping skin clean and dry  - Evaluate need for skin moisturizer/barrier cream  - Collaborate with interdisciplinary team   - Patient/family teaching  - Consider wound care consult   Outcome: Progressing     Problem: Nutrition/Hydration-ADULT  Goal: Nutrient/Hydration intake appropriate for improving, restoring or maintaining nutritional needs  Description: Monitor and assess patient's nutrition/hydration status for malnutrition. Collaborate with interdisciplinary team and initiate plan and interventions as ordered. Monitor patient's weight and dietary intake as ordered or per policy. Utilize nutrition screening tool and intervene as necessary. Determine patient's food preferences and provide high-protein, high-caloric foods as appropriate.      INTERVENTIONS:  - Monitor oral intake, urinary output, labs, and treatment plans  - Assess nutrition and hydration status and recommend course of action  - Evaluate amount of meals eaten  - Assist patient with eating if necessary   - Allow adequate time for meals  - Recommend/ encourage appropriate diets, oral nutritional supplements, and vitamin/mineral supplements  - Order, calculate, and assess calorie counts as needed  - Recommend, monitor, and adjust tube feedings and TPN/PPN based on assessed needs  - Assess need for intravenous fluids  - Provide specific nutrition/hydration education as appropriate  - Include patient/family/caregiver in decisions related to nutrition  Outcome: Progressing

## 2023-12-01 NOTE — QUICK NOTE
Notified of stroke alert at 5:46 AM.     80 yo F PMHx afib on eliquis on whom a stroke alert was called this AM for AMS. LKW 4:30 AM when a first rapid response was called. NIHSS 9 approximated by report over the phone for new R facial droop, aphasia, LOC as she was confused, minimally verbal and not following commands. CTH showed no acute intracranial abnormalities. CTA showed no evidence of LVO. She was not a candidate for TNK given eliquis use. Recommend MRI brain w/o to r/o stroke, lipid panel, A1c and evaluation for TME. TTE previously performed on 11/27/23 significant for severe LA dilation.

## 2023-12-01 NOTE — ASSESSMENT & PLAN NOTE
Rapid response was called on 12/1, due to concern for acute CVA  Patient was found altered with strokelike symptoms  CT head/neck and MRI brain were negative for any acute infarct  Neurology evaluated the patient, no concern for stroke pathway to be continued  Continue with Eliquis 2.5 mg twice daily  PT/OT next

## 2023-12-01 NOTE — CASE MANAGEMENT
Case Management Discharge Planning Note    Patient name Talib Grey  Location /-76 MRN [id number]  : 1940 Date 2023       Current Admission Date: 2023  Current Admission Diagnosis:Acute on chronic respiratory failure with hypoxia and hypercapnia [facility])   Patient Active Problem List    Diagnosis Date Noted    Stroke-like symptoms 2023    Underweight 2023    Acute blood loss anemia 2023    Acute on chronic diastolic CHF (congestive heart failure) ([address]) 2023    Compression fracture of lumbar vertebra ([address]) 2023    Status post hip hemiarthroplasty 2023    Acute postoperative pulmonary insufficiency ([address]) 2023    Bilateral pleural effusion 2023    Acute on chronic respiratory failure with hypoxia and hypercapnia ([address]) 2023    GIB (gastrointestinal bleeding) 2023    Cerebral aneurysm, nonruptured 2023    Abdominal distention/back pain 2023    MDD with persistant bereavement complex 2023    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    Thyroid nodule 2023    Severe protein-calorie malnutrition ([address]) 2023    Unspecified mood (affective) disorder ([address]) 2023    Failure to thrive in adult 2023    Pulmonary nodule 02/15/2023    Onychomycosis 02/15/2023    Anxiety 02/10/2023    Other specified hypothyroidism 2022    Peripheral edema 2021    Low back pain without sciatica 2021    Bilateral pneumonia 2017    Chronic obstructive pulmonary disease with acute exacerbation ([address]) 2017    Hypertension 2017    Hypercoagulable state ([address]) 2017    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    Vitamin D deficiency 2016    Osteoporosis 2015    Antiphospholipid antibody syndrome ([address]) 2012    Atrial fibrillation ([address]) 2012    Hereditary hemolytic anemia ([address]) 2012    Asthma 2012      LOS (days): 7  Geometric Mean LOS (GMLOS) (days): 3.90  Days to GMLOS:-2.9     OBJECTIVE:  Risk of Unplanned Readmission Score: 35.86         Current admission status: Inpatient   Preferred Pharmacy:   [address] [place] [address] #[id number] [facility]aman, [address] [place] [address]  [address] [phone]  Phone: [phone] Fax: [phone] [facility], [address] [place] [address] [address]  [id number] [place] [address]  Phone: [phone] Fax: [phone]    Primary Care Provider: Lynette Tanner MD    Primary Insurance: MEDICARE  Secondary Insurance: BLUE CROSS    DISCHARGE DETAILS:                                        IMM Given (Date):: 12/01/23  IMM Given to[de-identified] Patient

## 2023-12-01 NOTE — ASSESSMENT & PLAN NOTE
-Concern for exacerbation, pulmonary following  -Close respiratory monitoring ongoing, on 4L NC  -Continuing steroids, neb treatments

## 2023-12-01 NOTE — SPEECH THERAPY NOTE
Speech Language/Pathology    Speech/Language Pathology Progress Note    Patient Name: Serafin Cabrera  KZBKC'F Date: 12/1/2023     Problem List  Principal Problem:    Acute on chronic respiratory failure with hypoxia and hypercapnia (HCC)  Active Problems:    Bilateral pneumonia    Chronic obstructive pulmonary disease with acute exacerbation (HCC)    Atrial fibrillation (HCC)    Other specified hypothyroidism    GIB (gastrointestinal bleeding)    Acute blood loss anemia    Acute on chronic diastolic CHF (congestive heart failure) (HCC)    Stroke-like symptoms       Past Medical History  Past Medical History:   Diagnosis Date    Anti-phospholipid syndrome (HCC)     Asthma     Blood type O+     Cardiac disease     Chronic pain     COPD (chronic obstructive pulmonary disease) (HCC)     Femur fracture, left ([address]) 6/22/2023    Fracture of ankle     left    Hyperlipidemia     Hypertension     Hypokalemia 2/27/2023    Osteoporosis         Past Surgical History  Past Surgical History:   Procedure Laterality Date    APPENDECTOMY      BILATERAL OOPHORECTOMY Bilateral     ORIF TIBIAL SHAFT FRACTURE W/ PLATES AND SCREWS Right     KS HEMIARTHROPLASTY HIP PARTIAL Left 6/23/2023    Procedure: HEMIARTHROPLASTY HIP (BIPOLAR); Surgeon: Rhona Candelaria MD;  Location:  MAIN OR;  Service: Orthopedics         Subjective:  Pt transferred to ICU after rapid response for change in mental status. Per RN, this am pt was minimally alert though is now improving. "Oh I'm so so hungry"    Current Diet:  Dysphagia 3 w/ nectar thick     Objective:  Pt seen for dx dysphagia tx f/u to assess oropharyngeal swallow skill and PO safety after change in mental/medical status. Pt seen w/ dental soft material (chopped chicken in gravy and mashed potatoes) and nectar thick liquids. Pt fully awake and alert, oriented x4, and recalls MBS from yesterday. Bite strength is adequate for dental soft material. Timely, effective mastication and bolus formation. Prompt transfer of material fully clearing oral cavity. Swallows suspected delayed w/ reduced rise to palpation. Liquids taken via cup and straw. No anterior loss. No overt s/s aspiration across trials. S/w pt regarding current diet texture. Pt states she is agreeable to continuation of modifications at this time and denies desire for advancement at this time/     Assessment:  Despite change in mental status, oropharyngeal swallow skill remains appropriate for continuation of current modified diet. No overt s/s oropharyngeal difficulties w/ material assessed today.      Plan/Recommendations:  Continue current diet  Frequent/thorough oral care  ST to f/u as able/appropriate

## 2023-12-01 NOTE — ASSESSMENT & PLAN NOTE
Wt Readings from Last 3 Encounters:   12/01/23 55.1 kg (121 lb 7.6 oz)   10/20/23 52.2 kg (115 lb 2 oz)   10/05/23 53.1 kg (117 lb)   Appears volume overloaded, increasing edema per son x last several days. O2 requirement 4-5 L on admission, baseline is 2L   Outpatient lasix was changed from 20 mg daily to 40 mg daily about 4 weeks ago  CXR-Bilateral pleural effusions with bibasilar atelectasis is not significantly changed.    Echo 2/2023: EF 65%, G1DD  Cardiology following, appreciate recommendations  IV Lasix discontinued given contraction alkalosis, currently on a diuretic holiday

## 2023-12-01 NOTE — ASSESSMENT & PLAN NOTE
48-year-old female with history of atrial fibrillation on long-term warfarin use, COPD, CHF, chronic respiratory failure. Has been admitted since 69/96 with complicated admission: Acute blood loss anemia/GI bleed (supratherapeutic INR on admission of 5.4), syncope likely secondary to acute blood loss anemia acute on chronic CHF and respiratory failure with hypoxia,    Stroke alert was called early this morning 12/1 for new right facial droop and lethargy; no acute findings on neuroimaging, was not a candidate for acute intervention. Will undergo cerebrovascular workup. 12/1: MRI brain performed this AM; negative for CVA. R facial anatomy appears asymmetric at rest (decreased NL fold), but musculature appears symmetric when asked to smile/grin (may be physiologic finding)    In terms of her encephalopathy: No definite evidence to support seizure; may be related to her ongoing cardiac and pulmonary derangements, coupled with poor night sleep/prolonged hospitalization.      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    Plan:  -Can discontinue formal stroke pathway orders below (neuro checks, stroke education, etc.)  -MRI brain pending  -2D echo earlier this admission with EF 60%, normal wall motion/systolic function, severe left atrial dilation, aortic valve stenosis noted  -Continue low-dose Eliquis 2.5 mg twice daily; no need for antiplatelet initiation from neuro standpoint  -Telemetry monitoring  -Therapy evaluations  -Should have outpatient neurosurgery follow-up for surveillance of above-mentioned aneurysms on CTA

## 2023-12-01 NOTE — ASSESSMENT & PLAN NOTE
-Workup ongoing via GI, primary team  -Colonoscopy with diverticulosis and hemorrhoids  -Resumed on anticoagulation  -Close blood count monitoring ongoing

## 2023-12-01 NOTE — QUICK NOTE
RR called for AMS. CVA alert called. Upgraded to 9 [facility]. Family Rena Hwang) updated via phone. He will come in to see her this morning.     Lm Beltrán PA-C

## 2023-12-01 NOTE — PROGRESS NOTES
Rapid response called at 0530 due to change of mental status. This nurse went into pt room to give morning medications. Pt was minimally responsive to sternal rub. Pt BP elevated and pt diaphoretic. . Pt with new right sided facial droop. Pt not following any commands. Pt responding to pain. Pt was AAOX3 and able to follow all commands at approx 0430, when pt needed assistance to use the bedside commode. During Rapid response a stroke alert was called. Pt had STAT CT of head completed and pt transferred to ICU.

## 2023-12-01 NOTE — ASSESSMENT & PLAN NOTE
Patient with underlying COPD, and a chronic hypoxic respiratory failure with hypercapnia  CT imaging-Right middle lobe and bilateral lower lobe consolidation suggestive of compressive atelectasis however pneumonia cannot be entirely excluded.  Small bilateral pleural effusions   Afebrile, no leukocytosis  Recent Labs     11/30/23  0450 12/01/23  0620   PROCALCITONI 0.31* 0.23     Continue with IV ceftriaxone day #2   Continue with azithromycin for 3 days  Continue to trend procalcitonin and de-escalate antibiotics as needed   urine strep and Legionella antigen-negative  Sputum culture and Gram stain  Incentive spirometer  Respiratory protocol  Possible aspiration risk, as per speech evaluation  Continue with dysphagia diet, continue speech eval as needed  Inpatient consult to pulmonology with significant hypercapnia and COPD exacerbation, appreciate recommendations

## 2023-12-01 NOTE — PLAN OF CARE
Problem: Potential for Falls  Goal: Patient will remain free of falls  Description: INTERVENTIONS:  - Educate patient/family on patient safety including physical limitations  - Instruct patient to call for assistance with activity   - Consult OT/PT to assist with strengthening/mobility   - Keep Call bell within reach  - Keep bed low and locked with side rails adjusted as appropriate  - Keep care items and personal belongings within reach  - Initiate and maintain comfort rounds  - Make Fall Risk Sign visible to staff  - Offer Toileting every 4 Hours, in advance of need  - Initiate/Maintain bed alarm  - Obtain necessary fall risk management equipment:   - Apply yellow socks and bracelet for high fall risk patients  - Consider moving patient to room near nurses station  Outcome: Progressing     Problem: PAIN - ADULT  Goal: Verbalizes/displays adequate comfort level or baseline comfort level  Description: Interventions:  - Encourage patient to monitor pain and request assistance  - Assess pain using appropriate pain scale  - Administer analgesics based on type and severity of pain and evaluate response  - Implement non-pharmacological measures as appropriate and evaluate response  - Consider cultural and social influences on pain and pain management  - Notify physician/advanced practitioner if interventions unsuccessful or patient reports new pain  Outcome: Progressing     Problem: INFECTION - ADULT  Goal: Absence or prevention of progression during hospitalization  Description: INTERVENTIONS:  - Assess and monitor for signs and symptoms of infection  - Monitor lab/diagnostic results  - Monitor all insertion sites, i.e. indwelling lines, tubes, and drains  - Monitor endotracheal if appropriate and nasal secretions for changes in amount and color  - [place] appropriate cooling/warming therapies per order  - Administer medications as ordered  - Instruct and encourage patient and family to use good hand hygiene technique  - Identify and instruct in appropriate isolation precautions for identified infection/condition  Outcome: Progressing  Goal: Absence of fever/infection during neutropenic period  Description: INTERVENTIONS:  - Monitor WBC    Outcome: Progressing     Problem: SAFETY ADULT  Goal: Patient will remain free of falls  Description: INTERVENTIONS:  - Educate patient/family on patient safety including physical limitations  - Instruct patient to call for assistance with activity   - Consult OT/PT to assist with strengthening/mobility   - Keep Call bell within reach  - Keep bed low and locked with side rails adjusted as appropriate  - Keep care items and personal belongings within reach  - Initiate and maintain comfort rounds  - Make Fall Risk Sign visible to staff  - Offer Toileting every 4 Hours, in advance of need  - Initiate/Maintain bed alarm  - Obtain necessary fall risk management equipment:   - Apply yellow socks and bracelet for high fall risk patients  - Consider moving patient to room near nurses station  Outcome: Progressing  Goal: Maintain or return to baseline ADL function  Description: INTERVENTIONS:  -  Assess patient's ability to carry out ADLs; assess patient's baseline for ADL function and identify physical deficits which impact ability to perform ADLs (bathing, care of mouth/teeth, toileting, grooming, dressing, etc.)  - Assess/evaluate cause of self-care deficits   - Assess range of motion  - Assess patient's mobility; develop plan if impaired  - Assess patient's need for assistive devices and provide as appropriate  - Encourage maximum independence but intervene and supervise when necessary  - Involve family in performance of ADLs  - Assess for home care needs following discharge   - Consider OT consult to assist with ADL evaluation and planning for discharge  - Provide patient education as appropriate  Outcome: Progressing  Goal: Maintains/Returns to pre admission functional level  Description: INTERVENTIONS:  - Perform AM-PAC 6 Click Basic Mobility/ Daily Activity assessment daily.  - Set and communicate daily mobility goal to care team and patient/family/caregiver. - Collaborate with rehabilitation services on mobility goals if consulted  - Perform Range of Motion 3 times a day. - Reposition patient every 2 hours. - Dangle patient 5 times a day  - Stand patient 5 times a day  - Ambulate patient 5 times a day  - Out of bed to chair 3 times a day   - Out of bed for meals 3 times a day  - Out of bed for toileting  - Record patient progress and toleration of activity level   Outcome: Progressing     Problem: DISCHARGE PLANNING  Goal: Discharge to home or other facility with appropriate resources  Description: INTERVENTIONS:  - Identify barriers to discharge w/patient and caregiver  - Arrange for needed discharge resources and transportation as appropriate  - Identify discharge learning needs (meds, wound care, etc.)  - Arrange for interpretive services to assist at discharge as needed  - Refer to Case Management Department for coordinating discharge planning if the patient needs post-hospital services based on physician/advanced practitioner order or complex needs related to functional status, cognitive ability, or social support system  Outcome: Progressing     Problem: Knowledge Deficit  Goal: Patient/family/caregiver demonstrates understanding of disease process, treatment plan, medications, and discharge instructions  Description: Complete learning assessment and assess knowledge base.   Interventions:  - Provide teaching at level of understanding  - Provide teaching via preferred learning methods  Outcome: Progressing     Problem: CARDIOVASCULAR - ADULT  Goal: Maintains optimal cardiac output and hemodynamic stability  Description: INTERVENTIONS:  - Monitor I/O, vital signs and rhythm  - Monitor for S/S and trends of decreased cardiac output  - Administer and titrate ordered vasoactive medications to optimize hemodynamic stability  - Assess quality of pulses, skin color and temperature  - Assess for signs of decreased coronary artery perfusion  - Instruct patient to report change in severity of symptoms  Outcome: Progressing  Goal: Absence of cardiac dysrhythmias or at baseline rhythm  Description: INTERVENTIONS:  - Continuous cardiac monitoring, vital signs, obtain 12 lead EKG if ordered  - Administer antiarrhythmic and heart rate control medications as ordered  - Monitor electrolytes and administer replacement therapy as ordered  Outcome: Progressing     Problem: RESPIRATORY - ADULT  Goal: Achieves optimal ventilation and oxygenation  Description: INTERVENTIONS:  - Assess for changes in respiratory status  - Assess for changes in mentation and behavior  - Position to facilitate oxygenation and minimize respiratory effort  - Oxygen administered by appropriate delivery if ordered  - Initiate smoking cessation education as indicated  - Encourage broncho-pulmonary hygiene including cough, deep breathe, Incentive Spirometry  - Assess the need for suctioning and aspirate as needed  - Assess and instruct to report SOB or any respiratory difficulty  - Respiratory Therapy support as indicated  Outcome: Progressing     Problem: GENITOURINARY - ADULT  Goal: Maintains or returns to baseline urinary function  Description: INTERVENTIONS:  - Assess urinary function  - Encourage oral fluids to ensure adequate hydration if ordered  - Administer IV fluids as ordered to ensure adequate hydration  - Administer ordered medications as needed  - Offer frequent toileting  - Follow urinary retention protocol if ordered  Outcome: Progressing  Goal: Absence of urinary retention  Description: INTERVENTIONS:  - Assess patient’s ability to void and empty bladder  - Monitor I/O  - Bladder scan as needed  - Discuss with physician/AP medications to alleviate retention as needed  - Discuss catheterization for long term situations as appropriate  Outcome: Progressing  Goal: Urinary catheter remains patent  Description: INTERVENTIONS:  - Assess patency of urinary catheter  - If patient has a chronic hand, consider changing catheter if non-functioning  - Follow guidelines for intermittent irrigation of non-functioning urinary catheter  Outcome: Progressing     Problem: METABOLIC, FLUID AND ELECTROLYTES - ADULT  Goal: Electrolytes maintained within normal limits  Description: INTERVENTIONS:  - Monitor labs and assess patient for signs and symptoms of electrolyte imbalances  - Administer electrolyte replacement as ordered  - Monitor response to electrolyte replacements, including repeat lab results as appropriate  - Instruct patient on fluid and nutrition as appropriate  Outcome: Progressing  Goal: Fluid balance maintained  Description: INTERVENTIONS:  - Monitor labs   - Monitor I/O and WT  - Instruct patient on fluid and nutrition as appropriate  - Assess for signs & symptoms of volume excess or deficit  Outcome: Progressing  Goal: Glucose maintained within target range  Description: INTERVENTIONS:  - Monitor Blood Glucose as ordered  - Assess for signs and symptoms of hyperglycemia and hypoglycemia  - Administer ordered medications to maintain glucose within target range  - Assess nutritional intake and initiate nutrition service referral as needed  Outcome: Progressing     Problem: SKIN/TISSUE INTEGRITY - ADULT  Goal: Skin Integrity remains intact(Skin Breakdown Prevention)  Description: Assess:  -Perform Everardo assessment every shift  -Clean and moisturize skin every shift   -Inspect skin when repositioning, toileting, and assisting with ADLS  -Assess under medical devices such as oxygen tubing every 4 hours  -Assess extremities for adequate circulation and sensation     Bed Management:  -Have minimal linens on bed & keep smooth, unwrinkled  -Change linens as needed when moist or perspiring  -Avoid sitting or lying in one position for more than 2 hours while in bed  -Keep HOB at [address] [place]:  -Offer bedside commode  -Assess for incontinence every calazime cream  -Use incontinent care products after each incontinent episode such as calazime cream    Activity:  -Mobilize patient 3 times a day  -Encourage activity and walks on unit  -Encourage or provide ROM exercises   -Turn and reposition patient every 2 Hours  -Use appropriate equipment to lift or move patient in bed  -Instruct/ Assist with weight shifting every 15 min when out of bed in chair  -Consider limitation of chair time 4 hour intervals    Skin Care:  -Avoid use of baby powder, tape, friction and shearing, hot water or constrictive clothing  -Relieve pressure over bony prominences using wedges  -Do not massage red bony areas    Next Steps:  -Teach patient strategies to minimize risks such as EHOB cushion   -Consider consults to  interdisciplinary teams such as PT/OT and nutrition  Outcome: Progressing  Goal: Incision(s), wounds(s) or drain site(s) healing without S/S of infection  Description: INTERVENTIONS  - Assess and document dressing, incision, wound bed, drain sites and surrounding tissue  - Provide patient and family education  - Perform skin care/dressing changes every shift  Outcome: Progressing  Goal: Pressure injury heals and does not worsen  Description: Interventions:  - Implement low air loss mattress or specialty surface (Criteria met)  - Apply silicone foam dressing  - Instruct/assist with weight shifting every 15 minutes when in chair   - Limit chair time to 4 hour intervals  - Use special pressure reducing interventions such as EHOB cushion when in chair   - Apply fecal or urinary incontinence containment device   - Perform passive or active ROM every 4 hours  - Turn and reposition patient & offload bony prominences every 2 hours   - Utilize friction reducing device or surface for transfers   - Consider consults to  interdisciplinary teams such as PT/OT  - Use incontinent care products after each incontinent episode such as calazime cream  - Consider nutrition services referral as needed  Outcome: Progressing     Problem: Prexisting or High Potential for Compromised Skin Integrity  Goal: Skin integrity is maintained or improved  Description: INTERVENTIONS:  - Identify patients at risk for skin breakdown  - Assess and monitor skin integrity  - Assess and monitor nutrition and hydration status  - Monitor labs   - Assess for incontinence   - Turn and reposition patient  - Assist with mobility/ambulation  - Relieve pressure over bony prominences  - Avoid friction and shearing  - Provide appropriate hygiene as needed including keeping skin clean and dry  - Evaluate need for skin moisturizer/barrier cream  - Collaborate with interdisciplinary team   - Patient/family teaching  - Consider wound care consult   Outcome: Progressing     Problem: Nutrition/Hydration-ADULT  Goal: Nutrient/Hydration intake appropriate for improving, restoring or maintaining nutritional needs  Description: Monitor and assess patient's nutrition/hydration status for malnutrition. Collaborate with interdisciplinary team and initiate plan and interventions as ordered. Monitor patient's weight and dietary intake as ordered or per policy. Utilize nutrition screening tool and intervene as necessary. Determine patient's food preferences and provide high-protein, high-caloric foods as appropriate.      INTERVENTIONS:  - Monitor oral intake, urinary output, labs, and treatment plans  - Assess nutrition and hydration status and recommend course of action  - Evaluate amount of meals eaten  - Assist patient with eating if necessary   - Allow adequate time for meals  - Recommend/ encourage appropriate diets, oral nutritional supplements, and vitamin/mineral supplements  - Order, calculate, and assess calorie counts as needed  - Recommend, monitor, and adjust tube feedings and TPN/PPN based on assessed needs  - Assess need for intravenous fluids  - Provide specific nutrition/hydration education as appropriate  - Include patient/family/caregiver in decisions related to nutrition  Outcome: Progressing

## 2023-12-01 NOTE — ASSESSMENT & PLAN NOTE
BRBPR began 11/22, PCP recommended stopping warfarin temporarily, however persistent symptoms with large BRBPR episode witnessed by son this morning followed by syncopal episode  Hgb on admission 6   INR supratherapeutic on arrival  Recent Labs     11/29/23  0508 11/30/23  0450 12/01/23  0321   HGB 8.4* 8.4* 8.0*        No results for input(s): "INR" in the last 72 hours.    GI evaluated the patient, status post colonoscopy, no concern for active bleed, diverticulosis and internal hemorrhoids visualized  Okay to continue with anticoagulation as per GI

## 2023-12-01 NOTE — CASE MANAGEMENT
Case Management Discharge Planning Note    Patient name Asuncion Nicholas  Location /-48 MRN [id number]  : 1940 Date 2023       Current Admission Date: 2023  Current Admission Diagnosis:Acute on chronic respiratory failure with hypoxia and hypercapnia [facility])   Patient Active Problem List    Diagnosis Date Noted    Stroke-like symptoms 2023    Underweight 2023    Acute blood loss anemia 2023    Acute on chronic diastolic CHF (congestive heart failure) ([address]) 2023    Compression fracture of lumbar vertebra ([address]) 2023    Status post hip hemiarthroplasty 2023    Acute postoperative pulmonary insufficiency ([address]) 2023    Bilateral pleural effusion 2023    Acute on chronic respiratory failure with hypoxia and hypercapnia ([address]) 2023    GIB (gastrointestinal bleeding) 2023    Cerebral aneurysm, nonruptured 2023    Abdominal distention/back pain 2023    MDD with persistant bereavement complex 2023    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    Thyroid nodule 2023    Severe protein-calorie malnutrition ([address]) 2023    Unspecified mood (affective) disorder ([address]) 2023    Failure to thrive in adult 2023    Pulmonary nodule 02/15/2023    Onychomycosis 02/15/2023    Anxiety 02/10/2023    Other specified hypothyroidism 2022    Peripheral edema 2021    Low back pain without sciatica 2021    Bilateral pneumonia 2017    Chronic obstructive pulmonary disease with acute exacerbation ([address]) 2017    Hypertension 2017    Hypercoagulable state ([address]) 2017    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    Vitamin D deficiency 2016    Osteoporosis 2015    Antiphospholipid antibody syndrome ([address]) 2012    Atrial fibrillation ([address]) 2012    Hereditary hemolytic anemia ([address]) 2012    Asthma 2012      LOS (days): 7  Geometric Mean LOS (GMLOS) (days): 3.90  Days to GMLOS:-3     OBJECTIVE:  Risk of Unplanned Readmission Score: 35.86         Current admission status: Inpatient   Preferred Pharmacy:   [address] [place] [address] [id number] [facility], [address] [place] [address]  [address] [id number]  Phone: [phone] Fax: [phone] [facility], [address] [place] [address] [address]  [id number] [place] [address]  Phone: [phone] Fax: [phone]    Primary Care Provider: Sharyn Camargo MD    Primary Insurance: MEDICARE  Secondary Insurance: BLUE CROSS    DISCHARGE DETAILS:     [facility] can transport patient to [facility] at 10:30 on Saturday.   Notified patient, her son Jackeline Valverde at [phone], Jayne Hardy of [facility] and Dr Jose Angel Knutson of transport time

## 2023-12-01 NOTE — RAPID RESPONSE
Rapid Response Note  Carlos Alberto Dodd 79 y.o. female MRN: [id number]  Unit/Bed#: -01 Encounter: [id number]    Rapid Response Notification(s):   Response called date/time:  12/1/2023 5:36 AM  Response team arrival date/time:  12/1/2023 5:38 AM  Response end date/time:  12/1/2023 6:00 AM  Level of care:  Medsur  [facility]  Primary reason for rapid response call:  Acute change in neuro status    Rapid Response Intervention(s):   Airway:  None  Breathing:  None  Circulation:  Electrocardiogram  Fluids administered:  None  Medications administered:  None       Assessment:   Stroke like symptoms  New right facial droop  AMS  Lethargy but arousable  Withdrawal from pain in all four extremities  NIHSS 15 - difficult to score because patient would not follow commands/cooperate in exam  LKW 0430A      Plan:   Stroke Alert called at [address] [place] [address] and CTA H/N pending  Will not be TNK candidate because on Eliquis and recent GI bleed  Transfer to [facility] for airway watch     Rapid Response Outcome:   Code Status: Level 2 (DNAR but accepts intubation)      Family notified of transfer: yes  Family member contacted: Lavella Sabina REJI to call next of kin     Background/Situation:   Carlos Alberto Dodd is a 79 y.o. female who was admitted 11/24 for acute blood loss anemia 2/2 to GI bleed likely due to warfarin with supratherapeutic INR. GI bleed resolved and Eliquis started 11/30 per Cardiology due to [facility] of 5. Rapid response called to [facility]. LKW 0430 when patient was AAOx3 and following commands. Upon my arrival patient is lethargic but arousable, new right facial droop, does not follow commands but withdrawals from pain in all 4 extermities. Stroke alert called. Imaging pending. Will not be TNK candidate 2/2 Eliquis and recent GI bleed.     Review of Systems   Unable to perform ROS: Mental status change       Objective:   Vitals:    12/01/23 0018 12/01/23 0217 12/01/23 0526 12/01/23 0543   BP: (!) 177/79   Pulse:    83   Resp:       Temp:       TempSrc:       SpO2: 98% 97%  96%   Weight:   54 kg (119 lb 0.8 oz)    Height:         Physical Exam  Vitals reviewed. HENT:      Head: Normocephalic. Eyes:      Extraocular Movements: Extraocular movements intact. Pupils: Pupils are equal, round, and reactive to light. Cardiovascular:      Rate and Rhythm: Normal rate. Pulmonary:      Effort: Pulmonary effort is normal.      Breath sounds: Normal breath sounds. Neurological:      Mental Status: She is easily aroused. She is lethargic. Comments: New right facial droop. Response to name. Does not follow any commands. When asked commands patient states "I can't."    Psychiatric:         Behavior: Behavior is uncooperative. Portions of the record may have been created with voice recognition software. Occasional wrong word or "sound a like" substitutions may have occurred due to the inherent limitations of voice recognition software. Read the chart carefully and recognize, using context, where substitutions have occurred.     Eladio Hooks, [place]

## 2023-12-01 NOTE — PROGRESS NOTES
Progress Note - Pulmonary   Magali Paredes 79 y.o. female MRN: [id number]  Unit/Bed#: -01 Encounter: [id number]      Assessment/Plan:    COPD by history with acute exacerbation-no PFTs on file. Wheezing has improved. Currently on Solu-Medrol 40 mg every 12 hours. Plan to transition to prednisone 60 mg daily starting tomorrow. Continue nebulizer regimen with budesonide, Perforomist, Xopenex and Atrovent. Outpatient regimen includes Spiriva, Pulmicort and DuoNeb    Acute on chronic hypoxic respiratory failure-baseline oxygen needs 2 L/min. Titrate to maintain saturations above 88%. Abnormal chest CT with bilateral lower lobe aspiration pneumonia and small bilateral pleural effusions-continue antibiotics with ceftriaxone. Can discontinue azithromycin due to negative Legionella. Aspiration precautions. Acute on chronic heart failure with preserved ejection fraction-diuresis per primary team and cardiology      Subjective:   Patient complains of being tired. Rapid response called earlier for change in mental status. Objective:     Vitals: Blood pressure (!) 178/73, pulse 89, temperature 97.9 °F (36.6 °C), temperature source Oral, resp. rate 22, height 5' 6" (1.676 m), weight 55.1 kg (121 lb 7.6 oz), SpO2 93 %, not currently breastfeeding. ,  4 L, Body mass index is 19.61 kg/m². Intake/Output Summary (Last 24 hours) at 12/1/2023 1615  Last data filed at 12/1/2023 0800  Gross per 24 hour   Intake 90 ml   Output --   Net 90 ml       Physical Exam:      General:  Awake, alert, no distress   HEENT: No scleral icterus, EOMI, moist mucosa    Heart:  Regular rate and rhythm, no murmur   Lungs: Poor inspiratory effort, no significant wheezing   Abdomen: Soft, nontender, normal bowel sounds   Extremities: No clubbing, cyanosis or edema    Labs: I have personally reviewed pertinent lab results.     Results from last 7 days   Lab Units 12/01/23  0321 11/30/23  0450 11/29/23  0508   WBC Thousand/uL 6.03 5. 23 7.20   HEMOGLOBIN g/dL 8.0* 8.4* 8.4*   HEMATOCRIT % 26.6* 27.7* 28.2*   PLATELETS Thousands/uL 239 220 246         Results from last 7 days   Lab Units 12/01/23  0620 12/01/23  0321 11/30/23  0450 11/26/23  0456 11/25/23  0024   POTASSIUM mmol/L 3.5 3.5 3.5   < > 3.9   CHLORIDE mmol/L 91* 92* 89*   < > 97   CO2 mmol/L 45* >45* >45*   < > 41*   BUN mg/dL 17 18 14   < > 29*   CREATININE mg/dL 0.60 0.63 0.64   < > 0.75   CALCIUM mg/dL 8.5 8.9 8.7   < > 8.1*   ALK PHOS U/L  --  168*  --   --  165*   ALT U/L  --  11  --   --  10   AST U/L  --  17  --   --  18    < > = values in this interval not displayed.      Results from last 7 days   Lab Units 11/26/23  0456 11/25/23  1148   INR  1.04 1.02     Results from last 7 days   Lab Units 11/29/23  0508 11/26/23  0456   MAGNESIUM mg/dL 1.9 2.1

## 2023-12-01 NOTE — QUICK NOTE
Called patient's son, Cristopher Dewey and updated about current clinical status. Discussed this morning with rapid response and MRI brain findings. He confirmed a level 2 CODE STATUS, DO NOT RESUSCITATE but accepts intubation for patient.

## 2023-12-01 NOTE — CONSULTS
Consultation - Neurology   Lobo Fuentes 79 y.o. female MRN: [id number]  Unit/Bed#: -01 Encounter: [id number]      Assessment/Plan   Stroke-like symptoms  Assessment & Plan  57-year-old female with history of atrial fibrillation on long-term warfarin use, COPD, CHF, chronic respiratory failure. Has been admitted since 01/12 with complicated admission: Acute blood loss anemia/GI bleed (supratherapeutic INR on admission of 5.4), syncope likely secondary to acute blood loss anemia acute on chronic CHF and respiratory failure with hypoxia. Stroke alert was called early this morning 12/1 for new right facial droop and lethargy; no acute findings on neuroimaging, was not a candidate for acute intervention. Will undergo cerebrovascular workup. 12/1: MRI brain performed this AM; negative for CVA. On exam this morning: R facial anatomy appears asymmetric at rest (decreased NL fold), but musculature appears symmetric when asked to smile/grin (thus may be physiologic finding)    In terms of her encephalopathy: No definite evidence to support seizure; may be related to her ongoing cardiac and pulmonary derangements (hypoxia, acute CHF, COPD exacerbation, aspiration PNA concern) coupled with poor night sleep/prolonged hospitalization.      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  -MRI brain: No acute infarct/intracranial pathology, moderate microvascular disease, tiny 2 mm basilar aneurysm again re-demonstrated    Plan:  -Can discontinue formal stroke pathway orders below (neuro checks, stroke education, etc.)  -MRI brain results above  -2D echo earlier this admission with EF 60%, normal wall motion/systolic function, severe left atrial dilation, aortic valve stenosis noted  -Continue low-dose Eliquis 2.5 mg twice daily; no need for antiplatelet initiation from neuro standpoint  -Telemetry monitoring  -Therapy evaluations  -Should have outpatient neurosurgery follow-up for surveillance of above-mentioned aneurysms on CTA    Acute blood loss anemia  Assessment & Plan  -Workup ongoing via GI, primary team  -Colonoscopy with diverticulosis and hemorrhoids  -Resumed on anticoagulation, see above  -Close blood count monitoring ongoing    Atrial fibrillation ([address])  Assessment & Plan  -On low-dose Eliquis, diltiazem  -Telemetry monitoring    Chronic obstructive pulmonary disease with acute exacerbation ([address])  Assessment & Plan  -Concern for exacerbation, pulmonary following  -Close respiratory monitoring ongoing, on 4L NC  -Continuing steroids, neb treatments    No further inpatient neurologic workup anticipated, but will remain available for questions/concerns/neuro exam changes. Discussed plan of care with attending neurologist.     Josep Rm will not need outpatient follow up with Neurology. She will not require outpatient neurological testing. History of Present Illness     Reason for Consult / Principal Problem: Status post stroke alert, facial droop/lethargy    HPI: Josep Rm is a 79 y.o. female with history as mentioned above in assessment who neurology is asked to evaluate in regards to the above. Patient initially presented on 11/24 due to the above, she received initial blood transfusions. Patient underwent extensive workup with colonoscopy via GI, revealing severe diverticulosis and hemorrhoids. Cardiology recommended transitioning from chronic warfarin to use to NOAC; thus was started on low-dose Eliquis given her age and body weight. Seen by pulmonology yesterday for concern of COPD exacerbation, likely aspiration pneumonia. Early this morning, 12/1: Patient was noted with a new right facial droop, and newly lethargy, withdrawing to noxious stim in all extremities (per notes yesterday, patient had been more awake/alert, oriented).   Stroke alert was activated. See neuroimaging above. Was not a candidate for acute intervention (no TNK nor endovascular intervention); placed on stroke pathway. This morning, patient resting in bed, just returned from MRI. Patient's son is at bedside. He states patient over the past few days was much more awake and interactive than currently. However he does state that currently (examined around 10:45 AM) she appears improved compared to earlier this morning when he first arrived. He states that she can become quite somnolent and encephalopathic after a poor night's sleep in the past.     Patient notes feeling tired and generally weak just wants "to sleep". Denies any headache, but notes light sensitivity this morning. Notes pain in the hip/ribs with movement by nursing/staff. Inpatient consult to Neurology  Consult performed by: Davis Walter PA-C  Consult ordered by: YUE Oliver        Review of Systems   Unable to perform ROS: Mental status change       Historical Information   Past Medical History:   Diagnosis Date    Anti-phospholipid syndrome ([address])     Asthma     Blood type O+     Cardiac disease     Chronic pain     COPD (chronic obstructive pulmonary disease) ([address])     Femur fracture, left ([address]) 6/22/2023    Fracture of ankle     left    Hyperlipidemia     Hypertension     Hypokalemia 2/27/2023    Osteoporosis      Past Surgical History:   Procedure Laterality Date    APPENDECTOMY      BILATERAL OOPHORECTOMY Bilateral     ORIF TIBIAL SHAFT FRACTURE W/ PLATES AND SCREWS Right     [place] HEMIARTHROPLASTY HIP PARTIAL Left 6/23/2023    Procedure: HEMIARTHROPLASTY HIP (BIPOLAR);   Surgeon: Daryle Drum, MD;  Location:  MAIN OR;  Service: Orthopedics     Social History   Social History     Substance and Sexual Activity   Alcohol Use Not Currently     Social History     Substance and Sexual Activity   Drug Use No     E-Cigarette/Vaping    E-Cigarette Use Never User      E-Cigarette/Vaping Substances    Nicotine No     THC No      Social History     Tobacco Use   Smoking Status Former    Types: Cigarettes   Smokeless Tobacco Never   Tobacco Comments    Never smoker per [employer]     Family History:   Family History   Problem Relation Age of Onset    Hypertension Mother     Skin cancer Mother     Hypertension Father        Review of previous medical records was completed. Meds/Allergies   current meds:   Current Facility-Administered Medications   Medication Dose Route Frequency    acetaminophen (TYLENOL) tablet 650 mg  650 mg Oral Q6H PRN    albuterol inhalation solution 2.5 mg  2.5 mg Nebulization Q6H PRN    apixaban (ELIQUIS) tablet 2.5 mg  2.5 mg Oral BID    azithromycin (ZITHROMAX) tablet 500 mg  500 mg Oral Q24H    budesonide (PULMICORT) inhalation solution 0.5 mg  0.5 mg Nebulization Q12H    cefTRIAXone (ROCEPHIN) IVPB (premix in dextrose) 1,000 mg 50 mL  1,000 mg Intravenous Q24H    diltiazem (CARDIZEM CD) 24 hr capsule 240 mg  240 mg Oral Daily    ferrous sulfate tablet 325 mg  325 mg Oral Daily With Breakfast    formoterol (PERFOROMIST) nebulizer solution 20 mcg  20 mcg Nebulization Q12H    ipratropium (ATROVENT) 0.02 % inhalation solution 0.5 mg  0.5 mg Nebulization TID    levalbuterol (XOPENEX) inhalation solution 1.25 mg  1.25 mg Nebulization TID    levothyroxine tablet 25 mcg  25 mcg Oral Early Morning    methylPREDNISolone sodium succinate (Solu-MEDROL) injection 40 mg  40 mg Intravenous Q12H CARMEN    mirtazapine (REMERON) tablet 7.5 mg  7.5 mg Oral HS    pantoprazole (PROTONIX) EC tablet 40 mg  40 mg Oral BID AC    sertraline (ZOLOFT) tablet 50 mg  50 mg Oral Daily    and PTA meds:   Prior to Admission Medications   Prescriptions Last Dose Informant Patient Reported? Taking?    Ascorbic Acid (VITAMIN C) 1000 MG tablet  Outside Facility (Specify) Yes No   Sig: Take 1,000 mg by mouth daily   Cholecalciferol (VITAMIN D3) 20 MCG (800 UNIT) TABS  Outside Facility (Specify) Yes No   Sig: Take by mouth in the morning   Ferrous Sulfate 220 (44 Fe) MG/5ML LIQD  Outside Facility (Specify) No No   Sig: take 1 teaspoonful by mouth once daily   Fluticasone-Salmeterol (Advair) 250-50 mcg/dose inhaler   No No   Sig: Inhale 1 puff 2 (two) times a day Rinse mouth after use. Lidocaine Pain Relief 4 % PTCH   Yes No   acetaminophen (TYLENOL) 325 mg tablet  Outside Facility (Specify) No No   Sig: Take 3 tablets (975 mg total) by mouth every 8 (eight) hours   albuterol (2.5 mg/3 mL) 0.083 % nebulizer solution   No No   Sig: Take 3 mL (2.5 mg total) by nebulization every 6 (six) hours as needed for wheezing or shortness of breath   budesonide (PULMICORT) 0.5 mg/2 mL nebulizer solution   No No   Sig: Take 2 mL (0.5 mg total) by nebulization every 12 (twelve) hours Rinse mouth after use. digoxin (LANOXIN) 0.125 mg tablet   No No   Sig: Take 1 tablet (125 mcg total) by mouth every other day   diltiazem (CARDIZEM CD) 240 mg 24 hr capsule   No No   Sig: Take 1 capsule (240 mg total) by mouth daily   furosemide (LASIX) 20 mg tablet   No No   Si.5 TABS PO Q DAY   guaiFENesin (MUCINEX) 600 mg 12 hr tablet  Outside Facility (Specify) No No   Sig: Take 1 tablet (600 mg total) by mouth 2 (two) times a day   ipratropium-albuterol (DUO-NEB) 0.5-2.5 mg/3 mL nebulizer solution   Yes No   levothyroxine 25 mcg tablet   No No   Sig: TAKE 1 TABLET DAILY   lidocaine (LIDODERM) 5 %  Outside Facility (Specify) No No   Sig: Apply 1 patch topically over 12 hours daily Remove & Discard patch within 12 hours or as directed by MD   loratadine (CLARITIN) 10 mg tablet   No No   Sig: Take 1 tablet (10 mg total) by mouth daily   mirtazapine (REMERON) 7.5 MG tablet  Outside Facility (Specify) No No   Sig: Take 1 tablet (7.5 mg total) by mouth daily at bedtime   multivitamin (THERAGRAN) TABS  Self, Outside Facility (Specify) Yes No   Sig: Take 1 tablet by mouth daily   oxygen gas   Yes No   Sig: Inhale 1 L/min continuous.  Indications: Difficulty Breathing   sertraline (Zoloft) 50 mg tablet  Outside Facility (Specify) No No   Sig: Take 1 tablet (50 mg total) by mouth daily   tiotropium (Spiriva HandiHaler) 18 mcg inhalation capsule   No No   Sig: Place 1 capsule (18 mcg total) into inhaler and inhale daily   warfarin (COUMADIN) 2 mg tablet  Outside Facility (Specify) No No   Sig: Take 2 mg daily, adjust to keep INR therapeutic, between 2-3      Facility-Administered Medications: None       Allergies   Allergen Reactions    Aspirin Other (See Comments)    Penicillins Rash and Hives    Sulfa Antibiotics Rash       Objective   Vitals:Blood pressure 151/71, pulse 74, temperature 98.1 °F (36.7 °C), temperature source Oral, resp. rate (!) 25, height 5' 6" (1.676 m), weight 55.1 kg (121 lb 7.6 oz), SpO2 100 %, not currently breastfeeding. ,Body mass index is 19.61 kg/m². Intake/Output Summary (Last 24 hours) at 12/1/2023 0721  Last data filed at 11/30/2023 1747  Gross per 24 hour   Intake 270 ml   Output 60 ml   Net 210 ml       Invasive Devices: Invasive Devices       Peripheral Intravenous Line  Duration             Peripheral IV 12/01/23 Proximal;Right;Ventral (anterior) Forearm <1 day                  Examined alongside Dr. Sam Sawyer. Physical Exam  Constitutional:       Comments: Thin/frail appearance   HENT:      Head: Normocephalic and atraumatic. Eyes:      Extraocular Movements: Extraocular movements intact and EOM normal.      Conjunctiva/sclera: Conjunctivae normal.      Pupils: Pupils are equal, round, and reactive to light. Cardiovascular:      Rate and Rhythm: Normal rate. Pulmonary:      Comments: On 4L nasal cannula  Abdominal:      General: There is no distension. Musculoskeletal:         General: Normal range of motion. Skin:     General: Skin is warm and dry. Neurologic Exam     Mental Status     Patient is somnolent, but does arouse to verbal stimuli.   She can state her full name, her age, names the current month and year, names location as well as city and names her son at bedside all correctly. Soft-spoken, but no dysarthria nor aphasia. She is following central and appendicular commands correctly. Cranial Nerves     CN II   Visual fields full to confrontation. CN III, IV, VI   Pupils are equal, round, and reactive to light. Extraocular motions are normal.     CN V   Facial sensation intact. CN VIII   CN VIII normal.     CN IX, X   CN IX normal.   CN X normal.     CN XI   CN XI normal.     CN XII   CN XII normal.     Patient at rest has right nasolabial fold flattening and right facial asymmetry, however appears somewhat physiologic (as with smile appears to correct without any obvious right lower facial muscle weakness compared to left). Motor Exam   Mild generalized weakness given age and degree of current medical illness, but appears with symmetric strength throughout the upper and lower extremities. Sensory Exam   Light touch normal.     Gait, Coordination, and Reflexes   No extremity tremulousness, deferred coordination testing for sake of patient comfort. No hyperreflexia nor upper motor neuron signs with limited testing. Gait deferred given mental status and generalized weakness.     No clinical seizure activity noted throughout exam.       Lab Results: CBC:   Results from last 7 days   Lab Units 12/01/23  0321 11/30/23  0450 11/29/23  0508   WBC Thousand/uL 6.03 5.23 7.20   RBC Million/uL 2.49* 2.62* 2.68*   HEMOGLOBIN g/dL 8.0* 8.4* 8.4*   HEMATOCRIT % 26.6* 27.7* 28.2*   MCV fL 107* 106* 105*   PLATELETS Thousands/uL 239 220 246   , BMP/CMP:   Results from last 7 days   Lab Units 12/01/23  0620 12/01/23  0321 11/30/23  0450 11/26/23  0456 11/25/23  0024 11/24/23  1426 11/24/23  1426 11/24/23  1423   SODIUM mmol/L 139 140 139   < > 143   < > 142  --    POTASSIUM mmol/L 3.5 3.5 3.5   < > 3.9   < > 4.9  --    CHLORIDE mmol/L 91* 92* 89*   < > 97   < > 99  --    CO2 mmol/L 45* >45* >45*   < > 41*   < > 41*  --    CO2, I-STAT mmol/L  --   --   --   --   --   --   --  >45*   BUN mg/dL 17 18 14   < > 29*   < > 32*  --    CREATININE mg/dL 0.60 0.63 0.64   < > 0.75   < > 0.80  --    GLUCOSE, ISTAT mg/dl  --   --   --   --   --   --   --  150*   CALCIUM mg/dL 8.5 8.9 8.7   < > 8.1*   < > 8.6  --    AST U/L  --  17  --   --  18  --  19  --    ALT U/L  --  11  --   --  10  --  12  --    ALK PHOS U/L  --  168*  --   --  165*  --  187*  --    EGFR ml/min/1.73sq m 83 82 82   < > 73   < > 67  --     < > = values in this interval not displayed. , Vitamin B12:   Results from last 7 days   Lab Units 11/25/23  0024   VITAMIN B 12 pg/mL 416   , HgBA1C:   , TSH:   Results from last 7 days   Lab Units 11/24/23  1426   TSH 3RD GENERATON uIU/mL 2.758   , Coagulation:   Results from last 7 days   Lab Units 11/26/23  0456   INR  1.04   , Lipid Profile:     Imaging Studies: I have personally reviewed pertinent films in PACS  CTA stroke alert (head/neck)   Final Result by Riana Larios MD (12/01 5112)      No large vessel occlusion. No focal stenosis within the [place]. Stable 2 mm aneurysm of the distal aspect of the basilar artery projecting laterally between the left posterior cerebral artery and left superior cerebellar artery. In addition, there is a stable 3 mm right P-comm aneurysm not well visualized on the prior exam.   Recommend consultation with the [facility] of [facility] at [phone]. No hemodynamically significant stenosis within either common or internal carotid artery. Less than 50% stenosis by NASCET criteria. Stable incidental left thyroid lobe nodule for which nonemergent thyroid ultrasound is recommended.                   I personally discussed this study with Marilynn Gonzalez on 12/1/2023 6:40 AM.               Workstation performed: [id number]         CT stroke alert brain   Final Result by Riana Larios MD (12/01 0016)      No acute intracranial abnormality. Moderate chronic small vessel ischemic changes. I personally discussed this study with Darleen Lopez on 12/1/2023 6:40 AM.         Workstation performed: [id number]         FL barium swallow video w speech   Final Result by MARILYN QIU (11/30 9619)      CT chest wo contrast   Final Result by Damian Mullins MD (11/30 8410)      Right middle lobe and bilateral lower lobe consolidation suggestive of compressive atelectasis however pneumonia cannot be entirely excluded. Small bilateral pleural effusions. Workstation performed: [id number]         XR chest portable ICU   Final Result by Zara Jurado MD (11/28 4832)      Bilateral pleural effusions with bibasilar atelectasis is not significantly changed. Resident: Adam Mckay, the attending radiologist, have reviewed the images and agree with the final report above. Workstation performed: [id number]         XR chest portable   Final Result by Elsi Felix MD (11/27 0825)      Nasogastric tube has been placed, tip overlying the distal stomach. Moderate size right pleural effusion and small left pleural effusion are stable. Underlying interstitial edema suggested without significant change. Workstation performed: [id number]         XR chest portable ICU   Final Result by Aydee Ann MD (11/27 0805)      NG tube in stomach. Persistent bilateral groundglass opacity, likely edema, with small effusions and bibasilar atelectasis. Workstation performed: [id number]         X-ray chest 1 view portable   ED Interpretation by Shannan Lizarraga PA-C (11/24 2658)   CHF w/ B/L Pleural Effusions Larger on the Right      Final Result by Beatriz Davila MD (11/25 7943)      Cardiomegaly. CHF. Bilateral pleural effusions.                Workstation performed: [id number]           EKG, Pathology, and Other Studies: I have personally reviewed pertinent reports. VTE Prophylaxis: Sequential compression device (Venodyne)  and Eliquis    Code Status: Level 2 - DNAR: but accepts endotracheal intubation    Total time spent today 45 minutes. Discussed plan of care with patient/son and primary team: await MRI brain read, appears negative for CVA on review with attending; may be physiologic R facial asymmetry at rest; encephalopathy likely related to ongoing cardiac/pulmonary derangements, coupled with poor sleep and prolonged hospitalization/deconditioning. Please note dictation software was used in the formulation of this note. Please keep that in mind in light of any grammatical errors.

## 2023-12-02 VITALS
WEIGHT: 122.36 LBS | BODY MASS INDEX: 19.66 KG/M2 | RESPIRATION RATE: 18 BRPM | HEART RATE: 85 BPM | SYSTOLIC BLOOD PRESSURE: 149 MMHG | TEMPERATURE: 98.7 F | HEIGHT: 66 IN | OXYGEN SATURATION: 92 % | DIASTOLIC BLOOD PRESSURE: 54 MMHG

## 2023-12-02 PROBLEM — J96.22 ACUTE ON CHRONIC RESPIRATORY FAILURE WITH HYPOXIA AND HYPERCAPNIA (HCC): Status: RESOLVED | Noted: 2023-05-20 | Resolved: 2023-12-02

## 2023-12-02 PROBLEM — J96.21 ACUTE ON CHRONIC RESPIRATORY FAILURE WITH HYPOXIA AND HYPERCAPNIA (HCC): Status: RESOLVED | Noted: 2023-05-20 | Resolved: 2023-12-02

## 2023-12-02 PROBLEM — R29.90 STROKE-LIKE SYMPTOMS: Status: RESOLVED | Noted: 2023-12-01 | Resolved: 2023-12-02

## 2023-12-02 LAB
ANION GAP SERPL CALCULATED.3IONS-SCNC: 5 MMOL/L
ANISOCYTOSIS BLD QL SMEAR: PRESENT
ATRIAL RATE: 85 BPM
BASOPHILS # BLD AUTO: 0.01 THOUSANDS/ÂΜL (ref 0–0.1)
BASOPHILS NFR BLD AUTO: 0 % (ref 0–1)
BUN SERPL-MCNC: 24 MG/DL (ref 5–25)
CALCIUM SERPL-MCNC: 8.7 MG/DL (ref 8.4–10.2)
CHLORIDE SERPL-SCNC: 91 MMOL/L (ref 96–108)
CO2 SERPL-SCNC: 44 MMOL/L (ref 21–32)
CREAT SERPL-MCNC: 0.69 MG/DL (ref 0.6–1.3)
EOSINOPHIL # BLD AUTO: 0 THOUSAND/ÂΜL (ref 0–0.61)
EOSINOPHIL NFR BLD AUTO: 0 % (ref 0–6)
ERYTHROCYTE [DISTWIDTH] IN BLOOD BY AUTOMATED COUNT: 18.6 % (ref 11.6–15.1)
FLUAV RNA RESP QL NAA+PROBE: NEGATIVE
FLUBV RNA RESP QL NAA+PROBE: NEGATIVE
GFR SERPL CREATININE-BSD FRML MDRD: 80 ML/MIN/1.73SQ M
GLUCOSE SERPL-MCNC: 153 MG/DL (ref 65–140)
HCT VFR BLD AUTO: 25.4 % (ref 34.8–46.1)
HGB BLD-MCNC: 7.7 G/DL (ref 11.5–15.4)
IMM GRANULOCYTES # BLD AUTO: 0.08 THOUSAND/UL (ref 0–0.2)
IMM GRANULOCYTES NFR BLD AUTO: 1 % (ref 0–2)
LYMPHOCYTES # BLD AUTO: 0.26 THOUSANDS/ÂΜL (ref 0.6–4.47)
LYMPHOCYTES NFR BLD AUTO: 3 % (ref 14–44)
MACROCYTES BLD QL AUTO: PRESENT
MCH RBC QN AUTO: 32.6 PG (ref 26.8–34.3)
MCHC RBC AUTO-ENTMCNC: 30.3 G/DL (ref 31.4–37.4)
MCV RBC AUTO: 108 FL (ref 82–98)
MONOCYTES # BLD AUTO: 0.21 THOUSAND/ÂΜL (ref 0.17–1.22)
MONOCYTES NFR BLD AUTO: 3 % (ref 4–12)
NEUTROPHILS # BLD AUTO: 7.46 THOUSANDS/ÂΜL (ref 1.85–7.62)
NEUTS SEG NFR BLD AUTO: 93 % (ref 43–75)
NRBC BLD AUTO-RTO: 0 /100 WBCS
OVALOCYTES BLD QL SMEAR: PRESENT
P AXIS: 41 DEGREES
PLATELET # BLD AUTO: 258 THOUSANDS/UL (ref 149–390)
PLATELET BLD QL SMEAR: ADEQUATE
PMV BLD AUTO: 8.9 FL (ref 8.9–12.7)
POLYCHROMASIA BLD QL SMEAR: PRESENT
POTASSIUM SERPL-SCNC: 3.7 MMOL/L (ref 3.5–5.3)
PR INTERVAL: 156 MS
QRS AXIS: -1 DEGREES
QRSD INTERVAL: 100 MS
QT INTERVAL: 332 MS
QTC INTERVAL: 395 MS
RBC # BLD AUTO: 2.36 MILLION/UL (ref 3.81–5.12)
RBC MORPH BLD: PRESENT
RSV RNA RESP QL NAA+PROBE: NEGATIVE
SARS-COV-2 RNA RESP QL NAA+PROBE: NEGATIVE
SODIUM SERPL-SCNC: 140 MMOL/L (ref 135–147)
T WAVE AXIS: 3 DEGREES
VENTRICULAR RATE: 85 BPM
WBC # BLD AUTO: 8.02 THOUSAND/UL (ref 4.31–10.16)

## 2023-12-02 PROCEDURE — 99232 SBSQ HOSP IP/OBS MODERATE 35: CPT | Performed by: INTERNAL MEDICINE

## 2023-12-02 PROCEDURE — 85025 COMPLETE CBC W/AUTO DIFF WBC: CPT | Performed by: PHYSICIAN ASSISTANT

## 2023-12-02 PROCEDURE — 94640 AIRWAY INHALATION TREATMENT: CPT

## 2023-12-02 PROCEDURE — 0241U HB NFCT DS VIR RESP RNA 4 TRGT: CPT | Performed by: INTERNAL MEDICINE

## 2023-12-02 PROCEDURE — 99239 HOSP IP/OBS DSCHRG MGMT >30: CPT | Performed by: INTERNAL MEDICINE

## 2023-12-02 PROCEDURE — 94760 N-INVAS EAR/PLS OXIMETRY 1: CPT

## 2023-12-02 PROCEDURE — 80048 BASIC METABOLIC PNL TOTAL CA: CPT | Performed by: PHYSICIAN ASSISTANT

## 2023-12-02 RX ORDER — FERROUS SULFATE 325(65) MG
325 TABLET ORAL
Refills: 0
Start: 2023-12-03

## 2023-12-02 RX ORDER — CEFPODOXIME PROXETIL 200 MG/1
200 TABLET, FILM COATED ORAL 2 TIMES DAILY
Qty: 6 TABLET | Refills: 0
Start: 2023-12-02 | End: 2023-12-02

## 2023-12-02 RX ORDER — FORMOTEROL FUMARATE 20 UG/2ML
20 SOLUTION RESPIRATORY (INHALATION)
Refills: 0
Start: 2023-12-02 | End: 2023-12-02

## 2023-12-02 RX ORDER — LEVALBUTEROL INHALATION SOLUTION 1.25 MG/3ML
1.25 SOLUTION RESPIRATORY (INHALATION)
Refills: 0
Start: 2023-12-02 | End: 2023-12-02

## 2023-12-02 RX ORDER — PANTOPRAZOLE SODIUM 40 MG/1
40 TABLET, DELAYED RELEASE ORAL
Refills: 0
Start: 2023-12-02

## 2023-12-02 RX ORDER — CEFDINIR 300 MG/1
300 CAPSULE ORAL EVERY 12 HOURS SCHEDULED
Qty: 6 CAPSULE | Refills: 0
Start: 2023-12-02 | End: 2023-12-05

## 2023-12-02 RX ORDER — PREDNISONE 20 MG/1
TABLET ORAL
Qty: 32 TABLET | Refills: 0
Start: 2023-12-03 | End: 2023-12-21

## 2023-12-02 RX ADMIN — SERTRALINE HYDROCHLORIDE 50 MG: 50 TABLET ORAL at 08:55

## 2023-12-02 RX ADMIN — LEVALBUTEROL HYDROCHLORIDE 1.25 MG: 1.25 SOLUTION RESPIRATORY (INHALATION) at 07:44

## 2023-12-02 RX ADMIN — FERROUS SULFATE TAB 325 MG (65 MG ELEMENTAL FE) 325 MG: 325 (65 FE) TAB at 08:55

## 2023-12-02 RX ADMIN — FORMOTEROL FUMARATE DIHYDRATE 20 MCG: 20 SOLUTION RESPIRATORY (INHALATION) at 07:44

## 2023-12-02 RX ADMIN — IPRATROPIUM BROMIDE 0.5 MG: 0.5 SOLUTION RESPIRATORY (INHALATION) at 13:43

## 2023-12-02 RX ADMIN — PREDNISONE 60 MG: 20 TABLET ORAL at 08:55

## 2023-12-02 RX ADMIN — LEVALBUTEROL HYDROCHLORIDE 1.25 MG: 1.25 SOLUTION RESPIRATORY (INHALATION) at 13:43

## 2023-12-02 RX ADMIN — PANTOPRAZOLE SODIUM 40 MG: 40 TABLET, DELAYED RELEASE ORAL at 06:03

## 2023-12-02 RX ADMIN — BUDESONIDE 0.5 MG: 0.5 INHALANT RESPIRATORY (INHALATION) at 07:44

## 2023-12-02 RX ADMIN — LEVOTHYROXINE SODIUM 25 MCG: 25 TABLET ORAL at 06:03

## 2023-12-02 RX ADMIN — CEFTRIAXONE 1000 MG: 1 INJECTION, SOLUTION INTRAVENOUS at 08:55

## 2023-12-02 RX ADMIN — DILTIAZEM HYDROCHLORIDE 240 MG: 120 CAPSULE, COATED, EXTENDED RELEASE ORAL at 08:55

## 2023-12-02 RX ADMIN — APIXABAN 2.5 MG: 2.5 TABLET, FILM COATED ORAL at 08:55

## 2023-12-02 RX ADMIN — IPRATROPIUM BROMIDE 0.5 MG: 0.5 SOLUTION RESPIRATORY (INHALATION) at 07:44

## 2023-12-02 RX ADMIN — PIPERACILLIN SODIUM AND TAZOBACTAM SODIUM 3.38 G: 3; .375 INJECTION, POWDER, LYOPHILIZED, FOR SOLUTION INTRAVENOUS at 15:41

## 2023-12-02 NOTE — CASE MANAGEMENT
Case Management Progress Note    Patient name Francois Dong  Location [address] [facility] [place] 322/-[id number] MRN [id number]  : 1940 Date 2023       LOS (days): 8  Geometric Mean LOS (GMLOS) (days): 3.90  Days to GMLOS:-3.8        OBJECTIVE:        Current admission status: Inpatient  Preferred Pharmacy:   [address] [place] [address] [address][id number] Vonda Solis, [address] [place] [address]  [address] [place] [address] [place] [address]  Phone: [phone] Fax: [address] [facility], [address] [place] [address],[address] [place] [address]  Phone: [phone] Fax: [phone]    Primary Care Provider: Rojelio Finnegan MD    Primary Insurance: MEDICARE  Secondary Insurance: BLUE CROSS    PROGRESS NOTE: [facility] contacted  and requested transport time be pushed back. They need more time to get a concentrator for pt as all their current ones are I use.  CM pushed transport to 4pm.

## 2023-12-02 NOTE — ASSESSMENT & PLAN NOTE
Patient with underlying COPD, and a chronic hypoxic respiratory failure with hypercapnia  CT imaging-Right middle lobe and bilateral lower lobe consolidation suggestive of compressive atelectasis however pneumonia cannot be entirely excluded.  Small bilateral pleural effusions   Afebrile, no leukocytosis  Recent Labs     11/30/23  0450 12/01/23  0620   PROCALCITONI 0.31* 0.23   Continue with IV ceftriaxone day #3, discharged on cefpodoxime for 3 more days  Continue with azithromycin for 3 days  Sputum culture-1+ Pseudomonas, likely colonization   urine strep and Legionella antigen-negative  Speech evaluation at rehab incentive spirometer  Respiratory protocol  Aspiration precautions   Dysphagia diet   Outpatient pulmonology follow-up

## 2023-12-02 NOTE — ASSESSMENT & PLAN NOTE
BRBPR began 11/22, PCP recommended stopping warfarin temporarily, however persistent symptoms with large BRBPR episode witnessed by son this morning followed by syncopal episode  Hgb on admission 6   INR supratherapeutic on arrival  Recent Labs     11/30/23  0450 12/01/23  0321 12/02/23  0508   HGB 8.4* 8.0* 7.7*        No results for input(s): "INR" in the last 72 hours.    GI evaluated the patient, status post colonoscopy, no concern for active bleed, diverticulosis and internal hemorrhoids visualized  Okay to continue with anticoagulation as per GI  CBC within a week

## 2023-12-02 NOTE — DISCHARGE SUMMARY
[address] [facility]  Discharge- [facility] 1940, 79 y.o. female MRN: [id number]  Unit/Bed#: -Demetrice Encounter: [id number]  Primary Care Provider: Rafi Zabala MD   Date and time admitted to hospital: 11/24/2023  2:07 PM    Acute blood loss anemia  Assessment & Plan  In setting of acute lower GIB on warfarin with supratherapeutic INR   Iron panel: Iron 36, Iron Sat 23, TIBC 156 Ferritin 111  11/24: 1 unit PRBC  11/25: 1 unit PRBC  Recent Labs     11/30/23  0450 12/01/23  0321 12/02/23  0508   HGB 8.4* 8.0* 7.7*      Maintain hgb > 7  See above     GIB (gastrointestinal bleeding)  Assessment & Plan  BRBPR began 11/22, PCP recommended stopping warfarin temporarily, however persistent symptoms with large BRBPR episode witnessed by son this morning followed by syncopal episode  Hgb on admission 6   INR supratherapeutic on arrival  Recent Labs     11/30/23  0450 12/01/23  0321 12/02/23  0508   HGB 8.4* 8.0* 7.7*        No results for input(s): "INR" in the last 72 hours. GI evaluated the patient, status post colonoscopy, no concern for active bleed, diverticulosis and internal hemorrhoids visualized  Okay to continue with anticoagulation as per GI  CBC within a week      Other specified hypothyroidism  Assessment & Plan  Continue synthroid 25 mcg daily     Atrial fibrillation (HCC)  Assessment & Plan  Rate control: digoxin 125 mcg every other day (last taken 11/24)  Dig level 0.7  Rhythm: Cardizem 240 mg daily   OAC: warfarin - HOLD due to supratherapeutic INR (5.4)  Need to resume AC with NOAC, Eliquis sent to pharmacy and $22 monthly.    Low-dose Eliquis resume 2.5 mg twice daily    Chronic obstructive pulmonary disease with acute exacerbation (HCC)  Assessment & Plan  Chronically on 2L, currently requiring 2-3 L of oxygen  Home regimen: Spiriva inhaler + pumicort nebs BID + duonebs TID daily   Continue with steroid taper  PPI prophylaxis   Respiratory protocol    Bilateral pneumonia  Assessment & Plan  Patient with underlying COPD, and a chronic hypoxic respiratory failure with hypercapnia  CT imaging-Right middle lobe and bilateral lower lobe consolidation suggestive of compressive atelectasis however pneumonia cannot be entirely excluded. Small bilateral pleural effusions   Afebrile, no leukocytosis  Recent Labs     11/30/23  0450 12/01/23  0620   PROCALCITONI 0.31* 0.23   Continue with IV ceftriaxone day #3, discharged on cefpodoxime for 3 more days  Continue with azithromycin for 3 days  Sputum culture-1+ Pseudomonas, likely colonization   urine strep and Legionella antigen-negative  Speech evaluation at rehab incentive spirometer  Respiratory protocol  Aspiration precautions   Dysphagia diet   Outpatient pulmonology follow-up    Stroke-like symptoms-resolved as of 12/2/2023  Assessment & Plan  Rapid response was called on 12/1, due to concern for acute CVA  Patient was found altered with strokelike symptoms  CT head/neck and MRI brain were negative for any acute infarct  Neurology evaluated the patient, no concern for stroke pathway to be continued  Continue with Eliquis 2.5 mg twice daily  PT/OT     * Acute on chronic respiratory failure with hypoxia and hypercapnia (HCC)-resolved as of 12/2/2023  Assessment & Plan  Chronically maintained on 2L 2/2 COPD   Requiring 4-5 L on admission, SpO2 90-94%  COVID/FLU/RSV neg  CXR with venous congestion, bilateral effusions     Suspect multifactorial in the setting of acute on chronic CHF as well as COPD with acute exacerbation  Currently on 4L  With CO2 greater than 45  We will try her BiPAP at bedtime  Diuretics on hold today given component of contraction alkalosis  Ct scan 11/29-Right middle lobe and bilateral lower lobe consolidation suggestive of compressive atelectasis however pneumonia cannot be entirely excluded. Small bilateral pleural effusions.    Started on antibiotics, pulmonology consulted, appreciate input  Continue to trend procalcitonin              Medical Problems       Resolved Problems  Date Reviewed: 12/2/2023            Resolved    * (Principal) Acute on chronic respiratory failure with hypoxia and hypercapnia (HCC) 12/2/2023     Resolved by  Monica Persaud MD    Syncope 11/30/2023     Resolved by  Monica Persaud MD    Stroke-like symptoms 12/2/2023     Resolved by  Monica Persaud MD          Admission Date:   Admission Orders (From admission, onward)       Ordered        11/24/23 1632  INPATIENT ADMISSION  Once                            Admitting Diagnosis: COPD (chronic obstructive pulmonary disease) ([address]) [J44.9]  Severe protein-calorie malnutrition ([address]) [E43]  Respiratory distress [R06.03]  GI bleed [K92.2]  GIB (gastrointestinal bleeding) [K92.2]  Acute on chronic respiratory failure with hypoxia and hypercapnia ([address]) [W41.43, J96.22]  Acute on chronic anemia [D64.9]    HPI: as per, Arley Paget, PA-C , 11/24 Vandana Arellano is a 79 y.o. female with a PMH of chronic diastolic CHF, COPD with chronic resp failure on 2L, HTN, afib on coumadin, hypothyroidism who presents with multiple complaints. Son at bedside notes patient began experiencing BRBPR 11/22, PCP was informed who recommended holding warfarin however despite holding warfarin patient has continued with profuse bright red bowel movements, last this morning 11/24. Son states he was called to patient's house by home care aid who noted low SPO2 as well as low blood pressure + patient appear weak/fatigued. Son and aid attempted to help patient walk however she then collapsed, without head strike reported however LOC x 30-40 second + associated dizziness preceding event. Son called EMS. Son also notes patient appears more SOB and has increasing LE edema. Patient is chronically on 2L O2, requiring 4-5 L in ED. Patient denies fevers chills, CP, N/V, dysuria. She notes she "can feel her stomach" but is not in pain.      Procedures Performed:   Orders Placed This Encounter   Procedures    ED ECG Documentation Only       Summary of Hospital Course:     Patient presented with upper GI bleed, with supratherapeutic INR. Warfarin was stopped. Supportive care was given and patient underwent colonoscopy on 11/25, results of which were concerning for diverticulosis and hemorrhoids only. After thorough discussion with cardiology and family members, patient was started on low-dose Eliquis 2.5 mg twice daily. For acute on chronic respiratory failure secondary to mild COPD exacerbation and possible aspiration pneumonia/pneumonitis, patient was started on IV steroids, now discharged on prednisone taper. Patient received 2 days of IV ceftriaxone, and will continue with 3 days of cefdinir 300 mg twice daily. She will have outpatient pulmonology follow-up. She will continue with home bronchodilator therapy    Due to considerable risk for aspiration, patient will continue with dysphagia 3, nectar thick liquids. Patient had rapid response while inpatient, there was concern for acute CVA. However CT/MRI brain imaging was unremarkable for acute intracranial pathology. 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 Patient will need to follow-up with neurosurgery for that. Patient needs outpatient PCP, pulmonology and cardiology follow-up  She needs CBC and BMP to be repeated within 72 hours      Significant Findings, Care, Treatment and Services Provided:     Inpatient consult to gastroenterology  Inpatient consult to cardiology  Inpatient consult to speech pathology  PT/OT  Inpatient consult to care management  Inpatient consult to pulmonology    Complications: none    Condition at Discharge: fair         Discharge instructions/Information to patient and family:   See after visit summary for information provided to patient and family.       Provisions for Follow-Up Care:  See after visit summary for information related to follow-up care and any pertinent home health orders. PCP: Lalo Talavera MD    Disposition: Short-term rehab at [id number] [facility] Readmission: No    Discharge Statement   I spent 35 minutes discharging the patient. This time was spent on the day of discharge. I had direct contact with the patient on the day of discharge. Additional documentation is required if more than 30 minutes were spent on discharge. Discharge Medications:  See after visit summary for reconciled discharge medications provided to patient and family.

## 2023-12-02 NOTE — DISCHARGE INSTR - AVS FIRST PAGE
Please see your PCP within 7 days of discharge, to discuss recent hospitalization  Please follow-up with pulmonology  Continue with steroid taper as directed  BMP within 72 hours  CBC within 72 hours

## 2023-12-02 NOTE — ASSESSMENT & PLAN NOTE
In setting of acute lower GIB on warfarin with supratherapeutic INR   Iron panel: Iron 36, Iron Sat 23, TIBC 156 Ferritin 111  11/24: 1 unit PRBC  11/25: 1 unit PRBC  Recent Labs     11/30/23  0450 12/01/23  0321 12/02/23  0508   HGB 8.4* 8.0* 7.7*      Maintain hgb > 7  See above

## 2023-12-02 NOTE — ASSESSMENT & PLAN NOTE
Rapid response was called on 12/1, due to concern for acute CVA  Patient was found altered with strokelike symptoms  CT head/neck and MRI brain were negative for any acute infarct  Neurology evaluated the patient, no concern for stroke pathway to be continued  Continue with Eliquis 2.5 mg twice daily  PT/OT

## 2023-12-02 NOTE — PROGRESS NOTES
Progress Note - Pulmonary   Jeoffrey Crigler 79 y.o. female MRN: [id number]  Unit/Bed#: -01 Encounter: [id number]      Assessment:  Acute on chronic respiratory failure-on 2 LPM 24/7 at baseline  COPD with exacerbation-seems with asthmatic features/overlap, on Advair 250/Spiriva at home  Abnormal CT chest-LLL infiltrate suspect aspiration pneumonia/pneumonitis  Diastolic CHF    Plan:  Overall improving exacerbation compared to the admission plan, close to baseline  Sputum for Pseudomonas +1 colony likely colonized, no leukocytosis/PCT is normal+, negative urine antigen for Legionella/strep pneumoniae  No need for antibiotics escalation/complete 5 days of the ceftriaxone/cefdinir  Continue nebulizer only treatment while inpatient budesonide/Atrovent/Xopenex  Already switch to prednisone 60 mg daily today/taper down by 10 mg every 3 days. On discharge  Switch to home inhalers on discharge, outpatient follow-up with pulmonary    Subjective:   No overnight acute events. Feeling better, breathing easier, no dyspnea at rest, minimal cough no chest congestion     Vitals: Blood pressure 164/59, pulse 65, temperature 98 °F (36.7 °C), resp. rate 18, height 5' 6" (1.676 m), weight 55.5 kg (122 lb 5.7 oz), SpO2 93 %, not currently breastfeeding., Body mass index is 19.75 kg/m². Intake/Output Summary (Last 24 hours) at 12/2/2023 1313  Last data filed at 12/2/2023 0933  Gross per 24 hour   Intake 620 ml   Output 200 ml   Net 420 ml       Physical Exam  Gen: not in acute distress, frail, ill appearing Body mass index is 19.75 kg/m². HEENT:supple, no accessory muscle use, no JVD appreciated  Cardiac: RRR, no murmurs appreciated  Lungs: normal respiratory effort, mild posterior crackles   Abdomen: soft, nontender, normoactive bowel sounds  Extremities:mild LE pitting edema  Neuro: no focal motor deficit    Labs:  I have personally reviewed pertinent lab results.    + Sputum +1 colony of pseudomonas     Evelina Villalobos Leonel Luna MD

## 2023-12-02 NOTE — ASSESSMENT & PLAN NOTE
Rate control: digoxin 125 mcg every other day (last taken 11/24)  Dig level 0.7  Rhythm: Cardizem 240 mg daily   OAC: warfarin - HOLD due to supratherapeutic INR (5.4)  Need to resume AC with NOAC, Eliquis sent to pharmacy and $22 monthly.    Low-dose Eliquis resume 2.5 mg twice daily

## 2023-12-02 NOTE — ASSESSMENT & PLAN NOTE
Chronically on 2L, currently requiring 2-3 L of oxygen  Home regimen: Spiriva inhaler + pumicort nebs BID + duonebs TID daily   Continue with steroid taper  PPI prophylaxis   Respiratory protocol

## 2023-12-02 NOTE — NURSING NOTE
This RN called and gave report to receiving RN @ [facility]. Answered all questions and gave callback number in case questions arise.

## 2023-12-03 ENCOUNTER — NURSING HOME VISIT (OUTPATIENT)
Dept: FAMILY MEDICINE CLINIC | Facility: CLINIC | Age: 83
End: 2023-12-03
Payer: MEDICARE

## 2023-12-03 DIAGNOSIS — D62 ACUTE BLOOD LOSS ANEMIA: ICD-10-CM

## 2023-12-03 DIAGNOSIS — K92.2 GASTROINTESTINAL HEMORRHAGE, UNSPECIFIED GASTROINTESTINAL HEMORRHAGE TYPE: Primary | ICD-10-CM

## 2023-12-03 DIAGNOSIS — I48.0 PAROXYSMAL ATRIAL FIBRILLATION (HCC): ICD-10-CM

## 2023-12-03 DIAGNOSIS — S32.000A COMPRESSION FRACTURE OF LUMBAR VERTEBRA, UNSPECIFIED LUMBAR VERTEBRAL LEVEL, INITIAL ENCOUNTER (HCC): ICD-10-CM

## 2023-12-03 DIAGNOSIS — I50.33 ACUTE ON CHRONIC DIASTOLIC CHF (CONGESTIVE HEART FAILURE) (HCC): ICD-10-CM

## 2023-12-03 DIAGNOSIS — D68.61 ANTIPHOSPHOLIPID ANTIBODY SYNDROME (HCC): ICD-10-CM

## 2023-12-03 DIAGNOSIS — E43 SEVERE PROTEIN-CALORIE MALNUTRITION (HCC): ICD-10-CM

## 2023-12-03 DIAGNOSIS — J44.1 CHRONIC OBSTRUCTIVE PULMONARY DISEASE WITH ACUTE EXACERBATION (HCC): ICD-10-CM

## 2023-12-03 LAB
BACTERIA SPT RESP CULT: ABNORMAL
BACTERIA SPT RESP CULT: ABNORMAL
GRAM STN SPEC: ABNORMAL

## 2023-12-03 PROCEDURE — 99306 1ST NF CARE HIGH MDM 50: CPT | Performed by: FAMILY MEDICINE

## 2023-12-03 NOTE — PROGRESS NOTES
[address] [facility], [address] [place] [address]  Facility: [facility]    NAME: Sander Inman  AGE: 79 y.o. SEX: female    DATE OF ENCOUNTER: 12/3/2023    Code status:  Full Code    Assessment and Plan     1. Gastrointestinal hemorrhage, unspecified gastrointestinal hemorrhage type    2. Chronic obstructive pulmonary disease with acute exacerbation ([address])    3. Acute on chronic diastolic CHF (congestive heart failure) ([facility])    4. Paroxysmal atrial fibrillation ([address])    5. Antiphospholipid antibody syndrome ([facility])    6. Compression fracture of lumbar vertebra, unspecified lumbar vertebral level, initial encounter ([address])    7. Acute blood loss anemia    8. Severe protein-calorie malnutrition ([address])        All medications and routine orders were reviewed and updated as needed. Plan discussed with: Family member    Chief Complaint     Seen for admission at [facility] 79year-old female well-known to me from several prior admissions here. She was recently hospitalized due to GI bleeding. History of Present Illness     The patient was hospitalized due to bright red blood per rectum. She was found to be profoundly anemic with a hemoglobin of 6. No intervention was undertaken as she was supratherapeutic on her anticoagulant. This was held and then eventually changed to low-dose Eliquis. She was started on iron supplementation. The patient has a history of severe COPD and respiratory failure. She did not tolerate having a profound anemia very well. She moved her bowels 2 days ago. She seen no more blood. She denies any dysuria. She gets dyspnea with any exertion. Her appetite is poor. She feels weak and unsteady as she attempts to transfer. She requires assistance with all her activities of daily living.     HISTORY:  Past Medical History:   Diagnosis Date    Anti-phospholipid syndrome (HCC)     Asthma     Blood type O+     Cardiac disease     Chronic pain     COPD (chronic obstructive pulmonary disease) ([address])     Femur fracture, left ([address]) 6/22/2023    Fracture of ankle     left    Hyperlipidemia     Hypertension     Hypokalemia 2/27/2023    Osteoporosis      Past Surgical History:   Procedure Laterality Date    APPENDECTOMY      BILATERAL OOPHORECTOMY Bilateral     ORIF TIBIAL SHAFT FRACTURE W/ PLATES AND SCREWS Right     OR HEMIARTHROPLASTY HIP PARTIAL Left 6/23/2023    Procedure: HEMIARTHROPLASTY HIP (BIPOLAR); Surgeon: Zane Weinberg MD;  Location:  MAIN OR;  Service: Orthopedics     Family History   Problem Relation Age of Onset    Hypertension Mother     Skin cancer Mother     Hypertension Father      Social History     Socioeconomic History    Marital status:      Spouse name: None    Number of children: None    Years of education: None    Highest education level: None   Occupational History    None   Tobacco Use    Smoking status: Former     Types: Cigarettes    Smokeless tobacco: Never    Tobacco comments:     Never smoker per [employer]   Vaping Use    Vaping Use: Never used   Substance and Sexual Activity    Alcohol use: Not Currently    Drug use: No    Sexual activity: Not Currently   Other Topics Concern    None   Social History Narrative    None     Social Determinants of Health     Financial Resource Strain: Low Risk  (4/11/2023)    Overall Financial Resource Strain (CARDIA)     Difficulty of Paying Living Expenses: Not very hard   Food Insecurity: No Food Insecurity (8/4/2023)    Hunger Vital Sign     Worried About Running Out of Food in the Last Year: Never true     Ran Out of Food in the Last Year: Never true   Transportation Needs: No Transportation Needs (8/4/2023)    PRAPARE - Transportation     Lack of Transportation (Medical): No     Lack of Transportation (Non-Medical):  No   Physical Activity: Not on file   Stress: Not on file   Social Connections: Not on file   Intimate Partner Violence: Not on file   Housing Stability: Low Risk  (8/4/2023)    Housing Stability Vital Sign     Unable to Pay for Housing in the Last Year: No     Number of Places Lived in the Last Year: 1     Unstable Housing in the Last Year: No       Allergies: Allergies   Allergen Reactions    Aspirin Other (See Comments)    Penicillins Rash and Hives    Sulfa Antibiotics Rash       Review of Systems     Review of Systems   Constitutional:  Negative for activity change, appetite change, chills, diaphoresis, fatigue and unexpected weight change. HENT:  Negative for congestion, ear discharge, ear pain, hearing loss, nosebleeds and rhinorrhea. Eyes:  Negative for pain, redness, itching and visual disturbance. Respiratory:  Positive for shortness of breath. Negative for cough, choking and chest tightness. Cardiovascular:  Negative for chest pain and leg swelling. Gastrointestinal:  Positive for blood in stool. Negative for abdominal pain, constipation, diarrhea and nausea. Endocrine: Negative for cold intolerance, polydipsia and polyphagia. Genitourinary:  Negative for dysuria, frequency, hematuria and urgency. Musculoskeletal:  Negative for arthralgias, back pain, gait problem, joint swelling, neck pain and neck stiffness. Skin:  Negative for color change and rash. Allergic/Immunologic: Negative for environmental allergies and food allergies. Neurological:  Positive for weakness. Negative for dizziness, tremors, seizures, speech difficulty, numbness and headaches. Hematological:  Negative for adenopathy. Does not bruise/bleed easily. Psychiatric/Behavioral:  Negative for behavioral problems, dysphoric mood, hallucinations and self-injury. Medications and orders     All medications reviewed and updated in [facility] EMR. Objective     Vitals: per nursing home record    Physical Exam  Constitutional:       Appearance: Normal appearance. She is well-developed. HENT:      Head: Normocephalic and atraumatic.       Right Ear: External ear normal.      Left Ear: External ear normal.      Mouth/Throat:      Mouth: Mucous membranes are moist.      Pharynx: Oropharynx is clear. No oropharyngeal exudate. Eyes:      General: No scleral icterus. Right eye: No discharge. Left eye: No discharge. Extraocular Movements: Extraocular movements intact. Conjunctiva/sclera: Conjunctivae normal.      Pupils: Pupils are equal, round, and reactive to light. Neck:      Thyroid: No thyromegaly. Cardiovascular:      Rate and Rhythm: Normal rate. Rhythm irregular. Heart sounds: Normal heart sounds. No murmur heard. No gallop. Pulmonary:      Effort: Pulmonary effort is normal. No respiratory distress. Breath sounds: No wheezing or rales. Comments: Diminished breath sounds bilaterally  Abdominal:      General: Bowel sounds are normal.      Palpations: Abdomen is soft. There is no mass. Tenderness: There is no guarding or rebound. Musculoskeletal:         General: No tenderness or deformity. Normal range of motion. Cervical back: Normal range of motion and neck supple. Lymphadenopathy:      Cervical: No cervical adenopathy. Skin:     General: Skin is warm and dry. Findings: No rash. Neurological:      Mental Status: She is alert and oriented to person, place, and time. Cranial Nerves: No cranial nerve deficit. Motor: Weakness present. Coordination: Coordination normal.      Deep Tendon Reflexes: Reflexes are normal and symmetric. Reflexes normal.   Psychiatric:         Mood and Affect: Mood normal.         Behavior: Behavior normal.         Thought Content: Thought content normal.         Judgment: Judgment normal.         Pertinent Laboratory/Diagnostic Studies: The following labs/studies were reviewed please see chart or hospital paperwork for details. Diagnostic studies from the hospital were reviewed    - Admit for PT OT and medical therapy. Will she will be maintained on her iron.   She will continue with Eliquis.   We will monitor her labs    Patricia Graham DO  12/3/2023 11:45 AM

## 2023-12-04 ENCOUNTER — PATIENT OUTREACH (OUTPATIENT)
Dept: CASE MANAGEMENT | Facility: OTHER | Age: 83
End: 2023-12-04

## 2023-12-04 ENCOUNTER — NURSING HOME VISIT (OUTPATIENT)
Dept: FAMILY MEDICINE CLINIC | Facility: CLINIC | Age: 83
End: 2023-12-04
Payer: MEDICARE

## 2023-12-04 DIAGNOSIS — K92.2 GASTROINTESTINAL HEMORRHAGE, UNSPECIFIED GASTROINTESTINAL HEMORRHAGE TYPE: Primary | ICD-10-CM

## 2023-12-04 DIAGNOSIS — I48.0 PAROXYSMAL ATRIAL FIBRILLATION (HCC): ICD-10-CM

## 2023-12-04 DIAGNOSIS — I50.33 ACUTE ON CHRONIC DIASTOLIC CHF (CONGESTIVE HEART FAILURE) (HCC): ICD-10-CM

## 2023-12-04 DIAGNOSIS — J44.1 CHRONIC OBSTRUCTIVE PULMONARY DISEASE WITH ACUTE EXACERBATION (HCC): ICD-10-CM

## 2023-12-04 DIAGNOSIS — S32.000A COMPRESSION FRACTURE OF LUMBAR VERTEBRA, UNSPECIFIED LUMBAR VERTEBRAL LEVEL, INITIAL ENCOUNTER (HCC): ICD-10-CM

## 2023-12-04 DIAGNOSIS — E43 SEVERE PROTEIN-CALORIE MALNUTRITION (HCC): ICD-10-CM

## 2023-12-04 DIAGNOSIS — D62 ACUTE BLOOD LOSS ANEMIA: ICD-10-CM

## 2023-12-04 PROCEDURE — 99308 SBSQ NF CARE LOW MDM 20: CPT | Performed by: FAMILY MEDICINE

## 2023-12-04 NOTE — PROGRESS NOTES
Outpatient Care Management [facility]SNF Pathway. Discharged 12/2/23 to [facility]. Email sent to facility to inform them the patient is on the JENNIE Pathway and I will be following them during their skilled stay. This Admin Coordinator will continue to monitor via chart review.

## 2023-12-04 NOTE — PROGRESS NOTES
[address]  2026 [facility], [address] [place] [address]  Facility: [facility]    NAME: Flaco Mays  AGE: 79 y.o. SEX: female    DATE OF ENCOUNTER: 12/4/2023    Code status:  DNR w/ Hospitalization    Assessment and Plan     1. Gastrointestinal hemorrhage, unspecified gastrointestinal hemorrhage type    2. Chronic obstructive pulmonary disease with acute exacerbation ([address])    3. Acute on chronic diastolic CHF (congestive heart failure) (HCC)    4. Paroxysmal atrial fibrillation ([address])    5. Compression fracture of lumbar vertebra, unspecified lumbar vertebral level, initial encounter ([address])    6. Acute blood loss anemia    7. Severe protein-calorie malnutrition ([address])        All medications and routine orders were reviewed and updated as needed. Plan discussed with: Family member    Chief Complaint     Interim evaluation    History of Present Illness     The patient reports she has severe dyspnea with any type of exertion. She notes that the pain in her back is helped by the lidocaine patch. Her bowels have moved. She has no dysuria. Her appetite is poor. The following portions of the patient's history were reviewed and updated as appropriate: current medications, past family history, past medical history, past social history, past surgical history and problem list.    Allergies: Allergies   Allergen Reactions    Aspirin Other (See Comments)    Penicillins Rash and Hives    Sulfa Antibiotics Rash       Review of Systems     Review of Systems   Constitutional:  Negative for activity change, appetite change, chills, diaphoresis, fatigue and unexpected weight change. HENT:  Negative for congestion, ear discharge, ear pain, hearing loss, nosebleeds and rhinorrhea. Eyes:  Negative for pain, redness, itching and visual disturbance. Respiratory:  Positive for shortness of breath. Negative for cough, choking and chest tightness.     Cardiovascular:  Negative for chest pain and leg swelling. Gastrointestinal:  Positive for blood in stool. Negative for abdominal pain, constipation, diarrhea and nausea. Endocrine: Negative for cold intolerance, polydipsia and polyphagia. Genitourinary:  Negative for dysuria, frequency, hematuria and urgency. Musculoskeletal:  Positive for back pain. Negative for arthralgias, gait problem, joint swelling, neck pain and neck stiffness. Skin:  Negative for color change and rash. Allergic/Immunologic: Negative for environmental allergies and food allergies. Neurological:  Positive for weakness. Negative for dizziness, tremors, seizures, speech difficulty, numbness and headaches. Hematological:  Negative for adenopathy. Does not bruise/bleed easily. Psychiatric/Behavioral:  Negative for behavioral problems, dysphoric mood, hallucinations and self-injury. Medications and orders     All medications reviewed and updated in long term EMR. Objective     Vitals: per nursing home records    Physical Exam  Constitutional:       Appearance: Normal appearance. She is well-developed. HENT:      Head: Normocephalic and atraumatic. Right Ear: External ear normal.      Left Ear: External ear normal.      Mouth/Throat:      Mouth: Mucous membranes are moist.      Pharynx: Oropharynx is clear. No oropharyngeal exudate. Eyes:      General: No scleral icterus. Right eye: No discharge. Left eye: No discharge. Extraocular Movements: Extraocular movements intact. Conjunctiva/sclera: Conjunctivae normal.      Pupils: Pupils are equal, round, and reactive to light. Neck:      Thyroid: No thyromegaly. Cardiovascular:      Rate and Rhythm: Normal rate. Rhythm irregular. Heart sounds: Normal heart sounds. No murmur heard. No gallop. Pulmonary:      Effort: Pulmonary effort is normal. No respiratory distress. Breath sounds: Wheezing and rhonchi present. No rales.    Abdominal:      General: Bowel sounds are normal.      Palpations: Abdomen is soft. There is no mass. Tenderness: There is no guarding or rebound. Musculoskeletal:         General: No tenderness or deformity. Normal range of motion. Cervical back: Normal range of motion and neck supple. Lymphadenopathy:      Cervical: No cervical adenopathy. Skin:     General: Skin is warm and dry. Findings: No rash. Neurological:      Mental Status: She is alert and oriented to person, place, and time. Cranial Nerves: No cranial nerve deficit. Motor: Weakness present. Coordination: Coordination normal.      Deep Tendon Reflexes: Reflexes are normal and symmetric. Reflexes normal.   Psychiatric:         Mood and Affect: Mood normal.         Behavior: Behavior normal.         Thought Content: Thought content normal.         Judgment: Judgment normal.         Pertinent Laboratory/Diagnostic Studies: The following studies were reviewed please see chart or hospital paperwork for details. Space for lab dictation no new diagnostic studies    - Continue with an aggressive pulmonary inhalation therapy.   Monitor labs    Sari Shen DO  12/4/2023 11:26 AM

## 2023-12-07 ENCOUNTER — PATIENT OUTREACH (OUTPATIENT)
Dept: CASE MANAGEMENT | Facility: OTHER | Age: 83
End: 2023-12-07

## 2023-12-07 ENCOUNTER — NURSING HOME VISIT (OUTPATIENT)
Dept: FAMILY MEDICINE CLINIC | Facility: CLINIC | Age: 83
End: 2023-12-07
Payer: MEDICARE

## 2023-12-07 DIAGNOSIS — I48.0 PAROXYSMAL ATRIAL FIBRILLATION (HCC): ICD-10-CM

## 2023-12-07 DIAGNOSIS — D68.61 ANTIPHOSPHOLIPID ANTIBODY SYNDROME (HCC): ICD-10-CM

## 2023-12-07 DIAGNOSIS — I50.33 ACUTE ON CHRONIC DIASTOLIC CHF (CONGESTIVE HEART FAILURE) (HCC): ICD-10-CM

## 2023-12-07 DIAGNOSIS — J44.1 CHRONIC OBSTRUCTIVE PULMONARY DISEASE WITH ACUTE EXACERBATION (HCC): ICD-10-CM

## 2023-12-07 DIAGNOSIS — E43 SEVERE PROTEIN-CALORIE MALNUTRITION (HCC): ICD-10-CM

## 2023-12-07 DIAGNOSIS — J95.2 ACUTE POSTOPERATIVE PULMONARY INSUFFICIENCY (HCC): Primary | ICD-10-CM

## 2023-12-07 DIAGNOSIS — K92.2 GASTROINTESTINAL HEMORRHAGE, UNSPECIFIED GASTROINTESTINAL HEMORRHAGE TYPE: ICD-10-CM

## 2023-12-07 PROCEDURE — 99308 SBSQ NF CARE LOW MDM 20: CPT | Performed by: FAMILY MEDICINE

## 2023-12-07 NOTE — PROGRESS NOTES
Chart review complete Update obtained from [facility]). The patient is currently admitted to the SNF and is receiving skilled therapies No LCD at this time. This Admin Coordinator will continue to monitor via chart review.

## 2023-12-07 NOTE — PROGRESS NOTES
[address]  2026 [facility], [address] [place] [address]  Facility: [facility]    NAME: Bill Woody  AGE: 79 y.o. SEX: female    DATE OF ENCOUNTER: 12/7/2023    Code status:  Full Code    Assessment and Plan     1. Acute postoperative pulmonary insufficiency (HCC)    2. Gastrointestinal hemorrhage, unspecified gastrointestinal hemorrhage type    3. Chronic obstructive pulmonary disease with acute exacerbation ([address])    4. Acute on chronic diastolic CHF (congestive heart failure) (HCC)    5. Paroxysmal atrial fibrillation ([address])    6. Antiphospholipid antibody syndrome (HCC)    7. Severe protein-calorie malnutrition ([address])        All medications and routine orders were reviewed and updated as needed. Plan discussed with: Patient    Chief Complaint     Interim evaluation    History of Present Illness     The patient reports she has dyspnea as well as anxiety. She is aware that she is a full code and she states that she does not want to sign anything and that her children know what her wishes are. She has dyspnea with any exertion. She has profound weakness. Her bowels have moved but she seen no blood. She is excessively thirsty but dislikes the thickener used due to dysphagia    The following portions of the patient's history were reviewed and updated as appropriate: current medications, past family history, past medical history, past social history, past surgical history and problem list.    Allergies: Allergies   Allergen Reactions    Aspirin Other (See Comments)    Penicillins Rash and Hives    Sulfa Antibiotics Rash       Review of Systems     Review of Systems   Constitutional:  Negative for activity change, appetite change, chills, diaphoresis, fatigue and unexpected weight change. HENT:  Positive for trouble swallowing. Negative for congestion, ear discharge, ear pain, hearing loss, nosebleeds and rhinorrhea. Eyes:  Negative for pain, redness, itching and visual disturbance. Respiratory:  Positive for shortness of breath and wheezing. Negative for cough, choking and chest tightness. Cardiovascular:  Negative for chest pain and leg swelling. Gastrointestinal:  Negative for abdominal pain, blood in stool, constipation, diarrhea and nausea. Endocrine: Negative for cold intolerance, polydipsia and polyphagia. Genitourinary:  Negative for dysuria, frequency, hematuria and urgency. Musculoskeletal:  Negative for arthralgias, back pain, gait problem, joint swelling, neck pain and neck stiffness. Skin:  Negative for color change and rash. Allergic/Immunologic: Negative for environmental allergies and food allergies. Neurological:  Positive for weakness. Negative for dizziness, tremors, seizures, speech difficulty, numbness and headaches. Hematological:  Negative for adenopathy. Does not bruise/bleed easily. Psychiatric/Behavioral:  Negative for behavioral problems, dysphoric mood, hallucinations and self-injury. Medications and orders     All medications reviewed and updated in residential EMR. Objective     Vitals: per nursing home records    Physical Exam  Constitutional:       Appearance: Normal appearance. She is well-developed. HENT:      Head: Normocephalic and atraumatic. Right Ear: External ear normal.      Left Ear: External ear normal.      Mouth/Throat:      Mouth: Mucous membranes are moist.      Pharynx: Oropharynx is clear. No oropharyngeal exudate. Eyes:      General: No scleral icterus. Right eye: No discharge. Left eye: No discharge. Extraocular Movements: Extraocular movements intact. Conjunctiva/sclera: Conjunctivae normal.      Pupils: Pupils are equal, round, and reactive to light. Neck:      Thyroid: No thyromegaly. Cardiovascular:      Rate and Rhythm: Normal rate. Rhythm irregular. Heart sounds: Normal heart sounds. No murmur heard. No gallop.    Pulmonary:      Effort: Pulmonary effort is normal. No respiratory distress. Breath sounds: Wheezing and rhonchi present. No rales. Abdominal:      General: Bowel sounds are normal.      Palpations: Abdomen is soft. There is no mass. Tenderness: There is no guarding or rebound. Musculoskeletal:         General: No tenderness or deformity. Normal range of motion. Cervical back: Normal range of motion and neck supple. Lymphadenopathy:      Cervical: No cervical adenopathy. Skin:     General: Skin is warm and dry. Findings: No rash. Neurological:      Mental Status: She is alert and oriented to person, place, and time. Cranial Nerves: No cranial nerve deficit. Motor: Weakness present. Coordination: Coordination normal.      Deep Tendon Reflexes: Reflexes are normal and symmetric. Reflexes normal.   Psychiatric:         Mood and Affect: Mood normal.         Behavior: Behavior normal.         Thought Content: Thought content normal.         Judgment: Judgment normal.         Pertinent Laboratory/Diagnostic Studies: The following studies were reviewed please see chart or hospital paperwork for details.     Space for lab dictation labs reflect elevated white blood cell count and anemia    - We will add a low-dose of morphine to help with dyspnea and oxygen starvation    Nhi Andrew DO  12/7/2023 10:35 AM

## 2023-12-11 ENCOUNTER — NURSING HOME VISIT (OUTPATIENT)
Dept: FAMILY MEDICINE CLINIC | Facility: CLINIC | Age: 83
End: 2023-12-11
Payer: MEDICARE

## 2023-12-11 DIAGNOSIS — D62 ACUTE BLOOD LOSS ANEMIA: ICD-10-CM

## 2023-12-11 DIAGNOSIS — K92.2 GASTROINTESTINAL HEMORRHAGE, UNSPECIFIED GASTROINTESTINAL HEMORRHAGE TYPE: ICD-10-CM

## 2023-12-11 DIAGNOSIS — D68.61 ANTIPHOSPHOLIPID ANTIBODY SYNDROME (HCC): ICD-10-CM

## 2023-12-11 DIAGNOSIS — J44.1 CHRONIC OBSTRUCTIVE PULMONARY DISEASE WITH ACUTE EXACERBATION (HCC): ICD-10-CM

## 2023-12-11 DIAGNOSIS — J95.2 ACUTE POSTOPERATIVE PULMONARY INSUFFICIENCY (HCC): ICD-10-CM

## 2023-12-11 DIAGNOSIS — J90 BILATERAL PLEURAL EFFUSION: ICD-10-CM

## 2023-12-11 DIAGNOSIS — J81.0 ACUTE PULMONARY EDEMA (HCC): Primary | ICD-10-CM

## 2023-12-11 DIAGNOSIS — I50.33 ACUTE ON CHRONIC DIASTOLIC CHF (CONGESTIVE HEART FAILURE) (HCC): ICD-10-CM

## 2023-12-11 PROCEDURE — 99309 SBSQ NF CARE MODERATE MDM 30: CPT | Performed by: FAMILY MEDICINE

## 2023-12-11 NOTE — PROGRESS NOTES
[address]  2026 [facility], [address] [place] [address]  [facility]    NAME: Josep Rm  AGE: 79 y.o. SEX: female    DATE OF ENCOUNTER: 12/11/2023    Code status:  Full Code    Assessment and Plan     1. Acute pulmonary edema (HCC)    2. Gastrointestinal hemorrhage, unspecified gastrointestinal hemorrhage type    3. Acute postoperative pulmonary insufficiency (HCC)    4. Bilateral pleural effusion    5. Chronic obstructive pulmonary disease with acute exacerbation ([address])    6. Acute on chronic diastolic CHF (congestive heart failure) ([address])    7. Antiphospholipid antibody syndrome (HCC)    8. Acute blood loss anemia        All medications and routine orders were reviewed and updated as needed. Plan discussed with: Family member    Chief Complaint     Interim evaluation    History of Present Illness     I spoke with nursing multiple times over the weekend regarding a decline in her performance status. Chest x-ray showed the presence of pulmonary edema and she had her diuretic dose significantly increased. She appears much more comfortable today. She continues to receive low-dose morphine. I believe she understands that she is close to the end. Comfort care is our goal.  She has requested that she not be sent back to the hospital.    The following portions of the patient's history were reviewed and updated as appropriate: current medications, past family history, past medical history, past social history, past surgical history and problem list.    Allergies: Allergies   Allergen Reactions    Aspirin Other (See Comments)    Penicillins Rash and Hives    Sulfa Antibiotics Rash       Review of Systems     Review of Systems   Constitutional:  Positive for unexpected weight change. Negative for activity change, appetite change, chills, diaphoresis and fatigue. HENT:  Negative for congestion, ear discharge, ear pain, hearing loss, nosebleeds and rhinorrhea.     Eyes:  Negative for pain, redness, itching and visual disturbance. Respiratory:  Positive for shortness of breath and wheezing. Negative for cough, choking and chest tightness. Cardiovascular:  Negative for chest pain and leg swelling. Gastrointestinal:  Negative for abdominal pain, blood in stool, constipation, diarrhea and nausea. Endocrine: Negative for cold intolerance, polydipsia and polyphagia. Genitourinary:  Negative for dysuria, frequency, hematuria and urgency. Musculoskeletal:  Negative for arthralgias, back pain, gait problem, joint swelling, neck pain and neck stiffness. Skin:  Negative for color change and rash. Allergic/Immunologic: Negative for environmental allergies and food allergies. Neurological:  Positive for weakness. Negative for dizziness, tremors, seizures, speech difficulty, numbness and headaches. Hematological:  Negative for adenopathy. Does not bruise/bleed easily. Psychiatric/Behavioral:  Negative for behavioral problems, dysphoric mood, hallucinations and self-injury. Medications and orders     All medications reviewed and updated in prison E[facility]. Objective     Vitals: per nursing home records    Physical Exam  Constitutional:       Appearance: Normal appearance. She is well-developed. HENT:      Head: Normocephalic and atraumatic. Right Ear: External ear normal.      Left Ear: External ear normal.      Mouth/Throat:      Mouth: Mucous membranes are moist.      Pharynx: Oropharynx is clear. No oropharyngeal exudate. Eyes:      General: No scleral icterus. Right eye: No discharge. Left eye: No discharge. Extraocular Movements: Extraocular movements intact. Conjunctiva/sclera: Conjunctivae normal.      Pupils: Pupils are equal, round, and reactive to light. Neck:      Thyroid: No thyromegaly. Cardiovascular:      Rate and Rhythm: Normal rate. Rhythm irregular. Heart sounds: Normal heart sounds. No murmur heard.      No gallop. Pulmonary:      Effort: Pulmonary effort is normal. No respiratory distress. Breath sounds: Rales present. No wheezing. Abdominal:      General: Bowel sounds are normal.      Palpations: Abdomen is soft. There is no mass. Tenderness: There is no guarding or rebound. Musculoskeletal:         General: No tenderness or deformity. Normal range of motion. Cervical back: Normal range of motion and neck supple. Lymphadenopathy:      Cervical: No cervical adenopathy. Skin:     General: Skin is warm and dry. Findings: No rash. Neurological:      Mental Status: She is alert and oriented to person, place, and time. Cranial Nerves: No cranial nerve deficit. Motor: Weakness present. Coordination: Coordination normal.      Deep Tendon Reflexes: Reflexes are normal and symmetric. Reflexes normal.   Psychiatric:         Mood and Affect: Mood normal.         Behavior: Behavior normal.         Thought Content: Thought content normal.         Judgment: Judgment normal.         Pertinent Laboratory/Diagnostic Studies: The following studies were reviewed please see chart or hospital paperwork for details. Space for lab dictation chest x-ray with pulmonary edema and pleural effusions    - Increase Lasix to 40 twice daily. Await today's labs.     Nhi Andrew DO  12/11/2023 1:15 PM

## 2023-12-12 PROBLEM — S22.050A COMPRESSION FRACTURE OF T6 VERTEBRA (HCC): Status: ACTIVE | Noted: 2023-01-01

## 2023-12-12 PROBLEM — N28.0 RENAL INFARCT (HCC): Status: ACTIVE | Noted: 2023-01-01

## 2023-12-12 PROBLEM — I50.30 DIASTOLIC HEART FAILURE (HCC): Status: ACTIVE | Noted: 2023-01-01

## 2023-12-12 PROBLEM — N17.9 AKI (ACUTE KIDNEY INJURY) (HCC): Status: ACTIVE | Noted: 2023-01-01

## 2023-12-12 PROBLEM — G93.40 ENCEPHALOPATHY: Status: ACTIVE | Noted: 2023-01-01

## 2023-12-13 ENCOUNTER — PATIENT OUTREACH (OUTPATIENT)
Dept: CASE MANAGEMENT | Facility: OTHER | Age: 83
End: 2023-12-13

## 2023-12-13 PROBLEM — R13.10 DYSPHAGIA: Status: ACTIVE | Noted: 2023-01-01

## 2023-12-13 PROBLEM — Z71.89 GOALS OF CARE, COUNSELING/DISCUSSION: Status: ACTIVE | Noted: 2023-01-01

## 2023-12-13 PROBLEM — G89.29 CHRONIC PAIN: Status: ACTIVE | Noted: 2023-12-13

## 2023-12-13 NOTE — PROGRESS NOTES
Simin Worrell is a 83 y.o. female who is currently ordered Vancomycin IV with management by the Pharmacy Consult service.  Relevant clinical data and objective / subjective history reviewed.  Vancomycin Assessment:  Indication and Goal AUC/Trough: Pneumonia (goal -600, trough >10)  Clinical Status:  New  Micro:   Pending  Renal Function:  SCr: 1.01 mg/dL  CrCl: 31.2 mL/min  Renal replacement: Not on dialysis  Days of Therapy: 1  Current Dose: 1250mg once loading dose  Vancomycin Plan:  New Dosinmg every 24 hours  Estimated AUC: 483 mcg*hr/mL  Estimated Trough: 15.2 mcg/mL  Next Level: 12/15/23 at 0600  Renal Function Monitoring: Daily BMP and UOP  Pharmacy will continue to follow closely for s/sx of nephrotoxicity, infusion reactions and appropriateness of therapy.  BMP and CBC will be ordered per protocol. We will continue to follow the patient’s culture results and clinical progress daily. Also, cefepime will be adjusted renally if necessary.    Tim Yung, Pharmacist, PharmD, BCPS

## 2023-12-13 NOTE — ASSESSMENT & PLAN NOTE
Noted on CT CAP  Hx of antiphospholipid syndrome, recently with GIB and now on reduced-dose Eliquis  Continue home Eliquis for now  Trend renal indices

## 2023-12-13 NOTE — ASSESSMENT & PLAN NOTE
Baseline creatinine 0.5-0.6  Creatinine on admission 1  Likely prerenal. Patient's diuretic dose had been signifiacntly increased outpatient due to concern for pulmonary edema  Hold additional diuretics  Consider gentle IVF resuscitation  Trend renal indices  Avoid hypotension and nephrotoxins

## 2023-12-13 NOTE — QUICK NOTE
Notified at 8:32 PM. Response immediate.     Pt is an 84 yo F PMHx COPD, HTN, APS, afib on eliquis who presents for evaluation of AMS. Per EMS, LKW about 7:15 PM. She was reported to be having spells of unresponsiveness and generalized weakness. Initial NIHSS 27 per ED report, but pt poorly following commands. Per the ED report she was able to withdraw all extremities to nox stim and was able to hold a conversation.     CTH showed no acute intracranial abnormalities. CTA stable when compared to most recent study on 12/1, which showed small basilar and P-comm aneurysms but no evidence of LVO. She was not a candidate for TNK given active use of eliquis. CT CAP notable for new R middle lobe consolidation c/f developing pneumonia. Per ED also noted to be hypoxic upon arrival. Suspect that her symptoms are most likely 2/2 TME, less likely acute stroke. If she does not improve with treatment of infection/metabolic derangements, would consider further workup with MRI brain and EEG.

## 2023-12-13 NOTE — SPEECH THERAPY NOTE
Speech Language/Pathology Cx    Order received, chart reviewed. Pt known to this ST from previous admission. Per review of facility paperwork, baseline diet is minced and moist w/ mildly thick/nectar thick liquids. S/w RN and critical care providers who at this time requesting hold on ST evaluation 2/2 tachypnea. ST to f/u for evaluation as able when medically/clinically appropriate.

## 2023-12-13 NOTE — ASSESSMENT & PLAN NOTE
Wt Readings from Last 3 Encounters:   12/12/23 47.6 kg (104 lb 15 oz)   12/02/23 55.5 kg (122 lb 5.7 oz)   10/20/23 52.2 kg (115 lb 2 oz)     11/2023 Echo: EF 60%, G1DD, moderate TR  Diuretic dose had been increased outpatient due to concern for pulmonary edema  Hold additional diuretics as she currently examines hypovolemic to euvolemic  Strict I/O  Daily weights

## 2023-12-13 NOTE — H&P
[facility]  H&P  Name: Simin Worrell 83 y.o. female I MRN: [id number]  Unit/Bed#: -01 SDU I Date of Admission: 12/12/2023   Date of Service: 12/13/2023 I Hospital Day: 1      Assessment/Plan   * Acute on chronic respiratory failure with hypoxia and hypercapnia (HCC)  Assessment & Plan  Hx of chronic hypoxic and hypercapnic resp failure on 3L NC at baseline in the setting of COPD  Presented with increased WOB and hypoxia  VBG: pH 7.379, CO2 84  Initially on 4L NC then required BiPAP for increased WOB  CXR with RML infiltrate  CT CAP: Redemonstrated small bilateral effusions with probable subjacent compressive atelectasis in the lower lobes. Somewhat heterogeneous consolidation in the right middle lobe with associated air bronchograms which appears new compared to prior recent study dated 11/29/2023 suspicious for interval developing pneumonia.   Not in AECOPD- recently discharged on prednisone taper after COPD exac     Plan:  Start Xopenex and Atrovent nebs  Continue home nebs and inhalers  Abx as outlined below  Attempt to wean off bipap to facilitate airway clearance  Aggressive pulmonary toilet  Vest therapy  Wean supplemental O2 to maintain spO2 > 88%    Sepsis (HCC)  Assessment & Plan  SIRS: leukocytosis, tachypnea  Source: Pulmonary  CT CAP: Redemonstrated small bilateral effusions with probable subjacent compressive atelectasis in the lower lobes. Somewhat heterogeneous consolidation in the right middle lobe with associated air bronchograms which appears new compared to prior recent study dated 11/29/2023 suspicious for interval developing pneumonia.   UA with small leukocytes, negative nitrites  Blood cultures pending  Given CTX and Azithro in ED  Patient recently with pan-sensitive Pseudomonas in sputum cultures  Procal 0.61  Lactic 1.1    Plan:  Start cefepime and vancomycin  Check sputum culture, urine antigens, and MRSA  Follow-up culture data  Trend fever curve and  "WBC count    Dysphagia  Assessment & Plan  On puree and \"mildly thick\" liquids at facility  NPO, meds with applesauce on BIPAP  Speech consult    Chronic pain  Assessment & Plan  Patient has chronic back pain and has been receiving PRN morphine at her rehab  Hold morphine for now 2/2 lethargy  Continue lidoderm patches and PRN tylenol    Goals of care, counseling/discussion  Assessment & Plan  Patient recently admitted and made Level 2 DNR. POLST with level 2 code status documented  Discussed with patient's son that given her hx of COPD, chronic hypoxic/hypercapnic respiratory failure, and overall deconditioned state, she would likely be difficult to extubate should she require intubation. Patient's son was adamant that she would never want long-term intubation and that the patient has made comments recently about not wanting to live forever with pain.  Ultimately patient's son opted for level 3 DNR/DNI status  Of note, there is documentation from rehab physician on 12/11 stating, \"I believe she understands she is close to the end. Comfort care is our goal. She has requested that she not be sent back to the hospital.\"  Consider palliative care consult pending patient's progress      Compression fracture of T6 vertebra (HCC)  Assessment & Plan  Noted on CT CAP on admission  Consider NSGY consult to determine the need for TLSO brace with activity    Renal infarct (HCC)  Assessment & Plan  Noted on CT CAP  Hx of antiphospholipid syndrome, recently with GIB and now on reduced-dose Eliquis  Continue home Eliquis for now  Trend renal indices    Encephalopathy  Assessment & Plan  Presented as a stroke alert with \"unresponsive episode\" at facility  CTA H/N with no acute findings  Patient is currently lethargic and has difficulty speaking on bipap, but following commands in all extremities with equal strength throughout  Likely TME 2/2 infection  Q4h neuro checks  Avoid sedating medications  Hold home remeron  Delirium " "precautions      Diastolic heart failure (HCC)  Assessment & Plan  Wt Readings from Last 3 Encounters:   12/12/23 47.6 kg (104 lb 15 oz)   12/02/23 55.5 kg (122 lb 5.7 oz)   10/20/23 52.2 kg (115 lb 2 oz)     11/2023 Echo: EF 60%, G1DD, moderate TR  Diuretic dose had been increased outpatient due to concern for pulmonary edema  Hold additional diuretics as she currently examines hypovolemic to euvolemic  Strict I/O  Daily weights          JENNIE (acute kidney injury) ([facility])  Assessment & Plan  Baseline creatinine 0.5-0.6  Creatinine on admission 1  Likely prerenal. Patient's diuretic dose had been signifiacntly increased outpatient due to concern for pulmonary edema  Hold additional diuretics  Consider gentle IVF resuscitation  Trend renal indices  Avoid hypotension and nephrotoxins    Mood disorder ([facility])  Assessment & Plan  Hold home Remeron and Zoloft 2/2 lethargy    Failure to thrive in adult  Assessment & Plan  Nutrition and speech therapy consults    Other specified hypothyroidism  Assessment & Plan  Continue synthroid    Atrial fibrillation ([facility])  Assessment & Plan  Continue Eliquis  Continue Cardizem  Monitor on telemetry    Antiphospholipid antibody syndrome ([facility])  Assessment & Plan  Continue Eliquis    Hypertension  Assessment & Plan  Hold home amlodipine for now    COPD (chronic obstructive pulmonary disease) ([facility])  Assessment & Plan  Chronically on 2L NC  Home regimen: Spiriva inhaler, pulmicort nebs BID, duonebs TID daily   Not in AECOPD  Continue with steroid taper  Respiratory protocol           History of Present Illness     HPI: Smiin Worrell is a 83 y.o. female with PMHx significant for chronic hypoxic and hypercapnic respiratory failure on 2L NC, COPD, Afib on Eliquis, antiphospholipid syndrome, diastolic heart failure, HTN, hypothyroidism, and chronic pain who presented to the ED from rehab facility for an \"unresponsive episode\". History obtained from chart review and patient's daughter at bedside. " Per patient's daughter, the patient has been complaining of increased back pain and started receiving PRN morphine. She was also complaining of shortness of breath and her lasix dose had been increased due to concern for pulmonary edema on outpatient CXR. Since these medications were started the patient has been very lethargic. Today she was difficult to arouse prompting a stroke alert. CTA H/N was negative for acute findings. Mental status improved throughout her time in ED and she was able to follow commands in all extremities but remained somnolent. She was hypoxic requiring 6L nasal cannula and ultimately transitioned to BiPAP for increased work of breathing. Laboratory work-up showed JENNIE, leukocytosis, and elevated procal. CT CAP revealed severe RML PNA, L kidney infarct, and T6 compression fracture. She will be admitted SD1 under critical care service for further management.    History obtained from child and chart review.  Review of Systems   Unable to perform ROS: Acuity of condition     Disposition: Stepdown Level 1  Historical Information   Past Medical History:  No date: Anti-phospholipid syndrome ([facility])  No date: Asthma  No date: Blood type O+  No date: Cardiac disease  No date: Chronic pain  No date: COPD (chronic obstructive pulmonary disease) ([facility])  6/22/2023: Femur fracture, left ([facility])  No date: Fracture of ankle      Comment:  left  No date: Hyperlipidemia  No date: Hypertension  2/27/2023: Hypokalemia  No date: Osteoporosis Past Surgical History:  No date: APPENDECTOMY  No date: BILATERAL OOPHORECTOMY; Bilateral  No date: ORIF TIBIAL SHAFT FRACTURE W/ PLATES AND SCREWS; Right  6/23/2023: WA HEMIARTHROPLASTY HIP PARTIAL; Left      Comment:  Procedure: HEMIARTHROPLASTY HIP (BIPOLAR);  Surgeon:                Dwaine Womack MD;  Location:  MAIN OR;  Service:                Orthopedics   Current Outpatient Medications   Medication Instructions    acetaminophen (TYLENOL) 975 mg, Oral, Every 8 hours  scheduled    albuterol 2.5 mg, Nebulization, Every 6 hours PRN    apixaban (ELIQUIS) 2.5 mg, Oral, 2 times daily    budesonide (PULMICORT) 0.5 mg, Nebulization, Every 12 hours (RESP), Rinse mouth after use.    Cholecalciferol (VITAMIN D3) 20 MCG (800 UNIT) TABS Oral, Daily    diltiazem (CARDIZEM CD) 240 mg, Oral, Daily    ferrous sulfate 325 mg, Oral, Daily with breakfast    Fluticasone-Salmeterol (Advair) 250-50 mcg/dose inhaler 1 puff, Inhalation, 2 times daily, Rinse mouth after use.    furosemide (LASIX) 20 mg tablet 1.5 TABS PO Q DAY    guaiFENesin (MUCINEX) 600 mg, Oral, 2 times daily    ipratropium-albuterol (DUO-NEB) 0.5-2.5 mg/3 mL nebulizer solution No dose, route, or frequency recorded.    levothyroxine 25 mcg tablet TAKE 1 TABLET DAILY    lidocaine (LIDODERM) 5 % 1 patch, Topical, Daily, Remove & Discard patch within 12 hours or as directed by MD    Lidocaine Pain Relief 4 % PTCH No dose, route, or frequency recorded.    loratadine (CLARITIN) 10 mg, Oral, Daily    mirtazapine (REMERON) 7.5 mg, Oral, Daily at bedtime    multivitamin (THERAGRAN) TABS 1 tablet, Oral, Daily    oxygen 1 L/min, Inhalation, Continuous    pantoprazole (PROTONIX) 40 mg, Oral, 2 times daily before meals    predniSONE 20 mg tablet Take 3 tablets (60 mg total) by mouth daily for 3 days, THEN 2.5 tablets (50 mg total) daily for 3 days, THEN 2 tablets (40 mg total) daily for 3 days, THEN 1.5 tablets (30 mg total) daily for 3 days, THEN 1 tablet (20 mg total) daily for 3 days, THEN 0.5 tablets (10 mg total) daily for 3 days. Do not start before December 3, 2023.    sertraline (ZOLOFT) 50 mg, Oral, Daily    Spiriva HandiHaler 18 mcg, Inhalation, Daily    vitamin C 1,000 mg, Oral, Daily    Allergies   Allergen Reactions    Aspirin Other (See Comments)    Penicillins Rash and Hives    Sulfa Antibiotics Rash      Social History     Tobacco Use    Smoking status: Former     Types: Cigarettes    Smokeless tobacco: Never    Tobacco comments:      Never smoker per [employer]   Vaping Use    Vaping status: Never Used   Substance Use Topics    Alcohol use: Not Currently    Drug use: No    Family History   Problem Relation Age of Onset    Hypertension Mother     Skin cancer Mother     Hypertension Father           Objective                            Vitals I/O      Most Recent Min/Max in 24hrs   Temp (!) 97.1 °F (36.2 °C) Temp  Min: 97.1 °F (36.2 °C)  Max: 98.9 °F (37.2 °C)   Pulse 81 Pulse  Min: 65  Max: 81   Resp (!) 37 Resp  Min: 8  Max: 37   /79 BP  Min: 135/59  Max: 183/79   O2 Sat 95 % SpO2  Min: 83 %  Max: 96 %      Intake/Output Summary (Last 24 hours) at 12/13/2023 0244  Last data filed at 12/13/2023 0147  Gross per 24 hour   Intake 1050 ml   Output 600 ml   Net 450 ml       Diet NPO    Invasive Monitoring           Physical Exam   Physical Exam  Vitals reviewed.   Eyes:      Extraocular Movements: Extraocular movements intact.      Pupils: Pupils are equal, round, and reactive to light.   Skin:     General: Skin is warm and dry.   HENT:      Head: Normocephalic and atraumatic.      Mouth/Throat:      Mouth: Mucous membranes are dry.   Cardiovascular:      Rate and Rhythm: Normal rate. Rhythm irregular.      Heart sounds: No murmur heard.     No friction rub. No gallop.   Musculoskeletal:      Right lower leg: No edema.      Left lower leg: No edema.   Abdominal: General: There is no distension.      Palpations: Abdomen is soft.      Tenderness: There is no abdominal tenderness. There is no guarding.      Hernia: No hernia is present.   Constitutional:       Appearance: She is ill-appearing.      Comments: Frail. Tachypneic on BiPAP   Pulmonary:      Effort: Tachypnea present.      Breath sounds: No wheezing or rales.      Comments: Decreased on R  Neurological:      General: No focal deficit present.      Comments: Awakens to voice. Follows commands in all extremities with equal strength throughout. Difficulty answering orientation questions  due to bipap mask. Quickly falls back to sleep without stimulation            Diagnostic Studies      EKG: Afib  Imaging:  I have personally reviewed pertinent reports.       Medications:  Scheduled PRN   apixaban, 2.5 mg, BID  budesonide, 0.5 mg, Q12H  cefepime, 2,000 mg, Q12H  chlorhexidine, 15 mL, Q12H CARMEN  diltiazem, 240 mg, Daily  ferrous sulfate, 325 mg, Daily  guaiFENesin, 600 mg, BID  ipratropium, 0.5 mg, Q6H  levalbuterol, 1.25 mg, Q6H  levothyroxine, 25 mcg, Early Morning  lidocaine, 1 patch, Daily  magnesium sulfate, 2 g, Once  multivitamin-minerals, 1 tablet, Daily  pantoprazole, 40 mg, Daily Before Breakfast  potassium chloride, 20 mEq, Q2H  umeclidinium, 1 puff, Daily  vancomycin, 750 mg, Q24H      acetaminophen, 650 mg, Q6H PRN  albuterol, 2.5 mg, Q6H PRN  iohexol, 80 mL, Once in imaging       Continuous          Labs:    CBC    Recent Labs     12/12/23 2127 12/12/23 2135   WBC 20.00*  --    HGB 9.5* 10.2*   HCT 31.2* 30*     --      BMP    Recent Labs     12/12/23 2127 12/12/23 2135   SODIUM 139  --    K 3.3*  --    CL 90*  --    CO2 45* >45*   AGAP 4  --    BUN 38*  --    CREATININE 1.01  --    CALCIUM 8.5  --        Coags    Recent Labs     12/12/23 2127   INR 1.57*   PTT 59*        Additional Electrolytes  Recent Labs     12/12/23 2135   CAIONIZED 1.10*          Blood Gas    No recent results  No recent results LFTs  Recent Labs     12/12/23 2127   ALT 11   AST 17   ALKPHOS 111*   ALB 2.9*   TBILI 0.65       Infectious  Recent Labs     12/12/23 2127   PROCALCITONI 0.61*     Glucose  Recent Labs     12/12/23 2127   GLUC 138             Anticipated Length of Stay is > 2 midnights  Sharri Casarez PA-C

## 2023-12-13 NOTE — SEPSIS NOTE
Sepsis Note   Simin Worrell 83 y.o. female MRN: [id number]  Unit/Bed#: - SDU Encounter: [id number]       Initial Sepsis Screening       Row Name 12/13/23 0121                Is the patient's history suggestive of a new or worsening infection? Yes (Proceed)  -SB        Suspected source of infection pneumonia  -SB        Indicate SIRS criteria Tachypnea > 20 resp per min;Leukocytosis (WBC > 92185 IJL) OR Leukopenia (WBC <4000 IJL) OR Bandemia (WBC >10% bands)  -SB        Are two or more of the above signs & symptoms of infection both present and new to the patient? Yes (Proceed)  -SB        Assess for evidence of organ dysfunction: Are any of the below criteria present within 6 hours of suspected infection and SIRS criteria that are NOT considered to be chronic conditions? --                  User Key  (r) = Recorded By, (t) = Taken By, (c) = Cosigned By      Initials Name Provider Type    SB Sharri Casarez PA-C Physician Assistant                        Body mass index is 16.94 kg/m².  Wt Readings from Last 1 Encounters:   12/12/23 47.6 kg (104 lb 15 oz)        Ideal body weight: 59.3 kg (130 lb 11.7 oz)

## 2023-12-13 NOTE — ASSESSMENT & PLAN NOTE
"On puree and \"mildly thick\" liquids at facility  NPO, meds with applesauce on BIPAP  Speech consult  "

## 2023-12-13 NOTE — ASSESSMENT & PLAN NOTE
"Presented as a stroke alert with \"unresponsive episode\" at facility  CTA H/N with no acute findings  Patient is currently lethargic and has difficulty speaking on bipap, but following commands in all extremities with equal strength throughout  Likely TME 2/2 infection  Q4h neuro checks  Avoid sedating medications  Hold home remeron  Delirium precautions    "

## 2023-12-13 NOTE — ASSESSMENT & PLAN NOTE
Hx of chronic hypoxic and hypercapnic resp failure on 3L NC at baseline in the setting of COPD  Presented with increased WOB and hypoxia  VBG: pH 7.379, CO2 84  Initially on 4L NC then required BiPAP for increased WOB  CXR with RML infiltrate  CT CAP: Redemonstrated small bilateral effusions with probable subjacent compressive atelectasis in the lower lobes. Somewhat heterogeneous consolidation in the right middle lobe with associated air bronchograms which appears new compared to prior recent study dated 11/29/2023 suspicious for interval developing pneumonia.   Not in AECOPD- recently discharged on prednisone taper after COPD exac     Plan:  Start Xopenex and Atrovent nebs  Continue home nebs and inhalers  Abx as outlined below  Attempt to wean off bipap to facilitate airway clearance  Aggressive pulmonary toilet  Vest therapy  Wean supplemental O2 to maintain spO2 > 88%

## 2023-12-13 NOTE — CASE MANAGEMENT
Case Management Assessment & Discharge Planning Note    Patient name Simin Worrell  Location  SDU/-01 S* MRN [id number]  : 1940 Date 2023       Current Admission Date: 2023  Current Admission Diagnosis:Acute on chronic respiratory failure with hypoxia and hypercapnia (HCC)   Patient Active Problem List    Diagnosis Date Noted    Goals of care, counseling/discussion 2023    Chronic pain 2023    Dysphagia 2023    JENNIE (acute kidney injury) ([facility]) 2023    Diastolic heart failure ([facility]) 2023    Encephalopathy 2023    Renal infarct ([facility]) 2023    Compression fracture of T6 vertebra ([facility]) 2023    Underweight 2023    Acute blood loss anemia 2023    Acute on chronic diastolic CHF (congestive heart failure) ([facility]) 2023    Compression fracture of lumbar vertebra ([facility]) 2023    Status post hip hemiarthroplasty 2023    Acute postoperative pulmonary insufficiency ([facility]) 2023    Bilateral pleural effusion 2023    Acute on chronic respiratory failure with hypoxia and hypercapnia ([facility]) 2023    GIB (gastrointestinal bleeding) 2023    Cerebral aneurysm, nonruptured 2023    Abdominal distention/back pain 2023    MDD with persistant bereavement complex 2023    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HCC) 2023    Thyroid nodule 2023    Severe protein-calorie malnutrition (HCC) 2023    Mood disorder ([facility]) 2023    Failure to thrive in adult 2023    Pulmonary nodule 02/15/2023    Onychomycosis 02/15/2023    Anxiety 02/10/2023    Other specified hypothyroidism 2022    Peripheral edema 2021    Low back pain without sciatica 2021    Bilateral pneumonia 2017    COPD (chronic obstructive pulmonary disease) ([facility]) 2017    Sepsis ([facility]) 2017    Hypertension 2017    Hypercoagulable state ([facility]) 2017    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    Vitamin D deficiency 12/20/2016    Osteoporosis 06/26/2015    Antiphospholipid antibody syndrome (HCC) 11/13/2012    Atrial fibrillation (HCC) 11/13/2012    Hereditary hemolytic anemia (HCC) 11/13/2012    Asthma 11/13/2012      LOS (days): 1  Geometric Mean LOS (GMLOS) (days):   Days to GMLOS:     OBJECTIVE:  PATIENT READMITTED TO HOSPITAL  Risk of Unplanned Readmission Score: 45.97         Current admission status: Inpatient       [facility] Pharm[facility]:   [id number] [address]  [address] [facility] [address]  Phone: [phone] Fax: [phone]    [facility] - [address] [address]  Phone: [phone] Fax: [phone]    Primary Care Provider: Aaron Lemon MD    Primary Insurance: MEDICARE  Secondary Insurance: BLUE CROSS    ASSESSMENT:  Active Health Care Proxies       Solomon Worrell [facility] Agent - Child   Primary Phone: [phone] (Home)  Work Phone: [phone]                 Advance Directives  Does patient have a Health Care POA?: Yes  Does patient have Advance Directives?: Yes  Advance Directives: Living will, Power of  for health care  Primary Contact: Solomon Worrell: son:[phone]    Readmission Root Cause  30 Day Readmission: Yes  Who directed you to return to the hospital?: [facility]  Did you understand whom to contact if you had questions or problems?: Yes  Did you get your prescriptions before you left the hospital?: No  Reason:: Delivery service not offered  Were you able to get your prescriptions filled when you left the hospital?: Yes  Did you take your medications as prescribed?: Yes  Were you able to get to your follow-up appointments?: No  Reason:: Readmitted prior to appointment  During previous admission, was a post-acute recommendation made?: Yes  What post-acute resources were offered?: STR  Patient was readmitted  due to: Acute on chronic respiratory failure with hypoxia and hypercapnia, sepsis  Action Plan: Bipap, IV ABX    Patient Information  Admitted from:: Facility ([facility])  Mental Status: Alert (but placed on bipap)  During Assessment patient was accompanied by: Not accompanied during assessment  Assessment information provided by:: Other - please comment ([facility] and chart)  Primary Caregiver: Other (Comment) ([facility] for STR)  Support Systems: Self, Children, Family members  County of Residence: [place]  What city do you live in?: [place]  Living Arrangements: Other (Comment) (was living alone prior to STR at [facility])  Is patient a ?: No    Activities of Daily Living Prior to Admission  Functional Status: Assistance  Completes ADLs independently?: No  Level of ADL dependence: Assistance  Ambulates independently?: No  Level of ambulatory dependence: Assistance  Does patient use assisted devices?: Yes  Assisted Devices (DME) used: Walker  Does patient currently own DME?: Yes  What DME does the patient currently own?: Walker  Does patient have a history of Outpatient Therapy (PT/OT)?: No  Does the patient have a history of Short-Term Rehab?: Yes ([facility] and [facility])  Does patient currently have HHC?: No                          Housing Stability: Low Risk  (12/13/2023)    Housing Stability Vital Sign     Unable to Pay for Housing in the Last Year: No     Number of Places Lived in the Last Year: 1     Unstable Housing in the Last Year: No   Food Insecurity: No Food Insecurity (12/13/2023)    Hunger Vital Sign     Worried About Running Out of Food in the Last Year: Never true     Ran Out of Food in the Last Year: Never true   Transportation Needs: No Transportation Needs (12/13/2023)    PRAPARE - Transportation     Lack of Transportation (Medical): No     Lack of Transportation (Non-Medical): No   Utilities: Not on file       DISCHARGE DETAILS:    Additional  Comments: Acknowledge Pt is from [facility] for STR. Attempted to see Pt but Pt was being placed back on bipap. Pt's son(Solomon) coming to visit Pt and CM informed him that CM will be following Pt's progress . Pt was living alone in [facility] prior to STR. Pt;s son*Solomon) is Pt's POA and she has living will. Per Sharee at [facility], Pt with min assist and using walker, has assistance with adls. Tony reports if needed, Pt can return for STR but there are no long term beds available. CM to follow.

## 2023-12-13 NOTE — PLAN OF CARE
Problem: PAIN - ADULT  Goal: Verbalizes/displays adequate comfort level or baseline comfort level  Description: Interventions:  - Encourage patient to monitor pain and request assistance  - Assess pain using appropriate pain scale  - Administer analgesics based on type and severity of pain and evaluate response  - Implement non-pharmacological measures as appropriate and evaluate response  - Consider cultural and social influences on pain and pain management  - Notify physician/advanced practitioner if interventions unsuccessful or patient reports new pain  Outcome: Progressing     Problem: INFECTION - ADULT  Goal: Absence or prevention of progression during hospitalization  Description: INTERVENTIONS:  - Assess and monitor for signs and symptoms of infection  - Monitor lab/diagnostic results  - Monitor all insertion sites, i.e. indwelling lines, tubes, and drains  - Monitor endotracheal if appropriate and nasal secretions for changes in amount and color  - [place] appropriate cooling/warming therapies per order  - Administer medications as ordered  - Instruct and encourage patient and family to use good hand hygiene technique  - Identify and instruct in appropriate isolation precautions for identified infection/condition  Outcome: Progressing  Goal: Absence of fever/infection during neutropenic period  Description: INTERVENTIONS:  - Monitor WBC    Outcome: Progressing     Problem: SAFETY ADULT  Goal: Patient will remain free of falls  Description: INTERVENTIONS:  - Educate patient/family on patient safety including physical limitations  - Instruct patient to call for assistance with activity   - Consult OT/PT to assist with strengthening/mobility   - Keep Call bell within reach  - Keep bed low and locked with side rails adjusted as appropriate  - Keep care items and personal belongings within reach  - Initiate and maintain comfort rounds  - Make Fall Risk Sign visible to staff  - Apply yellow socks and bracelet  for high fall risk patients  - Consider moving patient to room near nurses station  Outcome: Progressing  Goal: Maintain or return to baseline ADL function  Description: INTERVENTIONS:  -  Assess patient's ability to carry out ADLs; assess patient's baseline for ADL function and identify physical deficits which impact ability to perform ADLs (bathing, care of mouth/teeth, toileting, grooming, dressing, etc.)  - Assess/evaluate cause of self-care deficits   - Assess range of motion  - Assess patient's mobility; develop plan if impaired  - Assess patient's need for assistive devices and provide as appropriate  - Encourage maximum independence but intervene and supervise when necessary  - Involve family in performance of ADLs  - Assess for home care needs following discharge   - Consider OT consult to assist with ADL evaluation and planning for discharge  - Provide patient education as appropriate  Outcome: Progressing  Goal: Maintains/Returns to pre admission functional level  Description: INTERVENTIONS:  - Perform AM-PAC 6 Click Basic Mobility/ Daily Activity assessment daily.  - Set and communicate daily mobility goal to care team and patient/family/caregiver.   - Collaborate with rehabilitation services on mobility goals if consulted  - Out of bed for toileting  - Record patient progress and toleration of activity level   Outcome: Progressing     Problem: DISCHARGE PLANNING  Goal: Discharge to home or other facility with appropriate resources  Description: INTERVENTIONS:  - Identify barriers to discharge w/patient and caregiver  - Arrange for needed discharge resources and transportation as appropriate  - Identify discharge learning needs (meds, wound care, etc.)  - Arrange for interpretive services to assist at discharge as needed  - Refer to Case Management Department for coordinating discharge planning if the patient needs post-hospital services based on physician/advanced practitioner order or complex needs  related to functional status, cognitive ability, or social support system  Outcome: Progressing     Problem: Knowledge Deficit  Goal: Patient/family/caregiver demonstrates understanding of disease process, treatment plan, medications, and discharge instructions  Description: Complete learning assessment and assess knowledge base.  Interventions:  - Provide teaching at level of understanding  - Provide teaching via preferred learning methods  Outcome: Progressing     Problem: Nutrition/Hydration-ADULT  Goal: Nutrient/Hydration intake appropriate for improving, restoring or maintaining nutritional needs  Description: Monitor and assess patient's nutrition/hydration status for malnutrition. Collaborate with interdisciplinary team and initiate plan and interventions as ordered.  Monitor patient's weight and dietary intake as ordered or per policy. Utilize nutrition screening tool and intervene as necessary. Determine patient's food preferences and provide high-protein, high-caloric foods as appropriate.     INTERVENTIONS:  - Monitor oral intake, urinary output, labs, and treatment plans  - Assess nutrition and hydration status and recommend course of action  - Evaluate amount of meals eaten  - Assist patient with eating if necessary   - Allow adequate time for meals  - Recommend/ encourage appropriate diets, oral nutritional supplements, and vitamin/mineral supplements  - Order, calculate, and assess calorie counts as needed  - Recommend, monitor, and adjust tube feedings and TPN/PPN based on assessed needs  - Assess need for intravenous fluids  - Provide specific nutrition/hydration education as appropriate  - Include patient/family/caregiver in decisions related to nutrition  Outcome: Progressing

## 2023-12-13 NOTE — ASSESSMENT & PLAN NOTE
Noted on CT CAP on admission  Consider NSGY consult to determine the need for TLSO brace with activity

## 2023-12-13 NOTE — ASSESSMENT & PLAN NOTE
Chronically on 2L NC  Home regimen: Spiriva inhaler, pulmicort nebs BID, duonebs TID daily   Not in AECOPD  Continue with steroid taper  Respiratory protocol

## 2023-12-13 NOTE — ASSESSMENT & PLAN NOTE
Patient has chronic back pain and has been receiving PRN morphine at her rehab  Hold morphine for now 2/2 lethargy  Continue lidoderm patches and PRN tylenol

## 2023-12-13 NOTE — CONSULTS
Consultation - Palliative and Supportive Care   Simin Worrell 83 y.o. female [id number]    Assessment/Plan:  Goals of care counseling  Encounter for palliative and supportive care  Altered mental status/encephalopathy  Acute on chronic hypoxic and hypercapnic respiratory failure  Unresponsive episode  Bibasilar pleural effusions  Restrictive lung disease  Dysphagia  Antiphospholipid syndrome  COPD  Chronic pain syndrome   -Extensive goals of care conversation today with son/Solomon/ZAINA over the phone.  Also spoke with son/Franco over the phone.  I spoke with Simin in person to the best of her ability; she is currently on BiPAP.  -After having her mouth swabbed, patient is significantly more comfortable.  She is also on Precedex.  She demonstrates comprehension of what we discussed.  She is clear that she would like to continue with the BiPAP, if needed, for the next 24 hours and will reevaluate tomorrow.  -Her sons agree to honor her wishes.  I speak more in depth with Solomon about palliative versus hospice care and what this may look like in the future.  Of course, the big concern is Remone his oxygen levels here in the hospital.  Sons would support comfort care if this is what the patient wants however for now the patient is comfortable and would like to continue BiPAP.  -Continue medical care with limit of DNR/DNI.  Palliative care will follow.    Social support:  Time spent providing supportive listening.     Validated patient and family experience              I have reviewed the patient's controlled substance dispensing history in the Prescription Drug Monitoring Program in compliance with the [facility] regulations before prescribing any controlled substances.  Last refills  Filled  Written  ID  Drug  QTY  Days  Prescriber  RX #  Dispenser  Refill  Daily Dose*  Pymt Type      12/07/2023 12/07/2023 6 Morphine Sulf 100 Mg/5 Ml Conc 30.00 60 Makenzie O\' [id number] Tiffanie ([id number]) 0 10.00 MME Private Pay [place]   07/03/2023  07/03/2023 1 Oxycodone Hcl (Ir) 5 Mg Tablet 28.00 5 Le Dun [id number] Omn ([id number]) 0 42.00 MME Private Pay [place]   07/03/2023 07/03/2023 5 Oxycodone Hcl (Ir) 5 Mg Tablet 28.00 5 [place] Dun [id number] Omn ([id number]) 0 42.00 MME Private Pay [place]   03/15/2023 03/13/2023 2 Methylphenidate 5 Mg Tablet 5.00 5 Ku Bha [id number] Omn ([id number]) 0  Private Pay [place]     Decisional apparatus:  Patient is partially competent on exam today.  If competence is lost, patient's substitute decision maker would default to Solomon* by [place] Act 169.  Advance Directive/Living Will/POLST: Pt has 3 adult children, however Solomon is medical POA.     We appreciate the invitation to be involved in this patient's care.  We will continue to follow throughout this hospitalization.  Please do not hesitate to reach our on call provider through our clinic answering service at [phone] should you have acute symptom control concerns.    Katt Piper PA-C  Palliative and Supportive Care  Clinic/Answering Service: [phone]  You can find me on [employer]!     IDENTIFICATION:  Inpatient consult to Palliative Care  Consult performed by: Katt Piper PA-C  Consult ordered by: YUE Buckley        Physician Requesting Consult: Ata Alas DO  Reason for Consult / Principal Problem: GOC counseling and SM secondary to acute respiratory failure.     History of Present Illness:  Simin Worrell is a 83 y.o. female who presents with unresponsive episode.  The patient has past medical history of COPD, A-fib on Eliquis, antiphospholipid syndrome, diastolic heart failure and chronic hypoxic and hypercapnic respiratory failure on 2 L nasal cannula, and chronic pain syndrome on morphine.  The patient has been [facility] for short-term rehab.  She recently complained of increased back pain and was placed on morphine.  She developed lethargy and worsening hypoxia and was difficult to arouse and therefore she was brought to the emergency  department.  She was found to be in respiratory failure and required BiPAP which did improve her mentation.  She has been admitted for further workup and care.  Palliative and supportive care has been called clarify goals of care conversations as prior documentation reflects that patient would not want to return to the hospital and did not require aggressive cares.     Review of Systems:   Review of Systems   Unable to perform ROS: Acuity of condition   HENT:  Negative for congestion and stridor.    Cardiovascular:  Positive for dyspnea on exertion.   Musculoskeletal:  Negative for arthritis and back pain.     Past Medical History:   Diagnosis Date    Anti-phospholipid syndrome ([facility])     Asthma     Blood type O+     Cardiac disease     Chronic pain     COPD (chronic obstructive pulmonary disease) (Formerly [facility])     Femur fracture, left ([facility]ly [facility]) 6/22/2023    Fracture of ankle     left    Hyperlipidemia     Hypertension     Hypokalemia 2/27/2023    Osteoporosis      Past Surgical History:   Procedure Laterality Date    APPENDECTOMY      BILATERAL OOPHORECTOMY Bilateral     ORIF TIBIAL SHAFT FRACTURE W/ PLATES AND SCREWS Right     [place] HEMIARTHROPLASTY HIP PARTIAL Left 6/23/2023    Procedure: HEMIARTHROPLASTY HIP (BIPOLAR);  Surgeon: Dwaine Womack MD;  Location:  MAIN OR;  Service: Orthopedics     Social History     Socioeconomic History    Marital status:      Spouse name: Not on file    Number of children: Not on file    Years of education: Not on file    Highest education level: Not on file   Occupational History    Not on file   Tobacco Use    Smoking status: Former     Types: Cigarettes    Smokeless tobacco: Never    Tobacco comments:     Never smoker per [employer]   Vaping Use    Vaping status: Never Used   Substance and Sexual Activity    Alcohol use: Not Currently    Drug use: No    Sexual activity: Not Currently   Other Topics Concern    Not on file   Social History Narrative    Not on file     Social Determinants  of Health     Financial Resource Strain: Low Risk  (4/11/2023)    Overall Financial Resource Strain (CARDIA)     Difficulty of Paying Living Expenses: Not very hard   Food Insecurity: No Food Insecurity (12/13/2023)    Hunger Vital Sign     Worried About Running Out of Food in the Last Year: Never true     Ran Out of Food in the Last Year: Never true   Transportation Needs: No Transportation Needs (12/13/2023)    PRAPARE - Transportation     Lack of Transportation (Medical): No     Lack of Transportation (Non-Medical): No   Physical Activity: Not on file   Stress: Not on file   Social Connections: Not on file   Intimate Partner Violence: Not on file   Housing Stability: Low Risk  (12/13/2023)    Housing Stability Vital Sign     Unable to Pay for Housing in the Last Year: No     Number of Places Lived in the Last Year: 1     Unstable Housing in the Last Year: No     Family History   Problem Relation Age of Onset    Hypertension Mother     Skin cancer Mother     Hypertension Father      Medications:  all current active meds have been reviewed and current meds:   Current Facility-Administered Medications   Medication Dose Route Frequency    acetaminophen (TYLENOL) tablet 650 mg  650 mg Oral Q6H PRN    albuterol inhalation solution 2.5 mg  2.5 mg Nebulization Q6H PRN    budesonide (PULMICORT) inhalation solution 0.5 mg  0.5 mg Nebulization Q12H    cefepime (MAXIPIME) IVPB (premix in dextrose) 2,000 mg 50 mL  2,000 mg Intravenous Q12H    chlorhexidine (PERIDEX) 0.12 % oral rinse 15 mL  15 mL Mouth/Throat Q12H CARMEN    dexmedeTOMIDine (Precedex) 400 mcg in sodium chloride 0.9% 100 mL  0.1-0.7 mcg/kg/hr Intravenous Titrated    heparin (porcine) 25,000 units in 0.45% NaCl 250 mL infusion (premix)  3-20 Units/kg/hr (Order-Specific) Intravenous Titrated    heparin (porcine) injection 1,350 Units  1,350 Units Intravenous Q6H PRN    heparin (porcine) injection 2,700 Units  2,700 Units Intravenous Q6H PRN    hydrALAZINE  (APRESOLINE) injection 10 mg  10 mg Intravenous Q6H PRN    HYDROmorphone HCl (DILAUDID) injection 0.2 mg  0.2 mg Intravenous Q4H PRN    iohexol (OMNIPAQUE) 350 MG/ML injection (MULTI-DOSE) 80 mL  80 mL Intravenous Once in imaging    ipratropium (ATROVENT) 0.02 % inhalation solution 0.5 mg  0.5 mg Nebulization Q6H    labetalol (NORMODYNE) injection 10 mg  10 mg Intravenous Q6H PRN    levalbuterol (XOPENEX) inhalation solution 1.25 mg  1.25 mg Nebulization Q6H    lidocaine (LIDODERM) 5 % patch 1 patch  1 patch Topical Daily    methylPREDNISolone sodium succinate (Solu-MEDROL) injection 40 mg  40 mg Intravenous Q8H CARMEN    potassium chloride 20 mEq IVPB (premix)  20 mEq Intravenous Once    vancomycin (VANCOCIN) IVPB (premix in dextrose) 1,000 mg 200 mL  1,000 mg Intravenous Q24H       Allergies   Allergen Reactions    Aspirin Other (See Comments)    Penicillins Rash and Hives    Sulfa Antibiotics Rash     Medications    Current Facility-Administered Medications:     acetaminophen (TYLENOL) tablet 650 mg, 650 mg, Oral, Q6H PRN, Sharri Danielleum, PA-C    albuterol inhalation solution 2.5 mg, 2.5 mg, Nebulization, Q6H PRN, Sharri Danielleum, PA-C    budesonide (PULMICORT) inhalation solution 0.5 mg, 0.5 mg, Nebulization, Q12H, Sharri Casarez, PA-C, 0.5 mg at 12/14/23 0723    cefepime (MAXIPIME) IVPB (premix in dextrose) 2,000 mg 50 mL, 2,000 mg, Intravenous, Q12H, Sharri Casarez, PA-C, Stopped at 12/14/23 1255    chlorhexidine (PERIDEX) 0.12 % oral rinse 15 mL, 15 mL, Mouth/Throat, Q12H CARMEN, Sharri Casarez, PA-C, 15 mL at 12/14/23 0832    dexmedeTOMIDine (Precedex) 400 mcg in sodium chloride 0.9% 100 mL, 0.1-0.7 mcg/kg/hr, Intravenous, Titrated, YUE Fung, Last Rate: 5.9 mL/hr at 12/14/23 1518, 0.5 mcg/kg/hr at 12/14/23 1518    heparin (porcine) 25,000 units in 0.45% NaCl 250 mL infusion (premix), 3-20 Units/kg/hr (Order-Specific), Intravenous, Titrated, KHALIDA BuckleyNP, Last Rate: 5.4 mL/hr at  "12/13/23 1458, 12 Units/kg/hr at 12/13/23 1458    heparin (porcine) injection 1,350 Units, 1,350 Units, Intravenous, Q6H PRN, YUE Buckley    heparin (porcine) injection 2,700 Units, 2,700 Units, Intravenous, Q6H PRN, YUE Buckley    hydrALAZINE (APRESOLINE) injection 10 mg, 10 mg, Intravenous, Q6H PRN, YUE Fung, 10 mg at 12/14/23 0237    HYDROmorphone HCl (DILAUDID) injection 0.2 mg, 0.2 mg, Intravenous, Q4H PRN, YUE Buckley, 0.2 mg at 12/14/23 1207    iohexol (OMNIPAQUE) 350 MG/ML injection (MULTI-DOSE) 80 mL, 80 mL, Intravenous, Once in imaging, Bronson Cameron DO    ipratropium (ATROVENT) 0.02 % inhalation solution 0.5 mg, 0.5 mg, Nebulization, Q6H, Sharri Casarez PA-C, 0.5 mg at 12/14/23 1308    labetalol (NORMODYNE) injection 10 mg, 10 mg, Intravenous, Q6H PRN, YUE Fung    levalbuterol (XOPENEX) inhalation solution 1.25 mg, 1.25 mg, Nebulization, Q6H, Sharri Casarez PA-C, 1.25 mg at 12/14/23 1308    lidocaine (LIDODERM) 5 % patch 1 patch, 1 patch, Topical, Daily, Sharri Casarez PA-C, 1 patch at 12/14/23 0832    methylPREDNISolone sodium succinate (Solu-MEDROL) injection 40 mg, 40 mg, Intravenous, Q8H CARMEN, YUE Fung, 40 mg at 12/14/23 1215    [COMPLETED] potassium chloride 20 mEq IVPB (premix), 20 mEq, Intravenous, Once, Stopped at 12/14/23 1413 **FOLLOWED BY** potassium chloride 20 mEq IVPB (premix), 20 mEq, Intravenous, Once, YUE Buckley, Last Rate: 50 mL/hr at 12/14/23 1414, 20 mEq at 12/14/23 1414    vancomycin (VANCOCIN) IVPB (premix in dextrose) 1,000 mg 200 mL, 1,000 mg, Intravenous, Q24H, Ata Alas DO, Last Rate: 200 mL/hr at 12/14/23 1521, 1,000 mg at 12/14/23 1521    Objective  /84   Pulse 98   Temp 98.1 °F (36.7 °C) (Axillary)   Resp (!) 32   Ht 5' 6\" (1.676 m)   Wt 46.6 kg (102 lb 11.8 oz)   SpO2 99%   BMI 16.58 kg/m²     Physical Exam:   Physical Exam  Vitals and " nursing note reviewed.   Constitutional:       General: She is not in acute distress.     Appearance: She is underweight. She is ill-appearing.      Interventions: Face mask in place.   HENT:      Head: Normocephalic and atraumatic.   Eyes:      General:         Right eye: No discharge.         Left eye: No discharge.      Conjunctiva/sclera: Conjunctivae normal.   Cardiovascular:      Rate and Rhythm: Regular rhythm.   Pulmonary:      Effort: Pulmonary effort is normal. No respiratory distress.   Musculoskeletal:         General: No swelling.      Cervical back: Neck supple.   Skin:     General: Skin is warm and dry.   Neurological:      Mental Status: She is alert.   Psychiatric:         Mood and Affect: Mood normal.         Behavior: Behavior normal.       Lab Results: I have personally reviewed pertinent labs., CBC:   Lab Results   Component Value Date    WBC 18.85 (H) 12/14/2023    HGB 10.3 (L) 12/14/2023    HCT 34.5 (L) 12/14/2023     (H) 12/14/2023     12/14/2023    RBC 3.27 (L) 12/14/2023    MCH 31.5 12/14/2023    MCHC 29.9 (L) 12/14/2023    RDW 16.7 (H) 12/14/2023    MPV 9.3 12/14/2023    NRBC 0 12/14/2023   , CMP:   Lab Results   Component Value Date    SODIUM 147 12/14/2023    K 3.0 (L) 12/14/2023    CL 96 12/14/2023    CO2 43 (H) 12/14/2023    BUN 35 (H) 12/14/2023    CREATININE 0.67 12/14/2023    CALCIUM 9.3 12/14/2023    EGFR 80 12/14/2023     Imaging Studies: I have personally reviewed pertinent reports.  EKG, Pathology, and Other Studies: I have personally reviewed pertinent reports.    Counseling / Coordination of Care  Total floor / unit time spent today 90 minutes. Greater than 50% of total time was spent with the patient and / or family counseling and / or coordination of care. A description of the counseling / coordination of care: reviewed chart, reviewed lab values, reviewed imaging, provided medical updates, discussed palliative care and symptom management, discussed hospice  "care and comfort care, discussed goals of care, discussed code status, discussed advanced directives, assessed POA/HCA, provided supportive listening, provided anticipatory guidance, provided psychosocial and emotional support, assessed competency and decision-making, and facilitated interdisciplinary communication. Reviewed with ICU team, RN and CM.  Portions of this document may have been created using dictation software and as such some \"sound alike\" terms may have been generated by the system. Do not hesitate to contact me with any questions or clarifications.    "

## 2023-12-13 NOTE — PROGRESS NOTES
Simin Worrell is a 83 y.o. female who is currently ordered Vancomycin IV with management by the Pharmacy Consult service.  Relevant clinical data and objective / subjective history reviewed.  Vancomycin Assessment:  Indication and Goal AUC/Trough: Pneumonia (goal -600, trough >10)  Clinical Status: improving  Micro:     Renal Function:  SCr: 0.8 mg/dL  CrCl: 39 mL/min  Renal replacement: Not on dialysis  Days of Therapy: 1  Current Dose: 750 mg q24h  Vancomycin Plan:  New Dosinmg every 24 hours  Estimated AUC: 408 mcg*hr/mL  Estimated Trough: 12.1 mcg/mL  Next Level: @0600 am on 12/15/23.  Renal Function Monitoring: Daily BMP and UOP  Pharmacy will continue to follow closely for s/sx of nephrotoxicity, infusion reactions and appropriateness of therapy.  BMP and CBC will be ordered per protocol. We will continue to follow the patient’s culture results and clinical progress daily.    Justina Barnard, Pharmacist

## 2023-12-13 NOTE — ED PROVIDER NOTES
History  No chief complaint on file.    Hx from paramedics - brought in by ambulance from nursing home with unresponsive episodes over last 30 minutes.  Last nl 50 minutes ago, nl can hold conversation.  General weakness unknown timeframe.  Hypoxic on oxygen 89%  PMH copd, chronic oxygen 4 liters.        Prior to Admission Medications   Prescriptions Last Dose Informant Patient Reported? Taking?   Ascorbic Acid (VITAMIN C) 1000 MG tablet  Outside Facility (Specify) Yes No   Sig: Take 1,000 mg by mouth daily   Cholecalciferol (VITAMIN D3) 20 MCG (800 UNIT) TABS  Outside Facility (Specify) Yes No   Sig: Take by mouth in the morning   Fluticasone-Salmeterol (Advair) 250-50 mcg/dose inhaler   No No   Sig: Inhale 1 puff 2 (two) times a day Rinse mouth after use.   Lidocaine Pain Relief 4 % PT[facility]   Yes No   acetaminophen (TYLENOL) 325 mg tablet  Outside Facility (Specify) No No   Sig: Take 3 tablets (975 mg total) by mouth every 8 (eight) hours   albuterol (2.5 mg/3 mL) 0.083 % nebulizer solution   No No   Sig: Take 3 mL (2.5 mg total) by nebulization every 6 (six) hours as needed for wheezing or shortness of breath   apixaban (Eliquis) 2.5 mg   No No   Sig: Take 1 tablet (2.5 mg total) by mouth 2 (two) times a day   budesonide (PULMICORT) 0.5 mg/2 mL nebulizer solution   No No   Sig: Take 2 mL (0.5 mg total) by nebulization every 12 (twelve) hours Rinse mouth after use.   diltiazem (CARDIZEM CD) 240 mg 24 hr capsule   No No   Sig: Take 1 capsule (240 mg total) by mouth daily   ferrous sulfate 325 (65 Fe) mg tablet   No No   Sig: Take 1 tablet (325 mg total) by mouth daily with breakfast Do not start before December 3, 2023.   furosemide (LASIX) 20 mg tablet   No No   Si.5 TABS PO Q DAY   guaiFENesin (MUCINEX) 600 mg 12 hr tablet  Outside Facility (Specify) No No   Sig: Take 1 tablet (600 mg total) by mouth 2 (two) times a day   ipratropium-albuterol (DUO-NEB) 0.5-2.5 mg/3 mL nebulizer solution   Yes No   levothyroxine  25 mcg tablet   No No   Sig: TAKE 1 TABLET DAILY   lidocaine (LIDODERM) 5 %  Outside Facility (Specify) No No   Sig: Apply 1 patch topically over 12 hours daily Remove & Discard patch within 12 hours or as directed by MD   loratadine (CLARITIN) 10 mg tablet   No No   Sig: Take 1 tablet (10 mg total) by mouth daily   mirtazapine (REMERON) 7.5 MG tablet  Outside Facility (Specify) No No   Sig: Take 1 tablet (7.5 mg total) by mouth daily at bedtime   multivitamin (THERAGRAN) TABS  Self, Outside Facility (Specify) Yes No   Sig: Take 1 tablet by mouth daily   oxygen gas   Yes No   Sig: Inhale 1 L/min continuous. Indications: Difficulty Breathing   pantoprazole (PROTONIX) 40 mg tablet   No No   Sig: Take 1 tablet (40 mg total) by mouth 2 (two) times a day before meals   predniSONE 20 mg tablet   No No   Sig: Take 3 tablets (60 mg total) by mouth daily for 3 days, THEN 2.5 tablets (50 mg total) daily for 3 days, THEN 2 tablets (40 mg total) daily for 3 days, THEN 1.5 tablets (30 mg total) daily for 3 days, THEN 1 tablet (20 mg total) daily for 3 days, THEN 0.5 tablets (10 mg total) daily for 3 days. Do not start before December 3, 2023.   sertraline (Zoloft) 50 mg tablet  Outside Facility (Specify) No No   Sig: Take 1 tablet (50 mg total) by mouth daily   tiotropium (Spiriva HandiHaler) 18 mcg inhalation capsule   No No   Sig: Place 1 capsule (18 mcg total) into inhaler and inhale daily      Facility-Administered Medications: None       Past Medical History:   Diagnosis Date    Anti-phospholipid syndrome (HCC)     Asthma     Blood type O+     Cardiac disease     Chronic pain     COPD (chronic obstructive pulmonary disease) ([facility])     Femur fracture, left (HCC) 6/22/2023    Fracture of ankle     left    Hyperlipidemia     Hypertension     Hypokalemia 2/27/2023    Osteoporosis        Past Surgical History:   Procedure Laterality Date    APPENDECTOMY      BILATERAL OOPHORECTOMY Bilateral     ORIF TIBIAL SHAFT FRACTURE W/  PLATES AND SCREWS Right     IA HEMIARTHROPLASTY HIP PARTIAL Left 6/23/2023    Procedure: HEMIARTHROPLASTY HIP (BIPOLAR);  Surgeon: Dwaine Womack MD;  Location:  MAIN OR;  Service: Orthopedics       Family History   Problem Relation Age of Onset    Hypertension Mother     Skin cancer Mother     Hypertension Father      I have reviewed and agree with the history as documented.    E-Cigarette/Vaping    E-Cigarette Use Never User      E-Cigarette/Vaping Substances    Nicotine No     THC No      Social History     Tobacco Use    Smoking status: Former     Types: Cigarettes    Smokeless tobacco: Never    Tobacco comments:     Never smoker per [employer]   Vaping Use    Vaping status: Never Used   Substance Use Topics    Alcohol use: Not Currently    Drug use: No       Review of Systems   Unable to perform ROS: Acuity of condition   All other systems reviewed and are negative.      Physical Exam  Physical Exam  Vitals and nursing note reviewed.   Constitutional:       General: She is in acute distress.      Appearance: She is well-developed. She is cachectic. She is ill-appearing.   HENT:      Head: Normocephalic and atraumatic.   Eyes:      Extraocular Movements: Extraocular movements intact.      Conjunctiva/sclera: Conjunctivae normal.      Pupils: Pupils are equal, round, and reactive to light.   Cardiovascular:      Rate and Rhythm: Normal rate and regular rhythm.      Heart sounds: No murmur heard.  Pulmonary:      Effort: Tachypnea, accessory muscle usage, prolonged expiration, respiratory distress and retractions present.      Breath sounds: Decreased air movement present. Decreased breath sounds present.   Abdominal:      Palpations: Abdomen is soft.      Tenderness: There is no abdominal tenderness.   Musculoskeletal:         General: No swelling.      Cervical back: Neck supple.   Skin:     General: Skin is warm and dry.      Capillary Refill: Capillary refill takes less than 2 seconds.   Neurological:       Mental Status: She is alert.      GCS: GCS eye subscore is 3. GCS verbal subscore is 4. GCS motor subscore is 6.      Cranial Nerves: No facial asymmetry.      Comments: Withdraws all extremities to pain   Psychiatric:         Mood and Affect: Mood normal.         Vital Signs  ED Triage Vitals   Temperature Pulse Respirations Blood Pressure SpO2   12/12/23 2037 12/12/23 2037 12/12/23 2037 12/12/23 2037 12/12/23 2037   97.8 °F (36.6 °C) 74 (!) 8 164/72 94 %      Temp Source Heart Rate Source Patient Position - Orthostatic VS BP Location FiO2 (%)   12/12/23 2100 12/12/23 2100 12/13/23 0042 12/13/23 0042 --   Axillary Monitor Lying Right arm       Pain Score       --                  Vitals:    12/12/23 2300 12/12/23 2345 12/13/23 0042 12/13/23 0139   BP: 163/76 148/68 162/79 162/79   Pulse: 68 74 79 79   Patient Position - Orthostatic VS:   Lying          Visual Acuity  Visual Acuity      Flowsheet Row Most Recent Value   L Pupil Size (mm) 3   R Pupil Size (mm) 3            ED Medications  Medications   iohexol (OMNIPAQUE) 350 MG/ML injection (MULTI-DOSE) 80 mL (has no administration in time range)   chlorhexidine (PERIDEX) 0.12 % oral rinse 15 mL (15 mL Mouth/Throat Not Given 12/13/23 0146)   albuterol inhalation solution 2.5 mg (has no administration in time range)   budesonide (PULMICORT) inhalation solution 0.5 mg (has no administration in time range)   diltiazem (CARDIZEM CD) 24 hr capsule 240 mg (has no administration in time range)   ferrous sulfate tablet 325 mg (has no administration in time range)   guaiFENesin (MUCINEX) 12 hr tablet 600 mg (has no administration in time range)   levothyroxine tablet 25 mcg (has no administration in time range)   multivitamin-minerals (CENTRUM) tablet 1 tablet (has no administration in time range)   pantoprazole (PROTONIX) EC tablet 40 mg (has no administration in time range)   umeclidinium 62.5 mcg/actuation inhaler AEPB 1 puff (has no administration in time range)    cefepime (MAXIPIME) IVPB (premix in dextrose) 2,000 mg 50 mL (2,000 mg Intravenous New Bag 12/13/23 0128)   vancomycin (VANCOCIN) 1250 mg in sodium chloride 0.9% 250 mL IVPB (1,250 mg Intravenous New Bag 12/13/23 0113)   apixaban (ELIQUIS) tablet 2.5 mg (has no administration in time range)   potassium chloride 20 mEq IVPB (premix) (has no administration in time range)   magnesium sulfate 2 g/50 mL IVPB (premix) 2 g (has no administration in time range)   vancomycin (VANCOCIN) IVPB (premix in dextrose) 750 mg 150 mL (has no administration in time range)   sodium chloride 0.9 % inhalation solution 3 mL (has no administration in time range)   levalbuterol (XOPENEX) inhalation solution 1.25 mg (has no administration in time range)   ipratropium (ATROVENT) 0.02 % inhalation solution 0.5 mg (has no administration in time range)   sodium chloride 0.9 % bolus 1,000 mL (0 mL Intravenous Stopped 12/12/23 2231)   iohexol (OMNIPAQUE) 350 MG/ML injection (MULTI-DOSE) 60 mL (60 mL Intravenous Given 12/12/23 2045)   cefTRIAXone (ROCEPHIN) IVPB (premix in dextrose) 2,000 mg 50 mL (0 mg Intravenous Stopped 12/12/23 2321)   azithromycin (ZITHROMAX) 500 mg in sodium chloride 0.9% 250mL IVPB 500 mg (500 mg Intravenous New Bag 12/12/23 2328)   albuterol inhalation solution 10 mg (10 mg Nebulization Given 12/12/23 2308)   ipratropium (ATROVENT) 0.02 % inhalation solution 1 mg (1 mg Nebulization Given 12/12/23 2308)   sodium chloride 0.9 % inhalation solution 12 mL (12 mL Nebulization Given 12/12/23 2308)   methylPREDNISolone sodium succinate (Solu-MEDROL) injection 125 mg (125 mg Intravenous Given 12/12/23 2307)       Diagnostic Studies  Results Reviewed       Procedure Component Value Units Date/Time    HS Troponin I 4hr [[id number]]  (Normal) Collected: 12/13/23 0113    Lab Status: Final result Specimen: Blood from Arm, Right Updated: 12/13/23 0148     hs TnI 4hr 24 ng/L      Delta 4hr hsTnI 1 ng/L     MRSA culture [[id number]]  Collected: 12/13/23 0113    Lab Status: In process Specimen: Nares from Nose Updated: 12/13/23 0123    HS Troponin I 2hr [[id number]]  (Normal) Collected: 12/12/23 2334    Lab Status: Final result Specimen: Blood from Arm, Left Updated: 12/13/23 0112     hs TnI 2hr 22 ng/L      Delta 2hr hsTnI -1 ng/L     Salicylate level [[id number]]  (Normal) Collected: 12/12/23 2127    Lab Status: Final result Specimen: Blood from Arm, Left Updated: 12/12/23 2228     Salicylate Lvl <5 mg/dL     Procalcitonin [[id number]]  (Abnormal) Collected: 12/12/23 2127    Lab Status: Final result Specimen: Blood from Arm, Left Updated: 12/12/23 2224     Procalcitonin 0.61 ng/ml     FLU/RSV/COVID - if FLU/RSV clinically relevant [[id number]]  (Normal) Collected: 12/12/23 2127    Lab Status: Final result Specimen: Nares from Nose Updated: 12/12/23 2218     SARS-CoV-2 Negative     INFLUENZA A PCR Negative     INFLUENZA B PCR Negative     RSV PCR Negative    Narrative:      FOR PEDIATRIC PATIENTS - copy/paste COVID Guidelines URL to browser: [email or web address]    SARS-CoV-2 assay is a Nucleic Acid Amplification assay intended for the  qualitative detection of nucleic acid from SARS-CoV-2 in nasopharyngeal  swabs. Results are for the presumptive identification of SARS-CoV-2 RNA.    Positive results are indicative of infection with SARS-CoV-2, the virus  causing COVID-19, but do not rule out bacterial infection or co-infection  with other viruses. Laboratories within the [place] and its  territories are required to report all positive results to the appropriate  public health authorities. Negative results do not preclude SARS-CoV-2  infection and should not be used as the sole basis for treatment or other  patient management decisions. Negative results must be combined with  clinical observations, patient history, and epidemiological information.  This test has not been FDA cleared or  approved.    This test has been authorized by FDA under an Emergency Use Authorization  (EUA). This test is only authorized for the duration of time the  declaration that circumstances exist justifying the authorization of the  emergency use of an in vitro diagnostic tests for detection of SARS-CoV-2  virus and/or diagnosis of COVID-19 infection under section 564(b)(1) of  the Act, 21 U.S.C. 360bbb-3(b)(1), unless the authorization is terminated  or revoked sooner. The test has been validated but independent review by FDA  and CLIA is pending.    Test performed using [employer] GeneXpert: This RT-PCR assay targets N2,  a region unique to SARS-CoV-2. A conserved region in the E-gene was chosen  for pan-Sarbecovirus detection which includes SARS-CoV-2.    According to CMS-2020-01-R, this platform meets the definition of high-throughput technology.    Hepatic function panel [[id number]]  (Abnormal) Collected: 12/12/23 2127    Lab Status: Final result Specimen: Blood from Arm, Left Updated: 12/12/23 2214     Total Bilirubin 0.65 mg/dL      Bilirubin, Direct 0.10 mg/dL      Alkaline Phosphatase 111 U/L      AST 17 U/L      ALT 11 U/L      Total Protein 5.8 g/dL      Albumin 2.9 g/dL     HS Troponin 0hr (reflex protocol) [[id number]]  (Normal) Collected: 12/12/23 2127    Lab Status: Final result Specimen: Blood from Arm, Left Updated: 12/12/23 2209     hs TnI 0hr 23 ng/L     Acetaminophen level-If concentration is detectable, please discuss with medical  on call. [[id number]]  (Abnormal) Collected: 12/12/23 2127    Lab Status: Final result Specimen: Blood from Arm, Left Updated: 12/12/23 2205     Acetaminophen Level 4 ug/mL     Basic metabolic panel [[id number]]  (Abnormal) Collected: 12/12/23 2127    Lab Status: Final result Specimen: Blood from Arm, Left Updated: 12/12/23 2202     Sodium 139 mmol/L      Potassium 3.3 mmol/L      Chloride 90 mmol/L      CO2 45 mmol/L      ANION GAP 4 mmol/L      BUN 38 mg/dL       Creatinine 1.01 mg/dL      Glucose 138 mg/dL      Calcium 8.5 mg/dL      eGFR 51 ml/min/1.73sq m     Narrative:      National Kidney Disease Foundation guidelines for Chronic Kidney Disease (CKD):     Stage 1 with normal or high GFR (GFR > 90 mL/min/1.73 square meters)    Stage 2 Mild CKD (GFR = 60-89 mL/min/1.73 square meters)    Stage 3A Moderate CKD (GFR = 45-59 mL/min/1.73 square meters)    Stage 3B Moderate CKD (GFR = 30-44 mL/min/1.73 square meters)    Stage 4 Severe CKD (GFR = 15-29 mL/min/1.73 square meters)    Stage 5 End Stage CKD (GFR <15 mL/min/1.73 square meters)  Note: GFR calculation is accurate only with a steady state creatinine    Ethanol [[id number]]  (Normal) Collected: 12/12/23 2127    Lab Status: Final result Specimen: Blood from Arm, Left Updated: 12/12/23 2201     Ethanol Lvl <10 mg/dL     Protime-INR [[id number]]  (Abnormal) Collected: 12/12/23 2127    Lab Status: Final result Specimen: Blood from Arm, Left Updated: 12/12/23 2201     Protime 19.3 seconds      INR 1.57    APTT [[id number]]  (Abnormal) Collected: 12/12/23 2127    Lab Status: Final result Specimen: Blood from Arm, Left Updated: 12/12/23 2201     PTT 59 seconds     Lactic acid, plasma (w/reflex if result > 2.0) [[id number]]  (Normal) Collected: 12/12/23 2127    Lab Status: Final result Specimen: Blood from Arm, Left Updated: 12/12/23 2200     LACTIC ACID 1.1 mmol/L     Narrative:      Result may be elevated if tourniquet was used during collection.    Urine Microscopic [[id number]]  (Abnormal) Collected: 12/12/23 2129    Lab Status: Final result Specimen: Urine, Straight Cath Updated: 12/12/23 2153     RBC, UA None Seen /hpf      WBC, UA 4-10 /hpf      Epithelial Cells Moderate /hpf      Bacteria, UA Occasional /hpf      Hyaline Casts, UA 10-20 /lpf      Ca Oxalate Barb, UA Moderate /hpf     UA w Reflex to Microscopic w Reflex to Culture [[id number]]  (Abnormal) Collected: 12/12/23 2129    Lab Status: Final result Specimen: Urine,  Straight Cath Updated: 12/12/23 2152     Color, UA Yellow     Clarity, UA Hazy     Specific Gravity, UA 1.020     pH, UA 5.5     Leukocytes, UA Small     Nitrite, UA Negative     Protein, UA 30 (1+) mg/dl      Glucose, UA Negative mg/dl      Ketones, UA Negative mg/dl      Urobilinogen, UA <2.0 mg/dl      Bilirubin, UA Negative     Occult Blood, UA Negative    CBC and Platelet [[id number]]  (Abnormal) Collected: 12/12/23 2127    Lab Status: Final result Specimen: Blood from Arm, Left Updated: 12/12/23 2142     WBC 20.00 Thousand/uL      RBC 2.96 Million/uL      Hemoglobin 9.5 g/dL      Hematocrit 31.2 %       fL      MCH 32.1 pg      MCHC 30.4 g/dL      RDW 16.6 %      Platelets 256 Thousands/uL      MPV 9.2 fL     Hepatitis panel, acute [[id number]] Collected: 12/12/23 2127    Lab Status: In process Specimen: Blood from Arm, Left Updated: 12/12/23 2138    POCT Blood Gas (CG8+) [[id number]]  (Abnormal) Collected: 12/12/23 2135    Lab Status: Final result Specimen: Venous Updated: 12/12/23 2138     ph, Tristen ISTAT 7.379     pCO2, Tristen i-STAT 84.2 mm HG      pO2, Tristen i-STAT 61.0 mm HG      BE, i-STAT 21 mmol/L      HCO3, Tristen i-STAT 49.7 mmol/L      CO2, i-STAT >45 mmol/L      O2 Sat, i-STAT 88 %      SODIUM, I-STAT 137 mmol/l      Potassium, i-STAT 2.9 mmol/L      Calcium, Ionized i-STAT 1.10 mmol/L      Hct, i-STAT 30 %      Hgb, i-STAT 10.2 g/dl      Glucose, i-STAT 138 mg/dl      Specimen Type VENOUS    Blood culture #2 [[id number]] Collected: 12/12/23 2127    Lab Status: In process Specimen: Blood from Arm, Left Updated: 12/12/23 2137    Blood culture #1 [[id number]] Collected: 12/12/23 2127    Lab Status: In process Specimen: Blood from Arm, Right Updated: 12/12/23 2137    Fingerstick Glucose (POCT) [[id number]]  (Abnormal) Collected: 12/12/23 2117    Lab Status: Final result Updated: 12/12/23 2117     POC Glucose 154 mg/dl                    CT chest abdomen pelvis w contrast   Final Result by Terry Skinner MD  (12/12 2211)      Limited study as above.      Redemonstrated small bilateral effusions with probable subjacent compressive atelectasis in the lower lobes. Somewhat heterogeneous consolidation in the right middle lobe with associated air bronchograms which appears new compared to prior recent study    dated 11/29/2023 suspicious for interval developing pneumonia. Recommend clinical correlation and short-term follow-up to resolution and to exclude underlying lesion.      Abnormal hypoenhancement of the left kidney with some sparing at the lower pole suspicious for hypoperfusion injury/infarction.      Suspected compression fracture deformity at T6 with approximately 50 to 60% loss of vertebral body height and without significant bony retropulsion appears new from prior recent studies,? Acute to subacute. Correlate clinically. Otherwise additional    multilevel thoracolumbar compression deformities overall similar in appearance.      Above findings discussed with Dr. Cameron at 10:10 p.m. on 12/12/2023.      Workstation performed: [id number]         CTA stroke alert (head/neck)   Final Result by Terry Skinner MD (12/12 2135)      No evidence of flow-limiting stenosis or large vessel occlusion within the major vessels of the [place].   Stable CTA examination of the head and neck compared to prior recent study dated 12/1/2023.   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      No significant stenosis of the cervical carotid arteries (less than 50% by NASCET criteria)      Findings were directly discussed with Freida Yi at 9:20 p.m.                           Workstation performed: [id number]         CT stroke alert brain   Final Result by Terry Skinner MD (12/12 2120)      No acute intracranial abnormality or interval change from prior recent study dated 12/1/2023.      Findings were directly  discussed with Freida Yi at 9:20 p.m.      Workstation performed: [id number]         XR chest portable    (Results Pending)              Procedures  CriticalCare Time    Date/Time: 12/13/2023 1:48 AM    Performed by: Bronson Cameron DO  Authorized by: Bronson Cameron DO    Critical care provider statement:     Critical care time (minutes):  56    Critical care time was exclusive of:  Separately billable procedures and treating other patients and teaching time    Critical care was necessary to treat or prevent imminent or life-threatening deterioration of the following conditions:  CNS failure or compromise, sepsis and respiratory failure    Critical care was time spent personally by me on the following activities:  Obtaining history from patient or surrogate, development of treatment plan with patient or surrogate, discussions with consultants, evaluation of patient's response to treatment, examination of patient, review of old charts, re-evaluation of patient's condition, ordering and review of radiographic studies, ordering and review of laboratory studies and ordering and performing treatments and interventions           ED Course                  Stroke Assessment       Row Name 12/12/23 2046             NIH Stroke Scale    Interval Baseline      Level of Consciousness (1a.) 1      LOC Questions (1b.) 2      LOC Commands (1c.) 0      Best Gaze (2.) 0      Visual (3.) 0      Facial Palsy (4.) 0      Motor Arm, Left (5a.) 4      Motor Arm, Right (5b.) 4      Motor Leg, Left (6a.) 4      Motor Leg, Right (6b.) 4      Limb Ataxia (7.) 2      Sensory (8.) 2      Best Language (9.) 2      Dysarthria (10.) 0      Extinction and Inattention (11.) (Formerly Neglect) 2      Total 27                    Flowsheet Row Most Recent Value   Thrombolytic Decision Options    Thrombolytic Decision Patient not a candidate.   Patient is not a candidate options Unclear time of onset outside appropriate time window.                    Initial Sepsis Screening       Row Name 12/13/23 0121                Is the patient's history suggestive of a new or worsening infection? Yes (Proceed)  -SB        Suspected source of infection pneumonia  -SB        Indicate SIRS criteria Tachypnea > 20 resp per min;Leukocytosis (WBC > 86851 IJL) OR Leukopenia (WBC <4000 IJL) OR Bandemia (WBC >10% bands)  -SB        Are two or more of the above signs & symptoms of infection both present and new to the patient? Yes (Proceed)  -SB        Assess for evidence of organ dysfunction: Are any of the below criteria present within 6 hours of suspected infection and SIRS criteria that are NOT considered to be chronic conditions? --                  User Key  (r) = Recorded By, (t) = Taken By, (c) = Cosigned By      Initials Name Provider Type    TU Casarez PA-C Physician Assistant                                  Medical Decision Making  Diff includes acute pneumonia sepsis, copd acute exacerbation with hypoventilation causing acute encephalopathy - less likely acute stroke.  Probable hypoperfusion to kidney from sepsis or vascular disease    Amount and/or Complexity of Data Reviewed  Labs: ordered.  Radiology: ordered.    Risk  Prescription drug management.             Disposition  Final diagnoses:   Failure to thrive in adult   Pneumonia   Underweight     Time reflects when diagnosis was documented in both MDM as applicable and the Disposition within this note       Time User Action Codes Description Comment    12/12/2023  8:32 PM Soham Durán [R62.7] Failure to thrive in adult     12/12/2023 11:30 PM Sharri Casarez [J18.9] Pneumonia     12/13/2023  1:42 AM Sharri Casarez [R63.6] Underweight           ED Disposition       None          Follow-up Information    None         Current Discharge Medication List        CONTINUE these medications which have NOT CHANGED    Details   acetaminophen (TYLENOL) 325 mg tablet Take 3 tablets (975 mg total) by mouth every 8  (eight) hours  Refills: 0    Associated Diagnoses: Low back pain without sciatica, unspecified back pain laterality, unspecified chronicity      albuterol (2.5 mg/3 mL) 0.083 % nebulizer solution Take 3 mL (2.5 mg total) by nebulization every 6 (six) hours as needed for wheezing or shortness of breath  Qty: 110 mL, Refills: 3    Associated Diagnoses: Moderate asthma, unspecified whether complicated, unspecified whether persistent      apixaban (Eliquis) 2.5 mg Take 1 tablet (2.5 mg total) by mouth 2 (two) times a day  Qty: 60 tablet, Refills: 0    Associated Diagnoses: Paroxysmal atrial fibrillation ([facility])      Ascorbic Acid (VITAMIN C) 1000 MG tablet Take 1,000 mg by mouth daily      budesonide (PULMICORT) 0.5 mg/2 mL nebulizer solution Take 2 mL (0.5 mg total) by nebulization every 12 (twelve) hours Rinse mouth after use.  Qty: 120 mL, Refills: 3    Associated Diagnoses: COPD with acute exacerbation ([facility])      Cholecalciferol (VITAMIN D3) 20 MCG (800 UNIT) TABS Take by mouth in the morning      diltiazem (CARDIZEM CD) 240 mg 24 hr capsule Take 1 capsule (240 mg total) by mouth daily  Qty: 30 capsule, Refills: 5    Associated Diagnoses: Primary hypertension; Atrial fibrillation, unspecified type ([facility])      ferrous sulfate 325 (65 Fe) mg tablet Take 1 tablet (325 mg total) by mouth daily with breakfast Do not start before December 3, 2023.  Refills: 0    Associated Diagnoses: COPD (chronic obstructive pulmonary disease) ([facility]); GIB (gastrointestinal bleeding); Acute on chronic diastolic CHF (congestive heart failure) ([facility])      Fluticasone-Salmeterol (Advair) 250-50 mcg/dose inhaler Inhale 1 puff 2 (two) times a day Rinse mouth after use.  Qty: 180 blister, Refills: 3    Comments: Substitution to a formulary equivalent within the same pharmaceutical class is authorized.  Associated Diagnoses: Chronic obstructive pulmonary disease, unspecified COPD type ([facility])      furosemide (LASIX) 20 mg tablet 1.5 TABS PO Q DAY  Qty:  135 tablet, Refills: 1    Associated Diagnoses: Atrial fibrillation, unspecified type ([facility])      guaiFENesin (MUCINEX) 600 mg 12 hr tablet Take 1 tablet (600 mg total) by mouth 2 (two) times a day  Refills: 0    Associated Diagnoses: Femur fracture, left ([facility])      ipratropium-albuterol (DUO-NEB) 0.5-2.5 mg/3 mL nebulizer solution       levothyroxine 25 mcg tablet TAKE 1 TABLET DAILY  Qty: 90 tablet, Refills: 3    Associated Diagnoses: Hypothyroidism, unspecified type      lidocaine (LIDODERM) 5 % Apply 1 patch topically over 12 hours daily Remove & Discard patch within 12 hours or as directed by MD  Qty: 90 patch, Refills: 3    Associated Diagnoses: Osteoporosis, unspecified osteoporosis type, unspecified pathological fracture presence; Low back pain without sciatica, unspecified back pain laterality, unspecified chronicity      Lidocaine Pain Relief 4 % [facility]       loratadine (CLARITIN) 10 mg tablet Take 1 tablet (10 mg total) by mouth daily  Qty: 30 tablet, Refills: 0    Associated Diagnoses: Uncomplicated asthma, unspecified asthma severity, unspecified whether persistent      mirtazapine (REMERON) 7.5 MG tablet Take 1 tablet (7.5 mg total) by mouth daily at bedtime  Qty: 90 tablet, Refills: 3    Associated Diagnoses: Adult failure to thrive      multivitamin (THERAGRAN) TABS Take 1 tablet by mouth daily      oxygen gas Inhale 1 L/min continuous. Indications: Difficulty Breathing      pantoprazole (PROTONIX) 40 mg tablet Take 1 tablet (40 mg total) by mouth 2 (two) times a day before meals  Refills: 0    Associated Diagnoses: COPD (chronic obstructive pulmonary disease) ([facility]); GIB (gastrointestinal bleeding); Acute on chronic diastolic CHF (congestive heart failure) ([facility])      predniSONE 20 mg tablet Take 3 tablets (60 mg total) by mouth daily for 3 days, THEN 2.5 tablets (50 mg total) daily for 3 days, THEN 2 tablets (40 mg total) daily for 3 days, THEN 1.5 tablets (30 mg total) daily for 3 days, THEN 1 tablet  (20 mg total) daily for 3 days, THEN 0.5 tablets (10 mg total) daily for 3 days. Do not start before December 3, 2023.  Qty: 32 tablet, Refills: 0    Associated Diagnoses: COPD (chronic obstructive pulmonary disease) ([facility]); GIB (gastrointestinal bleeding); Acute on chronic diastolic CHF (congestive heart failure) ([facility])      sertraline (Zoloft) 50 mg tablet Take 1 tablet (50 mg total) by mouth daily  Qty: 90 tablet, Refills: 3    Associated Diagnoses: Anxiety      tiotropium (Spiriva HandiHaler) 18 mcg inhalation capsule Place 1 capsule (18 mcg total) into inhaler and inhale daily  Qty: 30 capsule, Refills: 3    Associated Diagnoses: COPD exacerbation ([facility])             No discharge procedures on file.    PDMP Review         Value Time User    PDMP Reviewed  Yes 12/2/2023  1:45 PM Noreen Yarbrough MD            ED Provider  Electronically Signed by             Bronson Cameron DO  12/13/23 0149

## 2023-12-13 NOTE — PROGRESS NOTES
ADT alert received the patient admitted 12/12/23 to [facility]. This [facility] [place] will continue to monitor via chart review.

## 2023-12-13 NOTE — ASSESSMENT & PLAN NOTE
"Patient recently admitted and made Level 2 DNR. POLST with level 2 code status documented  Discussed with patient's son that given her hx of COPD, chronic hypoxic/hypercapnic respiratory failure, and overall deconditioned state, she would likely be difficult to extubate should she require intubation. Patient's son was adamant that she would never want long-term intubation and that the patient has made comments recently about not wanting to live forever with pain.  Ultimately patient's son opted for level 3 DNR/DNI status  Of note, there is documentation from rehab physician on 12/11 stating, \"I believe she understands she is close to the end. Comfort care is our goal. She has requested that she not be sent back to the hospital.\"  Consider palliative care consult pending patient's progress    "

## 2023-12-13 NOTE — ASSESSMENT & PLAN NOTE
SIRS: leukocytosis, tachypnea  Source: Pulmonary  CT CAP: Redemonstrated small bilateral effusions with probable subjacent compressive atelectasis in the lower lobes. Somewhat heterogeneous consolidation in the right middle lobe with associated air bronchograms which appears new compared to prior recent study dated 11/29/2023 suspicious for interval developing pneumonia.   UA with small leukocytes, negative nitrites  Blood cultures pending  Given CTX and Azithro in ED  Patient recently with pan-sensitive Pseudomonas in sputum cultures  Procal 0.61  Lactic 1.1    Plan:  Start cefepime and vancomycin  Check sputum culture, urine antigens, and MRSA  Follow-up culture data  Trend fever curve and WBC count

## 2023-12-14 PROBLEM — Z51.5 PALLIATIVE CARE PATIENT: Status: ACTIVE | Noted: 2023-01-01

## 2023-12-14 NOTE — SPEECH THERAPY NOTE
Speech Language/Pathology    Pt continues to require BiPAP, not appropriate for ST evaluation. Per critical care team, d/c orders at this time. Please re-consult when medically/clinically appropriate.

## 2023-12-14 NOTE — ASSESSMENT & PLAN NOTE
Hx of chronic hypoxic and hypercapnic resp failure on 3L NC at baseline in the setting of COPD  Presented with increased WOB and hypoxia  VBG: pH 7.379, CO2 84  Initially on 4L NC then required BiPAP for increased WOB  CXR with RML infiltrate  CT CAP: Redemonstrated small bilateral effusions with probable subjacent compressive atelectasis in the lower lobes. Somewhat heterogeneous consolidation in the right middle lobe with associated air bronchograms which appears new compared to prior recent study dated 11/29/2023 suspicious for interval developing pneumonia.   Not in [facility]- recently discharged on prednisone taper after COPD exac     Plan:  Start Xopenex and Atrovent nebs  Continue home nebs and inhalers  Cef/Vanco D2  Pulmonary toilet  Wean supplemental O2 to maintain spO2 > 88%

## 2023-12-14 NOTE — PLAN OF CARE
Problem: PAIN - ADULT  Goal: Verbalizes/displays adequate comfort level or baseline comfort level  Description: Interventions:  - Encourage patient to monitor pain and request assistance  - Assess pain using appropriate pain scale  - Administer analgesics based on type and severity of pain and evaluate response  - Implement non-pharmacological measures as appropriate and evaluate response  - Consider cultural and social influences on pain and pain management  - Notify physician/advanced practitioner if interventions unsuccessful or patient reports new pain  Outcome: Progressing     Problem: INFECTION - ADULT  Goal: Absence or prevention of progression during hospitalization  Description: INTERVENTIONS:  - Assess and monitor for signs and symptoms of infection  - Monitor lab/diagnostic results  - Monitor all insertion sites, i.e. indwelling lines, tubes, and drains  - Monitor endotracheal if appropriate and nasal secretions for changes in amount and color  - [place] appropriate cooling/warming therapies per order  - Administer medications as ordered  - Instruct and encourage patient and family to use good hand hygiene technique  - Identify and instruct in appropriate isolation precautions for identified infection/condition  Outcome: Progressing  Goal: Absence of fever/infection during neutropenic period  Description: INTERVENTIONS:  - Monitor WBC    Outcome: Progressing     Problem: SAFETY ADULT  Goal: Patient will remain free of falls  Description: INTERVENTIONS:  - Educate patient/family on patient safety including physical limitations  - Instruct patient to call for assistance with activity   - Consult OT/PT to assist with strengthening/mobility   - Keep Call bell within reach  - Keep bed low and locked with side rails adjusted as appropriate  - Keep care items and personal belongings within reach  - Initiate and maintain comfort rounds  - Make Fall Risk Sign visible to staff  - Offer Toileting every  Hours,  in advance of need  - Initiate/Maintain alarm  - Obtain necessary fall risk management equipment:   - Apply yellow socks and bracelet for high fall risk patients  - Consider moving patient to room near nurses station  Outcome: Progressing  Goal: Maintain or return to baseline ADL function  Description: INTERVENTIONS:  -  Assess patient's ability to carry out ADLs; assess patient's baseline for ADL function and identify physical deficits which impact ability to perform ADLs (bathing, care of mouth/teeth, toileting, grooming, dressing, etc.)  - Assess/evaluate cause of self-care deficits   - Assess range of motion  - Assess patient's mobility; develop plan if impaired  - Assess patient's need for assistive devices and provide as appropriate  - Encourage maximum independence but intervene and supervise when necessary  - Involve family in performance of ADLs  - Assess for home care needs following discharge   - Consider OT consult to assist with ADL evaluation and planning for discharge  - Provide patient education as appropriate  Outcome: Progressing  Goal: Maintains/Returns to pre admission functional level  Description: INTERVENTIONS:  - Perform AM-PAC 6 Click Basic Mobility/ Daily Activity assessment daily.  - Set and communicate daily mobility goal to care team and patient/family/caregiver.   - Collaborate with rehabilitation services on mobility goals if consulted  - Perform Range of Motion  times a day.  - Reposition patient every  hours.  - Dangle patient  times a day  - Stand patient  times a day  - Ambulate patient  times a day  - Out of bed to chair  times a day   - Out of bed for meal times a day  - Out of bed for toileting  - Record patient progress and toleration of activity level   Outcome: Progressing     Problem: DISCHARGE PLANNING  Goal: Discharge to home or other facility with appropriate resources  Description: INTERVENTIONS:  - Identify barriers to discharge w/patient and caregiver  - Arrange for  needed discharge resources and transportation as appropriate  - Identify discharge learning needs (meds, wound care, etc.)  - Arrange for interpretive services to assist at discharge as needed  - Refer to Case Management Department for coordinating discharge planning if the patient needs post-hospital services based on physician/advanced practitioner order or complex needs related to functional status, cognitive ability, or social support system  Outcome: Progressing     Problem: Knowledge Deficit  Goal: Patient/family/caregiver demonstrates understanding of disease process, treatment plan, medications, and discharge instructions  Description: Complete learning assessment and assess knowledge base.  Interventions:  - Provide teaching at level of understanding  - Provide teaching via preferred learning methods  Outcome: Progressing     Problem: Nutrition/Hydration-ADULT  Goal: Nutrient/Hydration intake appropriate for improving, restoring or maintaining nutritional needs  Description: Monitor and assess patient's nutrition/hydration status for malnutrition. Collaborate with interdisciplinary team and initiate plan and interventions as ordered.  Monitor patient's weight and dietary intake as ordered or per policy. Utilize nutrition screening tool and intervene as necessary. Determine patient's food preferences and provide high-protein, high-caloric foods as appropriate.     INTERVENTIONS:  - Monitor oral intake, urinary output, labs, and treatment plans  - Assess nutrition and hydration status and recommend course of action  - Evaluate amount of meals eaten  - Assist patient with eating if necessary   - Allow adequate time for meals  - Recommend/ encourage appropriate diets, oral nutritional supplements, and vitamin/mineral supplements  - Order, calculate, and assess calorie counts as needed  - Recommend, monitor, and adjust tube feedings and TPN/PPN based on assessed needs  - Assess need for intravenous fluids  -  Provide specific nutrition/hydration education as appropriate  - Include patient/family/caregiver in decisions related to nutrition  Outcome: Progressing     Problem: Prexisting or High Potential for Compromised Skin Integrity  Goal: Skin integrity is maintained or improved  Description: INTERVENTIONS:  - Identify patients at risk for skin breakdown  - Assess and monitor skin integrity  - Assess and monitor nutrition and hydration status  - Monitor labs   - Assess for incontinence   - Turn and reposition patient  - Assist with mobility/ambulation  - Relieve pressure over bony prominences  - Avoid friction and shearing  - Provide appropriate hygiene as needed including keeping skin clean and dry  - Evaluate need for skin moisturizer/barrier cream  - Collaborate with interdisciplinary team   - Patient/family teaching  - Consider wound care consult   Outcome: Progressing

## 2023-12-14 NOTE — PROGRESS NOTES
Simin Worrell is a 83 y.o. female who is currently ordered Vancomycin IV with management by the Pharmacy Consult service.  Relevant clinical data and objective / subjective history reviewed.  Vancomycin Assessment:  Indication and Goal AUC/Trough: Pneumonia (goal -600, trough >10)  Clinical Status: worsening  Micro:   Pending  Renal Function:  SCr: 0.67 mg/dL  CrCl: 46 mL/min  Renal replacement: Not on dialysis  Days of Therapy: 2  Current Dose: 750mg every 24 hours  Vancomycin Plan: current dose predicts AUC<400. Thus, dose changed as below.  New Dosinmg every 24 hours  Estimated AUC: 484 mcg*hr/mL  Estimated Trough: 13.7 mcg/mL  Next Level: 12/15/23 at 0600  Renal Function Monitoring: Daily BMP and UOP  Pharmacy will continue to follow closely for s/sx of nephrotoxicity, infusion reactions and appropriateness of therapy.  BMP and CBC will be ordered per protocol. We will continue to follow the patient’s culture results and clinical progress daily. Also, cefepime will be adjusted renally if necessary.    Tim Yung, Pharmacist, PharmD, BCPS

## 2023-12-14 NOTE — ASSESSMENT & PLAN NOTE
Chronically on 2L NC  Home regimen: Spiriva inhaler, pulmicort nebs BID, duonebs TID daily   No active exacerbation  Continue with steroid taper  Respiratory protocol

## 2023-12-14 NOTE — ASSESSMENT & PLAN NOTE
Baseline creatinine 0.5-0.6  Creatinine on admission 1  Likely prerenal. Patient's diuretic dose had been signifiacntly increased outpatient due to concern for pulmonary edema  Given Lasix 40mg x1 with good response  Hold additional IVF  Trend renal indices  Avoid hypotension and nephrotoxins

## 2023-12-14 NOTE — ASSESSMENT & PLAN NOTE
Patient has chronic back pain and has been receiving PRN morphine at her rehab  Hold morphine for now 2/2 lethargy  Multimodal analgesia  Currently denies pain

## 2023-12-14 NOTE — ASSESSMENT & PLAN NOTE
Wt Readings from Last 3 Encounters:   12/13/23 47.5 kg (104 lb 11.5 oz)   12/02/23 55.5 kg (122 lb 5.7 oz)   10/20/23 52.2 kg (115 lb 2 oz)     11/2023 Echo: EF 60%, G1DD, moderate TR  Diuretic dose had been increased outpatient due to concern for pulmonary edema  Hold additional diuretics as she currently examines hypovolemic to euvolemic  Strict I/O  Daily weights

## 2023-12-14 NOTE — ASSESSMENT & PLAN NOTE
Noted on CT CAP  Hx of antiphospholipid syndrome, recently with GIB and now on reduced-dose Eliquis  Transitioned to heparin in setting of NPO for Bipap  Trend renal indices

## 2023-12-14 NOTE — ASSESSMENT & PLAN NOTE
Chronically on 2L NC  Home regimen: Spiriva inhaler, pulmicort nebs BID, duonebs TID daily   No active exacerbation  Continue Solumedrol  On Bipap >24hrs in setting of worsening respiratory distress  Respiratory protocol

## 2023-12-14 NOTE — ASSESSMENT & PLAN NOTE
Home regimen of Eliquis  AC with heparin in setting of NPO  Restart cardizem when able to take PO  Continue tele

## 2023-12-14 NOTE — PROGRESS NOTES
"[facility]  Critical Care Progress Note  Name: Simin Worrell I  MRN: [id number]  Unit/Bed#: -01 SDU I Date of Admission: 12/12/2023   Date of Service: 12/14/2023 I Hospital Day: 2    Assessment/Plan   * Acute on chronic respiratory failure with hypoxia and hypercapnia (HCC)  Assessment & Plan  Hx of chronic hypoxic and hypercapnic resp failure on 3L NC at baseline in the setting of COPD  Presented with increased WOB and hypoxia  VBG: pH 7.379, CO2 84  Initially on 4L NC then required BiPAP for increased WOB  CXR with RML infiltrate  CT CAP: Redemonstrated small bilateral effusions with probable subjacent compressive atelectasis in the lower lobes. Somewhat heterogeneous consolidation in the right middle lobe with associated air bronchograms which appears new compared to prior recent study dated 11/29/2023 suspicious for interval developing pneumonia.   **EARLY IN AM- with respiratory distress, pursed lip breathing, only able to say 1-2 words at a time**    Plan:  Start Xopenex and Atrovent nebs  Solumedrol 125 x1, 40mg Q8H  Mag now  Cef/Vanco D2  Pulmonary toilet  Wean supplemental O2 to maintain spO2 > 88%  Continue ongoing GOC conversations    Sepsis (HCC)  Assessment & Plan  SIRS: leukocytosis, tachypnea  Source: Pulmonary  CT CAP: Redemonstrated small bilateral effusions with probable subjacent compressive atelectasis in the lower lobes. Somewhat heterogeneous consolidation in the right middle lobe with associated air bronchograms which appears new compared to prior recent study dated 11/29/2023 suspicious for interval developing pneumonia.   UA with small leukocytes, negative nitrites  Blood cultures pending  Patient recently with pan-sensitive Pseudomonas in sputum cultures  Procal 0.61  Lactic 1.1    Plan:  Cef/Vanc D2  Follow up sputum culture, urine antigens, and MRSA, BC  Trend fever curve and WBC count    Dysphagia  Assessment & Plan  On puree and \"mildly thick\" " "liquids at facility  NPO, meds with applesauce on BIPAP  Speech consult    Chronic pain  Assessment & Plan  Patient has chronic back pain and has been receiving PRN morphine at her rehab  Hold morphine for now 2/2 lethargy  Multimodal analgesia  Currently denies pain    Goals of care, counseling/discussion  Assessment & Plan  Patient recently admitted and made Level 2 DNR. POLST with level 2 code status documented  Discussed with patient's son that given her hx of COPD, chronic hypoxic/hypercapnic respiratory failure, and overall deconditioned state, she would likely be difficult to extubate should she require intubation. Patient's son was adamant that she would never want long-term intubation and that the patient has made comments recently about not wanting to live forever with pain.  Ultimately patient's son opted for level 3 DNR/DNI status  Of note, there is documentation from rehab physician on 12/11 stating, \"I believe she understands she is close to the end. Comfort care is our goal. She has requested that she not be sent back to the hospital.\"  Consider palliative care consult pending patient's progress    Compression fracture of T6 vertebra (HCC)  Assessment & Plan  Noted on CT CAP on admission  Consider NSGY consult to determine the need for TLSO brace with activity    Renal infarct (HCC)  Assessment & Plan  Noted on CT CAP  Hx of antiphospholipid syndrome, recently with GIB and now on reduced-dose Eliquis  Continue home Eliquis for now  Trend renal indices    Encephalopathy  Assessment & Plan  Presented as a stroke alert with \"unresponsive episode\" at facility  CTA H/N with no acute findings  Patient is currently lethargic and has difficulty speaking on bipap, but following commands in all extremities with equal strength throughout  Likely TME 2/2 infection  Q4h neuro checks  Avoid sedating medications  Hold home remeron  Delirium precautions    Diastolic heart failure (HCC)  Assessment & Plan  Wt Readings " from Last 3 Encounters:   12/13/23 47.5 kg (104 lb 11.5 oz)   12/02/23 55.5 kg (122 lb 5.7 oz)   10/20/23 52.2 kg (115 lb 2 oz)     11/2023 Echo: EF 60%, G1DD, moderate TR  Diuretic dose had been increased outpatient due to concern for pulmonary edema  Hold additional diuretics as she currently examines hypovolemic to euvolemic  Strict I/O  Daily weights    JENNIE (acute kidney injury) ([facility])  Assessment & Plan  Baseline creatinine 0.5-0.6  Creatinine on admission 1  Likely prerenal. Patient's diuretic dose had been signifiacntly increased outpatient due to concern for pulmonary edema  Given Lasix 40mg x1 with good response  Hold additional IVF  Trend renal indices  Avoid hypotension and nephrotoxins    Mood disorder ([facility])  Assessment & Plan  Continue to hold home Remeron and Zoloft 2/2 lethargy    Failure to thrive in adult  Assessment & Plan  Nutrition and speech therapy consults  On NTL in facility? Awaiting repeated recs    Other specified hypothyroidism  Assessment & Plan  Continue synthroid    Atrial fibrillation ([facility])  Assessment & Plan  Continue Eliquis  Cardizem held, consider restarting in AM  Continue tele    Antiphospholipid antibody syndrome ([facility])  Assessment & Plan  Continue Eliquis    Hypertension  Assessment & Plan  Hold home amlodipine  Consider restarting in AM    COPD (chronic obstructive pulmonary disease) ([facility])  Assessment & Plan  Chronically on 2L NC  Home regimen: Spiriva inhaler, pulmicort nebs BID, duonebs TID daily   No active exacerbation  Continue with steroid taper  Respiratory protocol      Disposition: Stepdown Level 1    ICU Core Measures     A: Assess, Prevent, and Manage Pain Has pain been assessed? Yes  Need for changes to pain regimen? No   B: Both SAT/SAT  N/A   C: Choice of Sedation RASS Goal: -1 Drowsy or 0 Alert and Calm  Need for changes to sedation or analgesia regimen? No   D: Delirium CAM-ICU: Negative   E: Early Mobility  Plan for early mobility? Yes   F: Family Engagement  Plan for family engagement today? Yes     Antibiotic Review: Awaiting culture results.       Prophylaxis:  VTE VTE covered by:  heparin (porcine), Intravenous, 12 Units/kg/hr at 12/13/23 1458  heparin (porcine), Intravenous  heparin (porcine), Intravenous       Stress Ulcer  not orderedcovered bypantoprazole (PROTONIX) 40 mg tablet [[id number]] (Long-Term Med)     Significant 24hr Events     24hr events: 83 YOF presenting with mixed hypoxic and hypercapnic respiratory failure in the setting of morphine administration for chronic pain. Amenable to bipap, weaned to off back to nasal cannula. Remains hemodynamically stable, still slightly encephalopathic but nearing baseline. GOC conversations ongoing, consideration for palliative consultation. No additional events overnight.    Addendum: at 0430- noted to be in respiratory distress. Decreased air movement. On exam conversationally dyspneic, only to say 1 word at a time. Pursed lip breathing with accessory muscle use. Offered bipap earlier in shift and refused. Plan for precedex infusion with bipap, if unable to tolerate will need at least MFNC. Will require ongoing GOC conversations.     Subjective   Review of Systems   Constitutional:  Positive for fatigue. Negative for chills and fever.   HENT:  Negative for ear pain and sore throat.    Eyes:  Negative for pain and visual disturbance.   Respiratory:  Positive for chest tightness and shortness of breath. Negative for cough.    Cardiovascular:  Negative for chest pain and palpitations.   Gastrointestinal:  Negative for abdominal pain and vomiting.   Genitourinary:  Negative for dysuria and hematuria.   Musculoskeletal:  Negative for arthralgias and back pain.   Skin:  Negative for color change and rash.   Neurological:  Positive for weakness. Negative for seizures and syncope.   Psychiatric/Behavioral:  Positive for confusion. The patient is nervous/anxious.    All other systems reviewed and are negative.     Objective                             Vitals I/O      Most Recent Min/Max in 24hrs   Temp 98.3 °F (36.8 °C) Temp  Min: 97.6 °F (36.4 °C)  Max: 98.3 °F (36.8 °C)   Pulse 90 Pulse  Min: 83  Max: 97   Resp 18 Resp  Min: 16  Max: 43   BP (!) 195/75 BP  Min: 160/73  Max: 202/92   O2 Sat 90 % SpO2  Min: 89 %  Max: 100 %      Intake/Output Summary (Last 24 hours) at 12/14/2023 0445  Last data filed at 12/14/2023 0301  Gross per 24 hour   Intake 298.78 ml   Output 750 ml   Net -451.22 ml       Diet NPO    Invasive Monitoring   N/a        Physical Exam   Physical Exam  Vitals and nursing note reviewed.   Eyes:      Extraocular Movements: Extraocular movements intact.      Pupils: Pupils are equal, round, and reactive to light.   Skin:     General: Skin is warm and dry.      Capillary Refill: Capillary refill takes less than 2 seconds.   HENT:      Head: Normocephalic and atraumatic.      Right Ear: Hearing normal.      Left Ear: Hearing normal.      Mouth/Throat:      Mouth: Mucous membranes are dry.   Neck:      Vascular: No JVD.   Cardiovascular:      Rate and Rhythm: Normal rate and regular rhythm.   Musculoskeletal:      Right lower leg: No edema.      Left lower leg: No edema.   Abdominal:      Palpations: Abdomen is soft.      Tenderness: There is no abdominal tenderness. There is no guarding.   Constitutional:       General: She is in acute distress.      Appearance: She is well-developed. She is ill-appearing.   Pulmonary:      Effort: Tachypnea, accessory muscle usage, respiratory distress and accessory muscle usage present.      Breath sounds: Decreased air movement present. Examination of the right-lower field reveals wheezing. Examination of the left-lower field reveals wheezing. Wheezing present.      Comments: Scant expiratory wheezing  Neurological:      Mental Status: She is alert and oriented to person, place and time.            Diagnostic Studies    EKG: NSR rate 82  Imaging: None new   I have personally reviewed  pertinent reports.       Medications:  Scheduled PRN   budesonide, 0.5 mg, Q12H  cefepime, 2,000 mg, Q12H  chlorhexidine, 15 mL, Q12H CARMEN  ipratropium, 0.5 mg, Q6H  levalbuterol, 1.25 mg, Q6H  lidocaine, 1 patch, Daily  magnesium sulfate, 2 g, Once  methylPREDNISolone sodium succinate, 125 mg, Once  methylPREDNISolone sodium succinate, 40 mg, Q8H CARMEN  vancomycin, 750 mg, Q24H      acetaminophen, 650 mg, Q6H PRN  albuterol, 2.5 mg, Q6H PRN  heparin (porcine), 1,350 Units, Q6H PRN  heparin (porcine), 2,700 Units, Q6H PRN  hydrALAZINE, 10 mg, Q6H PRN  HYDROmorphone, 0.2 mg, Q4H PRN  iohexol, 80 mL, Once in imaging  labetalol, 10 mg, Q6H PRN       Continuous    dexmedetomidine, 0.1-0.7 mcg/kg/hr, Last Rate: 0.2 mcg/kg/hr (12/14/23 0439)  heparin (porcine), 3-20 Units/kg/hr (Order-Specific), Last Rate: 12 Units/kg/hr (12/13/23 9168)         Labs:  CBC    Recent Labs     12/12/23 2127 12/12/23 2135 12/13/23  0605   WBC 20.00*  --  20.47*   HGB 9.5* 10.2* 10.2*   HCT 31.2* 30* 33.4*     --  272   BANDSPCT  --   --  8     BMP    Recent Labs     12/12/23 2127 12/12/23 2135 12/13/23  0605   SODIUM 139  --  143   K 3.3*  --  3.5   CL 90*  --  94*   CO2 45* >45* 41*   AGAP 4  --  8   BUN 38*  --  33*   CREATININE 1.01  --  0.80   CALCIUM 8.5  --  9.0       Coags    Recent Labs     12/12/23 2127 12/13/23  1507 12/13/23  2228   INR 1.57* 1.32*  --    PTT 59* 52* 68*        Additional Electrolytes  Recent Labs     12/12/23 2135 12/13/23  0605   MG  --  2.5   PHOS  --  2.7   CAIONIZED 1.10*  --           Blood Gas    No recent results  No recent results LFTs  Recent Labs     12/12/23 2127 12/13/23  0605   ALT 11 11   AST 17 12*   ALKPHOS 111* 124*   ALB 2.9* 3.2*   TBILI 0.65 0.53       Infectious  Recent Labs     12/12/23 2127 12/13/23  0605   PROCALCITONI 0.61* 0.59*     Glucose  Recent Labs     12/12/23 2127 12/13/23  0605   GLUC 138 164*        YUE Fung

## 2023-12-14 NOTE — ASSESSMENT & PLAN NOTE
Baseline creatinine 0.5-0.6  Creatinine on admission 1  Likely prerenal. Patient's diuretic dose had been signifiacntly increased outpatient due to concern for pulmonary edema  Hold on additional diuresis  Hold additional IVF  Trend renal indices  Avoid hypotension and nephrotoxins

## 2023-12-14 NOTE — ASSESSMENT & PLAN NOTE
SIRS: leukocytosis, tachypnea  Source: Pulmonary  CT CAP: Redemonstrated small bilateral effusions with probable subjacent compressive atelectasis in the lower lobes. Somewhat heterogeneous consolidation in the right middle lobe with associated air bronchograms which appears new compared to prior recent study dated 11/29/2023 suspicious for interval developing pneumonia.   UA with small leukocytes, negative nitrites  Blood cultures pending  Patient recently with pan-sensitive Pseudomonas in sputum cultures  Procal 0.61  Lactic 1.1    Plan:  Cef/Vanc D2  Follow up sputum culture, urine antigens, and MRSA, BC  Trend fever curve and WBC count

## 2023-12-14 NOTE — PLAN OF CARE
Problem: PAIN - ADULT  Goal: Verbalizes/displays adequate comfort level or baseline comfort level  Description: Interventions:  - Encourage patient to monitor pain and request assistance  - Assess pain using appropriate pain scale  - Administer analgesics based on type and severity of pain and evaluate response  - Implement non-pharmacological measures as appropriate and evaluate response  - Consider cultural and social influences on pain and pain management  - Notify physician/advanced practitioner if interventions unsuccessful or patient reports new pain  12/14/2023 0729 by Barbara Shukla  Outcome: Progressing  12/14/2023 0003 by Barbara Shukla  Outcome: Progressing     Problem: INFECTION - ADULT  Goal: Absence or prevention of progression during hospitalization  Description: INTERVENTIONS:  - Assess and monitor for signs and symptoms of infection  - Monitor lab/diagnostic results  - Monitor all insertion sites, i.e. indwelling lines, tubes, and drains  - Monitor endotracheal if appropriate and nasal secretions for changes in amount and color  - [place] appropriate cooling/warming therapies per order  - Administer medications as ordered  - Instruct and encourage patient and family to use good hand hygiene technique  - Identify and instruct in appropriate isolation precautions for identified infection/condition  12/14/2023 0729 by Barbara Shukla  Outcome: Progressing  12/14/2023 0003 by Barbara Shukla  Outcome: Progressing  Goal: Absence of fever/infection during neutropenic period  Description: INTERVENTIONS:  - Monitor WBC    12/14/2023 0729 by Barbara Shukla  Outcome: Progressing  12/14/2023 0003 by Barbara Shukla  Outcome: Progressing     Problem: SAFETY ADULT  Goal: Patient will remain free of falls  Description: INTERVENTIONS:  - Educate patient/family on patient safety including physical limitations  - Instruct patient to call for assistance with activity   - Consult OT/PT to assist with strengthening/mobility   -  Keep Call bell within reach  - Keep bed low and locked with side rails adjusted as appropriate  - Keep care items and personal belongings within reach  - Initiate and maintain comfort rounds  - Make Fall Risk Sign visible to staff  - Offer Toileting every  Hours, in advance of need  - Initiate/Maintain alarm  - Obtain necessary fall risk management equipment:   - Apply yellow socks and bracelet for high fall risk patients  - Consider moving patient to room near nurses station  12/14/2023 0729 by Barbara Shukla  Outcome: Progressing  12/14/2023 0003 by Barbara Shukla  Outcome: Progressing  Goal: Maintain or return to baseline ADL function  Description: INTERVENTIONS:  -  Assess patient's ability to carry out ADLs; assess patient's baseline for ADL function and identify physical deficits which impact ability to perform ADLs (bathing, care of mouth/teeth, toileting, grooming, dressing, etc.)  - Assess/evaluate cause of self-care deficits   - Assess range of motion  - Assess patient's mobility; develop plan if impaired  - Assess patient's need for assistive devices and provide as appropriate  - Encourage maximum independence but intervene and supervise when necessary  - Involve family in performance of ADLs  - Assess for home care needs following discharge   - Consider OT consult to assist with ADL evaluation and planning for discharge  - Provide patient education as appropriate  12/14/2023 0729 by Barbara Shukla  Outcome: Progressing  12/14/2023 0003 by Barbara Shukla  Outcome: Progressing  Goal: Maintains/Returns to pre admission functional level  Description: INTERVENTIONS:  - Perform AM-PAC 6 Click Basic Mobility/ Daily Activity assessment daily.  - Set and communicate daily mobility goal to care team and patient/family/caregiver.   - Collaborate with rehabilitation services on mobility goals if consulted  - Perform Range of Motion  times a day.  - Reposition patient every  hours.  - Dangle patient  times a day  - Stand  patient  times a day  - Ambulate patient  times a day  - Out of bed to chair times a day   - Out of bed for meals  times a day  - Out of bed for toileting  - Record patient progress and toleration of activity level   12/14/2023 0729 by Barbara Shukla  Outcome: Progressing  12/14/2023 0003 by Barbara Shukla  Outcome: Progressing     Problem: DISCHARGE PLANNING  Goal: Discharge to home or other facility with appropriate resources  Description: INTERVENTIONS:  - Identify barriers to discharge w/patient and caregiver  - Arrange for needed discharge resources and transportation as appropriate  - Identify discharge learning needs (meds, wound care, etc.)  - Arrange for interpretive services to assist at discharge as needed  - Refer to Case Management Department for coordinating discharge planning if the patient needs post-hospital services based on physician/advanced practitioner order or complex needs related to functional status, cognitive ability, or social support system  12/14/2023 0729 by Barbara Shukla  Outcome: Progressing  12/14/2023 0003 by Barbara Shukla  Outcome: Progressing     Problem: Knowledge Deficit  Goal: Patient/family/caregiver demonstrates understanding of disease process, treatment plan, medications, and discharge instructions  Description: Complete learning assessment and assess knowledge base.  Interventions:  - Provide teaching at level of understanding  - Provide teaching via preferred learning methods  12/14/2023 0729 by Barbara Shukla  Outcome: Progressing  12/14/2023 0003 by Barbara Shukla  Outcome: Progressing     Problem: Nutrition/Hydration-ADULT  Goal: Nutrient/Hydration intake appropriate for improving, restoring or maintaining nutritional needs  Description: Monitor and assess patient's nutrition/hydration status for malnutrition. Collaborate with interdisciplinary team and initiate plan and interventions as ordered.  Monitor patient's weight and dietary intake as ordered or per policy. Utilize  nutrition screening tool and intervene as necessary. Determine patient's food preferences and provide high-protein, high-caloric foods as appropriate.     INTERVENTIONS:  - Monitor oral intake, urinary output, labs, and treatment plans  - Assess nutrition and hydration status and recommend course of action  - Evaluate amount of meals eaten  - Assist patient with eating if necessary   - Allow adequate time for meals  - Recommend/ encourage appropriate diets, oral nutritional supplements, and vitamin/mineral supplements  - Order, calculate, and assess calorie counts as needed  - Recommend, monitor, and adjust tube feedings and TPN/PPN based on assessed needs  - Assess need for intravenous fluids  - Provide specific nutrition/hydration education as appropriate  - Include patient/family/caregiver in decisions related to nutrition  12/14/2023 0729 by Barbara Shukla  Outcome: Progressing  12/14/2023 0003 by Barbara Shukla  Outcome: Progressing     Problem: Prexisting or High Potential for Compromised Skin Integrity  Goal: Skin integrity is maintained or improved  Description: INTERVENTIONS:  - Identify patients at risk for skin breakdown  - Assess and monitor skin integrity  - Assess and monitor nutrition and hydration status  - Monitor labs   - Assess for incontinence   - Turn and reposition patient  - Assist with mobility/ambulation  - Relieve pressure over bony prominences  - Avoid friction and shearing  - Provide appropriate hygiene as needed including keeping skin clean and dry  - Evaluate need for skin moisturizer/barrier cream  - Collaborate with interdisciplinary team   - Patient/family teaching  - Consider wound care consult   12/14/2023 0729 by Barbara Shukla  Outcome: Progressing  12/14/2023 0003 by Barbara Shukla  Outcome: Progressing

## 2023-12-14 NOTE — ASSESSMENT & PLAN NOTE
"Patient recently admitted and made Level 2 DNR. POLST with level 2 code status documented  Discussed with patient's son that given her hx of COPD, chronic hypoxic/hypercapnic respiratory failure, and overall deconditioned state, she would likely be difficult to extubate should she require intubation. Patient's son was adamant that she would never want long-term intubation and that the patient has made comments recently about not wanting to live forever with pain.  Ultimately patient's son opted for level 3 DNR/DNI status  Of note, there is documentation from rehab physician on 12/11 stating, \"I believe she understands she is close to the end. Comfort care is our goal. She has requested that she not be sent back to the hospital.\"  Palliative Care appreciate, awaiting progress over next 24hrs prior to consideration for hospice  "

## 2023-12-14 NOTE — ASSESSMENT & PLAN NOTE
Hx of chronic hypoxic and hypercapnic resp failure on 3L NC at baseline in the setting of COPD  Presented with increased WOB and hypoxia  VBG: pH 7.379, CO2 84  Initially on 4L NC then required BiPAP for increased WOB  CXR with RML infiltrate  CT CAP: Redemonstrated small bilateral effusions with probable subjacent compressive atelectasis in the lower lobes. Somewhat heterogeneous consolidation in the right middle lobe with associated air bronchograms which appears new compared to prior recent study dated 11/29/2023 suspicious for interval developing pneumonia.   **EARLY IN AM 12/14- with respiratory distress, pursed lip breathing, only able to say 1-2 words at a time**\  Has remained on precedex/bipap convo now >24hrs    Plan:  Xopenex and Atrovent nebs  Solumedrol 40mg Q8H  Cef D3  Pulmonary toilet  Wean supplemental O2 to maintain spO2 > 88%  Continue ongoing GOC conversations

## 2023-12-14 NOTE — MALNUTRITION/BMI
12/14?This medical record reflects one or more clinical indicators suggestive of malnutrition and/or morbid obesity.    Malnutrition Findings:   Adult Malnutrition type: Chronic illness  Adult Degree of Malnutrition: Other severe protein calorie malnutrition  Malnutrition Characteristics: Fat loss, Muscle loss, Weight loss                  360 Statement: Pt presents with severe protein calorie malnutrition as evidenced by BMI 16.58, prominent clavicle bone, sunken orbitals, b/l depressed temporals and 9% wt loss in 7 weeks, (12/13/23 105 lbs, 10/20/23 115 lbs). Treat with diet advance and Ensure Clears BID.    BMI Findings:  Adult BMI Classifications: Underweight < 18.5        Body mass index is 16.58 kg/m².     See Nutrition note dated 12/14/23 for additional details.  Completed nutrition assessment is viewable in the nutrition documentation.

## 2023-12-14 NOTE — ASSESSMENT & PLAN NOTE
SIRS: leukocytosis, tachypnea  Source: Pulmonary  CT CAP: Redemonstrated small bilateral effusions with probable subjacent compressive atelectasis in the lower lobes. Somewhat heterogeneous consolidation in the right middle lobe with associated air bronchograms which appears new compared to prior recent study dated 11/29/2023 suspicious for interval developing pneumonia.   UA with small leukocytes, negative nitrites  Blood cultures pending  Patient recently with pan-sensitive Pseudomonas in sputum cultures  Procal 0.61  Lactic 1.1    Plan:  Cef D3  Vanco DC  MRSA negative, Antigens negative  BC negative x24hrs  Trend fever curve and WBC count

## 2023-12-15 ENCOUNTER — PATIENT OUTREACH (OUTPATIENT)
Dept: CASE MANAGEMENT | Facility: OTHER | Age: 83
End: 2023-12-15

## 2023-12-15 NOTE — PROGRESS NOTES
Chart review complete the patient admitted 12/12/23 to [facility]. This [facility] will continue to monitor via chart review.

## 2023-12-15 NOTE — PLAN OF CARE
Problem: PAIN - ADULT  Goal: Verbalizes/displays adequate comfort level or baseline comfort level  Description: Interventions:  - Encourage patient to monitor pain and request assistance  - Assess pain using appropriate pain scale  - Administer analgesics based on type and severity of pain and evaluate response  - Implement non-pharmacological measures as appropriate and evaluate response  - Consider cultural and social influences on pain and pain management  - Notify physician/advanced practitioner if interventions unsuccessful or patient reports new pain  Outcome: Progressing     Problem: INFECTION - ADULT  Goal: Absence or prevention of progression during hospitalization  Description: INTERVENTIONS:  - Assess and monitor for signs and symptoms of infection  - Monitor lab/diagnostic results  - Monitor all insertion sites, i.e. indwelling lines, tubes, and drains  - Monitor endotracheal if appropriate and nasal secretions for changes in amount and color  - [place] appropriate cooling/warming therapies per order  - Administer medications as ordered  - Instruct and encourage patient and family to use good hand hygiene technique  - Identify and instruct in appropriate isolation precautions for identified infection/condition  Outcome: Progressing  Goal: Absence of fever/infection during neutropenic period  Description: INTERVENTIONS:  - Monitor WBC    Outcome: Progressing     Problem: SAFETY ADULT  Goal: Patient will remain free of falls  Description: INTERVENTIONS:  - Educate patient/family on patient safety including physical limitations  - Instruct patient to call for assistance with activity   - Consult OT/PT to assist with strengthening/mobility   - Keep Call bell within reach  - Keep bed low and locked with side rails adjusted as appropriate  - Keep care items and personal belongings within reach  - Initiate and maintain comfort rounds  - Make Fall Risk Sign visible to staff  - Offer Toileting every 2 Hours,  in advance of need  - Initiate/Maintain bed alarm  - Obtain necessary fall risk management equipment:   - Apply yellow socks and bracelet for high fall risk patients  - Consider moving patient to room near nurses station  Outcome: Progressing  Goal: Maintain or return to baseline ADL function  Description: INTERVENTIONS:  -  Assess patient's ability to carry out ADLs; assess patient's baseline for ADL function and identify physical deficits which impact ability to perform ADLs (bathing, care of mouth/teeth, toileting, grooming, dressing, etc.)  - Assess/evaluate cause of self-care deficits   - Assess range of motion  - Assess patient's mobility; develop plan if impaired  - Assess patient's need for assistive devices and provide as appropriate  - Encourage maximum independence but intervene and supervise when necessary  - Involve family in performance of ADLs  - Assess for home care needs following discharge   - Consider OT consult to assist with ADL evaluation and planning for discharge  - Provide patient education as appropriate  Outcome: Progressing  Goal: Maintains/Returns to pre admission functional level  Description: INTERVENTIONS:  - Perform AM-PAC 6 Click Basic Mobility/ Daily Activity assessment daily.  - Set and communicate daily mobility goal to care team and patient/family/caregiver.   - Collaborate with rehabilitation services on mobility goals if consulted  - Perform Range of Motion 3 times a day.  - Reposition patient every 2 hours.  - Dangle patient 3 times a day  - Stand patient 3 times a day  - Ambulate patient 3 times a day  - Out of bed to chair 3 times a day   - Out of bed for meals 3 times a day  - Out of bed for toileting  - Record patient progress and toleration of activity level   Outcome: Progressing     Problem: DISCHARGE PLANNING  Goal: Discharge to home or other facility with appropriate resources  Description: INTERVENTIONS:  - Identify barriers to discharge w/patient and caregiver  -  Arrange for needed discharge resources and transportation as appropriate  - Identify discharge learning needs (meds, wound care, etc.)  - Arrange for interpretive services to assist at discharge as needed  - Refer to Case Management Department for coordinating discharge planning if the patient needs post-hospital services based on physician/advanced practitioner order or complex needs related to functional status, cognitive ability, or social support system  Outcome: Progressing     Problem: Knowledge Deficit  Goal: Patient/family/caregiver demonstrates understanding of disease process, treatment plan, medications, and discharge instructions  Description: Complete learning assessment and assess knowledge base.  Interventions:  - Provide teaching at level of understanding  - Provide teaching via preferred learning methods  Outcome: Progressing     Problem: Nutrition/Hydration-ADULT  Goal: Nutrient/Hydration intake appropriate for improving, restoring or maintaining nutritional needs  Description: Monitor and assess patient's nutrition/hydration status for malnutrition. Collaborate with interdisciplinary team and initiate plan and interventions as ordered.  Monitor patient's weight and dietary intake as ordered or per policy. Utilize nutrition screening tool and intervene as necessary. Determine patient's food preferences and provide high-protein, high-caloric foods as appropriate.     INTERVENTIONS:  - Monitor oral intake, urinary output, labs, and treatment plans  - Assess nutrition and hydration status and recommend course of action  - Evaluate amount of meals eaten  - Assist patient with eating if necessary   - Allow adequate time for meals  - Recommend/ encourage appropriate diets, oral nutritional supplements, and vitamin/mineral supplements  - Order, calculate, and assess calorie counts as needed  - Recommend, monitor, and adjust tube feedings and TPN/PPN based on assessed needs  - Assess need for intravenous  fluids  - Provide specific nutrition/hydration education as appropriate  - Include patient/family/caregiver in decisions related to nutrition  Outcome: Progressing     Problem: Prexisting or High Potential for Compromised Skin Integrity  Goal: Skin integrity is maintained or improved  Description: INTERVENTIONS:  - Identify patients at risk for skin breakdown  - Assess and monitor skin integrity  - Assess and monitor nutrition and hydration status  - Monitor labs   - Assess for incontinence   - Turn and reposition patient  - Assist with mobility/ambulation  - Relieve pressure over bony prominences  - Avoid friction and shearing  - Provide appropriate hygiene as needed including keeping skin clean and dry  - Evaluate need for skin moisturizer/barrier cream  - Collaborate with interdisciplinary team   - Patient/family teaching  - Consider wound care consult   Outcome: Progressing

## 2023-12-15 NOTE — CONSULTS
The patient’s vancomycin therapy has been completed / discontinued. Thank you for allowing us to take part in this patient's care. Pharmacy will sign-off now; please call or re-consult if there are any questions.    Tim Yung, PharmD, BCPS

## 2023-12-15 NOTE — PROGRESS NOTES
"    Progress Note - Palliative & Supportive Care  Simin Worrell  83 y.o.  female   SDU/-01 S*   MRN: [id number]  Encounter: [id number]     Assessment/Problems Actively Addressed this Visit:  Goals of care counseling  Encounter for palliative and supportive care  Altered mental status/encephalopathy  Acute on chronic hypoxic and hypercapnic respiratory failure  Unresponsive episode  Bibasilar pleural effusions  Restrictive lung disease  Dysphagia  Antiphospholipid syndrome  COPD  Chronic pain syndrome  -Goals of care discussion via telephone with patient's son/POA/Solomon. The patient is minimally responsive to my questions today and is not interactive.   -We reviewed that the patient is not drinking or eating due to BiPAP dependency.  I expressed my concern that the patient will continue to decline given severe illness.  -Solomon agrees; he accepts that his mother does not have much longer although she has \"made it through\" episodes like this in the past. Soolmon states that Simin appeared very comfortable on the BiPAP when he visited this morning; however if she appeared uncomfortable he would not want to continue with this.  -For now, the plan is to continue current level of care with BiPAP.  Wean if able. However, if the patient appears uncomfortable or if she has further decline on BiPAP, please discuss with Solomon.  Plan would be to transition to comfort care at that time.    24 History  Chart reviewed before visit.  Patient seen and examined.  Remains on BiPAP.  Unable to wean.    Decisional apparatus:  Patient is not competent on exam today.  If competence is lost, patient's substitute decision maker would default to Solomon by [place].  Advance Directive/Living Will/POLST: Solomon is POA. The patient has 3 adult children and Solomon updates them regularly.     Review of Systems:   Review of Systems   Unable to perform ROS: Acuity of condition     Medications    Current Facility-Administered Medications: "     acetaminophen (TYLENOL) tablet 650 mg, 650 mg, Oral, Q6H PRN, ADIEL Lamar-C    albuterol inhalation solution 2.5 mg, 2.5 mg, Nebulization, Q6H PRN, Sharri Casarez PA-C    budesonide (PULMICORT) inhalation solution 0.5 mg, 0.5 mg, Nebulization, Q12H, CLAIRE LamarC, 0.5 mg at 12/15/23 0722    cefepime (MAXIPIME) IVPB (premix in dextrose) 2,000 mg 50 mL, 2,000 mg, Intravenous, Q12H, ADIEL Lamar-C, Last Rate: 100 mL/hr at 12/15/23 0036, 2,000 mg at 12/15/23 0036    chlorhexidine (PERIDEX) 0.12 % oral rinse 15 mL, 15 mL, Mouth/Throat, Q12H CARMEN, Sharri Casarez PA-C, 15 mL at 12/14/23 2220    dexmedeTOMIDine (Precedex) 400 mcg in sodium chloride 0.9% 100 mL, 0.1-0.7 mcg/kg/hr, Intravenous, Titrated, YUE Fung, Last Rate: 7.1 mL/hr at 12/15/23 0212, 0.6 mcg/kg/hr at 12/15/23 0212    heparin (porcine) 25,000 units in 0.45% NaCl 250 mL infusion (premix), 3-20 Units/kg/hr (Order-Specific), Intravenous, Titrated, YUE Buckley, Last Rate: 5.4 mL/hr at 12/15/23 0036, 12 Units/kg/hr at 12/15/23 0036    heparin (porcine) injection 1,350 Units, 1,350 Units, Intravenous, Q6H PRN, YUE Buckley    heparin (porcine) injection 2,700 Units, 2,700 Units, Intravenous, Q6H PRN, YUE Buckley    hydrALAZINE (APRESOLINE) injection 10 mg, 10 mg, Intravenous, Q6H PRN, YUE Fung, 10 mg at 12/14/23 0237    HYDROmorphone HCl (DILAUDID) injection 0.2 mg, 0.2 mg, Intravenous, Q4H PRN, YUE Buckley, 0.2 mg at 12/15/23 0222    iohexol (OMNIPAQUE) 350 MG/ML injection (MULTI-DOSE) 80 mL, 80 mL, Intravenous, Once in imaging, Bronson Cameron DO    ipratropium (ATROVENT) 0.02 % inhalation solution 0.5 mg, 0.5 mg, Nebulization, Q6H, Sharri Casarez PA-C, 0.5 mg at 12/15/23 0722    labetalol (NORMODYNE) injection 10 mg, 10 mg, Intravenous, Q6H PRN, YUE Fung    levalbuterol (XOPENEX) inhalation solution 1.25 mg, 1.25 mg, Nebulization,  "Q6H, Sharri Karyna Casarez PA-C, 1.25 mg at 12/15/23 0721    lidocaine (LIDODERM) 5 % patch 1 patch, 1 patch, Topical, Daily, Sharri Troncoso SIGRID Casarez, 1 patch at 12/14/23 0832    methylPREDNISolone sodium succinate (Solu-MEDROL) injection 40 mg, 40 mg, Intravenous, Q8H CARMENAdam, KHALIDANP, 40 mg at 12/15/23 0516    Objective  BP (!) 192/91   Pulse 79   Temp (!) 97 °F (36.1 °C) (Axillary)   Resp (!) 32   Ht 5' 6\" (1.676 m)   Wt 46.6 kg (102 lb 11.8 oz)   SpO2 100%   BMI 16.58 kg/m²     Physical Exam:   Physical Exam  Vitals and nursing note reviewed. Exam conducted with a chaperone present.   Constitutional:       General: She is sleeping. She is not in acute distress.     Appearance: She is underweight. She is ill-appearing.      Interventions: Face mask in place.   HENT:      Head: Normocephalic and atraumatic.   Eyes:      Conjunctiva/sclera: Conjunctivae normal.   Cardiovascular:      Rate and Rhythm: Normal rate.      Heart sounds: No murmur heard.  Pulmonary:      Effort: Pulmonary effort is normal. No respiratory distress.      Breath sounds: Normal breath sounds.   Abdominal:      Palpations: Abdomen is soft.      Tenderness: There is no abdominal tenderness.   Musculoskeletal:      Cervical back: Neck supple.   Skin:     General: Skin is warm.      Capillary Refill: Capillary refill takes less than 2 seconds.      Coloration: Skin is pale.   Neurological:      Mental Status: She is lethargic.   Psychiatric:         Mood and Affect: Mood normal.       Lab Results: I have personally reviewed pertinent labs., CBC:   Lab Results   Component Value Date    WBC 12.00 (H) 12/15/2023    HGB 9.7 (L) 12/15/2023    HCT 32.8 (L) 12/15/2023     (H) 12/15/2023     12/15/2023    RBC 3.10 (L) 12/15/2023    MCH 31.3 12/15/2023    MCHC 29.6 (L) 12/15/2023    RDW 16.7 (H) 12/15/2023    MPV 9.0 12/15/2023   , CMP:   Lab Results   Component Value Date    SODIUM 147 12/15/2023    K 4.0 12/15/2023    CL " "100 12/15/2023    CO2 39 (H) 12/15/2023    BUN 50 (H) 12/15/2023    CREATININE 0.90 12/15/2023    CALCIUM 9.2 12/15/2023    EGFR 58 12/15/2023     Imaging Studies: I have personally reviewed pertinent reports.  EKG, Pathology, and Other Studies: I have personally reviewed pertinent reports.    Counseling / Coordination of Care  Total floor / unit time spent today 60 minutes. Greater than 50% of total time was spent with the patient and / or family counseling and / or coordinating of care. A description of the counseling / coordination of care: reviewed chart, reviewed lab values, reviewed imaging, provided medical updates, discussed palliative care and symptom management, discussed hospice care and comfort care, discussed goals of care, discussed code status, discussed advanced directives, assessed POA/HCA, provided supportive listening, provided anticipatory guidance, provided psychosocial and emotional support, assessed competency and decision-making, and facilitated interdisciplinary communication. Reviewed with ICU team, RN and CM.    Katt Piper PA-C  Palliative & Supportive Care    Portions of this document may have been created using dictation software and as such some \"sound alike\" terms may have been generated by the system. Do not hesitate to contact me with any questions or clarifications.    "

## 2023-12-15 NOTE — PLAN OF CARE
Problem: PAIN - ADULT  Goal: Verbalizes/displays adequate comfort level or baseline comfort level  Description: Interventions:  - Encourage patient to monitor pain and request assistance  - Assess pain using appropriate pain scale  - Administer analgesics based on type and severity of pain and evaluate response  - Implement non-pharmacological measures as appropriate and evaluate response  - Consider cultural and social influences on pain and pain management  - Notify physician/advanced practitioner if interventions unsuccessful or patient reports new pain  Outcome: Progressing     Problem: INFECTION - ADULT  Goal: Absence or prevention of progression during hospitalization  Description: INTERVENTIONS:  - Assess and monitor for signs and symptoms of infection  - Monitor lab/diagnostic results  - Monitor all insertion sites, i.e. indwelling lines, tubes, and drains  - Monitor endotracheal if appropriate and nasal secretions for changes in amount and color  - [place] appropriate cooling/warming therapies per order  - Administer medications as ordered  - Instruct and encourage patient and family to use good hand hygiene technique  - Identify and instruct in appropriate isolation precautions for identified infection/condition  Outcome: Progressing  Goal: Absence of fever/infection during neutropenic period  Description: INTERVENTIONS:  - Monitor WBC    Outcome: Progressing

## 2023-12-15 NOTE — CASE MANAGEMENT
Case Management Discharge Planning Note    Patient name Simin Worrell  Location  SDU/-01 S* MRN [id number]  : 1940 Date 12/15/2023       Current Admission Date: 2023  Current Admission Diagnosis:Acute on chronic respiratory failure with hypoxia and hypercapnia ([facility])   Patient Active Problem List    Diagnosis Date Noted    Palliative care patient 2023    Goals of care, counseling/discussion 2023    Chronic pain 2023    Dysphagia 2023    JENNIE (acute kidney injury) ([facility]) 2023    Diastolic heart failure ([facility]) 2023    Encephalopathy 2023    Renal infarct ([facility]) 2023    Compression fracture of T6 vertebra ([facility]) 2023    Underweight 2023    Acute blood loss anemia 2023    Acute on chronic diastolic CHF (congestive heart failure) ([facility]) 2023    Compression fracture of lumbar vertebra ([facility]) 2023    Status post hip hemiarthroplasty 2023    Acute postoperative pulmonary insufficiency ([facility]) 2023    Bilateral pleural effusion 2023    Acute on chronic respiratory failure with hypoxia and hypercapnia ([facility]) 2023    GIB (gastrointestinal bleeding) 2023    Cerebral aneurysm, nonruptured 2023    Abdominal distention/back pain 2023    MDD with persistant bereavement complex 2023    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[facility]) 2023    Thyroid nodule 2023    Severe protein-calorie malnutrition ([facility]) 2023    Mood disorder ([facility]) 2023    Failure to thrive in adult 2023    Pulmonary nodule 02/15/2023    Onychomycosis 02/15/2023    Anxiety 02/10/2023    Other specified hypothyroidism 2022    Peripheral edema 2021    Low back pain without sciatica 2021    Bilateral pneumonia 2017    COPD (chronic obstructive pulmonary disease) ([facility]) 2017    Sepsis ([facility]) 2017    Hypertension 2017    Hypercoagulable state ([facility]) 2017     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    Vitamin D deficiency 12/20/2016    Osteoporosis 06/26/2015    Antiphospholipid antibody syndrome (HCC) 11/13/2012    Atrial fibrillation (HCC) 11/13/2012    Hereditary hemolytic anemia (HCC) 11/13/2012    Asthma 11/13/2012      LOS (days): 3  Geometric Mean LOS (GMLOS) (days): 5.1  Days to GMLOS:2.5     OBJECTIVE:  Risk of Unplanned Readmission Score: 44.12         Current admission status: Inpatient   Preferred Pharmacy:   [id number] [place], [place] - [address] [address]  Phone: [phone] Fax: [phone]    [facility] [address] [address]  Phone: [phone] Fax: [phone]    Primary Care Provider: Aaron Lemon MD    Primary Insurance: MEDICARE  Secondary Insurance: BLUE CROSS    DISCHARGE DETAILS:    Additional Comments: Acknowledge Palliative Care's note from today 12/15. CM to follow Pt's status.

## 2023-12-15 NOTE — PROGRESS NOTES
Formerly [facility]  Critical Care Progress Note  Name: Simin Worrell I  MRN: [id number]  Unit/Bed#: -01 SDU I Date of Admission: 12/12/2023   Date of Service: 12/15/2023 I Hospital Day: 3    Assessment/Plan   * Acute on chronic respiratory failure with hypoxia and hypercapnia (HCC)  Assessment & Plan  Hx of chronic hypoxic and hypercapnic resp failure on 3L NC at baseline in the setting of COPD  Presented with increased WOB and hypoxia  VBG: pH 7.379, CO2 84  Initially on 4L NC then required BiPAP for increased WOB  CXR with RML infiltrate  CT CAP: Redemonstrated small bilateral effusions with probable subjacent compressive atelectasis in the lower lobes. Somewhat heterogeneous consolidation in the right middle lobe with associated air bronchograms which appears new compared to prior recent study dated 11/29/2023 suspicious for interval developing pneumonia.   **EARLY IN AM 12/14- with respiratory distress, pursed lip breathing, only able to say 1-2 words at a time**\  Has remained on precedex/bipap convo now >24hrs    Plan:  Xopenex and Atrovent nebs  Solumedrol 40mg Q8H  Cef D3  Pulmonary toilet  Wean supplemental O2 to maintain spO2 > 88%  Continue ongoing GOC conversations    Sepsis (HCC)  Assessment & Plan  SIRS: leukocytosis, tachypnea  Source: Pulmonary  CT CAP: Redemonstrated small bilateral effusions with probable subjacent compressive atelectasis in the lower lobes. Somewhat heterogeneous consolidation in the right middle lobe with associated air bronchograms which appears new compared to prior recent study dated 11/29/2023 suspicious for interval developing pneumonia.   UA with small leukocytes, negative nitrites  Blood cultures pending  Patient recently with pan-sensitive Pseudomonas in sputum cultures  Procal 0.61  Lactic 1.1    Plan:  Cef D3  Vanco DC  MRSA negative, Antigens negative  BC negative x24hrs  Trend fever curve and WBC count    Dysphagia  Assessment & Plan  On  "puree and \"mildly thick\" liquids at facility  NPO in setting of bipap  Speech consult    Chronic pain  Assessment & Plan  Patient has chronic back pain and has been receiving PRN morphine at her rehab  Hold morphine for now 2/2 lethargy  Multimodal analgesia  Currently denies pain    Goals of care, counseling/discussion  Assessment & Plan  Patient recently admitted and made Level 2 DNR. POLST with level 2 code status documented  Discussed with patient's son that given her hx of COPD, chronic hypoxic/hypercapnic respiratory failure, and overall deconditioned state, she would likely be difficult to extubate should she require intubation. Patient's son was adamant that she would never want long-term intubation and that the patient has made comments recently about not wanting to live forever with pain.  Ultimately patient's son opted for level 3 DNR/DNI status  Of note, there is documentation from rehab physician on 12/11 stating, \"I believe she understands she is close to the end. Comfort care is our goal. She has requested that she not be sent back to the hospital.\"  Palliative Care appreciate, awaiting progress over next 24hrs prior to consideration for hospice    Compression fracture of T6 vertebra (HCC)  Assessment & Plan  Noted on CT CAP on admission  Consider NSGY consult to determine the need for TLSO brace with activity    Renal infarct (HCC)  Assessment & Plan  Noted on CT CAP  Hx of antiphospholipid syndrome, recently with GIB and now on reduced-dose Eliquis  Transitioned to heparin in setting of NPO for Bipap  Trend renal indices    Encephalopathy  Assessment & Plan  Presented as a stroke alert with \"unresponsive episode\" at facility  CTA H/N with no acute findings  Patient is currently lethargic and has difficulty speaking on bipap, but following commands in all extremities with equal strength throughout  Likely TME 2/2 infection  Q4h neuro checks  Avoid sedating medications  Hold home remeron  Delirium " precautions    Diastolic heart failure (HCC)  Assessment & Plan  Wt Readings from Last 3 Encounters:   12/13/23 47.5 kg (104 lb 11.5 oz)   12/02/23 55.5 kg (122 lb 5.7 oz)   10/20/23 52.2 kg (115 lb 2 oz)     11/2023 Echo: EF 60%, G1DD, moderate TR  Diuretic dose had been increased outpatient due to concern for pulmonary edema  Hold additional diuretics as she currently examines hypovolemic to euvolemic  Strict I/O  Daily weights    JENNIE (acute kidney injury) ([facility])  Assessment & Plan  Baseline creatinine 0.5-0.6  Creatinine on admission 1  Likely prerenal. Patient's diuretic dose had been signifiacntly increased outpatient due to concern for pulmonary edema  Hold on additional diuresis  Hold additional IVF  Trend renal indices  Avoid hypotension and nephrotoxins    Mood disorder ([facility])  Assessment & Plan  Continue to hold home Remeron and Zoloft 2/2 lethargy    Failure to thrive in adult  Assessment & Plan  Nutrition and speech therapy consults  On NTL in facility? Awaiting repeated recs    Other specified hypothyroidism  Assessment & Plan  Continue synthroid    Atrial fibrillation ([facility])  Assessment & Plan  Home regimen of Eliquis  AC with heparin in setting of NPO  Restart cardizem when able to take PO  Continue tele    Antiphospholipid antibody syndrome ([facility])  Assessment & Plan  Continue Heparin gtt given NPO    Hypertension  Assessment & Plan  Hold home amlodipine  Restart when able to take PO    COPD (chronic obstructive pulmonary disease) ([facility])  Assessment & Plan  Chronically on 2L NC  Home regimen: Spiriva inhaler, pulmicort nebs BID, duonebs TID daily   No active exacerbation  Continue Solumedrol  On Bipap >24hrs in setting of worsening respiratory distress  Respiratory protocol      Disposition: Stepdown Level 1    ICU Core Measures     A: Assess, Prevent, and Manage Pain Has pain been assessed? Yes  Need for changes to pain regimen? No   B: Both SAT/SAT  N/A   C: Choice of Sedation RASS Goal: 0 Alert and  Calm  Need for changes to sedation or analgesia regimen? No   D: Delirium CAM-ICU: Negative   E: Early Mobility  Plan for early mobility? Yes   F: Family Engagement Plan for family engagement today? Yes     Antibiotic Review: Awaiting culture results.       Prophylaxis:  VTE VTE covered by:  heparin (porcine), Intravenous, 12 Units/kg/hr at 12/13/23 1458  heparin (porcine), Intravenous  heparin (porcine), Intravenous       Stress Ulcer  not orderedcovered bypantoprazole (PROTONIX) 40 mg tablet [[id number]] (Long-Term Med)         Significant 24hr Events     24hr events: 83 [facility] in setting of respiratory distress, mixed hypoxic and hypercapnic respiratory failure. Early AM 12/14 with respiratory distress prompting bipap placement and initiation of precedex infusion for comfort. Has remained on bipap >24hrs, unable to wean secondary to distress. Palliative consulted, plan to assess progress in AM prior to consideration for hospice. Overnight no acute events, other than requiring additional precedex in setting of agitation     Subjective   Review of Systems   Respiratory:  Positive for shortness of breath.    Gastrointestinal:  Negative for diarrhea, nausea and vomiting.   Musculoskeletal:  Positive for back pain.   Psychiatric/Behavioral:  The patient is nervous/anxious.       Objective                            Vitals I/O      Most Recent Min/Max in 24hrs   Temp 98.1 °F (36.7 °C) Temp  Min: 97.8 °F (36.6 °C)  Max: 98.3 °F (36.8 °C)   Pulse 93 Pulse  Min: 83  Max: 99   Resp (!) 32 Resp  Min: 16  Max: 32   BP (!) 193/88 BP  Min: 99/59  Max: 195/75   O2 Sat 94 % SpO2  Min: 87 %  Max: 100 %      Intake/Output Summary (Last 24 hours) at 12/15/2023 0035  Last data filed at 12/14/2023 2101  Gross per 24 hour   Intake 786.62 ml   Output 1188 ml   Net -401.38 ml       Diet NPO    Invasive Monitoring   N/a        Physical Exam   Physical Exam  Vitals and nursing note reviewed.   Eyes:      Extraocular Movements:  Extraocular movements intact.      Pupils: Pupils are equal, round, and reactive to light.   HENT:      Head: Normocephalic and atraumatic.      Right Ear: Hearing normal.      Left Ear: Hearing normal.      Mouth/Throat:      Mouth: Mucous membranes are dry.   Neck:      Vascular: No JVD.   Cardiovascular:      Rate and Rhythm: Normal rate and regular rhythm.   Musculoskeletal:      Right lower leg: No edema.      Left lower leg: No edema.   Abdominal:      Palpations: Abdomen is soft.      Tenderness: There is no abdominal tenderness. There is no guarding.   Constitutional:       Appearance: She is ill-appearing.   Pulmonary:      Effort: Tachypnea, accessory muscle usage and accessory muscle usage present.      Breath sounds: Examination of the right-upper field reveals decreased breath sounds. Examination of the left-upper field reveals decreased breath sounds. Examination of the right-lower field reveals decreased breath sounds and wheezing. Examination of the left-lower field reveals decreased breath sounds and wheezing. Decreased breath sounds and wheezing present.   Psychiatric:         Behavior: Behavior is cooperative.   Neurological:      Mental Status: She is alert. She is disoriented to situation.      GCS: GCS eye subscore is 4. GCS verbal subscore is 4. GCS motor subscore is 6.            Diagnostic Studies    EKG: NSr rate 85  Imaging: None new   I have personally reviewed pertinent reports.       Medications:  Scheduled PRN   budesonide, 0.5 mg, Q12H  cefepime, 2,000 mg, Q12H  chlorhexidine, 15 mL, Q12H CARMEN  ipratropium, 0.5 mg, Q6H  levalbuterol, 1.25 mg, Q6H  lidocaine, 1 patch, Daily  methylPREDNISolone sodium succinate, 40 mg, Q8H CARMEN      acetaminophen, 650 mg, Q6H PRN  albuterol, 2.5 mg, Q6H PRN  heparin (porcine), 1,350 Units, Q6H PRN  heparin (porcine), 2,700 Units, Q6H PRN  hydrALAZINE, 10 mg, Q6H PRN  HYDROmorphone, 0.2 mg, Q4H PRN  iohexol, 80 mL, Once in imaging  labetalol, 10 mg, Q6H  PRN       Continuous    dexmedetomidine, 0.1-0.7 mcg/kg/hr, Last Rate: 0.4 mcg/kg/hr (12/14/23 2355)  heparin (porcine), 3-20 Units/kg/hr (Order-Specific), Last Rate: 12 Units/kg/hr (12/13/23 1458)         Labs:  CBC    Recent Labs     12/13/23  0605 12/14/23  0523   WBC 20.47* 18.85*   HGB 10.2* 10.3*   HCT 33.4* 34.5*    291   BANDSPCT 8  --      BMP    Recent Labs     12/13/23  0605 12/14/23  0523   SODIUM 143 147   K 3.5 3.0*   CL 94* 96   CO2 41* 43*   AGAP 8 8   BUN 33* 35*   CREATININE 0.80 0.67   CALCIUM 9.0 9.3       Coags    Recent Labs     12/13/23  1507 12/13/23  2228 12/14/23  0524   INR 1.32*  --   --    PTT 52* 68* 75*        Additional Electrolytes  Recent Labs     12/13/23  0605   MG 2.5   PHOS 2.7          Blood Gas    No recent results  No recent results LFTs  Recent Labs     12/13/23  0605   ALT 11   AST 12*   ALKPHOS 124*   ALB 3.2*   TBILI 0.53       Infectious  Recent Labs     12/13/23  0605   PROCALCITONI 0.59*     Glucose  Recent Labs     12/13/23  0605 12/14/23  0523   GLUC 164* 125        YUE Fung

## 2023-12-16 NOTE — PLAN OF CARE
Problem: PAIN - ADULT  Goal: Verbalizes/displays adequate comfort level or baseline comfort level  Description: Interventions:  - Encourage patient to monitor pain and request assistance  - Assess pain using appropriate pain scale  - Administer analgesics based on type and severity of pain and evaluate response  - Implement non-pharmacological measures as appropriate and evaluate response  - Consider cultural and social influences on pain and pain management  - Notify physician/advanced practitioner if interventions unsuccessful or patient reports new pain  Outcome: Progressing     Problem: INFECTION - ADULT  Goal: Absence or prevention of progression during hospitalization  Description: INTERVENTIONS:  - Assess and monitor for signs and symptoms of infection  - Monitor lab/diagnostic results  - Monitor all insertion sites, i.e. indwelling lines, tubes, and drains  - Monitor endotracheal if appropriate and nasal secretions for changes in amount and color  - Reading appropriate cooling/warming therapies per order  - Administer medications as ordered  - Instruct and encourage patient and family to use good hand hygiene technique  - Identify and instruct in appropriate isolation precautions for identified infection/condition  Outcome: Progressing  Goal: Absence of fever/infection during neutropenic period  Description: INTERVENTIONS:  - Monitor WBC    Outcome: Progressing     Problem: SAFETY ADULT  Goal: Patient will remain free of falls  Description: INTERVENTIONS:  - Educate patient/family on patient safety including physical limitations  - Instruct patient to call for assistance with activity   - Consult OT/PT to assist with strengthening/mobility   - Keep Call bell within reach  - Keep bed low and locked with side rails adjusted as appropriate  - Keep care items and personal belongings within reach  - Initiate and maintain comfort rounds  - Make Fall Risk Sign visible to staff  - Offer Toileting every 2 Hours,  Detail Level: Detailed Number Of Curettages: 3 in advance of need  - Initiate/Maintain bed alarm  - Apply yellow socks and bracelet for high fall risk patients  - Consider moving patient to room near nurses station  Outcome: Progressing  Goal: Maintain or return to baseline ADL function  Description: INTERVENTIONS:  -  Assess patient's ability to carry out ADLs; assess patient's baseline for ADL function and identify physical deficits which impact ability to perform ADLs (bathing, care of mouth/teeth, toileting, grooming, dressing, etc.)  - Assess/evaluate cause of self-care deficits   - Assess range of motion  - Assess patient's mobility; develop plan if impaired  - Assess patient's need for assistive devices and provide as appropriate  - Encourage maximum independence but intervene and supervise when necessary  - Involve family in performance of ADLs  - Assess for home care needs following discharge   - Consider OT consult to assist with ADL evaluation and planning for discharge  - Provide patient education as appropriate  Outcome: Progressing  Goal: Maintains/Returns to pre admission functional level  Description: INTERVENTIONS:  - Perform AM-PAC 6 Click Basic Mobility/ Daily Activity assessment daily.  - Set and communicate daily mobility goal to care team and patient/family/caregiver.   - Collaborate with rehabilitation services on mobility goals if consulted  - Perform Range of Motion 3 times a day.  - Reposition patient every 2 hours.  - Dangle patient 2 times a day  - Stand patient 2 times a day  - Ambulate patient 2 times a day  - Out of bed to chair 2 times a day   - Out of bed for meals 2 times a day  - Out of bed for toileting  - Record patient progress and toleration of activity level   Outcome: Progressing     Problem: DISCHARGE PLANNING  Goal: Discharge to home or other facility with appropriate resources  Description: INTERVENTIONS:  - Identify barriers to discharge w/patient and caregiver  - Arrange for needed discharge resources and  Size Of Lesion In Cm: 0.8 transportation as appropriate  - Identify discharge learning needs (meds, wound care, etc.)  - Arrange for interpretive services to assist at discharge as needed  - Refer to Case Management Department for coordinating discharge planning if the patient needs post-hospital services based on physician/advanced practitioner order or complex needs related to functional status, cognitive ability, or social support system  Outcome: Progressing     Problem: Knowledge Deficit  Goal: Patient/family/caregiver demonstrates understanding of disease process, treatment plan, medications, and discharge instructions  Description: Complete learning assessment and assess knowledge base.  Interventions:  - Provide teaching at level of understanding  - Provide teaching via preferred learning methods  Outcome: Progressing     Problem: Nutrition/Hydration-ADULT  Goal: Nutrient/Hydration intake appropriate for improving, restoring or maintaining nutritional needs  Description: Monitor and assess patient's nutrition/hydration status for malnutrition. Collaborate with interdisciplinary team and initiate plan and interventions as ordered.  Monitor patient's weight and dietary intake as ordered or per policy. Utilize nutrition screening tool and intervene as necessary. Determine patient's food preferences and provide high-protein, high-caloric foods as appropriate.     INTERVENTIONS:  - Monitor oral intake, urinary output, labs, and treatment plans  - Assess nutrition and hydration status and recommend course of action  - Evaluate amount of meals eaten  - Assist patient with eating if necessary   - Allow adequate time for meals  - Recommend/ encourage appropriate diets, oral nutritional supplements, and vitamin/mineral supplements  - Order, calculate, and assess calorie counts as needed  - Recommend, monitor, and adjust tube feedings and TPN/PPN based on assessed needs  - Assess need for intravenous fluids  - Provide specific  Size Of Lesion After Curettage: 1.4 nutrition/hydration education as appropriate  - Include patient/family/caregiver in decisions related to nutrition  Outcome: Progressing     Problem: Prexisting or High Potential for Compromised Skin Integrity  Goal: Skin integrity is maintained or improved  Description: INTERVENTIONS:  - Identify patients at risk for skin breakdown  - Assess and monitor skin integrity  - Assess and monitor nutrition and hydration status  - Monitor labs   - Assess for incontinence   - Turn and reposition patient  - Assist with mobility/ambulation  - Relieve pressure over bony prominences  - Avoid friction and shearing  - Provide appropriate hygiene as needed including keeping skin clean and dry  - Evaluate need for skin moisturizer/barrier cream  - Collaborate with interdisciplinary team   - Patient/family teaching  - Consider wound care consult   Outcome: Progressing      Add Intralesional Injection: No Concentration (Mg/Ml Or Millions Of Plaque Forming Units/Cc): 0.01 Total Volume (Ccs): 1 Anesthesia Type: 1% lidocaine with epinephrine Anesthesia Volume In Cc: 2.5 Cautery Type: electrodesiccation What Was Performed First?: Curettage Final Size Statement: The size of the lesion after curettage was Additional Information: (Optional): The wound was cleaned, and a pressure dressing was applied. The patient received detailed post-op instructions. Consent was obtained from the patient. The risks, benefits and alternatives to therapy were discussed in detail. Specifically, the risks of infection, scarring, bleeding, prolonged wound healing, nerve injury, incomplete removal, allergy to anesthesia and recurrence were addressed. Alternatives to [facility], such as: surgical removal and XRT were also discussed. Prior to the procedure, the treatment site was clearly identified and confirmed by the patient. All components of Universal Protocol/PAUSE Rule completed. Post-Care Instructions: I reviewed with the patient in detail post-care instructions. Patient is to keep the area dry for 48 hours, and not to engage in any swimming until the area is healed. Should the patient develop any fevers, chills, bleeding, severe pain patient will contact the office immediately. Bill As A Line Item Or As Units: Line Item

## 2023-12-16 NOTE — ASSESSMENT & PLAN NOTE
Chronically on 2L NC  Home regimen: Spiriva inhaler, pulmicort nebs BID, duonebs TID daily   No active exacerbation  Continue Solumedrol 40 mg q8h  On Bipap >24hrs in setting of worsening respiratory distress  Respiratory protocol

## 2023-12-16 NOTE — ASSESSMENT & PLAN NOTE
"Patient recently admitted and made Level 2 DNR. POLST with level 2 code status documented  Discussed with patient's son that given her hx of COPD, chronic hypoxic/hypercapnic respiratory failure, and overall deconditioned state, she would likely be difficult to extubate should she require intubation. Patient's son was adamant that she would never want long-term intubation and that the patient has made comments recently about not wanting to live forever with pain.  Ultimately patient's son opted for level 3 DNR/DNI status  Of note, there is documentation from rehab physician on 12/11 stating, \"I believe she understands she is close to the end. Comfort care is our goal. She has requested that she not be sent back to the hospital.\"  Palliative Care appreciated  Ongoing family discussions regarding transitioning to hospice  "

## 2023-12-16 NOTE — ASSESSMENT & PLAN NOTE
Wt Readings from Last 3 Encounters:   12/16/23 46.1 kg (101 lb 10.1 oz)   12/02/23 55.5 kg (122 lb 5.7 oz)   10/20/23 52.2 kg (115 lb 2 oz)     11/2023 Echo: EF 60%, G1DD, moderate TR  Diuretic dose had been increased outpatient due to concern for pulmonary edema  Hold additional diuretics as she currently examines hypovolemic to euvolemic  Strict I/O  Daily weights

## 2023-12-16 NOTE — PROGRESS NOTES
"[facility]  Progress Note  Name: Simin Worrell I  MRN: [id number]  Unit/Bed#: -01 SDU I Date of Admission: 12/12/2023   Date of Service: 12/16/2023 I Hospital Day: 4    Assessment/Plan   * Acute on chronic respiratory failure with hypoxia and hypercapnia (HCC)  Assessment & Plan  Hx of chronic hypoxic and hypercapnic resp failure on 3L NC at baseline in the setting of COPD  Presented with increased WOB and hypoxia  VBG: pH 7.379, CO2 84  Initially on 4L NC then required BiPAP for increased WOB  CXR with RML infiltrate  CT CAP: Redemonstrated small bilateral effusions with probable subjacent compressive atelectasis in the lower lobes. Somewhat heterogeneous consolidation in the right middle lobe with associated air bronchograms which appears new compared to prior recent study dated 11/29/2023 suspicious for interval developing pneumonia.   **EARLY IN AM 12/14- with respiratory distress, pursed lip breathing, only able to say 1-2 words at a time**\  Has remained on precedex/bipap convo now >24hrs    Plan:  Xopenex and Atrovent nebs  Solumedrol 40mg Q8H  Cef D3  Pulmonary toilet  Wean supplemental O2 to maintain spO2 > 88%  Continue ongoing GOC conversations    Sepsis (HCC)  Assessment & Plan  SIRS: leukocytosis, tachypnea  Source: Pulmonary  CT CAP: Redemonstrated small bilateral effusions with probable subjacent compressive atelectasis in the lower lobes. Somewhat heterogeneous consolidation in the right middle lobe with associated air bronchograms which appears new compared to prior recent study dated 11/29/2023 suspicious for interval developing pneumonia.   UA with small leukocytes, negative nitrites  Blood cultures pending  Patient recently with pan-sensitive Pseudomonas in sputum cultures  Procal 0.61  Lactic 1.1    Plan:  Cefepime D4  MRSA negative, Antigens negative  BC negative x48hrs  Trend fever curve and WBC count    Dysphagia  Assessment & Plan  On puree and \"mildly " "thick\" liquids at facility  NPO in setting of bipap  Speech consult    Chronic pain  Assessment & Plan  Patient has chronic back pain and has been receiving PRN morphine at her rehab  Hold morphine for now 2/2 lethargy  Multimodal analgesia  Currently denies pain    Goals of care, counseling/discussion  Assessment & Plan  Patient recently admitted and made Level 2 DNR. POLST with level 2 code status documented  Discussed with patient's son that given her hx of COPD, chronic hypoxic/hypercapnic respiratory failure, and overall deconditioned state, she would likely be difficult to extubate should she require intubation. Patient's son was adamant that she would never want long-term intubation and that the patient has made comments recently about not wanting to live forever with pain.  Ultimately patient's son opted for level 3 DNR/DNI status  Of note, there is documentation from rehab physician on 12/11 stating, \"I believe she understands she is close to the end. Comfort care is our goal. She has requested that she not be sent back to the hospital.\"  Palliative Care appreciated  Ongoing family discussions regarding transitioning to hospice    Compression fracture of T6 vertebra (HCC)  Assessment & Plan  Noted on CT CAP on admission  Consider NSGY consult to determine the need for TLSO brace with activity    Renal infarct (HCC)  Assessment & Plan  Noted on CT CAP  Hx of antiphospholipid syndrome, recently with GIB and now on reduced-dose Eliquis  Transitioned to heparin in setting of NPO for Bipap  Trend renal indices    Encephalopathy  Assessment & Plan  Presented as a stroke alert with \"unresponsive episode\" at facility  CTA H/N with no acute findings  Patient is currently lethargic and has difficulty speaking on bipap, but following commands in all extremities with equal strength throughout  Likely TME 2/2 infection  Q4h neuro checks  Avoid sedating medications  Hold home remeron  Delirium precautions    Diastolic " heart failure (HCC)  Assessment & Plan  Wt Readings from Last 3 Encounters:   12/16/23 46.1 kg (101 lb 10.1 oz)   12/02/23 55.5 kg (122 lb 5.7 oz)   10/20/23 52.2 kg (115 lb 2 oz)     11/2023 Echo: EF 60%, G1DD, moderate TR  Diuretic dose had been increased outpatient due to concern for pulmonary edema  Hold additional diuretics as she currently examines hypovolemic to euvolemic  Strict I/O  Daily weights    JENNIE (acute kidney injury) ([facility])  Assessment & Plan  Baseline creatinine 0.5-0.6  Creatinine on admission 1  Likely prerenal. Patient's diuretic dose had been signifiacntly increased outpatient due to concern for pulmonary edema  Hold on additional diuresis  Hold additional IVF  Trend renal indices  Avoid hypotension and nephrotoxins    Mood disorder ([facility])  Assessment & Plan  Continue to hold home Remeron and Zoloft 2/2 lethargy    Failure to thrive in adult  Assessment & Plan  Nutrition and speech therapy consults  On NTL in facility? Awaiting repeated recs    Anxiety  Assessment & Plan  Hold home Zoloft  Continue precedex to tolerate BiPAP  Titrate to goal RASS of 0    Other specified hypothyroidism  Assessment & Plan  Continue synthroid    Atrial fibrillation ([facility])  Assessment & Plan  Home regimen of Eliquis  AC with heparin in setting of NPO  Restart cardizem when able to take PO  Continue tele    Antiphospholipid antibody syndrome ([facility])  Assessment & Plan  Continue Heparin gtt given NPO    Hypertension  Assessment & Plan  Hold home amlodipine  Restart when able to take PO    COPD (chronic obstructive pulmonary disease) ([facility])  Assessment & Plan  Chronically on 2L NC  Home regimen: Spiriva inhaler, pulmicort nebs BID, duonebs TID daily   No active exacerbation  Continue Solumedrol 40 mg q8h  On Bipap >24hrs in setting of worsening respiratory distress  Respiratory protocol             Disposition: Stepdown Level 1    ICU Core Measures     A: Assess, Prevent, and Manage Pain Has pain been assessed? Yes  Need  for changes to pain regimen? No   B: Both SAT/SAT  N/A   C: Choice of Sedation RASS Goal: 0 Alert and Calm  Need for changes to sedation or analgesia regimen? No   D: Delirium CAM-ICU: Unable to perform secondary to Acute cognitive dysfunction   E: Early Mobility  Plan for early mobility? Yes   F: Family Engagement Plan for family engagement today? Yes       Antibiotic Review: Continue broad spectrum secondary to severity of illness.       Prophylaxis:  VTE VTE covered by:  heparin (porcine), Intravenous, 10 Units/kg/hr at 12/16/23 0555  heparin (porcine), Intravenous  heparin (porcine), Intravenous       Stress Ulcer  not orderedcovered bypantoprazole (PROTONIX) 40 mg tablet [[id number]] (Long-Term Med)         Significant 24hr Events     24hr events: Off bipap for 6 hours yesterday, then back on due to respiratory distress. Slept overnight on bipap and precedex. Anxious this morning and tachypneic into the 40s. Ongoing GOC with palliative care.     Subjective   Review of Systems   Unable to perform ROS: Acuity of condition      Objective                            Vitals I/O      Most Recent Min/Max in 24hrs   Temp 97.7 °F (36.5 °C) Temp  Min: 97 °F (36.1 °C)  Max: 98 °F (36.7 °C)   Pulse 75 Pulse  Min: 75  Max: 97   Resp 18 Resp  Min: 16  Max: 32   /69 BP  Min: 131/67  Max: 192/91   O2 Sat 96 % SpO2  Min: 93 %  Max: 100 %      Intake/Output Summary (Last 24 hours) at 12/16/2023 0931  Last data filed at 12/16/2023 0400  Gross per 24 hour   Intake 430.24 ml   Output 350 ml   Net 80.24 ml       Diet NPO    Invasive Monitoring           Physical Exam   Physical Exam  Vitals reviewed.   Eyes:      Extraocular Movements: Extraocular movements intact.      Pupils: Pupils are equal, round, and reactive to light.   Skin:     General: Skin is warm and dry.   HENT:      Head: Normocephalic and atraumatic.      Nose: No congestion.      Mouth/Throat:      Mouth: Mucous membranes are dry.   Cardiovascular:      Rate and  Rhythm: Normal rate and regular rhythm.      Heart sounds: Murmur heard.      No friction rub. No gallop.   Musculoskeletal:      Right lower leg: No edema.      Left lower leg: No edema.   Abdominal: General: There is no distension.      Palpations: Abdomen is soft.      Tenderness: There is no abdominal tenderness. There is no guarding.      Hernia: No hernia is present.   Constitutional:       Appearance: She is ill-appearing.   Pulmonary:      Effort: Tachypnea, accessory muscle usage and accessory muscle usage present.      Breath sounds: No wheezing, rhonchi or rales.      Comments: Significantly diminished on the R  Neurological:      General: No focal deficit present.      Motor: Strength full and intact in all extremities.      Comments: Anxious, unable to answer questions while on bipap. Appears uncomfortable. Moving all extremities            Diagnostic Studies      EKG: NSR  Imaging:  I have personally reviewed pertinent reports.       Medications:  Scheduled PRN   budesonide, 0.5 mg, Q12H  cefepime, 2,000 mg, Q12H  chlorhexidine, 15 mL, Q12H CARMEN  ipratropium, 0.5 mg, Q6H  levalbuterol, 1.25 mg, Q6H  lidocaine, 1 patch, Daily  methylPREDNISolone sodium succinate, 40 mg, Q8H CARMEN      acetaminophen, 650 mg, Q6H PRN  albuterol, 2.5 mg, Q6H PRN  heparin (porcine), 1,350 Units, Q6H PRN  heparin (porcine), 2,700 Units, Q6H PRN  hydrALAZINE, 10 mg, Q6H PRN  HYDROmorphone, 0.2 mg, Q4H PRN  iohexol, 80 mL, Once in imaging  labetalol, 10 mg, Q6H PRN       Continuous    dexmedetomidine, 0.1-0.7 mcg/kg/hr, Last Rate: 0.7 mcg/kg/hr (12/16/23 0450)  heparin (porcine), 3-20 Units/kg/hr (Order-Specific), Last Rate: 10 Units/kg/hr (12/16/23 0555)         Labs:    CBC    Recent Labs     12/15/23  0510 12/16/23  0444   WBC 12.00* 11.33*   HGB 9.7* 10.2*   HCT 32.8* 35.1    188     BMP    Recent Labs     12/15/23  0514 12/16/23 0444   SODIUM 147 150*   K 4.0 4.0    102   CO2 39* 37*   AGAP 8 11   BUN 50* 54*    CREATININE 0.90 0.88   CALCIUM 9.2 9.4       Coags    Recent Labs     12/15/23  0510 12/16/23  0444   PTT 70* 96*        Additional Electrolytes  Recent Labs     12/15/23  0514 12/16/23  0444   MG 2.5 2.6   PHOS 3.3 3.3          Blood Gas    No recent results  No recent results LFTs  No recent results    Infectious  No recent results  Glucose  Recent Labs     12/15/23  0514 12/16/23  0444   GLUC 138 124             Sharri Casarez PA-C

## 2023-12-16 NOTE — ASSESSMENT & PLAN NOTE
SIRS: leukocytosis, tachypnea  Source: Pulmonary  CT CAP: Redemonstrated small bilateral effusions with probable subjacent compressive atelectasis in the lower lobes. Somewhat heterogeneous consolidation in the right middle lobe with associated air bronchograms which appears new compared to prior recent study dated 11/29/2023 suspicious for interval developing pneumonia.   UA with small leukocytes, negative nitrites  Blood cultures pending  Patient recently with pan-sensitive Pseudomonas in sputum cultures  Procal 0.61  Lactic 1.1    Plan:  Cefepime D4  MRSA negative, Antigens negative  BC negative x48hrs  Trend fever curve and WBC count

## 2023-12-17 NOTE — ASSESSMENT & PLAN NOTE
Hx of chronic hypoxic and hypercapnic resp failure on 3L NC at baseline in the setting of COPD  Presented with increased WOB and hypoxia  VBG: pH 7.379, CO2 84  Initially on 4L NC then required BiPAP for increased WOB  CXR with RML infiltrate  CT CAP: Redemonstrated small bilateral effusions with probable subjacent compressive atelectasis in the lower lobes. Somewhat heterogeneous consolidation in the right middle lobe with associated air bronchograms which appears new compared to prior recent study dated 11/29/2023 suspicious for interval developing pneumonia.   **EARLY IN AM 12/14- with respiratory distress, pursed lip breathing, only able to say 1-2 words at a time**\  Has remained on precedex/bipap convo now >24hrs    Plan:  Xopenex and Atrovent nebs  Cef D5  Pulmonary toilet  Wean supplemental O2 to maintain spO2 > 88%  Continue ongoing GOC conversations

## 2023-12-17 NOTE — ASSESSMENT & PLAN NOTE
Chronically on 2L NC  Home regimen: Spiriva inhaler, pulmicort nebs BID, duonebs TID daily   Continue Solumedrol 40 mg q8h  On Bipap intermittently now >48hrs given respiratory distress  Respiratory protocol

## 2023-12-17 NOTE — PROGRESS NOTES
"[facility]  Critical Care Progress Note  Name: Simin Worrell I  MRN: [id number]  Unit/Bed#: -01 SDU I Date of Admission: 12/12/2023   Date of Service: 12/17/2023 I Hospital Day: 5    Assessment/Plan   * Acute on chronic respiratory failure with hypoxia and hypercapnia (HCC)  Assessment & Plan  Hx of chronic hypoxic and hypercapnic resp failure on 3L NC at baseline in the setting of COPD  Presented with increased WOB and hypoxia  VBG: pH 7.379, CO2 84  Initially on 4L NC then required BiPAP for increased WOB  CXR with RML infiltrate  CT CAP: Redemonstrated small bilateral effusions with probable subjacent compressive atelectasis in the lower lobes. Somewhat heterogeneous consolidation in the right middle lobe with associated air bronchograms which appears new compared to prior recent study dated 11/29/2023 suspicious for interval developing pneumonia.   **EARLY IN AM 12/14- with respiratory distress, pursed lip breathing, only able to say 1-2 words at a time**\  Has remained on precedex/bipap convo now >24hrs    Plan:  Xopenex and Atrovent nebs  Cef D5  Pulmonary toilet  Wean supplemental O2 to maintain spO2 > 88%  Continue ongoing GOC conversations    Sepsis (HCC)  Assessment & Plan  SIRS: leukocytosis, tachypnea  Source: Pulmonary  CT CAP: Redemonstrated small bilateral effusions with probable subjacent compressive atelectasis in the lower lobes. Somewhat heterogeneous consolidation in the right middle lobe with associated air bronchograms which appears new compared to prior recent study dated 11/29/2023 suspicious for interval developing pneumonia.   UA with small leukocytes, negative nitrites  Blood cultures pending  Patient recently with pan-sensitive Pseudomonas in sputum cultures  Procal 0.61  Lactic 1.1    Plan:  Cefepime D5  MRSA negative, Antigens negative  BC negative x72hrs  Trend fever curve and WBC count    Dysphagia  Assessment & Plan  On puree and \"mildly thick\" " "liquids at facility  NPO in setting of bipap, AMS    Chronic pain  Assessment & Plan  Patient has chronic back pain and has been receiving PRN morphine at her rehab  Hold morphine for now 2/2 lethargy  Multimodal analgesia    Goals of care, counseling/discussion  Assessment & Plan  Patient recently admitted and made Level 2 DNR. POLST with level 2 code status documented  Discussed with patient's son that given her hx of COPD, chronic hypoxic/hypercapnic respiratory failure, and overall deconditioned state, she would likely be difficult to extubate should she require intubation. Patient's son was adamant that she would never want long-term intubation and that the patient has made comments recently about not wanting to live forever with pain.  Ultimately patient's son opted for level 3 DNR/DNI status  Of note, there is documentation from rehab physician on 12/11 stating, \"I believe she understands she is close to the end. Comfort care is our goal. She has requested that she not be sent back to the hospital.\"  Palliative Care appreciated  Ongoing family discussions regarding possible transition to hospice    Compression fracture of T6 vertebra (HCC)  Assessment & Plan  Noted on CT CAP on admission  Consider NSGY consult to determine the need for TLSO brace with activity    Renal infarct (HCC)  Assessment & Plan  Noted on CT CAP  Hx of antiphospholipid syndrome, recently with GIB and now on reduced-dose Eliquis  Transitioned to heparin in setting of NPO for Bipap  Trend renal indices    Encephalopathy  Assessment & Plan  Presented as a stroke alert with \"unresponsive episode\" at facility  CTA H/N with no acute findings  Patient is currently lethargic and has difficulty speaking on bipap, but following commands in all extremities with equal strength throughout  Likely TME 2/2 infection  Q4h neuro checks  Avoid sedating medications  Hold home remeron  Delirium precautions    Diastolic heart failure (HCC)  Assessment & " Plan  Wt Readings from Last 3 Encounters:   12/16/23 46.1 kg (101 lb 10.1 oz)   12/02/23 55.5 kg (122 lb 5.7 oz)   10/20/23 52.2 kg (115 lb 2 oz)     11/2023 Echo: EF 60%, G1DD, moderate TR  Diuretic dose had been increased outpatient due to concern for pulmonary edema  Hold additional diuretics as she currently examines hypovolemic to euvolemic  Strict I/O  Daily weights    JENNIE (acute kidney injury) ([facility])  Assessment & Plan  Baseline creatinine 0.5-0.6  Creatinine on admission 1  Likely prerenal. Patient's diuretic dose had been signifiacntly increased outpatient due to concern for pulmonary edema  Hold on additional diuresis  Hold additional IVF  Trend renal indices  Avoid hypotension and nephrotoxins    Mood disorder ([facility])  Assessment & Plan  Continue to hold home Remeron and Zoloft    Failure to thrive in adult  Assessment & Plan  Nutrition and speech therapy consults  On nectar thick liquid at facility  NPO in setting of AMS, hypoxia    Anxiety  Assessment & Plan  Hold home Zoloft  Continue precedex to tolerate BiPAP  Titrate to goal RASS of 0    Other specified hypothyroidism  Assessment & Plan  Continue synthroid when able to safely take PO    Atrial fibrillation ([facility])  Assessment & Plan  Home regimen of Eliquis  AC with heparin in setting of NPO  Restart cardizem when able to take PO  Continue tele    Antiphospholipid antibody syndrome ([facility])  Assessment & Plan  Continue Heparin gtt given NPO    Hypertension  Assessment & Plan  Hold home amlodipine  Restart when able to take PO    COPD (chronic obstructive pulmonary disease) ([facility])  Assessment & Plan  Chronically on 2L NC  Home regimen: Spiriva inhaler, pulmicort nebs BID, duonebs TID daily   Continue Solumedrol 40 mg q8h  On Bipap intermittently now >48hrs given respiratory distress  Respiratory protocol      Disposition: Stepdown Level 1    ICU Core Measures     A: Assess, Prevent, and Manage Pain Has pain been assessed? Yes  Need for changes to pain  regimen? No   B: Both SAT/SAT  N/A   C: Choice of Sedation RASS Goal: 0 Alert and Calm or +1 Restless  Need for changes to sedation or analgesia regimen? No   D: Delirium CAM-ICU: Positive   E: Early Mobility  Plan for early mobility? Yes   F: Family Engagement Plan for family engagement today? Yes     Antibiotic Review: Continue broad spectrum secondary to severity of illness.     Prophylaxis:  VTE VTE covered by:  heparin (porcine), Intravenous, 10 Units/kg/hr at 12/16/23 2352  heparin (porcine), Intravenous  heparin (porcine), Intravenous       Stress Ulcer  not orderedcovered bypantoprazole (PROTONIX) 40 mg tablet [[id number]] (Long-Term Med)     Significant 24hr Events     24hr events:  83 [facility] in setting of respiratory distress, mixed hypoxic and hypercapnic respiratory failure. Early AM 12/14 with respiratory distress prompting bipap placement and initiation of precedex infusion for comfort. Has remained on bipap intermittently for greater than 48hrs. Noted to be increasingly hypernatremic in setting of decreased PO intake, previous diuresis. Unable to be given FW or dextrose containing fluids 2/2 CHF. Ongoing conversations regarding possible transition to hospice.       Subjective   Review of Systems   Respiratory:  Positive for shortness of breath. Negative for cough.    Cardiovascular:  Negative for chest pain and palpitations.   Gastrointestinal:  Negative for abdominal pain, nausea and vomiting.   Musculoskeletal:  Positive for back pain.   Psychiatric/Behavioral:  Positive for confusion. The patient is nervous/anxious.    All other systems reviewed and are negative.     Objective                            Vitals I/O      Most Recent Min/Max in 24hrs   Temp 97.6 °F (36.4 °C) Temp  Min: 97.1 °F (36.2 °C)  Max: 98.7 °F (37.1 °C)   Pulse 80 Pulse  Min: 75  Max: 100   Resp 16 Resp  Min: 16  Max: 18   /76 BP  Min: 131/67  Max: 201/91   O2 Sat 90 % SpO2  Min: 90 %  Max: 100 %       Intake/Output Summary (Last 24 hours) at 12/17/2023 0022  Last data filed at 12/16/2023 2101  Gross per 24 hour   Intake 332.17 ml   Output 950 ml   Net -617.83 ml       Diet NPO    Invasive Monitoring   N/a        Physical Exam   Physical Exam  Eyes:      Extraocular Movements: Extraocular movements intact.      Pupils: Pupils are equal, round, and reactive to light.   Skin:     General: Skin is warm and dry.      Capillary Refill: Capillary refill takes less than 2 seconds.   HENT:      Head: Normocephalic and atraumatic.      Right Ear: Hearing normal.      Left Ear: Hearing normal.      Mouth/Throat:      Mouth: Mucous membranes are dry.   Neck:      Vascular: No JVD.   Cardiovascular:      Rate and Rhythm: Regular rhythm. Tachycardia present.      Pulses:           Radial pulses are 2+ on the right side and 2+ on the left side.      Heart sounds: S1 normal and S2 normal.   Musculoskeletal:      Right lower leg: No edema.      Left lower leg: No edema.   Abdominal:      Palpations: Abdomen is soft.      Tenderness: There is no abdominal tenderness. There is no guarding.   Constitutional:       Appearance: She is ill-appearing.   Pulmonary:      Effort: Tachypnea present. No respiratory distress.   Psychiatric:         Behavior: Behavior is cooperative.   Neurological:      Mental Status: She is alert. She is disoriented to situation.      GCS: GCS eye subscore is 3. GCS verbal subscore is 4. GCS motor subscore is 6.            Diagnostic Studies    EKG: NSR rate 93  Imaging: None new   I have personally reviewed pertinent reports.       Medications:  Scheduled PRN   budesonide, 0.5 mg, Q12H  cefepime, 2,000 mg, Q12H  chlorhexidine, 15 mL, Q12H CARMEN  ipratropium, 0.5 mg, Q6H  levalbuterol, 1.25 mg, Q6H  lidocaine, 1 patch, Daily      acetaminophen, 650 mg, Q6H PRN  albuterol, 2.5 mg, Q6H PRN  heparin (porcine), 1,350 Units, Q6H PRN  heparin (porcine), 2,700 Units, Q6H PRN  hydrALAZINE, 10 mg, Q6H  PRN  HYDROmorphone, 0.2 mg, Q4H PRN  iohexol, 80 mL, Once in imaging  labetalol, 10 mg, Q6H PRN       Continuous    dexmedetomidine, 0.1-0.7 mcg/kg/hr, Last Rate: 0.7 mcg/kg/hr (12/16/23 1649)  heparin (porcine), 3-20 Units/kg/hr (Order-Specific), Last Rate: 10 Units/kg/hr (12/16/23 6132)         Labs:  CBC    Recent Labs     12/15/23  0510 12/16/23  0444   WBC 12.00* 11.33*   HGB 9.7* 10.2*   HCT 32.8* 35.1    188     BMP    Recent Labs     12/15/23  0514 12/16/23  0444   SODIUM 147 150*   K 4.0 4.0    102   CO2 39* 37*   AGAP 8 11   BUN 50* 54*   CREATININE 0.90 0.88   CALCIUM 9.2 9.4       Coags    Recent Labs     12/16/23  1228 12/16/23  1940   PTT 77* 83*        Additional Electrolytes  Recent Labs     12/15/23  0514 12/16/23  0444   MG 2.5 2.6   PHOS 3.3 3.3          Blood Gas    No recent results  No recent results LFTs  No recent results    Infectious  No recent results  Glucose  Recent Labs     12/15/23  0514 12/16/23  0444   GLUC 138 124        YUE Fung

## 2023-12-17 NOTE — ASSESSMENT & PLAN NOTE
Patient has chronic back pain and has been receiving PRN morphine at her rehab  Hold morphine for now 2/2 lethargy  Multimodal analgesia

## 2023-12-17 NOTE — ASSESSMENT & PLAN NOTE
Nutrition and speech therapy consults  On nectar thick liquid at facility  NPO in setting of AMS, hypoxia

## 2023-12-17 NOTE — ASSESSMENT & PLAN NOTE
"Patient recently admitted and made Level 2 DNR. POLST with level 2 code status documented  Discussed with patient's son that given her hx of COPD, chronic hypoxic/hypercapnic respiratory failure, and overall deconditioned state, she would likely be difficult to extubate should she require intubation. Patient's son was adamant that she would never want long-term intubation and that the patient has made comments recently about not wanting to live forever with pain.  Ultimately patient's son opted for level 3 DNR/DNI status  Of note, there is documentation from rehab physician on 12/11 stating, \"I believe she understands she is close to the end. Comfort care is our goal. She has requested that she not be sent back to the hospital.\"  Palliative Care appreciated  Ongoing family discussions regarding possible transition to hospice  "

## 2023-12-17 NOTE — PLAN OF CARE
Problem: PAIN - ADULT  Goal: Verbalizes/displays adequate comfort level or baseline comfort level  Description: Interventions:  - Encourage patient to monitor pain and request assistance  - Assess pain using appropriate pain scale  - Administer analgesics based on type and severity of pain and evaluate response  - Implement non-pharmacological measures as appropriate and evaluate response  - Consider cultural and social influences on pain and pain management  - Notify physician/advanced practitioner if interventions unsuccessful or patient reports new pain  Outcome: Progressing     Problem: INFECTION - ADULT  Goal: Absence or prevention of progression during hospitalization  Description: INTERVENTIONS:  - Assess and monitor for signs and symptoms of infection  - Monitor lab/diagnostic results  - Monitor all insertion sites, i.e. indwelling lines, tubes, and drains  - Monitor endotracheal if appropriate and nasal secretions for changes in amount and color  - [place] appropriate cooling/warming therapies per order  - Administer medications as ordered  - Instruct and encourage patient and family to use good hand hygiene technique  - Identify and instruct in appropriate isolation precautions for identified infection/condition  Outcome: Progressing  Goal: Absence of fever/infection during neutropenic period  Description: INTERVENTIONS:  - Monitor WBC    Outcome: Progressing     Problem: SAFETY ADULT  Goal: Patient will remain free of falls  Description: INTERVENTIONS:  - Educate patient/family on patient safety including physical limitations  - Instruct patient to call for assistance with activity   - Consult OT/PT to assist with strengthening/mobility   - Keep Call bell within reach  - Keep bed low and locked with side rails adjusted as appropriate  - Keep care items and personal belongings within reach  - Initiate and maintain comfort rounds  - Make Fall Risk Sign visible to staff  - Offer Toileting every 2 Hours,  in advance of need  - Initiate/Maintain bed alarm  - Obtain necessary fall risk management equipment: bed alarm, socks   - Apply yellow socks and bracelet for high fall risk patients  - Consider moving patient to room near nurses station  Outcome: Progressing  Goal: Maintain or return to baseline ADL function  Description: INTERVENTIONS:  -  Assess patient's ability to carry out ADLs; assess patient's baseline for ADL function and identify physical deficits which impact ability to perform ADLs (bathing, care of mouth/teeth, toileting, grooming, dressing, etc.)  - Assess/evaluate cause of self-care deficits   - Assess range of motion  - Assess patient's mobility; develop plan if impaired  - Assess patient's need for assistive devices and provide as appropriate  - Encourage maximum independence but intervene and supervise when necessary  - Involve family in performance of ADLs  - Assess for home care needs following discharge   - Consider OT consult to assist with ADL evaluation and planning for discharge  - Provide patient education as appropriate  Outcome: Progressing  Goal: Maintains/Returns to pre admission functional level  Description: INTERVENTIONS:  - Perform AM-PAC 6 Click Basic Mobility/ Daily Activity assessment daily.  - Set and communicate daily mobility goal to care team and patient/family/caregiver.   - Collaborate with rehabilitation services on mobility goals if consulted  - Perform Range of Motion 3 times a day.  - Reposition patient every 2 hours.  - Dangle patient 2 times a day  - Stand patient 0 times a day  - Ambulate patient 0 times a day  - Out of bed to chair 1 times a day   - Out of bed for meals 1 times a day  - Out of bed for toileting  - Record patient progress and toleration of activity level   Outcome: Progressing     Problem: Knowledge Deficit  Goal: Patient/family/caregiver demonstrates understanding of disease process, treatment plan, medications, and discharge  instructions  Description: Complete learning assessment and assess knowledge base.  Interventions:  - Provide teaching at level of understanding  - Provide teaching via preferred learning methods  Outcome: Progressing     Problem: Nutrition/Hydration-ADULT  Goal: Nutrient/Hydration intake appropriate for improving, restoring or maintaining nutritional needs  Description: Monitor and assess patient's nutrition/hydration status for malnutrition. Collaborate with interdisciplinary team and initiate plan and interventions as ordered.  Monitor patient's weight and dietary intake as ordered or per policy. Utilize nutrition screening tool and intervene as necessary. Determine patient's food preferences and provide high-protein, high-caloric foods as appropriate.     INTERVENTIONS:  - Monitor oral intake, urinary output, labs, and treatment plans  - Assess nutrition and hydration status and recommend course of action  - Evaluate amount of meals eaten  - Assist patient with eating if necessary   - Allow adequate time for meals  - Recommend/ encourage appropriate diets, oral nutritional supplements, and vitamin/mineral supplements  - Order, calculate, and assess calorie counts as needed  - Recommend, monitor, and adjust tube feedings and TPN/PPN based on assessed needs  - Assess need for intravenous fluids  - Provide specific nutrition/hydration education as appropriate  - Include patient/family/caregiver in decisions related to nutrition  Outcome: Progressing     Problem: Prexisting or High Potential for Compromised Skin Integrity  Goal: Skin integrity is maintained or improved  Description: INTERVENTIONS:  - Identify patients at risk for skin breakdown  - Assess and monitor skin integrity  - Assess and monitor nutrition and hydration status  - Monitor labs   - Assess for incontinence   - Turn and reposition patient  - Assist with mobility/ambulation  - Relieve pressure over bony prominences  - Avoid friction and shearing  -  Provide appropriate hygiene as needed including keeping skin clean and dry  - Evaluate need for skin moisturizer/barrier cream  - Collaborate with interdisciplinary team   - Patient/family teaching  - Consider wound care consult   Outcome: Progressing

## 2023-12-17 NOTE — ASSESSMENT & PLAN NOTE
SIRS: leukocytosis, tachypnea  Source: Pulmonary  CT CAP: Redemonstrated small bilateral effusions with probable subjacent compressive atelectasis in the lower lobes. Somewhat heterogeneous consolidation in the right middle lobe with associated air bronchograms which appears new compared to prior recent study dated 11/29/2023 suspicious for interval developing pneumonia.   UA with small leukocytes, negative nitrites  Blood cultures pending  Patient recently with pan-sensitive Pseudomonas in sputum cultures  Procal 0.61  Lactic 1.1    Plan:  Cefepime D5  MRSA negative, Antigens negative  BC negative x72hrs  Trend fever curve and WBC count

## 2023-12-18 NOTE — ASSESSMENT & PLAN NOTE
"Patient recently admitted and made Level 2 DNR. POLST with level 2 code status documented  Discussed with patient's son that given her hx of COPD, chronic hypoxic/hypercapnic respiratory failure, and overall deconditioned state, she would likely be difficult to extubate should she require intubation. Patient's son was adamant that she would never want long-term intubation and that the patient has made comments recently about not wanting to live forever with pain.  Ultimately patient's son opted for level 3 DNR/DNI status  Of note, there is documentation from rehab physician on 12/11 stating, \"I believe she understands she is close to the end. Comfort care is our goal. She has requested that she not be sent back to the hospital.\"    Plan  COMFORT CARE  CONSULT TO HOSPICE  "

## 2023-12-18 NOTE — ASSESSMENT & PLAN NOTE
Baseline creatinine 0.5-0.6  Creatinine on admission 1  Likely prerenal. Patient's diuretic dose had been signifiacntly increased outpatient due to concern for pulmonary edema    Plan:  COMFORT CARE  CONSULT TO HOSPICE

## 2023-12-18 NOTE — DISCHARGE SUMMARY
"Discharge Summary - Simin Worrell 83 y.o. female MRN: [id number]    Unit/Bed#: -01 SDU Encounter: [id number] PCP: Aaron Lemon MD    Admission Date:   Admission Orders (From admission, onward)       Ordered        12/12/23 2330  Inpatient Admission  Once                            Admitting Diagnosis: Pneumonia [J18.9]  Failure to thrive in adult [R62.7]    HPI: Per Sharri Casarez PA-C H&P on 12/13/23:  \"Simin Worrell is a 83 y.o. female with PMHx significant for chronic hypoxic and hypercapnic respiratory failure on 2L NC, COPD, Afib on Eliquis, antiphospholipid syndrome, diastolic heart failure, HTN, hypothyroidism, and chronic pain who presented to the ED from rehab facility for an \"unresponsive episode\". History obtained from chart review and patient's daughter at bedside. Per patient's daughter, the patient has been complaining of increased back pain and started receiving PRN morphine. She was also complaining of shortness of breath and her lasix dose had been increased due to concern for pulmonary edema on outpatient CXR. Since these medications were started the patient has been very lethargic. Today she was difficult to arouse prompting a stroke alert. CTA H/N was negative for acute findings. Mental status improved throughout her time in ED and she was able to follow commands in all extremities but remained somnolent. She was hypoxic requiring 6L nasal cannula and ultimately transitioned to BiPAP for increased work of breathing. Laboratory work-up showed JENNIE, leukocytosis, and elevated procal. CT CAP revealed severe RML PNA, L kidney infarct, and T6 compression fracture. She will be admitted SD1 under critical care service for further management.\"     Procedures Performed:   Orders Placed This Encounter   Procedures    Critical Care     Summary of Hospital Course:   Patient has had multiple admissions since October 2023 for acute on chronic respiratory failure secondary to CHF and COPD. On this admission " the patient was unable to be transitioned off BIPAP due to WOB and declining mental status. GOC conversation occurred between Dr. Calloway and the patient's family who ultimately decided to transition the patient to comfort care.     Significant Findings, Care, Treatment and Services Provided:    CXR: Moderate right and small left-sided pleural effusions. Underlying right basilar atelectasis or pneumonia. pulmonary vascular congestion       Complications:   BIPAP dependent 2/2 acute on chronic respiratory failure  Sepsis -->pneumonia  Worsening hypernatremia -> NPO and no GI access  Encephalopathy/AMS -->obtunded    Disposition:      Final Diagnosis: Acute on chronic respiratory failure    Medical Problems       Resolved Problems  Date Reviewed: 2023   None         Date, Time and Cause of Death    Date of Death: 23  Time of Death:  3:19 AM  Preliminary Cause of Death: Acute on chronic respiratory failure with hypoxia and hypercapnia (HCC)  Entered by: Afia TURNER[KF1.1]       Attribution       KF1.1 YUE Sinclair 23 03:26            Death Note:    INPATIENT DEATH NOTE  Simin Worrell 83 y.o. female MRN: [id number]  Unit/Bed#: ICU [facility] SDU Encounter: [id number]    Date, Time and Cause of Death    Date of Death: 23  Time of Death:  3:19 AM  Preliminary Cause of Death: Acute on chronic respiratory failure with hypoxia and hypercapnia (HCC)  Entered by: Afia TURNER[KF1.1]       Attribution       KF1.1 YUE Sinclair 23 03:26             Patient's Information  Pronounced by: Afia Mishra  Did the patient's death occur in the ED?: No  Did the patient's death occur in the OR?: No  Did the patient's death occur less than 10 days post-op?: No  Did the patient's death occur within 24 hours of admission?: No  Was code status DNR at the time of death?: Yes    PHYSICAL EXAM:  Unresponsive to noxious stimuli, Spontaneous respirations  absent, Breath sounds absent, Carotid pulse absent, Heart sounds absent, Pupillary light reflex absent, and Corneal blink reflex absent    Medical Examiner notification criteria:  NONE APPLICABLE   Medical Examiner's office notified?:  No, does not meet ME notification criteria   Medical Examiner accepted case?:  No  Name of Medical Examiner: [facility]    Family Notification  Was the family notified?: Yes  Date Notified: 12/18/23  Time Notified: 0322  Notified by: Afia TURNER  Name of Family Notified of Death: Solomon Worrell   Relationship to Patient: Son  Family Notification Route: Telephone    Autopsy Options:  Post-mortem examination declined by next of kin    Primary Service Attending Physician notified?:  yes - Attending:  Nito Calloway MD    Physician/Resident responsible for completing Discharge Summary:  Afia TURNER

## 2023-12-18 NOTE — DEATH NOTE
INPATIENT DEATH NOTE  Simin Worrell 83 y.o. female MRN: [id number]  Unit/Bed#: -01 SDU Encounter: [id number]    Date, Time and Cause of Death    Date of Death: 23  Time of Death:  3:19 AM  Preliminary Cause of Death: Acute on chronic respiratory failure with hypoxia and hypercapnia (HCC)  Entered by: Afia TURNER[KF1.1]       Attribution       KF1.1 YUE Sinclair 23 03:26             Patient's Information  Pronounced by: Afia Mishra  Did the patient's death occur in the ED?: No  Did the patient's death occur in the OR?: No  Did the patient's death occur less than 10 days post-op?: No  Did the patient's death occur within 24 hours of admission?: No  Was code status DNR at the time of death?: Yes    PHYSICAL EXAM:  Unresponsive to noxious stimuli, Spontaneous respirations absent, Breath sounds absent, Carotid pulse absent, Heart sounds absent, Pupillary light reflex absent, and Corneal blink reflex absent    Medical Examiner notification criteria:  NONE APPLICABLE   Medical Examiner's office notified?:  No, does not meet ME notification criteria   Medical Examiner accepted case?:  No  Name of Medical Examiner: [facility]    Family Notification  Was the family notified?: Yes  Date Notified: 23  Time Notified: 322  Notified by: Afia TURNER  Name of Family Notified of Death: Solomon Worrell   Relationship to Patient: Son  Family Notification Route: Telephone    Autopsy Options:  Post-mortem examination declined by next of kin    Primary Service Attending Physician notified?:  yes - Attending:  Nito Calloway MD    Physician/Resident responsible for completing Discharge Summary:  Afia TURNER

## 2023-12-18 NOTE — PROGRESS NOTES
ADT alert received the patient  23. I have removed myself from the care team, updated the Care Coordination note, and closed the episode.

## 2023-12-18 NOTE — ASSESSMENT & PLAN NOTE
Chronically on 2L NC  Home regimen: Spiriva inhaler, pulmicort nebs BID, duonebs TID daily   Continue Solumedrol 40 mg q8h  On Bipap intermittently now >48hrs given respiratory distress

## 2023-12-18 NOTE — ASSESSMENT & PLAN NOTE
Noted on CT CAP  Hx of antiphospholipid syndrome, recently with GIB and now on reduced-dose Eliquis  Transitioned to heparin in setting of NPO for Bipap    Plan  COMFORT CARE  CONSULT TO HOSPICE

## 2023-12-18 NOTE — ASSESSMENT & PLAN NOTE
Hx of chronic hypoxic and hypercapnic resp failure on 3L NC at baseline in the setting of COPD  Presented with increased WOB and hypoxia  VBG: pH 7.379, CO2 84  Initially on 4L NC then required BiPAP for increased WOB  CXR with RML infiltrate  CT CAP: Redemonstrated small bilateral effusions with probable subjacent compressive atelectasis in the lower lobes. Somewhat heterogeneous consolidation in the right middle lobe with associated air bronchograms which appears new compared to prior recent study dated 11/29/2023 suspicious for interval developing pneumonia.   **EARLY IN AM 12/14- with respiratory distress, pursed lip breathing, only able to say 1-2 words at a time**\  Has remained on precedex/bipap convo now >24hrs    Plan:  COMFORT CARE  CONSULT TO HOSPICE

## 2023-12-18 NOTE — ASSESSMENT & PLAN NOTE
"Presented as a stroke alert with \"unresponsive episode\" at facility  CTA H/N with no acute findings  Patient is currently lethargic and has difficulty speaking on bipap, but following commands in all extremities with equal strength throughout    Plan  COMFORT CARE  CONSULT TO HOSPICE  "

## 2023-12-18 NOTE — ASSESSMENT & PLAN NOTE
Wt Readings from Last 3 Encounters:   12/17/23 46.5 kg (102 lb 8.2 oz)   12/02/23 55.5 kg (122 lb 5.7 oz)   10/20/23 52.2 kg (115 lb 2 oz)     11/2023 Echo: EF 60%, G1DD, moderate TR  Diuretic dose had been increased outpatient due to concern for pulmonary edema  Hold additional diuretics as she currently examines hypovolemic to euvolemic    Plan  COMFORT CARE  CONSULT TO HOSPICE

## 2023-12-18 NOTE — ASSESSMENT & PLAN NOTE
SIRS: leukocytosis, tachypnea  Source: Pulmonary  CT CAP: Redemonstrated small bilateral effusions with probable subjacent compressive atelectasis in the lower lobes. Somewhat heterogeneous consolidation in the right middle lobe with associated air bronchograms which appears new compared to prior recent study dated 11/29/2023 suspicious for interval developing pneumonia.   UA with small leukocytes, negative nitrites  Blood cultures pending  Patient recently with pan-sensitive Pseudomonas in sputum cultures  Procal 0.61  Lactic 1.1    Plan:  COMFORT CARE  CONSULT TO HOSPICE

## 2024-08-09 NOTE — ASSESSMENT & PLAN NOTE
Patient Seen in: [facility] Emergency Department      History     Chief Complaint   Patient presents with    Weakness     Stated Complaint: Pt c/o of chills, decreased appeitite, weakness. Pt is diabetic and hasn't eate*    Subjective:   HPI    This is a 76-year-old female chills, shaking bilateral leg pain family reports a temperature of 101 but actually they actually said 10 3 repeat examination and history.  She states she did she was fine this morning and then later on started became more short of breath, coughing feeling weak, tired she has had pneumonia and it feels similar to her symptoms she is also been having some pain with urination.  She denies presently any chest pain she does have some mild shortness of breath she is coughing some yellow-green sputum she also complained to her both of her legs ache.  She denies any diarrhea does have complaints of dysuria.  Denies any other complaints she does have a history of DVTs, she has a history of COVID-19 pulmonary embolism she has had a history of anemia, anxiety, arthritis.  The patient is on Xarelto.  She states this feels similar to her previous episodes when she had pneumonia.    Objective:   Past Medical History:    Abdominal pain    Anemia    Anxiety    Arrhythmia    Arthritis    Asthma (HCC)    Back pain    Back problem    Black stools    Bloating    Blurred vision    Breast CA (HCC)    left lump and rad.    Cancer (HCC)    breast, basal cell    Cervical radiculopathy    Cervical spinal stenosis    Deep vein thrombosis (HCC)    Depression    Diabetes (HCC)    Diabetes mellitus (HCC)    Diarrhea, unspecified    Dizziness    Easy bruising    On Xeralto    Esophageal reflux    Exposure to medical diagnostic radiation    Fatigue    Fitting and adjustment of vascular catheter    Headache disorder    Hearing loss    Heart attack (HCC)    Heartburn    Hemorrhoids    High blood pressure    High cholesterol    History of blood clots    History of COVID-19  Chronically on 2L, currently requiring 4-5  Suspect CHF > COPD exacerbation  Home regimen: Spiriva inhaler + pumicort nebs BID + duonebs TID daily   Continue ATC nebs currently    not hospitalized, no continued symptoms, cough, fever, loss of taste    History of depression    History of syncope    IgG deficiency (HCC)    ??? pt states she gets IgG infusions due to lack of IgG, unable to fight off infections    Indigestion    Leaking of urine    Migraines    Muscle weakness    uses cane    Neuropathy    rt foot bilateral hands    Osteoarthritis    Pain in joints    Pneumonia due to organism    Pulmonary embolism (HCC)    Shortness of breath    Sleep apnea    cpap - not using - machine is broken    Stress    Uncomfortable fullness after meals    Visual impairment    glasses/contacts    Weight gain              Past Surgical History:   Procedure Laterality Date    Abdomen surgery proc unlisted      duodenal biopsy    Appendectomy      Appendectomy      Breast surgery      Cataract      Cholecystectomy      Colonoscopy  10/1/10, 4/21/17    Colonoscopy N/A 02/27/2023    Procedure: COLONOSCOPY;  Surgeon: Migel Bass MD;  Location:  ENDOSCOPY    [facility]  04/21/2017    Eye surgery      Femur/knee surg unlisted      right knee arthroscopy    Hemorrhoidectomy,int/ext,simple      Hernia surgery      Hysterectomy  1984    total hystero.    Lumpectomy left Left 1999    lt lumpectomy and radiation.    Oophorectomy Bilateral 1984    total hystero.    Other  11/2021    left wrist, ORIF Dr. Stacy Long    Other  02/21/2022    CERVICAL 5 AND CERVICAL 6 LAMINECTOMIES, PARTIAL CERVICAL 7 LAMINECTOMY, LEFT CERVICAL 5/CERVICAL 6 AND CERVICAL 6/CERVICAL 7 FORAMINOTOMIES, CERVICAL 6 AND CERVICAL 7 ARTHRODESIS, AUTOGRAFT, ALLOGRAFT    Other surgical history  2010    endoscopy - papillotomy    Other surgical history  2016     under right eye, skin cancer removed    Other surgical history  02/2022    neck    Port for vascular access      Radiation left Left 1999    left lumpectomy and radiation.    Repair ing hernia,5+y/o,reducibl      Sigmoidoscopy,diagnostic      Spine surgery procedure unlisted       Tonsillectomy      Total abdom hysterectomy      Total knee replacement                  Social History     Socioeconomic History    Marital status:    Tobacco Use    Smoking status: Former     Current packs/day: 0.00     Types: Cigarettes     Quit date: 1968     Years since quittin.6    Smokeless tobacco: Never   Vaping Use    Vaping status: Never Used   Substance and Sexual Activity    Alcohol use: No     Comment: rare    Drug use: Not Currently     Types: Cannabis     Comment: uses cream for pain to back/neck with barometer changes.    Sexual activity: Not Currently     Partners: Male   Other Topics Concern    Caffeine Concern Yes     Comment: half of cup of coke daily    Sleep Concern No    Exercise Yes     Comment: walking    Seat Belt Yes     Social Determinants of Health     Financial Resource Strain: Low Risk  (10/25/2023)    Financial Resource Strain     Difficulty of Paying Living Expenses: Not hard at all     Med Affordability: No   Food Insecurity: No Food Insecurity (2024)    Food Insecurity     Food Insecurity: Never true   Transportation Needs: No Transportation Needs (2024)    Transportation Needs     Lack of Transportation: No   Physical Activity: Inactive (2019)    Received from [facility]    Exercise Vital Sign     Days of Exercise per Week: 0 days     Minutes of Exercise per Session: 0 min   Stress: No Stress Concern Present (10/25/2023)    Stress     Feeling of Stress : No   Social Connections: Socially Integrated (10/25/2023)    Social Connections     Frequency of Socialization with Friends and Family: 2   Housing Stability: Low Risk  (2024)    Housing Stability     Housing Instability: No              Review of Systems    Positive for stated Chief Complaint: Weakness    Other systems are as noted in HPI.  Constitutional and vital signs reviewed.      All other systems reviewed and negative except as noted above.    Physical  Exam     ED Triage Vitals [08/08/24 9944]   BP (!) 78/51   Pulse 95   Resp 20   Temp 100.2 °F (37.9 °C)   Temp src Oral   SpO2 (!) 85 %   O2 Device None (Room air)       Current Vitals:   Vital Signs  BP: 104/78  Pulse: 83  Resp: 20  Temp: 99 °F (37.2 °C)  Temp src: Oral  MAP (mmHg): 88    Oxygen Therapy  SpO2: 92 %  O2 Device: None (Room air)  O2 Flow Rate (L/min): 2 L/min            Physical Exam    General: .  The patient is pale looking.  In some mild to moderate respiratory distress.  She was noted to be hypoxic when she first arrived here.      HEENT: Atraumatic, conjunctiva are not pale.  There is no icterus.  Oral mucosa Is wet.  No facial trauma.  The neck is supple.    LUNGS: Both bases noted to have crackles bilaterally.  CV: Cardiovascular is regular without murmurs or rubs.    ABD: The abdomen is soft nondistended nontender.  There is no rebound.  There is no guarding.    EXT: There is good pulses bilaterally.  There is no calf tenderness.  There is no rash noted.  There is no edema    NEURO: Alert and oriented x4.  Muscle strength and sensory exam is grossly normal.  And the patient is neurologically intact with no focal findings.  She has no calf tenderness she is moving all extremities.    ED Course     Labs Reviewed   CBC WITH DIFFERENTIAL WITH PLATELET - Abnormal; Notable for the following components:       Result Value    WBC 15.0 (*)     RBC 3.52 (*)     HGB 10.8 (*)     HCT 31.3 (*)     Neutrophil Absolute Prelim 12.36 (*)     Neutrophil Absolute 12.36 (*)     Monocyte Absolute 1.09 (*)     All other components within normal limits   COMP METABOLIC PANEL (14) - Abnormal; Notable for the following components:    Glucose 170 (*)     Sodium 131 (*)     Creatinine 1.06 (*)     eGFR-Cr 54 (*)     All other components within normal limits   LACTIC ACID, PLASMA - Abnormal; Notable for the following components:    Lactic Acid 2.1 (*)     All other components within normal limits   ABG PANEL W ELECT AND  LACTATE - Abnormal; Notable for the following components:    ABG pCO2 33 (*)     ABG Base Excess -2.6 (*)     Total Hemoglobin 9.7 (*)     Sodium Blood Gas 133 (*)     All other components within normal limits   URINALYSIS WITH CULTURE REFLEX - Abnormal; Notable for the following components:    Urine Color Colorless (*)     Spec Gravity <1.005 (*)     Leukocyte Esterase Urine 75 (*)     Squamous Epi. Cells Few (*)     All other components within normal limits   POCT GLUCOSE - Abnormal; Notable for the following components:    POC Glucose 195 (*)     All other components within normal limits   TROPONIN I HIGH SENSITIVITY - Normal   LACTIC ACID REFLEX POST POSTIVE - Normal   SARS-COV-2 BY PCR (GENEXPERT) - Normal   HEMOGLOBIN A1C   RAINBOW DRAW LAVENDER   RAINBOW DRAW LIGHT GREEN   RAINBOW DRAW BLUE   BLOOD CULTURE   BLOOD CULTURE   URINE CULTURE, ROUTINE   SPUTUM CULTURE                    The patient was placed on monitors, IV was started, blood was drawn.     To rule out electrolyte imbalance she denies any blood in her stools or black stools she does have a fever at home.  She has been coughing possibly to include pneumonia, UTI, sepsis that she was hypoxic, hypotensive here.    MDM         The EKG shows normal sinus rhythm.  There is no acute ST elevations or ischemic findings.  There is poor R wave progression, nonspecific ST changes are noted.  The rest of the EKG including rate rhythm axis and intervals I agree with the EKG report . The rate is 88    Admission disposition: 8/8/2024 11:18 PM               I personally reviewed the radiographs and my individual interpretation shows    The patient has a port in the right side.  CT of the chest, abdomen shows no pulmonary embolism or pathology noted in the abdomen.  Findings of what seems it is pneumonia.    Also reviewed official report and it shows      CTA CHEST + CT ABD (W) + CT PEL (W) (CPT=71275/92403)    Result Date: 8/8/2024  PROCEDURE:  CTA CHEST + CT ABD  (W) + CT PEL (W) SH(CPT=71275/71120)  COMPARISON:  [facility] , CT, CT CHEST (CPT=71250), 3/28/2024, 4:04 PM.  [facility] , CT, CT CHEST (CPT=71250), 2/28/2024, 2:44 PM.  INDICATIONS:  Pt c/o of chills, decreased appeitite, weakness. Pt is diabetic and hasn't eaten since yesterday  TECHNIQUE:  CT of the chest (with CTA imaging), abdomen, and pelvis were obtained post- injection of non-ionic intravenous contrast material. Multi-planar reformatted/3-D images were created to optimize visualization of vascular anatomy.  Dose reduction techniques were used. Dose information is transmitted to the ACR (American College of Radiology) NRDR (National Radiology Data Registry) which includes the Dose Index Registry.  PATIENT STATED HISTORY:(As transcribed by Technologist)  Patient reports chills, decreased appetite, weakness, bilateral leg pain.   CONTRAST USED:  100cc of Isovue 370  FINDINGS:  CHEST:  LUNGS:  Scattered areas of atelectasis.  No pneumothorax.  Scattered opacities within left lower lobe. PLEURA:  No pleural effusions. MEDIASTINUM/YAYA:  No mediastinal or hilar adenopathy. CARDIAC:  No pericardial effusions.  Coronary artery calcifications.  CHEST WALL:  Right-sided chest port.  ABDOMEN/PELVIS: LIVER:  Stable hypodense lesion with calcifications within the left lobe of the liver.  This measures 4.5 x 2.2 cm and is not significantly changed since prior examinations. BILIARY:  Cholecystectomy clips.  No intrahepatic biliary dilatation.. SPLEEN:  Normal.  No enlargement or focal lesion. PANCREAS:  No martin-pancreatic inflammatory stranding ADRENALS:  Normal.  No mass or enlargement.  KIDNEYS:  Kidneys are symmetrical in size without evidence of hydronephrosis. BOWEL/MESENTERY:  Bowel is normal in caliber. No evidence of obstruction.  Appendectomy.  Colonic diverticulosis without evidence of diverticulitis. PELVIS:  Bladder is unremarkable in appearance.  No free pelvic fluid.  Calcified phleboliths in the pelvis.   Hysterectomy.  The  BONES:  Degenerative changes of thoracic and lumbar spine.  VASCULATURE:  Normal 3 vessel arch.  Atheromatous calcifications of the aorta.  No acute pulmonary embolus.             CONCLUSION:  1. No acute pulmonary embolus.  2. Scattered opacities within the left lower lobe which may represent atelectasis but developing pneumonia cannot be excluded. 3. Colonic diverticulosis with evidence of diverticulitis.  4. Stable lobulated hypodense lesion with small calcification within the left lobe of the liver.    LOCATION:  [facility]   Dictated by (CST): Cecilio Dalal MD on 8/08/2024 at 10:59 PM     Finalized by (CST): Cecilio Dalal MD on 8/08/2024 at 11:07 PM       XR CHEST AP PORTABLE  (CPT=71045)    Result Date: 8/8/2024  PROCEDURE:  XR CHEST AP PORTABLE  (CPT=71045)  TECHNIQUE:  AP chest radiograph was obtained.  COMPARISON:  [facility] , XR, XR CHEST AP PORTABLE  (CPT=71045), 3/28/2024, 3:13 PM.  [facility] , XR, XR CHEST AP PORTABLE  (CPT=71045), 2/27/2024, 9:17 AM.  INDICATIONS:  Pt c/o of chills, decreased appeitite, weakness. Pt is diabetic and hasn't eaten since yesterday  PATIENT STATED HISTORY: (As transcribed by Technologist)  Patient offered no additional history at this time.    FINDINGS:  Stable right-sided power port.  Cardiac size is stable.  No pleural effusions.  No pneumothorax.  Surgical clips within left axilla.  Mild interstitial opacities.  No pneumothorax.  Degenerative changes of thoracic spine.            CONCLUSION:  Mild interstitial opacities which may represent chronic interstitial changes versus mild edema.   LOCATION:  [facility]      Dictated by (CST): Cecilio Dalal MD on 8/08/2024 at 7:50 PM     Finalized by (CST): Cecilio Dalal MD on 8/08/2024 at 7:51 PM            There is no findings of diverticulitis the official report of the body of the CT shows no findings of diverticulitis.  The patient's white count is 15.  The patient's troponin was negative COVID is not  detected lactic acid was elevated.  The patient was given IV fluids 30 cc/kg, IV antibiotics, Zithromax, ceftriaxone.  The patient's case was discussed with the hospitalist, Dr. Cardona.  The patient will be admitted for further evaluation and treatment.  I did go back and reexamined her she was feeling significantly better on repeat exam.  She looks much better she was not hypoxic not tachycardic or hypoxic.  And not hypotensive.  She will be admitted for further evaluation I did not feel that she needs an ICU bed as her blood pressure has been stabilized at this point in time.  With IV fluids.         [facility]   part of [facility]      Sepsis Reassessment Note    /78 (BP Location: Left arm)   Pulse 83   Temp 99 °F (37.2 °C) (Oral)   Resp 20   Ht 154.9 cm (5' 1\")   Wt 72.1 kg   LMP  (LMP Unknown)   SpO2 92%   BMI 30.03 kg/m²      I completed the sepsis reassessment at all midnight    Cardiac:  Regularity: Regular  Rate: Normal  Heart Sounds: S1,S2    Lungs:   Right: Clear  Left: Rales    Peripheral Pulses:  Radial: Right 1+ or Left 1+      Capillary Refill:  <3 Secs    Skin:  Temp/Moisture: Warm and Dry  Color: Normal       James TATIANA Barr MD  8/9/2024  2:21 AM        A total of 35 minutes of critical care time (exclusive of billable procedures) was administered to manage the patient's critical lab values due to her sepsis, hypotension, hypoxia.  This involved direct patient intervention, complex decision making, and/or extensive discussions with the patient, family, and clinical staff.      Medical Decision Making      Disposition and Plan     Clinical Impression:  1. Community acquired pneumonia, unspecified laterality    2. Hypoxia    3. Hypotension, unspecified hypotension type    4. Sepsis, due to unspecified organism, unspecified whether acute organ dysfunction present (HCC)         Disposition:  Admit  8/8/2024 11:18 pm    Follow-up:  No follow-up provider  specified.        Medications Prescribed:  Current Discharge Medication List                            Hospital Problems       Present on Admission  Date Reviewed: 8/1/2024            ICD-10-CM Noted POA    * (Principal) Community acquired pneumonia, unspecified laterality J18.9 8/8/2024 Unknown    Hyperglycemia R73.9 8/8/2024 Yes    Hyponatremia E87.1 8/8/2024 Yes    Hypoxia R09.02 8/9/2024 Unknown    Leukocytosis D72.829 8/8/2024 Yes    Metabolic alkalosis E87.3 8/8/2024 Yes    Respiratory alkalosis E87.3 8/8/2024 Yes

## (undated) DEVICE — INTENDED FOR TISSUE SEPARATION, AND OTHER PROCEDURES THAT REQUIRE A SHARP SURGICAL BLADE TO PUNCTURE OR CUT.: Brand: BARD-PARKER SAFETY BLADES SIZE 10, STERILE

## (undated) DEVICE — SYRINGE 20ML LL

## (undated) DEVICE — IMPERVIOUS STOCKINETTE: Brand: DEROYAL

## (undated) DEVICE — SUT STRATAFIX SPIRAL 3-0 PGA/PCL 30 X 30 CM SXMD2B408

## (undated) DEVICE — TUBING SUCTION 5MM X 12 FT

## (undated) DEVICE — HOOD: Brand: FLYTE, SURGICOOL

## (undated) DEVICE — SUT VICRYL 0 CT-1 27 IN J260H

## (undated) DEVICE — HEAVY DUTY TABLE COVER: Brand: CONVERTORS

## (undated) DEVICE — GLOVE INDICATOR PI UNDERGLOVE SZ 8 BLUE

## (undated) DEVICE — NEPTUNE E-SEP SMOKE EVACUATION PENCIL, COATED, 70MM BLADE, PUSH BUTTON SWITCH: Brand: NEPTUNE E-SEP

## (undated) DEVICE — HANDPIECE SET WITH RETRACTABLE COAXIAL FAN SPRAY TIP AND SUCTION TUBE: Brand: INTERPULSE

## (undated) DEVICE — 450 ML BOTTLE OF 0.05% CHLORHEXIDINE GLUCONATE IN 99.95% STERILE WATER FOR IRRIGATION, USP AND APPLICATOR.: Brand: IRRISEPT ANTIMICROBIAL WOUND LAVAGE

## (undated) DEVICE — GLOVE INDICATOR PI UNDERGLOVE SZ 7 BLUE

## (undated) DEVICE — PACK MAJOR ORTHO W/SPLITS PBDS

## (undated) DEVICE — SKIN MARKER DUAL TIP WITH RULER CAP, FLEXIBLE RULER AND LABELS: Brand: DEVON

## (undated) DEVICE — 3M™ STERI-STRIP™ COMPOUND BENZOIN TINCTURE 40 BAGS/CARTON 4 CARTONS/CASE C1544: Brand: 3M™ STERI-STRIP™

## (undated) DEVICE — 5065 IMPAD REGULAR PAIR: Brand: A-V IMPULSE

## (undated) DEVICE — 3M™ IOBAN™ 2 ANTIMICROBIAL INCISE DRAPE 6650EZ: Brand: IOBAN™ 2

## (undated) DEVICE — CHLORAPREP HI-LITE 26ML ORANGE

## (undated) DEVICE — SUT VICRYL 1 CP-1 27 IN J268H

## (undated) DEVICE — OCCLUSIVE GAUZE STRIP,3% BISMUTH TRIBROMOPHENATE IN PETROLATUM BLEND: Brand: XEROFORM

## (undated) DEVICE — ABDUCTION PILLOW FOAM POSITIONER: Brand: CARDINAL HEALTH

## (undated) DEVICE — CAPIT HIP STEM CEMENTED

## (undated) DEVICE — 3M™ STERI-STRIP™ REINFORCED ADHESIVE SKIN CLOSURES, R1547, 1/2 IN X 4 IN (12 MM X 100 MM), 6 STRIPS/ENVELOPE: Brand: 3M™ STERI-STRIP™

## (undated) DEVICE — GLOVE SRG BIOGEL ORTHOPEDIC 7.5

## (undated) DEVICE — GLOVE SRG BIOGEL 7.5

## (undated) DEVICE — DRESSING MEPILEX AG BORDER 4 X 8 IN

## (undated) DEVICE — SUT VICRYL 2-0 CT-1 27 IN J259H

## (undated) DEVICE — SAW BLADE RECIPROCATING SINGLE SIDED 258 ORTHO

## (undated) DEVICE — CAPIT HIP BIPOLAR HEAD POR PRIMARY

## (undated) DEVICE — DRAPE SHEET THREE QUARTER

## (undated) DEVICE — NEEDLE HYPO 22G X 1-1/2 IN

## (undated) DEVICE — GLOVE SRG BIOGEL 7

## (undated) DEVICE — 3M™ STERI-DRAPE™ U-DRAPE 1015: Brand: STERI-DRAPE™

## (undated) DEVICE — ANTIBACTERIAL VIOLET BRAIDED (POLYGLACTIN 910), SYNTHETIC ABSORBABLE SURGICAL SUTURE: Brand: COATED VICRYL